# Patient Record
Sex: FEMALE | Race: WHITE | NOT HISPANIC OR LATINO | Employment: OTHER | URBAN - METROPOLITAN AREA
[De-identification: names, ages, dates, MRNs, and addresses within clinical notes are randomized per-mention and may not be internally consistent; named-entity substitution may affect disease eponyms.]

---

## 2017-01-04 RX ORDER — AMOXICILLIN 500 MG/1
CAPSULE ORAL
Qty: 30 CAPSULE | Refills: 0 | Status: SHIPPED | OUTPATIENT
Start: 2017-01-04 | End: 2017-05-01

## 2017-03-16 DIAGNOSIS — C50.912 MALIGNANT NEOPLASM OF LEFT BREAST: ICD-10-CM

## 2017-03-17 RX ORDER — LETROZOLE 2.5 MG/1
TABLET, FILM COATED ORAL
Qty: 90 TABLET | Refills: 0 | Status: SHIPPED | OUTPATIENT
Start: 2017-03-17 | End: 2017-05-01 | Stop reason: SDUPTHER

## 2017-03-28 ENCOUNTER — HOSPITAL ENCOUNTER (OUTPATIENT)
Dept: CARDIOLOGY | Facility: CLINIC | Age: 72
Discharge: HOME | End: 2017-03-28
Payer: MEDICARE

## 2017-03-28 ENCOUNTER — DOCUMENTATION ONLY (OUTPATIENT)
Dept: CARDIOLOGY | Facility: CLINIC | Age: 72
End: 2017-03-28

## 2017-03-28 ENCOUNTER — TELEPHONE (OUTPATIENT)
Dept: CARDIOLOGY | Facility: CLINIC | Age: 72
End: 2017-03-28

## 2017-03-28 DIAGNOSIS — Z95.2 S/P AVR: ICD-10-CM

## 2017-03-28 DIAGNOSIS — I10 ESSENTIAL HYPERTENSION: ICD-10-CM

## 2017-03-28 LAB
DIASTOLIC DYSFUNCTION: YES
ESTIMATED PA SYSTOLIC PRESSURE: 33
MITRAL VALVE REGURGITATION: ABNORMAL
RETIRED EF AND QEF - SEE NOTES: 65 (ref 55–65)
TRICUSPID VALVE REGURGITATION: ABNORMAL

## 2017-03-28 PROCEDURE — 93306 TTE W/DOPPLER COMPLETE: CPT | Mod: S$GLB,,, | Performed by: INTERNAL MEDICINE

## 2017-03-28 PROCEDURE — 96374 THER/PROPH/DIAG INJ IV PUSH: CPT | Mod: 59,S$GLB,, | Performed by: INTERNAL MEDICINE

## 2017-03-28 NOTE — TELEPHONE ENCOUNTER
Results given to patient.    Notes Recorded by Álvaro Palm MD on 3/28/2017 at 5:33 PM  Release-echo stable

## 2017-03-28 NOTE — PROGRESS NOTES
Patient identified by 2 identifiers. Denies previous reactions to blood transfusions and allergies reviewed.  Procedure explained & consent obtained.  22 g IV placed to Rt FA, flushed w/ 10cc NS pre & post contrast administration.  3cc Optison administered, echo images obtained.  Pt tolerated procedure well.  IV D/C'ed, preasure dsg applied.  Pt D/C'ed to home.

## 2017-03-29 ENCOUNTER — TELEPHONE (OUTPATIENT)
Dept: OPTOMETRY | Facility: CLINIC | Age: 72
End: 2017-03-29

## 2017-03-30 ENCOUNTER — TELEPHONE (OUTPATIENT)
Dept: HEMATOLOGY/ONCOLOGY | Facility: CLINIC | Age: 72
End: 2017-03-30

## 2017-04-03 DIAGNOSIS — M47.27 OSTEOARTHRITIS OF SPINE WITH RADICULOPATHY, LUMBOSACRAL REGION: ICD-10-CM

## 2017-04-03 DIAGNOSIS — M51.36 DEGENERATIVE LUMBAR DISC: Primary | ICD-10-CM

## 2017-04-03 NOTE — TELEPHONE ENCOUNTER
Patient sent in a refill request for her medication Gabapentin. Medication was called in to [facility]. Spoke to Dieudonen

## 2017-04-04 RX ORDER — GABAPENTIN 300 MG/1
300 CAPSULE ORAL 2 TIMES DAILY
Qty: 180 CAPSULE | Refills: 3 | Status: SHIPPED | OUTPATIENT
Start: 2017-04-04 | End: 2018-04-20 | Stop reason: SDUPTHER

## 2017-04-12 DIAGNOSIS — E11.9 DIABETES MELLITUS WITHOUT COMPLICATION: ICD-10-CM

## 2017-05-01 ENCOUNTER — TELEPHONE (OUTPATIENT)
Dept: SURGERY | Facility: CLINIC | Age: 72
End: 2017-05-01

## 2017-05-01 ENCOUNTER — OFFICE VISIT (OUTPATIENT)
Dept: HEMATOLOGY/ONCOLOGY | Facility: CLINIC | Age: 72
End: 2017-05-01
Payer: MEDICARE

## 2017-05-01 VITALS
RESPIRATION RATE: 16 BRPM | SYSTOLIC BLOOD PRESSURE: 132 MMHG | WEIGHT: 255.75 LBS | DIASTOLIC BLOOD PRESSURE: 60 MMHG | HEART RATE: 80 BPM | BODY MASS INDEX: 46.77 KG/M2 | TEMPERATURE: 99 F

## 2017-05-01 DIAGNOSIS — C50.912 MALIGNANT NEOPLASM OF LEFT FEMALE BREAST, UNSPECIFIED SITE OF BREAST: ICD-10-CM

## 2017-05-01 DIAGNOSIS — C50.912 CANCER OF LEFT BREAST, STAGE 1, ESTROGEN RECEPTOR POSITIVE: Primary | ICD-10-CM

## 2017-05-01 DIAGNOSIS — Z17.0 CANCER OF LEFT BREAST, STAGE 1, ESTROGEN RECEPTOR POSITIVE: Primary | ICD-10-CM

## 2017-05-01 PROCEDURE — 99213 OFFICE O/P EST LOW 20 MIN: CPT | Mod: S$GLB,,, | Performed by: INTERNAL MEDICINE

## 2017-05-01 PROCEDURE — 1157F ADVNC CARE PLAN IN RCRD: CPT | Mod: S$GLB,,, | Performed by: INTERNAL MEDICINE

## 2017-05-01 PROCEDURE — 3075F SYST BP GE 130 - 139MM HG: CPT | Mod: S$GLB,,, | Performed by: INTERNAL MEDICINE

## 2017-05-01 PROCEDURE — 1125F AMNT PAIN NOTED PAIN PRSNT: CPT | Mod: S$GLB,,, | Performed by: INTERNAL MEDICINE

## 2017-05-01 PROCEDURE — 99999 PR PBB SHADOW E&M-EST. PATIENT-LVL III: CPT | Mod: PBBFAC,,, | Performed by: INTERNAL MEDICINE

## 2017-05-01 PROCEDURE — 3078F DIAST BP <80 MM HG: CPT | Mod: S$GLB,,, | Performed by: INTERNAL MEDICINE

## 2017-05-01 PROCEDURE — 1159F MED LIST DOCD IN RCRD: CPT | Mod: S$GLB,,, | Performed by: INTERNAL MEDICINE

## 2017-05-01 PROCEDURE — 1160F RVW MEDS BY RX/DR IN RCRD: CPT | Mod: S$GLB,,, | Performed by: INTERNAL MEDICINE

## 2017-05-01 RX ORDER — LETROZOLE 2.5 MG/1
2.5 TABLET, FILM COATED ORAL DAILY
Qty: 90 TABLET | Refills: 3 | Status: SHIPPED | OUTPATIENT
Start: 2017-05-01 | End: 2017-11-22

## 2017-05-01 NOTE — PROGRESS NOTES
Subjective:       Patient ID: Alina Narayan is a 72 y.o. female.    Chief Complaint: Breast Cancer    HPI Ms Narayan is a 73 Y/O white female seen for followup of carcinoma of the left breast Stage IA ,T1bN0. She began letrozole hormonal therapy in November 2012.   Her major issue continues to be her bilateral knee pain.  She is using a walker.  She's also had significant stress with a brother and a son with prostate cancer or possible prostate cancer.      Mammogram in November was unremarkable.  History: On September 25 she underwent a screening mammogram which showed an asymmetric density in the left breast at 2:00 position. A followup mammogram on the 27th showed an oval mass in that area ultrasound showed a 6 mm solid mass. Core needle biopsy on October 1 showed invasive carcinoma ER 90% positive ID 80% positive and HER-2 negative. On October 29 she underwent lumpectomy and sentinel lymph node biopsy. That showed a 7 mm low-grade (1+2+1) infiltrating carcinoma. 3 sentinel lymph nodes were negative. 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  She has had mitral valve replacement.  She is followed in cardiology for that  Review of Systems   Constitutional: Negative for activity change (limited by knees), appetite change and fever.   Respiratory: Negative for cough and shortness of breath.    Cardiovascular: Negative for chest pain.   Gastrointestinal: Negative for abdominal pain, constipation and diarrhea.   Musculoskeletal: Positive for arthralgias.       Objective:      Physical Exam   Constitutional: She appears well-developed.   Obese   HENT:   Mouth/Throat: No oropharyngeal exudate.   Eyes: No scleral icterus.   Pulmonary/Chest: Effort normal and breath sounds normal. She has no wheezes. She has no rales. Right breast exhibits no mass, no nipple discharge and no skin change. Left breast exhibits no mass, no nipple discharge  and no skin change.       Abdominal: Soft. There is no tenderness.   Lymphadenopathy:     She has no cervical adenopathy.     She has no axillary adenopathy.        Right: No supraclavicular adenopathy present.        Left: No supraclavicular adenopathy present.   Psychiatric: She has a normal mood and affect. Her behavior is normal. Thought content normal.       Assessment:       1. Cancer of left breast, stage 1, estrogen receptor positive        Plan:         RTC 6 Months.    Refer for possible genetic testing (mother with breast cancer, father and brother with prostate CA)

## 2017-05-02 ENCOUNTER — TELEPHONE (OUTPATIENT)
Dept: ORTHOPEDICS | Facility: CLINIC | Age: 72
End: 2017-05-02

## 2017-05-02 NOTE — TELEPHONE ENCOUNTER
Ortho Telephone Triage Message  [id number]  Patient C/O: increased swelling/pain to left knee. Pt requesting appt with Dr. [facility], for possible aspiration, as Dr. [facility] has aspirated knee in past.   Triage Advice:Advised pt that KAREY Crawford LPN/Dr. [facility] will be notified and check with Dr. [facility].  Resolution: KAREY Crawford notified and will follow up with pt. Pt advised of same and will await follow up call per KAREY Crawford LPN.

## 2017-05-02 NOTE — TELEPHONE ENCOUNTER
Ortho Telephone Triage Follow Up [id number]  Follow up to pt to notify that Dr[facility] said that she may be seen by PA/NP for left knee pain/swelling and that same may aspirate, if indicated. Pt states understanding. Appt scheduled on 5/5/17 with GAIL Martinez at 1:45pm with arrival at 1:30pm. Pt advised to call [facility] for further questions/concerns. States understanding. Appt slip mailed.

## 2017-05-02 NOTE — TELEPHONE ENCOUNTER
----- Message from Oskar Arreola sent at 5/1/2017  1:02 PM CDT -----  Contact: self/home  Pt is having lt knee pain and would like to be seen today or any day this week.

## 2017-05-04 ENCOUNTER — OFFICE VISIT (OUTPATIENT)
Dept: SURGERY | Facility: CLINIC | Age: 72
End: 2017-05-04
Payer: MEDICARE

## 2017-05-04 DIAGNOSIS — Z80.3 FAMILY HISTORY OF BREAST CANCER: ICD-10-CM

## 2017-05-04 DIAGNOSIS — Z85.3 PERSONAL HISTORY OF BREAST CANCER: ICD-10-CM

## 2017-05-04 DIAGNOSIS — Z80.42 FAMILY HISTORY OF PROSTATE CANCER: ICD-10-CM

## 2017-05-04 PROCEDURE — 99213 OFFICE O/P EST LOW 20 MIN: CPT | Mod: S$GLB,,, | Performed by: NURSE PRACTITIONER

## 2017-05-04 PROCEDURE — 1159F MED LIST DOCD IN RCRD: CPT | Mod: S$GLB,,, | Performed by: NURSE PRACTITIONER

## 2017-05-04 PROCEDURE — 1157F ADVNC CARE PLAN IN RCRD: CPT | Mod: S$GLB,,, | Performed by: NURSE PRACTITIONER

## 2017-05-04 NOTE — PROGRESS NOTES
Patient presents today for genetic counseling, see pedigree for full family history which will be scanned into Epic. Personal history of breast cancer diagnosed at 67. This most likely represents a family clustering , however based on family history of prostate cancer and breast cancer, option for genetic testing discussed per current NCCN guidelines. Discussed cancer susceptibility genes, types of results, implications for self and family, and cost of  testing/insurance.  Buccal sample collected and sent to [facility] for [facility] , results expected in 2-3 weeks    Time in counseling 40 min, total time 40 min

## 2017-05-05 ENCOUNTER — OFFICE VISIT (OUTPATIENT)
Dept: ORTHOPEDICS | Facility: CLINIC | Age: 72
End: 2017-05-05
Payer: MEDICARE

## 2017-05-05 VITALS
HEIGHT: 62 IN | WEIGHT: 256.38 LBS | HEART RATE: 74 BPM | BODY MASS INDEX: 47.18 KG/M2 | RESPIRATION RATE: 16 BRPM | DIASTOLIC BLOOD PRESSURE: 75 MMHG | SYSTOLIC BLOOD PRESSURE: 132 MMHG

## 2017-05-05 DIAGNOSIS — M17.0 OSTEOARTHRITIS OF BOTH KNEES, UNSPECIFIED OSTEOARTHRITIS TYPE: Primary | ICD-10-CM

## 2017-05-05 PROCEDURE — 99999 PR PBB SHADOW E&M-EST. PATIENT-LVL IV: CPT | Mod: PBBFAC,,, | Performed by: PHYSICIAN ASSISTANT

## 2017-05-05 PROCEDURE — 99499 UNLISTED E&M SERVICE: CPT | Mod: S$GLB,,, | Performed by: PHYSICIAN ASSISTANT

## 2017-05-05 PROCEDURE — 1160F RVW MEDS BY RX/DR IN RCRD: CPT | Mod: S$GLB,,, | Performed by: PHYSICIAN ASSISTANT

## 2017-05-05 PROCEDURE — 99213 OFFICE O/P EST LOW 20 MIN: CPT | Mod: 25,S$GLB,, | Performed by: PHYSICIAN ASSISTANT

## 2017-05-05 PROCEDURE — 1157F ADVNC CARE PLAN IN RCRD: CPT | Mod: S$GLB,,, | Performed by: PHYSICIAN ASSISTANT

## 2017-05-05 PROCEDURE — 20610 DRAIN/INJ JOINT/BURSA W/O US: CPT | Mod: 50,S$GLB,, | Performed by: PHYSICIAN ASSISTANT

## 2017-05-05 PROCEDURE — 3075F SYST BP GE 130 - 139MM HG: CPT | Mod: S$GLB,,, | Performed by: PHYSICIAN ASSISTANT

## 2017-05-05 PROCEDURE — 1159F MED LIST DOCD IN RCRD: CPT | Mod: S$GLB,,, | Performed by: PHYSICIAN ASSISTANT

## 2017-05-05 PROCEDURE — 1125F AMNT PAIN NOTED PAIN PRSNT: CPT | Mod: S$GLB,,, | Performed by: PHYSICIAN ASSISTANT

## 2017-05-05 PROCEDURE — 3078F DIAST BP <80 MM HG: CPT | Mod: S$GLB,,, | Performed by: PHYSICIAN ASSISTANT

## 2017-05-05 RX ORDER — BETAMETHASONE SODIUM PHOSPHATE AND BETAMETHASONE ACETATE 3; 3 MG/ML; MG/ML
12 INJECTION, SUSPENSION INTRA-ARTICULAR; INTRALESIONAL; INTRAMUSCULAR; SOFT TISSUE
Status: COMPLETED | OUTPATIENT
Start: 2017-05-05 | End: 2017-05-05

## 2017-05-05 RX ADMIN — BETAMETHASONE SODIUM PHOSPHATE AND BETAMETHASONE ACETATE 12 MG: 3; 3 INJECTION, SUSPENSION INTRA-ARTICULAR; INTRALESIONAL; INTRAMUSCULAR; SOFT TISSUE at 02:05

## 2017-05-05 NOTE — PROGRESS NOTES
SUBJECTIVE:     Chief Complaint & History of Present Illness:  Alina Narayan is a Established patient 72 y.o. female who is seen here today with a complaint of    Chief Complaint   Patient presents with    Left Knee - Pain    .  Patient is long history of bilateral knee pain secondary to moderate to severe arthritis.  Unfortunate she is a poor surgical candidate secondary to overriding medical conditions was last seen treated in the clinic for this condition on 920/2016.  At which time she undergone cortisone injection of the left knee.  She's having a return of pain in bilateral knees at this point is requesting repeat injection therapy today  On a scale of 1-10, with 10 being worst pain imaginable, he rates this pain as 6 on good days and 10 on bad days.  she describes the pain as sore and grinding.    Review of patient's allergies indicates:   Allergen Reactions    Hydromorphone      Other reaction(s): sedation    Byetta [exenatide] Rash     Other reaction(s): Rash         Current Outpatient Prescriptions   Medication Sig Dispense Refill    alprazolam (XANAX) 0.25 MG tablet One-two daily as needed for anxiety 40 tablet 0    aspirin 81 MG Chew Take 81 mg by mouth once daily.      blood sugar diagnostic Strp TrueTest strips  - pt checks twice daily for uncontrolled glucoses 200 strip 3    carvedilol (COREG) 25 MG tablet .5-1 tablet oral twice daily or as directed. 180 tablet 3    CHOLECALCIFEROL, VITAMIN D3, (VITAMIN D3 ORAL) Take by mouth once daily.       gabapentin (NEURONTIN) 300 MG capsule Take 1 capsule (300 mg total) by mouth 2 (two) times daily. 180 capsule 3    hydrocodone-acetaminophen 5-325mg (NORCO) 5-325 mg per tablet One - two tabs every 6-8 hours as needed for pain 40 tablet 0    letrozole (FEMARA) 2.5 mg Tab Take 1 tablet (2.5 mg total) by mouth once daily. 90 tablet 3    levothyroxine (SYNTHROID) 50 MCG tablet Take 1 tablet (50 mcg total) by mouth every evening. 90 tablet 3     metformin (FORTAMET) 500 mg 24 hr tablet Increase dose to 2 pills twice daily or as directed 360 tablet 3    ondansetron (ZOFRAN-ODT) 4 MG TbDL One tablet as needed for nausea 20 tablet 0    pravastatin (PRAVACHOL) 20 MG tablet TAKE 1 TABLET ONE TIME DAILY 90 tablet 3     Current Facility-Administered Medications   Medication Dose Route Frequency Provider Last Rate Last Dose    EUFLEXXA injection Syrg 40 mg  40 mg Intra-articular Weekly Srinath Monge MD   40 mg at 11/23/15 1058       Past Medical History:   Diagnosis Date    Allergy     seasonal    Anxiety     Arthritis     Breast cancer 10/2012    left breast invasive ductal carcinoma    Colon polyp     Diabetes mellitus     Type 2    Diverticular disease     Diverticulitis 2009    Hyperlipidemia     Hypertension     Morbid obesity     MITCHELL (obstructive sleep apnea)     Stenosis     Stenosis and insufficiency of lacrimal passages     Thyroid disease        Past Surgical History:   Procedure Laterality Date    BREAST BIOPSY  10/1/2012    left breast- invasive ductal carcinoma    BREAST SURGERY      CARDIAC VALVE REPLACEMENT  12/2013    CARPAL TUNNEL RELEASE      Left    DILATION AND CURETTAGE OF UTERUS  1999    Endometrial polyps    ENDOMETRIAL ABLATION  1999    Enodmetrial polyps    EYE SURGERY      left lumpectomy  2012    SHOULDER SURGERY      SKIN BIOPSY         Vital Signs (Most Recent)  Vitals:    05/05/17 1352   BP: 132/75   Pulse: 74   Resp: 16           Review of Systems:  ROS:  Constitutional: no fever or chills  Eyes: no visual changes  ENT: no nasal congestion or sore throat  Respiratory: no cough or shortness of breath  Cardiovascular: no chest pain or palpitations, Status post AVR  Gastrointestinal: no nausea or vomiting, tolerating diet  Genitourinary: no hematuria or dysuria  Integument/Breast: no rash or pruritis, Positive history of  breast cancer  Hematologic/Lymphatic: no easy bruising or lymphadenopathy,  "Hypothyroidism, hyperlipidemia  Musculoskeletal: no arthralgias or myalgias  Neurological: no seizures or tremors  Behavioral/Psych: no auditory or visual hallucinations  Endocrine: Positive for type 2 diabetes poorly controlled                OBJECTIVE:     PHYSICAL EXAM:  Height: 5' 2" (157.5 cm) Weight: 116.3 kg (256 lb 6.3 oz), General Appearance: Well nourished, well developed, in no acute distress.  Neurological: Mood & affect are normal.  bilateral Knee Exam:  Knee Range of Motion:0-100 degrees flexion   Effusion:not significant  Condition of skin:intact  Location of tenderness: Globally   Strength:limited by pain and 5 of 5  Stability:  stable to testing  Varus /Valgus stress:  normal  Anabell:   negative/negative  Hip Examination:  normal    RADIOGRAPHS:  X-rays from previous visit reviewed by me today demonstrate moderate to severe arthritic changes throughout both knees with significant medial joint space narrowing osteophytic spurring and sclerotic changes    ASSESSMENT/PLAN:     Plan: We discussed with the patient at length all the different treatment options available for arthrosis of the knee including anti-inflammatories, acetaminophen, rest, ice, knee strengthening exercise, occasional cortisone injections for temporary relief, Viscosupplimentation injections, arthroscopic debridement osteotomy, and finally knee arthroplasty.   Proceed with bilateral cortisone injections today     The injection site was identified and the skin was prepared with a betadine solution. The  bilateral knee was injected with 1 ml of Celestone and 5 ml Lidocaine under sterile technique. Alina Narayan tolerated the procedure well, she was advised to rest the knee today, ice and elevation. she did receive immediate relief of the pain in and about his knee she was told this would be short lived and is secondary to the lidocaine. she may have an increase in his discomfort tonight followed by steady improvement over the next " several days. I may take 1-3 weeks following the injection to get the full benefit of the medication.  I will see her back in 4-6 months  . Sooner if he has any problems or concerns.    Alina LEXI Narayan was advised to monitor her blood sugars closely over the next several days. The steroid may cause a rise in them. If her glucose levels rise to a point she is uncomfortable or he is unable to control them is is to contact his PCP or go immediately to the emergency department.

## 2017-05-18 ENCOUNTER — TELEPHONE (OUTPATIENT)
Dept: INTERNAL MEDICINE | Facility: CLINIC | Age: 72
End: 2017-05-18

## 2017-05-24 ENCOUNTER — LAB VISIT (OUTPATIENT)
Dept: LAB | Facility: HOSPITAL | Age: 72
End: 2017-05-24
Attending: INTERNAL MEDICINE
Payer: MEDICARE

## 2017-05-24 ENCOUNTER — CLINICAL SUPPORT (OUTPATIENT)
Dept: DIABETES | Facility: CLINIC | Age: 72
End: 2017-05-24
Payer: MEDICARE

## 2017-05-24 DIAGNOSIS — R73.01 IMPAIRED FASTING BLOOD SUGAR: ICD-10-CM

## 2017-05-24 DIAGNOSIS — E66.01 MORBID OBESITY, UNSPECIFIED OBESITY TYPE: ICD-10-CM

## 2017-05-24 DIAGNOSIS — E11.9 DIABETES MELLITUS WITHOUT COMPLICATION: ICD-10-CM

## 2017-05-24 DIAGNOSIS — E78.2 MIXED HYPERLIPIDEMIA: ICD-10-CM

## 2017-05-24 DIAGNOSIS — I10 ESSENTIAL HYPERTENSION: ICD-10-CM

## 2017-05-24 LAB
ALBUMIN SERPL BCP-MCNC: 3.8 G/DL
ALP SERPL-CCNC: 121 U/L
ALT SERPL W/O P-5'-P-CCNC: 21 U/L
ANION GAP SERPL CALC-SCNC: 8 MMOL/L
AST SERPL-CCNC: 17 U/L
BILIRUB SERPL-MCNC: 0.5 MG/DL
BUN SERPL-MCNC: 25 MG/DL
CALCIUM SERPL-MCNC: 10.3 MG/DL
CHLORIDE SERPL-SCNC: 103 MMOL/L
CO2 SERPL-SCNC: 28 MMOL/L
CREAT SERPL-MCNC: 1 MG/DL
EST. GFR  (AFRICAN AMERICAN): >60 ML/MIN/1.73 M^2
EST. GFR  (NON AFRICAN AMERICAN): 56.4 ML/MIN/1.73 M^2
GLUCOSE SERPL-MCNC: 155 MG/DL
POTASSIUM SERPL-SCNC: 4.7 MMOL/L
PROT SERPL-MCNC: 7.2 G/DL
SODIUM SERPL-SCNC: 139 MMOL/L

## 2017-05-24 PROCEDURE — 83036 HEMOGLOBIN GLYCOSYLATED A1C: CPT

## 2017-05-24 PROCEDURE — 80053 COMPREHEN METABOLIC PANEL: CPT

## 2017-05-24 PROCEDURE — 36415 COLL VENOUS BLD VENIPUNCTURE: CPT

## 2017-05-24 PROCEDURE — G0108 DIAB MANAGE TRN  PER INDIV: HCPCS | Mod: S$GLB,,, | Performed by: DIETITIAN, REGISTERED

## 2017-05-24 NOTE — LETTER
May 24, 2017      Aarti Dunn MD  [address]  [facility] [address]         Patient: Alina Narayan   MR Number: [id number]   YOB: 1945   Date of Visit: 5/24/2017       Dear Dr. Dunn:    Thank you for referring Alina for diabetes self-management education and support. We discussed empowerment program to see endocrine NP at length. She verbalized prefers to check new A1C and if is still elevated, agrees to appt with empowerment. She is getting an A1C drawn today, and I will call her tomorrow with results and for scheduling next appt. Below is a summary of her clinical outcomes and goal progress.    Patient Outcomes:    A1c Status:   Lab Results   Component Value Date    HGBA1C 7.5 (H) 07/21/2016    HGBA1C 7.2 (H) 04/07/2016     Goals  Healthy Coping: Set (Work on strategies for reducing stress. )  Start Date: 05/24/17  Target Date: 08/24/17         Follow up:   · Alina to follow diabetes support plan indicated above  · Alina to attend medical appointments as scheduled  · Alina to update you on her DM education progress as needed    Pt willing to schedule appt with empowerment if A1C remains elevated after labs today.      If you have questions, please do not hesitate to call me. I look forward to providing additional education and support as needed.    Sincerely,    Leticia Lyn RD

## 2017-05-24 NOTE — PROGRESS NOTES
Diabetes Education  Author: Leticia Lyn RD  Date: 5/24/2017    Diabetes Education Visit  Diabetes Education Record Assessment/Progress: Initial    Diabetes Type  Diabetes Type : Type II    Diabetes History  Diabetes Diagnosis: >10 years    Nutrition  Meal Planning: 3 meals per day, water, diet drinks, artificial sweeteners  Meal Plan 24 Hour Recall - Breakfast: coffee with splenda, 1 toast, egg whites  Meal Plan 24 Hour Recall - Lunch: chicken breast with spinach and cheese, 2 small baby potatoes, 1 small corn on the cob  Meal Plan 24 Hour Recall - Dinner: 1/2 sandwich and 1/2 apple  Meal Plan 24 Hour Recall - Snack: diet cranberry juice    Monitoring   Self Monitoring : SMBG 1-3 times daily - reports 140s-160s in am, has been testing more frequently since steroid injection - BG elevated in 160s-200s after injection   Blood Glucose Logs: No    Exercise   Exercise Type: swimming (water aerobics)  Frequency:  (2-3 times weekly)  Duration: > 1 hour    Current Diabetes Treatment   Current Treatment: Oral Medication (Metformin 1000mg BID - taking 1000mg once daily - tried to increase but had diarrhea and went back to once daily)    Social History  Preferred Learning Method: Face to Face, Reading Materials  Primary Support: Self, Spouse  Occupation: retired  Smoking Status: Never a Smoker                             Barriers to Change  Barriers to Change: None  Learning Challenges : None    Readiness to Learn   Readiness to Learn : Acceptance    Cultural Influences  Cultural Influences: No    Diabetes Education Assessment/Progress    Acute Complications (preventing, detecting, and treating acute complications): Discussion, Individual Session, Written Materials Provided, Competent (verbalizes/demonstrates), Instructed (Reviewed s/s of hyperglycemia and s/s and appropriate treatment of hypoglycemia. Discussed not likely to have hypo with Metformin only. )    Chronic Complications (preventing, detecting, and treating  chronic complications): Discussion, Individual Session, Written Materials Provided, Competent (verbalizes/demonstrates), Instructed (Gets eye exam outside of [facility]. Verbalized will call and have them send report. Reviewed care schedule and home foot care. )    Diabetes Disease Process (diabetes disease process and treatment options): Discussion, Individual Session, Written Materials Provided, Competent (verbalizes/demonstrates), Instructed    Nutrition (Incorporating nutritional management into one's lifestyle): Discussion, Individual Session, Written Materials Provided, Competent (verbalizes/demonstrates), Instructed (Already controlling portion sizes appropriately, eating 3 meals daily, and avoiding CHO snacks and avoiding CHO beverages based on verbal recall. Praised efforts in controlling eating pattern and encouraged continuing. Shows very good understanding of sources of CHO, serving sizes, balancing meals, and label reading. Does not eat sweets often and when does strictly limits amount. )    Physical Activity (incorporating physical activity into one's lifestyle): Discussion, Individual Session, Written Materials Provided, Competent (verbalizes/demonstrates), Instructed (Already doing water exercises 2-3 times weekly. Applauded efforts and encouraged continuing. Discussed benefits of exercise. )    Medications (states correct name, dose, onset, peak, duration, side effects & timing of meds): Discussion, Individual Session, Written Materials Provided, Competent (verbalizes/demonstrates), Instructed (Reviewed timing, dosage and MOA of Metformin. Discussed titrating up to 1000mg BID per Rx - adding 500mg with dinner x1 week then increase to 1000mg with dinner and explained GI side effect usually improve over time. If does not improve, contact provider.    Gets steroid injections in knees. Discussed effect on BG. Pt also reports noticing elevated BG with knee pain.)    Monitoring (monitoring blood  glucose/other parameters & using results): Discussion, Individual Session, Written Materials Provided, Competent (verbalizes/demonstrates), Instructed (Reviewed SMBG at least once daily, alternating times, goal BG ranges, and keeping log. )    Goal Setting and Problem Solving (verbalizes behavior change strategies & sets realistic goals): Discussion, Individual Session    Behavior Change (developing personal strategies to health & behavior change): Discussion, Individual Session    Psychosocial Issues (developing personal srategies to address psychosocial concerns): Discussion, Individual Session (Experiencing stress associated with son and daughter-in-law . Provided emotional support and discussed effects of stress on BG. Encouraged strategies for reducing stress.)    Goals  Healthy Coping: Set (Work on strategies for reducing stress. )  Start Date: 05/24/17  Target Date: 08/24/17         Diabetes Care Plan/Intervention  Education Plan/Intervention: Other (Last A1C from a year ago. Getting labs drawn today. Discussed empowerment program. Pt agreed to appt if A1C is still elevated. )    Diabetes Meal Plan  Restrictions: Restricted Carbohydrate, Low Sodium, Low Fat  Carbohydrate Per Meal: 30-45g  Carbohydrate Per Snack : 15-20g    Education Units of Time   Time Spent: 60 min      Health Maintenance Topics with due status: Not Due       Topic Last Completion Date    Influenza Vaccine 10/06/2014    TETANUS VACCINE 02/26/2015    Colonoscopy 08/28/2015    Lipid Panel 07/21/2016    Urine Microalbumin 07/21/2016    Mammogram 11/29/2016     Health Maintenance Due   Topic Date Due    Foot Exam  04/08/1955    Zoster Vaccine  04/08/2005    DEXA SCAN  02/22/2016    Pneumococcal (65+) (2 of 2 - PPSV23) 03/05/2016    Eye Exam  09/14/2016    Hemoglobin A1c  01/21/2017

## 2017-05-25 LAB
ESTIMATED AVG GLUCOSE: 183 MG/DL
HBA1C MFR BLD HPLC: 8 %

## 2017-05-26 ENCOUNTER — TELEPHONE (OUTPATIENT)
Dept: INTERNAL MEDICINE | Facility: CLINIC | Age: 72
End: 2017-05-26

## 2017-05-26 NOTE — TELEPHONE ENCOUNTER
----- Message from Geneva Esparza sent at 5/26/2017  1:36 PM CDT -----  Contact: [facility] Jessica/[phone]       fax/[phone]  Cheryl said that she is calling in regards to needing to get an order or the form that she sent to the office on 5/3/2017 for the doctor to sign for pt to get her Cpap supplies she stated that it it has been two weeks she has been trying to get pt's supplies she stated that she the form over again today and all the doctor needs to do is sign the form. Please call and advise          Thank you

## 2017-05-26 NOTE — TELEPHONE ENCOUNTER
Spoke with [facility]. Please be on the look out for pt supply order that was faxed to the office. Pt also has a appointment with PCP Monday.    Thanks Tione:)

## 2017-05-29 ENCOUNTER — OFFICE VISIT (OUTPATIENT)
Dept: INTERNAL MEDICINE | Facility: CLINIC | Age: 72
End: 2017-05-29
Payer: MEDICARE

## 2017-05-29 VITALS
SYSTOLIC BLOOD PRESSURE: 126 MMHG | DIASTOLIC BLOOD PRESSURE: 62 MMHG | WEIGHT: 255 LBS | BODY MASS INDEX: 46.64 KG/M2 | HEART RATE: 84 BPM

## 2017-05-29 DIAGNOSIS — E03.4 HYPOTHYROIDISM DUE TO ACQUIRED ATROPHY OF THYROID: ICD-10-CM

## 2017-05-29 DIAGNOSIS — I10 ESSENTIAL HYPERTENSION: ICD-10-CM

## 2017-05-29 DIAGNOSIS — G47.30 SLEEP APNEA, UNSPECIFIED TYPE: Primary | ICD-10-CM

## 2017-05-29 DIAGNOSIS — Z95.2 S/P AVR: ICD-10-CM

## 2017-05-29 DIAGNOSIS — E55.9 MILD VITAMIN D DEFICIENCY: ICD-10-CM

## 2017-05-29 DIAGNOSIS — M81.0 AGE-RELATED OSTEOPOROSIS WITHOUT CURRENT PATHOLOGICAL FRACTURE: ICD-10-CM

## 2017-05-29 DIAGNOSIS — E78.2 MIXED HYPERLIPIDEMIA: ICD-10-CM

## 2017-05-29 DIAGNOSIS — Z13.820 SCREENING FOR OSTEOPOROSIS: ICD-10-CM

## 2017-05-29 PROCEDURE — 99999 PR PBB SHADOW E&M-EST. PATIENT-LVL IV: CPT | Mod: PBBFAC,,, | Performed by: INTERNAL MEDICINE

## 2017-05-29 PROCEDURE — 99499 UNLISTED E&M SERVICE: CPT | Mod: S$GLB,,, | Performed by: INTERNAL MEDICINE

## 2017-05-29 PROCEDURE — 1159F MED LIST DOCD IN RCRD: CPT | Mod: S$GLB,,, | Performed by: INTERNAL MEDICINE

## 2017-05-29 PROCEDURE — 3045F PR MOST RECENT HEMOGLOBIN A1C LEVEL 7.0-9.0%: CPT | Mod: S$GLB,,, | Performed by: INTERNAL MEDICINE

## 2017-05-29 PROCEDURE — 1157F ADVNC CARE PLAN IN RCRD: CPT | Mod: S$GLB,,, | Performed by: INTERNAL MEDICINE

## 2017-05-29 PROCEDURE — 99214 OFFICE O/P EST MOD 30 MIN: CPT | Mod: S$GLB,,, | Performed by: INTERNAL MEDICINE

## 2017-05-29 RX ORDER — METFORMIN HYDROCHLORIDE EXTENDED-RELEASE TABLETS 500 MG/1
TABLET, FILM COATED, EXTENDED RELEASE ORAL
Qty: 180 TABLET | Refills: 3 | Status: SHIPPED | OUTPATIENT
Start: 2017-05-29 | End: 2017-06-16

## 2017-05-29 RX ORDER — HYDROCODONE BITARTRATE AND ACETAMINOPHEN 5; 325 MG/1; MG/1
TABLET ORAL
Qty: 40 TABLET | Refills: 0 | Status: SHIPPED | OUTPATIENT
Start: 2017-05-29 | End: 2018-03-28 | Stop reason: SDUPTHER

## 2017-05-29 NOTE — PROGRESS NOTES
Subjective:       Patient ID: Alina Narayan is a 72 y.o. female.    Chief Complaint: Follow-up    HPIPt with significant knee pain despite steroid shots a few weeks ago.  Sugars are higher.  No CP or SOB.  Needs help to lose weight.  Review of Systems   Constitutional: Positive for fatigue.   Respiratory: Negative for shortness of breath (PND or orthopnea).    Cardiovascular: Negative for chest pain.   Gastrointestinal: Negative for abdominal pain, diarrhea, nausea and vomiting.   Endocrine: Positive for polyphagia. Negative for polydipsia and polyuria.   Genitourinary: Negative for dysuria.   Neurological: Positive for weakness. Negative for seizures, syncope and headaches.   Psychiatric/Behavioral: The patient is nervous/anxious.        Objective:      Physical Exam   Constitutional: She is oriented to person, place, and time. She appears well-developed and well-nourished. No distress.   HENT:   Head: Normocephalic.   Mouth/Throat: Oropharynx is clear and moist.   Neck: Neck supple. No JVD present. No thyromegaly present.   Cardiovascular: Normal rate, regular rhythm, normal heart sounds and intact distal pulses.  Exam reveals no gallop and no friction rub.    No murmur heard.  Pulmonary/Chest: Effort normal and breath sounds normal. She has no wheezes. She has no rales.   Abdominal: Soft. Bowel sounds are normal. She exhibits no distension and no mass. There is no tenderness. There is no rebound and no guarding.   Musculoskeletal: She exhibits no edema.   Lymphadenopathy:     She has no cervical adenopathy.   Neurological: She is alert and oriented to person, place, and time.   Skin: Skin is warm and dry.   Psychiatric: She has a normal mood and affect. Her behavior is normal. Judgment and thought content normal.       Assessment:       1. Sleep apnea, unspecified type    2. Essential hypertension    3. Uncontrolled type 2 diabetes mellitus with other specified complication    4. Hypothyroidism due to acquired  atrophy of thyroid    5. Mixed hyperlipidemia    6. S/P AVR    7. Mild vitamin D deficiency    8. Screening for osteoporosis    9. Age-related osteoporosis without current pathological fracture         Plan:   Sleep apnea, unspecified type  -     CPAP/BIPAP SUPPLIES    Essential hypertension  -     CBC auto differential; Future; Expected date: 05/29/2017  -     Comprehensive metabolic panel; Future; Expected date: 05/29/2017  Controlled - continue current meds    Uncontrolled type 2 diabetes mellitus with other specified complication  -     Hemoglobin A1c; Future; Expected date: 05/29/2017  -     metformin (FORTAMET) 500 mg 24 hr tablet; Increase dose to 2 pills daily  Dispense: 180 tablet; Refill: 3  -     Ambulatory consult to Bariatric Medicine  Add Januvia  Hypothyroidism due to acquired atrophy of thyroid  -     TSH; Future; Expected date: 05/29/2017    Mixed hyperlipidemia  -     Lipid panel; Future; Expected date: 05/29/2017    S/P AVR  stable    Mild vitamin D deficiency  -     Vitamin D; Future; Expected date: 05/29/2017    Screening for osteoporosis  -     DXA Bone Density Spine And Hip; Future; Expected date: 05/29/2017    Age-related osteoporosis without current pathological fracture   -     DXA Bone Density Spine And Hip; Future; Expected date: 05/29/2017    Other orders  -     SITagliptan (JANUVIA) 100 MG Tab; Take 1 tablet (100 mg total) by mouth once daily.  Dispense: 30 tablet; Refill: 1  -     Discontinue: blood sugar diagnostic Strp; TrueTest strips  - pt checks twice daily for uncontrolled glucoses  Dispense: 200 strip; Refill: 3  -     blood sugar diagnostic Strp; TrueTest strips  - pt checks twice daily for uncontrolled glucoses E11.65  Dispense: 200 strip; Refill: 3  -     hydrocodone-acetaminophen 5-325mg (NORCO) 5-325 mg per tablet; One - two tabs every 6-8 hours as needed for pain  Dispense: 40 tablet; Refill: 0

## 2017-05-30 ENCOUNTER — DOCUMENTATION ONLY (OUTPATIENT)
Dept: SURGERY | Facility: CLINIC | Age: 72
End: 2017-05-30

## 2017-05-30 NOTE — PROGRESS NOTES
Results received from [facility], negative [facility] , she was phoned and results discussed

## 2017-06-06 ENCOUNTER — HOSPITAL ENCOUNTER (OUTPATIENT)
Dept: RADIOLOGY | Facility: CLINIC | Age: 72
Discharge: HOME | End: 2017-06-06
Attending: INTERNAL MEDICINE
Payer: MEDICARE

## 2017-06-06 DIAGNOSIS — Z13.820 SCREENING FOR OSTEOPOROSIS: ICD-10-CM

## 2017-06-06 DIAGNOSIS — M81.0 AGE-RELATED OSTEOPOROSIS WITHOUT CURRENT PATHOLOGICAL FRACTURE: ICD-10-CM

## 2017-06-06 PROCEDURE — 77080 DXA BONE DENSITY AXIAL: CPT | Mod: TC

## 2017-06-06 PROCEDURE — 77080 DXA BONE DENSITY AXIAL: CPT | Mod: 26,,, | Performed by: INTERNAL MEDICINE

## 2017-06-12 ENCOUNTER — PATIENT MESSAGE (OUTPATIENT)
Dept: INTERNAL MEDICINE | Facility: CLINIC | Age: 72
End: 2017-06-12

## 2017-06-12 DIAGNOSIS — E11.9 DIABETES MELLITUS WITHOUT COMPLICATION: ICD-10-CM

## 2017-06-16 ENCOUNTER — PATIENT MESSAGE (OUTPATIENT)
Dept: INTERNAL MEDICINE | Facility: CLINIC | Age: 72
End: 2017-06-16

## 2017-06-16 RX ORDER — METFORMIN HYDROCHLORIDE 500 MG/1
500 TABLET ORAL 2 TIMES DAILY WITH MEALS
Qty: 180 TABLET | Refills: 3 | Status: SHIPPED | OUTPATIENT
Start: 2017-06-16 | End: 2018-03-28 | Stop reason: SDUPTHER

## 2017-06-16 RX ORDER — METFORMIN HYDROCHLORIDE 500 MG/1
TABLET ORAL
Qty: 180 TABLET | Refills: 3 | Status: SHIPPED | OUTPATIENT
Start: 2017-06-16 | End: 2017-09-11 | Stop reason: SDUPTHER

## 2017-06-22 ENCOUNTER — TELEPHONE (OUTPATIENT)
Dept: DIABETES | Facility: CLINIC | Age: 72
End: 2017-06-22

## 2017-06-22 NOTE — TELEPHONE ENCOUNTER
----- Message from Radha Kahn sent at 6/22/2017  8:10 AM CDT -----  Contact: Patient  Patient is requesting a call back in regards to rescheduling her 6/22/2017 appointment.    Please call [phone].

## 2017-06-22 NOTE — TELEPHONE ENCOUNTER
Called pt back regarding rescheduling empowerment appt. Pt recently started on Januvia and is scheduled to see medical weight loss specialist next month. Ms. Narayan stated she wants to wait and see what her next A1C is before scheduling with endocrine NP. Provided contact information to call back when ready.

## 2017-06-25 ENCOUNTER — NURSE TRIAGE (OUTPATIENT)
Dept: ADMINISTRATIVE | Facility: CLINIC | Age: 72
End: 2017-06-25

## 2017-06-25 ENCOUNTER — OFFICE VISIT (OUTPATIENT)
Dept: INTERNAL MEDICINE | Facility: CLINIC | Age: 72
End: 2017-06-25
Payer: MEDICARE

## 2017-06-25 VITALS
WEIGHT: 254.19 LBS | SYSTOLIC BLOOD PRESSURE: 120 MMHG | TEMPERATURE: 99 F | BODY MASS INDEX: 46.78 KG/M2 | HEIGHT: 62 IN | DIASTOLIC BLOOD PRESSURE: 68 MMHG | HEART RATE: 74 BPM

## 2017-06-25 DIAGNOSIS — N39.0 URINARY TRACT INFECTION WITHOUT HEMATURIA, SITE UNSPECIFIED: ICD-10-CM

## 2017-06-25 DIAGNOSIS — K57.32 DIVERTICULITIS OF LARGE INTESTINE WITHOUT PERFORATION OR ABSCESS WITHOUT BLEEDING: ICD-10-CM

## 2017-06-25 DIAGNOSIS — R10.9 LEFT SIDED ABDOMINAL PAIN: Primary | ICD-10-CM

## 2017-06-25 DIAGNOSIS — E66.01 MORBID OBESITY, UNSPECIFIED OBESITY TYPE: ICD-10-CM

## 2017-06-25 LAB
BILIRUB SERPL-MCNC: NORMAL MG/DL
BLOOD URINE, POC: NORMAL
COLOR, POC UA: YELLOW
GLUCOSE UR QL STRIP: NORMAL
KETONES UR QL STRIP: NORMAL
LEUKOCYTE ESTERASE URINE, POC: NORMAL
NITRITE, POC UA: NORMAL
PH, POC UA: 5
PROTEIN, POC: NORMAL
SPECIFIC GRAVITY, POC UA: 1.02
UROBILINOGEN, POC UA: 1

## 2017-06-25 PROCEDURE — 81002 URINALYSIS NONAUTO W/O SCOPE: CPT | Mod: S$GLB,,, | Performed by: INTERNAL MEDICINE

## 2017-06-25 PROCEDURE — 99999 PR PBB SHADOW E&M-EST. PATIENT-LVL IV: CPT | Mod: PBBFAC,,, | Performed by: INTERNAL MEDICINE

## 2017-06-25 PROCEDURE — 1159F MED LIST DOCD IN RCRD: CPT | Mod: S$GLB,,, | Performed by: INTERNAL MEDICINE

## 2017-06-25 PROCEDURE — 1125F AMNT PAIN NOTED PAIN PRSNT: CPT | Mod: S$GLB,,, | Performed by: INTERNAL MEDICINE

## 2017-06-25 PROCEDURE — 87086 URINE CULTURE/COLONY COUNT: CPT

## 2017-06-25 PROCEDURE — 99499 UNLISTED E&M SERVICE: CPT | Mod: S$GLB,,, | Performed by: INTERNAL MEDICINE

## 2017-06-25 PROCEDURE — 99213 OFFICE O/P EST LOW 20 MIN: CPT | Mod: 25,S$GLB,, | Performed by: INTERNAL MEDICINE

## 2017-06-25 PROCEDURE — 1157F ADVNC CARE PLAN IN RCRD: CPT | Mod: S$GLB,,, | Performed by: INTERNAL MEDICINE

## 2017-06-25 RX ORDER — CIPROFLOXACIN 500 MG/1
500 TABLET ORAL 2 TIMES DAILY
Qty: 20 TABLET | Refills: 0 | Status: SHIPPED | OUTPATIENT
Start: 2017-06-25 | End: 2017-07-05

## 2017-06-25 NOTE — TELEPHONE ENCOUNTER
Reason for Disposition   [1] MILD-MODERATE pain AND [2] constant AND [3] present > 2 hours    Protocols used:  ABDOMINAL PAIN - FEMALE-A-    Appointment scheduled with [facility].

## 2017-06-25 NOTE — PROGRESS NOTES
Subjective:       Patient ID: Alina Narayan is a 72 y.o. female.    Chief Complaint: Abdominal Pain (for 3 days)    Abdominal Pain   This is a new problem. The current episode started in the past 7 days. The onset quality is gradual. The problem occurs constantly. The problem has been gradually worsening. The pain is located in the LLQ, LUQ and suprapubic region. The pain is at a severity of 3/10. The pain is mild. The quality of the pain is aching. The abdominal pain does not radiate. Associated symptoms include frequency. Pertinent negatives include no arthralgias, diarrhea, dysuria, fever, headaches, hematochezia, hematuria, melena, nausea or vomiting. Associated symptoms comments: Softer stools. Nothing aggravates the pain. The pain is relieved by nothing. The treatment provided no relief.     Review of Systems   Constitutional: Negative for activity change, chills, fatigue and fever.   HENT: Negative for congestion, ear pain, nosebleeds, postnasal drip, sinus pressure and sore throat.    Eyes: Negative.  Negative for visual disturbance.   Respiratory: Negative for cough, chest tightness, shortness of breath and wheezing.    Cardiovascular: Negative for chest pain.   Gastrointestinal: Positive for abdominal pain. Negative for diarrhea, hematochezia, melena, nausea and vomiting.   Genitourinary: Positive for frequency. Negative for difficulty urinating, dysuria, hematuria and urgency.   Musculoskeletal: Negative for arthralgias and neck stiffness.   Skin: Negative for rash.   Neurological: Negative for dizziness, weakness and headaches.   Psychiatric/Behavioral: Negative for sleep disturbance. The patient is not nervous/anxious.        Objective:      Physical Exam   Constitutional: She is oriented to person, place, and time. She appears well-developed and well-nourished.  Non-toxic appearance. No distress.   HENT:   Head: Normocephalic and atraumatic.   Right Ear: Tympanic membrane, external ear and ear canal  normal.   Left Ear: Tympanic membrane, external ear and ear canal normal.   Eyes: EOM are normal. Pupils are equal, round, and reactive to light. No scleral icterus.   Neck: Normal range of motion. Neck supple. No thyromegaly present.   Cardiovascular: Normal rate, regular rhythm and normal heart sounds.    Pulmonary/Chest: Effort normal and breath sounds normal.   Abdominal: Soft. Bowel sounds are normal. She exhibits no mass. There is tenderness in the left upper quadrant and left lower quadrant. There is no rigidity, no rebound, no guarding and no CVA tenderness.   Musculoskeletal: Normal range of motion.   Lymphadenopathy:     She has no cervical adenopathy.   Neurological: She is alert and oriented to person, place, and time. She has normal reflexes. She displays normal reflexes. No cranial nerve deficit. She exhibits normal muscle tone. Coordination normal.   Skin: Skin is warm and dry.   Psychiatric: She has a normal mood and affect. Her behavior is normal.       Assessment:       1. Left sided abdominal pain    2. Morbid obesity, unspecified obesity type    3. Urinary tract infection without hematuria, site unspecified    4. Diverticulitis of large intestine without perforation or abscess without bleeding        Plan:   Alina was seen today for abdominal pain.    Diagnoses and all orders for this visit:    Left sided abdominal pain  -     POCT URINE DIPSTICK WITHOUT MICROSCOPE  -     Urine culture    Morbid obesity, unspecified obesity type    Urinary tract infection without hematuria, site unspecified    Diverticulitis of large intestine without perforation or abscess without bleeding    Other orders  -     ciprofloxacin HCl (CIPRO) 500 MG tablet; Take 1 tablet (500 mg total) by mouth 2 (two) times daily.

## 2017-06-26 ENCOUNTER — HOSPITAL ENCOUNTER (EMERGENCY)
Facility: HOSPITAL | Age: 72
Discharge: HOME | End: 2017-06-26
Attending: FAMILY MEDICINE | Admitting: FAMILY MEDICINE
Payer: MEDICARE

## 2017-06-26 ENCOUNTER — PATIENT MESSAGE (OUTPATIENT)
Dept: RESEARCH | Facility: HOSPITAL | Age: 72
End: 2017-06-26

## 2017-06-26 ENCOUNTER — NURSE TRIAGE (OUTPATIENT)
Dept: ADMINISTRATIVE | Facility: CLINIC | Age: 72
End: 2017-06-26

## 2017-06-26 VITALS
TEMPERATURE: 98 F | HEART RATE: 85 BPM | HEIGHT: 62 IN | BODY MASS INDEX: 46.74 KG/M2 | SYSTOLIC BLOOD PRESSURE: 149 MMHG | RESPIRATION RATE: 20 BRPM | OXYGEN SATURATION: 98 % | WEIGHT: 254 LBS | DIASTOLIC BLOOD PRESSURE: 70 MMHG

## 2017-06-26 DIAGNOSIS — R10.9 ABDOMINAL PAIN: ICD-10-CM

## 2017-06-26 DIAGNOSIS — K57.32 DIVERTICULITIS OF LARGE INTESTINE WITHOUT PERFORATION OR ABSCESS WITHOUT BLEEDING: Primary | ICD-10-CM

## 2017-06-26 LAB
ALBUMIN SERPL BCP-MCNC: 4 G/DL
ALBUMIN SERPL BCP-MCNC: 4 G/DL
ALP SERPL-CCNC: 108 U/L
ALP SERPL-CCNC: 108 U/L
ALT SERPL W/O P-5'-P-CCNC: 21 U/L
ALT SERPL W/O P-5'-P-CCNC: 21 U/L
ANION GAP SERPL CALC-SCNC: 12 MMOL/L
ANION GAP SERPL CALC-SCNC: 12 MMOL/L
AST SERPL-CCNC: 22 U/L
AST SERPL-CCNC: 22 U/L
BACTERIA #/AREA URNS AUTO: NORMAL /HPF
BACTERIA UR CULT: NORMAL
BASOPHILS # BLD AUTO: 0.02 K/UL
BASOPHILS # BLD AUTO: 0.02 K/UL
BASOPHILS NFR BLD: 0.3 %
BASOPHILS NFR BLD: 0.3 %
BILIRUB SERPL-MCNC: 0.5 MG/DL
BILIRUB SERPL-MCNC: 0.5 MG/DL
BILIRUB UR QL STRIP: NEGATIVE
BUN SERPL-MCNC: 19 MG/DL
BUN SERPL-MCNC: 19 MG/DL
BUN SERPL-MCNC: 25 MG/DL (ref 6–30)
CALCIUM SERPL-MCNC: 10.1 MG/DL
CALCIUM SERPL-MCNC: 10.1 MG/DL
CHLORIDE SERPL-SCNC: 103 MMOL/L (ref 95–110)
CHLORIDE SERPL-SCNC: 105 MMOL/L
CHLORIDE SERPL-SCNC: 105 MMOL/L
CLARITY UR REFRACT.AUTO: CLEAR
CO2 SERPL-SCNC: 23 MMOL/L
CO2 SERPL-SCNC: 23 MMOL/L
COLOR UR AUTO: ABNORMAL
CREAT SERPL-MCNC: 0.8 MG/DL (ref 0.5–1.4)
CREAT SERPL-MCNC: 0.9 MG/DL
CREAT SERPL-MCNC: 0.9 MG/DL
DIFFERENTIAL METHOD: ABNORMAL
DIFFERENTIAL METHOD: ABNORMAL
EOSINOPHIL # BLD AUTO: 0.1 K/UL
EOSINOPHIL # BLD AUTO: 0.1 K/UL
EOSINOPHIL NFR BLD: 1.1 %
EOSINOPHIL NFR BLD: 1.1 %
ERYTHROCYTE [DISTWIDTH] IN BLOOD BY AUTOMATED COUNT: 13.7 %
ERYTHROCYTE [DISTWIDTH] IN BLOOD BY AUTOMATED COUNT: 13.7 %
EST. GFR  (AFRICAN AMERICAN): >60 ML/MIN/1.73 M^2
EST. GFR  (AFRICAN AMERICAN): >60 ML/MIN/1.73 M^2
EST. GFR  (NON AFRICAN AMERICAN): >60 ML/MIN/1.73 M^2
EST. GFR  (NON AFRICAN AMERICAN): >60 ML/MIN/1.73 M^2
GLUCOSE SERPL-MCNC: 122 MG/DL
GLUCOSE SERPL-MCNC: 122 MG/DL
GLUCOSE SERPL-MCNC: 128 MG/DL (ref 70–110)
GLUCOSE UR QL STRIP: NEGATIVE
HCT VFR BLD AUTO: 43.2 %
HCT VFR BLD AUTO: 43.2 %
HCT VFR BLD CALC: 43 %PCV (ref 36–54)
HGB BLD-MCNC: 14 G/DL
HGB BLD-MCNC: 14 G/DL
HGB UR QL STRIP: NEGATIVE
KETONES UR QL STRIP: NEGATIVE
LEUKOCYTE ESTERASE UR QL STRIP: ABNORMAL
LIPASE SERPL-CCNC: 34 U/L
LIPASE SERPL-CCNC: 34 U/L
LYMPHOCYTES # BLD AUTO: 1.1 K/UL
LYMPHOCYTES # BLD AUTO: 1.1 K/UL
LYMPHOCYTES NFR BLD: 14.4 %
LYMPHOCYTES NFR BLD: 14.4 %
MCH RBC QN AUTO: 29.6 PG
MCH RBC QN AUTO: 29.6 PG
MCHC RBC AUTO-ENTMCNC: 32.4 %
MCHC RBC AUTO-ENTMCNC: 32.4 %
MCV RBC AUTO: 91 FL
MCV RBC AUTO: 91 FL
MICROSCOPIC COMMENT: NORMAL
MONOCYTES # BLD AUTO: 0.7 K/UL
MONOCYTES # BLD AUTO: 0.7 K/UL
MONOCYTES NFR BLD: 8.4 %
MONOCYTES NFR BLD: 8.4 %
NEUTROPHILS # BLD AUTO: 5.9 K/UL
NEUTROPHILS # BLD AUTO: 5.9 K/UL
NEUTROPHILS NFR BLD: 75.5 %
NEUTROPHILS NFR BLD: 75.5 %
NITRITE UR QL STRIP: NEGATIVE
PH UR STRIP: 5 [PH] (ref 5–8)
PLATELET # BLD AUTO: 160 K/UL
PLATELET # BLD AUTO: 160 K/UL
PMV BLD AUTO: 10.8 FL
PMV BLD AUTO: 10.8 FL
POC IONIZED CALCIUM: 1.12 MMOL/L (ref 1.06–1.42)
POC TCO2 (MEASURED): 30 MMOL/L (ref 23–29)
POTASSIUM BLD-SCNC: 4.9 MMOL/L (ref 3.5–5.1)
POTASSIUM SERPL-SCNC: 4.3 MMOL/L
POTASSIUM SERPL-SCNC: 4.3 MMOL/L
PROT SERPL-MCNC: 7.9 G/DL
PROT SERPL-MCNC: 7.9 G/DL
PROT UR QL STRIP: NEGATIVE
RBC # BLD AUTO: 4.73 M/UL
RBC # BLD AUTO: 4.73 M/UL
SAMPLE: ABNORMAL
SODIUM BLD-SCNC: 139 MMOL/L (ref 136–145)
SODIUM SERPL-SCNC: 140 MMOL/L
SODIUM SERPL-SCNC: 140 MMOL/L
SP GR UR STRIP: 1 (ref 1–1.03)
SQUAMOUS #/AREA URNS AUTO: 1 /HPF
URN SPEC COLLECT METH UR: ABNORMAL
UROBILINOGEN UR STRIP-ACNC: NEGATIVE EU/DL
WBC # BLD AUTO: 7.84 K/UL
WBC # BLD AUTO: 7.84 K/UL
WBC #/AREA URNS AUTO: 1 /HPF (ref 0–5)

## 2017-06-26 PROCEDURE — 96374 THER/PROPH/DIAG INJ IV PUSH: CPT

## 2017-06-26 PROCEDURE — 93010 ELECTROCARDIOGRAM REPORT: CPT | Mod: S$GLB,,, | Performed by: INTERNAL MEDICINE

## 2017-06-26 PROCEDURE — 81001 URINALYSIS AUTO W/SCOPE: CPT

## 2017-06-26 PROCEDURE — 96361 HYDRATE IV INFUSION ADD-ON: CPT

## 2017-06-26 PROCEDURE — 25000003 PHARM REV CODE 250

## 2017-06-26 PROCEDURE — 93005 ELECTROCARDIOGRAM TRACING: CPT

## 2017-06-26 PROCEDURE — 83690 ASSAY OF LIPASE: CPT

## 2017-06-26 PROCEDURE — 99284 EMERGENCY DEPT VISIT MOD MDM: CPT | Mod: 25

## 2017-06-26 PROCEDURE — 80053 COMPREHEN METABOLIC PANEL: CPT

## 2017-06-26 PROCEDURE — 63600175 PHARM REV CODE 636 W HCPCS

## 2017-06-26 PROCEDURE — 85025 COMPLETE CBC W/AUTO DIFF WBC: CPT

## 2017-06-26 PROCEDURE — 99284 EMERGENCY DEPT VISIT MOD MDM: CPT | Mod: ,,,

## 2017-06-26 PROCEDURE — 25500020 PHARM REV CODE 255: Performed by: FAMILY MEDICINE

## 2017-06-26 PROCEDURE — 87086 URINE CULTURE/COLONY COUNT: CPT

## 2017-06-26 RX ORDER — METRONIDAZOLE 500 MG/1
500 TABLET ORAL EVERY 12 HOURS
Qty: 14 TABLET | Refills: 0 | Status: SHIPPED | OUTPATIENT
Start: 2017-06-26 | End: 2017-07-03

## 2017-06-26 RX ORDER — DICYCLOMINE HYDROCHLORIDE 10 MG/ML
20 INJECTION INTRAMUSCULAR
Status: DISCONTINUED | OUTPATIENT
Start: 2017-06-26 | End: 2017-06-26 | Stop reason: HOSPADM

## 2017-06-26 RX ORDER — KETOROLAC TROMETHAMINE 30 MG/ML
10 INJECTION, SOLUTION INTRAMUSCULAR; INTRAVENOUS
Status: COMPLETED | OUTPATIENT
Start: 2017-06-26 | End: 2017-06-26

## 2017-06-26 RX ADMIN — IOHEXOL 100 ML: 350 INJECTION, SOLUTION INTRAVENOUS at 08:06

## 2017-06-26 RX ADMIN — SODIUM CHLORIDE 1000 ML: 0.9 INJECTION, SOLUTION INTRAVENOUS at 05:06

## 2017-06-26 RX ADMIN — KETOROLAC TROMETHAMINE 10 MG: 30 INJECTION, SOLUTION INTRAMUSCULAR at 05:06

## 2017-06-26 NOTE — ED TRIAGE NOTES
"Pt arrived to the Ed with CC of abdominal pain. Pt states she started having pain on Thursday and saw her DrKimi And started taking cipro for a bladder infection. Pt states last night she started experiencing sharp pain. Pt states she" might be having a diverticulitis attack"  "

## 2017-06-26 NOTE — ED NOTES
Patient identifiers verified and correct for Alina Nino    C/C: abdominal pain  APPEARANCE: awake and alert in NAD.  SKIN: warm, dry and intact. No breakdown or bruising.  MUSCULOSKELETAL: Patient moving all extremities spontaneously, no obvious swelling or deformities noted. Ambulates independently.  RESPIRATORY: no shortness of breath. All breath sounds CTA bilaterally.  CARDIAC: heart tones normal. Regular rate and rhythm; 2+ distal pulses; no peripheral edema  ABDOMEN: S/ND/NT, normoactive bowel sounds present in all four quadrants. Normal stool pattern. Pt reports pain in left lower quadrant.   : voids spontaneously without difficulty.  Neurologic: AAO x 4; follows commands equal strength in all extremities; denies numbness/tingling. PERRLA

## 2017-06-26 NOTE — TELEPHONE ENCOUNTER
Reason for Disposition   Pain lasting > 10 minutes and over 50 years old    Protocols used: ST ABDOMINAL PAIN - UPPER-A-OH

## 2017-06-26 NOTE — ED PROVIDER NOTES
Encounter Date: 6/26/2017       History     Chief Complaint   Patient presents with    Abdominal Pain     Pt c/o pain to left lower abdomen.  Hx diverticulitis and currently taking Cipro.      72-year-old female with medical history of hypertension, diabetes mellitus, hyperlipidemia, anxiety presents to the ED with left lower quadrant pain.  Pain described as sharp and waxes and wanes, made worse with movement.  Patient states symptoms began Thursday afternoon.  Patient reports having 3 soft bowel movements on Saturday but returned normal yesterday and this morning.  Patient states last time pain was this bad it was diverticulitis clear.  Patient denies fever, chills, nausea, vomiting, chest pain, shortness of breath, hematochezia, diarrhea, changes in urination, weakness, syncope.          Review of patient's allergies indicates:   Allergen Reactions    Hydromorphone      Other reaction(s): sedation    Byetta [exenatide] Rash     Other reaction(s): Rash     Past Medical History:   Diagnosis Date    Allergy     seasonal    Anxiety     Arthritis     Breast cancer 10/2012    left breast invasive ductal carcinoma    Colon polyp     Diabetes mellitus     Type 2    Diverticular disease     Diverticulitis 2009    Hyperlipidemia     Hypertension     Morbid obesity     MITCHELL (obstructive sleep apnea)     Stenosis     Stenosis and insufficiency of lacrimal passages     Thyroid disease      Past Surgical History:   Procedure Laterality Date    BREAST BIOPSY  10/1/2012    left breast- invasive ductal carcinoma    BREAST SURGERY      CARDIAC VALVE REPLACEMENT  12/2013    CARPAL TUNNEL RELEASE      Left    DILATION AND CURETTAGE OF UTERUS  1999    Endometrial polyps    ENDOMETRIAL ABLATION  1999    Enodmetrial polyps    EYE SURGERY      left lumpectomy  2012    SHOULDER SURGERY      SKIN BIOPSY       Family History   Problem Relation Age of Onset    Breast cancer Mother 62    Colon cancer Father      Cancer Father      Colon and Prostate CA    Cancer Maternal Grandfather      Colon CA    Ovarian cancer Neg Hx     Melanoma Neg Hx     Psoriasis Neg Hx     Lupus Neg Hx     Eczema Neg Hx     Acne Neg Hx      Social History   Substance Use Topics    Smoking status: Never Smoker    Smokeless tobacco: Never Used    Alcohol use No     Review of Systems   Constitutional: Negative for fever.   HENT: Negative for sore throat.    Respiratory: Negative for shortness of breath.    Cardiovascular: Negative for chest pain.   Gastrointestinal: Positive for abdominal pain. Negative for abdominal distention, diarrhea and nausea.   Genitourinary: Negative for dysuria and flank pain.   Musculoskeletal: Negative for back pain.   Skin: Negative for rash.   Neurological: Negative for syncope, weakness and headaches.   Hematological: Does not bruise/bleed easily.   Psychiatric/Behavioral: The patient is not nervous/anxious.        Physical Exam     Initial Vitals [06/26/17 1420]   BP Pulse Resp Temp SpO2   (!) 149/70 85 20 98 °F (36.7 °C) 98 %      MAP       96.33         Physical Exam    Vitals reviewed.  Constitutional: Vital signs are normal. She appears well-developed and well-nourished. She is not diaphoretic. No distress.   HENT:   Head: Normocephalic and atraumatic.   Nose: Nose normal.   Mouth/Throat: Oropharynx is clear and moist.   Eyes: Conjunctivae and lids are normal. Pupils are equal, round, and reactive to light. Lids are everted and swept, no foreign bodies found.   Neck: Trachea normal and normal range of motion. Neck supple.   Cardiovascular: Normal rate, regular rhythm and normal pulses.   No murmur heard.  Abdominal: Soft. Normal appearance and bowel sounds are normal. There is tenderness.   Musculoskeletal: She exhibits no edema.   Neurological: She is alert and oriented to person, place, and time. No sensory deficit.   Skin: Skin is warm. Capillary refill takes less than 2 seconds. No cyanosis.    Psychiatric: She has a normal mood and affect.         ED Course   Procedures  Labs Reviewed   CBC W/ AUTO DIFFERENTIAL - Abnormal; Notable for the following:        Result Value    Gran% 75.5 (*)     Lymph% 14.4 (*)     All other components within normal limits   COMPREHENSIVE METABOLIC PANEL - Abnormal; Notable for the following:     Glucose 122 (*)     All other components within normal limits   URINALYSIS - Abnormal; Notable for the following:     Leukocytes, UA Trace (*)     All other components within normal limits   CBC W/ AUTO DIFFERENTIAL - Abnormal; Notable for the following:     Gran% 75.5 (*)     Lymph% 14.4 (*)     All other components within normal limits   COMPREHENSIVE METABOLIC PANEL - Abnormal; Notable for the following:     Glucose 122 (*)     All other components within normal limits   ISTAT PROCEDURE - Abnormal; Notable for the following:     POC Glucose 128 (*)     POC TCO2 (MEASURED) 30 (*)     All other components within normal limits   CULTURE, URINE   CULTURE, URINE   LIPASE   URINALYSIS MICROSCOPIC   LIPASE        Imaging Results          CT Abdomen Pelvis With Contrast (Final result)  Result time 06/26/17 20:42:54    Final result by Martin Pandey MD (06/26/17 20:42:54)                 Impression:         Diverticulosis with short segment of bowel wall thickening in the mid left colon without significant fat stranding.  In appropriate clinical setting this could represent early acute diverticulitis.  No obstruction, free air, or abscess.    2.5 cm duodenal diverticulum arising off the second segment.    ______________________________________     Electronically signed by resident: MITCH JULIO MD  Date:     06/26/17  Time:    20:29            As the supervising and teaching physician, I personally reviewed the images and resident's interpretation and I agree with the findings.          Electronically signed by: MARTIN PANDEY MD  Date:     06/26/17  Time:    20:42               Narrative:    CT ABDOMEN, and, PELVIS  with IV contrast    Protocol:  Axial images of the abdomen and pelvis were acquired  after the use of 100 cc [id number] IV contrast.  Coronal and sagittal reconstructions were also obtained    HISTORY:  72 year old F with r/o diverticulitis    COMPARISON: 02/07/2012     FINDINGS:  Heart: Normal in size. No pericardial effusion.     Lung Bases: Well aerated, without consolidation or pleural fluid.     Liver: Normal in size and attenuation, with no focal hepatic lesions.       Gallbladder: No calcified gallstones.     Bile Ducts: No evidence of dilated ducts.     Pancreas: No mass or peripancreatic fat stranding.      Spleen: Normal.     Adrenals: Normal.     GI Tract/Mesentery: There is a short segment of bowel wall thickening in the mid LEFT colon with a few nearby colonic diverticula without significant localized fat stranding.  There is no extravasation of rectal contrast to suggest a leak.  The appendix is normal. 2.5 cm duodenal diverticulum arising off the second segment.    Kidneys/ Ureters: Kidneys are small in size with cortical thinning. No hydronephrosis or nephrolithiasis. No ureteral dilatation. Kidneys excrete contrast appropriately on delayed images.    Bladder: Bladder walls are thickened likely on the basis of nondistention.     Reproductive organs: Normal.     Retroperitoneum:  No significant adenopathy.      Peritoneal Space: No ascites. No free air. No abnormal fluid collection to suggest abscess.    Abdominal wall:  There is a small fat containing umbilical hernia.     Vasculature: No significant atherosclerosis or aneurysm.     Bones: No acute fracture. Age-appropriate degenerative changes.                          ,     Medical Decision Making:   History:   Old Medical Records: I decided to obtain old medical records.  Old Records Summarized: records from clinic visits.  Clinical Tests:   Lab Tests: Ordered and Reviewed  Radiological Study: Ordered and  "Reviewed       APC / Resident Notes:   72-year-old female with medical history of hypertension, diabetes mellitus, hyperlipidemia lipidemia, anxiety presents to the ED with left lower quadrant pain.  Cardiac exam reveals regular rate and rhythm.  Lungs clear bilateral auscultation with no decreased breath sounds.  Patient tender in LLQ,abdomen is soft, non distended with normal bowel sounds x 4.  Strength intact.  Sensation intact.  Distal pulses intact.  Vital stable.  Patient is in no acute distress.    Patient given IV toradol 10 mg.    Labs and imaging reviewed.   Istat wnl. CBC wnl. CMP wnl. Lipase 34. UA wnl.     CT abdomen shows, "Diverticulosis with short segment of bowel wall thickening in the mid left colon without significant fat stranding.  In appropriate clinical setting this could represent early acute diverticulitis.  No obstruction, free air, or abscess."    DDx includes but is not limited to diverticulitis, diverticulosis, abscess, UTI, constipation.    Discharged to home in stable condition, return to ED warnings given, follow up and patient care instructions given. Encouraged to continue cipro 500 x 7 days. Will start flagyl 500mg.     I have discussed and reviewed with my supervising physician.              ED Course     Clinical Impression:   The primary encounter diagnosis was Diverticulitis of large intestine without perforation or abscess without bleeding. A diagnosis of Abdominal pain was also pertinent to this visit.    Disposition:   Disposition: Discharged  Condition: Stable                        Marga Diego PA-C  06/26/17 2238    "

## 2017-06-28 LAB — BACTERIA UR CULT: NO GROWTH

## 2017-07-09 ENCOUNTER — PATIENT MESSAGE (OUTPATIENT)
Dept: INTERNAL MEDICINE | Facility: CLINIC | Age: 72
End: 2017-07-09

## 2017-07-24 ENCOUNTER — TELEPHONE (OUTPATIENT)
Dept: BARIATRICS | Facility: CLINIC | Age: 72
End: 2017-07-24

## 2017-07-24 NOTE — TELEPHONE ENCOUNTER
Called to reschedule patient's appointment with Dr. Maher. Left message for patient to return call

## 2017-07-28 RX ORDER — SITAGLIPTIN 100 MG/1
TABLET, FILM COATED ORAL
Qty: 30 TABLET | Refills: 1 | Status: SHIPPED | OUTPATIENT
Start: 2017-07-28 | End: 2017-08-29 | Stop reason: SDUPTHER

## 2017-07-31 ENCOUNTER — PATIENT MESSAGE (OUTPATIENT)
Dept: INTERNAL MEDICINE | Facility: CLINIC | Age: 72
End: 2017-07-31

## 2017-08-01 DIAGNOSIS — F41.9 ANXIETY: Primary | ICD-10-CM

## 2017-08-01 RX ORDER — ALPRAZOLAM 0.25 MG/1
TABLET ORAL
Qty: 40 TABLET | Refills: 0 | Status: SHIPPED | OUTPATIENT
Start: 2017-08-01 | End: 2017-08-29 | Stop reason: SDUPTHER

## 2017-08-17 ENCOUNTER — OFFICE VISIT (OUTPATIENT)
Dept: BARIATRICS | Facility: CLINIC | Age: 72
End: 2017-08-17
Payer: MEDICARE

## 2017-08-17 VITALS
DIASTOLIC BLOOD PRESSURE: 70 MMHG | WEIGHT: 254 LBS | SYSTOLIC BLOOD PRESSURE: 122 MMHG | HEART RATE: 76 BPM | BODY MASS INDEX: 46.74 KG/M2 | HEIGHT: 62 IN

## 2017-08-17 DIAGNOSIS — I10 ESSENTIAL HYPERTENSION: ICD-10-CM

## 2017-08-17 DIAGNOSIS — M51.37 DEGENERATION OF LUMBAR OR LUMBOSACRAL INTERVERTEBRAL DISC: ICD-10-CM

## 2017-08-17 DIAGNOSIS — E66.01 MORBID OBESITY WITH BMI OF 45.0-49.9, ADULT: Primary | ICD-10-CM

## 2017-08-17 DIAGNOSIS — G47.33 OBSTRUCTIVE SLEEP APNEA SYNDROME: ICD-10-CM

## 2017-08-17 PROCEDURE — 3078F DIAST BP <80 MM HG: CPT | Mod: S$GLB,,, | Performed by: INTERNAL MEDICINE

## 2017-08-17 PROCEDURE — 3045F PR MOST RECENT HEMOGLOBIN A1C LEVEL 7.0-9.0%: CPT | Mod: S$GLB,,, | Performed by: INTERNAL MEDICINE

## 2017-08-17 PROCEDURE — 1125F AMNT PAIN NOTED PAIN PRSNT: CPT | Mod: S$GLB,,, | Performed by: INTERNAL MEDICINE

## 2017-08-17 PROCEDURE — 99999 PR PBB SHADOW E&M-EST. PATIENT-LVL III: CPT | Mod: PBBFAC,,, | Performed by: INTERNAL MEDICINE

## 2017-08-17 PROCEDURE — 99215 OFFICE O/P EST HI 40 MIN: CPT | Mod: S$GLB,,, | Performed by: INTERNAL MEDICINE

## 2017-08-17 PROCEDURE — 1157F ADVNC CARE PLAN IN RCRD: CPT | Mod: S$GLB,,, | Performed by: INTERNAL MEDICINE

## 2017-08-17 PROCEDURE — 1159F MED LIST DOCD IN RCRD: CPT | Mod: S$GLB,,, | Performed by: INTERNAL MEDICINE

## 2017-08-17 PROCEDURE — 3074F SYST BP LT 130 MM HG: CPT | Mod: S$GLB,,, | Performed by: INTERNAL MEDICINE

## 2017-08-17 PROCEDURE — 99499 UNLISTED E&M SERVICE: CPT | Mod: S$GLB,,, | Performed by: INTERNAL MEDICINE

## 2017-08-17 PROCEDURE — 3008F BODY MASS INDEX DOCD: CPT | Mod: S$GLB,,, | Performed by: INTERNAL MEDICINE

## 2017-08-17 NOTE — LETTER
August 20, 2017      Aarti Dunn MD  [address]  [facility] [address]           [facility]  [address]  [facility] [address]  Phone: [phone]  Fax: [phone]          Patient: Alina Narayan   MR Number: [id number]   YOB: 1945   Date of Visit: 8/17/2017       Dear Dr. Aarti Dunn:    Thank you for referring Alina Narayan to me for evaluation. Attached you will find relevant portions of my assessment and plan of care.    If you have questions, please do not hesitate to call me. I look forward to following Alina Narayan along with you.    Sincerely,        Enclosure  CC:  No Recipients    If you would like to receive this communication electronically, please contact [email or web address] or [phone] to request more information on [employer] access.    For providers and/or their staff who would like to refer a patient to [facility], please contact us through our one-stop-shop provider referral line, [facility], at [phone].    If you feel you have received this communication in error or would no longer like to receive these types of communications, please e-mail [email or web address]

## 2017-08-17 NOTE — LETTER
August 22, 2017        Aarti Dunn MD  [address]  [facility] [address]             [facility]  [address]  [facility] [address]  Phone: [phone]  Fax: [phone]   Patient: Alina Narayan   MR Number: [id number]   YOB: 1945   Date of Visit: 8/17/2017       Dear Dr. Dunn:    Thank you for referring Alina Narayan to me for evaluation. Attached you will find relevant portions of my assessment and plan of care.    If you have questions, please do not hesitate to call me. I look forward to following Alina Narayan along with you.    Sincerely,      Shalini Maher MD            CC  No Recipients    Enclosure

## 2017-08-17 NOTE — PATIENT INSTRUCTIONS
1100 calories a day  40% protein (110 grams)  30% carbs (82 grams)  30 % fat (36 grams)  Food diary or calorie tracking Jayashree -loseit or food logs.   Exercise - try to add a 3rd day a week in the pool      Fruits and Vegetables       Include 1-2 servings of fruit daily.      1 serving of fruit includes ½ cup unsweetened applesauce, ½ medium banana, tennis ball size piece of fruit, 17 grapes, 1 cup melon, 1 cup strawberries, ¼ cup dried fruit     Include 2-3 servings of vegetables daily. 1 serving is 1 cup raw or ½ cup cooked.     Non-starchy vegetables include artichoke, asparagus, baby corn, bamboo shoots, beans: green/Italian/wax, bean sprouts, beets, broccoli, [place] sprouts, cabbage, carrots, cauliflower, celery, cucumber, eggplant, green onions or scallions, greens, jicama, leeks, mushrooms, okra, onions, pea pods, peppers, radishes, spinach, summer squash, tomatoes and salsa, turnips, vegetable juice cocktail, water chestnuts, zucchini      Sample meal plan  80-120g protein; 1108-9122 calories  Protein drinks and bars: 0-4 grams sugar  Drink lots of water throughout the day and exercise!  MENU # 1  Breakfast: 2 eggs, 1 turkey sausage estuardo  Lunch: 2-3 roll-ups (sliced turkey, low-fat slice cheese), baby carrots dipped in 1 Tbsp natural peanut butter  Mid-Day Snack: ¼ cup unsalted almonds, ½ cup fruit  Supper: 1 chicken thigh simmered in tomato sauce + 2 Tbsp mozzarella cheese, ½ cup black beans, 1/2 cup steamed veggies  Evening Snack: 1/2 cup grapes with 4 cubes low-fat cheddar cheese   ___________________________________________________  MENU # 2  Breakfast: protein drink  Mid-morning snack : ¼ cup unsalted nuts  Lunch: 1 cup tuna or chicken salad made with light freedman, over salad.   Supper: hamburger estuardo, slice of cheese, 1 cup steamed veggies.   Snack: light yogurt      Menu #3  Breakfast: 6oz plain Greek yogurt + fruit (½ banana, ½ cup fruit - pineapple, blueberries, strawberries, peach), may add  Splenda to marce.  Lunch: ½  chicken breast w/ slice pepper medhat cheese, 1/2 cup steamed veggies and small salad with Salad Spritzer.    Mid-Day snack: protein drink   Supper: 4oz Grilled fish, grilled veggie kabob ( mushrooms, onion, bell pepper, yellow squash, zucchini, cherry tomatoes)  Evening Snack: fudgsicle no-sugar-added    Menu # 4  Breakfast: vanilla iced coffee: 1 scoop vanilla protein powder + 4oz skim milk + 4oz coffee   Snack: protein bar  Lunch: 2 Lettuce tacos: ¼ cup seasoned ground turkey wrapped in a Johnathan lettuce leaf with 1 Tbsp shredded cheese and dollop low-fat sour cream  Dinner: Shrimp omelet: 2 eggs, ½ cup shrimp, green onions, and shredded cheese        Menu #5  Breakfast: 1 cup low-fat cottage cheese, ½ cup peaches (no sugar added)  Lunch: 2 oz baked pork chop, 1 cup okra and tomato stew  Snack: 1 cup black beans + salsa + dollop sour cream  Dinner: Caprese chicken salad: 2 oz chicken, 1oz fresh mozzarella, sliced tomato, 1 Tbsp olive oil, basil  Snack: sugar-free pudding cup      Menu #6  Breakfast: ½ cup part-skim ricotta cheese, 2 Tbsp sugar-free strawberry preserves, ¼ cup slivered almonds  Lunch: 2 oz canned chicken, 1oz shredded cheddar cheese, ½ cup black beans, 2 Tbsp salsa  Snack: Protein drink  Dinner: 4 oz grilled salmon steak, over mixed green salad with light dressing        Sample Diabetic/Bariatric Diet    Day #1    # CHO servings Protein (grams)   Breakfast 2/3 cup (6oz) non-fat yogurt (1C)  ½ medium banana (1C) 2 5   snack 1 cup non-fat milk or soy milk (1C) 1 8   Lunch 3oz canned tuna/chicken   1 egg  1 teaspoon ff freedman  3 whole wheat crackers (1C)  17 grapes (1C) 2 25   snack 1 oz unsalted min-pretzels (1C) 1 2   Dinner 2 oz lean meat/fish  ½ cup peas (1C)  ½ cup cooked carrots   2 18   snack 1 cup sugar free pudding (1C) 1 4   TOTAL  9 62       Day #2    #CHO servings Protein (grams)   Breakfast ½ cup oatmeal (1C)  1 cup blueberries (1C) 2 3   snack 1 cup non-fat  milk/soy milk (1C) 1 8   Lunch 1 small Gabrielas Chili (1.5C)  Side salad, 1 tsp dressing (.5C) 2 16   snack ½ cup Multi Grain Cheerios (.5C)  ½ cup non-fat milk (.5C) 1 5   Dinner 3 oz boiled shrimp/crab  1/2 cup split pea soup (1C)  ½ cup melon (1C) 2 23   snack 2/3  cup non-fat yogurt (1C) 1 5   TOTAL  9 60

## 2017-08-17 NOTE — LETTER
[facility]  [address]  [facility] [address]  Phone: [phone]  Fax: [phone] August 23, 2017      Aarti Dunn MD  [address]  [facility] [address]    Patient: Alina Narayan   MR Number: [id number]   YOB: 1945   Date of Visit: 8/17/2017     Dear Dr. Dunn:    Thank you for referring Alina Narayan to me for evaluation. Below are the relevant portions of my assessment and plan of care.    ASSESSMENT:  1. Morbid obesity with BMI of 45.0-49.9, adult    2. Uncontrolled type 2 diabetes mellitus with other specified complication    3. Essential hypertension    4. Degeneration of lumbar or lumbosacral intervertebral disc    5. Obstructive sleep apnea syndrome      PLAN:  1. Morbid obesity with BMI of 45.0-49.9, adult  She would prefer to start with lifestyle changes alone.   Nutrition materials provided today.    5378-2562 [place] menu given.     1100 calories a day  40% protein (110 grams)  30% carb's (82 grams)  30 % fat (36 grams)  Food diary or calorie tracking Jayashree -loseit or food logs.   Exercise - try to add a 3rd day a week in the pool    The patient was given individualized diet, exercise, and follow-up instructions.      2. Uncontrolled type 2 diabetes mellitus with other specified complication  Expect improvement with weight loss. Patient advised to check blood sugar regularly as medications may need to be adjusted with changes in diet and weight loss.       3. Essential hypertension  The current medical regimen is effective;  continue present plan and medications. Expect improvement with weight loss.      4. Degeneration of lumbar or lumbosacral intervertebral disc  Increase low impact activity as tolerated.  Avoid high impact activity, very heavy lifting or other exercise regimens that may cause discomfort.       5. Obstructive sleep apnea syndrome  Discussed importance of compliance with treatment, and that MITCHELL does require getting closer to normal  BMI range to see improvement that some other co-morbidities. Continue with CPAP.     If you have questions, please do not hesitate to call me. I look forward to following Alina along with you.    Sincerely,      Shalini Maher MD, [facility]  Medical Weight Loss   [facility]     ROBERTH/lorene

## 2017-08-17 NOTE — PROGRESS NOTES
Subjective:       Patient ID: Alina Narayan is a 72 y.o. female.    Chief Complaint: Consult    Current attempts at weight loss:  New pt to me, referred by Aarti Dunn MD  [address] [place] [address] with Patient Active Problem List:     HTN (hypertension)     Hyperlipemia     Diverticulosis     Family history of colon cancer     Malignant neoplasm of upper-outer quadrant of female breast     Nausea     Sleep apnea     Type 2 diabetes mellitus, uncontrolled     S/P AVR     Hypothyroid     Morbid obesity     Pain in joint, shoulder region     Chronic ear pain     Hearing loss, sensorineural     Special screening for malignant neoplasms, colon     Degeneration of lumbar or lumbosacral intervertebral disc     Cancer of left breast, stage 1, estrogen receptor positive    Lab Results       Component                Value               Date                       HGBA1C                   8.0 (H)             05/24/2017                 HGBA1C                   7.5 (H)             07/21/2016                 HGBA1C                   7.2 (H)             04/07/2016            Lab Results       Component                Value               Date                       LDLCALC                  60.4 (L)            07/21/2016                 CREATININE               0.9                 06/26/2017                 CREATININE               0.9                 06/26/2017            Has blood work scheduled next week.     States she cannot do much 2/2 to her shoulder and knees. Gets n pool 2 times a week. Has not changed her eating habits.        Previous diet attempts: WW years ago, but not for long.  Has been to nutritionist. States wasn't any help.     Heaviest weight:  260s#    Lightest weight: 140#    Goal weight: 200#    History of medication for loss: years ago an injection once a week.         Typical eating patterns: She does not like many vegetables. Lives with .She does cook.   Breakfast: Egg beaters  "with ham, maybe potatoes, half bagel or english muffin or toast with SF jelly      Lunch: rotisserie chicken, grilled or baked chicken or salmon with sweet potatoes, half potato,mushrooms. Red beans and rice.     Dinner: rice crispies with half banana. Half sandwich with "something to crunch".     Snacks: Denies but earlier said sometimes she eats bc she feels light headed. Cheezits. Peaches. Crackers    Beverages: Cran- grape juice. Coffee with coffee mate and splenda. Diet root beer. Water.     Willingness to change:  8/10    EKG:Normal sinus rhythm  Left anterior fascicular block  Abnormal ECG  No previous ECGs available    Echo: CONCLUSIONS     1 - Concentric remodeling.     2 - Normal left ventricular systolic function (EF 60-65%).     3 - Left ventricular diastolic dysfunction.     4 - Normal right ventricular systolic function .     5 - The estimated PA systolic pressure is 33 mmHg.     6 - Aortic valve prosthesis, effective prosthetic valve area corrected for BSA is 1.0 cm2.     7 - Mild mitral regurgitation.     8 - Mild tricuspid regurgitation.     BMR: 1615        Review of Systems   Constitutional: Positive for diaphoresis. Negative for chills and fever.   Respiratory: Positive for apnea. Negative for shortness of breath.         USes CPAP   Cardiovascular: Negative for chest pain and leg swelling.   Gastrointestinal: Positive for constipation. Negative for diarrhea.        + GERD sometimes   Genitourinary: Negative for difficulty urinating and dysuria.   Musculoskeletal: Positive for arthralgias and back pain.   Neurological: Positive for light-headedness. Negative for dizziness.        In the morning   Psychiatric/Behavioral: Positive for dysphoric mood. The patient is nervous/anxious.         States she cries       Objective:     /70   Pulse 76   Ht 5' 2" (1.575 m)   Wt 115.2 kg (253 lb 15.5 oz)   BMI 46.45 kg/m²     Physical Exam   Constitutional: She is oriented to person, place, and time. " She appears well-developed. No distress.   Morbidly     HENT:   Head: Normocephalic and atraumatic.   Mouth/Throat: No oropharyngeal exudate.   Eyes: EOM are normal. Pupils are equal, round, and reactive to light. No scleral icterus.   Neck: Normal range of motion. Neck supple. No thyromegaly present.   Cardiovascular: Normal rate and normal heart sounds.  Exam reveals no gallop and no friction rub.    No murmur heard.  Pulmonary/Chest: Effort normal and breath sounds normal. No respiratory distress. She has no wheezes.   Abdominal: Soft. Bowel sounds are normal. She exhibits no distension. There is no tenderness.   Musculoskeletal: Normal range of motion. She exhibits no edema.   Neurological: She is alert and oriented to person, place, and time. No cranial nerve deficit.   Skin: Skin is warm and dry. No erythema.   Psychiatric: She has a normal mood and affect. Her behavior is normal. Judgment normal.   Vitals reviewed.      Assessment:       1. Morbid obesity with BMI of 45.0-49.9, adult    2. Uncontrolled type 2 diabetes mellitus with other specified complication    3. Essential hypertension    4. Degeneration of lumbar or lumbosacral intervertebral disc    5. Obstructive sleep apnea syndrome        Plan:         1. Morbid obesity with BMI of 45.0-49.9, adult  She would prefer to start with lifestyle changes alone.   Nutrition materials provided today.    [id number] [place] menu given.    1100 calories a day  40% protein (110 grams)  30% carbs (82 grams)  30 % fat (36 grams)  Food diary or calorie tracking Jayashree -loseit or food logs.   Exercise - try to add a 3rd day a week in the pool      2. Uncontrolled type 2 diabetes mellitus with other specified complication  Expect improvement with weight loss. Pt advised to check blood sugar regularly as medications may need to be adjusted with changes in diet and weight loss.      3. Essential hypertension  The current medical regimen is effective;  continue present plan and  medications. Expect improvement with weight loss.     4. Degeneration of lumbar or lumbosacral intervertebral disc  Increase low impact activity as tolerated.  Avoid high impact activity, very heavy lifting or other exercise regimens that may cause discomfort.      5. Obstructive sleep apnea syndrome  Discussed importance of compliance with treatment, and that MITCHELL does require getting closer to normal BMI range to see improvement that some other co-morbidities. Continue with CPAP.

## 2017-08-22 ENCOUNTER — OFFICE VISIT (OUTPATIENT)
Dept: SPORTS MEDICINE | Facility: CLINIC | Age: 72
End: 2017-08-22
Payer: MEDICARE

## 2017-08-22 ENCOUNTER — HOSPITAL ENCOUNTER (OUTPATIENT)
Dept: RADIOLOGY | Facility: HOSPITAL | Age: 72
Discharge: HOME | End: 2017-08-22
Attending: ORTHOPAEDIC SURGERY
Payer: MEDICARE

## 2017-08-22 VITALS
HEART RATE: 83 BPM | DIASTOLIC BLOOD PRESSURE: 70 MMHG | SYSTOLIC BLOOD PRESSURE: 121 MMHG | BODY MASS INDEX: 46.56 KG/M2 | WEIGHT: 253 LBS | HEIGHT: 62 IN

## 2017-08-22 DIAGNOSIS — M25.512 LEFT SHOULDER PAIN: ICD-10-CM

## 2017-08-22 DIAGNOSIS — M25.512 LEFT SHOULDER PAIN: Primary | ICD-10-CM

## 2017-08-22 PROCEDURE — 99203 OFFICE O/P NEW LOW 30 MIN: CPT | Mod: 25,S$GLB,, | Performed by: ORTHOPAEDIC SURGERY

## 2017-08-22 PROCEDURE — 3078F DIAST BP <80 MM HG: CPT | Mod: S$GLB,,, | Performed by: ORTHOPAEDIC SURGERY

## 2017-08-22 PROCEDURE — 1159F MED LIST DOCD IN RCRD: CPT | Mod: S$GLB,,, | Performed by: ORTHOPAEDIC SURGERY

## 2017-08-22 PROCEDURE — 20610 DRAIN/INJ JOINT/BURSA W/O US: CPT | Mod: LT,S$GLB,, | Performed by: ORTHOPAEDIC SURGERY

## 2017-08-22 PROCEDURE — 99499 UNLISTED E&M SERVICE: CPT | Mod: S$GLB,,, | Performed by: ORTHOPAEDIC SURGERY

## 2017-08-22 PROCEDURE — 1125F AMNT PAIN NOTED PAIN PRSNT: CPT | Mod: S$GLB,,, | Performed by: ORTHOPAEDIC SURGERY

## 2017-08-22 PROCEDURE — 99999 PR PBB SHADOW E&M-EST. PATIENT-LVL III: CPT | Mod: PBBFAC,,, | Performed by: ORTHOPAEDIC SURGERY

## 2017-08-22 PROCEDURE — 73030 X-RAY EXAM OF SHOULDER: CPT | Mod: TC,PO,LT

## 2017-08-22 PROCEDURE — 3008F BODY MASS INDEX DOCD: CPT | Mod: S$GLB,,, | Performed by: ORTHOPAEDIC SURGERY

## 2017-08-22 PROCEDURE — 3074F SYST BP LT 130 MM HG: CPT | Mod: S$GLB,,, | Performed by: ORTHOPAEDIC SURGERY

## 2017-08-22 PROCEDURE — 73030 X-RAY EXAM OF SHOULDER: CPT | Mod: 26,LT,, | Performed by: RADIOLOGY

## 2017-08-22 PROCEDURE — 1157F ADVNC CARE PLAN IN RCRD: CPT | Mod: S$GLB,,, | Performed by: ORTHOPAEDIC SURGERY

## 2017-08-22 RX ORDER — TRIAMCINOLONE ACETONIDE 40 MG/ML
40 INJECTION, SUSPENSION INTRA-ARTICULAR; INTRAMUSCULAR
Status: DISCONTINUED | OUTPATIENT
Start: 2017-08-22 | End: 2017-08-22 | Stop reason: HOSPADM

## 2017-08-22 RX ADMIN — TRIAMCINOLONE ACETONIDE 40 MG: 40 INJECTION, SUSPENSION INTRA-ARTICULAR; INTRAMUSCULAR at 04:08

## 2017-08-22 NOTE — PROGRESS NOTES
CC: LEFT shoulder pain     72 y.o. Female with a history of left shoulder pain.  She was last seen in clinic over 3 years ago and received a cortisone injection.  Reports is wore off over the last couple months. She tried physical therapy 3 years ago and it made her pain worse. She states that the pain is severe and not responding to any conservative care.      She is a diabetic and had an aortic valve replacement after it was damaged during radiation for breast cancer.    She reports that the pain and weakness is worse with overhead activity. It also bothers her at night.    Is affecting ADLs.  Pain is 6/10 at it's worst.      Past Medical History:   Diagnosis Date    Allergy     seasonal    Anxiety     Arthritis     Breast cancer 10/2012    left breast invasive ductal carcinoma    Colon polyp     Diabetes mellitus     Type 2    Diverticular disease     Diverticulitis 2009    Hyperlipidemia     Hypertension     Morbid obesity     MITCHELL (obstructive sleep apnea)     Stenosis     Stenosis and insufficiency of lacrimal passages     Thyroid disease        Past Surgical History:   Procedure Laterality Date    BREAST BIOPSY  10/1/2012    left breast- invasive ductal carcinoma    BREAST SURGERY      CARDIAC VALVE REPLACEMENT  12/2013    CARPAL TUNNEL RELEASE      Left    DILATION AND CURETTAGE OF UTERUS  1999    Endometrial polyps    ENDOMETRIAL ABLATION  1999    Enodmetrial polyps    EYE SURGERY      left lumpectomy  2012    SHOULDER SURGERY      SKIN BIOPSY         Family History   Problem Relation Age of Onset    Breast cancer Mother 62    Colon cancer Father     Cancer Father      Colon and Prostate CA    Cancer Maternal Grandfather      Colon CA    Ovarian cancer Neg Hx     Melanoma Neg Hx     Psoriasis Neg Hx     Lupus Neg Hx     Eczema Neg Hx     Acne Neg Hx          Current Outpatient Prescriptions:     alprazolam (XANAX) 0.25 MG tablet, One-two daily as needed for anxiety, Disp:  40 tablet, Rfl: 0    aspirin 81 MG Chew, Take 81 mg by mouth once daily., Disp: , Rfl:     blood sugar diagnostic Strp, TrueTest strips  - pt checks twice daily for uncontrolled glucoses E11.65, Disp: 200 strip, Rfl: 3    carvedilol (COREG) 25 MG tablet, .5-1 tablet oral twice daily or as directed., Disp: 180 tablet, Rfl: 3    CHOLECALCIFEROL, VITAMIN D3, (VITAMIN D3 ORAL), Take by mouth once daily. , Disp: , Rfl:     gabapentin (NEURONTIN) 300 MG capsule, Take 1 capsule (300 mg total) by mouth 2 (two) times daily., Disp: 180 capsule, Rfl: 3    hydrocodone-acetaminophen 5-325mg (NORCO) 5-325 mg per tablet, One - two tabs every 6-8 hours as needed for pain, Disp: 40 tablet, Rfl: 0    JANUVIA 100 mg Tab, TAKE 1 TABLET BY MOUTH once DAILY, Disp: 30 tablet, Rfl: 1    letrozole (FEMARA) 2.5 mg Tab, Take 1 tablet (2.5 mg total) by mouth once daily., Disp: 90 tablet, Rfl: 3    levothyroxine (SYNTHROID) 50 MCG tablet, Take 1 tablet (50 mcg total) by mouth every evening., Disp: 90 tablet, Rfl: 3    metformin (GLUCOPHAGE) 500 MG tablet, Take 1 tablet (500 mg total) by mouth 2 (two) times daily with meals., Disp: 180 tablet, Rfl: 3    ondansetron (ZOFRAN-ODT) 4 MG TbDL, One tablet as needed for nausea, Disp: 20 tablet, Rfl: 0    pravastatin (PRAVACHOL) 20 MG tablet, TAKE 1 TABLET ONE TIME DAILY, Disp: 90 tablet, Rfl: 3    metformin (GLUCOPHAGE) 500 MG tablet, TAKE 1 TABLET (500 MG TOTAL) BY MOUTH 2 (TWO) TIMES DAILY WITH MEALS., Disp: 180 tablet, Rfl: 3    Current Facility-Administered Medications:     EUFLEXXA injection Syrg 40 mg, 40 mg, Intra-articular, Weekly, Srinath Monge MD, 40 mg at 11/23/15 1054    Review of patient's allergies indicates:   Allergen Reactions    Hydromorphone      Other reaction(s): sedation    Byetta [exenatide] Rash     Other reaction(s): Rash          REVIEW OF SYSTEMS:  Constitution: Negative. Negative for chills, fever and night sweats.   HENT: Negative for congestion and  "headaches.    Eyes: Negative for blurred vision, left vision loss and right vision loss.   Cardiovascular: Negative for chest pain and syncope.   Respiratory: Negative for cough and shortness of breath.    Endocrine: Negative for polydipsia, polyphagia and polyuria.   Hematologic/Lymphatic: Negative for bleeding problem. Does not bruise/bleed easily.   Skin: Negative for dry skin, itching and rash.   Musculoskeletal: Negative for falls.  Positive for left shoulder pain and muscle weakness.   Gastrointestinal: Negative for abdominal pain and bowel incontinence.   Genitourinary: Negative for bladder incontinence and nocturia.   Neurological: Negative for disturbances in coordination, loss of balance and seizures.   Psychiatric/Behavioral: Negative for depression. The patient does not have insomnia.    Allergic/Immunologic: Negative for hives and persistent infections.      PHYSICAL EXAMINATION:  Vitals:  /70   Pulse 83   Ht 5' 2" (1.575 m)   Wt 114.8 kg (253 lb)   BMI 46.27 kg/m²    General: The patient is alert and oriented x 3.  Mood is pleasant.  Observation of ears, eyes and nose reveal no gross abnormalities.  No labored breathing observed.  Gait is coordinated. Patient can toe walk and heel walk without difficulty.      LEFT Shoulder / Upper Extremity Exam    OBSERVATION:     Swelling  none  Deformity  none   Discoloration  none   Scapular winging +   Scars   none  Atrophy  none    TENDERNESS / CREPITUS (T/C):          T/C      T/C   Clavicle   -/-  SUPRAspinatus    -/-     AC Jt.    -/-  INFRAspinatus  -/-    SC Jt.    -/-  Deltoid    -/-      G. Tuberosity  -/-  LH BICEP groove  +/-   Acromion:  -/-  Midline Neck   -/-     Scapular Spine -/-  Trapezium   -/-   SMA Scapula  -/-  GH jt. line - post  -/-     Scapulothoracic  -/-         ROM: (* = with pain)  Right shoulder   Left shoulder        AROM (PROM)   AROM (PROM)   FE    170° (175°)     90° (145°)     ER at 90° ABD  90°  (90°)    90°  " (90°)   IR (spine level)   T10     L5    STRENGTH: (* = with pain) Right shoulder   Left shoulder    SCAPTION   5/5    4/5    IR    5/5    4/5   ER    5/5    4/5   BICEPS   5/5    4/5   Deltoid    5/5    4+/5     SIGNS:  Painful side       NEER   +    OANGELAS  neg    REYES   +    SPEEDS  neg     DROP ARM   -   BELLY PRESS neg   Superior escape none    LIFT-OFF  neg   X-Body ADD    neg    MOVING VALGUS neg        STABILITY TESTING    Right shoulder   Left shoulder    Translation     Anterior  up face     up face    Posterior  up face    up face    Sulcus   < 10mm    < 10 mm     Signs   Apprehension   neg      neg       Relocation   no change     no change      Jerk test  neg     neg    EXTREMITY NEURO-VASCULAR EXAM:    Sensation grossly intact to light touch all dermatomal regions.    DTR 2+ Biceps, Triceps, BR and Negative Rashards sign   Grossly intact motor function at Elbow, Wrist and Hand   Distal pulses radial and ulnar 2+, brisk cap refill, symmetric.      NECK:  Painless FROM and spinous processes non-tender. Negative Spurlings sign.      OTHER FINDINGS:  + scapular dyskinesia    XRAYS (8/22/17): No acute abnormality . Chronic DJD.       ASSESSMENT:    Left shoulder pain    PLAN:      1. Cortisone injection  2. Follow up as eneded    All questions were answered, patient will contact us for questions or concerns in the interim.

## 2017-08-22 NOTE — PROCEDURES
Large Joint Aspiration/Injection  Date/Time: 8/22/2017 4:12 PM  Performed by: DEDRICK MONIQUE  Authorized by: DEDRICK MONIQUE     Consent Done?:  Yes (Verbal)  Indications:  Pain  Procedure site marked: Yes    Timeout: Prior to procedure the correct patient, procedure, and site was verified      Location:  Shoulder  Site:  L subacromial bursa  Prep: Patient was prepped and draped in usual sterile fashion    Ultrasonic Guidance for needle placement: No  Needle size:  22 G  Approach:  Posterior  Medications:  40 mg triamcinolone acetonide 40 mg/mL  Patient tolerance:  Patient tolerated the procedure well with no immediate complications

## 2017-08-29 ENCOUNTER — PATIENT MESSAGE (OUTPATIENT)
Dept: INTERNAL MEDICINE | Facility: CLINIC | Age: 72
End: 2017-08-29

## 2017-08-29 DIAGNOSIS — F41.9 ANXIETY: ICD-10-CM

## 2017-08-29 RX ORDER — ALPRAZOLAM 0.25 MG/1
TABLET ORAL
Qty: 40 TABLET | Refills: 0 | Status: SHIPPED | OUTPATIENT
Start: 2017-09-01 | End: 2017-12-01 | Stop reason: SDUPTHER

## 2017-08-31 ENCOUNTER — OFFICE VISIT (OUTPATIENT)
Dept: INTERNAL MEDICINE | Facility: CLINIC | Age: 72
End: 2017-08-31
Payer: MEDICARE

## 2017-08-31 VITALS
DIASTOLIC BLOOD PRESSURE: 61 MMHG | HEART RATE: 78 BPM | OXYGEN SATURATION: 98 % | SYSTOLIC BLOOD PRESSURE: 128 MMHG | HEIGHT: 62 IN | BODY MASS INDEX: 44.9 KG/M2 | WEIGHT: 244 LBS

## 2017-08-31 DIAGNOSIS — R41.3 MEMORY CHANGE: Primary | ICD-10-CM

## 2017-08-31 DIAGNOSIS — M89.9 DISORDER OF BONE: ICD-10-CM

## 2017-08-31 DIAGNOSIS — M85.80 OSTEOPENIA, UNSPECIFIED LOCATION: ICD-10-CM

## 2017-08-31 DIAGNOSIS — K57.92 DIVERTICULITIS OF INTESTINE, UNSPECIFIED BLEEDING STATUS, UNSPECIFIED COMPLICATION STATUS, UNSPECIFIED PART OF INTESTINAL TRACT: ICD-10-CM

## 2017-08-31 PROCEDURE — 3008F BODY MASS INDEX DOCD: CPT | Mod: S$GLB,,, | Performed by: INTERNAL MEDICINE

## 2017-08-31 PROCEDURE — 3078F DIAST BP <80 MM HG: CPT | Mod: S$GLB,,, | Performed by: INTERNAL MEDICINE

## 2017-08-31 PROCEDURE — 99213 OFFICE O/P EST LOW 20 MIN: CPT | Mod: S$GLB,,, | Performed by: INTERNAL MEDICINE

## 2017-08-31 PROCEDURE — 1157F ADVNC CARE PLAN IN RCRD: CPT | Mod: S$GLB,,, | Performed by: INTERNAL MEDICINE

## 2017-08-31 PROCEDURE — 3074F SYST BP LT 130 MM HG: CPT | Mod: S$GLB,,, | Performed by: INTERNAL MEDICINE

## 2017-08-31 PROCEDURE — 1125F AMNT PAIN NOTED PAIN PRSNT: CPT | Mod: S$GLB,,, | Performed by: INTERNAL MEDICINE

## 2017-08-31 PROCEDURE — 99999 PR PBB SHADOW E&M-EST. PATIENT-LVL IV: CPT | Mod: PBBFAC,,, | Performed by: INTERNAL MEDICINE

## 2017-08-31 PROCEDURE — 1159F MED LIST DOCD IN RCRD: CPT | Mod: S$GLB,,, | Performed by: INTERNAL MEDICINE

## 2017-08-31 RX ORDER — TRIAMCINOLONE ACETONIDE 1 MG/G
CREAM TOPICAL 2 TIMES DAILY
Qty: 45 G | Refills: 0 | Status: SHIPPED | OUTPATIENT
Start: 2017-08-31 | End: 2018-02-04

## 2017-09-07 ENCOUNTER — TELEPHONE (OUTPATIENT)
Dept: ENDOSCOPY | Facility: HOSPITAL | Age: 72
End: 2017-09-07

## 2017-09-07 DIAGNOSIS — Z12.11 SPECIAL SCREENING FOR MALIGNANT NEOPLASMS, COLON: Primary | ICD-10-CM

## 2017-09-07 RX ORDER — SODIUM, POTASSIUM,MAG SULFATES 17.5-3.13G
1 SOLUTION, RECONSTITUTED, ORAL ORAL ONCE
Qty: 1 BOTTLE | Refills: 0 | Status: SHIPPED | OUTPATIENT
Start: 2017-09-07 | End: 2017-09-07

## 2017-09-07 NOTE — TELEPHONE ENCOUNTER
Patient is scheduled for a colonoscopy on 9/29/17.  She is concerned that she needs prophylactic antibiotics because of her valve replacement.  Please advise.  Thank you.

## 2017-09-08 ENCOUNTER — TELEPHONE (OUTPATIENT)
Dept: INTERNAL MEDICINE | Facility: CLINIC | Age: 72
End: 2017-09-08

## 2017-09-08 NOTE — PROGRESS NOTES
Subjective:       Patient ID: Alina Narayan is a 72 y.o. female.    Chief Complaint: Follow-up    HPIPt had diverticulitis with symptoms now resolved.  Under stress with family issues - feels memory not as good.  No CP or SOB.  Review of Systems   Respiratory: Negative for shortness of breath (PND or orthopnea).    Cardiovascular: Negative for chest pain (arm pain or jaw pain).   Gastrointestinal: Negative for abdominal pain, diarrhea, nausea and vomiting.   Genitourinary: Negative for dysuria.   Neurological: Negative for seizures, syncope and headaches.       Objective:      Physical Exam   Constitutional: She is oriented to person, place, and time. She appears well-developed and well-nourished. No distress.   HENT:   Head: Normocephalic.   Mouth/Throat: Oropharynx is clear and moist.   Neck: Neck supple. No JVD present. No thyromegaly present.   Cardiovascular: Normal rate, regular rhythm, normal heart sounds and intact distal pulses.  Exam reveals no gallop and no friction rub.    No murmur heard.  Pulmonary/Chest: Effort normal and breath sounds normal. She has no wheezes. She has no rales.   Abdominal: Soft. Bowel sounds are normal. She exhibits no distension and no mass. There is no tenderness. There is no rebound and no guarding.   Musculoskeletal: She exhibits no edema.   Lymphadenopathy:     She has no cervical adenopathy.   Neurological: She is alert and oriented to person, place, and time. She has normal reflexes.   Skin: Skin is warm and dry.   Psychiatric: She has a normal mood and affect. Her behavior is normal. Judgment and thought content normal.       Assessment:       1. Memory change    2. Diverticulitis of intestine, unspecified bleeding status, unspecified complication status, unspecified part of intestinal tract    3. Osteopenia, unspecified location    4. Disorder of bone         Plan:   Memory change  -     Vitamin B12; Future; Expected date: 08/31/2017  -     Methylmalonic acid, serum;  Future; Expected date: 08/31/2017    Diverticulitis of intestine, unspecified bleeding status, unspecified complication status, unspecified part of intestinal tract  -     Case request GI: COLONOSCOPY    Osteopenia, unspecified location  -      DXA Bone Density Spine And Hip; Future; Expected date: 08/31/2017    Disorder of bone   -     : DXA Bone Density Spine And Hip; Future; Expected date: 08/31/2017    Other orders  -     triamcinolone acetonide 0.1% (KENALOG) 0.1 % cream; Apply topically 2 (two) times daily.  Dispense: 45 g; Refill: 0

## 2017-09-08 NOTE — TELEPHONE ENCOUNTER
Called pt back and she states that [facility] says [facility] don't carry her glucose meter any more which means she has to change her meter and strips. Pt needs new Rx for a new meter and test strips sent to [facility]

## 2017-09-11 ENCOUNTER — LAB VISIT (OUTPATIENT)
Dept: LAB | Facility: HOSPITAL | Age: 72
End: 2017-09-11
Attending: INTERNAL MEDICINE
Payer: MEDICARE

## 2017-09-11 ENCOUNTER — OFFICE VISIT (OUTPATIENT)
Dept: INTERNAL MEDICINE | Facility: CLINIC | Age: 72
End: 2017-09-11
Payer: MEDICARE

## 2017-09-11 VITALS
DIASTOLIC BLOOD PRESSURE: 70 MMHG | SYSTOLIC BLOOD PRESSURE: 118 MMHG | WEIGHT: 247.81 LBS | HEART RATE: 89 BPM | TEMPERATURE: 98 F | BODY MASS INDEX: 45.6 KG/M2 | OXYGEN SATURATION: 98 % | HEIGHT: 62 IN

## 2017-09-11 DIAGNOSIS — R41.3 MEMORY CHANGE: ICD-10-CM

## 2017-09-11 DIAGNOSIS — Z95.2 S/P AVR: ICD-10-CM

## 2017-09-11 DIAGNOSIS — E66.9 DIABETES MELLITUS TYPE 2 IN OBESE: ICD-10-CM

## 2017-09-11 DIAGNOSIS — Z00.00 ENCOUNTER FOR PREVENTIVE HEALTH EXAMINATION: Primary | ICD-10-CM

## 2017-09-11 DIAGNOSIS — H90.5 SENSORINEURAL HEARING LOSS (SNHL), UNSPECIFIED LATERALITY: ICD-10-CM

## 2017-09-11 DIAGNOSIS — Z80.0 FAMILY HISTORY OF COLON CANCER: ICD-10-CM

## 2017-09-11 DIAGNOSIS — E78.2 MIXED HYPERLIPIDEMIA: ICD-10-CM

## 2017-09-11 DIAGNOSIS — E03.4 HYPOTHYROIDISM DUE TO ACQUIRED ATROPHY OF THYROID: ICD-10-CM

## 2017-09-11 DIAGNOSIS — C50.912 CANCER OF LEFT BREAST, STAGE 1, ESTROGEN RECEPTOR POSITIVE: ICD-10-CM

## 2017-09-11 DIAGNOSIS — E08.22 DIABETES MELLITUS DUE TO UNDERLYING CONDITION WITH STAGE 3 CHRONIC KIDNEY DISEASE, WITHOUT LONG-TERM CURRENT USE OF INSULIN: ICD-10-CM

## 2017-09-11 DIAGNOSIS — E66.01 MORBID OBESITY, UNSPECIFIED OBESITY TYPE: ICD-10-CM

## 2017-09-11 DIAGNOSIS — Z17.0 CANCER OF LEFT BREAST, STAGE 1, ESTROGEN RECEPTOR POSITIVE: ICD-10-CM

## 2017-09-11 DIAGNOSIS — N18.30 DIABETES MELLITUS DUE TO UNDERLYING CONDITION WITH STAGE 3 CHRONIC KIDNEY DISEASE, WITHOUT LONG-TERM CURRENT USE OF INSULIN: ICD-10-CM

## 2017-09-11 DIAGNOSIS — G47.33 OBSTRUCTIVE SLEEP APNEA SYNDROME: ICD-10-CM

## 2017-09-11 DIAGNOSIS — N18.30 CHRONIC KIDNEY DISEASE, STAGE III (MODERATE): ICD-10-CM

## 2017-09-11 DIAGNOSIS — M51.37 DEGENERATION OF LUMBAR OR LUMBOSACRAL INTERVERTEBRAL DISC: ICD-10-CM

## 2017-09-11 DIAGNOSIS — I10 ESSENTIAL HYPERTENSION: ICD-10-CM

## 2017-09-11 DIAGNOSIS — E11.69 DIABETES MELLITUS TYPE 2 IN OBESE: ICD-10-CM

## 2017-09-11 LAB — VIT B12 SERPL-MCNC: 443 PG/ML

## 2017-09-11 PROCEDURE — 83921 ORGANIC ACID SINGLE QUANT: CPT

## 2017-09-11 PROCEDURE — G0439 PPPS, SUBSEQ VISIT: HCPCS | Mod: S$GLB,,, | Performed by: NURSE PRACTITIONER

## 2017-09-11 PROCEDURE — 82607 VITAMIN B-12: CPT

## 2017-09-11 PROCEDURE — 99499 UNLISTED E&M SERVICE: CPT | Mod: S$GLB,,, | Performed by: NURSE PRACTITIONER

## 2017-09-11 PROCEDURE — 99999 PR PBB SHADOW E&M-EST. PATIENT-LVL V: CPT | Mod: PBBFAC,,, | Performed by: NURSE PRACTITIONER

## 2017-09-11 NOTE — PATIENT INSTRUCTIONS
Counseling and Referral of Other Preventative  (Italic type indicates deductible and co-insurance are waived)    Patient Name: Alina Narayan  Today's Date: 9/11/2017      SERVICE LIMITATIONS RECOMMENDATION    Vaccines    · Pneumococcal (once after 65)    · Influenza (annually)    · Hepatitis B (if medium/high risk)    · Prevnar 13      Hepatitis B medium/high risk factors:       - End-stage renal disease       - Hemophiliacs who received Factor VII or         IX concentrates       - Clients of institutions for the mentally             retarded       - Persons who live in the same house as          a HepB carrier       - Homosexual men       - Illicit injectable drug abusers     Pneumococcal: Done, no repeat necessary     Influenza: Done, repeat in one year     Hepatitis B: N/A     Prevnar 13: Done, no repeat necessary    Mammogram (biennial age 50-74)  Annually (age 40 or over)  Last done 11/29/2016, recommend to repeat every 1  years    Pap (up to age 70 and after 70 if unknown history or abnormal study last 10 years)    N/A     The USPSTF recommends against screening for cervical cancer in women older than age 65 years who have had adequate prior screening and are not otherwise at high risk for cervical cancer.      Colorectal cancer screening (to age 75)    · Fecal occult blood test (annual)  · Flexible sigmoidoscopy (5y)  · Screening colonoscopy (10y)  · Barium enema   Scheduled.    Diabetes self-management training (no USPSTF recommendations)  Requires referral by treating physician for patient with diabetes or renal disease. 10 hours of initial DSMT sessions of no less than 30 minutes each in a continuous 12-month period. 2 hours of follow-up DSMT in subsequent years.  Done this year, repeat every year    Bone mass measurements (age 65 & older, biennial)  Requires diagnosis related to osteoporosis or estrogen deficiency. Biennial benefit unless patient has history of long-term glucocorticoid  Last done  6/6/2017, recommend to repeat every 1-2  years    Glaucoma screening (no USPSTF recommendation)  Diabetes mellitus, family history   , age 50 or over    American, age 65 or over  Scheduled, see appointments    Medical nutrition therapy for diabetes or renal disease (no recommended schedule)  Requires referral by treating physician for patient with diabetes or renal disease or kidney transplant within the past 3 years.  Can be provided in same year as diabetes self-management training (DSMT), and CMS recommends medical nutrition therapy take place after DSMT. Up to 3 hours for initial year and 2 hours in subsequent years.  Done this year, repeat every year    Cardiovascular screening blood tests (every 5 years)  · Fasting lipid panel  Order as a panel if possible  Done this year, repeat every year    Diabetes screening tests (at least every 3 years, Medicare covers annually or at 6-month intervals for prediabetic patients)  · Fasting blood sugar (FBS) or glucose tolerance test (GTT)  Patient must be diagnosed with one of the following:       - Hypertension       - Dyslipidemia       - Obesity (BMI 30kg/m2)       - Previous elevated impaired FBS or GTT       ... or any two of the following:       - Overweight (BMI 25 but <30)       - Family history of diabetes       - Age 65 or older       - History of gestational diabetes or birth of baby weighing more than 9 pounds  Done this year, repeat every year    HIV screening (annually for increased risk patients)  · HIV-1 and HIV-2 by EIA, or LIZBETH, rapid antibody test or oral mucosa transudate  Patients must be at increased risk for HIV infection per USPSTF guidelines or pregnant. Tests covered annually for patient at increased risk or as requested by the patient. Pregnant patients may receive up to 3 tests during pregnancy.  Risks discussed, screening is not recommended    Smoking cessation counseling (up to 8 sessions per year)  Patients must be  asymptomatic of tobacco-related conditions to receive as a preventative service.  Non-smoker    Subsequent annual wellness visit  At least 12 months since last AWV  Return in one year     The following information is provided to all patients.  This information is to help you find resources for any of the problems found today that may be affecting your health:                Living healthy guide: www.[facility].[place].[facility]      Understanding Diabetes: www.diabetes.org      Eating healthy: www.cdc.gov/healthyweight      CDC home safety checklist: www.cdc.gov/steadi/patient.html      Agency on Aging: www.goea.[place].[facility]      Alcoholics anonymous (AA): www.aa.org      Physical Activity: www.mervin.nih.gov/vp7wquz      Tobacco use: www.quitwithusla.org

## 2017-09-12 ENCOUNTER — TELEPHONE (OUTPATIENT)
Dept: ENDOSCOPY | Facility: HOSPITAL | Age: 72
End: 2017-09-12

## 2017-09-12 PROBLEM — N18.30 CHRONIC KIDNEY DISEASE, STAGE III (MODERATE): Status: ACTIVE | Noted: 2017-09-12

## 2017-09-12 PROBLEM — N18.30 DIABETES MELLITUS DUE TO UNDERLYING CONDITION WITH STAGE 3 CHRONIC KIDNEY DISEASE, WITHOUT LONG-TERM CURRENT USE OF INSULIN: Status: ACTIVE | Noted: 2017-09-12

## 2017-09-12 PROBLEM — E08.22 DIABETES MELLITUS DUE TO UNDERLYING CONDITION WITH STAGE 3 CHRONIC KIDNEY DISEASE, WITHOUT LONG-TERM CURRENT USE OF INSULIN: Status: ACTIVE | Noted: 2017-09-12

## 2017-09-12 NOTE — TELEPHONE ENCOUNTER
Contacted patient and notified of Dr Palm's answer regarding prophylactic antibiotics.  Stated understanding.  No further questions at this time.

## 2017-09-12 NOTE — PROGRESS NOTES
"Alina Narayan presented for a  Medicare AWV and comprehensive Health Risk Assessment today. The following components were reviewed and updated:    · Medical history  · Family History  · Social history  · Allergies and Current Medications  · Health Risk Assessment  · Health Maintenance  · Care Team     ** See Completed Assessments for Annual Wellness Visit within the encounter summary.**       The following assessments were completed:  · Living Situation  · CAGE  · Depression Screening  · Timed Get Up and Go  · Whisper Test  · Cognitive Function Screening  · Nutrition Screening  · ADL Screening  · PAQ Screening    Vitals:    09/11/17 1407   BP: 118/70   Pulse: 89   Temp: 98 °F (36.7 °C)   SpO2: 98%   Weight: 112.4 kg (247 lb 12.8 oz)   Height: 5' 2" (1.575 m)     Body mass index is 45.32 kg/m².  Physical Exam   Constitutional: She is oriented to person, place, and time. She appears well-developed.   obese   HENT:   Head: Normocephalic and atraumatic.   Nose: Nose normal.   Eyes: Conjunctivae and EOM are normal.   Neck: Normal range of motion. Neck supple.   Cardiovascular: Normal rate, regular rhythm, normal heart sounds and intact distal pulses.    No murmur heard.  Pulmonary/Chest: Effort normal and breath sounds normal.   Musculoskeletal:   Knee pain bilaterally   Neurological: She is alert and oriented to person, place, and time.   Skin: Skin is warm and dry.   Psychiatric: She has a normal mood and affect. Her behavior is normal. Judgment and thought content normal.   Nursing note and vitals reviewed.        Diagnoses and health risks identified today and associated recommendations/orders:    1. Encounter for preventive health examination  Assessment performed. Health maintenance updated. Chart review completed.    2. Degeneration of lumbar or lumbosacral intervertebral disc  Chronic. Stable. Followed by Orthopedics.    3. Cancer of left breast, stage 1, estrogen receptor positive  Stable with current medication " regimen. Followed by Hematology Oncology.    4. Sensorineural hearing loss (SNHL), unspecified laterality  Whisper test normal today. Last seen by Audiology 2014. Patient does not wish to follow up at this time.    5. Morbid obesity, unspecified obesity type  Chronic. Currently limiting calories to approx 1100. Followed by Bariatric Medicine.    6. Essential hypertension  Chronic. Stable on current regimen. Followed by PCP.    7. Mixed hyperlipidemia  Chronic. Stable on current regimen. Followed by PCP.    8. Obstructive sleep apnea syndrome  Chronic. Stable with CPAP.  Followed by Sleep Medicine.    9. S/P AVR  Stable. Followed by Cardiology.    10. Hypothyroidism due to acquired atrophy of thyroid  Chronic. Stable on current regimen. Followed by PCP.    11. Family history of colon cancer  Last colonoscopy 8/28/2015. Next study has been scheduled. Followed by PCP.    12. Diabetes mellitus type 2 in obese  Chronic. Last A1C improved at 7.0. Stable on current regimen. Encouragement provided. Followed by PCP.    13. Chronic kidney disease, stage III (moderate)  Chronic. Last creatinine 1.0. Stable on current regimen. Followed by PCP.    14. Diabetes mellitus due to underlying condition with stage 3 chronic kidney disease, without long-term current use of insulin  Chronic. Stable on current regimen. See above. Followed by PCP.      Provided Alina with a 5-10 year written screening schedule and personal prevention plan. Recommendations were developed using the USPSTF age appropriate recommendations. Education, counseling, and referrals were provided as needed. After Visit Summary printed and given to patient which includes a list of additional screenings\tests needed.    Return for follow up with Primary Care Provider as instructed, ;sooner if problems, HRA in 1 year.    RICHY Ferreira

## 2017-09-14 ENCOUNTER — TELEPHONE (OUTPATIENT)
Dept: INTERNAL MEDICINE | Facility: CLINIC | Age: 72
End: 2017-09-14

## 2017-09-14 NOTE — TELEPHONE ENCOUNTER
----- Message from Maicol Merchant sent at 9/14/2017  1:49 PM CDT -----  Contact: Self [phone]  Pt is requesting a call back regarding her Meter & test strips, stated that she's completely out, advice    Thanks

## 2017-09-15 LAB — METHYLMALONATE SERPL-SCNC: 0.18 UMOL/L

## 2017-09-18 ENCOUNTER — TELEPHONE (OUTPATIENT)
Dept: SURGERY | Facility: CLINIC | Age: 72
End: 2017-09-18

## 2017-09-18 ENCOUNTER — TELEPHONE (OUTPATIENT)
Dept: INTERNAL MEDICINE | Facility: CLINIC | Age: 72
End: 2017-09-18

## 2017-09-18 DIAGNOSIS — Z85.3 PERSONAL HISTORY OF BREAST CANCER: Primary | ICD-10-CM

## 2017-09-18 RX ORDER — INSULIN PUMP SYRINGE, 3 ML
EACH MISCELLANEOUS
Qty: 1 EACH | Refills: 0 | Status: SHIPPED | OUTPATIENT
Start: 2017-09-18 | End: 2020-03-25 | Stop reason: SDUPTHER

## 2017-09-18 NOTE — TELEPHONE ENCOUNTER
----- Message from Leticia Ritchie sent at 9/18/2017 12:36 PM CDT -----  Contact: self   Alina States that she needs a call returned by a nurse in reference to having an order to schedule her mammogram and also seeing ga. Please call Alina @ [phone].                                                               Thanks :)

## 2017-09-18 NOTE — TELEPHONE ENCOUNTER
----- Message from Anurag Tobin sent at 9/18/2017  9:41 AM CDT -----  Contact: Self [phone]    Pt is requesting a call back regarding her Meter & test strips, stated that she's completely out, patient stated she need medications sent to [facility] mail order  advice      Thanks

## 2017-09-18 NOTE — TELEPHONE ENCOUNTER
Returned the patient's call regarding the message below.  The patient is scheduled to be seen on Monday 12/4/17 1:30 pm breast exam with Sonali Patel NP and 2 pm mammogram.  The patient voiced understanding of appointment date and times.  Reminder letter mailed to the patient.

## 2017-09-29 ENCOUNTER — ANESTHESIA (OUTPATIENT)
Dept: ENDOSCOPY | Facility: HOSPITAL | Age: 72
End: 2017-09-29
Payer: MEDICARE

## 2017-09-29 ENCOUNTER — HOSPITAL ENCOUNTER (OUTPATIENT)
Facility: HOSPITAL | Age: 72
Discharge: HOME | End: 2017-09-29
Attending: COLON & RECTAL SURGERY | Admitting: COLON & RECTAL SURGERY
Payer: MEDICARE

## 2017-09-29 ENCOUNTER — ANESTHESIA EVENT (OUTPATIENT)
Dept: ENDOSCOPY | Facility: HOSPITAL | Age: 72
End: 2017-09-29
Payer: MEDICARE

## 2017-09-29 ENCOUNTER — SURGERY (OUTPATIENT)
Age: 72
End: 2017-09-29

## 2017-09-29 VITALS
TEMPERATURE: 98 F | BODY MASS INDEX: 44.53 KG/M2 | DIASTOLIC BLOOD PRESSURE: 72 MMHG | HEART RATE: 64 BPM | HEIGHT: 62 IN | OXYGEN SATURATION: 98 % | RESPIRATION RATE: 16 BRPM | WEIGHT: 242 LBS | RESPIRATION RATE: 48 BRPM | SYSTOLIC BLOOD PRESSURE: 136 MMHG

## 2017-09-29 DIAGNOSIS — Z12.11 SPECIAL SCREENING FOR MALIGNANT NEOPLASMS, COLON: ICD-10-CM

## 2017-09-29 DIAGNOSIS — Z80.0 FAMILY HISTORY OF COLON CANCER: Primary | ICD-10-CM

## 2017-09-29 LAB — POCT GLUCOSE: 130 MG/DL (ref 70–110)

## 2017-09-29 PROCEDURE — 45380 COLONOSCOPY AND BIOPSY: CPT | Performed by: COLON & RECTAL SURGERY

## 2017-09-29 PROCEDURE — 45385 COLONOSCOPY W/LESION REMOVAL: CPT | Mod: PT,,, | Performed by: COLON & RECTAL SURGERY

## 2017-09-29 PROCEDURE — 25000003 PHARM REV CODE 250: Performed by: COLON & RECTAL SURGERY

## 2017-09-29 PROCEDURE — 37000009 HC ANESTHESIA EA ADD 15 MINS: Performed by: COLON & RECTAL SURGERY

## 2017-09-29 PROCEDURE — 63600175 PHARM REV CODE 636 W HCPCS: Performed by: NURSE ANESTHETIST, CERTIFIED REGISTERED

## 2017-09-29 PROCEDURE — 37000008 HC ANESTHESIA 1ST 15 MINUTES: Performed by: COLON & RECTAL SURGERY

## 2017-09-29 PROCEDURE — 45380 COLONOSCOPY AND BIOPSY: CPT | Mod: 59,,, | Performed by: COLON & RECTAL SURGERY

## 2017-09-29 PROCEDURE — 88305 TISSUE EXAM BY PATHOLOGIST: CPT | Mod: 26,,, | Performed by: PATHOLOGY

## 2017-09-29 PROCEDURE — 45385 COLONOSCOPY W/LESION REMOVAL: CPT | Performed by: COLON & RECTAL SURGERY

## 2017-09-29 PROCEDURE — 82962 GLUCOSE BLOOD TEST: CPT | Performed by: COLON & RECTAL SURGERY

## 2017-09-29 PROCEDURE — 27201012 HC FORCEPS, HOT/COLD, DISP: Performed by: COLON & RECTAL SURGERY

## 2017-09-29 PROCEDURE — 27201089 HC SNARE, DISP (ANY): Performed by: COLON & RECTAL SURGERY

## 2017-09-29 PROCEDURE — D9220A PRA ANESTHESIA: Mod: PT,ANES,, | Performed by: ANESTHESIOLOGY

## 2017-09-29 PROCEDURE — 88305 TISSUE EXAM BY PATHOLOGIST: CPT | Mod: 59 | Performed by: PATHOLOGY

## 2017-09-29 PROCEDURE — S5010 5% DEXTROSE AND 0.45% SALINE: HCPCS | Performed by: COLON & RECTAL SURGERY

## 2017-09-29 PROCEDURE — D9220A PRA ANESTHESIA: Mod: PT,CRNA,, | Performed by: NURSE ANESTHETIST, CERTIFIED REGISTERED

## 2017-09-29 RX ORDER — PROPOFOL 10 MG/ML
VIAL (ML) INTRAVENOUS
Status: DISCONTINUED | OUTPATIENT
Start: 2017-09-29 | End: 2017-09-29

## 2017-09-29 RX ORDER — DEXTROSE MONOHYDRATE AND SODIUM CHLORIDE 5; .45 G/100ML; G/100ML
INJECTION, SOLUTION INTRAVENOUS CONTINUOUS
Status: DISCONTINUED | OUTPATIENT
Start: 2017-09-29 | End: 2017-09-29 | Stop reason: HOSPADM

## 2017-09-29 RX ORDER — PROPOFOL 10 MG/ML
VIAL (ML) INTRAVENOUS CONTINUOUS PRN
Status: DISCONTINUED | OUTPATIENT
Start: 2017-09-29 | End: 2017-09-29

## 2017-09-29 RX ORDER — LIDOCAINE HCL/PF 100 MG/5ML
SYRINGE (ML) INTRAVENOUS
Status: DISCONTINUED | OUTPATIENT
Start: 2017-09-29 | End: 2017-09-29

## 2017-09-29 RX ADMIN — DEXTROSE AND SODIUM CHLORIDE: 5; .45 INJECTION, SOLUTION INTRAVENOUS at 10:09

## 2017-09-29 RX ADMIN — PROPOFOL 100 MG: 10 INJECTION, EMULSION INTRAVENOUS at 10:09

## 2017-09-29 RX ADMIN — PROPOFOL 175 MCG/KG/MIN: 10 INJECTION, EMULSION INTRAVENOUS at 10:09

## 2017-09-29 RX ADMIN — LIDOCAINE HYDROCHLORIDE 100 MG: 20 INJECTION, SOLUTION INTRAVENOUS at 10:09

## 2017-09-29 NOTE — PATIENT INSTRUCTIONS
Discharge Summary/Instructions after an Endoscopic Procedure  Patient Name: Alina Narayan  Patient MRN: [id number]  Patient YOB: 1945 Friday, September 29, 2017  Phani Worley MD  RESTRICTIONS:  During your procedure today, you received medications for sedation.  These   medications may affect your judgment, balance and coordination.  Therefore,   for 24 hours, you have the following restrictions:   - DO NOT drive a car, operate machinery, make legal/financial decisions,   sign important papers or drink alcohol.    ACTIVITY:  The following day: return to full activity including work, except no heavy   lifting, straining or running for 3 days if polyps were removed.  DIET:  Eat and drink normally unless instructed otherwise.  TREATMENT FOR COMMON SIDE EFFECTS:  - Mild abdominal pain, belching, bloating or excessive gas: rest, eat   lightly and use a heating pad.  - Sore Throat: treat with throat lozenges and/or gargle with warm salt   water.  SYMPTOMS TO WATCH FOR AND REPORT TO YOUR PHYSICIAN:  1. Abdominal pain or bloating, other than gas cramps.  2. Chest pain.  3. Back pain.  4. Chills or fever occurring within 24 hours after the procedure.  5. Rectal bleeding, which would show as bright red, maroon, or black stools.   (A tablespoon of blood from the rectum is not serious, especially if   hemorrhoids are present.)  6. Vomiting.  7. Weakness or dizziness.  8. Because air was used during the procedure, expelling large amounts of air   from your rectum or belching is normal.  9. If a bowel prep was taken, you may not have a bowel movement for 1-3   days.  This is normal.  GO DIRECTLY TO THE EMERGENCY ROOM IF YOU HAVE ANY OF THE FOLLOWING:   Difficulty breathing   Chills and/or fever over 101 F   Persistent vomiting and/or vomiting blood   Severe abdominal pain   Severe chest pain   Black, tarry stools   Bleeding- more than one tablespoon  Your doctor recommends these additional instructions:  If any biopsies  were taken, your doctors clinic will call you in 1 to 2   weeks with any results.  You are being discharged to home.   Your physician has recommended a repeat colonoscopy in three years for   surveillance.  For questions, problems or results please call your physician - Phani Worley MD at Work:  [phone].  [facility], EMERGENCY ROOM PHONE NUMBER: [phone]  IF A COMPLICATION OR EMERGENCY SITUATION ARISES AND YOU ARE UNABLE TO REACH   YOUR PHYSICIAN - GO DIRECTLY TO THE EMERGENCY ROOM.  Phani Worley MD  9/29/2017 11:04:59 AM  This report has been verified and signed electronically.

## 2017-09-29 NOTE — ANESTHESIA POSTPROCEDURE EVALUATION
"Anesthesia Post Evaluation    Patient: Alina Narayan    Procedure(s) Performed: Procedure(s) (LRB):  COLONOSCOPY (N/A)    Final Anesthesia Type: general  Patient location during evaluation: GI PACU  Patient participation: Yes- Able to Participate  Level of consciousness: awake and alert, awake and oriented  Post-procedure vital signs: reviewed and stable  Pain management: adequate  Airway patency: patent  PONV status at discharge: No PONV  Anesthetic complications: no      Cardiovascular status: stable  Respiratory status: unassisted, spontaneous ventilation and room air  Hydration status: euvolemic  Follow-up not needed.        Visit Vitals  /72   Pulse 64   Temp 36.7 °C (98.1 °F)   Resp 16   Ht 5' 2" (1.575 m)   Wt 109.8 kg (242 lb)   SpO2 98%   Breastfeeding? No   BMI 44.26 kg/m²       Pain/Lin Score: Pain Assessment Performed: Yes (9/29/2017 10:09 AM)  Presence of Pain: denies (9/29/2017 10:09 AM)  Lin Score: 10 (9/29/2017 11:44 AM)      "

## 2017-09-29 NOTE — ANESTHESIA RELEASE NOTE
"Anesthesia Release from PACU Note    Patient: Alina Narayan    Procedure(s) Performed: Procedure(s) (LRB):  COLONOSCOPY (N/A)    Anesthesia type: general    Post pain: Adequate analgesia    Post assessment: no apparent anesthetic complications    Last Vitals:   Visit Vitals  /72   Pulse 64   Temp 36.7 °C (98.1 °F)   Resp 16   Ht 5' 2" (1.575 m)   Wt 109.8 kg (242 lb)   SpO2 98%   Breastfeeding? No   BMI 44.26 kg/m²       Post vital signs: stable    Level of consciousness: awake    Nausea/Vomiting: no nausea/no vomiting    Complications: none    Airway Patency: patent    Respiratory: unassisted    Cardiovascular: stable and blood pressure at baseline    Hydration: euvolemic  "

## 2017-09-29 NOTE — TRANSFER OF CARE
"Anesthesia Transfer of Care Note    Patient: Alina Narayan    Procedure(s) Performed: Procedure(s) (LRB):  COLONOSCOPY (N/A)    Patient location: PACU    Anesthesia Type: general    Transport from OR: Transported from OR on 6-10 L/min O2 by face mask with adequate spontaneous ventilation    Post pain: adequate analgesia    Post vital signs: stable    Level of consciousness: awake    Nausea/Vomiting: no nausea/vomiting    Complications: none    Transfer of care protocol was followed      Last vitals:   Visit Vitals  BP (!) 154/72 (BP Location: Left arm, Patient Position: Sitting)   Pulse 98   Temp 37.1 °C (98.7 °F)   Resp 17   Ht 5' 2" (1.575 m)   Wt 109.8 kg (242 lb)   SpO2 99%   Breastfeeding? No   BMI 44.26 kg/m²     "

## 2017-09-29 NOTE — ANESTHESIA PREPROCEDURE EVALUATION
09/29/2017  Alina Narayan is a 72 y.o., female.    Pre-op Assessment    I have reviewed the Patient Summary Reports.     I have reviewed the Nursing Notes.      Review of Systems  Anesthesia Hx:  No problems with previous Anesthesia    Cardiovascular:   Hypertension ECG has been reviewed. ECHO reviewed   Pulmonary:   Sleep Apnea, CPAP    Endocrine:   Diabetes, type 2 Hypothyroidism        Physical Exam  General:  Well nourished, Morbid Obesity    Airway/Jaw/Neck:  Airway Findings: Mouth Opening: Normal Tongue: Normal  General Airway Assessment: Adult  Mallampati: III  Improves to II with phonation.  TM Distance: Normal, at least 6 cm  Jaw/Neck Findings:  Neck ROM: Normal ROM     Eyes/Ears/Nose:  Eyes/Ears/Nose Findings:    Dental:  Dental Findings: In tact   Chest/Lungs:  Chest/Lungs Findings: Normal Respiratory Rate     Heart/Vascular:  Heart Findings: Rate: Normal  Rhythm: Regular Rhythm        Mental Status:  Mental Status Findings:  Cooperative, Alert and Oriented         Anesthesia Plan  Type of Anesthesia, risks & benefits discussed:  Anesthesia Type:  general  Patient's Preference: general  Intra-op Monitoring Plan:   Intra-op Monitoring Plan Comments:   Post Op Pain Control Plan:   Post Op Pain Control Plan Comments:   Induction:   IV  Beta Blocker:  Patient is on a Beta-Blocker and has received one dose within the past 24 hours (No further documentation required).       Informed Consent: Patient understands risks and agrees with Anesthesia plan.  Questions answered. Anesthesia consent signed with patient.  ASA Score: 3     Day of Surgery Review of History & Physical:    H&P update referred to the surgeon.         Ready For Surgery From Anesthesia Perspective.

## 2017-09-29 NOTE — H&P
Endoscopy H&P    Procedure : Colonoscopy      personal history of colon polyps      Past Medical History:   Diagnosis Date    Allergy     seasonal    Anxiety     Arthritis     Breast cancer 10/2012    left breast invasive ductal carcinoma    Colon polyp     Diabetes mellitus     Type 2    Diverticular disease     Diverticulitis 2009    Hyperlipidemia     Hypertension     Morbid obesity     MITCHELL (obstructive sleep apnea)     Stenosis     Stenosis and insufficiency of lacrimal passages     Thyroid disease              Review of Systems -ROS:  GENERAL: No fever, chills, fatigability or weight loss.  CHEST: Denies ARELLANO, cyanosis, wheezing, cough and sputum production.  CARDIOVASCULAR: Denies chest pain, PND, orthopnea or reduced exercise tolerance.   Musculoskeletal ROS: negative for - gait disturbance or joint pain  Neurological ROS: negative for - confusion or memory loss        Physical Exam:  General: well developed, well nourished, no distress  Head: normocephalic  Neck: supple, symmetrical, trachea midline  Lungs:  clear to auscultation bilaterally and normal respiratory effort  Heart: regular rate and rhythm, S1, S2 normal, no murmur, rub or gallop and regular rate and rhythm  Abdomen: soft, non-tender non-distented; bowel sounds normal; no masses,  no organomegaly  Extremities: no cyanosis or edema, or clubbing       Moderate Sedation (choice): Mallampati Score 1    ASA : III    IMP: personal history of colon polyps    Plan: Colonoscopy with Moderate sedation.  I have explained the procedure including indications, alternatives, expected outcomes and potential complications. The patient appears to understand and gives informed consent. The patient is medically ready for surgery.  Have seen and examined the patient with the fellow and agree with their plan.  LOREN ORTEGA

## 2017-10-06 ENCOUNTER — TELEPHONE (OUTPATIENT)
Dept: ENDOSCOPY | Facility: HOSPITAL | Age: 72
End: 2017-10-06

## 2017-10-31 ENCOUNTER — TELEPHONE (OUTPATIENT)
Dept: BARIATRICS | Facility: CLINIC | Age: 72
End: 2017-10-31

## 2017-11-01 NOTE — PROGRESS NOTES
Subjective:       Patient ID: Alina Narayan is a 72 y.o. female.    Chief Complaint: No chief complaint on file.    HPI Ms Narayan is a 71 Y/O white female seen for followup of carcinoma of the left breast Stage IA ,T1bN0. She began letrozole hormonal therapy in November 2012.       She's  had significant stress with a brother with prostate cancer and a sister recently diagnosed with breast lump..  She was referred  for genetic testing (mother with breast cancer, father and brother with prostate CA) earlier this year - integrated BRCA testing through [employer] was negative.    Mammogram in November was unremarkable.  History: On September 25 she underwent a screening mammogram which showed an asymmetric density in the left breast at 2:00 position. A followup mammogram on the 27th showed an oval mass in that area ultrasound showed a 6 mm solid mass. Core needle biopsy on October 1 showed invasive carcinoma ER 90% positive ND 80% positive and HER-2 negative. On October 29 she underwent lumpectomy and sentinel lymph node biopsy. That showed a 7 mm low-grade (1+2+1) infiltrating carcinoma. 3 sentinel lymph nodes were negative. 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  She has had mitral valve replacement.  She is followed in cardiology for that  Review of Systems   Constitutional: Negative for activity change (limited by knees), appetite change, fever and unexpected weight change.   Eyes: Positive for visual disturbance (dry eyes).   Respiratory: Negative for cough and shortness of breath.    Cardiovascular: Negative for chest pain.   Gastrointestinal: Negative for abdominal pain, constipation and diarrhea.   Genitourinary: Negative for frequency.   Musculoskeletal: Positive for arthralgias and back pain.   Skin: Negative for rash.   Neurological: Negative for headaches.   Hematological: Negative for adenopathy.   Psychiatric/Behavioral:  The patient is nervous/anxious.        Objective:      Physical Exam   Constitutional: She appears well-developed.   Obese   HENT:   Mouth/Throat: No oropharyngeal exudate.   Eyes: No scleral icterus.   Pulmonary/Chest: Effort normal and breath sounds normal. She has no wheezes. She has no rales. Right breast exhibits no mass, no nipple discharge and no skin change. Left breast exhibits no mass, no nipple discharge and no skin change.       Abdominal: Soft. There is no tenderness.   Lymphadenopathy:     She has no cervical adenopathy.     She has no axillary adenopathy.        Right: No supraclavicular adenopathy present.        Left: No supraclavicular adenopathy present.   Psychiatric: She has a normal mood and affect. Her behavior is normal. Thought content normal.       Assessment:       1. Cancer of left breast, stage 1, estrogen receptor positive        Plan:     She will be due for her mammogram later this month and will follow-up in the [facility] Center..    Stop letrozole at the end of this month.  Follow-up with me as needed      Distress Screening Results: Psychosocial Distress screening score of Distress Score: 6 noted and reviewed. No intervention indicated.Related to family medical issues.

## 2017-11-02 ENCOUNTER — OFFICE VISIT (OUTPATIENT)
Dept: HEMATOLOGY/ONCOLOGY | Facility: CLINIC | Age: 72
End: 2017-11-02
Payer: MEDICARE

## 2017-11-02 VITALS
BODY MASS INDEX: 45.56 KG/M2 | HEART RATE: 73 BPM | DIASTOLIC BLOOD PRESSURE: 60 MMHG | TEMPERATURE: 98 F | RESPIRATION RATE: 6 BRPM | SYSTOLIC BLOOD PRESSURE: 135 MMHG | WEIGHT: 249.13 LBS

## 2017-11-02 DIAGNOSIS — C50.912 CANCER OF LEFT BREAST, STAGE 1, ESTROGEN RECEPTOR POSITIVE: Primary | ICD-10-CM

## 2017-11-02 DIAGNOSIS — Z17.0 CANCER OF LEFT BREAST, STAGE 1, ESTROGEN RECEPTOR POSITIVE: Primary | ICD-10-CM

## 2017-11-02 PROCEDURE — 99499 UNLISTED E&M SERVICE: CPT | Mod: S$GLB,,, | Performed by: INTERNAL MEDICINE

## 2017-11-02 PROCEDURE — 99999 PR PBB SHADOW E&M-EST. PATIENT-LVL III: CPT | Mod: PBBFAC,,, | Performed by: INTERNAL MEDICINE

## 2017-11-02 PROCEDURE — 99213 OFFICE O/P EST LOW 20 MIN: CPT | Mod: S$GLB,,, | Performed by: INTERNAL MEDICINE

## 2017-11-08 DIAGNOSIS — E78.2 MIXED HYPERLIPIDEMIA: Primary | ICD-10-CM

## 2017-11-20 NOTE — PROGRESS NOTES
Subjective:   Patient ID:  Alina Narayan is a 72 y.o. female who presents for evaluation of CVD    HPI: Patient is here for valvular heart disease.The patient is here for CAD risk factors.     The patient has no SOB, TIA, syncope or pre-syncope.Patient does not exercise.She has a lot of stress with 2 adult kids and lately has been having chest tightness lasting minutes with or without exertion sometimes with heart skips.        Review of Systems   Constitution: Negative for chills, decreased appetite, diaphoresis, fever, weakness, malaise/fatigue, night sweats, weight gain and weight loss.   HENT: Negative for congestion, hoarse voice, nosebleeds, sore throat and tinnitus.    Eyes: Negative for blurred vision, double vision, vision loss in left eye, vision loss in right eye, visual disturbance and visual halos.   Cardiovascular: Positive for chest pain and irregular heartbeat. Negative for claudication, cyanosis, dyspnea on exertion, leg swelling, near-syncope, orthopnea, palpitations, paroxysmal nocturnal dyspnea and syncope.   Respiratory: Negative for cough, hemoptysis, shortness of breath, sleep disturbances due to breathing, snoring, sputum production and wheezing.    Endocrine: Negative for cold intolerance, heat intolerance, polydipsia, polyphagia and polyuria.   Hematologic/Lymphatic: Negative for adenopathy and bleeding problem. Does not bruise/bleed easily.   Skin: Negative for color change, dry skin, flushing, itching, nail changes, poor wound healing, rash, skin cancer, suspicious lesions and unusual hair distribution.   Musculoskeletal: Negative for arthritis, back pain, falls, gout, joint pain, joint swelling, muscle cramps, muscle weakness, myalgias and stiffness.   Gastrointestinal: Negative for abdominal pain, anorexia, change in bowel habit, constipation, diarrhea, dysphagia, heartburn, hematemesis, hematochezia, melena and vomiting.   Genitourinary: Negative for decreased libido, dysuria,  "hematuria, hesitancy and urgency.   Neurological: Negative for excessive daytime sleepiness, dizziness, focal weakness, headaches, light-headedness, loss of balance, numbness, paresthesias, seizures, sensory change, tremors and vertigo.   Psychiatric/Behavioral: Negative for altered mental status, depression, hallucinations, memory loss, substance abuse and suicidal ideas. The patient does not have insomnia and is not nervous/anxious.    Allergic/Immunologic: Negative for environmental allergies and hives.       Objective: /60 (BP Location: Right arm, Patient Position: Sitting, BP Method: X-Large (Automatic))   Pulse 72   Ht 5' 2" (1.575 m)   Wt 113.2 kg (249 lb 9 oz)   SpO2 96%   BMI 45.65 kg/m²      Physical Exam   Constitutional: She is oriented to person, place, and time. She appears well-developed and well-nourished.   HENT:   Head: Normocephalic.   Eyes: EOM are normal. Pupils are equal, round, and reactive to light.   Neck: Normal range of motion. Normal carotid pulses, no hepatojugular reflux and no JVD present. Carotid bruit is not present. No thyromegaly present.   Cardiovascular: Normal rate, regular rhythm and intact distal pulses.  Exam reveals no gallop and no friction rub.    Murmur heard.   Systolic murmur is present with a grade of 1/6   Pulmonary/Chest: Effort normal and breath sounds normal. No tachypnea. No respiratory distress. She has no wheezes. She has no rales. She exhibits no tenderness.   Abdominal: Soft. Bowel sounds are normal. She exhibits no distension and no mass. There is no tenderness. There is no rebound and no guarding.   Musculoskeletal: Normal range of motion. She exhibits no edema or tenderness.   Lymphadenopathy:     She has no cervical adenopathy.   Neurological: She is alert and oriented to person, place, and time. No cranial nerve deficit. Coordination normal.   Skin: Skin is warm. No rash noted. No erythema.   Psychiatric: She has a normal mood and affect. Her " behavior is normal. Judgment and thought content normal.       Assessment:     1. S/P AVR    2. Essential hypertension    3. Mixed hyperlipidemia    4. Diabetes mellitus due to underlying condition with stage 3 chronic kidney disease, without long-term current use of insulin    5. Chronic kidney disease, stage III (moderate)    6. Cancer of left breast, stage 1, estrogen receptor positive    7. Morbid obesity    8. Vitamin D deficiency    9. Angina pectoris    10. Palpitations    11. Other specified hypothyroidism        Plan:   Discussed diet , achieving and maintaining ideal body weight, and exercise.   We reviewed meds in detail.  Reassured-discussed goals, options, plan  We have discussed the need for endocarditis prophylaxis.  Change ASA to Yosprala 40/81  Increasae carvedilol to whole in am  Try antacids first NTG second for chest tightness, which we will try to catch on cardiobeeper             Alina was seen today for chest pain and shortness of breath.    Diagnoses and all orders for this visit:    S/P AVR    Essential hypertension    Mixed hyperlipidemia    Diabetes mellitus due to underlying condition with stage 3 chronic kidney disease, without long-term current use of insulin    Chronic kidney disease, stage III (moderate)    Cancer of left breast, stage 1, estrogen receptor positive    Morbid obesity    Vitamin D deficiency    Angina pectoris    Palpitations    Other specified hypothyroidism            Return in about 8 months (around 7/22/2018) for with ECG, CFD Lavie day and labs;Cardiobeeper soon.

## 2017-11-22 ENCOUNTER — PATIENT OUTREACH (OUTPATIENT)
Dept: ADMINISTRATIVE | Facility: HOSPITAL | Age: 72
End: 2017-11-22

## 2017-11-22 ENCOUNTER — OFFICE VISIT (OUTPATIENT)
Dept: CARDIOLOGY | Facility: CLINIC | Age: 72
End: 2017-11-22
Payer: MEDICARE

## 2017-11-22 ENCOUNTER — HOSPITAL ENCOUNTER (OUTPATIENT)
Dept: CARDIOLOGY | Facility: CLINIC | Age: 72
Discharge: HOME | End: 2017-11-22
Payer: MEDICARE

## 2017-11-22 VITALS
SYSTOLIC BLOOD PRESSURE: 127 MMHG | HEIGHT: 62 IN | OXYGEN SATURATION: 96 % | BODY MASS INDEX: 45.92 KG/M2 | DIASTOLIC BLOOD PRESSURE: 60 MMHG | HEART RATE: 72 BPM | WEIGHT: 249.56 LBS

## 2017-11-22 DIAGNOSIS — E66.01 MORBID OBESITY: ICD-10-CM

## 2017-11-22 DIAGNOSIS — I20.9 ANGINA PECTORIS: ICD-10-CM

## 2017-11-22 DIAGNOSIS — E03.8 OTHER SPECIFIED HYPOTHYROIDISM: ICD-10-CM

## 2017-11-22 DIAGNOSIS — Z17.0 CANCER OF LEFT BREAST, STAGE 1, ESTROGEN RECEPTOR POSITIVE: ICD-10-CM

## 2017-11-22 DIAGNOSIS — E55.9 VITAMIN D DEFICIENCY: ICD-10-CM

## 2017-11-22 DIAGNOSIS — E08.22 DIABETES MELLITUS DUE TO UNDERLYING CONDITION WITH STAGE 3 CHRONIC KIDNEY DISEASE, WITHOUT LONG-TERM CURRENT USE OF INSULIN: ICD-10-CM

## 2017-11-22 DIAGNOSIS — I10 ESSENTIAL HYPERTENSION: ICD-10-CM

## 2017-11-22 DIAGNOSIS — N18.30 DIABETES MELLITUS DUE TO UNDERLYING CONDITION WITH STAGE 3 CHRONIC KIDNEY DISEASE, WITHOUT LONG-TERM CURRENT USE OF INSULIN: ICD-10-CM

## 2017-11-22 DIAGNOSIS — N18.30 CHRONIC KIDNEY DISEASE, STAGE III (MODERATE): ICD-10-CM

## 2017-11-22 DIAGNOSIS — R00.2 PALPITATIONS: ICD-10-CM

## 2017-11-22 DIAGNOSIS — Z95.2 S/P AVR: Primary | ICD-10-CM

## 2017-11-22 DIAGNOSIS — Z95.2 S/P AVR: ICD-10-CM

## 2017-11-22 DIAGNOSIS — E78.2 MIXED HYPERLIPIDEMIA: ICD-10-CM

## 2017-11-22 DIAGNOSIS — C50.912 CANCER OF LEFT BREAST, STAGE 1, ESTROGEN RECEPTOR POSITIVE: ICD-10-CM

## 2017-11-22 PROCEDURE — 93000 ELECTROCARDIOGRAM COMPLETE: CPT | Mod: S$GLB,,, | Performed by: INTERNAL MEDICINE

## 2017-11-22 PROCEDURE — 99499 UNLISTED E&M SERVICE: CPT | Mod: S$GLB,,, | Performed by: INTERNAL MEDICINE

## 2017-11-22 PROCEDURE — 99215 OFFICE O/P EST HI 40 MIN: CPT | Mod: S$GLB,,, | Performed by: INTERNAL MEDICINE

## 2017-11-22 PROCEDURE — 99999 PR PBB SHADOW E&M-EST. PATIENT-LVL IV: CPT | Mod: PBBFAC,,, | Performed by: INTERNAL MEDICINE

## 2017-11-22 RX ORDER — ASPIRIN/OMEPRAZOLE 81 MG-40MG
1 TABLET,IMMEDIATE,DELAY RELEASE,BIPHASE ORAL DAILY
Qty: 30 EACH | Refills: 12 | Status: SHIPPED | OUTPATIENT
Start: 2017-11-22 | End: 2017-11-22 | Stop reason: SDUPTHER

## 2017-11-22 RX ORDER — ASPIRIN/OMEPRAZOLE 81 MG-40MG
1 TABLET,IMMEDIATE,DELAY RELEASE,BIPHASE ORAL DAILY
Qty: 30 EACH | Refills: 12 | Status: SHIPPED | OUTPATIENT
Start: 2017-11-22 | End: 2018-12-18

## 2017-11-22 RX ORDER — NITROGLYCERIN 0.4 MG/1
0.4 TABLET SUBLINGUAL EVERY 5 MIN PRN
Qty: 25 TABLET | Refills: 3 | Status: ON HOLD | OUTPATIENT
Start: 2017-11-22 | End: 2018-02-04 | Stop reason: HOSPADM

## 2017-11-27 ENCOUNTER — CLINICAL SUPPORT (OUTPATIENT)
Dept: ELECTROPHYSIOLOGY | Facility: CLINIC | Age: 72
End: 2017-11-27
Attending: INTERNAL MEDICINE
Payer: MEDICARE

## 2017-11-27 ENCOUNTER — TELEPHONE (OUTPATIENT)
Dept: ELECTROPHYSIOLOGY | Facility: CLINIC | Age: 72
End: 2017-11-27

## 2017-11-27 DIAGNOSIS — I20.9 ANGINA PECTORIS: ICD-10-CM

## 2017-11-27 DIAGNOSIS — R00.2 PALPITATIONS: ICD-10-CM

## 2017-11-27 NOTE — TELEPHONE ENCOUNTER
Received a message from the patient in relation to the 30 day Cardiac Event that was scheduled on today.  Patient cancelled the message via her patient portal due to it was to be mailed to her home and the message stated she would need to come into the office.  Called the patient to let her know it was indeed noted the monitor would be sent to her home.  Rescheduled the event monitor with the patient and let her know it would be sent out to her home.  Also informed the patient she would receive a call from the monitoring company to confirm delivery and to confirm the mailing address and that the monitor would not be able to be sent out without this confirmation.  Patient verbalized understanding to all of the above.

## 2017-12-01 ENCOUNTER — OFFICE VISIT (OUTPATIENT)
Dept: INTERNAL MEDICINE | Facility: CLINIC | Age: 72
End: 2017-12-01
Payer: MEDICARE

## 2017-12-01 VITALS
BODY MASS INDEX: 46.19 KG/M2 | DIASTOLIC BLOOD PRESSURE: 71 MMHG | HEIGHT: 62 IN | SYSTOLIC BLOOD PRESSURE: 132 MMHG | HEART RATE: 74 BPM | WEIGHT: 251 LBS

## 2017-12-01 DIAGNOSIS — F41.9 ANXIETY: ICD-10-CM

## 2017-12-01 PROCEDURE — 99214 OFFICE O/P EST MOD 30 MIN: CPT | Mod: S$GLB,,, | Performed by: INTERNAL MEDICINE

## 2017-12-01 PROCEDURE — 99499 UNLISTED E&M SERVICE: CPT | Mod: S$GLB,,, | Performed by: INTERNAL MEDICINE

## 2017-12-01 PROCEDURE — 99999 PR PBB SHADOW E&M-EST. PATIENT-LVL IV: CPT | Mod: PBBFAC,,, | Performed by: INTERNAL MEDICINE

## 2017-12-01 RX ORDER — SITAGLIPTIN 100 MG/1
100 TABLET, FILM COATED ORAL DAILY
Refills: 1 | COMMUNITY
Start: 2017-09-27 | End: 2017-12-01 | Stop reason: SDUPTHER

## 2017-12-01 RX ORDER — SITAGLIPTIN 100 MG/1
100 TABLET, FILM COATED ORAL DAILY
Qty: 90 TABLET | Refills: 3 | Status: SHIPPED | OUTPATIENT
Start: 2017-12-01 | End: 2018-03-28

## 2017-12-01 RX ORDER — FLUOXETINE 10 MG/1
10 CAPSULE ORAL DAILY
Qty: 90 CAPSULE | Refills: 1 | Status: SHIPPED | OUTPATIENT
Start: 2017-12-01 | End: 2018-08-17

## 2017-12-01 RX ORDER — ALPRAZOLAM 0.25 MG/1
TABLET ORAL
Qty: 40 TABLET | Refills: 0 | Status: SHIPPED | OUTPATIENT
Start: 2017-12-01 | End: 2018-03-28 | Stop reason: SDUPTHER

## 2017-12-01 RX ORDER — OMEPRAZOLE 40 MG/1
40 CAPSULE, DELAYED RELEASE ORAL DAILY
Refills: 12 | COMMUNITY
Start: 2017-11-22 | End: 2018-03-28

## 2017-12-01 RX ORDER — PRAVASTATIN SODIUM 20 MG/1
TABLET ORAL
Qty: 90 TABLET | Refills: 3 | Status: SHIPPED | OUTPATIENT
Start: 2017-12-01 | End: 2019-02-06 | Stop reason: SDUPTHER

## 2017-12-04 ENCOUNTER — HOSPITAL ENCOUNTER (OUTPATIENT)
Dept: RADIOLOGY | Facility: HOSPITAL | Age: 72
Discharge: HOME | End: 2017-12-04
Attending: NURSE PRACTITIONER
Payer: MEDICARE

## 2017-12-04 ENCOUNTER — OFFICE VISIT (OUTPATIENT)
Dept: SURGERY | Facility: CLINIC | Age: 72
End: 2017-12-04
Payer: MEDICARE

## 2017-12-04 VITALS
BODY MASS INDEX: 45.82 KG/M2 | TEMPERATURE: 99 F | WEIGHT: 249 LBS | DIASTOLIC BLOOD PRESSURE: 67 MMHG | HEIGHT: 62 IN | SYSTOLIC BLOOD PRESSURE: 115 MMHG | HEART RATE: 81 BPM

## 2017-12-04 DIAGNOSIS — Z85.3 PERSONAL HISTORY OF BREAST CANCER: ICD-10-CM

## 2017-12-04 DIAGNOSIS — Z85.3 PERSONAL HISTORY OF BREAST CANCER: Primary | ICD-10-CM

## 2017-12-04 PROCEDURE — 99999 PR PBB SHADOW E&M-EST. PATIENT-LVL IV: CPT | Mod: PBBFAC,,, | Performed by: NURSE PRACTITIONER

## 2017-12-04 PROCEDURE — 99213 OFFICE O/P EST LOW 20 MIN: CPT | Mod: S$GLB,,, | Performed by: NURSE PRACTITIONER

## 2017-12-04 PROCEDURE — 77062 BREAST TOMOSYNTHESIS BI: CPT | Mod: 26,,, | Performed by: STUDENT IN AN ORGANIZED HEALTH CARE EDUCATION/TRAINING PROGRAM

## 2017-12-04 PROCEDURE — 77066 DX MAMMO INCL CAD BI: CPT | Mod: 26,,, | Performed by: STUDENT IN AN ORGANIZED HEALTH CARE EDUCATION/TRAINING PROGRAM

## 2017-12-04 PROCEDURE — 77066 DX MAMMO INCL CAD BI: CPT | Mod: TC,PO

## 2017-12-04 NOTE — PROGRESS NOTES
"Subjective:      Patient ID: Alina Narayan is a 72 y.o. female.    Chief Complaint: Breast Cancer Screening (CBE/Hx of Left Breast Cancer)      HPI: (PF, EPF - 1-3) (Detailed, Comp, - 4) patient presents today for breast cancer surveillance. Patient denies palpable breast mass, pain, nipple discharge, redness, increased warmth, unexplained weight loss, new onset bone pain. Seen by cardiology for "heart palpitations" and will be placed on 30 day monitor . She ambulates with assistance of a walker today and was unable to ambulate onto the exam table secondary to falling and pain in her knee    Last mmg 11- with no abnormality     10-1-2012 core biopsy left breast with IDC, ER/AZ +, HER-2 negative  10- left lumpectomy with 7mm IDC, negative SN, negative margins.   Adjuvant XRT, completed 5 years of endocrine therapy with AI (letrozole)     Negative genetic testing: Integrated [facility]AC[employer] May 2017    Review of Systems   Constitutional: Negative for appetite change, fatigue and fever.   Respiratory: Negative for cough and shortness of breath.    Cardiovascular: Negative for chest pain.   Musculoskeletal: Negative for back pain.     Objective:   Physical Exam   Pulmonary/Chest: She exhibits no mass, no tenderness, no laceration, no edema, no deformity, no swelling and no retraction. Right breast exhibits no inverted nipple, no mass, no nipple discharge, no skin change and no tenderness. Left breast exhibits no inverted nipple, no mass, no nipple discharge, no skin change and no tenderness. Breasts are symmetrical. There is no breast swelling.   Lymphadenopathy:     She has no cervical adenopathy.     She has no axillary adenopathy.        Right: No supraclavicular adenopathy present.        Left: No supraclavicular adenopathy present.     Assessment:       1. Personal history of breast cancer        Plan:       mmg today, new calcifications left breast with biopsy recommended, f/u pending pathology results   "

## 2017-12-05 ENCOUNTER — TELEPHONE (OUTPATIENT)
Dept: RADIOLOGY | Facility: HOSPITAL | Age: 72
End: 2017-12-05

## 2017-12-05 NOTE — TELEPHONE ENCOUNTER
/Spoke with patient. Reviewed breast biopsy procedure and reviewed instructions for breast biopsy. Patient expressed understanding and all questions were answered. Provided patient with my phone number to call for any further concerns or questions.   Patient scheduled breast biopsy at the [facility] on 12/11/17

## 2017-12-06 ENCOUNTER — PATIENT MESSAGE (OUTPATIENT)
Dept: CARDIOLOGY | Facility: CLINIC | Age: 72
End: 2017-12-06

## 2017-12-08 ENCOUNTER — PATIENT MESSAGE (OUTPATIENT)
Dept: HEMATOLOGY/ONCOLOGY | Facility: CLINIC | Age: 72
End: 2017-12-08

## 2017-12-08 ENCOUNTER — PATIENT MESSAGE (OUTPATIENT)
Dept: INTERNAL MEDICINE | Facility: CLINIC | Age: 72
End: 2017-12-08

## 2017-12-09 PROCEDURE — 93268 ECG RECORD/REVIEW: CPT | Mod: S$GLB,,, | Performed by: INTERNAL MEDICINE

## 2017-12-12 NOTE — PROGRESS NOTES
Subjective:       Patient ID: Alina Narayan is a 72 y.o. female.    Chief Complaint: Follow-up    HPI Pt is mainly feeling depressed about her autistic grandson and and cries easily.  Pt is not suicidal or homicidal.  Just saw Dr. Palm in Cardiology for palpitations  Review of Systems   Constitutional: Negative for activity change and unexpected weight change.   HENT: Negative for hearing loss, rhinorrhea and trouble swallowing.    Eyes: Positive for discharge and visual disturbance.   Respiratory: Positive for chest tightness. Negative for wheezing.    Cardiovascular: Positive for palpitations. Negative for chest pain.   Gastrointestinal: Positive for constipation and diarrhea. Negative for blood in stool and vomiting.   Endocrine: Negative for polydipsia and polyuria.   Genitourinary: Negative for difficulty urinating, dysuria, hematuria and menstrual problem.   Musculoskeletal: Positive for arthralgias. Negative for neck pain.   Neurological: Positive for weakness. Negative for headaches.   Psychiatric/Behavioral: Positive for confusion and dysphoric mood. Negative for suicidal ideas.       Objective:      Physical Exam   Constitutional: She is oriented to person, place, and time. She appears well-developed and well-nourished. No distress.   HENT:   Head: Normocephalic.   Mouth/Throat: Oropharynx is clear and moist.   Neck: Neck supple. No JVD present. No thyromegaly present.   Cardiovascular: Normal rate, regular rhythm, normal heart sounds and intact distal pulses.  Exam reveals no gallop and no friction rub.    No murmur heard.  Pulmonary/Chest: Effort normal and breath sounds normal. She has no wheezes. She has no rales.   Abdominal: Soft. Bowel sounds are normal. She exhibits no distension and no mass. There is no tenderness. There is no rebound and no guarding.   Musculoskeletal: She exhibits no edema.   Lymphadenopathy:     She has no cervical adenopathy.   Neurological: She is alert and oriented to  person, place, and time. She has normal reflexes.   Skin: Skin is warm and dry.   Psychiatric: She has a normal mood and affect. Her behavior is normal. Judgment and thought content normal.       Assessment:       1. DM (diabetes mellitus), type 2, uncontrolled, periph vascular complic    2. Anxiety        Plan:   DM (diabetes mellitus), type 2, uncontrolled, periph vascular complic  -     Hemoglobin A1c; Future; Expected date: 12/01/2017  Doing better with januvia  Anxiety  -     ALPRAZolam (XANAX) 0.25 MG tablet; One-two daily as needed for anxiety  Dispense: 40 tablet; Refill: 0    Other orders  -     JANUVIA 100 mg Tab; Take 1 tablet (100 mg total) by mouth once daily.  Dispense: 90 tablet; Refill: 3    Depression  -     Start FLUoxetine (PROZAC) 10 MG capsule; Take 1 capsule (10 mg total) by mouth once daily.  Dispense: 90 capsule; Refill: 1  Hyperlipidemia  -     pravastatin (PRAVACHOL) 20 MG tablet; TAKE 1 TABLET ONE TIME DAILY  Dispense: 90 tablet; Refill: 3

## 2017-12-13 ENCOUNTER — HOSPITAL ENCOUNTER (OUTPATIENT)
Dept: RADIOLOGY | Facility: HOSPITAL | Age: 72
Discharge: HOME | End: 2017-12-13
Attending: NURSE PRACTITIONER
Payer: MEDICARE

## 2017-12-13 DIAGNOSIS — R92.8 ABNORMAL MAMMOGRAM: Primary | ICD-10-CM

## 2017-12-13 PROCEDURE — 88305 TISSUE EXAM BY PATHOLOGIST: CPT | Mod: 26,,, | Performed by: PATHOLOGY

## 2017-12-13 PROCEDURE — 19081 BX BREAST 1ST LESION STRTCTC: CPT | Mod: TC,PO,LT

## 2017-12-13 PROCEDURE — 88305 TISSUE EXAM BY PATHOLOGIST: CPT | Performed by: PATHOLOGY

## 2017-12-13 PROCEDURE — 19081 BX BREAST 1ST LESION STRTCTC: CPT | Mod: 26,LT,, | Performed by: STUDENT IN AN ORGANIZED HEALTH CARE EDUCATION/TRAINING PROGRAM

## 2017-12-13 PROCEDURE — 27201044 MAMMO BREAST STEREOTACTIC BREAST BIOPSY LEFT: Mod: PO

## 2017-12-15 ENCOUNTER — TELEPHONE (OUTPATIENT)
Dept: SURGERY | Facility: CLINIC | Age: 72
End: 2017-12-15

## 2017-12-15 NOTE — TELEPHONE ENCOUNTER
Patient returned my call, discussed biopsy results. Explained that biopsy showed fibroconnective tissue,  no atypia/benign, all questions answered, pt advised to follow up in 6 months with repeat imaging per core biopsy protocol, advised case will be reviewed in the weekly core conference and pt will be notified if there are any changes. Patient verbalized understnading

## 2018-02-02 ENCOUNTER — HOSPITAL ENCOUNTER (OUTPATIENT)
Facility: HOSPITAL | Age: 73
Discharge: HOME | End: 2018-02-04
Attending: EMERGENCY MEDICINE | Admitting: HOSPITALIST
Payer: MEDICARE

## 2018-02-02 DIAGNOSIS — E03.9 HYPOTHYROIDISM, UNSPECIFIED TYPE: ICD-10-CM

## 2018-02-02 DIAGNOSIS — N18.30 DIABETES MELLITUS DUE TO UNDERLYING CONDITION WITH STAGE 3 CHRONIC KIDNEY DISEASE, WITHOUT LONG-TERM CURRENT USE OF INSULIN: ICD-10-CM

## 2018-02-02 DIAGNOSIS — C50.912 CANCER OF LEFT BREAST, STAGE 1, ESTROGEN RECEPTOR POSITIVE: ICD-10-CM
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
## 2018-02-02 DIAGNOSIS — M25.062 HEMARTHROSIS OF LEFT KNEE: ICD-10-CM

## 2018-02-02 DIAGNOSIS — Z17.0 CANCER OF LEFT BREAST, STAGE 1, ESTROGEN RECEPTOR POSITIVE: ICD-10-CM

## 2018-02-02 DIAGNOSIS — E66.01 MORBID OBESITY: ICD-10-CM

## 2018-02-02 DIAGNOSIS — E78.2 MIXED HYPERLIPIDEMIA: ICD-10-CM

## 2018-02-02 DIAGNOSIS — S80.02XA CONTUSION, KNEE AND LOWER LEG, LEFT, INITIAL ENCOUNTER: Primary | ICD-10-CM

## 2018-02-02 DIAGNOSIS — S80.12XA CONTUSION, KNEE AND LOWER LEG, LEFT, INITIAL ENCOUNTER: Primary | ICD-10-CM

## 2018-02-02 DIAGNOSIS — I10 ESSENTIAL HYPERTENSION: ICD-10-CM
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
## 2018-02-02 DIAGNOSIS — E08.22 DIABETES MELLITUS DUE TO UNDERLYING CONDITION WITH STAGE 3 CHRONIC KIDNEY DISEASE, WITHOUT LONG-TERM CURRENT USE OF INSULIN: ICD-10-CM

## 2018-02-02 DIAGNOSIS — Z95.2 S/P AVR: ICD-10-CM

## 2018-02-02 LAB
ALBUMIN SERPL BCP-MCNC: 3.3 G/DL
ANION GAP SERPL CALC-SCNC: 12 MMOL/L
ANION GAP SERPL CALC-SCNC: 12 MMOL/L
ANISOCYTOSIS BLD QL SMEAR: SLIGHT
ANISOCYTOSIS BLD QL SMEAR: SLIGHT
BASOPHILS # BLD AUTO: 0.02 K/UL
BASOPHILS # BLD AUTO: 0.02 K/UL
BASOPHILS NFR BLD: 0.3 %
BASOPHILS NFR BLD: 0.3 %
BUN SERPL-MCNC: 18 MG/DL
BUN SERPL-MCNC: 18 MG/DL
CALCIUM SERPL-MCNC: 9.6 MG/DL
CALCIUM SERPL-MCNC: 9.6 MG/DL
CHLORIDE SERPL-SCNC: 108 MMOL/L
CHLORIDE SERPL-SCNC: 108 MMOL/L
CO2 SERPL-SCNC: 22 MMOL/L
CO2 SERPL-SCNC: 22 MMOL/L
CREAT SERPL-MCNC: 0.8 MG/DL
CREAT SERPL-MCNC: 0.8 MG/DL
DIFFERENTIAL METHOD: ABNORMAL
DIFFERENTIAL METHOD: ABNORMAL
EOSINOPHIL # BLD AUTO: 0.1 K/UL
EOSINOPHIL # BLD AUTO: 0.1 K/UL
EOSINOPHIL NFR BLD: 0.9 %
EOSINOPHIL NFR BLD: 0.9 %
ERYTHROCYTE [DISTWIDTH] IN BLOOD BY AUTOMATED COUNT: 13.4 %
ERYTHROCYTE [DISTWIDTH] IN BLOOD BY AUTOMATED COUNT: 13.4 %
EST. GFR  (AFRICAN AMERICAN): >60 ML/MIN/1.73 M^2
EST. GFR  (AFRICAN AMERICAN): >60 ML/MIN/1.73 M^2
EST. GFR  (NON AFRICAN AMERICAN): >60 ML/MIN/1.73 M^2
EST. GFR  (NON AFRICAN AMERICAN): >60 ML/MIN/1.73 M^2
ESTIMATED AVG GLUCOSE: 151 MG/DL
GLUCOSE SERPL-MCNC: 164 MG/DL
GLUCOSE SERPL-MCNC: 164 MG/DL
HBA1C MFR BLD HPLC: 6.9 %
HCT VFR BLD AUTO: 34.9 %
HCT VFR BLD AUTO: 34.9 %
HGB BLD-MCNC: 12.1 G/DL
HGB BLD-MCNC: 12.1 G/DL
IMM GRANULOCYTES # BLD AUTO: 0.04 K/UL
IMM GRANULOCYTES # BLD AUTO: 0.04 K/UL
IMM GRANULOCYTES NFR BLD AUTO: 0.5 %
IMM GRANULOCYTES NFR BLD AUTO: 0.5 %
LYMPHOCYTES # BLD AUTO: 1.4 K/UL
LYMPHOCYTES # BLD AUTO: 1.4 K/UL
LYMPHOCYTES NFR BLD: 18.9 %
LYMPHOCYTES NFR BLD: 18.9 %
MAGNESIUM SERPL-MCNC: 2.1 MG/DL
MCH RBC QN AUTO: 29.7 PG
MCH RBC QN AUTO: 29.7 PG
MCHC RBC AUTO-ENTMCNC: 34.7 G/DL
MCHC RBC AUTO-ENTMCNC: 34.7 G/DL
MCV RBC AUTO: 86 FL
MCV RBC AUTO: 86 FL
MONOCYTES # BLD AUTO: 0.6 K/UL
MONOCYTES # BLD AUTO: 0.6 K/UL
MONOCYTES NFR BLD: 7.3 %
MONOCYTES NFR BLD: 7.3 %
NEUTROPHILS # BLD AUTO: 5.5 K/UL
NEUTROPHILS # BLD AUTO: 5.5 K/UL
NEUTROPHILS NFR BLD: 72.1 %
NEUTROPHILS NFR BLD: 72.1 %
NRBC BLD-RTO: 0 /100 WBC
NRBC BLD-RTO: 0 /100 WBC
PHOSPHATE SERPL-MCNC: 3.5 MG/DL
PLATELET # BLD AUTO: 108 K/UL
PLATELET # BLD AUTO: 108 K/UL
PLATELET BLD QL SMEAR: ABNORMAL
PLATELET BLD QL SMEAR: ABNORMAL
PMV BLD AUTO: 12.4 FL
PMV BLD AUTO: 12.4 FL
POCT GLUCOSE: 160 MG/DL (ref 70–110)
POIKILOCYTOSIS BLD QL SMEAR: SLIGHT
POIKILOCYTOSIS BLD QL SMEAR: SLIGHT
POTASSIUM SERPL-SCNC: 4.7 MMOL/L
POTASSIUM SERPL-SCNC: 4.7 MMOL/L
RBC # BLD AUTO: 4.07 M/UL
RBC # BLD AUTO: 4.07 M/UL
SODIUM SERPL-SCNC: 142 MMOL/L
SODIUM SERPL-SCNC: 142 MMOL/L
WBC # BLD AUTO: 7.58 K/UL
WBC # BLD AUTO: 7.58 K/UL

## 2018-02-02 PROCEDURE — 20610 DRAIN/INJ JOINT/BURSA W/O US: CPT

## 2018-02-02 PROCEDURE — 83735 ASSAY OF MAGNESIUM: CPT

## 2018-02-02 PROCEDURE — 83036 HEMOGLOBIN GLYCOSYLATED A1C: CPT

## 2018-02-02 PROCEDURE — 96376 TX/PRO/DX INJ SAME DRUG ADON: CPT | Mod: 59

## 2018-02-02 PROCEDURE — G0378 HOSPITAL OBSERVATION PER HR: HCPCS

## 2018-02-02 PROCEDURE — 36415 COLL VENOUS BLD VENIPUNCTURE: CPT

## 2018-02-02 PROCEDURE — 96375 TX/PRO/DX INJ NEW DRUG ADDON: CPT | Mod: 59

## 2018-02-02 PROCEDURE — 99285 EMERGENCY DEPT VISIT HI MDM: CPT

## 2018-02-02 PROCEDURE — 85025 COMPLETE CBC W/AUTO DIFF WBC: CPT

## 2018-02-02 PROCEDURE — 96374 THER/PROPH/DIAG INJ IV PUSH: CPT

## 2018-02-02 PROCEDURE — 25000003 PHARM REV CODE 250: Performed by: PHYSICIAN ASSISTANT

## 2018-02-02 PROCEDURE — 94761 N-INVAS EAR/PLS OXIMETRY MLT: CPT

## 2018-02-02 PROCEDURE — 80069 RENAL FUNCTION PANEL: CPT

## 2018-02-02 PROCEDURE — 99219 PR INITIAL OBSERVATION CARE,LEVL II: CPT | Mod: ,,, | Performed by: PHYSICIAN ASSISTANT

## 2018-02-02 PROCEDURE — 63600175 PHARM REV CODE 636 W HCPCS: Performed by: EMERGENCY MEDICINE

## 2018-02-02 PROCEDURE — 99285 EMERGENCY DEPT VISIT HI MDM: CPT | Mod: ,,, | Performed by: EMERGENCY MEDICINE

## 2018-02-02 PROCEDURE — 63600175 PHARM REV CODE 636 W HCPCS: Performed by: ORTHOPAEDIC SURGERY

## 2018-02-02 RX ORDER — FENTANYL CITRATE 50 UG/ML
50 INJECTION, SOLUTION INTRAMUSCULAR; INTRAVENOUS
Status: COMPLETED | OUTPATIENT
Start: 2018-02-02 | End: 2018-02-02

## 2018-02-02 RX ORDER — PROCHLORPERAZINE MALEATE 10 MG
10 TABLET ORAL EVERY 6 HOURS PRN
Status: DISCONTINUED | OUTPATIENT
Start: 2018-02-02 | End: 2018-02-04 | Stop reason: HOSPADM

## 2018-02-02 RX ORDER — RAMELTEON 8 MG/1
8 TABLET ORAL NIGHTLY PRN
Status: DISCONTINUED | OUTPATIENT
Start: 2018-02-02 | End: 2018-02-04 | Stop reason: HOSPADM

## 2018-02-02 RX ORDER — HYDROCODONE BITARTRATE AND ACETAMINOPHEN 5; 325 MG/1; MG/1
1 TABLET ORAL EVERY 4 HOURS PRN
Status: DISCONTINUED | OUTPATIENT
Start: 2018-02-02 | End: 2018-02-04 | Stop reason: HOSPADM

## 2018-02-02 RX ORDER — IBUPROFEN 200 MG
16 TABLET ORAL
Status: DISCONTINUED | OUTPATIENT
Start: 2018-02-02 | End: 2018-02-04 | Stop reason: HOSPADM

## 2018-02-02 RX ORDER — ACETAMINOPHEN 325 MG/1
650 TABLET ORAL EVERY 4 HOURS PRN
Status: DISCONTINUED | OUTPATIENT
Start: 2018-02-02 | End: 2018-02-04 | Stop reason: HOSPADM

## 2018-02-02 RX ORDER — CHOLECALCIFEROL (VITAMIN D3) 25 MCG
1000 TABLET ORAL DAILY
Status: DISCONTINUED | OUTPATIENT
Start: 2018-02-03 | End: 2018-02-04 | Stop reason: HOSPADM

## 2018-02-02 RX ORDER — ONDANSETRON 2 MG/ML
4 INJECTION INTRAMUSCULAR; INTRAVENOUS
Status: COMPLETED | OUTPATIENT
Start: 2018-02-02 | End: 2018-02-02

## 2018-02-02 RX ORDER — IBUPROFEN 200 MG
24 TABLET ORAL
Status: DISCONTINUED | OUTPATIENT
Start: 2018-02-02 | End: 2018-02-04 | Stop reason: HOSPADM

## 2018-02-02 RX ORDER — ASPIRIN 81 MG/1
81 TABLET ORAL DAILY
Status: DISCONTINUED | OUTPATIENT
Start: 2018-02-03 | End: 2018-02-04 | Stop reason: HOSPADM

## 2018-02-02 RX ORDER — GABAPENTIN 300 MG/1
300 CAPSULE ORAL 2 TIMES DAILY
Status: DISCONTINUED | OUTPATIENT
Start: 2018-02-02 | End: 2018-02-04 | Stop reason: HOSPADM

## 2018-02-02 RX ORDER — INSULIN ASPART 100 [IU]/ML
0-5 INJECTION, SOLUTION INTRAVENOUS; SUBCUTANEOUS
Status: DISCONTINUED | OUTPATIENT
Start: 2018-02-02 | End: 2018-02-04 | Stop reason: HOSPADM

## 2018-02-02 RX ORDER — SODIUM CHLORIDE 0.9 % (FLUSH) 0.9 %
5 SYRINGE (ML) INJECTION
Status: DISCONTINUED | OUTPATIENT
Start: 2018-02-02 | End: 2018-02-04 | Stop reason: HOSPADM

## 2018-02-02 RX ORDER — FENTANYL CITRATE 50 UG/ML
50 INJECTION, SOLUTION INTRAMUSCULAR; INTRAVENOUS
Status: DISCONTINUED | OUTPATIENT
Start: 2018-02-02 | End: 2018-02-02

## 2018-02-02 RX ORDER — AMOXICILLIN 250 MG
1 CAPSULE ORAL 2 TIMES DAILY PRN
Status: DISCONTINUED | OUTPATIENT
Start: 2018-02-02 | End: 2018-02-04 | Stop reason: HOSPADM

## 2018-02-02 RX ORDER — LEVOTHYROXINE SODIUM 50 UG/1
50 TABLET ORAL NIGHTLY
Status: DISCONTINUED | OUTPATIENT
Start: 2018-02-02 | End: 2018-02-04 | Stop reason: HOSPADM

## 2018-02-02 RX ORDER — PANTOPRAZOLE SODIUM 40 MG/1
40 TABLET, DELAYED RELEASE ORAL DAILY
Status: DISCONTINUED | OUTPATIENT
Start: 2018-02-03 | End: 2018-02-04 | Stop reason: HOSPADM

## 2018-02-02 RX ORDER — OXYCODONE HYDROCHLORIDE 5 MG/1
5 TABLET ORAL EVERY 6 HOURS PRN
Status: DISCONTINUED | OUTPATIENT
Start: 2018-02-02 | End: 2018-02-04 | Stop reason: HOSPADM

## 2018-02-02 RX ORDER — PRAVASTATIN SODIUM 20 MG/1
20 TABLET ORAL NIGHTLY
Status: DISCONTINUED | OUTPATIENT
Start: 2018-02-02 | End: 2018-02-04 | Stop reason: HOSPADM

## 2018-02-02 RX ORDER — CARVEDILOL 12.5 MG/1
12.5 TABLET ORAL 2 TIMES DAILY WITH MEALS
Status: DISCONTINUED | OUTPATIENT
Start: 2018-02-02 | End: 2018-02-04 | Stop reason: HOSPADM

## 2018-02-02 RX ORDER — GLUCAGON 1 MG
1 KIT INJECTION
Status: DISCONTINUED | OUTPATIENT
Start: 2018-02-02 | End: 2018-02-04 | Stop reason: HOSPADM

## 2018-02-02 RX ORDER — ONDANSETRON 4 MG/1
4 TABLET, ORALLY DISINTEGRATING ORAL EVERY 8 HOURS PRN
Status: DISCONTINUED | OUTPATIENT
Start: 2018-02-02 | End: 2018-02-04 | Stop reason: HOSPADM

## 2018-02-02 RX ADMIN — ONDANSETRON 4 MG: 2 INJECTION INTRAMUSCULAR; INTRAVENOUS at 12:02

## 2018-02-02 RX ADMIN — CARVEDILOL 12.5 MG: 12.5 TABLET, FILM COATED ORAL at 09:02

## 2018-02-02 RX ADMIN — LEVOTHYROXINE SODIUM 50 MCG: 50 TABLET ORAL at 09:02

## 2018-02-02 RX ADMIN — FENTANYL CITRATE 50 MCG: 50 INJECTION, SOLUTION INTRAMUSCULAR; INTRAVENOUS at 04:02

## 2018-02-02 RX ADMIN — GABAPENTIN 300 MG: 300 CAPSULE ORAL at 09:02

## 2018-02-02 RX ADMIN — FENTANYL CITRATE 50 MCG: 50 INJECTION, SOLUTION INTRAMUSCULAR; INTRAVENOUS at 12:02

## 2018-02-02 RX ADMIN — PRAVASTATIN SODIUM 20 MG: 20 TABLET ORAL at 09:02

## 2018-02-02 RX ADMIN — HYDROCODONE BITARTRATE AND ACETAMINOPHEN 1 TABLET: 5; 325 TABLET ORAL at 10:02

## 2018-02-02 NOTE — ED NOTES
APPEARANCE: awake and alert in NAD.  SKIN: warm, dry and intact. No breakdown or bruising.  MUSCULOSKELETAL: Patient has swelling to the left knee and bruising over the knee cap.   RESPIRATORY: no shortness of breath.   CARDIAC: heart tones normal. Regular rate and rhythm; 2+ distal pulses; no peripheral edema  ABDOMEN: S/ND/NT, normoactive bowel sounds present in all four quadrants. Normal stool pattern.  : voids spontaneously without difficulty.  Neurologic: AAO x 4; follows commands equal strength in all extremities; denies numbness/tingling.

## 2018-02-02 NOTE — HPI
Alina Narayan is a 72 y.o. female with multiple comorbidities and bilateral knee osteoarthritis who presents to the ED with bilateral knee pain after a fall this morning. She was walking in her home when her left foot caught on the ground and she fell forward landing on her knees. She was unable to get up because of pain. Her son assisted her and she was transported to the ED by EMS. She has had significant swelling and bruising of the left knee since the fall. She has a faint bruise over the anterior/inferior aspect of the right knee with a small effusion. She denies numbness or tingling in either leg. No other injuries. She has been seen in orthopedics clinic for her bilateral knee osteoarthritis in the past and has had several injections.

## 2018-02-02 NOTE — ED NOTES
Report received--pt c/o pain to L knee after fall today--bruising and swelling noted--neuro intact--family at bedside--will monitor

## 2018-02-02 NOTE — ED PROVIDER NOTES
Encounter Date: 2/2/2018       History     Chief Complaint   Patient presents with    Knee Pain     left knee     Patient presenting to the ED after an apparent fall while coming out of the bathroom where there is some unevenness she fell onto both knees with most of  her weight on the left knee. Brought in by paramedic ambulance with severe pain and swelling. Crying in severe pain  Denies any open wound          Review of patient's allergies indicates:   Allergen Reactions    Hydromorphone      Other reaction(s): sedation    Byetta [exenatide] Rash     Other reaction(s): Rash     Past Medical History:   Diagnosis Date    Allergy     seasonal    Anxiety     Arthritis     Breast cancer 10/2012    left breast invasive ductal carcinoma    Colon polyp     Diabetes mellitus     Type 2    Diverticular disease     Diverticulitis 2009    Genetic testing 05/2017    negative [employer] [facility]    Hyperlipidemia     Hypertension     Morbid obesity     MITCHELL (obstructive sleep apnea)     Stenosis     Stenosis and insufficiency of lacrimal passages     Thyroid disease      Past Surgical History:   Procedure Laterality Date    BREAST BIOPSY Left 10/1/2012    left breast- invasive ductal carcinoma    BREAST LUMPECTOMY Left 2012    CARDIAC VALVE REPLACEMENT  12/2013    CARPAL TUNNEL RELEASE      Left    COLONOSCOPY N/A 9/29/2017    Procedure: COLONOSCOPY;  Surgeon: Phani Worley MD;  Location: [facility] ([address] [place] [address]);  Service: Endoscopy;  Laterality: N/A;    DILATION AND CURETTAGE OF UTERUS  1999    Endometrial polyps    ENDOMETRIAL ABLATION  1999    Enodmetrial polyps    EYE SURGERY      left lumpectomy  2012    SHOULDER SURGERY      SKIN BIOPSY       Family History   Problem Relation Age of Onset    Breast cancer Mother 62    Cancer Mother      breast    Colon cancer Father     Cancer Father      Colon and Prostate CA    No Known Problems Sister     No Known Problems Brother     Cancer  Maternal Grandfather      Colon CA    No Known Problems Son     Cancer Brother      prostate    Anxiety disorder Son     SAL disease Son     Ovarian cancer Neg Hx     Melanoma Neg Hx     Psoriasis Neg Hx     Lupus Neg Hx     Eczema Neg Hx     Acne Neg Hx      Social History   Substance Use Topics    Smoking status: Never Smoker    Smokeless tobacco: Never Used    Alcohol use No     Review of Systems   Constitutional: Positive for activity change and diaphoresis. Negative for fatigue and fever.   HENT: Negative for trouble swallowing.    Eyes: Negative for photophobia and visual disturbance.   Respiratory: Negative for chest tightness and shortness of breath.    Cardiovascular: Negative for chest pain.   Gastrointestinal: Positive for nausea. Negative for abdominal pain and vomiting.   Genitourinary: Negative for hematuria.   Musculoskeletal: Positive for arthralgias, joint swelling and myalgias. Negative for back pain, neck pain and neck stiffness.   Skin:        Abrasion of left anterior knee   Neurological: Negative for syncope, weakness, light-headedness and headaches.   Hematological: Does not bruise/bleed easily.       Physical Exam     Initial Vitals [02/02/18 1146]   BP Pulse Resp Temp SpO2   134/79 72 20 98.1 °F (36.7 °C) 99 %      MAP       97.33         Physical Exam    Constitutional: She is cooperative. She appears ill. She appears distressed.   HENT:   Head: Normocephalic.   Eyes: Conjunctivae, EOM and lids are normal.   Neck: Normal range of motion. Neck supple.   Cardiovascular: Regular rhythm and normal heart sounds. Tachycardia present.    Pulmonary/Chest: Breath sounds normal. No accessory muscle usage. No tachypnea. No respiratory distress.   Abdominal: She exhibits no distension. There is no tenderness.   Musculoskeletal:        Right knee: Tenderness found.        Left knee: She exhibits decreased range of motion, swelling, ecchymosis, deformity and abnormal alignment.   Minimal  swelling and bruising of the right knee  Good ROM    Marked swelling and bruising of the left knee  Unable to stress the left knee due to pain   Neurological: She is alert and oriented to person, place, and time. She has normal strength. No cranial nerve deficit. GCS eye subscore is 4. GCS verbal subscore is 5. GCS motor subscore is 6.   Limited by the significant swelling and pain   Skin:              ED Course   Procedures  Labs Reviewed - No data to display       X-Rays:   Independently Interpreted Readings:   Other Readings:  Xrays of lower extremities negative for fracture  CT of the left lower extremity is negative for fracture                 Attending Attestation:             Attending ED Notes:   Plan is to place in the hospital for pain control and Physical therapy          ED Course      Clinical Impression:   The primary encounter diagnosis was Contusion, knee and lower leg, left, initial encounter. Diagnoses of Fall, Diabetes mellitus due to underlying condition with stage 3 chronic kidney disease, without long-term current use of insulin, Cancer of left breast, stage 1, estrogen receptor positive, Fall, initial encounter, Hemarthrosis of left knee, Essential hypertension, Mixed hyperlipidemia, Hypothyroidism, unspecified type, Morbid obesity, and S/P AVR were also pertinent to this visit.    Disposition:   Disposition: Placed in Observation  Condition: Serious                        Mario Deutsch MD  02/14/18 [id number]

## 2018-02-02 NOTE — SUBJECTIVE & OBJECTIVE
Past Medical History:   Diagnosis Date    Allergy     seasonal    Anxiety     Arthritis     Breast cancer 10/2012    left breast invasive ductal carcinoma    Colon polyp     Diabetes mellitus     Type 2    Diverticular disease     Diverticulitis 2009    Genetic testing 05/2017    negative [employer] [facility]    Hyperlipidemia     Hypertension     Morbid obesity     MITCHELL (obstructive sleep apnea)     Stenosis     Stenosis and insufficiency of lacrimal passages     Thyroid disease        Past Surgical History:   Procedure Laterality Date    BREAST BIOPSY Left 10/1/2012    left breast- invasive ductal carcinoma    BREAST LUMPECTOMY Left 2012    CARDIAC VALVE REPLACEMENT  12/2013    CARPAL TUNNEL RELEASE      Left    COLONOSCOPY N/A 9/29/2017    Procedure: COLONOSCOPY;  Surgeon: Phani Worley MD;  [facility] ([address] [place] [address]);  Service: Endoscopy;  Laterality: N/A;    DILATION AND CURETTAGE OF UTERUS  1999    Endometrial polyps    ENDOMETRIAL ABLATION  1999    Enodmetrial polyps    EYE SURGERY      left lumpectomy  2012    SHOULDER SURGERY      SKIN BIOPSY         Review of patient's allergies indicates:   Allergen Reactions    Hydromorphone      Other reaction(s): sedation    Byetta [exenatide] Rash     Other reaction(s): Rash       Current Facility-Administered Medications   Medication    fentaNYL injection 50 mcg     Current Outpatient Prescriptions   Medication Sig    ASPIR-LOW 81 mg EC tablet Take 81 mg by mouth once daily.    carvedilol (COREG) 25 MG tablet .5-1 tablet oral twice daily or as directed.    CHOLECALCIFEROL, VITAMIN D3, (VITAMIN D3 ORAL) Take by mouth once daily.     gabapentin (NEURONTIN) 300 MG capsule Take 1 capsule (300 mg total) by mouth 2 (two) times daily.    metformin (GLUCOPHAGE) 500 MG tablet Take 1 tablet (500 mg total) by mouth 2 (two) times daily with meals.    ALPRAZolam (XANAX) 0.25 MG tablet One-two daily as needed for anxiety     aspirin-omeprazole (YOSPRALA) 81-40 mg TbID Take 1 capsule by mouth once daily.    blood sugar diagnostic Strp TrueTest strips or strips and lancets for meter covered by insurance - pt checks twice daily for uncontrolled glucoses E11.65    blood-glucose meter kit True Test or meter covered by insurance - pt checks glucose twice daily for uncontrolled glucoses E11.65    FLUoxetine (PROZAC) 10 MG capsule Take 1 capsule (10 mg total) by mouth once daily.    hydrocodone-acetaminophen 5-325mg (NORCO) 5-325 mg per tablet One - two tabs every 6-8 hours as needed for pain    JANUVIA 100 mg Tab Take 1 tablet (100 mg total) by mouth once daily.    levothyroxine (SYNTHROID) 50 MCG tablet Take 1 tablet (50 mcg total) by mouth every evening.    nitroGLYCERIN (NITROSTAT) 0.4 MG SL tablet Place 1 tablet (0.4 mg total) under the tongue every 5 (five) minutes as needed for Chest pain.    omeprazole (PRILOSEC) 40 MG capsule Take 40 mg by mouth once daily.    pravastatin (PRAVACHOL) 20 MG tablet TAKE 1 TABLET ONE TIME DAILY    triamcinolone acetonide 0.1% (KENALOG) 0.1 % cream Apply topically 2 (two) times daily.     Family History     Problem Relation (Age of Onset)    Anxiety disorder Son    Breast cancer Mother (62)    Cancer Mother, Father, Maternal Grandfather, Brother    Colon cancer Father    SAL disease Son    No Known Problems Sister, Brother, Son        Social History Main Topics    Smoking status: Never Smoker    Smokeless tobacco: Never Used    Alcohol use No    Drug use: No    Sexual activity: Yes     ROS   See ROS documented by ED provider    Objective:     Vital Signs (Most Recent):  Temp: 98.2 °F (36.8 °C) (02/02/18 1556)  Pulse: 72 (02/02/18 1556)  Resp: 18 (02/02/18 1556)  BP: 132/62 (02/02/18 1556)  SpO2: 99 % (02/02/18 1556) Vital Signs (24h Range):  Temp:  [98.1 °F (36.7 °C)-98.2 °F (36.8 °C)] 98.2 °F (36.8 °C)  Pulse:  [72-78] 72  Resp:  [18-20] 18  SpO2:  [98 %-99 %] 99 %  BP: (123-134)/(60-79)  "132/62     Weight: 114.3 kg (252 lb)  Height: 5' 2" (157.5 cm)  Body mass index is 46.09 kg/m².    No intake or output data in the 24 hours ending 02/02/18 1653    Ortho/SPM Exam   HEENT: normocephalic, atraumatic  Resp: no increased work of breathing  CV: regular rate and rhythm  MSK: moves b/l upper extremities well    left lower extremity:  Small superficial abrasion over anterior knee  Large ecchymosis over anterior/medial aspect of knee with significant effusion  Sensation intact SP/DP/T/Sural/Saphenous nerves  Motor intact EHL/FHL/TA/gastroc  TTP around knee  Palpable DP/PT pulses  Unable to actively or passively range knee secondary to pain, but patient is able to maintain straight leg raise to demonstrate an intact extensor mechanism    right lower extremity:  Skin intact  Faint ecchymoses over anterior/inferior aspect of knee, no erythema, mild effusion  Sensation intact SP/DP/T/Sural/Saphenous nerves  Motor intact EHL/FHL/TA/gastroc  TTP anterior knee  Palpable DP/PT pulses  Active ROM knee intact with minimal discomfort  Able to actively extend knee without assistance      Significant Labs: All pertinent labs within the past 24 hours have been reviewed.    Significant Imaging: I have reviewed all pertinent imaging results/findings. Radiographs and CT reviewed. No fracture or dislocations bilateral knees or left tib/fib/femur. No patella esthela or baja.  "

## 2018-02-02 NOTE — ED TRIAGE NOTES
Pt reports she was walking to the bathroom in their home and tripped on a ledge and  Landed on both of her knees. Pt reports she has bad knees and is due for a knee replacement.

## 2018-02-02 NOTE — ASSESSMENT & PLAN NOTE
Alina Narayan is a 72 y.o. female with bilateral knee pain, left knee effusion    - No fractures or dislocation, extensor mechanism intact bilaterally  - Left knee effusion aspirated, found to have hemarthrosis. Minimal amount of sanguinous fluid withdrawn, likely due to clot  - WBAT b/l LE  - Recommend ice, elevation, and compression as tolerated to reduce swelling  - F/u in orthopedics clinic in 1-2 weeks for recheck (pt will be contacted to schedule)    After time out was performed and patient ID, side, and site were verified, the left knee was sterilly prepped in the standard fashion. An 18-gauge needle was introduced into the joint from an inferolateral site without complication. 1 cc sanguinous fluid was withdrawn. A second site at the superolateral aspect of the knee was then sterilly prepped and a new 18-gauge needle was introduced into this site in a second attempt to aspirate. 3 cc sanguinous fluid withdrawn. Sterile dressing was applied. Minimal blood loss. No complications.

## 2018-02-02 NOTE — CONSULTS
[facility]  Orthopedics  Consult Note    Patient Name: Alina Narayan  MRN: [id number]  Admission Date: 2/2/2018  Hospital Length of Stay: 0 days  Attending Provider: Mario Deutsch, *  Primary Care Provider: Aarti Dunn MD    Patient information was obtained from patient, past medical records and ER records.     Inpatient consult to Orthopedic Surgery  Consult performed by: ERICH NARAYAN  Consult ordered by: MARIO DEUTSCH        Subjective:     Principal Problem:<principal problem not specified>    Chief Complaint:   Chief Complaint   Patient presents with    Knee Pain     left knee        HPI: Alina Narayan is a 72 y.o. female with multiple comorbidities and bilateral knee osteoarthritis who presents to the ED with bilateral knee pain after a fall this morning. She was walking in her home when her left foot caught on the ground and she fell forward landing on her knees. She was unable to get up because of pain. Her son assisted her and she was transported to the ED by EMS. She has had significant swelling and bruising of the left knee since the fall. She has a faint bruise over the anterior/inferior aspect of the right knee with a small effusion. She denies numbness or tingling in either leg. No other injuries. She has been seen in orthopedics clinic for her bilateral knee osteoarthritis in the past and has had several injections.    Past Medical History:   Diagnosis Date    Allergy     seasonal    Anxiety     Arthritis     Breast cancer 10/2012    left breast invasive ductal carcinoma    Colon polyp     Diabetes mellitus     Type 2    Diverticular disease     Diverticulitis 2009    Genetic testing 05/2017    negative Integrated BRACAnalysis    Hyperlipidemia     Hypertension     Morbid obesity     MITCHELL (obstructive sleep apnea)     Stenosis     Stenosis and insufficiency of lacrimal passages     Thyroid disease        Past Surgical History:    Procedure Laterality Date    BREAST BIOPSY Left 10/1/2012    left breast- invasive ductal carcinoma    BREAST LUMPECTOMY Left 2012    CARDIAC VALVE REPLACEMENT  12/2013    CARPAL TUNNEL RELEASE      Left    COLONOSCOPY N/A 9/29/2017    Procedure: COLONOSCOPY;  Surgeon: Phani Worley MD;  Location: [facility] ([address] [place] [address]);  Service: Endoscopy;  Laterality: N/A;    DILATION AND CURETTAGE OF UTERUS  1999    Endometrial polyps    ENDOMETRIAL ABLATION  1999    Enodmetrial polyps    EYE SURGERY      left lumpectomy  2012    SHOULDER SURGERY      SKIN BIOPSY         Review of patient's allergies indicates:   Allergen Reactions    Hydromorphone      Other reaction(s): sedation    Byetta [exenatide] Rash     Other reaction(s): Rash       Current Facility-Administered Medications   Medication    fentaNYL injection 50 mcg     Current Outpatient Prescriptions   Medication Sig    ASPIR-LOW 81 mg EC tablet Take 81 mg by mouth once daily.    carvedilol (COREG) 25 MG tablet .5-1 tablet oral twice daily or as directed.    CHOLECALCIFEROL, VITAMIN D3, (VITAMIN D3 ORAL) Take by mouth once daily.     gabapentin (NEURONTIN) 300 MG capsule Take 1 capsule (300 mg total) by mouth 2 (two) times daily.    metformin (GLUCOPHAGE) 500 MG tablet Take 1 tablet (500 mg total) by mouth 2 (two) times daily with meals.    ALPRAZolam (XANAX) 0.25 MG tablet One-two daily as needed for anxiety    aspirin-omeprazole (YOSPRALA) 81-40 mg TbID Take 1 capsule by mouth once daily.    blood sugar diagnostic Strp TrueTest strips or strips and lancets for meter covered by insurance - pt checks twice daily for uncontrolled glucoses E11.65    blood-glucose meter kit True Test or meter covered by insurance - pt checks glucose twice daily for uncontrolled glucoses E11.65    FLUoxetine (PROZAC) 10 MG capsule Take 1 capsule (10 mg total) by mouth once daily.    hydrocodone-acetaminophen 5-325mg (NORCO) 5-325 mg per tablet One - two tabs  "every 6-8 hours as needed for pain    JANUVIA 100 mg Tab Take 1 tablet (100 mg total) by mouth once daily.    levothyroxine (SYNTHROID) 50 MCG tablet Take 1 tablet (50 mcg total) by mouth every evening.    nitroGLYCERIN (NITROSTAT) 0.4 MG SL tablet Place 1 tablet (0.4 mg total) under the tongue every 5 (five) minutes as needed for Chest pain.    omeprazole (PRILOSEC) 40 MG capsule Take 40 mg by mouth once daily.    pravastatin (PRAVACHOL) 20 MG tablet TAKE 1 TABLET ONE TIME DAILY    triamcinolone acetonide 0.1% (KENALOG) 0.1 % cream Apply topically 2 (two) times daily.     Family History     Problem Relation (Age of Onset)    Anxiety disorder Son    Breast cancer Mother (62)    Cancer Mother, Father, Maternal Grandfather, Brother    Colon cancer Father    SAL disease Son    No Known Problems Sister, Brother, Son        Social History Main Topics    Smoking status: Never Smoker    Smokeless tobacco: Never Used    Alcohol use No    Drug use: No    Sexual activity: Yes     ROS   See ROS documented by ED provider    Objective:     Vital Signs (Most Recent):  Temp: 98.2 °F (36.8 °C) (02/02/18 1556)  Pulse: 72 (02/02/18 1556)  Resp: 18 (02/02/18 1556)  BP: 132/62 (02/02/18 1556)  SpO2: 99 % (02/02/18 1556) Vital Signs (24h Range):  Temp:  [98.1 °F (36.7 °C)-98.2 °F (36.8 °C)] 98.2 °F (36.8 °C)  Pulse:  [72-78] 72  Resp:  [18-20] 18  SpO2:  [98 %-99 %] 99 %  BP: (123-134)/(60-79) 132/62     Weight: 114.3 kg (252 lb)  Height: 5' 2" (157.5 cm)  Body mass index is 46.09 kg/m².    No intake or output data in the 24 hours ending 02/02/18 1653    Ortho/SPM Exam   HEENT: normocephalic, atraumatic  Resp: no increased work of breathing  CV: regular rate and rhythm  MSK: moves b/l upper extremities well    left lower extremity:  Small superficial abrasion over anterior knee  Large ecchymosis over anterior/medial aspect of knee with significant effusion  Sensation intact SP/DP/T/Sural/Saphenous nerves  Motor intact " EHL/FHL/TA/gastroc  TTP around knee  Palpable DP/PT pulses  Unable to actively or passively range knee secondary to pain, but patient is able to maintain straight leg raise to demonstrate an intact extensor mechanism    right lower extremity:  Skin intact  Faint ecchymoses over anterior/inferior aspect of knee, no erythema, mild effusion  Sensation intact SP/DP/T/Sural/Saphenous nerves  Motor intact EHL/FHL/TA/gastroc  TTP anterior knee  Palpable DP/PT pulses  Active ROM knee intact with minimal discomfort  Able to actively extend knee without assistance      Significant Labs: All pertinent labs within the past 24 hours have been reviewed.    Significant Imaging: I have reviewed all pertinent imaging results/findings. Radiographs and CT reviewed. No fracture or dislocations bilateral knees or left tib/fib/femur. No patella esthela or baja.    Assessment/Plan:     Hemarthrosis of left knee    Alina Narayan is a 72 y.o. female with bilateral knee pain, left knee effusion    - No fractures or dislocation, extensor mechanism intact bilaterally  - Left knee effusion aspirated, found to have hemarthrosis. Minimal amount of sanguinous fluid withdrawn, likely due to clot  - WBAT b/l LE  - Recommend ice, elevation, and compression as tolerated to reduce swelling  - F/u in orthopedics clinic in 1-2 weeks for recheck (pt will be contacted to schedule)    Procedure note:  After time out was performed and patient ID, side, and site were verified, the left knee was sterilly prepped in the standard fashion. An 18-gauge needle was introduced into the joint from an inferolateral site without complication. 1 cc sanguinous fluid was withdrawn. A second site at the superolateral aspect of the knee was then sterilly prepped and a new 18-gauge needle was introduced into this site in a second attempt to aspirate. 3 cc sanguinous fluid withdrawn. Sterile dressing was applied. Minimal blood loss. No complications.                Thank you for  your consult. Please call with questions    Alida Choi MD  Orthopedics  [facility]

## 2018-02-03 LAB
ANION GAP SERPL CALC-SCNC: 9 MMOL/L
BUN SERPL-MCNC: 20 MG/DL
CALCIUM SERPL-MCNC: 8.9 MG/DL
CHLORIDE SERPL-SCNC: 106 MMOL/L
CO2 SERPL-SCNC: 24 MMOL/L
CREAT SERPL-MCNC: 0.8 MG/DL
EST. GFR  (AFRICAN AMERICAN): >60 ML/MIN/1.73 M^2
EST. GFR  (NON AFRICAN AMERICAN): >60 ML/MIN/1.73 M^2
GLUCOSE SERPL-MCNC: 147 MG/DL
POCT GLUCOSE: 145 MG/DL (ref 70–110)
POCT GLUCOSE: 158 MG/DL (ref 70–110)
POCT GLUCOSE: 174 MG/DL (ref 70–110)
POCT GLUCOSE: 182 MG/DL (ref 70–110)
POTASSIUM SERPL-SCNC: 4 MMOL/L
SODIUM SERPL-SCNC: 139 MMOL/L

## 2018-02-03 PROCEDURE — 97161 PT EVAL LOW COMPLEX 20 MIN: CPT

## 2018-02-03 PROCEDURE — 80048 BASIC METABOLIC PNL TOTAL CA: CPT

## 2018-02-03 PROCEDURE — 99225 PR SUBSEQUENT OBSERVATION CARE,LEVEL II: CPT | Mod: ,,, | Performed by: PHYSICIAN ASSISTANT

## 2018-02-03 PROCEDURE — G8978 MOBILITY CURRENT STATUS: HCPCS | Mod: CK

## 2018-02-03 PROCEDURE — 25000003 PHARM REV CODE 250: Performed by: PHYSICIAN ASSISTANT

## 2018-02-03 PROCEDURE — 63600175 PHARM REV CODE 636 W HCPCS: Performed by: PHYSICIAN ASSISTANT

## 2018-02-03 PROCEDURE — 97530 THERAPEUTIC ACTIVITIES: CPT

## 2018-02-03 PROCEDURE — 36415 COLL VENOUS BLD VENIPUNCTURE: CPT

## 2018-02-03 PROCEDURE — G0378 HOSPITAL OBSERVATION PER HR: HCPCS

## 2018-02-03 PROCEDURE — 82962 GLUCOSE BLOOD TEST: CPT

## 2018-02-03 PROCEDURE — G8980 MOBILITY D/C STATUS: HCPCS | Mod: CK

## 2018-02-03 PROCEDURE — G8979 MOBILITY GOAL STATUS: HCPCS | Mod: CJ

## 2018-02-03 PROCEDURE — 97116 GAIT TRAINING THERAPY: CPT

## 2018-02-03 RX ORDER — ENOXAPARIN SODIUM 100 MG/ML
40 INJECTION SUBCUTANEOUS EVERY 24 HOURS
Status: DISCONTINUED | OUTPATIENT
Start: 2018-02-03 | End: 2018-02-04 | Stop reason: HOSPADM

## 2018-02-03 RX ORDER — CYCLOBENZAPRINE HCL 5 MG
5 TABLET ORAL 3 TIMES DAILY PRN
Status: DISCONTINUED | OUTPATIENT
Start: 2018-02-03 | End: 2018-02-04 | Stop reason: HOSPADM

## 2018-02-03 RX ADMIN — CARVEDILOL 12.5 MG: 12.5 TABLET, FILM COATED ORAL at 05:02

## 2018-02-03 RX ADMIN — GABAPENTIN 300 MG: 300 CAPSULE ORAL at 09:02

## 2018-02-03 RX ADMIN — CARVEDILOL 12.5 MG: 12.5 TABLET, FILM COATED ORAL at 09:02

## 2018-02-03 RX ADMIN — LEVOTHYROXINE SODIUM 50 MCG: 50 TABLET ORAL at 09:02

## 2018-02-03 RX ADMIN — PANTOPRAZOLE SODIUM 40 MG: 40 TABLET, DELAYED RELEASE ORAL at 09:02

## 2018-02-03 RX ADMIN — ENOXAPARIN SODIUM 40 MG: 100 INJECTION SUBCUTANEOUS at 05:02

## 2018-02-03 RX ADMIN — ASPIRIN 81 MG: 81 TABLET, COATED ORAL at 09:02

## 2018-02-03 RX ADMIN — PRAVASTATIN SODIUM 20 MG: 20 TABLET ORAL at 09:02

## 2018-02-03 RX ADMIN — VITAMIN D, TAB 1000IU (100/BT) 1000 UNITS: 25 TAB at 09:02

## 2018-02-03 NOTE — HOSPITAL COURSE
Alina Narayan was placed in observation for immobility due to hemarthrosis of the left knee s/p mechanical fall. Intake imaging was without fracture or dislocation. Orthopedic surgery was consulted in the Ed; they performed a knee aspiration pulling off minimal sanguinous fluid. Patient was to be WBAT with activity and to maintain RICE management to reduce swelling. She will be contacted by for follow-up in 1-2 weeks. PT/OT consulted and recommending outpatient PT/OT, ambulatory referral sent. Patient stable to discharge.

## 2018-02-03 NOTE — ASSESSMENT & PLAN NOTE
Fall  Hemarthrosis of left knee  Presents to ED after mechanical fall onto both knees.  No head trauma, LOC, syncope.  - L leg X-rays and L leg CT without evidence of fracture/dislocation  - Ortho consulted and L knee aspiration performed.  See note.  - Pain control with PRN hydrocodone 5-325 and oxycodone 5 mg  - PT/OT consulted  - Ice, elevation, compression   - Fall precautions  - Intake labs pending

## 2018-02-03 NOTE — H&P
"[facility] Hospital Medicine  History & Physical    Patient Name: Alina Narayan  MRN: [id number]  Admission Date: 2/2/2018  Attending Physician: Juancho Rivera MD   Primary Care Provider: Aarti Dunn MD    [facility] Medicine Team: Networked reference to record PCT  Ankit Umaña PA-C     Patient information was obtained from patient, past medical records and ER records.     Subjective:     Principal Problem:<principal problem not specified>    Chief Complaint:   Chief Complaint   Patient presents with    Knee Pain     left knee        HPI: This is a 72 y.o. F with pmhx of DM2, OA, peripheral neuropathy, hypothyroidism, GERD, anxiety presents to the ED after an apparent fall while coming out of the bathroom where there was some unevenness on the ground.  She fell onto both knees with most of her weight on the left knee. Brought in by paramedic ambulance with severe pain and swelling. Crying in severe pain and states that she cannot walk on her legs.  Denies any open wound.  Denies numbness, tingling.  No head trauma.  No syncope.    In ED, XR of L femur, L tibia/fibula, BL knees without evidence of dislocation or fracture.  L CT leg shows "No evidence of fracture or dislocation.  Extensive edema throughout the soft tissues and a focal soft tissue density anterior to the knee consistent with a hematoma.  DJD"  Ortho consulted and cleared for WBAT.  Aspiration of L knee showed hemarthrosis.  No further intervention needed.  Pending Intake labs.  Given 3 injections of IV fentanyl 50 mcg in ED.     Past Medical History:   Diagnosis Date    Allergy     seasonal    Anxiety     Arthritis     Breast cancer 10/2012    left breast invasive ductal carcinoma    Colon polyp     Diabetes mellitus     Type 2    Diverticular disease     Diverticulitis 2009    Genetic testing 05/2017    negative Integrated BRACAnalysis    Hyperlipidemia     Hypertension     Morbid obesity     MITCHELL " (obstructive sleep apnea)     Stenosis     Stenosis and insufficiency of lacrimal passages     Thyroid disease        Past Surgical History:   Procedure Laterality Date    BREAST BIOPSY Left 10/1/2012    left breast- invasive ductal carcinoma    BREAST LUMPECTOMY Left 2012    CARDIAC VALVE REPLACEMENT  12/2013    CARPAL TUNNEL RELEASE      Left    COLONOSCOPY N/A 9/29/2017    Procedure: COLONOSCOPY;  Surgeon: Phani Worley MD;  Location: [facility] ([address] [place] [address]);  Service: Endoscopy;  Laterality: N/A;    DILATION AND CURETTAGE OF UTERUS  1999    Endometrial polyps    ENDOMETRIAL ABLATION  1999    Enodmetrial polyps    EYE SURGERY      left lumpectomy  2012    SHOULDER SURGERY      SKIN BIOPSY         Review of patient's allergies indicates:   Allergen Reactions    Hydromorphone      Other reaction(s): sedation    Byetta [exenatide] Rash     Other reaction(s): Rash       No current facility-administered medications on file prior to encounter.      Current Outpatient Prescriptions on File Prior to Encounter   Medication Sig    ASPIR-LOW 81 mg EC tablet Take 81 mg by mouth once daily.    carvedilol (COREG) 25 MG tablet .5-1 tablet oral twice daily or as directed.    CHOLECALCIFEROL, VITAMIN D3, (VITAMIN D3 ORAL) Take by mouth once daily.     gabapentin (NEURONTIN) 300 MG capsule Take 1 capsule (300 mg total) by mouth 2 (two) times daily.    metformin (GLUCOPHAGE) 500 MG tablet Take 1 tablet (500 mg total) by mouth 2 (two) times daily with meals.    ALPRAZolam (XANAX) 0.25 MG tablet One-two daily as needed for anxiety    aspirin-omeprazole (YOSPRALA) 81-40 mg TbID Take 1 capsule by mouth once daily.    blood sugar diagnostic Strp TrueTest strips or strips and lancets for meter covered by insurance - pt checks twice daily for uncontrolled glucoses E11.65    blood-glucose meter kit True Test or meter covered by insurance - pt checks glucose twice daily for uncontrolled glucoses E11.65     FLUoxetine (PROZAC) 10 MG capsule Take 1 capsule (10 mg total) by mouth once daily.    hydrocodone-acetaminophen 5-325mg (NORCO) 5-325 mg per tablet One - two tabs every 6-8 hours as needed for pain    JANUVIA 100 mg Tab Take 1 tablet (100 mg total) by mouth once daily.    levothyroxine (SYNTHROID) 50 MCG tablet Take 1 tablet (50 mcg total) by mouth every evening.    nitroGLYCERIN (NITROSTAT) 0.4 MG SL tablet Place 1 tablet (0.4 mg total) under the tongue every 5 (five) minutes as needed for Chest pain.    omeprazole (PRILOSEC) 40 MG capsule Take 40 mg by mouth once daily.    pravastatin (PRAVACHOL) 20 MG tablet TAKE 1 TABLET ONE TIME DAILY    triamcinolone acetonide 0.1% (KENALOG) 0.1 % cream Apply topically 2 (two) times daily.     Family History     Problem Relation (Age of Onset)    Anxiety disorder Son    Breast cancer Mother (62)    Cancer Mother, Father, Maternal Grandfather, Brother    Colon cancer Father    SAL disease Son    No Known Problems Sister, Brother, Son        Social History Main Topics    Smoking status: Never Smoker    Smokeless tobacco: Never Used    Alcohol use No    Drug use: No    Sexual activity: Yes     Review of Systems   Constitutional: Negative for chills and fever.   HENT: Negative for congestion and rhinorrhea.    Eyes: Negative for photophobia and visual disturbance.   Respiratory: Negative for cough and shortness of breath.    Cardiovascular: Negative for chest pain and palpitations.   Gastrointestinal: Negative for abdominal distention, abdominal pain, constipation, diarrhea, nausea and vomiting.   Genitourinary: Negative for difficulty urinating and dysuria.   Musculoskeletal: Positive for back pain and joint swelling (L knee). Negative for neck pain and neck stiffness.        BUE soreness  Moderate L knee pain   Skin:        Bruising around L knee  Tightness of skin around L knee   Neurological: Negative for dizziness, weakness and numbness.   Psychiatric/Behavioral:  Negative for behavioral problems and confusion. The patient is not nervous/anxious.      Objective:     Vital Signs (Most Recent):  Temp: 98.2 °F (36.8 °C) (02/02/18 1556)  Pulse: 86 (02/02/18 1919)  Resp: 18 (02/02/18 1556)  BP: (!) 117/59 (02/02/18 1729)  SpO2: 96 % (02/02/18 1919) Vital Signs (24h Range):  Temp:  [98.1 °F (36.7 °C)-98.2 °F (36.8 °C)] 98.2 °F (36.8 °C)  Pulse:  [72-86] 86  Resp:  [18-20] 18  SpO2:  [96 %-99 %] 96 %  BP: (117-134)/(59-79) 117/59     Weight: 114.3 kg (252 lb)  Body mass index is 46.09 kg/m².    Physical Exam   Constitutional: She is oriented to person, place, and time. She appears well-developed and well-nourished.   Obese female in mild distress while being positioned in bed   HENT:   Head: Normocephalic and atraumatic.   Eyes: Conjunctivae and EOM are normal. Pupils are equal, round, and reactive to light.   Neck: Normal range of motion. Neck supple.   BL mild shoulder tenderness   Cardiovascular: Normal rate, regular rhythm, normal heart sounds and intact distal pulses.    Pulmonary/Chest: Effort normal and breath sounds normal. She has no wheezes.   Abdominal: Soft. Bowel sounds are normal. She exhibits distension. There is no tenderness.   Musculoskeletal: She exhibits tenderness (L knee). She exhibits no deformity.   TTP from anterior mid thigh to mid tibia  No TTP to posterior knee  FROM to RLE  LROM to L knee 2/2 exquisite pain   Neurological: She is alert and oriented to person, place, and time.   Skin: Skin is warm and dry.   Ecchymosis surround L knee  Two bandaids from aspiration   Skin tension 8 inches around L knee   Psychiatric: She has a normal mood and affect. Her behavior is normal.   Nursing note and vitals reviewed.        CRANIAL NERVES     CN III, IV, VI   Pupils are equal, round, and reactive to light.  Extraocular motions are normal.        Significant Labs: CBC: No results for input(s): WBC, HGB, HCT, PLT in the last 48 hours.  CMP: No results for input(s):  NA, K, CL, CO2, GLU, BUN, CREATININE, CALCIUM, PROT, ALBUMIN, BILITOT, ALKPHOS, AST, ALT, ANIONGAP, EGFRNONAA in the last 48 hours.    Invalid input(s): TIERNEY    Significant Imaging: I have reviewed all pertinent imaging results/findings within the past 24 hours.   X-ray Chest 1 View  Result Date: 2/2/2018  COMPARISON: Chest radiograph 2/6/14 FINDINGS: Frontal chest radiograph. Large body habitus. No detrimental change. The bilateral lungs are well expanded without large consolidation.  No large pleural effusion or pneumothorax.  Cardiomediastinal silhouette appears stable without evidence of failure.  No acute osseous process seen.  Prior sternotomy. PA and lateral views can be obtained.    No detrimental change or radiographic acute intrathoracic process seen on this single view.    X-ray Femur Ap/lat Left  Result Date: 2/2/2018  COMPARISON: CT abdomen and pelvis 6/26/17 FINDINGS: 4 views left femur.  Overall alignment is within normal limits. No acute displaced fracture, dislocation, or destructive osseous process identified.  Mild degenerative change noted at the left hip and knee.   No subcutaneous emphysema or radiodense retained foreign body.    No acute displaced fracture or dislocation identified.     X-ray Tibia Fibula 2 View Left  Result Date: 2/2/2018  2 views: No fractures dislocation bone destruction seen. There is mild DJD.    X-ray Knee 3 View Bilateral  Result Date: 2/2/2018  3 views bilateral. Left: There is moderate DJD and a varus deformity. Right: There is moderate DJD and a varus deformity.     Ct Leg (tibia-fibula) Without Contrast Left  Result Date: 2/2/2018  Procedure comments: Axial 2-mm images of the left knee and  leg   obtained without intravenous contrast.  Data submitted for coronal and sagittal reformats. Comparison: Knee, tibia/fibula radiograph 2/2/2018 Indication: Trauma to knees after fall. Findings: There is extensive edema throughout the lower thigh and leg soft tissues.   "There is a focal soft tissue density overlying the knee measuring 3 cm X 9 cm X 15 cm AP X TV X CC dimensions respectively, and in the setting of trauma is consistent with a hematoma. There is no evidence of fracture or dislocation.  There are degenerative changes at the knee joint with narrowing of the medial tibiofemoral space and spurring .  No joint effusion. A calcaneal spur is present at the ankle.  There is calcific atherosclerosis throughout the visualized arteries.    No evidence of fracture or dislocation.  Extensive edema throughout the soft tissues and a focal soft tissue density anterior to the knee consistent with a hematoma. Correlate clinically. DJD . Atherosclerosis in the visualized arteries.    Assessment/Plan:     Contusion, knee and lower leg, left, initial encounter    Hemarthrosis of left knee  Presents to ED after mechanical fall onto both knees.  No head trauma, LOC, syncope.  - L leg X-rays and L leg CT without evidence of fracture/dislocation  - Ortho consulted and L knee aspiration performed.  See note.  - Pain control with PRN hydrocodone 5-325 and oxycodone 5 mg  - PT/OT consulted  - Ice, elevation, compression   - Fall precautions  - Intake labs pending        Diabetes mellitus due to underlying condition with stage 3 chronic kidney disease, without long-term current use of insulin    Home medication: metformin 1,000 mg BID, januvia 100 mg daily  Inpatient med: SSI, acchuchecks ACHS  - diabetic diet 1800 [address]  - Pending Hg A1c; Last Hg A1c 7.0        Cancer of left breast, stage 1, estrogen receptor positive    History of breast cancer  - 12/4/17 mammogram showed possible evidence of cancer  - 12/13/17 biopsy performed  -  Per chart, "showed fibroconnective tissue,  no atypia/benign".  Patient notified.        Morbid obesity    BMI 46  - Patient reports OA and weakness to her knees that is chronic.  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  - bovine valve replacment  - continue ASA        Hyperlipemia    Continue pravastatin 20 mg QHS        HTN (hypertension)    Continue carvedilol 12.5 mg BID          VTE Risk Mitigation         Ordered     Medium Risk of VTE  Once      02/02/18 1913     Place sequential compression device  Until discontinued      02/02/18 1913     Place ALBINA hose  Until discontinued      02/02/18 1913             Ankit Umaña PA-C  Department of Hospital Medicine   [facility]

## 2018-02-03 NOTE — ASSESSMENT & PLAN NOTE
"History of breast cancer  - 12/4/17 mammogram showed possible evidence of cancer  - 12/13/17 biopsy performed  -  Per chart, "showed fibroconnective tissue,  no atypia/benign".  Patient notified.  "

## 2018-02-03 NOTE — ASSESSMENT & PLAN NOTE
Radiation from breast cancer treatment damaged native AV.  - bovine valve replacment  - continue ASA

## 2018-02-03 NOTE — ASSESSMENT & PLAN NOTE
Home medication: metformin 1,000 mg BID, januvia 100 mg daily  Inpatient med: francisco GILLETTE  - diabetic diet [address]  - Pending Hg A1c; Last Hg A1c 7.0

## 2018-02-03 NOTE — ASSESSMENT & PLAN NOTE
Radiation from breast cancer treatment damaged native Av.  - H/H stable on admit  - bovine valve replacment  - continue ASA

## 2018-02-03 NOTE — ASSESSMENT & PLAN NOTE
Hemarthrosis of left knee  Stable.  - Presented to ED s/p mechanical fall onto both knees.  No head trauma, LOC, syncope.  - L leg X-rays and L leg CT without evidence of fracture/dislocation  - Ortho consulted and L knee aspiration performed.  See note.  - Pain control with PRN hydrocodone 5-325 and oxycodone 5 mg  - PT/OT consulted, awaiting recs  - Ice, elevation, compression   - Fall precautions  - Intake labs unremarkable

## 2018-02-03 NOTE — NURSING
Pt. Received from ER per stretcher in NAD accompanied by family members and transport. Left knee very edematous and bruised. Dressings x 2 to left knee Both dressings bloody. Ice bag applied to knee,left leg elevated. Pedal pulses strong ruben. Pt. Oriented to room and call bell system limb alert and allergy and fall precaution bracelet placed on pt.

## 2018-02-03 NOTE — PLAN OF CARE
Problem: Patient Care Overview  Goal: Plan of Care Review  Outcome: Ongoing (interventions implemented as appropriate)  Pt aaox3, vss, no s/s of distress noted.  Pt denies pain at this time.  Accuchecks ac/hs.  Safety precautions maintained, pt remains free of falls.  Ice pack to left knee.  Call light within reach.  Spouse at bedside.

## 2018-02-03 NOTE — ASSESSMENT & PLAN NOTE
Glucose within goal range 140-180  - Home medication: metformin 1,000 mg BID, januvia 100 mg daily  - Inpatient med: francisco GILLETTE  - diabetic diet [address]  - Hg A1c 6.9    - Last Hg A1c 7.0  - hypoglycemic protocols in place

## 2018-02-03 NOTE — ASSESSMENT & PLAN NOTE
BMI 46  - Patient reports OA and weakness to her knees that is chronic.  She states that she has been told she needs a BL knee replacement  - Obesity exacerbating chronic knee pain

## 2018-02-03 NOTE — SUBJECTIVE & OBJECTIVE
Past Medical History:   Diagnosis Date    Allergy     seasonal    Anxiety     Arthritis     Breast cancer 10/2012    left breast invasive ductal carcinoma    Colon polyp     Diabetes mellitus     Type 2    Diverticular disease     Diverticulitis 2009    Genetic testing 05/2017    negative Integrated BRACAnalysis    Hyperlipidemia     Hypertension     Morbid obesity     MITCHELL (obstructive sleep apnea)     Stenosis     Stenosis and insufficiency of lacrimal passages     Thyroid disease        Past Surgical History:   Procedure Laterality Date    BREAST BIOPSY Left 10/1/2012    left breast- invasive ductal carcinoma    BREAST LUMPECTOMY Left 2012    CARDIAC VALVE REPLACEMENT  12/2013    CARPAL TUNNEL RELEASE      Left    COLONOSCOPY N/A 9/29/2017    Procedure: COLONOSCOPY;  Surgeon: Phani Worley MD;  Location: [address]);  Service: Endoscopy;  Laterality: N/A;    DILATION AND CURETTAGE OF UTERUS  1999    Endometrial polyps    ENDOMETRIAL ABLATION  1999    Enodmetrial polyps    EYE SURGERY      left lumpectomy  2012    SHOULDER SURGERY      SKIN BIOPSY         Review of patient's allergies indicates:   Allergen Reactions    Hydromorphone      Other reaction(s): sedation    Byetta [exenatide] Rash     Other reaction(s): Rash       No current facility-administered medications on file prior to encounter.      Current Outpatient Prescriptions on File Prior to Encounter   Medication Sig    ASPIR-LOW 81 mg EC tablet Take 81 mg by mouth once daily.    carvedilol (COREG) 25 MG tablet .5-1 tablet oral twice daily or as directed.    CHOLECALCIFEROL, VITAMIN D3, (VITAMIN D3 ORAL) Take by mouth once daily.     gabapentin (NEURONTIN) 300 MG capsule Take 1 capsule (300 mg total) by mouth 2 (two) times daily.    metformin (GLUCOPHAGE) 500 MG tablet Take 1 tablet (500 mg total) by mouth 2 (two) times daily with meals.    ALPRAZolam (XANAX) 0.25 MG tablet One-two daily as needed for  anxiety    aspirin-omeprazole (YOSPRALA) 81-40 mg TbID Take 1 capsule by mouth once daily.    blood sugar diagnostic Strp TrueTest strips or strips and lancets for meter covered by insurance - pt checks twice daily for uncontrolled glucoses E11.65    blood-glucose meter kit True Test or meter covered by insurance - pt checks glucose twice daily for uncontrolled glucoses E11.65    FLUoxetine (PROZAC) 10 MG capsule Take 1 capsule (10 mg total) by mouth once daily.    hydrocodone-acetaminophen 5-325mg (NORCO) 5-325 mg per tablet One - two tabs every 6-8 hours as needed for pain    JANUVIA 100 mg Tab Take 1 tablet (100 mg total) by mouth once daily.    levothyroxine (SYNTHROID) 50 MCG tablet Take 1 tablet (50 mcg total) by mouth every evening.    nitroGLYCERIN (NITROSTAT) 0.4 MG SL tablet Place 1 tablet (0.4 mg total) under the tongue every 5 (five) minutes as needed for Chest pain.    omeprazole (PRILOSEC) 40 MG capsule Take 40 mg by mouth once daily.    pravastatin (PRAVACHOL) 20 MG tablet TAKE 1 TABLET ONE TIME DAILY    triamcinolone acetonide 0.1% (KENALOG) 0.1 % cream Apply topically 2 (two) times daily.     Family History     Problem Relation (Age of Onset)    Anxiety disorder Son    Breast cancer Mother (62)    Cancer Mother, Father, Maternal Grandfather, Brother    Colon cancer Father    SAL disease Son    No Known Problems Sister, Brother, Son        Social History Main Topics    Smoking status: Never Smoker    Smokeless tobacco: Never Used    Alcohol use No    Drug use: No    Sexual activity: Yes     Review of Systems   Constitutional: Negative for chills and fever.   HENT: Negative for congestion and rhinorrhea.    Eyes: Negative for photophobia and visual disturbance.   Respiratory: Negative for cough and shortness of breath.    Cardiovascular: Negative for chest pain and palpitations.   Gastrointestinal: Negative for abdominal distention, abdominal pain, constipation, diarrhea, nausea and  vomiting.   Genitourinary: Negative for difficulty urinating and dysuria.   Musculoskeletal: Positive for back pain and joint swelling (L knee). Negative for neck pain and neck stiffness.        BUE soreness  Moderate L knee pain   Skin:        Bruising around L knee  Tightness of skin around L knee   Neurological: Negative for dizziness, weakness and numbness.   Psychiatric/Behavioral: Negative for behavioral problems and confusion. The patient is not nervous/anxious.      Objective:     Vital Signs (Most Recent):  Temp: 98.2 °F (36.8 °C) (02/02/18 1556)  Pulse: 86 (02/02/18 1919)  Resp: 18 (02/02/18 1556)  BP: (!) 117/59 (02/02/18 1729)  SpO2: 96 % (02/02/18 1919) Vital Signs (24h Range):  Temp:  [98.1 °F (36.7 °C)-98.2 °F (36.8 °C)] 98.2 °F (36.8 °C)  Pulse:  [72-86] 86  Resp:  [18-20] 18  SpO2:  [96 %-99 %] 96 %  BP: (117-134)/(59-79) 117/59     Weight: 114.3 kg (252 lb)  Body mass index is 46.09 kg/m².    Physical Exam   Constitutional: She is oriented to person, place, and time. She appears well-developed and well-nourished.   Obese female in mild distress while being positioned in bed   HENT:   Head: Normocephalic and atraumatic.   Eyes: Conjunctivae and EOM are normal. Pupils are equal, round, and reactive to light.   Neck: Normal range of motion. Neck supple.   BL mild shoulder tenderness   Cardiovascular: Normal rate, regular rhythm, normal heart sounds and intact distal pulses.    Pulmonary/Chest: Effort normal and breath sounds normal. She has no wheezes.   Abdominal: Soft. Bowel sounds are normal. She exhibits distension. There is no tenderness.   Musculoskeletal: She exhibits tenderness (L knee). She exhibits no deformity.   TTP from anterior mid thigh to mid tibia  No TTP to posterior knee  FROM to RLE  LROM to L knee 2/2 exquisite pain   Neurological: She is alert and oriented to person, place, and time.   Skin: Skin is warm and dry.   Ecchymosis surround L knee  Two bandaids from aspiration   Skin  tension 8 inches around L knee   Psychiatric: She has a normal mood and affect. Her behavior is normal.   Nursing note and vitals reviewed.        CRANIAL NERVES     CN III, IV, VI   Pupils are equal, round, and reactive to light.  Extraocular motions are normal.        Significant Labs: CBC: No results for input(s): WBC, HGB, HCT, PLT in the last 48 hours.  CMP: No results for input(s): NA, K, CL, CO2, GLU, BUN, CREATININE, CALCIUM, PROT, ALBUMIN, BILITOT, ALKPHOS, AST, ALT, ANIONGAP, EGFRNONAA in the last 48 hours.    Invalid input(s): ESTGFAFRICA    Significant Imaging: I have reviewed all pertinent imaging results/findings within the past 24 hours.   X-ray Chest 1 View  Result Date: 2/2/2018  COMPARISON: Chest radiograph 2/6/14 FINDINGS: Frontal chest radiograph. Large body habitus. No detrimental change. The bilateral lungs are well expanded without large consolidation.  No large pleural effusion or pneumothorax.  Cardiomediastinal silhouette appears stable without evidence of failure.  No acute osseous process seen.  Prior sternotomy. PA and lateral views can be obtained.    No detrimental change or radiographic acute intrathoracic process seen on this single view.    X-ray Femur Ap/lat Left  Result Date: 2/2/2018  COMPARISON: CT abdomen and pelvis 6/26/17 FINDINGS: 4 views left femur.  Overall alignment is within normal limits. No acute displaced fracture, dislocation, or destructive osseous process identified.  Mild degenerative change noted at the left hip and knee.   No subcutaneous emphysema or radiodense retained foreign body.    No acute displaced fracture or dislocation identified.     X-ray Tibia Fibula 2 View Left  Result Date: 2/2/2018  2 views: No fractures dislocation bone destruction seen. There is mild DJD.    X-ray Knee 3 View Bilateral  Result Date: 2/2/2018  3 views bilateral. Left: There is moderate DJD and a varus deformity. Right: There is moderate DJD and a varus deformity.     Ct Leg  (tibia-fibula) Without Contrast Left  Result Date: 2/2/2018  Procedure comments: Axial 2-mm images of the left knee and  leg   obtained without intravenous contrast.  Data submitted for coronal and sagittal reformats. Comparison: Knee, tibia/fibula radiograph 2/2/2018 Indication: Trauma to knees after fall. Findings: There is extensive edema throughout the lower thigh and leg soft tissues.  There is a focal soft tissue density overlying the knee measuring 3 cm X 9 cm X 15 cm AP X TV X CC dimensions respectively, and in the setting of trauma is consistent with a hematoma. There is no evidence of fracture or dislocation.  There are degenerative changes at the knee joint with narrowing of the medial tibiofemoral space and spurring .  No joint effusion. A calcaneal spur is present at the ankle.  There is calcific atherosclerosis throughout the visualized arteries.    No evidence of fracture or dislocation.  Extensive edema throughout the soft tissues and a focal soft tissue density anterior to the knee consistent with a hematoma. Correlate clinically. DJD . Atherosclerosis in the visualized arteries.

## 2018-02-03 NOTE — SUBJECTIVE & OBJECTIVE
Interval History: Patient resting in bed with  at bedside. Pain controlled with PO pain meds.     Review of Systems   Constitutional: Negative for chills and fever.   HENT: Negative for congestion and rhinorrhea.    Eyes: Negative for photophobia and visual disturbance.   Respiratory: Negative for cough and shortness of breath.    Cardiovascular: Negative for chest pain and palpitations.   Gastrointestinal: Negative for abdominal distention, abdominal pain, constipation, diarrhea, nausea and vomiting.   Genitourinary: Negative for difficulty urinating and dysuria.   Musculoskeletal: Positive for arthralgias, back pain, gait problem and joint swelling (L knee). Negative for neck pain and neck stiffness.        Lower extremity muscle spasms   Moderate L knee pain   Skin: Positive for color change (L knee bruising).   Neurological: Negative for dizziness, weakness and numbness.   Psychiatric/Behavioral: Negative for behavioral problems and confusion. The patient is not nervous/anxious.      Objective:     Vital Signs (Most Recent):  Temp: 97.8 °F (36.6 °C) (02/03/18 0809)  Pulse: 75 (02/03/18 0809)  Resp: 17 (02/03/18 0809)  BP: (!) 129/59 (02/03/18 0809)  SpO2: (!) 93 % (02/03/18 0809) Vital Signs (24h Range):  Temp:  [96.6 °F (35.9 °C)-98.2 °F (36.8 °C)] 97.8 °F (36.6 °C)  Pulse:  [72-90] 75  Resp:  [16-20] 17  SpO2:  [93 %-99 %] 93 %  BP: (107-137)/(52-79) 129/59     Weight: 113.4 kg (250 lb)  Body mass index is 45.73 kg/m².    Intake/Output Summary (Last 24 hours) at 02/03/18 0934  Last data filed at 02/03/18 0600   Gross per 24 hour   Intake              180 ml   Output              600 ml   Net             -420 ml      Physical Exam   Constitutional: She is oriented to person, place, and time. She appears well-developed and well-nourished. No distress.   Obese older, female   HENT:   Head: Normocephalic and atraumatic.   Eyes: Conjunctivae and EOM are normal. Pupils are equal, round, and reactive to light.    Neck: Normal range of motion. Neck supple.   BL mild shoulder tenderness   Cardiovascular: Normal rate, regular rhythm, normal heart sounds and intact distal pulses.    Pulmonary/Chest: Effort normal and breath sounds normal. She has no wheezes.   Abdominal: Soft. Bowel sounds are normal. She exhibits distension. There is no tenderness.   Musculoskeletal: She exhibits tenderness (L knee). She exhibits no deformity.   RLE:   TTP to posterior knee and anterior ankle  4-5/5 knee flexion/extension, plantar/dorsi flexion, hip flexion  Sensation grossly intact to touch    LLE:  Two Mepilex dressings in place, fully saturated with sanguinous drainage   Edematous knee with ecchymosis of the anterior and mediolateral aspect  Minimal ROM due to pain  Sensation grossly intact to touch   Neurological: She is alert and oriented to person, place, and time.   Skin: Skin is warm and dry. Capillary refill takes 2 to 3 seconds. No erythema.   Psychiatric: She has a normal mood and affect. Her behavior is normal.   Nursing note and vitals reviewed.      Significant Labs: All pertinent labs within the past 24 hours have been reviewed.    Significant Imaging: I have reviewed all pertinent imaging results/findings within the past 24 hours.

## 2018-02-03 NOTE — HPI
"This is a 72 y.o. F with pmhx of DM2, OA, peripheral neuropathy, hypothyroidism, GERD, anxiety presents to the ED after an apparent fall while coming out of the bathroom where there was some unevenness on the ground.  She fell onto both knees with most of her weight on the left knee. Brought in by paramedic ambulance with severe pain and swelling. Crying in severe pain and states that she cannot walk on her legs.  Denies any open wound.  Denies numbness, tingling.  No head trauma.  No syncope.    In ED, XR of L femur, L tibia/fibula, BL knees without evidence of dislocation or fracture.  L CT leg shows "No evidence of fracture or dislocation.  Extensive edema throughout the soft tissues and a focal soft tissue density anterior to the knee consistent with a hematoma.  DJD"  Ortho consulted and cleared for WBAT.  Aspiration of L knee showed hemarthrosis.  No further intervention needed.  Pending Intake labs.  Given 3 injections of IV fentanyl 50 mcg in ED.   "

## 2018-02-03 NOTE — PT/OT/SLP EVAL
Physical Therapy Evaluation    Patient Name:  Alina Narayan   MRN:  [id number]    Recommendations:     Discharge Recommendations:      Discharge Equipment Recommendations: walker, rolling   Barriers to discharge: None    Assessment:     Alina Narayan is a 72 y.o. female admitted with a medical diagnosis of Contusion, knee and lower leg, left, initial encounter.  She presents with the following impairments/functional limitations:  weakness, impaired endurance, impaired functional mobilty, gait instability, impaired balance, decreased lower extremity function, orthopedic precautions, edema, impaired cardiopulmonary response to activity, decreased ROM.  Pt presents with a history of DM2, OA, peripheral neuropathy, hypothyroidism, GERD, anxiety presents to the ED after an apparent fall while coming out of the bathroom where there was some unevenness on the ground.  She fell onto both knees with most of her weight on the left knee.  In ED, XR of L femur, L tibia/fibula, BL knees without evidence of dislocation or fracture.  L CT leg shows no evidence of fracture or dislocation.  Extensive edema throughout the soft tissues and a focal soft tissue density anterior to the knee consistent with a hematoma. Aspiration of L knee showed hemarthrosis.    Significant jt effusion and contusions noted with the L knee. Integrity of LCL, MCL, ACL, and PCL intact of the L kn with specialized testing.  Pt tolerated PT evaluation and gait training well. Able to amb with CGA-SBA with AD.  Pt able to perf bed mobility and functional transfers with Oliver-SBA.  Pt tolerated stair amb.  Currently safe to return home. Recommendation for pt to receive a RW and skilled PT services in the outpatient setting upon discharge.  Pt will benefit from acute skilled PT services to address these deficits and reach maximum level of function.      Recent Surgery: * No surgery found *      Plan:     During this hospitalization, patient to be seen 2 x/week to  "address the above listed problems via gait training, therapeutic activities, therapeutic exercises, neuromuscular re-education  · Plan of Care Expires:  03/03/18   Plan of Care Reviewed with: patient, spouse    Subjective     Communicated with nursing prior to session.  Patient found in supine upon PT entry to room, agreeable to evaluation.      "Not at the moment, I've got my ice."    Chief Complaint: Pain in the R wrist at site of PIV  Patient comments/goals: To go home  Pain/Comfort:  · Pain Rating 1: 0/10  · Pain Addressed 1: Nurse notified  · Pain Rating Post-Intervention 1: 4/10 (At site of R wrist PIV)    Patients cultural, spiritual, Quaker conflicts given the current situation: no    Living Environment:  Pt lives with spouse, [facility], [address].  Prior to hospitalization, pt and spouse were both able to drive  Prior to admission, patient pt amb with a cane or rollator outdoors and used furniture for support indoors.  Pt was I with ADLs and functional transfers.  Patient has the following equipment: cane, straight, rollator, grab bar, shower chair, glucometer.  DME owned (not currently used): none.  Upon discharge, patient will have assistance from spouse.    Objective:     Patient found with: telemetry, peripheral IV     General Precautions: Standard, fall   Orthopedic Precautions:LLE weight bearing as tolerated   Braces: N/A     Exams:    · Cognitive Exam:  Patient is oriented to Person, Place, Time and Situation and follows 100% of multi step commands   · Fine Motor Coordination:    · -       Intact  Left hand thumb/finger opposition skills and Right hand thumb/finger opposition skills  · Gross Motor Coordination:  WFL  · Postural Exam:  Patient presented with the following abnormalities:    · -       No postural abnormalities identified  · Sensation:    · -       Intact  light/touch B LEs, hypersensitivity of the L knee  · Skin Integrity/Edema:      · -       Skin integrity: 2 wound dressing on the L " knee  · -       Edema: Mild B LEs, L kn jt effusion    · RUE ROM: WFL  · RUE Strength: Deficits: 4+/5 sh flex with pain  · LUE ROM: WFL  · LUE Strength: WFL    · RLE ROM: WFL  · RLE Strength: Deficits: 4+/5 hip flex and kn flex/ext  · LLE ROM: Deficits: kn flex inhibited by edema  · LLE Strength: kn strength not tested    Functional Mobility:    · Bed Mobility:     · Scooting: contact guard assistance  · Supine to Sit: minimum assistance  · Sit to Supine: stand by assistance    · Transfers:     · Sit to Stand:  minimum assistance with rolling walker  · Bed to Chair: stand by assistance with  rolling walker  using  Stand Pivot  · Toilet Transfer: S with  grab bars and rolling walker  using  Stand Pivot    · Gait: Pt amb 172', CGA-SBA, RW, decreased gait speed, minimal fwd trunk flex.    · Balance: SBA-CGA: dynamic standing balance with AD    · Stairs:  Pt ascended/descended 1 stair with No Assistive Device with bilateral handrails with Stand-by Assistance. Time taken for demo and ed on safety and proper tech.     AM-PAC 6 CLICK MOBILITY  Total Score:18       Therapeutic Activities and Exercises:  Whiteboard updated  Toileting: SBA with bladder management and hygiene  Lachman's Test: Neg  Posterior Drawer Test: Neg  Valgus Stress Test: Neg, inhibited testing sec to pain  Varus Stress Test: Neg, inhibited testing sec to pain    Patient left supine with all lines intact, call button in reach, nursing notified and spouse present.    GOALS:    Physical Therapy Goals        Problem: Physical Therapy Goal    Goal Priority Disciplines Outcome Goal Variances Interventions   Physical Therapy Goal     PT/OT, PT Ongoing (interventions implemented as appropriate)     Description:  Goals to be met by: 18     Patient will increase functional independence with mobility by performin. Supine to sit with Supervision.  2. Sit to supine with Supervision.  3. Sit to stand transfer with Stand-by Assistance.  4. Bed to chair  transfer with Supervision using Rolling Walker.  5. Gait  x 250 feet with Supervision using Rolling Walker.   6. Lower extremity exercise program x 20 reps per handout, with assistance as needed.                      History:     Past Medical History:   Diagnosis Date    Allergy     seasonal    Anxiety     Arthritis     Breast cancer 10/2012    left breast invasive ductal carcinoma    Colon polyp     Diabetes mellitus     Type 2    Diverticular disease     Diverticulitis 2009    Genetic testing 05/2017    negative Integrated BRACAnalysis    Hyperlipidemia     Hypertension     Morbid obesity     MITCHELL (obstructive sleep apnea)     Stenosis     Stenosis and insufficiency of lacrimal passages     Thyroid disease        Past Surgical History:   Procedure Laterality Date    BREAST BIOPSY Left 10/1/2012    left breast- invasive ductal carcinoma    BREAST LUMPECTOMY Left 2012    CARDIAC VALVE REPLACEMENT  12/2013    CARPAL TUNNEL RELEASE      Left    COLONOSCOPY N/A 9/29/2017    Procedure: COLONOSCOPY;  Surgeon: Phani Worley MD;  Location: [address]);  Service: Endoscopy;  Laterality: N/A;    DILATION AND CURETTAGE OF UTERUS  1999    Endometrial polyps    ENDOMETRIAL ABLATION  1999    Enodmetrial polyps    EYE SURGERY      left lumpectomy  2012    SHOULDER SURGERY      SKIN BIOPSY         Clinical Decision Making:     History  Co-morbidities and personal factors that may impact the plan of care Examination  Body Structures and Functions, activity limitations and participation restrictions that may impact the plan of care Clinical Presentation   Decision Making/ Complexity Score   Co-morbidities:   [] Time since onset of injury / illness / exacerbation  [x] Status of current condition  []Patient's cognitive status and safety concerns    [] Multiple Medical Problems (see med hx)  Personal Factors:   [] Patient's age  [] Prior Level of function   [] Patient's home situation (environment  and family support)  [] Patient's level of motivation  [] Expected progression of patient      HISTORY:(criteria)    [] 29565 - no personal factors/history    [x] 95791 - has 1-2 personal factor/comorbidity     [] 90412 - has >3 personal factor/comorbidity     Body Regions:  [] Objective examination findings  [] Head     []  Neck  [] Trunk   [] Upper Extremity  [x] Lower Extremity    Body Systems:  [] For communication ability, affect, cognition, language, and learning style: the assessment of the ability to make needs known, consciousness, orientation (person, place, and time), expected emotional /behavioral responses, and learning preferences (eg, learning barriers, education  needs)  [x] For the neuromuscular system: a general assessment of gross coordinated movement (eg, balance, gait, locomotion, transfers, and transitions) and motor function  (motor control and motor learning)  [x] For the musculoskeletal system: the assessment of gross symmetry, gross range of motion, gross strength, height, and weight  [x] For the integumentary system: the assessment of pliability(texture), presence of scar formation, skin color, and skin integrity  [] For cardiovascular/pulmonary system: the assessment of heart rate, respiratory rate, blood pressure, and edema     Activity limitations:    [] Patient's cognitive status and saf ety concerns          [x] Status of current condition      [x] Weight bearing restriction  [] Cardiopulmunary Restriction    Participation Restrictions:   [] Goals and goal agreement with the patient     [] Rehab potential (prognosis) and probable outcome      Examination of Body System: (criteria)    [x] 55544 - addressing 1-2 elements    [] 80355 - addressing a total of 3 or more elements     [] 07342 -  Addressing a total of 4 or more elements         Clinical Presentation: (criteria)  Stable - 89024     On examination of body system using standardized tests and measures patient presents with 1-2  elements from any of the following: body structures and functions, activity limitations, and/or participation restrictions.  Leading to a clinical presentation that is considered stable and/or uncomplicated                              Clinical Decision Making  (Eval Complexity):  Low- 77795     Time Tracking:     PT Received On: 02/03/18  PT Start Time: 1135     PT Stop Time: 1228  PT Total Time (min): 53 min     Billable Minutes: Evaluation 15, Gait Training 20 and Therapeutic Activity 16      Corinne Moses, PT  02/03/2018

## 2018-02-03 NOTE — PROGRESS NOTES
Left knee foam dressings removed.  Areas cleaned with normal saline and new foam dressings applied.  New ice pack placed to left knee.

## 2018-02-03 NOTE — PLAN OF CARE
Problem: Physical Therapy Goal  Goal: Physical Therapy Goal  Goals to be met by: 18     Patient will increase functional independence with mobility by performin. Supine to sit with Supervision.  2. Sit to supine with Supervision.  3. Sit to stand transfer with Stand-by Assistance.  4. Bed to chair transfer with Supervision using Rolling Walker.  5. Gait  x 250 feet with Supervision using Rolling Walker.   6. Lower extremity exercise program x 20 reps per handout, with assistance as needed.    Outcome: Ongoing (interventions implemented as appropriate)  PT goals established.

## 2018-02-03 NOTE — PROGRESS NOTES
"[facility] Hospital Medicine  Progress Note    Patient Name: Alina Narayan  MRN: [id number]  Patient Class: OP- Observation   Admission Date: 2/2/2018  Length of Stay: 0 days  Attending Physician: Juancho Rivera MD  Primary Care Provider: Aarti Dunn MD    [facility] Medicine Team: Networked reference to record PCT  Antonio Ruano PA-C    Subjective:     Principal Problem:Contusion, knee and lower leg, left, initial encounter    HPI:  This is a 72 y.o. F with pmhx of DM2, OA, peripheral neuropathy, hypothyroidism, GERD, anxiety presents to the ED after an apparent fall while coming out of the bathroom where there was some unevenness on the ground.  She fell onto both knees with most of her weight on the left knee. Brought in by paramedic ambulance with severe pain and swelling. Crying in severe pain and states that she cannot walk on her legs.  Denies any open wound.  Denies numbness, tingling.  No head trauma.  No syncope.    In ED, XR of L femur, L tibia/fibula, BL knees without evidence of dislocation or fracture.  L CT leg shows "No evidence of fracture or dislocation.  Extensive edema throughout the soft tissues and a focal soft tissue density anterior to the knee consistent with a hematoma.  DJD"  Ortho consulted and cleared for WBAT.  Aspiration of L knee showed hemarthrosis.  No further intervention needed.  Pending Intake labs.  Given 3 injections of IV fentanyl 50 mcg in ED.     Hospital Course:  Alina Narayan was placed in observation for immobility due to hemarthrosis of the left knee s/p mechanical fall. Intake imaging was without fracture or dislocation. Orthopedic surgery was consulted in the Ed; they performed a knee aspiration pulling off minimal sanguinous fluid. Patient was to be WBAT with activity and to maintain RICE management to reduce swelling. She will be contacted by for follow-up in 1-2 weeks. PT/OT consulted for recommendations.     Interval History: Patient " resting in bed with  at bedside. Pain controlled with PO pain meds.     Review of Systems   Constitutional: Negative for chills and fever.   HENT: Negative for congestion and rhinorrhea.    Eyes: Negative for photophobia and visual disturbance.   Respiratory: Negative for cough and shortness of breath.    Cardiovascular: Negative for chest pain and palpitations.   Gastrointestinal: Negative for abdominal distention, abdominal pain, constipation, diarrhea, nausea and vomiting.   Genitourinary: Negative for difficulty urinating and dysuria.   Musculoskeletal: Positive for arthralgias, back pain, gait problem and joint swelling (L knee). Negative for neck pain and neck stiffness.        Lower extremity muscle spasms   Moderate L knee pain   Skin: Positive for color change (L knee bruising).   Neurological: Negative for dizziness, weakness and numbness.   Psychiatric/Behavioral: Negative for behavioral problems and confusion. The patient is not nervous/anxious.      Objective:     Vital Signs (Most Recent):  Temp: 97.8 °F (36.6 °C) (02/03/18 0809)  Pulse: 75 (02/03/18 0809)  Resp: 17 (02/03/18 0809)  BP: (!) 129/59 (02/03/18 0809)  SpO2: (!) 93 % (02/03/18 0809) Vital Signs (24h Range):  Temp:  [96.6 °F (35.9 °C)-98.2 °F (36.8 °C)] 97.8 °F (36.6 °C)  Pulse:  [72-90] 75  Resp:  [16-20] 17  SpO2:  [93 %-99 %] 93 %  BP: (107-137)/(52-79) 129/59     Weight: 113.4 kg (250 lb)  Body mass index is 45.73 kg/m².    Intake/Output Summary (Last 24 hours) at 02/03/18 0934  Last data filed at 02/03/18 0600   Gross per 24 hour   Intake              180 ml   Output              600 ml   Net             -420 ml      Physical Exam   Constitutional: She is oriented to person, place, and time. She appears well-developed and well-nourished. No distress.   Obese older, female   HENT:   Head: Normocephalic and atraumatic.   Eyes: Conjunctivae and EOM are normal. Pupils are equal, round, and reactive to light.   Neck: Normal range of  motion. Neck supple.   BL mild shoulder tenderness   Cardiovascular: Normal rate, regular rhythm, normal heart sounds and intact distal pulses.    Pulmonary/Chest: Effort normal and breath sounds normal. She has no wheezes.   Abdominal: Soft. Bowel sounds are normal. She exhibits distension. There is no tenderness.   Musculoskeletal: She exhibits tenderness (L knee). She exhibits no deformity.   RLE:   TTP to posterior knee and anterior ankle  4-5/5 knee flexion/extension, plantar/dorsi flexion, hip flexion  Sensation grossly intact to touch    LLE:  Two Mepilex dressings in place, fully saturated with sanguinous drainage   Edematous knee with ecchymosis of the anterior and mediolateral aspect  Minimal ROM due to pain  Sensation grossly intact to touch   Neurological: She is alert and oriented to person, place, and time.   Skin: Skin is warm and dry. Capillary refill takes 2 to 3 seconds. No erythema.   Psychiatric: She has a normal mood and affect. Her behavior is normal.   Nursing note and vitals reviewed.      Significant Labs: All pertinent labs within the past 24 hours have been reviewed.    Significant Imaging: I have reviewed all pertinent imaging results/findings within the past 24 hours.    Assessment/Plan:      * Contusion, knee and lower leg, left, initial encounter    Hemarthrosis of left knee  Stable.  - Presented to ED s/p mechanical fall onto both knees.  No head trauma, LOC, syncope.  - L leg X-rays and L leg CT without evidence of fracture/dislocation  - Ortho consulted and L knee aspiration performed.  See note.  - Pain control with PRN hydrocodone 5-325 and oxycodone 5 mg  - PT/OT consulted, awaiting recs  - Ice, elevation, compression   - Fall precautions  - Intake labs unremarkable         Diabetes mellitus due to underlying condition with stage 3 chronic kidney disease, without long-term current use of insulin    Glucose within goal range 140-180  - Home medication: metformin 1,000 mg BID,  "januvia 100 mg daily  - Inpatient med: SSI, acchuchecks ACHS  - diabetic diet [address]  - Hg A1c 6.9    - Last Hg A1c 7.0  - hypoglycemic protocols in place        Cancer of left breast, stage 1, estrogen receptor positive    History of breast cancer  - 12/4/17 mammogram showed possible evidence of cancer  - 12/13/17 biopsy performed  -  Per chart, "showed fibroconnective tissue,  no atypia/benign".  Patient notified.        Morbid obesity    BMI 46  - Patient reports OA and weakness to her knees that is chronic.  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  - H/H stable on admit  - bovine valve replacment  - continue ASA        Hyperlipemia    - Continue pravastatin 20 mg QHS        HTN (hypertension)    Controlled  - Continue carvedilol 12.5 mg BID          VTE Risk Mitigation         Ordered     enoxaparin injection 40 mg  Daily     Route:  Subcutaneous        02/03/18 0930     Medium Risk of VTE  Once      02/02/18 1913     Place sequential compression device  Until discontinued      02/02/18 1913     Place ALBINA hose  Until discontinued      02/02/18 1913              Antonio Ruano PA-C  Department of Hospital Medicine   [facility]  "

## 2018-02-04 VITALS
DIASTOLIC BLOOD PRESSURE: 59 MMHG | OXYGEN SATURATION: 98 % | HEART RATE: 81 BPM | RESPIRATION RATE: 18 BRPM | WEIGHT: 250 LBS | BODY MASS INDEX: 46.01 KG/M2 | SYSTOLIC BLOOD PRESSURE: 121 MMHG | HEIGHT: 62 IN | TEMPERATURE: 98 F

## 2018-02-04 LAB
ANION GAP SERPL CALC-SCNC: 8 MMOL/L
BUN SERPL-MCNC: 19 MG/DL
CALCIUM SERPL-MCNC: 9.2 MG/DL
CHLORIDE SERPL-SCNC: 105 MMOL/L
CO2 SERPL-SCNC: 26 MMOL/L
CREAT SERPL-MCNC: 1 MG/DL
EST. GFR  (AFRICAN AMERICAN): >60 ML/MIN/1.73 M^2
EST. GFR  (NON AFRICAN AMERICAN): 56.4 ML/MIN/1.73 M^2
GLUCOSE SERPL-MCNC: 167 MG/DL
POCT GLUCOSE: 163 MG/DL (ref 70–110)
POTASSIUM SERPL-SCNC: 4.5 MMOL/L
SODIUM SERPL-SCNC: 139 MMOL/L

## 2018-02-04 PROCEDURE — 82962 GLUCOSE BLOOD TEST: CPT | Mod: 91

## 2018-02-04 PROCEDURE — 25000003 PHARM REV CODE 250: Performed by: PHYSICIAN ASSISTANT

## 2018-02-04 PROCEDURE — 99217 PR OBSERVATION CARE DISCHARGE: CPT | Mod: ,,, | Performed by: PHYSICIAN ASSISTANT

## 2018-02-04 PROCEDURE — 80048 BASIC METABOLIC PNL TOTAL CA: CPT

## 2018-02-04 PROCEDURE — 36415 COLL VENOUS BLD VENIPUNCTURE: CPT

## 2018-02-04 PROCEDURE — G0378 HOSPITAL OBSERVATION PER HR: HCPCS

## 2018-02-04 RX ADMIN — CARVEDILOL 12.5 MG: 12.5 TABLET, FILM COATED ORAL at 08:02

## 2018-02-04 RX ADMIN — ASPIRIN 81 MG: 81 TABLET, COATED ORAL at 08:02

## 2018-02-04 RX ADMIN — HYDROCODONE BITARTRATE AND ACETAMINOPHEN 1 TABLET: 5; 325 TABLET ORAL at 11:02

## 2018-02-04 RX ADMIN — HYDROCODONE BITARTRATE AND ACETAMINOPHEN 1 TABLET: 5; 325 TABLET ORAL at 04:02

## 2018-02-04 RX ADMIN — PANTOPRAZOLE SODIUM 40 MG: 40 TABLET, DELAYED RELEASE ORAL at 08:02

## 2018-02-04 RX ADMIN — VITAMIN D, TAB 1000IU (100/BT) 1000 UNITS: 25 TAB at 08:02

## 2018-02-04 RX ADMIN — GABAPENTIN 300 MG: 300 CAPSULE ORAL at 08:02

## 2018-02-04 NOTE — DISCHARGE SUMMARY
"[facility] Hospital Medicine  Discharge Summary      Patient Name: Alina Narayan  MRN: [id number]  Admission Date: 2/2/2018  Hospital Length of Stay: 0 days  Discharge Date and Time:  02/04/2018 10:28 AM  Attending Physician: Juancho Rivrea MD   Discharging Provider: nAtonio Ruano PA-C  Primary Care Provider: Aarti Dunn MD  Hospital Medicine Team: Networked reference to record PCT  Antonio Ruano PA-C    HPI:   This is a 72 y.o. F with pmhx of DM2, OA, peripheral neuropathy, hypothyroidism, GERD, anxiety presents to the ED after an apparent fall while coming out of the bathroom where there was some unevenness on the ground.  She fell onto both knees with most of her weight on the left knee. Brought in by paramedic ambulance with severe pain and swelling. Crying in severe pain and states that she cannot walk on her legs.  Denies any open wound.  Denies numbness, tingling.  No head trauma.  No syncope.    In ED, XR of L femur, L tibia/fibula, BL knees without evidence of dislocation or fracture.  L CT leg shows "No evidence of fracture or dislocation.  Extensive edema throughout the soft tissues and a focal soft tissue density anterior to the knee consistent with a hematoma.  DJD"  Ortho consulted and cleared for WBAT.  Aspiration of L knee showed hemarthrosis.  No further intervention needed.  Pending Intake labs.  Given 3 injections of IV fentanyl 50 mcg in ED.     * No surgery found *      Hospital Course:   Alina Narayan was placed in observation for immobility due to hemarthrosis of the left knee s/p mechanical fall. Intake imaging was without fracture or dislocation. Orthopedic surgery was consulted in the Ed; they performed a knee aspiration pulling off minimal sanguinous fluid. Patient was to be WBAT with activity and to maintain RICE management to reduce swelling. She will be contacted by for follow-up in 1-2 weeks. PT/OT consulted and recommending outpatient PT/OT, " "ambulatory referral sent. Patient stable to discharge.      Consults:   Consults         Status Ordering Provider     Inpatient consult to Orthopedic Surgery  Once     Provider:  (Not yet assigned)    Completed KYM MYERS          * Contusion, knee and lower leg, left, initial encounter    Hemarthrosis of left knee  Stable.  - Presented to ED s/p mechanical fall onto both knees.  No head trauma, LOC, syncope.  - L leg X-rays and L leg CT without evidence of fracture/dislocation  - Ortho consulted and L knee aspiration performed.  See note.   - patient to be contacted regarding follow-up in 1-2 weeks    - Pain control with PRN home med, hydrocodone 5-325   - PT/OT consulted and recommending outpatient PT/OT   - rolling walker ordered at discharge, to be delivered to bedside  - Ice, elevation, compression          Final Active Diagnoses:    Diagnosis Date Noted POA    PRINCIPAL PROBLEM:  Contusion, knee and lower leg, left, initial encounter [S80.02XA, S80.12XA] 02/02/2018 Yes    Hemarthrosis of left knee [M25.062] 02/02/2018 Yes    Fall [W19.XXXA] 02/02/2018 Unknown    Diabetes mellitus due to underlying condition with stage 3 chronic kidney disease, without long-term current use of insulin [E08.22, N18.3] 09/12/2017 Yes      Problems Resolved During this Admission:    Diagnosis Date Noted Date Resolved POA    Cancer of left breast, stage 1, estrogen receptor positive [C50.912, Z17.0] 12/07/2015 02/04/2018 Not Applicable    S/P AVR [Z95.2] 12/02/2013 02/04/2018 Not Applicable    Morbid obesity [E66.01] 12/02/2013 02/04/2018 Yes    Hypothyroid [E03.9] 12/02/2013 02/04/2018 Yes    HTN (hypertension) [I10] 07/06/2012 02/04/2018 Yes    Hyperlipemia [E78.5] 07/06/2012 02/04/2018 Yes       Discharged Condition: good    Disposition: Home or Self Care    Follow Up:    Patient Instructions:     WALKER FOR HOME USE   Order Specific Question Answer Comments   Type of Walker: Adult (5'4"-6'6")    With " "wheels? Yes    Height: 5' 2" (1.575 m)    Weight: 113.4 kg (250 lb)    Length of need (1-99 months): 99    Does patient have medical equipment at home? cane, straight    Does patient have medical equipment at home? rollator    Does patient have medical equipment at home? grab bar    Does patient have medical equipment at home? shower chair    Does patient have medical equipment at home? glucometer    Please check all that apply: Patient is unable to safely ambulate without equipment.      Ambulatory consult to Physical Therapy   Referral Priority: Routine Referral Type: Physical Medicine   Referral Reason: Specialty Services Required    Requested Specialty: Physical Therapy    Number of Visits Requested: 1      Diet diabetic     Activity as tolerated     Ice to affected area     Keep surgical extremity elevated     Notify your health care provider if you experience any of the following:  severe uncontrolled pain     Remove dressing in 24 hours         Significant Diagnostic Studies: Labs:   CMP     Recent Labs  Lab 02/02/18  2124 02/03/18  0431 02/04/18  0515     142 139 139   K 4.7  4.7 4.0 4.5     108 106 105   CO2 22*  22* 24 26   *  164* 147* 167*   BUN 18  18 20 19   CREATININE 0.8  0.8 0.8 1.0   CALCIUM 9.6  9.6 8.9 9.2   ALBUMIN 3.3*  --   --    ANIONGAP 12  12 9 8   ESTGFRAFRICA >60.0  >60.0 >60.0 >60.0   EGFRNONAA >60.0  >60.0 >60.0 56.4*    and CBC     Recent Labs  Lab 02/02/18 2124   WBC 7.58  7.58   HGB 12.1  12.1   HCT 34.9*  34.9*   *  108*     X-ray Chest 1 View  Result Date: 2/2/2018  COMPARISON: Chest radiograph 2/6/14 FINDINGS: Frontal chest radiograph. Large body habitus. No detrimental change. The bilateral lungs are well expanded without large consolidation.  No large pleural effusion or pneumothorax.  Cardiomediastinal silhouette appears stable without evidence of failure.  No acute osseous process seen.  Prior sternotomy. PA and lateral views can be " obtained.    No detrimental change or radiographic acute intrathoracic process seen on this single view.         X-ray Femur Ap/lat Left  Result Date: 2/2/2018  COMPARISON: CT abdomen and pelvis 6/26/17 FINDINGS: 4 views left femur.  Overall alignment is within normal limits. No acute displaced fracture, dislocation, or destructive osseous process identified.  Mild degenerative change noted at the left hip and knee.   No subcutaneous emphysema or radiodense retained foreign body.    No acute displaced fracture or dislocation identified.     X-ray Tibia Fibula 2 View Left  Result Date: 2/2/2018  2 views: No fractures dislocation bone destruction seen. There is mild DJD.        X-ray Knee 3 View Bilateral  Result Date: 2/2/2018  3 views bilateral. Left: There is moderate DJD and a varus deformity. Right: There is moderate DJD and a varus deformity.    Ct Leg (tibia-fibula) Without Contrast Left  Result Date: 2/2/2018  Procedure comments: Axial 2-mm images of the left knee and  leg   obtained without intravenous contrast.  Data submitted for coronal and sagittal reformats. Comparison: Knee, tibia/fibula radiograph 2/2/2018 Indication: Trauma to knees after fall. Findings: There is extensive edema throughout the lower thigh and leg soft tissues.  There is a focal soft tissue density overlying the knee measuring 3 cm X 9 cm X 15 cm AP X TV X CC dimensions respectively, and in the setting of trauma is consistent with a hematoma. There is no evidence of fracture or dislocation.  There are degenerative changes at the knee joint with narrowing of the medial tibiofemoral space and spurring .  No joint effusion. A calcaneal spur is present at the ankle.  There is calcific atherosclerosis throughout the visualized arteries.    No evidence of fracture or dislocation.  Extensive edema throughout the soft tissues and a focal soft tissue density anterior to the knee consistent with a hematoma. Correlate clinically. DJD .  Atherosclerosis in the visualized arteries.      Pending Diagnostic Studies:     None         Medications:  Reconciled Home Medications:   Current Discharge Medication List      CONTINUE these medications which have NOT CHANGED    Details   ASPIR-LOW 81 mg EC tablet Take 81 mg by mouth once daily.  Refills: 12      carvedilol (COREG) 25 MG tablet .5-1 tablet oral twice daily or as directed.  Qty: 180 tablet, Refills: 3    Associated Diagnoses: S/P AVR; Essential hypertension      CHOLECALCIFEROL, VITAMIN D3, (VITAMIN D3 ORAL) Take by mouth once daily.       gabapentin (NEURONTIN) 300 MG capsule Take 1 capsule (300 mg total) by mouth 2 (two) times daily.  Qty: 180 capsule, Refills: 3    Associated Diagnoses: Degenerative lumbar disc; Osteoarthritis of spine with radiculopathy, lumbosacral region      metformin (GLUCOPHAGE) 500 MG tablet Take 1 tablet (500 mg total) by mouth 2 (two) times daily with meals.  Qty: 180 tablet, Refills: 3      ALPRAZolam (XANAX) 0.25 MG tablet One-two daily as needed for anxiety  Qty: 40 tablet, Refills: 0    Associated Diagnoses: Anxiety      aspirin-omeprazole (YOSPRALA) 81-40 mg TbID Take 1 capsule by mouth once daily.  Qty: 30 each, Refills: 12    Associated Diagnoses: S/P AVR; Essential hypertension; Mixed hyperlipidemia; Diabetes mellitus due to underlying condition with stage 3 chronic kidney disease, without long-term current use of insulin      blood sugar diagnostic Strp TrueTest strips or strips and lancets for meter covered by insurance - pt checks twice daily for uncontrolled glucoses E11.65  Qty: 200 each, Refills: 3      blood-glucose meter kit True Test or meter covered by insurance - pt checks glucose twice daily for uncontrolled glucoses E11.65  Qty: 1 each, Refills: 0      FLUoxetine (PROZAC) 10 MG capsule Take 1 capsule (10 mg total) by mouth once daily.  Qty: 90 capsule, Refills: 1      hydrocodone-acetaminophen 5-325mg (NORCO) 5-325 mg per tablet One - two tabs every  6-8 hours as needed for pain  Qty: 40 tablet, Refills: 0      JANUVIA 100 mg Tab Take 1 tablet (100 mg total) by mouth once daily.  Qty: 90 tablet, Refills: 3      levothyroxine (SYNTHROID) 50 MCG tablet Take 1 tablet (50 mcg total) by mouth every evening.  Qty: 90 tablet, Refills: 3    Associated Diagnoses: Hypothyroidism due to acquired atrophy of thyroid      omeprazole (PRILOSEC) 40 MG capsule Take 40 mg by mouth once daily.  Refills: 12      pravastatin (PRAVACHOL) 20 MG tablet TAKE 1 TABLET ONE TIME DAILY  Qty: 90 tablet, Refills: 3         STOP taking these medications       nitroGLYCERIN (NITROSTAT) 0.4 MG SL tablet Comments:   Reason for Stopping:         triamcinolone acetonide 0.1% (KENALOG) 0.1 % cream Comments:   Reason for Stopping:               Indwelling Lines/Drains at time of discharge:   Lines/Drains/Airways     Drain            Female External Urinary Catheter 02/02/18 2 days                Time spent on the discharge of patient: 30 minutes  Patient was seen and examined on the date of discharge and determined to be suitable for discharge.         Antonio Ruano PA-C  Department of Hospital Medicine  [facility]

## 2018-02-04 NOTE — PROGRESS NOTES
Pt discharged from unit.  Pt aaox3, vss, no s/s of distress noted.  Pt c/o pain to left knee 5/10.  Discharge instructions given to pt and she verbalized understanding.  Home meds and f/u appts reviewed as well.  IV removed from right wrist w/ catheter intact, no redness or swelling noted to area.  Walker was delivered to room prior to discharge.  Pt left unit via w/c and was accompanied by spouse.

## 2018-02-04 NOTE — ASSESSMENT & PLAN NOTE
Hemarthrosis of left knee  Stable.  - Presented to ED s/p mechanical fall onto both knees.  No head trauma, LOC, syncope.  - L leg X-rays and L leg CT without evidence of fracture/dislocation  - Ortho consulted and L knee aspiration performed.  See note.   - patient to be contacted regarding follow-up in 1-2 weeks    - Pain control with PRN home med, hydrocodone 5-325   - PT/OT consulted and recommending outpatient PT/OT  - Ice, elevation, compression

## 2018-02-04 NOTE — ASSESSMENT & PLAN NOTE
Hemarthrosis of left knee  Stable.  - Presented to ED s/p mechanical fall onto both knees.  No head trauma, LOC, syncope.  - L leg X-rays and L leg CT without evidence of fracture/dislocation  - Ortho consulted and L knee aspiration performed.  See note.   - patient to be contacted regarding follow-up in 1-2 weeks    - Pain control with PRN home med, hydrocodone 5-325   - PT/OT consulted and recommending outpatient PT/OT   - rolling walker ordered at discharge, to be delivered to bedside  - Ice, elevation, compression

## 2018-02-04 NOTE — PLAN OF CARE
Problem: Patient Care Overview  Goal: Plan of Care Review  Outcome: Ongoing (interventions implemented as appropriate)  Pt aaox3, vss, no s/s of distress noted.  Accuchecks ac/hs.  Safety precautions maintained, pt remains free of falls.  No s/s of infection noted.  Pt c/o pain to lle 4/10.  Ice Pack to left knee.  Call light within reach.  Spouse at bedside.

## 2018-02-04 NOTE — PT/OT/SLP DISCHARGE
Physical Therapy Discharge Summary    Name: Alina Narayan  MRN: [id number]   Principal Problem: Contusion, knee and lower leg, left, initial encounter     Patient Discharged from acute Physical Therapy on 18.  Please refer to prior PT noted date on 2/3/18 for functional status.     Assessment:     Patient appropriate for care in another setting.    Objective:     GOALS:    Physical Therapy Goals     Not on file          Multidisciplinary Problems (Resolved)        Problem: Physical Therapy Goal    Goal Priority Disciplines Outcome Goal Variances Interventions   Physical Therapy Goal   (Resolved)     PT/OT, PT Outcome(s) achieved     Description:  Goals to be met by: 18     Patient will increase functional independence with mobility by performin. Supine to sit with Supervision.  2. Sit to supine with Supervision.  3. Sit to stand transfer with Stand-by Assistance.  4. Bed to chair transfer with Supervision using Rolling Walker.  5. Gait  x 250 feet with Supervision using Rolling Walker.   6. Lower extremity exercise program x 20 reps per handout, with assistance as needed.                      Reasons for Discontinuation of Therapy Services  Transfer to alternate level of care.      Plan:     Patient Discharged to: Outpatient Therapy Services.  D/C'd with kathrine only.     Corinne Moses, PT  2018

## 2018-02-04 NOTE — ASSESSMENT & PLAN NOTE
Continue home regimen. Glucose within goal range 140-180  - Home medication: metformin 1,000 mg BID, januvia 100 mg daily  - Hg A1c 6.9

## 2018-02-04 NOTE — PLAN OF CARE
Pt being discharged home and will need a walker with wheels.  Put in call to [employer], faxed orders and paperwork to [phone].  Pt ready to discharge home when walker is delivered to room.

## 2018-02-08 PROBLEM — M25.562 LEFT ANTERIOR KNEE PAIN: Status: ACTIVE | Noted: 2018-02-08

## 2018-02-16 ENCOUNTER — OFFICE VISIT (OUTPATIENT)
Dept: ORTHOPEDICS | Facility: CLINIC | Age: 73
End: 2018-02-16
Payer: MEDICARE

## 2018-02-16 VITALS
WEIGHT: 250 LBS | DIASTOLIC BLOOD PRESSURE: 86 MMHG | HEIGHT: 62 IN | SYSTOLIC BLOOD PRESSURE: 125 MMHG | BODY MASS INDEX: 46.01 KG/M2 | HEART RATE: 96 BPM

## 2018-02-16 DIAGNOSIS — M25.562 ACUTE PAIN OF LEFT KNEE: ICD-10-CM

## 2018-02-16 DIAGNOSIS — T14.8XXA HEMATOMA AND CONTUSION: Primary | ICD-10-CM

## 2018-02-16 PROCEDURE — 3008F BODY MASS INDEX DOCD: CPT | Mod: S$GLB,,, | Performed by: PHYSICIAN ASSISTANT

## 2018-02-16 PROCEDURE — 1159F MED LIST DOCD IN RCRD: CPT | Mod: S$GLB,,, | Performed by: PHYSICIAN ASSISTANT

## 2018-02-16 PROCEDURE — 99213 OFFICE O/P EST LOW 20 MIN: CPT | Mod: S$GLB,,, | Performed by: PHYSICIAN ASSISTANT

## 2018-02-16 PROCEDURE — 99999 PR PBB SHADOW E&M-EST. PATIENT-LVL III: CPT | Mod: PBBFAC,,, | Performed by: PHYSICIAN ASSISTANT

## 2018-02-16 PROCEDURE — 1125F AMNT PAIN NOTED PAIN PRSNT: CPT | Mod: S$GLB,,, | Performed by: PHYSICIAN ASSISTANT

## 2018-02-16 NOTE — PROGRESS NOTES
SUBJECTIVE:     Chief Complaint & History of Present Illness:  Alina Narayan is a Established patient 72 y.o. female who is seen here today with a complaint of    Chief Complaint   Patient presents with    Left Knee - Pain    .  His patient well known to us was last seen and treated emergency room 02/02/2018 for significant contusion of the left knee.  Aspiration was attempted in the emergency room but no significant fluid was able to be withdrawn she does have a large hematoma in the anterior portion of the knee and calf region.  With significant associated ecchymosis  On a scale of 1-10, with 10 being worst pain imaginable, he rates this pain as 4 on good days and 7 on bad days.  she describes the pain as tender and sore.    Review of patient's allergies indicates:   Allergen Reactions    Hydromorphone      Other reaction(s): sedation    Byetta [exenatide] Rash     Other reaction(s): Rash         Current Outpatient Prescriptions   Medication Sig Dispense Refill    ALPRAZolam (XANAX) 0.25 MG tablet One-two daily as needed for anxiety 40 tablet 0    ASPIR-LOW 81 mg EC tablet Take 81 mg by mouth once daily.  12    aspirin-omeprazole (YOSPRALA) 81-40 mg TbID Take 1 capsule by mouth once daily. 30 each 12    blood sugar diagnostic Strp TrueTest strips or strips and lancets for meter covered by insurance - pt checks twice daily for uncontrolled glucoses E11.65 200 each 3    blood-glucose meter kit True Test or meter covered by insurance - pt checks glucose twice daily for uncontrolled glucoses E11.65 1 each 0    carvedilol (COREG) 25 MG tablet .5-1 tablet oral twice daily or as directed. 180 tablet 3    CHOLECALCIFEROL, VITAMIN D3, (VITAMIN D3 ORAL) Take by mouth once daily.       FLUoxetine (PROZAC) 10 MG capsule Take 1 capsule (10 mg total) by mouth once daily. 90 capsule 1    gabapentin (NEURONTIN) 300 MG capsule Take 1 capsule (300 mg total) by mouth 2 (two) times daily. 180 capsule 3     hydrocodone-acetaminophen 5-325mg (NORCO) 5-325 mg per tablet One - two tabs every 6-8 hours as needed for pain 40 tablet 0    JANUVIA 100 mg Tab Take 1 tablet (100 mg total) by mouth once daily. 90 tablet 3    levothyroxine (SYNTHROID) 50 MCG tablet Take 1 tablet (50 mcg total) by mouth every evening. 90 tablet 3    metformin (GLUCOPHAGE) 500 MG tablet Take 1 tablet (500 mg total) by mouth 2 (two) times daily with meals. 180 tablet 3    omeprazole (PRILOSEC) 40 MG capsule Take 40 mg by mouth once daily.  12    pravastatin (PRAVACHOL) 20 MG tablet TAKE 1 TABLET ONE TIME DAILY 90 tablet 3     No current facility-administered medications for this visit.        Past Medical History:   Diagnosis Date    Allergy     seasonal    Anxiety     Arthritis     Breast cancer 10/2012    left breast invasive ductal carcinoma    Colon polyp     Diabetes mellitus     Type 2    Diverticular disease     Diverticulitis 2009    Genetic testing 05/2017    negative [employer]    Hyperlipidemia     Hypertension     Morbid obesity     MITCHELL (obstructive sleep apnea)     Stenosis     Stenosis and insufficiency of lacrimal passages     Thyroid disease        Past Surgical History:   Procedure Laterality Date    BREAST BIOPSY Left 10/1/2012    left breast- invasive ductal carcinoma    BREAST LUMPECTOMY Left 2012    CARDIAC VALVE REPLACEMENT  12/2013    CARPAL TUNNEL RELEASE      Left    COLONOSCOPY N/A 9/29/2017    Procedure: COLONOSCOPY;  Surgeon: Phani Worley MD;  Location: [facility] ([address] [place] [address]);  Service: Endoscopy;  Laterality: N/A;    DILATION AND CURETTAGE OF UTERUS  1999    Endometrial polyps    ENDOMETRIAL ABLATION  1999    Enodmetrial polyps    EYE SURGERY      left lumpectomy  2012    SHOULDER SURGERY      SKIN BIOPSY         Vital Signs (Most Recent)  Vitals:    02/16/18 1511   BP: 125/86   Pulse: 96           Review of Systems:  ROS:  Constitutional: no fever or chills  Eyes: no visual  "changes  ENT: no nasal congestion or sore throat  Respiratory: no cough or shortness of breath  Cardiovascular: no chest pain or palpitations, Status post atrial valve replacement, aortic stenosis  Gastrointestinal: no nausea or vomiting, tolerating diet  Genitourinary: no hematuria or dysuria, Positive CK D stage III  Integument/Breast: no rash or pruritis, Positive history of breast cancer  Hematologic/Lymphatic: no easy bruising or lymphadenopathy  Musculoskeletal: no arthralgias or myalgias  Neurological: no seizures or tremors  Behavioral/Psych: no auditory or visual hallucinations, Positive for anxiety  Endocrine: no heat or cold intolerance, Diabetes type 2                OBJECTIVE:     PHYSICAL EXAM:  Height: 5' 2" (157.5 cm) Weight: 113.4 kg (250 lb), General Appearance: Well nourished, well developed, in no acute distress.  Neurological: Mood & affect are normal.  left  Knee Exam:  Knee Range of Motion:0-60 degrees flexion   Effusion:yes and tense  Condition of skin:bruising, mild erythema and no evidence of infection  Location of tenderness: Globally   Strength: Not tested  Stability:  stable to testing  Varus /Valgus stress:  normal        ASSESSMENT/PLAN:     Plan: A she does have mild the edema throughout the lower extremities was advised to increase elevations leg.  Upon questioning she was doing her elevation in a lazy boy and not getting the lower extremity significant higher than the heart.  She was instructed in proper elevation and ice application to the affected area.  She will continue her current pain medication with the intermittent Tylenol not to exceed 3000 mg per day.  Follow-up when necessary  "

## 2018-03-01 ENCOUNTER — TELEPHONE (OUTPATIENT)
Dept: INTERNAL MEDICINE | Facility: CLINIC | Age: 73
End: 2018-03-01

## 2018-03-01 NOTE — TELEPHONE ENCOUNTER
Called to reschedule appointment, patient is requesting to speak with you. She fell and went to ER 4 weeks ago and knee is still very swollen.

## 2018-03-02 NOTE — TELEPHONE ENCOUNTER
Spoke with patient about making appointment with [facility] and she will wait to see you on 3-23-18.

## 2018-03-03 NOTE — PROGRESS NOTES
Patient, Alina Narayan (MRN #[id number]), presented with a recorded BMI of 46.64 kg/m^2 consistent with the definition of morbid obesity (ICD-10 E66.01). The patient's morbid obesity was monitored, evaluated, addressed and/or treated. This addendum to the medical record is made on 03/03/2018.

## 2018-03-12 ENCOUNTER — PATIENT MESSAGE (OUTPATIENT)
Dept: ORTHOPEDICS | Facility: CLINIC | Age: 73
End: 2018-03-12

## 2018-03-19 ENCOUNTER — TELEPHONE (OUTPATIENT)
Dept: INTERNAL MEDICINE | Facility: CLINIC | Age: 73
End: 2018-03-19

## 2018-03-28 ENCOUNTER — OFFICE VISIT (OUTPATIENT)
Dept: INTERNAL MEDICINE | Facility: CLINIC | Age: 73
End: 2018-03-28
Payer: MEDICARE

## 2018-03-28 VITALS
TEMPERATURE: 99 F | DIASTOLIC BLOOD PRESSURE: 73 MMHG | HEART RATE: 68 BPM | HEIGHT: 62 IN | SYSTOLIC BLOOD PRESSURE: 125 MMHG | BODY MASS INDEX: 46.74 KG/M2 | WEIGHT: 254 LBS

## 2018-03-28 DIAGNOSIS — I10 ESSENTIAL HYPERTENSION: ICD-10-CM

## 2018-03-28 DIAGNOSIS — Z95.2 S/P AVR: ICD-10-CM

## 2018-03-28 DIAGNOSIS — F41.9 ANXIETY: ICD-10-CM

## 2018-03-28 DIAGNOSIS — S89.92XS INJURY OF LEFT KNEE, SEQUELA: ICD-10-CM

## 2018-03-28 DIAGNOSIS — R60.9 EDEMA, UNSPECIFIED TYPE: ICD-10-CM

## 2018-03-28 DIAGNOSIS — E03.4 HYPOTHYROIDISM DUE TO ACQUIRED ATROPHY OF THYROID: ICD-10-CM

## 2018-03-28 DIAGNOSIS — S99.922S INJURY OF LEFT FOOT, SEQUELA: Primary | ICD-10-CM

## 2018-03-28 DIAGNOSIS — N39.0 URINARY TRACT INFECTION WITHOUT HEMATURIA, SITE UNSPECIFIED: ICD-10-CM

## 2018-03-28 LAB
BILIRUB UR QL STRIP: NEGATIVE
CLARITY UR REFRACT.AUTO: ABNORMAL
COLOR UR AUTO: YELLOW
GLUCOSE UR QL STRIP: NEGATIVE
HGB UR QL STRIP: NEGATIVE
KETONES UR QL STRIP: NEGATIVE
LEUKOCYTE ESTERASE UR QL STRIP: NEGATIVE
NITRITE UR QL STRIP: NEGATIVE
PH UR STRIP: 6 [PH] (ref 5–8)
PROT UR QL STRIP: NEGATIVE
SP GR UR STRIP: 1.02 (ref 1–1.03)
URN SPEC COLLECT METH UR: ABNORMAL
UROBILINOGEN UR STRIP-ACNC: NEGATIVE EU/DL

## 2018-03-28 PROCEDURE — 81003 URINALYSIS AUTO W/O SCOPE: CPT

## 2018-03-28 PROCEDURE — 99214 OFFICE O/P EST MOD 30 MIN: CPT | Mod: S$GLB,,, | Performed by: INTERNAL MEDICINE

## 2018-03-28 PROCEDURE — 99999 PR PBB SHADOW E&M-EST. PATIENT-LVL V: CPT | Mod: PBBFAC,,, | Performed by: INTERNAL MEDICINE

## 2018-03-28 PROCEDURE — 3078F DIAST BP <80 MM HG: CPT | Mod: CPTII,S$GLB,, | Performed by: INTERNAL MEDICINE

## 2018-03-28 PROCEDURE — 87086 URINE CULTURE/COLONY COUNT: CPT

## 2018-03-28 PROCEDURE — 3074F SYST BP LT 130 MM HG: CPT | Mod: CPTII,S$GLB,, | Performed by: INTERNAL MEDICINE

## 2018-03-28 RX ORDER — LEVOTHYROXINE SODIUM 50 UG/1
50 TABLET ORAL NIGHTLY
Qty: 90 TABLET | Refills: 3 | Status: SHIPPED | OUTPATIENT
Start: 2018-03-28 | End: 2019-05-13 | Stop reason: SDUPTHER

## 2018-03-28 RX ORDER — CARVEDILOL 25 MG/1
TABLET ORAL
Qty: 180 TABLET | Refills: 3 | Status: ON HOLD | OUTPATIENT
Start: 2018-03-28 | End: 2019-05-10 | Stop reason: SDUPTHER

## 2018-03-28 RX ORDER — HYDROCODONE BITARTRATE AND ACETAMINOPHEN 5; 325 MG/1; MG/1
TABLET ORAL
Qty: 40 TABLET | Refills: 0 | Status: SHIPPED | OUTPATIENT
Start: 2018-03-28 | End: 2018-08-17 | Stop reason: SDUPTHER

## 2018-03-28 RX ORDER — ALPRAZOLAM 0.25 MG/1
TABLET ORAL
Qty: 40 TABLET | Refills: 0 | Status: SHIPPED | OUTPATIENT
Start: 2018-03-28 | End: 2018-08-17 | Stop reason: SDUPTHER

## 2018-03-28 RX ORDER — METFORMIN HYDROCHLORIDE 500 MG/1
500 TABLET ORAL 2 TIMES DAILY WITH MEALS
Qty: 180 TABLET | Refills: 3 | Status: SHIPPED | OUTPATIENT
Start: 2018-03-28 | End: 2019-06-17 | Stop reason: SDUPTHER

## 2018-03-29 LAB
BACTERIA UR CULT: NORMAL
BACTERIA UR CULT: NORMAL

## 2018-04-02 ENCOUNTER — HOSPITAL ENCOUNTER (OUTPATIENT)
Dept: VASCULAR SURGERY | Facility: CLINIC | Age: 73
Discharge: HOME | End: 2018-04-02
Attending: INTERNAL MEDICINE
Payer: MEDICARE

## 2018-04-02 ENCOUNTER — HOSPITAL ENCOUNTER (OUTPATIENT)
Dept: RADIOLOGY | Facility: HOSPITAL | Age: 73
Discharge: HOME | End: 2018-04-02
Attending: INTERNAL MEDICINE
Payer: MEDICARE

## 2018-04-02 DIAGNOSIS — R60.9 EDEMA, UNSPECIFIED TYPE: ICD-10-CM

## 2018-04-02 DIAGNOSIS — S99.922S INJURY OF LEFT FOOT, SEQUELA: ICD-10-CM

## 2018-04-02 PROCEDURE — 73630 X-RAY EXAM OF FOOT: CPT | Mod: TC,LT

## 2018-04-02 PROCEDURE — 73610 X-RAY EXAM OF ANKLE: CPT | Mod: TC,LT

## 2018-04-02 PROCEDURE — 73630 X-RAY EXAM OF FOOT: CPT | Mod: 26,LT,, | Performed by: RADIOLOGY

## 2018-04-02 PROCEDURE — 93970 EXTREMITY STUDY: CPT | Mod: S$GLB,,, | Performed by: SURGERY

## 2018-04-02 PROCEDURE — 73610 X-RAY EXAM OF ANKLE: CPT | Mod: 26,LT,, | Performed by: RADIOLOGY

## 2018-04-08 NOTE — PROGRESS NOTES
Subjective:       Patient ID: Alina Narayan is a 73 y.o. female.    Chief Complaint: Follow-up    HPIPt fell about one month ago in the bathroom and her left leg, ankle and foot are still painful.  No Cp or SOB.    Review of Systems   Respiratory: Negative for shortness of breath (PND or orthopnea).    Cardiovascular: Negative for chest pain (arm pain or jaw pain).   Gastrointestinal: Negative for abdominal pain, diarrhea, nausea and vomiting.   Genitourinary: Negative for dysuria.   Neurological: Negative for seizures, syncope and headaches.       Objective:      Physical Exam   Constitutional: She is oriented to person, place, and time. She appears well-developed and well-nourished. No distress.   HENT:   Head: Normocephalic.   Mouth/Throat: Oropharynx is clear and moist.   Neck: Neck supple. No JVD present. No thyromegaly present.   Cardiovascular: Normal rate, regular rhythm, normal heart sounds and intact distal pulses.  Exam reveals no gallop and no friction rub.    No murmur heard.  Pulmonary/Chest: Effort normal and breath sounds normal. She has no wheezes. She has no rales.   Abdominal: Soft. Bowel sounds are normal. She exhibits no distension and no mass. There is no tenderness. There is no rebound and no guarding.   Musculoskeletal: She exhibits no edema.   Le knee and leg still somewhat swollen   Lymphadenopathy:     She has no cervical adenopathy.   Neurological: She is alert and oriented to person, place, and time. She has normal reflexes.   Skin: Skin is warm and dry.   Psychiatric: She has a normal mood and affect. Her behavior is normal. Judgment and thought content normal.       Assessment:       1. Injury of left foot, sequela    2. Hypothyroidism due to acquired atrophy of thyroid    3. S/P AVR    4. Essential hypertension    5. Edema, unspecified type    6. Injury of left knee, sequela    7. Anxiety    8. Urinary tract infection without hematuria, site unspecified        Plan:   Injury of left  foot, sequela  -     X-Ray Foot Complete Left; Future; Expected date: 03/28/2018  -     X-Ray Ankle Complete Left; Future; Expected date: 03/28/2018    Hypothyroidism due to acquired atrophy of thyroid  -     levothyroxine (SYNTHROID) 50 MCG tablet; Take 1 tablet (50 mcg total) by mouth every evening.  Dispense: 90 tablet; Refill: 3    S/P AVR  -     carvedilol (COREG) 25 MG tablet; .5-1 tablet oral twice daily or as directed.  Dispense: 180 tablet; Refill: 3    Essential hypertension  -     carvedilol (COREG) 25 MG tablet; .5-1 tablet oral twice daily or as directed.  Dispense: 180 tablet; Refill: 3  Controlled - continue current meds    Edema, unspecified type  -     VAS US Venous Legs Bilateral; Future  -     CAR Ultrasound doppler venous legs bilat; Future  -     US Lower Extremity Veins Bilateral; Future; Expected date: 03/28/2018  Check for blood clot  Injury of left knee, sequela  -     Ambulatory consult to Orthopedics    Anxiety  -     ALPRAZolam (XANAX) 0.25 MG tablet; One-two daily as needed for anxiety  Dispense: 40 tablet; Refill: 0    Urinary tract infection without hematuria, site unspecified  -     Urinalysis  -     Urine culture    Other orders  -     metFORMIN (GLUCOPHAGE) 500 MG tablet; Take 1 tablet (500 mg total) by mouth 2 (two) times daily with meals.  Dispense: 180 tablet; Refill: 3  -     hydrocodone-acetaminophen 5-325mg (NORCO) 5-325 mg per tablet; One - two tabs every 6-8 hours as needed for pain  Dispense: 40 tablet; Refill: 0  -     varicella-zoster [id number], PF, (SHINGRIX, PF,) 50 mcg/0.5 mL injection; Inject 0.5 mLs into the muscle once.  Dispense: 0.5 mL; Refill: 0

## 2018-04-10 ENCOUNTER — OFFICE VISIT (OUTPATIENT)
Dept: ORTHOPEDICS | Facility: CLINIC | Age: 73
End: 2018-04-10
Payer: MEDICARE

## 2018-04-10 VITALS
HEART RATE: 85 BPM | BODY MASS INDEX: 46.7 KG/M2 | HEIGHT: 62 IN | SYSTOLIC BLOOD PRESSURE: 141 MMHG | DIASTOLIC BLOOD PRESSURE: 82 MMHG | WEIGHT: 253.75 LBS

## 2018-04-10 DIAGNOSIS — M25.562 LEFT ANTERIOR KNEE PAIN: ICD-10-CM

## 2018-04-10 DIAGNOSIS — M17.0 OSTEOARTHRITIS OF BOTH KNEES, UNSPECIFIED OSTEOARTHRITIS TYPE: ICD-10-CM

## 2018-04-10 DIAGNOSIS — T14.8XXA HEMATOMA AND CONTUSION: Primary | ICD-10-CM

## 2018-04-10 PROCEDURE — 99999 PR PBB SHADOW E&M-EST. PATIENT-LVL III: CPT | Mod: PBBFAC,,, | Performed by: PHYSICIAN ASSISTANT

## 2018-04-10 PROCEDURE — 3077F SYST BP >= 140 MM HG: CPT | Mod: CPTII,S$GLB,, | Performed by: PHYSICIAN ASSISTANT

## 2018-04-10 PROCEDURE — 99213 OFFICE O/P EST LOW 20 MIN: CPT | Mod: S$GLB,,, | Performed by: PHYSICIAN ASSISTANT

## 2018-04-10 PROCEDURE — 3079F DIAST BP 80-89 MM HG: CPT | Mod: CPTII,S$GLB,, | Performed by: PHYSICIAN ASSISTANT

## 2018-04-10 NOTE — LETTER
April 10, 2018      Aarti Dunn MD  [address]  [facility] [address]           [facility]  [address]  [facility] [address]  Phone: [phone]          Patient: Alina Narayan   MR Number: [id number]   YOB: 1945   Date of Visit: 4/10/2018       Dear Dr. Aarti Dunn:    Thank you for referring Alina Narayan to me for evaluation. Attached you will find relevant portions of my assessment and plan of care.    If you have questions, please do not hesitate to call me. I look forward to following Alina Narayan along with you.    Sincerely,    Den Porras PA-C    Enclosure  CC:  No Recipients    If you would like to receive this communication electronically, please contact [email or web address] or [phone] to request more information on [facility] access.    For providers and/or their staff who would like to refer a patient to [facility], please contact us through our one-stop-shop provider referral line, [facility], at [phone].    If you feel you have received this communication in error or would no longer like to receive these types of communications, please e-mail [email or web address]

## 2018-04-10 NOTE — PROGRESS NOTES
SUBJECTIVE:     Chief Complaint & History of Present Illness:  Alina Narayan is a Established patient 73 y.o. female who is seen here today with a complaint of    Chief Complaint   Patient presents with    Left Knee - Pain    .  She is patient well known to me was last seen and treated in the clinic for this condition 2/16/2018 for hematoma and contusion of the knee and leg.  Her symptoms have subsided substantially since her last visit she still has some mild to moderate ecchymosis around the anterior knee near the patella and the thigh region.  Swelling is down considerably.  She continues to struggle with soreness and achiness in the knees radiating down the legs to the ankles.  She was seen by her primary care who has x-rayed both foot and ankle for a concern of mechanical injuries a do demonstrate moderate arthritic changes in multiple areas in both the mortise of the ankle as well as the plantar surfaces of the foot  On a scale of 1-10, with 10 being worst pain imaginable, he rates this pain as 2 on good days and 5 on bad days.  she describes the pain as sore and achy.    Review of patient's allergies indicates:   Allergen Reactions    Hydromorphone      Other reaction(s): sedation    Byetta [exenatide] Rash     Other reaction(s): Rash         Current Outpatient Prescriptions   Medication Sig Dispense Refill    ALPRAZolam (XANAX) 0.25 MG tablet One-two daily as needed for anxiety 40 tablet 0    ASPIR-LOW 81 mg EC tablet Take 81 mg by mouth once daily.  12    aspirin-omeprazole (YOSPRALA) 81-40 mg TbID Take 1 capsule by mouth once daily. 30 each 12    blood sugar diagnostic Strp TrueTest strips or strips and lancets for meter covered by insurance - pt checks twice daily for uncontrolled glucoses E11.65 200 each 3    blood-glucose meter kit True Test or meter covered by insurance - pt checks glucose twice daily for uncontrolled glucoses E11.65 1 each 0    carvedilol (COREG) 25 MG tablet .5-1 tablet  oral twice daily or as directed. 180 tablet 3    CHOLECALCIFEROL, VITAMIN D3, (VITAMIN D3 ORAL) Take by mouth once daily.       FLUoxetine (PROZAC) 10 MG capsule Take 1 capsule (10 mg total) by mouth once daily. 90 capsule 1    gabapentin (NEURONTIN) 300 MG capsule Take 1 capsule (300 mg total) by mouth 2 (two) times daily. 180 capsule 3    hydrocodone-acetaminophen 5-325mg (NORCO) 5-325 mg per tablet One - two tabs every 6-8 hours as needed for pain 40 tablet 0    levothyroxine (SYNTHROID) 50 MCG tablet Take 1 tablet (50 mcg total) by mouth every evening. 90 tablet 3    metFORMIN (GLUCOPHAGE) 500 MG tablet Take 1 tablet (500 mg total) by mouth 2 (two) times daily with meals. 180 tablet 3    pravastatin (PRAVACHOL) 20 MG tablet TAKE 1 TABLET ONE TIME DAILY 90 tablet 3     No current facility-administered medications for this visit.        Past Medical History:   Diagnosis Date    Allergy     seasonal    Anxiety     Arthritis     Breast cancer 10/2012    left breast invasive ductal carcinoma    Colon polyp     Diabetes mellitus     Type 2    Diverticular disease     Diverticulitis 2009    Genetic testing 05/2017    negative Integrated BRACAnalysis    Hyperlipidemia     Hypertension     Morbid obesity     MITCHELL (obstructive sleep apnea)     Stenosis     Stenosis and insufficiency of lacrimal passages     Thyroid disease        Past Surgical History:   Procedure Laterality Date    BREAST BIOPSY Left 10/1/2012    left breast- invasive ductal carcinoma    BREAST LUMPECTOMY Left 2012    CARDIAC VALVE REPLACEMENT  12/2013    CARPAL TUNNEL RELEASE      Left    COLONOSCOPY N/A 9/29/2017    Procedure: COLONOSCOPY;  Surgeon: Phani Worley MD;  Location: [address]);  Service: Endoscopy;  Laterality: N/A;    DILATION AND CURETTAGE OF UTERUS  1999    Endometrial polyps    ENDOMETRIAL ABLATION  1999    Enodmetrial polyps    EYE SURGERY      left lumpectomy  2012    SHOULDER SURGERY    "   SKIN BIOPSY         Vital Signs (Most Recent)  Vitals:    04/10/18 1133   BP: (!) 141/82   Pulse: 85           Review of Systems:  ROS:  Constitutional: no fever or chills  Eyes: no visual changes  ENT: no nasal congestion or sore throat  Respiratory: no cough or shortness of breath  Cardiovascular: no chest pain or palpitations, Status post atrial valve replacement, aortic stenosis  Gastrointestinal: no nausea or vomiting, tolerating diet  Genitourinary: no hematuria or dysuria, Positive CK D stage III  Integument/Breast: no rash or pruritis, Positive history of breast cancer  Hematologic/Lymphatic: no easy bruising or lymphadenopathy  Musculoskeletal: no arthralgias or myalgias  Neurological: no seizures or tremors  Behavioral/Psych: no auditory or visual hallucinations, Positive for anxiety  Endocrine: no heat or cold intolerance, Diabetes type 2               OBJECTIVE:     PHYSICAL EXAM:  Height: 5' 2" (157.5 cm) Weight: 115.1 kg (253 lb 12 oz), General Appearance: Well nourished, well developed, in no acute distress.  Neurological: Mood & affect are normal.  left  Knee Exam:  Knee Range of Motion:0-100 degrees flexion   Effusion:yes  Condition of skin:bruising  Location of tenderness: Globally   Strength:limited by pain and 5 of 5  Stability:  stable to testing  Varus /Valgus stress:  normal  Termination of foot and ankle demonstrates excellent range of motion with flexion to 90° plantarflexion to 180 30° of internal rotation without increase in pain.  There is mild tenderness to deep palpation of the plantar surface of the foot this is a chronic condition    Hip Examination:  normal        ASSESSMENT/PLAN:     Plan: We discussed with the patient at length all the different treatment options available for  the knee including anti-inflammatories, acetaminophen, rest, ice, knee strengthening exercise, occasional cortisone injections for temporary relief, Viscosupplimentation injections, arthroscopic " debridement osteotomy, and finally knee arthroplasty.   Patient will continue with ice and heat elevation and rest as she continues to resorb her rather large hematoma course of watchful waiting at this point follow-up.

## 2018-04-20 ENCOUNTER — PATIENT MESSAGE (OUTPATIENT)
Dept: INTERNAL MEDICINE | Facility: CLINIC | Age: 73
End: 2018-04-20

## 2018-04-20 DIAGNOSIS — M51.36 DEGENERATIVE LUMBAR DISC: ICD-10-CM

## 2018-04-20 DIAGNOSIS — M47.27 OSTEOARTHRITIS OF SPINE WITH RADICULOPATHY, LUMBOSACRAL REGION: ICD-10-CM

## 2018-04-20 RX ORDER — GABAPENTIN 300 MG/1
300 CAPSULE ORAL 2 TIMES DAILY
Qty: 180 CAPSULE | Refills: 1 | Status: SHIPPED | OUTPATIENT
Start: 2018-04-20 | End: 2018-07-24 | Stop reason: SDUPTHER

## 2018-04-23 ENCOUNTER — TELEPHONE (OUTPATIENT)
Dept: INTERNAL MEDICINE | Facility: CLINIC | Age: 73
End: 2018-04-23

## 2018-05-02 ENCOUNTER — PES CALL (OUTPATIENT)
Dept: ADMINISTRATIVE | Facility: CLINIC | Age: 73
End: 2018-05-02

## 2018-07-17 ENCOUNTER — HOSPITAL ENCOUNTER (OUTPATIENT)
Dept: RADIOLOGY | Facility: HOSPITAL | Age: 73
Discharge: HOME | End: 2018-07-17
Attending: NURSE PRACTITIONER
Payer: MEDICARE

## 2018-07-17 ENCOUNTER — OFFICE VISIT (OUTPATIENT)
Dept: SURGERY | Facility: CLINIC | Age: 73
End: 2018-07-17
Payer: MEDICARE

## 2018-07-17 VITALS
TEMPERATURE: 98 F | DIASTOLIC BLOOD PRESSURE: 73 MMHG | HEART RATE: 72 BPM | HEIGHT: 62 IN | BODY MASS INDEX: 47.59 KG/M2 | SYSTOLIC BLOOD PRESSURE: 141 MMHG | WEIGHT: 258.63 LBS

## 2018-07-17 DIAGNOSIS — Z85.3 PERSONAL HISTORY OF BREAST CANCER: Primary | ICD-10-CM

## 2018-07-17 DIAGNOSIS — R92.8 ABNORMAL MAMMOGRAM: ICD-10-CM

## 2018-07-17 DIAGNOSIS — Z80.3 FAMILY HISTORY OF BREAST CANCER: ICD-10-CM

## 2018-07-17 PROCEDURE — 99213 OFFICE O/P EST LOW 20 MIN: CPT | Mod: S$GLB,,, | Performed by: NURSE PRACTITIONER

## 2018-07-17 PROCEDURE — 77065 DX MAMMO INCL CAD UNI: CPT | Mod: TC,PO,LT

## 2018-07-17 PROCEDURE — 77065 DX MAMMO INCL CAD UNI: CPT | Mod: 26,LT,, | Performed by: RADIOLOGY

## 2018-07-17 PROCEDURE — 3078F DIAST BP <80 MM HG: CPT | Mod: CPTII,S$GLB,, | Performed by: NURSE PRACTITIONER

## 2018-07-17 PROCEDURE — 3077F SYST BP >= 140 MM HG: CPT | Mod: CPTII,S$GLB,, | Performed by: NURSE PRACTITIONER

## 2018-07-17 PROCEDURE — 99999 PR PBB SHADOW E&M-EST. PATIENT-LVL IV: CPT | Mod: PBBFAC,,, | Performed by: NURSE PRACTITIONER

## 2018-07-17 PROCEDURE — 77061 BREAST TOMOSYNTHESIS UNI: CPT | Mod: TC,PO,LT

## 2018-07-17 PROCEDURE — 77061 BREAST TOMOSYNTHESIS UNI: CPT | Mod: 26,LT,, | Performed by: RADIOLOGY

## 2018-07-17 NOTE — PROGRESS NOTES
Subjective:      Patient ID: Alina Narayan is a 73 y.o. female.    Chief Complaint: Breast Cancer Screening (CBE/Hx of Breast Cancer)      HPI: (PF, EPF - 1-3) (Detailed, Comp, - 4)patient presents today for breast cancer surveillance and 6 month f/u from benign core needle biopsy left breast 12/2017.  Patient denies palpable breast mass, pain, nipple discharge, redness, increased warmth, unexplained weight loss, new onset bone pain. She is using a walker, unable to get onto exam table today, reports fall 2/2018     Last mmg 12-4-2017, see report for details, new calcifications left breast with biopsy recommended     12- core biopsy left breast for new calcifications with FRAGMENTS OF BENIGN FAT AND DENSE FIBROCONNECTIVE  TISSUE WITH DYSTROPHIC CALCIFICATION, NO MALIGNANCY OR ATYPIA IDENTIFIED     10-1-2012 core biopsy left breast with IDC, ER/OK +, HER-2 negative  10- left lumpectomy with 7mm IDC, negative SN, negative margins.   Adjuvant XRT, completed 5 years of endocrine therapy with AI (letrozole)      Negative genetic testing: Integrated [employer] May 2017      Review of Systems  Objective:   Physical Exam   Pulmonary/Chest: Right breast exhibits no inverted nipple, no mass, no nipple discharge, no skin change and no tenderness. Left breast exhibits no inverted nipple, no mass, no nipple discharge, no skin change and no tenderness. Breasts are symmetrical. There is no breast swelling.   Lymphadenopathy:     She has no cervical adenopathy.     She has no axillary adenopathy.        Right: No supraclavicular adenopathy present.        Left: No supraclavicular adenopathy present.     Assessment:       1. Personal history of breast cancer    2. Family history of breast cancer        Plan:       Left mmg today, There are post-surgical findings from a previous lumpectomy with radiation seen in the upper outer quadrant of the left breast at 1 o'clock in the posterior depth. There has been no interval  development of a suspicious mass, microcalcification, or architectural distortion. benign  Return in 12/2018 with bilat diag mmg  Call for any interval palpable breast mass, pain, nipple discharge, skin changes or other breast related concerns

## 2018-07-24 DIAGNOSIS — M51.36 DEGENERATIVE LUMBAR DISC: ICD-10-CM

## 2018-07-24 DIAGNOSIS — M47.27 OSTEOARTHRITIS OF SPINE WITH RADICULOPATHY, LUMBOSACRAL REGION: ICD-10-CM

## 2018-07-24 RX ORDER — GABAPENTIN 300 MG/1
CAPSULE ORAL
Qty: 180 CAPSULE | Refills: 1 | Status: SHIPPED | OUTPATIENT
Start: 2018-07-24 | End: 2018-08-17 | Stop reason: SDUPTHER

## 2018-07-27 ENCOUNTER — PATIENT MESSAGE (OUTPATIENT)
Dept: INTERNAL MEDICINE | Facility: CLINIC | Age: 73
End: 2018-07-27

## 2018-07-30 ENCOUNTER — TELEPHONE (OUTPATIENT)
Dept: INTERNAL MEDICINE | Facility: CLINIC | Age: 73
End: 2018-07-30

## 2018-07-30 NOTE — TELEPHONE ENCOUNTER
Alina Dunn MD 3 days ago         My appointment with Dr. Dunn is August 17th at 3:00 pm. I would like to have any blood work I need done the morning before my visit. I will have to fast for my A1C so if I can get my blood work done the morning before on the 16th that would be easier for me.   Alina Narayan         Just sending this so you can place lab orders when u return

## 2018-08-17 ENCOUNTER — OFFICE VISIT (OUTPATIENT)
Dept: INTERNAL MEDICINE | Facility: CLINIC | Age: 73
End: 2018-08-17
Payer: MEDICARE

## 2018-08-17 VITALS
OXYGEN SATURATION: 97 % | HEART RATE: 85 BPM | WEIGHT: 253.31 LBS | BODY MASS INDEX: 46.61 KG/M2 | SYSTOLIC BLOOD PRESSURE: 130 MMHG | HEIGHT: 62 IN | DIASTOLIC BLOOD PRESSURE: 74 MMHG | TEMPERATURE: 98 F

## 2018-08-17 DIAGNOSIS — M51.36 DEGENERATIVE LUMBAR DISC: ICD-10-CM

## 2018-08-17 DIAGNOSIS — M47.27 OSTEOARTHRITIS OF SPINE WITH RADICULOPATHY, LUMBOSACRAL REGION: ICD-10-CM

## 2018-08-17 DIAGNOSIS — N18.30 DIABETES MELLITUS DUE TO UNDERLYING CONDITION WITH STAGE 3 CHRONIC KIDNEY DISEASE, WITHOUT LONG-TERM CURRENT USE OF INSULIN: ICD-10-CM

## 2018-08-17 DIAGNOSIS — Z95.2 S/P AVR: ICD-10-CM

## 2018-08-17 DIAGNOSIS — N18.2 CHRONIC KIDNEY DISEASE, STAGE II (MILD): ICD-10-CM

## 2018-08-17 DIAGNOSIS — Z85.3 PERSONAL HISTORY OF BREAST CANCER: ICD-10-CM

## 2018-08-17 DIAGNOSIS — E08.22 DIABETES MELLITUS DUE TO UNDERLYING CONDITION WITH STAGE 3 CHRONIC KIDNEY DISEASE, WITHOUT LONG-TERM CURRENT USE OF INSULIN: ICD-10-CM

## 2018-08-17 DIAGNOSIS — E55.9 VITAMIN D DEFICIENCY: ICD-10-CM

## 2018-08-17 DIAGNOSIS — E03.9 HYPOTHYROIDISM, UNSPECIFIED TYPE: ICD-10-CM

## 2018-08-17 DIAGNOSIS — I10 ESSENTIAL HYPERTENSION: Primary | ICD-10-CM

## 2018-08-17 DIAGNOSIS — N39.0 URINARY TRACT INFECTION WITHOUT HEMATURIA, SITE UNSPECIFIED: ICD-10-CM

## 2018-08-17 DIAGNOSIS — F41.9 ANXIETY: ICD-10-CM

## 2018-08-17 DIAGNOSIS — I35.0 AORTIC VALVE STENOSIS, SEVERE: ICD-10-CM

## 2018-08-17 DIAGNOSIS — E78.2 MIXED HYPERLIPIDEMIA: ICD-10-CM

## 2018-08-17 DIAGNOSIS — I25.10 CVD (CARDIOVASCULAR DISEASE): ICD-10-CM

## 2018-08-17 PROCEDURE — 3075F SYST BP GE 130 - 139MM HG: CPT | Mod: CPTII,S$GLB,, | Performed by: INTERNAL MEDICINE

## 2018-08-17 PROCEDURE — 99999 PR PBB SHADOW E&M-EST. PATIENT-LVL IV: CPT | Mod: PBBFAC,,, | Performed by: INTERNAL MEDICINE

## 2018-08-17 PROCEDURE — 99214 OFFICE O/P EST MOD 30 MIN: CPT | Mod: S$GLB,,, | Performed by: INTERNAL MEDICINE

## 2018-08-17 PROCEDURE — 3078F DIAST BP <80 MM HG: CPT | Mod: CPTII,S$GLB,, | Performed by: INTERNAL MEDICINE

## 2018-08-17 PROCEDURE — 99499 UNLISTED E&M SERVICE: CPT | Mod: HCNC,S$GLB,, | Performed by: INTERNAL MEDICINE

## 2018-08-17 RX ORDER — HYDROCODONE BITARTRATE AND ACETAMINOPHEN 5; 325 MG/1; MG/1
TABLET ORAL
Qty: 40 TABLET | Refills: 0 | Status: ON HOLD | OUTPATIENT
Start: 2018-08-17 | End: 2019-02-01 | Stop reason: HOSPADM

## 2018-08-17 RX ORDER — ALPRAZOLAM 0.25 MG/1
TABLET ORAL
Qty: 40 TABLET | Refills: 0 | Status: SHIPPED | OUTPATIENT
Start: 2018-08-17 | End: 2018-12-18 | Stop reason: SDUPTHER

## 2018-08-17 RX ORDER — LANCETS
EACH MISCELLANEOUS
Qty: 200 EACH | Refills: 3 | Status: SHIPPED | OUTPATIENT
Start: 2018-08-17 | End: 2020-03-25 | Stop reason: SDUPTHER

## 2018-08-17 RX ORDER — GABAPENTIN 300 MG/1
300 CAPSULE ORAL 2 TIMES DAILY
Qty: 180 CAPSULE | Refills: 1 | Status: SHIPPED | OUTPATIENT
Start: 2018-08-17 | End: 2019-02-06 | Stop reason: SDUPTHER

## 2018-08-21 ENCOUNTER — LAB VISIT (OUTPATIENT)
Dept: LAB | Facility: HOSPITAL | Age: 73
End: 2018-08-21
Attending: INTERNAL MEDICINE
Payer: MEDICARE

## 2018-08-21 DIAGNOSIS — E55.9 VITAMIN D DEFICIENCY: ICD-10-CM

## 2018-08-21 DIAGNOSIS — E78.2 MIXED HYPERLIPIDEMIA: ICD-10-CM

## 2018-08-21 DIAGNOSIS — N18.30 DIABETES MELLITUS DUE TO UNDERLYING CONDITION WITH STAGE 3 CHRONIC KIDNEY DISEASE, WITHOUT LONG-TERM CURRENT USE OF INSULIN: ICD-10-CM

## 2018-08-21 DIAGNOSIS — I10 ESSENTIAL HYPERTENSION: ICD-10-CM

## 2018-08-21 DIAGNOSIS — E08.22 DIABETES MELLITUS DUE TO UNDERLYING CONDITION WITH STAGE 3 CHRONIC KIDNEY DISEASE, WITHOUT LONG-TERM CURRENT USE OF INSULIN: ICD-10-CM

## 2018-08-21 LAB
25(OH)D3+25(OH)D2 SERPL-MCNC: 31 NG/ML
ALBUMIN SERPL BCP-MCNC: 3.8 G/DL
ALP SERPL-CCNC: 102 U/L
ALT SERPL W/O P-5'-P-CCNC: 17 U/L
ANION GAP SERPL CALC-SCNC: 6 MMOL/L
AST SERPL-CCNC: 16 U/L
BASOPHILS # BLD AUTO: 0.01 K/UL
BASOPHILS NFR BLD: 0.2 %
BILIRUB SERPL-MCNC: 0.6 MG/DL
BUN SERPL-MCNC: 23 MG/DL
CALCIUM SERPL-MCNC: 9.8 MG/DL
CHLORIDE SERPL-SCNC: 105 MMOL/L
CHOLEST SERPL-MCNC: 146 MG/DL
CHOLEST/HDLC SERPL: 2.9 {RATIO}
CO2 SERPL-SCNC: 30 MMOL/L
CREAT SERPL-MCNC: 0.9 MG/DL
DIFFERENTIAL METHOD: ABNORMAL
EOSINOPHIL # BLD AUTO: 0.1 K/UL
EOSINOPHIL NFR BLD: 1.4 %
ERYTHROCYTE [DISTWIDTH] IN BLOOD BY AUTOMATED COUNT: 13.9 %
EST. GFR  (AFRICAN AMERICAN): >60 ML/MIN/1.73 M^2
EST. GFR  (NON AFRICAN AMERICAN): >60 ML/MIN/1.73 M^2
ESTIMATED AVG GLUCOSE: 163 MG/DL
GLUCOSE SERPL-MCNC: 124 MG/DL
HBA1C MFR BLD HPLC: 7.3 %
HCT VFR BLD AUTO: 41.6 %
HDLC SERPL-MCNC: 51 MG/DL
HDLC SERPL: 34.9 %
HGB BLD-MCNC: 13 G/DL
IMM GRANULOCYTES # BLD AUTO: 0.02 K/UL
IMM GRANULOCYTES NFR BLD AUTO: 0.4 %
LDLC SERPL CALC-MCNC: 72 MG/DL
LYMPHOCYTES # BLD AUTO: 1.5 K/UL
LYMPHOCYTES NFR BLD: 28.2 %
MCH RBC QN AUTO: 29.5 PG
MCHC RBC AUTO-ENTMCNC: 31.3 G/DL
MCV RBC AUTO: 94 FL
MONOCYTES # BLD AUTO: 0.5 K/UL
MONOCYTES NFR BLD: 8.8 %
NEUTROPHILS # BLD AUTO: 3.1 K/UL
NEUTROPHILS NFR BLD: 61 %
NONHDLC SERPL-MCNC: 95 MG/DL
NRBC BLD-RTO: 0 /100 WBC
PLATELET # BLD AUTO: 143 K/UL
PMV BLD AUTO: 11.3 FL
POTASSIUM SERPL-SCNC: 4.3 MMOL/L
PROT SERPL-MCNC: 6.9 G/DL
RBC # BLD AUTO: 4.41 M/UL
SODIUM SERPL-SCNC: 141 MMOL/L
T4 FREE SERPL-MCNC: 0.92 NG/DL
TRIGL SERPL-MCNC: 115 MG/DL
TSH SERPL DL<=0.005 MIU/L-ACNC: 5.28 UIU/ML
WBC # BLD AUTO: 5.14 K/UL

## 2018-08-21 PROCEDURE — 84439 ASSAY OF FREE THYROXINE: CPT

## 2018-08-21 PROCEDURE — 82306 VITAMIN D 25 HYDROXY: CPT

## 2018-08-21 PROCEDURE — 36415 COLL VENOUS BLD VENIPUNCTURE: CPT | Mod: PO

## 2018-08-21 PROCEDURE — 80061 LIPID PANEL: CPT

## 2018-08-21 PROCEDURE — 80053 COMPREHEN METABOLIC PANEL: CPT

## 2018-08-21 PROCEDURE — 84443 ASSAY THYROID STIM HORMONE: CPT

## 2018-08-21 PROCEDURE — 85025 COMPLETE CBC W/AUTO DIFF WBC: CPT

## 2018-08-21 PROCEDURE — 83036 HEMOGLOBIN GLYCOSYLATED A1C: CPT

## 2018-08-22 DIAGNOSIS — E11.9 DIABETES MELLITUS WITHOUT COMPLICATION: ICD-10-CM

## 2018-08-26 ENCOUNTER — PATIENT MESSAGE (OUTPATIENT)
Dept: INTERNAL MEDICINE | Facility: CLINIC | Age: 73
End: 2018-08-26

## 2018-08-26 ENCOUNTER — HOSPITAL ENCOUNTER (EMERGENCY)
Facility: HOSPITAL | Age: 73
Discharge: HOME | End: 2018-08-26
Attending: EMERGENCY MEDICINE
Payer: MEDICARE

## 2018-08-26 VITALS
BODY MASS INDEX: 46.01 KG/M2 | DIASTOLIC BLOOD PRESSURE: 60 MMHG | HEIGHT: 62 IN | RESPIRATION RATE: 16 BRPM | HEART RATE: 73 BPM | TEMPERATURE: 98 F | SYSTOLIC BLOOD PRESSURE: 126 MMHG | WEIGHT: 250 LBS | OXYGEN SATURATION: 98 %

## 2018-08-26 DIAGNOSIS — K57.92 DIVERTICULITIS: Primary | ICD-10-CM

## 2018-08-26 LAB
ALBUMIN SERPL BCP-MCNC: 3.9 G/DL
ALP SERPL-CCNC: 111 U/L
ALT SERPL W/O P-5'-P-CCNC: 17 U/L
ANION GAP SERPL CALC-SCNC: 10 MMOL/L
AST SERPL-CCNC: 16 U/L
BASOPHILS # BLD AUTO: 0.03 K/UL
BASOPHILS NFR BLD: 0.4 %
BILIRUB SERPL-MCNC: 0.7 MG/DL
BUN SERPL-MCNC: 19 MG/DL
CALCIUM SERPL-MCNC: 10.1 MG/DL
CHLORIDE SERPL-SCNC: 108 MMOL/L
CO2 SERPL-SCNC: 22 MMOL/L
CREAT SERPL-MCNC: 0.9 MG/DL
DIFFERENTIAL METHOD: ABNORMAL
EOSINOPHIL # BLD AUTO: 0.1 K/UL
EOSINOPHIL NFR BLD: 1.2 %
ERYTHROCYTE [DISTWIDTH] IN BLOOD BY AUTOMATED COUNT: 13.5 %
EST. GFR  (AFRICAN AMERICAN): >60 ML/MIN/1.73 M^2
EST. GFR  (NON AFRICAN AMERICAN): >60 ML/MIN/1.73 M^2
GLUCOSE SERPL-MCNC: 125 MG/DL
HCT VFR BLD AUTO: 43.6 %
HGB BLD-MCNC: 14 G/DL
IMM GRANULOCYTES # BLD AUTO: 0.02 K/UL
IMM GRANULOCYTES NFR BLD AUTO: 0.3 %
LIPASE SERPL-CCNC: 37 U/L
LYMPHOCYTES # BLD AUTO: 1.3 K/UL
LYMPHOCYTES NFR BLD: 19.1 %
MCH RBC QN AUTO: 29.6 PG
MCHC RBC AUTO-ENTMCNC: 32.1 G/DL
MCV RBC AUTO: 92 FL
MONOCYTES # BLD AUTO: 0.6 K/UL
MONOCYTES NFR BLD: 9.3 %
NEUTROPHILS # BLD AUTO: 4.8 K/UL
NEUTROPHILS NFR BLD: 69.7 %
NRBC BLD-RTO: 0 /100 WBC
PLATELET # BLD AUTO: 138 K/UL
PMV BLD AUTO: 10.9 FL
POTASSIUM SERPL-SCNC: 4.2 MMOL/L
PROT SERPL-MCNC: 7.3 G/DL
RBC # BLD AUTO: 4.73 M/UL
SODIUM SERPL-SCNC: 140 MMOL/L
WBC # BLD AUTO: 6.91 K/UL

## 2018-08-26 PROCEDURE — 85025 COMPLETE CBC W/AUTO DIFF WBC: CPT

## 2018-08-26 PROCEDURE — 99284 EMERGENCY DEPT VISIT MOD MDM: CPT | Mod: ,,, | Performed by: EMERGENCY MEDICINE

## 2018-08-26 PROCEDURE — 99284 EMERGENCY DEPT VISIT MOD MDM: CPT

## 2018-08-26 PROCEDURE — 83690 ASSAY OF LIPASE: CPT

## 2018-08-26 PROCEDURE — 80053 COMPREHEN METABOLIC PANEL: CPT

## 2018-08-26 PROCEDURE — 25500020 PHARM REV CODE 255: Performed by: EMERGENCY MEDICINE

## 2018-08-26 RX ORDER — METRONIDAZOLE 500 MG/1
500 TABLET ORAL EVERY 12 HOURS
Qty: 21 TABLET | Refills: 0 | Status: SHIPPED | OUTPATIENT
Start: 2018-08-26 | End: 2018-09-06

## 2018-08-26 RX ORDER — CIPROFLOXACIN 500 MG/1
500 TABLET ORAL 2 TIMES DAILY
Qty: 14 TABLET | Refills: 0 | Status: SHIPPED | OUTPATIENT
Start: 2018-08-26 | End: 2018-09-02

## 2018-08-26 RX ADMIN — IOHEXOL 100 ML: 350 INJECTION, SOLUTION INTRAVENOUS at 01:08

## 2018-08-26 NOTE — PROGRESS NOTES
Subjective:       Patient ID: Alina Narayan is a 73 y.o. female.    Chief Complaint: Follow-up    HPIPt doing better - back in water aerobics.  Denies any new CP or SOB.  No palpitations.  Review of Systems   Constitutional: Positive for fatigue.   Respiratory: Negative for shortness of breath (PND or orthopnea).    Cardiovascular: Negative for chest pain.   Gastrointestinal: Negative for abdominal pain, diarrhea, nausea and vomiting.   Endocrine: Negative for polydipsia, polyphagia and polyuria.   Genitourinary: Negative for dysuria.   Skin: Negative for pallor.   Neurological: Positive for weakness. Negative for dizziness, tremors, seizures, syncope, speech difficulty and headaches.   Psychiatric/Behavioral: Negative for confusion. The patient is nervous/anxious.        Objective:      Physical Exam   Constitutional: She is oriented to person, place, and time. She appears well-developed and well-nourished. No distress.   HENT:   Head: Normocephalic.   Mouth/Throat: Oropharynx is clear and moist.   Neck: Neck supple. No JVD present. No thyromegaly present.   Cardiovascular: Normal rate, regular rhythm, normal heart sounds and intact distal pulses. Exam reveals no gallop and no friction rub.   No murmur heard.  Pulmonary/Chest: Effort normal and breath sounds normal. She has no wheezes. She has no rales.   Abdominal: Soft. Bowel sounds are normal. She exhibits no distension and no mass. There is no tenderness. There is no rebound and no guarding.   Musculoskeletal: She exhibits no edema.   Lymphadenopathy:     She has no cervical adenopathy.   Neurological: She is alert and oriented to person, place, and time. She has normal reflexes.   Skin: Skin is warm and dry.   Psychiatric: She has a normal mood and affect. Her behavior is normal. Judgment and thought content normal.       Assessment:       1. Essential hypertension    2. Mixed hyperlipidemia    3. Aortic valve stenosis, severe    4. S/P AVR    5. Chronic kidney  disease, stage II (mild)    6. Personal history of breast cancer    7. Diabetes mellitus due to underlying condition with stage 3 chronic kidney disease, without long-term current use of insulin    8. Hypothyroidism, unspecified type    9. Degenerative lumbar disc    10. Osteoarthritis of spine with radiculopathy, lumbosacral region    11. Anxiety    12. Urinary tract infection without hematuria, site unspecified    13. Vitamin D deficiency    14. CVD (cardiovascular disease)        Plan:   Essential hypertension  -     CBC auto differential; Future; Expected date: 08/17/2018  -     Comprehensive metabolic panel; Future; Expected date: 08/17/2018  -     TSH; Future; Expected date: 08/17/2018  -     Microalbumin/creatinine urine ratio  Controlled - continue current meds    Mixed hyperlipidemia  -     Lipid panel; Future; Expected date: 08/17/2018    Aortic valve stenosis, severe    S/P AVR  -     Ambulatory consult to Cardiology    Chronic kidney disease, stage II (mild)  Controlled - continue current meds    Personal history of breast cancer  LICO  Diabetes mellitus due to underlying condition with stage 3 chronic kidney disease, without long-term current use of insulin  -     Hemoglobin A1c; Future; Expected date: 08/17/2018    Hypothyroidism, unspecified type  Controlled - continue current meds    Degenerative lumbar disc  -     gabapentin (NEURONTIN) 300 MG capsule; Take 1 capsule (300 mg total) by mouth 2 (two) times daily.  Dispense: 180 capsule; Refill: 1    Osteoarthritis of spine with radiculopathy, lumbosacral region  -     gabapentin (NEURONTIN) 300 MG capsule; Take 1 capsule (300 mg total) by mouth 2 (two) times daily.  Dispense: 180 capsule; Refill: 1    Anxiety  -     ALPRAZolam (XANAX) 0.25 MG tablet; One-two daily as needed for anxiety  Dispense: 40 tablet; Refill: 0    Urinary tract infection without hematuria, site unspecified  -     Urinalysis  -     Urine culture    Vitamin D deficiency  -      Vitamin D; Future; Expected date: 08/17/2018    CVD (cardiovascular disease)  -     US Carotid Bilateral; Future; Expected date: 08/17/2018    Other orders  -     lancets (ACCU-CHEK SOFTCLIX LANCETS) Misc; TrueTest lancets for meter covered by insurance - pt checks twice daily for uncontrolled glucoses E11.65,  Dispense: 200 each; Refill: 3  -     blood sugar diagnostic Strp; TrueTest strips or strips and lancets for meter covered by insurance - pt checks twice daily for uncontrolled glucoses E11.65  Dispense: 200 each; Refill: 3  -     HYDROcodone-acetaminophen (NORCO) 5-325 mg per tablet; One - two tabs every 6-8 hours as needed for pain  Dispense: 40 tablet; Refill: 0

## 2018-08-26 NOTE — ED PROVIDER NOTES
"Encounter Date: 8/26/2018       History     Chief Complaint   Patient presents with    Abdominal Pain     "another diverticulitis attack". Onset abd pain 2 days ago , lower abd pain  . Now pain is on left side. Had diarrhea yesterday, deneis n/v, denies fever or dysuria.     Pt c/o abd pain x 2-3 days, LLQ/L side, worsening, feels c/w prior episodes diverticulitis.  No fever/chills, episode diarrhea yesterday.  No vomiting.  No urinary sx.      The history is provided by the patient.   Abdominal Pain   The other symptoms of the illness do not include fever or fatigue.   Symptoms associated with the illness do not include chills or diaphoresis.     Review of patient's allergies indicates:   Allergen Reactions    Hydromorphone      Other reaction(s): sedation    Byetta [exenatide] Rash     Other reaction(s): Rash     Past Medical History:   Diagnosis Date    Allergy     seasonal    Anxiety     Arthritis     Breast cancer 10/2012    left breast invasive ductal carcinoma    Colon polyp     Diabetes mellitus     Type 2    Diverticular disease     Diverticulitis 2009    Genetic testing 05/2017    negative Integrated [employer]    Hyperlipidemia     Hypertension     Morbid obesity     MITCHELL (obstructive sleep apnea)     Stenosis     Stenosis and insufficiency of lacrimal passages     Thyroid disease      Past Surgical History:   Procedure Laterality Date    BREAST BIOPSY Left 10/1/2012    left breast- invasive ductal carcinoma    BREAST BIOPSY Left 12/2017    BREAST LUMPECTOMY Left 2012    CARDIAC VALVE REPLACEMENT  12/2013    CARPAL TUNNEL RELEASE      Left    DILATION AND CURETTAGE OF UTERUS  1999    Endometrial polyps    ENDOMETRIAL ABLATION  1999    Enodmetrial polyps    EYE SURGERY      left lumpectomy  2012    SHOULDER SURGERY      SKIN BIOPSY       Family History   Problem Relation Age of Onset    Breast cancer Mother 62    Cancer Mother         breast    Colon cancer Father     " Cancer Father         Colon and Prostate CA    No Known Problems Sister     No Known Problems Brother     Cancer Maternal Grandfather         Colon CA    No Known Problems Son     Cancer Brother         prostate    Anxiety disorder Son     SAL disease Son     Ovarian cancer Neg Hx     Melanoma Neg Hx     Psoriasis Neg Hx     Lupus Neg Hx     Eczema Neg Hx     Acne Neg Hx      Social History     Tobacco Use    Smoking status: Never Smoker    Smokeless tobacco: Never Used   Substance Use Topics    Alcohol use: No     Alcohol/week: 0.0 oz    Drug use: No     Review of Systems   Constitutional: Negative for chills, diaphoresis, fatigue and fever.   Gastrointestinal: Positive for abdominal pain.   Genitourinary: Negative for difficulty urinating.   All other systems reviewed and are negative.      Physical Exam     Initial Vitals [08/26/18 1116]   BP Pulse Resp Temp SpO2   135/73 78 18 98.4 °F (36.9 °C) 98 %      MAP       --         Physical Exam    Nursing note and vitals reviewed.  Constitutional: She appears well-developed and well-nourished. She is not diaphoretic. No distress.   HENT:   Mouth/Throat: Oropharynx is clear and moist.   Eyes: Conjunctivae are normal.   Neck: Neck supple.   Cardiovascular: Normal rate.   Pulmonary/Chest: No respiratory distress.   Abdominal: Soft. She exhibits no distension. There is tenderness in the left upper quadrant and left lower quadrant. There is no rebound and no guarding.   Neurological: She is alert and oriented to person, place, and time.   Skin: Skin is warm and dry.   Psychiatric: She has a normal mood and affect.         ED Course   Procedures  Labs Reviewed   CBC W/ AUTO DIFFERENTIAL - Abnormal; Notable for the following components:       Result Value    Platelets 138 (*)     All other components within normal limits   COMPREHENSIVE METABOLIC PANEL - Abnormal; Notable for the following components:    CO2 22 (*)     Glucose 125 (*)     All other  components within normal limits   LIPASE          Imaging Results          CT Abdomen Pelvis With Contrast (Final result)  Result time 08/26/18 14:25:26    Final result by Erick Goetz MD (08/26/18 14:25:26)                 Impression:      Extensive diverticulosis coli with a short segment in the descending colon with minimal surrounding fat stranding concerning for mild diverticulitis.  No evidence of surrounding fluid or free air.    Gallbladder is unremarkable.    Electronically signed by resident: Sanford Lama  Date:    08/26/2018  Time:    13:31    Electronically signed by: Erick Goetz MD  Date:    08/26/2018  Time:    14:25             Narrative:    EXAMINATION:  CT ABDOMEN PELVIS WITH CONTRAST    CLINICAL HISTORY:  LLQ pain, suspect diverticulitis    TECHNIQUE:  Low dose axial images, sagittal and coronal reformations were obtained from the lung bases to the pubic symphysis following the IV administration of 100 mL of Omnipaque 350 .  Oral contrast was not given.    COMPARISON:  CT abdomen pelvis 06/26/2017.    FINDINGS:  Sternotomy wires are present.    Heart: Normal in size. No pericardial effusion.    Lung Bases: Well aerated, without consolidation or pleural fluid.    Liver: Normal in size and attenuation, with no focal hepatic lesions.    Gallbladder: No calcified gallstones. No evidence of cholecystitis.    Bile Ducts: No evidence of dilated ducts.    Pancreas: No mass or peripancreatic fat stranding.    Spleen: Unremarkable.    Adrenals: Unremarkable.    Kidneys/ Ureters: Kidneys are mildly atrophic with cortical thinning likely related to chronic renal disease.  Normal concentration and excretion of contrast. No hydronephrosis.  No ureteral dilatation.    Bladder: No evidence of wall thickening.    Reproductive organs: Unremarkable.    GI Tract/Mesentery: There is scattered prominent diverticulosis coli throughout the colon.  In the descending colon there is a short segment with minimal  surrounding fat stranding, not significantly changed from prior CT, concerning for mild diverticulitis.  No surrounding fluid or free air.    No obstruction.    Peritoneal Space: No ascites. No free air.    Retroperitoneum:  No significant adenopathy.    Abdominal wall:  Small fat containing umbilical hernia.    Vasculature: No aneurysm.  Calcific atherosclerosis throughout the abdominal aorta.    Bones: No acute fracture. Multilevel degenerative changes of the spine with multiple bridging osteophytes.                                 Medical Decision Making:   Initial Assessment:   72 yo f, h/o DM, HTN, multiple episodes diverticulitis, here with LLQ/L mid abd pain/tenderness on exam, likely recurrent diverticulitis  HD stable, well-appearing, afebrile  ED Management:  Labs  CT abd/pelvis  Pt declining pain meds    2:29 PM  CT c/w mild diverticulitis  Safe for d/c home with outpatient management, labs o/w unremarkable, VSS, tolerating PO well                      Clinical Impression:   The encounter diagnosis was Diverticulitis.                             Kathleen Gil MD  08/27/18 [id number]

## 2018-08-30 DIAGNOSIS — E11.9 TYPE 2 DIABETES MELLITUS WITHOUT COMPLICATION: ICD-10-CM

## 2018-10-01 NOTE — PROGRESS NOTES
Subjective:   Patient ID:  Alina Narayan is a 73 y.o. female who presents for follow-up of AVR    HPI: Patient is here for valvular heart disease.The patient is here for CAD risk factors.     The patient has no chest pain, SOB ( only for seconds with quick pain), TIA, palpitations, syncope or pre-syncope.Patient does not exercise much.BP usually 120s.        Review of Systems   Constitution: Negative for chills, decreased appetite, diaphoresis, fever, weakness, malaise/fatigue, night sweats, weight gain and weight loss.   HENT: Negative for congestion, hoarse voice, nosebleeds, sore throat and tinnitus.    Eyes: Negative for blurred vision, double vision, vision loss in left eye, vision loss in right eye, visual disturbance and visual halos.   Cardiovascular: Negative for chest pain, claudication, cyanosis, dyspnea on exertion, irregular heartbeat, leg swelling, near-syncope, orthopnea, palpitations, paroxysmal nocturnal dyspnea and syncope.   Respiratory: Negative for cough, hemoptysis, shortness of breath, sleep disturbances due to breathing, snoring, sputum production and wheezing.    Endocrine: Negative for cold intolerance, heat intolerance, polydipsia, polyphagia and polyuria.   Hematologic/Lymphatic: Negative for adenopathy and bleeding problem. Does not bruise/bleed easily.   Skin: Negative for color change, dry skin, flushing, itching, nail changes, poor wound healing, rash, skin cancer, suspicious lesions and unusual hair distribution.   Musculoskeletal: Positive for arthritis and joint pain. Negative for back pain, falls, gout, joint swelling, muscle cramps, muscle weakness, myalgias and stiffness.   Gastrointestinal: Negative for abdominal pain, anorexia, change in bowel habit, constipation, diarrhea, dysphagia, heartburn, hematemesis, hematochezia, melena and vomiting.   Genitourinary: Negative for decreased libido, dysuria, hematuria, hesitancy and urgency.   Neurological: Negative for excessive daytime  "sleepiness, dizziness, focal weakness, headaches, light-headedness, loss of balance, numbness, paresthesias, seizures, sensory change, tremors and vertigo.   Psychiatric/Behavioral: Negative for altered mental status, depression, hallucinations, memory loss, substance abuse and suicidal ideas. The patient does not have insomnia and is not nervous/anxious.    Allergic/Immunologic: Negative for environmental allergies and hives.       Objective: BP (!) 148/65 (BP Location: Right arm, Patient Position: Sitting, BP Method: X-Large (Automatic))   Pulse 73   Ht 5' 2" (1.575 m)   Wt 116.5 kg (256 lb 13.4 oz)   BMI 46.98 kg/m²      Physical Exam   Constitutional: She is oriented to person, place, and time. She appears well-developed and well-nourished.   HENT:   Head: Normocephalic.   Eyes: EOM are normal. Pupils are equal, round, and reactive to light.   Neck: Normal range of motion. Normal carotid pulses, no hepatojugular reflux and no JVD present. Carotid bruit is not present. No thyromegaly present.   Cardiovascular: Normal rate, regular rhythm and intact distal pulses. Exam reveals no gallop and no friction rub.   Murmur heard.   Systolic murmur is present with a grade of 1/6.  Pulmonary/Chest: Effort normal and breath sounds normal. No tachypnea. No respiratory distress. She has no wheezes. She has no rales. She exhibits no tenderness.   Abdominal: Soft. Bowel sounds are normal. She exhibits no distension and no mass. There is no tenderness. There is no rebound and no guarding.   Musculoskeletal: Normal range of motion. She exhibits no edema or tenderness.   Lymphadenopathy:     She has no cervical adenopathy.   Neurological: She is alert and oriented to person, place, and time. No cranial nerve deficit. Coordination normal.   Skin: Skin is warm. No rash noted. No erythema.   Psychiatric: She has a normal mood and affect. Her behavior is normal. Judgment and thought content normal.       Assessment:     1. S/P AVR " (aortic valve replacement)    2. Diabetes mellitus due to underlying condition with stage 3 chronic kidney disease, without long-term current use of insulin    3. Diabetes mellitus type 2 in obese    4. Chronic kidney disease, stage III (moderate)    5. Left anterior knee pain    6. Hemarthrosis of left knee    7. Sleep apnea, unspecified type    8. Vitamin D deficiency    9. Class 3 obesity due to excess calories in adult    10. Morbid obesity        Plan:   Discussed diet , achieving and maintaining ideal body weight, and exercise.   We reviewed meds in detail.  Reassured-discussed goals, options, plan.  We have discussed the need for endocarditis prophylaxis.             Alina was seen today for s/p avr and chest pain.    Diagnoses and all orders for this visit:    S/P AVR (aortic valve replacement)  -     EKG 12-lead; Future; Expected date: 12/02/2019  -     2D echo with color flow doppler; Future; Expected date: 10/03/2018    Diabetes mellitus due to underlying condition with stage 3 chronic kidney disease, without long-term current use of insulin  -     Lipid panel; Future; Expected date: 12/02/2019  -     Comprehensive metabolic panel; Future; Expected date: 12/02/2019  -     TSH; Future; Expected date: 12/02/2019  -     Hemoglobin A1c; Future; Expected date: 12/02/2019    Diabetes mellitus type 2 in obese  -     Comprehensive metabolic panel; Future; Expected date: 12/02/2019  -     TSH; Future; Expected date: 12/02/2019  -     Hemoglobin A1c; Future; Expected date: 12/02/2019    Chronic kidney disease, stage III (moderate)  -     Comprehensive metabolic panel; Future; Expected date: 12/02/2019  -     TSH; Future; Expected date: 12/02/2019    Left anterior knee pain    Hemarthrosis of left knee    Sleep apnea, unspecified type  -     2D echo with color flow doppler; Future; Expected date: 10/03/2018    Vitamin D deficiency    Class 3 obesity due to excess calories in adult    Morbid obesity  -     TSH;  Future; Expected date: 12/02/2019  -     Hemoglobin A1c; Future; Expected date: 12/02/2019  -     2D echo with color flow doppler; Future; Expected date: 10/03/2018            Follow-up in about 15 months (around 1/2/2020) for with ECG and labs; CFD Néstor ko.

## 2018-10-02 ENCOUNTER — PES CALL (OUTPATIENT)
Dept: ADMINISTRATIVE | Facility: CLINIC | Age: 73
End: 2018-10-02

## 2018-10-02 ENCOUNTER — OFFICE VISIT (OUTPATIENT)
Dept: CARDIOLOGY | Facility: CLINIC | Age: 73
End: 2018-10-02
Payer: MEDICARE

## 2018-10-02 VITALS
SYSTOLIC BLOOD PRESSURE: 148 MMHG | WEIGHT: 256.81 LBS | BODY MASS INDEX: 47.26 KG/M2 | DIASTOLIC BLOOD PRESSURE: 65 MMHG | HEIGHT: 62 IN | HEART RATE: 73 BPM

## 2018-10-02 DIAGNOSIS — N18.30 DIABETES MELLITUS DUE TO UNDERLYING CONDITION WITH STAGE 3 CHRONIC KIDNEY DISEASE, WITHOUT LONG-TERM CURRENT USE OF INSULIN: ICD-10-CM

## 2018-10-02 DIAGNOSIS — E66.9 DIABETES MELLITUS TYPE 2 IN OBESE: ICD-10-CM

## 2018-10-02 DIAGNOSIS — M25.062 HEMARTHROSIS OF LEFT KNEE: ICD-10-CM

## 2018-10-02 DIAGNOSIS — E08.22 DIABETES MELLITUS DUE TO UNDERLYING CONDITION WITH STAGE 3 CHRONIC KIDNEY DISEASE, WITHOUT LONG-TERM CURRENT USE OF INSULIN: ICD-10-CM

## 2018-10-02 DIAGNOSIS — M25.562 LEFT ANTERIOR KNEE PAIN: ICD-10-CM

## 2018-10-02 DIAGNOSIS — N18.30 CHRONIC KIDNEY DISEASE, STAGE III (MODERATE): ICD-10-CM

## 2018-10-02 DIAGNOSIS — E66.01 MORBID OBESITY: ICD-10-CM

## 2018-10-02 DIAGNOSIS — Z95.2 S/P AVR (AORTIC VALVE REPLACEMENT): Primary | ICD-10-CM

## 2018-10-02 DIAGNOSIS — E55.9 VITAMIN D DEFICIENCY: ICD-10-CM

## 2018-10-02 DIAGNOSIS — G47.30 SLEEP APNEA, UNSPECIFIED TYPE: ICD-10-CM

## 2018-10-02 DIAGNOSIS — E11.69 DIABETES MELLITUS TYPE 2 IN OBESE: ICD-10-CM

## 2018-10-02 PROCEDURE — 1101F PT FALLS ASSESS-DOCD LE1/YR: CPT | Mod: CPTII,,, | Performed by: INTERNAL MEDICINE

## 2018-10-02 PROCEDURE — 3078F DIAST BP <80 MM HG: CPT | Mod: CPTII,,, | Performed by: INTERNAL MEDICINE

## 2018-10-02 PROCEDURE — 99499 UNLISTED E&M SERVICE: CPT | Mod: HCNC,S$GLB,, | Performed by: INTERNAL MEDICINE

## 2018-10-02 PROCEDURE — 99999 PR PBB SHADOW E&M-EST. PATIENT-LVL IV: CPT | Mod: PBBFAC,,, | Performed by: INTERNAL MEDICINE

## 2018-10-02 PROCEDURE — 3045F PR MOST RECENT HEMOGLOBIN A1C LEVEL 7.0-9.0%: CPT | Mod: CPTII,,, | Performed by: INTERNAL MEDICINE

## 2018-10-02 PROCEDURE — 99214 OFFICE O/P EST MOD 30 MIN: CPT | Mod: PBBFAC | Performed by: INTERNAL MEDICINE

## 2018-10-02 PROCEDURE — 99215 OFFICE O/P EST HI 40 MIN: CPT | Mod: S$PBB,,, | Performed by: INTERNAL MEDICINE

## 2018-10-02 PROCEDURE — 3077F SYST BP >= 140 MM HG: CPT | Mod: CPTII,,, | Performed by: INTERNAL MEDICINE

## 2018-10-02 NOTE — PATIENT INSTRUCTIONS
Discussed diet , achieving and maintaining ideal body weight, and exercise.   We reviewed meds in detail.  Reassured-discussed goals, options, plan.  We have discussed the need for endocarditis prophylaxis.

## 2018-10-02 NOTE — LETTER
October 2, 2018      Aarti Dunn MD  [address]  [place] [address]           [facility]  [address]  [place] [address]  Phone: [phone]          Patient: Alina Narayan   MR Number: [id number]   YOB: 1945   Date of Visit: 10/2/2018       Dear Dr. Aarti Dunn:    Thank you for referring Alina Narayan to me for evaluation. Attached you will find relevant portions of my assessment and plan of care.    If you have questions, please do not hesitate to call me. I look forward to following Alina Narayan along with you.    Sincerely,    Álvaro Palm MD    Enclosure  CC:  No Recipients    If you would like to receive this communication electronically, please contact [email or web address] or [phone] to request more information on [facility] access.    For providers and/or their staff who would like to refer a patient to [facility], please contact us through our one-stop-shop provider referral line, [facility] , at [phone].    If you feel you have received this communication in error or would no longer like to receive these types of communications, please e-mail [email or web address]

## 2018-10-12 ENCOUNTER — TELEPHONE (OUTPATIENT)
Dept: INTERNAL MEDICINE | Facility: CLINIC | Age: 73
End: 2018-10-12

## 2018-10-19 ENCOUNTER — TELEPHONE (OUTPATIENT)
Dept: CARDIOLOGY | Facility: CLINIC | Age: 73
End: 2018-10-19

## 2018-10-19 ENCOUNTER — DOCUMENTATION ONLY (OUTPATIENT)
Dept: CARDIOLOGY | Facility: CLINIC | Age: 73
End: 2018-10-19

## 2018-10-19 ENCOUNTER — HOSPITAL ENCOUNTER (OUTPATIENT)
Dept: CARDIOLOGY | Facility: CLINIC | Age: 73
Discharge: HOME | End: 2018-10-19
Attending: INTERNAL MEDICINE
Payer: MEDICARE

## 2018-10-19 DIAGNOSIS — Z95.2 S/P AVR (AORTIC VALVE REPLACEMENT): ICD-10-CM

## 2018-10-19 DIAGNOSIS — E66.01 MORBID OBESITY: ICD-10-CM

## 2018-10-19 DIAGNOSIS — G47.30 SLEEP APNEA, UNSPECIFIED TYPE: ICD-10-CM

## 2018-10-19 LAB
ESTIMATED PA SYSTOLIC PRESSURE: 28.4
MITRAL VALVE REGURGITATION: NORMAL
RETIRED EF AND QEF - SEE NOTES: 53 (ref 55–65)
TRICUSPID VALVE REGURGITATION: NORMAL

## 2018-10-19 PROCEDURE — 93306 TTE W/DOPPLER COMPLETE: CPT | Mod: 26,S$PBB,, | Performed by: INTERNAL MEDICINE

## 2018-10-19 NOTE — TELEPHONE ENCOUNTER
Dr Palm reviewed the echo and said that valve looks and the mitral regurgitation is mild not enough to do anything.  Over all the echo is fine.

## 2018-12-18 ENCOUNTER — OFFICE VISIT (OUTPATIENT)
Dept: INTERNAL MEDICINE | Facility: CLINIC | Age: 73
End: 2018-12-18
Payer: MEDICARE

## 2018-12-18 ENCOUNTER — LAB VISIT (OUTPATIENT)
Dept: LAB | Facility: HOSPITAL | Age: 73
End: 2018-12-18
Attending: INTERNAL MEDICINE
Payer: MEDICARE

## 2018-12-18 VITALS
HEART RATE: 81 BPM | SYSTOLIC BLOOD PRESSURE: 108 MMHG | WEIGHT: 260.13 LBS | BODY MASS INDEX: 47.87 KG/M2 | DIASTOLIC BLOOD PRESSURE: 60 MMHG | OXYGEN SATURATION: 98 % | HEIGHT: 62 IN

## 2018-12-18 VITALS
WEIGHT: 261.25 LBS | BODY MASS INDEX: 48.07 KG/M2 | OXYGEN SATURATION: 97 % | HEART RATE: 81 BPM | HEIGHT: 62 IN | DIASTOLIC BLOOD PRESSURE: 60 MMHG | SYSTOLIC BLOOD PRESSURE: 108 MMHG

## 2018-12-18 DIAGNOSIS — M89.9 DISORDER OF BONE: ICD-10-CM

## 2018-12-18 DIAGNOSIS — G47.33 OBSTRUCTIVE SLEEP APNEA: ICD-10-CM

## 2018-12-18 DIAGNOSIS — E66.9 DIABETES MELLITUS TYPE 2 IN OBESE: ICD-10-CM

## 2018-12-18 DIAGNOSIS — Z95.2 S/P AVR (AORTIC VALVE REPLACEMENT): ICD-10-CM

## 2018-12-18 DIAGNOSIS — Z85.3 PERSONAL HISTORY OF BREAST CANCER: ICD-10-CM

## 2018-12-18 DIAGNOSIS — E08.22 DIABETES MELLITUS DUE TO UNDERLYING CONDITION WITH STAGE 3 CHRONIC KIDNEY DISEASE, WITHOUT LONG-TERM CURRENT USE OF INSULIN: ICD-10-CM

## 2018-12-18 DIAGNOSIS — E11.69 DIABETES MELLITUS TYPE 2 IN OBESE: ICD-10-CM

## 2018-12-18 DIAGNOSIS — E55.9 VITAMIN D DEFICIENCY: ICD-10-CM

## 2018-12-18 DIAGNOSIS — I70.0 ATHEROSCLEROSIS OF AORTA: ICD-10-CM

## 2018-12-18 DIAGNOSIS — N18.30 DIABETES MELLITUS DUE TO UNDERLYING CONDITION WITH STAGE 3 CHRONIC KIDNEY DISEASE, WITHOUT LONG-TERM CURRENT USE OF INSULIN: ICD-10-CM

## 2018-12-18 DIAGNOSIS — D69.6 THROMBOCYTOPENIA, UNSPECIFIED: ICD-10-CM

## 2018-12-18 DIAGNOSIS — H90.5 SENSORINEURAL HEARING LOSS (SNHL), UNSPECIFIED LATERALITY: ICD-10-CM

## 2018-12-18 DIAGNOSIS — N18.30 CHRONIC KIDNEY DISEASE, STAGE III (MODERATE): Primary | ICD-10-CM

## 2018-12-18 DIAGNOSIS — N18.30 CHRONIC KIDNEY DISEASE, STAGE III (MODERATE): ICD-10-CM

## 2018-12-18 DIAGNOSIS — Z00.00 ENCOUNTER FOR PREVENTIVE HEALTH EXAMINATION: Primary | ICD-10-CM

## 2018-12-18 DIAGNOSIS — M76.61 TENDONITIS, ACHILLES, RIGHT: ICD-10-CM

## 2018-12-18 DIAGNOSIS — G47.30 SLEEP APNEA, UNSPECIFIED TYPE: ICD-10-CM

## 2018-12-18 DIAGNOSIS — Z80.0 FAMILY HISTORY OF COLON CANCER: ICD-10-CM

## 2018-12-18 DIAGNOSIS — F41.9 ANXIETY: ICD-10-CM

## 2018-12-18 PROBLEM — Z12.11 SPECIAL SCREENING FOR MALIGNANT NEOPLASMS, COLON: Status: RESOLVED | Noted: 2017-09-29 | Resolved: 2018-12-18

## 2018-12-18 LAB
ALBUMIN SERPL BCP-MCNC: 3.8 G/DL
ALP SERPL-CCNC: 116 U/L
ALT SERPL W/O P-5'-P-CCNC: 19 U/L
ANION GAP SERPL CALC-SCNC: 10 MMOL/L
AST SERPL-CCNC: 17 U/L
BILIRUB SERPL-MCNC: 0.4 MG/DL
BUN SERPL-MCNC: 20 MG/DL
CALCIUM SERPL-MCNC: 9.5 MG/DL
CHLORIDE SERPL-SCNC: 103 MMOL/L
CO2 SERPL-SCNC: 27 MMOL/L
CREAT SERPL-MCNC: 0.9 MG/DL
EST. GFR  (AFRICAN AMERICAN): >60 ML/MIN/1.73 M^2
EST. GFR  (NON AFRICAN AMERICAN): >60 ML/MIN/1.73 M^2
ESTIMATED AVG GLUCOSE: 163 MG/DL
GLUCOSE SERPL-MCNC: 168 MG/DL
HBA1C MFR BLD HPLC: 7.3 %
POTASSIUM SERPL-SCNC: 4.6 MMOL/L
PROT SERPL-MCNC: 7.3 G/DL
SODIUM SERPL-SCNC: 140 MMOL/L
TSH SERPL DL<=0.005 MIU/L-ACNC: 2.12 UIU/ML

## 2018-12-18 PROCEDURE — 80053 COMPREHEN METABOLIC PANEL: CPT | Mod: HCNC

## 2018-12-18 PROCEDURE — 36415 COLL VENOUS BLD VENIPUNCTURE: CPT | Mod: HCNC,PO

## 2018-12-18 PROCEDURE — 3074F SYST BP LT 130 MM HG: CPT | Mod: CPTII,HCNC,S$GLB, | Performed by: INTERNAL MEDICINE

## 2018-12-18 PROCEDURE — 1101F PT FALLS ASSESS-DOCD LE1/YR: CPT | Mod: CPTII,HCNC,S$GLB, | Performed by: INTERNAL MEDICINE

## 2018-12-18 PROCEDURE — 83036 HEMOGLOBIN GLYCOSYLATED A1C: CPT | Mod: HCNC

## 2018-12-18 PROCEDURE — 99499 UNLISTED E&M SERVICE: CPT | Mod: HCNC,S$GLB,, | Performed by: INTERNAL MEDICINE

## 2018-12-18 PROCEDURE — 3045F PR MOST RECENT HEMOGLOBIN A1C LEVEL 7.0-9.0%: CPT | Mod: CPTII,HCNC,S$GLB, | Performed by: NURSE PRACTITIONER

## 2018-12-18 PROCEDURE — 84443 ASSAY THYROID STIM HORMONE: CPT | Mod: HCNC

## 2018-12-18 PROCEDURE — 99999 PR PBB SHADOW E&M-EST. PATIENT-LVL IV: CPT | Mod: PBBFAC,HCNC,, | Performed by: NURSE PRACTITIONER

## 2018-12-18 PROCEDURE — 99999 PR PBB SHADOW E&M-EST. PATIENT-LVL V: CPT | Mod: PBBFAC,HCNC,, | Performed by: INTERNAL MEDICINE

## 2018-12-18 PROCEDURE — 99499 UNLISTED E&M SERVICE: CPT | Mod: HCNC,S$GLB,, | Performed by: NURSE PRACTITIONER

## 2018-12-18 PROCEDURE — 3074F SYST BP LT 130 MM HG: CPT | Mod: CPTII,HCNC,S$GLB, | Performed by: NURSE PRACTITIONER

## 2018-12-18 PROCEDURE — 3078F DIAST BP <80 MM HG: CPT | Mod: CPTII,HCNC,S$GLB, | Performed by: INTERNAL MEDICINE

## 2018-12-18 PROCEDURE — 99214 OFFICE O/P EST MOD 30 MIN: CPT | Mod: HCNC,S$GLB,, | Performed by: INTERNAL MEDICINE

## 2018-12-18 PROCEDURE — G0439 PPPS, SUBSEQ VISIT: HCPCS | Mod: HCNC,S$GLB,, | Performed by: NURSE PRACTITIONER

## 2018-12-18 PROCEDURE — 3078F DIAST BP <80 MM HG: CPT | Mod: CPTII,HCNC,S$GLB, | Performed by: NURSE PRACTITIONER

## 2018-12-18 RX ORDER — MELOXICAM 7.5 MG/1
TABLET ORAL
Qty: 30 TABLET | Refills: 0 | Status: SHIPPED | OUTPATIENT
Start: 2018-12-18 | End: 2019-01-30

## 2018-12-18 RX ORDER — ALPRAZOLAM 0.25 MG/1
TABLET ORAL
Qty: 40 TABLET | Refills: 0 | Status: SHIPPED | OUTPATIENT
Start: 2018-12-18 | End: 2019-08-13

## 2018-12-18 NOTE — PATIENT INSTRUCTIONS
Counseling and Referral of Other Preventative  (Italic type indicates deductible and co-insurance are waived)    Patient Name: Alina Narayan  Today's Date: 12/18/2018    Health Maintenance       Date Due Completion Date    Urine Microalbumin 06/26/2018 6/26/2017    Hemoglobin A1c 04/02/2019 10/2/2018    Eye Exam 04/12/2019 4/12/2018    Override on 2/6/2012: Done    DEXA SCAN 06/06/2019 6/6/2017    Mammogram 07/17/2019 7/17/2018    Lipid Panel 08/21/2019 8/21/2018    High Dose Statin 12/18/2019 12/18/2018    Foot Exam 12/18/2019 12/18/2018 (Done)    Override on 12/18/2018: Done    Override on 8/31/2017: Done    Colonoscopy 09/29/2020 9/29/2017    TETANUS VACCINE 02/26/2025 2/26/2015        No orders of the defined types were placed in this encounter.    The following information is provided to all patients.  This information is to help you find resources for any of the problems found today that may be affecting your health:                Living healthy guide: www.[facility].[place].gov      Understanding Diabetes: www.diabetes.org      Eating healthy: www.cdc.gov/healthyweight      CDC home safety checklist: www.cdc.gov/steadi/patient.html      Agency on Aging: www.goea.[place].gov      Alcoholics anonymous (AA): www.aa.org      Physical Activity: www.mervin.nih.gov/vg5wgjr      Tobacco use: www.quitwithusla.org

## 2018-12-18 NOTE — PROGRESS NOTES
"Alina Narayan presented for a  Medicare AWV and comprehensive Health Risk Assessment today. The following components were reviewed and updated:    · Medical history  · Family History  · Social history  · Allergies and Current Medications  · Health Risk Assessment  · Health Maintenance  · Care Team     ** See Completed Assessments for Annual Wellness Visit within the encounter summary.**       The following assessments were completed:  · Living Situation  · CAGE  · Depression Screening  · Timed Get Up and Go  · Whisper Test  · Cognitive Function Screening  ·   ·   · Nutrition Screening  · ADL Screening  · PAQ Screening    Vitals:    12/18/18 1308   BP: 108/60   Pulse: 81   SpO2: 97%   Weight: 118.5 kg (261 lb 3.9 oz)   Height: 5' 2" (1.575 m)     Body mass index is 47.78 kg/m².  Physical Exam   Constitutional: She is oriented to person, place, and time. She appears well-developed.   Obese  Wheeled walker use   HENT:   Head: Normocephalic and atraumatic.   Nose: Nose normal.   Eyes: Conjunctivae and EOM are normal.   Cardiovascular: Normal rate, regular rhythm, normal heart sounds and intact distal pulses.   No murmur heard.  Pulses:       Dorsalis pedis pulses are 2+ on the right side, and 2+ on the left side.   Pulmonary/Chest: Effort normal and breath sounds normal.   Musculoskeletal:        Right foot: There is normal range of motion and no deformity (fungus right great toe).        Left foot: There is normal range of motion and no deformity.   Feet:   Right Foot:   Protective Sensation: 7 sites tested. 7 sites sensed.   Skin Integrity: Negative for ulcer, blister, skin breakdown, callus or dry skin.   Left Foot:   Protective Sensation: 7 sites tested. 7 sites sensed.   Skin Integrity: Negative for blister, skin breakdown, callus or dry skin.   Neurological: She is alert and oriented to person, place, and time.   Skin: Skin is warm and dry.   Psychiatric: She has a normal mood and affect. Her behavior is normal. " Judgment and thought content normal.   Nursing note and vitals reviewed.        Diagnoses and health risks identified today and associated recommendations/orders:    1. Encounter for preventive health examination  Assessment performed. Health maintenance updated. Chart review completed.    2. Chronic kidney disease, stage III (moderate)  Chronic. Stable. Followed by PCP.  Component      Latest Ref Rng & Units 8/26/2018             Creatinine      0.5 - 1.4 mg/dL 0.9     3. Atherosclerosis of aorta  Noted on imaging. Stable with blood pressure control and statin therapy. Followed by PCP.    4. Thrombocytopenia, unspecified  Component      Latest Ref Rng & Units 8/26/2018 8/21/2018 2/2/2018             9:24 PM   Platelets      150 - 350 K/uL 138 (L) 143 (L) 108 (L)   Chronic. Stable. Followed by PCP.    5. Diabetes mellitus due to underlying condition with stage 3 chronic kidney disease, without long-term current use of insulin  Component      Latest Ref Rng & Units 8/21/2018   Hemoglobin A1C      4.0 - 5.6 % 7.3 (H)   Estimated Avg Glucose      68 - 131 mg/dL 163 (H)   Chronic. Not at goal. Discussed diet and exercise. Followed by PCP.    6. Diabetes mellitus type 2 in obese  Component      Latest Ref Rng & Units 8/21/2018   Hemoglobin A1C      4.0 - 5.6 % 7.3 (H)   Estimated Avg Glucose      68 - 131 mg/dL 163 (H)   Chronic. Not at goal. Discussed diet and exercise. Followed by PCP.    7. BMI 45.0-49.9, adult  Chronic. Limited ROM due to knee injury in February. Continues to exercise in the pool twice weekly.  Followed by PCP.    8. Sensorineural hearing loss (SNHL), unspecified laterality  Chronic. Stable. Evaluated by Audiology.    9. S/P AVR (aortic valve replacement)  Chronic. Stable. Followed by Cardiology.    10. Personal history of breast cancer  Stable. Mammogram up to date.    11. Vitamin D deficiency  Chronic. Continue current regimen. Followed by PCP.    12. Sleep apnea, unspecified type  Chronic. Stable  with CPAP. Needs new machine per patient. Plans to discuss with PCP today.    13. Family history of colon cancer  Colonoscopy up to date.      Provided Alina with a 5-10 year written screening schedule and personal prevention plan. Recommendations were developed using the USPSTF age appropriate recommendations. Education, counseling, and referrals were provided as needed. After Visit Summary printed and given to patient which includes a list of additional screenings\tests needed.    Follow-up for Annual Wellness Visit in 1 year, follow up with Primary Care Provider as instructed, ;sooner if prob.    RICHY Ferreira

## 2018-12-26 ENCOUNTER — TELEPHONE (OUTPATIENT)
Dept: SURGERY | Facility: CLINIC | Age: 73
End: 2018-12-26

## 2018-12-26 DIAGNOSIS — Z85.3 PERSONAL HISTORY OF BREAST CANCER: Primary | ICD-10-CM

## 2018-12-26 NOTE — TELEPHONE ENCOUNTER
----- Message from Carina Garcia sent at 12/26/2018  3:28 PM CST -----  Contact: Self  Patient wants to know if she needs to see Sonali for an appt when she comes in for her 6 month f/u mammo.  [phone]

## 2018-12-26 NOTE — TELEPHONE ENCOUNTER
Returned the patient call regarding the message below.  The patient is scheduled to be seen on Friday 12/28/18 10:15 am mammogram and breast exam to follow with Janes Landa NP.  The patient voiced understanding of appointment date and time.

## 2018-12-28 ENCOUNTER — OFFICE VISIT (OUTPATIENT)
Dept: SURGERY | Facility: CLINIC | Age: 73
End: 2018-12-28
Payer: MEDICARE

## 2018-12-28 ENCOUNTER — HOSPITAL ENCOUNTER (OUTPATIENT)
Dept: RADIOLOGY | Facility: HOSPITAL | Age: 73
Discharge: HOME | End: 2018-12-28
Attending: NURSE PRACTITIONER
Payer: MEDICARE

## 2018-12-28 ENCOUNTER — PATIENT MESSAGE (OUTPATIENT)
Dept: INTERNAL MEDICINE | Facility: CLINIC | Age: 73
End: 2018-12-28

## 2018-12-28 VITALS
HEART RATE: 75 BPM | SYSTOLIC BLOOD PRESSURE: 161 MMHG | TEMPERATURE: 98 F | DIASTOLIC BLOOD PRESSURE: 72 MMHG | BODY MASS INDEX: 47.84 KG/M2 | WEIGHT: 260 LBS | HEIGHT: 62 IN

## 2018-12-28 DIAGNOSIS — Z12.39 BREAST CANCER SCREENING: Primary | ICD-10-CM

## 2018-12-28 DIAGNOSIS — Z85.3 PERSONAL HISTORY OF BREAST CANCER: ICD-10-CM

## 2018-12-28 DIAGNOSIS — N63.10 LUMP OF BREAST, RIGHT: ICD-10-CM

## 2018-12-28 DIAGNOSIS — N64.89 BREAST ASYMMETRY: ICD-10-CM

## 2018-12-28 DIAGNOSIS — Z80.3 FAMILY HISTORY OF BREAST CANCER: ICD-10-CM

## 2018-12-28 DIAGNOSIS — R59.0 ENLARGED LYMPH NODE IN NECK: ICD-10-CM

## 2018-12-28 PROCEDURE — 77062 BREAST TOMOSYNTHESIS BI: CPT | Mod: 26,HCNC,, | Performed by: RADIOLOGY

## 2018-12-28 PROCEDURE — 76642 ULTRASOUND BREAST LIMITED: CPT | Mod: 26,HCNC,RT, | Performed by: RADIOLOGY

## 2018-12-28 PROCEDURE — 1101F PT FALLS ASSESS-DOCD LE1/YR: CPT | Mod: CPTII,HCNC,S$GLB, | Performed by: NURSE PRACTITIONER

## 2018-12-28 PROCEDURE — 76642 ULTRASOUND BREAST LIMITED: CPT | Mod: TC,HCNC,PO,RT

## 2018-12-28 PROCEDURE — 3078F DIAST BP <80 MM HG: CPT | Mod: CPTII,HCNC,S$GLB, | Performed by: NURSE PRACTITIONER

## 2018-12-28 PROCEDURE — 99999 PR PBB SHADOW E&M-EST. PATIENT-LVL III: CPT | Mod: PBBFAC,HCNC,, | Performed by: NURSE PRACTITIONER

## 2018-12-28 PROCEDURE — 77066 DX MAMMO INCL CAD BI: CPT | Mod: 26,HCNC,, | Performed by: RADIOLOGY

## 2018-12-28 PROCEDURE — 99213 OFFICE O/P EST LOW 20 MIN: CPT | Mod: HCNC,S$GLB,, | Performed by: NURSE PRACTITIONER

## 2018-12-28 PROCEDURE — 77062 BREAST TOMOSYNTHESIS BI: CPT | Mod: TC,HCNC,PO

## 2018-12-28 PROCEDURE — 3077F SYST BP >= 140 MM HG: CPT | Mod: CPTII,HCNC,S$GLB, | Performed by: NURSE PRACTITIONER

## 2018-12-28 RX ORDER — AMOXICILLIN 500 MG/1
CAPSULE ORAL
Refills: 1 | COMMUNITY
Start: 2018-12-19 | End: 2019-02-14

## 2018-12-28 NOTE — Clinical Note
Dr. Dunn,Ms. Narayan mentioned she should be seeing a GI specialist soon for to irregularity and constipation, to rule out a bowel obstruction, according to the patient. Wanted to see if you could facilitate this referral if not already. Patient thought she might be seeing Dr. Worley for this? I don't see an appt. Please let me know if I can be of any help.Thanks, Janes Landa, RICHY

## 2018-12-28 NOTE — PROGRESS NOTES
Subjective:       Patient ID: Alina Narayan is a 73 y.o. female.    Chief Complaint: Follow-up    HPIPt back in water aerobics - less pain in knees, shoulders and back.  No CP or oSB.  No abdominal pain N/V/D.    Review of Systems   Respiratory: Negative for shortness of breath (PND or orthopnea).    Cardiovascular: Negative for chest pain (arm pain or jaw pain).   Gastrointestinal: Negative for abdominal pain, diarrhea, nausea and vomiting.   Genitourinary: Negative for dysuria.   Neurological: Negative for seizures, syncope and headaches.       Objective:      Physical Exam   Constitutional: She is oriented to person, place, and time. She appears well-developed and well-nourished. No distress.   HENT:   Head: Normocephalic.   Mouth/Throat: Oropharynx is clear and moist.   Neck: Neck supple. No JVD present. No thyromegaly present.   Cardiovascular: Normal rate, regular rhythm, normal heart sounds and intact distal pulses. Exam reveals no gallop and no friction rub.   No murmur heard.  Pulmonary/Chest: Effort normal and breath sounds normal. She has no wheezes. She has no rales.   Abdominal: Soft. Bowel sounds are normal. She exhibits no distension and no mass. There is no tenderness. There is no rebound and no guarding.   Musculoskeletal: She exhibits no edema.   Lymphadenopathy:     She has no cervical adenopathy.   Neurological: She is alert and oriented to person, place, and time. She has normal reflexes.   Skin: Skin is warm and dry.   Psychiatric: She has a normal mood and affect. Her behavior is normal. Judgment and thought content normal.       Assessment:       1. Chronic kidney disease, stage III (moderate)    2. Anxiety    3. Personal history of breast cancer    4. Diabetes mellitus due to underlying condition with stage 3 chronic kidney disease, without long-term current use of insulin    5. Obstructive sleep apnea    6. Disorder of bone    7. Tendonitis, Achilles, right        Plan:   Chronic kidney  disease, stage III (moderate)  resolved  Anxiety  -     ALPRAZolam (XANAX) 0.25 MG tablet; One-two daily as needed for anxiety  Dispense: 40 tablet; Refill: 0    Personal history of breast cancer  LICO  Diabetes mellitus due to underlying condition with stage 3 chronic kidney disease, without long-term current use of insulin  -     Comprehensive metabolic panel; Future; Expected date: 12/18/2018  -     TSH; Future; Expected date: 12/18/2018  -     Hemoglobin A1c; Future; Expected date: 12/18/2018    Obstructive sleep apnea  -     CPAP FOR HOME USE    Disorder of bone  -     DXA Bone Density Spine And Hip; Future; Expected date: 12/18/2018    Tendonitis, Achilles, right  -     Ambulatory consult to Podiatry    Other orders  -     blood sugar diagnostic Strp; True Metirx strips or strips and lancets  - pt checks twice daily for uncontrolled glucoses E11.65  Dispense: 200 each; Refill: 3  -     meloxicam (MOBIC) 7.5 MG tablet; One tablet twice daily  Dispense: 30 tablet; Refill: 0    Note sent to [facility] - need for appt vs colonoscopy after diverticulitis even though recent colonoscopy

## 2018-12-28 NOTE — PROGRESS NOTES
"Patient ID: Alina Narayan is a 73 y.o. female.    Chief Complaint: Breast Cancer Screening (CBE/Hx of Breast Cancer)        HPI:  Established patient of the Department of Breast Surgery, previously seen by RICHY Redman, and new to me, presents today for breast cancer surveillance.    Breast History:  Reviewed with pt today:    10-1-2012 core biopsy left breast with IDC, ER+/TN +, HER-2 negative  10- left lumpectomy with 7mm IDC, negative SN, negative margins.   Adjuvant XRT, completed 5 years of endocrine therapy with AI (letrozole)   Pt reports she needed to "have aortic valve replaced a year after radiation because radiation burned it"    Negative genetic testing: [facility] May 2017    Bilat mmg 17 report reviewed, w/ report reading:  Impression:  Left breast 1:00 axis posterior depth post-therapy change with new suspicious calcifications. Assessment: 4 - Suspicious finding. Biopsy is recommended.    BI-RADS Category:   Overall: 4 - Suspicious   Recommendation:  Left breast stereotactic biopsy is recommended for the new calcifications at the site of post-therapy change.    Core bx 17 path report reviewed, w/ report indicatin. CORE BIOPSY, LEFT BREAST SHOWING FRAGMENTS OF BENIGN FAT AND DENSE FIBROCONNECTIVE  TISSUE WITH DYSTROPHIC CALCIFICATION, NO MALIGNANCY OR ATYPIA IDENTIFIED.  2. CORE BIOPSY, LEFT BREAST SHOWING A SMALL FRAGMENT OF DENSE FIBROCONNECTIVE    Left mmg 2018 benign     Breast Imaging  Bilat mammogram and right breast U/S today with 2 abnormalities to right breast/axillary tail, report pending in computer, discussed in person with Dr. Alexander (radiologist who performed U/S), biopsies to be scheduled    Interval Breast History  Patient denies palpable breast mass, enlarged lymph nodes, breast pain, breast-related skin changes, nipple discharge and nipple retraction.    GYN History:  Uterus and ovaries:  intact  Menopause:  54 y/o  HRT usage:  " "never  , first live birth at 20 y/o  Personal history of ovarian cancer:  no    Other Oncologic History:  Personal history of other cancer not abovementioned:  no    Social History:  Tobacco use:  denies  Alcohol use:  denies    Family History:    Family Oncologic History    Ashkenazi Adventism ancestry:  no    Family History   Problem Relation Age of Onset    Breast cancer Mother 62    Colon cancer Father     Cancer Father         Colon CA (cause of death); Prostate CA (no chemo)    Cancer Maternal Grandfather         Colon CA    Cancer Brother         prostate CA, no chemo, "it was bad"    Ovarian cancer Neg Hx        History of genetic testing in relatives:  no          Review of Systems     Constitutional:  Negative for unexplained weight loss.    Respiratory:  Negative for dyspnea or hemoptysis.    Musculoskeletal:  Negative for new-onset or changing back pain, hip pain, or new-onset bone pain.  History of fall in February in 2018 that injured left knee and foot, history of disc problems and lower spinal arthritis (for years), left shoulder tears (for years), bilateral knee arthritis (for years), bilateral carpal tunnel (for years), and right Achilles problems (started 1-2 months ago, has upcoming appt with Melany Sotomayor in Podiatry); nothing new or worsening with chronic problems.      Neurological:  Negative for new-onset or changing headaches.    Gastrointestinal:  Negative for new-onset or changing abdominal pain.  Negative for hematochezia.  Positive for bloating, secondary to constipation per patient, onset several months ago.      Denies other systemic concerns.    Objective:   Physical Exam   Pulmonary/Chest: She exhibits no mass, no edema and no deformity. Right breast exhibits mass and tenderness. Right breast exhibits no inverted nipple, no nipple discharge and no skin change. Left breast exhibits tenderness. Left breast exhibits no inverted nipple, no mass, no nipple discharge and no skin " change. Breasts are asymmetrical. There is no breast swelling.   Unable to ambulate onto exam table.  Using walker today.  Discussed limitations of performing CBE in upright position only.    Bilat breasts with diffuse TTP.    S/p left breast lumpectomy scar 1:00 region, well healed, TTP, pt states this is unchanged, no mass or skin change appreciated.    Right breast 11-12:00 in axillary tail with round lump appreciated, <1 cm.  Of significant clinical suspicion.    Right breast 12:00 region, medial to palpable lump, with swollen tissue that is slightly asymmetrical and more vascular-appearing when compared to left breast corresponding region. No discrete mass appreciated to this area.  Of low clinical suspicion.    Right inframammary fold 5:00 with 1in of skin irritation, no mass appreciated.    Breathing non-labored.       Abdominal: There is no tenderness.   Musculoskeletal:   Negative for low back point-tenderness or spinal column point-tenderness.   Lymphadenopathy:        Head (left side): Submandibular adenopathy present.     She has cervical adenopathy.     She has no axillary adenopathy.        Right: No supraclavicular adenopathy present.        Left: No supraclavicular adenopathy present.   Left anterior neck with asymmetry as compared to right anterior neck. Possible lymph node feels 1.5cm to my estimation. Anterior cervical lymphadenopathy vs. submandibular lymphadenopathy vs. thyroid etiology.     Assessment:       1. Breast cancer screening    2. Lump of breast, right    3. Personal history of breast cancer    4. Family history of breast cancer    5. Enlarged lymph node in neck    6. Breast asymmetry          Plan:   Lump of breast, right  S/p mmg and U/S of areas today, w/ report pending.  Discussed in person w/ Dr. Alexander.  Right breast biopsies to be scheduled.    Breast asymmetry  Right breast 12:00 region, medial to palpable lump, with swollen tissue that is slightly asymmetrical and more  "vascular-appearing when compared to left breast corresponding region. No discrete mass appreciated to this area.  Of low clinical suspicion.  Ordered right limited breast U/S today; pt desires to have this on day of right breast bx.  Discussed w/ radiologist Dr. Alexander.    Enlarged lymph node in neck  Soft tissue U/S head/neck/thyroid ordered today.  Rule out concerning etiology with imaging.  Left anterior cervical lymphadenopathy vs left submandibular lymphadenopathy vs thyroid etiology. Will f/u pending results.    Skin irritation, right inframammary fold  Pt was unaware of this. Mild. Area appears moist today. Possibly of fungal etiology. Advised pt to keep dry and to apply OTC Lotrimin x 2 weeks and to contact me if not improved/resolved.    Other    Pt to f/u with Dr. Worley or other GI specialist for "possible bowel obstruction," per patient.  Pt denies hematochezia but states BMs are irregular and she gets constipated.  States she has discussed w/ PCP.    [facility] for genetic counseling with possible need for updated genetic testing given only BRCA1/2 were tested in 2017.  Paperwork initiated today and will be sent to [employer].    Musculoskeletal ROS positives are likely of non-cancer-related etiology based on pt's reported history.  Pt to proceed w/ Podiatry f/u for Achilles.  Advised pt to continue monitoring all musculoskeletal symptoms and to RTC with any changes/worsening of existing symptoms or development of new symptoms.  Advised her as to symptoms to monitor for.  Offered bone scan; pt declined at this time.      Follow-up  TBD after breast bx results and neck U/S result.  Plan to see pt back in clinic within 1 month unless she will be seeing a breast surgeon secondary to results of above.  "

## 2018-12-29 NOTE — PROGRESS NOTES
Pranav,    Please schedule pt for right breast biopsy and right breast lymph node biopsy.  Right breast U/S to occur on same day.  Please let me know if you need me to put in any orders.    Thanks,  Janes    Discussed suspicious findings in person today with pt.

## 2018-12-31 ENCOUNTER — TELEPHONE (OUTPATIENT)
Dept: RADIOLOGY | Facility: HOSPITAL | Age: 73
End: 2018-12-31

## 2018-12-31 NOTE — TELEPHONE ENCOUNTER
Spoke with patient. Reviewed breast biopsy procedure and reviewed instructions for breast biopsy. Patient expressed understanding and all questions were answered. Provided patient with my phone number to call for any further concerns or questions.   Patient scheduled breast biopsy at the [facility] on 1/7/2019

## 2019-01-03 ENCOUNTER — HOSPITAL ENCOUNTER (OUTPATIENT)
Dept: RADIOLOGY | Facility: HOSPITAL | Age: 74
Discharge: HOME | End: 2019-01-03
Attending: NURSE PRACTITIONER
Payer: MEDICARE

## 2019-01-03 DIAGNOSIS — R59.0 ENLARGED LYMPH NODE IN NECK: ICD-10-CM

## 2019-01-03 PROCEDURE — 76536 US EXAM OF HEAD AND NECK: CPT | Mod: TC,HCNC

## 2019-01-03 PROCEDURE — 76536 US SOFT TISSUE HEAD NECK THYROID: ICD-10-PCS | Mod: 26,HCNC,, | Performed by: RADIOLOGY

## 2019-01-03 PROCEDURE — 76536 US EXAM OF HEAD AND NECK: CPT | Mod: 26,HCNC,, | Performed by: RADIOLOGY

## 2019-01-07 ENCOUNTER — HOSPITAL ENCOUNTER (OUTPATIENT)
Dept: RADIOLOGY | Facility: HOSPITAL | Age: 74
Discharge: HOME | End: 2019-01-07
Attending: NURSE PRACTITIONER
Payer: MEDICARE

## 2019-01-07 DIAGNOSIS — N64.89 BREAST ASYMMETRY: ICD-10-CM

## 2019-01-07 DIAGNOSIS — R92.8 ABNORMAL MAMMOGRAM: ICD-10-CM

## 2019-01-07 PROCEDURE — 38505 US BIOPSY LYMPH NODE AXILLA: ICD-10-PCS | Mod: HCNC,,, | Performed by: RADIOLOGY

## 2019-01-07 PROCEDURE — 19083 US BREAST BIOPSY WITH IMAGING 1ST SITE RIGHT: ICD-10-PCS | Mod: HCNC,RT,, | Performed by: RADIOLOGY

## 2019-01-07 PROCEDURE — 88305 TISSUE EXAM BY PATHOLOGIST: CPT | Mod: HCNC,59 | Performed by: PATHOLOGY

## 2019-01-07 PROCEDURE — 88341 IMHCHEM/IMCYTCHM EA ADD ANTB: CPT | Mod: 26,HCNC,59, | Performed by: PATHOLOGY

## 2019-01-07 PROCEDURE — 19083 BX BREAST 1ST LESION US IMAG: CPT | Mod: HCNC,RT,, | Performed by: RADIOLOGY

## 2019-01-07 PROCEDURE — 77065 DX MAMMO INCL CAD UNI: CPT | Mod: 26,HCNC,RT, | Performed by: RADIOLOGY

## 2019-01-07 PROCEDURE — 88342 IMHCHEM/IMCYTCHM 1ST ANTB: CPT | Mod: 26,HCNC,59, | Performed by: PATHOLOGY

## 2019-01-07 PROCEDURE — 27201068 US BREAST BIOPSY WITH IMAGING 1ST SITE RIGHT: Mod: HCNC,PO

## 2019-01-07 PROCEDURE — 88342 TISSUE SPECIMEN TO PATHOLOGY, RADIOLOGY: ICD-10-PCS | Mod: 26,HCNC,59, | Performed by: PATHOLOGY

## 2019-01-07 PROCEDURE — 38505 NEEDLE BIOPSY LYMPH NODES: CPT | Mod: HCNC,,, | Performed by: RADIOLOGY

## 2019-01-07 PROCEDURE — 88341 TISSUE SPECIMEN TO PATHOLOGY, RADIOLOGY: ICD-10-PCS | Mod: 26,HCNC,59, | Performed by: PATHOLOGY

## 2019-01-07 PROCEDURE — 88360 PR  TUMOR IMMUNOHISTOCHEM/MANUAL: ICD-10-PCS | Mod: 26,HCNC,, | Performed by: PATHOLOGY

## 2019-01-07 PROCEDURE — 88360 TUMOR IMMUNOHISTOCHEM/MANUAL: CPT | Mod: 26,HCNC,, | Performed by: PATHOLOGY

## 2019-01-07 PROCEDURE — 27201068 US BIOPSY LYMPH NODE AXILLA: Mod: HCNC,PO

## 2019-01-07 PROCEDURE — 88305 TISSUE EXAM BY PATHOLOGIST: CPT | Mod: 26,HCNC,, | Performed by: PATHOLOGY

## 2019-01-07 PROCEDURE — 77065 MAMMO DIGITAL DIAGNOSTIC RIGHT WITH CAD: ICD-10-PCS | Mod: 26,HCNC,RT, | Performed by: RADIOLOGY

## 2019-01-07 PROCEDURE — 88341 IMHCHEM/IMCYTCHM EA ADD ANTB: CPT | Mod: HCNC | Performed by: PATHOLOGY

## 2019-01-07 PROCEDURE — 88305 TISSUE SPECIMEN TO PATHOLOGY, RADIOLOGY: ICD-10-PCS | Mod: 26,HCNC,, | Performed by: PATHOLOGY

## 2019-01-07 PROCEDURE — 77065 DX MAMMO INCL CAD UNI: CPT | Mod: TC,HCNC,PO,RT

## 2019-01-10 ENCOUNTER — TELEPHONE (OUTPATIENT)
Dept: SURGERY | Facility: CLINIC | Age: 74
End: 2019-01-10

## 2019-01-10 NOTE — TELEPHONE ENCOUNTER
Called patient to discuss biopsy results from 1/7/19. Explained that biopsy showed two positive lymph nodes for invasive mammary carcinoma. We discussed what this means, and that we need to schedule her to come in and meet with a surgeon to discuss treatment options. Patient would like to see Dr. Flores, who did her left breast surgery. Scheduled for Thursday at 3:45 in the general surgery clinic. Provided directions to this clinic, patient verbalized understanding of all information     Told patient I would speak to Dr. Flores about any other tests/scans he may want to order, and we can potentially get them done while we wait for the appt with him. She will wait for my call regarding this

## 2019-01-11 ENCOUNTER — TELEPHONE (OUTPATIENT)
Dept: SURGERY | Facility: CLINIC | Age: 74
End: 2019-01-11

## 2019-01-11 DIAGNOSIS — C50.911 MALIGNANT NEOPLASM OF RIGHT FEMALE BREAST, UNSPECIFIED ESTROGEN RECEPTOR STATUS, UNSPECIFIED SITE OF BREAST: Primary | ICD-10-CM

## 2019-01-11 NOTE — TELEPHONE ENCOUNTER
Called patient again regarding recommendation for breast MRI to determine breast primary. We discussed MRI and how it is performed. Scheduled patient for Tuesday 1/15 at 4pm. Reviewed the location of the Imaging Center, patient verbalized understanding of all information

## 2019-01-11 NOTE — TELEPHONE ENCOUNTER
Called patient back after speaking to Dr. Baker about her case and need for further diagnostic workup. Per Dr. Baker patient will require a breast MRI. Left voicemail explaining to patient that we can schedule this at her convenience, provided my callback number and requested return call at earliest convenience

## 2019-01-14 ENCOUNTER — TELEPHONE (OUTPATIENT)
Dept: SURGERY | Facility: CLINIC | Age: 74
End: 2019-01-14

## 2019-01-14 ENCOUNTER — DOCUMENTATION ONLY (OUTPATIENT)
Dept: SURGERY | Facility: CLINIC | Age: 74
End: 2019-01-14

## 2019-01-14 ENCOUNTER — TELEPHONE (OUTPATIENT)
Dept: INTERNAL MEDICINE | Facility: CLINIC | Age: 74
End: 2019-01-14

## 2019-01-14 DIAGNOSIS — C50.611 MALIGNANT NEOPLASM OF AXILLARY TAIL OF RIGHT BREAST IN FEMALE, ESTROGEN RECEPTOR NEGATIVE: ICD-10-CM

## 2019-01-14 DIAGNOSIS — Z17.1 MALIGNANT NEOPLASM OF AXILLARY TAIL OF RIGHT BREAST IN FEMALE, ESTROGEN RECEPTOR NEGATIVE: ICD-10-CM

## 2019-01-14 DIAGNOSIS — Z17.1 MALIGNANT NEOPLASM OF RIGHT BREAST IN FEMALE, ESTROGEN RECEPTOR NEGATIVE, UNSPECIFIED SITE OF BREAST: Primary | ICD-10-CM

## 2019-01-14 DIAGNOSIS — C50.911 MALIGNANT NEOPLASM OF RIGHT BREAST IN FEMALE, ESTROGEN RECEPTOR NEGATIVE, UNSPECIFIED SITE OF BREAST: Primary | ICD-10-CM

## 2019-01-14 NOTE — TELEPHONE ENCOUNTER
Called patient to discuss two more appts Dr. Flores is requesting we schedule for her. Discussed with patient his recommendation for a PET scan and a consult with a medical oncologist. Patient is agreeable with this, scheduled PET for Friday. She would like to see Dr. Dwyer, who treated her previous contralateral breast cancer. Scheduled her to see him on 1/21/19. Reviewed the location of all appts, as well as her other existing appts for this week. Verbalized understanding of all information

## 2019-01-14 NOTE — PROGRESS NOTES
Notified by medical assistant Abdoulaye that she was advised by [employer] that pt is scheduled for her genetic counseling session on 1/16/2019 at 2:30.  Will advise Dr. Flores.

## 2019-01-14 NOTE — TELEPHONE ENCOUNTER
Patient states her Breast cancer is back and needed to reschedule appointment to eval CPAP machine. States you will need to call to get CPAP readings from [phone].

## 2019-01-14 NOTE — TELEPHONE ENCOUNTER
----- Message from Yanely Vargas sent at 1/14/2019 12:45 PM CST -----  Contact: call pt [phone]  Calling because she needs to reschedule her appt 01/18/2019, but she is unsure when she will be able to come in with all of her appointments and surgeries scheduled,

## 2019-01-15 ENCOUNTER — HOSPITAL ENCOUNTER (OUTPATIENT)
Dept: RADIOLOGY | Facility: HOSPITAL | Age: 74
Discharge: HOME | End: 2019-01-15
Attending: SURGERY
Payer: MEDICARE

## 2019-01-15 DIAGNOSIS — C50.911 MALIGNANT NEOPLASM OF RIGHT FEMALE BREAST, UNSPECIFIED ESTROGEN RECEPTOR STATUS, UNSPECIFIED SITE OF BREAST: ICD-10-CM

## 2019-01-15 PROCEDURE — 77049 MRI BREAST C-+ W/CAD BI: CPT | Mod: 26,HCNC,, | Performed by: RADIOLOGY

## 2019-01-15 PROCEDURE — A9585 GADOBUTROL INJECTION: HCPCS | Mod: HCNC | Performed by: SURGERY

## 2019-01-15 PROCEDURE — 25500020 PHARM REV CODE 255: Mod: HCNC | Performed by: SURGERY

## 2019-01-15 PROCEDURE — 77049 MRI BREAST C-+ W/CAD BI: CPT | Mod: TC,HCNC

## 2019-01-15 PROCEDURE — 77049 MRI BREAST W/WO CONTRAST, W/CAD, BILATERAL: ICD-10-PCS | Mod: 26,HCNC,, | Performed by: RADIOLOGY

## 2019-01-15 RX ORDER — GADOBUTROL 604.72 MG/ML
20 INJECTION INTRAVENOUS
Status: COMPLETED | OUTPATIENT
Start: 2019-01-15 | End: 2019-01-15

## 2019-01-15 RX ADMIN — GADOBUTROL 20 ML: 604.72 INJECTION INTRAVENOUS at 06:01

## 2019-01-16 ENCOUNTER — OFFICE VISIT (OUTPATIENT)
Dept: PODIATRY | Facility: CLINIC | Age: 74
End: 2019-01-16
Payer: MEDICARE

## 2019-01-16 VITALS — BODY MASS INDEX: 47.84 KG/M2 | WEIGHT: 260 LBS | HEIGHT: 62 IN

## 2019-01-16 DIAGNOSIS — M79.671 RIGHT FOOT PAIN: ICD-10-CM

## 2019-01-16 DIAGNOSIS — M76.60 ACHILLES TENDINITIS, UNSPECIFIED LATERALITY: Primary | ICD-10-CM

## 2019-01-16 DIAGNOSIS — B35.1 ONYCHOMYCOSIS DUE TO DERMATOPHYTE: ICD-10-CM

## 2019-01-16 PROCEDURE — 99999 PR PBB SHADOW E&M-EST. PATIENT-LVL III: CPT | Mod: PBBFAC,HCNC,, | Performed by: PODIATRIST

## 2019-01-16 PROCEDURE — 99213 OFFICE O/P EST LOW 20 MIN: CPT | Mod: HCNC,S$GLB,, | Performed by: PODIATRIST

## 2019-01-16 PROCEDURE — 99213 PR OFFICE/OUTPT VISIT, EST, LEVL III, 20-29 MIN: ICD-10-PCS | Mod: HCNC,S$GLB,, | Performed by: PODIATRIST

## 2019-01-16 PROCEDURE — 1101F PR PT FALLS ASSESS DOC 0-1 FALLS W/OUT INJ PAST YR: ICD-10-PCS | Mod: CPTII,HCNC,S$GLB, | Performed by: PODIATRIST

## 2019-01-16 PROCEDURE — 1101F PT FALLS ASSESS-DOCD LE1/YR: CPT | Mod: CPTII,HCNC,S$GLB, | Performed by: PODIATRIST

## 2019-01-16 PROCEDURE — 99999 PR PBB SHADOW E&M-EST. PATIENT-LVL III: ICD-10-PCS | Mod: PBBFAC,HCNC,, | Performed by: PODIATRIST

## 2019-01-16 PROCEDURE — 99499 RISK ADDL DX/OHS AUDIT: ICD-10-PCS | Mod: HCNC,S$GLB,, | Performed by: PODIATRIST

## 2019-01-16 PROCEDURE — 99499 UNLISTED E&M SERVICE: CPT | Mod: HCNC,S$GLB,, | Performed by: PODIATRIST

## 2019-01-16 RX ORDER — DICLOFENAC SODIUM 10 MG/G
2 GEL TOPICAL DAILY
Qty: 100 G | Refills: 3 | Status: SHIPPED | OUTPATIENT
Start: 2019-01-16 | End: 2019-08-13 | Stop reason: ALTCHOICE

## 2019-01-17 ENCOUNTER — OFFICE VISIT (OUTPATIENT)
Dept: SURGERY | Facility: CLINIC | Age: 74
End: 2019-01-17
Payer: MEDICARE

## 2019-01-17 ENCOUNTER — DOCUMENTATION ONLY (OUTPATIENT)
Dept: SURGERY | Facility: CLINIC | Age: 74
End: 2019-01-17

## 2019-01-17 VITALS
HEART RATE: 80 BPM | WEIGHT: 259.69 LBS | SYSTOLIC BLOOD PRESSURE: 138 MMHG | DIASTOLIC BLOOD PRESSURE: 64 MMHG | TEMPERATURE: 98 F | HEIGHT: 62 IN | BODY MASS INDEX: 47.79 KG/M2

## 2019-01-17 DIAGNOSIS — C50.911 MALIGNANT NEOPLASM OF RIGHT FEMALE BREAST, UNSPECIFIED ESTROGEN RECEPTOR STATUS, UNSPECIFIED SITE OF BREAST: Primary | ICD-10-CM

## 2019-01-17 DIAGNOSIS — R59.0 AXILLARY LYMPHADENOPATHY: ICD-10-CM

## 2019-01-17 PROCEDURE — 99999 PR PBB SHADOW E&M-EST. PATIENT-LVL IV: ICD-10-PCS | Mod: PBBFAC,HCNC,, | Performed by: SURGERY

## 2019-01-17 PROCEDURE — 3078F PR MOST RECENT DIASTOLIC BLOOD PRESSURE < 80 MM HG: ICD-10-PCS | Mod: CPTII,HCNC,S$GLB, | Performed by: SURGERY

## 2019-01-17 PROCEDURE — 3075F PR MOST RECENT SYSTOLIC BLOOD PRESS GE 130-139MM HG: ICD-10-PCS | Mod: CPTII,HCNC,S$GLB, | Performed by: SURGERY

## 2019-01-17 PROCEDURE — 3078F DIAST BP <80 MM HG: CPT | Mod: CPTII,HCNC,S$GLB, | Performed by: SURGERY

## 2019-01-17 PROCEDURE — 1101F PR PT FALLS ASSESS DOC 0-1 FALLS W/OUT INJ PAST YR: ICD-10-PCS | Mod: CPTII,HCNC,S$GLB, | Performed by: SURGERY

## 2019-01-17 PROCEDURE — 99215 PR OFFICE/OUTPT VISIT, EST, LEVL V, 40-54 MIN: ICD-10-PCS | Mod: HCNC,S$GLB,, | Performed by: SURGERY

## 2019-01-17 PROCEDURE — 3075F SYST BP GE 130 - 139MM HG: CPT | Mod: CPTII,HCNC,S$GLB, | Performed by: SURGERY

## 2019-01-17 PROCEDURE — 99215 OFFICE O/P EST HI 40 MIN: CPT | Mod: HCNC,S$GLB,, | Performed by: SURGERY

## 2019-01-17 PROCEDURE — 99999 PR PBB SHADOW E&M-EST. PATIENT-LVL IV: CPT | Mod: PBBFAC,HCNC,, | Performed by: SURGERY

## 2019-01-17 PROCEDURE — 1101F PT FALLS ASSESS-DOCD LE1/YR: CPT | Mod: CPTII,HCNC,S$GLB, | Performed by: SURGERY

## 2019-01-17 NOTE — LETTER
January 20, 2019      Janes Landa, ALEX  [address] [address]           [facility]  [address]  [place] [address]  Phone: [phone]          Patient: Alina Narayan   MR Number: [id number]   YOB: 1945   Date of Visit: 1/17/2019       Dear Janes Landa:    Thank you for referring Alina Narayan to me for evaluation. Attached you will find relevant portions of my assessment and plan of care.    If you have questions, please do not hesitate to call me. I look forward to following Alina Narayan along with you.    Sincerely,    Gaston Flores MD    Enclosure  CC:  No Recipients    If you would like to receive this communication electronically, please contact [email or web address] or [phone] to request more information on [employer] access.    For providers and/or their staff who would like to refer a patient to [facility], please contact us through our one-stop-shop provider referral line, Vasiliy Alejandra, at [phone].    If you feel you have received this communication in error or would no longer like to receive these types of communications, please e-mail [email or web address]

## 2019-01-17 NOTE — H&P (VIEW-ONLY)
Breast Cancer  History and Physical  [facility]  Department of Surgery    REFERRING PROVIDER: Janes Landa, DNP  [address] [place] [address]    CHIEF COMPLAINT: right breast cancer    Subjective:      Alina Narayan is a 73 y.o. postmenopausal female referred for evaluation of recently diagnosed lymph nodes x 2 of the right breast. The patient was initially referred for surgical evaluation of an abnormal mammogram first noted 12/2018. Follow-up imaging showed calcifications with 2 axillary lymph nodes, the largest measuring  1.4 x 1 cm in the axillary tail. A ultrasound guided biopsy was performed on right with pathology revealing metastatic mammary carcinoma of the breast.     Patient does routinely do self breast exams.  Patient has not noted a change on breast exam.  Patient denies nipple discharge. Patient endorses a history of previous breast biopsy. Patient has a personal history of breast cancer.    She underwent an ultrasound guided breast biopsy on 1/7 followed by a MRI notable for level 1 right axillary adenopathy without MR evidence of a primary.     - she had a history of a CNB of the left breast 10/2012, w/ IDC, ER/WA (+), HER 2 russell neg, followed by a left lumpectomy for a 7 mm IDC and negative margins and SN, thus she underwent adjuvant XRT with 5 years of ietroazole     - QY MMG - BIRADs 2 in 2015. 2016  - MMG BIRADs 4 - 12/13/2017, with new calcifications and stereotactic bx  - 7/2018 - BIRADs 2   - 12/2018 - BIRADS 4B  - 1/7/19 u/s guided bx from right axillary mass - met mammary carcinoma, also in right axillary node  - 1/15/19 - MRI breast with two axillary nodes demonstrated, largest measurig 1.4 x 1 cm     - negative genetic testing, integrated BRCA 5/2017     Findings at that time from the most recent biopsy were the following:   Estrogen Receptor: neg  Her-2 russell: neg  Progesterone Receptor: neg  Ki 67 - 40%   Lymph node status: pos      GYN History:  Age of menarche was  11. Age of menopause was 53.  Patient denies hormonal therapy. Patient is . Age of first live birth was 19. Patient did breast feed.    FAMILY History:  - mother - breast cancer 62   - father - CRC   - brother - prostate Ca     Social:  - no tobacco or etoh use  - swims with her girlfriends twice weekly at the [employer]  - uses a rolling walker, no shortness of breath or chest pain with walking, mostly limited by knee pain     Past Medical History:   Diagnosis Date    Allergy     seasonal    Anxiety     Arthritis     Breast cancer 10/2012    left breast invasive ductal carcinoma    Colon polyp     Diabetes mellitus     Type 2    Diverticular disease     Diverticulitis     Genetic testing 2017    negative Integrated BRACAnalysis    Hyperlipidemia     Hypertension     Morbid obesity     MITCHELL (obstructive sleep apnea)     Stenosis     Stenosis and insufficiency of lacrimal passages     Thyroid disease      Past Surgical History:   Procedure Laterality Date    BREAST BIOPSY Left 10/1/2012    left breast- invasive ductal carcinoma    BREAST BIOPSY Left 2017    BREAST LUMPECTOMY Left     CARDIAC VALVE REPLACEMENT  2013    CARDIAC VALVE SURGERY  2013    CARPAL TUNNEL RELEASE      Left    CATHETERIZATION, HEART, LEFT Left 2013    Performed [facility] CATH LAB    COLONOSCOPY N/A 2017    [facility]formed by Ph[facility] Worley [facility] at [facility] ENDO (4TH FLR)    COLONOSCOPY N/A 2015    Performed by [facility] ENDO (4TH FLR)    DILATION AND CURETTAGE OF UTERUS      Endometrial polyps    ENDOMETRIAL ABLATION      Enodmetrial polyps    NIC-TRANSFORAMINAL Left 10/1/2015    [facility] MGT    EYE SURGERY      left lumpectomy      REPLACEMENT, AORTIC VALVE N/A 2013    Performed by Venkat Webb MD at [facility] OR 2ND FLR    SHOULDER SURGERY      SKIN BIOPSY       Current Outpatient  Medications on File Prior to Visit   Medication Sig Dispense Refill    ALPRAZolam (XANAX) 0.25 MG tablet One-two daily as needed for anxiety 40 tablet 0    amoxicillin (AMOXIL) 500 MG capsule TAKE 4 CAPSULES 1 HOUR BEFORE APPOINTMENT  1    ASPIR-LOW 81 mg EC tablet Take 81 mg by mouth once daily.  12    blood sugar diagnostic Strp True Metirx strips or strips and lancets  - pt checks twice daily for uncontrolled glucoses E11.65 200 each 3    carvedilol (COREG) 25 MG tablet .5-1 tablet oral twice daily or as directed. 180 tablet 3    CHOLECALCIFEROL, VITAMIN D3, (VITAMIN D3 ORAL) Take by mouth once daily. 1000 IU      diclofenac sodium (VOLTAREN) 1 % Gel Apply 2 g topically once daily. 100 g 3    gabapentin (NEURONTIN) 300 MG capsule Take 1 capsule (300 mg total) by mouth 2 (two) times daily. 180 capsule 1    HYDROcodone-acetaminophen (NORCO) 5-325 mg per tablet One - two tabs every 6-8 hours as needed for pain 40 tablet 0    lancets (ACCU-CHEK SOFTCLIX LANCETS) Misc TrueTest lancets for meter covered by insurance - pt checks twice daily for uncontrolled glucoses E11.65, 200 each 3    levothyroxine (SYNTHROID) 50 MCG tablet Take 1 tablet (50 mcg total) by mouth every evening. 90 tablet 3    meloxicam (MOBIC) 7.5 MG tablet One tablet twice daily 30 tablet 0    metFORMIN (GLUCOPHAGE) 500 MG tablet Take 1 tablet (500 mg total) by mouth 2 (two) times daily with meals. 180 tablet 3    pravastatin (PRAVACHOL) 20 MG tablet TAKE 1 TABLET ONE TIME DAILY 90 tablet 3    blood-glucose meter kit True Test or meter covered by insurance - pt checks glucose twice daily for uncontrolled glucoses E11.65 1 each 0     No current facility-administered medications on file prior to visit.      Social History     Socioeconomic History    Marital status:      Spouse name: Srinath    Number of children: 2    Years of education: Not on file    Highest education level: Not on file   Social Needs    Financial resource  "strain: Not on file    Food insecurity - worry: Not on file    Food insecurity - inability: Not on file    Transportation needs - medical: Not on file    Transportation needs - non-medical: Not on file   Occupational History    Occupation: Retired   Tobacco Use    Smoking status: Never Smoker    Smokeless tobacco: Never Used   Substance and Sexual Activity    Alcohol use: No     Alcohol/week: 0.0 oz    Drug use: No    Sexual activity: Yes   Other Topics Concern    Are you pregnant or think you may be? No    Breast-feeding No   Social History Narrative    Not on file     Family History   Problem Relation Age of Onset    Breast cancer Mother 62    Colon cancer Father     Cancer Father         Colon CA (cause of death); Prostate CA (no chemo)    Heart disease Father     No Known Problems Sister     No Known Problems Brother     Cancer Maternal Grandfather         Colon CA    No Known Problems Son     Cancer Brother         prostate CA, no chemo, "it was bad"    Anxiety disorder Son     SAL disease Son     Sleep disorder Son     Ovarian cancer Neg Hx     Melanoma Neg Hx     Psoriasis Neg Hx     Lupus Neg Hx     Eczema Neg Hx     Acne Neg Hx         Review of Systems  Review of Systems   Constitutional: Positive for activity change. Negative for fever and unexpected weight change.        Bilateral knee pain (evaluated and not candidate for knee replacements)    Eyes: Negative for pain.   Respiratory: Negative for chest tightness and shortness of breath.    Cardiovascular: Negative for chest pain and palpitations.   Gastrointestinal: Negative for abdominal pain.   Genitourinary: Negative for difficulty urinating.   Musculoskeletal: Positive for back pain, gait problem and joint swelling.   Neurological: Negative for dizziness.   Hematological: Bruises/bleeds easily.        Objective:   PHYSICAL EXAM:  /64   Pulse 80   Temp 97.6 °F (36.4 °C)   Ht 5' 2" (1.575 m)   Wt 117.8 kg (259 " lb 11.2 oz)   BMI 47.50 kg/m²     Physical Exam   Constitutional: She is oriented to person, place, and time. She appears well-developed and well-nourished.   Sitting in a wheelchair   HENT:   Head: Normocephalic and atraumatic.   Eyes: EOM are normal.   Neck: Neck supple.   Cardiovascular: Normal rate.    Pulmonary/Chest: Effort normal. She exhibits no mass, no tenderness and no retraction.   On room air, no palpable LN in the right axilla, no palpable breast masses, ecchymosis noted from the previous biopsy sites   Abdominal: Soft. She exhibits no distension.   Neurological: She is alert and oriented to person, place, and time.   Skin: Skin is warm and dry.     Psychiatric: She has a normal mood and affect.         Radiology review: Images personally reviewed by me in the clinic.     MRI 1/15/19    History:  Two new biopsy-proven level 1 right axillary stef metastases of unknown breast primary.      Films Compared:  Prior images (if available) were compared.     Technique:  Multiplanar multisequence MR images. IV contrast: 20 mL Multihance.     Findings:  Scattered fibroglandular tissue. Minimal background parenchymal enhancement.     Level 1 right axillary adenopathy. Two of these nodes are biopsy-proven stef metastases with associated internal biopsy clips. The largest of the nodes measures 1.4 x 1.0 cm.      No MR evidence for the primary malignancy in either breast. No abnormal skin or nipple enhancement.      Benign post treatment changes in the left breast, including lumpectomy scar.      No left axillary adenopathy. No internal mammary adenopathy.      Impression:  1. No MR evidence for the primary breast malignancy.      2. Level 1 right axillary adenopathy. Two of these nodes are biopsy-proven stef metastases of unknown breast primary. BI-RADS 6: Known malignancy.      BI-RADS Category:   Overall: 6 - Known Biopsy-Proven Malignancy     Surgical Pathology: 1/7/19  Estrogen receptor:  Negative.  Progesterone receptor: Negative.  HER2: Negative.  Ki-67: 40%.    FINAL PATHOLOGIC DIAGNOSIS [id number],ER,PGR,AIT8HCE,ER,PGR    1. Right axillary mass, core needle biopsy:  - One lymph node positive for metastatic mammary carcinoma (1/1)  Note: Immunohistochemical preparations demonstrate tumor cell positivity for AE1/AE3, WSK, and CAM5.2, which  support the above diagnosis. Prognostic/predictive markers for ER, FL, HER2 and Ki 67 are pending, and will be  reported in an addendum.  2. Right axilla node, core needle biopsy:  - One lymph node positive for metastatic mammary carcinoma (1/1)  Note: Immunohistochemical preparations demonstrate tumor cell positivity for AE1/AE3, WSK, CD31, and CAM5.2,  while negative for CD34 and Factor VIII, which support the above diagnosis. Prognostic/predictive markers for ER,  FL, HER2 and Ki 67 are pending, and will be reported in an addendum.       Assessment:      Alina Narayan is a 73 y.o. postmenopausal female with recently diagnosed carcinoma of the right breast with 2 positive biopsied LN, no breast primary noted, triple neg.      Plan:     - PET tomorrow 1/18   - Evaluation by med onc, Dr. Dwyer already scheduled for 1/21, in order to discuss pre-operative chemotherapy   - per patient request, would not desire radiation but would rather opt for a RIGHT MRM, possible consideration of a bilateral mastectomy (LEFT completion/ppx mastectomy) due to history of requiring an AVR after radiation to the LEFT side s/p lumpectomy  - consented for port placement on the RIGHT 2/1/19   - f/u after the above with Dr. Flores for formal consent and further discussion regarding definitive intervention, likely planned 6 months after chemo initiation (or 1 month after it has been completed)     I have personally taken the history and examined this patient and agree with the resident's note as stated above.  Alina Narayan is a very pleasant 73-year-old  female who presents with  her    for evaluation of a newly diagnosed right-sided breast cancer,   presenting as two positive right axillary nodes and occult primary in the right   breast.    The patient's history dates back to me back to 2012 when she underwent breast   conservation surgery and radiotherapy for an estrogen receptor positive tumor in   2012.  She was on Femara, which she completed a five year course.  She states   her ejection fraction dropped from her radiation and was secondary to radiation   therapy and had dropped down to 50 to 55%.  She now presented with axillary   abnormality on diagnostic imaging.  Needle biopsy revealed two lymph nodes that   were both positive for triple negative breast cancer.  The patient subsequently   underwent MRI of the breast, which revealed no evidence of any primary source   within the right breast.    Certainly, I feel this is consistent with an occult primary since her cancer   back in 2012, was estrogen receptor positive and the two nodes now in the right   axilla, both triple negative and both consistent with metastatic triple negative   breast cancer, two right axillary lymph nodes with occult primary.    The patient has had an aortic valve replacement with a bovine valve by Dr. Webb and she is currently not on any anticoagulation.  We discussed potential   treatments for occult primary of the breast, which would include potentially   axillary lymph node dissection followed by either whole breast radiotherapy to   the right breast for occult primary versus right therapeutic mastectomy.    She is not interested in receiving any radiotherapy preventatively to the right   breast due to the fact that she feels her drop in her ejection fraction was due   to her prior left-sided breast radiotherapy.  She would desire a right modified   radical mastectomy for the occult primary rather than receiving radiotherapy.    We discussed that she may still require adjuvant radiotherapy if  she is found to   have N2 disease or pathologic findings that may warrant postmastectomy   radiotherapy.  She also desires a left completion prophylactic mastectomy to   achieve symmetry.    The patient underwent genetic testing in May 2017, which was negative for any   BRCA or genetic mutation.  At this point, since she presents with two nodes   positive for metastatic triple negative breast cancer, we will obtain a PET CT   whole body fusion scan for staging purposes.  This has been scheduled for   01/18/2019.  The patient will see Dr. Dwyer on Monday 01/21/2019 to discuss   neoadjuvant chemotherapy and I will plan to place a right-sided Port-A-Cath on   Friday 02/01/2019.  Certainly could be placed on the left side, but since she   will ultimately have a mastectomy and the left side has already been radiated,   we will attempt a Port-A-Cath placement on the ipsilateral right side.    Time spent with the patient and her  today was greater than 60 minutes   with greater than 50% of time in counseling.  We will proceed with neoadjuvant   chemotherapy approach, which will likely include multiple drug regimens.  I will   defer choice of medications, especially Adriamycin to Dr. Dwyer given potential   compromise, cardiac function due to her prior radiotherapy and report of   ejection fraction of 50 to 55%.      RLC/[facility]  dd: 01/20/2019 09:22:02 (CST)  td: 01/20/2019 21:48:47 (CST)  Doc ID   #[id number]  Job ID #[id number]    CC:     Job # [id number].

## 2019-01-17 NOTE — PROGRESS NOTES
Nurse Navigator Note:     Met with patient during her consult with Dr. Flores. Patient and I reviewed the information she discussed with Dr. Flores, including treatment options, diagnosis, and future plans for workup. Patient and I went through the new patient binder, explained some of the information and why it is provided.     Also offered patient consults with our other specialty clinics: Dr. Carranza for gynecological health during treatment, our breast physical therapy department for pre-op and post-operative assessments, Dr. York for psychological support, and Karen Russo for nutritional counseling. Explained to patient that all of these support services are completely optional. Discussed that physical therapy is the only service that is recommended pre-op specifically, everything else can be requested at a later time. Patient was given a copy of Dr. Flores's card and my card. Encouraged her to call me if she has any questions or concerns or would like to schedule any additional appointments. Verbalized understanding of all information.

## 2019-01-17 NOTE — PROGRESS NOTES
Breast Cancer  History and Physical  [facility]  Department of Surgery    REFERRING PROVIDER: Janes Landa, DNP  [address] [place] [address]    CHIEF COMPLAINT: right breast cancer    Subjective:      Alina Narayan is a 73 y.o. postmenopausal female referred for evaluation of recently diagnosed lymph nodes x 2 of the right breast. The patient was initially referred for surgical evaluation of an abnormal mammogram first noted 12/2018. Follow-up imaging showed calcifications with 2 axillary lymph nodes, the largest measuring  1.4 x 1 cm in the axillary tail. A ultrasound guided biopsy was performed on right with pathology revealing metastatic mammary carcinoma of the breast.     Patient does routinely do self breast exams.  Patient has not noted a change on breast exam.  Patient denies nipple discharge. Patient endorses a history of previous breast biopsy. Patient has a personal history of breast cancer.    She underwent an ultrasound guided breast biopsy on 1/7 followed by a MRI notable for level 1 right axillary adenopathy without MR evidence of a primary.     - she had a history of a CNB of the left breast 10/2012, w/ IDC, ER/CT (+), HER 2 russell neg, followed by a left lumpectomy for a 7 mm IDC and negative margins and SN, thus she underwent adjuvant XRT with 5 years of ietroazole     - QY MMG - BIRADs 2 in 2015. 2016  - MMG BIRADs 4 - 12/13/2017, with new calcifications and stereotactic bx  - 7/2018 - BIRADs 2   - 12/2018 - BIRADS 4B  - 1/7/19 u/s guided bx from right axillary mass - met mammary carcinoma, also in right axillary node  - 1/15/19 - MRI breast with two axillary nodes demonstrated, largest measurig 1.4 x 1 cm     - negative genetic testing, integrated BRCA 5/2017     Findings at that time from the most recent biopsy were the following:   Estrogen Receptor: neg  Her-2 russell: neg  Progesterone Receptor: neg  Ki 67 - 40%   Lymph node status: pos      GYN History:  Age of menarche was  11. Age of menopause was 53.  Patient denies hormonal therapy. Patient is . Age of first live birth was 19. Patient did breast feed.    FAMILY History:  - mother - breast cancer 62   - father - CRC   - brother - prostate Ca     Social:  - no tobacco or etoh use  - swims with her girlfriends twice weekly at the [employer]  - uses a rolling walker, no shortness of breath or chest pain with walking, mostly limited by knee pain     Past Medical History:   Diagnosis Date    Allergy     seasonal    Anxiety     Arthritis     Breast cancer 10/2012    left breast invasive ductal carcinoma    Colon polyp     Diabetes mellitus     Type 2    Diverticular disease     Diverticulitis     Genetic testing 2017    negative Integrated BRACAnalysis    Hyperlipidemia     Hypertension     Morbid obesity     MITCHELL (obstructive sleep apnea)     Stenosis     Stenosis and insufficiency of lacrimal passages     Thyroid disease      Past Surgical History:   Procedure Laterality Date    BREAST BIOPSY Left 10/1/2012    left breast- invasive ductal carcinoma    BREAST BIOPSY Left 2017    BREAST LUMPECTOMY Left     CARDIAC VALVE REPLACEMENT  2013    CARDIAC VALVE SURGERY  2013    CARPAL TUNNEL RELEASE      Left    CATHETERIZATION, HEART, LEFT Left 2013    Performed by Alphonse Merchant [facility] at [facility] CATH LAB    COLONOSCOPY N/A 2017    Performed by Phani Worley [facility] at [facility] ENDO (4TH FLR)    COLONOSCOPY N/A 2015    Performed by Phani Worley [facility] at [facility] ENDO (4TH FLR)    DILATION AND CURETTAGE OF UTERUS      Endometrial polyps    ENDOMETRIAL ABLATION      Enodmetrial polyps    NIC-TRANSFORAMINAL Left 10/1/2015    [facility] MGT    EYE SURGERY      left lumpectomy      REPLACEMENT, AORTIC VALVE N/A 2013    Performed by Venkat Webb MD at [facility] OR 2ND FLR    SHOULDER SURGERY      SKIN BIOPSY       Current Outpatient  Medications on File Prior to Visit   Medication Sig Dispense Refill    ALPRAZolam (XANAX) 0.25 MG tablet One-two daily as needed for anxiety 40 tablet 0    amoxicillin (AMOXIL) 500 MG capsule TAKE 4 CAPSULES 1 HOUR BEFORE APPOINTMENT  1    ASPIR-LOW 81 mg EC tablet Take 81 mg by mouth once daily.  12    blood sugar diagnostic Strp True Metirx strips or strips and lancets  - pt checks twice daily for uncontrolled glucoses E11.65 200 each 3    carvedilol (COREG) 25 MG tablet .5-1 tablet oral twice daily or as directed. 180 tablet 3    CHOLECALCIFEROL, VITAMIN D3, (VITAMIN D3 ORAL) Take by mouth once daily. 1000 IU      diclofenac sodium (VOLTAREN) 1 % Gel Apply 2 g topically once daily. 100 g 3    gabapentin (NEURONTIN) 300 MG capsule Take 1 capsule (300 mg total) by mouth 2 (two) times daily. 180 capsule 1    HYDROcodone-acetaminophen (NORCO) 5-325 mg per tablet One - two tabs every 6-8 hours as needed for pain 40 tablet 0    lancets (ACCU-CHEK SOFTCLIX LANCETS) Misc TrueTest lancets for meter covered by insurance - pt checks twice daily for uncontrolled glucoses E11.65, 200 each 3    levothyroxine (SYNTHROID) 50 MCG tablet Take 1 tablet (50 mcg total) by mouth every evening. 90 tablet 3    meloxicam (MOBIC) 7.5 MG tablet One tablet twice daily 30 tablet 0    metFORMIN (GLUCOPHAGE) 500 MG tablet Take 1 tablet (500 mg total) by mouth 2 (two) times daily with meals. 180 tablet 3    pravastatin (PRAVACHOL) 20 MG tablet TAKE 1 TABLET ONE TIME DAILY 90 tablet 3    blood-glucose meter kit True Test or meter covered by insurance - pt checks glucose twice daily for uncontrolled glucoses E11.65 1 each 0     No current facility-administered medications on file prior to visit.      Social History     Socioeconomic History    Marital status:      Spouse name: Srinath    Number of children: 2    Years of education: Not on file    Highest education level: Not on file   Social Needs    Financial resource  "strain: Not on file    Food insecurity - worry: Not on file    Food insecurity - inability: Not on file    Transportation needs - medical: Not on file    Transportation needs - non-medical: Not on file   Occupational History    Occupation: Retired   Tobacco Use    Smoking status: Never Smoker    Smokeless tobacco: Never Used   Substance and Sexual Activity    Alcohol use: No     Alcohol/week: 0.0 oz    Drug use: No    Sexual activity: Yes   Other Topics Concern    Are you pregnant or think you may be? No    Breast-feeding No   Social History Narrative    Not on file     Family History   Problem Relation Age of Onset    Breast cancer Mother 62    Colon cancer Father     Cancer Father         Colon CA (cause of death); Prostate CA (no chemo)    Heart disease Father     No Known Problems Sister     No Known Problems Brother     Cancer Maternal Grandfather         Colon CA    No Known Problems Son     Cancer Brother         prostate CA, no chemo, "it was bad"    Anxiety disorder Son     SAL disease Son     Sleep disorder Son     Ovarian cancer Neg Hx     Melanoma Neg Hx     Psoriasis Neg Hx     Lupus Neg Hx     Eczema Neg Hx     Acne Neg Hx         Review of Systems  Review of Systems   Constitutional: Positive for activity change. Negative for fever and unexpected weight change.        Bilateral knee pain (evaluated and not candidate for knee replacements)    Eyes: Negative for pain.   Respiratory: Negative for chest tightness and shortness of breath.    Cardiovascular: Negative for chest pain and palpitations.   Gastrointestinal: Negative for abdominal pain.   Genitourinary: Negative for difficulty urinating.   Musculoskeletal: Positive for back pain, gait problem and joint swelling.   Neurological: Negative for dizziness.   Hematological: Bruises/bleeds easily.        Objective:   PHYSICAL EXAM:  /64   Pulse 80   Temp 97.6 °F (36.4 °C)   Ht 5' 2" (1.575 m)   Wt 117.8 kg (259 " lb 11.2 oz)   BMI 47.50 kg/m²     Physical Exam   Constitutional: She is oriented to person, place, and time. She appears well-developed and well-nourished.   Sitting in a wheelchair   HENT:   Head: Normocephalic and atraumatic.   Eyes: EOM are normal.   Neck: Neck supple.   Cardiovascular: Normal rate.    Pulmonary/Chest: Effort normal. She exhibits no mass, no tenderness and no retraction.   On room air, no palpable LN in the right axilla, no palpable breast masses, ecchymosis noted from the previous biopsy sites   Abdominal: Soft. She exhibits no distension.   Neurological: She is alert and oriented to person, place, and time.   Skin: Skin is warm and dry.     Psychiatric: She has a normal mood and affect.         Radiology review: Images personally reviewed by me in the clinic.     MRI 1/15/19    History:  Two new biopsy-proven level 1 right axillary stef metastases of unknown breast primary.      Films Compared:  Prior images (if available) were compared.     Technique:  Multiplanar multisequence MR images. IV contrast: 20 mL Multihance.     Findings:  Scattered fibroglandular tissue. Minimal background parenchymal enhancement.     Level 1 right axillary adenopathy. Two of these nodes are biopsy-proven stef metastases with associated internal biopsy clips. The largest of the nodes measures 1.4 x 1.0 cm.      No MR evidence for the primary malignancy in either breast. No abnormal skin or nipple enhancement.      Benign post treatment changes in the left breast, including lumpectomy scar.      No left axillary adenopathy. No internal mammary adenopathy.      Impression:  1. No MR evidence for the primary breast malignancy.      2. Level 1 right axillary adenopathy. Two of these nodes are biopsy-proven stef metastases of unknown breast primary. BI-RADS 6: Known malignancy.      BI-RADS Category:   Overall: 6 - Known Biopsy-Proven Malignancy     Surgical Pathology: 1/7/19  Estrogen receptor:  Negative.  Progesterone receptor: Negative.  HER2: Negative.  Ki-67: 40%.    FINAL PATHOLOGIC DIAGNOSIS [id number],ER,PGR,IYO5SGZ,ER,PGR    1. Right axillary mass, core needle biopsy:  - One lymph node positive for metastatic mammary carcinoma (1/1)  Note: Immunohistochemical preparations demonstrate tumor cell positivity for AE1/AE3, WSK, and CAM5.2, which  support the above diagnosis. Prognostic/predictive markers for ER, ND, HER2 and Ki 67 are pending, and will be  reported in an addendum.  2. Right axilla node, core needle biopsy:  - One lymph node positive for metastatic mammary carcinoma (1/1)  Note: Immunohistochemical preparations demonstrate tumor cell positivity for AE1/AE3, WSK, CD31, and CAM5.2,  while negative for CD34 and Factor VIII, which support the above diagnosis. Prognostic/predictive markers for ER,  ND, HER2 and Ki 67 are pending, and will be reported in an addendum.       Assessment:      Alina Narayan is a 73 y.o. postmenopausal female with recently diagnosed carcinoma of the right breast with 2 positive biopsied LN, no breast primary noted, triple neg.      Plan:     - PET tomorrow 1/18   - Evaluation by med onc, Dr. Dwyer already scheduled for 1/21, in order to discuss pre-operative chemotherapy   - per patient request, would not desire radiation but would rather opt for a RIGHT MRM, possible consideration of a bilateral mastectomy (LEFT completion/ppx mastectomy) due to history of requiring an AVR after radiation to the LEFT side s/p lumpectomy  - consented for port placement on the RIGHT 2/1/19   - f/u after the above with Dr. Flores for formal consent and further discussion regarding definitive intervention, likely planned 6 months after chemo initiation (or 1 month after it has been completed)     I have personally taken the history and examined this patient and agree with the resident's note as stated above.  Alina Narayan is a very pleasant 73-year-old  female who presents with  her    for evaluation of a newly diagnosed right-sided breast cancer,   presenting as two positive right axillary nodes and occult primary in the right   breast.    The patient's history dates back to me back to 2012 when she underwent breast   conservation surgery and radiotherapy for an estrogen receptor positive tumor in   2012.  She was on Femara, which she completed a five year course.  She states   her ejection fraction dropped from her radiation and was secondary to radiation   therapy and had dropped down to 50 to 55%.  She now presented with axillary   abnormality on diagnostic imaging.  Needle biopsy revealed two lymph nodes that   were both positive for triple negative breast cancer.  The patient subsequently   underwent MRI of the breast, which revealed no evidence of any primary source   within the right breast.    Certainly, I feel this is consistent with an occult primary since her cancer   back in 2012, was estrogen receptor positive and the two nodes now in the right   axilla, both triple negative and both consistent with metastatic triple negative   breast cancer, two right axillary lymph nodes with occult primary.    The patient has had an aortic valve replacement with a bovine valve by Dr. Webb and she is currently not on any anticoagulation.  We discussed potential   treatments for occult primary of the breast, which would include potentially   axillary lymph node dissection followed by either whole breast radiotherapy to   the right breast for occult primary versus right therapeutic mastectomy.    She is not interested in receiving any radiotherapy preventatively to the right   breast due to the fact that she feels her drop in her ejection fraction was due   to her prior left-sided breast radiotherapy.  She would desire a right modified   radical mastectomy for the occult primary rather than receiving radiotherapy.    We discussed that she may still require adjuvant radiotherapy if  she is found to   have N2 disease or pathologic findings that may warrant postmastectomy   radiotherapy.  She also desires a left completion prophylactic mastectomy to   achieve symmetry.    The patient underwent genetic testing in May 2017, which was negative for any   BRCA or genetic mutation.  At this point, since she presents with two nodes   positive for metastatic triple negative breast cancer, we will obtain a PET CT   whole body fusion scan for staging purposes.  This has been scheduled for   01/18/2019.  The patient will see Dr. Dwyer on Monday 01/21/2019 to discuss   neoadjuvant chemotherapy and I will plan to place a right-sided Port-A-Cath on   Friday 02/01/2019.  Certainly could be placed on the left side, but since she   will ultimately have a mastectomy and the left side has already been radiated,   we will attempt a Port-A-Cath placement on the ipsilateral right side.    Time spent with the patient and her  today was greater than 60 minutes   with greater than 50% of time in counseling.  We will proceed with neoadjuvant   chemotherapy approach, which will likely include multiple drug regimens.  I will   defer choice of medications, especially Adriamycin to Dr. Dwyer given potential   compromise, cardiac function due to her prior radiotherapy and report of   ejection fraction of 50 to 55%.      RLC/HN  dd: 01/20/2019 09:22:02 (CST)  td: 01/20/2019 21:48:47 (CST)  Doc ID   #[id number]  Job ID #[id number]    CC:     Job # [id number].

## 2019-01-17 NOTE — LETTER
January 20, 2019        Janes Landa, ALEX  [address] [facility] [address]             [facility] - General Surgery  [address]  [place] [address]  Phone: [phone]   Patient: Alina Narayan   MR Number: [id number]   YOB: 1945   Date of Visit: 1/17/2019       Dear Dr. Landa:    Thank you for referring Alina Narayan to me for evaluation. Below are the relevant portions of my assessment and plan of care.            If you have questions, please do not hesitate to call me. I look forward to following Alina along with you.    Sincerely,      Gaston Flores MD           CC  Erick Dwyer MD

## 2019-01-18 ENCOUNTER — HOSPITAL ENCOUNTER (OUTPATIENT)
Dept: RADIOLOGY | Facility: HOSPITAL | Age: 74
Discharge: HOME | End: 2019-01-18
Attending: SURGERY
Payer: MEDICARE

## 2019-01-18 DIAGNOSIS — Z17.1 MALIGNANT NEOPLASM OF RIGHT BREAST IN FEMALE, ESTROGEN RECEPTOR NEGATIVE, UNSPECIFIED SITE OF BREAST: ICD-10-CM

## 2019-01-18 DIAGNOSIS — Z17.1 MALIGNANT NEOPLASM OF AXILLARY TAIL OF RIGHT BREAST IN FEMALE, ESTROGEN RECEPTOR NEGATIVE: ICD-10-CM

## 2019-01-18 DIAGNOSIS — C50.611 MALIGNANT NEOPLASM OF AXILLARY TAIL OF RIGHT BREAST IN FEMALE, ESTROGEN RECEPTOR NEGATIVE: ICD-10-CM

## 2019-01-18 DIAGNOSIS — C50.911 MALIGNANT NEOPLASM OF RIGHT BREAST IN FEMALE, ESTROGEN RECEPTOR NEGATIVE, UNSPECIFIED SITE OF BREAST: ICD-10-CM

## 2019-01-18 LAB — POCT GLUCOSE: 159 MG/DL (ref 70–110)

## 2019-01-18 PROCEDURE — 78815 PET IMAGE W/CT SKULL-THIGH: CPT | Mod: 26,HCNC,PI, | Performed by: RADIOLOGY

## 2019-01-18 PROCEDURE — 78815 PET IMAGE W/CT SKULL-THIGH: CPT | Mod: TC,HCNC,PI

## 2019-01-18 PROCEDURE — A9552 F18 FDG: HCPCS | Mod: HCNC

## 2019-01-18 PROCEDURE — 78815 NM PET CT ROUTINE: ICD-10-PCS | Mod: 26,HCNC,PI, | Performed by: RADIOLOGY

## 2019-01-21 ENCOUNTER — OFFICE VISIT (OUTPATIENT)
Dept: HEMATOLOGY/ONCOLOGY | Facility: CLINIC | Age: 74
End: 2019-01-21
Payer: MEDICARE

## 2019-01-21 VITALS
OXYGEN SATURATION: 99 % | HEART RATE: 72 BPM | WEIGHT: 261.94 LBS | BODY MASS INDEX: 48.2 KG/M2 | HEIGHT: 62 IN | TEMPERATURE: 98 F | RESPIRATION RATE: 18 BRPM | SYSTOLIC BLOOD PRESSURE: 137 MMHG | DIASTOLIC BLOOD PRESSURE: 63 MMHG

## 2019-01-21 DIAGNOSIS — Z51.11 ENCOUNTER FOR ANTINEOPLASTIC CHEMOTHERAPY: ICD-10-CM

## 2019-01-21 DIAGNOSIS — Z17.1 MALIGNANT NEOPLASM OF RIGHT BREAST IN FEMALE, ESTROGEN RECEPTOR NEGATIVE, UNSPECIFIED SITE OF BREAST: Primary | ICD-10-CM

## 2019-01-21 DIAGNOSIS — Z85.3 PERSONAL HISTORY OF BREAST CANCER: ICD-10-CM

## 2019-01-21 DIAGNOSIS — C50.911 MALIGNANT NEOPLASM OF RIGHT BREAST IN FEMALE, ESTROGEN RECEPTOR NEGATIVE, UNSPECIFIED SITE OF BREAST: Primary | ICD-10-CM

## 2019-01-21 PROCEDURE — 99499 UNLISTED E&M SERVICE: CPT | Mod: HCNC,S$GLB,, | Performed by: INTERNAL MEDICINE

## 2019-01-21 PROCEDURE — 1101F PT FALLS ASSESS-DOCD LE1/YR: CPT | Mod: CPTII,HCNC,S$GLB, | Performed by: INTERNAL MEDICINE

## 2019-01-21 PROCEDURE — 3075F SYST BP GE 130 - 139MM HG: CPT | Mod: CPTII,HCNC,S$GLB, | Performed by: INTERNAL MEDICINE

## 2019-01-21 PROCEDURE — 1101F PR PT FALLS ASSESS DOC 0-1 FALLS W/OUT INJ PAST YR: ICD-10-PCS | Mod: CPTII,HCNC,S$GLB, | Performed by: INTERNAL MEDICINE

## 2019-01-21 PROCEDURE — 3075F PR MOST RECENT SYSTOLIC BLOOD PRESS GE 130-139MM HG: ICD-10-PCS | Mod: CPTII,HCNC,S$GLB, | Performed by: INTERNAL MEDICINE

## 2019-01-21 PROCEDURE — 99499 RISK ADDL DX/OHS AUDIT: ICD-10-PCS | Mod: HCNC,S$GLB,, | Performed by: INTERNAL MEDICINE

## 2019-01-21 PROCEDURE — 3078F DIAST BP <80 MM HG: CPT | Mod: CPTII,HCNC,S$GLB, | Performed by: INTERNAL MEDICINE

## 2019-01-21 PROCEDURE — 99999 PR PBB SHADOW E&M-EST. PATIENT-LVL IV: CPT | Mod: PBBFAC,HCNC,, | Performed by: INTERNAL MEDICINE

## 2019-01-21 PROCEDURE — 99214 PR OFFICE/OUTPT VISIT, EST, LEVL IV, 30-39 MIN: ICD-10-PCS | Mod: HCNC,S$GLB,, | Performed by: INTERNAL MEDICINE

## 2019-01-21 PROCEDURE — 3078F PR MOST RECENT DIASTOLIC BLOOD PRESSURE < 80 MM HG: ICD-10-PCS | Mod: CPTII,HCNC,S$GLB, | Performed by: INTERNAL MEDICINE

## 2019-01-21 PROCEDURE — 99214 OFFICE O/P EST MOD 30 MIN: CPT | Mod: HCNC,S$GLB,, | Performed by: INTERNAL MEDICINE

## 2019-01-21 PROCEDURE — 99999 PR PBB SHADOW E&M-EST. PATIENT-LVL IV: ICD-10-PCS | Mod: PBBFAC,HCNC,, | Performed by: INTERNAL MEDICINE

## 2019-01-21 NOTE — LETTER
January 21, 2019      Gaston Flores MD  [address]  [facility] [address]           [facility] Hematology Oncology  [address]  [facility] [address]  Phone: [phone]          Patient: Alina Narayan   MR Number: [id number]   YOB: 1945   Date of Visit: 1/21/2019       Dear Dr. Gaston Flores:    Thank you for referring Alina Narayan to me for evaluation. Attached you will find relevant portions of my assessment and plan of care.    If you have questions, please do not hesitate to call me. I look forward to following Alina Narayan along with you.    Sincerely,    Erick Dwyer MD    Enclosure  CC:  No Recipients    If you would like to receive this communication electronically, please contact [email or web address] or [phone] to request more information on [employer] Link access.    For providers and/or their staff who would like to refer a patient to [facility], please contact us through our one-stop-shop provider referral line, [facility], at [phone].    If you feel you have received this communication in error or would no longer like to receive these types of communications, please e-mail [email or web address]

## 2019-01-21 NOTE — PROGRESS NOTES
Patient, Alina Narayan (MRN #[id number]), presented with a recorded BMI of 47.9 kg/m^2 consistent with the definition of morbid obesity (ICD-10 E66.01). The patient's morbid obesity was monitored, evaluated, addressed and/or treated. This addendum to the medical record is made on 01/21/2019.

## 2019-01-22 ENCOUNTER — PATIENT MESSAGE (OUTPATIENT)
Dept: SURGERY | Facility: CLINIC | Age: 74
End: 2019-01-22

## 2019-01-22 ENCOUNTER — DOCUMENTATION ONLY (OUTPATIENT)
Dept: SURGERY | Facility: CLINIC | Age: 74
End: 2019-01-22

## 2019-01-22 DIAGNOSIS — Z51.11 ENCOUNTER FOR ANTINEOPLASTIC CHEMOTHERAPY: Primary | ICD-10-CM

## 2019-01-22 RX ORDER — LIDOCAINE AND PRILOCAINE 25; 25 MG/G; MG/G
CREAM TOPICAL
Qty: 5 G | Refills: 3 | Status: SHIPPED | OUTPATIENT
Start: 2019-01-22 | End: 2020-01-29

## 2019-01-22 RX ORDER — ONDANSETRON 4 MG/1
4 TABLET, ORALLY DISINTEGRATING ORAL EVERY 6 HOURS PRN
Qty: 20 TABLET | Refills: 3 | Status: SHIPPED | OUTPATIENT
Start: 2019-01-22 | End: 2019-01-30

## 2019-01-22 NOTE — TELEPHONE ENCOUNTER
----- Message from Kailee Nunez sent at 1/22/2019  2:27 PM CST -----  Contact: Pt   Pt was expecting a prescription for numbing cream, since her port will be put in 2/1:  The [facility] location has shut down. Pt's preferred pickup pharmacy is.    - [facility]   [address]  [place] [address] Ph: [phone]

## 2019-01-22 NOTE — PROGRESS NOTES
Genetic counseling through [facility] was initiated around 12/28/2018 to determine whether further genetic testing was indicated for this pt given only BRCA1/2 were tested in 2017.    Received fax from Pretty Marcelo, genetic counselor with [employer], on 1/18/2019.  Reviewed SILVINA Marcelo's recommendations for this pt of mine who is now being seen by breast surgeon Dr. Flores.  Per the report, no further genetic testing is indicated at this time if pt's previous BRCA1/2 testing was normal (which it was), and pt has chosen not to proceed with genetic testing as it is not clinically indicated but is to contact [facility] if anyone else in the family develops cancer or pursues genetic testing.      BRCA1/2 test results were requested by [facility] and will be sent to [facility].  Received permission from pt over the phone on 1/22/2019 at 12:40PM to send these to [facility].    Pt has been messaged regarding [facility] counseling session.  See encounter.    See full report, which will be scanned into Media tab.  Dr. Flores has been notified of above.

## 2019-01-23 NOTE — PROGRESS NOTES
Subjective:      Patient ID: Alina Narayan is a 73 y.o. female.    Chief Complaint: Diabetes Mellitus (right ankle pain (PCP Dr Dunn)); Diabetic Foot Exam; Nail Care; and Foot Pain    Alina is a 73 y.o. female who presents to the podiatry clinic  with complaint of  right foot pain. Complains of right posterior heel pain. Onset of the symptoms was several months ago. Precipitating event: none known. Current symptoms include: ability to bear weight, but with some pain. Aggravating factors: any weight bearing. Symptoms have progressed to a point and plateaued. Patient has had prior foot problems. Evaluation to date: none. Treatment to date: none. Patients rates pain 4/10 on pain scale.        Review of Systems   Constitution: Negative for chills, diaphoresis, fever and weakness.   Cardiovascular: Negative for claudication, cyanosis, leg swelling and syncope.   Respiratory: Negative for cough and shortness of breath.    Skin: Positive for color change, nail changes and suspicious lesions.   Musculoskeletal: Positive for joint pain and myalgias. Negative for falls, muscle cramps and muscle weakness.   Gastrointestinal: Negative for diarrhea, nausea and vomiting.   Neurological: Negative for disturbances in coordination, numbness, paresthesias, sensory change and tremors.   Psychiatric/Behavioral: Negative for altered mental status.           Objective:      Physical Exam   Constitutional: She appears well-developed. She is cooperative.   Oriented to time, place, and person.   Cardiovascular:   DP and PT pulses are palpable bilaterally. 3 sec capillary refill time and toes and feet are warm to touch proximally .  There is  hair growth on the feet and toes b/l. There is no edema b/l. No spider veins or varicosities present b/l.      Musculoskeletal:   Equinus noted b/l ankles with < 10 deg DF noted. MMT 5/5 in DF/PF/Inv/Ev resistance with no reproduction of pain in any direction. Passive range of motion of ankle and  pedal joints is painless b/l.    Decreased right ankle joint ROM, pain with palpation of posterior 1/3 calcaneus at region of Achilles tendon insertion. No pain with ankle joint ROM      Feet:   Right Foot:   Skin Integrity: Negative for callus or dry skin.   Left Foot:   Skin Integrity: Negative for callus or dry skin.   Lymphadenopathy:   Negative lymphadenopathy bilateral popliteal fossa and tarsal tunnel.   Neurological: She is alert.   Light touch, proprioception, and sharp/dull sensation are all intact bilaterally. Protective threshold with the [place]Wienstein monofilament is intact bilaterally.    Skin:   No open lesions, lacerations or wounds noted.Interdigital spaces clean, dry and intact b/l. No erythema noted to b/l foot.    Toenails 1-5 bilaterally are elongated by 2-3 mm, thickened by 2-3 mm, discolored/yellowed, dystrophic, brittle with subungual debris.     Psychiatric: She has a normal mood and affect.             Assessment:       Encounter Diagnoses   Name Primary?    Achilles tendinitis, unspecified laterality Yes    Right foot pain     Onychomycosis due to dermatophyte          Plan:       Alina was seen today for diabetes mellitus, diabetic foot exam, nail care and foot pain.    Diagnoses and all orders for this visit:    Achilles tendinitis, unspecified laterality    Right foot pain    Onychomycosis due to dermatophyte    Other orders  -     diclofenac sodium (VOLTAREN) 1 % Gel; Apply 2 g topically once daily.      I counseled the patient on her conditions, their implications and medical management.    Discussed conservative treatment with shoes of adequate dimensions, material, and style to alleviate symptoms and delay or prevent surgical intervention.    - Patient will stretch the tendo achilles complex three times daily as demonstrated in the office to lengthen heel cord and increase motion at ankle offloading the load on the forefoot and MTPJ..  Literature was dispensed illustrating  proper stretching technique.    Rx Voltaren gel to be applied to affected area up to 3-4 x daily as needed for pain    RICE. The patient is advised to apply ice or cold packs intermittently as needed to relieve pain. 20 minute increments, protect skin with towel.      Nails 1-5 B/L feet trimmed. Patient is aware that routine trimming of nails or calluses will not be covered service per insurance and he will fall under Proc B if this service is desired. Patient verbalized understanding of this. RTC as needed for Proc B or any other pedal complaint.     Heel lifts dispensed.     F/u 6 weeks right heel pain.     Melany Sotomayor DPM

## 2019-01-24 ENCOUNTER — TELEPHONE (OUTPATIENT)
Dept: HEMATOLOGY/ONCOLOGY | Facility: CLINIC | Age: 74
End: 2019-01-24

## 2019-01-24 NOTE — TELEPHONE ENCOUNTER
Left voice mail to call the office to schedule infusion. Scheduled for 2/4?  Number was provided.      ----- Message from Britney Stacy RN sent at 1/23/2019  3:56 PM CST -----  I think that was she needs labs the day she starts her therapy.     ----- Message -----  From: Erick Dwyer MD  Sent: 1/23/2019   3:51 PM  To: Britney Stacy RN    She needs labs today she starts her therapy  ----- Message -----  From: Britney Stacy RN  Sent: 1/23/2019  12:56 PM  To: Erick Dwyer MD        ----- Message -----  From: Alicia Marion  Sent: 1/23/2019  12:27 PM  To: Reymundo Erick T Staff    Chemo is approved. Does she need labs or f/u before starting ?    Message   Received: Today   Message Contents   MD Alicia Epstein       Start weekly Taxol with GAVIN MOON from [facility]

## 2019-01-24 NOTE — TELEPHONE ENCOUNTER
Spoke to patient about scheduling her chemo. We scheduled her for the chemo class on 1/28. The chemo to start on 2/11. Sent message to nurse patient had a few questions about medications.        ----- Message from Kailee Nunez sent at 1/24/2019  1:35 PM CST -----  Contact: Pt  Pt missed your call to schedule. I let her know that you mentioned 2/4 for her infusion, but she stated that her port was scheduled to be put in on 2/1. She has questions. Please contact her when you can at [phone]

## 2019-01-24 NOTE — TELEPHONE ENCOUNTER
Call put in to patient to discuss chemotherapy she will be receiving. Pt did not answer. Message left to return call to clinic. Will reach out to patient again about this matter.         ----- Message from Ally Cutler sent at 1/24/2019  2:32 PM CST -----  Regarding: Questions  Patient would like to speak to nurse about starting her chemo. She states she did not get any information on what medications she can or can not take. She is scheduled to get her port on Friday 2/1. I scheduled her for chemo class on Monday 1/28. She is starting chemo on 2/11. Please call patient.    Thanks  [place]ia

## 2019-01-28 ENCOUNTER — CLINICAL SUPPORT (OUTPATIENT)
Dept: HEMATOLOGY/ONCOLOGY | Facility: CLINIC | Age: 74
End: 2019-01-28
Payer: MEDICARE

## 2019-01-28 NOTE — PROGRESS NOTES
Patient completed chemo class:  Viewed video presentation, received binder that has medication information specific to the patient, participated in Q&A.    Speakers included the Oncology Dietician (Karen).   Navigator had one-on-one discussion of patient's treatment regimen.  The group was oriented to the Infusion Center.

## 2019-01-31 ENCOUNTER — TELEPHONE (OUTPATIENT)
Dept: SURGERY | Facility: CLINIC | Age: 74
End: 2019-01-31

## 2019-01-31 NOTE — TELEPHONE ENCOUNTER
Spoke with pt regarding surgery, pt advised to arrive to [facility] at 0530 for 0730 surgery, pt verbalized understanding, pre-op education reinforced, all questions answered at this time, pt given reassurance

## 2019-01-31 NOTE — TELEPHONE ENCOUNTER
Left VM with my direct contact information, patient advised to give a return call with any questions or concerns regarding arrival time for surgery tomorrow 2/1/19 for 0573

## 2019-01-31 NOTE — TELEPHONE ENCOUNTER
----- Message from Cheryl Mccain sent at 1/31/2019 11:47 AM CST -----  Contact: Patient   Patient Returning Call from [facility]    Who Left Message for Patient: Swati     Communication Preference: [phone]

## 2019-02-01 ENCOUNTER — ANESTHESIA EVENT (OUTPATIENT)
Dept: SURGERY | Facility: HOSPITAL | Age: 74
End: 2019-02-01
Payer: MEDICARE

## 2019-02-01 ENCOUNTER — HOSPITAL ENCOUNTER (OUTPATIENT)
Facility: HOSPITAL | Age: 74
Discharge: HOME | End: 2019-02-01
Attending: SURGERY | Admitting: SURGERY
Payer: MEDICARE

## 2019-02-01 ENCOUNTER — ANESTHESIA (OUTPATIENT)
Dept: SURGERY | Facility: HOSPITAL | Age: 74
End: 2019-02-01
Payer: MEDICARE

## 2019-02-01 VITALS
HEART RATE: 71 BPM | HEIGHT: 62 IN | DIASTOLIC BLOOD PRESSURE: 50 MMHG | OXYGEN SATURATION: 98 % | BODY MASS INDEX: 46.38 KG/M2 | RESPIRATION RATE: 20 BRPM | WEIGHT: 252 LBS | TEMPERATURE: 97 F | SYSTOLIC BLOOD PRESSURE: 94 MMHG

## 2019-02-01 DIAGNOSIS — C50.919 BREAST CANCER: Primary | ICD-10-CM

## 2019-02-01 LAB
POCT GLUCOSE: 162 MG/DL (ref 70–110)
POCT GLUCOSE: 167 MG/DL (ref 70–110)

## 2019-02-01 PROCEDURE — 25000003 PHARM REV CODE 250: Mod: HCNC | Performed by: NURSE ANESTHETIST, CERTIFIED REGISTERED

## 2019-02-01 PROCEDURE — 82962 GLUCOSE BLOOD TEST: CPT | Mod: HCNC,59 | Performed by: SURGERY

## 2019-02-01 PROCEDURE — 25000003 PHARM REV CODE 250: Mod: HCNC

## 2019-02-01 PROCEDURE — 36000706: Mod: HCNC | Performed by: SURGERY

## 2019-02-01 PROCEDURE — 94761 N-INVAS EAR/PLS OXIMETRY MLT: CPT | Mod: HCNC

## 2019-02-01 PROCEDURE — 71000015 HC POSTOP RECOV 1ST HR: Mod: HCNC | Performed by: SURGERY

## 2019-02-01 PROCEDURE — 77001 CHG FLUOROGUIDE CNTRL VEN ACCESS,PLACE,REPLACE,REMOVE: ICD-10-PCS | Mod: 26,HCNC,, | Performed by: SURGERY

## 2019-02-01 PROCEDURE — 25000003 PHARM REV CODE 250: Mod: HCNC | Performed by: ANESTHESIOLOGY

## 2019-02-01 PROCEDURE — 37000009 HC ANESTHESIA EA ADD 15 MINS: Mod: HCNC | Performed by: SURGERY

## 2019-02-01 PROCEDURE — 82962 GLUCOSE BLOOD TEST: CPT | Mod: HCNC | Performed by: SURGERY

## 2019-02-01 PROCEDURE — 27200651 HC AIRWAY, LMA: Mod: HCNC | Performed by: NURSE ANESTHETIST, CERTIFIED REGISTERED

## 2019-02-01 PROCEDURE — 36561 PR INSERT TUNNELED CV CATH WITH PORT: ICD-10-PCS | Mod: HCNC,RT,, | Performed by: SURGERY

## 2019-02-01 PROCEDURE — 63600175 PHARM REV CODE 636 W HCPCS: Mod: HCNC | Performed by: SURGERY

## 2019-02-01 PROCEDURE — 25000003 PHARM REV CODE 250: Mod: HCNC | Performed by: SURGERY

## 2019-02-01 PROCEDURE — 63600175 PHARM REV CODE 636 W HCPCS: Mod: HCNC | Performed by: ANESTHESIOLOGY

## 2019-02-01 PROCEDURE — 36561 INSERT TUNNELED CV CATH: CPT | Mod: HCNC,RT,, | Performed by: SURGERY

## 2019-02-01 PROCEDURE — D9220A PRA ANESTHESIA: ICD-10-PCS | Mod: HCNC,ANES,, | Performed by: ANESTHESIOLOGY

## 2019-02-01 PROCEDURE — 71000033 HC RECOVERY, INTIAL HOUR: Mod: HCNC | Performed by: SURGERY

## 2019-02-01 PROCEDURE — 63600175 PHARM REV CODE 636 W HCPCS: Mod: HCNC | Performed by: NURSE ANESTHETIST, CERTIFIED REGISTERED

## 2019-02-01 PROCEDURE — 37000008 HC ANESTHESIA 1ST 15 MINUTES: Mod: HCNC | Performed by: SURGERY

## 2019-02-01 PROCEDURE — D9220A PRA ANESTHESIA: Mod: HCNC,ANES,, | Performed by: ANESTHESIOLOGY

## 2019-02-01 PROCEDURE — 71000039 HC RECOVERY, EACH ADD'L HOUR: Mod: HCNC | Performed by: SURGERY

## 2019-02-01 PROCEDURE — C1788 PORT, INDWELLING, IMP: HCPCS | Mod: HCNC | Performed by: SURGERY

## 2019-02-01 PROCEDURE — 63600175 PHARM REV CODE 636 W HCPCS: Mod: HCNC | Performed by: STUDENT IN AN ORGANIZED HEALTH CARE EDUCATION/TRAINING PROGRAM

## 2019-02-01 PROCEDURE — 77001 FLUOROGUIDE FOR VEIN DEVICE: CPT | Mod: 26,HCNC,, | Performed by: SURGERY

## 2019-02-01 PROCEDURE — S0020 INJECTION, BUPIVICAINE HYDRO: HCPCS | Mod: HCNC | Performed by: SURGERY

## 2019-02-01 PROCEDURE — 36000707: Mod: HCNC | Performed by: SURGERY

## 2019-02-01 PROCEDURE — 63600175 PHARM REV CODE 636 W HCPCS: Mod: HCNC

## 2019-02-01 DEVICE — KIT POWERPORT SINGLE 8FR: Type: IMPLANTABLE DEVICE | Site: CHEST | Status: FUNCTIONAL

## 2019-02-01 RX ORDER — FENTANYL CITRATE 50 UG/ML
INJECTION, SOLUTION INTRAMUSCULAR; INTRAVENOUS
Status: COMPLETED
Start: 2019-02-01 | End: 2019-02-01

## 2019-02-01 RX ORDER — ACETAMINOPHEN 500 MG
TABLET ORAL
Status: COMPLETED
Start: 2019-02-01 | End: 2019-02-01

## 2019-02-01 RX ORDER — ACETAMINOPHEN 500 MG
1000 TABLET ORAL ONCE
Status: COMPLETED | OUTPATIENT
Start: 2019-02-01 | End: 2019-02-01

## 2019-02-01 RX ORDER — OXYCODONE HYDROCHLORIDE 5 MG/1
TABLET ORAL
Status: COMPLETED
Start: 2019-02-01 | End: 2019-02-01

## 2019-02-01 RX ORDER — FENTANYL CITRATE 50 UG/ML
25 INJECTION, SOLUTION INTRAMUSCULAR; INTRAVENOUS EVERY 5 MIN PRN
Status: COMPLETED | OUTPATIENT
Start: 2019-02-01 | End: 2019-02-01

## 2019-02-01 RX ORDER — OXYCODONE HYDROCHLORIDE 5 MG/1
5 TABLET ORAL ONCE
Status: DISCONTINUED | OUTPATIENT
Start: 2019-02-01 | End: 2019-02-01 | Stop reason: HOSPADM

## 2019-02-01 RX ORDER — SODIUM CHLORIDE 9 MG/ML
INJECTION, SOLUTION INTRAVENOUS CONTINUOUS
Status: DISCONTINUED | OUTPATIENT
Start: 2019-02-01 | End: 2019-02-01 | Stop reason: HOSPADM

## 2019-02-01 RX ORDER — LIDOCAINE HCL/PF 100 MG/5ML
SYRINGE (ML) INTRAVENOUS
Status: DISCONTINUED | OUTPATIENT
Start: 2019-02-01 | End: 2019-02-01

## 2019-02-01 RX ORDER — ONDANSETRON 2 MG/ML
4 INJECTION INTRAMUSCULAR; INTRAVENOUS ONCE AS NEEDED
Status: DISCONTINUED | OUTPATIENT
Start: 2019-02-01 | End: 2019-02-01 | Stop reason: HOSPADM

## 2019-02-01 RX ORDER — GLYCOPYRROLATE 0.2 MG/ML
INJECTION INTRAMUSCULAR; INTRAVENOUS
Status: DISCONTINUED | OUTPATIENT
Start: 2019-02-01 | End: 2019-02-01

## 2019-02-01 RX ORDER — MIDAZOLAM HYDROCHLORIDE 1 MG/ML
INJECTION, SOLUTION INTRAMUSCULAR; INTRAVENOUS
Status: DISCONTINUED | OUTPATIENT
Start: 2019-02-01 | End: 2019-02-01

## 2019-02-01 RX ORDER — SODIUM CHLORIDE 0.9 % (FLUSH) 0.9 %
3 SYRINGE (ML) INJECTION
Status: DISCONTINUED | OUTPATIENT
Start: 2019-02-01 | End: 2019-02-01 | Stop reason: HOSPADM

## 2019-02-01 RX ORDER — KETOROLAC TROMETHAMINE 30 MG/ML
15 INJECTION, SOLUTION INTRAMUSCULAR; INTRAVENOUS ONCE
Status: COMPLETED | OUTPATIENT
Start: 2019-02-01 | End: 2019-02-01

## 2019-02-01 RX ORDER — PROPOFOL 10 MG/ML
VIAL (ML) INTRAVENOUS CONTINUOUS PRN
Status: DISCONTINUED | OUTPATIENT
Start: 2019-02-01 | End: 2019-02-01

## 2019-02-01 RX ORDER — PROPOFOL 10 MG/ML
VIAL (ML) INTRAVENOUS
Status: DISCONTINUED | OUTPATIENT
Start: 2019-02-01 | End: 2019-02-01

## 2019-02-01 RX ORDER — ONDANSETRON 2 MG/ML
INJECTION INTRAMUSCULAR; INTRAVENOUS
Status: DISCONTINUED | OUTPATIENT
Start: 2019-02-01 | End: 2019-02-01

## 2019-02-01 RX ORDER — LIDOCAINE HYDROCHLORIDE 10 MG/ML
1 INJECTION, SOLUTION EPIDURAL; INFILTRATION; INTRACAUDAL; PERINEURAL ONCE
Status: DISCONTINUED | OUTPATIENT
Start: 2019-02-01 | End: 2019-02-01 | Stop reason: HOSPADM

## 2019-02-01 RX ORDER — HEPARIN SODIUM (PORCINE) LOCK FLUSH IV SOLN 100 UNIT/ML 100 UNIT/ML
SOLUTION INTRAVENOUS
Status: DISCONTINUED | OUTPATIENT
Start: 2019-02-01 | End: 2019-02-01 | Stop reason: HOSPADM

## 2019-02-01 RX ORDER — OXYCODONE HYDROCHLORIDE 5 MG/1
5 TABLET ORAL ONCE AS NEEDED
Status: COMPLETED | OUTPATIENT
Start: 2019-02-01 | End: 2019-02-01

## 2019-02-01 RX ORDER — KETOROLAC TROMETHAMINE 30 MG/ML
INJECTION, SOLUTION INTRAMUSCULAR; INTRAVENOUS
Status: COMPLETED
Start: 2019-02-01 | End: 2019-02-01

## 2019-02-01 RX ORDER — CEFAZOLIN SODIUM 1 G/3ML
2 INJECTION, POWDER, FOR SOLUTION INTRAMUSCULAR; INTRAVENOUS
Status: COMPLETED | OUTPATIENT
Start: 2019-02-01 | End: 2019-02-01

## 2019-02-01 RX ORDER — DEXTROMETHORPHAN HYDROBROMIDE, GUAIFENESIN 5; 100 MG/5ML; MG/5ML
650 LIQUID ORAL EVERY 8 HOURS PRN
Refills: 0 | COMMUNITY
Start: 2019-02-01 | End: 2019-05-08

## 2019-02-01 RX ORDER — BUPIVACAINE HYDROCHLORIDE 5 MG/ML
INJECTION, SOLUTION EPIDURAL; INTRACAUDAL
Status: DISCONTINUED | OUTPATIENT
Start: 2019-02-01 | End: 2019-02-01 | Stop reason: HOSPADM

## 2019-02-01 RX ORDER — LIDOCAINE HYDROCHLORIDE 10 MG/ML
1 INJECTION, SOLUTION EPIDURAL; INFILTRATION; INTRACAUDAL; PERINEURAL ONCE
Status: COMPLETED | OUTPATIENT
Start: 2019-02-01 | End: 2019-02-01

## 2019-02-01 RX ADMIN — PROPOFOL 70 MG: 10 INJECTION, EMULSION INTRAVENOUS at 07:02

## 2019-02-01 RX ADMIN — CEFAZOLIN 2 G: 330 INJECTION, POWDER, FOR SOLUTION INTRAMUSCULAR; INTRAVENOUS at 07:02

## 2019-02-01 RX ADMIN — FENTANYL CITRATE 25 MCG: 50 INJECTION, SOLUTION INTRAMUSCULAR; INTRAVENOUS at 09:02

## 2019-02-01 RX ADMIN — ONDANSETRON 4 MG: 2 INJECTION INTRAMUSCULAR; INTRAVENOUS at 08:02

## 2019-02-01 RX ADMIN — KETOROLAC TROMETHAMINE 15 MG: 30 INJECTION, SOLUTION INTRAMUSCULAR at 10:02

## 2019-02-01 RX ADMIN — OXYCODONE HYDROCHLORIDE 5 MG: 5 TABLET ORAL at 09:02

## 2019-02-01 RX ADMIN — Medication 1000 MG: at 10:02

## 2019-02-01 RX ADMIN — KETOROLAC TROMETHAMINE 15 MG: 30 INJECTION, SOLUTION INTRAMUSCULAR; INTRAVENOUS at 10:02

## 2019-02-01 RX ADMIN — LIDOCAINE HYDROCHLORIDE 80 MG: 20 INJECTION, SOLUTION INTRAVENOUS at 07:02

## 2019-02-01 RX ADMIN — GLYCOPYRROLATE 0.2 MG: 0.2 INJECTION, SOLUTION INTRAMUSCULAR; INTRAVENOUS at 06:02

## 2019-02-01 RX ADMIN — MIDAZOLAM HYDROCHLORIDE 2 MG: 1 INJECTION, SOLUTION INTRAMUSCULAR; INTRAVENOUS at 06:02

## 2019-02-01 RX ADMIN — SODIUM CHLORIDE, SODIUM GLUCONATE, SODIUM ACETATE, POTASSIUM CHLORIDE, MAGNESIUM CHLORIDE, SODIUM PHOSPHATE, DIBASIC, AND POTASSIUM PHOSPHATE: .53; .5; .37; .037; .03; .012; .00082 INJECTION, SOLUTION INTRAVENOUS at 07:02

## 2019-02-01 RX ADMIN — SODIUM CHLORIDE: 0.9 INJECTION, SOLUTION INTRAVENOUS at 06:02

## 2019-02-01 RX ADMIN — ACETAMINOPHEN 1000 MG: 500 TABLET ORAL at 10:02

## 2019-02-01 RX ADMIN — LIDOCAINE HYDROCHLORIDE 10 MG: 10 INJECTION, SOLUTION EPIDURAL; INFILTRATION; INTRACAUDAL; PERINEURAL at 06:02

## 2019-02-01 RX ADMIN — PROPOFOL 200 MCG/KG/MIN: 10 INJECTION, EMULSION INTRAVENOUS at 07:02

## 2019-02-01 NOTE — INTERVAL H&P NOTE
The patient has been examined and the H&P has been reviewed:    I concur with the findings and no changes have occurred since H&P was written. Patient marked on the RIGHT, consents verified in media tab    Anesthesia/Surgery risks, benefits and alternative options discussed and understood by patient/family.          Active Hospital Problems    Diagnosis  POA    Breast cancer [C50.919]  Yes      Resolved Hospital Problems   No resolved problems to display.

## 2019-02-01 NOTE — BRIEF OP NOTE
[facility]  Brief Operative Note     SUMMARY     Surgery Date: 2/1/2019     Surgeon(s) and Role:     * Gaston Flores MD - Primary  Srinath Humphreys MD - res       Pre-op Diagnosis:  Malignant neoplasm of right female breast, unspecified estrogen receptor status, unspecified site of breast [C50.911]    Post-op Diagnosis:  Post-Op Diagnosis Codes:     * Malignant neoplasm of right female breast, unspecified estrogen receptor status, unspecified site of breast [C50.911]    Procedure(s) (LRB):  [id number] (Right)    Anesthesia: General    Description of the findings of the procedure: RIGHT post placement, verified placement intra-op fluoro     Findings/Key Components: see op note for further details     Estimated Blood Loss: * No values recorded between 2/1/2019  7:39 AM and 2/1/2019  8:03 AM *         Specimens:   Specimen (12h ago, onward)    None          Discharge Note    SUMMARY     Admit Date: 2/1/2019    Discharge Date and Time:  02/01/2019 8:03 AM    Hospital Course (synopsis of major diagnoses, care, treatment, and services provided during the course of the hospital stay): S/p RIGHT port placement, under fluoro guidance, monitored in PACU and discharged home in stable condition after additional verification via CXR in PACU.     Final Diagnosis: Post-Op Diagnosis Codes:     * Malignant neoplasm of right female breast, unspecified estrogen receptor status, unspecified site of breast [C50.911]    Disposition: Home or Self Care    Follow Up/Patient Instructions:     Medications:  Reconciled Home Medications:      Medication List      START taking these medications    acetaminophen 650 MG Tbsr  Commonly known as:  TYLENOL  Take 1 tablet (650 mg total) by mouth every 8 (eight) hours as needed (pain).        CHANGE how you take these medications    metFORMIN 500 MG tablet  Commonly known as:  GLUCOPHAGE  Take 1 tablet (500 mg total) by mouth 2 (two) times daily with meals.  What changed:     · how much to take  · when to take this     pravastatin 20 MG tablet  Commonly known as:  PRAVACHOL  TAKE 1 TABLET ONE TIME DAILY  What changed:    · how much to take  · how to take this  · when to take this  · additional instructions        CONTINUE taking these medications    ALPRAZolam 0.25 MG tablet  Commonly known as:  XANAX  One-two daily as needed for anxiety     amoxicillin 500 MG capsule  Commonly known as:  AMOXIL  TAKE 4 CAPSULES 1 HOUR BEFORE APPOINTMENT     ASPIR-LOW 81 MG EC tablet  Generic drug:  aspirin  Take 81 mg by mouth once daily.     blood sugar diagnostic Strp  True Metirx strips or strips and lancets  - pt checks twice daily for uncontrolled glucoses E11.65     blood-glucose meter kit  True Test or meter covered by insurance - pt checks glucose twice daily for uncontrolled glucoses E11.65     carvedilol 25 MG tablet  Commonly known as:  COREG  .5-1 tablet oral twice daily or as directed.     diclofenac sodium 1 % Gel  Commonly known as:  VOLTAREN  Apply 2 g topically once daily.     gabapentin 300 MG capsule  Commonly known as:  NEURONTIN  Take 1 capsule (300 mg total) by mouth 2 (two) times daily.     lancets Misc  Commonly known as:  ACCU-CHEK SOFTCLIX LANCETS  TrueTest lancets for meter covered by insurance - pt checks twice daily for uncontrolled glucoses E11.65,     levothyroxine 50 MCG tablet  Commonly known as:  SYNTHROID  Take 1 tablet (50 mcg total) by mouth every evening.     lidocaine-prilocaine cream  Commonly known as:  EMLA  Apply topically as needed. Apply topically as needed 45 minutes prior to port access.     VITAMIN D3 ORAL  Take by mouth once daily. 1000 IU        STOP taking these medications    HYDROcodone-acetaminophen 5-325 mg per tablet  Commonly known as:  NORCO          Discharge Procedure Orders   Diet Adult Regular     Notify your health care provider if you experience any of the following:  temperature >100.4     Notify your health care provider if you  experience any of the following:  persistent nausea and vomiting or diarrhea     Notify your health care provider if you experience any of the following:  severe uncontrolled pain     Notify your health care provider if you experience any of the following:  redness, tenderness, or signs of infection (pain, swelling, redness, odor or green/yellow discharge around incision site)     Notify your health care provider if you experience any of the following:  worsening rash     Notify your health care provider if you experience any of the following:  difficulty breathing or increased cough     No dressing needed     Activity as tolerated     Follow-up Information     Erick Dwyer MD In 2 weeks.    Specialties:  Hematology and Oncology, Hematology  Why:  for initiation of chemo  Contact information:  [address] [facility] [address]  [phone]             Follow up In 1 week.    Why:  starting 2/4

## 2019-02-01 NOTE — BRIEF OP NOTE
[facility]  Brief Operative Note     SUMMARY     Surgery Date: 2/1/2019     Surgeon(s) and Role:     * Gaston Flores MD - Primary     * Srinath Humphreys MD - Resident - Assisting      Pre-op Diagnosis:  Malignant neoplasm of right female breast, unspecified estrogen receptor status, unspecified site of breast [C50.911]    Post-op Diagnosis:  Post-Op Diagnosis Codes:     * Malignant neoplasm of right female breast, unspecified estrogen receptor status, unspecified site of breast [C50.911]    Procedure(s) (LRB):  [id number] (Right)    Anesthesia: General    Description of the findings of the procedure: Right SCV port-a-cath placement     Findings/Key Components: As above     Estimated Blood Loss: * No values recorded between 2/1/2019  7:39 AM and 2/1/2019  8:15 AM *         Specimens:   Specimen (12h ago, onward)    None          Discharge Note    SUMMARY     Admit Date: 2/1/2019    Discharge Date and Time:  02/01/2019 8:15 AM    Hospital Course (synopsis of major diagnoses, care, treatment, and services provided during the course of the hospital stay): Successful outpatient procedure.      Final Diagnosis: Post-Op Diagnosis Codes:     * Malignant neoplasm of right female breast, unspecified estrogen receptor status, unspecified site of breast [C50.911]    Disposition: Home or Self Care    Follow Up/Patient Instructions:     Medications:  Reconciled Home Medications:      Medication List      START taking these medications    acetaminophen 650 MG Tbsr  Commonly known as:  TYLENOL  Take 1 tablet (650 mg total) by mouth every 8 (eight) hours as needed (pain).        CHANGE how you take these medications    metFORMIN 500 MG tablet  Commonly known as:  GLUCOPHAGE  Take 1 tablet (500 mg total) by mouth 2 (two) times daily with meals.  What changed:    · how much to take  · when to take this     pravastatin 20 MG tablet  Commonly known as:  PRAVACHOL  TAKE 1 TABLET ONE TIME DAILY  What  changed:    · how much to take  · how to take this  · when to take this  · additional instructions        CONTINUE taking these medications    ALPRAZolam 0.25 MG tablet  Commonly known as:  XANAX  One-two daily as needed for anxiety     amoxicillin 500 MG capsule  Commonly known as:  AMOXIL  TAKE 4 CAPSULES 1 HOUR BEFORE APPOINTMENT     ASPIR-LOW 81 MG EC tablet  Generic drug:  aspirin  Take 81 mg by mouth once daily.     blood sugar diagnostic Strp  True Metirx strips or strips and lancets  - pt checks twice daily for uncontrolled glucoses E11.65     blood-glucose meter kit  True Test or meter covered by insurance - pt checks glucose twice daily for uncontrolled glucoses E11.65     carvedilol 25 MG tablet  Commonly known as:  COREG  .5-1 tablet oral twice daily or as directed.     diclofenac sodium 1 % Gel  Commonly known as:  VOLTAREN  Apply 2 g topically once daily.     gabapentin 300 MG capsule  Commonly known as:  NEURONTIN  Take 1 capsule (300 mg total) by mouth 2 (two) times daily.     lancets Misc  Commonly known as:  ACCU-CHEK SOFTCLIX LANCETS  TrueTest lancets for meter covered by insurance - pt checks twice daily for uncontrolled glucoses E11.65,     levothyroxine 50 MCG tablet  Commonly known as:  SYNTHROID  Take 1 tablet (50 mcg total) by mouth every evening.     lidocaine-prilocaine cream  Commonly known as:  EMLA  Apply topically as needed. Apply topically as needed 45 minutes prior to port access.     VITAMIN D3 ORAL  Take by mouth once daily. 1000 IU        STOP taking these medications    HYDROcodone-acetaminophen 5-325 mg per tablet  Commonly known as:  NORCO          Discharge Procedure Orders   Diet Adult Regular     Notify your health care provider if you experience any of the following:  temperature >100.4     Notify your health care provider if you experience any of the following:  persistent nausea and vomiting or diarrhea     Notify your health care provider if you experience any of the  following:  severe uncontrolled pain     Notify your health care provider if you experience any of the following:  redness, tenderness, or signs of infection (pain, swelling, redness, odor or green/yellow discharge around incision site)     Notify your health care provider if you experience any of the following:  worsening rash     Notify your health care provider if you experience any of the following:  difficulty breathing or increased cough     No dressing needed     Activity as tolerated     Follow-up Information     Erick Dwyer MD In 2 weeks.    Specialties:  Hematology and Oncology, Hematology  Why:  for initiation of chemo  Contact information:  [address]  [facility] [address]  [phone]             Follow up In 1 week.    Why:  starting 2/4

## 2019-02-01 NOTE — OP NOTE
DATE OF PROCEDURE:  02/01/2019.    PRIMARY SURGEON:  Gaston Flores M.D.    ASSISTANT:  Srinath Humphreys M.D. (RES).    PREOPERATIVE DIAGNOSIS:  Invasive infiltrating triple-negative breast carcinoma   of the right breast with need for preoperative primary neoadjuvant chemotherapy.    POSTOPERATIVE DIAGNOSIS:  Invasive infiltrating triple-negative breast carcinoma   of the right breast with need for preoperative primary neoadjuvant   chemotherapy.    PROCEDURE PERFORMED:  Insertion of right anterior chest wall, right subclavian   vein, MRI-compatible 8-[place] PowerPort Port-A-Cath with intraoperative   fluoroscopy by Seldinger technique.    PROCEDURE IN DETAIL:  The patient underwent informed consent.  The history and   physical examination was reviewed and updated.  The right chest was marked.  We   opted to place the port on the right side, which was the ipsilateral side   because she had had prior radiotherapy on the left and since she would   ultimately desire mastectomy.  The patient was brought to the Operating Room.    Again, informed consent was obtained.  The history and physical examination was   reviewed and updated.  The right side was marked.  She underwent general   anesthesia with general endotracheal anesthesia and was positioned in a supine   position.  A vertical roll was placed parallel to her spine between her shoulder   blades.  Both arms were tucked.  The right anterior neck and chest were prepped   and draped in a sterile fashion.  Ioban drape was placed.  Using standard   landmarks at the midclavicular line in the infraclavicular region, we cannulated   the right subclavian vein with good venous blood return after percutaneous   aspirate was obtained.  The guidewire was advanced into the central venous   system under fluoroscopic guidance.  The percutaneous access site was enlarged   with a #15 blade scalpel to a 3 to 4 cm incision.  We created a subcutaneous   pocket.  Hemostasis was  achieved.  The port was anchored to the catheter with   the locking hub and cut to a length of approximately 18 cm.  We anchored the   port into the subcutaneous pocket with interrupted 2-0 Vicryl sutures x3.  The   fluoroscopy confirmed the wire in the proper position.  The dilator and   peel-away sheath were then advanced over the guidewire under fluoroscopic   guidance.  The dilator and guidewire were removed.  The catheter was advanced   through the peel-away sheath without difficulty.  The peel-away sheath was   withdrawn.  Fluoroscopy confirmed the catheter tip in the appropriate position   in the superior vena cava without kinks in the port catheter system.  The port   was accessed with the Randle needle.  It flushed and aspirated easily.  It was   flushed with a final solution of 100 units of heparin per milliliter of saline.    The deep dermal and subcutaneous layers were closed with 3-0 Vicryl suture.    The skin was closed with a running 4-0 Monocryl subcuticular skin closure.    Mastisol, Steri-Strips, sterile Telfa gauze and Tegaderm dressing were applied.    Estimated blood loss was minimal.  All needle, instrument and sponge counts   were correct.  Postop chest x-ray would be pending.  She tolerated the procedure   well without complication.  She was turned over to Anesthesia for extubation   and transport to the Recovery area in a satisfactory condition.      RAMESH/ESTHER  dd: 02/01/2019 10:53:55 (CST)  td: 02/01/2019 11:13:53 (CST)  Doc ID   #[id number]  Job ID #[id number]    CC:

## 2019-02-01 NOTE — DISCHARGE INSTRUCTIONS
Vascular Access Port Implantation   Port implantation is surgery to place (implant) a port under the skin. For vascular access, it is placed into a vein. The port allows medicines or nutrition to be sent right into your bloodstream. Blood can also be taken or given through the port. During the procedure, a long, thin tube called a catheter is threaded into one of your large veins. The tube is then attached to the port. This usually sits under the skin of your chest and causes a small bump. To use the port, a special needle is passed through your skin and into the port. The needle can stay in your skin for up to 7 days, if needed. A port can stay in place for weeks or months or longer.    Why is a vascular access port needed?  A vascular access port may allow healthcare providers to give you:  · Chemotherapy or other cancer-fighting drugs  · IV treatments, such as antibiotics or nutrition  · Hemodialysis (for kidney failure)  The port may also be used to draw blood.  Before the procedure  Follow any instructions you are given on how to prepare.  Tell your provider about any medicines you are taking. This includes:  · All prescription medicines  · Over-the-counter medicines such as aspirin or ibuprofen  · Herbs, vitamins, and other supplements  Also be sure your provider knows:  · If you are pregnant or think you may be pregnant  · If you are allergic to any medicines or substances, especially local anesthetics or iodine  · Your full medical history, including why you will need the port  · If you plan on doing any contact sports  During the procedure  · Before the procedure, an IV may be put into a vein in your arm or hand. This gives you fluids and medicines. You may be given medicine through the IV to help you relax during the procedure. This is called sedation. But some surgeons place ports using general anesthesia.  · The chest is used most often for the port. In some cases, your belly (abdomen) or arm will be  used instead.  · The skin over the insertion area is numbed with local anesthetic.  · Ultrasound or X-rays are used to help the healthcare provider guide the catheter into the proper location during the procedure.  · A cut (incision) is made in the skin where the port will be placed. A small pocket for the port is formed under the skin.  · A second small incision is made in the skin near the first incision. A tunnel under the skin is created. The catheter is put through the tunnel and into the blood vessel.  · The skin is closed over the port. It is held shut with stitches (sutures) or surgical glue or tape. The second small incision is also closed.  · A chest X-ray may be done to make sure the port is placed properly.  After the procedure  You may be taken to a recovery room where youll recover from the sedation. Nurses will check on you as you rest. If you have pain, nurses can give you medicine. If you are not staying in the hospital overnight, you will be sent home a few hours after the procedure is done. A healthcare provider will tell you when you can go home. An adult family member or friend will need to drive you home.  Recovering at home  · Take pain medicine as directed by your healthcare provider.  · Take it easy for 24 hours after the procedure. Avoid physical activity and heavy lifting until your healthcare provider says its OK.  · Keep the port clean and dry. Ask when you can shower again. You will need to keep the port dry by covering it when you shower.  · Care for the insertion site as you are directed.  · Dont swim, bathe, or do other activities that cause water to cover the insertion site.  · To keep the port from getting blocked with blood clots, flush it as often as directed. You should be shown the proper way to flush the port before you go home. It is important to follow these directions.     Risks and possible complications of implantation  · Bleeding  · Infection of the insertion  site  · Damage to a blood vessel  · Nerve injury or irritation  · Collapsed lung (for chest port placements)  · Skin breakdown over the port  Risks and possible complications of having a port  · Blocked  port or catheter  · Leakage or breakage of the port or catheter  · The port moves out of position  · Blood clot  · Skin or bloodstream infection  · Skin breakdown over the port      When to seek medical care  Call your healthcare provider right away if you have any of the following:  · A fever of 100.4°F (38.0°C) or higher  · You can't access or use the port properly  · You can't flush the port or get a blood return  · The skin near the port is red, warm, swollen, or broken  · You have shoulder pain on the side where the port is located  · You feel a heart flutter or racing heart   · Swollen arm, if the port is placed in your arm

## 2019-02-01 NOTE — ANESTHESIA PREPROCEDURE EVALUATION
02/01/2019  Alina Narayan is a 73 y.o., female   Pre-operative evaluation for Procedure(s) (LRB):  [id number] (Right)    Alina Narayan is a 73 y.o. female     Patient Active Problem List   Diagnosis    Diverticulosis    Family history of colon cancer    Sleep apnea    Diabetes mellitus type 2 in obese    S/P AVR (aortic valve replacement)    Chronic ear pain    Hearing loss, sensorineural    Degeneration of lumbar or lumbosacral intervertebral disc    Malignant neoplasm of right female breast    Chronic kidney disease, stage III (moderate)    Diabetes mellitus due to underlying condition with stage 3 chronic kidney disease, without long-term current use of insulin    Vitamin D deficiency    Hemarthrosis of left knee    Contusion, knee and lower leg, left, initial encounter    Fall    Left anterior knee pain    Edema    Personal history of breast cancer    BMI 45.0-49.9, adult    Atherosclerosis of aorta    Thrombocytopenia, unspecified    Encounter for antineoplastic chemotherapy    Breast cancer       Review of patient's allergies indicates:   Allergen Reactions    Hydromorphone      Other reaction(s): sedation    Byetta [exenatide] Rash     Other reaction(s): Rash       No current facility-administered medications on file prior to encounter.      Current Outpatient Medications on File Prior to Encounter   Medication Sig Dispense Refill    ASPIR-LOW 81 mg EC tablet Take 81 mg by mouth once daily.  12    carvedilol (COREG) 25 MG tablet .5-1 tablet oral twice daily or as directed. 180 tablet 3    CHOLECALCIFEROL, VITAMIN D3, (VITAMIN D3 ORAL) Take by mouth once daily. 1000 IU      diclofenac sodium (VOLTAREN) 1 % Gel Apply 2 g topically once daily. 100 g 3    gabapentin (NEURONTIN) 300 MG capsule Take 1 capsule (300 mg total) by mouth 2 (two) times daily. 180 capsule 1     levothyroxine (SYNTHROID) 50 MCG tablet Take 1 tablet (50 mcg total) by mouth every evening. 90 tablet 3    metFORMIN (GLUCOPHAGE) 500 MG tablet Take 1 tablet (500 mg total) by mouth 2 (two) times daily with meals. (Patient taking differently: Take 1,000 mg by mouth daily with breakfast. ) 180 tablet 3    pravastatin (PRAVACHOL) 20 MG tablet TAKE 1 TABLET ONE TIME DAILY (Patient taking differently: Take 20 mg by mouth every evening. TAKE 1 TABLET ONE TIME DAILY) 90 tablet 3    ALPRAZolam (XANAX) 0.25 MG tablet One-two daily as needed for anxiety 40 tablet 0    amoxicillin (AMOXIL) 500 MG capsule TAKE 4 CAPSULES 1 HOUR BEFORE APPOINTMENT  1    blood sugar diagnostic Strp True Metirx strips or strips and lancets  - pt checks twice daily for uncontrolled glucoses E11.65 200 each 3    blood-glucose meter kit True Test or meter covered by insurance - pt checks glucose twice daily for uncontrolled glucoses E11.65 1 each 0    HYDROcodone-acetaminophen (NORCO) 5-325 mg per tablet One - two tabs every 6-8 hours as needed for pain 40 tablet 0    lancets (ACCU-CHEK SOFTCLIX LANCETS) Misc TrueTest lancets for meter covered by insurance - pt checks twice daily for uncontrolled glucoses E11.65, 200 each 3       Past Surgical History:   Procedure Laterality Date    BREAST BIOPSY Left 10/1/2012    left breast- invasive ductal carcinoma    BREAST BIOPSY Left 12/2017    BREAST LUMPECTOMY Left 2012    CARDIAC VALVE REPLACEMENT  12/2013    CARDIAC VALVE SURGERY  2013    CARPAL TUNNEL RELEASE      Left    CATHETERIZATION, HEART, LEFT Left 11/7/2013    [facility]    COLONOSCOPY N/A 9/29/2017    [facility] by Phani Worley [facility] at [facility] ENDO (4TH FLR)    COLONOSCOPY N/A 8/28/2015    [facility] at [facility] ENDO (4TH FLR)    DILATION AND CURETTAGE OF UTERUS  1999    Endometrial polyps    ENDOMETRIAL ABLATION  1999    Enodmetrial polyps     NIC-TRANSFORAMINAL Left 10/1/2015    Per[facility] at [facility] MGT    EYE SURGERY      left lumpectomy  2012    REPLACEMENT, AORTIC VALVE N/A 12/2/2013    Performed by Venkat Webb MD at [facility] OR [address] [place] [address]    SHOULDER SURGERY      SKIN BIOPSY         Social History     Socioeconomic History    Marital status:      Spouse name: Srinath    Number of children: 2    Years of education: Not on file    Highest education level: Not on file   Social Needs    Financial resource strain: Not on file    Food insecurity - worry: Not on file    Food insecurity - inability: Not on file    Transportation needs - medical: Not on file    Transportation needs - non-medical: Not on file   Occupational History    Occupation: Retired   Tobacco Use    Smoking status: Never Smoker    Smokeless tobacco: Never Used   Substance and Sexual Activity    Alcohol use: No     Alcohol/week: 0.0 oz    Drug use: No    Sexual activity: Yes   Other Topics Concern    Are you pregnant or think you may be? No    Breast-feeding No   Social History Narrative    Not on file     Lab Results   Component Value Date    WBC 6.91 08/26/2018    HGB 14.0 08/26/2018    HCT 43.6 08/26/2018    MCV 92 08/26/2018     (L) 08/26/2018       2D Echo:  Results for orders placed or performed during the hospital encounter of 10/19/18   2D echo with color flow doppler   Result Value Ref Range    QEF 53 55 - 65    Mitral Valve Regurgitation MILD TO MODERATE     Est. PA Systolic Pressure 28.4     Tricuspid Valve Regurgitation MILD TO MODERATE          Anesthesia Evaluation    I have reviewed the Patient Summary Reports.     I have reviewed the Medications.     Review of Systems  Anesthesia Hx:  No problems with previous Anesthesia Denies Hx of Anesthetic complications  History of prior surgery of interest to airway management or planning: Denies Family Hx of Anesthesia complications.   Denies Personal Hx of Anesthesia  complications.   Social:  No Alcohol Use, Non-Smoker    Hematology/Oncology:  Hematology Normal   Oncology Normal     EENT/Dental:EENT/Dental Normal   Cardiovascular:   Exercise tolerance: good Hypertension    Pulmonary:   Sleep Apnea    Renal/:   Chronic Renal Disease, CRI    Hepatic/GI:  Hepatic/GI Normal    Musculoskeletal:   Arthritis     Neurological:  Neurology Normal    Endocrine:   Diabetes, type 2    Psych:  Psychiatric Normal       Aortic valve replacement five years ago, no complications   Body mass index is 46.09 kg/m².      Physical Exam  General:  Well nourished, Morbid Obesity    Airway/Jaw/Neck:  Airway Findings: Mouth Opening: Normal Tongue: Normal  General Airway Assessment: Adult, Average  Mallampati: II  TM Distance: Normal, at least 6 cm  Jaw/Neck Findings:  Neck ROM: Normal ROM      Dental:  Dental Findings: In tact    Chest/Lungs:  Chest/Lungs Findings: Normal Respiratory Rate, Clear to auscultation     Heart/Vascular:  Heart Findings: Rate: Normal  Rhythm: Regular Rhythm        Mental Status:  Mental Status Findings:  Alert and Oriented, Cooperative         Anesthesia Plan  Type of Anesthesia, risks & benefits discussed:  Anesthesia Type:  general  Patient's Preference:   Intra-op Monitoring Plan: standard ASA monitors  Intra-op Monitoring Plan Comments:   Post Op Pain Control Plan: multimodal analgesia and IV/PO Opioids PRN  Post Op Pain Control Plan Comments:   Induction:   IV  Beta Blocker:  Patient is not currently on a Beta-Blocker (No further documentation required).       Informed Consent: Patient understands risks and agrees with Anesthesia plan.  Questions answered. Anesthesia consent signed with patient.  ASA Score: 3     Day of Surgery Review of History & Physical:    H&P update referred to the surgeon.         Ready For Surgery From Anesthesia Perspective.

## 2019-02-01 NOTE — PLAN OF CARE
Discharge instructions given to patient and spouse . Verbalized understanding. Pt tolerated fluids. No complaints at this time. Pt adequate for discharge. Consents in chart

## 2019-02-01 NOTE — TRANSFER OF CARE
"Anesthesia Transfer of Care Note    Patient: Alina Narayan    Procedure(s) Performed: Procedure(s) (LRB):  [id number] (Right)    Patient location: PACU    Anesthesia Type: general    Transport from OR: Transported from OR on room air with adequate spontaneous ventilation    Post pain: adequate analgesia    Post assessment: no apparent anesthetic complications and tolerated procedure well    Post vital signs: stable    Level of consciousness: awake    Nausea/Vomiting: no nausea/vomiting    Complications: none    Transfer of care protocol was followed      Last vitals:   Visit Vitals  /68   Pulse 78   Temp 36.3 °C (97.4 °F) (Oral)   Resp 16   Ht 5' 2" (1.575 m)   Wt 114.3 kg (252 lb)   SpO2 97%   Breastfeeding? No   BMI 46.09 kg/m²     "

## 2019-02-01 NOTE — ANESTHESIA POSTPROCEDURE EVALUATION
"Anesthesia Post Evaluation    Patient: Alina Narayan    Procedure(s) Performed: Procedure(s) (LRB):  [id number] (Right)    Final Anesthesia Type: general  Patient location during evaluation: PACU  Patient participation: Yes- Able to Participate  Level of consciousness: awake and alert and oriented  Post-procedure vital signs: reviewed and stable  Pain management: adequate  Airway patency: patent  PONV status at discharge: No PONV  Anesthetic complications: no      Cardiovascular status: blood pressure returned to baseline and hemodynamically stable  Respiratory status: unassisted, spontaneous ventilation and room air  Hydration status: euvolemic  Follow-up not needed.        Visit Vitals  BP (!) 94/50   Pulse 71   Temp 36.2 °C (97.1 °F) (Tympanic)   Resp 20   Ht 5' 2" (1.575 m)   Wt 114.3 kg (252 lb)   SpO2 98%   Breastfeeding? No   BMI 46.09 kg/m²       Pain/Lin Score: Pain Rating Prior to Med Admin: 6 (2/1/2019 10:29 AM)  Lin Score: 10 (2/1/2019 10:48 AM)        "

## 2019-02-01 NOTE — PLAN OF CARE
Pt AAOx4. Complaints of moderate pain to right chest. PRN PO and IV pain medication administered as ordered. Tolerating sips of water. VSS. Safety maintained. CXR completed. Dressing to site remains CDI. Pt to be discharged home per [facility].

## 2019-02-04 RX ORDER — HEPARIN 100 UNIT/ML
500 SYRINGE INTRAVENOUS
Status: CANCELLED | OUTPATIENT
Start: 2019-02-04

## 2019-02-04 RX ORDER — EPINEPHRINE 0.3 MG/.3ML
0.3 INJECTION SUBCUTANEOUS ONCE AS NEEDED
Status: CANCELLED | OUTPATIENT
Start: 2019-02-04 | End: 2019-02-04

## 2019-02-04 RX ORDER — SODIUM CHLORIDE 0.9 % (FLUSH) 0.9 %
10 SYRINGE (ML) INJECTION
Status: CANCELLED | OUTPATIENT
Start: 2019-02-04

## 2019-02-04 RX ORDER — FAMOTIDINE 10 MG/ML
20 INJECTION INTRAVENOUS
Status: CANCELLED | OUTPATIENT
Start: 2019-02-04

## 2019-02-04 RX ORDER — DIPHENHYDRAMINE HYDROCHLORIDE 50 MG/ML
50 INJECTION INTRAMUSCULAR; INTRAVENOUS ONCE AS NEEDED
Status: CANCELLED | OUTPATIENT
Start: 2019-02-04 | End: 2019-02-04

## 2019-02-06 DIAGNOSIS — M51.36 DEGENERATIVE LUMBAR DISC: ICD-10-CM

## 2019-02-06 DIAGNOSIS — M47.27 OSTEOARTHRITIS OF SPINE WITH RADICULOPATHY, LUMBOSACRAL REGION: ICD-10-CM

## 2019-02-07 ENCOUNTER — PATIENT MESSAGE (OUTPATIENT)
Dept: CARDIOLOGY | Facility: CLINIC | Age: 74
End: 2019-02-07

## 2019-02-08 RX ORDER — PRAVASTATIN SODIUM 20 MG/1
TABLET ORAL
Qty: 90 TABLET | Refills: 3 | Status: SHIPPED | OUTPATIENT
Start: 2019-02-08 | End: 2020-05-02

## 2019-02-08 RX ORDER — GABAPENTIN 300 MG/1
CAPSULE ORAL
Qty: 180 CAPSULE | Refills: 1 | Status: SHIPPED | OUTPATIENT
Start: 2019-02-08 | End: 2019-08-25 | Stop reason: SDUPTHER

## 2019-02-11 ENCOUNTER — INFUSION (OUTPATIENT)
Dept: INFUSION THERAPY | Facility: HOSPITAL | Age: 74
End: 2019-02-11
Attending: INTERNAL MEDICINE
Payer: MEDICARE

## 2019-02-11 VITALS
HEIGHT: 62 IN | RESPIRATION RATE: 18 BRPM | SYSTOLIC BLOOD PRESSURE: 175 MMHG | WEIGHT: 252 LBS | DIASTOLIC BLOOD PRESSURE: 80 MMHG | HEART RATE: 77 BPM | TEMPERATURE: 98 F | BODY MASS INDEX: 46.38 KG/M2

## 2019-02-11 DIAGNOSIS — Z51.11 ENCOUNTER FOR ANTINEOPLASTIC CHEMOTHERAPY: ICD-10-CM

## 2019-02-11 DIAGNOSIS — Z51.11 ENCOUNTER FOR ANTINEOPLASTIC CHEMOTHERAPY: Primary | ICD-10-CM

## 2019-02-11 DIAGNOSIS — Z17.1 MALIGNANT NEOPLASM OF RIGHT BREAST IN FEMALE, ESTROGEN RECEPTOR NEGATIVE, UNSPECIFIED SITE OF BREAST: ICD-10-CM

## 2019-02-11 DIAGNOSIS — C50.911 MALIGNANT NEOPLASM OF RIGHT BREAST IN FEMALE, ESTROGEN RECEPTOR NEGATIVE, UNSPECIFIED SITE OF BREAST: Primary | ICD-10-CM

## 2019-02-11 DIAGNOSIS — C50.911 MALIGNANT NEOPLASM OF RIGHT FEMALE BREAST: ICD-10-CM

## 2019-02-11 DIAGNOSIS — Z17.1 MALIGNANT NEOPLASM OF RIGHT BREAST IN FEMALE, ESTROGEN RECEPTOR NEGATIVE, UNSPECIFIED SITE OF BREAST: Primary | ICD-10-CM

## 2019-02-11 DIAGNOSIS — C50.911 MALIGNANT NEOPLASM OF RIGHT BREAST IN FEMALE, ESTROGEN RECEPTOR NEGATIVE, UNSPECIFIED SITE OF BREAST: ICD-10-CM

## 2019-02-11 LAB
ALBUMIN SERPL BCP-MCNC: 3.6 G/DL
ALP SERPL-CCNC: 108 U/L
ALT SERPL W/O P-5'-P-CCNC: 16 U/L
ANION GAP SERPL CALC-SCNC: 7 MMOL/L
AST SERPL-CCNC: 15 U/L
BASOPHILS # BLD AUTO: 0.02 K/UL
BASOPHILS NFR BLD: 0.4 %
BILIRUB SERPL-MCNC: 0.5 MG/DL
BUN SERPL-MCNC: 19 MG/DL
CALCIUM SERPL-MCNC: 9.7 MG/DL
CHLORIDE SERPL-SCNC: 105 MMOL/L
CO2 SERPL-SCNC: 29 MMOL/L
CREAT SERPL-MCNC: 0.9 MG/DL
DIFFERENTIAL METHOD: ABNORMAL
EOSINOPHIL # BLD AUTO: 0.1 K/UL
EOSINOPHIL NFR BLD: 1.2 %
ERYTHROCYTE [DISTWIDTH] IN BLOOD BY AUTOMATED COUNT: 13.1 %
EST. GFR  (AFRICAN AMERICAN): >60 ML/MIN/1.73 M^2
EST. GFR  (NON AFRICAN AMERICAN): >60 ML/MIN/1.73 M^2
GLUCOSE SERPL-MCNC: 147 MG/DL
HCT VFR BLD AUTO: 40.2 %
HGB BLD-MCNC: 12.4 G/DL
IMM GRANULOCYTES # BLD AUTO: 0.02 K/UL
IMM GRANULOCYTES NFR BLD AUTO: 0.4 %
LYMPHOCYTES # BLD AUTO: 1.2 K/UL
LYMPHOCYTES NFR BLD: 23 %
MCH RBC QN AUTO: 29.5 PG
MCHC RBC AUTO-ENTMCNC: 30.8 G/DL
MCV RBC AUTO: 96 FL
MONOCYTES # BLD AUTO: 0.5 K/UL
MONOCYTES NFR BLD: 9 %
NEUTROPHILS # BLD AUTO: 3.4 K/UL
NEUTROPHILS NFR BLD: 66 %
NRBC BLD-RTO: 0 /100 WBC
PLATELET # BLD AUTO: 147 K/UL
PMV BLD AUTO: 11.1 FL
POTASSIUM SERPL-SCNC: 4.2 MMOL/L
PROT SERPL-MCNC: 6.9 G/DL
RBC # BLD AUTO: 4.21 M/UL
SODIUM SERPL-SCNC: 141 MMOL/L
WBC # BLD AUTO: 5.09 K/UL

## 2019-02-11 PROCEDURE — 96413 CHEMO IV INFUSION 1 HR: CPT | Mod: HCNC

## 2019-02-11 PROCEDURE — 96367 TX/PROPH/DG ADDL SEQ IV INF: CPT | Mod: HCNC

## 2019-02-11 PROCEDURE — 80053 COMPREHEN METABOLIC PANEL: CPT | Mod: HCNC

## 2019-02-11 PROCEDURE — 63600175 PHARM REV CODE 636 W HCPCS: Mod: HCNC | Performed by: INTERNAL MEDICINE

## 2019-02-11 PROCEDURE — 25000003 PHARM REV CODE 250: Mod: HCNC | Performed by: INTERNAL MEDICINE

## 2019-02-11 PROCEDURE — S0028 INJECTION, FAMOTIDINE, 20 MG: HCPCS | Mod: HCNC | Performed by: INTERNAL MEDICINE

## 2019-02-11 PROCEDURE — 85025 COMPLETE CBC W/AUTO DIFF WBC: CPT | Mod: HCNC

## 2019-02-11 PROCEDURE — 96375 TX/PRO/DX INJ NEW DRUG ADDON: CPT | Mod: HCNC

## 2019-02-11 PROCEDURE — A4216 STERILE WATER/SALINE, 10 ML: HCPCS | Mod: HCNC | Performed by: INTERNAL MEDICINE

## 2019-02-11 RX ORDER — HEPARIN 100 UNIT/ML
500 SYRINGE INTRAVENOUS
Status: CANCELLED | OUTPATIENT
Start: 2019-02-11

## 2019-02-11 RX ORDER — SODIUM CHLORIDE 0.9 % (FLUSH) 0.9 %
10 SYRINGE (ML) INJECTION
Status: CANCELLED | OUTPATIENT
Start: 2019-02-11

## 2019-02-11 RX ORDER — DIPHENHYDRAMINE HYDROCHLORIDE 50 MG/ML
50 INJECTION INTRAMUSCULAR; INTRAVENOUS ONCE AS NEEDED
Status: DISCONTINUED | OUTPATIENT
Start: 2019-02-11 | End: 2019-02-11 | Stop reason: HOSPADM

## 2019-02-11 RX ORDER — HEPARIN 100 UNIT/ML
500 SYRINGE INTRAVENOUS
Status: DISCONTINUED | OUTPATIENT
Start: 2019-02-11 | End: 2019-02-11 | Stop reason: HOSPADM

## 2019-02-11 RX ORDER — SODIUM CHLORIDE 0.9 % (FLUSH) 0.9 %
10 SYRINGE (ML) INJECTION
Status: COMPLETED | OUTPATIENT
Start: 2019-02-11 | End: 2019-02-11

## 2019-02-11 RX ORDER — EPINEPHRINE 0.3 MG/.3ML
0.3 INJECTION SUBCUTANEOUS ONCE AS NEEDED
Status: DISCONTINUED | OUTPATIENT
Start: 2019-02-11 | End: 2019-02-11 | Stop reason: HOSPADM

## 2019-02-11 RX ORDER — HEPARIN 100 UNIT/ML
500 SYRINGE INTRAVENOUS
Status: COMPLETED | OUTPATIENT
Start: 2019-02-11 | End: 2019-02-11

## 2019-02-11 RX ORDER — FAMOTIDINE 10 MG/ML
20 INJECTION INTRAVENOUS
Status: COMPLETED | OUTPATIENT
Start: 2019-02-11 | End: 2019-02-11

## 2019-02-11 RX ORDER — SODIUM CHLORIDE 0.9 % (FLUSH) 0.9 %
10 SYRINGE (ML) INJECTION
Status: DISCONTINUED | OUTPATIENT
Start: 2019-02-11 | End: 2019-02-11 | Stop reason: HOSPADM

## 2019-02-11 RX ADMIN — SODIUM CHLORIDE: 9 INJECTION, SOLUTION INTRAVENOUS at 11:02

## 2019-02-11 RX ADMIN — HEPARIN 500 UNITS: 100 SYRINGE at 10:02

## 2019-02-11 RX ADMIN — PACLITAXEL 180 MG: 6 INJECTION, SOLUTION, CONCENTRATE INTRAVENOUS at 12:02

## 2019-02-11 RX ADMIN — Medication 10 ML: at 10:02

## 2019-02-11 RX ADMIN — FAMOTIDINE 20 MG: 10 INJECTION, SOLUTION INTRAVENOUS at 11:02

## 2019-02-11 RX ADMIN — DIPHENHYDRAMINE HYDROCHLORIDE 25 MG: 50 INJECTION, SOLUTION INTRAMUSCULAR; INTRAVENOUS at 11:02

## 2019-02-11 RX ADMIN — HEPARIN SODIUM (PORCINE) LOCK FLUSH IV SOLN 100 UNIT/ML 500 UNITS: 100 SOLUTION at 02:02

## 2019-02-11 RX ADMIN — DEXAMETHASONE SODIUM PHOSPHATE 10 MG: 4 INJECTION, SOLUTION INTRA-ARTICULAR; INTRALESIONAL; INTRAMUSCULAR; INTRAVENOUS; SOFT TISSUE at 11:02

## 2019-02-11 NOTE — PLAN OF CARE
Problem: Adult Inpatient Plan of Care  Goal: Plan of Care Review  Outcome: Ongoing (interventions implemented as appropriate)  1420:  Patient tolerated Taxol well, NAD noted, denies any problems.  Portacath flushed per protocol and de-accessed.  AVS given, although chemo appt for next week still needs to be scheduled.  Patient advised to contact office in the a.m. To verify appt time.  Released in stable condition, ambulated off unit, with walker, accompanied by her .

## 2019-02-12 ENCOUNTER — PATIENT MESSAGE (OUTPATIENT)
Dept: HEMATOLOGY/ONCOLOGY | Facility: CLINIC | Age: 74
End: 2019-02-12

## 2019-02-12 ENCOUNTER — TELEPHONE (OUTPATIENT)
Dept: HEMATOLOGY/ONCOLOGY | Facility: CLINIC | Age: 74
End: 2019-02-12

## 2019-02-12 DIAGNOSIS — Z17.1 MALIGNANT NEOPLASM OF RIGHT BREAST IN FEMALE, ESTROGEN RECEPTOR NEGATIVE, UNSPECIFIED SITE OF BREAST: Primary | ICD-10-CM

## 2019-02-12 DIAGNOSIS — C50.911 MALIGNANT NEOPLASM OF RIGHT BREAST IN FEMALE, ESTROGEN RECEPTOR NEGATIVE, UNSPECIFIED SITE OF BREAST: Primary | ICD-10-CM

## 2019-02-12 NOTE — TELEPHONE ENCOUNTER
Spoke with pt to update her about her weekly taxol and labs and hour beforehand. Pt agreed to this. Pt verbalized she is doing well since her first treatment, only have 2 episodes of diarrhea this morning and waking with just a little flushed feeling to her cheeks. Pt informed to give us a call if she needs anything or starts feeling any worse after her chemo treatments. Pt verbalized understanding of this information

## 2019-02-13 RX ORDER — DIPHENHYDRAMINE HYDROCHLORIDE 50 MG/ML
50 INJECTION INTRAMUSCULAR; INTRAVENOUS ONCE AS NEEDED
Status: CANCELLED | OUTPATIENT
Start: 2019-02-19 | End: 2019-02-13

## 2019-02-13 RX ORDER — HEPARIN 100 UNIT/ML
500 SYRINGE INTRAVENOUS
Status: CANCELLED | OUTPATIENT
Start: 2019-02-19

## 2019-02-13 RX ORDER — SODIUM CHLORIDE 0.9 % (FLUSH) 0.9 %
10 SYRINGE (ML) INJECTION
Status: CANCELLED | OUTPATIENT
Start: 2019-02-19

## 2019-02-13 RX ORDER — FAMOTIDINE 10 MG/ML
20 INJECTION INTRAVENOUS
Status: CANCELLED | OUTPATIENT
Start: 2019-02-19

## 2019-02-13 RX ORDER — EPINEPHRINE 0.3 MG/.3ML
0.3 INJECTION SUBCUTANEOUS ONCE AS NEEDED
Status: CANCELLED | OUTPATIENT
Start: 2019-02-19 | End: 2019-02-13

## 2019-02-14 ENCOUNTER — OFFICE VISIT (OUTPATIENT)
Dept: SURGERY | Facility: CLINIC | Age: 74
End: 2019-02-14
Payer: MEDICARE

## 2019-02-14 ENCOUNTER — HOSPITAL ENCOUNTER (EMERGENCY)
Facility: HOSPITAL | Age: 74
Discharge: HOME | End: 2019-02-14
Attending: EMERGENCY MEDICINE
Payer: MEDICARE

## 2019-02-14 ENCOUNTER — TELEPHONE (OUTPATIENT)
Dept: HEMATOLOGY/ONCOLOGY | Facility: CLINIC | Age: 74
End: 2019-02-14

## 2019-02-14 VITALS
HEIGHT: 62 IN | SYSTOLIC BLOOD PRESSURE: 135 MMHG | WEIGHT: 252 LBS | BODY MASS INDEX: 46.38 KG/M2 | TEMPERATURE: 98 F | DIASTOLIC BLOOD PRESSURE: 62 MMHG | HEART RATE: 77 BPM

## 2019-02-14 VITALS
WEIGHT: 252 LBS | OXYGEN SATURATION: 99 % | SYSTOLIC BLOOD PRESSURE: 132 MMHG | TEMPERATURE: 98 F | BODY MASS INDEX: 46.38 KG/M2 | HEIGHT: 62 IN | RESPIRATION RATE: 19 BRPM | DIASTOLIC BLOOD PRESSURE: 63 MMHG | HEART RATE: 74 BPM

## 2019-02-14 DIAGNOSIS — C50.911 MALIGNANT NEOPLASM OF RIGHT BREAST IN FEMALE, ESTROGEN RECEPTOR NEGATIVE, UNSPECIFIED SITE OF BREAST: Primary | ICD-10-CM

## 2019-02-14 DIAGNOSIS — K57.30 DIVERTICULOSIS OF LARGE INTESTINE WITHOUT HEMORRHAGE: Primary | ICD-10-CM

## 2019-02-14 DIAGNOSIS — Z17.1 MALIGNANT NEOPLASM OF RIGHT BREAST IN FEMALE, ESTROGEN RECEPTOR NEGATIVE, UNSPECIFIED SITE OF BREAST: Primary | ICD-10-CM

## 2019-02-14 DIAGNOSIS — R19.7 DIARRHEA, UNSPECIFIED TYPE: Primary | ICD-10-CM

## 2019-02-14 DIAGNOSIS — R10.9 ABDOMINAL PAIN: ICD-10-CM

## 2019-02-14 LAB
ALBUMIN SERPL BCP-MCNC: 4.1 G/DL
ALP SERPL-CCNC: 105 U/L
ALT SERPL W/O P-5'-P-CCNC: 23 U/L
ANION GAP SERPL CALC-SCNC: 11 MMOL/L
AST SERPL-CCNC: 30 U/L
BASOPHILS # BLD AUTO: 0.02 K/UL
BASOPHILS NFR BLD: 0.4 %
BILIRUB SERPL-MCNC: 0.6 MG/DL
BILIRUB UR QL STRIP: NEGATIVE
BUN SERPL-MCNC: 17 MG/DL
CALCIUM SERPL-MCNC: 9.8 MG/DL
CHLORIDE SERPL-SCNC: 106 MMOL/L
CLARITY UR REFRACT.AUTO: CLEAR
CO2 SERPL-SCNC: 22 MMOL/L
COLOR UR AUTO: NORMAL
CREAT SERPL-MCNC: 0.9 MG/DL
DIFFERENTIAL METHOD: ABNORMAL
EOSINOPHIL # BLD AUTO: 0.1 K/UL
EOSINOPHIL NFR BLD: 1.3 %
ERYTHROCYTE [DISTWIDTH] IN BLOOD BY AUTOMATED COUNT: 13.1 %
EST. GFR  (AFRICAN AMERICAN): >60 ML/MIN/1.73 M^2
EST. GFR  (NON AFRICAN AMERICAN): >60 ML/MIN/1.73 M^2
GLUCOSE SERPL-MCNC: 136 MG/DL
GLUCOSE UR QL STRIP: NEGATIVE
HCT VFR BLD AUTO: 48 %
HGB BLD-MCNC: 14.5 G/DL
HGB UR QL STRIP: NEGATIVE
IMM GRANULOCYTES # BLD AUTO: 0 K/UL
IMM GRANULOCYTES NFR BLD AUTO: 0 %
KETONES UR QL STRIP: NEGATIVE
LEUKOCYTE ESTERASE UR QL STRIP: NEGATIVE
LIPASE SERPL-CCNC: 36 U/L
LYMPHOCYTES # BLD AUTO: 1.2 K/UL
LYMPHOCYTES NFR BLD: 22.6 %
MAGNESIUM SERPL-MCNC: 2.6 MG/DL
MCH RBC QN AUTO: 30 PG
MCHC RBC AUTO-ENTMCNC: 30.2 G/DL
MCV RBC AUTO: 99 FL
MONOCYTES # BLD AUTO: 0.1 K/UL
MONOCYTES NFR BLD: 2.3 %
NEUTROPHILS # BLD AUTO: 3.8 K/UL
NEUTROPHILS NFR BLD: 73.4 %
NITRITE UR QL STRIP: NEGATIVE
NRBC BLD-RTO: 0 /100 WBC
PH UR STRIP: 7 [PH] (ref 5–8)
PLATELET # BLD AUTO: 133 K/UL
PMV BLD AUTO: 11.4 FL
POTASSIUM SERPL-SCNC: 5.3 MMOL/L
PROT SERPL-MCNC: 8.2 G/DL
PROT UR QL STRIP: NEGATIVE
RBC # BLD AUTO: 4.83 M/UL
SODIUM SERPL-SCNC: 139 MMOL/L
SP GR UR STRIP: 1 (ref 1–1.03)
URN SPEC COLLECT METH UR: NORMAL
WBC # BLD AUTO: 5.22 K/UL

## 2019-02-14 PROCEDURE — 99999 PR PBB SHADOW E&M-EST. PATIENT-LVL III: ICD-10-PCS | Mod: PBBFAC,HCNC,, | Performed by: SURGERY

## 2019-02-14 PROCEDURE — 99999 PR PBB SHADOW E&M-EST. PATIENT-LVL III: CPT | Mod: PBBFAC,HCNC,, | Performed by: SURGERY

## 2019-02-14 PROCEDURE — 99024 POSTOP FOLLOW-UP VISIT: CPT | Mod: HCNC,S$GLB,, | Performed by: SURGERY

## 2019-02-14 PROCEDURE — 99285 PR EMERGENCY DEPT VISIT,LEVEL V: ICD-10-PCS | Mod: HCNC,,, | Performed by: EMERGENCY MEDICINE

## 2019-02-14 PROCEDURE — 93010 EKG 12-LEAD: ICD-10-PCS | Mod: HCNC,,, | Performed by: INTERNAL MEDICINE

## 2019-02-14 PROCEDURE — 25500020 PHARM REV CODE 255: Mod: HCNC | Performed by: EMERGENCY MEDICINE

## 2019-02-14 PROCEDURE — 96375 TX/PRO/DX INJ NEW DRUG ADDON: CPT | Mod: HCNC

## 2019-02-14 PROCEDURE — 83690 ASSAY OF LIPASE: CPT | Mod: HCNC

## 2019-02-14 PROCEDURE — 63600175 PHARM REV CODE 636 W HCPCS: Mod: HCNC | Performed by: EMERGENCY MEDICINE

## 2019-02-14 PROCEDURE — 81003 URINALYSIS AUTO W/O SCOPE: CPT | Mod: HCNC

## 2019-02-14 PROCEDURE — 93010 ELECTROCARDIOGRAM REPORT: CPT | Mod: HCNC,,, | Performed by: INTERNAL MEDICINE

## 2019-02-14 PROCEDURE — 93005 ELECTROCARDIOGRAM TRACING: CPT | Mod: HCNC

## 2019-02-14 PROCEDURE — 83735 ASSAY OF MAGNESIUM: CPT | Mod: HCNC

## 2019-02-14 PROCEDURE — 99024 PR POST-OP FOLLOW-UP VISIT: ICD-10-PCS | Mod: HCNC,S$GLB,, | Performed by: SURGERY

## 2019-02-14 PROCEDURE — 96374 THER/PROPH/DIAG INJ IV PUSH: CPT | Mod: HCNC

## 2019-02-14 PROCEDURE — 80053 COMPREHEN METABOLIC PANEL: CPT | Mod: HCNC

## 2019-02-14 PROCEDURE — 99285 EMERGENCY DEPT VISIT HI MDM: CPT | Mod: HCNC,,, | Performed by: EMERGENCY MEDICINE

## 2019-02-14 PROCEDURE — 99285 EMERGENCY DEPT VISIT HI MDM: CPT | Mod: 25,HCNC

## 2019-02-14 PROCEDURE — 85025 COMPLETE CBC W/AUTO DIFF WBC: CPT | Mod: HCNC

## 2019-02-14 RX ORDER — HYDROCODONE BITARTRATE AND ACETAMINOPHEN 5; 325 MG/1; MG/1
1 TABLET ORAL EVERY 4 HOURS PRN
Qty: 18 TABLET | Refills: 0 | Status: SHIPPED | OUTPATIENT
Start: 2019-02-14 | End: 2019-05-08

## 2019-02-14 RX ORDER — DICYCLOMINE HYDROCHLORIDE 20 MG/1
20 TABLET ORAL 3 TIMES DAILY PRN
Qty: 20 TABLET | Refills: 0 | Status: SHIPPED | OUTPATIENT
Start: 2019-02-14 | End: 2019-03-16

## 2019-02-14 RX ORDER — ONDANSETRON 2 MG/ML
4 INJECTION INTRAMUSCULAR; INTRAVENOUS
Status: COMPLETED | OUTPATIENT
Start: 2019-02-14 | End: 2019-02-14

## 2019-02-14 RX ORDER — MORPHINE SULFATE 4 MG/ML
4 INJECTION, SOLUTION INTRAMUSCULAR; INTRAVENOUS
Status: COMPLETED | OUTPATIENT
Start: 2019-02-14 | End: 2019-02-14

## 2019-02-14 RX ORDER — CIPROFLOXACIN 500 MG/1
500 TABLET ORAL 2 TIMES DAILY
Qty: 20 TABLET | Refills: 0 | Status: SHIPPED | OUTPATIENT
Start: 2019-02-14 | End: 2019-02-14

## 2019-02-14 RX ADMIN — MORPHINE SULFATE 4 MG: 4 INJECTION INTRAVENOUS at 05:02

## 2019-02-14 RX ADMIN — ONDANSETRON 4 MG: 2 INJECTION INTRAMUSCULAR; INTRAVENOUS at 04:02

## 2019-02-14 RX ADMIN — IOHEXOL 100 ML: 350 INJECTION, SOLUTION INTRAVENOUS at 07:02

## 2019-02-14 NOTE — TELEPHONE ENCOUNTER
"[facility] abdominal pain with intermittent cramping sometimes as bad as 9/10. Patient reports having diarrhea x2 bouts 2nd day after chemo, since diarrhea resolved. Patient denies fever, N/V, diarrhea, bloody stools. Patient states similar to her previous "diverticulitis" flare up, and concerned could be reoccurring. Patient was inquiring if chemo could be causing this. Explained to patient potentially could be triggering but not precise cause if so. Explained that she should report to the ED, if pain that severe for imaging and evaluation in case antibiotic therapy needed. Will trend patient's chart to follow plan of care. Will inform Dr. Dwyer of patient's situation. Explained that if caused from chemo that would be determined and discussed with Dr. Dwyer in case dosage strength or frequency change necessary.     "

## 2019-02-14 NOTE — PROGRESS NOTES
Subjective:       Alina Narayan presents to the clinic 2 weeks following a right portacath placement. Eating a regular diet without difficulty. Bowel movements are Abnormal- experienced diarrhea and lower abdominal pain following first chemo infusion..  No pain related to port..      Objective:      There were no vitals taken for this visit.    General:  alert, appears stated age and cooperative   Abdomen: soft, non-tender   Incision:   healing well, no drainage, no erythema, no hernia, no seroma, no swelling, no dehiscence, incision well approximated       Assessment:      Doing well postoperatively.      Plan:      1. Continue chemo as scheduled.   2. Follow up prior to chemo completion to discuss surgical planning.   I agree with the assessment and plan as outlined above.  No clinic al signs of post-op infection per resident report.  No signs of hematoma, seroma, or infection.  F/U with me in 4 months for surgical planning as previously outlined as she will be near completion of neoadjuvant chemotherapy.

## 2019-02-14 NOTE — ED PROVIDER NOTES
Encounter Date: 2/14/2019       History     Chief Complaint   Patient presents with    Abdominal Pain     since yesterday, with diarrhea tuesday. Hx of diverticulitis. Also breast Ca pt, chemo monday. Denies fevers or chills.      Time patient was seen by the provider: 4:15 PM      The patient is a 73 y.o. female with co-morbidities including: breast CA, anxiety, HLD, HTN, DM, CKD, and diverticulitis, who presents to the ED with a complaint of lower abdominal cramping since this morning. Patient's last chemotherapy treatment was 3 days ago. The next day she developed a loose diarrhea which became watery as the day progressed. Denies hematochezia. Patient notes a significant history of diverticulosis and diverticulitis, with her last episode of diverticulitis occurring 6 months ago.  notes that Dr. Dwyer prescribed Cipro for treatment of possible diverticulitis. Denies active diarrhea, fevers, chills, sore throat, difficulty swallowing, CP, cough, SOB, difficulty urinating, dysuria, nausea, or emesis. Denies sick contacts.         The history is provided by the patient, the spouse and medical records.     Review of patient's allergies indicates:   Allergen Reactions    Hydromorphone      Other reaction(s): sedation    Byetta [exenatide] Rash     Other reaction(s): Rash     Past Medical History:   Diagnosis Date    Allergy     seasonal    Anxiety     Arthritis     Breast cancer 10/2012    left breast invasive ductal carcinoma    Colon polyp     Diabetes mellitus     Type 2    Diverticular disease     Diverticulitis 2009    Genetic testing 05/2017    negative Integrated [employer]    Hyperlipidemia     Hypertension     Morbid obesity     MITCHELL (obstructive sleep apnea)     Stenosis     Stenosis and insufficiency of lacrimal passages     Thyroid disease      Past Surgical History:   Procedure Laterality Date    BREAST BIOPSY Left 10/1/2012    left breast- invasive ductal carcinoma    BREAST  "BIOPSY Left 12/2017    BREAST LUMPECTOMY Left 2012    CARDIAC VALVE REPLACEMENT  12/2013    CARDIAC VALVE SURGERY  2013    CARPAL TUNNEL RELEASE      Left    CATHETERIZATION, HEART, LEFT Left 11/7/2013    Performed by [facility]onse Merchant [facility] at [facility] CATH LAB    COLONOSCOPY N/A 9/29/2017    Performed by Phani Worley MD at [facility] ENDO (4TH FLR)    COLONOSCOPY N/A 8/28/2015    Performed by Phani Worley [facility] at [facility] ENDO (4TH FLR)    DILATION AND CURETTAGE OF UTERUS  1999    Endometrial polyps    ENDOMETRIAL ABLATION  1999    Enodmetrial polyps    NIC-TRANSFORAMINAL Left 10/1/2015    [facility]    EYE SURGERY      [id number]-[id number]-G-CATH Right 2/1/2019    Performed by Gaston Flores MD at [facility] OR 2ND FLR    left lumpectomy  2012    REPLACEMENT, AORTIC VALVE N/A 12/2/2013    Performed by Venkat Webb MD at [facility] OR 2ND FLR    SHOULDER SURGERY      SKIN BIOPSY       Family History   Problem Relation Age of Onset    Breast cancer Mother 62    Colon cancer Father     Cancer Father         Colon CA (cause of death); Prostate CA (no chemo)    Heart disease Father     No Known Problems Sister     No Known Problems Brother     Cancer Maternal Grandfather         Colon CA    No Known Problems Son     Cancer Brother         prostate CA, no chemo, "it was bad"    Anxiety disorder Son     SAL disease Son     Sleep disorder Son     Ovarian cancer Neg Hx     Melanoma Neg Hx     Psoriasis Neg Hx     Lupus Neg Hx     Eczema Neg Hx     Acne Neg Hx      Social History     Tobacco Use    Smoking status: Never Smoker    Smokeless tobacco: Never Used   Substance Use Topics    Alcohol use: No     Alcohol/week: 0.0 oz    Drug use: No     Review of Systems   Constitutional: Negative for chills and fever.   HENT: Negative for sore throat and trouble swallowing.    Eyes: Positive for visual disturbance.   Respiratory: Negative for shortness of breath.  "   Cardiovascular: Negative for chest pain.   Gastrointestinal: Positive for abdominal pain. Negative for blood in stool, diarrhea, nausea and vomiting.   Genitourinary: Negative for difficulty urinating and dysuria.   Musculoskeletal: Negative for gait problem.   Skin: Negative for wound.   Neurological: Negative for speech difficulty.   Psychiatric/Behavioral: Negative for confusion.       Physical Exam     Initial Vitals [02/14/19 1458]   BP Pulse Resp Temp SpO2   (!) 180/81 76 18 98.3 °F (36.8 °C) 98 %      MAP       --         Physical Exam    Nursing note and vitals reviewed.  Constitutional: She appears well-developed and well-nourished. No distress.   HENT:   Head: Normocephalic and atraumatic.   Eyes: Pupils are equal, round, and reactive to light.   Cardiovascular: Normal rate, regular rhythm and normal heart sounds. Exam reveals no gallop and no friction rub.    No murmur heard.  Pulmonary/Chest: Breath sounds normal. No respiratory distress. She has no wheezes. She has no rhonchi. She has no rales.   Abdominal: Soft. Bowel sounds are normal. She exhibits no distension. There is no tenderness.   Musculoskeletal: Normal range of motion. She exhibits no edema.   Neurological: She is alert and oriented to person, place, and time. She has normal strength. No cranial nerve deficit.   Skin: Skin is warm and dry. No pallor.         ED Course   Procedures  Labs Reviewed   CBC W/ AUTO DIFFERENTIAL - Abnormal; Notable for the following components:       Result Value    MCV 99 (*)     MCHC 30.2 (*)     Platelets 133 (*)     Mono # 0.1 (*)     Gran% 73.4 (*)     Mono% 2.3 (*)     All other components within normal limits   COMPREHENSIVE METABOLIC PANEL - Abnormal; Notable for the following components:    Potassium 5.3 (*)     CO2 22 (*)     Glucose 136 (*)     All other components within normal limits   LIPASE   MAGNESIUM   URINALYSIS, REFLEX TO URINE CULTURE    Narrative:     Preferred Collection Type->Urine, Clean  Catch     EKG Readings: (Independently Interpreted)   Initial Reading: No STEMI. Rhythm: Normal Sinus Rhythm. Heart Rate: 76. Ectopy: No Ectopy. Conduction: Normal. ST Segments: Normal ST Segments. T Waves: Normal. Axis: Left Axis Deviation.       Imaging Results          CT Abdomen Pelvis With Contrast (Final result)  Result time 02/14/19 20:17:52    Final result by Martin Pandey MD (02/14/19 20:17:52)                 Impression:      No acute intra-abdominal findings.    Diverticulosis coli without evidence of acute diverticulitis.    Additional findings as above.    Electronically signed by resident: Og Singer  Date:    02/14/2019  Time:    19:24    Electronically signed by: Martin Pandey MD  Date:    02/14/2019  Time:    20:17             Narrative:    EXAMINATION:  CT ABDOMEN PELVIS WITH CONTRAST    CLINICAL HISTORY:  Abdominal pain, unspecified;    TECHNIQUE:  Low dose axial images, sagittal and coronal reformations were obtained from the lung bases to the pubic symphysis following the IV administration of 100 mL of Omnipaque 350 .  Oral contrast was not given.    COMPARISON:  Nuclear medicine PET-CT from 01/18/2019.  CT abdomen/pelvis from 08/26/2018.    FINDINGS:  Heart: Normal in size. No pericardial effusion.    Lung Bases: Well aerated, without consolidation or pleural fluid.    Liver: Normal in size and attenuation, with no focal hepatic lesions.    Gallbladder: No calcified gallstones.    Bile Ducts: No evidence of dilated ducts.    Pancreas: No mass or peripancreatic fat stranding.    Spleen: Unremarkable.    Adrenals: Unremarkable.    Kidneys/ Ureters: Normal in size and location. Normal concentration and excretion of contrast. No hydronephrosis or nephrolithiasis. No ureteral dilatation.    Bladder: No evidence of wall thickening.    Reproductive organs: Unremarkable.    GI Tract/Mesentery: Diverticulosis coli without evidence of acute diverticulitis.  No evidence of bowel obstruction  or inflammation. Duodenal diverticulum 2nd segment, unchanged.    Peritoneal Space: No ascites. No free air.    Retroperitoneum:  No significant adenopathy.    Abdominal wall:  Small fat containing umbilical hernia.    Vasculature: Mild atherosclerosis without aneurysm.    Bones: No acute fracture. Stable degenerative changes throughout the spine.                                 Medical Decision Making:   History:   Old Medical Records: I decided to obtain old medical records.  Clinical Tests:   Lab Tests: Ordered and Reviewed  Radiological Study: Ordered and Reviewed  Medical Tests: Ordered and Reviewed  Other:   I have discussed this case with another health care provider.  Medical decision making:  This patient was evaluated for abdominal pain and diarrhea that began after receiving chemotherapy.  She was afebrile stable vital signs. She had no clinical signs of dehydration.  She had a benign abdominal examination. Labs are unremarkable. CT of the abdomen and pelvis was negative for obstruction or other concerning acute findings.  The patient had resolution of her symptoms with ED management.  I discussed the presentation and workup with the oncology service who recommended discharge home.  She was prescribed Bentyl and Norco.  She was instructed to follow up for next scheduled chemotherapy.  She was instructed to return to the ED immediately for severe pain, inability to tolerate oral intake, or for any other concerns.  Pee So III, M.D.                      Clinical Impression:   The primary encounter diagnosis was Diarrhea, unspecified type. A diagnosis of Abdominal pain was also pertinent to this visit.      Disposition:   Disposition: Discharged  Condition: Stable                        Pee So III, MD  02/18/19 [id number]

## 2019-02-14 NOTE — ED TRIAGE NOTES
Patient in for eval of abdominal pain and diarrhea x 2 days. Patient has breast cancer (last chemo Monday) and a history of diverticulitis/diverticulosis. Sent by oncologist to rule out acute diverticulitis infection. Patient reports she has had 2 episodes of loose stools. Denies any nausea, vomiting, chest pain, headache, fever, or chills.

## 2019-02-15 NOTE — TELEPHONE ENCOUNTER
Contacted patient this morning to follow up on events of yesterday. Patient went to the ED, CT imaging with diverticuli of colon however no evidence of diverticulitis. Per patient morphine injection and pain resolved, however, pain returned later on but today is more manageable. Reeducated on use of bentyl if the cramping symptoms return. Explained that if fever/chill like feeling noted to initiate the cipro to treat ppx if concern for infection. Explained that if N/V, pain intensifies, or if fever of 100.4 for 4 hours or spike of 101 noted to return back to ED over weekend for evaluation. Discussed maintaining hydration and maintaining a light/bland diet at this time. She voiced understanding. No further questions/concerns at this time. Patient plans to return to clinic Monday for chemotherapy.

## 2019-02-18 ENCOUNTER — INFUSION (OUTPATIENT)
Dept: INFUSION THERAPY | Facility: HOSPITAL | Age: 74
End: 2019-02-18
Attending: INTERNAL MEDICINE
Payer: MEDICARE

## 2019-02-18 ENCOUNTER — OUTPATIENT CASE MANAGEMENT (OUTPATIENT)
Dept: ADMINISTRATIVE | Facility: OTHER | Age: 74
End: 2019-02-18

## 2019-02-18 VITALS
RESPIRATION RATE: 18 BRPM | HEIGHT: 62 IN | TEMPERATURE: 98 F | WEIGHT: 252 LBS | DIASTOLIC BLOOD PRESSURE: 76 MMHG | SYSTOLIC BLOOD PRESSURE: 177 MMHG | BODY MASS INDEX: 46.38 KG/M2 | HEART RATE: 76 BPM

## 2019-02-18 DIAGNOSIS — Z51.11 ENCOUNTER FOR ANTINEOPLASTIC CHEMOTHERAPY: Primary | ICD-10-CM

## 2019-02-18 DIAGNOSIS — Z51.11 ENCOUNTER FOR ANTINEOPLASTIC CHEMOTHERAPY: ICD-10-CM

## 2019-02-18 DIAGNOSIS — C50.911 MALIGNANT NEOPLASM OF RIGHT BREAST IN FEMALE, ESTROGEN RECEPTOR NEGATIVE, UNSPECIFIED SITE OF BREAST: Primary | ICD-10-CM

## 2019-02-18 DIAGNOSIS — Z17.1 MALIGNANT NEOPLASM OF RIGHT BREAST IN FEMALE, ESTROGEN RECEPTOR NEGATIVE, UNSPECIFIED SITE OF BREAST: Primary | ICD-10-CM

## 2019-02-18 DIAGNOSIS — C50.911 MALIGNANT NEOPLASM OF RIGHT FEMALE BREAST: ICD-10-CM

## 2019-02-18 DIAGNOSIS — C50.911 MALIGNANT NEOPLASM OF RIGHT BREAST IN FEMALE, ESTROGEN RECEPTOR NEGATIVE, UNSPECIFIED SITE OF BREAST: ICD-10-CM

## 2019-02-18 DIAGNOSIS — Z17.1 MALIGNANT NEOPLASM OF RIGHT BREAST IN FEMALE, ESTROGEN RECEPTOR NEGATIVE, UNSPECIFIED SITE OF BREAST: ICD-10-CM

## 2019-02-18 LAB
ALBUMIN SERPL BCP-MCNC: 3.3 G/DL
ALP SERPL-CCNC: 104 U/L
ALT SERPL W/O P-5'-P-CCNC: 19 U/L
ANION GAP SERPL CALC-SCNC: 4 MMOL/L
AST SERPL-CCNC: 15 U/L
BASOPHILS # BLD AUTO: 0.02 K/UL
BASOPHILS NFR BLD: 0.6 %
BILIRUB SERPL-MCNC: 0.4 MG/DL
BUN SERPL-MCNC: 15 MG/DL
CALCIUM SERPL-MCNC: 9.4 MG/DL
CHLORIDE SERPL-SCNC: 106 MMOL/L
CO2 SERPL-SCNC: 29 MMOL/L
CREAT SERPL-MCNC: 0.8 MG/DL
DIFFERENTIAL METHOD: ABNORMAL
EOSINOPHIL # BLD AUTO: 0.1 K/UL
EOSINOPHIL NFR BLD: 1.9 %
ERYTHROCYTE [DISTWIDTH] IN BLOOD BY AUTOMATED COUNT: 12.8 %
EST. GFR  (AFRICAN AMERICAN): >60 ML/MIN/1.73 M^2
EST. GFR  (NON AFRICAN AMERICAN): >60 ML/MIN/1.73 M^2
GLUCOSE SERPL-MCNC: 225 MG/DL
HCT VFR BLD AUTO: 36.8 %
HGB BLD-MCNC: 11.7 G/DL
IMM GRANULOCYTES # BLD AUTO: 0.03 K/UL
IMM GRANULOCYTES NFR BLD AUTO: 0.8 %
LYMPHOCYTES # BLD AUTO: 1.1 K/UL
LYMPHOCYTES NFR BLD: 29.6 %
MCH RBC QN AUTO: 29.5 PG
MCHC RBC AUTO-ENTMCNC: 31.8 G/DL
MCV RBC AUTO: 93 FL
MONOCYTES # BLD AUTO: 0.2 K/UL
MONOCYTES NFR BLD: 5 %
NEUTROPHILS # BLD AUTO: 2.2 K/UL
NEUTROPHILS NFR BLD: 62.1 %
NRBC BLD-RTO: 0 /100 WBC
PLATELET # BLD AUTO: 154 K/UL
PMV BLD AUTO: 11 FL
POTASSIUM SERPL-SCNC: 4 MMOL/L
PROT SERPL-MCNC: 6.4 G/DL
RBC # BLD AUTO: 3.96 M/UL
SODIUM SERPL-SCNC: 139 MMOL/L
WBC # BLD AUTO: 3.61 K/UL

## 2019-02-18 PROCEDURE — 96367 TX/PROPH/DG ADDL SEQ IV INF: CPT | Mod: HCNC

## 2019-02-18 PROCEDURE — A4216 STERILE WATER/SALINE, 10 ML: HCPCS | Mod: HCNC | Performed by: INTERNAL MEDICINE

## 2019-02-18 PROCEDURE — 96375 TX/PRO/DX INJ NEW DRUG ADDON: CPT | Mod: HCNC

## 2019-02-18 PROCEDURE — 85025 COMPLETE CBC W/AUTO DIFF WBC: CPT | Mod: HCNC

## 2019-02-18 PROCEDURE — 63600175 PHARM REV CODE 636 W HCPCS: Mod: HCNC | Performed by: INTERNAL MEDICINE

## 2019-02-18 PROCEDURE — 96413 CHEMO IV INFUSION 1 HR: CPT | Mod: HCNC

## 2019-02-18 PROCEDURE — S0028 INJECTION, FAMOTIDINE, 20 MG: HCPCS | Mod: HCNC | Performed by: INTERNAL MEDICINE

## 2019-02-18 PROCEDURE — 25000003 PHARM REV CODE 250: Mod: HCNC | Performed by: INTERNAL MEDICINE

## 2019-02-18 PROCEDURE — 80053 COMPREHEN METABOLIC PANEL: CPT | Mod: HCNC

## 2019-02-18 RX ORDER — SODIUM CHLORIDE 0.9 % (FLUSH) 0.9 %
10 SYRINGE (ML) INJECTION
Status: DISCONTINUED | OUTPATIENT
Start: 2019-02-18 | End: 2019-02-18 | Stop reason: HOSPADM

## 2019-02-18 RX ORDER — FAMOTIDINE 10 MG/ML
20 INJECTION INTRAVENOUS
Status: COMPLETED | OUTPATIENT
Start: 2019-02-18 | End: 2019-02-18

## 2019-02-18 RX ORDER — DIPHENHYDRAMINE HYDROCHLORIDE 50 MG/ML
50 INJECTION INTRAMUSCULAR; INTRAVENOUS ONCE AS NEEDED
Status: DISCONTINUED | OUTPATIENT
Start: 2019-02-18 | End: 2019-02-18 | Stop reason: HOSPADM

## 2019-02-18 RX ORDER — SODIUM CHLORIDE 0.9 % (FLUSH) 0.9 %
10 SYRINGE (ML) INJECTION
Status: COMPLETED | OUTPATIENT
Start: 2019-02-18 | End: 2019-02-18

## 2019-02-18 RX ORDER — HEPARIN 100 UNIT/ML
500 SYRINGE INTRAVENOUS
Status: CANCELLED | OUTPATIENT
Start: 2019-02-18

## 2019-02-18 RX ORDER — HEPARIN 100 UNIT/ML
500 SYRINGE INTRAVENOUS
Status: COMPLETED | OUTPATIENT
Start: 2019-02-18 | End: 2019-02-18

## 2019-02-18 RX ORDER — SODIUM CHLORIDE 0.9 % (FLUSH) 0.9 %
10 SYRINGE (ML) INJECTION
Status: CANCELLED | OUTPATIENT
Start: 2019-02-18

## 2019-02-18 RX ORDER — HEPARIN 100 UNIT/ML
500 SYRINGE INTRAVENOUS
Status: DISCONTINUED | OUTPATIENT
Start: 2019-02-18 | End: 2019-02-18 | Stop reason: HOSPADM

## 2019-02-18 RX ORDER — EPINEPHRINE 0.3 MG/.3ML
0.3 INJECTION SUBCUTANEOUS ONCE AS NEEDED
Status: DISCONTINUED | OUTPATIENT
Start: 2019-02-18 | End: 2019-02-18 | Stop reason: HOSPADM

## 2019-02-18 RX ADMIN — HEPARIN 500 UNITS: 100 SYRINGE at 08:02

## 2019-02-18 RX ADMIN — Medication 10 ML: at 11:02

## 2019-02-18 RX ADMIN — PACLITAXEL 180 MG: 6 INJECTION, SOLUTION INTRAVENOUS at 10:02

## 2019-02-18 RX ADMIN — SODIUM CHLORIDE: 0.9 INJECTION, SOLUTION INTRAVENOUS at 09:02

## 2019-02-18 RX ADMIN — FAMOTIDINE 20 MG: 10 INJECTION INTRAVENOUS at 09:02

## 2019-02-18 RX ADMIN — DEXAMETHASONE SODIUM PHOSPHATE 10 MG: 4 INJECTION, SOLUTION INTRA-ARTICULAR; INTRALESIONAL; INTRAMUSCULAR; INTRAVENOUS; SOFT TISSUE at 09:02

## 2019-02-18 RX ADMIN — HEPARIN SODIUM (PORCINE) LOCK FLUSH IV SOLN 100 UNIT/ML 500 UNITS: 100 SOLUTION at 11:02

## 2019-02-18 RX ADMIN — Medication 10 ML: at 08:02

## 2019-02-18 RX ADMIN — DIPHENHYDRAMINE HYDROCHLORIDE 25 MG: 50 INJECTION, SOLUTION INTRAMUSCULAR; INTRAVENOUS at 10:02

## 2019-02-18 NOTE — PROGRESS NOTES
The following patient has been assigned to Cha Taylor RN with Outpatient Complex Care Management for high risk screening.    Reason: High Risk Recently Discharged    Please contact [facility] at [phone] with any questions.    Thank you,    Cassie Campbell    Outpatient Case Management

## 2019-02-18 NOTE — PLAN OF CARE
Problem: Adult Inpatient Plan of Care  Goal: Plan of Care Review  Did well with weekly taxol.Still having loose stools Encouraged to get immodium.Sensitive to tegaderm.

## 2019-02-19 RX ORDER — FAMOTIDINE 10 MG/ML
20 INJECTION INTRAVENOUS
Status: CANCELLED | OUTPATIENT
Start: 2019-02-26

## 2019-02-19 RX ORDER — DIPHENHYDRAMINE HYDROCHLORIDE 50 MG/ML
50 INJECTION INTRAMUSCULAR; INTRAVENOUS ONCE AS NEEDED
Status: CANCELLED | OUTPATIENT
Start: 2019-02-26 | End: 2019-02-26

## 2019-02-19 RX ORDER — EPINEPHRINE 0.3 MG/.3ML
0.3 INJECTION SUBCUTANEOUS ONCE AS NEEDED
Status: CANCELLED | OUTPATIENT
Start: 2019-02-26 | End: 2019-02-26

## 2019-02-19 RX ORDER — SODIUM CHLORIDE 0.9 % (FLUSH) 0.9 %
10 SYRINGE (ML) INJECTION
Status: CANCELLED | OUTPATIENT
Start: 2019-02-26

## 2019-02-19 RX ORDER — HEPARIN 100 UNIT/ML
500 SYRINGE INTRAVENOUS
Status: CANCELLED | OUTPATIENT
Start: 2019-02-26

## 2019-02-20 ENCOUNTER — OUTPATIENT CASE MANAGEMENT (OUTPATIENT)
Dept: ADMINISTRATIVE | Facility: OTHER | Age: 74
End: 2019-02-20

## 2019-02-20 NOTE — PROGRESS NOTES
Summary:1st attempt to complete initial assessment-spoke with pt -pt stated that she had just gottne chemotherpay on Monday and was not feeling well today at all -stating the she was achy and had just gotten into her recliner-requested a call back later    Interventions:  Honored pt requested - asked about tomorrow - time agreed on    Plan:  Called back tomorrow at 11 am per pt request

## 2019-02-25 ENCOUNTER — TELEPHONE (OUTPATIENT)
Dept: HEMATOLOGY/ONCOLOGY | Facility: CLINIC | Age: 74
End: 2019-02-25

## 2019-02-25 ENCOUNTER — INFUSION (OUTPATIENT)
Dept: INFUSION THERAPY | Facility: HOSPITAL | Age: 74
End: 2019-02-25
Attending: INTERNAL MEDICINE
Payer: MEDICARE

## 2019-02-25 ENCOUNTER — OUTPATIENT CASE MANAGEMENT (OUTPATIENT)
Dept: ADMINISTRATIVE | Facility: OTHER | Age: 74
End: 2019-02-25

## 2019-02-25 VITALS
SYSTOLIC BLOOD PRESSURE: 136 MMHG | RESPIRATION RATE: 18 BRPM | HEART RATE: 79 BPM | BODY MASS INDEX: 46.38 KG/M2 | WEIGHT: 252 LBS | DIASTOLIC BLOOD PRESSURE: 65 MMHG | HEIGHT: 62 IN

## 2019-02-25 DIAGNOSIS — C50.911 MALIGNANT NEOPLASM OF RIGHT BREAST IN FEMALE, ESTROGEN RECEPTOR NEGATIVE, UNSPECIFIED SITE OF BREAST: ICD-10-CM

## 2019-02-25 DIAGNOSIS — Z17.1 MALIGNANT NEOPLASM OF RIGHT BREAST IN FEMALE, ESTROGEN RECEPTOR NEGATIVE, UNSPECIFIED SITE OF BREAST: Primary | ICD-10-CM

## 2019-02-25 DIAGNOSIS — C50.911 MALIGNANT NEOPLASM OF RIGHT FEMALE BREAST: ICD-10-CM

## 2019-02-25 DIAGNOSIS — Z51.11 ENCOUNTER FOR ANTINEOPLASTIC CHEMOTHERAPY: Primary | ICD-10-CM

## 2019-02-25 DIAGNOSIS — Z51.11 ENCOUNTER FOR ANTINEOPLASTIC CHEMOTHERAPY: ICD-10-CM

## 2019-02-25 DIAGNOSIS — Z17.1 MALIGNANT NEOPLASM OF RIGHT BREAST IN FEMALE, ESTROGEN RECEPTOR NEGATIVE, UNSPECIFIED SITE OF BREAST: ICD-10-CM

## 2019-02-25 DIAGNOSIS — C50.911 MALIGNANT NEOPLASM OF RIGHT BREAST IN FEMALE, ESTROGEN RECEPTOR NEGATIVE, UNSPECIFIED SITE OF BREAST: Primary | ICD-10-CM

## 2019-02-25 LAB
ALBUMIN SERPL BCP-MCNC: 3.6 G/DL
ALP SERPL-CCNC: 116 U/L
ALT SERPL W/O P-5'-P-CCNC: 20 U/L
ANION GAP SERPL CALC-SCNC: 8 MMOL/L
AST SERPL-CCNC: 14 U/L
BASOPHILS # BLD AUTO: 0.03 K/UL
BASOPHILS NFR BLD: 0.7 %
BILIRUB SERPL-MCNC: 0.5 MG/DL
BUN SERPL-MCNC: 16 MG/DL
CALCIUM SERPL-MCNC: 9.5 MG/DL
CHLORIDE SERPL-SCNC: 103 MMOL/L
CO2 SERPL-SCNC: 26 MMOL/L
CREAT SERPL-MCNC: 0.9 MG/DL
DIFFERENTIAL METHOD: ABNORMAL
EOSINOPHIL # BLD AUTO: 0.1 K/UL
EOSINOPHIL NFR BLD: 1.4 %
ERYTHROCYTE [DISTWIDTH] IN BLOOD BY AUTOMATED COUNT: 13.1 %
EST. GFR  (AFRICAN AMERICAN): >60 ML/MIN/1.73 M^2
EST. GFR  (NON AFRICAN AMERICAN): >60 ML/MIN/1.73 M^2
GLUCOSE SERPL-MCNC: 165 MG/DL
HCT VFR BLD AUTO: 39.4 %
HGB BLD-MCNC: 12.7 G/DL
IMM GRANULOCYTES # BLD AUTO: 0.04 K/UL
IMM GRANULOCYTES NFR BLD AUTO: 0.9 %
LYMPHOCYTES # BLD AUTO: 1.2 K/UL
LYMPHOCYTES NFR BLD: 27.1 %
MCH RBC QN AUTO: 30.1 PG
MCHC RBC AUTO-ENTMCNC: 32.2 G/DL
MCV RBC AUTO: 93 FL
MONOCYTES # BLD AUTO: 0.2 K/UL
MONOCYTES NFR BLD: 5.3 %
NEUTROPHILS # BLD AUTO: 2.8 K/UL
NEUTROPHILS NFR BLD: 64.6 %
NRBC BLD-RTO: 0 /100 WBC
PLATELET # BLD AUTO: 186 K/UL
PMV BLD AUTO: 11 FL
POTASSIUM SERPL-SCNC: 4.1 MMOL/L
PROT SERPL-MCNC: 7 G/DL
RBC # BLD AUTO: 4.22 M/UL
SODIUM SERPL-SCNC: 137 MMOL/L
WBC # BLD AUTO: 4.36 K/UL

## 2019-02-25 PROCEDURE — 36593 DECLOT VASCULAR DEVICE: CPT | Mod: HCNC

## 2019-02-25 PROCEDURE — S0028 INJECTION, FAMOTIDINE, 20 MG: HCPCS | Mod: HCNC | Performed by: INTERNAL MEDICINE

## 2019-02-25 PROCEDURE — 96523 IRRIG DRUG DELIVERY DEVICE: CPT | Mod: HCNC

## 2019-02-25 PROCEDURE — 63600175 PHARM REV CODE 636 W HCPCS: Mod: HCNC | Performed by: INTERNAL MEDICINE

## 2019-02-25 PROCEDURE — 85025 COMPLETE CBC W/AUTO DIFF WBC: CPT | Mod: HCNC

## 2019-02-25 PROCEDURE — 96413 CHEMO IV INFUSION 1 HR: CPT | Mod: HCNC

## 2019-02-25 PROCEDURE — 63600175 PHARM REV CODE 636 W HCPCS: Mod: HCNC,JG | Performed by: INTERNAL MEDICINE

## 2019-02-25 PROCEDURE — 96367 TX/PROPH/DG ADDL SEQ IV INF: CPT | Mod: HCNC

## 2019-02-25 PROCEDURE — 25000003 PHARM REV CODE 250: Mod: HCNC | Performed by: INTERNAL MEDICINE

## 2019-02-25 PROCEDURE — 96375 TX/PRO/DX INJ NEW DRUG ADDON: CPT | Mod: HCNC

## 2019-02-25 PROCEDURE — 80053 COMPREHEN METABOLIC PANEL: CPT | Mod: HCNC

## 2019-02-25 RX ORDER — EPINEPHRINE 0.3 MG/.3ML
0.3 INJECTION SUBCUTANEOUS ONCE AS NEEDED
Status: DISCONTINUED | OUTPATIENT
Start: 2019-02-25 | End: 2019-02-25 | Stop reason: HOSPADM

## 2019-02-25 RX ORDER — SODIUM CHLORIDE 0.9 % (FLUSH) 0.9 %
10 SYRINGE (ML) INJECTION
Status: CANCELLED | OUTPATIENT
Start: 2019-02-25

## 2019-02-25 RX ORDER — SODIUM CHLORIDE 0.9 % (FLUSH) 0.9 %
10 SYRINGE (ML) INJECTION
Status: DISCONTINUED | OUTPATIENT
Start: 2019-02-25 | End: 2019-02-25 | Stop reason: HOSPADM

## 2019-02-25 RX ORDER — HEPARIN 100 UNIT/ML
500 SYRINGE INTRAVENOUS
Status: DISCONTINUED | OUTPATIENT
Start: 2019-02-25 | End: 2019-02-25 | Stop reason: HOSPADM

## 2019-02-25 RX ORDER — DIPHENHYDRAMINE HYDROCHLORIDE 50 MG/ML
50 INJECTION INTRAMUSCULAR; INTRAVENOUS ONCE AS NEEDED
Status: DISCONTINUED | OUTPATIENT
Start: 2019-02-25 | End: 2019-02-25 | Stop reason: HOSPADM

## 2019-02-25 RX ORDER — FAMOTIDINE 10 MG/ML
20 INJECTION INTRAVENOUS
Status: COMPLETED | OUTPATIENT
Start: 2019-02-25 | End: 2019-02-25

## 2019-02-25 RX ORDER — HEPARIN 100 UNIT/ML
500 SYRINGE INTRAVENOUS
Status: CANCELLED | OUTPATIENT
Start: 2019-02-25

## 2019-02-25 RX ADMIN — ALTEPLASE 2 MG: 2.2 INJECTION, POWDER, LYOPHILIZED, FOR SOLUTION INTRAVENOUS at 12:02

## 2019-02-25 RX ADMIN — DEXAMETHASONE SODIUM PHOSPHATE 10 MG: 4 INJECTION, SOLUTION INTRA-ARTICULAR; INTRALESIONAL; INTRAMUSCULAR; INTRAVENOUS; SOFT TISSUE at 02:02

## 2019-02-25 RX ADMIN — FAMOTIDINE 20 MG: 10 INJECTION, SOLUTION INTRAVENOUS at 02:02

## 2019-02-25 RX ADMIN — PACLITAXEL 180 MG: 6 INJECTION, SOLUTION INTRAVENOUS at 03:02

## 2019-02-25 RX ADMIN — DIPHENHYDRAMINE HYDROCHLORIDE 25 MG: 50 INJECTION, SOLUTION INTRAMUSCULAR; INTRAVENOUS at 02:02

## 2019-02-25 NOTE — PLAN OF CARE
Problem: Adult Inpatient Plan of Care  Goal: Plan of Care Review  Outcome: Ongoing (interventions implemented as appropriate)  Pt tolerated taxol well.  No s/s of reaction. Vitals stable, NAD.

## 2019-02-25 NOTE — TELEPHONE ENCOUNTER
Spoke with pt to reschedule her labs to clinic collects. Pt informed labs were rescheduled and she can get them drawn from port now. Pt verbalized understanding.      ----- Message from Kailee Nunez sent at 2/25/2019 10:48 AM CST -----  Contact: Pt   Pt called about the 2/25 12 p.m appt scheduled for today. She is used to going up on the 5th floor but the visit has her scheduled to come to the 3rd instead. Please call her at [phone]

## 2019-02-25 NOTE — NURSING
1255- Patient arrived ambulatory to the unit to have labs drawn from port.  Port was accessed, flushed well but no blood return aspirated.  Cath berto instilled and labs drawn from peripheral site.  Patient discharged to next appointment, RN will re-assess cathflo.

## 2019-02-25 NOTE — LETTER
February 25, 2019    Alina Narayan  [address] [id number]             [facility] [address] [place] [address] Dear Ms. Sylvain,    I work with [facility] Outpatient Case Management Department. We received a referral to call you to discuss your medical history.These services are free of charge and are offered to [facility] patients who have recently been discharged from any of our facilities or who have complex medical conditions that may require the skill of a nurse to assist with management.         .      I attempted to reach you by telephone, but I was unsuccessful. Please call our department so that we can go over some questions with you regarding your health.    The Outpatient Case Management Department can be reached at [phone] from 8:00AM to 4:30 PM on Monday thru Friday. [facility] also has a program where a nurse is available 24/7 to answer questions or provide medical advice, their number is [phone].    Thanks,      Cha SanchezVirginia) Claudia RN  [phone]  Outpatient Care Management

## 2019-02-26 ENCOUNTER — TELEPHONE (OUTPATIENT)
Dept: HEMATOLOGY/ONCOLOGY | Facility: CLINIC | Age: 74
End: 2019-02-26

## 2019-02-26 NOTE — TELEPHONE ENCOUNTER
Spoke with patient to inform her no one will be available this Monday to do her port draw for labwork and informed pt it may have to be drawn peripherally. Pt did not seem too happy about this idea, but informed pt would check with Dr Dwyer to see if she could have it drawn from the port this week.       ----- Message from Britney Stacy RN sent at 2/26/2019  3:55 PM CST -----  Message   Received: Today   Message Contents   Jamie Stacy RN         Chemo doesn't have any availability on 03/4/19 before visit due to the holiday, can you see if a time can be given and pt can just come per nurse approval, or see if pt will have labs drawn on the 3rd floor, please advise.Thanks!   Previous Messages        ----- Message -----   From: Britney Stacy RN   Sent: 2/25/2019   2:47 PM   To: Alicia Marion     Could someone please change Ms Narayan's lab appt on 3/4 to a port draw. Thanks in advance!

## 2019-02-27 ENCOUNTER — TELEPHONE (OUTPATIENT)
Dept: HEMATOLOGY/ONCOLOGY | Facility: CLINIC | Age: 74
End: 2019-02-27

## 2019-02-27 NOTE — TELEPHONE ENCOUNTER
Spoke with pt to let her know we were able to get her scheduled for labs at 10:15 on 3/4/19 before her appt with Dr Dwyer. Pt verbalized understanding of this information.     ----- Message from Kailee Nunez sent at 2/27/2019  2:32 PM CST -----  Contact: Pt   Pt was expecting a call in regards to her appt, in consideration for the time it will take to travel. Please follow up with her at [phone]

## 2019-03-01 ENCOUNTER — TELEPHONE (OUTPATIENT)
Dept: INTERNAL MEDICINE | Facility: CLINIC | Age: 74
End: 2019-03-01

## 2019-03-01 ENCOUNTER — OUTPATIENT CASE MANAGEMENT (OUTPATIENT)
Dept: ADMINISTRATIVE | Facility: OTHER | Age: 74
End: 2019-03-01

## 2019-03-01 PROBLEM — C50.611 CANCER OF AXILLARY TAIL OF RIGHT BREAST: Status: ACTIVE | Noted: 2019-03-01

## 2019-03-01 NOTE — PROGRESS NOTES
"Summary:  spoke with pt regarding enrollent in [facility] - briefly explained program - pt agree to enroll.  Pt states that she is diabetic -and her bloods sugars are normally in the 120's to 130's  -but with chemo it has been in the 160's but never in the 180's 190's or over 200 according to the pt.Pt reports that she fell a little over a year ago and really injured her knee - pt reports that she has very bad knees and needs replacements but it has taken a back seat to the cancer.  Pt states that her biggest problem is her cancer reporting that she has 5 months of chemo abd this upcoming Monday will be her fourth treatment - additionally pt reports that after her chemo she faces a bilateral mastectomy.  Pt stated she was asked about if she was "swisihing" pt was interested in earning what she should be doing with mouth care - pt stated that she has been having diarrhea and a rough time with chemo-pt reports she is drinking  3 bottles of alkaline water per day to stay hydrated.  Pt reports she does not have an appetite as her digestive system is "messed up" from chemo      Interventions:  Messaged PCP regarding med rec discrepancies  Discussed mount care and care needed during chemo  Mailed chemo therapy educational info on care -mouth, skin, nails  Discuss disaster plan encouraged pt to talk with oncology about chemo if evacuated  Mailed pill box  Mailed info on fall prevention  Mailed blood glucose logs  Mailed OTC [employer] Fe  Discussed staying hydrated and also using an electrolyte fluid like Pedialyte or poweraide etc to make sure she does not get dehydrtated    Plan:  Follow-up on receipt of literature  Follow on chemo tolerance and mouth care  Follow-up on taking and recording of blood sugars    Todays [facility] Self-Management Care Plan was developed with the patients/caregivers input and was based on identified barriers from todays assessment.  Goals were written today with the patient/caregiver and the " patient has agreed to work towards these goals to improve his/her overall well-being. Patient verbalized understanding of the care plan, goals, and all of today's instructions. Encouraged patient/caregiver to communicate with his/her physician and health care team about health conditions and the treatment plan.  Provided my contact information today and encouraged patient/caregiver to call me with any questions as needed.

## 2019-03-01 NOTE — PROGRESS NOTES
Subjective:       Patient ID: Alina Narayan is a 73 y.o. female.    Chief Complaint: No chief complaint on file.    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  She has received 3 cycles of weekly Taxol.     Major side effect has been related to her GI system.  She has some soft bowel movements and some abdominal cramps starting after her chemotherapy.  She actually went to the emergency room after 1st treatment.  She has Imodium and dicyclomine to take at home.    Current history:  abnormal mammogram of the right breast in December 2018.  She was having no breast symptoms at that time    That mammogram showed a 13 mm mass in the right axillary tail.  By ultrasound there was a 6 x 9 x 11 mm intramammary node and a 2nd 5 x 6 x 6 mm hypoechoic mass in the axillary tail.  On January 7, 2019 both masses were biopsied and both showed lymph nodes with metastatic carcinoma which was ER negative NH negative and HER2 negative.  Ki-67 was 40%.    MRI on January 15, 2019 showed 2 right axillary lymph nodes the largest measured 1.4 x 1.0 cm.  There were no abnormalities in the right breast.    PET scan was then performed which showed only low-grade activity in the right axilla with an SUV of 1.32.    Previous breast cancer history History: On September 25, 2012 she underwent a screening mammogram which showed an asymmetric density in the left breast at 2:00 position. A followup mammogram on the 27th showed an oval mass in that area ultrasound showed a 6 mm solid mass. Core needle biopsy on October 1 showed invasive carcinoma ER 90% positive NH 80% positive and HER-2 negative. On October 29 she underwent lumpectomy and sentinel lymph node biopsy. That showed a 7 mm low-grade (1+2+1) infiltrating carcinoma. 3 sentinel lymph nodes were negative. Final pathological stage TIB N0 stage IA.  She completed letrozole  therapy in 2017.    She was referred  for genetic testing (mother with breast cancer, father and brother with prostate CA)  - integrated BRCA testing through [employer] was negative.  She did not have more comprehensive testing is at was denied.    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  She has had mitral valve replacement.      Review of Systems   Constitutional: Negative for activity change (limited by knees), appetite change, fever and unexpected weight change.   Eyes: Negative for visual disturbance (chronic dry eyes ).   Respiratory: Negative for cough and shortness of breath.    Cardiovascular: Negative for chest pain.   Gastrointestinal: Positive for abdominal pain (Crampy with loose bowels). Negative for constipation and diarrhea.   Genitourinary: Negative for frequency.   Musculoskeletal: Positive for arthralgias (knees) and back pain.   Skin: Negative for rash.   Neurological: Negative for headaches.   Hematological: Negative for adenopathy.   Psychiatric/Behavioral: Negative for dysphoric mood. The patient is not nervous/anxious.        Objective:      Physical Exam   Constitutional: She is oriented to person, place, and time. She appears well-developed and well-nourished. No distress.   Obese   HENT:   Mouth/Throat: No oropharyngeal exudate.   Eyes: No scleral icterus.   Cardiovascular: Normal rate and regular rhythm.   Pulmonary/Chest: Effort normal and breath sounds normal. She has no wheezes. She has no rales. Right breast exhibits no mass, no nipple discharge and no skin change. Left breast exhibits no mass, no nipple discharge and no skin change.       Abdominal: Soft. There is no tenderness.   Lymphadenopathy:     She has no cervical adenopathy.     She has no axillary adenopathy.        Right: No supraclavicular adenopathy present.        Left: No supraclavicular adenopathy present.   Neurological: She is alert and oriented to person, place, and time.    Psychiatric: She has a normal mood and affect. Her behavior is normal. Thought content normal.   Vitals reviewed.      Assessment:     CBC is unremarkable  1. Encounter for antineoplastic chemotherapy    2. Cancer of axillary tail of right breast        Plan:       Continue current therapy.      Distress Screening Results: Psychosocial Distress screening score of Distress Score: 0 noted and reviewed. No intervention indicated.

## 2019-03-01 NOTE — TELEPHONE ENCOUNTER
----- Message from Sharron Taylor RN sent at 3/1/2019 12:26 PM CST -----  Contact: Cha Taylor RN [facility]  Good Afternoon,    The above pt was enrolled in Outpatient Care Management today.  The med rec revealed the following discrepancies.:  Asprin 81 mg 1 daily - not taking  Bentyl 20 mg 3x/day as needed for abdominal cramping -not taking  Norco 5-325 mg q 4 hr for pain PRN - not taking  .  This is just FYI as we cannot change the MAR - thank you for your time and attention in this matter.    Best Regards,    Cha ([place]) Claudia FRIEND BSN RN   Outpatient Care Management  [phone]

## 2019-03-01 NOTE — PATIENT INSTRUCTIONS
Fall Due to Dizziness, Weakness, or Loss of Balance  The symptoms that led to your fall have been evaluated. Your doctor feels it is safe for you to return home.  Many things can cause you to become dizzy. Everyone means a little something different by the word dizzy. People may describe their symptoms using these words:  · It doesnt feel right in my head  · It feels like spinning in my head  · It seems like the room is spinning  · My balance feels off  · I feel lightheaded, like I am going to pass out  All these descriptions can have real causes.     Your balance mechanism is in your inner ear. Anything disturbing it can make you feel dizzy, whether it is from a cold, an injury, or many other things. Anything that causes your blood pressure to drop suddenly can make you feel lightheaded, or like you are going to faint. This is because at that moment there might not be enough blood flowing to your brain. Causes include:  · Medicines  · Dehydration  · Standing up or bending over too quickly  · Becoming overheated  · Taking a hot shower or bath  · Straining hard while lifting something or using the toilet  · Strokes, heart attack, heart valve disease, very slow or very fast heart rate  · Low blood sugar  · Ear infection  · Hyperventilation  · Anemia  · Trauma  · Infection  · Panic attack  · Pregnancy  You may be at risk of repeat falls. Take precautions described below to prevent another fall.  Home care  · If you become lightheaded or dizzy, lie down immediately or sit and lean forward with your head down. It is better to do this than fall and seriously hurt or injure yourself.  · Rest today. When changing position, take a moment to be sure any dizziness goes away before standing and walking.  · If you have been prescribed a walker, be sure to use it whenever you walk, even if it is a short distance.  · If you were injured during the fall, follow the advice from your doctor regarding care of your injury.  · Be  sure your doctor knows all the medicines, herbs, and supplements you take. Some medicines can cause dizziness.  Follow-up care  Unless given other advice, call your doctor on the next office day to advise of your fall and to schedule an appointment. You may require further treatment for the underlying condition that caused todays fall.  If X-ray or a CT scan were done, you will be notified of the results, especially if it affects treatment.  Call 911  Call 911 if any of these occur:  · Trouble breathing  · Confused or difficulty arousing  · Fainting or loss of consciousness  · Rapid or very slow heart rate  · Seizure  · Difficulty with speech or vision, weakness of an arm or leg  · Difficulty walking or talking, loss of balance  · Numbness or weakness in one side of your body, facial droop  When to seek medical advice  Call your healthcare provider right away if any of the following occur:  · Another fall  · Continued dizzy spells  · Severe headache  · Blood in vomit or stools (black or red color)  Date Last Reviewed: 11/5/2015  © 0215-6153 [employer], [employer]. [address] [place] [address], [place] [address]. All rights reserved. This information is not intended as a substitute for professional medical care. Always follow your healthcare professional's instructions.

## 2019-03-04 ENCOUNTER — INFUSION (OUTPATIENT)
Dept: INFUSION THERAPY | Facility: HOSPITAL | Age: 74
End: 2019-03-04
Attending: INTERNAL MEDICINE
Payer: MEDICARE

## 2019-03-04 ENCOUNTER — OFFICE VISIT (OUTPATIENT)
Dept: HEMATOLOGY/ONCOLOGY | Facility: CLINIC | Age: 74
End: 2019-03-04
Payer: MEDICARE

## 2019-03-04 VITALS
DIASTOLIC BLOOD PRESSURE: 58 MMHG | TEMPERATURE: 98 F | HEIGHT: 62 IN | RESPIRATION RATE: 20 BRPM | BODY MASS INDEX: 47.15 KG/M2 | OXYGEN SATURATION: 97 % | WEIGHT: 256.19 LBS | HEART RATE: 80 BPM | SYSTOLIC BLOOD PRESSURE: 128 MMHG

## 2019-03-04 VITALS
WEIGHT: 256.19 LBS | BODY MASS INDEX: 47.15 KG/M2 | RESPIRATION RATE: 18 BRPM | HEART RATE: 77 BPM | TEMPERATURE: 98 F | HEIGHT: 62 IN | DIASTOLIC BLOOD PRESSURE: 68 MMHG | SYSTOLIC BLOOD PRESSURE: 129 MMHG

## 2019-03-04 DIAGNOSIS — C50.611 CANCER OF AXILLARY TAIL OF RIGHT BREAST: ICD-10-CM

## 2019-03-04 DIAGNOSIS — Z51.11 ENCOUNTER FOR ANTINEOPLASTIC CHEMOTHERAPY: ICD-10-CM

## 2019-03-04 DIAGNOSIS — Z51.11 ENCOUNTER FOR ANTINEOPLASTIC CHEMOTHERAPY: Primary | ICD-10-CM

## 2019-03-04 DIAGNOSIS — Z17.1 MALIGNANT NEOPLASM OF RIGHT BREAST IN FEMALE, ESTROGEN RECEPTOR NEGATIVE, UNSPECIFIED SITE OF BREAST: Primary | ICD-10-CM

## 2019-03-04 DIAGNOSIS — C50.911 MALIGNANT NEOPLASM OF RIGHT BREAST IN FEMALE, ESTROGEN RECEPTOR NEGATIVE, UNSPECIFIED SITE OF BREAST: Primary | ICD-10-CM

## 2019-03-04 DIAGNOSIS — Z17.1 MALIGNANT NEOPLASM OF RIGHT BREAST IN FEMALE, ESTROGEN RECEPTOR NEGATIVE, UNSPECIFIED SITE OF BREAST: ICD-10-CM

## 2019-03-04 DIAGNOSIS — C50.911 MALIGNANT NEOPLASM OF RIGHT BREAST IN FEMALE, ESTROGEN RECEPTOR NEGATIVE, UNSPECIFIED SITE OF BREAST: ICD-10-CM

## 2019-03-04 DIAGNOSIS — C50.911 MALIGNANT NEOPLASM OF RIGHT FEMALE BREAST: ICD-10-CM

## 2019-03-04 LAB
ALBUMIN SERPL BCP-MCNC: 3.4 G/DL
ALP SERPL-CCNC: 96 U/L
ALT SERPL W/O P-5'-P-CCNC: 19 U/L
ANION GAP SERPL CALC-SCNC: 8 MMOL/L
AST SERPL-CCNC: 16 U/L
BASOPHILS # BLD AUTO: 0.03 K/UL
BASOPHILS NFR BLD: 0.7 %
BILIRUB SERPL-MCNC: 0.4 MG/DL
BUN SERPL-MCNC: 15 MG/DL
CALCIUM SERPL-MCNC: 9.5 MG/DL
CHLORIDE SERPL-SCNC: 104 MMOL/L
CO2 SERPL-SCNC: 27 MMOL/L
CREAT SERPL-MCNC: 0.8 MG/DL
DIFFERENTIAL METHOD: ABNORMAL
EOSINOPHIL # BLD AUTO: 0.1 K/UL
EOSINOPHIL NFR BLD: 2.7 %
ERYTHROCYTE [DISTWIDTH] IN BLOOD BY AUTOMATED COUNT: 13.2 %
EST. GFR  (AFRICAN AMERICAN): >60 ML/MIN/1.73 M^2
EST. GFR  (NON AFRICAN AMERICAN): >60 ML/MIN/1.73 M^2
GLUCOSE SERPL-MCNC: 158 MG/DL
HCT VFR BLD AUTO: 37.4 %
HGB BLD-MCNC: 12 G/DL
IMM GRANULOCYTES # BLD AUTO: 0.06 K/UL
IMM GRANULOCYTES NFR BLD AUTO: 1.5 %
LYMPHOCYTES # BLD AUTO: 1.3 K/UL
LYMPHOCYTES NFR BLD: 32.8 %
MCH RBC QN AUTO: 29.8 PG
MCHC RBC AUTO-ENTMCNC: 32.1 G/DL
MCV RBC AUTO: 93 FL
MONOCYTES # BLD AUTO: 0.2 K/UL
MONOCYTES NFR BLD: 5.9 %
NEUTROPHILS # BLD AUTO: 2.3 K/UL
NEUTROPHILS NFR BLD: 56.4 %
NRBC BLD-RTO: 0 /100 WBC
PLATELET # BLD AUTO: 182 K/UL
PMV BLD AUTO: 11 FL
POTASSIUM SERPL-SCNC: 4.5 MMOL/L
PROT SERPL-MCNC: 6.6 G/DL
RBC # BLD AUTO: 4.03 M/UL
SODIUM SERPL-SCNC: 139 MMOL/L
WBC # BLD AUTO: 4.08 K/UL

## 2019-03-04 PROCEDURE — 3078F DIAST BP <80 MM HG: CPT | Mod: HCNC,CPTII,S$GLB, | Performed by: INTERNAL MEDICINE

## 2019-03-04 PROCEDURE — 99214 PR OFFICE/OUTPT VISIT, EST, LEVL IV, 30-39 MIN: ICD-10-PCS | Mod: HCNC,S$GLB,, | Performed by: INTERNAL MEDICINE

## 2019-03-04 PROCEDURE — A4216 STERILE WATER/SALINE, 10 ML: HCPCS | Mod: HCNC | Performed by: INTERNAL MEDICINE

## 2019-03-04 PROCEDURE — 25000003 PHARM REV CODE 250: Mod: HCNC | Performed by: INTERNAL MEDICINE

## 2019-03-04 PROCEDURE — 99999 PR PBB SHADOW E&M-EST. PATIENT-LVL V: CPT | Mod: PBBFAC,HCNC,, | Performed by: INTERNAL MEDICINE

## 2019-03-04 PROCEDURE — 85025 COMPLETE CBC W/AUTO DIFF WBC: CPT | Mod: HCNC

## 2019-03-04 PROCEDURE — S0028 INJECTION, FAMOTIDINE, 20 MG: HCPCS | Mod: HCNC | Performed by: INTERNAL MEDICINE

## 2019-03-04 PROCEDURE — 3074F PR MOST RECENT SYSTOLIC BLOOD PRESSURE < 130 MM HG: ICD-10-PCS | Mod: HCNC,CPTII,S$GLB, | Performed by: INTERNAL MEDICINE

## 2019-03-04 PROCEDURE — 63600175 PHARM REV CODE 636 W HCPCS: Mod: HCNC | Performed by: INTERNAL MEDICINE

## 2019-03-04 PROCEDURE — 96413 CHEMO IV INFUSION 1 HR: CPT | Mod: HCNC

## 2019-03-04 PROCEDURE — 80053 COMPREHEN METABOLIC PANEL: CPT | Mod: HCNC

## 2019-03-04 PROCEDURE — 99999 PR PBB SHADOW E&M-EST. PATIENT-LVL V: ICD-10-PCS | Mod: PBBFAC,HCNC,, | Performed by: INTERNAL MEDICINE

## 2019-03-04 PROCEDURE — 96367 TX/PROPH/DG ADDL SEQ IV INF: CPT | Mod: HCNC

## 2019-03-04 PROCEDURE — 1101F PT FALLS ASSESS-DOCD LE1/YR: CPT | Mod: HCNC,CPTII,S$GLB, | Performed by: INTERNAL MEDICINE

## 2019-03-04 PROCEDURE — 96375 TX/PRO/DX INJ NEW DRUG ADDON: CPT | Mod: HCNC

## 2019-03-04 PROCEDURE — 99214 OFFICE O/P EST MOD 30 MIN: CPT | Mod: HCNC,S$GLB,, | Performed by: INTERNAL MEDICINE

## 2019-03-04 PROCEDURE — 3078F PR MOST RECENT DIASTOLIC BLOOD PRESSURE < 80 MM HG: ICD-10-PCS | Mod: HCNC,CPTII,S$GLB, | Performed by: INTERNAL MEDICINE

## 2019-03-04 PROCEDURE — 1101F PR PT FALLS ASSESS DOC 0-1 FALLS W/OUT INJ PAST YR: ICD-10-PCS | Mod: HCNC,CPTII,S$GLB, | Performed by: INTERNAL MEDICINE

## 2019-03-04 PROCEDURE — 3074F SYST BP LT 130 MM HG: CPT | Mod: HCNC,CPTII,S$GLB, | Performed by: INTERNAL MEDICINE

## 2019-03-04 RX ORDER — SODIUM CHLORIDE 0.9 % (FLUSH) 0.9 %
10 SYRINGE (ML) INJECTION
Status: DISCONTINUED | OUTPATIENT
Start: 2019-03-04 | End: 2019-03-04 | Stop reason: HOSPADM

## 2019-03-04 RX ORDER — SODIUM CHLORIDE 0.9 % (FLUSH) 0.9 %
10 SYRINGE (ML) INJECTION
Status: CANCELLED | OUTPATIENT
Start: 2019-03-05

## 2019-03-04 RX ORDER — DIPHENHYDRAMINE HYDROCHLORIDE 50 MG/ML
50 INJECTION INTRAMUSCULAR; INTRAVENOUS ONCE AS NEEDED
Status: CANCELLED | OUTPATIENT
Start: 2019-03-05 | End: 2019-03-05

## 2019-03-04 RX ORDER — HEPARIN 100 UNIT/ML
500 SYRINGE INTRAVENOUS
Status: CANCELLED | OUTPATIENT
Start: 2019-03-04

## 2019-03-04 RX ORDER — HEPARIN 100 UNIT/ML
500 SYRINGE INTRAVENOUS
Status: COMPLETED | OUTPATIENT
Start: 2019-03-04 | End: 2019-03-04

## 2019-03-04 RX ORDER — EPINEPHRINE 0.3 MG/.3ML
0.3 INJECTION SUBCUTANEOUS ONCE AS NEEDED
Status: CANCELLED | OUTPATIENT
Start: 2019-03-05 | End: 2019-03-05

## 2019-03-04 RX ORDER — DIPHENHYDRAMINE HYDROCHLORIDE 50 MG/ML
50 INJECTION INTRAMUSCULAR; INTRAVENOUS ONCE AS NEEDED
Status: DISCONTINUED | OUTPATIENT
Start: 2019-03-04 | End: 2019-03-04 | Stop reason: HOSPADM

## 2019-03-04 RX ORDER — EPINEPHRINE 0.3 MG/.3ML
0.3 INJECTION SUBCUTANEOUS ONCE AS NEEDED
Status: DISCONTINUED | OUTPATIENT
Start: 2019-03-04 | End: 2019-03-04 | Stop reason: HOSPADM

## 2019-03-04 RX ORDER — SODIUM CHLORIDE 0.9 % (FLUSH) 0.9 %
10 SYRINGE (ML) INJECTION
Status: COMPLETED | OUTPATIENT
Start: 2019-03-04 | End: 2019-03-04

## 2019-03-04 RX ORDER — FAMOTIDINE 10 MG/ML
20 INJECTION INTRAVENOUS
Status: COMPLETED | OUTPATIENT
Start: 2019-03-04 | End: 2019-03-04

## 2019-03-04 RX ORDER — FAMOTIDINE 10 MG/ML
20 INJECTION INTRAVENOUS
Status: CANCELLED | OUTPATIENT
Start: 2019-03-05

## 2019-03-04 RX ORDER — HEPARIN 100 UNIT/ML
500 SYRINGE INTRAVENOUS
Status: DISCONTINUED | OUTPATIENT
Start: 2019-03-04 | End: 2019-03-04 | Stop reason: HOSPADM

## 2019-03-04 RX ORDER — HEPARIN 100 UNIT/ML
500 SYRINGE INTRAVENOUS
Status: CANCELLED | OUTPATIENT
Start: 2019-03-05

## 2019-03-04 RX ORDER — SODIUM CHLORIDE 0.9 % (FLUSH) 0.9 %
10 SYRINGE (ML) INJECTION
Status: CANCELLED | OUTPATIENT
Start: 2019-03-04

## 2019-03-04 RX ADMIN — PACLITAXEL 180 MG: 6 INJECTION, SOLUTION INTRAVENOUS at 12:03

## 2019-03-04 RX ADMIN — HEPARIN SODIUM (PORCINE) LOCK FLUSH IV SOLN 100 UNIT/ML 500 UNITS: 100 SOLUTION at 10:03

## 2019-03-04 RX ADMIN — DIPHENHYDRAMINE HYDROCHLORIDE 25 MG: 50 INJECTION INTRAMUSCULAR; INTRAVENOUS at 12:03

## 2019-03-04 RX ADMIN — Medication 10 ML: at 02:03

## 2019-03-04 RX ADMIN — DEXAMETHASONE SODIUM PHOSPHATE 10 MG: 4 INJECTION, SOLUTION INTRA-ARTICULAR; INTRALESIONAL; INTRAMUSCULAR; INTRAVENOUS; SOFT TISSUE at 12:03

## 2019-03-04 RX ADMIN — Medication 10 ML: at 10:03

## 2019-03-04 RX ADMIN — FAMOTIDINE 20 MG: 10 INJECTION, SOLUTION INTRAVENOUS at 11:03

## 2019-03-04 RX ADMIN — HEPARIN SODIUM (PORCINE) LOCK FLUSH IV SOLN 100 UNIT/ML 500 UNITS: 100 SOLUTION at 02:03

## 2019-03-04 RX ADMIN — SODIUM CHLORIDE: 0.9 INJECTION, SOLUTION INTRAVENOUS at 11:03

## 2019-03-04 NOTE — PLAN OF CARE
Problem: Neurotoxicity (Chemotherapy Effects)  Goal: Neurotoxicity Symptom Control  Outcome: Ongoing (interventions implemented as appropriate)  Labs , hx, and medications reviewed. Assessment completed. Discussed plan of care with patient. Patient in agreement. VSS.  Chair reclined and warm blanket and snack offered. Will cont to monitor

## 2019-03-04 NOTE — PLAN OF CARE
Problem: Adult Inpatient Plan of Care  Goal: Plan of Care Review  Outcome: Ongoing (interventions implemented as appropriate)  Pt tolerated C4 taxol without adverse effects. VSS. Verbalized understanding of RTC date. DC with family ambulating independently.

## 2019-03-04 NOTE — NURSING
Pt arrived for port access and labs.  Port flushes easily with blood return after deep breaths, labs sent.  Port left access for chemo today.  Pt now going to MD appt with  using her walker.

## 2019-03-04 NOTE — Clinical Note
Weekly Taxol with CBC and CMP.Next week she needs labs at 9:30 a.m. and chemotherapy around noon (has a another doctor's appointment at 10:30 a.m.)

## 2019-03-05 ENCOUNTER — PATIENT MESSAGE (OUTPATIENT)
Dept: INTERNAL MEDICINE | Facility: CLINIC | Age: 74
End: 2019-03-05

## 2019-03-06 RX ORDER — FAMOTIDINE 10 MG/ML
20 INJECTION INTRAVENOUS
Status: CANCELLED | OUTPATIENT
Start: 2019-03-12

## 2019-03-06 RX ORDER — EPINEPHRINE 0.3 MG/.3ML
0.3 INJECTION SUBCUTANEOUS ONCE AS NEEDED
Status: CANCELLED | OUTPATIENT
Start: 2019-03-12 | End: 2019-03-12

## 2019-03-06 RX ORDER — SODIUM CHLORIDE 0.9 % (FLUSH) 0.9 %
10 SYRINGE (ML) INJECTION
Status: CANCELLED | OUTPATIENT
Start: 2019-03-12

## 2019-03-06 RX ORDER — DIPHENHYDRAMINE HYDROCHLORIDE 50 MG/ML
50 INJECTION INTRAMUSCULAR; INTRAVENOUS ONCE AS NEEDED
Status: CANCELLED | OUTPATIENT
Start: 2019-03-12 | End: 2019-03-12

## 2019-03-06 RX ORDER — HEPARIN 100 UNIT/ML
500 SYRINGE INTRAVENOUS
Status: CANCELLED | OUTPATIENT
Start: 2019-03-12

## 2019-03-08 ENCOUNTER — OUTPATIENT CASE MANAGEMENT (OUTPATIENT)
Dept: ADMINISTRATIVE | Facility: OTHER | Age: 74
End: 2019-03-08

## 2019-03-08 NOTE — PROGRESS NOTES
Summary:  1st missed follow-up attempt    Interventions:  Left message explaining the purpose of the call - follow-up on pt and seeing education packet and pillbox was received - left contact info requesting a return call to update    Plan:  Follow-up next week if no return call - 3-15  *pt has chemo on 3-11

## 2019-03-11 ENCOUNTER — INFUSION (OUTPATIENT)
Dept: INFUSION THERAPY | Facility: HOSPITAL | Age: 74
End: 2019-03-11
Attending: INTERNAL MEDICINE
Payer: MEDICARE

## 2019-03-11 ENCOUNTER — TELEPHONE (OUTPATIENT)
Dept: INTERNAL MEDICINE | Facility: CLINIC | Age: 74
End: 2019-03-11

## 2019-03-11 ENCOUNTER — OFFICE VISIT (OUTPATIENT)
Dept: INTERNAL MEDICINE | Facility: CLINIC | Age: 74
End: 2019-03-11
Payer: MEDICARE

## 2019-03-11 VITALS
HEIGHT: 62 IN | BODY MASS INDEX: 47.06 KG/M2 | WEIGHT: 255.75 LBS | OXYGEN SATURATION: 98 % | SYSTOLIC BLOOD PRESSURE: 130 MMHG | DIASTOLIC BLOOD PRESSURE: 80 MMHG | HEART RATE: 84 BPM

## 2019-03-11 VITALS
HEART RATE: 80 BPM | HEIGHT: 62 IN | BODY MASS INDEX: 47.06 KG/M2 | SYSTOLIC BLOOD PRESSURE: 118 MMHG | WEIGHT: 255.75 LBS | RESPIRATION RATE: 18 BRPM | DIASTOLIC BLOOD PRESSURE: 60 MMHG | TEMPERATURE: 98 F

## 2019-03-11 DIAGNOSIS — N18.30 DIABETES MELLITUS DUE TO UNDERLYING CONDITION WITH STAGE 3 CHRONIC KIDNEY DISEASE, WITHOUT LONG-TERM CURRENT USE OF INSULIN: Primary | ICD-10-CM

## 2019-03-11 DIAGNOSIS — C50.911 MALIGNANT NEOPLASM OF RIGHT BREAST IN FEMALE, ESTROGEN RECEPTOR NEGATIVE, UNSPECIFIED SITE OF BREAST: Primary | ICD-10-CM

## 2019-03-11 DIAGNOSIS — E08.22 DIABETES MELLITUS DUE TO UNDERLYING CONDITION WITH STAGE 3 CHRONIC KIDNEY DISEASE, WITHOUT LONG-TERM CURRENT USE OF INSULIN: ICD-10-CM

## 2019-03-11 DIAGNOSIS — K57.30 DIVERTICULOSIS OF LARGE INTESTINE WITHOUT HEMORRHAGE: ICD-10-CM

## 2019-03-11 DIAGNOSIS — E78.2 HYPERLIPIDEMIA, MIXED: ICD-10-CM

## 2019-03-11 DIAGNOSIS — N18.30 DIABETES MELLITUS DUE TO UNDERLYING CONDITION WITH STAGE 3 CHRONIC KIDNEY DISEASE, WITHOUT LONG-TERM CURRENT USE OF INSULIN: ICD-10-CM

## 2019-03-11 DIAGNOSIS — Z51.11 ENCOUNTER FOR ANTINEOPLASTIC CHEMOTHERAPY: ICD-10-CM

## 2019-03-11 DIAGNOSIS — Z51.11 ENCOUNTER FOR ANTINEOPLASTIC CHEMOTHERAPY: Primary | ICD-10-CM

## 2019-03-11 DIAGNOSIS — E03.9 HYPOTHYROIDISM, UNSPECIFIED TYPE: ICD-10-CM

## 2019-03-11 DIAGNOSIS — C50.911 MALIGNANT NEOPLASM OF RIGHT FEMALE BREAST: ICD-10-CM

## 2019-03-11 DIAGNOSIS — Z17.1 MALIGNANT NEOPLASM OF RIGHT BREAST IN FEMALE, ESTROGEN RECEPTOR NEGATIVE, UNSPECIFIED SITE OF BREAST: Primary | ICD-10-CM

## 2019-03-11 DIAGNOSIS — C50.611 CANCER OF AXILLARY TAIL OF RIGHT BREAST: Primary | ICD-10-CM

## 2019-03-11 DIAGNOSIS — E08.22 DIABETES MELLITUS DUE TO UNDERLYING CONDITION WITH STAGE 3 CHRONIC KIDNEY DISEASE, WITHOUT LONG-TERM CURRENT USE OF INSULIN: Primary | ICD-10-CM

## 2019-03-11 DIAGNOSIS — I10 ESSENTIAL HYPERTENSION: ICD-10-CM

## 2019-03-11 DIAGNOSIS — C50.911 MALIGNANT NEOPLASM OF RIGHT BREAST IN FEMALE, ESTROGEN RECEPTOR NEGATIVE, UNSPECIFIED SITE OF BREAST: ICD-10-CM

## 2019-03-11 DIAGNOSIS — G47.33 OBSTRUCTIVE SLEEP APNEA: ICD-10-CM

## 2019-03-11 DIAGNOSIS — Z17.1 MALIGNANT NEOPLASM OF RIGHT BREAST IN FEMALE, ESTROGEN RECEPTOR NEGATIVE, UNSPECIFIED SITE OF BREAST: ICD-10-CM

## 2019-03-11 LAB
ALBUMIN SERPL BCP-MCNC: 3.3 G/DL
ALP SERPL-CCNC: 106 U/L
ALT SERPL W/O P-5'-P-CCNC: 18 U/L
ANION GAP SERPL CALC-SCNC: 6 MMOL/L
AST SERPL-CCNC: 13 U/L
BASOPHILS # BLD AUTO: 0.03 K/UL
BASOPHILS NFR BLD: 0.7 %
BILIRUB SERPL-MCNC: 0.4 MG/DL
BUN SERPL-MCNC: 15 MG/DL
CALCIUM SERPL-MCNC: 9.1 MG/DL
CHLORIDE SERPL-SCNC: 105 MMOL/L
CO2 SERPL-SCNC: 27 MMOL/L
CREAT SERPL-MCNC: 0.8 MG/DL
DIFFERENTIAL METHOD: ABNORMAL
EOSINOPHIL # BLD AUTO: 0.1 K/UL
EOSINOPHIL NFR BLD: 1.9 %
ERYTHROCYTE [DISTWIDTH] IN BLOOD BY AUTOMATED COUNT: 13.5 %
EST. GFR  (AFRICAN AMERICAN): >60 ML/MIN/1.73 M^2
EST. GFR  (NON AFRICAN AMERICAN): >60 ML/MIN/1.73 M^2
ESTIMATED AVG GLUCOSE: 189 MG/DL
GLUCOSE SERPL-MCNC: 179 MG/DL
HBA1C MFR BLD HPLC: 8.2 %
HCT VFR BLD AUTO: 36.5 %
HGB BLD-MCNC: 11.5 G/DL
IMM GRANULOCYTES # BLD AUTO: 0.06 K/UL
IMM GRANULOCYTES NFR BLD AUTO: 1.4 %
LYMPHOCYTES # BLD AUTO: 1.3 K/UL
LYMPHOCYTES NFR BLD: 30.6 %
MCH RBC QN AUTO: 29.6 PG
MCHC RBC AUTO-ENTMCNC: 31.5 G/DL
MCV RBC AUTO: 94 FL
MONOCYTES # BLD AUTO: 0.2 K/UL
MONOCYTES NFR BLD: 5.2 %
NEUTROPHILS # BLD AUTO: 2.5 K/UL
NEUTROPHILS NFR BLD: 60.2 %
NRBC BLD-RTO: 0 /100 WBC
PLATELET # BLD AUTO: 165 K/UL
PMV BLD AUTO: 11 FL
POTASSIUM SERPL-SCNC: 3.9 MMOL/L
PROT SERPL-MCNC: 6.4 G/DL
RBC # BLD AUTO: 3.89 M/UL
SODIUM SERPL-SCNC: 138 MMOL/L
TSH SERPL DL<=0.005 MIU/L-ACNC: 1.55 UIU/ML
WBC # BLD AUTO: 4.22 K/UL

## 2019-03-11 PROCEDURE — 83036 HEMOGLOBIN GLYCOSYLATED A1C: CPT | Mod: HCNC

## 2019-03-11 PROCEDURE — 99499 RISK ADDL DX/OHS AUDIT: ICD-10-PCS | Mod: HCNC,S$GLB,, | Performed by: INTERNAL MEDICINE

## 2019-03-11 PROCEDURE — 99999 PR PBB SHADOW E&M-EST. PATIENT-LVL IV: ICD-10-PCS | Mod: PBBFAC,HCNC,, | Performed by: INTERNAL MEDICINE

## 2019-03-11 PROCEDURE — 3075F PR MOST RECENT SYSTOLIC BLOOD PRESS GE 130-139MM HG: ICD-10-PCS | Mod: HCNC,CPTII,S$GLB, | Performed by: INTERNAL MEDICINE

## 2019-03-11 PROCEDURE — 25000003 PHARM REV CODE 250: Mod: HCNC | Performed by: INTERNAL MEDICINE

## 2019-03-11 PROCEDURE — A4216 STERILE WATER/SALINE, 10 ML: HCPCS | Mod: HCNC | Performed by: INTERNAL MEDICINE

## 2019-03-11 PROCEDURE — 3079F DIAST BP 80-89 MM HG: CPT | Mod: HCNC,CPTII,S$GLB, | Performed by: INTERNAL MEDICINE

## 2019-03-11 PROCEDURE — 63600175 PHARM REV CODE 636 W HCPCS: Mod: HCNC | Performed by: INTERNAL MEDICINE

## 2019-03-11 PROCEDURE — S0028 INJECTION, FAMOTIDINE, 20 MG: HCPCS | Mod: HCNC | Performed by: INTERNAL MEDICINE

## 2019-03-11 PROCEDURE — 3075F SYST BP GE 130 - 139MM HG: CPT | Mod: HCNC,CPTII,S$GLB, | Performed by: INTERNAL MEDICINE

## 2019-03-11 PROCEDURE — 99213 OFFICE O/P EST LOW 20 MIN: CPT | Mod: HCNC,S$GLB,, | Performed by: INTERNAL MEDICINE

## 2019-03-11 PROCEDURE — 99213 PR OFFICE/OUTPT VISIT, EST, LEVL III, 20-29 MIN: ICD-10-PCS | Mod: HCNC,S$GLB,, | Performed by: INTERNAL MEDICINE

## 2019-03-11 PROCEDURE — 1101F PT FALLS ASSESS-DOCD LE1/YR: CPT | Mod: HCNC,CPTII,S$GLB, | Performed by: INTERNAL MEDICINE

## 2019-03-11 PROCEDURE — 3079F PR MOST RECENT DIASTOLIC BLOOD PRESSURE 80-89 MM HG: ICD-10-PCS | Mod: HCNC,CPTII,S$GLB, | Performed by: INTERNAL MEDICINE

## 2019-03-11 PROCEDURE — 96367 TX/PROPH/DG ADDL SEQ IV INF: CPT | Mod: HCNC

## 2019-03-11 PROCEDURE — 96375 TX/PRO/DX INJ NEW DRUG ADDON: CPT | Mod: HCNC

## 2019-03-11 PROCEDURE — 1101F PR PT FALLS ASSESS DOC 0-1 FALLS W/OUT INJ PAST YR: ICD-10-PCS | Mod: HCNC,CPTII,S$GLB, | Performed by: INTERNAL MEDICINE

## 2019-03-11 PROCEDURE — 99999 PR PBB SHADOW E&M-EST. PATIENT-LVL IV: CPT | Mod: PBBFAC,HCNC,, | Performed by: INTERNAL MEDICINE

## 2019-03-11 PROCEDURE — 96365 THER/PROPH/DIAG IV INF INIT: CPT | Mod: HCNC

## 2019-03-11 PROCEDURE — 85025 COMPLETE CBC W/AUTO DIFF WBC: CPT | Mod: HCNC

## 2019-03-11 PROCEDURE — 80053 COMPREHEN METABOLIC PANEL: CPT | Mod: HCNC

## 2019-03-11 PROCEDURE — 96413 CHEMO IV INFUSION 1 HR: CPT | Mod: HCNC

## 2019-03-11 PROCEDURE — 84443 ASSAY THYROID STIM HORMONE: CPT | Mod: HCNC

## 2019-03-11 PROCEDURE — 99499 UNLISTED E&M SERVICE: CPT | Mod: HCNC,S$GLB,, | Performed by: INTERNAL MEDICINE

## 2019-03-11 RX ORDER — HEPARIN 100 UNIT/ML
500 SYRINGE INTRAVENOUS
Status: DISCONTINUED | OUTPATIENT
Start: 2019-03-11 | End: 2023-05-22

## 2019-03-11 RX ORDER — EPINEPHRINE 0.3 MG/.3ML
0.3 INJECTION SUBCUTANEOUS ONCE AS NEEDED
Status: DISCONTINUED | OUTPATIENT
Start: 2019-03-11 | End: 2019-03-11 | Stop reason: HOSPADM

## 2019-03-11 RX ORDER — SODIUM CHLORIDE 0.9 % (FLUSH) 0.9 %
10 SYRINGE (ML) INJECTION
Status: DISCONTINUED | OUTPATIENT
Start: 2019-03-11 | End: 2019-03-11 | Stop reason: HOSPADM

## 2019-03-11 RX ORDER — SODIUM CHLORIDE 0.9 % (FLUSH) 0.9 %
10 SYRINGE (ML) INJECTION
Status: COMPLETED | OUTPATIENT
Start: 2019-03-11 | End: 2019-03-11

## 2019-03-11 RX ORDER — HEPARIN 100 UNIT/ML
500 SYRINGE INTRAVENOUS
Status: CANCELLED | OUTPATIENT
Start: 2019-03-11

## 2019-03-11 RX ORDER — SODIUM CHLORIDE 0.9 % (FLUSH) 0.9 %
10 SYRINGE (ML) INJECTION
Status: CANCELLED | OUTPATIENT
Start: 2019-03-11

## 2019-03-11 RX ORDER — DIPHENHYDRAMINE HYDROCHLORIDE 50 MG/ML
50 INJECTION INTRAMUSCULAR; INTRAVENOUS ONCE AS NEEDED
Status: DISCONTINUED | OUTPATIENT
Start: 2019-03-11 | End: 2019-03-11 | Stop reason: HOSPADM

## 2019-03-11 RX ORDER — FAMOTIDINE 10 MG/ML
20 INJECTION INTRAVENOUS
Status: COMPLETED | OUTPATIENT
Start: 2019-03-11 | End: 2019-03-11

## 2019-03-11 RX ORDER — HEPARIN 100 UNIT/ML
500 SYRINGE INTRAVENOUS
Status: DISCONTINUED | OUTPATIENT
Start: 2019-03-11 | End: 2019-03-11 | Stop reason: HOSPADM

## 2019-03-11 RX ADMIN — HEPARIN 500 UNITS: 100 SYRINGE at 03:03

## 2019-03-11 RX ADMIN — DIPHENHYDRAMINE HYDROCHLORIDE 25 MG: 50 INJECTION, SOLUTION INTRAMUSCULAR; INTRAVENOUS at 01:03

## 2019-03-11 RX ADMIN — Medication 10 ML: at 11:03

## 2019-03-11 RX ADMIN — PACLITAXEL 180 MG: 6 INJECTION, SOLUTION INTRAVENOUS at 02:03

## 2019-03-11 RX ADMIN — ALTEPLASE 2 MG: 2.2 INJECTION, POWDER, LYOPHILIZED, FOR SOLUTION INTRAVENOUS at 11:03

## 2019-03-11 RX ADMIN — FAMOTIDINE 20 MG: 10 INJECTION, SOLUTION INTRAVENOUS at 01:03

## 2019-03-11 RX ADMIN — Medication 10 ML: at 03:03

## 2019-03-11 RX ADMIN — DEXAMETHASONE SODIUM PHOSPHATE 10 MG: 4 INJECTION, SOLUTION INTRA-ARTICULAR; INTRALESIONAL; INTRAMUSCULAR; INTRAVENOUS; SOFT TISSUE at 01:03

## 2019-03-11 RX ADMIN — SODIUM CHLORIDE: 0.9 INJECTION, SOLUTION INTRAVENOUS at 01:03

## 2019-03-11 NOTE — PLAN OF CARE
Problem: Adult Inpatient Plan of Care  Goal: Plan of Care Review  Outcome: Ongoing (interventions implemented as appropriate)  Infusion completed, pt tolerated well; pt instructed to remain well hydrated; reviewed when to contact MD, when to report to ER; pt declined printed AVS, pt and spouse verbalized understanding of all discussed and when to report next

## 2019-03-11 NOTE — NURSING
Pt arrived for port access and labs.  Port flushes easily with no blood return.  Activase instilled, report given to infusion nurse.  Labs drawn from right hand and sent.  Pt now waiting on labs and infusion chair with .

## 2019-03-11 NOTE — PROGRESS NOTES
Subjective:       Patient ID: Alina Narayan is a 73 y.o. female.    Chief Complaint: Follow-up    HPIPt receiving chemo today - she uses CPAP regularly with great benefit.  No CP or SOB.  Review of Systems   Constitutional: Positive for activity change. Negative for unexpected weight change.   HENT: Positive for rhinorrhea. Negative for hearing loss and trouble swallowing.    Eyes: Negative for discharge and visual disturbance.   Respiratory: Negative for chest tightness, shortness of breath (PND or orthopnea) and wheezing.    Cardiovascular: Negative for chest pain and palpitations.   Gastrointestinal: Positive for diarrhea. Negative for abdominal pain, blood in stool, constipation, nausea and vomiting.   Endocrine: Negative for polydipsia and polyuria.   Genitourinary: Negative for difficulty urinating, dysuria, hematuria and menstrual problem.   Musculoskeletal: Positive for arthralgias. Negative for joint swelling and neck pain.   Neurological: Negative for seizures, syncope, weakness and headaches.   Psychiatric/Behavioral: Negative for confusion and dysphoric mood.       Objective:      Physical Exam   Constitutional: She is oriented to person, place, and time. She appears well-developed and well-nourished. No distress.   HENT:   Head: Normocephalic.   Mouth/Throat: Oropharynx is clear and moist.   Neck: Neck supple. No JVD present. No thyromegaly present.   Cardiovascular: Normal rate, regular rhythm, normal heart sounds and intact distal pulses. Exam reveals no gallop and no friction rub.   No murmur heard.  Pulmonary/Chest: Effort normal and breath sounds normal. She has no wheezes. She has no rales.   Abdominal: Soft. Bowel sounds are normal. She exhibits no distension and no mass. There is no tenderness. There is no rebound and no guarding.   Musculoskeletal: She exhibits no edema.   Lymphadenopathy:     She has no cervical adenopathy.   Neurological: She is alert and oriented to person, place, and time.  She has normal reflexes.   Skin: Skin is warm and dry.   Psychiatric: She has a normal mood and affect. Her behavior is normal. Judgment and thought content normal.       Assessment:       1. Cancer of axillary tail of right breast    2. Diabetes mellitus due to underlying condition with stage 3 chronic kidney disease, without long-term current use of insulin    3. Diverticulosis of large intestine without hemorrhage    4. Hypothyroidism, unspecified type    5. Hyperlipidemia, mixed    6. Essential hypertension    7. Obstructive sleep apnea        Plan:   Cancer of axillary tail of right breast  Chemo per oncology  Diabetes mellitus due to underlying condition with stage 3 chronic kidney disease, without long-term current use of insulin  Check HgbA1c today  Diverticulosis of large intestine without hemorrhage  No symptoms  Hypothyroidism, unspecified type  Check lab  Hyperlipidemia, mixed  Controlled - continue current meds    Essential hypertension  Controlled - continue current meds    Obstructive sleep apnea  Patient is using faithfully and derives great benefit from it

## 2019-03-11 NOTE — NURSING
1300  Pt here for Taxol infusion, accompanied by spouse, no new complaints or concerns at present; discussed treatment plan for today, all questions answered and pt agrees to proceed

## 2019-03-11 NOTE — TELEPHONE ENCOUNTER
Colon due 9/2020  ----- Message from Phani Worley MD sent at 1/8/2019 11:08 AM CST -----  Contact: Aarti Dunn MD  Sorry for the delay I was out,. She's good for 3 years  Phani  ----- Message -----  From: Aarti Dunn MD  Sent: 12/18/2018   4:23 PM  To: Phani Worley MD    Hi,    Ms. Narayan had her last colonoscopy 9/2017 then had a bout of diverticulitis 8/2018 - would you suggest she do another colonoscopy - hx of colon cancer in her father. Thanks for your advice - have a great holiday!    Aarti

## 2019-03-12 RX ORDER — FAMOTIDINE 10 MG/ML
20 INJECTION INTRAVENOUS
Status: CANCELLED | OUTPATIENT
Start: 2019-03-19

## 2019-03-12 RX ORDER — DIPHENHYDRAMINE HYDROCHLORIDE 50 MG/ML
50 INJECTION INTRAMUSCULAR; INTRAVENOUS ONCE AS NEEDED
Status: CANCELLED | OUTPATIENT
Start: 2019-03-19 | End: 2019-03-19

## 2019-03-12 RX ORDER — SODIUM CHLORIDE 0.9 % (FLUSH) 0.9 %
10 SYRINGE (ML) INJECTION
Status: CANCELLED | OUTPATIENT
Start: 2019-03-19

## 2019-03-12 RX ORDER — EPINEPHRINE 0.3 MG/.3ML
0.3 INJECTION SUBCUTANEOUS ONCE AS NEEDED
Status: CANCELLED | OUTPATIENT
Start: 2019-03-19 | End: 2019-03-19

## 2019-03-12 RX ORDER — HEPARIN 100 UNIT/ML
500 SYRINGE INTRAVENOUS
Status: CANCELLED | OUTPATIENT
Start: 2019-03-19

## 2019-03-14 ENCOUNTER — TELEPHONE (OUTPATIENT)
Dept: SURGERY | Facility: CLINIC | Age: 74
End: 2019-03-14

## 2019-03-14 NOTE — TELEPHONE ENCOUNTER
Contacted [employer] to see if the genetic counselor for this patient received pervious genetic testing result.  Per Ellen with [facility] previous genetic testing results received.  Janes Landa NP notified of this information.

## 2019-03-15 ENCOUNTER — OUTPATIENT CASE MANAGEMENT (OUTPATIENT)
Dept: ADMINISTRATIVE | Facility: OTHER | Age: 74
End: 2019-03-15

## 2019-03-15 NOTE — PROGRESS NOTES
Summary:  Spoke with pt regarding follow-up - pt stated that her nausea comes and goes - states she has chemo every Monday for 2 months - then switched to another drug - Pt stated that the educational packet was extremely helpful -pt was glad to know about mouth care - pt has been doing mouth care - pt reports that her skin is dry from the chemo  Pt thinks by August she will be finished the chemo - Pt states her appetite is ok - as long as she can keep the nausea down - pt states she often has aches by mid-week - Pt states that she gets steroids every week and it is making A1C  high -states it has never been this high at 8.1- pt states that her blood sugars are running between the 160's to 170's. Pt reports she saw her PCP this week and was assured that her blood sugars would come down as soon as she was off the steroids. Pt states that she had had breast cancer 6 years ago and it was hormonal and this cancer in her other breast is not hormonal.  Pt thanked [facility] for the educational packet - stated that her son had gotten her lotion for her dry skin from whole [employer] store-pt reports that she has lost all of her hair - states this will be a rough year because after the chemo is finished she will have to have surgery to have the breast removed.  Pt denies any SOB or falls    Interventions:  Discussed the importance of protein as well as proper nutrition during chemo  Allowed pt to express feeling regarding the chemo and the day's different symptoms as she goes thru the week  Encouraged pt to stay ahead of the nausea      Plan:  Continue to follow-up with pt on Friday -as that is her best day  Provide support and comfort as needed  Assess for further educational opportunities  Protein drinks?  Encourage pt and offer support thru the various symptoms and side effects  Todays [facility] Self-Management Care Plan was developed with the patients/caregivers input and was based on identified barriers from todays assessment.   Goals were written today with the patient/caregiver and the patient has agreed to work towards these goals to improve his/her overall well-being. Patient verbalized understanding of the care plan, goals, and all of today's instructions. Encouraged patient/caregiver to communicate with his/her physician and health care team about health conditions and the treatment plan.  Provided my contact information today and encouraged patient/caregiver to call me with any questions as needed.

## 2019-03-18 ENCOUNTER — INFUSION (OUTPATIENT)
Dept: INFUSION THERAPY | Facility: HOSPITAL | Age: 74
End: 2019-03-18
Attending: INTERNAL MEDICINE
Payer: MEDICARE

## 2019-03-18 VITALS
OXYGEN SATURATION: 98 % | RESPIRATION RATE: 18 BRPM | TEMPERATURE: 98 F | HEART RATE: 80 BPM | DIASTOLIC BLOOD PRESSURE: 71 MMHG | SYSTOLIC BLOOD PRESSURE: 146 MMHG

## 2019-03-18 DIAGNOSIS — Z51.11 ENCOUNTER FOR ANTINEOPLASTIC CHEMOTHERAPY: Primary | ICD-10-CM

## 2019-03-18 DIAGNOSIS — C50.911 MALIGNANT NEOPLASM OF RIGHT BREAST IN FEMALE, ESTROGEN RECEPTOR NEGATIVE, UNSPECIFIED SITE OF BREAST: Primary | ICD-10-CM

## 2019-03-18 DIAGNOSIS — Z51.11 ENCOUNTER FOR ANTINEOPLASTIC CHEMOTHERAPY: ICD-10-CM

## 2019-03-18 DIAGNOSIS — Z17.1 MALIGNANT NEOPLASM OF RIGHT BREAST IN FEMALE, ESTROGEN RECEPTOR NEGATIVE, UNSPECIFIED SITE OF BREAST: ICD-10-CM

## 2019-03-18 DIAGNOSIS — C50.911 MALIGNANT NEOPLASM OF RIGHT BREAST IN FEMALE, ESTROGEN RECEPTOR NEGATIVE, UNSPECIFIED SITE OF BREAST: ICD-10-CM

## 2019-03-18 DIAGNOSIS — C50.911 MALIGNANT NEOPLASM OF RIGHT FEMALE BREAST: ICD-10-CM

## 2019-03-18 DIAGNOSIS — Z17.1 MALIGNANT NEOPLASM OF RIGHT BREAST IN FEMALE, ESTROGEN RECEPTOR NEGATIVE, UNSPECIFIED SITE OF BREAST: Primary | ICD-10-CM

## 2019-03-18 LAB
ALBUMIN SERPL BCP-MCNC: 3.5 G/DL
ALP SERPL-CCNC: 117 U/L
ALT SERPL W/O P-5'-P-CCNC: 17 U/L
ANION GAP SERPL CALC-SCNC: 8 MMOL/L
AST SERPL-CCNC: 12 U/L
BASOPHILS # BLD AUTO: 0.03 K/UL
BASOPHILS NFR BLD: 0.7 %
BILIRUB SERPL-MCNC: 0.4 MG/DL
BUN SERPL-MCNC: 17 MG/DL
CALCIUM SERPL-MCNC: 9.5 MG/DL
CHLORIDE SERPL-SCNC: 105 MMOL/L
CO2 SERPL-SCNC: 27 MMOL/L
CREAT SERPL-MCNC: 0.9 MG/DL
DIFFERENTIAL METHOD: ABNORMAL
EOSINOPHIL # BLD AUTO: 0.1 K/UL
EOSINOPHIL NFR BLD: 3 %
ERYTHROCYTE [DISTWIDTH] IN BLOOD BY AUTOMATED COUNT: 13.8 %
EST. GFR  (AFRICAN AMERICAN): >60 ML/MIN/1.73 M^2
EST. GFR  (NON AFRICAN AMERICAN): >60 ML/MIN/1.73 M^2
GLUCOSE SERPL-MCNC: 179 MG/DL
HCT VFR BLD AUTO: 38.7 %
HGB BLD-MCNC: 11.9 G/DL
IMM GRANULOCYTES # BLD AUTO: 0.07 K/UL
IMM GRANULOCYTES NFR BLD AUTO: 1.6 %
LYMPHOCYTES # BLD AUTO: 1.1 K/UL
LYMPHOCYTES NFR BLD: 26.3 %
MCH RBC QN AUTO: 29.6 PG
MCHC RBC AUTO-ENTMCNC: 30.7 G/DL
MCV RBC AUTO: 96 FL
MONOCYTES # BLD AUTO: 0.2 K/UL
MONOCYTES NFR BLD: 5.5 %
NEUTROPHILS # BLD AUTO: 2.7 K/UL
NEUTROPHILS NFR BLD: 62.9 %
NRBC BLD-RTO: 0 /100 WBC
PLATELET # BLD AUTO: 180 K/UL
PMV BLD AUTO: 10.9 FL
POTASSIUM SERPL-SCNC: 4.1 MMOL/L
PROT SERPL-MCNC: 6.7 G/DL
RBC # BLD AUTO: 4.02 M/UL
SODIUM SERPL-SCNC: 140 MMOL/L
WBC # BLD AUTO: 4.34 K/UL

## 2019-03-18 PROCEDURE — S0028 INJECTION, FAMOTIDINE, 20 MG: HCPCS | Mod: HCNC | Performed by: INTERNAL MEDICINE

## 2019-03-18 PROCEDURE — A4216 STERILE WATER/SALINE, 10 ML: HCPCS | Mod: HCNC | Performed by: INTERNAL MEDICINE

## 2019-03-18 PROCEDURE — 80053 COMPREHEN METABOLIC PANEL: CPT | Mod: HCNC

## 2019-03-18 PROCEDURE — 25000003 PHARM REV CODE 250: Mod: HCNC | Performed by: INTERNAL MEDICINE

## 2019-03-18 PROCEDURE — 96375 TX/PRO/DX INJ NEW DRUG ADDON: CPT | Mod: HCNC

## 2019-03-18 PROCEDURE — 63600175 PHARM REV CODE 636 W HCPCS: Mod: HCNC | Performed by: INTERNAL MEDICINE

## 2019-03-18 PROCEDURE — 96367 TX/PROPH/DG ADDL SEQ IV INF: CPT | Mod: HCNC

## 2019-03-18 PROCEDURE — 96413 CHEMO IV INFUSION 1 HR: CPT | Mod: HCNC

## 2019-03-18 PROCEDURE — 85025 COMPLETE CBC W/AUTO DIFF WBC: CPT | Mod: HCNC

## 2019-03-18 RX ORDER — SODIUM CHLORIDE 0.9 % (FLUSH) 0.9 %
10 SYRINGE (ML) INJECTION
Status: COMPLETED | OUTPATIENT
Start: 2019-03-18 | End: 2019-03-18

## 2019-03-18 RX ORDER — FAMOTIDINE 10 MG/ML
20 INJECTION INTRAVENOUS
Status: COMPLETED | OUTPATIENT
Start: 2019-03-18 | End: 2019-03-18

## 2019-03-18 RX ORDER — EPINEPHRINE 0.3 MG/.3ML
0.3 INJECTION SUBCUTANEOUS ONCE AS NEEDED
Status: DISCONTINUED | OUTPATIENT
Start: 2019-03-18 | End: 2019-03-18 | Stop reason: HOSPADM

## 2019-03-18 RX ORDER — HEPARIN 100 UNIT/ML
500 SYRINGE INTRAVENOUS
Status: DISCONTINUED | OUTPATIENT
Start: 2019-03-18 | End: 2019-03-18 | Stop reason: HOSPADM

## 2019-03-18 RX ORDER — HEPARIN 100 UNIT/ML
500 SYRINGE INTRAVENOUS
Status: CANCELLED | OUTPATIENT
Start: 2019-03-18

## 2019-03-18 RX ORDER — HEPARIN 100 UNIT/ML
500 SYRINGE INTRAVENOUS
Status: COMPLETED | OUTPATIENT
Start: 2019-03-18 | End: 2019-03-18

## 2019-03-18 RX ORDER — DIPHENHYDRAMINE HYDROCHLORIDE 50 MG/ML
50 INJECTION INTRAMUSCULAR; INTRAVENOUS ONCE AS NEEDED
Status: DISCONTINUED | OUTPATIENT
Start: 2019-03-18 | End: 2019-03-18 | Stop reason: HOSPADM

## 2019-03-18 RX ORDER — SODIUM CHLORIDE 0.9 % (FLUSH) 0.9 %
10 SYRINGE (ML) INJECTION
Status: CANCELLED | OUTPATIENT
Start: 2019-03-18

## 2019-03-18 RX ORDER — SODIUM CHLORIDE 0.9 % (FLUSH) 0.9 %
10 SYRINGE (ML) INJECTION
Status: DISCONTINUED | OUTPATIENT
Start: 2019-03-18 | End: 2019-03-18 | Stop reason: HOSPADM

## 2019-03-18 RX ADMIN — Medication 10 ML: at 01:03

## 2019-03-18 RX ADMIN — DEXAMETHASONE SODIUM PHOSPHATE 10 MG: 4 INJECTION, SOLUTION INTRAMUSCULAR; INTRAVENOUS at 11:03

## 2019-03-18 RX ADMIN — FAMOTIDINE 20 MG: 10 INJECTION, SOLUTION INTRAVENOUS at 12:03

## 2019-03-18 RX ADMIN — SODIUM CHLORIDE: 0.9 INJECTION, SOLUTION INTRAVENOUS at 11:03

## 2019-03-18 RX ADMIN — PACLITAXEL 180 MG: 6 INJECTION, SOLUTION INTRAVENOUS at 12:03

## 2019-03-18 RX ADMIN — DIPHENHYDRAMINE HYDROCHLORIDE 25 MG: 50 INJECTION, SOLUTION INTRAMUSCULAR; INTRAVENOUS at 12:03

## 2019-03-18 RX ADMIN — Medication 10 ML: at 10:03

## 2019-03-18 RX ADMIN — HEPARIN 500 UNITS: 100 SYRINGE at 01:03

## 2019-03-18 RX ADMIN — HEPARIN 500 UNITS: 100 SYRINGE at 10:03

## 2019-03-18 NOTE — PLAN OF CARE
Problem: Adult Inpatient Plan of Care  Goal: Plan of Care Review  Outcome: Ongoing (interventions implemented as appropriate)  Pt tolerated Taxol infusion well, with no complications or s/s of adverse reaction. VS stable throughout treatment. Right chest port positive for blood return, flushed with normal saline and heparin and de-accessed prior to discharge. Site dressed with band-aid. RTC 3/25/19, pt and  verbalized understanding. Pt discharged with no distress noted and ambulated indepedently out of infusion center accompanied by ..

## 2019-03-18 NOTE — NURSING
PAC accessed for lab draw, 10 cc of blood return obtained after multiple flushes and position changes, however unable to obtain sufficient amount of blood following waste to get lab specimens.  PAC flushed with saline and heparin and left accessed for today's scheduled treatment.  Labs obtained peripherally, tolerated well.  Dr Dwyer and EUGENIO Stacy, RN sent message to set up port study since patient has had cathflo multiple times and continues to have difficulty with port.  Per Dr Dwyer, ok to use pac for chemo today

## 2019-03-19 ENCOUNTER — TELEPHONE (OUTPATIENT)
Dept: HEMATOLOGY/ONCOLOGY | Facility: CLINIC | Age: 74
End: 2019-03-19

## 2019-03-19 NOTE — TELEPHONE ENCOUNTER
spoke with pt on today in regards to upcoming appointments on 03/25/19, pt is aware and has confirm.

## 2019-03-22 ENCOUNTER — OUTPATIENT CASE MANAGEMENT (OUTPATIENT)
Dept: ADMINISTRATIVE | Facility: OTHER | Age: 74
End: 2019-03-22

## 2019-03-22 NOTE — PROGRESS NOTES
Subjective:       Patient ID: Alina Narayan is a 73 y.o. female.    Chief Complaint: No chief complaint on file.    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  She has received 6 cycles of weekly Taxol.    Overall she has been tolerating that fairly well. She has some mild nausea and continues to have some bloating, cramping and irregular bowels.  She also has some fatigue.      Current history:  abnormal mammogram of the right breast in December 2018.  She was having no breast symptoms at that time    That mammogram showed a 13 mm mass in the right axillary tail.  By ultrasound there was a 6 x 9 x 11 mm intramammary node and a 2nd 5 x 6 x 6 mm hypoechoic mass in the axillary tail.  On January 7, 2019 both masses were biopsied and both showed lymph nodes with metastatic carcinoma which was ER negative KS negative and HER2 negative.  Ki-67 was 40%.    MRI on January 15, 2019 showed 2 right axillary lymph nodes the largest measured 1.4 x 1.0 cm.  There were no abnormalities in the right breast.    PET scan was then performed which showed only low-grade activity in the right axilla with an SUV of 1.32.    Previous breast cancer history History: On September 25, 2012 she underwent a screening mammogram which showed an asymmetric density in the left breast at 2:00 position. A followup mammogram on the 27th showed an oval mass in that area ultrasound showed a 6 mm solid mass. Core needle biopsy on October 1 showed invasive carcinoma ER 90% positive KS 80% positive and HER-2 negative. On October 29 she underwent lumpectomy and sentinel lymph node biopsy. That showed a 7 mm low-grade (1+2+1) infiltrating carcinoma. 3 sentinel lymph nodes were negative. Final pathological stage TIB N0 stage IA.  She completed letrozole therapy in 2017.    She was referred  for genetic testing (mother with breast cancer, father  and brother with prostate CA)  - integrated BRCA testing through [employer] was negative.  She did not have more comprehensive testing is at was denied.    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  She has had mitral valve replacement.      Review of Systems   Constitutional: Positive for fatigue. Negative for activity change (limited by knees), appetite change, fever and unexpected weight change.   Respiratory: Negative for cough and shortness of breath.    Cardiovascular: Negative for chest pain.   Gastrointestinal: Positive for abdominal distention, abdominal pain (Crampy with loose bowels) and nausea. Negative for constipation and diarrhea.   Genitourinary: Negative for frequency.   Musculoskeletal: Positive for arthralgias (knees) and back pain.   Skin: Negative for rash.   Neurological: Negative for headaches.   Hematological: Negative for adenopathy.   Psychiatric/Behavioral: Negative for dysphoric mood. The patient is not nervous/anxious.        Objective:      Physical Exam   Constitutional: She is oriented to person, place, and time. She appears well-developed and well-nourished. No distress.   Obese   HENT:   Mouth/Throat: No oropharyngeal exudate.   Eyes: No scleral icterus.   Cardiovascular: Normal rate and regular rhythm.   Pulmonary/Chest: Effort normal and breath sounds normal. She has no wheezes. She has no rales. Right breast exhibits no mass, no nipple discharge and no skin change. Left breast exhibits no mass, no nipple discharge and no skin change.       Abdominal: Soft. There is no tenderness.   Lymphadenopathy:     She has no cervical adenopathy.     She has no axillary adenopathy.        Right: No supraclavicular adenopathy present.        Left: No supraclavicular adenopathy present.   Neurological: She is alert and oriented to person, place, and time.   Psychiatric: She has a normal mood and affect. Her behavior is normal. Thought content normal.   Vitals  reviewed.      Assessment:     CBC is unremarkable, CMP shows a glucose of 230 otherwise unremarkable.  1. Cancer of axillary tail of right breast    2. Encounter for antineoplastic chemotherapy        Plan:       Continue current therapy.  Port study.  Will likely repeat imaging after her initial chemotherapy since she has no clearly palpable abnormality.

## 2019-03-25 ENCOUNTER — INFUSION (OUTPATIENT)
Dept: INFUSION THERAPY | Facility: HOSPITAL | Age: 74
End: 2019-03-25
Attending: INTERNAL MEDICINE
Payer: MEDICARE

## 2019-03-25 ENCOUNTER — TELEPHONE (OUTPATIENT)
Dept: HEMATOLOGY/ONCOLOGY | Facility: CLINIC | Age: 74
End: 2019-03-25

## 2019-03-25 ENCOUNTER — OFFICE VISIT (OUTPATIENT)
Dept: HEMATOLOGY/ONCOLOGY | Facility: CLINIC | Age: 74
End: 2019-03-25
Payer: MEDICARE

## 2019-03-25 VITALS
RESPIRATION RATE: 20 BRPM | BODY MASS INDEX: 47.3 KG/M2 | DIASTOLIC BLOOD PRESSURE: 60 MMHG | HEIGHT: 62 IN | SYSTOLIC BLOOD PRESSURE: 134 MMHG | OXYGEN SATURATION: 95 % | HEART RATE: 87 BPM | WEIGHT: 257.06 LBS | TEMPERATURE: 98 F

## 2019-03-25 VITALS
SYSTOLIC BLOOD PRESSURE: 131 MMHG | DIASTOLIC BLOOD PRESSURE: 71 MMHG | TEMPERATURE: 98 F | RESPIRATION RATE: 16 BRPM | HEART RATE: 80 BPM

## 2019-03-25 DIAGNOSIS — Z51.11 ENCOUNTER FOR ANTINEOPLASTIC CHEMOTHERAPY: ICD-10-CM

## 2019-03-25 DIAGNOSIS — Z17.1 MALIGNANT NEOPLASM OF RIGHT BREAST IN FEMALE, ESTROGEN RECEPTOR NEGATIVE, UNSPECIFIED SITE OF BREAST: Primary | ICD-10-CM

## 2019-03-25 DIAGNOSIS — C50.911 MALIGNANT NEOPLASM OF RIGHT BREAST IN FEMALE, ESTROGEN RECEPTOR NEGATIVE, UNSPECIFIED SITE OF BREAST: Primary | ICD-10-CM

## 2019-03-25 DIAGNOSIS — C50.911 MALIGNANT NEOPLASM OF RIGHT BREAST IN FEMALE, ESTROGEN RECEPTOR NEGATIVE, UNSPECIFIED SITE OF BREAST: ICD-10-CM

## 2019-03-25 DIAGNOSIS — Z17.1 MALIGNANT NEOPLASM OF RIGHT BREAST IN FEMALE, ESTROGEN RECEPTOR NEGATIVE, UNSPECIFIED SITE OF BREAST: ICD-10-CM

## 2019-03-25 DIAGNOSIS — C50.611 CANCER OF AXILLARY TAIL OF RIGHT BREAST: Primary | ICD-10-CM

## 2019-03-25 DIAGNOSIS — C50.911 MALIGNANT NEOPLASM OF RIGHT FEMALE BREAST: ICD-10-CM

## 2019-03-25 DIAGNOSIS — Z51.11 ENCOUNTER FOR ANTINEOPLASTIC CHEMOTHERAPY: Primary | ICD-10-CM

## 2019-03-25 LAB
ALBUMIN SERPL BCP-MCNC: 3.3 G/DL (ref 3.5–5.2)
ALP SERPL-CCNC: 101 U/L (ref 55–135)
ALT SERPL W/O P-5'-P-CCNC: 16 U/L (ref 10–44)
ANION GAP SERPL CALC-SCNC: 9 MMOL/L (ref 8–16)
AST SERPL-CCNC: 12 U/L (ref 10–40)
BASOPHILS # BLD AUTO: 0.03 K/UL (ref 0–0.2)
BASOPHILS NFR BLD: 0.8 % (ref 0–1.9)
BILIRUB SERPL-MCNC: 0.5 MG/DL (ref 0.1–1)
BUN SERPL-MCNC: 12 MG/DL (ref 8–23)
CALCIUM SERPL-MCNC: 9.2 MG/DL (ref 8.7–10.5)
CHLORIDE SERPL-SCNC: 105 MMOL/L (ref 95–110)
CO2 SERPL-SCNC: 27 MMOL/L (ref 23–29)
CREAT SERPL-MCNC: 0.8 MG/DL (ref 0.5–1.4)
DIFFERENTIAL METHOD: ABNORMAL
EOSINOPHIL # BLD AUTO: 0.1 K/UL (ref 0–0.5)
EOSINOPHIL NFR BLD: 1.8 % (ref 0–8)
ERYTHROCYTE [DISTWIDTH] IN BLOOD BY AUTOMATED COUNT: 14.3 % (ref 11.5–14.5)
EST. GFR  (AFRICAN AMERICAN): >60 ML/MIN/1.73 M^2
EST. GFR  (NON AFRICAN AMERICAN): >60 ML/MIN/1.73 M^2
GLUCOSE SERPL-MCNC: 230 MG/DL (ref 70–110)
HCT VFR BLD AUTO: 36.6 % (ref 37–48.5)
HGB BLD-MCNC: 11.3 G/DL (ref 12–16)
IMM GRANULOCYTES # BLD AUTO: 0.11 K/UL (ref 0–0.04)
IMM GRANULOCYTES NFR BLD AUTO: 2.8 % (ref 0–0.5)
LYMPHOCYTES # BLD AUTO: 1.3 K/UL (ref 1–4.8)
LYMPHOCYTES NFR BLD: 31.9 % (ref 18–48)
MCH RBC QN AUTO: 29.9 PG (ref 27–31)
MCHC RBC AUTO-ENTMCNC: 30.9 G/DL (ref 32–36)
MCV RBC AUTO: 97 FL (ref 82–98)
MONOCYTES # BLD AUTO: 0.2 K/UL (ref 0.3–1)
MONOCYTES NFR BLD: 4.8 % (ref 4–15)
NEUTROPHILS # BLD AUTO: 2.3 K/UL (ref 1.8–7.7)
NEUTROPHILS NFR BLD: 57.9 % (ref 38–73)
NRBC BLD-RTO: 0 /100 WBC
PLATELET # BLD AUTO: 179 K/UL (ref 150–350)
PMV BLD AUTO: 10.9 FL (ref 9.2–12.9)
POTASSIUM SERPL-SCNC: 3.8 MMOL/L (ref 3.5–5.1)
PROT SERPL-MCNC: 6.4 G/DL (ref 6–8.4)
RBC # BLD AUTO: 3.78 M/UL (ref 4–5.4)
SODIUM SERPL-SCNC: 141 MMOL/L (ref 136–145)
WBC # BLD AUTO: 3.95 K/UL (ref 3.9–12.7)

## 2019-03-25 PROCEDURE — 25000003 PHARM REV CODE 250: Mod: HCNC | Performed by: INTERNAL MEDICINE

## 2019-03-25 PROCEDURE — 3078F PR MOST RECENT DIASTOLIC BLOOD PRESSURE < 80 MM HG: ICD-10-PCS | Mod: HCNC,CPTII,S$GLB, | Performed by: INTERNAL MEDICINE

## 2019-03-25 PROCEDURE — 3078F DIAST BP <80 MM HG: CPT | Mod: HCNC,CPTII,S$GLB, | Performed by: INTERNAL MEDICINE

## 2019-03-25 PROCEDURE — 96413 CHEMO IV INFUSION 1 HR: CPT | Mod: HCNC

## 2019-03-25 PROCEDURE — A4216 STERILE WATER/SALINE, 10 ML: HCPCS | Mod: HCNC | Performed by: INTERNAL MEDICINE

## 2019-03-25 PROCEDURE — 1101F PT FALLS ASSESS-DOCD LE1/YR: CPT | Mod: HCNC,CPTII,S$GLB, | Performed by: INTERNAL MEDICINE

## 2019-03-25 PROCEDURE — 63600175 PHARM REV CODE 636 W HCPCS: Mod: HCNC | Performed by: INTERNAL MEDICINE

## 2019-03-25 PROCEDURE — 3075F PR MOST RECENT SYSTOLIC BLOOD PRESS GE 130-139MM HG: ICD-10-PCS | Mod: HCNC,CPTII,S$GLB, | Performed by: INTERNAL MEDICINE

## 2019-03-25 PROCEDURE — 3075F SYST BP GE 130 - 139MM HG: CPT | Mod: HCNC,CPTII,S$GLB, | Performed by: INTERNAL MEDICINE

## 2019-03-25 PROCEDURE — 85025 COMPLETE CBC W/AUTO DIFF WBC: CPT | Mod: HCNC

## 2019-03-25 PROCEDURE — 1101F PR PT FALLS ASSESS DOC 0-1 FALLS W/OUT INJ PAST YR: ICD-10-PCS | Mod: HCNC,CPTII,S$GLB, | Performed by: INTERNAL MEDICINE

## 2019-03-25 PROCEDURE — S0028 INJECTION, FAMOTIDINE, 20 MG: HCPCS | Mod: HCNC | Performed by: INTERNAL MEDICINE

## 2019-03-25 PROCEDURE — 96375 TX/PRO/DX INJ NEW DRUG ADDON: CPT | Mod: HCNC

## 2019-03-25 PROCEDURE — 99214 OFFICE O/P EST MOD 30 MIN: CPT | Mod: HCNC,S$GLB,, | Performed by: INTERNAL MEDICINE

## 2019-03-25 PROCEDURE — 80053 COMPREHEN METABOLIC PANEL: CPT | Mod: HCNC

## 2019-03-25 PROCEDURE — 99999 PR PBB SHADOW E&M-EST. PATIENT-LVL III: ICD-10-PCS | Mod: PBBFAC,HCNC,, | Performed by: INTERNAL MEDICINE

## 2019-03-25 PROCEDURE — 99214 PR OFFICE/OUTPT VISIT, EST, LEVL IV, 30-39 MIN: ICD-10-PCS | Mod: HCNC,S$GLB,, | Performed by: INTERNAL MEDICINE

## 2019-03-25 PROCEDURE — 36593 DECLOT VASCULAR DEVICE: CPT | Mod: HCNC

## 2019-03-25 PROCEDURE — 96367 TX/PROPH/DG ADDL SEQ IV INF: CPT | Mod: HCNC

## 2019-03-25 PROCEDURE — 99999 PR PBB SHADOW E&M-EST. PATIENT-LVL III: CPT | Mod: PBBFAC,HCNC,, | Performed by: INTERNAL MEDICINE

## 2019-03-25 PROCEDURE — 63600175 PHARM REV CODE 636 W HCPCS: Mod: HCNC,JG | Performed by: INTERNAL MEDICINE

## 2019-03-25 RX ORDER — EPINEPHRINE 0.3 MG/.3ML
0.3 INJECTION SUBCUTANEOUS ONCE AS NEEDED
Status: DISCONTINUED | OUTPATIENT
Start: 2019-03-25 | End: 2019-03-25 | Stop reason: HOSPADM

## 2019-03-25 RX ORDER — SODIUM CHLORIDE 0.9 % (FLUSH) 0.9 %
10 SYRINGE (ML) INJECTION
Status: CANCELLED | OUTPATIENT
Start: 2019-03-26

## 2019-03-25 RX ORDER — DIPHENHYDRAMINE HYDROCHLORIDE 50 MG/ML
50 INJECTION INTRAMUSCULAR; INTRAVENOUS ONCE AS NEEDED
Status: DISCONTINUED | OUTPATIENT
Start: 2019-03-25 | End: 2019-03-25 | Stop reason: HOSPADM

## 2019-03-25 RX ORDER — HEPARIN 100 UNIT/ML
500 SYRINGE INTRAVENOUS
Status: DISCONTINUED | OUTPATIENT
Start: 2019-03-25 | End: 2019-03-25 | Stop reason: HOSPADM

## 2019-03-25 RX ORDER — HEPARIN 100 UNIT/ML
500 SYRINGE INTRAVENOUS
Status: CANCELLED | OUTPATIENT
Start: 2019-03-26

## 2019-03-25 RX ORDER — HEPARIN 100 UNIT/ML
500 SYRINGE INTRAVENOUS
Status: CANCELLED | OUTPATIENT
Start: 2019-03-25

## 2019-03-25 RX ORDER — DIPHENHYDRAMINE HYDROCHLORIDE 50 MG/ML
50 INJECTION INTRAMUSCULAR; INTRAVENOUS ONCE AS NEEDED
Status: CANCELLED | OUTPATIENT
Start: 2019-03-26

## 2019-03-25 RX ORDER — FAMOTIDINE 10 MG/ML
20 INJECTION INTRAVENOUS
Status: COMPLETED | OUTPATIENT
Start: 2019-03-25 | End: 2019-03-25

## 2019-03-25 RX ORDER — SODIUM CHLORIDE 0.9 % (FLUSH) 0.9 %
10 SYRINGE (ML) INJECTION
Status: CANCELLED | OUTPATIENT
Start: 2019-03-25

## 2019-03-25 RX ORDER — SODIUM CHLORIDE 0.9 % (FLUSH) 0.9 %
10 SYRINGE (ML) INJECTION
Status: DISCONTINUED | OUTPATIENT
Start: 2019-03-25 | End: 2019-03-25 | Stop reason: HOSPADM

## 2019-03-25 RX ORDER — EPINEPHRINE 0.3 MG/.3ML
0.3 INJECTION SUBCUTANEOUS ONCE AS NEEDED
Status: CANCELLED | OUTPATIENT
Start: 2019-03-26

## 2019-03-25 RX ORDER — FAMOTIDINE 10 MG/ML
20 INJECTION INTRAVENOUS
Status: CANCELLED | OUTPATIENT
Start: 2019-03-26

## 2019-03-25 RX ORDER — SODIUM CHLORIDE 0.9 % (FLUSH) 0.9 %
10 SYRINGE (ML) INJECTION
Status: COMPLETED | OUTPATIENT
Start: 2019-03-25 | End: 2019-03-25

## 2019-03-25 RX ADMIN — DIPHENHYDRAMINE HYDROCHLORIDE 25 MG: 50 INJECTION INTRAMUSCULAR; INTRAVENOUS at 10:03

## 2019-03-25 RX ADMIN — SODIUM CHLORIDE: 0.9 INJECTION, SOLUTION INTRAVENOUS at 10:03

## 2019-03-25 RX ADMIN — ALTEPLASE 2 MG: 2.2 INJECTION, POWDER, LYOPHILIZED, FOR SOLUTION INTRAVENOUS at 08:03

## 2019-03-25 RX ADMIN — DEXAMETHASONE SODIUM PHOSPHATE 10 MG: 4 INJECTION, SOLUTION INTRAMUSCULAR; INTRAVENOUS at 10:03

## 2019-03-25 RX ADMIN — FAMOTIDINE 20 MG: 10 INJECTION, SOLUTION INTRAVENOUS at 10:03

## 2019-03-25 RX ADMIN — Medication 10 ML: at 08:03

## 2019-03-25 RX ADMIN — PACLITAXEL 180 MG: 6 INJECTION, SOLUTION INTRAVENOUS at 10:03

## 2019-03-25 RX ADMIN — HEPARIN 500 UNITS: 100 SYRINGE at 12:03

## 2019-03-25 NOTE — TELEPHONE ENCOUNTER
Spoke to patient scheduled port study for Friday 3/29. Scheduled chemo and port draw for Monday 4/1.      ----- Message from Erick Dwyer MD sent at 3/25/2019  9:45 AM CDT -----  Port study before next treatment

## 2019-03-25 NOTE — PLAN OF CARE
Problem: Adult Inpatient Plan of Care  Goal: Plan of Care Review  Outcome: Ongoing (interventions implemented as appropriate)  1210:  Patient tolerated Taxol infusion well, VSS, NAD noted.  Upon completion of infusion, patient reported some chest tightness, during infusion, that only lasted a few seconds then went away and is not occurring at present.  Port flushed per protocol and de-accessed.  Brisk blood return noted.  AVS declined.  Patient released in stable condition, seated on rolling walker, accompanied by her .

## 2019-03-26 RX ORDER — DIPHENHYDRAMINE HYDROCHLORIDE 50 MG/ML
50 INJECTION INTRAMUSCULAR; INTRAVENOUS ONCE AS NEEDED
Status: CANCELLED | OUTPATIENT
Start: 2019-04-02

## 2019-03-26 RX ORDER — FAMOTIDINE 10 MG/ML
20 INJECTION INTRAVENOUS
Status: CANCELLED | OUTPATIENT
Start: 2019-04-02

## 2019-03-26 RX ORDER — HEPARIN 100 UNIT/ML
500 SYRINGE INTRAVENOUS
Status: CANCELLED | OUTPATIENT
Start: 2019-04-02

## 2019-03-26 RX ORDER — EPINEPHRINE 0.3 MG/.3ML
0.3 INJECTION SUBCUTANEOUS ONCE AS NEEDED
Status: CANCELLED | OUTPATIENT
Start: 2019-04-02

## 2019-03-26 RX ORDER — SODIUM CHLORIDE 0.9 % (FLUSH) 0.9 %
10 SYRINGE (ML) INJECTION
Status: CANCELLED | OUTPATIENT
Start: 2019-04-02

## 2019-03-27 ENCOUNTER — TELEPHONE (OUTPATIENT)
Dept: HEMATOLOGY/ONCOLOGY | Facility: CLINIC | Age: 74
End: 2019-03-27

## 2019-03-27 NOTE — TELEPHONE ENCOUNTER
Spoke with patient to try and answer questions she has about procedure she is scheduled for on 3/29/19. Patient is wanting to know if what she is seeing that she is scheduled for is the correct procedure because she will need to fast the night before, the paperwork she saw said they would be putting in a PICC line, and the patient is curious as to if she will be under sedation for all of this. Answered patients questions to the best of my ability and also gave the patient the number to IR ([phone]) to speak with someone in that department that may be able to better explain the process to her. Patient would like a call from Dr Dwyer if possible since she is still feeling unsure about the procedure, but will also try calling the number given to her today to reach someone. Informed patient would relay this to Dr Dwyer and to reach back out to us if she still cannot get in touch with anyone from IR.         ----- Message from Kailee Nunez sent at 3/27/2019  2:03 PM CDT -----  Contact: Pt   Pt called to check the 3/29appt or precedure she called about earlier. She has questions it and fasting etc. Please contact her at [phone]. Thank you

## 2019-03-28 ENCOUNTER — TELEPHONE (OUTPATIENT)
Dept: HEMATOLOGY/ONCOLOGY | Facility: CLINIC | Age: 74
End: 2019-03-28

## 2019-03-28 ENCOUNTER — PATIENT MESSAGE (OUTPATIENT)
Dept: HEMATOLOGY/ONCOLOGY | Facility: CLINIC | Age: 74
End: 2019-03-28

## 2019-03-28 ENCOUNTER — TELEPHONE (OUTPATIENT)
Dept: INTERVENTIONAL RADIOLOGY/VASCULAR | Facility: HOSPITAL | Age: 74
End: 2019-03-28

## 2019-03-28 NOTE — TELEPHONE ENCOUNTER
Returned call to patient to follow up and discuss procedure details. Information relayed to patient and everything clarified. Patient had also just recently spoke with IR and appeared much more relieved. At this time patient also mentioned scheduling situation. She was concerned of appointments on 4/17 as they are to be scheduled on Monday the 15th alternatively. Will forward to scheduling for assistance and have them contact her regarding changes. She expressed gratitude.

## 2019-03-28 NOTE — TELEPHONE ENCOUNTER
----- Message from Andie Koch sent at 3/28/2019 10:59 AM CDT -----  Contact: pt   Pt called to speak with nurse shruthi have some questions   Callback#[phone]  Thank You  Isis

## 2019-03-28 NOTE — TELEPHONE ENCOUNTER
Returned call to patient to discuss no answer. Voicemail left. Clinic number provided for patient to contact us back

## 2019-03-28 NOTE — TELEPHONE ENCOUNTER
----- Message from Mary Mora sent at 3/28/2019  9:17 AM CDT -----  Contact: Patient  Pt has been calling for 3day to find out what procedure she's having on 3/29. Spoke with Britney yesterday, and was waiting for a response from Dr Dwyer. She has questions that she needs clarified ASAP.    Contact:: [phone]

## 2019-03-29 ENCOUNTER — HOSPITAL ENCOUNTER (OUTPATIENT)
Dept: INTERVENTIONAL RADIOLOGY/VASCULAR | Facility: HOSPITAL | Age: 74
Discharge: HOME | End: 2019-03-29
Attending: INTERNAL MEDICINE
Payer: MEDICARE

## 2019-03-29 ENCOUNTER — OUTPATIENT CASE MANAGEMENT (OUTPATIENT)
Dept: ADMINISTRATIVE | Facility: OTHER | Age: 74
End: 2019-03-29

## 2019-03-29 DIAGNOSIS — C50.611 CANCER OF AXILLARY TAIL OF RIGHT BREAST: ICD-10-CM

## 2019-03-29 DIAGNOSIS — Z51.11 ENCOUNTER FOR ANTINEOPLASTIC CHEMOTHERAPY: ICD-10-CM

## 2019-03-29 PROCEDURE — 36593 IR CATHETER DECLOT THROMBOLYTIC: ICD-10-PCS | Mod: HCNC,GC,, | Performed by: STUDENT IN AN ORGANIZED HEALTH CARE EDUCATION/TRAINING PROGRAM

## 2019-03-29 PROCEDURE — 36593 DECLOT VASCULAR DEVICE: CPT | Mod: HCNC,GC,, | Performed by: STUDENT IN AN ORGANIZED HEALTH CARE EDUCATION/TRAINING PROGRAM

## 2019-03-29 PROCEDURE — 36593 DECLOT VASCULAR DEVICE: CPT | Mod: HCNC

## 2019-03-29 PROCEDURE — 25500020 PHARM REV CODE 255: Mod: HCNC | Performed by: INTERNAL MEDICINE

## 2019-03-29 RX ADMIN — IOHEXOL 10 ML: 300 INJECTION, SOLUTION INTRAVENOUS at 09:03

## 2019-03-29 NOTE — PROGRESS NOTES
Port deaccessed after heparin flush instilled and 2x2/bandaid to site. Left via w/c without c/o's.

## 2019-03-29 NOTE — PROGRESS NOTES
Arrived via w/c for port check. Patient states poor outflow and subsequent peripheral blood draws but inflows well for chemo.  Site cleansed with Chloroprep and 1 inch port needle placed without difficulty. MD here and contrast injected per him.

## 2019-03-29 NOTE — PROCEDURES
Radiology Post-Procedure Note    Pre Op Diagnosis: port dysfunction  Post Op Diagnosis: Same    Procedure: port check    Procedure performed by: Shankar Donaldson MD    Findings:   R chest port was accessed and flushed without difficulty. Small amount of blood was able to be aspirated initially. Contrast was injected through the port under fluoroscopy and demonstrated intact catheter in satisfactory position without evidence of a fibrin sheath. Following contrast injection, blood was aspirated without difficulty.     Patient tolerated procedure well.    Shankar Donaldson MD  Radiology PGY-3  [phone]

## 2019-04-01 ENCOUNTER — INFUSION (OUTPATIENT)
Dept: INFUSION THERAPY | Facility: HOSPITAL | Age: 74
End: 2019-04-01
Attending: INTERNAL MEDICINE
Payer: MEDICARE

## 2019-04-01 VITALS
HEART RATE: 76 BPM | HEIGHT: 62 IN | RESPIRATION RATE: 18 BRPM | DIASTOLIC BLOOD PRESSURE: 80 MMHG | BODY MASS INDEX: 47.29 KG/M2 | SYSTOLIC BLOOD PRESSURE: 122 MMHG | WEIGHT: 257 LBS | TEMPERATURE: 98 F

## 2019-04-01 DIAGNOSIS — Z51.11 ENCOUNTER FOR ANTINEOPLASTIC CHEMOTHERAPY: ICD-10-CM

## 2019-04-01 DIAGNOSIS — Z51.11 ENCOUNTER FOR ANTINEOPLASTIC CHEMOTHERAPY: Primary | ICD-10-CM

## 2019-04-01 DIAGNOSIS — C50.911 MALIGNANT NEOPLASM OF RIGHT FEMALE BREAST: ICD-10-CM

## 2019-04-01 DIAGNOSIS — C50.911 MALIGNANT NEOPLASM OF RIGHT BREAST IN FEMALE, ESTROGEN RECEPTOR NEGATIVE, UNSPECIFIED SITE OF BREAST: ICD-10-CM

## 2019-04-01 DIAGNOSIS — Z17.1 MALIGNANT NEOPLASM OF RIGHT BREAST IN FEMALE, ESTROGEN RECEPTOR NEGATIVE, UNSPECIFIED SITE OF BREAST: Primary | ICD-10-CM

## 2019-04-01 DIAGNOSIS — C50.911 MALIGNANT NEOPLASM OF RIGHT BREAST IN FEMALE, ESTROGEN RECEPTOR NEGATIVE, UNSPECIFIED SITE OF BREAST: Primary | ICD-10-CM

## 2019-04-01 DIAGNOSIS — Z17.1 MALIGNANT NEOPLASM OF RIGHT BREAST IN FEMALE, ESTROGEN RECEPTOR NEGATIVE, UNSPECIFIED SITE OF BREAST: ICD-10-CM

## 2019-04-01 LAB
ALBUMIN SERPL BCP-MCNC: 3.3 G/DL (ref 3.5–5.2)
ALP SERPL-CCNC: 102 U/L (ref 55–135)
ALT SERPL W/O P-5'-P-CCNC: 20 U/L (ref 10–44)
ANION GAP SERPL CALC-SCNC: 8 MMOL/L (ref 8–16)
AST SERPL-CCNC: 13 U/L (ref 10–40)
BASOPHILS # BLD AUTO: 0.04 K/UL (ref 0–0.2)
BASOPHILS NFR BLD: 1 % (ref 0–1.9)
BILIRUB SERPL-MCNC: 0.5 MG/DL (ref 0.1–1)
BUN SERPL-MCNC: 14 MG/DL (ref 8–23)
CALCIUM SERPL-MCNC: 9.4 MG/DL (ref 8.7–10.5)
CHLORIDE SERPL-SCNC: 106 MMOL/L (ref 95–110)
CO2 SERPL-SCNC: 28 MMOL/L (ref 23–29)
CREAT SERPL-MCNC: 0.8 MG/DL (ref 0.5–1.4)
DIFFERENTIAL METHOD: ABNORMAL
EOSINOPHIL # BLD AUTO: 0.1 K/UL (ref 0–0.5)
EOSINOPHIL NFR BLD: 1.2 % (ref 0–8)
ERYTHROCYTE [DISTWIDTH] IN BLOOD BY AUTOMATED COUNT: 14.6 % (ref 11.5–14.5)
EST. GFR  (AFRICAN AMERICAN): >60 ML/MIN/1.73 M^2
EST. GFR  (NON AFRICAN AMERICAN): >60 ML/MIN/1.73 M^2
GLUCOSE SERPL-MCNC: 192 MG/DL (ref 70–110)
HCT VFR BLD AUTO: 35.4 % (ref 37–48.5)
HGB BLD-MCNC: 11.1 G/DL (ref 12–16)
IMM GRANULOCYTES # BLD AUTO: 0.14 K/UL (ref 0–0.04)
IMM GRANULOCYTES NFR BLD AUTO: 3.4 % (ref 0–0.5)
LYMPHOCYTES # BLD AUTO: 1.2 K/UL (ref 1–4.8)
LYMPHOCYTES NFR BLD: 29.6 % (ref 18–48)
MCH RBC QN AUTO: 29.7 PG (ref 27–31)
MCHC RBC AUTO-ENTMCNC: 31.4 G/DL (ref 32–36)
MCV RBC AUTO: 95 FL (ref 82–98)
MONOCYTES # BLD AUTO: 0.2 K/UL (ref 0.3–1)
MONOCYTES NFR BLD: 5.9 % (ref 4–15)
NEUTROPHILS # BLD AUTO: 2.4 K/UL (ref 1.8–7.7)
NEUTROPHILS NFR BLD: 58.9 % (ref 38–73)
NRBC BLD-RTO: 0 /100 WBC
PLATELET # BLD AUTO: 191 K/UL (ref 150–350)
PMV BLD AUTO: 10.3 FL (ref 9.2–12.9)
POTASSIUM SERPL-SCNC: 4 MMOL/L (ref 3.5–5.1)
PROT SERPL-MCNC: 6.5 G/DL (ref 6–8.4)
RBC # BLD AUTO: 3.74 M/UL (ref 4–5.4)
SODIUM SERPL-SCNC: 142 MMOL/L (ref 136–145)
WBC # BLD AUTO: 4.09 K/UL (ref 3.9–12.7)

## 2019-04-01 PROCEDURE — 25000003 PHARM REV CODE 250: Mod: HCNC | Performed by: INTERNAL MEDICINE

## 2019-04-01 PROCEDURE — 63600175 PHARM REV CODE 636 W HCPCS: Mod: HCNC | Performed by: INTERNAL MEDICINE

## 2019-04-01 PROCEDURE — S0028 INJECTION, FAMOTIDINE, 20 MG: HCPCS | Mod: HCNC | Performed by: INTERNAL MEDICINE

## 2019-04-01 PROCEDURE — 96413 CHEMO IV INFUSION 1 HR: CPT | Mod: HCNC

## 2019-04-01 PROCEDURE — 85025 COMPLETE CBC W/AUTO DIFF WBC: CPT | Mod: HCNC

## 2019-04-01 PROCEDURE — A4216 STERILE WATER/SALINE, 10 ML: HCPCS | Mod: HCNC | Performed by: INTERNAL MEDICINE

## 2019-04-01 PROCEDURE — 80053 COMPREHEN METABOLIC PANEL: CPT | Mod: HCNC

## 2019-04-01 PROCEDURE — 96367 TX/PROPH/DG ADDL SEQ IV INF: CPT | Mod: HCNC

## 2019-04-01 RX ORDER — HEPARIN 100 UNIT/ML
500 SYRINGE INTRAVENOUS
Status: COMPLETED | OUTPATIENT
Start: 2019-04-01 | End: 2019-04-01

## 2019-04-01 RX ORDER — FAMOTIDINE 10 MG/ML
20 INJECTION INTRAVENOUS
Status: COMPLETED | OUTPATIENT
Start: 2019-04-01 | End: 2019-04-01

## 2019-04-01 RX ORDER — SODIUM CHLORIDE 0.9 % (FLUSH) 0.9 %
10 SYRINGE (ML) INJECTION
Status: CANCELLED | OUTPATIENT
Start: 2019-04-01

## 2019-04-01 RX ORDER — HEPARIN 100 UNIT/ML
500 SYRINGE INTRAVENOUS
Status: CANCELLED | OUTPATIENT
Start: 2019-04-01

## 2019-04-01 RX ORDER — SODIUM CHLORIDE 0.9 % (FLUSH) 0.9 %
10 SYRINGE (ML) INJECTION
Status: COMPLETED | OUTPATIENT
Start: 2019-04-01 | End: 2019-04-01

## 2019-04-01 RX ORDER — SODIUM CHLORIDE 0.9 % (FLUSH) 0.9 %
10 SYRINGE (ML) INJECTION
Status: DISCONTINUED | OUTPATIENT
Start: 2019-04-01 | End: 2019-04-01 | Stop reason: HOSPADM

## 2019-04-01 RX ORDER — EPINEPHRINE 0.3 MG/.3ML
0.3 INJECTION SUBCUTANEOUS ONCE AS NEEDED
Status: DISCONTINUED | OUTPATIENT
Start: 2019-04-01 | End: 2019-04-01 | Stop reason: HOSPADM

## 2019-04-01 RX ORDER — HEPARIN 100 UNIT/ML
500 SYRINGE INTRAVENOUS
Status: DISCONTINUED | OUTPATIENT
Start: 2019-04-01 | End: 2019-04-01 | Stop reason: HOSPADM

## 2019-04-01 RX ORDER — DIPHENHYDRAMINE HYDROCHLORIDE 50 MG/ML
50 INJECTION INTRAMUSCULAR; INTRAVENOUS ONCE AS NEEDED
Status: DISCONTINUED | OUTPATIENT
Start: 2019-04-01 | End: 2019-04-01 | Stop reason: HOSPADM

## 2019-04-01 RX ADMIN — HEPARIN 500 UNITS: 100 SYRINGE at 11:04

## 2019-04-01 RX ADMIN — SODIUM CHLORIDE: 9 INJECTION, SOLUTION INTRAVENOUS at 09:04

## 2019-04-01 RX ADMIN — DEXAMETHASONE SODIUM PHOSPHATE 10 MG: 4 INJECTION, SOLUTION INTRAMUSCULAR; INTRAVENOUS at 09:04

## 2019-04-01 RX ADMIN — Medication 10 ML: at 08:04

## 2019-04-01 RX ADMIN — Medication 10 ML: at 11:04

## 2019-04-01 RX ADMIN — FAMOTIDINE 20 MG: 10 INJECTION, SOLUTION INTRAVENOUS at 09:04

## 2019-04-01 RX ADMIN — DIPHENHYDRAMINE HYDROCHLORIDE 25 MG: 50 INJECTION, SOLUTION INTRAMUSCULAR; INTRAVENOUS at 10:04

## 2019-04-01 RX ADMIN — HEPARIN 500 UNITS: 100 SYRINGE at 08:04

## 2019-04-01 RX ADMIN — PACLITAXEL 180 MG: 6 INJECTION, SOLUTION INTRAVENOUS at 10:04

## 2019-04-01 NOTE — PLAN OF CARE
Problem: Adult Inpatient Plan of Care  Goal: Plan of Care Review  Outcome: Ongoing (interventions implemented as appropriate)  Pt here for taxol infusion D1C8, labs, hx, meds, allergies reviewed, pt with no complaints at this time, reclined in chair, continue to monitor

## 2019-04-01 NOTE — PLAN OF CARE
Problem: Adult Inpatient Plan of Care  Goal: Plan of Care Review  Outcome: Ongoing (interventions implemented as appropriate)  Pt tolerated taxol infusion without issue, pt to rtc next week 4/8/19, no distress noted upon d/c to home with spouse

## 2019-04-02 RX ORDER — EPINEPHRINE 0.3 MG/.3ML
0.3 INJECTION SUBCUTANEOUS ONCE AS NEEDED
Status: CANCELLED | OUTPATIENT
Start: 2019-04-09

## 2019-04-02 RX ORDER — SODIUM CHLORIDE 0.9 % (FLUSH) 0.9 %
10 SYRINGE (ML) INJECTION
Status: CANCELLED | OUTPATIENT
Start: 2019-04-09

## 2019-04-02 RX ORDER — DIPHENHYDRAMINE HYDROCHLORIDE 50 MG/ML
50 INJECTION INTRAMUSCULAR; INTRAVENOUS ONCE AS NEEDED
Status: CANCELLED | OUTPATIENT
Start: 2019-04-09

## 2019-04-02 RX ORDER — HEPARIN 100 UNIT/ML
500 SYRINGE INTRAVENOUS
Status: CANCELLED | OUTPATIENT
Start: 2019-04-09

## 2019-04-02 RX ORDER — FAMOTIDINE 10 MG/ML
20 INJECTION INTRAVENOUS
Status: CANCELLED | OUTPATIENT
Start: 2019-04-09

## 2019-04-05 ENCOUNTER — OUTPATIENT CASE MANAGEMENT (OUTPATIENT)
Dept: ADMINISTRATIVE | Facility: OTHER | Age: 74
End: 2019-04-05

## 2019-04-05 NOTE — PROGRESS NOTES
Summary:  Spoke with pt regarding last week and pt being in interventional radiology - pt states that she has a kink in her port- pt states she is feeling terrible - the nausea and cramps in her stomach are awful - pt has trouble eating and she has to eat to take her medications - pt stats she has 4 more of the weekly treattments- pt - pt states her urine stinks and her poop stinks - states she doesn't feel clean and states she doesn't feel clean stating chemo stinks -literally - chemo is a hard thing to get thru - states it is not a picnic-pt shared her cancer story and how she went thru the 5 years and now this cancer is not hormonal but in her breasts.  - pt states that her oncologist is baffled- Pt reports that chemo kills everything stating her skin constantly flakes and peels- pt reports using lotion  Pt reports that she still has diarrhea after chemo -usually on Wednesday and often has aches too  Pt states she has to keep on because she is not ready to give up    Interventions:  Allow pt to vent about her side effects and feelings  Offered emotional support and empathy with listening  Encouraged pt to continue to keep skin hydrated and cared for  Encouraged pt to write down questions regarding port, surgery and chemo  Encouraged pt to keep hydrated on her diarrhea days - pt doesn't like Gatorade- uses water and juice    Plan:  Follow-up thru the next four weeks of chemo -offer emtional support  Assess for further educational needs    Todays OPCM Self-Management Care Plan was developed with the patients/caregivers input and was based on identified barriers from todays assessment.  Goals were written today with the patient/caregiver and the patient has agreed to work towards these goals to improve his/her overall well-being. Patient verbalized understanding of the care plan, goals, and all of today's instructions. Encouraged patient/caregiver to communicate with his/her physician and health care team about  health conditions and the treatment plan.  Provided my contact information today and encouraged patient/caregiver to call me with any questions as needed.

## 2019-04-08 ENCOUNTER — INFUSION (OUTPATIENT)
Dept: INFUSION THERAPY | Facility: HOSPITAL | Age: 74
End: 2019-04-08
Attending: INTERNAL MEDICINE
Payer: MEDICARE

## 2019-04-08 VITALS
BODY MASS INDEX: 46.49 KG/M2 | HEIGHT: 62 IN | SYSTOLIC BLOOD PRESSURE: 122 MMHG | RESPIRATION RATE: 18 BRPM | HEART RATE: 79 BPM | WEIGHT: 252.63 LBS | DIASTOLIC BLOOD PRESSURE: 57 MMHG

## 2019-04-08 VITALS — BODY MASS INDEX: 46.49 KG/M2 | WEIGHT: 252.63 LBS | HEIGHT: 62 IN

## 2019-04-08 DIAGNOSIS — C50.911 MALIGNANT NEOPLASM OF RIGHT BREAST IN FEMALE, ESTROGEN RECEPTOR NEGATIVE, UNSPECIFIED SITE OF BREAST: Primary | ICD-10-CM

## 2019-04-08 DIAGNOSIS — C50.911 MALIGNANT NEOPLASM OF RIGHT FEMALE BREAST: ICD-10-CM

## 2019-04-08 DIAGNOSIS — Z17.1 MALIGNANT NEOPLASM OF RIGHT BREAST IN FEMALE, ESTROGEN RECEPTOR NEGATIVE, UNSPECIFIED SITE OF BREAST: Primary | ICD-10-CM

## 2019-04-08 DIAGNOSIS — Z51.11 ENCOUNTER FOR ANTINEOPLASTIC CHEMOTHERAPY: ICD-10-CM

## 2019-04-08 LAB
ALBUMIN SERPL BCP-MCNC: 3.2 G/DL (ref 3.5–5.2)
ALP SERPL-CCNC: 97 U/L (ref 55–135)
ALT SERPL W/O P-5'-P-CCNC: 19 U/L (ref 10–44)
ANION GAP SERPL CALC-SCNC: 7 MMOL/L (ref 8–16)
AST SERPL-CCNC: 13 U/L (ref 10–40)
BASOPHILS # BLD AUTO: 0.04 K/UL (ref 0–0.2)
BASOPHILS NFR BLD: 1.1 % (ref 0–1.9)
BILIRUB SERPL-MCNC: 0.6 MG/DL (ref 0.1–1)
BUN SERPL-MCNC: 14 MG/DL (ref 8–23)
CALCIUM SERPL-MCNC: 9.3 MG/DL (ref 8.7–10.5)
CHLORIDE SERPL-SCNC: 105 MMOL/L (ref 95–110)
CO2 SERPL-SCNC: 28 MMOL/L (ref 23–29)
CREAT SERPL-MCNC: 0.8 MG/DL (ref 0.5–1.4)
DIFFERENTIAL METHOD: ABNORMAL
EOSINOPHIL # BLD AUTO: 0 K/UL (ref 0–0.5)
EOSINOPHIL NFR BLD: 1.1 % (ref 0–8)
ERYTHROCYTE [DISTWIDTH] IN BLOOD BY AUTOMATED COUNT: 15 % (ref 11.5–14.5)
EST. GFR  (AFRICAN AMERICAN): >60 ML/MIN/1.73 M^2
EST. GFR  (NON AFRICAN AMERICAN): >60 ML/MIN/1.73 M^2
GLUCOSE SERPL-MCNC: 168 MG/DL (ref 70–110)
HCT VFR BLD AUTO: 34.6 % (ref 37–48.5)
HGB BLD-MCNC: 11 G/DL (ref 12–16)
IMM GRANULOCYTES # BLD AUTO: 0.12 K/UL (ref 0–0.04)
IMM GRANULOCYTES NFR BLD AUTO: 3.2 % (ref 0–0.5)
LYMPHOCYTES # BLD AUTO: 1.1 K/UL (ref 1–4.8)
LYMPHOCYTES NFR BLD: 28 % (ref 18–48)
MCH RBC QN AUTO: 30.2 PG (ref 27–31)
MCHC RBC AUTO-ENTMCNC: 31.8 G/DL (ref 32–36)
MCV RBC AUTO: 95 FL (ref 82–98)
MONOCYTES # BLD AUTO: 0.2 K/UL (ref 0.3–1)
MONOCYTES NFR BLD: 6.3 % (ref 4–15)
NEUTROPHILS # BLD AUTO: 2.3 K/UL (ref 1.8–7.7)
NEUTROPHILS NFR BLD: 60.3 % (ref 38–73)
NRBC BLD-RTO: 0 /100 WBC
PLATELET # BLD AUTO: 180 K/UL (ref 150–350)
PMV BLD AUTO: 10.5 FL (ref 9.2–12.9)
POTASSIUM SERPL-SCNC: 4 MMOL/L (ref 3.5–5.1)
PROT SERPL-MCNC: 6.3 G/DL (ref 6–8.4)
RBC # BLD AUTO: 3.64 M/UL (ref 4–5.4)
SODIUM SERPL-SCNC: 140 MMOL/L (ref 136–145)
WBC # BLD AUTO: 3.78 K/UL (ref 3.9–12.7)

## 2019-04-08 PROCEDURE — 96374 THER/PROPH/DIAG INJ IV PUSH: CPT | Mod: HCNC

## 2019-04-08 PROCEDURE — 96367 TX/PROPH/DG ADDL SEQ IV INF: CPT | Mod: HCNC

## 2019-04-08 PROCEDURE — 25000003 PHARM REV CODE 250: Mod: HCNC | Performed by: INTERNAL MEDICINE

## 2019-04-08 PROCEDURE — A4216 STERILE WATER/SALINE, 10 ML: HCPCS | Mod: HCNC | Performed by: INTERNAL MEDICINE

## 2019-04-08 PROCEDURE — 96413 CHEMO IV INFUSION 1 HR: CPT | Mod: HCNC

## 2019-04-08 PROCEDURE — 63600175 PHARM REV CODE 636 W HCPCS: Mod: HCNC | Performed by: INTERNAL MEDICINE

## 2019-04-08 PROCEDURE — 85025 COMPLETE CBC W/AUTO DIFF WBC: CPT | Mod: HCNC

## 2019-04-08 PROCEDURE — S0028 INJECTION, FAMOTIDINE, 20 MG: HCPCS | Mod: HCNC | Performed by: INTERNAL MEDICINE

## 2019-04-08 PROCEDURE — 96375 TX/PRO/DX INJ NEW DRUG ADDON: CPT

## 2019-04-08 PROCEDURE — 80053 COMPREHEN METABOLIC PANEL: CPT | Mod: HCNC

## 2019-04-08 RX ORDER — HEPARIN 100 UNIT/ML
500 SYRINGE INTRAVENOUS
Status: CANCELLED | OUTPATIENT
Start: 2019-04-08

## 2019-04-08 RX ORDER — DIPHENHYDRAMINE HYDROCHLORIDE 50 MG/ML
50 INJECTION INTRAMUSCULAR; INTRAVENOUS ONCE AS NEEDED
Status: DISCONTINUED | OUTPATIENT
Start: 2019-04-08 | End: 2019-04-08 | Stop reason: HOSPADM

## 2019-04-08 RX ORDER — SODIUM CHLORIDE 0.9 % (FLUSH) 0.9 %
10 SYRINGE (ML) INJECTION
Status: DISCONTINUED | OUTPATIENT
Start: 2019-04-08 | End: 2019-04-08 | Stop reason: HOSPADM

## 2019-04-08 RX ORDER — EPINEPHRINE 0.3 MG/.3ML
0.3 INJECTION SUBCUTANEOUS ONCE AS NEEDED
Status: DISCONTINUED | OUTPATIENT
Start: 2019-04-08 | End: 2019-04-08 | Stop reason: HOSPADM

## 2019-04-08 RX ORDER — HEPARIN 100 UNIT/ML
500 SYRINGE INTRAVENOUS
Status: COMPLETED | OUTPATIENT
Start: 2019-04-08 | End: 2019-04-08

## 2019-04-08 RX ORDER — HEPARIN 100 UNIT/ML
500 SYRINGE INTRAVENOUS
Status: DISCONTINUED | OUTPATIENT
Start: 2019-04-08 | End: 2019-04-08 | Stop reason: HOSPADM

## 2019-04-08 RX ORDER — SODIUM CHLORIDE 0.9 % (FLUSH) 0.9 %
10 SYRINGE (ML) INJECTION
Status: CANCELLED | OUTPATIENT
Start: 2019-04-08

## 2019-04-08 RX ORDER — FAMOTIDINE 10 MG/ML
20 INJECTION INTRAVENOUS
Status: COMPLETED | OUTPATIENT
Start: 2019-04-08 | End: 2019-04-08

## 2019-04-08 RX ORDER — SODIUM CHLORIDE 0.9 % (FLUSH) 0.9 %
10 SYRINGE (ML) INJECTION
Status: COMPLETED | OUTPATIENT
Start: 2019-04-08 | End: 2019-04-08

## 2019-04-08 RX ADMIN — FAMOTIDINE 20 MG: 10 INJECTION, SOLUTION INTRAVENOUS at 11:04

## 2019-04-08 RX ADMIN — HEPARIN 500 UNITS: 100 SYRINGE at 09:04

## 2019-04-08 RX ADMIN — Medication 10 ML: at 09:04

## 2019-04-08 RX ADMIN — PACLITAXEL 180 MG: 6 INJECTION, SOLUTION INTRAVENOUS at 11:04

## 2019-04-08 RX ADMIN — HEPARIN 500 UNITS: 100 SYRINGE at 12:04

## 2019-04-08 RX ADMIN — DIPHENHYDRAMINE HYDROCHLORIDE 25 MG: 50 INJECTION, SOLUTION INTRAMUSCULAR; INTRAVENOUS at 10:04

## 2019-04-08 RX ADMIN — SODIUM CHLORIDE: 9 INJECTION, SOLUTION INTRAVENOUS at 10:04

## 2019-04-08 RX ADMIN — Medication 10 ML: at 12:04

## 2019-04-08 RX ADMIN — Medication 10 MG: at 11:04

## 2019-04-08 NOTE — PLAN OF CARE
Problem: Adult Inpatient Plan of Care  Goal: Plan of Care Review  Outcome: Ongoing (interventions implemented as appropriate)  Tolerated infusion well.  No reactions noted.  No questions or concerns at this time.  Ambulated off unit in [facility].

## 2019-04-08 NOTE — NURSING
0920- Patient arrived ambulatory to the unit to have labs drawn from port.  Patient reports some nausea and discomfort to abdomen, has prn meds to treat.  Patient to await lab results for treatment today, port left accessed.

## 2019-04-08 NOTE — PLAN OF CARE
Problem: Nausea and Vomiting (Chemotherapy Effects)  Goal: Fluid and Electrolyte Balance  Outcome: Ongoing (interventions implemented as appropriate)  Patient here for Taxol.  Assessment completed and labs reviewed.  VSS.  No needs at this time. Chair reclined and blanket offered.  Will continue to monitor.

## 2019-04-12 ENCOUNTER — OUTPATIENT CASE MANAGEMENT (OUTPATIENT)
Dept: ADMINISTRATIVE | Facility: OTHER | Age: 74
End: 2019-04-12

## 2019-04-12 NOTE — PROGRESS NOTES
Subjective:       Patient ID: Alina Narayan is a 74 y.o. female.    Chief Complaint: No chief complaint on file.    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  She has received 9 cycles of weekly Taxol.  She had recent port study showed her catheter was in the appropriate place.    Her major side effect has been some GI symptoms with decreased appetite and some nausea.  She also has some numbness in her fingertips and some pins and needle sensation in her feet which bother her at night.        Current history:  abnormal mammogram of the right breast in December 2018.  She was having no breast symptoms at that time    That mammogram showed a 13 mm mass in the right axillary tail.  By ultrasound there was a 6 x 9 x 11 mm intramammary node and a 2nd 5 x 6 x 6 mm hypoechoic mass in the axillary tail.  On January 7, 2019 both masses were biopsied and both showed lymph nodes with metastatic carcinoma which was ER negative KS negative and HER2 negative.  Ki-67 was 40%.    MRI on January 15, 2019 showed 2 right axillary lymph nodes the largest measured 1.4 x 1.0 cm.  There were no abnormalities in the right breast.    PET scan was then performed which showed only low-grade activity in the right axilla with an SUV of 1.32.    Previous breast cancer history History: On September 25, 2012 she underwent a screening mammogram which showed an asymmetric density in the left breast at 2:00 position. A followup mammogram on the 27th showed an oval mass in that area ultrasound showed a 6 mm solid mass. Core needle biopsy on October 1 showed invasive carcinoma ER 90% positive KS 80% positive and HER-2 negative. On October 29 she underwent lumpectomy and sentinel lymph node biopsy. That showed a 7 mm low-grade (1+2+1) infiltrating carcinoma. 3 sentinel lymph nodes were negative. Final pathological stage TIB N0 stage  IA.  She completed letrozole therapy in 2017.    She was referred  for genetic testing (mother with breast cancer, father and brother with prostate CA)  - integrated BRCA testing through [employer] was negative.  She did not have more comprehensive testing is at was denied.    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  She has had mitral valve replacement.      Review of Systems   Constitutional: Positive for appetite change ( decreased) and fatigue. Negative for activity change, fever and unexpected weight change.   Respiratory: Negative for cough and shortness of breath.    Cardiovascular: Negative for chest pain.   Gastrointestinal: Positive for abdominal distention ( bloating), abdominal pain (Crampy with loose bowels) and nausea. Negative for constipation and diarrhea.   Genitourinary: Negative for frequency.   Musculoskeletal: Positive for arthralgias (knees) and back pain.   Skin: Negative for rash.   Neurological: Positive for numbness (In fingertips). Negative for headaches.   Hematological: Negative for adenopathy.   Psychiatric/Behavioral: Negative for dysphoric mood. The patient is not nervous/anxious.        Objective:      Physical Exam   Constitutional: She is oriented to person, place, and time. She appears well-developed and well-nourished. No distress.   Obese   HENT:   Mouth/Throat: No oropharyngeal exudate.   Eyes: No scleral icterus.   Cardiovascular: Normal rate and regular rhythm.   Pulmonary/Chest: Effort normal and breath sounds normal. She has no wheezes. She has no rales. Right breast exhibits no mass, no nipple discharge and no skin change. Left breast exhibits no mass, no nipple discharge and no skin change.       Abdominal: Soft. There is no tenderness.   Lymphadenopathy:     She has no cervical adenopathy.     She has no axillary adenopathy.        Right: No supraclavicular adenopathy present.        Left: No supraclavicular adenopathy present.    Neurological: She is alert and oriented to person, place, and time.   Psychiatric: She has a normal mood and affect. Her behavior is normal. Thought content normal.   Vitals reviewed.      Assessment:     CBC shows a white blood cell count of 3940 with an ANC of 2200  1. Cancer of axillary tail of right breast        Plan:       Continue weekly Taxol.  Return to clinic in 3 weeks.  Repeat mammogram/ultrasound at that time.   She will start CMF chemotherapy at that time.

## 2019-04-15 ENCOUNTER — INFUSION (OUTPATIENT)
Dept: INFUSION THERAPY | Facility: HOSPITAL | Age: 74
End: 2019-04-15
Attending: INTERNAL MEDICINE
Payer: MEDICARE

## 2019-04-15 ENCOUNTER — OFFICE VISIT (OUTPATIENT)
Dept: HEMATOLOGY/ONCOLOGY | Facility: CLINIC | Age: 74
End: 2019-04-15
Payer: MEDICARE

## 2019-04-15 VITALS
WEIGHT: 252 LBS | SYSTOLIC BLOOD PRESSURE: 126 MMHG | DIASTOLIC BLOOD PRESSURE: 72 MMHG | HEIGHT: 62 IN | BODY MASS INDEX: 46.38 KG/M2 | TEMPERATURE: 98 F | HEART RATE: 84 BPM | RESPIRATION RATE: 18 BRPM

## 2019-04-15 VITALS
OXYGEN SATURATION: 99 % | WEIGHT: 252.19 LBS | HEIGHT: 62 IN | SYSTOLIC BLOOD PRESSURE: 127 MMHG | DIASTOLIC BLOOD PRESSURE: 60 MMHG | BODY MASS INDEX: 46.41 KG/M2 | RESPIRATION RATE: 20 BRPM | HEART RATE: 78 BPM

## 2019-04-15 DIAGNOSIS — C50.911 MALIGNANT NEOPLASM OF RIGHT BREAST IN FEMALE, ESTROGEN RECEPTOR NEGATIVE, UNSPECIFIED SITE OF BREAST: Primary | ICD-10-CM

## 2019-04-15 DIAGNOSIS — Z17.1 MALIGNANT NEOPLASM OF RIGHT BREAST IN FEMALE, ESTROGEN RECEPTOR NEGATIVE, UNSPECIFIED SITE OF BREAST: Primary | ICD-10-CM

## 2019-04-15 DIAGNOSIS — C50.611 CANCER OF AXILLARY TAIL OF RIGHT BREAST: Primary | ICD-10-CM

## 2019-04-15 DIAGNOSIS — C50.911 MALIGNANT NEOPLASM OF RIGHT FEMALE BREAST: ICD-10-CM

## 2019-04-15 DIAGNOSIS — Z51.11 ENCOUNTER FOR ANTINEOPLASTIC CHEMOTHERAPY: ICD-10-CM

## 2019-04-15 LAB
ALBUMIN SERPL BCP-MCNC: 3.2 G/DL (ref 3.5–5.2)
ALP SERPL-CCNC: 103 U/L (ref 55–135)
ALT SERPL W/O P-5'-P-CCNC: 18 U/L (ref 10–44)
ANION GAP SERPL CALC-SCNC: 8 MMOL/L (ref 8–16)
AST SERPL-CCNC: 14 U/L (ref 10–40)
BASOPHILS # BLD AUTO: 0.03 K/UL (ref 0–0.2)
BASOPHILS NFR BLD: 0.8 % (ref 0–1.9)
BILIRUB SERPL-MCNC: 0.5 MG/DL (ref 0.1–1)
BUN SERPL-MCNC: 15 MG/DL (ref 8–23)
CALCIUM SERPL-MCNC: 8.9 MG/DL (ref 8.7–10.5)
CHLORIDE SERPL-SCNC: 106 MMOL/L (ref 95–110)
CO2 SERPL-SCNC: 26 MMOL/L (ref 23–29)
CREAT SERPL-MCNC: 0.8 MG/DL (ref 0.5–1.4)
DIFFERENTIAL METHOD: ABNORMAL
EOSINOPHIL # BLD AUTO: 0.1 K/UL (ref 0–0.5)
EOSINOPHIL NFR BLD: 1.3 % (ref 0–8)
ERYTHROCYTE [DISTWIDTH] IN BLOOD BY AUTOMATED COUNT: 15.3 % (ref 11.5–14.5)
EST. GFR  (AFRICAN AMERICAN): >60 ML/MIN/1.73 M^2
EST. GFR  (NON AFRICAN AMERICAN): >60 ML/MIN/1.73 M^2
GLUCOSE SERPL-MCNC: 195 MG/DL (ref 70–110)
HCT VFR BLD AUTO: 34.7 % (ref 37–48.5)
HGB BLD-MCNC: 10.7 G/DL (ref 12–16)
IMM GRANULOCYTES # BLD AUTO: 0.13 K/UL (ref 0–0.04)
IMM GRANULOCYTES NFR BLD AUTO: 3.3 % (ref 0–0.5)
LYMPHOCYTES # BLD AUTO: 1.2 K/UL (ref 1–4.8)
LYMPHOCYTES NFR BLD: 31.5 % (ref 18–48)
MCH RBC QN AUTO: 30.1 PG (ref 27–31)
MCHC RBC AUTO-ENTMCNC: 30.8 G/DL (ref 32–36)
MCV RBC AUTO: 98 FL (ref 82–98)
MONOCYTES # BLD AUTO: 0.3 K/UL (ref 0.3–1)
MONOCYTES NFR BLD: 6.6 % (ref 4–15)
NEUTROPHILS # BLD AUTO: 2.2 K/UL (ref 1.8–7.7)
NEUTROPHILS NFR BLD: 56.5 % (ref 38–73)
NRBC BLD-RTO: 1 /100 WBC
PLATELET # BLD AUTO: 171 K/UL (ref 150–350)
PMV BLD AUTO: 10.9 FL (ref 9.2–12.9)
POTASSIUM SERPL-SCNC: 4.4 MMOL/L (ref 3.5–5.1)
PROT SERPL-MCNC: 6.3 G/DL (ref 6–8.4)
RBC # BLD AUTO: 3.56 M/UL (ref 4–5.4)
SODIUM SERPL-SCNC: 140 MMOL/L (ref 136–145)
WBC # BLD AUTO: 3.94 K/UL (ref 3.9–12.7)

## 2019-04-15 PROCEDURE — 25000003 PHARM REV CODE 250: Mod: HCNC | Performed by: INTERNAL MEDICINE

## 2019-04-15 PROCEDURE — A4216 STERILE WATER/SALINE, 10 ML: HCPCS | Mod: HCNC | Performed by: INTERNAL MEDICINE

## 2019-04-15 PROCEDURE — 1101F PT FALLS ASSESS-DOCD LE1/YR: CPT | Mod: HCNC,CPTII,S$GLB, | Performed by: INTERNAL MEDICINE

## 2019-04-15 PROCEDURE — 63600175 PHARM REV CODE 636 W HCPCS: Mod: HCNC | Performed by: INTERNAL MEDICINE

## 2019-04-15 PROCEDURE — 1101F PR PT FALLS ASSESS DOC 0-1 FALLS W/OUT INJ PAST YR: ICD-10-PCS | Mod: HCNC,CPTII,S$GLB, | Performed by: INTERNAL MEDICINE

## 2019-04-15 PROCEDURE — 99214 OFFICE O/P EST MOD 30 MIN: CPT | Mod: HCNC,S$GLB,, | Performed by: INTERNAL MEDICINE

## 2019-04-15 PROCEDURE — 96375 TX/PRO/DX INJ NEW DRUG ADDON: CPT | Mod: HCNC

## 2019-04-15 PROCEDURE — 3078F DIAST BP <80 MM HG: CPT | Mod: HCNC,CPTII,S$GLB, | Performed by: INTERNAL MEDICINE

## 2019-04-15 PROCEDURE — 3074F SYST BP LT 130 MM HG: CPT | Mod: HCNC,CPTII,S$GLB, | Performed by: INTERNAL MEDICINE

## 2019-04-15 PROCEDURE — 99214 PR OFFICE/OUTPT VISIT, EST, LEVL IV, 30-39 MIN: ICD-10-PCS | Mod: HCNC,S$GLB,, | Performed by: INTERNAL MEDICINE

## 2019-04-15 PROCEDURE — 80053 COMPREHEN METABOLIC PANEL: CPT | Mod: HCNC

## 2019-04-15 PROCEDURE — 96413 CHEMO IV INFUSION 1 HR: CPT | Mod: HCNC

## 2019-04-15 PROCEDURE — 96367 TX/PROPH/DG ADDL SEQ IV INF: CPT | Mod: HCNC

## 2019-04-15 PROCEDURE — 3074F PR MOST RECENT SYSTOLIC BLOOD PRESSURE < 130 MM HG: ICD-10-PCS | Mod: HCNC,CPTII,S$GLB, | Performed by: INTERNAL MEDICINE

## 2019-04-15 PROCEDURE — 85025 COMPLETE CBC W/AUTO DIFF WBC: CPT | Mod: HCNC

## 2019-04-15 PROCEDURE — S0028 INJECTION, FAMOTIDINE, 20 MG: HCPCS | Mod: HCNC | Performed by: INTERNAL MEDICINE

## 2019-04-15 PROCEDURE — 3078F PR MOST RECENT DIASTOLIC BLOOD PRESSURE < 80 MM HG: ICD-10-PCS | Mod: HCNC,CPTII,S$GLB, | Performed by: INTERNAL MEDICINE

## 2019-04-15 PROCEDURE — 99999 PR PBB SHADOW E&M-EST. PATIENT-LVL IV: ICD-10-PCS | Mod: PBBFAC,HCNC,, | Performed by: INTERNAL MEDICINE

## 2019-04-15 PROCEDURE — 99999 PR PBB SHADOW E&M-EST. PATIENT-LVL IV: CPT | Mod: PBBFAC,HCNC,, | Performed by: INTERNAL MEDICINE

## 2019-04-15 RX ORDER — HEPARIN 100 UNIT/ML
500 SYRINGE INTRAVENOUS
Status: DISCONTINUED | OUTPATIENT
Start: 2019-04-15 | End: 2019-04-15 | Stop reason: HOSPADM

## 2019-04-15 RX ORDER — SODIUM CHLORIDE 0.9 % (FLUSH) 0.9 %
10 SYRINGE (ML) INJECTION
Status: COMPLETED | OUTPATIENT
Start: 2019-04-15 | End: 2019-04-15

## 2019-04-15 RX ORDER — FAMOTIDINE 10 MG/ML
20 INJECTION INTRAVENOUS
Status: CANCELLED | OUTPATIENT
Start: 2019-04-16

## 2019-04-15 RX ORDER — HEPARIN 100 UNIT/ML
500 SYRINGE INTRAVENOUS
Status: CANCELLED | OUTPATIENT
Start: 2019-04-15

## 2019-04-15 RX ORDER — SODIUM CHLORIDE 0.9 % (FLUSH) 0.9 %
10 SYRINGE (ML) INJECTION
Status: CANCELLED | OUTPATIENT
Start: 2019-04-15

## 2019-04-15 RX ORDER — FAMOTIDINE 10 MG/ML
20 INJECTION INTRAVENOUS
Status: COMPLETED | OUTPATIENT
Start: 2019-04-15 | End: 2019-04-15

## 2019-04-15 RX ORDER — DIPHENHYDRAMINE HYDROCHLORIDE 50 MG/ML
50 INJECTION INTRAMUSCULAR; INTRAVENOUS ONCE AS NEEDED
Status: DISCONTINUED | OUTPATIENT
Start: 2019-04-15 | End: 2019-04-15 | Stop reason: HOSPADM

## 2019-04-15 RX ORDER — HEPARIN 100 UNIT/ML
500 SYRINGE INTRAVENOUS
Status: CANCELLED | OUTPATIENT
Start: 2019-04-16

## 2019-04-15 RX ORDER — SODIUM CHLORIDE 0.9 % (FLUSH) 0.9 %
10 SYRINGE (ML) INJECTION
Status: DISCONTINUED | OUTPATIENT
Start: 2019-04-15 | End: 2019-04-15 | Stop reason: HOSPADM

## 2019-04-15 RX ORDER — DIPHENHYDRAMINE HYDROCHLORIDE 50 MG/ML
50 INJECTION INTRAMUSCULAR; INTRAVENOUS ONCE AS NEEDED
Status: CANCELLED | OUTPATIENT
Start: 2019-04-16

## 2019-04-15 RX ORDER — HEPARIN 100 UNIT/ML
500 SYRINGE INTRAVENOUS
Status: COMPLETED | OUTPATIENT
Start: 2019-04-15 | End: 2019-04-15

## 2019-04-15 RX ORDER — EPINEPHRINE 0.3 MG/.3ML
0.3 INJECTION SUBCUTANEOUS ONCE AS NEEDED
Status: DISCONTINUED | OUTPATIENT
Start: 2019-04-15 | End: 2019-04-15 | Stop reason: HOSPADM

## 2019-04-15 RX ORDER — SODIUM CHLORIDE 0.9 % (FLUSH) 0.9 %
10 SYRINGE (ML) INJECTION
Status: CANCELLED | OUTPATIENT
Start: 2019-04-16

## 2019-04-15 RX ORDER — EPINEPHRINE 0.3 MG/.3ML
0.3 INJECTION SUBCUTANEOUS ONCE AS NEEDED
Status: CANCELLED | OUTPATIENT
Start: 2019-04-16

## 2019-04-15 RX ADMIN — Medication 10 ML: at 01:04

## 2019-04-15 RX ADMIN — DIPHENHYDRAMINE HYDROCHLORIDE 25 MG: 50 INJECTION, SOLUTION INTRAMUSCULAR; INTRAVENOUS at 11:04

## 2019-04-15 RX ADMIN — HEPARIN 500 UNITS: 100 SYRINGE at 01:04

## 2019-04-15 RX ADMIN — HEPARIN 500 UNITS: 100 SYRINGE at 09:04

## 2019-04-15 RX ADMIN — DEXAMETHASONE SODIUM PHOSPHATE 10 MG: 4 INJECTION, SOLUTION INTRAMUSCULAR; INTRAVENOUS at 11:04

## 2019-04-15 RX ADMIN — Medication 10 ML: at 09:04

## 2019-04-15 RX ADMIN — PACLITAXEL 180 MG: 6 INJECTION, SOLUTION INTRAVENOUS at 12:04

## 2019-04-15 RX ADMIN — FAMOTIDINE 20 MG: 10 INJECTION, SOLUTION INTRAVENOUS at 11:04

## 2019-04-15 RX ADMIN — SODIUM CHLORIDE: 0.9 INJECTION, SOLUTION INTRAVENOUS at 11:04

## 2019-04-15 NOTE — PLAN OF CARE
Problem: Adult Inpatient Plan of Care  Goal: Plan of Care Review  Outcome: Ongoing (interventions implemented as appropriate)  Pt tolerated taxol infusion withouti ssue, pt has no upcoming appts scheduled at this time, no distress noted pt d/c to home with spouse

## 2019-04-15 NOTE — PLAN OF CARE
Problem: Adult Inpatient Plan of Care  Goal: Plan of Care Review  Outcome: Ongoing (interventions implemented as appropriate)  Pt here for taxol infusion D1C10, labs, hx, meds, allergies reviewed, pt with no complaints at this time, reclined in chair, continue to monitor

## 2019-04-15 NOTE — NURSING
Patient's PAC accessed.  Flushes easily.  Blood retrurn sluggish but present.  Lab specimens obtained and sent to lab.  PAC heparinized and left in place for scheduled chemo.

## 2019-04-16 ENCOUNTER — TELEPHONE (OUTPATIENT)
Dept: HEMATOLOGY/ONCOLOGY | Facility: CLINIC | Age: 74
End: 2019-04-16

## 2019-04-16 NOTE — TELEPHONE ENCOUNTER
Called patient to schedule next chemo and port draw labs and follow up.        ----- Message from Shalini Arreola RN sent at 4/16/2019 11:26 AM CDT -----  Contact: Patient  Needs C11 taxol 4/22 if available?    ----- Message -----  From: Mary Mora  Sent: 4/16/2019  11:20 AM  To: Reym[facility] Staff    Pt would like to schedule lab and infusion appt on 04/22 if avail.      Contact:: [phone]

## 2019-04-17 RX ORDER — HEPARIN 100 UNIT/ML
500 SYRINGE INTRAVENOUS
Status: CANCELLED | OUTPATIENT
Start: 2019-04-23

## 2019-04-17 RX ORDER — DIPHENHYDRAMINE HYDROCHLORIDE 50 MG/ML
50 INJECTION INTRAMUSCULAR; INTRAVENOUS ONCE AS NEEDED
Status: CANCELLED | OUTPATIENT
Start: 2019-04-23

## 2019-04-17 RX ORDER — EPINEPHRINE 0.3 MG/.3ML
0.3 INJECTION SUBCUTANEOUS ONCE AS NEEDED
Status: CANCELLED | OUTPATIENT
Start: 2019-04-23

## 2019-04-17 RX ORDER — SODIUM CHLORIDE 0.9 % (FLUSH) 0.9 %
10 SYRINGE (ML) INJECTION
Status: CANCELLED | OUTPATIENT
Start: 2019-04-23

## 2019-04-17 RX ORDER — FAMOTIDINE 10 MG/ML
20 INJECTION INTRAVENOUS
Status: CANCELLED | OUTPATIENT
Start: 2019-04-23

## 2019-04-18 ENCOUNTER — PATIENT MESSAGE (OUTPATIENT)
Dept: ADMINISTRATIVE | Facility: OTHER | Age: 74
End: 2019-04-18

## 2019-04-22 ENCOUNTER — INFUSION (OUTPATIENT)
Dept: INFUSION THERAPY | Facility: HOSPITAL | Age: 74
End: 2019-04-22
Attending: INTERNAL MEDICINE
Payer: MEDICARE

## 2019-04-22 ENCOUNTER — PATIENT MESSAGE (OUTPATIENT)
Dept: CARDIOLOGY | Facility: CLINIC | Age: 74
End: 2019-04-22

## 2019-04-22 VITALS
TEMPERATURE: 98 F | WEIGHT: 254.88 LBS | HEIGHT: 62 IN | HEART RATE: 86 BPM | DIASTOLIC BLOOD PRESSURE: 75 MMHG | BODY MASS INDEX: 46.9 KG/M2 | SYSTOLIC BLOOD PRESSURE: 161 MMHG | RESPIRATION RATE: 18 BRPM

## 2019-04-22 DIAGNOSIS — C50.911 MALIGNANT NEOPLASM OF RIGHT BREAST IN FEMALE, ESTROGEN RECEPTOR NEGATIVE, UNSPECIFIED SITE OF BREAST: Primary | ICD-10-CM

## 2019-04-22 DIAGNOSIS — Z17.1 MALIGNANT NEOPLASM OF RIGHT BREAST IN FEMALE, ESTROGEN RECEPTOR NEGATIVE, UNSPECIFIED SITE OF BREAST: Primary | ICD-10-CM

## 2019-04-22 DIAGNOSIS — C50.911 MALIGNANT NEOPLASM OF RIGHT FEMALE BREAST: ICD-10-CM

## 2019-04-22 LAB
ALBUMIN SERPL BCP-MCNC: 3.2 G/DL (ref 3.5–5.2)
ALP SERPL-CCNC: 107 U/L (ref 55–135)
ALT SERPL W/O P-5'-P-CCNC: 15 U/L (ref 10–44)
ANION GAP SERPL CALC-SCNC: 6 MMOL/L (ref 8–16)
AST SERPL-CCNC: 11 U/L (ref 10–40)
BASOPHILS # BLD AUTO: 0.03 K/UL (ref 0–0.2)
BASOPHILS NFR BLD: 0.7 % (ref 0–1.9)
BILIRUB SERPL-MCNC: 0.5 MG/DL (ref 0.1–1)
BUN SERPL-MCNC: 12 MG/DL (ref 8–23)
CALCIUM SERPL-MCNC: 9.2 MG/DL (ref 8.7–10.5)
CHLORIDE SERPL-SCNC: 105 MMOL/L (ref 95–110)
CO2 SERPL-SCNC: 29 MMOL/L (ref 23–29)
CREAT SERPL-MCNC: 0.8 MG/DL (ref 0.5–1.4)
DIFFERENTIAL METHOD: ABNORMAL
EOSINOPHIL # BLD AUTO: 0 K/UL (ref 0–0.5)
EOSINOPHIL NFR BLD: 1 % (ref 0–8)
ERYTHROCYTE [DISTWIDTH] IN BLOOD BY AUTOMATED COUNT: 15.9 % (ref 11.5–14.5)
EST. GFR  (AFRICAN AMERICAN): >60 ML/MIN/1.73 M^2
EST. GFR  (NON AFRICAN AMERICAN): >60 ML/MIN/1.73 M^2
GLUCOSE SERPL-MCNC: 214 MG/DL (ref 70–110)
HCT VFR BLD AUTO: 33.2 % (ref 37–48.5)
HGB BLD-MCNC: 10.4 G/DL (ref 12–16)
IMM GRANULOCYTES # BLD AUTO: 0.16 K/UL (ref 0–0.04)
IMM GRANULOCYTES NFR BLD AUTO: 3.8 % (ref 0–0.5)
LYMPHOCYTES # BLD AUTO: 1.1 K/UL (ref 1–4.8)
LYMPHOCYTES NFR BLD: 26.4 % (ref 18–48)
MCH RBC QN AUTO: 30.7 PG (ref 27–31)
MCHC RBC AUTO-ENTMCNC: 31.3 G/DL (ref 32–36)
MCV RBC AUTO: 98 FL (ref 82–98)
MONOCYTES # BLD AUTO: 0.3 K/UL (ref 0.3–1)
MONOCYTES NFR BLD: 5.9 % (ref 4–15)
NEUTROPHILS # BLD AUTO: 2.6 K/UL (ref 1.8–7.7)
NEUTROPHILS NFR BLD: 62.2 % (ref 38–73)
NRBC BLD-RTO: 0 /100 WBC
PLATELET # BLD AUTO: 173 K/UL (ref 150–350)
PMV BLD AUTO: 10.4 FL (ref 9.2–12.9)
POTASSIUM SERPL-SCNC: 4.2 MMOL/L (ref 3.5–5.1)
PROT SERPL-MCNC: 6.1 G/DL (ref 6–8.4)
RBC # BLD AUTO: 3.39 M/UL (ref 4–5.4)
SODIUM SERPL-SCNC: 140 MMOL/L (ref 136–145)
WBC # BLD AUTO: 4.21 K/UL (ref 3.9–12.7)

## 2019-04-22 PROCEDURE — A4216 STERILE WATER/SALINE, 10 ML: HCPCS | Mod: HCNC | Performed by: INTERNAL MEDICINE

## 2019-04-22 PROCEDURE — 25000003 PHARM REV CODE 250: Mod: HCNC | Performed by: INTERNAL MEDICINE

## 2019-04-22 PROCEDURE — 96376 TX/PRO/DX INJ SAME DRUG ADON: CPT | Mod: HCNC

## 2019-04-22 PROCEDURE — 96367 TX/PROPH/DG ADDL SEQ IV INF: CPT | Mod: HCNC

## 2019-04-22 PROCEDURE — 96413 CHEMO IV INFUSION 1 HR: CPT | Mod: HCNC

## 2019-04-22 PROCEDURE — 63600175 PHARM REV CODE 636 W HCPCS: Mod: HCNC | Performed by: INTERNAL MEDICINE

## 2019-04-22 PROCEDURE — 80053 COMPREHEN METABOLIC PANEL: CPT | Mod: HCNC

## 2019-04-22 PROCEDURE — S0028 INJECTION, FAMOTIDINE, 20 MG: HCPCS | Mod: HCNC | Performed by: INTERNAL MEDICINE

## 2019-04-22 PROCEDURE — 85025 COMPLETE CBC W/AUTO DIFF WBC: CPT | Mod: HCNC

## 2019-04-22 RX ORDER — SODIUM CHLORIDE 0.9 % (FLUSH) 0.9 %
10 SYRINGE (ML) INJECTION
Status: COMPLETED | OUTPATIENT
Start: 2019-04-22 | End: 2019-04-22

## 2019-04-22 RX ORDER — HEPARIN 100 UNIT/ML
500 SYRINGE INTRAVENOUS
Status: DISCONTINUED | OUTPATIENT
Start: 2019-04-22 | End: 2019-04-22 | Stop reason: HOSPADM

## 2019-04-22 RX ORDER — FAMOTIDINE 10 MG/ML
20 INJECTION INTRAVENOUS
Status: COMPLETED | OUTPATIENT
Start: 2019-04-22 | End: 2019-04-22

## 2019-04-22 RX ORDER — SODIUM CHLORIDE 0.9 % (FLUSH) 0.9 %
10 SYRINGE (ML) INJECTION
Status: DISCONTINUED | OUTPATIENT
Start: 2019-04-22 | End: 2019-04-22 | Stop reason: HOSPADM

## 2019-04-22 RX ORDER — HEPARIN 100 UNIT/ML
500 SYRINGE INTRAVENOUS
Status: CANCELLED | OUTPATIENT
Start: 2019-04-22

## 2019-04-22 RX ORDER — SODIUM CHLORIDE 0.9 % (FLUSH) 0.9 %
10 SYRINGE (ML) INJECTION
Status: CANCELLED | OUTPATIENT
Start: 2019-04-22

## 2019-04-22 RX ORDER — EPINEPHRINE 0.3 MG/.3ML
0.3 INJECTION SUBCUTANEOUS ONCE AS NEEDED
Status: DISCONTINUED | OUTPATIENT
Start: 2019-04-22 | End: 2019-04-22 | Stop reason: HOSPADM

## 2019-04-22 RX ORDER — HEPARIN 100 UNIT/ML
500 SYRINGE INTRAVENOUS
Status: COMPLETED | OUTPATIENT
Start: 2019-04-22 | End: 2019-04-22

## 2019-04-22 RX ORDER — DIPHENHYDRAMINE HYDROCHLORIDE 50 MG/ML
50 INJECTION INTRAMUSCULAR; INTRAVENOUS ONCE AS NEEDED
Status: DISCONTINUED | OUTPATIENT
Start: 2019-04-22 | End: 2019-04-22 | Stop reason: HOSPADM

## 2019-04-22 RX ADMIN — FAMOTIDINE 20 MG: 10 INJECTION, SOLUTION INTRAVENOUS at 10:04

## 2019-04-22 RX ADMIN — DIPHENHYDRAMINE HYDROCHLORIDE 25 MG: 50 INJECTION, SOLUTION INTRAMUSCULAR; INTRAVENOUS at 09:04

## 2019-04-22 RX ADMIN — HEPARIN 500 UNITS: 100 SYRINGE at 12:04

## 2019-04-22 RX ADMIN — Medication 10 ML: at 12:04

## 2019-04-22 RX ADMIN — SODIUM CHLORIDE: 0.9 INJECTION, SOLUTION INTRAVENOUS at 09:04

## 2019-04-22 RX ADMIN — Medication 10 ML: at 08:04

## 2019-04-22 RX ADMIN — HEPARIN 500 UNITS: 100 SYRINGE at 08:04

## 2019-04-22 RX ADMIN — DEXAMETHASONE SODIUM PHOSPHATE 10 MG: 4 INJECTION, SOLUTION INTRAMUSCULAR; INTRAVENOUS at 09:04

## 2019-04-22 RX ADMIN — PACLITAXEL 180 MG: 6 INJECTION, SOLUTION INTRAVENOUS at 11:04

## 2019-04-22 NOTE — PLAN OF CARE
Problem: Adult Inpatient Plan of Care  Goal: Plan of Care Review  Outcome: Ongoing (interventions implemented as appropriate)  Infusion completed, pt tolerated well; pt instructed to remain well hydrated, reminded to contact MD Palm for recent edema; discussed when to contact MD, when to report to ER; pt declined printed AVS, verbalized understanding of all discussed and when to report next

## 2019-04-22 NOTE — NURSING
0920  OK per MD Dwyer to proceed with treatment, pt with +2 bilateral leg/feet edema x several weeks, no pain, redness or temp changes, pt instructed to contact MD Palm today for further evaluation

## 2019-04-22 NOTE — NURSING
0840  Pt here for Taxol infusion, accompanied by spouse, reports bilateral lower leg/feet edema +2 x several weeks, no pain, redness, temp changes reported, pt seen by cardiologist MD Palm and instructed to contact MD via [facility] today to discuss same;discussed treatment plan for today, all questions answered and pt agrees to proceed, pending lab results

## 2019-04-23 ENCOUNTER — PATIENT MESSAGE (OUTPATIENT)
Dept: HEMATOLOGY/ONCOLOGY | Facility: CLINIC | Age: 74
End: 2019-04-23

## 2019-04-23 RX ORDER — EPINEPHRINE 0.3 MG/.3ML
0.3 INJECTION SUBCUTANEOUS ONCE AS NEEDED
Status: CANCELLED | OUTPATIENT
Start: 2019-04-30

## 2019-04-23 RX ORDER — HEPARIN 100 UNIT/ML
500 SYRINGE INTRAVENOUS
Status: CANCELLED | OUTPATIENT
Start: 2019-04-30

## 2019-04-23 RX ORDER — SODIUM CHLORIDE 0.9 % (FLUSH) 0.9 %
10 SYRINGE (ML) INJECTION
Status: CANCELLED | OUTPATIENT
Start: 2019-04-30

## 2019-04-23 RX ORDER — FUROSEMIDE 20 MG/1
20 TABLET ORAL DAILY
COMMUNITY
End: 2019-04-23 | Stop reason: SDUPTHER

## 2019-04-23 RX ORDER — FAMOTIDINE 10 MG/ML
20 INJECTION INTRAVENOUS
Status: CANCELLED | OUTPATIENT
Start: 2019-04-30

## 2019-04-23 RX ORDER — DIPHENHYDRAMINE HYDROCHLORIDE 50 MG/ML
50 INJECTION INTRAMUSCULAR; INTRAVENOUS ONCE AS NEEDED
Status: CANCELLED | OUTPATIENT
Start: 2019-04-30

## 2019-04-23 RX ORDER — FUROSEMIDE 20 MG/1
20 TABLET ORAL DAILY
Qty: 30 TABLET | Refills: 11 | Status: SHIPPED | OUTPATIENT
Start: 2019-04-23 | End: 2020-05-11

## 2019-04-29 ENCOUNTER — INFUSION (OUTPATIENT)
Dept: INFUSION THERAPY | Facility: HOSPITAL | Age: 74
End: 2019-04-29
Attending: INTERNAL MEDICINE
Payer: MEDICARE

## 2019-04-29 VITALS
BODY MASS INDEX: 46.74 KG/M2 | RESPIRATION RATE: 18 BRPM | TEMPERATURE: 98 F | SYSTOLIC BLOOD PRESSURE: 151 MMHG | WEIGHT: 254 LBS | DIASTOLIC BLOOD PRESSURE: 64 MMHG | HEART RATE: 100 BPM | HEIGHT: 62 IN

## 2019-04-29 DIAGNOSIS — C50.911 MALIGNANT NEOPLASM OF RIGHT BREAST IN FEMALE, ESTROGEN RECEPTOR NEGATIVE, UNSPECIFIED SITE OF BREAST: Primary | ICD-10-CM

## 2019-04-29 DIAGNOSIS — C50.911 MALIGNANT NEOPLASM OF RIGHT BREAST IN FEMALE, ESTROGEN RECEPTOR NEGATIVE, UNSPECIFIED SITE OF BREAST: ICD-10-CM

## 2019-04-29 DIAGNOSIS — Z51.11 ENCOUNTER FOR ANTINEOPLASTIC CHEMOTHERAPY: ICD-10-CM

## 2019-04-29 DIAGNOSIS — Z17.1 MALIGNANT NEOPLASM OF RIGHT BREAST IN FEMALE, ESTROGEN RECEPTOR NEGATIVE, UNSPECIFIED SITE OF BREAST: Primary | ICD-10-CM

## 2019-04-29 DIAGNOSIS — C50.911 MALIGNANT NEOPLASM OF RIGHT FEMALE BREAST: Primary | ICD-10-CM

## 2019-04-29 DIAGNOSIS — Z17.1 MALIGNANT NEOPLASM OF RIGHT BREAST IN FEMALE, ESTROGEN RECEPTOR NEGATIVE, UNSPECIFIED SITE OF BREAST: ICD-10-CM

## 2019-04-29 LAB
ALBUMIN SERPL BCP-MCNC: 3.2 G/DL (ref 3.5–5.2)
ALP SERPL-CCNC: 112 U/L (ref 55–135)
ALT SERPL W/O P-5'-P-CCNC: 17 U/L (ref 10–44)
ANION GAP SERPL CALC-SCNC: 10 MMOL/L (ref 8–16)
AST SERPL-CCNC: 11 U/L (ref 10–40)
BASOPHILS # BLD AUTO: 0.03 K/UL (ref 0–0.2)
BASOPHILS NFR BLD: 0.7 % (ref 0–1.9)
BILIRUB SERPL-MCNC: 0.7 MG/DL (ref 0.1–1)
BUN SERPL-MCNC: 19 MG/DL (ref 8–23)
CALCIUM SERPL-MCNC: 9.1 MG/DL (ref 8.7–10.5)
CHLORIDE SERPL-SCNC: 102 MMOL/L (ref 95–110)
CO2 SERPL-SCNC: 27 MMOL/L (ref 23–29)
CREAT SERPL-MCNC: 0.8 MG/DL (ref 0.5–1.4)
DIFFERENTIAL METHOD: ABNORMAL
EOSINOPHIL # BLD AUTO: 0 K/UL (ref 0–0.5)
EOSINOPHIL NFR BLD: 0.9 % (ref 0–8)
ERYTHROCYTE [DISTWIDTH] IN BLOOD BY AUTOMATED COUNT: 16.2 % (ref 11.5–14.5)
EST. GFR  (AFRICAN AMERICAN): >60 ML/MIN/1.73 M^2
EST. GFR  (NON AFRICAN AMERICAN): >60 ML/MIN/1.73 M^2
GLUCOSE SERPL-MCNC: 254 MG/DL (ref 70–110)
HCT VFR BLD AUTO: 33.5 % (ref 37–48.5)
HGB BLD-MCNC: 10.5 G/DL (ref 12–16)
IMM GRANULOCYTES # BLD AUTO: 0.12 K/UL (ref 0–0.04)
IMM GRANULOCYTES NFR BLD AUTO: 2.8 % (ref 0–0.5)
LYMPHOCYTES # BLD AUTO: 1.1 K/UL (ref 1–4.8)
LYMPHOCYTES NFR BLD: 24.6 % (ref 18–48)
MCH RBC QN AUTO: 30.8 PG (ref 27–31)
MCHC RBC AUTO-ENTMCNC: 31.3 G/DL (ref 32–36)
MCV RBC AUTO: 98 FL (ref 82–98)
MONOCYTES # BLD AUTO: 0.3 K/UL (ref 0.3–1)
MONOCYTES NFR BLD: 6.2 % (ref 4–15)
NEUTROPHILS # BLD AUTO: 2.8 K/UL (ref 1.8–7.7)
NEUTROPHILS NFR BLD: 64.8 % (ref 38–73)
NRBC BLD-RTO: 1 /100 WBC
PLATELET # BLD AUTO: 163 K/UL (ref 150–350)
PMV BLD AUTO: 10.6 FL (ref 9.2–12.9)
POTASSIUM SERPL-SCNC: 3.6 MMOL/L (ref 3.5–5.1)
PROT SERPL-MCNC: 6.3 G/DL (ref 6–8.4)
RBC # BLD AUTO: 3.41 M/UL (ref 4–5.4)
SODIUM SERPL-SCNC: 139 MMOL/L (ref 136–145)
WBC # BLD AUTO: 4.35 K/UL (ref 3.9–12.7)

## 2019-04-29 PROCEDURE — 63600175 PHARM REV CODE 636 W HCPCS: Mod: HCNC | Performed by: INTERNAL MEDICINE

## 2019-04-29 PROCEDURE — 25000003 PHARM REV CODE 250: Mod: HCNC | Performed by: INTERNAL MEDICINE

## 2019-04-29 PROCEDURE — 80053 COMPREHEN METABOLIC PANEL: CPT | Mod: HCNC

## 2019-04-29 PROCEDURE — S0028 INJECTION, FAMOTIDINE, 20 MG: HCPCS | Mod: HCNC | Performed by: INTERNAL MEDICINE

## 2019-04-29 PROCEDURE — 96375 TX/PRO/DX INJ NEW DRUG ADDON: CPT | Mod: HCNC

## 2019-04-29 PROCEDURE — A4216 STERILE WATER/SALINE, 10 ML: HCPCS | Mod: HCNC | Performed by: INTERNAL MEDICINE

## 2019-04-29 PROCEDURE — 96367 TX/PROPH/DG ADDL SEQ IV INF: CPT | Mod: HCNC

## 2019-04-29 PROCEDURE — 85025 COMPLETE CBC W/AUTO DIFF WBC: CPT | Mod: HCNC

## 2019-04-29 PROCEDURE — 96413 CHEMO IV INFUSION 1 HR: CPT | Mod: HCNC

## 2019-04-29 RX ORDER — DIPHENHYDRAMINE HYDROCHLORIDE 50 MG/ML
50 INJECTION INTRAMUSCULAR; INTRAVENOUS ONCE AS NEEDED
Status: DISCONTINUED | OUTPATIENT
Start: 2019-04-29 | End: 2019-04-29 | Stop reason: HOSPADM

## 2019-04-29 RX ORDER — FAMOTIDINE 10 MG/ML
20 INJECTION INTRAVENOUS
Status: COMPLETED | OUTPATIENT
Start: 2019-04-29 | End: 2019-04-29

## 2019-04-29 RX ORDER — SODIUM CHLORIDE 0.9 % (FLUSH) 0.9 %
10 SYRINGE (ML) INJECTION
Status: CANCELLED | OUTPATIENT
Start: 2019-04-29

## 2019-04-29 RX ORDER — EPINEPHRINE 0.3 MG/.3ML
0.3 INJECTION SUBCUTANEOUS ONCE AS NEEDED
Status: DISCONTINUED | OUTPATIENT
Start: 2019-04-29 | End: 2019-04-29 | Stop reason: HOSPADM

## 2019-04-29 RX ORDER — HEPARIN 100 UNIT/ML
500 SYRINGE INTRAVENOUS
Status: COMPLETED | OUTPATIENT
Start: 2019-04-29 | End: 2019-04-29

## 2019-04-29 RX ORDER — HEPARIN 100 UNIT/ML
500 SYRINGE INTRAVENOUS
Status: CANCELLED | OUTPATIENT
Start: 2019-04-29

## 2019-04-29 RX ORDER — SODIUM CHLORIDE 0.9 % (FLUSH) 0.9 %
10 SYRINGE (ML) INJECTION
Status: DISCONTINUED | OUTPATIENT
Start: 2019-04-29 | End: 2019-04-29 | Stop reason: HOSPADM

## 2019-04-29 RX ORDER — SODIUM CHLORIDE 0.9 % (FLUSH) 0.9 %
10 SYRINGE (ML) INJECTION
Status: COMPLETED | OUTPATIENT
Start: 2019-04-29 | End: 2019-04-29

## 2019-04-29 RX ORDER — HEPARIN 100 UNIT/ML
500 SYRINGE INTRAVENOUS
Status: DISCONTINUED | OUTPATIENT
Start: 2019-04-29 | End: 2019-04-29 | Stop reason: HOSPADM

## 2019-04-29 RX ADMIN — HEPARIN 500 UNITS: 100 SYRINGE at 09:04

## 2019-04-29 RX ADMIN — DIPHENHYDRAMINE HYDROCHLORIDE 25 MG: 50 INJECTION INTRAMUSCULAR; INTRAVENOUS at 11:04

## 2019-04-29 RX ADMIN — Medication 10 ML: at 12:04

## 2019-04-29 RX ADMIN — FAMOTIDINE 20 MG: 10 INJECTION, SOLUTION INTRAVENOUS at 10:04

## 2019-04-29 RX ADMIN — Medication 10 ML: at 09:04

## 2019-04-29 RX ADMIN — DEXAMETHASONE SODIUM PHOSPHATE 10 MG: 4 INJECTION, SOLUTION INTRAMUSCULAR; INTRAVENOUS at 10:04

## 2019-04-29 RX ADMIN — SODIUM CHLORIDE: 9 INJECTION, SOLUTION INTRAVENOUS at 10:04

## 2019-04-29 RX ADMIN — PACLITAXEL 180 MG: 6 INJECTION, SOLUTION INTRAVENOUS at 11:04

## 2019-04-29 RX ADMIN — HEPARIN 500 UNITS: 100 SYRINGE at 12:04

## 2019-04-29 NOTE — PLAN OF CARE
Problem: Adult Inpatient Plan of Care  Goal: Plan of Care Review  Outcome: Ongoing (interventions implemented as appropriate)  Pt here for Taxol infusion D1C12, labs, hx, meds, allergies reviewed, pt with c/o shortness of breath on exertion, pt states last week had lots of swelling in ankles started on lasix and is improved still with trace edema to ankles, reclined in chair, continue to monitor

## 2019-04-29 NOTE — PLAN OF CARE
Problem: Adult Inpatient Plan of Care  Goal: Plan of Care Review  Outcome: Ongoing (interventions implemented as appropriate)  Pt tolerated taxol infusion without issue, pt to rtc 5/6/19, no distress noted upon d/c to home

## 2019-04-29 NOTE — NURSING
Pt arrived for port access and labs.  Port flushes easily with good blood return, labs sent.  Port left access.  Pt now waiting on labs and infusion chair.

## 2019-05-03 NOTE — PROGRESS NOTES
Subjective:       Patient ID: Alina Narayan is a 74 y.o. female.    Chief Complaint: No chief complaint on file.    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  She has received 12 cycles of weekly Taxol.      Today she reports she continues to be fatigued.  She also has some worsening shortness of breath.  Her lower extremity edema improved with Lasix.  Her appetite is off.  She has some numbness in her hands and feet.      Current history:  abnormal mammogram of the right breast in December 2018.  She was having no breast symptoms at that time    That mammogram showed a 13 mm mass in the right axillary tail.  By ultrasound there was a 6 x 9 x 11 mm intramammary node and a 2nd 5 x 6 x 6 mm hypoechoic mass in the axillary tail.  On January 7, 2019 both masses were biopsied and both showed lymph nodes with metastatic carcinoma which was ER negative IN negative and HER2 negative.  Ki-67 was 40%.    MRI on January 15, 2019 showed 2 right axillary lymph nodes the largest measured 1.4 x 1.0 cm.  There were no abnormalities in the right breast.    PET scan was then performed which showed only low-grade activity in the right axilla with an SUV of 1.32.    Previous breast cancer history History: On September 25, 2012 she underwent a screening mammogram which showed an asymmetric density in the left breast at 2:00 position. A followup mammogram on the 27th showed an oval mass in that area ultrasound showed a 6 mm solid mass. Core needle biopsy on October 1 showed invasive carcinoma ER 90% positive IN 80% positive and HER-2 negative. On October 29 she underwent lumpectomy and sentinel lymph node biopsy. That showed a 7 mm low-grade (1+2+1) infiltrating carcinoma. 3 sentinel lymph nodes were negative. Final pathological stage TIB N0 stage IA.  She completed letrozole therapy in 2017.    She was referred  for  genetic testing (mother with breast cancer, father and brother with prostate CA)  - integrated BRCA testing through [employer] was negative.  She did not have more comprehensive testing is at was denied.    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  She has had mitral valve replacement.   Review of Systems   Constitutional: Positive for activity change, appetite change and fatigue. Negative for unexpected weight change.   Eyes: Negative for visual disturbance.   Respiratory: Positive for shortness of breath. Negative for cough.    Cardiovascular: Negative for chest pain.   Gastrointestinal: Positive for diarrhea. Negative for abdominal pain.   Genitourinary: Negative for frequency.   Musculoskeletal: Positive for back pain.   Skin: Negative for rash.   Neurological: Positive for numbness. Negative for headaches.   Hematological: Negative for adenopathy.   Psychiatric/Behavioral: The patient is not nervous/anxious.        Objective:      Physical Exam   Constitutional: She is oriented to person, place, and time. She appears well-developed. No distress.   Obese   HENT:   Mouth/Throat: No oropharyngeal exudate.   Eyes: No scleral icterus.   Cardiovascular: Normal rate and regular rhythm.   Pulmonary/Chest: Effort normal and breath sounds normal. She has no wheezes. She has no rales.   Abdominal: Soft. There is no tenderness.   Lymphadenopathy:     She has no cervical adenopathy.   Neurological: She is alert and oriented to person, place, and time.   Psychiatric: She has a normal mood and affect. Her behavior is normal. Thought content normal.   Vitals reviewed.      Assessment:      mammogram and ultrasound today-There is an intramammary lymph node seen in the axillary tail region of the right breast. Associated butterfly clip; pathology showed metastatic disease within a lymph node.    There is a 9 mm lymph node seen in the right axilla. Associated coil clip; pathology showed  metastastic disease within a lymph node. Decreased in size compared to prior, which measured 14 mm.    US Breast Right Limited  There is a 6 mm x 6 mm x 7 mm intramammary lymph node seen in the axillary tail region of the right breast. Compared to the previous study, the intramammary lymph node decreased in size. Previously measured 6 x 9 x 11 mm.  1. Cancer of axillary tail of right breast        Plan:       Plan to initiate additional neoadjuvant therapy with [facility] for 4 cycles.      Will start that next week.  Echocardiogram.

## 2019-05-06 ENCOUNTER — HOSPITAL ENCOUNTER (OUTPATIENT)
Dept: RADIOLOGY | Facility: HOSPITAL | Age: 74
Discharge: HOME | DRG: 176 | End: 2019-05-06
Attending: RADIOLOGY
Payer: MEDICARE

## 2019-05-06 ENCOUNTER — HOSPITAL ENCOUNTER (OUTPATIENT)
Dept: CARDIOLOGY | Facility: CLINIC | Age: 74
Discharge: HOME | DRG: 176 | End: 2019-05-06
Attending: INTERNAL MEDICINE
Payer: MEDICARE

## 2019-05-06 ENCOUNTER — TELEPHONE (OUTPATIENT)
Dept: HEMATOLOGY/ONCOLOGY | Facility: CLINIC | Age: 74
End: 2019-05-06

## 2019-05-06 ENCOUNTER — INFUSION (OUTPATIENT)
Dept: INFUSION THERAPY | Facility: HOSPITAL | Age: 74
DRG: 176 | End: 2019-05-06
Attending: INTERNAL MEDICINE
Payer: MEDICARE

## 2019-05-06 ENCOUNTER — OFFICE VISIT (OUTPATIENT)
Dept: HEMATOLOGY/ONCOLOGY | Facility: CLINIC | Age: 74
DRG: 176 | End: 2019-05-06
Payer: MEDICARE

## 2019-05-06 ENCOUNTER — HOSPITAL ENCOUNTER (OUTPATIENT)
Dept: RADIOLOGY | Facility: HOSPITAL | Age: 74
Discharge: HOME | DRG: 176 | End: 2019-05-06
Attending: INTERNAL MEDICINE
Payer: MEDICARE

## 2019-05-06 VITALS
HEIGHT: 62 IN | DIASTOLIC BLOOD PRESSURE: 54 MMHG | OXYGEN SATURATION: 92 % | WEIGHT: 241.38 LBS | HEART RATE: 95 BPM | SYSTOLIC BLOOD PRESSURE: 121 MMHG | RESPIRATION RATE: 20 BRPM | BODY MASS INDEX: 44.42 KG/M2

## 2019-05-06 VITALS
WEIGHT: 241 LBS | BODY MASS INDEX: 44.35 KG/M2 | HEART RATE: 90 BPM | SYSTOLIC BLOOD PRESSURE: 121 MMHG | DIASTOLIC BLOOD PRESSURE: 54 MMHG | HEIGHT: 62 IN

## 2019-05-06 DIAGNOSIS — C50.611 CANCER OF AXILLARY TAIL OF RIGHT BREAST: ICD-10-CM

## 2019-05-06 DIAGNOSIS — Z17.1 MALIGNANT NEOPLASM OF RIGHT BREAST IN FEMALE, ESTROGEN RECEPTOR NEGATIVE, UNSPECIFIED SITE OF BREAST: Primary | ICD-10-CM

## 2019-05-06 DIAGNOSIS — C50.911 MALIGNANT NEOPLASM OF RIGHT BREAST IN FEMALE, ESTROGEN RECEPTOR NEGATIVE, UNSPECIFIED SITE OF BREAST: Primary | ICD-10-CM

## 2019-05-06 DIAGNOSIS — R10.9 ABDOMINAL CRAMPS: ICD-10-CM

## 2019-05-06 DIAGNOSIS — C50.911 MALIGNANT NEOPLASM OF RIGHT FEMALE BREAST: ICD-10-CM

## 2019-05-06 DIAGNOSIS — N63.0 BREAST MASS: ICD-10-CM

## 2019-05-06 DIAGNOSIS — C50.611 CANCER OF AXILLARY TAIL OF RIGHT BREAST: Primary | ICD-10-CM

## 2019-05-06 LAB
ALBUMIN SERPL BCP-MCNC: 3.6 G/DL (ref 3.5–5.2)
ALP SERPL-CCNC: 117 U/L (ref 55–135)
ALT SERPL W/O P-5'-P-CCNC: 20 U/L (ref 10–44)
ANION GAP SERPL CALC-SCNC: 9 MMOL/L (ref 8–16)
ASCENDING AORTA: 3.27 CM
AST SERPL-CCNC: 16 U/L (ref 10–40)
AV INDEX (PROSTH): 0.58
AV MEAN GRADIENT: 7.1 MMHG
AV PEAK GRADIENT: 13.54 MMHG
AV VALVE AREA: 2.1 CM2
AV VELOCITY RATIO: 0.49
BASOPHILS # BLD AUTO: 0.04 K/UL (ref 0–0.2)
BASOPHILS NFR BLD: 0.8 % (ref 0–1.9)
BILIRUB SERPL-MCNC: 0.7 MG/DL (ref 0.1–1)
BSA FOR ECHO PROCEDURE: 2.19 M2
BUN SERPL-MCNC: 16 MG/DL (ref 8–23)
CALCIUM SERPL-MCNC: 9.4 MG/DL (ref 8.7–10.5)
CHLORIDE SERPL-SCNC: 100 MMOL/L (ref 95–110)
CO2 SERPL-SCNC: 30 MMOL/L (ref 23–29)
CREAT SERPL-MCNC: 0.9 MG/DL (ref 0.5–1.4)
CV ECHO LV RWT: 0.38 CM
DIFFERENTIAL METHOD: ABNORMAL
DOP CALC AO PEAK VEL: 1.84 M/S
DOP CALC AO VTI: 31.71 CM
DOP CALC LVOT AREA: 3.63 CM2
DOP CALC LVOT DIAMETER: 2.15 CM
DOP CALC LVOT PEAK VEL: 0.9 M/S
DOP CALC LVOT STROKE VOLUME: 66.59 CM3
DOP CALCLVOT PEAK VEL VTI: 18.35 CM
E WAVE DECELERATION TIME: 198.97 MSEC
E/A RATIO: 0.61
E/E' RATIO: 8.38
ECHO LV POSTERIOR WALL: 0.84 CM (ref 0.6–1.1)
EOSINOPHIL # BLD AUTO: 0.1 K/UL (ref 0–0.5)
EOSINOPHIL NFR BLD: 1 % (ref 0–8)
ERYTHROCYTE [DISTWIDTH] IN BLOOD BY AUTOMATED COUNT: 16.1 % (ref 11.5–14.5)
EST. GFR  (AFRICAN AMERICAN): >60 ML/MIN/1.73 M^2
EST. GFR  (NON AFRICAN AMERICAN): >60 ML/MIN/1.73 M^2
FRACTIONAL SHORTENING: 28 % (ref 28–44)
GLUCOSE SERPL-MCNC: 220 MG/DL (ref 70–110)
HCT VFR BLD AUTO: 34.3 % (ref 37–48.5)
HGB BLD-MCNC: 11.3 G/DL (ref 12–16)
IMM GRANULOCYTES # BLD AUTO: 0.19 K/UL (ref 0–0.04)
IMM GRANULOCYTES NFR BLD AUTO: 3.7 % (ref 0–0.5)
INTERVENTRICULAR SEPTUM: 0.95 CM (ref 0.6–1.1)
IVRT: 0.08 MSEC
LA MAJOR: 5 CM
LA MINOR: 5 CM
LA WIDTH: 4.1 CM
LEFT ATRIUM SIZE: 4.2 CM
LEFT ATRIUM VOLUME INDEX: 35.4 ML/M2
LEFT ATRIUM VOLUME: 73.19 CM3
LEFT INTERNAL DIMENSION IN SYSTOLE: 3.14 CM (ref 2.1–4)
LEFT VENTRICLE DIASTOLIC VOLUME INDEX: 41.93 ML/M2
LEFT VENTRICLE DIASTOLIC VOLUME: 86.77 ML
LEFT VENTRICLE MASS INDEX: 60.9 G/M2
LEFT VENTRICLE SYSTOLIC VOLUME INDEX: 18.9 ML/M2
LEFT VENTRICLE SYSTOLIC VOLUME: 39.08 ML
LEFT VENTRICULAR INTERNAL DIMENSION IN DIASTOLE: 4.38 CM (ref 3.5–6)
LEFT VENTRICULAR MASS: 126.11 G
LV LATERAL E/E' RATIO: 8.38
LV SEPTAL E/E' RATIO: 8.38
LYMPHOCYTES # BLD AUTO: 1.2 K/UL (ref 1–4.8)
LYMPHOCYTES NFR BLD: 23.8 % (ref 18–48)
MCH RBC QN AUTO: 31.6 PG (ref 27–31)
MCHC RBC AUTO-ENTMCNC: 32.9 G/DL (ref 32–36)
MCV RBC AUTO: 96 FL (ref 82–98)
MONOCYTES # BLD AUTO: 0.3 K/UL (ref 0.3–1)
MONOCYTES NFR BLD: 6.3 % (ref 4–15)
MV PEAK A VEL: 1.09 M/S
MV PEAK E VEL: 0.67 M/S
NEUTROPHILS # BLD AUTO: 3.3 K/UL (ref 1.8–7.7)
NEUTROPHILS NFR BLD: 64.4 % (ref 38–73)
NRBC BLD-RTO: 1 /100 WBC
PISA TR MAX VEL: 2.6 M/S
PLATELET # BLD AUTO: 188 K/UL (ref 150–350)
PMV BLD AUTO: 10.5 FL (ref 9.2–12.9)
POTASSIUM SERPL-SCNC: 3.7 MMOL/L (ref 3.5–5.1)
PROT SERPL-MCNC: 6.9 G/DL (ref 6–8.4)
PULM VEIN S/D RATIO: 1.73
PV PEAK D VEL: 0.33 M/S
PV PEAK S VEL: 0.57 M/S
RA MAJOR: 4.66 CM
RA PRESSURE: 3 MMHG
RA WIDTH: 3.69 CM
RBC # BLD AUTO: 3.58 M/UL (ref 4–5.4)
RIGHT VENTRICULAR END-DIASTOLIC DIMENSION: 2.52 CM
RV TISSUE DOPPLER FREE WALL SYSTOLIC VELOCITY 1 (APICAL 4 CHAMBER VIEW): 10.05 M/S
SINUS: 2.56 CM
SODIUM SERPL-SCNC: 139 MMOL/L (ref 136–145)
STJ: 3.27 CM
TDI LATERAL: 0.08
TDI SEPTAL: 0.08
TDI: 0.08
TR MAX PG: 27.04 MMHG
TRICUSPID ANNULAR PLANE SYSTOLIC EXCURSION: 1.66 CM
TV REST PULMONARY ARTERY PRESSURE: 30 MMHG
WBC # BLD AUTO: 5.08 K/UL (ref 3.9–12.7)

## 2019-05-06 PROCEDURE — 77065 DX MAMMO INCL CAD UNI: CPT | Mod: 26,HCNC,, | Performed by: RADIOLOGY

## 2019-05-06 PROCEDURE — 25000003 PHARM REV CODE 250: Mod: HCNC | Performed by: INTERNAL MEDICINE

## 2019-05-06 PROCEDURE — 99499 RISK ADDL DX/OHS AUDIT: ICD-10-PCS | Mod: HCNC,S$GLB,, | Performed by: INTERNAL MEDICINE

## 2019-05-06 PROCEDURE — 3074F SYST BP LT 130 MM HG: CPT | Mod: HCNC,CPTII,S$GLB, | Performed by: INTERNAL MEDICINE

## 2019-05-06 PROCEDURE — 99999 PR PBB SHADOW E&M-EST. PATIENT-LVL III: CPT | Mod: PBBFAC,HCNC,, | Performed by: INTERNAL MEDICINE

## 2019-05-06 PROCEDURE — 36415 COLL VENOUS BLD VENIPUNCTURE: CPT | Mod: HCNC

## 2019-05-06 PROCEDURE — 76642 ULTRASOUND BREAST LIMITED: CPT | Mod: TC,HCNC,PO,RT

## 2019-05-06 PROCEDURE — 76642 ULTRASOUND BREAST LIMITED: CPT | Mod: 26,HCNC,RT, | Performed by: RADIOLOGY

## 2019-05-06 PROCEDURE — 99214 PR OFFICE/OUTPT VISIT, EST, LEVL IV, 30-39 MIN: ICD-10-PCS | Mod: HCNC,S$GLB,, | Performed by: INTERNAL MEDICINE

## 2019-05-06 PROCEDURE — 77061 MAMMO DIGITAL DIAGNOSTIC RIGHT WITH TOMOSYNTHESIS_CAD: ICD-10-PCS | Mod: 26,HCNC,, | Performed by: RADIOLOGY

## 2019-05-06 PROCEDURE — 1101F PR PT FALLS ASSESS DOC 0-1 FALLS W/OUT INJ PAST YR: ICD-10-PCS | Mod: HCNC,CPTII,S$GLB, | Performed by: INTERNAL MEDICINE

## 2019-05-06 PROCEDURE — 77061 BREAST TOMOSYNTHESIS UNI: CPT | Mod: TC,HCNC,PO

## 2019-05-06 PROCEDURE — 77065 DX MAMMO INCL CAD UNI: CPT | Mod: TC,HCNC,PO

## 2019-05-06 PROCEDURE — 99499 UNLISTED E&M SERVICE: CPT | Mod: HCNC,S$GLB,, | Performed by: INTERNAL MEDICINE

## 2019-05-06 PROCEDURE — 96523 IRRIG DRUG DELIVERY DEVICE: CPT | Mod: HCNC

## 2019-05-06 PROCEDURE — 93306 TRANSTHORACIC ECHO (TTE) COMPLETE (CUPID ONLY): ICD-10-PCS | Mod: HCNC,S$GLB,, | Performed by: INTERNAL MEDICINE

## 2019-05-06 PROCEDURE — 76642 US BREAST RIGHT LIMITED: ICD-10-PCS | Mod: 26,HCNC,RT, | Performed by: RADIOLOGY

## 2019-05-06 PROCEDURE — 99999 PR PBB SHADOW E&M-EST. PATIENT-LVL III: ICD-10-PCS | Mod: PBBFAC,HCNC,, | Performed by: INTERNAL MEDICINE

## 2019-05-06 PROCEDURE — 99214 OFFICE O/P EST MOD 30 MIN: CPT | Mod: HCNC,S$GLB,, | Performed by: INTERNAL MEDICINE

## 2019-05-06 PROCEDURE — 80053 COMPREHEN METABOLIC PANEL: CPT | Mod: HCNC

## 2019-05-06 PROCEDURE — 3078F PR MOST RECENT DIASTOLIC BLOOD PRESSURE < 80 MM HG: ICD-10-PCS | Mod: HCNC,CPTII,S$GLB, | Performed by: INTERNAL MEDICINE

## 2019-05-06 PROCEDURE — 3078F DIAST BP <80 MM HG: CPT | Mod: HCNC,CPTII,S$GLB, | Performed by: INTERNAL MEDICINE

## 2019-05-06 PROCEDURE — 77065 MAMMO DIGITAL DIAGNOSTIC RIGHT WITH TOMOSYNTHESIS_CAD: ICD-10-PCS | Mod: 26,HCNC,, | Performed by: RADIOLOGY

## 2019-05-06 PROCEDURE — 3074F PR MOST RECENT SYSTOLIC BLOOD PRESSURE < 130 MM HG: ICD-10-PCS | Mod: HCNC,CPTII,S$GLB, | Performed by: INTERNAL MEDICINE

## 2019-05-06 PROCEDURE — 93306 TTE W/DOPPLER COMPLETE: CPT | Mod: HCNC,S$GLB,, | Performed by: INTERNAL MEDICINE

## 2019-05-06 PROCEDURE — 63600175 PHARM REV CODE 636 W HCPCS: Mod: HCNC | Performed by: INTERNAL MEDICINE

## 2019-05-06 PROCEDURE — 77061 BREAST TOMOSYNTHESIS UNI: CPT | Mod: 26,HCNC,, | Performed by: RADIOLOGY

## 2019-05-06 PROCEDURE — 85025 COMPLETE CBC W/AUTO DIFF WBC: CPT | Mod: HCNC

## 2019-05-06 PROCEDURE — 1101F PT FALLS ASSESS-DOCD LE1/YR: CPT | Mod: HCNC,CPTII,S$GLB, | Performed by: INTERNAL MEDICINE

## 2019-05-06 PROCEDURE — A4216 STERILE WATER/SALINE, 10 ML: HCPCS | Mod: HCNC | Performed by: INTERNAL MEDICINE

## 2019-05-06 RX ORDER — DICYCLOMINE HYDROCHLORIDE 20 MG/1
20 TABLET ORAL 3 TIMES DAILY PRN
Qty: 30 TABLET | Refills: 1 | Status: SHIPPED | OUTPATIENT
Start: 2019-05-06 | End: 2019-08-13 | Stop reason: ALTCHOICE

## 2019-05-06 RX ORDER — HEPARIN 100 UNIT/ML
500 SYRINGE INTRAVENOUS
Status: CANCELLED | OUTPATIENT
Start: 2019-05-14

## 2019-05-06 RX ORDER — SODIUM CHLORIDE 0.9 % (FLUSH) 0.9 %
10 SYRINGE (ML) INJECTION
Status: COMPLETED | OUTPATIENT
Start: 2019-05-06 | End: 2019-05-06

## 2019-05-06 RX ORDER — HEPARIN 100 UNIT/ML
500 SYRINGE INTRAVENOUS
Status: CANCELLED | OUTPATIENT
Start: 2019-05-06

## 2019-05-06 RX ORDER — METHOTREXATE 25 MG/ML
40 INJECTION INTRA-ARTERIAL; INTRAMUSCULAR; INTRATHECAL; INTRAVENOUS
Status: CANCELLED | OUTPATIENT
Start: 2019-05-14

## 2019-05-06 RX ORDER — SODIUM CHLORIDE 0.9 % (FLUSH) 0.9 %
10 SYRINGE (ML) INJECTION
Status: CANCELLED | OUTPATIENT
Start: 2019-05-06

## 2019-05-06 RX ORDER — SODIUM CHLORIDE 0.9 % (FLUSH) 0.9 %
10 SYRINGE (ML) INJECTION
Status: CANCELLED | OUTPATIENT
Start: 2019-05-14

## 2019-05-06 RX ORDER — HEPARIN 100 UNIT/ML
500 SYRINGE INTRAVENOUS
Status: COMPLETED | OUTPATIENT
Start: 2019-05-06 | End: 2019-05-06

## 2019-05-06 RX ORDER — FLUOROURACIL 50 MG/ML
600 INJECTION, SOLUTION INTRAVENOUS
Status: CANCELLED | OUTPATIENT
Start: 2019-05-14

## 2019-05-06 RX ADMIN — Medication 10 ML: at 10:05

## 2019-05-06 RX ADMIN — HEPARIN 500 UNITS: 100 SYRINGE at 10:05

## 2019-05-06 NOTE — TELEPHONE ENCOUNTER
Called patient no answer left message asking pt to call back to assist with scheduling her chemo and f/u

## 2019-05-06 NOTE — PROGRESS NOTES
Dr. Dwyer,    This result got sent to my in-basket.  I see that you ordered this imaging, I assume to assess the pt's response to chemo.  Please let me know if there is anything you need me to do with regard to this pt.    Thanks,  Janes

## 2019-05-06 NOTE — TELEPHONE ENCOUNTER
----- Message from Britney Stacy RN sent at 5/6/2019  2:30 PM CDT -----      ----- Message -----  From: Erick Dwyer MD  Sent: 5/6/2019   2:25 PM  To: Britney Stacy RN    Just chemo  ----- Message -----  From: Britney Stacy RN  Sent: 5/6/2019   1:42 PM  To: Erick Dwyer MD        ----- Message -----  From: Alicia Marion  Sent: 5/6/2019   1:34 PM  To: Reym[facility] Staff    Does she need labs and a visit or just chemo for the 1st visit ?    Message   Received: Today   Message Contents   MD Alicia Epstein         Echocardiogram next available-start [facility] chemotherapy next week (every 3 weeks)

## 2019-05-07 ENCOUNTER — PATIENT MESSAGE (OUTPATIENT)
Dept: CARDIOLOGY | Facility: CLINIC | Age: 74
End: 2019-05-07

## 2019-05-07 ENCOUNTER — PATIENT MESSAGE (OUTPATIENT)
Dept: HEMATOLOGY/ONCOLOGY | Facility: CLINIC | Age: 74
End: 2019-05-07

## 2019-05-08 ENCOUNTER — HOSPITAL ENCOUNTER (INPATIENT)
Facility: HOSPITAL | Age: 74
LOS: 2 days | Discharge: HOME | DRG: 176 | End: 2019-05-10
Attending: EMERGENCY MEDICINE | Admitting: INTERNAL MEDICINE
Payer: MEDICARE

## 2019-05-08 ENCOUNTER — TELEPHONE (OUTPATIENT)
Dept: HEMATOLOGY/ONCOLOGY | Facility: CLINIC | Age: 74
End: 2019-05-08

## 2019-05-08 DIAGNOSIS — I10 ESSENTIAL HYPERTENSION: ICD-10-CM

## 2019-05-08 DIAGNOSIS — I26.99 PULMONARY EMBOLISM: ICD-10-CM

## 2019-05-08 DIAGNOSIS — I27.20 PULMONARY HTN: ICD-10-CM

## 2019-05-08 DIAGNOSIS — I26.99 BILATERAL PULMONARY EMBOLISM: Primary | ICD-10-CM

## 2019-05-08 DIAGNOSIS — Z95.2 S/P AVR: ICD-10-CM

## 2019-05-08 DIAGNOSIS — R06.02 SHORTNESS OF BREATH: ICD-10-CM

## 2019-05-08 DIAGNOSIS — R07.9 CHEST PAIN: ICD-10-CM

## 2019-05-08 LAB
ALBUMIN SERPL BCP-MCNC: 3.3 G/DL (ref 3.5–5.2)
ALP SERPL-CCNC: 120 U/L (ref 55–135)
ALT SERPL W/O P-5'-P-CCNC: 25 U/L (ref 10–44)
ANION GAP SERPL CALC-SCNC: 12 MMOL/L (ref 8–16)
APTT BLDCRRT: 22.6 SEC (ref 21–32)
AST SERPL-CCNC: 22 U/L (ref 10–40)
BASOPHILS # BLD AUTO: 0.06 K/UL (ref 0–0.2)
BASOPHILS NFR BLD: 1 % (ref 0–1.9)
BILIRUB SERPL-MCNC: 0.5 MG/DL (ref 0.1–1)
BILIRUB UR QL STRIP: NEGATIVE
BNP SERPL-MCNC: 25 PG/ML (ref 0–99)
BUN SERPL-MCNC: 17 MG/DL (ref 8–23)
CALCIUM SERPL-MCNC: 9.6 MG/DL (ref 8.7–10.5)
CHLORIDE SERPL-SCNC: 101 MMOL/L (ref 95–110)
CLARITY UR REFRACT.AUTO: CLEAR
CO2 SERPL-SCNC: 26 MMOL/L (ref 23–29)
COLOR UR AUTO: ABNORMAL
CREAT SERPL-MCNC: 0.9 MG/DL (ref 0.5–1.4)
DIFFERENTIAL METHOD: ABNORMAL
EOSINOPHIL # BLD AUTO: 0.1 K/UL (ref 0–0.5)
EOSINOPHIL NFR BLD: 0.8 % (ref 0–8)
ERYTHROCYTE [DISTWIDTH] IN BLOOD BY AUTOMATED COUNT: 16.6 % (ref 11.5–14.5)
EST. GFR  (AFRICAN AMERICAN): >60 ML/MIN/1.73 M^2
EST. GFR  (NON AFRICAN AMERICAN): >60 ML/MIN/1.73 M^2
GLUCOSE SERPL-MCNC: 195 MG/DL (ref 70–110)
GLUCOSE UR QL STRIP: NEGATIVE
HCT VFR BLD AUTO: 34.8 % (ref 37–48.5)
HGB BLD-MCNC: 11 G/DL (ref 12–16)
HGB UR QL STRIP: ABNORMAL
HYALINE CASTS UR QL AUTO: 1 /LPF
IMM GRANULOCYTES # BLD AUTO: 0.26 K/UL (ref 0–0.04)
IMM GRANULOCYTES NFR BLD AUTO: 4.2 % (ref 0–0.5)
KETONES UR QL STRIP: NEGATIVE
LEUKOCYTE ESTERASE UR QL STRIP: NEGATIVE
LYMPHOCYTES # BLD AUTO: 1.4 K/UL (ref 1–4.8)
LYMPHOCYTES NFR BLD: 22.3 % (ref 18–48)
MCH RBC QN AUTO: 30.8 PG (ref 27–31)
MCHC RBC AUTO-ENTMCNC: 31.6 G/DL (ref 32–36)
MCV RBC AUTO: 98 FL (ref 82–98)
MICROSCOPIC COMMENT: NORMAL
MONOCYTES # BLD AUTO: 0.5 K/UL (ref 0.3–1)
MONOCYTES NFR BLD: 8.7 % (ref 4–15)
NEUTROPHILS # BLD AUTO: 3.9 K/UL (ref 1.8–7.7)
NEUTROPHILS NFR BLD: 63 % (ref 38–73)
NITRITE UR QL STRIP: NEGATIVE
NRBC BLD-RTO: 1 /100 WBC
PH UR STRIP: 6 [PH] (ref 5–8)
PLATELET # BLD AUTO: 212 K/UL (ref 150–350)
PMV BLD AUTO: 11 FL (ref 9.2–12.9)
POTASSIUM SERPL-SCNC: 3.5 MMOL/L (ref 3.5–5.1)
PROT SERPL-MCNC: 6.8 G/DL (ref 6–8.4)
PROT UR QL STRIP: NEGATIVE
RBC # BLD AUTO: 3.57 M/UL (ref 4–5.4)
RBC #/AREA URNS AUTO: 3 /HPF (ref 0–4)
SODIUM SERPL-SCNC: 139 MMOL/L (ref 136–145)
SP GR UR STRIP: 1 (ref 1–1.03)
SQUAMOUS #/AREA URNS AUTO: 0 /HPF
TROPONIN I SERPL DL<=0.01 NG/ML-MCNC: <0.006 NG/ML (ref 0–0.03)
URN SPEC COLLECT METH UR: ABNORMAL
WBC # BLD AUTO: 6.18 K/UL (ref 3.9–12.7)
WBC #/AREA URNS AUTO: 2 /HPF (ref 0–5)

## 2019-05-08 PROCEDURE — 85025 COMPLETE CBC W/AUTO DIFF WBC: CPT | Mod: HCNC

## 2019-05-08 PROCEDURE — 93010 EKG 12-LEAD: ICD-10-PCS | Mod: HCNC,,, | Performed by: INTERNAL MEDICINE

## 2019-05-08 PROCEDURE — 93010 ELECTROCARDIOGRAM REPORT: CPT | Mod: HCNC,,, | Performed by: INTERNAL MEDICINE

## 2019-05-08 PROCEDURE — 99285 EMERGENCY DEPT VISIT HI MDM: CPT | Mod: 25,HCNC

## 2019-05-08 PROCEDURE — 85730 THROMBOPLASTIN TIME PARTIAL: CPT | Mod: HCNC

## 2019-05-08 PROCEDURE — 93005 ELECTROCARDIOGRAM TRACING: CPT | Mod: HCNC

## 2019-05-08 PROCEDURE — 80053 COMPREHEN METABOLIC PANEL: CPT | Mod: HCNC

## 2019-05-08 PROCEDURE — 83880 ASSAY OF NATRIURETIC PEPTIDE: CPT | Mod: HCNC

## 2019-05-08 PROCEDURE — 63600175 PHARM REV CODE 636 W HCPCS: Mod: HCNC | Performed by: STUDENT IN AN ORGANIZED HEALTH CARE EDUCATION/TRAINING PROGRAM

## 2019-05-08 PROCEDURE — 12000002 HC ACUTE/MED SURGE SEMI-PRIVATE ROOM: Mod: HCNC

## 2019-05-08 PROCEDURE — 20600001 HC STEP DOWN PRIVATE ROOM: Mod: HCNC

## 2019-05-08 PROCEDURE — 81001 URINALYSIS AUTO W/SCOPE: CPT | Mod: HCNC

## 2019-05-08 PROCEDURE — 84484 ASSAY OF TROPONIN QUANT: CPT | Mod: HCNC

## 2019-05-08 PROCEDURE — 25000003 PHARM REV CODE 250: Mod: HCNC | Performed by: STUDENT IN AN ORGANIZED HEALTH CARE EDUCATION/TRAINING PROGRAM

## 2019-05-08 PROCEDURE — 94761 N-INVAS EAR/PLS OXIMETRY MLT: CPT | Mod: HCNC

## 2019-05-08 PROCEDURE — 99284 PR EMERGENCY DEPT VISIT,LEVEL IV: ICD-10-PCS | Mod: HCNC,,, | Performed by: PHYSICIAN ASSISTANT

## 2019-05-08 PROCEDURE — 25500020 PHARM REV CODE 255: Mod: HCNC | Performed by: EMERGENCY MEDICINE

## 2019-05-08 PROCEDURE — 96374 THER/PROPH/DIAG INJ IV PUSH: CPT | Mod: HCNC

## 2019-05-08 PROCEDURE — 99284 EMERGENCY DEPT VISIT MOD MDM: CPT | Mod: HCNC,,, | Performed by: PHYSICIAN ASSISTANT

## 2019-05-08 RX ORDER — IBUPROFEN 200 MG
24 TABLET ORAL
Status: DISCONTINUED | OUTPATIENT
Start: 2019-05-08 | End: 2019-05-10 | Stop reason: HOSPADM

## 2019-05-08 RX ORDER — HEPARIN SODIUM,PORCINE/D5W 25000/250
18 INTRAVENOUS SOLUTION INTRAVENOUS CONTINUOUS
Status: DISCONTINUED | OUTPATIENT
Start: 2019-05-08 | End: 2019-05-09

## 2019-05-08 RX ORDER — IBUPROFEN 200 MG
16 TABLET ORAL
Status: DISCONTINUED | OUTPATIENT
Start: 2019-05-08 | End: 2019-05-10 | Stop reason: HOSPADM

## 2019-05-08 RX ORDER — INSULIN ASPART 100 [IU]/ML
0-5 INJECTION, SOLUTION INTRAVENOUS; SUBCUTANEOUS
Status: DISCONTINUED | OUTPATIENT
Start: 2019-05-08 | End: 2019-05-10 | Stop reason: HOSPADM

## 2019-05-08 RX ORDER — FUROSEMIDE 20 MG/1
20 TABLET ORAL DAILY
Status: DISCONTINUED | OUTPATIENT
Start: 2019-05-09 | End: 2019-05-10 | Stop reason: HOSPADM

## 2019-05-08 RX ORDER — GABAPENTIN 300 MG/1
300 CAPSULE ORAL 2 TIMES DAILY
Status: DISCONTINUED | OUTPATIENT
Start: 2019-05-08 | End: 2019-05-10 | Stop reason: HOSPADM

## 2019-05-08 RX ORDER — PRAVASTATIN SODIUM 10 MG/1
20 TABLET ORAL DAILY
Status: DISCONTINUED | OUTPATIENT
Start: 2019-05-09 | End: 2019-05-10 | Stop reason: HOSPADM

## 2019-05-08 RX ORDER — SODIUM CHLORIDE 0.9 % (FLUSH) 0.9 %
10 SYRINGE (ML) INJECTION
Status: DISCONTINUED | OUTPATIENT
Start: 2019-05-08 | End: 2019-05-10 | Stop reason: HOSPADM

## 2019-05-08 RX ORDER — ACETAMINOPHEN 325 MG/1
650 TABLET ORAL EVERY 4 HOURS PRN
Status: DISCONTINUED | OUTPATIENT
Start: 2019-05-08 | End: 2019-05-10 | Stop reason: HOSPADM

## 2019-05-08 RX ORDER — LEVOTHYROXINE SODIUM 50 UG/1
50 TABLET ORAL NIGHTLY
Status: DISCONTINUED | OUTPATIENT
Start: 2019-05-08 | End: 2019-05-10 | Stop reason: HOSPADM

## 2019-05-08 RX ORDER — CARVEDILOL 12.5 MG/1
12.5 TABLET ORAL 2 TIMES DAILY WITH MEALS
Status: DISCONTINUED | OUTPATIENT
Start: 2019-05-09 | End: 2019-05-10 | Stop reason: HOSPADM

## 2019-05-08 RX ORDER — GLUCAGON 1 MG
1 KIT INJECTION
Status: DISCONTINUED | OUTPATIENT
Start: 2019-05-08 | End: 2019-05-10 | Stop reason: HOSPADM

## 2019-05-08 RX ORDER — ASPIRIN 81 MG/1
81 TABLET ORAL DAILY
Status: DISCONTINUED | OUTPATIENT
Start: 2019-05-09 | End: 2019-05-10 | Stop reason: HOSPADM

## 2019-05-08 RX ADMIN — IOHEXOL 100 ML: 350 INJECTION, SOLUTION INTRAVENOUS at 07:05

## 2019-05-08 RX ADMIN — HEPARIN SODIUM AND DEXTROSE 18 UNITS/KG/HR: 10000; 5 INJECTION INTRAVENOUS at 10:05

## 2019-05-08 RX ADMIN — LEVOTHYROXINE SODIUM 50 MCG: 50 TABLET ORAL at 11:05

## 2019-05-08 RX ADMIN — GABAPENTIN 300 MG: 300 CAPSULE ORAL at 11:05

## 2019-05-08 NOTE — TELEPHONE ENCOUNTER
"Spoke with pt about having trouble breathing. Pt breathing was very labored over the phone. Pt relayed she started feeling like this every since she started taking lasix. Pt wanted to know if she could be seen by pulmonary "real quick" today. I informed they probably wouldn't be able to get her in on such short notice. Placed patient on hold to discuss this with Dr Dwyer. We both agreed patient should go to the emergency room, and although reluctant at first she agreed to go today.         ----- Message from Andie Koch sent at 5/8/2019 11:46 AM CDT -----  Contact: pt   Pt called to speak with nurse states that she is having a hard time breathing  have some questions   Callback#[phone]  Thank You  DANNY Koch     "

## 2019-05-08 NOTE — ED NOTES
Patient resting in stretcher with NAD noted. VSS, following commands, and speech clear and appropriate. All belongings within reach. Patient denies any pain at this time. Patient updated on plan of care and all questions addressed. Safety precautions remain in place. Patient provided pillow per RN.

## 2019-05-08 NOTE — ED TRIAGE NOTES
Cancer of the breast with lymph removal, patient s/p 12 weeks of chemo treatments ended 9 days ago, c/o SOB and tightness in the chest starting  2 weeks ago and progressing with increased fluid in BLE treating with lasix from primary but not improving, denies diarrhea chest pain, N/V.

## 2019-05-08 NOTE — ED PROVIDER NOTES
Encounter Date: 5/8/2019       History     Chief Complaint   Patient presents with    Shortness of Breath     just finished chemo for breast, had avr in past     Patient is a 74-yo white female with a PMHx of BRCA (left breast ductal invasive carcinoma), DMII, HTN, HLD, and thyroid disease who presents to the ED for urgent evaluation of SOB. Patient reports 2-3 week history of shortness of breath on exertion and bilateral leg swelling. She informed her cardiologist Dr. Palm of these symptoms, who prescribed Lasix 20 mg and scheduled an echocardiogram. Patient reports the echo from 5/6/19 was normal and Lasix has not improved her symptoms. She states she becomes short of breath with mild exertion. She reports a history of aortic valve replacement 5 years ago. She endorses concern about these new symptoms as she is scheduled to start her second round of chemotherapy on 5/13. Her 12th week of Taxol was on 4/29. She denies hx of DVT/PE. She takes ASA 81 mg daily. She denies fever/chills, HA, chest pain, nausea/vomiting, abdominal pain, diarrhea, constipation, dysuria, bloody stools, numbness/tingling, or extremity weakness.     The history is provided by the patient.     Review of patient's allergies indicates:   Allergen Reactions    Dilaudid [hydromorphone (bulk)] Other (See Comments)     Other reaction(s): sedation    Hydromorphone Other (See Comments)     Other reaction(s): sedation    Exenatide Rash     Other reaction(s): Rash     Past Medical History:   Diagnosis Date    Allergy     seasonal    Anxiety     Arthritis     Breast cancer 10/2012    left breast invasive ductal carcinoma    Colon polyp     Diabetes mellitus     Type 2    Diverticular disease     Diverticulitis 2009    Genetic testing 05/2017    negative Integrated [employer]    Hyperlipidemia     Hypertension     Morbid obesity     MITCHELL (obstructive sleep apnea)     Stenosis     Stenosis and insufficiency of lacrimal passages      "Thyroid disease      Past Surgical History:   Procedure Laterality Date    BREAST BIOPSY Left 10/1/2012    left breast- invasive ductal carcinoma    BREAST BIOPSY Left 12/2017    BREAST LUMPECTOMY Left 2012    CARDIAC VALVE REPLACEMENT  12/2013    CARDIAC VALVE SURGERY  2013    CARPAL TUNNEL RELEASE      Left    CATHETERIZATION, HEART, LEFT Left 11/7/2013    [facility] CATH LAB    COLONOSCOPY N/A 9/29/2017    Performed by Phani Worley MD at [facility] ENDO (4TH FLR)    COLONOSCOPY N/A 8/28/2015    [facility] at [facility] ENDO (4TH FLR)    DILATION AND CURETTAGE OF UTERUS  1999    Endometrial polyps    ENDOMETRIAL ABLATION  1999    Enodmetrial polyps    NIC-TRANSFORAMINAL Left 10/1/2015    [facility]    EYE SURGERY      [id number] Right 2/1/2019    [facility]formed [facility]aston Flores [facility] at [facility] OR 2ND FLR    left lumpectomy  2012    REPLACEMENT, AORTIC VALVE N/A 12/2/2013    Performed by Venkat Webb [facility] at [facility] OR 2ND FLR    SHOULDER SURGERY      SKIN BIOPSY       Family History   Problem Relation Age of Onset    Breast cancer Mother 62    Colon cancer Father     Cancer Father         Colon CA (cause of death); Prostate CA (no chemo)    Heart disease Father     No Known Problems Sister     No Known Problems Brother     Cancer Maternal Grandfather         Colon CA    No Known Problems Son     Cancer Brother         prostate CA, no chemo, "it was bad"    Anxiety disorder Son     SAL disease Son     Sleep disorder Son     Ovarian cancer Neg Hx     Melanoma Neg Hx     Psoriasis Neg Hx     Lupus Neg Hx     Eczema Neg Hx     Acne Neg Hx      Social History     Tobacco Use    Smoking status: Never Smoker    Smokeless tobacco: Never Used   Substance Use Topics    Alcohol use: No     Alcohol/week: 0.0 oz    Drug use: No     Review of Systems   Constitutional: Negative for chills " and fever.   HENT: Negative for sore throat.    Eyes: Negative for visual disturbance.   Respiratory: Positive for shortness of breath. Negative for cough.    Cardiovascular: Positive for leg swelling. Negative for chest pain.   Gastrointestinal: Negative for abdominal pain, blood in stool, constipation, diarrhea, nausea and vomiting.   Genitourinary: Negative for dysuria and hematuria.   Musculoskeletal: Negative for back pain.   Skin: Negative for wound.   Neurological: Negative for weakness, numbness and headaches.   Psychiatric/Behavioral: Negative for dysphoric mood.       Physical Exam     Initial Vitals [05/08/19 1417]   BP Pulse Resp Temp SpO2   (!) 123/58 95 18 97.7 °F (36.5 °C) 96 %      MAP       --         Physical Exam    Nursing note and vitals reviewed.  Constitutional: She appears well-developed and well-nourished. She is not diaphoretic. No distress.   HENT:   Head: Normocephalic and atraumatic.   Mouth/Throat: Oropharynx is clear and moist. No oropharyngeal exudate.   Eyes: Conjunctivae and EOM are normal. Pupils are equal, round, and reactive to light.   Neck: Normal range of motion. Neck supple.   Cardiovascular: Normal rate, regular rhythm, normal heart sounds and intact distal pulses.   Pulmonary/Chest: Breath sounds normal. No respiratory distress. She has no wheezes. She has no rhonchi. She has no rales.   Patient speaking in clear and full sentences, no accessory muscle usage.   Abdominal: Soft. Bowel sounds are normal. There is no tenderness.   Musculoskeletal: Normal range of motion. She exhibits no edema or tenderness.   Mild BLE non-pitting edema (L>R, pt states at baseline 2/2 knee injury).   Neurological: She is alert and oriented to person, place, and time. She has normal strength. GCS score is 15. GCS eye subscore is 4. GCS verbal subscore is 5. GCS motor subscore is 6.   Skin: Skin is warm and dry.   Psychiatric: She has a normal mood and affect. Thought content normal.         ED  Course   Procedures  Labs Reviewed   CBC W/ AUTO DIFFERENTIAL - Abnormal; Notable for the following components:       Result Value    RBC 3.57 (*)     Hemoglobin 11.0 (*)     Hematocrit 34.8 (*)     Mean Corpuscular Hemoglobin Conc 31.6 (*)     RDW 16.6 (*)     Immature Granulocytes 4.2 (*)     Immature Grans (Abs) 0.26 (*)     nRBC 1 (*)     All other components within normal limits    Narrative:     1 lav shared   COMPREHENSIVE METABOLIC PANEL - Abnormal; Notable for the following components:    Glucose 195 (*)     Albumin 3.3 (*)     All other components within normal limits    Narrative:     1 lav shared   URINALYSIS, REFLEX TO URINE CULTURE - Abnormal; Notable for the following components:    Occult Blood UA 2+ (*)     All other components within normal limits    Narrative:     Preferred Collection Type->Urine, Clean Catch   TROPONIN I    Narrative:     1 lav shared   B-TYPE NATRIURETIC PEPTIDE    Narrative:     1 lav shared   URINALYSIS MICROSCOPIC    Narrative:     Preferred Collection Type->Urine, Clean Catch        ECG Results          EKG 12-lead (In process)  Result time 05/08/19 15:14:36    In process by Interface, Lab In [facility] (05/08/19 15:14:36)                 Narrative:    Test Reason : R06.02,    Vent. Rate : 091 BPM     Atrial Rate : 091 BPM     P-R Int : 134 ms          QRS Dur : 102 ms      QT Int : 368 ms       P-R-T Axes : 062 -58 047 degrees     QTc Int : 452 ms    Normal sinus rhythm  Left axis deviation  Inferior infarct ,age undetermined  Abnormal ECG  When compared with ECG of 14-FEB-2019 16:38,  No significant change was found    Referred By: [id number]   SELF           Confirmed By:                             Imaging Results          CTA Chest Non-Coronary (PE Study) (In process)                  Medical Decision Making:   History:   Old Medical Records: I decided to obtain old medical records.  Initial Assessment:   Patient is a 74-yo white female with a PMHx of BRCA (left breast  ductal invasive carcinoma), DMII, HTN, HLD, and thyroid disease who presents to the ED for urgent evaluation of SOB. PE reveals a non-toxic, afebrile, well-appearing female in NAD. Per RN, patient saturating at 93-96%, 2L NC placed with saturation now at 99%. While auscultating the lungs with pt sitting up, patient had transient drop to 71%. /59.  Differential Diagnosis:   DDx includes but is not limited to: DVT/PE, atypical ACS, PNA, PTX, symptomatic anemia, chest wall strain.  Clinical Tests:   Lab Tests: Ordered and Reviewed  Radiological Study: Ordered  Medical Tests: Ordered and Reviewed  ED Management:  ED course:  Basic labs, troponin, BNP, UA, EKG.  Will obtain CTA PE study given Wells score of 4.    CBC without leukocytosis, stable H&H.  CMP remarkable for glucose 195 and albumin 3.3; normal renal function and LFTs.  UA without signs of infection.  Troponin negative, BNP within normal limits.  CTA pending.    I will transfer care of this patient to Dr. Aguilar, who will determine final disposition of the patient pending CT results.  There are no identifiable sources of infection on exam.  EKG without signs of ischemia, neg Trop; therefore, I do not suspect ACS at this time.  Results of most recent echo reveal low normal left ventricular systolic function with EF 50-55%. BNP normal. I do not suspect acute CHF at this time. Patient informed of resulted findings. She is stable for sign out. I have discussed patient case with my supervisory physician, who is in agreement with my assessment and plan.      Additional MDM:     Well's Criteria Score:  -Clinical symptoms of DVT (leg swelling, pain with palpation) = 0.0  -Other diagnosis less likely than pulmonary embolism =            3.0  -Heart Rate >100 =   0.0  -Immobilization (= or > than 3 days) or surgery in the previous 4 weeks = 0.0  -Previous DVT/PE = 0.0  -Hemoptysis =          0.0  -Malignancy =           1.0  Well's Probability Score =     4          APC / Resident Notes:   1952: Patient's care signed out to this on-coming provider (Dr. Violetta Aguilar, PGY-2) at change of shift with reassessment significant for CTAB sating 98% on RA with decrease to 89% while talking when on RA with patient appearing winded while talking. Awaiting CT PE study.    2019: CTPE read with bilateral PE. Patient with no troponin leak or elevation in BNP. Will consult oncology for Dr. Dwyer's team in the setting of bilateral pulmonary embolisms. Patient is currently satting 98% on RA. Discussed with patient about findings and that she will be admitted.    2100: Medicine down to admit patient for oncology team.                  Clinical Impression:       ICD-10-CM ICD-9-CM   1. Shortness of breath R06.02 786.05                                Cheryl Ghotra PA-C  05/08/19 2002       Violetta Aguilar MD  Resident  05/08/19 3414

## 2019-05-09 LAB
ALBUMIN SERPL BCP-MCNC: 3.3 G/DL (ref 3.5–5.2)
ALP SERPL-CCNC: 109 U/L (ref 55–135)
ALT SERPL W/O P-5'-P-CCNC: 20 U/L (ref 10–44)
ANION GAP SERPL CALC-SCNC: 10 MMOL/L (ref 8–16)
APTT BLDCRRT: 101.9 SEC (ref 21–32)
ASCENDING AORTA: 3.4 CM
AST SERPL-CCNC: 18 U/L (ref 10–40)
BASOPHILS # BLD AUTO: 0.05 K/UL (ref 0–0.2)
BASOPHILS # BLD AUTO: 0.05 K/UL (ref 0–0.2)
BASOPHILS NFR BLD: 0.8 % (ref 0–1.9)
BASOPHILS NFR BLD: 0.8 % (ref 0–1.9)
BILIRUB SERPL-MCNC: 0.4 MG/DL (ref 0.1–1)
BSA FOR ECHO PROCEDURE: 2.18 M2
BUN SERPL-MCNC: 13 MG/DL (ref 8–23)
CALCIUM SERPL-MCNC: 9.1 MG/DL (ref 8.7–10.5)
CHLORIDE SERPL-SCNC: 103 MMOL/L (ref 95–110)
CO2 SERPL-SCNC: 27 MMOL/L (ref 23–29)
CREAT SERPL-MCNC: 0.9 MG/DL (ref 0.5–1.4)
CV ECHO LV RWT: 0.33 CM
DIFFERENTIAL METHOD: ABNORMAL
DIFFERENTIAL METHOD: ABNORMAL
DOP CALC LVOT AREA: 3.11 CM2
DOP CALC LVOT DIAMETER: 1.99 CM
E WAVE DECELERATION TIME: 179.46 MSEC
E/A RATIO: 0.64
E/E' RATIO: 8.4
ECHO LV POSTERIOR WALL: 0.84 CM (ref 0.6–1.1)
EOSINOPHIL # BLD AUTO: 0.1 K/UL (ref 0–0.5)
EOSINOPHIL # BLD AUTO: 0.1 K/UL (ref 0–0.5)
EOSINOPHIL NFR BLD: 0.8 % (ref 0–8)
EOSINOPHIL NFR BLD: 0.8 % (ref 0–8)
ERYTHROCYTE [DISTWIDTH] IN BLOOD BY AUTOMATED COUNT: 16.8 % (ref 11.5–14.5)
ERYTHROCYTE [DISTWIDTH] IN BLOOD BY AUTOMATED COUNT: 16.8 % (ref 11.5–14.5)
EST. GFR  (AFRICAN AMERICAN): >60 ML/MIN/1.73 M^2
EST. GFR  (NON AFRICAN AMERICAN): >60 ML/MIN/1.73 M^2
FRACTIONAL SHORTENING: 35 % (ref 28–44)
GLUCOSE SERPL-MCNC: 150 MG/DL (ref 70–110)
HCT VFR BLD AUTO: 33.5 % (ref 37–48.5)
HCT VFR BLD AUTO: 33.5 % (ref 37–48.5)
HGB BLD-MCNC: 10.6 G/DL (ref 12–16)
HGB BLD-MCNC: 10.6 G/DL (ref 12–16)
IMM GRANULOCYTES # BLD AUTO: 0.24 K/UL (ref 0–0.04)
IMM GRANULOCYTES # BLD AUTO: 0.24 K/UL (ref 0–0.04)
IMM GRANULOCYTES NFR BLD AUTO: 3.9 % (ref 0–0.5)
IMM GRANULOCYTES NFR BLD AUTO: 3.9 % (ref 0–0.5)
INTERVENTRICULAR SEPTUM: 0.81 CM (ref 0.6–1.1)
IVRT: 0.11 MSEC
LA MAJOR: 5.61 CM
LA MINOR: 5.01 CM
LA WIDTH: 4.33 CM
LEFT ATRIUM SIZE: 3.79 CM
LEFT ATRIUM VOLUME INDEX: 35.7 ML/M2
LEFT ATRIUM VOLUME: 73.83 CM3
LEFT INTERNAL DIMENSION IN SYSTOLE: 3.28 CM (ref 2.1–4)
LEFT VENTRICLE DIASTOLIC VOLUME INDEX: 57.78 ML/M2
LEFT VENTRICLE DIASTOLIC VOLUME: 119.35 ML
LEFT VENTRICLE MASS INDEX: 68.9 G/M2
LEFT VENTRICLE SYSTOLIC VOLUME INDEX: 21.1 ML/M2
LEFT VENTRICLE SYSTOLIC VOLUME: 43.61 ML
LEFT VENTRICULAR INTERNAL DIMENSION IN DIASTOLE: 5.02 CM (ref 3.5–6)
LEFT VENTRICULAR MASS: 142.24 G
LV LATERAL E/E' RATIO: 7.88
LV SEPTAL E/E' RATIO: 9
LYMPHOCYTES # BLD AUTO: 2 K/UL (ref 1–4.8)
LYMPHOCYTES # BLD AUTO: 2 K/UL (ref 1–4.8)
LYMPHOCYTES NFR BLD: 33 % (ref 18–48)
LYMPHOCYTES NFR BLD: 33 % (ref 18–48)
MAGNESIUM SERPL-MCNC: 1.7 MG/DL (ref 1.6–2.6)
MCH RBC QN AUTO: 31.1 PG (ref 27–31)
MCH RBC QN AUTO: 31.1 PG (ref 27–31)
MCHC RBC AUTO-ENTMCNC: 31.6 G/DL (ref 32–36)
MCHC RBC AUTO-ENTMCNC: 31.6 G/DL (ref 32–36)
MCV RBC AUTO: 98 FL (ref 82–98)
MCV RBC AUTO: 98 FL (ref 82–98)
MONOCYTES # BLD AUTO: 0.7 K/UL (ref 0.3–1)
MONOCYTES # BLD AUTO: 0.7 K/UL (ref 0.3–1)
MONOCYTES NFR BLD: 11 % (ref 4–15)
MONOCYTES NFR BLD: 11 % (ref 4–15)
MV PEAK A VEL: 0.98 M/S
MV PEAK E VEL: 0.63 M/S
NEUTROPHILS # BLD AUTO: 3.1 K/UL (ref 1.8–7.7)
NEUTROPHILS # BLD AUTO: 3.1 K/UL (ref 1.8–7.7)
NEUTROPHILS NFR BLD: 50.5 % (ref 38–73)
NEUTROPHILS NFR BLD: 50.5 % (ref 38–73)
NRBC BLD-RTO: 1 /100 WBC
NRBC BLD-RTO: 1 /100 WBC
PISA TR MAX VEL: 2.42 M/S
PLATELET # BLD AUTO: 209 K/UL (ref 150–350)
PLATELET # BLD AUTO: 209 K/UL (ref 150–350)
PMV BLD AUTO: 10.7 FL (ref 9.2–12.9)
PMV BLD AUTO: 10.7 FL (ref 9.2–12.9)
POCT GLUCOSE: 140 MG/DL (ref 70–110)
POCT GLUCOSE: 157 MG/DL (ref 70–110)
POCT GLUCOSE: 168 MG/DL (ref 70–110)
POTASSIUM SERPL-SCNC: 3.3 MMOL/L (ref 3.5–5.1)
PROT SERPL-MCNC: 6.3 G/DL (ref 6–8.4)
RA PRESSURE: 3 MMHG
RBC # BLD AUTO: 3.41 M/UL (ref 4–5.4)
RBC # BLD AUTO: 3.41 M/UL (ref 4–5.4)
RIGHT VENTRICULAR END-DIASTOLIC DIMENSION: 3.64 CM
RV TISSUE DOPPLER FREE WALL SYSTOLIC VELOCITY 1 (APICAL 4 CHAMBER VIEW): 8.58 M/S
SINUS: 3.1 CM
SODIUM SERPL-SCNC: 140 MMOL/L (ref 136–145)
STJ: 2.46 CM
TDI LATERAL: 0.08
TDI SEPTAL: 0.07
TDI: 0.08
TR MAX PG: 23.43 MMHG
TV REST PULMONARY ARTERY PRESSURE: 26 MMHG
WBC # BLD AUTO: 6.09 K/UL (ref 3.9–12.7)
WBC # BLD AUTO: 6.09 K/UL (ref 3.9–12.7)

## 2019-05-09 PROCEDURE — 99223 1ST HOSP IP/OBS HIGH 75: CPT | Mod: AI,HCNC,, | Performed by: INTERNAL MEDICINE

## 2019-05-09 PROCEDURE — 80053 COMPREHEN METABOLIC PANEL: CPT | Mod: HCNC

## 2019-05-09 PROCEDURE — 36415 COLL VENOUS BLD VENIPUNCTURE: CPT | Mod: HCNC

## 2019-05-09 PROCEDURE — 83735 ASSAY OF MAGNESIUM: CPT | Mod: HCNC

## 2019-05-09 PROCEDURE — 63600175 PHARM REV CODE 636 W HCPCS: Mod: HCNC | Performed by: INTERNAL MEDICINE

## 2019-05-09 PROCEDURE — 94660 CPAP INITIATION&MGMT: CPT | Mod: HCNC

## 2019-05-09 PROCEDURE — 25000003 PHARM REV CODE 250: Mod: HCNC | Performed by: PEDIATRICS

## 2019-05-09 PROCEDURE — 99223 PR INITIAL HOSPITAL CARE,LEVL III: ICD-10-PCS | Mod: AI,HCNC,, | Performed by: INTERNAL MEDICINE

## 2019-05-09 PROCEDURE — 63600175 PHARM REV CODE 636 W HCPCS: Mod: HCNC | Performed by: STUDENT IN AN ORGANIZED HEALTH CARE EDUCATION/TRAINING PROGRAM

## 2019-05-09 PROCEDURE — 85730 THROMBOPLASTIN TIME PARTIAL: CPT | Mod: HCNC

## 2019-05-09 PROCEDURE — 99900035 HC TECH TIME PER 15 MIN (STAT): Mod: HCNC

## 2019-05-09 PROCEDURE — 27000190 HC CPAP FULL FACE MASK W/VALVE: Mod: HCNC

## 2019-05-09 PROCEDURE — 85025 COMPLETE CBC W/AUTO DIFF WBC: CPT | Mod: HCNC

## 2019-05-09 PROCEDURE — 25000003 PHARM REV CODE 250: Mod: HCNC | Performed by: STUDENT IN AN ORGANIZED HEALTH CARE EDUCATION/TRAINING PROGRAM

## 2019-05-09 PROCEDURE — 94761 N-INVAS EAR/PLS OXIMETRY MLT: CPT | Mod: HCNC

## 2019-05-09 PROCEDURE — 20600001 HC STEP DOWN PRIVATE ROOM: Mod: HCNC

## 2019-05-09 RX ORDER — POTASSIUM CHLORIDE 20 MEQ/15ML
40 SOLUTION ORAL ONCE
Status: DISCONTINUED | OUTPATIENT
Start: 2019-05-09 | End: 2019-05-09

## 2019-05-09 RX ORDER — LANOLIN ALCOHOL/MO/W.PET/CERES
400 CREAM (GRAM) TOPICAL ONCE
Status: COMPLETED | OUTPATIENT
Start: 2019-05-09 | End: 2019-05-09

## 2019-05-09 RX ORDER — POTASSIUM CHLORIDE 20 MEQ/1
40 TABLET, EXTENDED RELEASE ORAL ONCE
Status: COMPLETED | OUTPATIENT
Start: 2019-05-09 | End: 2019-05-09

## 2019-05-09 RX ORDER — MAGNESIUM GLUCONATE 27 MG(500)
54 TABLET ORAL ONCE
Status: DISCONTINUED | OUTPATIENT
Start: 2019-05-09 | End: 2019-05-09

## 2019-05-09 RX ORDER — ENOXAPARIN SODIUM 150 MG/ML
1 INJECTION SUBCUTANEOUS 2 TIMES DAILY
Status: DISCONTINUED | OUTPATIENT
Start: 2019-05-09 | End: 2019-05-10 | Stop reason: HOSPADM

## 2019-05-09 RX ADMIN — POTASSIUM CHLORIDE 40 MEQ: 1500 TABLET, EXTENDED RELEASE ORAL at 07:05

## 2019-05-09 RX ADMIN — FUROSEMIDE 20 MG: 20 TABLET ORAL at 09:05

## 2019-05-09 RX ADMIN — ENOXAPARIN SODIUM 110 MG: 150 INJECTION SUBCUTANEOUS at 11:05

## 2019-05-09 RX ADMIN — GABAPENTIN 300 MG: 300 CAPSULE ORAL at 09:05

## 2019-05-09 RX ADMIN — HUMAN ALBUMIN MICROSPHERES AND PERFLUTREN 0.66 MG: 10; .22 INJECTION, SOLUTION INTRAVENOUS at 09:05

## 2019-05-09 RX ADMIN — ASPIRIN 81 MG: 81 TABLET, COATED ORAL at 09:05

## 2019-05-09 RX ADMIN — MAGNESIUM OXIDE TAB 400 MG (241.3 MG ELEMENTAL MG) 400 MG: 400 (241.3 MG) TAB at 07:05

## 2019-05-09 RX ADMIN — LEVOTHYROXINE SODIUM 50 MCG: 50 TABLET ORAL at 09:05

## 2019-05-09 RX ADMIN — ENOXAPARIN SODIUM 110 MG: 150 INJECTION SUBCUTANEOUS at 09:05

## 2019-05-09 NOTE — ASSESSMENT & PLAN NOTE
- follows with Dr. Dwyer  - triple negative, s/p 12 cycles of neoadj taxol  - will need chronic AC - follow up with Dr. Dwyer regarding next step in cancer treatment

## 2019-05-09 NOTE — PROGRESS NOTES
Dr. Garza notified of current BP and will hold evening coreg.        05/09/19 1551 05/09/19 1552   Vital Signs   BP (!) 96/51 (!) 105/53   MAP (mmHg) 72 76   BP Location Right arm Right arm   Patient Position Lying Lying

## 2019-05-09 NOTE — HPI
"Ms. Alina Narayan is a 74 year old female who has ER/AR/HER2- breast cancer of right breast, DM2 with CKD3, hypothyroidism, HLD, HTN, MITCHELL on CPAP and prior hx of ER/AR+ breast cancer of left breast treated with lumpectomy, radiation and aromatase inhibition in 2012 c/b radiation damage to aortic valve requiring bioprosthetic aortic valve replacement who presents today due to worsening shortness of breath. She recently completed her 12th cycle of neoadjuvant Taxol on 4/29/2019 and was seen by Dr. Dwyer, her oncologist in clinic on 5/6 where he noted that  she continued to be fatigued, with worsening shortness of breath but that her lower extremity edema improved with Lasix. As a result of this SOB a repeat echo was recently performed which displayed EF 50-55, normal RV systolic function, ePAP of 30 from 28 on echo 11/2018. Due to increasing shortness of breath, difficult with chest tightness and "catching her wind" over the last 2 weeks she presented to the hospital. On ROS she denies chest pain, hemoptysis, unilateral leg swelling, productive cough, but does state new dry cough x 2 weeks. Pt denied fever, states chronically cold/chills since chemo started, denies sore throat, nausea, vomiting, abdo pain, changes to bowel/bladder or new skin changes. She is joined at the bedside by her  during the interview and examination.     In the ED at [facility] was performed of her chest which displayed significant bilateral PE. Despite the clot burden, BNP and trop were unrevealing and patient was not tachycardic. Labs which were obtained were all around her baseline, platelets were >200. Medical Oncology consulted because of the above.       "

## 2019-05-09 NOTE — SUBJECTIVE & OBJECTIVE
Interval History: No acute evnts oevrnight. Patient remains afebrile, hemodynamically stable. U/S bilateral LE's performed overnight showing no evidence of DVT. Patient continues on heparin gtt, will plan to switch to Lovenox and discuss the best option for long-tem anticoagulation.     Oncology Treatment Plan:   OP BREAST CMF - METHOTREXATE FLUOROURACIL CYCLOPHOSPHAMIDE (IV) Q3W    Medications:  Continuous Infusions:   heparin (porcine) in D5W 14 Units/kg/hr (05/09/19 0739)     Scheduled Meds:   aspirin  81 mg Oral Daily    carvedilol  12.5 mg Oral BID WM    furosemide  20 mg Oral Daily    gabapentin  300 mg Oral BID    levothyroxine  50 mcg Oral QHS    pravastatin  20 mg Oral Daily     PRN Meds:acetaminophen, dextrose 50%, dextrose 50%, glucagon (human recombinant), glucose, glucose, heparin (PORCINE), heparin (PORCINE), insulin aspart U-100, sodium chloride 0.9%     Review of Systems   Constitutional: Positive for chills (chronic since chemo started) and fatigue. Negative for appetite change and fever.   HENT: Negative.    Eyes: Negative for visual disturbance.   Respiratory: Positive for cough (dry cough x 2 days), chest tightness and shortness of breath. Negative for wheezing.    Cardiovascular: Positive for leg swelling (recently started on lasix - improved on lasix). Negative for chest pain and palpitations.   Gastrointestinal: Negative for anal bleeding, constipation, diarrhea, nausea and vomiting.   Endocrine: Positive for cold intolerance.   Genitourinary: Negative.    Musculoskeletal: Negative.    Skin: Negative.    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   Hematological: Bruises/bleeds easily.   Psychiatric/Behavioral: Negative.      Objective:     Vital Signs (Most Recent):  Temp: 97.6 °F (36.4 °C) (05/09/19 0806)  Pulse: 75 (05/09/19 0806)  Resp: 14 (05/09/19 0806)  BP: (!) 105/56 (05/09/19 0810)  SpO2: (!) 94 % (05/09/19 0806) Vital Signs (24h Range):  Temp:   [97.6 °F (36.4 °C)-98 °F (36.7 °C)] 97.6 °F (36.4 °C)  Pulse:  [75-99] 75  Resp:  [14-22] 14  SpO2:  [93 %-100 %] 94 %  BP: ()/(51-67) 105/56     Weight: 109.2 kg (240 lb 13.6 oz)  Body mass index is 44.05 kg/m².  Body surface area is 2.19 meters squared.    No intake or output data in the 24 hours ending 05/09/19 0852    Physical Exam   Constitutional: She is oriented to person, place, and time. She appears well-developed and well-nourished. She appears ill. No distress. Nasal cannula in place.   Obese  Satting 92% during my exam - nursing vitals improved 98%   HENT:   Head: Normocephalic and atraumatic.   Eyes: Pupils are equal, round, and reactive to light. Right eye exhibits no discharge. Left eye exhibits no discharge. No scleral icterus.   Cardiovascular: Normal rate, regular rhythm, S1 normal and S2 normal.   Murmur heard.   Systolic murmur is present.  Pulses:       Radial pulses are 2+ on the right side, and 2+ on the left side.        Dorsalis pedis pulses are 2+ on the right side, and 2+ on the left side.   Loud S2, singular    Pulmonary/Chest: Effort normal. No respiratory distress.   Reduced breath sounds BL lower lung fields  On nasal cannula, satting 95% during my exam on 2L   Abdominal: Soft. There is no tenderness.   Lymphadenopathy:        Head (right side): No submental, no submandibular and no tonsillar adenopathy present.        Head (left side): No submental, no submandibular and no tonsillar adenopathy present.   Neurological: She is alert and oriented to person, place, and time.   Skin: Skin is warm, dry and intact. She is not diaphoretic. There is pallor.   No gross swelling of BL LE  LLE marginally larger circ at calf than RLE   Psychiatric: She has a normal mood and affect. Her speech is normal.   Nursing note and vitals reviewed.      Significant Labs:   CBC:   Recent Labs   Lab 05/08/19  1534 05/09/19  0356   WBC 6.18 6.09  6.09   HGB 11.0* 10.6*  10.6*   HCT 34.8* 33.5*  33.5*     209  209   , CMP:   Recent Labs   Lab 05/08/19  1534 05/09/19  0356    140   K 3.5 3.3*    103   CO2 26 27   * 150*   BUN 17 13   CREATININE 0.9 0.9   CALCIUM 9.6 9.1   PROT 6.8 6.3   ALBUMIN 3.3* 3.3*   BILITOT 0.5 0.4   ALKPHOS 120 109   AST 22 18   ALT 25 20   ANIONGAP 12 10   EGFRNONAA >60.0 >60.0   , Coagulation:   Recent Labs   Lab 05/08/19  2200 05/09/19  0356   APTT 22.6 101.9*    and LFTs:   Recent Labs   Lab 05/08/19  1534 05/09/19  0356   ALT 25 20   AST 22 18   ALKPHOS 120 109   BILITOT 0.5 0.4   PROT 6.8 6.3   ALBUMIN 3.3* 3.3*       Diagnostic Results:  I have reviewed and interpreted all pertinent imaging results/findings within the past 24 hours.     Imaging Results          US Lower Extremity Veins Bilateral (Final result)  Result time 05/09/19 03:37:27    Final result by Martin Pandey MD (05/09/19 03:37:27)                 Impression:      No evidence of deep venous thrombosis in either lower extremity.    Electronically signed by resident: Shankar Donaldson  Date:    05/09/2019  Time:    03:32    Electronically signed by: Martin Pandey MD  Date:    05/09/2019  Time:    03:37             Narrative:    EXAMINATION:  US LOWER EXTREMITY VEINS BILATERAL    CLINICAL HISTORY:  pulmonary embolism;    TECHNIQUE:  Duplex and color flow Doppler and dynamic compression was performed of the bilateral lower extremity veins was performed.    COMPARISON:  Ultrasound 10/26/2015    FINDINGS:  Right thigh veins: The common femoral, femoral, popliteal, upper greater saphenous, and deep femoral veins are patent and free of thrombus. The veins are normally compressible and have normal phasic flow and augmentation response.    Right calf veins: The visualized calf veins are patent.    Left thigh veins: The common femoral, femoral, popliteal, upper greater saphenous, and deep femoral veins are patent and free of thrombus. The veins are normally compressible and have normal phasic flow and  augmentation response.    Left calf veins: The visualized calf veins are patent.                               CTA Chest Non-Coronary (PE Study) (Final result)  Result time 05/08/19 20:03:13    Final result by Homero Webb MD (05/08/19 20:03:13)                 Impression:      Bilateral pulmonary thromboembolism involving the right lower lobe and left upper, lingular and lower lobes as above.    Few additional findings as above.    COMMUNICATION  This critical result was discovered/received at 19:40 hours on 05/08/2019.  The critical information above was relayed by Dr. Owusu on my behalf by telephone to Dr. Foley on 05/08/2019 at 19:47 hours.    Electronically signed by resident: Issa Bush  Date:    05/08/2019  Time:    19:41    Electronically signed by: Homero Webb MD  Date:    05/08/2019  Time:    20:03             Narrative:    EXAMINATION:  CTA CHEST NON CORONARY    CLINICAL HISTORY:  Chest pain, acute, PE suspected, high pretest prob;    TECHNIQUE:  Low dose axial images, sagittal and coronal reformations were obtained from the thoracic inlet to the lung bases following the IV administration of 100 mL of Omnipaque 350.  Contrast timing was optimized to evaluate the pulmonary arteries.    COMPARISON:  Chest radiograph 02/01/2019.    FINDINGS:  Pulmonary vasculature: Satisfactory opacification of the pulmonary arterial system.  There are intraluminal filling defects in the distal right pulmonary artery at the origin of the right interlobar pulmonary artery extending into the right lower lobe segmental pulmonary arteries.  Additionally, there are filling defects in the left pulmonary artery extending into the left upper, lingular and lower lobar arteries and distally into the segmental pulmonary arteries.    Aorta: Left-sided aortic arch.  Minimal calcific atherosclerosis without aneurysm or dissection.    Base of Neck: No significant abnormality.    Thoracic soft tissues: Right anterior chest port with  catheter tip in the SVC.  Postsurgical changes of median sternotomy..    Heart: Normal size. No effusion.  Calcification of the aortic valve and coronary arteries.    Jena/Mediastinum: No pathologic stef enlargement.    Airways: Patent.    Lungs/Pleura: No consolidation or findings to suggest pulmonary infarction.  No pleural effusion or thickening.    Esophagus: Normal.    Upper Abdomen: Scattered colonic diverticulosis.    Bones: No acute fracture. No suspicious lytic or sclerotic lesions.

## 2019-05-09 NOTE — PROGRESS NOTES
ATTENDING NOTE, ONCOLOGY INPATIENT TEAM      Patient seen and examined, chart reviewed.  Full note to follow by house staff.  This morning she appears comfortable, in NAD.  Lungs are clear to auscultation.  Abdomen is soft, nontender.  Labs reviewed.    PLAN  Port study today.  Repeat labs in am.  Switch to enoxaparin 1 mg/kg Q 12 hours.  Will discuss long term anticoagulation with Dr. Dwyer;  She is not a candidate for NOACs.  We will follow.  Her multiple questions were answered to her satisfaction.        Stephon Liu MD

## 2019-05-09 NOTE — ASSESSMENT & PLAN NOTE
CTA revealed intraluminal filling defects in the distal right pulmonary artery at the origin of the right interlobar pulmonary artery extending into the right lower lobe segmental pulmonary arteries.  Additionally, there are filling defects in the left pulmonary artery extending into the left upper, lingular and lower lobar arteries and distally into the segmental pulmonary arteries.    Patient with multiple risk factors, reduced mobility, obesity and active cancer    Plan:  · Heparin gtt per HD nomogram  · Eval repeat 2D echo for RV dysfunction, eval clot burden with BL LE US Veins w/ doppler  · NPO at midnight  · Consider vascular consult in AM but patient hemodynamically stable  · May require oxygen for home use   · Transition from heparin gtt to NOAC per medical oncology primary service

## 2019-05-09 NOTE — PROGRESS NOTES
Dr. Miller notified of current BP, will hold coreg this AM and continue to monitor pt.          05/09/19 0806 05/09/19 0810   Vital Signs   BP (!) 87/51 (!) 105/56   MAP (mmHg) 66  --    BP Location Right arm Right arm   BP Method Automatic Automatic   Patient Position Lying Lying

## 2019-05-09 NOTE — ASSESSMENT & PLAN NOTE
- sliding scale insulin   - A1c was 8.2 on 3/2019   - may need change to baseline diabetic regime with worsening A1c - recommend PCP follow up for adjustments

## 2019-05-09 NOTE — PLAN OF CARE
MDRs completed with Dr. Gonzalez and the team. Patient of Dr. Dwyer with a history of ER/[place]/HER2 - breast cancer of the right breast (prior history of ER/[place]+ breast cancer of the left breast s/p lumpectomy, radiation and aromatase inhibition in 2012). Patient admitted on 5/8/19 c/o worsening SOB x 2 weeks. Imaging results show bilateral PE's. Patient started on a heparin gtt. Today VSS. Patient is afebrile. O2 sats 93-94% on 2L O2 per nasal cannula. Plans to transition patient from a Heparin gtt to Lovenox injections today; Lovenox 1mg/kg every 12 hours. BLE ultrasound negative for DVTs. Plans for port study today to assess for clot. Discharge plan pending medical stability. CM to continue to follow with the team.    PCP: Aarti Dunn MD    Pharmacy:   [id number] - [place] - [id number] [address] [facility] [address]  [id number] [address] [facility] [address]  [place] [id number]  Phone: [phone] Fax: [phone]    Payor: [employer] MEDICARE / Plan: [employer] MEDICARE HMO / Product Type: Capitation /     Future Appointments   Date Time Provider Department Center   7/3/2019 11:00 AM Critical access hospital NM1 GE INFINIA LIMIT [id number] Critical access hospital NUCLEAR Critical access hospital     Rebecca Mims, RN, BSN, CM [facility]  Nurse - Med Onc/Gyn Onc     Past 6 months/Lifetime

## 2019-05-09 NOTE — SUBJECTIVE & OBJECTIVE
Patient information was obtained from patient, spouse/SO, past medical records and ER records.     Oncology History:    Abnormal mammogram of the right breast in December 2018.  She was having no breast symptoms at that time     That mammogram showed a 13 mm mass in the right axillary tail.  By ultrasound there was a 6 x 9 x 11 mm intramammary node and a 2nd 5 x 6 x 6 mm hypoechoic mass in the axillary tail.  On January 7, 2019 both masses were biopsied and both showed lymph nodes with metastatic carcinoma which was ER negative GA negative and HER2 negative.  Ki-67 was 40%. Has undergone 12 cycles of Taxol (last dose April 29, 2019).     MRI on January 15, 2019 showed 2 right axillary lymph nodes the largest measured 1.4 x 1.0 cm.  There were no abnormalities in the right breast.     PET scan was then performed which showed only low-grade activity in the right axilla with an SUV of 1.32.     Previous breast cancer history History: On September 25, 2012 she underwent a screening mammogram which showed an asymmetric density in the left breast at 2:00 position. A followup mammogram on the 27th showed an oval mass in that area ultrasound showed a 6 mm solid mass. Core needle biopsy on October 1 showed invasive carcinoma ER 90% positive GA 80% positive and HER-2 negative. On October 29 she underwent lumpectomy and sentinel lymph node biopsy. That showed a 7 mm low-grade (1+2+1) infiltrating carcinoma. 3 sentinel lymph nodes were negative. Final pathological stage TIB N0 stage IA.  She completed letrozole therapy in 2017.     She was referred  for genetic testing (mother with breast cancer, father and brother with prostate CA)  - integrated BRCA testing through [facility] was negative.  She did not have more comprehensive testing is at was denied.      Dilaudid [hydromorphone (bulk)]; Hydromorphone; and Exenatide     Past Medical History:   Diagnosis Date    Allergy     seasonal    Anxiety     Arthritis     Breast cancer  10/2012    left breast invasive ductal carcinoma    Colon polyp     Diabetes mellitus     Type 2    Diverticular disease     Diverticulitis 2009    Genetic testing 05/2017    negative Integrated BRACAnalysis    Hyperlipidemia     Hypertension     Morbid obesity     MITCHELL (obstructive sleep apnea)     Stenosis     Stenosis and insufficiency of lacrimal passages     Thyroid disease      Past Surgical History:   Procedure Laterality Date    BREAST BIOPSY Left 10/1/2012    left breast- invasive ductal carcinoma    BREAST BIOPSY Left 12/2017    BREAST LUMPECTOMY Left 2012    CARDIAC VALVE REPLACEMENT  12/2013    CARDIAC VALVE SURGERY  2013    CARPAL TUNNEL RELEASE      Left    CATHETERIZATION, HEART, LEFT Left 11/7/2013    Performed by Alphonse Merchant MD at [facility] CATH LAB    COLONOSCOPY N/A 9/29/2017    Performed by Phani Worley MD at [facility] ENDO (4TH FLR)    COLONOSCOPY N/A 8/28/2015    Performed by Phani Worley [facility] at [facility] ENDO (4TH FLR)    DILATION AND CURETTAGE OF UTERUS  1999    Endometrial polyps    ENDOMETRIAL ABLATION  1999    Enodmetrial polyps    NIC-TRANSFORAMINAL Left 10/1/2015    [facility] at [facility]    EYE SURGERY      [id number]-CATH Right 2/1/2019    Performed by Gaston Flores MD at [facility] OR 2ND FLR    left lumpectomy  2012    REPLACEMENT, AORTIC VALVE N/A 12/2/2013    Performed by Venkat Webb MD at [facility] OR 2ND FLR    SHOULDER SURGERY      SKIN BIOPSY       Family History     Problem Relation (Age of Onset)    Anxiety disorder Son    Breast cancer Mother (62)    Cancer Father, Maternal Grandfather, Brother    Colon cancer Father    SAL disease Son    Heart disease Father    No Known Problems Sister, Brother, Son    Sleep disorder Son        Tobacco Use    Smoking status: Never Smoker    Smokeless tobacco: Never Used   Substance and Sexual Activity    Alcohol use: No     Alcohol/week: 0.0 oz    Drug use: No     Sexual activity: Yes       Review of Systems   Constitutional: Positive for chills (chronic since chemo started) and fatigue. Negative for appetite change and fever.   HENT: Negative.    Eyes: Negative for visual disturbance.   Respiratory: Positive for cough (dry cough x 2 days), chest tightness and shortness of breath. Negative for wheezing.    Cardiovascular: Positive for leg swelling (recently started on lasix - improved on lasix). Negative for chest pain and palpitations.   Gastrointestinal: Negative for anal bleeding, constipation, diarrhea, nausea and vomiting.   Endocrine: Positive for cold intolerance.   Genitourinary: Negative.    Musculoskeletal: Negative.    Skin: Negative.    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   Hematological: Bruises/bleeds easily.   Psychiatric/Behavioral: Negative.      Objective:     Vital Signs (Most Recent):  Temp: 97.7 °F (36.5 °C) (05/08/19 1417)  Pulse: 84 (05/08/19 2017)  Resp: 18 (05/08/19 2017)  BP: (!) 130/59 (05/08/19 1908)  SpO2: 97 % (05/08/19 2017) Vital Signs (24h Range):  Temp:  [97.7 °F (36.5 °C)] 97.7 °F (36.5 °C)  Pulse:  [75-95] 84  Resp:  [17-22] 18  SpO2:  [96 %-100 %] 97 %  BP: (104-134)/(53-67) 130/59     Weight: 108.9 kg (240 lb)  Body mass index is 43.9 kg/m².  Body surface area is 2.18 meters squared.    ECOG SCORE         [unfilled]    Lines/Drains/Airways     Central Venous Catheter Line                 Port A Cath Single Lumen -- days          Peripheral Intravenous Line                 Peripheral IV - Single Lumen 05/08/19 1533 20 G Right Forearm less than 1 day                Physical Exam   Constitutional: She is oriented to person, place, and time. She appears well-developed and well-nourished. She appears ill. No distress. Nasal cannula in place.   Obese  Satting 92% during my exam - nursing vitals improved 98%   HENT:   Head: Normocephalic and atraumatic.   Eyes: Pupils are equal, round, and reactive to  light. Right eye exhibits no discharge. Left eye exhibits no discharge. No scleral icterus.   Cardiovascular: Normal rate, regular rhythm, S1 normal and S2 normal.   Murmur heard.   Systolic murmur is present.  Pulses:       Radial pulses are 2+ on the right side, and 2+ on the left side.        Dorsalis pedis pulses are 2+ on the right side, and 2+ on the left side.   Loud S2, singular    Pulmonary/Chest: Effort normal. No respiratory distress.   Reduced breath sounds BL lower lung fields  On nasal cannula, satting 95% during my exam on 2L   Abdominal: Soft. There is no tenderness.   Lymphadenopathy:        Head (right side): No submental, no submandibular and no tonsillar adenopathy present.        Head (left side): No submental, no submandibular and no tonsillar adenopathy present.   Neurological: She is alert and oriented to person, place, and time.   Skin: Skin is warm, dry and intact. She is not diaphoretic. There is pallor.   No gross swelling of BL LE  LLE marginally larger circ at calf than RLE   Psychiatric: She has a normal mood and affect. Her speech is normal.   Nursing note and vitals reviewed.      Significant Labs:   All pertinent labs from the last 24 hours have been reviewed.   Normal BNP and normal trop.     Diagnostic Results:  I have reviewed all pertinent imaging results/findings within the past 24 hours.

## 2019-05-09 NOTE — PLAN OF CARE
Problem: Adult Inpatient Plan of Care  Goal: Plan of Care Review  Outcome: Ongoing (interventions implemented as appropriate)  Pt. with nonskid footwear on with bed in lowest position and locked with bed rails up x2.  Pt. instructed to call prior to getting OOB.  Pt. with call light within reach and verbalized understanding.  Pt. with TTE complete this morning.  Pt. received electrolyte replacements this morning.  Pt.continues with c/o SOB, weaned down to 1L O2.  Pt. transitioned to lovenox injections this morning.  Pt. pending port study.  Will continue to monitor pt.

## 2019-05-09 NOTE — ASSESSMENT & PLAN NOTE
- risk factor for DVT/PE  - diabetic diet  - will need diet resumed if no need for vascular intervention - consider consult/call in AM

## 2019-05-09 NOTE — HPI
"HPI: Ms. 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 She recently completed her 12th cycle of neoadjuvant Taxol on 4/29/2019 and was seen by Dr. Dwyer, her oncologist in clinic on 5/6 where he noted that  she continued to be fatigued, with worsening shortness of breath but that her lower extremity edema improved with Lasix. As a result of this SOB a repeat echo was recently performed which displayed EF 50-55, normal RV systolic function, ePAP of 30 from 28 on echo 11/2018. Due to increasing shortness of breath, difficult with chest tightness and "catching her wind" over the last 2 weeks she presented to the hospital. On ROS she denies chest pain, hemoptysis, unilateral leg swelling, productive cough, but does state new dry cough x 2 weeks. Pt denied fever, states chronically cold/chills since chemo started, denies sore throat, nausea, vomiting, abdo pain, changes to bowel/bladder or new skin changes. She is joined at the bedside by her  during the interview and examination.      In the ED at [facility] was performed of her chest which displayed significant bilateral PE. Despite the clot burden, BNP and trop were unrevealing and patient was not tachycardic. Labs which were obtained were all around her baseline, platelets were >200. Medical Oncology consulted because of the above.            Patient information was obtained from patient, spouse/SO, past medical records and ER records.      Oncology History:    Abnormal mammogram of the right breast in December 2018.  She was having no breast symptoms at that time     That mammogram showed a 13 mm mass in the right " axillary tail.  By ultrasound there was a 6 x 9 x 11 mm intramammary node and a 2nd 5 x 6 x 6 mm hypoechoic mass in the axillary tail.  On January 7, 2019 both masses were biopsied and both showed lymph nodes with metastatic carcinoma which was ER negative MS negative and HER2 negative.  Ki-67 was 40%. Has undergone 12 cycles of Taxol (last dose April 29, 2019).     MRI on January 15, 2019 showed 2 right axillary lymph nodes the largest measured 1.4 x 1.0 cm.  There were no abnormalities in the right breast.     PET scan was then performed which showed only low-grade activity in the right axilla with an SUV of 1.32.     Previous breast cancer history History: On September 25, 2012 she underwent a screening mammogram which showed an asymmetric density in the left breast at 2:00 position. A followup mammogram on the 27th showed an oval mass in that area ultrasound showed a 6 mm solid mass. Core needle biopsy on October 1 showed invasive carcinoma ER 90% positive MS 80% positive and HER-2 negative. On October 29 she underwent lumpectomy and sentinel lymph node biopsy. That showed a 7 mm low-grade (1+2+1) infiltrating carcinoma. 3 sentinel lymph nodes were negative. Final pathological stage TIB N0 stage IA.  She completed letrozole therapy in 2017.     She was referred  for genetic testing (mother with breast cancer, father and brother with prostate CA)  - integrated BRCA testing through [employer] was negative.  She did not have more comprehensive testing is at was denied.

## 2019-05-09 NOTE — PROGRESS NOTES
"[facility]  Hematology/Oncology  Progress Note    Patient Name: Alina Narayan  Admission Date: 5/8/2019  Hospital Length of Stay: 1 days  Code Status: Full Code     Subjective:     HPI: Ms. 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 She recently completed her 12th cycle of neoadjuvant Taxol on 4/29/2019 and was seen by Dr. Dwyer, her oncologist in clinic on 5/6 where he noted that  she continued to be fatigued, with worsening shortness of breath but that her lower extremity edema improved with Lasix. As a result of this SOB a repeat echo was recently performed which displayed EF 50-55, normal RV systolic function, ePAP of 30 from 28 on echo 11/2018. Due to increasing shortness of breath, difficult with chest tightness and "catching her wind" over the last 2 weeks she presented to the hospital. On ROS she denies chest pain, hemoptysis, unilateral leg swelling, productive cough, but does state new dry cough x 2 weeks. Pt denied fever, states chronically cold/chills since chemo started, denies sore throat, nausea, vomiting, abdo pain, changes to bowel/bladder or new skin changes. She is joined at the bedside by her  during the interview and examination.      In the ED at [facility] was performed of her chest which displayed significant bilateral PE. Despite the clot burden, BNP and trop were unrevealing and patient was not tachycardic. Labs which were obtained were all around her baseline, platelets were >200. Medical Oncology consulted because of the above.            Patient information was obtained from patient, spouse/SO, past medical records " and ER records.      Oncology History:    Abnormal mammogram of the right breast in December 2018.  She was having no breast symptoms at that time     That mammogram showed a 13 mm mass in the right axillary tail.  By ultrasound there was a 6 x 9 x 11 mm intramammary node and a 2nd 5 x 6 x 6 mm hypoechoic mass in the axillary tail.  On January 7, 2019 both masses were biopsied and both showed lymph nodes with metastatic carcinoma which was ER negative MT negative and HER2 negative.  Ki-67 was 40%. Has undergone 12 cycles of Taxol (last dose April 29, 2019).     MRI on January 15, 2019 showed 2 right axillary lymph nodes the largest measured 1.4 x 1.0 cm.  There were no abnormalities in the right breast.     PET scan was then performed which showed only low-grade activity in the right axilla with an SUV of 1.32.     Previous breast cancer history History: On September 25, 2012 she underwent a screening mammogram which showed an asymmetric density in the left breast at 2:00 position. A followup mammogram on the 27th showed an oval mass in that area ultrasound showed a 6 mm solid mass. Core needle biopsy on October 1 showed invasive carcinoma ER 90% positive MT 80% positive and HER-2 negative. On October 29 she underwent lumpectomy and sentinel lymph node biopsy. That showed a 7 mm low-grade (1+2+1) infiltrating carcinoma. 3 sentinel lymph nodes were negative. Final pathological stage TIB N0 stage IA.  She completed letrozole therapy in 2017.     She was referred  for genetic testing (mother with breast cancer, father and brother with prostate CA)  - integrated BRCA testing through [employer] was negative.  She did not have more comprehensive testing is at was denied.        Interval History: No acute evnts oevrnight. Patient remains afebrile, hemodynamically stable. U/S bilateral LE's performed overnight showing no evidence of DVT. Patient continues on heparin gtt, will plan to switch to Lovenox and discuss the best  option for long-tem anticoagulation.     Oncology Treatment Plan:   OP BREAST CMF - METHOTREXATE FLUOROURACIL CYCLOPHOSPHAMIDE (IV) Q3W    Medications:  Continuous Infusions:   heparin (porcine) in D5W 14 Units/kg/hr (05/09/19 0739)     Scheduled Meds:   aspirin  81 mg Oral Daily    carvedilol  12.5 mg Oral BID WM    furosemide  20 mg Oral Daily    gabapentin  300 mg Oral BID    levothyroxine  50 mcg Oral QHS    pravastatin  20 mg Oral Daily     PRN Meds:acetaminophen, dextrose 50%, dextrose 50%, glucagon (human recombinant), glucose, glucose, heparin (PORCINE), heparin (PORCINE), insulin aspart U-100, sodium chloride 0.9%     Review of Systems   Constitutional: Positive for chills (chronic since chemo started) and fatigue. Negative for appetite change and fever.   HENT: Negative.    Eyes: Negative for visual disturbance.   Respiratory: Positive for cough (dry cough x 2 days), chest tightness and shortness of breath. Negative for wheezing.    Cardiovascular: Positive for leg swelling (recently started on lasix - improved on lasix). Negative for chest pain and palpitations.   Gastrointestinal: Negative for anal bleeding, constipation, diarrhea, nausea and vomiting.   Endocrine: Positive for cold intolerance.   Genitourinary: Negative.    Musculoskeletal: Negative.    Skin: Negative.    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   Hematological: Bruises/bleeds easily.   Psychiatric/Behavioral: Negative.      Objective:     Vital Signs (Most Recent):  Temp: 97.6 °F (36.4 °C) (05/09/19 0806)  Pulse: 75 (05/09/19 0806)  Resp: 14 (05/09/19 0806)  BP: (!) 105/56 (05/09/19 0810)  SpO2: (!) 94 % (05/09/19 0806) Vital Signs (24h Range):  Temp:  [97.6 °F (36.4 °C)-98 °F (36.7 °C)] 97.6 °F (36.4 °C)  Pulse:  [75-99] 75  Resp:  [14-22] 14  SpO2:  [93 %-100 %] 94 %  BP: ()/(51-67) 105/56     Weight: 109.2 kg (240 lb 13.6 oz)  Body mass index is 44.05 kg/m².  Body surface area is 2.19  meters squared.    No intake or output data in the 24 hours ending 05/09/19 0852    Physical Exam   Constitutional: She is oriented to person, place, and time. She appears well-developed and well-nourished. She appears ill. No distress. Nasal cannula in place.   Obese  Satting 92% during my exam - nursing vitals improved 98%   HENT:   Head: Normocephalic and atraumatic.   Eyes: Pupils are equal, round, and reactive to light. Right eye exhibits no discharge. Left eye exhibits no discharge. No scleral icterus.   Cardiovascular: Normal rate, regular rhythm, S1 normal and S2 normal.   Murmur heard.   Systolic murmur is present.  Pulses:       Radial pulses are 2+ on the right side, and 2+ on the left side.        Dorsalis pedis pulses are 2+ on the right side, and 2+ on the left side.   Loud S2, singular    Pulmonary/Chest: Effort normal. No respiratory distress.   Reduced breath sounds BL lower lung fields  On nasal cannula, satting 95% during my exam on 2L   Abdominal: Soft. There is no tenderness.   Lymphadenopathy:        Head (right side): No submental, no submandibular and no tonsillar adenopathy present.        Head (left side): No submental, no submandibular and no tonsillar adenopathy present.   Neurological: She is alert and oriented to person, place, and time.   Skin: Skin is warm, dry and intact. She is not diaphoretic. There is pallor.   No gross swelling of BL LE  LLE marginally larger circ at calf than RLE   Psychiatric: She has a normal mood and affect. Her speech is normal.   Nursing note and vitals reviewed.      Significant Labs:   CBC:   Recent Labs   Lab 05/08/19  1534 05/09/19  0356   WBC 6.18 6.09  6.09   HGB 11.0* 10.6*  10.6*   HCT 34.8* 33.5*  33.5*    209  209   , CMP:   Recent Labs   Lab 05/08/19  1534 05/09/19  0356    140   K 3.5 3.3*    103   CO2 26 27   * 150*   BUN 17 13   CREATININE 0.9 0.9   CALCIUM 9.6 9.1   PROT 6.8 6.3   ALBUMIN 3.3* 3.3*   BILITOT 0.5  0.4   ALKPHOS 120 109   AST 22 18   ALT 25 20   ANIONGAP 12 10   EGFRNONAA >60.0 >60.0   , Coagulation:   Recent Labs   Lab 05/08/19  2200 05/09/19  0356   APTT 22.6 101.9*    and LFTs:   Recent Labs   Lab 05/08/19  1534 05/09/19  0356   ALT 25 20   AST 22 18   ALKPHOS 120 109   BILITOT 0.5 0.4   PROT 6.8 6.3   ALBUMIN 3.3* 3.3*       Diagnostic Results:  I have reviewed and interpreted all pertinent imaging results/findings within the past 24 hours.     Imaging Results          US Lower Extremity Veins Bilateral (Final result)  Result time 05/09/19 03:37:27    Final result by Martin Pandey MD (05/09/19 03:37:27)                 Impression:      No evidence of deep venous thrombosis in either lower extremity.    Electronically signed by resident: Shankar Donaldson  Date:    05/09/2019  Time:    03:32    Electronically signed by: Martin Pandey MD  Date:    05/09/2019  Time:    03:37             Narrative:    EXAMINATION:  US LOWER EXTREMITY VEINS BILATERAL    CLINICAL HISTORY:  pulmonary embolism;    TECHNIQUE:  Duplex and color flow Doppler and dynamic compression was performed of the bilateral lower extremity veins was performed.    COMPARISON:  Ultrasound 10/26/2015    FINDINGS:  Right thigh veins: The common femoral, femoral, popliteal, upper greater saphenous, and deep femoral veins are patent and free of thrombus. The veins are normally compressible and have normal phasic flow and augmentation response.    Right calf veins: The visualized calf veins are patent.    Left thigh veins: The common femoral, femoral, popliteal, upper greater saphenous, and deep femoral veins are patent and free of thrombus. The veins are normally compressible and have normal phasic flow and augmentation response.    Left calf veins: The visualized calf veins are patent.                               CTA Chest Non-Coronary (PE Study) (Final result)  Result time 05/08/19 20:03:13    Final result by Homero Webb MD (05/08/19  20:03:13)                 Impression:      Bilateral pulmonary thromboembolism involving the right lower lobe and left upper, lingular and lower lobes as above.    Few additional findings as above.    COMMUNICATION  This critical result was discovered/received at 19:40 hours on 05/08/2019.  The critical information above was relayed by Dr. Owusu on my behalf by telephone to Dr. Foley on 05/08/2019 at 19:47 hours.    Electronically signed by resident: Issa Bush  Date:    05/08/2019  Time:    19:41    Electronically signed by: Homero Webb MD  Date:    05/08/2019  Time:    20:03             Narrative:    EXAMINATION:  CTA CHEST NON CORONARY    CLINICAL HISTORY:  Chest pain, acute, PE suspected, high pretest prob;    TECHNIQUE:  Low dose axial images, sagittal and coronal reformations were obtained from the thoracic inlet to the lung bases following the IV administration of 100 mL of Omnipaque 350.  Contrast timing was optimized to evaluate the pulmonary arteries.    COMPARISON:  Chest radiograph 02/01/2019.    FINDINGS:  Pulmonary vasculature: Satisfactory opacification of the pulmonary arterial system.  There are intraluminal filling defects in the distal right pulmonary artery at the origin of the right interlobar pulmonary artery extending into the right lower lobe segmental pulmonary arteries.  Additionally, there are filling defects in the left pulmonary artery extending into the left upper, lingular and lower lobar arteries and distally into the segmental pulmonary arteries.    Aorta: Left-sided aortic arch.  Minimal calcific atherosclerosis without aneurysm or dissection.    Base of Neck: No significant abnormality.    Thoracic soft tissues: Right anterior chest port with catheter tip in the SVC.  Postsurgical changes of median sternotomy..    Heart: Normal size. No effusion.  Calcification of the aortic valve and coronary arteries.    Jena/Mediastinum: No pathologic stef enlargement.    Airways:  Patent.    Lungs/Pleura: No consolidation or findings to suggest pulmonary infarction.  No pleural effusion or thickening.    Esophagus: Normal.    Upper Abdomen: Scattered colonic diverticulosis.    Bones: No acute fracture. No suspicious lytic or sclerotic lesions.                              Assessment/Plan:     Bilateral pulmonary embolism  CTA revealed intraluminal filling defects in the distal right pulmonary artery at the origin of the right interlobar pulmonary artery extending into the right lower lobe segmental pulmonary arteries.  Additionally, there are filling defects in the left pulmonary artery extending into the left upper, lingular and lower lobar arteries and distally into the segmental pulmonary arteries.     Patient with multiple risk factors, reduced mobility, obesity and active cancer     Plan:  · Will switch from heparin gtt to enoxaparin today, will discuss best option for long-term anticoagulation with pt's oncologist, Dr. Dwyer  · Repeating 2D echo showing mildly improved EF (63%), and signs of mild right heart strain (estimated PA 26 mm Hg) but no significant change from prior echo on 05/06/19  · B/l LE US showing no evidence of DVT  · Will obtain U/S of port site to try to rule out clot at the port tip.     BMI 45.0-49.9, adult  - risk factor for DVT/PE  - diabetic diet    Personal history of breast cancer  - prior ER/MA pos L breast adeno treated with lumpectomy and radiation in 2012  - follows with Dr. Dwyer for triple ng R breast ca now     Diabetes mellitus due to underlying condition with stage 3 chronic kidney disease, without long-term current use of insulin  - sliding scale insulin   - A1c was 8.2 on 3/2019   - may need change to baseline diabetic regime with worsening A1c - recommend PCP follow up for adjustments     Malignant neoplasm of right female breast  - follows with Dr. Dwyer  - triple negative, s/p 12 cycles of neoadj taxol  - will need chronic AC - follow up with Dr. Dwyer  regarding next step in cancer treatment     S/P AVR (aortic valve replacement)  - continue ASA 81mg in the setting of bioprosthetic AVR    Diabetes mellitus type 2 in obese  - see above     Essential hypertension  - continue coreg while inpatient      Javier Miller MD  Hematology/Oncology  [facility]      ATTENDING NOTE, ONCOLOGY INPATIENT TEAM        Patient seen and examined, chart reviewed.  Full note to follow by house staff.  This morning she appears comfortable, in NAD.  Lungs are clear to auscultation.  Abdomen is soft, nontender.  Labs reviewed.     PLAN  Port study today.  Repeat labs in am.  Switch to enoxaparin 1 mg/kg Q 12 hours.  Will discuss long term anticoagulation with Dr. Dwyer;  She is not a candidate for NOACs.  We will follow.  Her multiple questions were answered to her satisfaction.           Stephon Liu MD

## 2019-05-09 NOTE — ASSESSMENT & PLAN NOTE
CTA revealed intraluminal filling defects in the distal right pulmonary artery at the origin of the right interlobar pulmonary artery extending into the right lower lobe segmental pulmonary arteries.  Additionally, there are filling defects in the left pulmonary artery extending into the left upper, lingular and lower lobar arteries and distally into the segmental pulmonary arteries.     Patient with multiple risk factors, reduced mobility, obesity and active cancer     Plan:  · Will switch from heparin gtt to enoxaparin today, will discuss best option for long-term anticoagulation with pt's oncologist, Dr. Dwyer  · Repeating 2D echo showing mildly improved EF (63%), and signs of mild right heart strain (estimated PA 26 mm Hg) but no significant change from prior echo on 05/06/19  · B/l LE US showing no evidence of DVT  · Will obtain U/S of port site to try to rule out clot at the port tip.

## 2019-05-09 NOTE — NURSING TRANSFER
Nursing Transfer Note      5/9/2019     Transfer From: ED    Transfer via stretcher    Transfer with O2, cardiac monitoring    Transported by transport    Medicines sent: n/a    Chart send with patient: Yes    Notified: N/A    Patient reassessed at: 05/09/2019 0000    Upon arrival to floor: cardiac monitor applied, patient oriented to room, call bell in reach and bed in lowest position    No skin breakdown noted

## 2019-05-09 NOTE — ASSESSMENT & PLAN NOTE
- prior ER/CT pos L breast adeno treated with lumpectomy and radiation in 2012  - follows with Dr. Dwyer for triple ng R breast ca now

## 2019-05-09 NOTE — H&P
"[facility]  Hematology  Bone Marrow Transplant / Medical Oncology  H&P    Subjective:     Principal Problem: <principal problem not specified>    HPI: Ms. 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 She recently completed her 12th cycle of neoadjuvant Taxol on 4/29/2019 and was seen by Dr. Dwyer, her oncologist in clinic on 5/6 where he noted that  she continued to be fatigued, with worsening shortness of breath but that her lower extremity edema improved with Lasix. As a result of this SOB a repeat echo was recently performed which displayed EF 50-55, normal RV systolic function, ePAP of 30 from 28 on echo 11/2018. Due to increasing shortness of breath, difficult with chest tightness and "catching her wind" over the last 2 weeks she presented to the hospital. On ROS she denies chest pain, hemoptysis, unilateral leg swelling, productive cough, but does state new dry cough x 2 weeks. Pt denied fever, states chronically cold/chills since chemo started, denies sore throat, nausea, vomiting, abdo pain, changes to bowel/bladder or new skin changes. She is joined at the bedside by her  during the interview and examination.     In the ED at [facility]A was performed of her chest which displayed significant bilateral PE. Despite the clot burden, BNP and trop were unrevealing and patient was not tachycardic. Labs which were obtained were all around her baseline, platelets were >200. Medical Oncology consulted because of the above.         Patient information was obtained from patient, spouse/SO, past medical records and ER records.     Oncology History:  "   Abnormal mammogram of the right breast in December 2018.  She was having no breast symptoms at that time     That mammogram showed a 13 mm mass in the right axillary tail.  By ultrasound there was a 6 x 9 x 11 mm intramammary node and a 2nd 5 x 6 x 6 mm hypoechoic mass in the axillary tail.  On January 7, 2019 both masses were biopsied and both showed lymph nodes with metastatic carcinoma which was ER negative FL negative and HER2 negative.  Ki-67 was 40%. Has undergone 12 cycles of Taxol (last dose April 29, 2019).     MRI on January 15, 2019 showed 2 right axillary lymph nodes the largest measured 1.4 x 1.0 cm.  There were no abnormalities in the right breast.     PET scan was then performed which showed only low-grade activity in the right axilla with an SUV of 1.32.     Previous breast cancer history History: On September 25, 2012 she underwent a screening mammogram which showed an asymmetric density in the left breast at 2:00 position. A followup mammogram on the 27th showed an oval mass in that area ultrasound showed a 6 mm solid mass. Core needle biopsy on October 1 showed invasive carcinoma ER 90% positive FL 80% positive and HER-2 negative. On October 29 she underwent lumpectomy and sentinel lymph node biopsy. That showed a 7 mm low-grade (1+2+1) infiltrating carcinoma. 3 sentinel lymph nodes were negative. Final pathological stage TIB N0 stage IA.  She completed letrozole therapy in 2017.     She was referred  for genetic testing (mother with breast cancer, father and brother with prostate CA)  - integrated BRCA testing through [employer] was negative.  She did not have more comprehensive testing is at was denied.      Dilaudid [hydromorphone (bulk)]; Hydromorphone; and Exenatide     Past Medical History:   Diagnosis Date    Allergy     seasonal    Anxiety     Arthritis     Breast cancer 10/2012    left breast invasive ductal carcinoma    Colon polyp     Diabetes mellitus     Type 2    Diverticular  disease     Diverticulitis 2009    Genetic testing 05/2017    negative Integrated [employer]    Hyperlipidemia     Hypertension     Morbid obesity     MITCHELL (obstructive sleep apnea)     Stenosis     Stenosis and insufficiency of lacrimal passages     Thyroid disease      Past Surgical History:   Procedure Laterality Date    BREAST BIOPSY Left 10/1/2012    left breast- invasive ductal carcinoma    BREAST BIOPSY Left 12/2017    BREAST LUMPECTOMY Left 2012    CARDIAC VALVE REPLACEMENT  12/2013    CARDIAC VALVE SURGERY  2013    CARPAL TUNNEL RELEASE      Left    CATHETERIZATION, HEART, LEFT Left 11/7/2013    Performed by Alphonse Merchant MD at [facility] CATH LAB    COLONOSCOPY N/A 9/29/2017    Performed by Phani Worley MD at [facility] ENDO (4TH FLR)    COLONOSCOPY N/A 8/28/2015    Performed by Phani Worley MD at [facility] ENDO (4TH FLR)    DILATION AND CURETTAGE OF UTERUS  1999    Endometrial polyps    ENDOMETRIAL ABLATION  1999    Enodmetrial polyps    NIC-TRANSFORAMINAL Left 10/1/2015    [facility] Luis [facility] at [facility]    EYE SURGERY      RKNBDIAMT-VNYN-J-CATH Right 2/1/2019    Performed by Gaston Flores MD at [facility] OR 2ND FLR    left lumpectomy  2012    REPLACEMENT, AORTIC VALVE N/A 12/2/2013    Performed by Venkat Webb MD at [facility] OR 2ND FLR    SHOULDER SURGERY      SKIN BIOPSY       Family History     Problem Relation (Age of Onset)    Anxiety disorder Son    Breast cancer Mother (62)    Cancer Father, Maternal Grandfather, Brother    Colon cancer Father    SAL disease Son    Heart disease Father    No Known Problems Sister, Brother, Son    Sleep disorder Son        Tobacco Use    Smoking status: Never Smoker    Smokeless tobacco: Never Used   Substance and Sexual Activity    Alcohol use: No     Alcohol/week: 0.0 oz    Drug use: No    Sexual activity: Yes       Review of Systems   Constitutional: Positive for chills (chronic since chemo started) and  fatigue. Negative for appetite change and fever.   HENT: Negative.    Eyes: Negative for visual disturbance.   Respiratory: Positive for cough (dry cough x 2 days), chest tightness and shortness of breath. Negative for wheezing.    Cardiovascular: Positive for leg swelling (recently started on lasix - improved on lasix). Negative for chest pain and palpitations.   Gastrointestinal: Negative for anal bleeding, constipation, diarrhea, nausea and vomiting.   Endocrine: Positive for cold intolerance.   Genitourinary: Negative.    Musculoskeletal: Negative.    Skin: Negative.    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   Hematological: Bruises/bleeds easily.   Psychiatric/Behavioral: Negative.      Objective:     Vital Signs (Most Recent):  Temp: 97.7 °F (36.5 °C) (05/08/19 1417)  Pulse: 84 (05/08/19 2017)  Resp: 18 (05/08/19 2017)  BP: (!) 130/59 (05/08/19 1908)  SpO2: 97 % (05/08/19 2017) Vital Signs (24h Range):  Temp:  [97.7 °F (36.5 °C)] 97.7 °F (36.5 °C)  Pulse:  [75-95] 84  Resp:  [17-22] 18  SpO2:  [96 %-100 %] 97 %  BP: (104-134)/(53-67) 130/59     Weight: 108.9 kg (240 lb)  Body mass index is 43.9 kg/m².  Body surface area is 2.18 meters squared.    ECOG SCORE         [unfilled]    Lines/Drains/Airways     Central Venous Catheter Line                 Port A Cath Single Lumen -- days          Peripheral Intravenous Line                 Peripheral IV - Single Lumen 05/08/19 1533 20 G Right Forearm less than 1 day                Physical Exam   Constitutional: She is oriented to person, place, and time. She appears well-developed and well-nourished. She appears ill. No distress. Nasal cannula in place.   Obese  Satting 92% during my exam - nursing vitals improved 98%   HENT:   Head: Normocephalic and atraumatic.   Eyes: Pupils are equal, round, and reactive to light. Right eye exhibits no discharge. Left eye exhibits no discharge. No scleral icterus.   Cardiovascular: Normal rate,  regular rhythm, S1 normal and S2 normal.   Murmur heard.   Systolic murmur is present.  Pulses:       Radial pulses are 2+ on the right side, and 2+ on the left side.        Dorsalis pedis pulses are 2+ on the right side, and 2+ on the left side.   Loud S2, singular    Pulmonary/Chest: Effort normal. No respiratory distress.   Reduced breath sounds BL lower lung fields  On nasal cannula, satting 95% during my exam on 2L   Abdominal: Soft. There is no tenderness.   Lymphadenopathy:        Head (right side): No submental, no submandibular and no tonsillar adenopathy present.        Head (left side): No submental, no submandibular and no tonsillar adenopathy present.   Neurological: She is alert and oriented to person, place, and time.   Skin: Skin is warm, dry and intact. She is not diaphoretic. There is pallor.   No gross swelling of BL LE  LLE marginally larger circ at calf than RLE   Psychiatric: She has a normal mood and affect. Her speech is normal.   Nursing note and vitals reviewed.      Significant Labs:   All pertinent labs from the last 24 hours have been reviewed.   Normal BNP and normal trop.     Diagnostic Results:  I have reviewed all pertinent imaging results/findings within the past 24 hours.    Assessment/Plan:     Bilateral pulmonary embolism  CTA revealed intraluminal filling defects in the distal right pulmonary artery at the origin of the right interlobar pulmonary artery extending into the right lower lobe segmental pulmonary arteries.  Additionally, there are filling defects in the left pulmonary artery extending into the left upper, lingular and lower lobar arteries and distally into the segmental pulmonary arteries.    Patient with multiple risk factors, reduced mobility, obesity and active cancer    Plan:  · Heparin gtt per HD nomogram  · Eval repeat 2D echo for RV dysfunction, eval clot burden with BL LE US Veins w/ doppler  · NPO at midnight  · Consider vascular consult in AM but patient  hemodynamically stable  · May require oxygen for home use   · Transition from heparin gtt to NOAC per medical oncology primary service     BMI 45.0-49.9, adult  - risk factor for DVT/PE  - diabetic diet  - will need diet resumed if no need for vascular intervention - consider consult/call in AM     Personal history of breast cancer  - prior ER/MA pos L breast adeno treated with lumpectomy and radiation in 2012  - follows with Dr. Dwyer for triple ng R breast ca now     Diabetes mellitus due to underlying condition with stage 3 chronic kidney disease, without long-term current use of insulin  - sliding scale insulin   - A1c was 8.2 on 3/2019   - may need change to baseline diabetic regime with worsening A1c - recommend PCP follow up for adjustments     Malignant neoplasm of right female breast  - follows with Dr. Dwyer  - triple negative, s/p 12 cycles of neoadj taxol  - will need chronic AC - follow up with Dr. Dwyer regarding next step in cancer treatment     S/P AVR (aortic valve replacement)  - continue ASA 81mg in the setting of bioprosthetic AVR    Diabetes mellitus type 2 in obese  - see above     Essential hypertension  - continue coreg while inpatient          VTE Risk Mitigation (From admission, onward)        Ordered     heparin 25,000 units in dextrose 5% 250 mL (100 units/mL) infusion HIGH INTENSITY nomogram - [facility]  Continuous      05/08/19 2128     heparin 25,000 units in dextrose 5% (100 units/ml) IV bolus from bag - ADDITIONAL PRN BOLUS - 60 units/kg  As needed (PRN)      05/08/19 2128     heparin 25,000 units in dextrose 5% (100 units/ml) IV bolus from bag - ADDITIONAL PRN BOLUS - 30 units/kg  As needed (PRN)      05/08/19 2128     Reason for no Mechanical VTE Prophylaxis  Once      05/08/19 2135     IP VTE HIGH RISK PATIENT  Once      05/08/19 2135          Disposition: inpatient - home with possibly need for home O2     Homero Sagastume MD  Bone Marrow Transplant  Hematology  [facility]            ATTENDING NOTE, ONCOLOGY INPATIENT TEAM    As above; please refer to my note of 5/9/2019 for our assessment and plan    Stephon Liu MD

## 2019-05-09 NOTE — HOSPITAL COURSE
Admitted to medical oncology service for further management of bilateral PE's seen on CTA. Started on heparin gtt and obtained 2D echo to evaluate for signs of right heart strain, showed mildly improved EF but no other significant changes from prior echo performed on 05/06/19. Subsequently switched from heparin gtt to Lovenox, further discussing best option for long-term anticoagulation with pt's oncologist Dr. Dwyer. Plans remain to start CMF therapy in Heme/Onc clinic next week. Plan for discharge home today (05/10). Patient will continue to take Lovenox at home.

## 2019-05-09 NOTE — PLAN OF CARE
Problem: Adult Inpatient Plan of Care  Goal: Plan of Care Review  Outcome: Ongoing (interventions implemented as appropriate)  Patient is progressing and involved with plan of care. Frequent rounds made to assess pain and safety. Pt communicating needs throughout shift. Up with stand by assist (uses walker at home) NPO until vascular consult complete. Voiding without difficulty. No c/o pain. Pt on 2LMP O2, Until nighttime when she'll switch to CPAP. Telemetry monitored as ordered. Heperan continued as ordered but put on hold once PTT resulted. Due to restart at 0730. No c/o pain.CBG to be checked ACHS. US completed, TTE pending. All vitals stable; no acute events this shift. Pt. Remaining free from falls or injury throughout shift; bed in lowest position; side rails up X2; call light within reach; pt instructed to call for assistance as needed - verbalized understanding. Q2h rounding on patient. Will continue to monitor.

## 2019-05-09 NOTE — NURSING
Patient identified by 2 identifiers. Denies previous reactions to blood transfusions, allergies reviewed & procedure explained.  20 g IV in place to Rt FA, flushed w/ 10cc NS pre & post contrast administration.  3cc Optison administered, echo images obtained.  Pt tolerated procedure well.

## 2019-05-09 NOTE — ASSESSMENT & PLAN NOTE
- prior ER/NJ pos L breast adeno treated with lumpectomy and radiation in 2012  - follows with Dr. Dwyer for triple ng R breast ca now

## 2019-05-09 NOTE — PROGRESS NOTES
Pt PTT is at 101.9. Heparan held until 0730. Then will start again at 9.2 ml/hr. PTT should be checked again at 1330..

## 2019-05-10 VITALS
DIASTOLIC BLOOD PRESSURE: 57 MMHG | TEMPERATURE: 99 F | SYSTOLIC BLOOD PRESSURE: 105 MMHG | HEIGHT: 62 IN | WEIGHT: 240 LBS | OXYGEN SATURATION: 98 % | HEART RATE: 99 BPM | RESPIRATION RATE: 18 BRPM | BODY MASS INDEX: 44.16 KG/M2

## 2019-05-10 PROBLEM — I27.20 PULMONARY HTN: Status: ACTIVE | Noted: 2019-05-10

## 2019-05-10 LAB
ALBUMIN SERPL BCP-MCNC: 3 G/DL (ref 3.5–5.2)
ALP SERPL-CCNC: 110 U/L (ref 55–135)
ALT SERPL W/O P-5'-P-CCNC: 20 U/L (ref 10–44)
ANION GAP SERPL CALC-SCNC: 10 MMOL/L (ref 8–16)
AST SERPL-CCNC: 17 U/L (ref 10–40)
BASOPHILS # BLD AUTO: 0.03 K/UL (ref 0–0.2)
BASOPHILS NFR BLD: 0.6 % (ref 0–1.9)
BILIRUB SERPL-MCNC: 0.3 MG/DL (ref 0.1–1)
BUN SERPL-MCNC: 14 MG/DL (ref 8–23)
CALCIUM SERPL-MCNC: 9.3 MG/DL (ref 8.7–10.5)
CHLORIDE SERPL-SCNC: 104 MMOL/L (ref 95–110)
CO2 SERPL-SCNC: 27 MMOL/L (ref 23–29)
CREAT SERPL-MCNC: 0.8 MG/DL (ref 0.5–1.4)
DIFFERENTIAL METHOD: ABNORMAL
EOSINOPHIL # BLD AUTO: 0.1 K/UL (ref 0–0.5)
EOSINOPHIL NFR BLD: 1.7 % (ref 0–8)
ERYTHROCYTE [DISTWIDTH] IN BLOOD BY AUTOMATED COUNT: 17.2 % (ref 11.5–14.5)
EST. GFR  (AFRICAN AMERICAN): >60 ML/MIN/1.73 M^2
EST. GFR  (NON AFRICAN AMERICAN): >60 ML/MIN/1.73 M^2
GLUCOSE SERPL-MCNC: 153 MG/DL (ref 70–110)
HCT VFR BLD AUTO: 33.4 % (ref 37–48.5)
HGB BLD-MCNC: 10.3 G/DL (ref 12–16)
IMM GRANULOCYTES # BLD AUTO: 0.14 K/UL (ref 0–0.04)
IMM GRANULOCYTES NFR BLD AUTO: 3 % (ref 0–0.5)
LYMPHOCYTES # BLD AUTO: 1.5 K/UL (ref 1–4.8)
LYMPHOCYTES NFR BLD: 32.3 % (ref 18–48)
MAGNESIUM SERPL-MCNC: 2 MG/DL (ref 1.6–2.6)
MCH RBC QN AUTO: 30.8 PG (ref 27–31)
MCHC RBC AUTO-ENTMCNC: 30.8 G/DL (ref 32–36)
MCV RBC AUTO: 100 FL (ref 82–98)
MONOCYTES # BLD AUTO: 0.7 K/UL (ref 0.3–1)
MONOCYTES NFR BLD: 13.8 % (ref 4–15)
NEUTROPHILS # BLD AUTO: 2.3 K/UL (ref 1.8–7.7)
NEUTROPHILS NFR BLD: 48.6 % (ref 38–73)
NRBC BLD-RTO: 1 /100 WBC
PLATELET # BLD AUTO: 203 K/UL (ref 150–350)
PMV BLD AUTO: 10.7 FL (ref 9.2–12.9)
POCT GLUCOSE: 229 MG/DL (ref 70–110)
POTASSIUM SERPL-SCNC: 3.9 MMOL/L (ref 3.5–5.1)
PROT SERPL-MCNC: 5.9 G/DL (ref 6–8.4)
RBC # BLD AUTO: 3.34 M/UL (ref 4–5.4)
SODIUM SERPL-SCNC: 141 MMOL/L (ref 136–145)
WBC # BLD AUTO: 4.7 K/UL (ref 3.9–12.7)

## 2019-05-10 PROCEDURE — 97161 PT EVAL LOW COMPLEX 20 MIN: CPT | Mod: HCNC

## 2019-05-10 PROCEDURE — 97165 OT EVAL LOW COMPLEX 30 MIN: CPT | Mod: HCNC

## 2019-05-10 PROCEDURE — 83735 ASSAY OF MAGNESIUM: CPT | Mod: HCNC

## 2019-05-10 PROCEDURE — 99900035 HC TECH TIME PER 15 MIN (STAT): Mod: HCNC

## 2019-05-10 PROCEDURE — 36415 COLL VENOUS BLD VENIPUNCTURE: CPT | Mod: HCNC

## 2019-05-10 PROCEDURE — 25000003 PHARM REV CODE 250: Mod: HCNC | Performed by: STUDENT IN AN ORGANIZED HEALTH CARE EDUCATION/TRAINING PROGRAM

## 2019-05-10 PROCEDURE — 85025 COMPLETE CBC W/AUTO DIFF WBC: CPT | Mod: HCNC

## 2019-05-10 PROCEDURE — 63600175 PHARM REV CODE 636 W HCPCS: Mod: HCNC | Performed by: STUDENT IN AN ORGANIZED HEALTH CARE EDUCATION/TRAINING PROGRAM

## 2019-05-10 PROCEDURE — 99239 PR HOSPITAL DISCHARGE DAY,>30 MIN: ICD-10-PCS | Mod: HCNC,GC,, | Performed by: INTERNAL MEDICINE

## 2019-05-10 PROCEDURE — 80053 COMPREHEN METABOLIC PANEL: CPT | Mod: HCNC

## 2019-05-10 PROCEDURE — 94660 CPAP INITIATION&MGMT: CPT | Mod: HCNC

## 2019-05-10 PROCEDURE — 99239 HOSP IP/OBS DSCHRG MGMT >30: CPT | Mod: HCNC,GC,, | Performed by: INTERNAL MEDICINE

## 2019-05-10 RX ORDER — ENOXAPARIN SODIUM 150 MG/ML
150 INJECTION SUBCUTANEOUS DAILY
Qty: 30 ML | Refills: 0 | Status: SHIPPED | OUTPATIENT
Start: 2019-05-10 | End: 2019-06-04 | Stop reason: SDUPTHER

## 2019-05-10 RX ORDER — ENOXAPARIN SODIUM 150 MG/ML
1.5 INJECTION SUBCUTANEOUS DAILY
Qty: 33 ML | Refills: 0 | Status: CANCELLED | OUTPATIENT
Start: 2019-05-10 | End: 2019-06-09

## 2019-05-10 RX ORDER — CARVEDILOL 25 MG/1
TABLET ORAL
Qty: 180 TABLET | Refills: 3
Start: 2019-05-10 | End: 2019-08-25 | Stop reason: SDUPTHER

## 2019-05-10 RX ORDER — ENOXAPARIN SODIUM 100 MG/ML
1.5 INJECTION SUBCUTANEOUS DAILY
Qty: 48 ML | Refills: 0 | Status: SHIPPED | OUTPATIENT
Start: 2019-05-10 | End: 2019-05-10 | Stop reason: HOSPADM

## 2019-05-10 RX ADMIN — INSULIN ASPART 2 UNITS: 100 INJECTION, SOLUTION INTRAVENOUS; SUBCUTANEOUS at 12:05

## 2019-05-10 RX ADMIN — ASPIRIN 81 MG: 81 TABLET, COATED ORAL at 08:05

## 2019-05-10 RX ADMIN — ENOXAPARIN SODIUM 110 MG: 150 INJECTION SUBCUTANEOUS at 08:05

## 2019-05-10 RX ADMIN — FUROSEMIDE 20 MG: 20 TABLET ORAL at 08:05

## 2019-05-10 RX ADMIN — GABAPENTIN 300 MG: 300 CAPSULE ORAL at 08:05

## 2019-05-10 NOTE — PLAN OF CARE
Problem: Adult Inpatient Plan of Care  Goal: Plan of Care Review  Outcome: Ongoing (interventions implemented as appropriate)  Plan of care reviewed with the patient at the beginning of the shift. Pt admitted for Bilateral PE. She is on therapeutic lovenox BID. Lovenox teaching attempted, pt states she will not be able to give herself injections and that her  will be doing it. Encouraged her to make sure he is here for her morning dose. Oxygen at 1L NC. Pt does experience some dyspnea with exertion. She denies pain, n/v/d. Blood glucose monitoring ac and hs. Tele monitoring continued. Vitals stable. Pt afebrile. Fall precautions maintained. Pt remained free from falls and injury this shift. Bed locked in lowest position, side rails up x2, call light within reach. Instructed pt to call for assistance as needed. Pt verbalized understanding. No acute issues overnight. Will continue to monitor.

## 2019-05-10 NOTE — ASSESSMENT & PLAN NOTE
- Coreg has been held during this admission due to persistent hypotension (systolic BP in 's)  - Instructed patient to continue to hold over the weekend until evaluated by Dr. Dwyer on Monday

## 2019-05-10 NOTE — ASSESSMENT & PLAN NOTE
Per pt's recent ech on 05/06/19:    · Low normal left ventricular systolic function. The estimated ejection fraction is 50-55%  · Indeterminate left ventricular diastolic function.  · There is a bioprosthetic aortic valve present but poorly visualized. SAW 2.10cm2, AVAi 0.96cm2/m2, peak velocity 1.84m/s, mean gradient 9mmHg.  · Normal right ventricular systolic function.  · Mild tricuspid regurgitation.  · The estimated PA systolic pressure is 30 mm Hg  · Normal central venous pressure (3 mm Hg).  · Pulmonary hypertension present.  · Mild left atrial enlargement.    Plan:   - In light, of recent findings of bilateral pulmonary embolisms, patient may need the use of home oxygen therapy for a period of time.  - Will perform six minute walk test to assess

## 2019-05-10 NOTE — PROGRESS NOTES
Discharge instructions and explained to pt.  Pt. And  educated on lovenox injections.  Pt. verbalized understanding with no further questions.  Peripheral IV D/C'd with catheter tip intact.  VS WDL.  Patient is awaiting escort.      ROAD TEST  O=SpO2 98% on RA  A=Ambulating around room with walker  D=IV D/C'd  T=Tolerating regular diet  E=Voids  S=Performs self care independently  T=Teaching on lovenox injections complete

## 2019-05-10 NOTE — PLAN OF CARE
Problem: Occupational Therapy Goal  Goal: Occupational Therapy Goal  Outcome: Outcome(s) achieved Date Met: 05/10/19  Evaluation complete.  Pt without skilled OT need.  DC OT  Mony Gray, OT  5/10/2019

## 2019-05-10 NOTE — ASSESSMENT & PLAN NOTE
CTA revealed intraluminal filling defects in the distal right pulmonary artery at the origin of the right interlobar pulmonary artery extending into the right lower lobe segmental pulmonary arteries.  Additionally, there are filling defects in the left pulmonary artery extending into the left upper, lingular and lower lobar arteries and distally into the segmental pulmonary arteries.     Patient with multiple risk factors, reduced mobility, obesity and active cancer     Plan:  · Will discharge home today on Lovenox for long-term anticoagulation. Follow-up with pt's oncologist Dr. Dwyer on Monday 05/13  · Repeat 2D echo showing mildly improved EF (63%), and signs of mild right heart strain (estimated PA 26 mm Hg) but no significant change from prior echo on 05/06/19  · B/l LE US showing no evidence of DVT  · B/l UE U/S showing no evidence of DVT, no abnormalities seen around pt's port

## 2019-05-10 NOTE — PLAN OF CARE
05/10/2019      Alina Narayan  [address] [address]          [facility] Medicine Dept.  [facility] [address] [address] [id number] [phone] after hours  [phone] fax Principal Diagnosis:  <principal problem not specified>  Oxygen Requiring Condition: Pulmonary HTN                         Portable Oxygen / Compressor    Pulse Oximetry:   88% on 05/10/19 at rest    Prescribe: 0-5 Liter/min Nasal Canula continuous    Six Minute Walking Test:  SpO2 96% at rest on RA  SpO2 86% with ambulation on RA  SpO2 91 % with ambulation on 1L O2  Pt. recovery time 3 minutes with NC O2 @ 1L with SpO2 100%.      __________________________  Javier Miller MD  05/10/2019

## 2019-05-10 NOTE — ASSESSMENT & PLAN NOTE
- prior ER/AL pos L breast adeno treated with lumpectomy and radiation in 2012  - follows with Dr. Dwyer for triple ng R breast ca now

## 2019-05-10 NOTE — ASSESSMENT & PLAN NOTE
CTA revealed intraluminal filling defects in the distal right pulmonary artery at the origin of the right interlobar pulmonary artery extending into the right lower lobe segmental pulmonary arteries.  Additionally, there are filling defects in the left pulmonary artery extending into the left upper, lingular and lower lobar arteries and distally into the segmental pulmonary arteries.     Patient with multiple risk factors, reduced mobility, obesity and active cancer     Plan:  · Will discharge home today on Lovenox for long-term anticoagulation.  Pt could be possibly be a candidate for a NOAC like edoxaban or rivaroxaban, but patient will discuss best option for long-term anticoagulation with her oncologist, Dr. Dwyer.  · Repeat 2D echo showing mildly improved EF (63%), and signs of mild right heart strain (estimated PA 26 mm Hg) but no significant change from prior echo on 05/06/19  · B/l LE US showing no evidence of DVT  · B/l UE U/S showing no evidence of DVT, no abnormalities seen around pt's port

## 2019-05-10 NOTE — PT/OT/SLP EVAL
Occupational Therapy   Evaluation and Discharge Note    Name: Alina Narayan  MRN: [id number]  Admitting Diagnosis:  <principal problem not specified>      Recommendations:     Discharge Recommendations: home  Discharge Equipment Recommendations:  none  Barriers to discharge:  None    Assessment:     Alina Narayan is a 74 y.o. female with a medical diagnosis of <principal problem not specified>. At this time, patient is functioning at their prior level of function and does not require further acute OT services.     Plan:     During this hospitalization, patient does not require further acute OT services.  Please re-consult if situation changes.    · Plan of Care Reviewed with: patient    Subjective     Chief Complaint: decreased endurance, SOB   Patient/Family Comments/goals: return to home     Occupational Profile:  Living Environment: Pt lives with spouse in 1 story house with threshold to enter  Previous level of function: pt with DME in place for self care and occasional assit due to SOB.  Pt admits she has difficulty donning shoes and socks and prefers slip ons for ease  Equipment Used at home:  walker, rolling, grab bar, shower chair, rollator  Assistance upon Discharge: spouse and adult children able to assist     Pain/Comfort:  · Pain Rating 1: 0/10    Patients cultural, spiritual, Voodoo conflicts given the current situation: no    Objective:     Communicated with: RN prior to session.  Patient found supine with telemetry, oxygen upon OT entry to room.    General Precautions: Standard, fall   Orthopedic Precautions:N/A   Braces: N/A     Occupational Performance:    Bed Mobility:    · Pt SBA for supine to sit edge of bed     Functional Mobility/Transfers:  · Functional Mobility: Pt SBA for mobility with rolling walker     Activities of Daily Living:  · Pt able to complete self care but prefers slip on shoes and no socks due to difficulty bending over     Cognitive/Visual Perceptual:  Cognitive/Psychosocial  Skills:     -       Oriented to: Person, Place, Time and Situation   -       Follows Commands/attention:Follows two-step commands  -       Communication: clear/fluent  -       Memory: No Deficits noted  -       Safety awareness/insight to disability: intact   -       Mood/Affect/Coping skills/emotional control: Appropriate to situation    Physical Exam:  Upper Extremity Range of Motion:  -       Right Upper Extremity: WFL  -       Left Upper Extremity: WFL  Upper Extremity Strength:    -       Right Upper Extremity: WFL  -       Left Upper Extremity: WFL    AMPAC 6 Click ADL:  AMPAC Total Score: 21    Treatment & Education:  Evaluation complete.  Pt educated on safety, role of OT, importance of increased participation in self care for gains , expectations for participation, expectations for gains, POC, energy conservation, caregiver strain. White board updated.     Education:    Patient left supine with all lines intact    GOALS:   Multidisciplinary Problems     Occupational Therapy Goals     Not on file          Multidisciplinary Problems (Resolved)        Problem: Occupational Therapy Goal    Goal Priority Disciplines Outcome Interventions   Occupational Therapy Goal   (Resolved)     OT, PT/OT Outcome(s) achieved                    History:     Past Medical History:   Diagnosis Date    Allergy     seasonal    Anxiety     Arthritis     Breast cancer 10/2012    left breast invasive ductal carcinoma    Colon polyp     Diabetes mellitus     Type 2    Diverticular disease     Diverticulitis 2009    Genetic testing 05/2017    negative Integrated BRACAnalysis    Hyperlipidemia     Hypertension     Morbid obesity     MITCHELL (obstructive sleep apnea)     Stenosis     Stenosis and insufficiency of lacrimal passages     Thyroid disease        Past Surgical History:   Procedure Laterality Date    BREAST BIOPSY Left 10/1/2012    left breast- invasive ductal carcinoma    BREAST BIOPSY Left 12/2017    BREAST  LUMPECTOMY Left 2012    CARDIAC VALVE REPLACEMENT  12/2013    CARDIAC VALVE SURGERY  2013    CARPAL TUNNEL RELEASE      Left    CATHETERIZATION, HEART, LEFT Left 11/7/2013    [facility] CATH LAB    COLONOSCOPY N/A 9/29/2017    [facility] by [facility] at [facility] ENDO (4TH FLR)    COLONOSCOPY N/A 8/28/2015    [facility] by [facility] at [facility] ENDO (4TH FLR)    DILATION AND CURETTAGE OF UTERUS  1999    Endometrial polyps    ENDOMETRIAL ABLATION  1999    Enodmetrial polyps    NIC-TRANSFORAMINAL Left 10/1/2015    [facility]    EYE SURGERY      [id number]-T-CATH Right 2/1/2019    Per[facility] at [facility] OR 2ND FLR    left lumpectomy  2012    REPLACEMENT, AORTIC VALVE N/A 12/2/2013    Performed by Venkat Webb [facility] at [facility] OR 2ND FLR    SHOULDER SURGERY      SKIN BIOPSY         Time Tracking:     OT Date of Treatment: 05/10/19  OT Start Time: 1005  OT Stop Time: 1025  OT Total Time (min): 20 min    Billable Minutes:Evaluation 20    Mony Gray, OT  5/10/2019

## 2019-05-10 NOTE — ASSESSMENT & PLAN NOTE
- follows with Dr. Dwyer  - triple negative, s/p 12 cycles of neoadj taxol, plans to start [facility] therapy next week  - will need chronic AC - follow up with Dr. Dwyer regarding next step in cancer treatment

## 2019-05-10 NOTE — PT/OT/SLP EVAL
Physical Therapy Evaluation/discharge summary    Patient Name:  Alina Narayan   MRN:  [id number]    Recommendations:     Discharge Recommendations:  home   Discharge Equipment Recommendations: none   Barriers to discharge: None    Assessment:     Alina Narayan is a 74 y.o. female admitted with a medical diagnosis of B pulmonary embolism.  She presents with the following impairments/functional limitations:    impaired response to activity via need for supplemental O2. Otherwise, pt is able to perform bed mobility, transfers, and ambulation with supervision to Mod independence with use of rolling walker. She is safe to discharge from acute PT services, focusing on frequent ambulation to allow for full return of functional mobility.     Rehab Prognosis: Good; patient would benefit from acute skilled PT services to address these deficits and reach maximum level of function.    Recent Surgery: * No surgery found *      Plan:     · DC home with , no DME needs.    Subjective     Chief Complaint: None  Patient/Family Comments/goals: To return home today.  Pain/Comfort:  · Pain Rating 1: 0/10    Patients cultural, spiritual, Islam conflicts given the current situation: no    Living Environment:  Lives with  in [facility] [address] [facility] in [place], [place]. She bathes/dresses herself, has a walk-in shower with shower chair, grab bars by toilet and   Prior to admission, patients level of function was Mod I with walker.  Equipment used at home: walker, rolling, rollator, grab bar, shower chair(low height bed; high toilet).  DME owned (not currently used): none.  Upon discharge, patient will have assistance from .    Objective:     Communicated with nurse, Sydney, prior to session.  Patient found HOB elevated with telemetry, oxygen  upon PT entry to room.    General Precautions: Standard,     Orthopedic Precautions:N/A   Braces:       Exams:  · Cognitive Exam:  Patient is oriented to Person, Place, Time and Situation  · RUE ROM:  WNL  · RUE Strength: WNL  · LUE ROM: WNL  · LUE Strength: WNL  · RLE ROM: WNL  · RLE Strength: WNL  · LLE ROM: WNL  · LLE Strength: WNL    Functional Mobility:  · Bed Mobility:  Supine to Sit: supervision  · Transfers:  Sit to Stand:  supervision with rolling walker  · Bed to Chair: supervision with  rolling walker  using  Stand Pivot  · Gait: 200 feet with supervision and use of rolling walker, swing-through gait pattern. Increased work of breathing, lower SpO2 (88%) during ambulation. Prior to ambulation, wavered between 96-97%.   · Balance: Able to stand with no UE support statically with supervision; dynamically with RW, supervision as well      Therapeutic Activities and Exercises:   Pt was able to stand from low-rise toilet with unilateral UE support- supervision.     AM-PAC 6 CLICK MOBILITY  Total Score:24     Patient left up in chair with call button in reach and nurse present.        History:     Past Medical History:   Diagnosis Date    Allergy     seasonal    Anxiety     Arthritis     Breast cancer 10/2012    left breast invasive ductal carcinoma    Colon polyp     Diabetes mellitus     Type 2    Diverticular disease     Diverticulitis 2009    Genetic testing 05/2017    negative Integrated BRACAnalysis    Hyperlipidemia     Hypertension     Morbid obesity     MITCHELL (obstructive sleep apnea)     Stenosis     Stenosis and insufficiency of lacrimal passages     Thyroid disease        Past Surgical History:   Procedure Laterality Date    BREAST BIOPSY Left 10/1/2012    left breast- invasive ductal carcinoma    BREAST BIOPSY Left 12/2017    BREAST LUMPECTOMY Left 2012    CARDIAC VALVE REPLACEMENT  12/2013    CARDIAC VALVE SURGERY  2013    CARPAL TUNNEL RELEASE      Left    CATHETERIZATION, HEART, LEFT Left 11/7/2013    [facility] CATH LAB    COLONOSCOPY N/A 9/29/2017    [facility] at [facility] ENDO (4TH FLR)    COLONOSCOPY N/A 8/28/2015     [facility] at [facility] ENDO (4TH FLR)    DILATION AND CURETTAGE OF UTERUS  1999    Endometrial polyps    ENDOMETRIAL ABLATION  1999    Enodmetrial polyps    NIC-TRANSFORAMINAL Left 10/1/2015    [facility]    EYE SURGERY      [id number]-CATH Right 2/1/2019    Performed by G[facility] at [facility] OR 2ND FLR    left lumpectomy  2012    REPLACEMENT, AORTIC VALVE N/A 12/2/2013    Performed by Venkat [facility] at [facility] OR 2ND FLR    SHOULDER SURGERY      SKIN BIOPSY         Time Tracking:     PT Received On: 05/10/19  PT Start Time: 1008     PT Stop Time: 1028  PT Total Time (min): 20 min     Billable Minutes: Evaluation 20      Ni Mendiola, PT  05/10/2019

## 2019-05-10 NOTE — PROGRESS NOTES
"[facility]  Hematology/Oncology  Progress Note    Patient Name: Alina Narayan  Admission Date: 5/8/2019  Hospital Length of Stay: 2 days  Code Status: Full Code     Subjective:     HPI:  HPI: Ms. 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 She recently completed her 12th cycle of neoadjuvant Taxol on 4/29/2019 and was seen by Dr. Dwyer, her oncologist in clinic on 5/6 where he noted that  she continued to be fatigued, with worsening shortness of breath but that her lower extremity edema improved with Lasix. As a result of this SOB a repeat echo was recently performed which displayed EF 50-55, normal RV systolic function, ePAP of 30 from 28 on echo 11/2018. Due to increasing shortness of breath, difficult with chest tightness and "catching her wind" over the last 2 weeks she presented to the hospital. On ROS she denies chest pain, hemoptysis, unilateral leg swelling, productive cough, but does state new dry cough x 2 weeks. Pt denied fever, states chronically cold/chills since chemo started, denies sore throat, nausea, vomiting, abdo pain, changes to bowel/bladder or new skin changes. She is joined at the bedside by her  during the interview and examination.      In the ED at [facility] was performed of her chest which displayed significant bilateral PE. Despite the clot burden, BNP and trop were unrevealing and patient was not tachycardic. Labs which were obtained were all around her baseline, platelets were >200. Medical Oncology consulted because of the above.            Patient information was obtained from patient, spouse/SO, past medical " records and ER records.      Oncology History:    Abnormal mammogram of the right breast in December 2018.  She was having no breast symptoms at that time     That mammogram showed a 13 mm mass in the right axillary tail.  By ultrasound there was a 6 x 9 x 11 mm intramammary node and a 2nd 5 x 6 x 6 mm hypoechoic mass in the axillary tail.  On January 7, 2019 both masses were biopsied and both showed lymph nodes with metastatic carcinoma which was ER negative KS negative and HER2 negative.  Ki-67 was 40%. Has undergone 12 cycles of Taxol (last dose April 29, 2019).     MRI on January 15, 2019 showed 2 right axillary lymph nodes the largest measured 1.4 x 1.0 cm.  There were no abnormalities in the right breast.     PET scan was then performed which showed only low-grade activity in the right axilla with an SUV of 1.32.     Previous breast cancer history History: On September 25, 2012 she underwent a screening mammogram which showed an asymmetric density in the left breast at 2:00 position. A followup mammogram on the 27th showed an oval mass in that area ultrasound showed a 6 mm solid mass. Core needle biopsy on October 1 showed invasive carcinoma ER 90% positive KS 80% positive and HER-2 negative. On October 29 she underwent lumpectomy and sentinel lymph node biopsy. That showed a 7 mm low-grade (1+2+1) infiltrating carcinoma. 3 sentinel lymph nodes were negative. Final pathological stage TIB N0 stage IA.  She completed letrozole therapy in 2017.     She was referred  for genetic testing (mother with breast cancer, father and brother with prostate CA)  - integrated BRCA testing through [facility] was negative.  She did not have more comprehensive testing is at was denied.        Interval History: No acute evnts oevrnight. Patient remains afebrile, hemodynamically stable. U/S bilateral UE's performed yesterday showing no evidence of DVT and no abnormalities involving the patient's port. Switched patient to Lovenox for  anticoagulation, plan to discharge home today. Plans to start CMF therapy in Heme/Onc clinic next week with Dr. Dwyer.    Oncology Treatment Plan:   OP BREAST CMF - METHOTREXATE FLUOROURACIL CYCLOPHOSPHAMIDE (IV) Q3W    Medications:  Continuous Infusions:    Scheduled Meds:   aspirin  81 mg Oral Daily    carvedilol  12.5 mg Oral BID WM    enoxparin  1 mg/kg Subcutaneous BID    furosemide  20 mg Oral Daily    gabapentin  300 mg Oral BID    levothyroxine  50 mcg Oral QHS    pravastatin  20 mg Oral Daily     PRN Meds:acetaminophen, dextrose 50%, dextrose 50%, glucagon (human recombinant), glucose, glucose, insulin aspart U-100, sodium chloride 0.9%     Review of Systems   Constitutional: Positive for chills (chronic since chemo started) and fatigue. Negative for appetite change and fever.   HENT: Negative.    Eyes: Negative for visual disturbance.   Respiratory: Positive for cough (dry cough x 2 days), chest tightness and shortness of breath. Negative for wheezing.    Cardiovascular: Positive for leg swelling (recently started on lasix - improved on lasix). Negative for chest pain and palpitations.   Gastrointestinal: Negative for anal bleeding, constipation, diarrhea, nausea and vomiting.   Endocrine: Positive for cold intolerance.   Genitourinary: Negative.    Musculoskeletal: Negative.    Skin: Negative.    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   Hematological: Bruises/bleeds easily.   Psychiatric/Behavioral: Negative.      Objective:     Vital Signs (Most Recent):  Temp: 97.8 °F (36.6 °C) (05/10/19 0457)  Pulse: 84 (05/10/19 0733)  Resp: 20 (05/10/19 0457)  BP: (!) 107/56 (05/10/19 0457)  SpO2: 98 % (05/10/19 0457) Vital Signs (24h Range):  Temp:  [97.4 °F (36.3 °C)-98.9 °F (37.2 °C)] 97.8 °F (36.6 °C)  Pulse:  [] 84  Resp:  [17-20] 20  SpO2:  [94 %-99 %] 98 %  BP: ()/(51-60) 107/56     Weight: 108.9 kg (240 lb)  Body mass index is 43.9 kg/m².  Body surface  area is 2.18 meters squared.      Intake/Output Summary (Last 24 hours) at 5/10/2019 0817  Last data filed at 5/10/2019 0000  Gross per 24 hour   Intake 600 ml   Output --   Net 600 ml       Physical Exam   Constitutional: She is oriented to person, place, and time. She appears well-developed and well-nourished. She appears ill. No distress. Nasal cannula in place.   Obese  Satting 92% during my exam - nursing vitals improved 98%   HENT:   Head: Normocephalic and atraumatic.   Eyes: Pupils are equal, round, and reactive to light. Right eye exhibits no discharge. Left eye exhibits no discharge. No scleral icterus.   Cardiovascular: Normal rate, regular rhythm, S1 normal and S2 normal.   Murmur heard.   Systolic murmur is present.  Pulses:       Radial pulses are 2+ on the right side, and 2+ on the left side.        Dorsalis pedis pulses are 2+ on the right side, and 2+ on the left side.   Loud S2, singular    Pulmonary/Chest: Effort normal. No respiratory distress.   Reduced breath sounds BL lower lung fields  On nasal cannula, satting 95% during my exam on 2L   Abdominal: Soft. There is no tenderness.   Lymphadenopathy:        Head (right side): No submental, no submandibular and no tonsillar adenopathy present.        Head (left side): No submental, no submandibular and no tonsillar adenopathy present.   Neurological: She is alert and oriented to person, place, and time.   Skin: Skin is warm, dry and intact. She is not diaphoretic. There is pallor.   No gross swelling of BL LE  LLE marginally larger circ at calf than RLE   Psychiatric: She has a normal mood and affect. Her speech is normal.   Nursing note and vitals reviewed.      Significant Labs:   CBC:   Recent Labs   Lab 05/08/19  1534 05/09/19  0356 05/10/19  0542   WBC 6.18 6.09  6.09 4.70   HGB 11.0* 10.6*  10.6* 10.3*   HCT 34.8* 33.5*  33.5* 33.4*    209  209 203   , CMP:   Recent Labs   Lab 05/08/19  1534 05/09/19  0356 05/10/19  0542     140 141   K 3.5 3.3* 3.9    103 104   CO2 26 27 27   * 150* 153*   BUN 17 13 14   CREATININE 0.9 0.9 0.8   CALCIUM 9.6 9.1 9.3   PROT 6.8 6.3 5.9*   ALBUMIN 3.3* 3.3* 3.0*   BILITOT 0.5 0.4 0.3   ALKPHOS 120 109 110   AST 22 18 17   ALT 25 20 20   ANIONGAP 12 10 10   EGFRNONAA >60.0 >60.0 >60.0   , Coagulation:   Recent Labs   Lab 05/08/19  2200 05/09/19  0356   APTT 22.6 101.9*    and LFTs:   Recent Labs   Lab 05/08/19  1534 05/09/19  0356 05/10/19  0542   ALT 25 20 20   AST 22 18 17   ALKPHOS 120 109 110   BILITOT 0.5 0.4 0.3   PROT 6.8 6.3 5.9*   ALBUMIN 3.3* 3.3* 3.0*       Diagnostic Results:  I have reviewed and interpreted all pertinent imaging results/findings within the past 24 hours.     Imaging Results          US Lower Extremity Veins Bilateral (Final result)  Result time 05/09/19 03:37:27    Final result by Martin Pandey MD (05/09/19 03:37:27)                 Impression:      No evidence of deep venous thrombosis in either lower extremity.    Electronically signed by resident: Shankar Donaldson  Date:    05/09/2019  Time:    03:32    Electronically signed by: Martin Pandey MD  Date:    05/09/2019  Time:    03:37             Narrative:    EXAMINATION:  US LOWER EXTREMITY VEINS BILATERAL    CLINICAL HISTORY:  pulmonary embolism;    TECHNIQUE:  Duplex and color flow Doppler and dynamic compression was performed of the bilateral lower extremity veins was performed.    COMPARISON:  Ultrasound 10/26/2015    FINDINGS:  Right thigh veins: The common femoral, femoral, popliteal, upper greater saphenous, and deep femoral veins are patent and free of thrombus. The veins are normally compressible and have normal phasic flow and augmentation response.    Right calf veins: The visualized calf veins are patent.    Left thigh veins: The common femoral, femoral, popliteal, upper greater saphenous, and deep femoral veins are patent and free of thrombus. The veins are normally compressible and have normal  phasic flow and augmentation response.    Left calf veins: The visualized calf veins are patent.                               CTA Chest Non-Coronary (PE Study) (Final result)  Result time 05/08/19 20:03:13    Final result by Homero Webb MD (05/08/19 20:03:13)                 Impression:      Bilateral pulmonary thromboembolism involving the right lower lobe and left upper, lingular and lower lobes as above.    Few additional findings as above.    COMMUNICATION  This critical result was discovered/received at 19:40 hours on 05/08/2019.  The critical information above was relayed by Dr. Owusu on my behalf by telephone to Dr. Foley on 05/08/2019 at 19:47 hours.    Electronically signed by resident: Issa Bush  Date:    05/08/2019  Time:    19:41    Electronically signed by: Homero Webb MD  Date:    05/08/2019  Time:    20:03             Narrative:    EXAMINATION:  CTA CHEST NON CORONARY    CLINICAL HISTORY:  Chest pain, acute, PE suspected, high pretest prob;    TECHNIQUE:  Low dose axial images, sagittal and coronal reformations were obtained from the thoracic inlet to the lung bases following the IV administration of 100 mL of Omnipaque 350.  Contrast timing was optimized to evaluate the pulmonary arteries.    COMPARISON:  Chest radiograph 02/01/2019.    FINDINGS:  Pulmonary vasculature: Satisfactory opacification of the pulmonary arterial system.  There are intraluminal filling defects in the distal right pulmonary artery at the origin of the right interlobar pulmonary artery extending into the right lower lobe segmental pulmonary arteries.  Additionally, there are filling defects in the left pulmonary artery extending into the left upper, lingular and lower lobar arteries and distally into the segmental pulmonary arteries.    Aorta: Left-sided aortic arch.  Minimal calcific atherosclerosis without aneurysm or dissection.    Base of Neck: No significant abnormality.    Thoracic soft tissues: Right anterior  chest port with catheter tip in the SVC.  Postsurgical changes of median sternotomy..    Heart: Normal size. No effusion.  Calcification of the aortic valve and coronary arteries.    Jena/Mediastinum: No pathologic stef enlargement.    Airways: Patent.    Lungs/Pleura: No consolidation or findings to suggest pulmonary infarction.  No pleural effusion or thickening.    Esophagus: Normal.    Upper Abdomen: Scattered colonic diverticulosis.    Bones: No acute fracture. No suspicious lytic or sclerotic lesions.                              Assessment/Plan:     Bilateral pulmonary embolism  CTA revealed intraluminal filling defects in the distal right pulmonary artery at the origin of the right interlobar pulmonary artery extending into the right lower lobe segmental pulmonary arteries.  Additionally, there are filling defects in the left pulmonary artery extending into the left upper, lingular and lower lobar arteries and distally into the segmental pulmonary arteries.     Patient with multiple risk factors, reduced mobility, obesity and active cancer     Plan:  · Will discharge home today on Lovenox for long-term anticoagulation.  Pt could be possibly be a candidate for a NOAC like edoxaban or rivaroxaban, but patient will discuss best option for long-term anticoagulation with her oncologist, Dr. Dwyer.  · Repeat 2D echo showing mildly improved EF (63%), and signs of mild right heart strain (estimated PA 26 mm Hg) but no significant change from prior echo on 05/06/19  · B/l LE US showing no evidence of DVT  · B/l UE U/S showing no evidence of DVT, no abnormalities seen around pt's port    Pulmonary HTN:    Per pt's recent ech on 05/06/19:    · Low normal left ventricular systolic function. The estimated ejection fraction is 50-55%  · Indeterminate left ventricular diastolic function.  · There is a bioprosthetic aortic valve present but poorly visualized. SAW 2.10cm2, AVAi 0.96cm2/m2, peak velocity 1.84m/s, mean  gradient 9mmHg.  · Normal right ventricular systolic function.  · Mild tricuspid regurgitation.  · The estimated PA systolic pressure is 30 mm Hg  · Normal central venous pressure (3 mm Hg).  · Pulmonary hypertension present.  · Mild left atrial enlargement.    Plan:   In light, of recent findings of bilateral pulmonary embolisms, patient may need the use of home oxygen therapy for a period of time.    Pulmonary HTN  Per pt's recent ech on 05/06/19:    · Low normal left ventricular systolic function. The estimated ejection fraction is 50-55%  · Indeterminate left ventricular diastolic function.  · There is a bioprosthetic aortic valve present but poorly visualized. SAW 2.10cm2, AVAi 0.96cm2/m2, peak velocity 1.84m/s, mean gradient 9mmHg.  · Normal right ventricular systolic function.  · Mild tricuspid regurgitation.  · The estimated PA systolic pressure is 30 mm Hg  · Normal central venous pressure (3 mm Hg).  · Pulmonary hypertension present.  · Mild left atrial enlargement.    Plan:   - In light, of recent findings of bilateral pulmonary embolisms, patient may need the use of home oxygen therapy for a period of time.  - Will perform six minute walk test to assess    BMI 45.0-49.9, adult  - risk factor for DVT/PE  - diabetic diet    Personal history of breast cancer  - prior ER/GA pos L breast adeno treated with lumpectomy and radiation in 2012  - follows with Dr. Dwyer for triple ng R breast ca now     Diabetes mellitus due to underlying condition with stage 3 chronic kidney disease, without long-term current use of insulin  - sliding scale insulin   - A1c was 8.2 on 3/2019   - may need change to baseline diabetic regime with worsening A1c - recommend PCP follow up for adjustments     Malignant neoplasm of right female breast  - follows with Dr. Dwyer  - triple negative, s/p 12 cycles of neoadj taxol, plans to start CMF therapy next week  - will need chronic AC - follow up with Dr. Dwyer regarding next step in cancer  treatment     S/P AVR (aortic valve replacement)  - continue ASA 81mg in the setting of bioprosthetic AVR    Diabetes mellitus type 2 in obese  - see above     Essential hypertension  - continue coreg while inpatient             Javier Miller MD  Hematology/Oncology  [facility]

## 2019-05-10 NOTE — PROGRESS NOTES
Received report from med onc team that pt is a discharge needing portable/home oxygen.  Order placed in [facility].  Contacted [facility]/Jenae and Paige to offer support/assistance if needed.      Per Jenae she is processing the order  for delivery.  No other needs voiced at this time.

## 2019-05-10 NOTE — SUBJECTIVE & OBJECTIVE
Interval History: No acute evnts oevrnight. Patient remains afebrile, hemodynamically stable. U/S bilateral UE's performed yesterday showing no evidence of DVT and no abnormalities involving the patient's port. Switched patient to Lovenox for anticoagulation, plan to discharge home today. Plans to start CMF therapy in Heme/Onc clinic next week with Dr. Dwyer.    Oncology Treatment Plan:   OP BREAST CMF - METHOTREXATE FLUOROURACIL CYCLOPHOSPHAMIDE (IV) Q3W    Medications:  Continuous Infusions:    Scheduled Meds:   aspirin  81 mg Oral Daily    carvedilol  12.5 mg Oral BID WM    enoxparin  1 mg/kg Subcutaneous BID    furosemide  20 mg Oral Daily    gabapentin  300 mg Oral BID    levothyroxine  50 mcg Oral QHS    pravastatin  20 mg Oral Daily     PRN Meds:acetaminophen, dextrose 50%, dextrose 50%, glucagon (human recombinant), glucose, glucose, insulin aspart U-100, sodium chloride 0.9%     Review of Systems   Constitutional: Positive for chills (chronic since chemo started) and fatigue. Negative for appetite change and fever.   HENT: Negative.    Eyes: Negative for visual disturbance.   Respiratory: Positive for cough (dry cough x 2 days), chest tightness and shortness of breath. Negative for wheezing.    Cardiovascular: Positive for leg swelling (recently started on lasix - improved on lasix). Negative for chest pain and palpitations.   Gastrointestinal: Negative for anal bleeding, constipation, diarrhea, nausea and vomiting.   Endocrine: Positive for cold intolerance.   Genitourinary: Negative.    Musculoskeletal: Negative.    Skin: Negative.    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   Hematological: Bruises/bleeds easily.   Psychiatric/Behavioral: Negative.      Objective:     Vital Signs (Most Recent):  Temp: 97.8 °F (36.6 °C) (05/10/19 0457)  Pulse: 84 (05/10/19 0733)  Resp: 20 (05/10/19 0457)  BP: (!) 107/56 (05/10/19 0457)  SpO2: 98 % (05/10/19 0457) Vital Signs (24h  Range):  Temp:  [97.4 °F (36.3 °C)-98.9 °F (37.2 °C)] 97.8 °F (36.6 °C)  Pulse:  [] 84  Resp:  [17-20] 20  SpO2:  [94 %-99 %] 98 %  BP: ()/(51-60) 107/56     Weight: 108.9 kg (240 lb)  Body mass index is 43.9 kg/m².  Body surface area is 2.18 meters squared.      Intake/Output Summary (Last 24 hours) at 5/10/2019 0817  Last data filed at 5/10/2019 0000  Gross per 24 hour   Intake 600 ml   Output --   Net 600 ml       Physical Exam   Constitutional: She is oriented to person, place, and time. She appears well-developed and well-nourished. She appears ill. No distress. Nasal cannula in place.   Obese  Satting 92% during my exam - nursing vitals improved 98%   HENT:   Head: Normocephalic and atraumatic.   Eyes: Pupils are equal, round, and reactive to light. Right eye exhibits no discharge. Left eye exhibits no discharge. No scleral icterus.   Cardiovascular: Normal rate, regular rhythm, S1 normal and S2 normal.   Murmur heard.   Systolic murmur is present.  Pulses:       Radial pulses are 2+ on the right side, and 2+ on the left side.        Dorsalis pedis pulses are 2+ on the right side, and 2+ on the left side.   Loud S2, singular    Pulmonary/Chest: Effort normal. No respiratory distress.   Reduced breath sounds BL lower lung fields  On nasal cannula, satting 95% during my exam on 2L   Abdominal: Soft. There is no tenderness.   Lymphadenopathy:        Head (right side): No submental, no submandibular and no tonsillar adenopathy present.        Head (left side): No submental, no submandibular and no tonsillar adenopathy present.   Neurological: She is alert and oriented to person, place, and time.   Skin: Skin is warm, dry and intact. She is not diaphoretic. There is pallor.   No gross swelling of BL LE  LLE marginally larger circ at calf than RLE   Psychiatric: She has a normal mood and affect. Her speech is normal.   Nursing note and vitals reviewed.      Significant Labs:   CBC:   Recent Labs   Lab  05/08/19  1534 05/09/19  0356 05/10/19  0542   WBC 6.18 6.09  6.09 4.70   HGB 11.0* 10.6*  10.6* 10.3*   HCT 34.8* 33.5*  33.5* 33.4*    209  209 203   , CMP:   Recent Labs   Lab 05/08/19  1534 05/09/19  0356 05/10/19  0542    140 141   K 3.5 3.3* 3.9    103 104   CO2 26 27 27   * 150* 153*   BUN 17 13 14   CREATININE 0.9 0.9 0.8   CALCIUM 9.6 9.1 9.3   PROT 6.8 6.3 5.9*   ALBUMIN 3.3* 3.3* 3.0*   BILITOT 0.5 0.4 0.3   ALKPHOS 120 109 110   AST 22 18 17   ALT 25 20 20   ANIONGAP 12 10 10   EGFRNONAA >60.0 >60.0 >60.0   , Coagulation:   Recent Labs   Lab 05/08/19  2200 05/09/19  0356   APTT 22.6 101.9*    and LFTs:   Recent Labs   Lab 05/08/19  1534 05/09/19  0356 05/10/19  0542   ALT 25 20 20   AST 22 18 17   ALKPHOS 120 109 110   BILITOT 0.5 0.4 0.3   PROT 6.8 6.3 5.9*   ALBUMIN 3.3* 3.3* 3.0*       Diagnostic Results:  I have reviewed and interpreted all pertinent imaging results/findings within the past 24 hours.     Imaging Results          US Lower Extremity Veins Bilateral (Final result)  Result time 05/09/19 03:37:27    Final result by Martin Pandey MD (05/09/19 03:37:27)                 Impression:      No evidence of deep venous thrombosis in either lower extremity.    Electronically signed by resident: Shankar Donaldson  Date:    05/09/2019  Time:    03:32    Electronically signed by: Martin Pandey MD  Date:    05/09/2019  Time:    03:37             Narrative:    EXAMINATION:  US LOWER EXTREMITY VEINS BILATERAL    CLINICAL HISTORY:  pulmonary embolism;    TECHNIQUE:  Duplex and color flow Doppler and dynamic compression was performed of the bilateral lower extremity veins was performed.    COMPARISON:  Ultrasound 10/26/2015    FINDINGS:  Right thigh veins: The common femoral, femoral, popliteal, upper greater saphenous, and deep femoral veins are patent and free of thrombus. The veins are normally compressible and have normal phasic flow and augmentation response.    Right  calf veins: The visualized calf veins are patent.    Left thigh veins: The common femoral, femoral, popliteal, upper greater saphenous, and deep femoral veins are patent and free of thrombus. The veins are normally compressible and have normal phasic flow and augmentation response.    Left calf veins: The visualized calf veins are patent.                               CTA Chest Non-Coronary (PE Study) (Final result)  Result time 05/08/19 20:03:13    Final result by Homero Webb MD (05/08/19 20:03:13)                 Impression:      Bilateral pulmonary thromboembolism involving the right lower lobe and left upper, lingular and lower lobes as above.    Few additional findings as above.    COMMUNICATION  This critical result was discovered/received at 19:40 hours on 05/08/2019.  The critical information above was relayed by Dr. Owusu on my behalf by telephone to Dr. Foley on 05/08/2019 at 19:47 hours.    Electronically signed by resident: Issa Bush  Date:    05/08/2019  Time:    19:41    Electronically signed by: Homero Webb MD  Date:    05/08/2019  Time:    20:03             Narrative:    EXAMINATION:  CTA CHEST NON CORONARY    CLINICAL HISTORY:  Chest pain, acute, PE suspected, high pretest prob;    TECHNIQUE:  Low dose axial images, sagittal and coronal reformations were obtained from the thoracic inlet to the lung bases following the IV administration of 100 mL of Omnipaque 350.  Contrast timing was optimized to evaluate the pulmonary arteries.    COMPARISON:  Chest radiograph 02/01/2019.    FINDINGS:  Pulmonary vasculature: Satisfactory opacification of the pulmonary arterial system.  There are intraluminal filling defects in the distal right pulmonary artery at the origin of the right interlobar pulmonary artery extending into the right lower lobe segmental pulmonary arteries.  Additionally, there are filling defects in the left pulmonary artery extending into the left upper, lingular and lower lobar  arteries and distally into the segmental pulmonary arteries.    Aorta: Left-sided aortic arch.  Minimal calcific atherosclerosis without aneurysm or dissection.    Base of Neck: No significant abnormality.    Thoracic soft tissues: Right anterior chest port with catheter tip in the SVC.  Postsurgical changes of median sternotomy..    Heart: Normal size. No effusion.  Calcification of the aortic valve and coronary arteries.    Jena/Mediastinum: No pathologic stef enlargement.    Airways: Patent.    Lungs/Pleura: No consolidation or findings to suggest pulmonary infarction.  No pleural effusion or thickening.    Esophagus: Normal.    Upper Abdomen: Scattered colonic diverticulosis.    Bones: No acute fracture. No suspicious lytic or sclerotic lesions.

## 2019-05-10 NOTE — PROGRESS NOTES
Per [facility] oxygen dispatched and on its way to pt bedside.  Updated pts assigned nurse [id number].

## 2019-05-10 NOTE — PLAN OF CARE
Problem: Physical Therapy Goal  Goal: Physical Therapy Goal  Outcome: Outcome(s) achieved Date Met: 05/10/19  Pt is safe to discharge from PT services. No equipment needs at this time.

## 2019-05-10 NOTE — PLAN OF CARE
MDRs completed with Dr. Devi and the team. Plans for patient to discharge home later today. Patient to discharge home on Lovenox injections. Bedside nurse to complete Lovenox teaching with patient and patient's spouse. Dr. Devi to contact Dr. Dwyer regarding follow-up appt. Discharge and follow-up instructions to be completed by the bedside nurse.    Future Appointments   Date Time Provider Department Center   7/3/2019 11:00 AM [facility] NM[facility] GE INFINIA LIMIT 430LBS [facility] NUCLEAR [facility]      05/10/19 1158   Final Note   Assessment Type Final Discharge Note   Anticipated Discharge Disposition Home   What phone number can be called within the next 1-3 days to see how you are doing after discharge?   ([phone])   Hospital Follow Up  Appt(s) scheduled? Yes   Discharge plans and expectations educations in teach back method with documentation complete? Yes  (per bedside nurse)

## 2019-05-10 NOTE — PHYSICIAN QUERY
PT Name: Alina Narayan  MR #: [id number]     PHYSICIAN QUERY -  ELECTROLYTE CLARIFICATION      CDS: Dee Tobin RN    Contact information:[email or web address]    This form is a permanent document in the medical record.     Query Date: May 10, 2019    By submitting this query, we are merely seeking further clarification of documentation to reflect the severity of illness of your patient. Please utilize your independent clinical judgment when addressing the question(s) below.    The Medical record reflects the following:     Indicators   Supporting Clinical Findings Location in Medical Record   x Lab Value(s)  5/9   Potassium 3.3       Lab Results   x Treatment                 Medication potassium chloride SA CR tablet 40 mEq  Ordered Dose: 40 mEq   Route: Oral   Frequency: Once MAR- Given 5/9    Other       Provider, please specify the diagnosis or diagnoses that correspond(s) to the above indicators. Marco all that apply:    [ +  ] Hypokalemia   [   ] Other electrolyte disturbance (please specify): _______   [   ]  Clinically Undetermined       Please document in your progress notes daily for the duration of treatment until resolved, and include in your discharge summary.

## 2019-05-10 NOTE — DISCHARGE SUMMARY
"[facility]  Hematology/Oncology  Discharge Summary      Patient Name: Alina Narayan  MRN: [id number]  Admission Date: 5/8/2019  Hospital Length of Stay: 2 days  Discharge Date and Time:  05/10/2019 2:41 PM  Attending Physician: Stephon Liu MD   Discharging Provider: Javier Miller MD  Primary Care Provider: Aarti Dunn MD    HPI: HPI: Ms. 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 She recently completed her 12th cycle of neoadjuvant Taxol on 4/29/2019 and was seen by Dr. Dwyer, her oncologist in clinic on 5/6 where he noted that  she continued to be fatigued, with worsening shortness of breath but that her lower extremity edema improved with Lasix. As a result of this SOB a repeat echo was recently performed which displayed EF 50-55, normal RV systolic function, ePAP of 30 from 28 on echo 11/2018. Due to increasing shortness of breath, difficult with chest tightness and "catching her wind" over the last 2 weeks she presented to the hospital. On ROS she denies chest pain, hemoptysis, unilateral leg swelling, productive cough, but does state new dry cough x 2 weeks. Pt denied fever, states chronically cold/chills since chemo started, denies sore throat, nausea, vomiting, abdo pain, changes to bowel/bladder or new skin changes. She is joined at the bedside by her  during the interview and examination.      In the ED at [facility] was performed of her chest which displayed significant bilateral PE. Despite the clot burden, BNP and trop were unrevealing and patient was not tachycardic. Labs which were obtained were all around her " baseline, platelets were >200. Medical Oncology consulted because of the above.            Patient information was obtained from patient, spouse/SO, past medical records and ER records.      Oncology History:    Abnormal mammogram of the right breast in December 2018.  She was having no breast symptoms at that time     That mammogram showed a 13 mm mass in the right axillary tail.  By ultrasound there was a 6 x 9 x 11 mm intramammary node and a 2nd 5 x 6 x 6 mm hypoechoic mass in the axillary tail.  On January 7, 2019 both masses were biopsied and both showed lymph nodes with metastatic carcinoma which was ER negative SD negative and HER2 negative.  Ki-67 was 40%. Has undergone 12 cycles of Taxol (last dose April 29, 2019).     MRI on January 15, 2019 showed 2 right axillary lymph nodes the largest measured 1.4 x 1.0 cm.  There were no abnormalities in the right breast.     PET scan was then performed which showed only low-grade activity in the right axilla with an SUV of 1.32.     Previous breast cancer history History: On September 25, 2012 she underwent a screening mammogram which showed an asymmetric density in the left breast at 2:00 position. A followup mammogram on the 27th showed an oval mass in that area ultrasound showed a 6 mm solid mass. Core needle biopsy on October 1 showed invasive carcinoma ER 90% positive SD 80% positive and HER-2 negative. On October 29 she underwent lumpectomy and sentinel lymph node biopsy. That showed a 7 mm low-grade (1+2+1) infiltrating carcinoma. 3 sentinel lymph nodes were negative. Final pathological stage TIB N0 stage IA.  She completed letrozole therapy in 2017.     She was referred  for genetic testing (mother with breast cancer, father and brother with prostate CA)  - integrated BRCA testing through [employer] was negative.  She did not have more comprehensive testing is at was denied.        * No surgery found *     Hospital Course: Admitted to medical oncology service for  further management of bilateral PE's seen on [facility]. Started on heparin gtt and obtained 2D echo to evaluate for signs of right heart strain, showed mildly improved EF but no other significant changes from prior echo performed on 05/06/19. Subsequently switched from heparin gtt to Lovenox, further discussing best option for long-term anticoagulation with pt's oncologist Dr. Dwyer. Plans remain to start CMF therapy in Heme/Onc clinic next week. Plan for discharge home today (05/10). Patient will continue to take Lovenox at home.    Consults:     Significant Diagnostic Studies: Labs:   CMP   Recent Labs   Lab 05/08/19  1534 05/09/19  0356 05/10/19  0542    140 141   K 3.5 3.3* 3.9    103 104   CO2 26 27 27   * 150* 153*   BUN 17 13 14   CREATININE 0.9 0.9 0.8   CALCIUM 9.6 9.1 9.3   PROT 6.8 6.3 5.9*   ALBUMIN 3.3* 3.3* 3.0*   BILITOT 0.5 0.4 0.3   ALKPHOS 120 109 110   AST 22 18 17   ALT 25 20 20   ANIONGAP 12 10 10   ESTGFRAFRICA >60.0 >60.0 >60.0   EGFRNONAA >60.0 >60.0 >60.0   , CBC   Recent Labs   Lab 05/08/19  1534 05/09/19  0356 05/10/19  0542   WBC 6.18 6.09  6.09 4.70   HGB 11.0* 10.6*  10.6* 10.3*   HCT 34.8* 33.5*  33.5* 33.4*    209  209 203   , INR   Lab Results   Component Value Date    INR 1.0 12/07/2013    INR 1.0 12/06/2013    INR 1.1 12/05/2013    and A1C:   Recent Labs   Lab 12/18/18  1657 03/11/19  1110   HGBA1C 7.3* 8.2*     Radiology: CT scan: CT ABDOMEN PELVIS WITH CONTRAST: No results found for this visit on 05/08/19.    Pending Diagnostic Studies:     Procedure Component Value Units Date/Time    IR PICC Tunneled with Port (xpd) [[id number]]     Order Status:  Sent Lab Status:  No result         Final Active Diagnoses:    Diagnosis Date Noted POA    PRINCIPAL PROBLEM:  Bilateral pulmonary embolism [I26.99] 05/08/2019 Yes    Pulmonary HTN [I27.20] 05/10/2019 Unknown    BMI 45.0-49.9, adult [Z68.42] 09/30/2018 Not Applicable    Personal history of breast cancer  "[Z85.3] 07/17/2018 Not Applicable    Diabetes mellitus due to underlying condition with stage 3 chronic kidney disease, without long-term current use of insulin [E08.22, N18.3] 09/12/2017 Yes    Malignant neoplasm of right female breast [C50.911] 12/07/2015 Yes    S/P AVR (aortic valve replacement) [Z95.2] 12/02/2013 Not Applicable    Diabetes mellitus type 2 in obese [E11.69, E66.9] 12/02/2013 Yes    Essential hypertension [I10] 07/06/2012 Yes      Problems Resolved During this Admission:      Discharged Condition: good    Disposition: Home or Self Care    Follow Up:  Follow-up Information     Erick Dwyer MD.    Specialties:  Hematology and Oncology, Hematology  Why:  As scheduled by the clinic  Contact information:  [id number]  [facility] [id number]  [phone]                 Patient Instructions:      OXYGEN FOR HOME USE     Order Specific Question Answer Comments   Liter Flow 1 0-5   Duration Continuous    Qualifying SpO2: 86% upon ambulation    Testing done at: Exercise/Activity    Route nasal cannula    Portable mode: continuous    Device home concentrator with portable unit    Length of need (in months): 3 mos    Patient condition with qualifying saturation other chronic pulmonary condition bilteral pulmonary embolisms   Height: 5' 2" (1.575 m)    Weight: 108.9 kg (240 lb)    Does patient have medical equipment at home? walker, rolling low height bed; high toilet / low height bed; high toilet / low height bed; high toilet / low height bed; high toilet   Alternative treatment measures have been tried or considered and deemed clinically ineffective. Yes      OXYGEN FOR HOME USE     Order Specific Question Answer Comments   Liter Flow 1 0-5   Duration Continuous    Qualifying SpO2: pulmonary HTN    Testing done at: Rest    Route nasal cannula    Portable mode: continuous    Device home concentrator with portable unit    Height: 5' 2" (1.575 m)    Weight: 108.9 kg (240 lb)    Does patient have " "medical equipment at home? walker, rolling low height bed; high toilet / low height bed; high toilet / low height bed; high toilet / low height bed; high toilet   Alternative treatment measures have been tried or considered and deemed clinically ineffective. Yes      OXYGEN FOR HOME USE     Order Specific Question Answer Comments   Liter Flow 1 0-5   Duration Continuous    Qualifying SpO2: 86% upon ambulation    Testing done at: Exercise/Activity    Route nasal cannula    Portable mode: continuous    Device home concentrator with portable unit    Length of need (in months): 3 mos    Patient condition with qualifying saturation other chronic pulmonary condition Pulmonary HTN   Height: 5' 2" (1.575 m)    Weight: 108.9 kg (240 lb)    Does patient have medical equipment at home? walker, rolling low height bed; high toilet / low height bed; high toilet / low height bed; high toilet / low height bed; high toilet   Alternative treatment measures have been tried or considered and deemed clinically ineffective. Yes      OXYGEN FOR HOME USE     Order Specific Question Answer Comments   Liter Flow 1    Duration Continuous    Qualifying SpO2: 86% with ambulation    Testing done at: Exercise/Activity    Route nasal cannula    Portable mode: continuous    Device home concentrator with portable unit    Patient condition with qualifying saturation other chronic pulmonary condition Pulmonary HTN   Height: 5' 2" (1.575 m)    Weight: 108.9 kg (240 lb)    Does patient have medical equipment at home? walker, rolling    Alternative treatment measures have been tried or considered and deemed clinically ineffective. Yes      Medications:  Reconciled Home Medications:      Medication List      START taking these medications    enoxaparin 150 mg/mL Syrg  Commonly known as:  LOVENOX  Inject 1 mL (150 mg total) into the skin once daily.        CHANGE how you take these medications    carvedilol 25 MG tablet  Commonly known as:  COREG  .5-1 " tablet oral twice daily or as directed.  Hold until Dr. Dwyer says to restart  What changed:  additional instructions        CONTINUE taking these medications    ALPRAZolam 0.25 MG tablet  Commonly known as:  XANAX  One-two daily as needed for anxiety     ASPIR-LOW 81 MG EC tablet  Generic drug:  aspirin  Take 81 mg by mouth once daily.     blood sugar diagnostic Strp  True Metirx strips or strips and lancets  - pt checks twice daily for uncontrolled glucoses E11.65     blood-glucose meter kit  True Test or meter covered by insurance - pt checks glucose twice daily for uncontrolled glucoses E11.65     diclofenac sodium 1 % Gel  Commonly known as:  VOLTAREN  Apply 2 g topically once daily.     dicyclomine 20 mg tablet  Commonly known as:  BENTYL  Take 1 tablet (20 mg total) by mouth 3 (three) times daily as needed.     furosemide 20 MG tablet  Commonly known as:  LASIX  Take 1 tablet (20 mg total) by mouth once daily.     gabapentin 300 MG capsule  Commonly known as:  NEURONTIN  TAKE 1 CAPSULE TWICE DAILY     lancets Misc  Commonly known as:  ACCU-CHEK SOFTCLIX LANCETS  TrueTest lancets for meter covered by insurance - pt checks twice daily for uncontrolled glucoses E11.65,     levothyroxine 50 MCG tablet  Commonly known as:  SYNTHROID  Take 1 tablet (50 mcg total) by mouth every evening.     lidocaine-prilocaine cream  Commonly known as:  EMLA  Apply topically as needed. Apply topically as needed 45 minutes prior to port access.     pravastatin 20 MG tablet  Commonly known as:  PRAVACHOL  TAKE 1 TABLET EVERY DAY     VITAMIN D3 ORAL  Take by mouth once daily. 1000 IU        STOP taking these medications    acetaminophen 650 MG Tbsr  Commonly known as:  TYLENOL     HYDROcodone-acetaminophen 5-325 mg per tablet  Commonly known as:  NORCO        ASK your doctor about these medications    metFORMIN 500 MG tablet  Commonly known as:  GLUCOPHAGE  Take 1 tablet (500 mg total) by mouth 2 (two) times daily with meals.             Javier Miller MD  Hematology/Oncology  [facility]

## 2019-05-13 DIAGNOSIS — E03.4 HYPOTHYROIDISM DUE TO ACQUIRED ATROPHY OF THYROID: ICD-10-CM

## 2019-05-13 NOTE — PROGRESS NOTES
Subjective:       Patient ID: Alina Narayan is a 74 y.o. female.    Chief Complaint: No chief complaint on file.    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  She has received 12 cycles of weekly Taxol.    Following her last visit she had persistent shortness of breath.  Echocardiogram showed no significant change and because of worsening symptoms she was instructed to go to the emergency room on May 8.  Evaluation there with CT angiogram revealed pulmonary embolism-as follows:Bilateral pulmonary thromboembolism involving the right lower lobe and left upper, lingular and lower lobes as above.  She is currently on Lovenox at home and also home oxygen.  She still is short of breath with activity.    Her follow-up  Breast imaging showed the following:There is a 9 mm lymph node seen in the right axilla. Associated coil clip; pathology showed metastastic disease within a lymph node. Decreased in size compared to prior, which measured 14 mm.      US Breast Right Limited  There is a 6 mm x 6 mm x 7 mm intramammary lymph node seen in the axillary tail region of the right breast. Compared to the previous study, the intramammary lymph node decreased in size. Previously measured 6 x 9 x 11 mm    Current history:  abnormal mammogram of the right breast in December 2018.  She was having no breast symptoms at that time    That mammogram showed a 13 mm mass in the right axillary tail.  By ultrasound there was a 6 x 9 x 11 mm intramammary node and a 2nd 5 x 6 x 6 mm hypoechoic mass in the axillary tail.  On January 7, 2019 both masses were biopsied and both showed lymph nodes with metastatic carcinoma which was ER negative TX negative and HER2 negative.  Ki-67 was 40%.    MRI on January 15, 2019 showed 2 right axillary lymph nodes the largest measured 1.4 x 1.0 cm.  There were no abnormalities in the right  breast.    PET scan was then performed which showed only low-grade activity in the right axilla with an SUV of 1.32.    Previous breast cancer history History: On September 25, 2012 she underwent a screening mammogram which showed an asymmetric density in the left breast at 2:00 position. A followup mammogram on the 27th showed an oval mass in that area ultrasound showed a 6 mm solid mass. Core needle biopsy on October 1 showed invasive carcinoma ER 90% positive NC 80% positive and HER-2 negative. On October 29 she underwent lumpectomy and sentinel lymph node biopsy. That showed a 7 mm low-grade (1+2+1) infiltrating carcinoma. 3 sentinel lymph nodes were negative. Final pathological stage TIB N0 stage IA.  She completed letrozole therapy in 2017.    She was referred  for genetic testing (mother with breast cancer, father and brother with prostate CA)  - integrated BRCA testing through [employer] was negative.  She did not have more comprehensive testing is at was denied.  Review of Systems   Constitutional: Negative for appetite change, fever and unexpected weight change.   Respiratory: Positive for shortness of breath. Negative for cough.    Cardiovascular: Negative for chest pain.   Gastrointestinal: Negative for abdominal pain, diarrhea and nausea.   Musculoskeletal: Negative for back pain.   Skin: Negative for rash.   Neurological: Negative for headaches.   Hematological: Negative for adenopathy.   Psychiatric/Behavioral: Negative for dysphoric mood. The patient is nervous/anxious.        Objective:      Physical Exam   Constitutional: She is oriented to person, place, and time. She appears well-developed. No distress.   Obese   Cardiovascular: Normal rate and regular rhythm.   Pulmonary/Chest: Effort normal and breath sounds normal. Right breast exhibits no mass, no nipple discharge and no skin change. Left breast exhibits no mass, no nipple discharge and no skin change.   Abdominal: Soft. There is no tenderness.    Lymphadenopathy:     She has no cervical adenopathy.     She has no axillary adenopathy.        Right: No supraclavicular adenopathy present.        Left: No supraclavicular adenopathy present.   Neurological: She is alert and oriented to person, place, and time.   Psychiatric: She has a normal mood and affect. Her behavior is normal. Thought content normal.   Vitals reviewed.      Assessment:      Right breast cancer improved on neoadjuvant therapy.  1. Bilateral pulmonary embolism        Plan:         Start CMF therapy.  Return in 3 weeks.

## 2019-05-14 ENCOUNTER — OFFICE VISIT (OUTPATIENT)
Dept: HEMATOLOGY/ONCOLOGY | Facility: CLINIC | Age: 74
End: 2019-05-14
Payer: MEDICARE

## 2019-05-14 ENCOUNTER — INFUSION (OUTPATIENT)
Dept: INFUSION THERAPY | Facility: HOSPITAL | Age: 74
End: 2019-05-14
Attending: INTERNAL MEDICINE
Payer: MEDICARE

## 2019-05-14 VITALS
OXYGEN SATURATION: 96 % | DIASTOLIC BLOOD PRESSURE: 64 MMHG | HEIGHT: 62 IN | HEART RATE: 96 BPM | SYSTOLIC BLOOD PRESSURE: 137 MMHG | BODY MASS INDEX: 43.9 KG/M2 | RESPIRATION RATE: 20 BRPM | TEMPERATURE: 98 F

## 2019-05-14 VITALS
RESPIRATION RATE: 20 BRPM | WEIGHT: 239.88 LBS | HEART RATE: 91 BPM | BODY MASS INDEX: 44.14 KG/M2 | SYSTOLIC BLOOD PRESSURE: 122 MMHG | HEIGHT: 62 IN | TEMPERATURE: 98 F | DIASTOLIC BLOOD PRESSURE: 62 MMHG

## 2019-05-14 DIAGNOSIS — I26.99 BILATERAL PULMONARY EMBOLISM: Primary | ICD-10-CM

## 2019-05-14 DIAGNOSIS — C50.611 CANCER OF AXILLARY TAIL OF RIGHT BREAST: ICD-10-CM

## 2019-05-14 DIAGNOSIS — C50.911 MALIGNANT NEOPLASM OF RIGHT BREAST IN FEMALE, ESTROGEN RECEPTOR NEGATIVE, UNSPECIFIED SITE OF BREAST: Primary | ICD-10-CM

## 2019-05-14 DIAGNOSIS — Z17.1 MALIGNANT NEOPLASM OF RIGHT BREAST IN FEMALE, ESTROGEN RECEPTOR NEGATIVE, UNSPECIFIED SITE OF BREAST: Primary | ICD-10-CM

## 2019-05-14 PROCEDURE — 3075F PR MOST RECENT SYSTOLIC BLOOD PRESS GE 130-139MM HG: ICD-10-PCS | Mod: HCNC,CPTII,S$GLB, | Performed by: INTERNAL MEDICINE

## 2019-05-14 PROCEDURE — 99999 PR PBB SHADOW E&M-EST. PATIENT-LVL III: CPT | Mod: PBBFAC,HCNC,, | Performed by: INTERNAL MEDICINE

## 2019-05-14 PROCEDURE — 99214 PR OFFICE/OUTPT VISIT, EST, LEVL IV, 30-39 MIN: ICD-10-PCS | Mod: HCNC,S$GLB,, | Performed by: INTERNAL MEDICINE

## 2019-05-14 PROCEDURE — 3078F DIAST BP <80 MM HG: CPT | Mod: HCNC,CPTII,S$GLB, | Performed by: INTERNAL MEDICINE

## 2019-05-14 PROCEDURE — 1101F PT FALLS ASSESS-DOCD LE1/YR: CPT | Mod: HCNC,CPTII,S$GLB, | Performed by: INTERNAL MEDICINE

## 2019-05-14 PROCEDURE — 3075F SYST BP GE 130 - 139MM HG: CPT | Mod: HCNC,CPTII,S$GLB, | Performed by: INTERNAL MEDICINE

## 2019-05-14 PROCEDURE — 96413 CHEMO IV INFUSION 1 HR: CPT | Mod: HCNC

## 2019-05-14 PROCEDURE — 99999 PR PBB SHADOW E&M-EST. PATIENT-LVL III: ICD-10-PCS | Mod: PBBFAC,HCNC,, | Performed by: INTERNAL MEDICINE

## 2019-05-14 PROCEDURE — 63600175 PHARM REV CODE 636 W HCPCS: Mod: HCNC | Performed by: INTERNAL MEDICINE

## 2019-05-14 PROCEDURE — 99214 OFFICE O/P EST MOD 30 MIN: CPT | Mod: HCNC,S$GLB,, | Performed by: INTERNAL MEDICINE

## 2019-05-14 PROCEDURE — 1101F PR PT FALLS ASSESS DOC 0-1 FALLS W/OUT INJ PAST YR: ICD-10-PCS | Mod: HCNC,CPTII,S$GLB, | Performed by: INTERNAL MEDICINE

## 2019-05-14 PROCEDURE — 3078F PR MOST RECENT DIASTOLIC BLOOD PRESSURE < 80 MM HG: ICD-10-PCS | Mod: HCNC,CPTII,S$GLB, | Performed by: INTERNAL MEDICINE

## 2019-05-14 PROCEDURE — 96411 CHEMO IV PUSH ADDL DRUG: CPT | Mod: HCNC

## 2019-05-14 PROCEDURE — 96367 TX/PROPH/DG ADDL SEQ IV INF: CPT | Mod: HCNC

## 2019-05-14 PROCEDURE — 25000003 PHARM REV CODE 250: Mod: HCNC | Performed by: INTERNAL MEDICINE

## 2019-05-14 RX ORDER — HEPARIN 100 UNIT/ML
500 SYRINGE INTRAVENOUS
Status: DISCONTINUED | OUTPATIENT
Start: 2019-05-14 | End: 2019-05-14 | Stop reason: HOSPADM

## 2019-05-14 RX ORDER — SODIUM CHLORIDE 0.9 % (FLUSH) 0.9 %
10 SYRINGE (ML) INJECTION
Status: DISCONTINUED | OUTPATIENT
Start: 2019-05-14 | End: 2019-05-14 | Stop reason: HOSPADM

## 2019-05-14 RX ORDER — METHOTREXATE 25 MG/ML
40 INJECTION INTRA-ARTERIAL; INTRAMUSCULAR; INTRATHECAL; INTRAVENOUS
Status: COMPLETED | OUTPATIENT
Start: 2019-05-14 | End: 2019-05-14

## 2019-05-14 RX ORDER — FLUOROURACIL 50 MG/ML
600 INJECTION, SOLUTION INTRAVENOUS
Status: COMPLETED | OUTPATIENT
Start: 2019-05-14 | End: 2019-05-14

## 2019-05-14 RX ADMIN — SODIUM CHLORIDE: 0.9 INJECTION, SOLUTION INTRAVENOUS at 10:05

## 2019-05-14 RX ADMIN — DEXAMETHASONE SODIUM PHOSPHATE: 4 INJECTION, SOLUTION INTRA-ARTICULAR; INTRALESIONAL; INTRAMUSCULAR; INTRAVENOUS; SOFT TISSUE at 10:05

## 2019-05-14 RX ADMIN — METHOTREXATE 90 MG: 25 SOLUTION INTRA-ARTERIAL; INTRAMUSCULAR; INTRATHECAL; INTRAVENOUS at 10:05

## 2019-05-14 RX ADMIN — FLUOROURACIL 1345 MG: 50 INJECTION, SOLUTION INTRAVENOUS at 10:05

## 2019-05-14 RX ADMIN — HEPARIN 500 UNITS: 100 SYRINGE at 12:05

## 2019-05-14 RX ADMIN — CYCLOPHOSPHAMIDE 1345 MG: 1 INJECTION, POWDER, FOR SOLUTION INTRAVENOUS; ORAL at 11:05

## 2019-05-14 NOTE — PLAN OF CARE
Problem: Adult Inpatient Plan of Care  Goal: Plan of Care Review  Outcome: Ongoing (interventions implemented as appropriate)  1220:  Patient tolerated C1D1 CMF well, NAD noted, denies any new issues.  [facility] hand-outs given to patient and reviewed.  Port flushed upon treatment completion, and de-accessed.  AVS given.  Patient released in stable condition, via wheelchair, accompanied by her .

## 2019-05-15 RX ORDER — LEVOTHYROXINE SODIUM 50 UG/1
50 TABLET ORAL NIGHTLY
Qty: 90 TABLET | Refills: 3 | Status: SHIPPED | OUTPATIENT
Start: 2019-05-15 | End: 2020-05-02

## 2019-05-28 ENCOUNTER — TELEPHONE (OUTPATIENT)
Dept: HEMATOLOGY/ONCOLOGY | Facility: CLINIC | Age: 74
End: 2019-05-28

## 2019-05-28 NOTE — TELEPHONE ENCOUNTER
Called and spoke with patient in regards to scheduling her infusion at the [facility] if she would like as they have opened an infusion center. Patient stated that she does not, she has lived out [place] for 47 years and has only gone to that hospital twice and both times she was disappointed. I voiced understanding, apologized for the bad experience and told her I would keep her appointments as is.

## 2019-06-03 ENCOUNTER — TELEPHONE (OUTPATIENT)
Dept: HEMATOLOGY/ONCOLOGY | Facility: CLINIC | Age: 74
End: 2019-06-03

## 2019-06-03 NOTE — TELEPHONE ENCOUNTER
Spoke with patient about bleeding from the site of lovenox injections. Pt informed that when she took the shot Saturday it bled a little, she placed some gauze over the site and went to bed, woke up a little later and the gauze was soaked. The site kept bleeding a fair amount to soak the gauze pads. Pt called triage and spoke with Dr Devi who advised holding the shot Sunday. Pt said the bleeding has since stopped, but both patient and  are nervous about her taking the shot again before she sees a provider. Offered patient an urgent care slot today , 6/3, but patient said she has an appt with Dr Dwyer tomorrow and will just wait to be seen, but wanted to see should she still take the shot. Advised pt that if she feels uncomfortable taking it again before speaking to a provider and being seen, she can hold today's dose and possibly resume tomorrow after having seen Dr Dwyer. Pt agreeable to this and will wait to see Dr Dwyer before she takes another shot. Informed pt would relay this information to Dr Dwyer as well and to call back to clinic if she starts bleeding again or needs anything else. Pt verbalized understanding of this information.       ----- Message from Kailee Nunez sent at 6/3/2019  8:39 AM CDT -----  Contact: pt  Please contact the pt in regards to a concern after taking a blood clot shot.  enoxaparin (LOVENOX) 150 mg/mL Syrg . Pt has noticed bleeding and she's not sure how to address this or will she have to schedule an emergency appt to come in.  Her number is [phone]     Thank you

## 2019-06-03 NOTE — TELEPHONE ENCOUNTER
Spoke with patient to let her know Dr Dwyer would like her to restart her lovenox shot today. Pt agreebale to this and will monitor the area for bleeding and see Dr Dwyer tomorrow for her appointment.

## 2019-06-04 ENCOUNTER — INFUSION (OUTPATIENT)
Dept: INFUSION THERAPY | Facility: HOSPITAL | Age: 74
End: 2019-06-04
Attending: INTERNAL MEDICINE
Payer: MEDICARE

## 2019-06-04 ENCOUNTER — OFFICE VISIT (OUTPATIENT)
Dept: HEMATOLOGY/ONCOLOGY | Facility: CLINIC | Age: 74
End: 2019-06-04
Payer: MEDICARE

## 2019-06-04 ENCOUNTER — TELEPHONE (OUTPATIENT)
Dept: HEMATOLOGY/ONCOLOGY | Facility: CLINIC | Age: 74
End: 2019-06-04

## 2019-06-04 VITALS
SYSTOLIC BLOOD PRESSURE: 133 MMHG | RESPIRATION RATE: 18 BRPM | HEART RATE: 79 BPM | WEIGHT: 244.5 LBS | DIASTOLIC BLOOD PRESSURE: 60 MMHG | TEMPERATURE: 98 F | HEIGHT: 62 IN | OXYGEN SATURATION: 98 % | BODY MASS INDEX: 44.99 KG/M2

## 2019-06-04 DIAGNOSIS — C50.911 MALIGNANT NEOPLASM OF RIGHT FEMALE BREAST: ICD-10-CM

## 2019-06-04 DIAGNOSIS — I26.99 BILATERAL PULMONARY EMBOLISM: ICD-10-CM

## 2019-06-04 DIAGNOSIS — C50.611 CANCER OF AXILLARY TAIL OF RIGHT BREAST: Primary | ICD-10-CM

## 2019-06-04 LAB
ALBUMIN SERPL BCP-MCNC: 3.5 G/DL (ref 3.5–5.2)
ALP SERPL-CCNC: 101 U/L (ref 55–135)
ALT SERPL W/O P-5'-P-CCNC: 62 U/L (ref 10–44)
ANION GAP SERPL CALC-SCNC: 9 MMOL/L (ref 8–16)
ANISOCYTOSIS BLD QL SMEAR: SLIGHT
AST SERPL-CCNC: 39 U/L (ref 10–40)
BASOPHILS # BLD AUTO: 0.03 K/UL (ref 0–0.2)
BASOPHILS NFR BLD: 1 % (ref 0–1.9)
BILIRUB SERPL-MCNC: 0.5 MG/DL (ref 0.1–1)
BUN SERPL-MCNC: 17 MG/DL (ref 8–23)
CALCIUM SERPL-MCNC: 9.7 MG/DL (ref 8.7–10.5)
CHLORIDE SERPL-SCNC: 102 MMOL/L (ref 95–110)
CO2 SERPL-SCNC: 28 MMOL/L (ref 23–29)
CREAT SERPL-MCNC: 1 MG/DL (ref 0.5–1.4)
DIFFERENTIAL METHOD: ABNORMAL
EOSINOPHIL # BLD AUTO: 0.1 K/UL (ref 0–0.5)
EOSINOPHIL NFR BLD: 2.7 % (ref 0–8)
ERYTHROCYTE [DISTWIDTH] IN BLOOD BY AUTOMATED COUNT: 15.8 % (ref 11.5–14.5)
EST. GFR  (AFRICAN AMERICAN): >60 ML/MIN/1.73 M^2
EST. GFR  (NON AFRICAN AMERICAN): 55.6 ML/MIN/1.73 M^2
GLUCOSE SERPL-MCNC: 204 MG/DL (ref 70–110)
HCT VFR BLD AUTO: 35 % (ref 37–48.5)
HGB BLD-MCNC: 11 G/DL (ref 12–16)
HYPOCHROMIA BLD QL SMEAR: ABNORMAL
IMM GRANULOCYTES # BLD AUTO: 0.13 K/UL (ref 0–0.04)
IMM GRANULOCYTES NFR BLD AUTO: 4.5 % (ref 0–0.5)
LYMPHOCYTES # BLD AUTO: 1.3 K/UL (ref 1–4.8)
LYMPHOCYTES NFR BLD: 46 % (ref 18–48)
MCH RBC QN AUTO: 31.8 PG (ref 27–31)
MCHC RBC AUTO-ENTMCNC: 31.4 G/DL (ref 32–36)
MCV RBC AUTO: 101 FL (ref 82–98)
MONOCYTES # BLD AUTO: 0.7 K/UL (ref 0.3–1)
MONOCYTES NFR BLD: 22.7 % (ref 4–15)
NEUTROPHILS # BLD AUTO: 0.7 K/UL (ref 1.8–7.7)
NEUTROPHILS NFR BLD: 23.1 % (ref 38–73)
NRBC BLD-RTO: 1 /100 WBC
OVALOCYTES BLD QL SMEAR: ABNORMAL
PLATELET # BLD AUTO: 141 K/UL (ref 150–350)
PMV BLD AUTO: 10.5 FL (ref 9.2–12.9)
POIKILOCYTOSIS BLD QL SMEAR: SLIGHT
POLYCHROMASIA BLD QL SMEAR: ABNORMAL
POTASSIUM SERPL-SCNC: 4.1 MMOL/L (ref 3.5–5.1)
PROT SERPL-MCNC: 6.5 G/DL (ref 6–8.4)
RBC # BLD AUTO: 3.46 M/UL (ref 4–5.4)
SODIUM SERPL-SCNC: 139 MMOL/L (ref 136–145)
WBC # BLD AUTO: 2.91 K/UL (ref 3.9–12.7)

## 2019-06-04 PROCEDURE — 85025 COMPLETE CBC W/AUTO DIFF WBC: CPT | Mod: HCNC

## 2019-06-04 PROCEDURE — 3075F SYST BP GE 130 - 139MM HG: CPT | Mod: HCNC,CPTII,S$GLB, | Performed by: INTERNAL MEDICINE

## 2019-06-04 PROCEDURE — 99214 OFFICE O/P EST MOD 30 MIN: CPT | Mod: HCNC,S$GLB,, | Performed by: INTERNAL MEDICINE

## 2019-06-04 PROCEDURE — 99499 RISK ADDL DX/OHS AUDIT: ICD-10-PCS | Mod: HCNC,S$GLB,, | Performed by: INTERNAL MEDICINE

## 2019-06-04 PROCEDURE — 80053 COMPREHEN METABOLIC PANEL: CPT | Mod: HCNC

## 2019-06-04 PROCEDURE — A4216 STERILE WATER/SALINE, 10 ML: HCPCS | Mod: HCNC | Performed by: INTERNAL MEDICINE

## 2019-06-04 PROCEDURE — 99214 PR OFFICE/OUTPT VISIT, EST, LEVL IV, 30-39 MIN: ICD-10-PCS | Mod: HCNC,S$GLB,, | Performed by: INTERNAL MEDICINE

## 2019-06-04 PROCEDURE — 3078F DIAST BP <80 MM HG: CPT | Mod: HCNC,CPTII,S$GLB, | Performed by: INTERNAL MEDICINE

## 2019-06-04 PROCEDURE — 3078F PR MOST RECENT DIASTOLIC BLOOD PRESSURE < 80 MM HG: ICD-10-PCS | Mod: HCNC,CPTII,S$GLB, | Performed by: INTERNAL MEDICINE

## 2019-06-04 PROCEDURE — 63600175 PHARM REV CODE 636 W HCPCS: Mod: HCNC | Performed by: INTERNAL MEDICINE

## 2019-06-04 PROCEDURE — 99499 UNLISTED E&M SERVICE: CPT | Mod: HCNC,S$GLB,, | Performed by: INTERNAL MEDICINE

## 2019-06-04 PROCEDURE — 1101F PT FALLS ASSESS-DOCD LE1/YR: CPT | Mod: HCNC,CPTII,S$GLB, | Performed by: INTERNAL MEDICINE

## 2019-06-04 PROCEDURE — 25000003 PHARM REV CODE 250: Mod: HCNC | Performed by: INTERNAL MEDICINE

## 2019-06-04 PROCEDURE — 99999 PR PBB SHADOW E&M-EST. PATIENT-LVL III: ICD-10-PCS | Mod: PBBFAC,HCNC,, | Performed by: INTERNAL MEDICINE

## 2019-06-04 PROCEDURE — 1101F PR PT FALLS ASSESS DOC 0-1 FALLS W/OUT INJ PAST YR: ICD-10-PCS | Mod: HCNC,CPTII,S$GLB, | Performed by: INTERNAL MEDICINE

## 2019-06-04 PROCEDURE — 99999 PR PBB SHADOW E&M-EST. PATIENT-LVL III: CPT | Mod: PBBFAC,HCNC,, | Performed by: INTERNAL MEDICINE

## 2019-06-04 PROCEDURE — 3075F PR MOST RECENT SYSTOLIC BLOOD PRESS GE 130-139MM HG: ICD-10-PCS | Mod: HCNC,CPTII,S$GLB, | Performed by: INTERNAL MEDICINE

## 2019-06-04 PROCEDURE — 36591 DRAW BLOOD OFF VENOUS DEVICE: CPT | Mod: HCNC

## 2019-06-04 RX ORDER — SODIUM CHLORIDE 0.9 % (FLUSH) 0.9 %
10 SYRINGE (ML) INJECTION
Status: CANCELLED | OUTPATIENT
Start: 2019-06-04

## 2019-06-04 RX ORDER — HEPARIN 100 UNIT/ML
500 SYRINGE INTRAVENOUS
Status: COMPLETED | OUTPATIENT
Start: 2019-06-04 | End: 2019-06-04

## 2019-06-04 RX ORDER — SODIUM CHLORIDE 0.9 % (FLUSH) 0.9 %
10 SYRINGE (ML) INJECTION
Status: COMPLETED | OUTPATIENT
Start: 2019-06-04 | End: 2019-06-04

## 2019-06-04 RX ORDER — ENOXAPARIN SODIUM 150 MG/ML
150 INJECTION SUBCUTANEOUS DAILY
Qty: 30 ML | Refills: 3 | Status: SHIPPED | OUTPATIENT
Start: 2019-06-04 | End: 2019-10-10

## 2019-06-04 RX ORDER — HEPARIN 100 UNIT/ML
500 SYRINGE INTRAVENOUS
Status: CANCELLED | OUTPATIENT
Start: 2019-06-04

## 2019-06-04 RX ADMIN — HEPARIN 500 UNITS: 100 SYRINGE at 09:06

## 2019-06-04 RX ADMIN — Medication 10 ML: at 09:06

## 2019-06-04 NOTE — TELEPHONE ENCOUNTER
----- Message from Britney Stacy, RN sent at 6/4/2019 11:11 AM CDT -----  That looks like what I am seeing on the paper chart over here.   He routed the chart to Louise at [facility] for some reason, but this is what is says: Delay CMF 1 week need CBC next week and day 2 Neulasta, return in 4 weeks with CBC and CMP     ----- Message -----  From: Alicia Marion  Sent: 6/4/2019  11:07 AM  To: Reymundo MONT[facility] Staff    I did not receive a chart on this. Can you verify this is right   Patient needs CBC and CMF next week with D2 Neulasta and then come back 3 weeks from then/4 weeks from today     ----- Message -----  From: Louise Marcelo  Sent: 6/4/2019  11:05 AM  To: Alicia Marion    Delay CMF 1 week need CBC next week and day 2 Neulasta, return in 4 weeks with CBC and CMP

## 2019-06-04 NOTE — PROGRESS NOTES
Patient, Alina Narayan (MRN #[id number]), presented with a recent Platelet count less than 150 K/uL consistent with the definition of thrombocytopenia (ICD10 - D69.6).    Platelets   Date Value Ref Range Status   06/04/2019 141 (L) 150 - 350 K/uL Final     The patient's thrombocytopenia was monitored, evaluated, addressed and/or treated. This addendum to the medical record is made on 06/04/2019.

## 2019-06-04 NOTE — PROGRESS NOTES
Subjective:       Patient ID: Alina Narayan is a 74 y.o. female.    Chief Complaint: No chief complaint on file.    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  She has received 12 cycles of weekly Taxol and 1 cycle of CMF.      She was diagnosed with PE on on May 8.    She is currently on Lovenox at home and has had some recent bleeding from her injection sites.       Her follow-up  Breast imaging after Taxol showed the following:There is a 9 mm lymph node seen in the right axilla. Associated coil clip; pathology showed metastastic disease within a lymph node. Decreased in size compared to prior, which measured 14 mm.      US Breast Right Limited  There is a 6 mm x 6 mm x 7 mm intramammary lymph node seen in the axillary tail region of the right breast. Compared to the previous study, the intramammary lymph node decreased in size. Previously measured 6 x 9 x 11 mm    Current history:  abnormal mammogram of the right breast in December 2018.  She was having no breast symptoms at that time    That mammogram showed a 13 mm mass in the right axillary tail.  By ultrasound there was a 6 x 9 x 11 mm intramammary node and a 2nd 5 x 6 x 6 mm hypoechoic mass in the axillary tail.  On January 7, 2019 both masses were biopsied and both showed lymph nodes with metastatic carcinoma which was ER negative MN negative and HER2 negative.  Ki-67 was 40%.    MRI on January 15, 2019 showed 2 right axillary lymph nodes the largest measured 1.4 x 1.0 cm.  There were no abnormalities in the right breast.    PET scan was then performed which showed only low-grade activity in the right axilla with an SUV of 1.32.    Previous breast cancer history History: On September 25, 2012 she underwent a screening mammogram which showed an asymmetric density in the left breast at 2:00 position. A followup mammogram on the 27th showed an  oval mass in that area ultrasound showed a 6 mm solid mass. Core needle biopsy on October 1 showed invasive carcinoma ER 90% positive CT 80% positive and HER-2 negative. On October 29 she underwent lumpectomy and sentinel lymph node biopsy. That showed a 7 mm low-grade (1+2+1) infiltrating carcinoma. 3 sentinel lymph nodes were negative. Final pathological stage TIB N0 stage IA.  She completed letrozole therapy in 2017.  Review of Systems   Constitutional: Negative for appetite change and unexpected weight change.   Eyes: Negative for visual disturbance.   Respiratory: Positive for shortness of breath. Negative for cough.    Cardiovascular: Negative for chest pain.   Gastrointestinal: Positive for diarrhea. Negative for abdominal pain.   Genitourinary: Positive for frequency.   Musculoskeletal: Positive for back pain.   Skin: Negative for rash.   Neurological: Negative for headaches.   Hematological: Negative for adenopathy.   Psychiatric/Behavioral: The patient is nervous/anxious.        Objective:      Physical Exam   Constitutional: She is oriented to person, place, and time. She appears well-developed and well-nourished.   HENT:   Mouth/Throat: No oropharyngeal exudate.   Eyes: No scleral icterus.   Cardiovascular: Normal rate and regular rhythm.   Pulmonary/Chest: Effort normal and breath sounds normal. She has no wheezes. She has no rales. Right breast exhibits no mass, no nipple discharge and no skin change. Left breast exhibits no mass.       Abdominal: There is no tenderness.   Lymphadenopathy:     She has no cervical adenopathy.   Neurological: She is alert and oriented to person, place, and time.   Psychiatric: She has a normal mood and affect. Her behavior is normal. Thought content normal.       Assessment:       1. Cancer of axillary tail of right breast    2. Bilateral pulmonary embolism        Plan:       Delay chemotherapy 1 week due to neutropenia.  Add Neulasta for cycle 2.          Distress  Screening Results: Psychosocial Distress screening score of Distress Score: 5 noted and reviewed. No intervention indicated.

## 2019-06-04 NOTE — NURSING
Patient here for blood draw from right chest port-accesses easily with blood return after changing positions and deep breathing-blood to lab-line flushed and left accessed for chemo today-tolerated well.

## 2019-06-05 RX ORDER — SODIUM CHLORIDE 0.9 % (FLUSH) 0.9 %
10 SYRINGE (ML) INJECTION
Status: CANCELLED | OUTPATIENT
Start: 2019-06-11

## 2019-06-05 RX ORDER — METHOTREXATE 25 MG/ML
40 INJECTION INTRA-ARTERIAL; INTRAMUSCULAR; INTRATHECAL; INTRAVENOUS
Status: CANCELLED | OUTPATIENT
Start: 2019-06-11

## 2019-06-05 RX ORDER — FLUOROURACIL 50 MG/ML
600 INJECTION, SOLUTION INTRAVENOUS
Status: CANCELLED | OUTPATIENT
Start: 2019-06-11

## 2019-06-05 RX ORDER — HEPARIN 100 UNIT/ML
500 SYRINGE INTRAVENOUS
Status: CANCELLED | OUTPATIENT
Start: 2019-06-11

## 2019-06-06 ENCOUNTER — PATIENT MESSAGE (OUTPATIENT)
Dept: HEMATOLOGY/ONCOLOGY | Facility: CLINIC | Age: 74
End: 2019-06-06

## 2019-06-06 ENCOUNTER — TELEPHONE (OUTPATIENT)
Dept: HEMATOLOGY/ONCOLOGY | Facility: CLINIC | Age: 74
End: 2019-06-06

## 2019-06-06 NOTE — TELEPHONE ENCOUNTER
Returned call to patient to discuss issues and pain in her foot. First time called, got busy signal. Second call, was able to leave a voicemail asking patient to please call back to clinic at her earliest convenience.      ----- Message from Andie Koch sent at 6/6/2019 10:15 AM CDT -----  Contact: pt    Pt  called to speak with nurse states that he has some questions about pt having some issues and pain in foot   Callback#[phone]  Thank You  DANNY Koch

## 2019-06-07 ENCOUNTER — TELEPHONE (OUTPATIENT)
Dept: PODIATRY | Facility: CLINIC | Age: 74
End: 2019-06-07

## 2019-06-07 ENCOUNTER — OFFICE VISIT (OUTPATIENT)
Dept: URGENT CARE | Facility: CLINIC | Age: 74
End: 2019-06-07
Payer: MEDICARE

## 2019-06-07 VITALS
WEIGHT: 244.5 LBS | TEMPERATURE: 98 F | HEART RATE: 83 BPM | SYSTOLIC BLOOD PRESSURE: 126 MMHG | HEIGHT: 62 IN | OXYGEN SATURATION: 97 % | RESPIRATION RATE: 18 BRPM | BODY MASS INDEX: 44.99 KG/M2 | DIASTOLIC BLOOD PRESSURE: 65 MMHG

## 2019-06-07 DIAGNOSIS — M79.672 PAIN OF LEFT HEEL: ICD-10-CM

## 2019-06-07 DIAGNOSIS — E11.65 UNCONTROLLED TYPE 2 DIABETES MELLITUS WITH HYPERGLYCEMIA: ICD-10-CM

## 2019-06-07 DIAGNOSIS — B35.1 TOENAIL FUNGUS: ICD-10-CM

## 2019-06-07 DIAGNOSIS — C50.911 MALIGNANT NEOPLASM OF RIGHT FEMALE BREAST, UNSPECIFIED ESTROGEN RECEPTOR STATUS, UNSPECIFIED SITE OF BREAST: ICD-10-CM

## 2019-06-07 DIAGNOSIS — S99.829A TOENAIL TORN AWAY: Primary | ICD-10-CM

## 2019-06-07 DIAGNOSIS — N18.30 DIABETES MELLITUS DUE TO UNDERLYING CONDITION WITH STAGE 3 CHRONIC KIDNEY DISEASE, WITHOUT LONG-TERM CURRENT USE OF INSULIN: ICD-10-CM

## 2019-06-07 DIAGNOSIS — E08.22 DIABETES MELLITUS DUE TO UNDERLYING CONDITION WITH STAGE 3 CHRONIC KIDNEY DISEASE, WITHOUT LONG-TERM CURRENT USE OF INSULIN: ICD-10-CM

## 2019-06-07 PROCEDURE — 3078F DIAST BP <80 MM HG: CPT | Mod: CPTII,S$GLB,, | Performed by: NURSE PRACTITIONER

## 2019-06-07 PROCEDURE — 1101F PR PT FALLS ASSESS DOC 0-1 FALLS W/OUT INJ PAST YR: ICD-10-PCS | Mod: CPTII,S$GLB,, | Performed by: NURSE PRACTITIONER

## 2019-06-07 PROCEDURE — 3045F PR MOST RECENT HEMOGLOBIN A1C LEVEL 7.0-9.0%: CPT | Mod: CPTII,S$GLB,, | Performed by: NURSE PRACTITIONER

## 2019-06-07 PROCEDURE — 99214 OFFICE O/P EST MOD 30 MIN: CPT | Mod: S$GLB,,, | Performed by: NURSE PRACTITIONER

## 2019-06-07 PROCEDURE — 1101F PT FALLS ASSESS-DOCD LE1/YR: CPT | Mod: CPTII,S$GLB,, | Performed by: NURSE PRACTITIONER

## 2019-06-07 PROCEDURE — 99214 PR OFFICE/OUTPT VISIT, EST, LEVL IV, 30-39 MIN: ICD-10-PCS | Mod: S$GLB,,, | Performed by: NURSE PRACTITIONER

## 2019-06-07 PROCEDURE — 3074F SYST BP LT 130 MM HG: CPT | Mod: CPTII,S$GLB,, | Performed by: NURSE PRACTITIONER

## 2019-06-07 PROCEDURE — 3078F PR MOST RECENT DIASTOLIC BLOOD PRESSURE < 80 MM HG: ICD-10-PCS | Mod: CPTII,S$GLB,, | Performed by: NURSE PRACTITIONER

## 2019-06-07 PROCEDURE — 3074F PR MOST RECENT SYSTOLIC BLOOD PRESSURE < 130 MM HG: ICD-10-PCS | Mod: CPTII,S$GLB,, | Performed by: NURSE PRACTITIONER

## 2019-06-07 PROCEDURE — 3045F PR MOST RECENT HEMOGLOBIN A1C LEVEL 7.0-9.0%: ICD-10-PCS | Mod: CPTII,S$GLB,, | Performed by: NURSE PRACTITIONER

## 2019-06-07 NOTE — PATIENT INSTRUCTIONS
Return to Urgent Care or go to ER if symptoms worsen or fail to improve.  Follow up with PCP as recommended for further management.     You will receive a phone call soon for an Urgent Referral to Podiatry.    Spoke with Aida in clinic [facility]-- she has sent a message for an appointment for next week.  Scheduling will contact patient to schedule.               Understanding Heel Pain    Your heel is the back part of your foot. A band of tissue called the plantar fascia connects the heel bone to the bones in the ball of your foot. Nerves run from the heel up the inside of your ankle and into your leg. When you feel pain in the bottom of your heel, the plantar fascia may be inflamed. Overuse, Achilles tightness, or excess body weight can cause the tissue to tear or pull away from the bone. Sometimes the inflamed plantar fascia also irritates a nerve, causing more pain.  What causes heel pain?  Wearing shoes with poor cushioning can irritate the tissue in your heel (plantar fascia). Being overweight or standing for long periods can also irritate the tissue. Running, walking, tennis, and other sports that put stress on the heels can cause tiny tears in the tissue. If your lower leg muscles are tight, this is more likely to occur. A tight Achilles tendon will also contribute to heel pain.  Symptoms  You may feel pain on the bottom or on the inside edge of your heel. The pain may be sharp when you get out of bed or when you stand up after sitting for a while. You may feel a dull ache in your heel after youve been standing for a long time on a hard surface. Running can also cause a dull ache.  Preventing future problems  To prevent future heel pain, wear shoes with well-cushioned heels. And do exercises prescribed by your healthcare provider to stretch the plantar fascia and the muscles in the lower leg.   Date Last Reviewed: 9/10/2015  © 1258-6239 [employer]. [address] [place] [address], [place] [address].  All rights reserved. This information is not intended as a substitute for professional medical care. Always follow your healthcare professional's instructions.        Understanding Tinea Unguium  Tinea unguium is a type of fungal infection. The fungus infects the fingernails and, more commonly, the toenails. Its more common in men, older adults, and people who have diabetes, peripheral vascular disease, or another health problem that weakens the immune system.  How to say it  [id number]-[id number]   What causes tinea unguium?  Tinea unguium is caused by a fungus. Several different types of fungus can grow on the nails.  The condition is much more likely to occur on the toenails. It can spread from one nail to another. You are more likely to get tinea unguium if you:  · Have another fungal infection, such as athletes foot  · Have sweaty feet  · Share nail clippers with a person who has a fungal infection  · Swim often  · Walk barefoot in damp areas, such as locker rooms  · Use communal or shared showers  What are the symptoms of tinea unguium?  If you have an infected nail, it may become:  · Brittle  · Thick  · Hard  · Discolored, yellow to brown  · Irregular in shape  The nail may also have crumbling white or colored material under it.  If left untreated, the fungus may spread to the nail bed, which is the skin under the nail. The nail may  also fall off.  How is tinea unguium treated?  With proper treatment, tinea unguium may be cured. But it often takes several months as the nail grows.  Treatments include:  · Good hygiene. Keep feet and nails clean and dry. If the infection is on the toenails, check that your shoes fit properly.  · Medicine. Over-the-counter antifungal products, such as creams, may kill the fungus and ease symptoms. If you have a severe infection, or the infection wont go away, you may need to take another medicine by mouth.  Some of these oral medicines can have side effects and need to be  used for 3 months.  · Surgery.  The nail may be removed. But there is a high chance the fungus will return.  · Laser. A special type of laser directed at the nail itself can kill the fungus.  When should I call my healthcare provider?  Call your healthcare provider right away if you have any of these:  · Pain that gets worse  · Symptoms that dont get better, or get worse  · New symptoms   Date Last Reviewed: 3/30/2016  © 2423-4027 [employer]. [address] [place] [address]. All rights reserved. This information is not intended as a substitute for professional medical care. Always follow your healthcare professional's instructions.

## 2019-06-07 NOTE — TELEPHONE ENCOUNTER
----- Message from Aida Barillas sent at 6/7/2019 12:28 PM CDT -----  Regarding: Urgent Care Referral   Good Afternoon,    GEORGE Escobar with the  [facility] entered a referral for the current patient to be seen asap for  Left foot pain/ Uncontrolled type II DM. The first availability I see from my end is 6/24/19. Would it be possible to escalate an appointment on Dr. Bauer's schedule for the patient?     Thank you for your assistance!    Aida Barillas   [facility]  [phone]

## 2019-06-07 NOTE — PROGRESS NOTES
"Subjective:       Patient ID: Alina Narayan is a 74 y.o. female.    Vitals:  height is 5' 2" (1.575 m) and weight is 110.9 kg (244 lb 7.8 oz). Her temperature is 97.9 °F (36.6 °C). Her blood pressure is 126/65 and her pulse is 83. Her respiration is 18 and oxygen saturation is 97%.     Chief Complaint: Foot Pain    This is a 74 y.o. female who presents today with a chief complaint of left foot pain where it appears to be skin peeling and redness for 3 weeks.        Rash   This is a new problem. The current episode started 1 to 4 weeks ago. The problem is unchanged. The affected locations include the left foot. The rash is characterized by pain, redness and peeling. Pertinent negatives include no cough, fever or sore throat. Past treatments include nothing.       Constitution: Negative for chills and fever.   HENT: Negative for facial swelling and sore throat.    Neck: Negative for painful lymph nodes.   Eyes: Negative for eye itching and eyelid swelling.   Respiratory: Negative for cough.    Musculoskeletal: Positive for pain (left heel pain). Negative for joint pain and joint swelling.   Skin: Negative for color change, pale, wound, abrasion, laceration, lesion, skin thickening/induration, puncture wound, erythema, bruising, abscess, avulsion and hives.   Allergic/Immunologic: Negative for environmental allergies, immunocompromised state and hives.   Hematologic/Lymphatic: Negative for swollen lymph nodes.       Objective:      Physical Exam   Constitutional: She is oriented to person, place, and time. She appears well-developed and well-nourished. She is cooperative.  Non-toxic appearance. She does not appear ill. No distress.   HENT:   Head: Normocephalic and atraumatic.   Right Ear: Hearing, tympanic membrane, external ear and ear canal normal.   Left Ear: Hearing, tympanic membrane, external ear and ear canal normal.   Nose: Nose normal. No mucosal edema, rhinorrhea or nasal deformity. No epistaxis. Right sinus " exhibits no maxillary sinus tenderness and no frontal sinus tenderness. Left sinus exhibits no maxillary sinus tenderness and no frontal sinus tenderness.   Mouth/Throat: Uvula is midline, oropharynx is clear and moist and mucous membranes are normal. No trismus in the jaw. Normal dentition. No uvula swelling. No posterior oropharyngeal erythema.   Eyes: Conjunctivae and lids are normal. Right eye exhibits no discharge. Left eye exhibits no discharge. No scleral icterus.   Sclera clear bilat   Neck: Trachea normal, normal range of motion, full passive range of motion without pain and phonation normal. Neck supple.   Cardiovascular: Normal rate, regular rhythm, normal heart sounds, intact distal pulses and normal pulses.   Pulses:       Dorsalis pedis pulses are 2+ on the right side, and 2+ on the left side.   Pulmonary/Chest: Effort normal and breath sounds normal. No respiratory distress.   Abdominal: Soft. Normal appearance and bowel sounds are normal. She exhibits no distension, no pulsatile midline mass and no mass. There is no tenderness.   Musculoskeletal: Normal range of motion. She exhibits no edema or deformity.        Left foot: There is tenderness (heel tenderness to palpation). There is normal capillary refill.        Feet:    Feet:   Left Foot:   Skin Integrity: Positive for erythema (heel) and dry skin.   Neurological: She is alert and oriented to person, place, and time. She exhibits normal muscle tone. Coordination normal.   Skin: Skin is warm, dry and intact. She is not diaphoretic. No erythema. No pallor.   Psychiatric: She has a normal mood and affect. Her speech is normal and behavior is normal. Judgment and thought content normal. Cognition and memory are normal.   Nursing note and vitals reviewed.      Assessment:       1. Toenail torn away    2. Pain of left heel    3. Uncontrolled type 2 diabetes mellitus with hyperglycemia    4. Diabetes mellitus due to underlying condition with stage 3  chronic kidney disease, without long-term current use of insulin    5. Malignant neoplasm of right female breast, unspecified estrogen receptor status, unspecified site of breast    6. Toenail fungus        Plan:         Toenail torn away  -     Ambulatory referral to Podiatry    Pain of left heel  -     Ambulatory referral to Podiatry    Uncontrolled type 2 diabetes mellitus with hyperglycemia  -     Ambulatory referral to Podiatry    Diabetes mellitus due to underlying condition with stage 3 chronic kidney disease, without long-term current use of insulin    Malignant neoplasm of right female breast, unspecified estrogen receptor status, unspecified site of breast    Toenail fungus      Alina Wilkesug 74 y.o. female with significant co-morbidities-- currently receiving chemo treatment for Breast CA-- recently diagnosed with PE last month and receiving lovenox injections-- has history of DM, uncontrolled and fungal toenail infection.  Currently left 1st toenail distal edge is tearing apart from the base. Now patient has new complaint of left heel pain with erythema and peeling of foot skin. Long discussion with patient regarding prevention of foot infections.  Recommended patient follow up with Podiatry due to risk factors.   Spoke with Aida in clinic -- she has sent a message for an appointment for next week.  Scheduling will contact patient to schedule.

## 2019-06-10 ENCOUNTER — OFFICE VISIT (OUTPATIENT)
Dept: PODIATRY | Facility: CLINIC | Age: 74
End: 2019-06-10
Payer: MEDICARE

## 2019-06-10 ENCOUNTER — TELEPHONE (OUTPATIENT)
Dept: URGENT CARE | Facility: CLINIC | Age: 74
End: 2019-06-10

## 2019-06-10 VITALS
HEART RATE: 77 BPM | SYSTOLIC BLOOD PRESSURE: 117 MMHG | WEIGHT: 243 LBS | BODY MASS INDEX: 44.72 KG/M2 | DIASTOLIC BLOOD PRESSURE: 67 MMHG | HEIGHT: 62 IN | RESPIRATION RATE: 18 BRPM

## 2019-06-10 DIAGNOSIS — T45.1X5A CHEMOTHERAPY-INDUCED NEUROPATHY: ICD-10-CM

## 2019-06-10 DIAGNOSIS — E66.9 DIABETES MELLITUS TYPE 2 IN OBESE: Primary | ICD-10-CM

## 2019-06-10 DIAGNOSIS — G62.0 CHEMOTHERAPY-INDUCED NEUROPATHY: ICD-10-CM

## 2019-06-10 DIAGNOSIS — L84 CORN OR CALLUS: ICD-10-CM

## 2019-06-10 DIAGNOSIS — E11.69 DIABETES MELLITUS TYPE 2 IN OBESE: Primary | ICD-10-CM

## 2019-06-10 PROCEDURE — 99999 PR PBB SHADOW E&M-EST. PATIENT-LVL IV: ICD-10-PCS | Mod: PBBFAC,HCNC,, | Performed by: PODIATRIST

## 2019-06-10 PROCEDURE — 1101F PT FALLS ASSESS-DOCD LE1/YR: CPT | Mod: HCNC,CPTII,S$GLB, | Performed by: PODIATRIST

## 2019-06-10 PROCEDURE — 3045F PR MOST RECENT HEMOGLOBIN A1C LEVEL 7.0-9.0%: ICD-10-PCS | Mod: HCNC,CPTII,S$GLB, | Performed by: PODIATRIST

## 2019-06-10 PROCEDURE — 99213 PR OFFICE/OUTPT VISIT, EST, LEVL III, 20-29 MIN: ICD-10-PCS | Mod: HCNC,S$GLB,, | Performed by: PODIATRIST

## 2019-06-10 PROCEDURE — 99499 UNLISTED E&M SERVICE: CPT | Mod: HCNC,S$GLB,, | Performed by: PODIATRIST

## 2019-06-10 PROCEDURE — 3074F PR MOST RECENT SYSTOLIC BLOOD PRESSURE < 130 MM HG: ICD-10-PCS | Mod: HCNC,CPTII,S$GLB, | Performed by: PODIATRIST

## 2019-06-10 PROCEDURE — 3074F SYST BP LT 130 MM HG: CPT | Mod: HCNC,CPTII,S$GLB, | Performed by: PODIATRIST

## 2019-06-10 PROCEDURE — 1101F PR PT FALLS ASSESS DOC 0-1 FALLS W/OUT INJ PAST YR: ICD-10-PCS | Mod: HCNC,CPTII,S$GLB, | Performed by: PODIATRIST

## 2019-06-10 PROCEDURE — 99213 OFFICE O/P EST LOW 20 MIN: CPT | Mod: HCNC,S$GLB,, | Performed by: PODIATRIST

## 2019-06-10 PROCEDURE — 3045F PR MOST RECENT HEMOGLOBIN A1C LEVEL 7.0-9.0%: CPT | Mod: HCNC,CPTII,S$GLB, | Performed by: PODIATRIST

## 2019-06-10 PROCEDURE — 99499 RISK ADDL DX/OHS AUDIT: ICD-10-PCS | Mod: HCNC,S$GLB,, | Performed by: PODIATRIST

## 2019-06-10 PROCEDURE — 3078F PR MOST RECENT DIASTOLIC BLOOD PRESSURE < 80 MM HG: ICD-10-PCS | Mod: HCNC,CPTII,S$GLB, | Performed by: PODIATRIST

## 2019-06-10 PROCEDURE — 99999 PR PBB SHADOW E&M-EST. PATIENT-LVL IV: CPT | Mod: PBBFAC,HCNC,, | Performed by: PODIATRIST

## 2019-06-10 PROCEDURE — 3078F DIAST BP <80 MM HG: CPT | Mod: HCNC,CPTII,S$GLB, | Performed by: PODIATRIST

## 2019-06-10 NOTE — TELEPHONE ENCOUNTER
Call back DOS 6/7/2019- Patient stated she is doing good and has an appointment today with her foot doctor.

## 2019-06-10 NOTE — PROGRESS NOTES
Subjective:      Patient ID: Alina Narayan is a 74 y.o. female.    Chief Complaint: Blister (left foot )    75 y/o female presents with left heel skin lesion. Pt is known to Dr. Sotomayor for diabetic foot routine care and R achilles tendonitis. Pt was prescribed voltaren but was not applying it because she was concerned that it will have inter action with chemo. Complains of L heel pain over the lesion. She is walking with open toe shoe today. She tells me it is painful to walk on.  Noticed couple days ago. She wants to make sure that it is not infected. She is presenting with her .     Review of Systems   Constitution: Negative for decreased appetite, fever and malaise/fatigue.   HENT: Negative for congestion.    Cardiovascular: Negative for chest pain and leg swelling.   Respiratory: Negative for cough and shortness of breath.    Skin: Positive for color change. Negative for nail changes and rash.   Musculoskeletal: Negative for arthritis, joint pain, joint swelling and muscle weakness.        L foot pain     Gastrointestinal: Negative for bloating, abdominal pain, nausea and vomiting.   Neurological: Positive for numbness and sensory change. Negative for headaches and weakness.   Psychiatric/Behavioral: Negative for altered mental status.             Past Medical History:   Diagnosis Date    Allergy     seasonal    Anxiety     Arthritis     Breast cancer 10/2012    left breast invasive ductal carcinoma    Colon polyp     Diabetes mellitus     Type 2    Diverticular disease     Diverticulitis 2009    Genetic testing 05/2017    negative Integrated BRACAnalysis    Hyperlipidemia     Hypertension     Morbid obesity     MITCHELL (obstructive sleep apnea)     Stenosis     Stenosis and insufficiency of lacrimal passages     Thyroid disease        Past Surgical History:   Procedure Laterality Date    BREAST BIOPSY Left 10/1/2012    left breast- invasive ductal carcinoma    BREAST BIOPSY Left 12/2017     "BREAST LUMPECTOMY Left 2012    CARDIAC VALVE REPLACEMENT  12/2013    CARDIAC VALVE SURGERY  2013    CARPAL TUNNEL RELEASE      Left    CATHETERIZATION, HEART, LEFT Left 11/7/2013    Performed [facility]chant [facility] at [facility] CATH LAB    COLONOSCOPY N/A 9/29/2017    Performed by Phani Worley MD at [facility] ENDO (4TH FLR)    COLONOSCOPY N/A 8/28/2015    Per[facility] by [facility] Worley [facility] at [facility] ENDO (4TH FLR)    DILATION AND CURETTAGE OF UTERUS  1999    Endometrial polyps    ENDOMETRIAL ABLATION  1999    Enodmetrial polyps    NIC-TRANSFORAMINAL Left 10/1/2015    [facility]    EYE SURGERY      [id number]-CATH Right 2/1/2019    Performed by Gaston Flores MD at [facility] OR 2ND FLR    left lumpectomy  2012    REPLACEMENT, AORTIC VALVE N/A 12/2/2013    Performed by Venkat Webb MD at [facility] OR 2ND FLR    SHOULDER SURGERY      SKIN BIOPSY         Family History   Problem Relation Age of Onset    Breast cancer Mother 62    Colon cancer Father     Cancer Father         Colon CA (cause of death); Prostate CA (no chemo)    Heart disease Father     No Known Problems Sister     No Known Problems Brother     Cancer Maternal Grandfather         Colon CA    No Known Problems Son     Cancer Brother         prostate CA, no chemo, "it was bad"    Anxiety disorder Son     SAL disease Son     Sleep disorder Son     Ovarian cancer Neg Hx     Melanoma Neg Hx     Psoriasis Neg Hx     Lupus Neg Hx     Eczema Neg Hx     Acne Neg Hx        Social History     Socioeconomic History    Marital status:      Spouse name: Srinath    Number of children: 2    Years of education: Not on file    Highest education level: Not on file   Occupational History    Occupation: Retired   Social Needs    Financial resource strain: Not on file    Food insecurity:     Worry: Not on file     Inability: Not on file    Transportation needs:     Medical: Not on file "     Non-medical: Not on file   Tobacco Use    Smoking status: Never Smoker    Smokeless tobacco: Never Used   Substance and Sexual Activity    Alcohol use: No     Alcohol/week: 0.0 oz    Drug use: No    Sexual activity: Yes   Lifestyle    Physical activity:     Days per week: Not on file     Minutes per session: Not on file    Stress: Not on file   Relationships    Social connections:     Talks on phone: Not on file     Gets together: Not on file     Attends Oriental orthodox service: Not on file     Active member of club or organization: Not on file     Attends meetings of clubs or organizations: Not on file     Relationship status: Not on file   Other Topics Concern    Are you pregnant or think you may be? No    Breast-feeding No   Social History Narrative    Not on file       Current Outpatient Medications   Medication Sig Dispense Refill    ASPIR-LOW 81 mg EC tablet Take 81 mg by mouth once daily.  12    blood sugar diagnostic Strp True Metirx strips or strips and lancets  - pt checks twice daily for uncontrolled glucoses E11.65 200 each 3    carvedilol (COREG) 25 MG tablet .5-1 tablet oral twice daily or as directed.  Hold until Dr. Dwyer says to restart 180 tablet 3    CHOLECALCIFEROL, VITAMIN D3, (VITAMIN D3 ORAL) Take by mouth once daily. 1000 IU      enoxaparin (LOVENOX) 150 mg/mL Syrg Inject 1 mL (150 mg total) into the skin once daily. 30 mL 3    furosemide (LASIX) 20 MG tablet Take 1 tablet (20 mg total) by mouth once daily. 30 tablet 11    gabapentin (NEURONTIN) 300 MG capsule TAKE 1 CAPSULE TWICE DAILY 180 capsule 1    lancets (ACCU-CHEK SOFTCLIX LANCETS) Misc TrueTest lancets for meter covered by insurance - pt checks twice daily for uncontrolled glucoses E11.65, 200 each 3    levothyroxine (SYNTHROID) 50 MCG tablet Take 1 tablet (50 mcg total) by mouth every evening. 90 tablet 3    lidocaine-prilocaine (EMLA) cream Apply topically as needed. Apply topically as needed 45 minutes prior to  "port access. 5 g 3    pravastatin (PRAVACHOL) 20 MG tablet TAKE 1 TABLET EVERY DAY 90 tablet 3    ALPRAZolam (XANAX) 0.25 MG tablet One-two daily as needed for anxiety 40 tablet 0    blood-glucose meter kit True Test or meter covered by insurance - pt checks glucose twice daily for uncontrolled glucoses E11.65 1 each 0    diclofenac sodium (VOLTAREN) 1 % Gel Apply 2 g topically once daily. 100 g 3    dicyclomine (BENTYL) 20 mg tablet Take 1 tablet (20 mg total) by mouth 3 (three) times daily as needed. 30 tablet 1    metFORMIN (GLUCOPHAGE) 500 MG tablet Take 1 tablet (500 mg total) by mouth 2 (two) times daily with meals. (Patient taking differently: Take 1,000 mg by mouth daily with breakfast. ) 180 tablet 3     No current facility-administered medications for this visit.      Facility-Administered Medications Ordered in Other Visits   Medication Dose Route Frequency Provider Last Rate Last Dose    alteplase injection 2 mg  2 mg Intra-Catheter PRN Erick Dwyer MD   2 mg at 03/11/19 1140    heparin, porcine (PF) 100 unit/mL injection flush 500 Units  500 Units Intravenous 1 time in Clinic/HOD Erick Dwyer MD        heparin, porcine (PF) 100 unit/mL injection flush 500 Units  500 Units Intravenous 1 time in Clinic/HOD Erick Dwyer MD           Review of patient's allergies indicates:   Allergen Reactions    Dilaudid [hydromorphone (bulk)] Other (See Comments)     Other reaction(s): sedation    Hydromorphone Other (See Comments)     Other reaction(s): sedation    Exenatide Rash     Other reaction(s): Rash       Vitals:    06/10/19 1311   BP: 117/67   Pulse: 77   Resp: 18   Weight: 110.2 kg (243 lb)   Height: 5' 2" (1.575 m)   PainSc: 0-No pain       Objective:      Physical Exam   Constitutional: She is oriented to person, place, and time. She appears well-developed and well-nourished. No distress.   Musculoskeletal: She exhibits tenderness. She exhibits no edema or deformity.   Neurological: She is " alert and oriented to person, place, and time.   Skin: Skin is warm. Capillary refill takes less than 2 seconds. No erythema.   Psychiatric: She has a normal mood and affect. Her behavior is normal.       Vascular: Distal DP/PT pulses palpable 2/4. CRT < 3 sec to tips of toes. No vericosities noted to LEs. Hair growth present LE, warm to touch LE, No edema noted to LE.    Dermatologic: No open lesions, lacerations or wounds. Interdigital spaces clean, dry and intact. No erythema, rubor, calor noted LE, Toenails 1-5 bilaterally are elongated by 2-3 mm, thickened by 2-3 mm, discolored/yellowed, dystrophic, brittle with subungual debris. No incurvation. Mild xerosis noted, bilat.   L heel: pre ulcerative/hyperkeratotic lesion posterior heel. No signs of infection such as rubor, calor, drainage, pus, and maldor.    Musculoskeletal: MMT 5/5 in DF/PF/Inv/Ev resistance with no reproduction of pain in any direction. Passive range of motion of ankle and pedal joints is painless. No calf tenderness LE, Compartments soft/compressible.  L heel: pop around the heel.     Neurological:   Light touch, proprioception, and sharp/dull sensation are decreased b/l. Protective threshold with the Coram-Wienstein monofilament is decreased b/l. Vibratory sensation decreased b/l.         Assessment:       Encounter Diagnoses   Name Primary?    Diabetes mellitus type 2 in obese Yes    BMI 45.0-49.9, adult     Corn or callus     Chemotherapy-induced neuropathy          Plan:       Alina was seen today for blister.    Diagnoses and all orders for this visit:    Diabetes mellitus type 2 in obese    BMI 45.0-49.9, adult    Corn or callus    Chemotherapy-induced neuropathy      I counseled the patient on her conditions, their implications and medical management.    75 y/o female with L heel skin lesion most likely callus vs wart     -skin lesion debrided sharply with #15 blade. Tolerated well. No signs of infection.  -recommend OTC callus  removal and WBAT in open toe shoe  -Shoe inspection. General Foot Education. Patient reminded of the importance of good nutrition. Patient instructed on proper foot hygeine. We discussed wearing proper shoe gear, daily foot inspections, never walking without protective shoe gear, caution putting sharp instruments to feet. Discussed general foot care:  Wear comfortable, proper fitting shoes. Wash feet daily. Dry well. After drying, apply moisturizer to feet (no lotion to webspaces). Inspect feet daily for skin breaks, blisters, swelling, or redness. Wear cotton socks (preferably white)  Change socks every day. Do NOT walk barefoot. Do NOT use heating pads or warm/hot water soaks   -It was discussed the importance of wearing shoes with adequate room in toe box to accommodate toe deformities. Recommended New Balance/[employer]ics shoe brands with adequate arch supports to alleviate abnormal pressure and improve stability of foot while walking. Avoid flat shoes and barefoot walking as these will exacerbate or worsen symptoms.   -The nature of the condition, options for management, as well as potential risks and complications were discussed in detail with patient. Patient was amenable to my recommendations and left my office fully informed and will follow up as instructed or sooner if necessary.    -Patient was advised of signs and symptoms of infection including redness, drainage, purulence, odor, streaking, fever, chills and I advised patient to seek medical attention (ER or urgent care) if these symptoms arise.   -Discussed reducing caloric intake, increase physical activity, weight loss, exercise, low salt diet, which may include blood sugar control to help with foot and ankle complications.  -f/u 4 months for routine DM care.

## 2019-06-11 ENCOUNTER — INFUSION (OUTPATIENT)
Dept: INFUSION THERAPY | Facility: HOSPITAL | Age: 74
End: 2019-06-11
Attending: INTERNAL MEDICINE
Payer: MEDICARE

## 2019-06-11 VITALS
SYSTOLIC BLOOD PRESSURE: 121 MMHG | TEMPERATURE: 98 F | HEART RATE: 81 BPM | DIASTOLIC BLOOD PRESSURE: 60 MMHG | RESPIRATION RATE: 18 BRPM

## 2019-06-11 DIAGNOSIS — Z17.1 MALIGNANT NEOPLASM OF RIGHT BREAST IN FEMALE, ESTROGEN RECEPTOR NEGATIVE, UNSPECIFIED SITE OF BREAST: Primary | ICD-10-CM

## 2019-06-11 DIAGNOSIS — C50.911 MALIGNANT NEOPLASM OF RIGHT FEMALE BREAST: ICD-10-CM

## 2019-06-11 DIAGNOSIS — C50.611 CANCER OF AXILLARY TAIL OF RIGHT BREAST: Primary | ICD-10-CM

## 2019-06-11 DIAGNOSIS — C50.911 MALIGNANT NEOPLASM OF RIGHT BREAST IN FEMALE, ESTROGEN RECEPTOR NEGATIVE, UNSPECIFIED SITE OF BREAST: Primary | ICD-10-CM

## 2019-06-11 LAB
BASOPHILS # BLD AUTO: 0.04 K/UL (ref 0–0.2)
BASOPHILS NFR BLD: 0.6 % (ref 0–1.9)
DIFFERENTIAL METHOD: ABNORMAL
EOSINOPHIL # BLD AUTO: 0.1 K/UL (ref 0–0.5)
EOSINOPHIL NFR BLD: 1 % (ref 0–8)
ERYTHROCYTE [DISTWIDTH] IN BLOOD BY AUTOMATED COUNT: 14.9 % (ref 11.5–14.5)
HCT VFR BLD AUTO: 36.5 % (ref 37–48.5)
HGB BLD-MCNC: 11.4 G/DL (ref 12–16)
IMM GRANULOCYTES # BLD AUTO: 0.16 K/UL (ref 0–0.04)
IMM GRANULOCYTES NFR BLD AUTO: 2.6 % (ref 0–0.5)
LYMPHOCYTES # BLD AUTO: 1.5 K/UL (ref 1–4.8)
LYMPHOCYTES NFR BLD: 23.8 % (ref 18–48)
MCH RBC QN AUTO: 31.9 PG (ref 27–31)
MCHC RBC AUTO-ENTMCNC: 31.2 G/DL (ref 32–36)
MCV RBC AUTO: 102 FL (ref 82–98)
MONOCYTES # BLD AUTO: 0.7 K/UL (ref 0.3–1)
MONOCYTES NFR BLD: 10.7 % (ref 4–15)
NEUTROPHILS # BLD AUTO: 3.8 K/UL (ref 1.8–7.7)
NEUTROPHILS NFR BLD: 61.3 % (ref 38–73)
NRBC BLD-RTO: 0 /100 WBC
PLATELET # BLD AUTO: 120 K/UL (ref 150–350)
PMV BLD AUTO: 10.2 FL (ref 9.2–12.9)
RBC # BLD AUTO: 3.57 M/UL (ref 4–5.4)
WBC # BLD AUTO: 6.18 K/UL (ref 3.9–12.7)

## 2019-06-11 PROCEDURE — 63600175 PHARM REV CODE 636 W HCPCS: Mod: HCNC,JG | Performed by: INTERNAL MEDICINE

## 2019-06-11 PROCEDURE — 63600175 PHARM REV CODE 636 W HCPCS: Mod: HCNC | Performed by: INTERNAL MEDICINE

## 2019-06-11 PROCEDURE — 96413 CHEMO IV INFUSION 1 HR: CPT | Mod: HCNC

## 2019-06-11 PROCEDURE — 96367 TX/PROPH/DG ADDL SEQ IV INF: CPT | Mod: HCNC

## 2019-06-11 PROCEDURE — A4216 STERILE WATER/SALINE, 10 ML: HCPCS | Mod: HCNC | Performed by: INTERNAL MEDICINE

## 2019-06-11 PROCEDURE — 25000003 PHARM REV CODE 250: Mod: HCNC | Performed by: INTERNAL MEDICINE

## 2019-06-11 PROCEDURE — 85025 COMPLETE CBC W/AUTO DIFF WBC: CPT | Mod: HCNC

## 2019-06-11 PROCEDURE — 36415 COLL VENOUS BLD VENIPUNCTURE: CPT | Mod: HCNC

## 2019-06-11 PROCEDURE — 96411 CHEMO IV PUSH ADDL DRUG: CPT | Mod: HCNC

## 2019-06-11 RX ORDER — METHOTREXATE 25 MG/ML
40 INJECTION INTRA-ARTERIAL; INTRAMUSCULAR; INTRATHECAL; INTRAVENOUS
Status: COMPLETED | OUTPATIENT
Start: 2019-06-11 | End: 2019-06-11

## 2019-06-11 RX ORDER — SODIUM CHLORIDE 0.9 % (FLUSH) 0.9 %
10 SYRINGE (ML) INJECTION
Status: COMPLETED | OUTPATIENT
Start: 2019-06-11 | End: 2019-06-11

## 2019-06-11 RX ORDER — HEPARIN 100 UNIT/ML
500 SYRINGE INTRAVENOUS
Status: CANCELLED | OUTPATIENT
Start: 2019-06-11

## 2019-06-11 RX ORDER — FLUOROURACIL 50 MG/ML
600 INJECTION, SOLUTION INTRAVENOUS
Status: COMPLETED | OUTPATIENT
Start: 2019-06-11 | End: 2019-06-11

## 2019-06-11 RX ORDER — SODIUM CHLORIDE 0.9 % (FLUSH) 0.9 %
10 SYRINGE (ML) INJECTION
Status: CANCELLED | OUTPATIENT
Start: 2019-06-11

## 2019-06-11 RX ORDER — SODIUM CHLORIDE 0.9 % (FLUSH) 0.9 %
10 SYRINGE (ML) INJECTION
Status: DISCONTINUED | OUTPATIENT
Start: 2019-06-11 | End: 2019-06-11 | Stop reason: HOSPADM

## 2019-06-11 RX ORDER — HEPARIN 100 UNIT/ML
500 SYRINGE INTRAVENOUS
Status: COMPLETED | OUTPATIENT
Start: 2019-06-11 | End: 2019-06-11

## 2019-06-11 RX ORDER — HEPARIN 100 UNIT/ML
500 SYRINGE INTRAVENOUS
Status: DISCONTINUED | OUTPATIENT
Start: 2019-06-11 | End: 2019-06-11 | Stop reason: HOSPADM

## 2019-06-11 RX ADMIN — Medication 10 ML: at 01:06

## 2019-06-11 RX ADMIN — CYCLOPHOSPHAMIDE 1345 MG: 500 INJECTION, POWDER, FOR SOLUTION INTRAVENOUS; ORAL at 12:06

## 2019-06-11 RX ADMIN — METHOTREXATE 90 MG: 25 SOLUTION INTRA-ARTERIAL; INTRAMUSCULAR; INTRATHECAL; INTRAVENOUS at 12:06

## 2019-06-11 RX ADMIN — SODIUM CHLORIDE: 0.9 INJECTION, SOLUTION INTRAVENOUS at 11:06

## 2019-06-11 RX ADMIN — HEPARIN 500 UNITS: 100 SYRINGE at 10:06

## 2019-06-11 RX ADMIN — Medication 10 ML: at 10:06

## 2019-06-11 RX ADMIN — HEPARIN 500 UNITS: 100 SYRINGE at 01:06

## 2019-06-11 RX ADMIN — FLUOROURACIL 1345 MG: 50 INJECTION, SOLUTION INTRAVENOUS at 12:06

## 2019-06-11 RX ADMIN — DEXAMETHASONE SODIUM PHOSPHATE: 4 INJECTION, SOLUTION INTRA-ARTICULAR; INTRALESIONAL; INTRAMUSCULAR; INTRAVENOUS; SOFT TISSUE at 11:06

## 2019-06-11 NOTE — PLAN OF CARE
Problem: Adult Inpatient Plan of Care  Goal: Plan of Care Review  Outcome: Ongoing (interventions implemented as appropriate)  Did well today with cmf.For neulasta tomarrow.Jayashree'ts given. No complaints.

## 2019-06-12 ENCOUNTER — INFUSION (OUTPATIENT)
Dept: INFUSION THERAPY | Facility: HOSPITAL | Age: 74
End: 2019-06-12
Attending: INTERNAL MEDICINE
Payer: MEDICARE

## 2019-06-12 DIAGNOSIS — Z17.1 MALIGNANT NEOPLASM OF RIGHT BREAST IN FEMALE, ESTROGEN RECEPTOR NEGATIVE, UNSPECIFIED SITE OF BREAST: Primary | ICD-10-CM

## 2019-06-12 DIAGNOSIS — C50.911 MALIGNANT NEOPLASM OF RIGHT BREAST IN FEMALE, ESTROGEN RECEPTOR NEGATIVE, UNSPECIFIED SITE OF BREAST: Primary | ICD-10-CM

## 2019-06-12 PROCEDURE — 63600175 PHARM REV CODE 636 W HCPCS: Mod: HCNC,JG | Performed by: INTERNAL MEDICINE

## 2019-06-12 PROCEDURE — 96372 THER/PROPH/DIAG INJ SC/IM: CPT | Mod: HCNC

## 2019-06-12 RX ADMIN — PEGFILGRASTIM 6 MG: 6 INJECTION SUBCUTANEOUS at 02:06

## 2019-06-12 NOTE — NURSING
Patient here for neulasta injection-teaching with booklet on neulasta-no complaints except fatigue-tolerated well.

## 2019-06-17 RX ORDER — METFORMIN HYDROCHLORIDE 500 MG/1
TABLET ORAL
Qty: 180 TABLET | Refills: 3 | Status: SHIPPED | OUTPATIENT
Start: 2019-06-17 | End: 2020-05-02

## 2019-06-20 ENCOUNTER — PES CALL (OUTPATIENT)
Dept: ADMINISTRATIVE | Facility: CLINIC | Age: 74
End: 2019-06-20

## 2019-06-25 RX ORDER — SODIUM CHLORIDE 0.9 % (FLUSH) 0.9 %
10 SYRINGE (ML) INJECTION
Status: CANCELLED | OUTPATIENT
Start: 2019-07-02

## 2019-06-25 RX ORDER — METHOTREXATE 25 MG/ML
40 INJECTION INTRA-ARTERIAL; INTRAMUSCULAR; INTRATHECAL; INTRAVENOUS
Status: CANCELLED | OUTPATIENT
Start: 2019-07-02

## 2019-06-25 RX ORDER — HEPARIN 100 UNIT/ML
500 SYRINGE INTRAVENOUS
Status: CANCELLED | OUTPATIENT
Start: 2019-07-02

## 2019-06-25 RX ORDER — FLUOROURACIL 50 MG/ML
600 INJECTION, SOLUTION INTRAVENOUS
Status: CANCELLED | OUTPATIENT
Start: 2019-07-02

## 2019-07-01 NOTE — PROGRESS NOTES
Subjective:       Patient ID: Alina Narayan is a 74 y.o. female.    Chief Complaint: No chief complaint on file.    HPI Ms Narayan is a 73 Y/O white female who is currently receiving neoadjuvant chemotherapy for a recently diagnosed triple negative carcinoma of the right breast.  She has received 12 cycles of weekly Taxol and 2 cycles of CMF.      She was diagnosed with PE on on May 8.    She is currently on Lovenox at home.    Today she reports that her breathing has been good.  She is no longer using home oxygen.  She has some intermittent swelling of her feet.  She does have some diarrhea for a day or 2 after chemotherapy and then subsequently some constipation.  Appetite has been somewhat decreased.  She has noted some hair growth.      Her follow-up  Breast imaging after Taxol showed the following:There is a 9 mm lymph node seen in the right axilla. Associated coil clip; pathology showed metastastic disease within a lymph node. Decreased in size compared to prior, which measured 14 mm.      US Breast Right Limited  There is a 6 mm x 6 mm x 7 mm intramammary lymph node seen in the axillary tail region of the right breast. Compared to the previous study, the intramammary lymph node decreased in size. Previously measured 6 x 9 x 11 mm    Current history:  abnormal mammogram of the right breast in December 2018.  She was having no breast symptoms at that time    That mammogram showed a 13 mm mass in the right axillary tail.  By ultrasound there was a 6 x 9 x 11 mm intramammary node and a 2nd 5 x 6 x 6 mm hypoechoic mass in the axillary tail.  On January 7, 2019 both masses were biopsied and both showed lymph nodes with metastatic carcinoma which was ER negative MA negative and HER2 negative.  Ki-67 was 40%.    MRI on January 15, 2019 showed 2 right axillary lymph nodes the largest measured 1.4 x 1.0 cm.  There were no abnormalities in the right breast.    PET scan was then performed which showed only low-grade activity  in the right axilla with an SUV of 1.32.    Previous breast cancer history History: On September 25, 2012 she underwent a screening mammogram which showed an asymmetric density in the left breast at 2:00 position. A followup mammogram on the 27th showed an oval mass in that area ultrasound showed a 6 mm solid mass. Core needle biopsy on October 1 showed invasive carcinoma ER 90% positive AL 80% positive and HER-2 negative. On October 29 she underwent lumpectomy and sentinel lymph node biopsy. That showed a 7 mm low-grade (1+2+1) infiltrating carcinoma. 3 sentinel lymph nodes were negative. Final pathological stage TIB N0 stage IA.  She completed letrozole therapy in 2017.  Review of Systems   Constitutional: Positive for appetite change and unexpected weight change.   Eyes: Negative for visual disturbance.   Respiratory: Negative for cough and shortness of breath.    Cardiovascular: Negative for chest pain.   Gastrointestinal: Positive for diarrhea. Negative for abdominal pain.   Genitourinary: Negative for frequency.   Musculoskeletal: Positive for back pain.   Skin: Negative for rash.   Neurological: Negative for headaches.   Hematological: Negative for adenopathy.   Psychiatric/Behavioral: The patient is nervous/anxious.        Objective:      Physical Exam   Constitutional: She is oriented to person, place, and time. She appears well-developed and well-nourished.   Eyes: No scleral icterus.   Cardiovascular: Normal rate and regular rhythm.   Pulmonary/Chest: Effort normal. Right breast exhibits no mass, no nipple discharge and no skin change. Left breast exhibits no mass, no nipple discharge and no skin change.       Abdominal: Soft. She exhibits no mass. There is no tenderness.   Lymphadenopathy:     She has no cervical adenopathy.   Neurological: She is alert and oriented to person, place, and time.   Psychiatric: She has a normal mood and affect. Her behavior is normal. Thought content normal.   Vitals  reviewed.      Assessment:       1. Cancer of axillary tail of right breast        Plan:       Cycle 3 of CMF and return in 3 weeks for cycle 4.  Will need follow-up in surgery with repeat imaging.        Distress Screening Results: Psychosocial Distress screening score of Distress Score: 6 noted and reviewed. No intervention indicated.

## 2019-07-02 ENCOUNTER — OFFICE VISIT (OUTPATIENT)
Dept: HEMATOLOGY/ONCOLOGY | Facility: CLINIC | Age: 74
End: 2019-07-02
Payer: MEDICARE

## 2019-07-02 ENCOUNTER — INFUSION (OUTPATIENT)
Dept: INFUSION THERAPY | Facility: HOSPITAL | Age: 74
End: 2019-07-02
Attending: INTERNAL MEDICINE
Payer: MEDICARE

## 2019-07-02 VITALS
RESPIRATION RATE: 18 BRPM | BODY MASS INDEX: 44.18 KG/M2 | TEMPERATURE: 98 F | HEIGHT: 62 IN | OXYGEN SATURATION: 97 % | DIASTOLIC BLOOD PRESSURE: 71 MMHG | HEART RATE: 82 BPM | WEIGHT: 240.06 LBS | SYSTOLIC BLOOD PRESSURE: 134 MMHG

## 2019-07-02 VITALS
RESPIRATION RATE: 18 BRPM | DIASTOLIC BLOOD PRESSURE: 59 MMHG | HEIGHT: 62 IN | HEART RATE: 82 BPM | WEIGHT: 240.06 LBS | SYSTOLIC BLOOD PRESSURE: 127 MMHG | BODY MASS INDEX: 44.18 KG/M2 | TEMPERATURE: 98 F

## 2019-07-02 DIAGNOSIS — C50.611 CANCER OF AXILLARY TAIL OF RIGHT BREAST: Primary | ICD-10-CM

## 2019-07-02 DIAGNOSIS — Z17.1 MALIGNANT NEOPLASM OF RIGHT BREAST IN FEMALE, ESTROGEN RECEPTOR NEGATIVE, UNSPECIFIED SITE OF BREAST: ICD-10-CM

## 2019-07-02 DIAGNOSIS — C50.911 MALIGNANT NEOPLASM OF RIGHT BREAST IN FEMALE, ESTROGEN RECEPTOR NEGATIVE, UNSPECIFIED SITE OF BREAST: Primary | ICD-10-CM

## 2019-07-02 DIAGNOSIS — C50.911 MALIGNANT NEOPLASM OF RIGHT FEMALE BREAST: ICD-10-CM

## 2019-07-02 DIAGNOSIS — Z17.1 MALIGNANT NEOPLASM OF RIGHT BREAST IN FEMALE, ESTROGEN RECEPTOR NEGATIVE, UNSPECIFIED SITE OF BREAST: Primary | ICD-10-CM

## 2019-07-02 DIAGNOSIS — C50.911 MALIGNANT NEOPLASM OF RIGHT BREAST IN FEMALE, ESTROGEN RECEPTOR NEGATIVE, UNSPECIFIED SITE OF BREAST: ICD-10-CM

## 2019-07-02 LAB
ALBUMIN SERPL BCP-MCNC: 3.7 G/DL (ref 3.5–5.2)
ALP SERPL-CCNC: 99 U/L (ref 55–135)
ALT SERPL W/O P-5'-P-CCNC: 25 U/L (ref 10–44)
ANION GAP SERPL CALC-SCNC: 10 MMOL/L (ref 8–16)
AST SERPL-CCNC: 24 U/L (ref 10–40)
BASOPHILS # BLD AUTO: 0.05 K/UL (ref 0–0.2)
BASOPHILS NFR BLD: 0.8 % (ref 0–1.9)
BILIRUB SERPL-MCNC: 0.4 MG/DL (ref 0.1–1)
BUN SERPL-MCNC: 14 MG/DL (ref 8–23)
CALCIUM SERPL-MCNC: 9.5 MG/DL (ref 8.7–10.5)
CHLORIDE SERPL-SCNC: 102 MMOL/L (ref 95–110)
CO2 SERPL-SCNC: 30 MMOL/L (ref 23–29)
CREAT SERPL-MCNC: 0.9 MG/DL (ref 0.5–1.4)
DIFFERENTIAL METHOD: ABNORMAL
EOSINOPHIL # BLD AUTO: 0.1 K/UL (ref 0–0.5)
EOSINOPHIL NFR BLD: 1.1 % (ref 0–8)
ERYTHROCYTE [DISTWIDTH] IN BLOOD BY AUTOMATED COUNT: 14.3 % (ref 11.5–14.5)
EST. GFR  (AFRICAN AMERICAN): >60 ML/MIN/1.73 M^2
EST. GFR  (NON AFRICAN AMERICAN): >60 ML/MIN/1.73 M^2
GLUCOSE SERPL-MCNC: 165 MG/DL (ref 70–110)
HCT VFR BLD AUTO: 35.4 % (ref 37–48.5)
HGB BLD-MCNC: 11.3 G/DL (ref 12–16)
IMM GRANULOCYTES # BLD AUTO: 0.16 K/UL (ref 0–0.04)
IMM GRANULOCYTES NFR BLD AUTO: 2.6 % (ref 0–0.5)
LYMPHOCYTES # BLD AUTO: 1.6 K/UL (ref 1–4.8)
LYMPHOCYTES NFR BLD: 25.7 % (ref 18–48)
MCH RBC QN AUTO: 32 PG (ref 27–31)
MCHC RBC AUTO-ENTMCNC: 31.9 G/DL (ref 32–36)
MCV RBC AUTO: 100 FL (ref 82–98)
MONOCYTES # BLD AUTO: 0.6 K/UL (ref 0.3–1)
MONOCYTES NFR BLD: 9.7 % (ref 4–15)
NEUTROPHILS # BLD AUTO: 3.7 K/UL (ref 1.8–7.7)
NEUTROPHILS NFR BLD: 60.1 % (ref 38–73)
NRBC BLD-RTO: 1 /100 WBC
PLATELET # BLD AUTO: 105 K/UL (ref 150–350)
PMV BLD AUTO: 11.6 FL (ref 9.2–12.9)
POTASSIUM SERPL-SCNC: 3.9 MMOL/L (ref 3.5–5.1)
PROT SERPL-MCNC: 6.5 G/DL (ref 6–8.4)
RBC # BLD AUTO: 3.53 M/UL (ref 4–5.4)
SODIUM SERPL-SCNC: 142 MMOL/L (ref 136–145)
WBC # BLD AUTO: 6.1 K/UL (ref 3.9–12.7)

## 2019-07-02 PROCEDURE — 63600175 PHARM REV CODE 636 W HCPCS: Mod: HCNC | Performed by: INTERNAL MEDICINE

## 2019-07-02 PROCEDURE — A4216 STERILE WATER/SALINE, 10 ML: HCPCS | Mod: HCNC | Performed by: INTERNAL MEDICINE

## 2019-07-02 PROCEDURE — 96413 CHEMO IV INFUSION 1 HR: CPT | Mod: HCNC

## 2019-07-02 PROCEDURE — 25000003 PHARM REV CODE 250: Mod: HCNC | Performed by: INTERNAL MEDICINE

## 2019-07-02 PROCEDURE — 1101F PR PT FALLS ASSESS DOC 0-1 FALLS W/OUT INJ PAST YR: ICD-10-PCS | Mod: HCNC,CPTII,S$GLB, | Performed by: INTERNAL MEDICINE

## 2019-07-02 PROCEDURE — 3078F PR MOST RECENT DIASTOLIC BLOOD PRESSURE < 80 MM HG: ICD-10-PCS | Mod: HCNC,CPTII,S$GLB, | Performed by: INTERNAL MEDICINE

## 2019-07-02 PROCEDURE — 99214 OFFICE O/P EST MOD 30 MIN: CPT | Mod: HCNC,S$GLB,, | Performed by: INTERNAL MEDICINE

## 2019-07-02 PROCEDURE — 3075F SYST BP GE 130 - 139MM HG: CPT | Mod: HCNC,CPTII,S$GLB, | Performed by: INTERNAL MEDICINE

## 2019-07-02 PROCEDURE — 85025 COMPLETE CBC W/AUTO DIFF WBC: CPT | Mod: HCNC

## 2019-07-02 PROCEDURE — 80053 COMPREHEN METABOLIC PANEL: CPT | Mod: HCNC

## 2019-07-02 PROCEDURE — 99999 PR PBB SHADOW E&M-EST. PATIENT-LVL III: CPT | Mod: PBBFAC,HCNC,, | Performed by: INTERNAL MEDICINE

## 2019-07-02 PROCEDURE — 1101F PT FALLS ASSESS-DOCD LE1/YR: CPT | Mod: HCNC,CPTII,S$GLB, | Performed by: INTERNAL MEDICINE

## 2019-07-02 PROCEDURE — 99214 PR OFFICE/OUTPT VISIT, EST, LEVL IV, 30-39 MIN: ICD-10-PCS | Mod: HCNC,S$GLB,, | Performed by: INTERNAL MEDICINE

## 2019-07-02 PROCEDURE — 99999 PR PBB SHADOW E&M-EST. PATIENT-LVL III: ICD-10-PCS | Mod: PBBFAC,HCNC,, | Performed by: INTERNAL MEDICINE

## 2019-07-02 PROCEDURE — 96367 TX/PROPH/DG ADDL SEQ IV INF: CPT | Mod: HCNC

## 2019-07-02 PROCEDURE — 3075F PR MOST RECENT SYSTOLIC BLOOD PRESS GE 130-139MM HG: ICD-10-PCS | Mod: HCNC,CPTII,S$GLB, | Performed by: INTERNAL MEDICINE

## 2019-07-02 PROCEDURE — 96411 CHEMO IV PUSH ADDL DRUG: CPT | Mod: HCNC

## 2019-07-02 PROCEDURE — 3078F DIAST BP <80 MM HG: CPT | Mod: HCNC,CPTII,S$GLB, | Performed by: INTERNAL MEDICINE

## 2019-07-02 RX ORDER — SODIUM CHLORIDE 0.9 % (FLUSH) 0.9 %
10 SYRINGE (ML) INJECTION
Status: DISCONTINUED | OUTPATIENT
Start: 2019-07-02 | End: 2019-07-02 | Stop reason: HOSPADM

## 2019-07-02 RX ORDER — METHOTREXATE 25 MG/ML
40 INJECTION INTRA-ARTERIAL; INTRAMUSCULAR; INTRATHECAL; INTRAVENOUS
Status: COMPLETED | OUTPATIENT
Start: 2019-07-02 | End: 2019-07-02

## 2019-07-02 RX ORDER — FLUOROURACIL 50 MG/ML
600 INJECTION, SOLUTION INTRAVENOUS
Status: COMPLETED | OUTPATIENT
Start: 2019-07-02 | End: 2019-07-02

## 2019-07-02 RX ORDER — HEPARIN 100 UNIT/ML
500 SYRINGE INTRAVENOUS
Status: COMPLETED | OUTPATIENT
Start: 2019-07-02 | End: 2019-07-02

## 2019-07-02 RX ORDER — HEPARIN 100 UNIT/ML
500 SYRINGE INTRAVENOUS
Status: DISCONTINUED | OUTPATIENT
Start: 2019-07-02 | End: 2019-07-02 | Stop reason: HOSPADM

## 2019-07-02 RX ORDER — SODIUM CHLORIDE 0.9 % (FLUSH) 0.9 %
10 SYRINGE (ML) INJECTION
Status: COMPLETED | OUTPATIENT
Start: 2019-07-02 | End: 2019-07-02

## 2019-07-02 RX ORDER — HEPARIN 100 UNIT/ML
500 SYRINGE INTRAVENOUS
Status: CANCELLED | OUTPATIENT
Start: 2019-07-02

## 2019-07-02 RX ORDER — SODIUM CHLORIDE 0.9 % (FLUSH) 0.9 %
10 SYRINGE (ML) INJECTION
Status: CANCELLED | OUTPATIENT
Start: 2019-07-02

## 2019-07-02 RX ADMIN — SODIUM CHLORIDE: 0.9 INJECTION, SOLUTION INTRAVENOUS at 09:07

## 2019-07-02 RX ADMIN — CYCLOPHOSPHAMIDE 1345 MG: 1 INJECTION, POWDER, FOR SOLUTION INTRAVENOUS; ORAL at 10:07

## 2019-07-02 RX ADMIN — HEPARIN 500 UNITS: 100 SYRINGE at 08:07

## 2019-07-02 RX ADMIN — HEPARIN 500 UNITS: 100 SYRINGE at 11:07

## 2019-07-02 RX ADMIN — PALONOSETRON HYDROCHLORIDE: 0.25 INJECTION, SOLUTION INTRAVENOUS at 09:07

## 2019-07-02 RX ADMIN — Medication 10 ML: at 08:07

## 2019-07-02 RX ADMIN — Medication 10 ML: at 11:07

## 2019-07-02 RX ADMIN — FLUOROURACIL 1345 MG: 50 INJECTION, SOLUTION INTRAVENOUS at 09:07

## 2019-07-02 RX ADMIN — METHOTREXATE 90 MG: 25 INJECTION, SOLUTION INTRA-ARTERIAL; INTRAMUSCULAR; INTRATHECAL; INTRAVENOUS at 09:07

## 2019-07-02 NOTE — NURSING
Patient arrived for lab draw. Port + blood return present, flushed, hep locked and left accessed for possible chemo today. NaD noted upon discharge. Discharged to MD appt, ambulated independently using walker.

## 2019-07-03 ENCOUNTER — TELEPHONE (OUTPATIENT)
Dept: SURGERY | Facility: CLINIC | Age: 74
End: 2019-07-03

## 2019-07-03 NOTE — TELEPHONE ENCOUNTER
Called patient and she is scheduled to see Dr. Flores on 7/18 at 9:30am as requested by the patient.    ----- Message from Alicia Marion sent at 7/3/2019  2:13 PM CDT -----  Good afternoon, Dr. Dwyer asked that  Dr. Flores see this patient in 2 weeks please.

## 2019-07-13 ENCOUNTER — HOSPITAL ENCOUNTER (EMERGENCY)
Facility: HOSPITAL | Age: 74
Discharge: HOME | End: 2019-07-13
Attending: EMERGENCY MEDICINE
Payer: MEDICARE

## 2019-07-13 ENCOUNTER — PATIENT MESSAGE (OUTPATIENT)
Dept: INTERNAL MEDICINE | Facility: CLINIC | Age: 74
End: 2019-07-13

## 2019-07-13 VITALS
HEART RATE: 84 BPM | SYSTOLIC BLOOD PRESSURE: 126 MMHG | HEIGHT: 62 IN | DIASTOLIC BLOOD PRESSURE: 59 MMHG | OXYGEN SATURATION: 99 % | WEIGHT: 240 LBS | RESPIRATION RATE: 17 BRPM | TEMPERATURE: 98 F | BODY MASS INDEX: 44.16 KG/M2

## 2019-07-13 DIAGNOSIS — K76.0 HEPATIC STEATOSIS: ICD-10-CM

## 2019-07-13 DIAGNOSIS — R10.32 LEFT LOWER QUADRANT PAIN: Primary | ICD-10-CM

## 2019-07-13 LAB
ALBUMIN SERPL BCP-MCNC: 3.8 G/DL (ref 3.5–5.2)
ALP SERPL-CCNC: 91 U/L (ref 55–135)
ALT SERPL W/O P-5'-P-CCNC: 21 U/L (ref 10–44)
ANION GAP SERPL CALC-SCNC: 11 MMOL/L (ref 8–16)
AST SERPL-CCNC: 18 U/L (ref 10–40)
BASOPHILS # BLD AUTO: 0.02 K/UL (ref 0–0.2)
BASOPHILS NFR BLD: 0.9 % (ref 0–1.9)
BILIRUB SERPL-MCNC: 0.5 MG/DL (ref 0.1–1)
BILIRUB UR QL STRIP: NEGATIVE
BUN SERPL-MCNC: 12 MG/DL (ref 8–23)
CALCIUM SERPL-MCNC: 10 MG/DL (ref 8.7–10.5)
CHLORIDE SERPL-SCNC: 102 MMOL/L (ref 95–110)
CLARITY UR REFRACT.AUTO: CLEAR
CO2 SERPL-SCNC: 26 MMOL/L (ref 23–29)
COLOR UR AUTO: ABNORMAL
CREAT SERPL-MCNC: 0.9 MG/DL (ref 0.5–1.4)
DIFFERENTIAL METHOD: ABNORMAL
EOSINOPHIL # BLD AUTO: 0 K/UL (ref 0–0.5)
EOSINOPHIL NFR BLD: 0.9 % (ref 0–8)
ERYTHROCYTE [DISTWIDTH] IN BLOOD BY AUTOMATED COUNT: 13.7 % (ref 11.5–14.5)
EST. GFR  (AFRICAN AMERICAN): >60 ML/MIN/1.73 M^2
EST. GFR  (NON AFRICAN AMERICAN): >60 ML/MIN/1.73 M^2
GLUCOSE SERPL-MCNC: 194 MG/DL (ref 70–110)
GLUCOSE UR QL STRIP: ABNORMAL
HCT VFR BLD AUTO: 34.2 % (ref 37–48.5)
HGB BLD-MCNC: 11 G/DL (ref 12–16)
HGB UR QL STRIP: NEGATIVE
IMM GRANULOCYTES # BLD AUTO: 0.01 K/UL (ref 0–0.04)
IMM GRANULOCYTES NFR BLD AUTO: 0.5 % (ref 0–0.5)
KETONES UR QL STRIP: NEGATIVE
LEUKOCYTE ESTERASE UR QL STRIP: NEGATIVE
LYMPHOCYTES # BLD AUTO: 0.6 K/UL (ref 1–4.8)
LYMPHOCYTES NFR BLD: 28.6 % (ref 18–48)
MCH RBC QN AUTO: 31.5 PG (ref 27–31)
MCHC RBC AUTO-ENTMCNC: 32.2 G/DL (ref 32–36)
MCV RBC AUTO: 98 FL (ref 82–98)
MONOCYTES # BLD AUTO: 0.2 K/UL (ref 0.3–1)
MONOCYTES NFR BLD: 8.6 % (ref 4–15)
NEUTROPHILS # BLD AUTO: 1.3 K/UL (ref 1.8–7.7)
NEUTROPHILS NFR BLD: 60.5 % (ref 38–73)
NITRITE UR QL STRIP: NEGATIVE
NRBC BLD-RTO: 0 /100 WBC
PH UR STRIP: 7 [PH] (ref 5–8)
PLATELET # BLD AUTO: 139 K/UL (ref 150–350)
PMV BLD AUTO: 10.1 FL (ref 9.2–12.9)
POTASSIUM SERPL-SCNC: 3.9 MMOL/L (ref 3.5–5.1)
PROT SERPL-MCNC: 6.7 G/DL (ref 6–8.4)
PROT UR QL STRIP: NEGATIVE
RBC # BLD AUTO: 3.49 M/UL (ref 4–5.4)
SODIUM SERPL-SCNC: 139 MMOL/L (ref 136–145)
SP GR UR STRIP: 1 (ref 1–1.03)
URN SPEC COLLECT METH UR: ABNORMAL
WBC # BLD AUTO: 2.2 K/UL (ref 3.9–12.7)

## 2019-07-13 PROCEDURE — 99285 PR EMERGENCY DEPT VISIT,LEVEL V: ICD-10-PCS | Mod: HCNC,,, | Performed by: EMERGENCY MEDICINE

## 2019-07-13 PROCEDURE — 25500020 PHARM REV CODE 255: Mod: HCNC | Performed by: EMERGENCY MEDICINE

## 2019-07-13 PROCEDURE — 81003 URINALYSIS AUTO W/O SCOPE: CPT | Mod: HCNC

## 2019-07-13 PROCEDURE — 80053 COMPREHEN METABOLIC PANEL: CPT | Mod: HCNC

## 2019-07-13 PROCEDURE — 99285 EMERGENCY DEPT VISIT HI MDM: CPT | Mod: 25,HCNC

## 2019-07-13 PROCEDURE — 25000003 PHARM REV CODE 250: Mod: HCNC | Performed by: STUDENT IN AN ORGANIZED HEALTH CARE EDUCATION/TRAINING PROGRAM

## 2019-07-13 PROCEDURE — 85025 COMPLETE CBC W/AUTO DIFF WBC: CPT | Mod: HCNC

## 2019-07-13 PROCEDURE — 99285 EMERGENCY DEPT VISIT HI MDM: CPT | Mod: HCNC,,, | Performed by: EMERGENCY MEDICINE

## 2019-07-13 RX ORDER — MORPHINE SULFATE 2 MG/ML
2 INJECTION, SOLUTION INTRAMUSCULAR; INTRAVENOUS
Status: DISCONTINUED | OUTPATIENT
Start: 2019-07-13 | End: 2019-07-13 | Stop reason: HOSPADM

## 2019-07-13 RX ORDER — CIPROFLOXACIN 500 MG/1
500 TABLET ORAL 2 TIMES DAILY
Qty: 20 TABLET | Refills: 0 | Status: SHIPPED | OUTPATIENT
Start: 2019-07-13 | End: 2019-07-23

## 2019-07-13 RX ORDER — METRONIDAZOLE 500 MG/1
500 TABLET ORAL
Status: COMPLETED | OUTPATIENT
Start: 2019-07-13 | End: 2019-07-13

## 2019-07-13 RX ORDER — METRONIDAZOLE 500 MG/1
500 TABLET ORAL EVERY 12 HOURS
Qty: 20 TABLET | Refills: 0 | Status: SHIPPED | OUTPATIENT
Start: 2019-07-13 | End: 2019-07-23

## 2019-07-13 RX ORDER — CIPROFLOXACIN 500 MG/1
500 TABLET ORAL
Status: COMPLETED | OUTPATIENT
Start: 2019-07-13 | End: 2019-07-13

## 2019-07-13 RX ADMIN — METRONIDAZOLE 500 MG: 500 TABLET ORAL at 04:07

## 2019-07-13 RX ADMIN — IOHEXOL 100 ML: 350 INJECTION, SOLUTION INTRAVENOUS at 01:07

## 2019-07-13 RX ADMIN — CIPROFLOXACIN HYDROCHLORIDE 500 MG: 500 TABLET, FILM COATED ORAL at 04:07

## 2019-07-13 NOTE — DISCHARGE INSTRUCTIONS
Your CT showed early diverticulitis, for which you should take antibiotics as prescribed.  Your CT also showed a small nodule in your right upper lung, as well as fatty liver, for which you should follow-up with your PCP next week (see attached CT reading).    Our goal in the emergency department is to always give you outstanding care and exceptional service. You may receive a survey by mail or e-mail in the next week regarding your experience in our ED. We would greatly appreciate your completing and returning the survey. Your feedback provides us with a way to recognize our staff who give very good care and it helps us learn how to improve when your experience was below our aspiration of excellence.

## 2019-07-13 NOTE — ED PROVIDER NOTES
Encounter Date: 7/13/2019       History     Chief Complaint   Patient presents with    Abdominal Pain     Hx of divertiulitis. Denies blood in stool. Last received chemo 2 weeks ago.      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 Pt states the pain began yesterday morning without inciting events. Pain is described as achy L sided abd pain, 2/10 at rest and 8/10 with movement. She has not taken anything to relieve the pain. She denies fevers, chills, diaphoresis, N/V/D, blood in stool, dysuria, hematuria, or SoB.     The history is provided by the patient.     Review of patient's allergies indicates:   Allergen Reactions    Dilaudid [hydromorphone (bulk)] Other (See Comments)     Other reaction(s): sedation    Hydromorphone Other (See Comments)     Other reaction(s): sedation    Exenatide Rash     Other reaction(s): Rash     Past Medical History:   Diagnosis Date    Allergy     seasonal    Anxiety     Arthritis     Breast cancer 10/2012    left breast invasive ductal carcinoma    Colon polyp     Diabetes mellitus     Type 2    Diverticular disease     Diverticulitis 2009    Genetic testing 05/2017    negative Integrated [employer]    Hyperlipidemia     Hypertension     Morbid obesity     MITCHELL (obstructive sleep apnea)     Stenosis     Stenosis and insufficiency of lacrimal passages     Thyroid disease      Past Surgical History:   Procedure Laterality Date    BREAST BIOPSY Left 10/1/2012    left breast- invasive ductal carcinoma    BREAST BIOPSY Left 12/2017    BREAST LUMPECTOMY Left 2012    CARDIAC VALVE REPLACEMENT  12/2013    CARDIAC VALVE SURGERY  2013    CARPAL TUNNEL RELEASE      Left    CATHETERIZATION, HEART, LEFT Left 11/7/2013    Per[facility] CATH LAB    COLONOSCOPY N/A 9/29/2017    Performed by Phani Worley MD at [facility] ENDO (4TH  "FLR)    COLONOSCOPY N/A 8/28/2015    Performed by [facility] at [facility] ENDO (4TH FLR)    DILATION AND CURETTAGE OF UTERUS  1999    Endometrial polyps    ENDOMETRIAL ABLATION  1999    Enodmetrial polyps    NIC-TRANSFORAMINAL Left 10/1/2015    Performed by [facility] Luis [facility] MGT    EYE SURGERY      [id number]-QOMM-E-CATH Right 2/1/2019    Performed by Gaston Flores MD at [facility] OR 2ND FLR    left lumpectomy  2012    REPLACEMENT, AORTIC VALVE N/A 12/2/2013    Performed by Venkat Webb MD at [facility] OR 2ND FLR    SHOULDER SURGERY      SKIN BIOPSY       Family History   Problem Relation Age of Onset    Breast cancer Mother 62    Colon cancer Father     Cancer Father         Colon CA (cause of death); Prostate CA (no chemo)    Heart disease Father     No Known Problems Sister     No Known Problems Brother     Cancer Maternal Grandfather         Colon CA    No Known Problems Son     Cancer Brother         prostate CA, no chemo, "it was bad"    Anxiety disorder Son     SAL disease Son     Sleep disorder Son     Ovarian cancer Neg Hx     Melanoma Neg Hx     Psoriasis Neg Hx     Lupus Neg Hx     Eczema Neg Hx     Acne Neg Hx      Social History     Tobacco Use    Smoking status: Never Smoker    Smokeless tobacco: Never Used   Substance Use Topics    Alcohol use: No     Alcohol/week: 0.0 oz    Drug use: No     Review of Systems   Constitutional: Negative for chills, diaphoresis and fever.   HENT: Negative for ear pain, hearing loss, postnasal drip, rhinorrhea, sore throat, tinnitus and trouble swallowing.    Eyes: Negative for visual disturbance.   Respiratory: Negative for cough, chest tightness, shortness of breath, wheezing and stridor.    Cardiovascular: Negative for chest pain.   Gastrointestinal: Positive for abdominal pain (L sided). Negative for abdominal distention, diarrhea, nausea and vomiting.   Endocrine: Negative for cold intolerance and heat " intolerance.   Genitourinary: Negative for difficulty urinating and dysuria.   Musculoskeletal: Negative for arthralgias and myalgias.   Skin: Negative for rash.   Neurological: Negative for dizziness, weakness, light-headedness and headaches.   Hematological: Bruises/bleeds easily (On Lovenox).   Psychiatric/Behavioral: Negative for confusion.       Physical Exam     Initial Vitals [07/13/19 1114]   BP Pulse Resp Temp SpO2   (!) 123/98 88 16 98.4 °F (36.9 °C) 97 %      MAP       --         Physical Exam    Nursing note and vitals reviewed.  Constitutional: She appears well-developed and well-nourished.   HENT:   Head: Normocephalic and atraumatic.   Right Ear: Hearing normal.   Left Ear: Hearing normal.   Eyes: Conjunctivae, EOM and lids are normal.   Neck: Trachea normal, normal range of motion and phonation normal. Neck supple. No stridor present. No tracheal tenderness present. No tracheal deviation present.   Cardiovascular: Normal rate, regular rhythm and normal heart sounds. Exam reveals no gallop and no friction rub.    No murmur heard.  Pulses:       Radial pulses are 2+ on the right side, and 2+ on the left side.   Pulmonary/Chest: Effort normal and breath sounds normal. No respiratory distress. She has no decreased breath sounds. She has no wheezes. She has no rhonchi. She has no rales.   Abdominal: Soft. Normal appearance and bowel sounds are normal. She exhibits no distension. There is tenderness in the left upper quadrant and left lower quadrant. There is no rigidity, no rebound and no guarding.   Musculoskeletal: Normal range of motion.   Neurological: She is alert and oriented to person, place, and time. She has normal strength.   Skin: Skin is warm, dry and intact. No laceration and no rash noted. No pallor.   Psychiatric: She has a normal mood and affect. Her speech is normal and behavior is normal. Judgment and thought content normal. Cognition and memory are normal.         ED Course    Procedures  Labs Reviewed   CBC W/ AUTO DIFFERENTIAL - Abnormal; Notable for the following components:       Result Value    WBC 2.20 (*)     RBC 3.49 (*)     Hemoglobin 11.0 (*)     Hematocrit 34.2 (*)     Mean Corpuscular Hemoglobin 31.5 (*)     Platelets 139 (*)     Gran # (ANC) 1.3 (*)     Lymph # 0.6 (*)     Mono # 0.2 (*)     All other components within normal limits   COMPREHENSIVE METABOLIC PANEL - Abnormal; Notable for the following components:    Glucose 194 (*)     All other components within normal limits   URINALYSIS, REFLEX TO URINE CULTURE - Abnormal; Notable for the following components:    Glucose, UA Trace (*)     All other components within normal limits    Narrative:     Preferred Collection Type->Urine, Clean Catch          Imaging Results          CT Abdomen Pelvis With Contrast (Final result)  Result time 07/13/19 14:04:52    Final result by Mike Sullivan MD (07/13/19 14:04:52)                 Impression:      1. Findings concerning for early changes of mid to distal descending colon diverticulitis.  Correlation advised.  2. Hepatic steatosis, correlation with LFTs recommended.  3. Nonspecific 1-2 mm pulmonary nodule within the right upper lobe.  For a solid nodule <6 mm, Fleischner Society 2017 guidelines recommend no routine follow up for a low risk patient, or follow-up with non-contrast chest CT at 12 months in a high risk patient.  4. Several additional findings above.      Electronically signed by: Mike Sullivan MD  Date:    07/13/2019  Time:    14:04             Narrative:    EXAMINATION:  CT ABDOMEN PELVIS WITH CONTRAST    CLINICAL HISTORY:  LLQ pain, suspect diverticulitis;    TECHNIQUE:  Low dose axial images, sagittal and coronal reformations were obtained from the lung bases to the pubic symphysis following the IV administration of 100 mL of Omnipaque 350 .  Oral contrast was not given.    COMPARISON:  02/14/2019    FINDINGS:  Images of the lower thorax are remarkable for  a 1-2 mm pulmonary nodule within the right upper lobe.  There is bilateral basilar dependent atelectasis.    The liver is hypoattenuating, suggesting steatosis, correlation with LFTs recommended.  There are a few punctate foci of hypoattenuation within the spleen, nonspecific.  The pancreas is grossly unremarkable noting atrophic change.  The gallbladder is unremarkable.  The adrenal glands are unremarkable.  The portal vein, splenic vein, SMV, celiac axis and SMA all are patent.  There is no significant biliary dilation or ascites.  The pancreatic duct is not dilated.    The kidneys enhance symmetrically without hydronephrosis or nephrolithiasis.  There is bilateral perinephric fat stranding.  The bilateral ureters are unremarkable without calculi seen.  The urinary bladder is unremarkable.  The uterus and adnexa is grossly unremarkable.    There are multiple scattered colonic diverticula noting there may be subtle indistinctness about a few diverticula involving the mid to distal descending colon versus motion artifact.  The terminal ileum and appendix are unremarkable.  The small bowel is grossly unremarkable.  No focal organized pelvic fluid collection.    Degenerative changes are noted of the sacroiliac joints and spine.  No significant inguinal lymphadenopathy.  Multiple anterior abdominal wall injection sites noted, several of which have regions of surrounding induration, clinical correlation recommended.                                 Medical Decision Making:   History:   Old Medical Records: I decided to obtain old medical records.  Old Records Summarized: records from clinic visits and records from previous admission(s).       <> Summary of Records: Chart notable for recent chemo tx for breast cancer, PE, IDDM, and recurrent diverticulitis  Initial Assessment:   74 y.o. female  with h/o Breast cancer on chemo, PE, Type 2 IDDM, and recurrent diverticulitis presents with L sided abd pain. My DDx includes  but is not limited to diverticulitis, mesenteric ischemia, gastroenteritis, gastroparesis, electrolyte abnormalities, UTI, ovarian torsion, ovarian cyst, endometriosis. I have considered PE, ACS, nephrolithiasis, hydronephrosis, pyelonephritis, and mets but based on hx and physical exam findings, these are less likely. Will obtain labs, imaging. Clinical presentation consistent with diverticulitis.     Clinical Tests:   Lab Tests: Ordered and Reviewed  Radiological Study: Ordered and Reviewed       APC / Resident Notes:   4:27 PM  CT Abd pelvis with contrast:   1. Findings concerning for early changes of mid to distal descending colon diverticulitis.   2. Hepatic steatosis, correlation with LFTs recommended.  3. Nonspecific 1-2 mm pulmonary nodule within the right upper lobe.  For a solid nodule <6 mm, Fleischner Society 2017 guidelines recommend no routine follow up for a low risk patient, or follow-up with non-contrast chest CT at 12 months in a high risk patient.    Heme/onc called to determine if cipro and flagyl would be okay to give for 10d given pt's chemo treatment. Pt is to receive chemo in 11d from now.  Heme/onc cleared abx     Pt given 500mg Cipro and 500mg Flagyl. Will be sent home to complete 10d course of treatment    Pt informed of incidental findings on CT and told to f/u with her providers. She states she understands treatment and f/u plans.           Attending Attestation:   Physician Attestation Statement for Resident:  As the supervising MD   Physician Attestation Statement: I have personally seen and examined this patient.   I agree with the above history. -: 74F with PMH  breast cancer (last CTX 2 weeks ago), T2 IDDM, CKD 3, bilateral PE, diverticulitis, hypertension, hyperlipidemia, presenting with 1 day of LLQ pain. Pain worse with movement, but no NVD, F/C, blood in stool.    As the supervising MD I agree with the above PE.   -: NAD, NCAT, A&Ox3, PERRL and EOMI, neck supple/normal ROM,  CTAB, RRR no mrg, normal extremity ROM/m/s, abd s/nd/mod TTP in LLQ w/o rebound, no LE edema, skin dry and warm   As the supervising MD I agree with the above treatment, course, plan, and disposition.   -:     No leukocytosis, remainder of CMP and CBC grossly within normal limits    CT showing early diverticulitis, as well as 1-2 mm solitary pulmonary nodule in right upper lobe, and hepatic steatosis.  Findings discussed with patient, with recommendation to follow up with her PCP early next week for further evaluation.  Will discharge patient home on Cipro/Flagyl for early diverticulitis per discussion with Heme-Onc.  I have reviewed and agree with the residents interpretation of the following: lab data and CT scans.  I have reviewed the following: old records at this facility.                       Clinical Impression:       ICD-10-CM ICD-9-CM   1. Left lower quadrant pain R10.32 789.04   2. Lung nodule < 6cm on CT R91.1 793.11   3. Hepatic steatosis K76.0 571.8                                Steve Arcos MD  Resident  07/13/19 1655       Padmini Garcia MD  07/22/19 1200       Padmini Garcia MD  07/22/19 1200

## 2019-07-13 NOTE — ED NOTES
Pt states she would like to wait on morphine administration, states she doesn't think she needs it right now.

## 2019-07-15 ENCOUNTER — TELEPHONE (OUTPATIENT)
Dept: INTERNAL MEDICINE | Facility: CLINIC | Age: 74
End: 2019-07-15

## 2019-07-15 DIAGNOSIS — R91.1 PULMONARY NODULE: Primary | ICD-10-CM

## 2019-07-15 NOTE — TELEPHONE ENCOUNTER
Pt feeling better from diverticulitis.  Discussed fatty liver with normal LFTs - advised to decrease carbohydrates - will consider referral to hepatology  after chemo and mastectomy.  Very small nodule R lung 1-2mm - will repeat Ct chest in 3 months - Please schedule CT chest in early October with appt with me afterwards thanks.

## 2019-07-18 ENCOUNTER — TELEPHONE (OUTPATIENT)
Dept: PREADMISSION TESTING | Facility: HOSPITAL | Age: 74
End: 2019-07-18

## 2019-07-18 ENCOUNTER — OFFICE VISIT (OUTPATIENT)
Dept: SURGERY | Facility: CLINIC | Age: 74
End: 2019-07-18
Payer: MEDICARE

## 2019-07-18 VITALS
DIASTOLIC BLOOD PRESSURE: 65 MMHG | HEIGHT: 62 IN | HEART RATE: 74 BPM | SYSTOLIC BLOOD PRESSURE: 109 MMHG | WEIGHT: 240.06 LBS | TEMPERATURE: 98 F | BODY MASS INDEX: 44.18 KG/M2

## 2019-07-18 DIAGNOSIS — Z17.1 MALIGNANT NEOPLASM OF RIGHT BREAST IN FEMALE, ESTROGEN RECEPTOR NEGATIVE, UNSPECIFIED SITE OF BREAST: Primary | ICD-10-CM

## 2019-07-18 DIAGNOSIS — C50.911 MALIGNANT NEOPLASM OF RIGHT BREAST IN FEMALE, ESTROGEN RECEPTOR NEGATIVE, UNSPECIFIED SITE OF BREAST: Primary | ICD-10-CM

## 2019-07-18 PROCEDURE — 99214 OFFICE O/P EST MOD 30 MIN: CPT | Mod: HCNC,S$GLB,, | Performed by: SURGERY

## 2019-07-18 PROCEDURE — 99999 PR PBB SHADOW E&M-EST. PATIENT-LVL III: ICD-10-PCS | Mod: PBBFAC,HCNC,, | Performed by: SURGERY

## 2019-07-18 PROCEDURE — 99499 RISK ADDL DX/OHS AUDIT: ICD-10-PCS | Mod: S$GLB,,, | Performed by: SURGERY

## 2019-07-18 PROCEDURE — 99499 UNLISTED E&M SERVICE: CPT | Mod: S$GLB,,, | Performed by: SURGERY

## 2019-07-18 PROCEDURE — 1101F PT FALLS ASSESS-DOCD LE1/YR: CPT | Mod: HCNC,CPTII,S$GLB, | Performed by: SURGERY

## 2019-07-18 PROCEDURE — 3078F PR MOST RECENT DIASTOLIC BLOOD PRESSURE < 80 MM HG: ICD-10-PCS | Mod: HCNC,CPTII,S$GLB, | Performed by: SURGERY

## 2019-07-18 PROCEDURE — 3074F SYST BP LT 130 MM HG: CPT | Mod: HCNC,CPTII,S$GLB, | Performed by: SURGERY

## 2019-07-18 PROCEDURE — 99214 PR OFFICE/OUTPT VISIT, EST, LEVL IV, 30-39 MIN: ICD-10-PCS | Mod: HCNC,S$GLB,, | Performed by: SURGERY

## 2019-07-18 PROCEDURE — 99999 PR PBB SHADOW E&M-EST. PATIENT-LVL III: CPT | Mod: PBBFAC,HCNC,, | Performed by: SURGERY

## 2019-07-18 PROCEDURE — 3078F DIAST BP <80 MM HG: CPT | Mod: HCNC,CPTII,S$GLB, | Performed by: SURGERY

## 2019-07-18 PROCEDURE — 3074F PR MOST RECENT SYSTOLIC BLOOD PRESSURE < 130 MM HG: ICD-10-PCS | Mod: HCNC,CPTII,S$GLB, | Performed by: SURGERY

## 2019-07-18 PROCEDURE — 1101F PR PT FALLS ASSESS DOC 0-1 FALLS W/OUT INJ PAST YR: ICD-10-PCS | Mod: HCNC,CPTII,S$GLB, | Performed by: SURGERY

## 2019-07-18 NOTE — TELEPHONE ENCOUNTER
----- Message from Arnold Verde, RN sent at 7/18/2019 11:35 AM CDT -----  Yuval oDe!,         Pt of Dr Flores's needs appt Dr Marsh prior to surgery with Dr Flores 8/26/19. She has her last chemo 7/23. We were thinking week of 8/12 to let her recover from the chemo.     Thanks,   Arnold dhaliwal/Dr Flores

## 2019-07-18 NOTE — TELEPHONE ENCOUNTER
----- Message from Arnold Verde, RN sent at 7/18/2019 11:35 AM CDT -----  Yuval Doe!,         Pt of Dr Flores's needs appt Dr Marsh prior to surgery with Dr Flores 8/26/19. She has her last chemo 7/23. We were thinking week of 8/12 to let her recover from the chemo.     Thanks,   Arnold dhaliwal/Dr Flores

## 2019-07-18 NOTE — PROGRESS NOTES
CHIEF COMPLAINT: right breast cancer     Subjective:       Alina Narayan is a 73 y.o. postmenopausal female referred for surgical planning of for right breast cancer. Patient has one more course of chemotherapy. Patient had an abnormal mammogram first noted 2018. Follow-up imaging showed calcifications with 2 axillary lymph nodes, the largest measuring  1.4 x 1 cm in the axillary tail. A ultrasound guided biopsy was performed on right with pathology revealing metastatic mammary carcinoma of the breast.       Patient does routinely do self breast exams.  Patient has not noted a change on breast exam.  Patient denies nipple discharge. She had a history of of the left breast cancer in 10/2012, w/ IDC, ER/NE (+), HER 2 russell neg, followed by a left lumpectomy for a 7 mm IDC and negative margins and SN, thus she underwent adjuvant XRT with 5 years of ietroazole      - QY MMG - BIRADs 2 in . 2016  - MMG BIRADs 4 - 2017, with new calcifications and stereotactic bx  - 2018 - BIRADs 2   - 2018 - BIRADS 4B  - 19 u/s guided bx from right axillary mass - met mammary carcinoma, also in right axillary node  - 1/15/19 - MRI breast with two axillary nodes demonstrated, largest measurig 1.4 x 1 cm      - negative genetic testing, integrated BRCA 2017      Findings at that time from the most recent biopsy were the following:   Estrogen Receptor: neg  Her-2 russell: neg  Progesterone Receptor: neg  Ki 67 - 40%   Lymph node status: pos        GYN History:  Age of menarche was 11. Age of menopause was 53.  Patient denies hormonal therapy. Patient is . Age of first live birth was 19. Patient did breast feed.     FAMILY History:  - mother - breast cancer 62   - father - CRC   - brother - prostate Ca      Social:  - no tobacco or etoh use  - swims with her girlfriends twice weekly at the [employer]  - uses a rolling walker, no shortness of breath or chest pain with walking, mostly limited by knee pain            Past Medical History:   Diagnosis Date    Allergy       seasonal    Anxiety      Arthritis      Breast cancer 10/2012     left breast invasive ductal carcinoma    Colon polyp      Diabetes mellitus       Type 2    Diverticular disease      Diverticulitis 2009    Genetic testing 05/2017     negative Integrated BRACAnalysis    Hyperlipidemia      Hypertension      Morbid obesity      MITCHELL (obstructive sleep apnea)      Stenosis      Stenosis and insufficiency of lacrimal passages      Thyroid disease              Past Surgical History:   Procedure Laterality Date    BREAST BIOPSY Left 10/1/2012     left breast- invasive ductal carcinoma    BREAST BIOPSY Left 12/2017    BREAST LUMPECTOMY Left 2012    CARDIAC VALVE REPLACEMENT   12/2013    CARDIAC VALVE SURGERY   2013    CARPAL TUNNEL RELEASE         Left    CATHETERIZATION, HEART, LEFT Left 11/7/2013     Performed by [facility]chant [facility] at [facility] CATH LAB    COLONOSCOPY N/A 9/29/2017     Performed by Phani Worley MD at [facility] ENDO (4TH FLR)    COLONOSCOPY N/A 8/28/2015     Performed by Phani Worley [facility] at [facility] ENDO (4TH FLR)    DILATION AND CURETTAGE OF UTERUS   1999     Endometrial polyps    ENDOMETRIAL ABLATION   1999     Enodmetrial polyps    NIC-TRANSFORAMINAL Left 10/1/2015     Per[facility] at [facility] PAIN MGT    EYE SURGERY        left lumpectomy   2012    REPLACEMENT, AORTIC VALVE N/A 12/2/2013     Performed by Venkat Webb MD at [facility] OR 2ND FLR    SHOULDER SURGERY        SKIN BIOPSY                 Current Outpatient Medications on File Prior to Visit   Medication Sig Dispense Refill    ALPRAZolam (XANAX) 0.25 MG tablet One-two daily as needed for anxiety 40 tablet 0    amoxicillin (AMOXIL) 500 MG capsule TAKE 4 CAPSULES 1 HOUR BEFORE APPOINTMENT   1    ASPIR-LOW 81 mg EC tablet Take 81 mg by mouth once daily.   12    blood sugar diagnostic Strp True Metirx strips or strips and lancets  -  pt checks twice daily for uncontrolled glucoses E11.65 200 each 3    carvedilol (COREG) 25 MG tablet .5-1 tablet oral twice daily or as directed. 180 tablet 3    CHOLECALCIFEROL, VITAMIN D3, (VITAMIN D3 ORAL) Take by mouth once daily. 1000 IU        diclofenac sodium (VOLTAREN) 1 % Gel Apply 2 g topically once daily. 100 g 3    gabapentin (NEURONTIN) 300 MG capsule Take 1 capsule (300 mg total) by mouth 2 (two) times daily. 180 capsule 1    HYDROcodone-acetaminophen (NORCO) 5-325 mg per tablet One - two tabs every 6-8 hours as needed for pain 40 tablet 0    lancets (ACCU-CHEK SOFTCLIX LANCETS) Misc TrueTest lancets for meter covered by insurance - pt checks twice daily for uncontrolled glucoses E11.65, 200 each 3    levothyroxine (SYNTHROID) 50 MCG tablet Take 1 tablet (50 mcg total) by mouth every evening. 90 tablet 3    meloxicam (MOBIC) 7.5 MG tablet One tablet twice daily 30 tablet 0    metFORMIN (GLUCOPHAGE) 500 MG tablet Take 1 tablet (500 mg total) by mouth 2 (two) times daily with meals. 180 tablet 3    pravastatin (PRAVACHOL) 20 MG tablet TAKE 1 TABLET ONE TIME DAILY 90 tablet 3    blood-glucose meter kit True Test or meter covered by insurance - pt checks glucose twice daily for uncontrolled glucoses E11.65 1 each 0      No current facility-administered medications on file prior to visit.       Social History            Socioeconomic History    Marital status:        Spouse name: Srinath    Number of children: 2    Years of education: Not on file    Highest education level: Not on file   Social Needs    Financial resource strain: Not on file    Food insecurity - worry: Not on file    Food insecurity - inability: Not on file    Transportation needs - medical: Not on file    Transportation needs - non-medical: Not on file   Occupational History    Occupation: Retired   Tobacco Use    Smoking status: Never Smoker    Smokeless tobacco: Never Used   Substance and Sexual Activity     "Alcohol use: No       Alcohol/week: 0.0 oz    Drug use: No    Sexual activity: Yes   Other Topics Concern    Are you pregnant or think you may be? No    Breast-feeding No   Social History Narrative    Not on file            Family History   Problem Relation Age of Onset    Breast cancer Mother 62    Colon cancer Father      Cancer Father           Colon CA (cause of death); Prostate CA (no chemo)    Heart disease Father      No Known Problems Sister      No Known Problems Brother      Cancer Maternal Grandfather           Colon CA    No Known Problems Son      Cancer Brother           prostate CA, no chemo, "it was bad"    Anxiety disorder Son      SAL disease Son      Sleep disorder Son      Ovarian cancer Neg Hx      Melanoma Neg Hx      Psoriasis Neg Hx      Lupus Neg Hx      Eczema Neg Hx      Acne Neg Hx           Review of Systems  Review of Systems   Constitutional: Positive for activity change. Negative for fever and unexpected weight change.        Bilateral knee pain (evaluated and not candidate for knee replacements)    Eyes: Negative for pain.   Respiratory: Negative for chest tightness and shortness of breath.    Cardiovascular: Negative for chest pain and palpitations.   Gastrointestinal: Negative for abdominal pain.   Genitourinary: Negative for difficulty urinating.   Musculoskeletal: Positive for back pain, gait problem and joint swelling.   Neurological: Negative for dizziness.   Hematological: Bruises/bleeds easily.         Objective:   PHYSICAL EXAM:  /64   Pulse 80   Temp 97.6 °F (36.4 °C)   Ht 5' 2" (1.575 m)   Wt 117.8 kg (259 lb 11.2 oz)   BMI 47.50 kg/m²      Physical Exam   Constitutional: She is oriented to person, place, and time. She appears well-developed and well-nourished.   Sitting in a wheelchair   HENT:   Head: Normocephalic and atraumatic.   Eyes: EOM are normal.   Neck: Neck supple.   Cardiovascular: Normal rate.    Pulmonary/Chest: Effort " normal. She exhibits no mass, no tenderness and no retraction.   On room air, no palpable LN in the right axilla, no palpable breast masses, ecchymosis noted from the previous biopsy sites   Abdominal: Soft. She exhibits no distension.   Neurological: She is alert and oriented to person, place, and time.   Skin: Skin is warm and dry.     Psychiatric: She has a normal mood and affect.            Radiology review: Images personally reviewed by me in the clinic.      MRI 1/15/19     History:  Two new biopsy-proven level 1 right axillary stef metastases of unknown breast primary.      Films Compared:  Prior images (if available) were compared.     Technique:  Multiplanar multisequence MR images. IV contrast: 20 mL Multihance.     Findings:  Scattered fibroglandular tissue. Minimal background parenchymal enhancement.     Level 1 right axillary adenopathy. Two of these nodes are biopsy-proven stef metastases with associated internal biopsy clips. The largest of the nodes measures 1.4 x 1.0 cm.      No MR evidence for the primary malignancy in either breast. No abnormal skin or nipple enhancement.      Benign post treatment changes in the left breast, including lumpectomy scar.      No left axillary adenopathy. No internal mammary adenopathy.      Impression:  1. No MR evidence for the primary breast malignancy.      2. Level 1 right axillary adenopathy. Two of these nodes are biopsy-proven stef metastases of unknown breast primary. BI-RADS 6: Known malignancy.      BI-RADS Category:   Overall: 6 - Known Biopsy-Proven Malignancy     Surgical Pathology: 1/7/19  Estrogen receptor: Negative.  Progesterone receptor: Negative.  HER2: Negative.  Ki-67: 40%.     FINAL PATHOLOGIC DIAGNOSIS DAT0PIT,ER,PGR,YDT3ZEU,ER,PGR     1. Right axillary mass, core needle biopsy:  - One lymph node positive for metastatic mammary carcinoma (1/1)  Note: Immunohistochemical preparations demonstrate tumor cell positivity for AE1/AE3, WSK, and  CAM5.2, which  support the above diagnosis. Prognostic/predictive markers for ER, WA, HER2 and Ki 67 are pending, and will be  reported in an addendum.  2. Right axilla node, core needle biopsy:  - One lymph node positive for metastatic mammary carcinoma (1/1)  Note: Immunohistochemical preparations demonstrate tumor cell positivity for AE1/AE3, WSK, CD31, and CAM5.2,  while negative for CD34 and Factor VIII, which support the above diagnosis. Prognostic/predictive markers for ER,  WA, HER2 and Ki 67 are pending, and will be reported in an addendum.        Assessment:       Alina Narayan is a 73 y.o. postmenopausal female with diagnosed carcinoma of the right breast with 2 positive biopsied LN, no breast primary noted, triple neg presents for surgical planning.     Plan:        1.) Complete last session of chemotherapy July 23, 2019.  2.) Schedule Right Mastectomy for August 26th 2019.  3.) Follow up with Oncology to determine removal of Port.  4.) Perform follow up imaging for lung nodule.    I have personally taken the history and examined this patient and agree with the resident's note as stated above.  I have personally taken the history and examined this patient and agree with the resident's note as stated above.  Patient presents with her  as she nears completion of her neoadjuvant preoperative chemotherapy for her port negative breast cancer on the right side. She had presented with 2 positive right axillary lymph nodes an occult primary in her right breast the biopsies of the axillary nodes revealed triple negative breast cancer.  She has a history of breast conservation surgery and radiotherapy in the remote past for estrogen receptor positive cancer on the contralateral left breast in 2012 as noted above.    She is supposed to finish her chemotherapy next week on 07/23/2019 with Dr. Dwyer.  She has had a DVT and is currently on Lovenox every 24 hr once a day.  Her genetic testing in 2017 was negative.   Her pre chemotherapy PET scan had revealed only the evidence of disease in the right axilla with occult primary lesion in the right breast.  She does not wants radiotherapy for the occult primary given the fact that the radiotherapy had caused some cardiac insufficiency back in 2012.  Initially she wanted bilateral mastectomies but this point we have elected to keep the left side given the risk created by her recent DVT and I would like to minimize her surgery time.    We will hold the Lovenox preoperatively for 24 hr and perform a right modified radical mastectomy only without reconstruction.  This has been tentatively scheduled for Monday 08/26/2019.  This may need be altered if her chemotherapy is delayed by Dr. Dwyer for any side effects or complications related to her last visit.  The patient will discuss with Dr. Dwyer whether we will remove the right-sided ipsilateral Port-A-Cath or not.  Since she had had occult disease in the right breast at the time of presentation I will not repeat the MRI.  She may not be eligible for any clinical trial such as the platinum for future trial if there is residual the because of the prior history of breast cancer.  Therefore me that she has residual disease she will likely get oral capecitabine.  Nonetheless, I will ask her to ask Dr. Dwyer if he would like the Port-A-Cath removed at the time of for right modified radical mastectomy.  The patient let me know after she discusses this with Dr. Dwyer as to whether or not we will remove the Port-A-Cath at time of surgery.

## 2019-07-22 DIAGNOSIS — C50.911 MALIGNANT NEOPLASM OF RIGHT BREAST IN FEMALE, ESTROGEN RECEPTOR NEGATIVE, UNSPECIFIED SITE OF BREAST: Primary | ICD-10-CM

## 2019-07-22 DIAGNOSIS — E11.9 TYPE 2 DIABETES MELLITUS WITHOUT COMPLICATION, UNSPECIFIED WHETHER LONG TERM INSULIN USE: ICD-10-CM

## 2019-07-22 DIAGNOSIS — Z17.1 MALIGNANT NEOPLASM OF RIGHT BREAST IN FEMALE, ESTROGEN RECEPTOR NEGATIVE, UNSPECIFIED SITE OF BREAST: Primary | ICD-10-CM

## 2019-07-22 NOTE — PROGRESS NOTES
Subjective:       Patient ID: Alina Narayan is a 74 y.o. female.    Chief Complaint: No chief complaint on file.    HPI Ms Narayan is a 73 Y/O white female who is currently receiving neoadjuvant chemotherapy for a recently diagnosed triple negative carcinoma of the right breast.  She has received 12 cycles of weekly Taxol and 3 cycles of CMF.      She was diagnosed with PE on on May 8.    She is currently on Lovenox at home.  She has some fatigue but really no new issues.  Appetite remains decreased.  She is scheduled for surgery at the end of August.    Her follow-up  Breast imaging after Taxol showed the following:There is a 9 mm lymph node seen in the right axilla. Associated coil clip; pathology showed metastastic disease within a lymph node. Decreased in size compared to prior, which measured 14 mm.      US Breast Right Limited  There is a 6 mm x 6 mm x 7 mm intramammary lymph node seen in the axillary tail region of the right breast. Compared to the previous study, the intramammary lymph node decreased in size. Previously measured 6 x 9 x 11 mm    Current history:  abnormal mammogram of the right breast in December 2018.  She was having no breast symptoms at that time    That mammogram showed a 13 mm mass in the right axillary tail.  By ultrasound there was a 6 x 9 x 11 mm intramammary node and a 2nd 5 x 6 x 6 mm hypoechoic mass in the axillary tail.  On January 7, 2019 both masses were biopsied and both showed lymph nodes with metastatic carcinoma which was ER negative MO negative and HER2 negative.  Ki-67 was 40%.    MRI on January 15, 2019 showed 2 right axillary lymph nodes the largest measured 1.4 x 1.0 cm.  There were no abnormalities in the right breast.    PET scan was then performed which showed only low-grade activity in the right axilla with an SUV of 1.32.    Previous breast cancer history History: On September 25, 2012 she underwent a screening mammogram which showed an asymmetric density in the left  breast at 2:00 position. A followup mammogram on the 27th showed an oval mass in that area ultrasound showed a 6 mm solid mass. Core needle biopsy on October 1 showed invasive carcinoma ER 90% positive NH 80% positive and HER-2 negative. On October 29 she underwent lumpectomy and sentinel lymph node biopsy. That showed a 7 mm low-grade (1+2+1) infiltrating carcinoma. 3 sentinel lymph nodes were negative. Final pathological stage TIB N0 stage IA.  She completed letrozole therapy in 2017  Review of Systems   Constitutional: Positive for appetite change. Negative for unexpected weight change.   Eyes: Negative for visual disturbance.   Respiratory: Negative for cough and shortness of breath.    Cardiovascular: Negative for chest pain.   Gastrointestinal: Positive for abdominal pain. Negative for diarrhea.   Genitourinary: Negative for frequency.   Musculoskeletal: Negative for back pain.   Skin: Negative for rash.   Neurological: Negative for headaches.   Hematological: Negative for adenopathy.   Psychiatric/Behavioral: The patient is nervous/anxious.        Objective:      Physical Exam   Constitutional: She is oriented to person, place, and time. She appears well-developed and well-nourished.   HENT:   Mouth/Throat: No oropharyngeal exudate.   Cardiovascular: Normal rate and regular rhythm.   Pulmonary/Chest: Effort normal and breath sounds normal. She has no wheezes. She has no rales.   Abdominal: Soft. There is no tenderness.   Lymphadenopathy:     She has no cervical adenopathy.   Neurological: She is alert and oriented to person, place, and time.   Psychiatric: She has a normal mood and affect. Her behavior is normal. Thought content normal.   Vitals reviewed.      Assessment:     CBC shows ANC of 400  1. Cancer of axillary tail of right breast        Plan:       Delay chemotherapy 1 week.  Recheck CBC on the 26 with possible G-CSF     We discussed follow-up of her pulmonary nodule with repeat scanning which is  going to be done in October.  I will see her in 1 month with labs.

## 2019-07-23 ENCOUNTER — INFUSION (OUTPATIENT)
Dept: INFUSION THERAPY | Facility: HOSPITAL | Age: 74
End: 2019-07-23
Attending: INTERNAL MEDICINE
Payer: MEDICARE

## 2019-07-23 ENCOUNTER — OFFICE VISIT (OUTPATIENT)
Dept: HEMATOLOGY/ONCOLOGY | Facility: CLINIC | Age: 74
End: 2019-07-23
Payer: MEDICARE

## 2019-07-23 VITALS
BODY MASS INDEX: 43.08 KG/M2 | WEIGHT: 234.13 LBS | SYSTOLIC BLOOD PRESSURE: 128 MMHG | RESPIRATION RATE: 18 BRPM | DIASTOLIC BLOOD PRESSURE: 57 MMHG | TEMPERATURE: 98 F | HEART RATE: 83 BPM | OXYGEN SATURATION: 95 % | HEIGHT: 62 IN

## 2019-07-23 DIAGNOSIS — C50.911 MALIGNANT NEOPLASM OF RIGHT FEMALE BREAST: ICD-10-CM

## 2019-07-23 DIAGNOSIS — C50.611 CANCER OF AXILLARY TAIL OF RIGHT BREAST: Primary | ICD-10-CM

## 2019-07-23 DIAGNOSIS — T45.1X5A CHEMOTHERAPY INDUCED NEUTROPENIA: ICD-10-CM

## 2019-07-23 DIAGNOSIS — C50.611 CANCER OF AXILLARY TAIL OF RIGHT BREAST: ICD-10-CM

## 2019-07-23 DIAGNOSIS — D70.1 CHEMOTHERAPY INDUCED NEUTROPENIA: ICD-10-CM

## 2019-07-23 DIAGNOSIS — C50.911 MALIGNANT NEOPLASM OF RIGHT BREAST IN FEMALE, ESTROGEN RECEPTOR NEGATIVE, UNSPECIFIED SITE OF BREAST: Primary | ICD-10-CM

## 2019-07-23 DIAGNOSIS — Z17.1 MALIGNANT NEOPLASM OF RIGHT BREAST IN FEMALE, ESTROGEN RECEPTOR NEGATIVE, UNSPECIFIED SITE OF BREAST: Primary | ICD-10-CM

## 2019-07-23 LAB
ALBUMIN SERPL BCP-MCNC: 3.7 G/DL (ref 3.5–5.2)
ALP SERPL-CCNC: 79 U/L (ref 55–135)
ALT SERPL W/O P-5'-P-CCNC: 41 U/L (ref 10–44)
ANION GAP SERPL CALC-SCNC: 7 MMOL/L (ref 8–16)
AST SERPL-CCNC: 43 U/L (ref 10–40)
BASOPHILS # BLD AUTO: 0.02 K/UL (ref 0–0.2)
BASOPHILS NFR BLD: 1 % (ref 0–1.9)
BILIRUB SERPL-MCNC: 0.4 MG/DL (ref 0.1–1)
BUN SERPL-MCNC: 11 MG/DL (ref 8–23)
CALCIUM SERPL-MCNC: 9 MG/DL (ref 8.7–10.5)
CHLORIDE SERPL-SCNC: 101 MMOL/L (ref 95–110)
CO2 SERPL-SCNC: 31 MMOL/L (ref 23–29)
CREAT SERPL-MCNC: 0.9 MG/DL (ref 0.5–1.4)
DIFFERENTIAL METHOD: ABNORMAL
EOSINOPHIL # BLD AUTO: 0.1 K/UL (ref 0–0.5)
EOSINOPHIL NFR BLD: 5 % (ref 0–8)
ERYTHROCYTE [DISTWIDTH] IN BLOOD BY AUTOMATED COUNT: 14.2 % (ref 11.5–14.5)
EST. GFR  (AFRICAN AMERICAN): >60 ML/MIN/1.73 M^2
EST. GFR  (NON AFRICAN AMERICAN): >60 ML/MIN/1.73 M^2
GLUCOSE SERPL-MCNC: 157 MG/DL (ref 70–110)
HCT VFR BLD AUTO: 38.5 % (ref 37–48.5)
HGB BLD-MCNC: 11.7 G/DL (ref 12–16)
IMM GRANULOCYTES # BLD AUTO: 0.03 K/UL (ref 0–0.04)
IMM GRANULOCYTES NFR BLD AUTO: 1.5 % (ref 0–0.5)
LYMPHOCYTES # BLD AUTO: 0.9 K/UL (ref 1–4.8)
LYMPHOCYTES NFR BLD: 45.8 % (ref 18–48)
MCH RBC QN AUTO: 31 PG (ref 27–31)
MCHC RBC AUTO-ENTMCNC: 30.4 G/DL (ref 32–36)
MCV RBC AUTO: 102 FL (ref 82–98)
MONOCYTES # BLD AUTO: 0.5 K/UL (ref 0.3–1)
MONOCYTES NFR BLD: 25.4 % (ref 4–15)
NEUTROPHILS # BLD AUTO: 0.4 K/UL (ref 1.8–7.7)
NEUTROPHILS NFR BLD: 21.3 % (ref 38–73)
NRBC BLD-RTO: 0 /100 WBC
PLATELET # BLD AUTO: 163 K/UL (ref 150–350)
PMV BLD AUTO: 10.1 FL (ref 9.2–12.9)
POTASSIUM SERPL-SCNC: 3.7 MMOL/L (ref 3.5–5.1)
PROT SERPL-MCNC: 6.3 G/DL (ref 6–8.4)
RBC # BLD AUTO: 3.77 M/UL (ref 4–5.4)
SODIUM SERPL-SCNC: 139 MMOL/L (ref 136–145)
WBC # BLD AUTO: 2.01 K/UL (ref 3.9–12.7)

## 2019-07-23 PROCEDURE — 63600175 PHARM REV CODE 636 W HCPCS: Mod: HCNC | Performed by: INTERNAL MEDICINE

## 2019-07-23 PROCEDURE — 3074F PR MOST RECENT SYSTOLIC BLOOD PRESSURE < 130 MM HG: ICD-10-PCS | Mod: HCNC,CPTII,S$GLB, | Performed by: INTERNAL MEDICINE

## 2019-07-23 PROCEDURE — 99214 OFFICE O/P EST MOD 30 MIN: CPT | Mod: HCNC,S$GLB,, | Performed by: INTERNAL MEDICINE

## 2019-07-23 PROCEDURE — 1101F PT FALLS ASSESS-DOCD LE1/YR: CPT | Mod: HCNC,CPTII,S$GLB, | Performed by: INTERNAL MEDICINE

## 2019-07-23 PROCEDURE — A4216 STERILE WATER/SALINE, 10 ML: HCPCS | Mod: HCNC | Performed by: INTERNAL MEDICINE

## 2019-07-23 PROCEDURE — 99214 PR OFFICE/OUTPT VISIT, EST, LEVL IV, 30-39 MIN: ICD-10-PCS | Mod: HCNC,S$GLB,, | Performed by: INTERNAL MEDICINE

## 2019-07-23 PROCEDURE — 85025 COMPLETE CBC W/AUTO DIFF WBC: CPT | Mod: HCNC

## 2019-07-23 PROCEDURE — 25000003 PHARM REV CODE 250: Mod: HCNC | Performed by: INTERNAL MEDICINE

## 2019-07-23 PROCEDURE — 3078F DIAST BP <80 MM HG: CPT | Mod: HCNC,CPTII,S$GLB, | Performed by: INTERNAL MEDICINE

## 2019-07-23 PROCEDURE — 1101F PR PT FALLS ASSESS DOC 0-1 FALLS W/OUT INJ PAST YR: ICD-10-PCS | Mod: HCNC,CPTII,S$GLB, | Performed by: INTERNAL MEDICINE

## 2019-07-23 PROCEDURE — 99999 PR PBB SHADOW E&M-EST. PATIENT-LVL IV: CPT | Mod: PBBFAC,HCNC,, | Performed by: INTERNAL MEDICINE

## 2019-07-23 PROCEDURE — 80053 COMPREHEN METABOLIC PANEL: CPT | Mod: HCNC

## 2019-07-23 PROCEDURE — 99999 PR PBB SHADOW E&M-EST. PATIENT-LVL IV: ICD-10-PCS | Mod: PBBFAC,HCNC,, | Performed by: INTERNAL MEDICINE

## 2019-07-23 PROCEDURE — 3074F SYST BP LT 130 MM HG: CPT | Mod: HCNC,CPTII,S$GLB, | Performed by: INTERNAL MEDICINE

## 2019-07-23 PROCEDURE — 3078F PR MOST RECENT DIASTOLIC BLOOD PRESSURE < 80 MM HG: ICD-10-PCS | Mod: HCNC,CPTII,S$GLB, | Performed by: INTERNAL MEDICINE

## 2019-07-23 PROCEDURE — 36591 DRAW BLOOD OFF VENOUS DEVICE: CPT | Mod: HCNC

## 2019-07-23 RX ORDER — SODIUM CHLORIDE 0.9 % (FLUSH) 0.9 %
10 SYRINGE (ML) INJECTION
Status: DISCONTINUED | OUTPATIENT
Start: 2019-07-23 | End: 2023-06-13

## 2019-07-23 RX ORDER — HEPARIN 100 UNIT/ML
500 SYRINGE INTRAVENOUS
Status: COMPLETED | OUTPATIENT
Start: 2019-07-23 | End: 2019-07-23

## 2019-07-23 RX ORDER — HEPARIN 100 UNIT/ML
500 SYRINGE INTRAVENOUS
Status: CANCELLED | OUTPATIENT
Start: 2019-07-23

## 2019-07-23 RX ORDER — SODIUM CHLORIDE 0.9 % (FLUSH) 0.9 %
10 SYRINGE (ML) INJECTION
Status: CANCELLED | OUTPATIENT
Start: 2019-07-23

## 2019-07-23 RX ORDER — SODIUM CHLORIDE 0.9 % (FLUSH) 0.9 %
10 SYRINGE (ML) INJECTION
Status: COMPLETED | OUTPATIENT
Start: 2019-07-23 | End: 2019-07-23

## 2019-07-23 RX ADMIN — HEPARIN 500 UNITS: 100 SYRINGE at 10:07

## 2019-07-23 RX ADMIN — HEPARIN 500 UNITS: 100 SYRINGE at 11:07

## 2019-07-23 RX ADMIN — Medication 10 ML: at 10:07

## 2019-07-23 RX ADMIN — Medication 10 ML: at 11:07

## 2019-07-23 NOTE — Clinical Note
Delay CMF 1 week, day 2 NeulastaCBC on July 26 with possible Neupogen to start that daySee me in 4 weeks with CBC and CMP

## 2019-07-23 NOTE — NURSING
1014  Pt here for lab draw from PAC, no new complaints or concerns at present; discussed using plastic utensils r/t metallic taste; labs drawn per order, pt tolerated well; pt instructed to contact MD for any needs or concerns, pt verbalized understanding of all discussed

## 2019-07-23 NOTE — NURSING
1154  Pt here for deaccess of PAC, will not have treatment today; deaccessed per protocol, tolerated well; printed instructions given to and reviewed for Lovenox injections; pt instructed to contact MD for any needs or concerns; pt verbalized understanding of all discussed and when to report next

## 2019-07-24 ENCOUNTER — PATIENT MESSAGE (OUTPATIENT)
Dept: SURGERY | Facility: HOSPITAL | Age: 74
End: 2019-07-24

## 2019-07-24 ENCOUNTER — PES CALL (OUTPATIENT)
Dept: ADMINISTRATIVE | Facility: CLINIC | Age: 74
End: 2019-07-24

## 2019-07-24 ENCOUNTER — TELEPHONE (OUTPATIENT)
Dept: HEMATOLOGY/ONCOLOGY | Facility: CLINIC | Age: 74
End: 2019-07-24

## 2019-07-24 NOTE — TELEPHONE ENCOUNTER
----- Message from Britney Stcay RN sent at 7/24/2019  1:52 PM CDT -----  Contact: Patient      ----- Message -----  From: Mary Mora  Sent: 7/24/2019   1:45 PM  To: [facility] Staff    Pt would like a call regarding chemo scheduling, say that the dates are incorrect. Please contact to assist.      Contact:: [phone]

## 2019-07-24 NOTE — TELEPHONE ENCOUNTER
Called and spoke with patient in regards to her apts. patient wanted to know why her injection was not scheduled on Friday. I explained that it had to be approved by her insurance company before we can schedule it. Patient voiced understanding.   Patient then asked if she should have chemo scheduled on 8/20, as she thought her last chemo should be on 7/30. I advised her that I would find out and call her back. She voiced appreciation

## 2019-07-26 ENCOUNTER — INFUSION (OUTPATIENT)
Dept: INFUSION THERAPY | Facility: HOSPITAL | Age: 74
End: 2019-07-26
Attending: INTERNAL MEDICINE
Payer: MEDICARE

## 2019-07-26 ENCOUNTER — PATIENT MESSAGE (OUTPATIENT)
Dept: HEMATOLOGY/ONCOLOGY | Facility: CLINIC | Age: 74
End: 2019-07-26

## 2019-07-26 DIAGNOSIS — C50.911 MALIGNANT NEOPLASM OF RIGHT FEMALE BREAST: ICD-10-CM

## 2019-07-26 DIAGNOSIS — Z17.1 MALIGNANT NEOPLASM OF RIGHT BREAST IN FEMALE, ESTROGEN RECEPTOR NEGATIVE, UNSPECIFIED SITE OF BREAST: Primary | ICD-10-CM

## 2019-07-26 DIAGNOSIS — Z51.11 ENCOUNTER FOR ANTINEOPLASTIC CHEMOTHERAPY: ICD-10-CM

## 2019-07-26 DIAGNOSIS — C50.911 MALIGNANT NEOPLASM OF RIGHT BREAST IN FEMALE, ESTROGEN RECEPTOR NEGATIVE, UNSPECIFIED SITE OF BREAST: Primary | ICD-10-CM

## 2019-07-26 LAB
ALBUMIN SERPL BCP-MCNC: 3.6 G/DL (ref 3.5–5.2)
ALP SERPL-CCNC: 71 U/L (ref 55–135)
ALT SERPL W/O P-5'-P-CCNC: 26 U/L (ref 10–44)
ANION GAP SERPL CALC-SCNC: 9 MMOL/L (ref 8–16)
AST SERPL-CCNC: 24 U/L (ref 10–40)
BASOPHILS # BLD AUTO: 0.03 K/UL (ref 0–0.2)
BASOPHILS NFR BLD: 1 % (ref 0–1.9)
BILIRUB SERPL-MCNC: 0.3 MG/DL (ref 0.1–1)
BUN SERPL-MCNC: 9 MG/DL (ref 8–23)
CALCIUM SERPL-MCNC: 9.5 MG/DL (ref 8.7–10.5)
CHLORIDE SERPL-SCNC: 102 MMOL/L (ref 95–110)
CO2 SERPL-SCNC: 30 MMOL/L (ref 23–29)
CREAT SERPL-MCNC: 0.9 MG/DL (ref 0.5–1.4)
DIFFERENTIAL METHOD: ABNORMAL
EOSINOPHIL # BLD AUTO: 0.1 K/UL (ref 0–0.5)
EOSINOPHIL NFR BLD: 3.3 % (ref 0–8)
ERYTHROCYTE [DISTWIDTH] IN BLOOD BY AUTOMATED COUNT: 14 % (ref 11.5–14.5)
EST. GFR  (AFRICAN AMERICAN): >60 ML/MIN/1.73 M^2
EST. GFR  (NON AFRICAN AMERICAN): >60 ML/MIN/1.73 M^2
GLUCOSE SERPL-MCNC: 176 MG/DL (ref 70–110)
HCT VFR BLD AUTO: 38.4 % (ref 37–48.5)
HGB BLD-MCNC: 11.6 G/DL (ref 12–16)
IMM GRANULOCYTES # BLD AUTO: 0.1 K/UL (ref 0–0.04)
IMM GRANULOCYTES NFR BLD AUTO: 3.3 % (ref 0–0.5)
LYMPHOCYTES # BLD AUTO: 1.1 K/UL (ref 1–4.8)
LYMPHOCYTES NFR BLD: 36.8 % (ref 18–48)
MCH RBC QN AUTO: 30.9 PG (ref 27–31)
MCHC RBC AUTO-ENTMCNC: 30.2 G/DL (ref 32–36)
MCV RBC AUTO: 102 FL (ref 82–98)
MONOCYTES # BLD AUTO: 0.5 K/UL (ref 0.3–1)
MONOCYTES NFR BLD: 17.8 % (ref 4–15)
NEUTROPHILS # BLD AUTO: 1.2 K/UL (ref 1.8–7.7)
NEUTROPHILS NFR BLD: 37.8 % (ref 38–73)
NRBC BLD-RTO: 0 /100 WBC
PLATELET # BLD AUTO: 133 K/UL (ref 150–350)
PMV BLD AUTO: 10.5 FL (ref 9.2–12.9)
POTASSIUM SERPL-SCNC: 3.8 MMOL/L (ref 3.5–5.1)
PROT SERPL-MCNC: 6.3 G/DL (ref 6–8.4)
RBC # BLD AUTO: 3.76 M/UL (ref 4–5.4)
SODIUM SERPL-SCNC: 141 MMOL/L (ref 136–145)
WBC # BLD AUTO: 3.04 K/UL (ref 3.9–12.7)

## 2019-07-26 PROCEDURE — 25000003 PHARM REV CODE 250: Mod: HCNC | Performed by: INTERNAL MEDICINE

## 2019-07-26 PROCEDURE — A4216 STERILE WATER/SALINE, 10 ML: HCPCS | Mod: HCNC | Performed by: INTERNAL MEDICINE

## 2019-07-26 PROCEDURE — 85025 COMPLETE CBC W/AUTO DIFF WBC: CPT | Mod: HCNC

## 2019-07-26 PROCEDURE — 63600175 PHARM REV CODE 636 W HCPCS: Mod: HCNC | Performed by: INTERNAL MEDICINE

## 2019-07-26 PROCEDURE — 80053 COMPREHEN METABOLIC PANEL: CPT | Mod: HCNC

## 2019-07-26 PROCEDURE — 36591 DRAW BLOOD OFF VENOUS DEVICE: CPT | Mod: HCNC

## 2019-07-26 RX ORDER — HEPARIN 100 UNIT/ML
500 SYRINGE INTRAVENOUS
Status: CANCELLED | OUTPATIENT
Start: 2019-07-26

## 2019-07-26 RX ORDER — SODIUM CHLORIDE 0.9 % (FLUSH) 0.9 %
10 SYRINGE (ML) INJECTION
Status: COMPLETED | OUTPATIENT
Start: 2019-07-26 | End: 2019-07-26

## 2019-07-26 RX ORDER — HEPARIN 100 UNIT/ML
500 SYRINGE INTRAVENOUS
Status: COMPLETED | OUTPATIENT
Start: 2019-07-26 | End: 2019-07-26

## 2019-07-26 RX ORDER — SODIUM CHLORIDE 0.9 % (FLUSH) 0.9 %
10 SYRINGE (ML) INJECTION
Status: CANCELLED | OUTPATIENT
Start: 2019-07-26

## 2019-07-26 RX ADMIN — HEPARIN 500 UNITS: 100 SYRINGE at 10:07

## 2019-07-26 RX ADMIN — Medication 10 ML: at 10:07

## 2019-07-29 RX ORDER — SODIUM CHLORIDE 0.9 % (FLUSH) 0.9 %
10 SYRINGE (ML) INJECTION
Status: CANCELLED | OUTPATIENT
Start: 2019-07-29

## 2019-07-29 RX ORDER — METHOTREXATE 25 MG/ML
40 INJECTION INTRA-ARTERIAL; INTRAMUSCULAR; INTRATHECAL; INTRAVENOUS
Status: CANCELLED | OUTPATIENT
Start: 2019-07-29

## 2019-07-29 RX ORDER — HEPARIN 100 UNIT/ML
500 SYRINGE INTRAVENOUS
Status: CANCELLED | OUTPATIENT
Start: 2019-07-29

## 2019-07-29 RX ORDER — FLUOROURACIL 50 MG/ML
600 INJECTION, SOLUTION INTRAVENOUS
Status: CANCELLED | OUTPATIENT
Start: 2019-07-29

## 2019-07-30 ENCOUNTER — INFUSION (OUTPATIENT)
Dept: INFUSION THERAPY | Facility: HOSPITAL | Age: 74
End: 2019-07-30
Attending: INTERNAL MEDICINE
Payer: MEDICARE

## 2019-07-30 ENCOUNTER — TELEPHONE (OUTPATIENT)
Dept: HEMATOLOGY/ONCOLOGY | Facility: CLINIC | Age: 74
End: 2019-07-30

## 2019-07-30 VITALS
DIASTOLIC BLOOD PRESSURE: 63 MMHG | HEART RATE: 71 BPM | SYSTOLIC BLOOD PRESSURE: 135 MMHG | WEIGHT: 236.75 LBS | RESPIRATION RATE: 18 BRPM | TEMPERATURE: 98 F | HEIGHT: 62 IN | BODY MASS INDEX: 43.57 KG/M2

## 2019-07-30 DIAGNOSIS — C50.911 MALIGNANT NEOPLASM OF RIGHT BREAST IN FEMALE, ESTROGEN RECEPTOR NEGATIVE, UNSPECIFIED SITE OF BREAST: Primary | ICD-10-CM

## 2019-07-30 DIAGNOSIS — C50.611 CANCER OF AXILLARY TAIL OF RIGHT BREAST: Primary | ICD-10-CM

## 2019-07-30 DIAGNOSIS — C50.911 MALIGNANT NEOPLASM OF RIGHT FEMALE BREAST: ICD-10-CM

## 2019-07-30 DIAGNOSIS — Z17.1 MALIGNANT NEOPLASM OF RIGHT BREAST IN FEMALE, ESTROGEN RECEPTOR NEGATIVE, UNSPECIFIED SITE OF BREAST: Primary | ICD-10-CM

## 2019-07-30 LAB
BASOPHILS # BLD AUTO: 0.03 K/UL (ref 0–0.2)
BASOPHILS NFR BLD: 0.7 % (ref 0–1.9)
DIFFERENTIAL METHOD: ABNORMAL
EOSINOPHIL # BLD AUTO: 0.1 K/UL (ref 0–0.5)
EOSINOPHIL NFR BLD: 2.4 % (ref 0–8)
ERYTHROCYTE [DISTWIDTH] IN BLOOD BY AUTOMATED COUNT: 13.8 % (ref 11.5–14.5)
HCT VFR BLD AUTO: 36.9 % (ref 37–48.5)
HGB BLD-MCNC: 11.6 G/DL (ref 12–16)
IMM GRANULOCYTES # BLD AUTO: 0.07 K/UL (ref 0–0.04)
IMM GRANULOCYTES NFR BLD AUTO: 1.5 % (ref 0–0.5)
LYMPHOCYTES # BLD AUTO: 1 K/UL (ref 1–4.8)
LYMPHOCYTES NFR BLD: 22.5 % (ref 18–48)
MCH RBC QN AUTO: 31.3 PG (ref 27–31)
MCHC RBC AUTO-ENTMCNC: 31.4 G/DL (ref 32–36)
MCV RBC AUTO: 100 FL (ref 82–98)
MONOCYTES # BLD AUTO: 0.5 K/UL (ref 0.3–1)
MONOCYTES NFR BLD: 10.8 % (ref 4–15)
NEUTROPHILS # BLD AUTO: 2.8 K/UL (ref 1.8–7.7)
NEUTROPHILS NFR BLD: 62.1 % (ref 38–73)
NRBC BLD-RTO: 0 /100 WBC
PLATELET # BLD AUTO: 102 K/UL (ref 150–350)
PMV BLD AUTO: 10.7 FL (ref 9.2–12.9)
RBC # BLD AUTO: 3.71 M/UL (ref 4–5.4)
WBC # BLD AUTO: 4.54 K/UL (ref 3.9–12.7)

## 2019-07-30 PROCEDURE — 25000003 PHARM REV CODE 250: Mod: HCNC | Performed by: INTERNAL MEDICINE

## 2019-07-30 PROCEDURE — 85025 COMPLETE CBC W/AUTO DIFF WBC: CPT | Mod: HCNC

## 2019-07-30 PROCEDURE — 63600175 PHARM REV CODE 636 W HCPCS: Mod: HCNC | Performed by: INTERNAL MEDICINE

## 2019-07-30 PROCEDURE — 96367 TX/PROPH/DG ADDL SEQ IV INF: CPT | Mod: HCNC

## 2019-07-30 PROCEDURE — 96413 CHEMO IV INFUSION 1 HR: CPT | Mod: HCNC

## 2019-07-30 PROCEDURE — A4216 STERILE WATER/SALINE, 10 ML: HCPCS | Mod: HCNC | Performed by: INTERNAL MEDICINE

## 2019-07-30 PROCEDURE — 96411 CHEMO IV PUSH ADDL DRUG: CPT | Mod: HCNC

## 2019-07-30 RX ORDER — FLUOROURACIL 50 MG/ML
600 INJECTION, SOLUTION INTRAVENOUS
Status: COMPLETED | OUTPATIENT
Start: 2019-07-30 | End: 2019-07-30

## 2019-07-30 RX ORDER — HEPARIN 100 UNIT/ML
500 SYRINGE INTRAVENOUS
Status: COMPLETED | OUTPATIENT
Start: 2019-07-30 | End: 2019-07-30

## 2019-07-30 RX ORDER — SODIUM CHLORIDE 0.9 % (FLUSH) 0.9 %
10 SYRINGE (ML) INJECTION
Status: COMPLETED | OUTPATIENT
Start: 2019-07-30 | End: 2019-07-30

## 2019-07-30 RX ORDER — SODIUM CHLORIDE 0.9 % (FLUSH) 0.9 %
10 SYRINGE (ML) INJECTION
Status: DISCONTINUED | OUTPATIENT
Start: 2019-07-30 | End: 2019-07-30 | Stop reason: HOSPADM

## 2019-07-30 RX ORDER — HEPARIN 100 UNIT/ML
500 SYRINGE INTRAVENOUS
Status: CANCELLED | OUTPATIENT
Start: 2019-07-30

## 2019-07-30 RX ORDER — HEPARIN 100 UNIT/ML
500 SYRINGE INTRAVENOUS
Status: DISCONTINUED | OUTPATIENT
Start: 2019-07-30 | End: 2019-07-30 | Stop reason: HOSPADM

## 2019-07-30 RX ORDER — SODIUM CHLORIDE 0.9 % (FLUSH) 0.9 %
10 SYRINGE (ML) INJECTION
Status: CANCELLED | OUTPATIENT
Start: 2019-07-30

## 2019-07-30 RX ORDER — METHOTREXATE 25 MG/ML
40 INJECTION INTRA-ARTERIAL; INTRAMUSCULAR; INTRATHECAL; INTRAVENOUS
Status: COMPLETED | OUTPATIENT
Start: 2019-07-30 | End: 2019-07-30

## 2019-07-30 RX ADMIN — HEPARIN 500 UNITS: 100 SYRINGE at 10:07

## 2019-07-30 RX ADMIN — HEPARIN 500 UNITS: 100 SYRINGE at 01:07

## 2019-07-30 RX ADMIN — FLUOROURACIL 1345 MG: 50 INJECTION, SOLUTION INTRAVENOUS at 12:07

## 2019-07-30 RX ADMIN — DEXAMETHASONE SODIUM PHOSPHATE: 4 INJECTION, SOLUTION INTRAMUSCULAR; INTRAVENOUS at 11:07

## 2019-07-30 RX ADMIN — CYCLOPHOSPHAMIDE 1345 MG: 1 INJECTION, POWDER, FOR SOLUTION INTRAVENOUS; ORAL at 12:07

## 2019-07-30 RX ADMIN — METHOTREXATE 90 MG: 25 SOLUTION INTRA-ARTERIAL; INTRAMUSCULAR; INTRATHECAL; INTRAVENOUS at 12:07

## 2019-07-30 RX ADMIN — SODIUM CHLORIDE: 0.9 INJECTION, SOLUTION INTRAVENOUS at 11:07

## 2019-07-30 RX ADMIN — Medication 10 ML: at 01:07

## 2019-07-30 RX ADMIN — Medication 10 ML: at 10:07

## 2019-07-30 NOTE — TELEPHONE ENCOUNTER
"----- Message from Kailee Nunez sent at 7/30/2019  8:02 AM CDT -----  Pt was expecting a lab today before the appt   If this was left off in error, please contact her and advise, she can be reached at [phone]  "Thank you for all that you do for our patients'"  "

## 2019-07-30 NOTE — TELEPHONE ENCOUNTER
Contacted patient to confirm that labs needing to be obtained for CBC prior to her infusion today at 11. Explained to patient would be scheduled for 10 via port prior to her infusion. She expressed understanding and gratitude.

## 2019-07-30 NOTE — PLAN OF CARE
Problem: Adult Inpatient Plan of Care  Goal: Plan of Care Review  Outcome: Outcome(s) achieved Date Met: 07/30/19  Pt tolerated C4D1 CMF without adverse effects. VSS. Verbalized understanding of RTC date. DC with family ambulating independently.

## 2019-08-13 ENCOUNTER — INITIAL CONSULT (OUTPATIENT)
Dept: INTERNAL MEDICINE | Facility: CLINIC | Age: 74
End: 2019-08-13
Payer: MEDICARE

## 2019-08-13 VITALS
HEIGHT: 62 IN | OXYGEN SATURATION: 97 % | BODY MASS INDEX: 42.73 KG/M2 | SYSTOLIC BLOOD PRESSURE: 102 MMHG | WEIGHT: 232.19 LBS | TEMPERATURE: 98 F | DIASTOLIC BLOOD PRESSURE: 58 MMHG | HEART RATE: 79 BPM

## 2019-08-13 DIAGNOSIS — M51.37 DEGENERATION OF LUMBAR OR LUMBOSACRAL INTERVERTEBRAL DISC: ICD-10-CM

## 2019-08-13 DIAGNOSIS — I10 ESSENTIAL HYPERTENSION: ICD-10-CM

## 2019-08-13 DIAGNOSIS — E11.69 DIABETES MELLITUS TYPE 2 IN OBESE: ICD-10-CM

## 2019-08-13 DIAGNOSIS — D72.819 LEUKOPENIA, UNSPECIFIED TYPE: ICD-10-CM

## 2019-08-13 DIAGNOSIS — I27.20 PULMONARY HTN: ICD-10-CM

## 2019-08-13 DIAGNOSIS — D69.6 THROMBOCYTOPENIA, UNSPECIFIED: ICD-10-CM

## 2019-08-13 DIAGNOSIS — E66.9 DIABETES MELLITUS TYPE 2 IN OBESE: ICD-10-CM

## 2019-08-13 DIAGNOSIS — H90.5 SENSORINEURAL HEARING LOSS (SNHL), UNSPECIFIED LATERALITY: ICD-10-CM

## 2019-08-13 DIAGNOSIS — Z01.818 PREOP EXAMINATION: Primary | ICD-10-CM

## 2019-08-13 DIAGNOSIS — R60.9 EDEMA, UNSPECIFIED TYPE: ICD-10-CM

## 2019-08-13 DIAGNOSIS — Z85.3 PERSONAL HISTORY OF BREAST CANCER: ICD-10-CM

## 2019-08-13 DIAGNOSIS — R22.9 SOFT TISSUE SWELLING: ICD-10-CM

## 2019-08-13 DIAGNOSIS — M67.912 DISORDER OF ROTATOR CUFF OF BOTH SHOULDERS: ICD-10-CM

## 2019-08-13 DIAGNOSIS — G47.33 OBSTRUCTIVE SLEEP APNEA: ICD-10-CM

## 2019-08-13 DIAGNOSIS — K76.0 FATTY LIVER: ICD-10-CM

## 2019-08-13 DIAGNOSIS — E78.2 HYPERLIPIDEMIA, MIXED: ICD-10-CM

## 2019-08-13 DIAGNOSIS — I26.99 BILATERAL PULMONARY EMBOLISM: ICD-10-CM

## 2019-08-13 DIAGNOSIS — Z95.2 S/P AVR (AORTIC VALVE REPLACEMENT): ICD-10-CM

## 2019-08-13 DIAGNOSIS — M67.911 DISORDER OF ROTATOR CUFF OF BOTH SHOULDERS: ICD-10-CM

## 2019-08-13 PROCEDURE — 1101F PT FALLS ASSESS-DOCD LE1/YR: CPT | Mod: HCNC,CPTII,S$GLB, | Performed by: HOSPITALIST

## 2019-08-13 PROCEDURE — 3045F PR MOST RECENT HEMOGLOBIN A1C LEVEL 7.0-9.0%: CPT | Mod: HCNC,CPTII,S$GLB, | Performed by: HOSPITALIST

## 2019-08-13 PROCEDURE — 3074F PR MOST RECENT SYSTOLIC BLOOD PRESSURE < 130 MM HG: ICD-10-PCS | Mod: HCNC,CPTII,S$GLB, | Performed by: HOSPITALIST

## 2019-08-13 PROCEDURE — 99999 PR PBB SHADOW E&M-EST. PATIENT-LVL III: CPT | Mod: PBBFAC,HCNC,, | Performed by: HOSPITALIST

## 2019-08-13 PROCEDURE — 99499 RISK ADDL DX/OHS AUDIT: ICD-10-PCS | Mod: HCNC,S$GLB,, | Performed by: HOSPITALIST

## 2019-08-13 PROCEDURE — 99999 PR PBB SHADOW E&M-EST. PATIENT-LVL III: ICD-10-PCS | Mod: PBBFAC,HCNC,, | Performed by: HOSPITALIST

## 2019-08-13 PROCEDURE — 3045F PR MOST RECENT HEMOGLOBIN A1C LEVEL 7.0-9.0%: ICD-10-PCS | Mod: HCNC,CPTII,S$GLB, | Performed by: HOSPITALIST

## 2019-08-13 PROCEDURE — 3074F SYST BP LT 130 MM HG: CPT | Mod: HCNC,CPTII,S$GLB, | Performed by: HOSPITALIST

## 2019-08-13 PROCEDURE — 99214 PR OFFICE/OUTPT VISIT, EST, LEVL IV, 30-39 MIN: ICD-10-PCS | Mod: HCNC,S$GLB,, | Performed by: HOSPITALIST

## 2019-08-13 PROCEDURE — 3078F DIAST BP <80 MM HG: CPT | Mod: HCNC,CPTII,S$GLB, | Performed by: HOSPITALIST

## 2019-08-13 PROCEDURE — 99499 UNLISTED E&M SERVICE: CPT | Mod: HCNC,S$GLB,, | Performed by: HOSPITALIST

## 2019-08-13 PROCEDURE — 3078F PR MOST RECENT DIASTOLIC BLOOD PRESSURE < 80 MM HG: ICD-10-PCS | Mod: HCNC,CPTII,S$GLB, | Performed by: HOSPITALIST

## 2019-08-13 PROCEDURE — 1101F PR PT FALLS ASSESS DOC 0-1 FALLS W/OUT INJ PAST YR: ICD-10-PCS | Mod: HCNC,CPTII,S$GLB, | Performed by: HOSPITALIST

## 2019-08-13 PROCEDURE — 99214 OFFICE O/P EST MOD 30 MIN: CPT | Mod: HCNC,S$GLB,, | Performed by: HOSPITALIST

## 2019-08-13 NOTE — ASSESSMENT & PLAN NOTE
May 2019   · Normal RV size with low normal to normal right ventricular systolic function.  · Normal central venous pressure (3 mm Hg).  · The estimated PA systolic pressure is 26 mm Hg

## 2019-08-13 NOTE — ASSESSMENT & PLAN NOTE
Edema- I suggested avoidance of added salt,avoidance of NSAID's, except ASA, unless advised or ordered  and suggested Limb elevation and iam hose use

## 2019-08-13 NOTE — OUTPATIENT SUBJECTIVE & OBJECTIVE
"Outpatient Subjective & Objective     Chief complaint-Preoperative evaluation, Perioperative Medical management, complication reduction plan     Active cardiac conditions- none    Revised cardiac risk index predictors- none    Functional capacity -Examples of physical activity ,  house work and can take a flight of stairs holding on to the railing----- She can undertake all the above activities without  chest pain,chest tightness, Shortness of breath ,making her exercise tolerance more,   than 4 Mets.       Review of Systems   Constitutional: Negative for chills and fever.            Weight loss with chemo    HENT:        Sleep apnea   Eyes:        No new visual changes   Respiratory:        No cough , phlegm    No Hemoptysis   Cardiovascular:        As noted   Gastrointestinal:        No overt GI/ blood losses  LMP 53 Y  Bowel movements- diarrhea, constipation    Endocrine:        Prednisone use > 20 mg daily for 3 weeks   Genitourinary: Negative for dysuria.        No urinary hesitancy    Musculoskeletal:        As above      Skin: Negative for rash.   Neurological: Negative for syncope.        No unilateral weakness   Hematological:        Current use of Anticoagulants  Current use of Antiplatelet agents  None    Psychiatric/Behavioral:        Supportive  , family   No SI/HI     No vascular stenting           No anesthesia, bleeding, cardiac problems, PONV with previous surgeries/procedures.  Medications and Allergies reviewed in epic.   Cancer screening suggested     FH- No anesthesia,bleeding / venous thrombosis ,  in family     Physical Exam  Blood pressure (!) 102/58, pulse 79, temperature 97.8 °F (36.6 °C), height 5' 2" (1.575 m), weight 105.3 kg (232 lb 3.2 oz), SpO2 97 %.      Physical Exam  Constitutional- Vitals - Body mass index is 42.47 kg/m².,   Vitals:    08/13/19 1018   BP: (!) 102/58   Pulse: 79   Temp: 97.8 °F (36.6 °C)     General appearance-Conscious,Coherent  Eyes- No conjunctival " icterus,pupils  round , reactive to light  and  bilateral intra ocular lenses  ENT-Oral cavity- moist  , Hearing grossly normal   Neck- No thyromegaly ,Trachea -central, No jugular venous distension,   No Carotid Bruit   Cardiovascular -Heart Sounds- Normal  and  no murmur   , No gallop rhythm   Respiratory - Normal Respiratory Effort, Normal breath sounds,  no wheeze  and  no forced expiratory wheeze    Peripheral pitting pedal edema-- mild, no calf pain   Gastrointestinal -Soft abdomen, No palpable masses, Non Tender,Liver,Spleen not palpable. No-- free fluid and shifting dullness  Musculoskeletal- No finger Clubbing. Strength grossly normal   Lymphatic-No Palpable cervical, axillary,Inguinal lymphadenopathy   Psychiatric - normal effect,Orientation  Rt Dorsalis pedis pulses-palpable    Lt Dorsalis pedis pulses- palpable   Rt Posterior tibial pulses -palpable   Left posterior tibial pulses -palpable   Miscellaneous -  no asterixis,  no dupuytren's contracture,  Surgical scarsternum, port. Bruises abdomen , Hematoma Rt lower abdomen   and  no renal bruit  Investigations  Lab and Imaging have been reviewed in epic.    Review of Medicine tests    EKG- I had independently reviewed the EKG from--5/8/2019   It was reported to be showing     Normal sinus rhythm  Left axis deviation  Minimal voltage criteria for LVH, may be normal variant  Abnormal ECG  When compared with ECG of 08-MAY-2019 14:25,  No significant change was found    MAY 2019     · Normal left ventricular systolic function. The estimated ejection fraction is 63%  · Local minor septal wall motion abnormalities.  · Indeterminate left ventricular diastolic function.  · Mild left atrial enlargement.  · Normal RV size with low normal to normal right ventricular systolic function.  · Normal central venous pressure (3 mm Hg).  · The estimated PA systolic pressure is 26 mm Hg    Review of clinical lab tests:  Lab Results   Component Value Date    CREATININE 0.9  07/26/2019    HGB 11.6 (L) 07/30/2019     (L) 07/30/2019     Aug  2018     No evidence of a hemodynamically significant carotid bifurcation stenosis    Nov 2013     Normal coronary arteries  Severe aortic stenosis        Review of old records- Was done and information gathered regards to events leading to surgery and health conditions of significance in the perioperative period.    Outpatient Subjective & Objective

## 2019-08-13 NOTE — ASSESSMENT & PLAN NOTE
Left side  - 2013 - operated , had Radiation    Rt side  Dec2018       I suggest monitoring the patient for any suggestions of adrenal insufficiency in view of the recent steroid use

## 2019-08-13 NOTE — PROGRESS NOTES
[facility] - Pre Op Consult  Progress Note    Patient Name: Alina Narayan  MRN: [id number]  Date of Evaluation- 08/20/2019  PCP- Aarti Dunn MD    Future cases for Alina Narayan [[id number]]     Case ID Status Date Time Gagan Procedure Provider Location    [id number] [facility] 8/26/2019  7:00  MASTECTOMY, MODIFIED RADICAL Gaston Flores MD [[id number]] [facility] OR 2ND FLR      Rt     HPI:  History of present illness- I had the pleasure of meeting this pleasant 74 y.o. lady in the pre op clinic prior to her elective Breast surgery. The patient is new to me . Alina was accompanied by  Vince.    I have obtained the history by speaking to the patient and by reviewing the electronic health records.    Events leading up to surgery / History of presenting illness -    Malignant neoplasm of right breast     Found on mammogram, leading to biopsy     Had port placed Early Feb 2019 ( 2 nd ) , started Chemo, Mid Feb 2019 - End of July 2019   GI effects from chemo   Fingers , toes numbness     No pain from this .No aggravating factors. No relieving factors     Relevant health conditions of significance for the perioperative period/ History of presenting illness -    Health conditions of significance for the perioperative period - Pulmonary embolism , HTN, DM, sleep apnea     Not known to have COPD    Lives with  in a single level house in [place]    going to help post op              Subjective/ Objective:          Chief complaint-Preoperative evaluation, Perioperative Medical management, complication reduction plan     Active cardiac conditions- none    Revised cardiac risk index predictors- none    Functional capacity -Examples of physical activity ,  house work and can take a flight of stairs holding on to the railing----- She can undertake all the above activities without  chest pain,chest tightness, Shortness of breath ,making her exercise tolerance more,   than 4 Mets.       Review of Systems   Constitutional: Negative  "for chills and fever.            Weight loss with chemo    HENT:        Sleep apnea   Eyes:        No new visual changes   Respiratory:        No cough , phlegm    No Hemoptysis   Cardiovascular:        As noted   Gastrointestinal:        No overt GI/ blood losses  LMP 53 Y  Bowel movements- diarrhea, constipation    Endocrine:        Prednisone use > 20 mg daily for 3 weeks   Genitourinary: Negative for dysuria.        No urinary hesitancy    Musculoskeletal:        As above      Skin: Negative for rash.   Neurological: Negative for syncope.        No unilateral weakness   Hematological:        Current use of Anticoagulants  Current use of Antiplatelet agents  None    Psychiatric/Behavioral:        Supportive  , family   No SI/HI     No vascular stenting           No anesthesia, bleeding, cardiac problems, PONV with previous surgeries/procedures.  Medications and Allergies reviewed in epic.   Cancer screening suggested     FH- No anesthesia,bleeding / venous thrombosis ,  in family     Physical Exam  Blood pressure (!) 102/58, pulse 79, temperature 97.8 °F (36.6 °C), height 5' 2" (1.575 m), weight 105.3 kg (232 lb 3.2 oz), SpO2 97 %.      Physical Exam  Constitutional- Vitals - Body mass index is 42.47 kg/m².,   Vitals:    08/13/19 1018   BP: (!) 102/58   Pulse: 79   Temp: 97.8 °F (36.6 °C)     General appearance-Conscious,Coherent  Eyes- No conjunctival icterus,pupils  round , reactive to light  and  bilateral intra ocular lenses  ENT-Oral cavity- moist  , Hearing grossly normal   Neck- No thyromegaly ,Trachea -central, No jugular venous distension,   No Carotid Bruit   Cardiovascular -Heart Sounds- Normal  and  no murmur   , No gallop rhythm   Respiratory - Normal Respiratory Effort, Normal breath sounds,  no wheeze  and  no forced expiratory wheeze    Peripheral pitting pedal edema-- mild, no calf pain   Gastrointestinal -Soft abdomen, No palpable masses, Non Tender,Liver,Spleen not palpable. No-- free " fluid and shifting dullness  Musculoskeletal- No finger Clubbing. Strength grossly normal   Lymphatic-No Palpable cervical, axillary,Inguinal lymphadenopathy   Psychiatric - normal effect,Orientation  Rt Dorsalis pedis pulses-palpable    Lt Dorsalis pedis pulses- palpable   Rt Posterior tibial pulses -palpable   Left posterior tibial pulses -palpable   Miscellaneous -  no asterixis,  no dupuytren's contracture,  Surgical scarsternum, port. Bruises abdomen , Hematoma Rt lower abdomen   and  no renal bruit  Investigations  Lab and Imaging have been reviewed in [facility].    Review of Medicine tests    EKG- I had independently reviewed the EKG from--5/8/2019   It was reported to be showing     Normal sinus rhythm  Left axis deviation  Minimal voltage criteria for LVH, may be normal variant  Abnormal ECG  When compared with ECG of 08-MAY-2019 14:25,  No significant change was found    MAY 2019     · Normal left ventricular systolic function. The estimated ejection fraction is 63%  · Local minor septal wall motion abnormalities.  · Indeterminate left ventricular diastolic function.  · Mild left atrial enlargement.  · Normal RV size with low normal to normal right ventricular systolic function.  · Normal central venous pressure (3 mm Hg).  · The estimated PA systolic pressure is 26 mm Hg    Review of clinical lab tests:  Lab Results   Component Value Date    CREATININE 0.9 07/26/2019    HGB 11.6 (L) 07/30/2019     (L) 07/30/2019     Aug  2018     No evidence of a hemodynamically significant carotid bifurcation stenosis    Nov 2013     Normal coronary arteries  Severe aortic stenosis        Review of old records- Was done and information gathered regards to events leading to surgery and health conditions of significance in the perioperative period.        Preoperative cardiac risk assessment-  The patient does not have any active cardiac conditions . Revised cardiac risk index predictors- 0---.Functional capacity is more  than 4 Mets. She will be undergoing a Breast procedure that carries a intermediate risk     Risk of a major Cardiac event ( Defined as death, myocardial infarction, or cardiac arrest at 30 days after noncardiac surgery), based on RCRI score     3.9%        No further cardiac work up is indicated prior to proceeding with the surgery     Orders Placed This Encounter    Hemoglobin A1c       American Society of Anesthesiologists Physical status classification ( ASA ) class:3     Postoperative pulmonary complication risk assessment:      ARISCAT ( [place]) risk index- risk class -  Low, if duration of surgery is under 3 hours, intermediate, if duration of surgery is over 3 hours      [place] Respiratory failure index- percentage risk of respiratory failure: 0.5 %     Assessment/Plan:     Essential hypertension  Coreg      Recent BP readings in the record-120/60's  Hypertension-  Blood pressure is acceptable . I suggest continuation of -Coreg-- during the entire perioperative period. I suggest  blood pressure  function monitoring .I suggest addressing pain control as uncontrolled pain can increased blood pressure     Personal history of breast cancer  Left side  - 2013 - operated , had Radiation    Rt side  Dec2018       I suggest monitoring the patient for any suggestions of adrenal insufficiency in view of the recent steroid use    Bilateral pulmonary embolism  Was suddenly SOB   Presented to ER    She was diagnosed with PE on on May 8.    She is currently on Lovenox at home.PM time     May 8 th 2019   Bilateral pulmonary thromboembolism involving the right lower lobe and left upper, lingular and lower lobes    History of PE  Episode- 1  Associated with reduced mobility ( with chemo, knee arthritis , Morbid obesity ) long journeys, cancer, prior surgery, no long Hospital stay,no  HRT  Anticoagulated with Lovenox-  injects - abdominal   IVC filter -None     US 5/9- No evidence of deep venous thrombosis in either  lower extremity  US 5/9-No thrombus in central veins of the left upper extremity  US- 5/9-No thrombus in central veins of the right upper extremity    She are beyond the higher risk period of 12 weeks post diagnosis     PE is provoked PE- cancer ,reduced mobility ( with chemo, knee arthritis , Morbid obesity )    Surgery scheduled for 8/26     Last dose pre op 8/24 PM    Increased risk of thrombosis in the anaya operative period       Degeneration of lumbar or lumbosacral intervertebral disc  Gabapentin helping back pain      Hearing loss, sensorineural  Not wearing hearing aids   Not troubled with hearing loss    Pulmonary HTN  May 2019   · Normal RV size with low normal to normal right ventricular systolic function.  · Normal central venous pressure (3 mm Hg).  · The estimated PA systolic pressure is 26 mm Hg    Hyperlipidemia, mixed  HLD-I  suggest continuation of statin during the entire perioperative period.    S/P AVR (aortic valve replacement)  Radiation related   Bovine  Echo ,May 2019 - Mobility is normal  No longer SOB    Thrombocytopenia, unspecified  Plt 102 - 7/30/.2019   - I suggest that the perioperative team be aware of this so that appropriate care can be taken     BMI 45.0-49.9, adult  Has HLD, HTN, DM, Arthritis , sleep apnea, fatty liver     Encouraged weight loss    Diabetes mellitus type 2 in obese  Type 2 Diabetes Mellitus  On treatment with oral agent    Hemoglobin A1c- 8.2- March 2019   Capillary glucose check-none   Steroid effect      Diabetes Mellitus-I suggest monitoring the glucose in the perioperative period ( Before meals and bed time,if the patient is on oral feeds or every 6 hourly ,if the patient is NPO )  Blood glucose target in hospitalized patients is 140-180. Oral Hypoglycemic agents are generally avoided during the hospital stay . If glucose is consistently elevated ,I suggest using basal ,prandial Insulin regimen to control the glucose , as elevated glucose can be associated  with adverse surgical out comes. Please consider involving Hospital Medicine or Endocrinology ,if any help is needed with Glucose control. Patient will be instructed based on the pre op clinic guidelines  about adjustment of diabetic treatment (If applicable )  considering the NPO status for Surgery     I had educated that uncontrolled DM can cause post op complications,risk of infection, wound healing problem,increased length of stay in hospital and its associated complications.I suggest exercise as much as possible and follow diabetic diet      Edema  Edema- I suggested avoidance of added salt,avoidance of NSAID's, except ASA, unless advised or ordered  and suggested Limb elevation and iam hose use    Obstructive sleep apnea  Sleep apnea .Uses CPAP .Suggested bringing for hospital use . Informed the risk of worsening sleep apnea in the perioperative period and suggest using CPAP use any time in 24 hrs ( day or night )for planned sleep       I suggest a sleep study and suggest caution with usage of medication that can cause respiratory suppression in the perioperative period      Avoidance of  supine sleep, weight gain , alcoholic beverages , care with , sedative , CNS depressant use indicated  since all of these can worsen MITCHELL     Had Sedation with dilaudid     Bicarbonate elevated- suggested getting CPAP adjusted, if needed             Fatty liver  Weight loss encouraged   No suggestion of  hepatic decompensation     Disorder of rotator cuff of both shoulders  Had surgery Rt side- long time ago  Left side - had steroid injections around 2017      She I s  concerned about the way that she is going to be handled in the OR    I suggest that the perioperative team be aware of this so that appropriate care can be taken     Soft tissue swelling  Rt posterior chest   Non tender   ? Bruising on it   ? Hematoma   Suggested follow up       Leukopenia  Started GCSF by Hematologist         Preventive perioperative  care    Thromboembolic prophylaxis:  Her risk factors for thrombosis include cancer morbid obesity, surgical procedure and age.I suggest  thromboembolic prophylaxis ( mechanical/pharmacological, weighing the risk benefits of pharmacological agent use considering anaya procedural bleeding )  during the perioperative period.I suggested being active in the post operative period.      Postoperative pulmonary complication prophylaxis-Risk factors for post operative pulmonary complications include age over 65 years, ASA class >2 and proximity of the surgical site to the lungs- I suggest incentive spirometry use, early ambulation, end tidal carbon dioxide monitoring and pain control so as to avoid diaphragmatic splinting  , oral care , head end of bed elevation      Renal complication prophylaxis-Risk factors for renal complications include diabetes mellitus and hypertension . I suggest keeping her well hydrated  in the perioperative period.I suggested drinking 2 litre's of water a day      Surgical site Infection Prophylaxis-I  suggest appropriate antibiotic for Prophylaxis against Surgical site infections     This visit was focused on Preoperative evaluation, Perioperative Medical management, complication reduction plans. I suggest that the patient follows up with primary care or relevant sub specialists for ongoing health care.    I appreciate the opportunity to be involved in this patients care. Please feel free to contact me if there were any questions about this consultation.    Patient is optimized     Patient was instructed to call and update me about any changes to health,  medication, office visits ,testing out side of the anaya operative care center , hospitalizations between now and surgery     Merlyn Marsh MD  Perioperative Medicine  [facility]   Pager [phone]  ---  8/16- 18 07     Corresponded with surgeon , Hematologist     With regards to anticoagulation hold time -  Last dose 24  hours pre op -   As per Anesthesiologist -  she can still get a regional block   She is currently using Lovenox at night time ,will suggest moving Lovenox towards the AM time   Spoke to her    With regards to the Lovenox injection site - can be given anywhere in the subcutaneous tissue    spoke to her - suggested -upper lateral thighs, buttocks  -  8/20- 19 52     Labs from 8/20/2019   PLT - N  Hb, HCT stable   A1c 7       Correspondence from Dr Flores - 8/16 regarding her concern of shoulder     Plan to limit abduction / adduction of the arm to 90 degrees.   -  8/23- 2012    Correspondence from surgeon 8 /21-   Leukopenia   Plan to repeat CBC-     Called to speak to her  Had Neulasta , 3 shots  Rt back swelling better   Doing well  Taking Lovenox- took lovenox 9 30 AM   Last dose 24 hours pre op      Had bleeding with Lovenox injection - 8/22  No fever   No overt GI/ blood losses     Called to follow up , spoke to  Her to address any concerns with the up coming surgery or any questions on Medication instructions -  Doing well ,No changes to Medication, Health -

## 2019-08-13 NOTE — ASSESSMENT & PLAN NOTE
Plt 102 - 7/30/.2019   - I suggest that the perioperative team be aware of this so that appropriate care can be taken

## 2019-08-13 NOTE — ASSESSMENT & PLAN NOTE
Was suddenly SOB   Presented to ER    She was diagnosed with PE on on May 8.    She is currently on Lovenox at home.PM time     May 8 th 2019   Bilateral pulmonary thromboembolism involving the right lower lobe and left upper, lingular and lower lobes    History of PE  Episode- 1  Associated with reduced mobility ( with chemo, knee arthritis , Morbid obesity ) long journeys, cancer, prior surgery, no long Hospital stay,no  HRT  Anticoagulated with Lovenox-  injects - abdominal   IVC filter -None     US 5/9- No evidence of deep venous thrombosis in either lower extremity  US 5/9-No thrombus in central veins of the left upper extremity  US- 5/9-No thrombus in central veins of the right upper extremity    She are beyond the higher risk period of 12 weeks post diagnosis     PE is provoked PE- cancer ,reduced mobility ( with chemo, knee arthritis , Morbid obesity )    Surgery scheduled for 8/26     Last dose pre op 8/24 PM    Increased risk of thrombosis in the anaya operative period

## 2019-08-13 NOTE — PATIENT INSTRUCTIONS
Your surgery has been scheduled for:__Monday 8/26__     You should report to:  _____[facility], located on the [place] side of the first floor of the [facility] ([phone])  ___X___The Second Floor Surgery Center, located on the [facility] side of the Second floor of the [facility] ([phone])  ______3rd Floor [facility]SCU located on the [facility] side of the [facility] [phone]    Please Note  -Tell your doctors if you take asprin, products containing aspirin, herbal medications or blood thinners, such as Coumadin, Ticlid, or Plavix. (Consult your provider regarding holding or stopping before surgery).  -Arrange for someone to drive you home following surgery. You will not be allowed to leave the surgical facility alone or drive yourself home following sedation and anesthesia.      Outpatient Encounter Medications as of 8/13/2019   Medication Sig Note Dispense Refill    ASPIR-LOW 81 mg EC tablet Take 81 mg by mouth once daily. 8/13/2019: As per Dr Dwyer  12    carvedilol (COREG) 25 MG tablet .5-1 tablet oral twice daily or as directed.  Hold until Dr. Dwyer says to restart (Patient taking differently: Take 12.5 mg by mouth 2 (two) times daily with meals. ) 8/13/2019: Take AM of surgery 180 tablet 3    CHOLECALCIFEROL, VITAMIN D3, (VITAMIN D3 ORAL) Take by mouth once daily. 1000 IU 8/13/2019: Hold until after surgery      enoxaparin (LOVENOX) 150 mg/mL Syrg Inject 1 mL (150 mg total) into the skin once daily. 8/13/2019: Take last dose Sat PM prior to surgery per Dr Marsh 30 mL 3    furosemide (LASIX) 20 MG tablet Take 1 tablet (20 mg total) by mouth once daily. 8/13/2019: Hold AM of surgery 30 tablet 11    gabapentin (NEURONTIN) 300 MG capsule TAKE 1 CAPSULE TWICE DAILY 8/13/2019: Take AM of surgery 180 capsule 1    levothyroxine (SYNTHROID) 50 MCG tablet Take 1 tablet (50 mcg total) by mouth every evening. (Patient taking  differently: Take 50 mcg by mouth before breakfast. ) 8/13/2019: Take AM of surgery 90 tablet 3    lidocaine-prilocaine (EMLA) cream Apply topically as needed. Apply topically as needed 45 minutes prior to port access. 8/13/2019: Hold AM of surgery 5 g 3    metFORMIN (GLUCOPHAGE) 500 MG tablet TAKE 1 TABLET TWICE DAILY WITH MEALS (Patient taking differently: TAKE 1 TABLET TWICE DAILY WITH MEALS  (takes 2 in AM only)) 8/13/2019: Hold AM of surgery 180 tablet 3    pravastatin (PRAVACHOL) 20 MG tablet TAKE 1 TABLET EVERY DAY 8/13/2019: Take as sched PM 90 tablet 3    blood sugar diagnostic Strp True Metirx strips or strips and lancets  - pt checks twice daily for uncontrolled glucoses E11.65  200 each 3    blood-glucose meter kit True Test or meter covered by insurance - pt checks glucose twice daily for uncontrolled glucoses E11.65  1 each 0    lancets (ACCU-CHEK SOFTCLIX LANCETS) Misc TrueTest lancets for meter covered by insurance - pt checks twice daily for uncontrolled glucoses E11.65,  200 each 3     Facility-Administered Encounter Medications as of 8/13/2019         Please review the instructions regarding your above listed medications.    Before Surgery (or as per your Surgeon)  -Stop taking all herbal medications 14 days prior to surgery  -Stop taking aspirin, products containing aspirin (per Dr Dwyer) days before surgery  -Stop taking blood thinners  2 days before surgery  -Refrain from drinking alcoholic beverages for 24 hours before and after surgery  -Stop or limit smoking   days before surgery    Night before Surgery (or as per your Surgeon)  -Stop ALL solid food, gum, candy 8 hours before surgery/procedure time.  -Stop all CLOUDY liquids: coffee with creamer, tube feeds, cloudy juices, 6 hours prior to surgery/procedure time.  -CLEAR liquids include only water, black coffee NO creamer, clear oral rehydration drinks, clear sports drinks or clear fruit juices (no orange juice, no pulpy juices, no  apple cider)  -IF IN DOUBT, drink water instead.   -Take a shower or bath (shower is recommended). Bathe with Hibiciens soap or an antibacterial soap from the neck down. If not supplied by your surgeon, Hibiciens soap will need to be purchased over the counter in the pharmacy. Rinse soap off thoroughly.  -Shampoo your hair with your regular shampoo    The Day of Surgery  -The patient should be ENCOURAGED to drink carbohydrate-rich clear liquids (sports drinks, clear juices) until 2 hours prior to surgery/procedure time.  -NOTHING TO DRINK 2 hours before to surgery/procedure time. If you are told to take medication on the morning of surgery, it may be taken with a sip of water.   -Take another bath or shower with Hibiciens or any antibacterial soap, to reduce the chance of infection.  -Take heart and blood pressure medications with a small sip of water, as advised by the perioperative team.  -Do not take fluid pills  -You may brush your teeth and rinse your mouth, but do not swallow any additional water.  -Do not apply perfumes, powder, body lotions, or deodorant on the day of surgery.  -Nail polish should be removed  -Do not wear makeup or moisturizer  -Wear comfortable clothes, such as a button front shirt and loose fitting pants.  -Leave all jewelry, including body piercings, and valuables at home.  -Bring any devices you will need after surgery such as crutches or canes.  -If you have sleep apnea, please bring your CPAP machine    In the event of your physical conditions including the onset of a cold or respiratory illness, or if you have to delay or cancel your surgery, please notify your surgeon.     If you have any questions or concerns, please don't hesitate to call.    Sincerely,    Lucia [phone]

## 2019-08-13 NOTE — ASSESSMENT & PLAN NOTE
Coreg      Recent BP readings in the record-120/60's  Hypertension-  Blood pressure is acceptable . I suggest continuation of -Coreg-- during the entire perioperative period. I suggest  blood pressure  function monitoring .I suggest addressing pain control as uncontrolled pain can increased blood pressure

## 2019-08-13 NOTE — LETTER
August 13, 2019      Gaston Flores MD  [address] [facility] [address]           [facility] - Pre Op Consult  [address] [facility] [address]  Phone: [phone]          Patient: Alina Narayan   MR Number: [id number]   YOB: 1945   Date of Visit: 8/13/2019       Dear Dr. Gaston Flores:    Thank you for referring Alina Narayan to me for evaluation. Attached you will find relevant portions of my assessment and plan of care.    If you have questions, please do not hesitate to call me. I look forward to following Alina Narayan along with you.    Sincerely,    Merlyn Marsh MD    Enclosure  CC:  MD Aarti Epstein MD Carl J. Lavie, MD    If you would like to receive this communication electronically, please contact [email or web address] or [phone] to request more information on [employer] access.    For providers and/or their staff who would like to refer a patient to [facility], please contact us through our one-stop-shop provider referral line, [facility], at [phone].    If you feel you have received this communication in error or would no longer like to receive these types of communications, please e-mail [email or web address]

## 2019-08-13 NOTE — HPI
History of present illness- I had the pleasure of meeting this pleasant 74 y.o. lady in the pre op clinic prior to her elective Breast surgery. The patient is new to me . Alina was accompanied by  Vince.    I have obtained the history by speaking to the patient and by reviewing the electronic health records.    Events leading up to surgery / History of presenting illness -    Malignant neoplasm of right breast     Found on mammogram, leading to biopsy     Had port placed Early Feb 2019 ( 2 nd ) , started Chemo, Mid Feb 2019 - End of July 2019   GI effects from chemo   Fingers , toes numbness     No pain from this .No aggravating factors. No relieving factors     Relevant health conditions of significance for the perioperative period/ History of presenting illness -    Health conditions of significance for the perioperative period - Pulmonary embolism , HTN, DM, sleep apnea     Not known to have COPD    Lives with  in a single level house in [place]    going to help post op

## 2019-08-13 NOTE — ASSESSMENT & PLAN NOTE
Type 2 Diabetes Mellitus  On treatment with oral agent    Hemoglobin A1c- 8.2- March 2019   Capillary glucose check-none   Steroid effect      Diabetes Mellitus-I suggest monitoring the glucose in the perioperative period ( Before meals and bed time,if the patient is on oral feeds or every 6 hourly ,if the patient is NPO )  Blood glucose target in hospitalized patients is 140-180. Oral Hypoglycemic agents are generally avoided during the hospital stay . If glucose is consistently elevated ,I suggest using basal ,prandial Insulin regimen to control the glucose , as elevated glucose can be associated with adverse surgical out comes. Please consider involving Hospital Medicine or Endocrinology ,if any help is needed with Glucose control. Patient will be instructed based on the pre op clinic guidelines  about adjustment of diabetic treatment (If applicable )  considering the NPO status for Surgery     I had educated that uncontrolled DM can cause post op complications,risk of infection, wound healing problem,increased length of stay in hospital and its associated complications.I suggest exercise as much as possible and follow diabetic diet

## 2019-08-13 NOTE — ASSESSMENT & PLAN NOTE
Sleep apnea .Uses CPAP .Suggested bringing for hospital use . Informed the risk of worsening sleep apnea in the perioperative period and suggest using CPAP use any time in 24 hrs ( day or night )for planned sleep       I suggest a sleep study and suggest caution with usage of medication that can cause respiratory suppression in the perioperative period      Avoidance of  supine sleep, weight gain , alcoholic beverages , care with , sedative , CNS depressant use indicated  since all of these can worsen MITCHELL     Had Sedation with dilaudid     Bicarbonate elevated- suggested getting CPAP adjusted, if needed

## 2019-08-13 NOTE — ASSESSMENT & PLAN NOTE
Had surgery Rt side- long time ago  Left side - had steroid injections around 2017      She I s  concerned about the way that she is going to be handled in the OR    I suggest that the perioperative team be aware of this so that appropriate care can be taken

## 2019-08-15 ENCOUNTER — PES CALL (OUTPATIENT)
Dept: ADMINISTRATIVE | Facility: CLINIC | Age: 74
End: 2019-08-15

## 2019-08-19 PROBLEM — Z51.11 ENCOUNTER FOR ANTINEOPLASTIC CHEMOTHERAPY: Status: RESOLVED | Noted: 2019-01-21 | Resolved: 2019-08-19

## 2019-08-19 NOTE — PROGRESS NOTES
Subjective:       Patient ID: Alina Narayan is a 74 y.o. female.    Chief Complaint: No chief complaint on file.    HPI   Ms Narayan is a 75 Y/O white female who is currently receiving neoadjuvant chemotherapy for a recently diagnosed triple negative carcinoma of the right breast.  She has received 12 cycles of weekly Taxol and 4 cycles of CMF which she completed on July 30th.  She is due for surgery on August 26.      She continues on Lovenox for her history of pulmonary embolism diagnosed in May of this year.        Current history:  abnormal mammogram of the right breast in December 2018.  She was having no breast symptoms at that time    That mammogram showed a 13 mm mass in the right axillary tail.  By ultrasound there was a 6 x 9 x 11 mm intramammary node and a 2nd 5 x 6 x 6 mm hypoechoic mass in the axillary tail.  On January 7, 2019 both masses were biopsied and both showed lymph nodes with metastatic carcinoma which was ER negative MS negative and HER2 negative.  Ki-67 was 40%.    MRI on January 15, 2019 showed 2 right axillary lymph nodes the largest measured 1.4 x 1.0 cm.  There were no abnormalities in the right breast.    PET scan was then performed which showed only low-grade activity in the right axilla with an SUV of 1.32.    She then received 12 weeks of weekly Taxol and 4 cycles CMF chemotherapy.    Her follow-up  Breast imaging after Taxol showed the following:There is a 9 mm lymph node seen in the right axilla. Associated coil clip; pathology showed metastastic disease within a lymph node. Decreased in size compared to prior, which measured 14 mm.      US Breast Right Limited  There is a 6 mm x 6 mm x 7 mm intramammary lymph node seen in the axillary tail region of the right breast. Compared to the previous study, the intramammary lymph node decreased in size. Previously measured 6 x 9 x 11 mm    Previous breast cancer history History: On September 25, 2012 she underwent a screening mammogram which  showed an asymmetric density in the left breast at 2:00 position. A followup mammogram on the 27th showed an oval mass in that area ultrasound showed a 6 mm solid mass. Core needle biopsy on October 1 showed invasive carcinoma ER 90% positive NH 80% positive and HER-2 negative. On October 29 she underwent lumpectomy and sentinel lymph node biopsy. That showed a 7 mm low-grade (1+2+1) infiltrating carcinoma. 3 sentinel lymph nodes were negative. Final pathological stage TIB N0 stage IA.  She completed letrozole therapy in 2017  Review of Systems   Constitutional: Negative for appetite change and unexpected weight change.   Eyes: Negative for visual disturbance.   Respiratory: Negative for cough and shortness of breath.    Cardiovascular: Negative for chest pain.   Gastrointestinal: Positive for abdominal pain and diarrhea.   Genitourinary: Negative for frequency.   Musculoskeletal: Negative for back pain.   Skin: Negative for rash.   Neurological: Negative for headaches.   Hematological: Negative for adenopathy.   Psychiatric/Behavioral: The patient is nervous/anxious.      Objective:      Physical Exam   Constitutional: She is oriented to person, place, and time. She appears well-developed and well-nourished.   HENT:   Mouth/Throat: No oropharyngeal exudate.   Cardiovascular: Normal rate and regular rhythm.   Pulmonary/Chest: Effort normal and breath sounds normal. She has no wheezes. She has no rales.   Abdominal: Soft. There is no tenderness.   Lymphadenopathy:     She has no cervical adenopathy.   Neurological: She is alert and oriented to person, place, and time.   Psychiatric: She has a normal mood and affect. Her behavior is normal. Thought content normal.   Vitals reviewed.      Assessment:     CBC shows white count of 2440 with an ANC of 803, hemoglobin 11.5, platelet count [id number]  Metabolic profile is normal  1. Cancer of axillary tail of right breast    2. Bilateral pulmonary embolism        Plan:        G-CSF this week  Return to clinic in 1 month.  Will discuss recommendations for any additional therapy at that time.

## 2019-08-20 ENCOUNTER — TELEPHONE (OUTPATIENT)
Dept: HEMATOLOGY/ONCOLOGY | Facility: CLINIC | Age: 74
End: 2019-08-20

## 2019-08-20 ENCOUNTER — INFUSION (OUTPATIENT)
Dept: INFUSION THERAPY | Facility: HOSPITAL | Age: 74
End: 2019-08-20
Attending: INTERNAL MEDICINE
Payer: MEDICARE

## 2019-08-20 ENCOUNTER — OFFICE VISIT (OUTPATIENT)
Dept: HEMATOLOGY/ONCOLOGY | Facility: CLINIC | Age: 74
End: 2019-08-20
Payer: MEDICARE

## 2019-08-20 VITALS
BODY MASS INDEX: 42.92 KG/M2 | HEIGHT: 62 IN | DIASTOLIC BLOOD PRESSURE: 61 MMHG | RESPIRATION RATE: 20 BRPM | OXYGEN SATURATION: 92 % | SYSTOLIC BLOOD PRESSURE: 130 MMHG | TEMPERATURE: 98 F | HEART RATE: 88 BPM | WEIGHT: 233.25 LBS

## 2019-08-20 DIAGNOSIS — C50.911 MALIGNANT NEOPLASM OF RIGHT FEMALE BREAST: ICD-10-CM

## 2019-08-20 DIAGNOSIS — C50.911 MALIGNANT NEOPLASM OF RIGHT BREAST IN FEMALE, ESTROGEN RECEPTOR NEGATIVE, UNSPECIFIED SITE OF BREAST: Primary | ICD-10-CM

## 2019-08-20 DIAGNOSIS — Z51.11 ENCOUNTER FOR ANTINEOPLASTIC CHEMOTHERAPY: ICD-10-CM

## 2019-08-20 DIAGNOSIS — E66.9 DIABETES MELLITUS TYPE 2 IN OBESE: ICD-10-CM

## 2019-08-20 DIAGNOSIS — Z17.1 MALIGNANT NEOPLASM OF RIGHT BREAST IN FEMALE, ESTROGEN RECEPTOR NEGATIVE, UNSPECIFIED SITE OF BREAST: Primary | ICD-10-CM

## 2019-08-20 DIAGNOSIS — C50.611 CANCER OF AXILLARY TAIL OF RIGHT BREAST: Primary | ICD-10-CM

## 2019-08-20 DIAGNOSIS — C50.611 CANCER OF AXILLARY TAIL OF RIGHT BREAST: ICD-10-CM

## 2019-08-20 DIAGNOSIS — I26.99 BILATERAL PULMONARY EMBOLISM: ICD-10-CM

## 2019-08-20 DIAGNOSIS — E11.69 DIABETES MELLITUS TYPE 2 IN OBESE: ICD-10-CM

## 2019-08-20 PROBLEM — D72.819 LEUKOPENIA: Status: ACTIVE | Noted: 2019-08-20

## 2019-08-20 LAB
ALBUMIN SERPL BCP-MCNC: 3.7 G/DL (ref 3.5–5.2)
ALP SERPL-CCNC: 96 U/L (ref 55–135)
ALT SERPL W/O P-5'-P-CCNC: 27 U/L (ref 10–44)
ANION GAP SERPL CALC-SCNC: 12 MMOL/L (ref 8–16)
ANISOCYTOSIS BLD QL SMEAR: SLIGHT
AST SERPL-CCNC: 20 U/L (ref 10–40)
BASOPHILS # BLD AUTO: ABNORMAL K/UL (ref 0–0.2)
BASOPHILS NFR BLD: 1 % (ref 0–1.9)
BILIRUB SERPL-MCNC: 0.4 MG/DL (ref 0.1–1)
BUN SERPL-MCNC: 15 MG/DL (ref 8–23)
CALCIUM SERPL-MCNC: 9.6 MG/DL (ref 8.7–10.5)
CHLORIDE SERPL-SCNC: 104 MMOL/L (ref 95–110)
CO2 SERPL-SCNC: 26 MMOL/L (ref 23–29)
CREAT SERPL-MCNC: 0.8 MG/DL (ref 0.5–1.4)
DIFFERENTIAL METHOD: ABNORMAL
EOSINOPHIL # BLD AUTO: ABNORMAL K/UL (ref 0–0.5)
EOSINOPHIL NFR BLD: 1 % (ref 0–8)
ERYTHROCYTE [DISTWIDTH] IN BLOOD BY AUTOMATED COUNT: 13.8 % (ref 11.5–14.5)
EST. GFR  (AFRICAN AMERICAN): >60 ML/MIN/1.73 M^2
EST. GFR  (NON AFRICAN AMERICAN): >60 ML/MIN/1.73 M^2
ESTIMATED AVG GLUCOSE: 154 MG/DL (ref 68–131)
GLUCOSE SERPL-MCNC: 135 MG/DL (ref 70–110)
HBA1C MFR BLD HPLC: 7 % (ref 4–5.6)
HCT VFR BLD AUTO: 35.9 % (ref 37–48.5)
HGB BLD-MCNC: 11.5 G/DL (ref 12–16)
HYPOCHROMIA BLD QL SMEAR: ABNORMAL
IMM GRANULOCYTES # BLD AUTO: ABNORMAL K/UL (ref 0–0.04)
IMM GRANULOCYTES NFR BLD AUTO: ABNORMAL % (ref 0–0.5)
LYMPHOCYTES # BLD AUTO: ABNORMAL K/UL (ref 1–4.8)
LYMPHOCYTES NFR BLD: 37 % (ref 18–48)
MCH RBC QN AUTO: 30.8 PG (ref 27–31)
MCHC RBC AUTO-ENTMCNC: 32 G/DL (ref 32–36)
MCV RBC AUTO: 96 FL (ref 82–98)
METAMYELOCYTES NFR BLD MANUAL: 4 %
MONOCYTES # BLD AUTO: ABNORMAL K/UL (ref 0.3–1)
MONOCYTES NFR BLD: 19 % (ref 4–15)
MYELOCYTES NFR BLD MANUAL: 5 %
NEUTROPHILS NFR BLD: 33 % (ref 38–73)
NRBC BLD-RTO: 0 /100 WBC
PLATELET # BLD AUTO: 179 K/UL (ref 150–350)
PLATELET BLD QL SMEAR: ABNORMAL
PMV BLD AUTO: 9.7 FL (ref 9.2–12.9)
POLYCHROMASIA BLD QL SMEAR: ABNORMAL
POTASSIUM SERPL-SCNC: 4 MMOL/L (ref 3.5–5.1)
PROT SERPL-MCNC: 6.5 G/DL (ref 6–8.4)
RBC # BLD AUTO: 3.73 M/UL (ref 4–5.4)
SODIUM SERPL-SCNC: 142 MMOL/L (ref 136–145)
WBC # BLD AUTO: 2.44 K/UL (ref 3.9–12.7)

## 2019-08-20 PROCEDURE — 99214 OFFICE O/P EST MOD 30 MIN: CPT | Mod: HCNC,S$GLB,, | Performed by: INTERNAL MEDICINE

## 2019-08-20 PROCEDURE — 3075F SYST BP GE 130 - 139MM HG: CPT | Mod: HCNC,CPTII,S$GLB, | Performed by: INTERNAL MEDICINE

## 2019-08-20 PROCEDURE — 83036 HEMOGLOBIN GLYCOSYLATED A1C: CPT | Mod: HCNC

## 2019-08-20 PROCEDURE — 25000003 PHARM REV CODE 250: Mod: HCNC | Performed by: INTERNAL MEDICINE

## 2019-08-20 PROCEDURE — 85007 BL SMEAR W/DIFF WBC COUNT: CPT | Mod: HCNC

## 2019-08-20 PROCEDURE — A4216 STERILE WATER/SALINE, 10 ML: HCPCS | Mod: HCNC | Performed by: INTERNAL MEDICINE

## 2019-08-20 PROCEDURE — 1101F PT FALLS ASSESS-DOCD LE1/YR: CPT | Mod: HCNC,CPTII,S$GLB, | Performed by: INTERNAL MEDICINE

## 2019-08-20 PROCEDURE — 80053 COMPREHEN METABOLIC PANEL: CPT | Mod: HCNC

## 2019-08-20 PROCEDURE — 3078F PR MOST RECENT DIASTOLIC BLOOD PRESSURE < 80 MM HG: ICD-10-PCS | Mod: HCNC,CPTII,S$GLB, | Performed by: INTERNAL MEDICINE

## 2019-08-20 PROCEDURE — 36591 DRAW BLOOD OFF VENOUS DEVICE: CPT | Mod: HCNC

## 2019-08-20 PROCEDURE — 3078F DIAST BP <80 MM HG: CPT | Mod: HCNC,CPTII,S$GLB, | Performed by: INTERNAL MEDICINE

## 2019-08-20 PROCEDURE — 85027 COMPLETE CBC AUTOMATED: CPT | Mod: HCNC

## 2019-08-20 PROCEDURE — 63600175 PHARM REV CODE 636 W HCPCS: Mod: HCNC | Performed by: INTERNAL MEDICINE

## 2019-08-20 PROCEDURE — 99999 PR PBB SHADOW E&M-EST. PATIENT-LVL III: ICD-10-PCS | Mod: PBBFAC,HCNC,, | Performed by: INTERNAL MEDICINE

## 2019-08-20 PROCEDURE — 1101F PR PT FALLS ASSESS DOC 0-1 FALLS W/OUT INJ PAST YR: ICD-10-PCS | Mod: HCNC,CPTII,S$GLB, | Performed by: INTERNAL MEDICINE

## 2019-08-20 PROCEDURE — 99999 PR PBB SHADOW E&M-EST. PATIENT-LVL III: CPT | Mod: PBBFAC,HCNC,, | Performed by: INTERNAL MEDICINE

## 2019-08-20 PROCEDURE — 3075F PR MOST RECENT SYSTOLIC BLOOD PRESS GE 130-139MM HG: ICD-10-PCS | Mod: HCNC,CPTII,S$GLB, | Performed by: INTERNAL MEDICINE

## 2019-08-20 PROCEDURE — 99214 PR OFFICE/OUTPT VISIT, EST, LEVL IV, 30-39 MIN: ICD-10-PCS | Mod: HCNC,S$GLB,, | Performed by: INTERNAL MEDICINE

## 2019-08-20 RX ORDER — SODIUM CHLORIDE 0.9 % (FLUSH) 0.9 %
10 SYRINGE (ML) INJECTION
Status: CANCELLED | OUTPATIENT
Start: 2019-08-20

## 2019-08-20 RX ORDER — HEPARIN 100 UNIT/ML
500 SYRINGE INTRAVENOUS
Status: CANCELLED | OUTPATIENT
Start: 2019-08-20

## 2019-08-20 RX ORDER — HEPARIN 100 UNIT/ML
500 SYRINGE INTRAVENOUS
Status: COMPLETED | OUTPATIENT
Start: 2019-08-20 | End: 2019-08-20

## 2019-08-20 RX ORDER — SODIUM CHLORIDE 0.9 % (FLUSH) 0.9 %
10 SYRINGE (ML) INJECTION
Status: COMPLETED | OUTPATIENT
Start: 2019-08-20 | End: 2019-08-20

## 2019-08-20 RX ADMIN — HEPARIN SODIUM (PORCINE) LOCK FLUSH IV SOLN 100 UNIT/ML 500 UNITS: 100 SOLUTION at 08:08

## 2019-08-20 RX ADMIN — Medication 10 ML: at 08:08

## 2019-08-20 NOTE — TELEPHONE ENCOUNTER
----- Message from Erick Dwyer MD sent at 8/20/2019 11:10 AM CDT -----  Needs G-CSF WED-Friday this week

## 2019-08-20 NOTE — NURSING
Patient here for blood draw from right chest port-accesses easily with good blood return-blood to lab-line flushed and needle removed.

## 2019-08-21 ENCOUNTER — INFUSION (OUTPATIENT)
Dept: INFUSION THERAPY | Facility: HOSPITAL | Age: 74
End: 2019-08-21
Attending: INTERNAL MEDICINE
Payer: MEDICARE

## 2019-08-21 DIAGNOSIS — D70.1 CHEMOTHERAPY INDUCED NEUTROPENIA: Primary | ICD-10-CM

## 2019-08-21 DIAGNOSIS — T45.1X5A CHEMOTHERAPY INDUCED NEUTROPENIA: Primary | ICD-10-CM

## 2019-08-21 DIAGNOSIS — C50.911 MALIGNANT NEOPLASM OF RIGHT BREAST IN FEMALE, ESTROGEN RECEPTOR NEGATIVE, UNSPECIFIED SITE OF BREAST: Primary | ICD-10-CM

## 2019-08-21 DIAGNOSIS — Z17.1 MALIGNANT NEOPLASM OF RIGHT BREAST IN FEMALE, ESTROGEN RECEPTOR NEGATIVE, UNSPECIFIED SITE OF BREAST: Primary | ICD-10-CM

## 2019-08-21 PROCEDURE — 63600175 PHARM REV CODE 636 W HCPCS: Mod: JG,HCNC | Performed by: INTERNAL MEDICINE

## 2019-08-21 PROCEDURE — 96372 THER/PROPH/DIAG INJ SC/IM: CPT | Mod: HCNC

## 2019-08-21 RX ADMIN — FILGRASTIM 480 MCG: 480 INJECTION, SOLUTION INTRAVENOUS; SUBCUTANEOUS at 11:08

## 2019-08-22 ENCOUNTER — INFUSION (OUTPATIENT)
Dept: INFUSION THERAPY | Facility: HOSPITAL | Age: 74
End: 2019-08-22
Attending: INTERNAL MEDICINE
Payer: MEDICARE

## 2019-08-22 DIAGNOSIS — T45.1X5A CHEMOTHERAPY INDUCED NEUTROPENIA: ICD-10-CM

## 2019-08-22 DIAGNOSIS — Z17.1 MALIGNANT NEOPLASM OF RIGHT BREAST IN FEMALE, ESTROGEN RECEPTOR NEGATIVE, UNSPECIFIED SITE OF BREAST: Primary | ICD-10-CM

## 2019-08-22 DIAGNOSIS — C50.911 MALIGNANT NEOPLASM OF RIGHT BREAST IN FEMALE, ESTROGEN RECEPTOR NEGATIVE, UNSPECIFIED SITE OF BREAST: Primary | ICD-10-CM

## 2019-08-22 DIAGNOSIS — D70.1 CHEMOTHERAPY INDUCED NEUTROPENIA: ICD-10-CM

## 2019-08-22 PROCEDURE — 63600175 PHARM REV CODE 636 W HCPCS: Mod: JG,HCNC | Performed by: INTERNAL MEDICINE

## 2019-08-22 PROCEDURE — 96372 THER/PROPH/DIAG INJ SC/IM: CPT | Mod: HCNC

## 2019-08-22 RX ADMIN — FILGRASTIM 480 MCG: 480 INJECTION, SOLUTION INTRAVENOUS; SUBCUTANEOUS at 10:08

## 2019-08-23 ENCOUNTER — ANESTHESIA EVENT (OUTPATIENT)
Dept: SURGERY | Facility: HOSPITAL | Age: 74
End: 2019-08-23
Payer: MEDICARE

## 2019-08-23 ENCOUNTER — TELEPHONE (OUTPATIENT)
Dept: SURGERY | Facility: CLINIC | Age: 74
End: 2019-08-23

## 2019-08-23 ENCOUNTER — INFUSION (OUTPATIENT)
Dept: INFUSION THERAPY | Facility: HOSPITAL | Age: 74
End: 2019-08-23
Attending: INTERNAL MEDICINE
Payer: MEDICARE

## 2019-08-23 DIAGNOSIS — D70.1 CHEMOTHERAPY INDUCED NEUTROPENIA: Primary | ICD-10-CM

## 2019-08-23 DIAGNOSIS — T45.1X5A CHEMOTHERAPY INDUCED NEUTROPENIA: Primary | ICD-10-CM

## 2019-08-23 PROCEDURE — 63600175 PHARM REV CODE 636 W HCPCS: Mod: JG,HCNC | Performed by: INTERNAL MEDICINE

## 2019-08-23 PROCEDURE — 96372 THER/PROPH/DIAG INJ SC/IM: CPT | Mod: HCNC

## 2019-08-23 RX ADMIN — FILGRASTIM 480 MCG: 480 INJECTION, SOLUTION INTRAVENOUS; SUBCUTANEOUS at 12:08

## 2019-08-23 NOTE — NURSING
Patient in clinic for Neupogen injection. Medication given SQ into abdomen with no complications. Patient has no c/o pain or discomfort. Reports no changes since last appointment. Patient educated on side effects of medication. AVS declined. Discharged home.

## 2019-08-23 NOTE — TELEPHONE ENCOUNTER
Spoke with pt regarding surgery, pt advised to [facility] at [facility] for 1300 surgery, pt verbalized understanding, pre-op education reinforced, all questions answered at this time, pt given reassurance

## 2019-08-25 DIAGNOSIS — M51.36 DEGENERATIVE LUMBAR DISC: ICD-10-CM

## 2019-08-25 DIAGNOSIS — I10 ESSENTIAL HYPERTENSION: ICD-10-CM

## 2019-08-25 DIAGNOSIS — M47.27 OSTEOARTHRITIS OF SPINE WITH RADICULOPATHY, LUMBOSACRAL REGION: ICD-10-CM

## 2019-08-25 DIAGNOSIS — Z95.2 S/P AVR: ICD-10-CM

## 2019-08-26 ENCOUNTER — HOSPITAL ENCOUNTER (OUTPATIENT)
Facility: HOSPITAL | Age: 74
Discharge: HOME | End: 2019-08-27
Attending: SURGERY | Admitting: SURGERY
Payer: MEDICARE

## 2019-08-26 ENCOUNTER — ANESTHESIA (OUTPATIENT)
Dept: SURGERY | Facility: HOSPITAL | Age: 74
End: 2019-08-26
Payer: MEDICARE

## 2019-08-26 DIAGNOSIS — C50.919 INVASIVE CARCINOMA OF BREAST: ICD-10-CM

## 2019-08-26 LAB
POCT GLUCOSE: 137 MG/DL (ref 70–110)
POCT GLUCOSE: 184 MG/DL (ref 70–110)

## 2019-08-26 PROCEDURE — 36000706: Mod: HCNC | Performed by: SURGERY

## 2019-08-26 PROCEDURE — 64461 PVB THORACIC SINGLE INJ SITE: CPT | Mod: HCNC | Performed by: STUDENT IN AN ORGANIZED HEALTH CARE EDUCATION/TRAINING PROGRAM

## 2019-08-26 PROCEDURE — 94761 N-INVAS EAR/PLS OXIMETRY MLT: CPT | Mod: HCNC

## 2019-08-26 PROCEDURE — 88305 TISSUE EXAM BY PATHOLOGIST: CPT | Mod: HCNC | Performed by: PATHOLOGY

## 2019-08-26 PROCEDURE — 63600175 PHARM REV CODE 636 W HCPCS: Mod: HCNC | Performed by: ANESTHESIOLOGY

## 2019-08-26 PROCEDURE — 25000003 PHARM REV CODE 250: Mod: HCNC | Performed by: STUDENT IN AN ORGANIZED HEALTH CARE EDUCATION/TRAINING PROGRAM

## 2019-08-26 PROCEDURE — 63600175 PHARM REV CODE 636 W HCPCS: Mod: HCNC | Performed by: NURSE ANESTHETIST, CERTIFIED REGISTERED

## 2019-08-26 PROCEDURE — 88342 IMHCHEM/IMCYTCHM 1ST ANTB: CPT | Mod: 26,HCNC,, | Performed by: PATHOLOGY

## 2019-08-26 PROCEDURE — 63600175 PHARM REV CODE 636 W HCPCS: Mod: HCNC | Performed by: STUDENT IN AN ORGANIZED HEALTH CARE EDUCATION/TRAINING PROGRAM

## 2019-08-26 PROCEDURE — 25000003 PHARM REV CODE 250: Mod: HCNC | Performed by: ANESTHESIOLOGY

## 2019-08-26 PROCEDURE — 64461 PVB THORACIC SINGLE INJ SITE: CPT | Mod: 59,HCNC,RT, | Performed by: ANESTHESIOLOGY

## 2019-08-26 PROCEDURE — 71000033 HC RECOVERY, INTIAL HOUR: Mod: HCNC | Performed by: SURGERY

## 2019-08-26 PROCEDURE — 19307 MAST MOD RAD: CPT | Mod: HCNC,RT,, | Performed by: SURGERY

## 2019-08-26 PROCEDURE — 36590 PR REMOVAL TUNNELED CV CATH W SUBQ PORT OR PUMP: ICD-10-PCS | Mod: 51,HCNC,RT, | Performed by: SURGERY

## 2019-08-26 PROCEDURE — 88305 TISSUE SPECIMEN TO PATHOLOGY - SURGERY: ICD-10-PCS | Mod: 26,HCNC,, | Performed by: PATHOLOGY

## 2019-08-26 PROCEDURE — 64461 T4 ERECTOR SPINAE PLANE SINGLE INJECTION BLOCK: ICD-10-PCS | Mod: 59,HCNC,RT, | Performed by: ANESTHESIOLOGY

## 2019-08-26 PROCEDURE — C1729 CATH, DRAINAGE: HCPCS | Mod: HCNC | Performed by: SURGERY

## 2019-08-26 PROCEDURE — 27201423 OPTIME MED/SURG SUP & DEVICES STERILE SUPPLY: Mod: HCNC | Performed by: SURGERY

## 2019-08-26 PROCEDURE — 88307 TISSUE EXAM BY PATHOLOGIST: CPT | Mod: 26,HCNC,, | Performed by: PATHOLOGY

## 2019-08-26 PROCEDURE — D9220A PRA ANESTHESIA: ICD-10-PCS | Mod: HCNC,ANES,, | Performed by: ANESTHESIOLOGY

## 2019-08-26 PROCEDURE — 82962 GLUCOSE BLOOD TEST: CPT | Mod: HCNC | Performed by: SURGERY

## 2019-08-26 PROCEDURE — 36000707: Mod: HCNC | Performed by: SURGERY

## 2019-08-26 PROCEDURE — D9220A PRA ANESTHESIA: Mod: HCNC,ANES,, | Performed by: ANESTHESIOLOGY

## 2019-08-26 PROCEDURE — 76942 ECHO GUIDE FOR BIOPSY: CPT | Mod: HCNC,59 | Performed by: STUDENT IN AN ORGANIZED HEALTH CARE EDUCATION/TRAINING PROGRAM

## 2019-08-26 PROCEDURE — 36590 REMOVAL TUNNELED CV CATH: CPT | Mod: 51,HCNC,RT, | Performed by: SURGERY

## 2019-08-26 PROCEDURE — 71000039 HC RECOVERY, EACH ADD'L HOUR: Mod: HCNC | Performed by: SURGERY

## 2019-08-26 PROCEDURE — 25000003 PHARM REV CODE 250: Mod: HCNC | Performed by: NURSE ANESTHETIST, CERTIFIED REGISTERED

## 2019-08-26 PROCEDURE — 37000009 HC ANESTHESIA EA ADD 15 MINS: Mod: HCNC | Performed by: SURGERY

## 2019-08-26 PROCEDURE — 25000003 PHARM REV CODE 250: Mod: HCNC

## 2019-08-26 PROCEDURE — 37000008 HC ANESTHESIA 1ST 15 MINUTES: Mod: HCNC | Performed by: SURGERY

## 2019-08-26 PROCEDURE — 63600175 PHARM REV CODE 636 W HCPCS: Mod: HCNC

## 2019-08-26 PROCEDURE — 88305 TISSUE EXAM BY PATHOLOGIST: CPT | Mod: 26,HCNC,, | Performed by: PATHOLOGY

## 2019-08-26 PROCEDURE — 19307 PR MASTECTOMY, MODIFIED RADICAL: ICD-10-PCS | Mod: HCNC,RT,, | Performed by: SURGERY

## 2019-08-26 PROCEDURE — 88342 IMHCHEM/IMCYTCHM 1ST ANTB: CPT | Mod: HCNC | Performed by: PATHOLOGY

## 2019-08-26 PROCEDURE — 63600175 PHARM REV CODE 636 W HCPCS: Mod: HCNC | Performed by: SURGERY

## 2019-08-26 PROCEDURE — 88342 TISSUE SPECIMEN TO PATHOLOGY - SURGERY: ICD-10-PCS | Mod: 26,HCNC,, | Performed by: PATHOLOGY

## 2019-08-26 PROCEDURE — 88307 TISSUE SPECIMEN TO PATHOLOGY - SURGERY: ICD-10-PCS | Mod: 26,HCNC,, | Performed by: PATHOLOGY

## 2019-08-26 RX ORDER — BUPIVACAINE HYDROCHLORIDE AND EPINEPHRINE 5; 5 MG/ML; UG/ML
INJECTION, SOLUTION EPIDURAL; INTRACAUDAL; PERINEURAL
Status: COMPLETED | OUTPATIENT
Start: 2019-08-26 | End: 2019-08-26

## 2019-08-26 RX ORDER — MIDAZOLAM HYDROCHLORIDE 1 MG/ML
0.5 INJECTION INTRAMUSCULAR; INTRAVENOUS
Status: DISCONTINUED | OUTPATIENT
Start: 2019-08-26 | End: 2019-08-26

## 2019-08-26 RX ORDER — GABAPENTIN 300 MG/1
300 CAPSULE ORAL 2 TIMES DAILY
Status: DISCONTINUED | OUTPATIENT
Start: 2019-08-26 | End: 2019-08-27 | Stop reason: HOSPADM

## 2019-08-26 RX ORDER — OXYCODONE AND ACETAMINOPHEN 5; 325 MG/1; MG/1
1 TABLET ORAL EVERY 6 HOURS PRN
Qty: 20 TABLET | Refills: 0 | Status: SHIPPED | OUTPATIENT
Start: 2019-08-26 | End: 2019-10-14

## 2019-08-26 RX ORDER — PROPOFOL 10 MG/ML
VIAL (ML) INTRAVENOUS
Status: DISCONTINUED | OUTPATIENT
Start: 2019-08-26 | End: 2019-08-26

## 2019-08-26 RX ORDER — IBUPROFEN 200 MG
16 TABLET ORAL
Status: DISCONTINUED | OUTPATIENT
Start: 2019-08-26 | End: 2019-08-27 | Stop reason: HOSPADM

## 2019-08-26 RX ORDER — FENTANYL CITRATE 50 UG/ML
INJECTION, SOLUTION INTRAMUSCULAR; INTRAVENOUS
Status: DISCONTINUED | OUTPATIENT
Start: 2019-08-26 | End: 2019-08-26

## 2019-08-26 RX ORDER — OXYCODONE AND ACETAMINOPHEN 5; 325 MG/1; MG/1
TABLET ORAL
Status: DISPENSED
Start: 2019-08-26 | End: 2019-08-27

## 2019-08-26 RX ORDER — PHENYLEPHRINE HYDROCHLORIDE 10 MG/ML
INJECTION INTRAVENOUS
Status: DISCONTINUED | OUTPATIENT
Start: 2019-08-26 | End: 2019-08-26

## 2019-08-26 RX ORDER — SODIUM CHLORIDE 9 MG/ML
INJECTION, SOLUTION INTRAVENOUS CONTINUOUS
Status: DISCONTINUED | OUTPATIENT
Start: 2019-08-26 | End: 2019-08-26

## 2019-08-26 RX ORDER — LEVOTHYROXINE SODIUM 50 UG/1
50 TABLET ORAL
Status: DISCONTINUED | OUTPATIENT
Start: 2019-08-27 | End: 2019-08-27 | Stop reason: HOSPADM

## 2019-08-26 RX ORDER — OXYCODONE AND ACETAMINOPHEN 5; 325 MG/1; MG/1
1 TABLET ORAL EVERY 4 HOURS PRN
Status: DISCONTINUED | OUTPATIENT
Start: 2019-08-26 | End: 2019-08-27 | Stop reason: HOSPADM

## 2019-08-26 RX ORDER — HYDROMORPHONE HYDROCHLORIDE 1 MG/ML
0.2 INJECTION, SOLUTION INTRAMUSCULAR; INTRAVENOUS; SUBCUTANEOUS EVERY 5 MIN PRN
Status: COMPLETED | OUTPATIENT
Start: 2019-08-26 | End: 2019-08-26

## 2019-08-26 RX ORDER — CARVEDILOL 12.5 MG/1
12.5 TABLET ORAL 2 TIMES DAILY WITH MEALS
Status: DISCONTINUED | OUTPATIENT
Start: 2019-08-26 | End: 2019-08-27 | Stop reason: HOSPADM

## 2019-08-26 RX ORDER — ONDANSETRON 2 MG/ML
INJECTION INTRAMUSCULAR; INTRAVENOUS
Status: DISCONTINUED | OUTPATIENT
Start: 2019-08-26 | End: 2019-08-26

## 2019-08-26 RX ORDER — LIDOCAINE HCL/PF 100 MG/5ML
SYRINGE (ML) INTRAVENOUS
Status: DISCONTINUED | OUTPATIENT
Start: 2019-08-26 | End: 2019-08-26

## 2019-08-26 RX ORDER — IBUPROFEN 200 MG
24 TABLET ORAL
Status: DISCONTINUED | OUTPATIENT
Start: 2019-08-26 | End: 2019-08-27 | Stop reason: HOSPADM

## 2019-08-26 RX ORDER — LORAZEPAM 2 MG/ML
0.5 INJECTION INTRAMUSCULAR EVERY 30 MIN PRN
Status: COMPLETED | OUTPATIENT
Start: 2019-08-26 | End: 2019-08-26

## 2019-08-26 RX ORDER — HYDROMORPHONE HYDROCHLORIDE 1 MG/ML
INJECTION, SOLUTION INTRAMUSCULAR; INTRAVENOUS; SUBCUTANEOUS
Status: COMPLETED
Start: 2019-08-26 | End: 2019-08-26

## 2019-08-26 RX ORDER — FENTANYL CITRATE 50 UG/ML
25 INJECTION, SOLUTION INTRAMUSCULAR; INTRAVENOUS EVERY 5 MIN PRN
Status: DISCONTINUED | OUTPATIENT
Start: 2019-08-26 | End: 2019-08-26

## 2019-08-26 RX ORDER — LIDOCAINE HYDROCHLORIDE 10 MG/ML
1 INJECTION, SOLUTION EPIDURAL; INFILTRATION; INTRACAUDAL; PERINEURAL ONCE
Status: DISCONTINUED | OUTPATIENT
Start: 2019-08-26 | End: 2019-08-26

## 2019-08-26 RX ORDER — OXYCODONE AND ACETAMINOPHEN 10; 325 MG/1; MG/1
1 TABLET ORAL EVERY 4 HOURS PRN
Status: DISCONTINUED | OUTPATIENT
Start: 2019-08-26 | End: 2019-08-27 | Stop reason: HOSPADM

## 2019-08-26 RX ORDER — INSULIN ASPART 100 [IU]/ML
0-5 INJECTION, SOLUTION INTRAVENOUS; SUBCUTANEOUS
Status: DISCONTINUED | OUTPATIENT
Start: 2019-08-26 | End: 2019-08-27 | Stop reason: HOSPADM

## 2019-08-26 RX ORDER — OXYCODONE AND ACETAMINOPHEN 5; 325 MG/1; MG/1
1 TABLET ORAL ONCE
Status: COMPLETED | OUTPATIENT
Start: 2019-08-26 | End: 2019-08-26

## 2019-08-26 RX ORDER — FENTANYL CITRATE 50 UG/ML
25 INJECTION, SOLUTION INTRAMUSCULAR; INTRAVENOUS EVERY 5 MIN PRN
Status: COMPLETED | OUTPATIENT
Start: 2019-08-26 | End: 2019-08-26

## 2019-08-26 RX ORDER — ENOXAPARIN SODIUM 100 MG/ML
1 INJECTION SUBCUTANEOUS
Status: DISCONTINUED | OUTPATIENT
Start: 2019-08-27 | End: 2019-08-27 | Stop reason: HOSPADM

## 2019-08-26 RX ORDER — ASPIRIN 81 MG/1
81 TABLET ORAL DAILY
Status: DISCONTINUED | OUTPATIENT
Start: 2019-08-27 | End: 2019-08-27 | Stop reason: HOSPADM

## 2019-08-26 RX ORDER — SODIUM CHLORIDE 0.9 % (FLUSH) 0.9 %
10 SYRINGE (ML) INJECTION
Status: DISCONTINUED | OUTPATIENT
Start: 2019-08-26 | End: 2019-08-27 | Stop reason: HOSPADM

## 2019-08-26 RX ORDER — ONDANSETRON 8 MG/1
8 TABLET, ORALLY DISINTEGRATING ORAL EVERY 8 HOURS PRN
Status: DISCONTINUED | OUTPATIENT
Start: 2019-08-26 | End: 2019-08-27 | Stop reason: HOSPADM

## 2019-08-26 RX ORDER — LORAZEPAM 2 MG/ML
INJECTION INTRAMUSCULAR
Status: COMPLETED
Start: 2019-08-26 | End: 2019-08-26

## 2019-08-26 RX ORDER — ACETAMINOPHEN 325 MG/1
650 TABLET ORAL EVERY 8 HOURS PRN
Status: DISCONTINUED | OUTPATIENT
Start: 2019-08-26 | End: 2019-08-27 | Stop reason: HOSPADM

## 2019-08-26 RX ORDER — GLUCAGON 1 MG
1 KIT INJECTION
Status: DISCONTINUED | OUTPATIENT
Start: 2019-08-26 | End: 2019-08-27 | Stop reason: HOSPADM

## 2019-08-26 RX ORDER — OXYCODONE AND ACETAMINOPHEN 5; 325 MG/1; MG/1
TABLET ORAL
Status: COMPLETED
Start: 2019-08-26 | End: 2019-08-26

## 2019-08-26 RX ORDER — CEFAZOLIN SODIUM 1 G/3ML
2 INJECTION, POWDER, FOR SOLUTION INTRAMUSCULAR; INTRAVENOUS
Status: COMPLETED | OUTPATIENT
Start: 2019-08-26 | End: 2019-08-26

## 2019-08-26 RX ORDER — SODIUM CHLORIDE 0.9 % (FLUSH) 0.9 %
3 SYRINGE (ML) INJECTION
Status: DISCONTINUED | OUTPATIENT
Start: 2019-08-26 | End: 2019-08-26

## 2019-08-26 RX ADMIN — FENTANYL CITRATE 25 MCG: 50 INJECTION INTRAMUSCULAR; INTRAVENOUS at 06:08

## 2019-08-26 RX ADMIN — ONDANSETRON 4 MG: 2 INJECTION INTRAMUSCULAR; INTRAVENOUS at 05:08

## 2019-08-26 RX ADMIN — HYDROMORPHONE HYDROCHLORIDE 0.2 MG: 1 INJECTION, SOLUTION INTRAMUSCULAR; INTRAVENOUS; SUBCUTANEOUS at 07:08

## 2019-08-26 RX ADMIN — GABAPENTIN 300 MG: 300 CAPSULE ORAL at 08:08

## 2019-08-26 RX ADMIN — HYDROMORPHONE HYDROCHLORIDE 0.2 MG: 1 INJECTION, SOLUTION INTRAMUSCULAR; INTRAVENOUS; SUBCUTANEOUS at 06:08

## 2019-08-26 RX ADMIN — OXYCODONE AND ACETAMINOPHEN 1 TABLET: 5; 325 TABLET ORAL at 06:08

## 2019-08-26 RX ADMIN — PROPOFOL 50 MG: 10 INJECTION, EMULSION INTRAVENOUS at 02:08

## 2019-08-26 RX ADMIN — FENTANYL CITRATE 50 MCG: 50 INJECTION INTRAMUSCULAR; INTRAVENOUS at 12:08

## 2019-08-26 RX ADMIN — PROPOFOL 200 MG: 10 INJECTION, EMULSION INTRAVENOUS at 02:08

## 2019-08-26 RX ADMIN — SODIUM CHLORIDE, SODIUM GLUCONATE, SODIUM ACETATE, POTASSIUM CHLORIDE, MAGNESIUM CHLORIDE, SODIUM PHOSPHATE, DIBASIC, AND POTASSIUM PHOSPHATE: .53; .5; .37; .037; .03; .012; .00082 INJECTION, SOLUTION INTRAVENOUS at 04:08

## 2019-08-26 RX ADMIN — PHENYLEPHRINE HYDROCHLORIDE 100 MCG: 10 INJECTION INTRAVENOUS at 03:08

## 2019-08-26 RX ADMIN — OXYCODONE HYDROCHLORIDE AND ACETAMINOPHEN 1 TABLET: 5; 325 TABLET ORAL at 06:08

## 2019-08-26 RX ADMIN — SODIUM CHLORIDE: 0.9 INJECTION, SOLUTION INTRAVENOUS at 12:08

## 2019-08-26 RX ADMIN — CEFAZOLIN 2 G: 330 INJECTION, POWDER, FOR SOLUTION INTRAMUSCULAR; INTRAVENOUS at 03:08

## 2019-08-26 RX ADMIN — ONDANSETRON 8 MG: 8 TABLET, ORALLY DISINTEGRATING ORAL at 07:08

## 2019-08-26 RX ADMIN — BUPIVACAINE HYDROCHLORIDE AND EPINEPHRINE BITARTRATE 30 ML: 5; .005 INJECTION, SOLUTION EPIDURAL; INTRACAUDAL; PERINEURAL at 12:08

## 2019-08-26 RX ADMIN — CARVEDILOL 12.5 MG: 12.5 TABLET, FILM COATED ORAL at 07:08

## 2019-08-26 RX ADMIN — FENTANYL CITRATE 25 MCG: 50 INJECTION, SOLUTION INTRAMUSCULAR; INTRAVENOUS at 03:08

## 2019-08-26 RX ADMIN — PHENYLEPHRINE HYDROCHLORIDE 100 MCG: 10 INJECTION INTRAVENOUS at 04:08

## 2019-08-26 RX ADMIN — LORAZEPAM 0.5 MG: 2 INJECTION INTRAMUSCULAR; INTRAVENOUS at 07:08

## 2019-08-26 RX ADMIN — LIDOCAINE HYDROCHLORIDE 100 MG: 20 INJECTION, SOLUTION INTRAVENOUS at 02:08

## 2019-08-26 NOTE — PLAN OF CARE
Pt resting comfortably.    Call light in reach.    No questions or concerns at this time.      Please see note attached to chart

## 2019-08-26 NOTE — TRANSFER OF CARE
"Anesthesia Transfer of Care Note    Patient: Alina Narayan    Procedure(s) Performed: Procedure(s) (LRB):  MASTECTOMY, MODIFIED RADICAL (Right)  REMOVAL, CATHETER, CENTRAL VENOUS, TUNNELED, WITH PORT (Right)    Patient location: PACU    Anesthesia Type: general    Transport from OR: Transported from OR on 6-10 L/min O2 by face mask with adequate spontaneous ventilation    Post pain: adequate analgesia    Post assessment: no apparent anesthetic complications and tolerated procedure well    Post vital signs: stable    Level of consciousness: responds to stimulation and lethargic    Nausea/Vomiting: no nausea/vomiting    Complications: none    Transfer of care protocol was followed      Last vitals:   Visit Vitals  BP (!) 146/77 (BP Location: Left arm, Patient Position: Lying)   Pulse 74   Temp 36.3 °C (97.3 °F) (Temporal)   Resp 18   Ht 5' 2" (1.575 m)   Wt 105.2 kg (232 lb)   SpO2 100%   Breastfeeding? No   BMI 42.43 kg/m²     "

## 2019-08-26 NOTE — ANESTHESIA PREPROCEDURE EVALUATION
08/26/2019  Alina Narayan is a 74 y.o., female   Pre-operative evaluation for Procedure(s) (LRB):  [id number] (Right)    Alina Narayan is a 74 y.o. female     Patient Active Problem List   Diagnosis    Essential hypertension    Hyperlipidemia, mixed    Diverticulosis    Family history of colon cancer    Obstructive sleep apnea    Diabetes mellitus type 2 in obese    S/P AVR (aortic valve replacement)    Hearing loss, sensorineural    Degeneration of lumbar or lumbosacral intervertebral disc    Malignant neoplasm of right female breast    Chronic kidney disease, stage III (moderate)    Diabetes mellitus due to underlying condition with stage 3 chronic kidney disease, without long-term current use of insulin    Vitamin D deficiency    Hemarthrosis of left knee    Contusion, knee and lower leg, left, initial encounter    Fall    Left anterior knee pain    Edema    Personal history of breast cancer    BMI 45.0-49.9, adult    Atherosclerosis of aorta    Thrombocytopenia, unspecified    Cancer of axillary tail of right breast    Bilateral pulmonary embolism    Pulmonary HTN    Chemotherapy induced neutropenia    Fatty liver    Disorder of rotator cuff of both shoulders    Soft tissue swelling    Leukopenia    Invasive carcinoma of breast       Review of patient's allergies indicates:   Allergen Reactions    Hydromorphone      Other reaction(s): sedation    Byetta [exenatide] Rash     Other reaction(s): Rash       Current Facility-Administered Medications on File Prior to Visit   Medication Dose Route Frequency Provider Last Rate Last Dose    0.9%  NaCl infusion   Intravenous Continuous Toni Heck MD 70 mL/hr at 08/26/19 1200      alteplase injection 2 mg  2 mg Intra-Catheter PRN Erick Dwyer MD   2 mg at 03/11/19 1140    ceFAZolin injection 2 g  2 g Intravenous  On Call Procedure Toni Heck MD        fentaNYL injection 25 mcg  25 mcg Intravenous Q5 Min PRN Wenceslao Salcedo MD        heparin, porcine (PF) 100 unit/mL injection flush 500 Units  500 Units Intravenous 1 time in Clinic/HOD Erick wDyer MD        heparin, porcine (PF) 100 unit/mL injection flush 500 Units  500 Units Intravenous 1 time in Clinic/HOD Erick Dwyer MD        lidocaine (PF) 10 mg/ml (1%) injection 10 mg  1 mL Intradermal Once Toni Heck MD        midazolam (VERSED) 1 mg/mL injection 0.5 mg  0.5 mg Intravenous PRN Wenceslao Salcedo MD        sodium chloride 0.9% flush 10 mL  10 mL Intravenous PRN Erick Dwyer MD   10 mL at 07/23/19 1157     Current Outpatient Medications on File Prior to Visit   Medication Sig Dispense Refill    ASPIR-LOW 81 mg EC tablet Take 81 mg by mouth once daily.  12    blood sugar diagnostic Strp True Metirx strips or strips and lancets  - pt checks twice daily for uncontrolled glucoses E11.65 200 each 3    blood-glucose meter kit True Test or meter covered by insurance - pt checks glucose twice daily for uncontrolled glucoses E11.65 1 each 0    carvedilol (COREG) 25 MG tablet .5-1 tablet oral twice daily or as directed.  Hold until Dr. Dwyer says to restart (Patient taking differently: Take 12.5 mg by mouth 2 (two) times daily with meals. ) 180 tablet 3    CHOLECALCIFEROL, VITAMIN D3, (VITAMIN D3 ORAL) Take by mouth once daily. 1000 IU      enoxaparin (LOVENOX) 150 mg/mL Syrg Inject 1 mL (150 mg total) into the skin once daily. 30 mL 3    furosemide (LASIX) 20 MG tablet Take 1 tablet (20 mg total) by mouth once daily. 30 tablet 11    gabapentin (NEURONTIN) 300 MG capsule TAKE 1 CAPSULE TWICE DAILY 180 capsule 1    lancets (ACCU-CHEK SOFTCLIX LANCETS) Misc TrueTest lancets for meter covered by insurance - pt checks twice daily for uncontrolled glucoses E11.65, 200 each 3    levothyroxine (SYNTHROID) 50 MCG tablet Take 1 tablet (50 mcg  total) by mouth every evening. (Patient taking differently: Take 50 mcg by mouth before breakfast. ) 90 tablet 3    lidocaine-prilocaine (EMLA) cream Apply topically as needed. Apply topically as needed 45 minutes prior to port access. 5 g 3    metFORMIN (GLUCOPHAGE) 500 MG tablet TAKE 1 TABLET TWICE DAILY WITH MEALS (Patient taking differently: TAKE 1 TABLET TWICE DAILY WITH MEALS  (takes 2 in AM only)) 180 tablet 3    pravastatin (PRAVACHOL) 20 MG tablet TAKE 1 TABLET EVERY DAY 90 tablet 3       Past Surgical History:   Procedure Laterality Date    BREAST BIOPSY Left 10/1/2012    left breast- invasive ductal carcinoma    BREAST BIOPSY Left 12/2017    BREAST LUMPECTOMY Left 2012    CARDIAC VALVE REPLACEMENT  12/2013    CARDIAC VALVE SURGERY  2013    CARPAL TUNNEL RELEASE      Left    CATHETERIZATION, HEART, LEFT Left 11/7/2013    [facility]chant [facility] at [facility] CATH LAB    COLONOSCOPY N/A 9/29/2017    Performed by Phani Worley MD at [facility] ENDO (4TH FLR)    COLONOSCOPY N/A 8/28/2015    Performed by Ph[facility]ley [facility] at [facility] ENDO (4TH FLR)    DILATION AND CURETTAGE OF UTERUS  1999    Endometrial polyps    ENDOMETRIAL ABLATION  1999    Enodmetrial polyps    NIC-TRANSFORAMINAL Left 10/1/2015    [facility]    EYE SURGERY      YLRHIVJIA-[id number]-Q-CATH Right 2/1/2019    Performed by Gaston Flores MD at [facility] OR 2ND FLR    left lumpectomy  2012    REPLACEMENT, AORTIC VALVE N/A 12/2/2013    Performed by Venkat Webb MD at [facility] OR 2ND FLR    SHOULDER SURGERY      SKIN BIOPSY         Social History     Socioeconomic History    Marital status:      Spouse name: Srinath    Number of children: 2    Years of education: Not on file    Highest education level: Not on file   Occupational History    Occupation: Retired   Social Needs    Financial resource strain: Not on file    Food insecurity:     Worry: Not on file     Inability:  Not on file    Transportation needs:     Medical: Not on file     Non-medical: Not on file   Tobacco Use    Smoking status: Never Smoker    Smokeless tobacco: Never Used   Substance and Sexual Activity    Alcohol use: No     Alcohol/week: 0.0 oz    Drug use: No    Sexual activity: Yes   Lifestyle    Physical activity:     Days per week: Not on file     Minutes per session: Not on file    Stress: Not on file   Relationships    Social connections:     Talks on phone: Not on file     Gets together: Not on file     Attends Gnosticist service: Not on file     Active member of club or organization: Not on file     Attends meetings of clubs or organizations: Not on file     Relationship status: Not on file   Other Topics Concern    Are you pregnant or think you may be? No    Breast-feeding No   Social History Narrative    Not on file     Lab Results   Component Value Date    WBC 29.07 (H) 08/23/2019    HGB 11.5 (L) 08/23/2019    HCT 36.6 (L) 08/23/2019    MCV 98 08/23/2019     (L) 08/23/2019       2D Echo:  Results for orders placed or performed during the hospital encounter of 10/19/18   2D echo with color flow doppler   Result Value Ref Range    QEF 53 55 - 65    Mitral Valve Regurgitation MILD TO MODERATE     Est. PA Systolic Pressure 28.4     Tricuspid Valve Regurgitation MILD TO MODERATE          Anesthesia Evaluation    I have reviewed the Patient Summary Reports.     I have reviewed the Medications.     Review of Systems  Anesthesia Hx:  No problems with previous Anesthesia Denies Hx of Anesthetic complications  History of prior surgery of interest to airway management or planning: Denies Family Hx of Anesthesia complications.   Denies Personal Hx of Anesthesia complications.   Social:  No Alcohol Use, Non-Smoker    Hematology/Oncology:  Hematology Normal   Oncology Normal     EENT/Dental:EENT/Dental Normal   Cardiovascular:   Exercise tolerance: good Hypertension    Pulmonary:   Sleep Apnea     Renal/:   Chronic Renal Disease, CRI    Hepatic/GI:  Hepatic/GI Normal    Musculoskeletal:   Arthritis     Neurological:  Neurology Normal    Endocrine:   Diabetes, type 2    Psych:  Psychiatric Normal       Aortic valve replacement five years ago, no complications   There is no height or weight on file to calculate BMI.      Physical Exam  General:  Well nourished, Morbid Obesity    Airway/Jaw/Neck:  Airway Findings: Mouth Opening: Normal Tongue: Normal  General Airway Assessment: Adult, Average  Mallampati: II  TM Distance: Normal, at least 6 cm  Jaw/Neck Findings:  Neck ROM: Normal ROM      Dental:  Dental Findings: In tact    Chest/Lungs:  Chest/Lungs Findings: Normal Respiratory Rate, Clear to auscultation     Heart/Vascular:  Heart Findings: Rate: Normal  Rhythm: Regular Rhythm        Mental Status:  Mental Status Findings:  Alert and Oriented, Cooperative         Anesthesia Plan  Type of Anesthesia, risks & benefits discussed:  Anesthesia Type:  general  Patient's Preference:   Intra-op Monitoring Plan: standard ASA monitors  Intra-op Monitoring Plan Comments:   Post Op Pain Control Plan: multimodal analgesia and IV/PO Opioids PRN  Post Op Pain Control Plan Comments:   Induction:   IV  Beta Blocker:  Patient is not currently on a Beta-Blocker (No further documentation required).       Informed Consent: Patient understands risks and agrees with Anesthesia plan.  Questions answered. Anesthesia consent signed with patient.  ASA Score: 3     Day of Surgery Review of History & Physical:    H&P update referred to the surgeon.         Ready For Surgery From Anesthesia Perspective.                                                                                                                  08/26/2019  Alina Narayan is a 74 y.o., female.    Anesthesia Evaluation         Review of Systems  Social:  Non-Smoker   Hematology/Oncology:        Oncology Comments: Breast cancer   Cardiovascular:   Hypertension PVD  hyperlipidemia Pulmonary HTN,  S/P AVR (aortic valve replacement),  ejection fraction is 63%   Pulmonary:   Sleep Apnea Bilateral pulmonary embolism   Renal/:   Chronic Renal Disease, CRI    Hepatic/GI:   Liver Disease, (Fatty)    Musculoskeletal:   Arthritis     Endocrine:   Diabetes        Physical Exam  General:  Morbid Obesity    Airway/Jaw/Neck:  Airway Findings: Mouth Opening: Normal Tongue: Normal  General Airway Assessment: Adult  Mallampati: III  Improves to II with phonation.  TM Distance: Normal, at least 6 cm  Jaw/Neck Findings:  Neck ROM: Normal ROM     Eyes/Ears/Nose:  Eyes/Ears/Nose Findings:    Dental:  Dental Findings: In tact   Chest/Lungs:  Chest/Lungs Findings: Normal Respiratory Rate     Heart/Vascular:  Heart Findings: Rate: Normal  Rhythm: Regular Rhythm        Mental Status:  Mental Status Findings:  Alert and Oriented, Cooperative         Anesthesia Plan  Type of Anesthesia, risks & benefits discussed:  Anesthesia Type:  general  Patient's Preference:   Intra-op Monitoring Plan: standard ASA monitors  Intra-op Monitoring Plan Comments:   Post Op Pain Control Plan: multimodal analgesia  Post Op Pain Control Plan Comments:   Induction:   IV  Beta Blocker:  Patient is on a Beta-Blocker and has received one dose within the past 24 hours (No further documentation required).       Informed Consent: Patient understands risks and agrees with Anesthesia plan.  Questions answered. Anesthesia consent signed with patient.  ASA Score: 3     Day of Surgery Review of History & Physical:    H&P update referred to the surgeon.         Ready For Surgery From Anesthesia Perspective.

## 2019-08-26 NOTE — ANESTHESIA POSTPROCEDURE EVALUATION
Anesthesia Post Evaluation    Patient: Alina Narayan    Procedure(s) Performed: Procedure(s) (LRB):  MASTECTOMY, MODIFIED RADICAL (Right)  REMOVAL, CATHETER, CENTRAL VENOUS, TUNNELED, WITH PORT (Right)    Final Anesthesia Type: general  Patient location during evaluation: PACU  Patient participation: Yes- Able to Participate  Level of consciousness: awake and alert and oriented  Post-procedure vital signs: reviewed and stable  Pain management: adequate  Airway patency: patent  PONV status at discharge: No PONV  Anesthetic complications: no      Cardiovascular status: blood pressure returned to baseline  Respiratory status: unassisted, room air and spontaneous ventilation  Hydration status: euvolemic  Follow-up not needed.          Vitals Value Taken Time   /61 8/26/2019  6:17 PM   Temp 36.3 °C (97.3 °F) 8/26/2019  6:00 PM   Pulse 72 8/26/2019  6:17 PM   Resp 16 8/26/2019  6:17 PM   SpO2 91 % 8/26/2019  6:17 PM   Vitals shown include unvalidated device data.      No case tracking events are documented in the log.      Pain/Lin Score: Pain Rating Prior to Med Admin: 8 (8/26/2019  6:10 PM)  Lin Score: 10 (8/26/2019  2:10 PM)

## 2019-08-26 NOTE — H&P
CHIEF COMPLAINT: right breast cancer     Subjective:       Alina Narayan is a 73 y.o. postmenopausal female referred for surgical planning of for right breast cancer. Patient has one more course of chemotherapy. Patient had an abnormal mammogram first noted 2018. Follow-up imaging showed calcifications with 2 axillary lymph nodes, the largest measuring  1.4 x 1 cm in the axillary tail. A ultrasound guided biopsy was performed on right with pathology revealing metastatic mammary carcinoma of the breast.       Patient does routinely do self breast exams.  Patient has not noted a change on breast exam.  Patient denies nipple discharge. She had a history of of the left breast cancer in 10/2012, w/ IDC, ER/FL (+), HER 2 russell neg, followed by a left lumpectomy for a 7 mm IDC and negative margins and SN, thus she underwent adjuvant XRT with 5 years of ietroazole      - QY MMG - BIRADs 2 in . 2016  - MMG BIRADs 4 - 2017, with new calcifications and stereotactic bx  - 2018 - BIRADs 2   - 2018 - BIRADS 4B  - 19 u/s guided bx from right axillary mass - met mammary carcinoma, also in right axillary node  - 1/15/19 - MRI breast with two axillary nodes demonstrated, largest measurig 1.4 x 1 cm      - negative genetic testing, integrated BRCA 2017      Findings at that time from the most recent biopsy were the following:   Estrogen Receptor: neg  Her-2 russell: neg  Progesterone Receptor: neg  Ki 67 - 40%   Lymph node status: pos       Interval Hx:  Patient reports no significant medical changes since last seen in clinic. Has small rash under right breast. Spoke with Dr. Dwyer and with her great response to her white cell GSF injection she is cleared to have her port removed today as well.     GYN History:  Age of menarche was 11. Age of menopause was 53.  Patient denies hormonal therapy. Patient is . Age of first live birth was 19. Patient did breast feed.     FAMILY History:  - mother - breast cancer 62   -  father - CRC   - brother - prostate Ca      Social:  - no tobacco or etoh use  - swims with her girlfriends twice weekly at the [employer]  - uses a rolling walker, no shortness of breath or chest pain with walking, mostly limited by knee pain              Past Medical History:   Diagnosis Date    Allergy       seasonal    Anxiety      Arthritis      Breast cancer 10/2012     left breast invasive ductal carcinoma    Colon polyp      Diabetes mellitus       Type 2    Diverticular disease      Diverticulitis 2009    Genetic testing 05/2017     negative Integrated BRACAnalysis    Hyperlipidemia      Hypertension      Morbid obesity      MITCHELL (obstructive sleep apnea)      Stenosis      Stenosis and insufficiency of lacrimal passages      Thyroid disease                  Past Surgical History:   Procedure Laterality Date    BREAST BIOPSY Left 10/1/2012     left breast- invasive ductal carcinoma    BREAST BIOPSY Left 12/2017    BREAST LUMPECTOMY Left 2012    CARDIAC VALVE REPLACEMENT   12/2013    CARDIAC VALVE SURGERY   2013    CARPAL TUNNEL RELEASE         Left    CATHETERIZATION, HEART, LEFT Left 11/7/2013     Performed [facility]chant [facility] at [facility] CATH LAB    COLONOSCOPY N/A 9/29/2017     Performed by Phani Worley MD at [facility] ENDO (4TH FLR)    COLONOSCOPY N/A 8/28/2015     Performed by [facility]ley [facility] at [facility] ENDO (4TH FLR)    DILATION AND CURETTAGE OF UTERUS   1999     Endometrial polyps    ENDOMETRIAL ABLATION   1999     Enodmetrial polyps    NIC-TRANSFORAMINAL Left 10/1/2015     [facility] at [facility]    EYE SURGERY        left lumpectomy   2012    REPLACEMENT, AORTIC VALVE N/A 12/2/2013     Performed by Venkat Webb MD at [facility] OR 2ND FLR    SHOULDER SURGERY        SKIN BIOPSY                      Current Outpatient Medications on File Prior to Visit   Medication Sig Dispense Refill    ALPRAZolam (XANAX) 0.25 MG tablet One-two  daily as needed for anxiety 40 tablet 0    amoxicillin (AMOXIL) 500 MG capsule TAKE 4 CAPSULES 1 HOUR BEFORE APPOINTMENT   1    ASPIR-LOW 81 mg EC tablet Take 81 mg by mouth once daily.   12    blood sugar diagnostic Strp True Metirx strips or strips and lancets  - pt checks twice daily for uncontrolled glucoses E11.65 200 each 3    carvedilol (COREG) 25 MG tablet .5-1 tablet oral twice daily or as directed. 180 tablet 3    CHOLECALCIFEROL, VITAMIN D3, (VITAMIN D3 ORAL) Take by mouth once daily. 1000 IU        diclofenac sodium (VOLTAREN) 1 % Gel Apply 2 g topically once daily. 100 g 3    gabapentin (NEURONTIN) 300 MG capsule Take 1 capsule (300 mg total) by mouth 2 (two) times daily. 180 capsule 1    HYDROcodone-acetaminophen (NORCO) 5-325 mg per tablet One - two tabs every 6-8 hours as needed for pain 40 tablet 0    lancets (ACCU-CHEK SOFTCLIX LANCETS) Misc TrueTest lancets for meter covered by insurance - pt checks twice daily for uncontrolled glucoses E11.65, 200 each 3    levothyroxine (SYNTHROID) 50 MCG tablet Take 1 tablet (50 mcg total) by mouth every evening. 90 tablet 3    meloxicam (MOBIC) 7.5 MG tablet One tablet twice daily 30 tablet 0    metFORMIN (GLUCOPHAGE) 500 MG tablet Take 1 tablet (500 mg total) by mouth 2 (two) times daily with meals. 180 tablet 3    pravastatin (PRAVACHOL) 20 MG tablet TAKE 1 TABLET ONE TIME DAILY 90 tablet 3    blood-glucose meter kit True Test or meter covered by insurance - pt checks glucose twice daily for uncontrolled glucoses E11.65 1 each 0      No current facility-administered medications on file prior to visit.       Social History                Socioeconomic History    Marital status:        Spouse name: Sirnath    Number of children: 2    Years of education: Not on file    Highest education level: Not on file   Social Needs    Financial resource strain: Not on file    Food insecurity - worry: Not on file    Food insecurity - inability:  "Not on file    Transportation needs - medical: Not on file    Transportation needs - non-medical: Not on file   Occupational History    Occupation: Retired   Tobacco Use    Smoking status: Never Smoker    Smokeless tobacco: Never Used   Substance and Sexual Activity    Alcohol use: No       Alcohol/week: 0.0 oz    Drug use: No    Sexual activity: Yes   Other Topics Concern    Are you pregnant or think you may be? No    Breast-feeding No   Social History Narrative    Not on file                Family History   Problem Relation Age of Onset    Breast cancer Mother 62    Colon cancer Father      Cancer Father           Colon CA (cause of death); Prostate CA (no chemo)    Heart disease Father      No Known Problems Sister      No Known Problems Brother      Cancer Maternal Grandfather           Colon CA    No Known Problems Son      Cancer Brother           prostate CA, no chemo, "it was bad"    Anxiety disorder Son      SAL disease Son      Sleep disorder Son      Ovarian cancer Neg Hx      Melanoma Neg Hx      Psoriasis Neg Hx      Lupus Neg Hx      Eczema Neg Hx      Acne Neg Hx           Review of Systems  Review of Systems   Constitutional: Positive for activity change. Negative for fever and unexpected weight change.        Bilateral knee pain (evaluated and not candidate for knee replacements)    Eyes: Negative for pain.   Respiratory: Negative for chest tightness and shortness of breath.    Cardiovascular: Negative for chest pain and palpitations.   Gastrointestinal: Negative for abdominal pain.   Genitourinary: Negative for difficulty urinating.   Musculoskeletal: Positive for back pain, gait problem and joint swelling.   Neurological: Negative for dizziness.   Hematological: Bruises/bleeds easily.         Objective:   PHYSICAL EXAM:  /64   Pulse 80   Temp 97.6 °F (36.4 °C)   Ht 5' 2" (1.575 m)   Wt 117.8 kg (259 lb 11.2 oz)   BMI 47.50 kg/m²      Physical " Exam   Constitutional: She is oriented to person, place, and time. She appears well-developed and well-nourished.   Sitting in a wheelchair   HENT:   Head: Normocephalic and atraumatic.   Eyes: EOM are normal.   Neck: Neck supple.   Cardiovascular: Normal rate.    Pulmonary/Chest: Effort normal. She exhibits no mass, no tenderness and no retraction.   On room air, no palpable LN in the right axilla, no palpable breast masses, ecchymosis noted from the previous biopsy sites   Abdominal: Soft. She exhibits no distension.   Neurological: She is alert and oriented to person, place, and time.   Skin: Skin is warm and dry.     Psychiatric: She has a normal mood and affect.            Radiology review: Images personally reviewed by me in the clinic.      MRI 1/15/19     History:  Two new biopsy-proven level 1 right axillary stef metastases of unknown breast primary.      Films Compared:  Prior images (if available) were compared.     Technique:  Multiplanar multisequence MR images. IV contrast: 20 mL Multihance.     Findings:  Scattered fibroglandular tissue. Minimal background parenchymal enhancement.     Level 1 right axillary adenopathy. Two of these nodes are biopsy-proven stef metastases with associated internal biopsy clips. The largest of the nodes measures 1.4 x 1.0 cm.      No MR evidence for the primary malignancy in either breast. No abnormal skin or nipple enhancement.      Benign post treatment changes in the left breast, including lumpectomy scar.      No left axillary adenopathy. No internal mammary adenopathy.      Impression:  1. No MR evidence for the primary breast malignancy.      2. Level 1 right axillary adenopathy. Two of these nodes are biopsy-proven stef metastases of unknown breast primary. BI-RADS 6: Known malignancy.      BI-RADS Category:   Overall: 6 - Known Biopsy-Proven Malignancy     Surgical Pathology: 1/7/19  Estrogen receptor: Negative.  Progesterone receptor: Negative.  HER2:  Negative.  Ki-67: 40%.     FINAL PATHOLOGIC DIAGNOSIS QHN3LAI,ER,PGR,MWV3RSH,ER,PGR     1. Right axillary mass, core needle biopsy:  - One lymph node positive for metastatic mammary carcinoma (1/1)  Note: Immunohistochemical preparations demonstrate tumor cell positivity for AE1/AE3, WSK, and CAM5.2, which  support the above diagnosis. Prognostic/predictive markers for ER, NC, HER2 and Ki 67 are pending, and will be  reported in an addendum.  2. Right axilla node, core needle biopsy:  - One lymph node positive for metastatic mammary carcinoma (1/1)  Note: Immunohistochemical preparations demonstrate tumor cell positivity for AE1/AE3, WSK, CD31, and CAM5.2,  while negative for CD34 and Factor VIII, which support the above diagnosis. Prognostic/predictive markers for ER,  NC, HER2 and Ki 67 are pending, and will be reported in an addendum.        Assessment:       Alina Narayan is a 73 y.o. postmenopausal female with diagnosed carcinoma of the right breast with 2 positive biopsied LN, no breast primary noted, triple neg presents for surgical planning.     Plan:         1.) Complete last session of chemotherapy July 23, 2019.  2.) Schedule Right Mastectomy for August 26th 2019.  3.) Follow up with Oncology to determine removal of Port.  4.) Perform follow up imaging for lung nodule.     I have personally taken the history and examined this patient and agree with the resident's note as stated above.  I have personally taken the history and examined this patient and agree with the resident's note as stated above.  Patient presents with her  as she nears completion of her neoadjuvant preoperative chemotherapy for her port negative breast cancer on the right side. She had presented with 2 positive right axillary lymph nodes an occult primary in her right breast the biopsies of the axillary nodes revealed triple negative breast cancer.  She has a history of breast conservation surgery and radiotherapy in the remote  past for estrogen receptor positive cancer on the contralateral left breast in 2012 as noted above.     She is supposed to finish her chemotherapy next week on 07/23/2019 with Dr. Dwyer.  She has had a DVT and is currently on Lovenox every 24 hr once a day.  Her genetic testing in 2017 was negative.  Her pre chemotherapy PET scan had revealed only the evidence of disease in the right axilla with occult primary lesion in the right breast.  She does not wants radiotherapy for the occult primary given the fact that the radiotherapy had caused some cardiac insufficiency back in 2012.  Initially she wanted bilateral mastectomies but this point we have elected to keep the left side given the risk created by her recent DVT and I would like to minimize her surgery time.     We will hold the Lovenox preoperatively for 24 hr and perform a right modified radical mastectomy only without reconstruction.  This has been tentatively scheduled for Monday 08/26/2019.  This may need be altered if her chemotherapy is delayed by Dr. Dwyer for any side effects or complications related to her last visit.  The patient will discuss with Dr. Dwyer whether we will remove the right-sided ipsilateral Port-A-Cath or not.  Since she had had occult disease in the right breast at the time of presentation I will not repeat the MRI.  She may not be eligible for any clinical trial such as the platinum for future trial if there is residual the because of the prior history of breast cancer.  Therefore me that she has residual disease she will likely get oral capecitabine.  Nonetheless, I will ask her to ask Dr. Dwyer if he would like the Port-A-Cath removed at the time of for right modified radical mastectomy.  The patient let me know after she discusses this with Dr. Dwyer as to whether or not we will remove the Port-A-Cath at time of surgery.

## 2019-08-26 NOTE — BRIEF OP NOTE
[facility]  Brief Operative Note     SUMMARY     Surgery Date: 8/26/2019     Surgeon(s) and Role:     * Gaston Flores MD - Primary     * Toni Heck MD - Resident - Assisting        Pre-op Diagnosis:  Malignant neoplasm of right breast in female, estrogen receptor negative, unspecified site of breast [C50.911, Z17.1]    Post-op Diagnosis:  Post-Op Diagnosis Codes:     * Malignant neoplasm of right breast in female, estrogen receptor negative, unspecified site of breast [C50.911, Z17.1]    Procedure(s) (LRB):  MASTECTOMY, MODIFIED RADICAL (Right)  REMOVAL, CATHETER, CENTRAL VENOUS, TUNNELED, WITH PORT (Right)    Anesthesia: General    Description of the findings of the procedure: Right modified radical mastectomy performed and right port removed.    Findings/Key Components: 2 fernanda drains placed/    Estimated Blood Loss: 50 mL         Specimens:   Specimen (12h ago, onward)    Start     Ordered    08/26/19 1714  Specimen to Pathology - Surgery  Once     Comments:  Pre-op Diagnosis: Malignant neoplasm of right breast in female, estrogen receptor negative, unspecified site of breast [C50.911, Z17.1]Procedure(s):MASTECTOMY, MODIFIED RADICALREMOVAL, CATHETER, CENTRAL VENOUS, TUNNELED, WITH PORT Number of specimens: 2Name of specimens: 1. Apical Level 2 Lymph Node Tissue - Perm2. Right Modified Radical Mastectomy, Short Stitch-Superior, Long Stitch-Axillary Contacts - Perm     Start Status     08/26/19 1714 Collected (08/26/19 1430) Order ID: [id number]       08/26/19 1716          Dispo: Patient extubated and to PACU in stable condition

## 2019-08-26 NOTE — PROGRESS NOTES
MD at bedside examining pt R breast rash.  Resident will contact MD Flores in regards to breast irritation

## 2019-08-26 NOTE — ANESTHESIA PROCEDURE NOTES
T4 Erector Spinae Plane Single Injection Block    Patient location during procedure: pre-op   Block not for primary anesthetic.  Reason for block: at surgeon's request and post-op pain management   Post-op Pain Location: R chest wall pain  Start time: 8/26/2019 12:45 PM  Timeout: 8/26/2019 12:45 PM   End time: 8/26/2019 12:50 PM    Staffing  Authorizing Provider: Venkat Robledo MD  Performing Provider: Andrea Klein MD    Preanesthetic Checklist  Completed: patient identified, site marked, surgical consent, pre-op evaluation, timeout performed, IV checked, risks and benefits discussed and monitors and equipment checked  Peripheral Block  Patient position: sitting  Prep: ChloraPrep  Patient monitoring: heart rate, cardiac monitor, continuous pulse ox, continuous capnometry and frequent blood pressure checks  Block type: erector spinae plane (Erector Spinae Plane Block)  Laterality: right  Injection technique: single shot  Location: T3-4  Needle  Needle type: Tuohy   Needle gauge: 17 G  Needle length: 3.5 in  Needle localization: anatomical landmarks and ultrasound guidance   -ultrasound image captured on disc.  Assessment  Injection assessment: negative aspiration, negative parasthesia and local visualized surrounding nerve  Paresthesia pain: none  Heart rate change: no  Slow fractionated injection: yes  Additional Notes  Patient tolerated well.  [facility]

## 2019-08-27 VITALS
HEART RATE: 81 BPM | WEIGHT: 232 LBS | OXYGEN SATURATION: 95 % | HEIGHT: 62 IN | TEMPERATURE: 97 F | DIASTOLIC BLOOD PRESSURE: 58 MMHG | SYSTOLIC BLOOD PRESSURE: 108 MMHG | RESPIRATION RATE: 18 BRPM | BODY MASS INDEX: 42.69 KG/M2

## 2019-08-27 LAB
ANION GAP SERPL CALC-SCNC: 8 MMOL/L (ref 8–16)
ANISOCYTOSIS BLD QL SMEAR: SLIGHT
BASOPHILS NFR BLD: 1 % (ref 0–1.9)
BUN SERPL-MCNC: 13 MG/DL (ref 8–23)
CALCIUM SERPL-MCNC: 8.8 MG/DL (ref 8.7–10.5)
CHLORIDE SERPL-SCNC: 104 MMOL/L (ref 95–110)
CO2 SERPL-SCNC: 29 MMOL/L (ref 23–29)
CREAT SERPL-MCNC: 0.9 MG/DL (ref 0.5–1.4)
DIFFERENTIAL METHOD: ABNORMAL
EOSINOPHIL NFR BLD: 0 % (ref 0–8)
ERYTHROCYTE [DISTWIDTH] IN BLOOD BY AUTOMATED COUNT: 14.5 % (ref 11.5–14.5)
EST. GFR  (AFRICAN AMERICAN): >60 ML/MIN/1.73 M^2
EST. GFR  (NON AFRICAN AMERICAN): >60 ML/MIN/1.73 M^2
GLUCOSE SERPL-MCNC: 167 MG/DL (ref 70–110)
HCT VFR BLD AUTO: 33.6 % (ref 37–48.5)
HGB BLD-MCNC: 10.5 G/DL (ref 12–16)
IMM GRANULOCYTES # BLD AUTO: ABNORMAL K/UL (ref 0–0.04)
IMM GRANULOCYTES NFR BLD AUTO: ABNORMAL % (ref 0–0.5)
LYMPHOCYTES NFR BLD: 7 % (ref 18–48)
MAGNESIUM SERPL-MCNC: 1.9 MG/DL (ref 1.6–2.6)
MCH RBC QN AUTO: 31 PG (ref 27–31)
MCHC RBC AUTO-ENTMCNC: 31.3 G/DL (ref 32–36)
MCV RBC AUTO: 99 FL (ref 82–98)
METAMYELOCYTES NFR BLD MANUAL: 5 %
MONOCYTES NFR BLD: 6 % (ref 4–15)
MYELOCYTES NFR BLD MANUAL: 3 %
NEUTROPHILS NFR BLD: 78 % (ref 38–73)
NRBC BLD-RTO: 0 /100 WBC
OVALOCYTES BLD QL SMEAR: ABNORMAL
PHOSPHATE SERPL-MCNC: 4.3 MG/DL (ref 2.7–4.5)
PLATELET # BLD AUTO: 80 K/UL (ref 150–350)
PLATELET BLD QL SMEAR: ABNORMAL
PMV BLD AUTO: 10.8 FL (ref 9.2–12.9)
POCT GLUCOSE: 144 MG/DL (ref 70–110)
POCT GLUCOSE: 153 MG/DL (ref 70–110)
POCT GLUCOSE: 167 MG/DL (ref 70–110)
POLYCHROMASIA BLD QL SMEAR: ABNORMAL
POTASSIUM SERPL-SCNC: 4.6 MMOL/L (ref 3.5–5.1)
RBC # BLD AUTO: 3.39 M/UL (ref 4–5.4)
SODIUM SERPL-SCNC: 141 MMOL/L (ref 136–145)
WBC # BLD AUTO: 12.02 K/UL (ref 3.9–12.7)

## 2019-08-27 PROCEDURE — 25000003 PHARM REV CODE 250: Mod: HCNC | Performed by: STUDENT IN AN ORGANIZED HEALTH CARE EDUCATION/TRAINING PROGRAM

## 2019-08-27 PROCEDURE — 84100 ASSAY OF PHOSPHORUS: CPT | Mod: HCNC

## 2019-08-27 PROCEDURE — 36415 COLL VENOUS BLD VENIPUNCTURE: CPT | Mod: HCNC

## 2019-08-27 PROCEDURE — 85007 BL SMEAR W/DIFF WBC COUNT: CPT | Mod: HCNC

## 2019-08-27 PROCEDURE — 85027 COMPLETE CBC AUTOMATED: CPT | Mod: HCNC

## 2019-08-27 PROCEDURE — 83735 ASSAY OF MAGNESIUM: CPT | Mod: HCNC

## 2019-08-27 PROCEDURE — 63600175 PHARM REV CODE 636 W HCPCS: Mod: HCNC | Performed by: STUDENT IN AN ORGANIZED HEALTH CARE EDUCATION/TRAINING PROGRAM

## 2019-08-27 PROCEDURE — 80048 BASIC METABOLIC PNL TOTAL CA: CPT | Mod: HCNC

## 2019-08-27 RX ORDER — DOXYLAMINE SUCCINATE 25 MG
TABLET ORAL 2 TIMES DAILY
Status: DISCONTINUED | OUTPATIENT
Start: 2019-08-27 | End: 2019-08-27 | Stop reason: HOSPADM

## 2019-08-27 RX ORDER — DOXYLAMINE SUCCINATE 25 MG
TABLET ORAL 2 TIMES DAILY
Refills: 0 | COMMUNITY
Start: 2019-08-27 | End: 2020-01-29

## 2019-08-27 RX ADMIN — ASPIRIN 81 MG: 81 TABLET, COATED ORAL at 08:08

## 2019-08-27 RX ADMIN — CARVEDILOL 12.5 MG: 12.5 TABLET, FILM COATED ORAL at 08:08

## 2019-08-27 RX ADMIN — MICONAZOLE NITRATE: 20 CREAM TOPICAL at 12:08

## 2019-08-27 RX ADMIN — OXYCODONE HYDROCHLORIDE AND ACETAMINOPHEN 1 TABLET: 10; 325 TABLET ORAL at 01:08

## 2019-08-27 RX ADMIN — LEVOTHYROXINE SODIUM 50 MCG: 50 TABLET ORAL at 05:08

## 2019-08-27 RX ADMIN — OXYCODONE HYDROCHLORIDE AND ACETAMINOPHEN 1 TABLET: 10; 325 TABLET ORAL at 05:08

## 2019-08-27 RX ADMIN — GABAPENTIN 300 MG: 300 CAPSULE ORAL at 08:08

## 2019-08-27 RX ADMIN — ENOXAPARIN SODIUM 110 MG: 100 INJECTION SUBCUTANEOUS at 08:08

## 2019-08-27 NOTE — OP NOTE
DATE OF PROCEDURE:  08/26/2019    PRIMARY SURGEON:  Gaston Flores M.D.    ASSISTANT SURGEON:  Toni Heck M.D. (RES).    PREOPERATIVE DIAGNOSES:  Status post neoadjuvant primary preoperative   chemotherapy for initial presentation with positive axillary stef disease with   occult primary in the right breast.    POSTOPERATIVE DIAGNOSES:  Status post neoadjuvant primary preoperative   chemotherapy for initial presentation with positive axillary stef disease with   occult primary in the right breast consistent with breast cancer with right   axillary lymph node involvement with occult right breast.    PROCEDURE:  Right breast modified radical mastectomy; removal of right   subclavian vein Port-A-Cath.    PROCEDURE IN DETAIL:  The patient underwent informed consent.  The history and   physical examination was reviewed and updated.  The right side was marked.  The   patient underwent a regional block by the Regional Anesthesia Team.  Her   platelet count and white blood cell count were stable for surgery after   preoperative injection of Neulasta last week.  She underwent transferred to the   Operating Room.  She underwent general anesthesia.  She was in a supine   position.  The right breast, right anterior chest, right arm and axilla were   prepped and draped in a sterile fashion.  We marked traditional transverse   elliptical mastectomy incisions to take the entire nipple areolar complex.  The   skin was incised sharply.  The inferior dissection was carried inferiorly down   to the inframammary fold, 1 cm inferior to it, identifying the fascia of the   rectus abdominis muscle and the dissection was carried medially to the midline   to the lateral border of the sternum not crossing midline and laterally to   identify the anterior border of latissimus dorsi muscle and the serratus   anterior muscle.  The superior incision was made and the superior dissection was   carried cephalad to the clavicle, medially  to the midline to the lateral border   of the sternum, again not crossing midline, identifying the intercostal   perforators and preserving those.  The superior lateral dissection was carried   up and over the upper outer quadrant axillary tail of Leonardo breast tissue to   the level of the clavipectoral fascia, which was opened.  The breast was removed   from a medial to lateral direction taking the pectoralis fascia with it.  We   performed an en bloc right axillary lymph node dissection, identifying the   axillary vein and preserving its adventitia.  We cleaned out the lymph nodes   beneath the pectoralis minor muscle and the apical level 2 lymph nodes were   submitted separately.  We swept out all the lymphatic tissue deep to the   pectoralis minor level 2 region, preserving the medial pectoral nerve and   lateral pectoral vessels.  The dissection was carried posterior to this dividing   the intercostal brachial nerve due to the reaction from the preoperative   chemotherapy to the known prior positive nodes.  There was fibrosis in the   axillary tissue.  The posterior dissection was continued to identify the long   thoracic nerve of Martines on the posteromedial chest wall continued the dissection   posterolaterally.  We identified the subscapularis muscle and the thoracodorsal   neurovascular bundle, which was also preserved.  The axillary contents were   swept inferiorly off of the subscapularis muscle, the thoracodorsal   neurovascular bundle and long thoracic nerve on the posteromedial chest wall   inferiorly off of the latissimus dorsi muscle and serratus anterior muscle to   complete the modified radical mastectomy, again preserving all motor nerves and   dividing the intercostal brachial nerve, preserving the axillary vein and its   adventitia to minimize risk for lymphedema.  Once this was done, there was no   residual palpable lymphadenopathy in the axilla.  There was no palpable level 3   lymphadenopathy.   The breast was oriented with a short stitch superiorly and a   long stitch laterally and submitted to pathology for permanent sectioning.    During the procedure from within the superior mastectomy flap through the   subcutaneous tissue, we opened the capsule around the Port-A-Cath and removed   the port from within the mastectomy wound.  We reapproximated the catheter tract   with 2-0 Vicryl figure-of-eight suture as the catheter was pulled from the   catheter tract.  The port was removed intact in its entirety and the capsule had   been opened from within the wound.  She tolerated the port removal at this   portion of the procedure well.  We then irrigated the mastectomy flaps.  They   were viable without evidence of ischemia, approximately 1 cm in thickness,   leaving only skin and subcutaneous tissue, having performed the dissection   outside of the superficial investing fascial layer of the breast.  We then   placed two #19 round Javier drains through separate inferior stab incisions.    They were anchored to the skin at the exit site with 2-0 nylon sutures.  The   deep dermal and subcutaneous layers were reapproximated with 2-0 and 3-0 Vicryl   sutures.  The skin was closed with staples.  Sterile skin dressing was applied,   followed by sterile fluff gauze and post-procedure bra.  Estimated blood loss   had been minimal.  All needle, instrument and sponge counts were correct.  The   drains were placed to self-bulb suction.  The patient was turned over to   Anesthesia for extubation and transfer to the recovery area in a satisfactory   condition.  All specimens were sent to Pathology for permanent sectioning.    Again, the specimens included the right modified radical mastectomy as well as   the apical level 2 lymph nodes additionally submitted for permanent sectioning.      RLC/HN  dd: 08/27/2019 06:54:53 (CDT)  td: 08/27/2019 09:16:41 (CD)  Doc ID   #[id number]  Job ID #[id number]    CC:

## 2019-08-27 NOTE — DISCHARGE SUMMARY
[facility]  General Surgery  Discharge Summary      Patient Name: Alina Narayan  MRN: [id number]  Admission Date: 8/26/2019  Hospital Length of Stay: 0 days  Discharge Date and Time:  08/27/2019 1:40 PM  Attending Physician: Gaston Flores MD   Discharging Provider: Toni Heck MD  Primary Care Provider: Aarti Dunn MD     HPI: Alina Narayan is a 73 y.o. postmenopausal female referred for surgical planning of for right breast cancer. Patient has one more course of chemotherapy. Patient had an abnormal mammogram first noted 12/2018. Follow-up imaging showed calcifications with 2 axillary lymph nodes, the largest measuring  1.4 x 1 cm in the axillary tail. A ultrasound guided biopsy was performed on right with pathology revealing metastatic mammary carcinoma of the breast.       Patient does routinely do self breast exams.  Patient has not noted a change on breast exam.  Patient denies nipple discharge. She had a history of of the left breast cancer in 10/2012, w/ IDC, ER/UT (+), HER 2 russell neg, followed by a left lumpectomy for a 7 mm IDC and negative margins and SN, thus she underwent adjuvant XRT with 5 years of ietroazole      - QY MMG - BIRADs 2 in 2015. 2016  - MMG BIRADs 4 - 12/13/2017, with new calcifications and stereotactic bx  - 7/2018 - BIRADs 2   - 12/2018 - BIRADS 4B  - 1/7/19 u/s guided bx from right axillary mass - met mammary carcinoma, also in right axillary node  - 1/15/19 - MRI breast with two axillary nodes demonstrated, largest measurig 1.4 x 1 cm      - negative genetic testing, integrated BRCA 5/2017      Findings at that time from the most recent biopsy were the following:   Estrogen Receptor: neg  Her-2 russell: neg  Progesterone Receptor: neg  Ki 67 - 40%   Lymph node status: pos        Interval Hx:  Patient reports no significant medical changes since last seen in clinic. Has small rash under right breast. Spoke with Dr. Dwyer and with her great response to her  white cell GSF injection she is cleared to have her port removed today as well.     GYN History:  Age of menarche was 11. Age of menopause was 53.  Patient denies hormonal therapy. Patient is . Age of first live birth was 19. Patient did breast feed.     FAMILY History:  - mother - breast cancer 62   - father - CRC   - brother - prostate Ca      Social:  - no tobacco or etoh use  - swims with her girlfriends twice weekly at the [employer]  - uses a rolling walker, no shortness of breath or chest pain with walking, mostly limited by knee pain     Procedure(s) (LRB):  MASTECTOMY, MODIFIED RADICAL (Right)  REMOVAL, CATHETER, CENTRAL VENOUS, TUNNELED, WITH PORT (Right)     Hospital Course: Patient tolerated procedure well, POD 1 her pain was well controlled, she was tolerating diet, she was ambulating, voiding, and HDS. She was discharged with instructions to follow up in 2 weeks. She was prescribed topical antifungal cream as well to apply to her right inferior chest wall.    Physical Exam   Constitutional: She is oriented to person, place, and time. She appears well-developed and well-nourished. No distress.   HENT:   Head: Normocephalic and atraumatic.   Eyes: Pupils are equal, round, and reactive to light. EOM are normal.   Neck: Normal range of motion. Neck supple.   Cardiovascular: Normal rate and regular rhythm.   Pulmonary/Chest: Effort normal. No respiratory distress.       Abdominal: Soft. She exhibits no distension.   Musculoskeletal: Normal range of motion.   Neurological: She is alert and oriented to person, place, and time.   Skin: Skin is warm and dry. She is not diaphoretic.   Psychiatric: She has a normal mood and affect. Her behavior is normal. Judgment and thought content normal.   Nursing note and vitals reviewed.        Consults:     Significant Diagnostic Studies: Labs:   BMP:   Recent Labs   Lab 19  0552   *      K 4.6      CO2 29   BUN 13   CREATININE 0.9   CALCIUM  8.8   MG 1.9    and CBC   Recent Labs   Lab 08/27/19  0552   WBC 12.02   HGB 10.5*   HCT 33.6*   PLT 80*       Pending Diagnostic Studies:     None        Final Active Diagnoses:    Diagnosis Date Noted POA    PRINCIPAL PROBLEM:  Invasive carcinoma of breast [C50.919] 08/26/2019 Yes      Problems Resolved During this Admission:      Discharged Condition: good    Disposition:     Follow Up:    Patient Instructions:   No discharge procedures on file.  Medications:  Reconciled Home Medications:      Medication List      START taking these medications    miconazole 2 % cream  Commonly known as:  MICOTIN  Apply topically 2 (two) times daily.     oxyCODONE-acetaminophen 5-325 mg per tablet  Commonly known as:  PERCOCET  Take 1 tablet by mouth every 6 (six) hours as needed for Pain.        CHANGE how you take these medications    carvedilol 25 MG tablet  Commonly known as:  COREG  .5-1 tablet oral twice daily or as directed.  Hold until Dr. Dwyer says to restart  What changed:    · how much to take  · how to take this  · when to take this  · additional instructions     levothyroxine 50 MCG tablet  Commonly known as:  SYNTHROID  Take 1 tablet (50 mcg total) by mouth every evening.  What changed:  when to take this     metFORMIN 500 MG tablet  Commonly known as:  GLUCOPHAGE  TAKE 1 TABLET TWICE DAILY WITH MEALS  What changed:    · how much to take  · how to take this  · when to take this        CONTINUE taking these medications    ASPIR-LOW 81 MG EC tablet  Generic drug:  aspirin  Take 81 mg by mouth once daily.     blood sugar diagnostic Strp  True Metirx strips or strips and lancets  - pt checks twice daily for uncontrolled glucoses E11.65     blood-glucose meter kit  True Test or meter covered by insurance - pt checks glucose twice daily for uncontrolled glucoses E11.65     enoxaparin 150 mg/mL Syrg  Commonly known as:  LOVENOX  Inject 1 mL (150 mg total) into the skin once daily.     furosemide 20 MG tablet  Commonly  known as:  LASIX  Take 1 tablet (20 mg total) by mouth once daily.     gabapentin 300 MG capsule  Commonly known as:  NEURONTIN  TAKE 1 CAPSULE TWICE DAILY     lancets Misc  Commonly known as:  ACCU-CHEK SOFTCLIX LANCETS  TrueTest lancets for meter covered by insurance - pt checks twice daily for uncontrolled glucoses E11.65,     lidocaine-prilocaine cream  Commonly known as:  EMLA  Apply topically as needed. Apply topically as needed 45 minutes prior to port access.     pravastatin 20 MG tablet  Commonly known as:  PRAVACHOL  TAKE 1 TABLET EVERY DAY     VITAMIN D3 ORAL  Take by mouth once daily. 1000 IU            Toni Heck MD  General Surgery  [facility]

## 2019-08-27 NOTE — PROGRESS NOTES
Pt complaining of pain 10/10 with no decrease in pain after multiple doses of PO and IV medications. Dr James notified and Ativan IVP ordered. Will administer and continue to monitor.

## 2019-08-27 NOTE — NURSING
Pt ambulated 2 laps in hallway this shift so far. Denies c/o pain and is resting in chair with call bell intact and in reach. Will continue to monitor.

## 2019-08-27 NOTE — NURSING
ANA MARÍA drain care teaching complete with  at side. Both state understanding. Drain care handout given to pt as well.

## 2019-08-27 NOTE — PLAN OF CARE
Problem: Adult Inpatient Plan of Care  Goal: Plan of Care Review  Outcome: Ongoing (interventions implemented as appropriate)  Viital signs stable. Afebrile. Drowsy, oriented and following commands. Pain controlled with multiple medications. Nausea also controlled with PRN meds. Currently on 2LNC. Dressings remain CDI. ANA MARÍA drains intact and draining x2.  POC reviewed with pt and family members at the bedside with understanding verbalized.

## 2019-08-29 RX ORDER — GABAPENTIN 300 MG/1
CAPSULE ORAL
Qty: 180 CAPSULE | Refills: 1 | Status: SHIPPED | OUTPATIENT
Start: 2019-08-29 | End: 2020-02-14

## 2019-08-29 RX ORDER — CARVEDILOL 25 MG/1
12.5 TABLET ORAL 2 TIMES DAILY WITH MEALS
Qty: 90 TABLET | Refills: 3 | Status: SHIPPED | OUTPATIENT
Start: 2019-08-29 | End: 2020-10-25

## 2019-08-30 ENCOUNTER — TELEPHONE (OUTPATIENT)
Dept: SURGERY | Facility: CLINIC | Age: 74
End: 2019-08-30

## 2019-08-30 ENCOUNTER — PATIENT MESSAGE (OUTPATIENT)
Dept: ADMINISTRATIVE | Facility: OTHER | Age: 74
End: 2019-08-30

## 2019-08-30 NOTE — TELEPHONE ENCOUNTER
Called and left a message for the patient regarding having Dr. Flores check her ANA MARÍA drains. She did not answer, left her detailed instructions to call me back on Tuesday to get her scheduled to see Dr. Flores on Thursday while he is in clinic. Left patient my direct number.

## 2019-08-30 NOTE — TELEPHONE ENCOUNTER
"Patient called regarding ANA MARÍA drains, patient states she is having multiple large thick blood clots in the ANA MARÍA drains, patient states that she is unable to fully empty the ANA MARÍA drain bulbs at this time as the blood clots are "stuck," patient states that she is stripping the ANA MARÍA tubing multiple times per day to remove clots from the tubing, currently ANA MARÍA tubing is clot free and free flowing at this time, ANA MARÍA drain education reinforced, patient given reassurance at this time, pt to continue to stipe tubing to ensure no clots in tubes and record ANA MARÍA drain output, advised patient to call office if tubing becomes blocked rt clots, pt verbalized understanding, patient currently on Lovenox 150 mg/mL subQ daily and 81 mg ASA daily, will notify MD   "

## 2019-08-31 ENCOUNTER — HOSPITAL ENCOUNTER (INPATIENT)
Facility: HOSPITAL | Age: 74
LOS: 4 days | Discharge: HOME | DRG: 920 | End: 2019-09-04
Attending: EMERGENCY MEDICINE | Admitting: SURGERY
Payer: MEDICARE

## 2019-08-31 ENCOUNTER — NURSE TRIAGE (OUTPATIENT)
Dept: ADMINISTRATIVE | Facility: CLINIC | Age: 74
End: 2019-08-31

## 2019-08-31 DIAGNOSIS — Z85.3 PERSONAL HISTORY OF BREAST CANCER: ICD-10-CM

## 2019-08-31 DIAGNOSIS — N64.89 HEMATOMA (NONTRAUMATIC) OF BREAST: Primary | ICD-10-CM

## 2019-08-31 DIAGNOSIS — R42 LIGHTHEADEDNESS: ICD-10-CM

## 2019-08-31 DIAGNOSIS — R53.1 WEAKNESS: ICD-10-CM

## 2019-08-31 DIAGNOSIS — D50.0 BLOOD LOSS ANEMIA: ICD-10-CM

## 2019-08-31 LAB
ABO + RH BLD: NORMAL
ALBUMIN SERPL BCP-MCNC: 3.3 G/DL (ref 3.5–5.2)
ALP SERPL-CCNC: 112 U/L (ref 55–135)
ALT SERPL W/O P-5'-P-CCNC: 11 U/L (ref 10–44)
ANION GAP SERPL CALC-SCNC: 12 MMOL/L (ref 8–16)
AST SERPL-CCNC: 12 U/L (ref 10–40)
BASOPHILS # BLD AUTO: 0.03 K/UL (ref 0–0.2)
BASOPHILS NFR BLD: 0.3 % (ref 0–1.9)
BILIRUB SERPL-MCNC: 0.5 MG/DL (ref 0.1–1)
BLD GP AB SCN CELLS X3 SERPL QL: NORMAL
BUN SERPL-MCNC: 19 MG/DL (ref 8–23)
CALCIUM SERPL-MCNC: 9.2 MG/DL (ref 8.7–10.5)
CHLORIDE SERPL-SCNC: 100 MMOL/L (ref 95–110)
CO2 SERPL-SCNC: 27 MMOL/L (ref 23–29)
CREAT SERPL-MCNC: 1 MG/DL (ref 0.5–1.4)
DIFFERENTIAL METHOD: ABNORMAL
EOSINOPHIL # BLD AUTO: 0.1 K/UL (ref 0–0.5)
EOSINOPHIL NFR BLD: 0.5 % (ref 0–8)
ERYTHROCYTE [DISTWIDTH] IN BLOOD BY AUTOMATED COUNT: 14.6 % (ref 11.5–14.5)
EST. GFR  (AFRICAN AMERICAN): >60 ML/MIN/1.73 M^2
EST. GFR  (NON AFRICAN AMERICAN): 55.6 ML/MIN/1.73 M^2
GLUCOSE SERPL-MCNC: 196 MG/DL (ref 70–110)
HCT VFR BLD AUTO: 25.4 % (ref 37–48.5)
HGB BLD-MCNC: 7.7 G/DL (ref 12–16)
IMM GRANULOCYTES # BLD AUTO: 0.44 K/UL (ref 0–0.04)
IMM GRANULOCYTES NFR BLD AUTO: 4.2 % (ref 0–0.5)
INR PPP: 1 (ref 0.8–1.2)
LYMPHOCYTES # BLD AUTO: 1.3 K/UL (ref 1–4.8)
LYMPHOCYTES NFR BLD: 12.4 % (ref 18–48)
MCH RBC QN AUTO: 30.2 PG (ref 27–31)
MCHC RBC AUTO-ENTMCNC: 30.3 G/DL (ref 32–36)
MCV RBC AUTO: 100 FL (ref 82–98)
MONOCYTES # BLD AUTO: 0.8 K/UL (ref 0.3–1)
MONOCYTES NFR BLD: 7.6 % (ref 4–15)
NEUTROPHILS # BLD AUTO: 8 K/UL (ref 1.8–7.7)
NEUTROPHILS NFR BLD: 75 % (ref 38–73)
NRBC BLD-RTO: 1 /100 WBC
PLATELET # BLD AUTO: 151 K/UL (ref 150–350)
PMV BLD AUTO: 11 FL (ref 9.2–12.9)
POTASSIUM SERPL-SCNC: 4.2 MMOL/L (ref 3.5–5.1)
PROT SERPL-MCNC: 6.5 G/DL (ref 6–8.4)
PROTHROMBIN TIME: 10.2 SEC (ref 9–12.5)
RBC # BLD AUTO: 2.55 M/UL (ref 4–5.4)
SODIUM SERPL-SCNC: 139 MMOL/L (ref 136–145)
WBC # BLD AUTO: 10.58 K/UL (ref 3.9–12.7)

## 2019-08-31 PROCEDURE — 85610 PROTHROMBIN TIME: CPT | Mod: HCNC

## 2019-08-31 PROCEDURE — 85025 COMPLETE CBC W/AUTO DIFF WBC: CPT | Mod: HCNC

## 2019-08-31 PROCEDURE — 86920 COMPATIBILITY TEST SPIN: CPT | Mod: HCNC

## 2019-08-31 PROCEDURE — 86901 BLOOD TYPING SEROLOGIC RH(D): CPT | Mod: HCNC

## 2019-08-31 PROCEDURE — G0378 HOSPITAL OBSERVATION PER HR: HCPCS | Mod: HCNC

## 2019-08-31 PROCEDURE — 99285 EMERGENCY DEPT VISIT HI MDM: CPT | Mod: HCNC,,, | Performed by: EMERGENCY MEDICINE

## 2019-08-31 PROCEDURE — 93010 ELECTROCARDIOGRAM REPORT: CPT | Mod: HCNC,,, | Performed by: INTERNAL MEDICINE

## 2019-08-31 PROCEDURE — 99284 EMERGENCY DEPT VISIT MOD MDM: CPT | Mod: 25,HCNC

## 2019-08-31 PROCEDURE — 96360 HYDRATION IV INFUSION INIT: CPT | Mod: HCNC

## 2019-08-31 PROCEDURE — 93010 EKG 12-LEAD: ICD-10-PCS | Mod: HCNC,,, | Performed by: INTERNAL MEDICINE

## 2019-08-31 PROCEDURE — 63600175 PHARM REV CODE 636 W HCPCS: Mod: HCNC | Performed by: PHYSICIAN ASSISTANT

## 2019-08-31 PROCEDURE — 93005 ELECTROCARDIOGRAM TRACING: CPT | Mod: HCNC

## 2019-08-31 PROCEDURE — 99285 EMERGENCY DEPT VISIT HI MDM: CPT | Mod: 25,HCNC

## 2019-08-31 PROCEDURE — 99285 PR EMERGENCY DEPT VISIT,LEVEL V: ICD-10-PCS | Mod: HCNC,,, | Performed by: EMERGENCY MEDICINE

## 2019-08-31 PROCEDURE — 12000002 HC ACUTE/MED SURGE SEMI-PRIVATE ROOM: Mod: HCNC

## 2019-08-31 PROCEDURE — 96361 HYDRATE IV INFUSION ADD-ON: CPT | Mod: HCNC

## 2019-08-31 PROCEDURE — 80053 COMPREHEN METABOLIC PANEL: CPT | Mod: HCNC

## 2019-08-31 RX ORDER — LEVOTHYROXINE SODIUM 50 UG/1
50 TABLET ORAL
Status: DISCONTINUED | OUTPATIENT
Start: 2019-09-01 | End: 2019-09-04 | Stop reason: HOSPADM

## 2019-08-31 RX ORDER — OXYCODONE AND ACETAMINOPHEN 5; 325 MG/1; MG/1
1 TABLET ORAL EVERY 4 HOURS PRN
Status: DISCONTINUED | OUTPATIENT
Start: 2019-08-31 | End: 2019-09-04 | Stop reason: HOSPADM

## 2019-08-31 RX ORDER — DOXYLAMINE SUCCINATE 25 MG
TABLET ORAL 2 TIMES DAILY
Status: DISCONTINUED | OUTPATIENT
Start: 2019-08-31 | End: 2019-09-04 | Stop reason: HOSPADM

## 2019-08-31 RX ORDER — IBUPROFEN 200 MG
16 TABLET ORAL
Status: DISCONTINUED | OUTPATIENT
Start: 2019-08-31 | End: 2019-09-04 | Stop reason: HOSPADM

## 2019-08-31 RX ORDER — ENOXAPARIN SODIUM 150 MG/ML
1 INJECTION SUBCUTANEOUS
Status: DISCONTINUED | OUTPATIENT
Start: 2019-09-01 | End: 2019-09-01

## 2019-08-31 RX ORDER — IBUPROFEN 200 MG
24 TABLET ORAL
Status: DISCONTINUED | OUTPATIENT
Start: 2019-08-31 | End: 2019-09-04 | Stop reason: HOSPADM

## 2019-08-31 RX ORDER — CARVEDILOL 12.5 MG/1
12.5 TABLET ORAL 2 TIMES DAILY WITH MEALS
Status: DISCONTINUED | OUTPATIENT
Start: 2019-09-01 | End: 2019-09-04 | Stop reason: HOSPADM

## 2019-08-31 RX ORDER — GABAPENTIN 300 MG/1
300 CAPSULE ORAL 2 TIMES DAILY
Status: DISCONTINUED | OUTPATIENT
Start: 2019-08-31 | End: 2019-09-04 | Stop reason: HOSPADM

## 2019-08-31 RX ORDER — GLUCAGON 1 MG
1 KIT INJECTION
Status: DISCONTINUED | OUTPATIENT
Start: 2019-08-31 | End: 2019-09-04 | Stop reason: HOSPADM

## 2019-08-31 RX ORDER — LIDOCAINE HYDROCHLORIDE 10 MG/ML
1 INJECTION, SOLUTION EPIDURAL; INFILTRATION; INTRACAUDAL; PERINEURAL ONCE
Status: DISCONTINUED | OUTPATIENT
Start: 2019-08-31 | End: 2019-09-04 | Stop reason: HOSPADM

## 2019-08-31 RX ORDER — ACETAMINOPHEN 325 MG/1
650 TABLET ORAL EVERY 8 HOURS PRN
Status: DISCONTINUED | OUTPATIENT
Start: 2019-08-31 | End: 2019-09-04 | Stop reason: HOSPADM

## 2019-08-31 RX ORDER — OXYCODONE AND ACETAMINOPHEN 10; 325 MG/1; MG/1
1 TABLET ORAL EVERY 4 HOURS PRN
Status: DISCONTINUED | OUTPATIENT
Start: 2019-08-31 | End: 2019-09-04 | Stop reason: HOSPADM

## 2019-08-31 RX ORDER — INSULIN ASPART 100 [IU]/ML
0-5 INJECTION, SOLUTION INTRAVENOUS; SUBCUTANEOUS
Status: DISCONTINUED | OUTPATIENT
Start: 2019-08-31 | End: 2019-09-04 | Stop reason: HOSPADM

## 2019-08-31 RX ORDER — FERROUS SULFATE 325(65) MG
325 TABLET, DELAYED RELEASE (ENTERIC COATED) ORAL DAILY
Status: DISCONTINUED | OUTPATIENT
Start: 2019-09-01 | End: 2019-09-04 | Stop reason: HOSPADM

## 2019-08-31 RX ORDER — ONDANSETRON 8 MG/1
8 TABLET, ORALLY DISINTEGRATING ORAL EVERY 8 HOURS PRN
Status: DISCONTINUED | OUTPATIENT
Start: 2019-08-31 | End: 2019-09-04 | Stop reason: HOSPADM

## 2019-08-31 RX ORDER — SODIUM CHLORIDE 0.9 % (FLUSH) 0.9 %
10 SYRINGE (ML) INJECTION
Status: DISCONTINUED | OUTPATIENT
Start: 2019-08-31 | End: 2019-09-04 | Stop reason: HOSPADM

## 2019-08-31 RX ADMIN — SODIUM CHLORIDE 1000 ML: 0.9 INJECTION, SOLUTION INTRAVENOUS at 08:08

## 2019-09-01 PROBLEM — R58 BLEEDING: Status: ACTIVE | Noted: 2019-09-01

## 2019-09-01 LAB
ABO + RH BLD: NORMAL
BASOPHILS # BLD AUTO: 0.02 K/UL (ref 0–0.2)
BASOPHILS # BLD AUTO: 0.04 K/UL (ref 0–0.2)
BASOPHILS NFR BLD: 0.3 % (ref 0–1.9)
BASOPHILS NFR BLD: 0.4 % (ref 0–1.9)
BLD GP AB SCN CELLS X3 SERPL QL: NORMAL
BLD PROD TYP BPU: NORMAL
BLD PROD TYP BPU: NORMAL
BLOOD UNIT EXPIRATION DATE: NORMAL
BLOOD UNIT EXPIRATION DATE: NORMAL
BLOOD UNIT TYPE CODE: 5100
BLOOD UNIT TYPE CODE: 5100
BLOOD UNIT TYPE: NORMAL
BLOOD UNIT TYPE: NORMAL
CODING SYSTEM: NORMAL
CODING SYSTEM: NORMAL
DIFFERENTIAL METHOD: ABNORMAL
DIFFERENTIAL METHOD: ABNORMAL
DISPENSE STATUS: NORMAL
DISPENSE STATUS: NORMAL
EOSINOPHIL # BLD AUTO: 0 K/UL (ref 0–0.5)
EOSINOPHIL # BLD AUTO: 0.1 K/UL (ref 0–0.5)
EOSINOPHIL NFR BLD: 0.4 % (ref 0–8)
EOSINOPHIL NFR BLD: 0.8 % (ref 0–8)
ERYTHROCYTE [DISTWIDTH] IN BLOOD BY AUTOMATED COUNT: 14.8 % (ref 11.5–14.5)
ERYTHROCYTE [DISTWIDTH] IN BLOOD BY AUTOMATED COUNT: 15.7 % (ref 11.5–14.5)
FIBRINOGEN PPP-MCNC: 523 MG/DL (ref 182–366)
HCT VFR BLD AUTO: 20.7 % (ref 37–48.5)
HCT VFR BLD AUTO: 31.1 % (ref 37–48.5)
HGB BLD-MCNC: 6.3 G/DL (ref 12–16)
HGB BLD-MCNC: 9.9 G/DL (ref 12–16)
IMM GRANULOCYTES # BLD AUTO: 0.2 K/UL (ref 0–0.04)
IMM GRANULOCYTES # BLD AUTO: 0.3 K/UL (ref 0–0.04)
IMM GRANULOCYTES NFR BLD AUTO: 2.1 % (ref 0–0.5)
IMM GRANULOCYTES NFR BLD AUTO: 3.8 % (ref 0–0.5)
INR PPP: 1 (ref 0.8–1.2)
LYMPHOCYTES # BLD AUTO: 0.9 K/UL (ref 1–4.8)
LYMPHOCYTES # BLD AUTO: 1.2 K/UL (ref 1–4.8)
LYMPHOCYTES NFR BLD: 15.2 % (ref 18–48)
LYMPHOCYTES NFR BLD: 9.7 % (ref 18–48)
MCH RBC QN AUTO: 30.4 PG (ref 27–31)
MCH RBC QN AUTO: 30.7 PG (ref 27–31)
MCHC RBC AUTO-ENTMCNC: 30.4 G/DL (ref 32–36)
MCHC RBC AUTO-ENTMCNC: 31.8 G/DL (ref 32–36)
MCV RBC AUTO: 101 FL (ref 82–98)
MCV RBC AUTO: 95 FL (ref 82–98)
MONOCYTES # BLD AUTO: 0.7 K/UL (ref 0.3–1)
MONOCYTES # BLD AUTO: 0.7 K/UL (ref 0.3–1)
MONOCYTES NFR BLD: 7.2 % (ref 4–15)
MONOCYTES NFR BLD: 8.9 % (ref 4–15)
NEUTROPHILS # BLD AUTO: 5.7 K/UL (ref 1.8–7.7)
NEUTROPHILS # BLD AUTO: 7.8 K/UL (ref 1.8–7.7)
NEUTROPHILS NFR BLD: 71 % (ref 38–73)
NEUTROPHILS NFR BLD: 80.2 % (ref 38–73)
NRBC BLD-RTO: 0 /100 WBC
NRBC BLD-RTO: 0 /100 WBC
PLATELET # BLD AUTO: 124 K/UL (ref 150–350)
PLATELET # BLD AUTO: 147 K/UL (ref 150–350)
PMV BLD AUTO: 10 FL (ref 9.2–12.9)
PMV BLD AUTO: 10.7 FL (ref 9.2–12.9)
POCT GLUCOSE: 145 MG/DL (ref 70–110)
PROTHROMBIN TIME: 10.2 SEC (ref 9–12.5)
RBC # BLD AUTO: 2.05 M/UL (ref 4–5.4)
RBC # BLD AUTO: 3.26 M/UL (ref 4–5.4)
TRANS ERYTHROCYTES VOL PATIENT: NORMAL ML
TRANS ERYTHROCYTES VOL PATIENT: NORMAL ML
WBC # BLD AUTO: 7.97 K/UL (ref 3.9–12.7)
WBC # BLD AUTO: 9.74 K/UL (ref 3.9–12.7)

## 2019-09-01 PROCEDURE — 36415 COLL VENOUS BLD VENIPUNCTURE: CPT | Mod: HCNC

## 2019-09-01 PROCEDURE — 86901 BLOOD TYPING SEROLOGIC RH(D): CPT | Mod: HCNC

## 2019-09-01 PROCEDURE — G0378 HOSPITAL OBSERVATION PER HR: HCPCS | Mod: HCNC

## 2019-09-01 PROCEDURE — 85025 COMPLETE CBC W/AUTO DIFF WBC: CPT | Mod: 91,HCNC

## 2019-09-01 PROCEDURE — 25000003 PHARM REV CODE 250: Mod: HCNC | Performed by: STUDENT IN AN ORGANIZED HEALTH CARE EDUCATION/TRAINING PROGRAM

## 2019-09-01 PROCEDURE — P9021 RED BLOOD CELLS UNIT: HCPCS | Mod: HCNC

## 2019-09-01 PROCEDURE — 86920 COMPATIBILITY TEST SPIN: CPT | Mod: HCNC

## 2019-09-01 PROCEDURE — 27201040 HC RC 50 FILTER: Mod: HCNC

## 2019-09-01 PROCEDURE — 36430 TRANSFUSION BLD/BLD COMPNT: CPT

## 2019-09-01 PROCEDURE — 85610 PROTHROMBIN TIME: CPT | Mod: HCNC

## 2019-09-01 PROCEDURE — 85384 FIBRINOGEN ACTIVITY: CPT | Mod: HCNC

## 2019-09-01 PROCEDURE — 63600175 PHARM REV CODE 636 W HCPCS: Mod: HCNC | Performed by: STUDENT IN AN ORGANIZED HEALTH CARE EDUCATION/TRAINING PROGRAM

## 2019-09-01 PROCEDURE — 11000001 HC ACUTE MED/SURG PRIVATE ROOM: Mod: HCNC

## 2019-09-01 RX ORDER — HYDROCODONE BITARTRATE AND ACETAMINOPHEN 500; 5 MG/1; MG/1
TABLET ORAL
Status: DISCONTINUED | OUTPATIENT
Start: 2019-09-01 | End: 2019-09-04 | Stop reason: HOSPADM

## 2019-09-01 RX ORDER — SODIUM CHLORIDE 9 MG/ML
INJECTION, SOLUTION INTRAVENOUS CONTINUOUS
Status: DISCONTINUED | OUTPATIENT
Start: 2019-09-01 | End: 2019-09-02

## 2019-09-01 RX ADMIN — CARVEDILOL 12.5 MG: 12.5 TABLET, FILM COATED ORAL at 09:09

## 2019-09-01 RX ADMIN — GABAPENTIN 300 MG: 300 CAPSULE ORAL at 09:09

## 2019-09-01 RX ADMIN — FERROUS SULFATE TAB EC 325 MG (65 MG FE EQUIVALENT) 325 MG: 325 (65 FE) TABLET DELAYED RESPONSE at 09:09

## 2019-09-01 RX ADMIN — SODIUM CHLORIDE: 0.9 INJECTION, SOLUTION INTRAVENOUS at 08:09

## 2019-09-01 RX ADMIN — MICONAZOLE NITRATE: 20 CREAM TOPICAL at 09:09

## 2019-09-01 RX ADMIN — LEVOTHYROXINE SODIUM 50 MCG: 50 TABLET ORAL at 05:09

## 2019-09-01 NOTE — CONSULTS
"[facility]  General Surgery  Consult Note    Inpatient consult to General Surgery  Consult performed by: Lilly Amato MD  Consult ordered by: Cheryl Ghotra PA-C  Reason for consult: blood loss after surgery  Assessment/Recommendations: Ms. Narayan is a 75 yo woman POD5 after right modified radical mastectomy with bloody drain output, a hematoma in the dependent portion of the axilla and Hg down to 7.7 from 10.5 post op, with likely symptomatic anemia.  - currently she would prefer not to undergo blood transfusion given the risks of transfusion and would like to wait to see how she feels in the morning - currently hemodynamically stable  - will admit to observation and d/w Dr. Flores in the morning  - will need to continue therapeutic lovenox in the morning d/t recent PE        Subjective:     Chief Complaint/Reason for Admission: weakness, lightheadedness    History of Present Illness: Mrs. Narayan is a pleasant 75 yo woman w h/x left breast cancer s/p lumpectomy+XRT, DM2, CKD3, hypothyroidism, HLD, HTN, MITCHELL on CPAP, PE on therapeutic Lovenox, recently diagnosed with right breast cancer s/p right modified radical mastectomy with Dr. Flores on 8/26/19 who presents to the ED for evaluation of hypotension, lightheadedness and bloody drainage.  Patient reports feeling lightheaded, weak and fatigued over the past few days. She states that tonight at home she had a BP of around 80/60. She reports frequent emptying of bloody fluid from her drains which is sometimes difficult due to clotting. Pain is tolerable and is exacerbated with movement of the right arm. She has a good appetite and is having bowel function. She has had over a liter out from drains since surgery and reports that it is mostly bloody with clots, darker than "red koolaid." Denies shortness of breath, chest pain or palpitations.            Current Facility-Administered Medications on File Prior to Encounter   Medication    " alteplase injection 2 mg    heparin, porcine (PF) 100 unit/mL injection flush 500 Units    heparin, porcine (PF) 100 unit/mL injection flush 500 Units    sodium chloride 0.9% flush 10 mL     Current Outpatient Medications on File Prior to Encounter   Medication Sig    ASPIR-LOW 81 mg EC tablet Take 81 mg by mouth once daily.    blood sugar diagnostic Strp True Metirx strips or strips and lancets  - pt checks twice daily for uncontrolled glucoses E11.65    blood-glucose meter kit True Test or meter covered by insurance - pt checks glucose twice daily for uncontrolled glucoses E11.65    carvedilol (COREG) 25 MG tablet Take 0.5 tablets (12.5 mg total) by mouth 2 (two) times daily with meals.    CHOLECALCIFEROL, VITAMIN D3, (VITAMIN D3 ORAL) Take by mouth once daily. 1000 IU    enoxaparin (LOVENOX) 150 mg/mL Syrg Inject 1 mL (150 mg total) into the skin once daily.    furosemide (LASIX) 20 MG tablet Take 1 tablet (20 mg total) by mouth once daily.    gabapentin (NEURONTIN) 300 MG capsule TAKE 1 CAPSULE TWICE DAILY    lancets (ACCU-CHEK SOFTCLIX LANCETS) Misc TrueTest lancets for meter covered by insurance - pt checks twice daily for uncontrolled glucoses E11.65,    levothyroxine (SYNTHROID) 50 MCG tablet Take 1 tablet (50 mcg total) by mouth every evening. (Patient taking differently: Take 50 mcg by mouth before breakfast. )    lidocaine-prilocaine (EMLA) cream Apply topically as needed. Apply topically as needed 45 minutes prior to port access.    metFORMIN (GLUCOPHAGE) 500 MG tablet TAKE 1 TABLET TWICE DAILY WITH MEALS (Patient taking differently: TAKE 1 TABLET TWICE DAILY WITH MEALS  (takes 2 in AM only))    miconazole (MICOTIN) 2 % cream Apply topically 2 (two) times daily.    oxyCODONE-acetaminophen (PERCOCET) 5-325 mg per tablet Take 1 tablet by mouth every 6 (six) hours as needed for Pain.    pravastatin (PRAVACHOL) 20 MG tablet TAKE 1 TABLET EVERY DAY       Review of patient's allergies  indicates:   Allergen Reactions    Dilaudid [hydromorphone (bulk)] Other (See Comments)     Other reaction(s): sedation    Hydromorphone Other (See Comments)     Other reaction(s): sedation    Exenatide Rash     Other reaction(s): Rash       Past Medical History:   Diagnosis Date    Allergy     seasonal    Anxiety     Arthritis     Breast cancer 10/2012    left breast invasive ductal carcinoma    Colon polyp     Diabetes mellitus     Type 2    Diabetes mellitus, type 2     Diverticular disease     Diverticulitis 2009    Genetic testing 05/2017    negative Integrated BRACAnalysis    Hyperlipidemia     Hypertension     Morbid obesity     MITCHELL (obstructive sleep apnea)     Pulmonary embolism     Stenosis     Stenosis and insufficiency of lacrimal passages     Thyroid disease      Past Surgical History:   Procedure Laterality Date    BREAST BIOPSY Left 10/1/2012    left breast- invasive ductal carcinoma    BREAST BIOPSY Left 12/2017    BREAST LUMPECTOMY Left 2012    CARDIAC VALVE REPLACEMENT  12/2013    CARDIAC VALVE SURGERY  2013    CARPAL TUNNEL RELEASE      Left    CATHETERIZATION, HEART, LEFT Left 11/7/2013    [facility] CATH LAB    COLONOSCOPY N/A 9/29/2017    Performed by Phani Worley MD at [facility] ENDO (4TH FLR)    COLONOSCOPY N/A 8/28/2015    Per[facility] by [facility]ley [facility] at [facility] ENDO (4TH FLR)    DILATION AND CURETTAGE OF UTERUS  1999    Endometrial polyps    ENDOMETRIAL ABLATION  1999    Enodmetrial polyps    NIC-TRANSFORAMINAL Left 10/1/2015    [facility]    EYE SURGERY      YKAIZBYAR-QUQD-B-CATH Right 2/1/2019    Performed by Gaston Flores [facility] at [facility] OR 2ND FLR    left lumpectomy  2012    MASTECTOMY, MODIFIED RADICAL Right 8/26/2019    Per[facility] OR 2ND FLR    REMOVAL, CATHETER, CENTRAL VENOUS, TUNNELED, WITH PORT Right 8/26/2019    [facility] Gaston BYRD  [facility] 2ND FLR    REPLACEMENT, AORTIC VALVE N/A 12/2/2013    [facility] at [facility] OR 2ND FLR    SHOULDER SURGERY      SKIN BIOPSY       Family History     Problem Relation (Age of Onset)    Anxiety disorder Son    Breast cancer Mother (62)    Cancer Father, Maternal Grandfather, Brother    Colon cancer Father    SAL disease Son    Heart disease Father    No Known Problems Sister, Brother, Son    Sleep disorder Son        Tobacco Use    Smoking status: Never Smoker    Smokeless tobacco: Never Used   Substance and Sexual Activity    Alcohol use: No     Alcohol/week: 0.0 oz    Drug use: No    Sexual activity: Yes     Review of Systems   Constitutional: Positive for activity change and fatigue. Negative for appetite change, chills, diaphoresis, fever and unexpected weight change.   HENT: Negative.    Respiratory: Negative for shortness of breath.    Cardiovascular: Negative for chest pain and palpitations.   Gastrointestinal: Negative.    Genitourinary: Negative.    Musculoskeletal: Negative.    Skin: Positive for wound.        Bruising r mastectomy wound   Neurological: Positive for dizziness, weakness and light-headedness.   Hematological: Negative for adenopathy. Bruises/bleeds easily.   Psychiatric/Behavioral: Negative.      Objective:     Vital Signs (Most Recent):  Temp: 98.4 °F (36.9 °C) (08/31/19 2015)  Pulse: 102 (08/31/19 2015)  Resp: 20 (08/31/19 2015)  BP: 131/63 (08/31/19 2015)  SpO2: 98 % (08/31/19 2015) Vital Signs (24h Range):  Temp:  [98.4 °F (36.9 °C)] 98.4 °F (36.9 °C)  Pulse:  [102] 102  Resp:  [20] 20  SpO2:  [98 %] 98 %  BP: (131)/(63) 131/63     Weight: 104.3 kg (230 lb)  Body mass index is 42.07 kg/m².    No intake or output data in the 24 hours ending 08/31/19 2239    Physical Exam   Constitutional: She is oriented to person, place, and time. She appears well-developed and well-nourished. No distress.   Eyes: EOM are normal.   Cardiovascular:  Normal rate.   Pulmonary/Chest: Effort normal.       Abdominal: Soft.   Musculoskeletal: Normal range of motion.   Neurological: She is alert and oriented to person, place, and time.   Skin: Skin is warm and dry. There is pallor.   Psychiatric: She has a normal mood and affect.   Nursing note and vitals reviewed.      Significant Labs:  CBC:   Recent Labs   Lab 08/31/19 2055   WBC 10.58   RBC 2.55*   HGB 7.7*   HCT 25.4*      *   MCH 30.2   MCHC 30.3*     CMP:   Recent Labs   Lab 08/31/19 2055   *   CALCIUM 9.2   ALBUMIN 3.3*   PROT 6.5      K 4.2   CO2 27      BUN 19   CREATININE 1.0   ALKPHOS 112   ALT 11   AST 12   BILITOT 0.5       Significant Diagnostics:  CXR: I have reviewed all pertinent results/findings within the past 24 hours. lungs ok   Can see a right axillary opacity    Assessment/Plan:     Active Diagnoses:    Diagnosis Date Noted POA    Lightheadedness [R42] 08/31/2019 Yes      Problems Resolved During this Admission:       Thank you for your consult. I will follow-up with patient. Please contact us if you have any additional questions.    Britni Amato MD  General Surgery  [facility]

## 2019-09-01 NOTE — ED NOTES
Attempted to call report. Receiving nurse providing pt care at this time. Will continue to monitor.

## 2019-09-01 NOTE — ASSESSMENT & PLAN NOTE
Diagnosed on May 2019 CTA during neoadjuvant chemo - Being managed as outpatient by Dr. Dwyer    Will hold anticoagulation today and plan for OR tomorrow

## 2019-09-01 NOTE — PROGRESS NOTES
[facility]  General Surgery  Progress Note    Subjective:     History of Present Illness:  No notes on file    Post-Op Info:  Procedure(s) (LRB):  WASHOUT - Right mastectomy site (Right)         Interval History: NAEON, patient feeling ok this morning, pain well controlled, afebrile, mildly tachycardic, normotensive    Medications:  Continuous Infusions:   sodium chloride 0.9%       Scheduled Meds:   carvedilol  12.5 mg Oral BID WM    ferrous sulfate  325 mg Oral Daily    gabapentin  300 mg Oral BID    levothyroxine  50 mcg Oral Before breakfast    lidocaine (PF) 10 mg/ml (1%)  1 mL Other Once    miconazole   Topical (Top) BID     PRN Meds:sodium chloride, acetaminophen, Dextrose 10% Bolus, Dextrose 10% Bolus, glucagon (human recombinant), glucose, glucose, insulin aspart U-100, ondansetron, oxyCODONE-acetaminophen, oxyCODONE-acetaminophen, promethazine (PHENERGAN) IVPB, sodium chloride 0.9%     Review of patient's allergies indicates:   Allergen Reactions    Dilaudid [hydromorphone (bulk)] Other (See Comments)     Other reaction(s): sedation    Hydromorphone Other (See Comments)     Other reaction(s): sedation    Exenatide Rash     Other reaction(s): Rash     Objective:     Vital Signs (Most Recent):  Temp: 96.3 °F (35.7 °C) (09/01/19 0135)  Pulse: 103 (09/01/19 0135)  Resp: 16 (09/01/19 0135)  BP: (!) 121/57 (09/01/19 0135)  SpO2: 97 % (09/01/19 0135) Vital Signs (24h Range):  Temp:  [96.3 °F (35.7 °C)-98.4 °F (36.9 °C)] 96.3 °F (35.7 °C)  Pulse:  [] 103  Resp:  [15-20] 16  SpO2:  [97 %-100 %] 97 %  BP: (103-181)/(51-94) 121/57     Weight: 105.8 kg (233 lb 2.5 oz)  Body mass index is 42.65 kg/m².    Intake/Output - Last 3 Shifts       08/30 0700 - 08/31 0659 08/31 0700 - 09/01 0659 09/01 0700 - 09/02 0659    IV Piggyback  1000     Total Intake(mL/kg)  1000 (9.5)     Urine (mL/kg/hr)  0     Drains  45     Total Output  45     Net  +955            Urine Occurrence  2 x            Physical Exam   Constitutional: She is oriented to person, place, and time. She appears well-developed and well-nourished.   Cardiovascular: Normal rate and regular rhythm.   Pulmonary/Chest: Effort normal. No respiratory distress.       Abdominal: Soft. She exhibits no distension.   Musculoskeletal: Normal range of motion.   Neurological: She is alert and oriented to person, place, and time.   Skin: Skin is warm and dry.   Psychiatric: She has a normal mood and affect. Her behavior is normal. Judgment and thought content normal.   Nursing note and vitals reviewed.      Significant Labs:  CBC:   Recent Labs   Lab 09/01/19  0409   WBC 7.97   RBC 2.05*   HGB 6.3*   HCT 20.7*   *   *   MCH 30.7   MCHC 30.4*     CMP:   Recent Labs   Lab 08/31/19  2055   *   CALCIUM 9.2   ALBUMIN 3.3*   PROT 6.5      K 4.2   CO2 27      BUN 19   CREATININE 1.0   ALKPHOS 112   ALT 11   AST 12   BILITOT 0.5       Significant Diagnostics:  I have reviewed all pertinent imaging results/findings within the past 24 hours.    Assessment/Plan:     Bleeding  Patient is a 73 yo F who underwent R modified radical mastectomy 8/26/19 for an occult right breast cancer, who was also diagnosed with pulm emboli in May 2019 and has been on therapeutic lovenox perioperatively    Will transfuse 2 x pRBC today, and recheck CBC and coags after  Hold anticoagulation  Plan for to takeback for washout in the OR tomorrow AM - consent in chart  NPO  mIVF    Bilateral pulmonary embolism  Diagnosed on May 2019 CTA during neoadjuvant chemo - Being managed as outpatient by Dr. Dwyer    Will hold anticoagulation today and plan for OR tomorrow        Toni Heck MD  General Surgery  [facility]

## 2019-09-01 NOTE — ED TRIAGE NOTES
74 year old female with history of breast cancer with recent surgery presents to the ED c/o weakness. Reports that she was feeling weak and dizzy earlier,  took BP and the systolic was in the 80's. Has also been having issues with her ANA MARÍA drains not draining.

## 2019-09-01 NOTE — SUBJECTIVE & OBJECTIVE
Interval History: NAEON, patient feeling ok this morning, pain well controlled, afebrile, mildly tachycardic, normotensive    Medications:  Continuous Infusions:   sodium chloride 0.9%       Scheduled Meds:   carvedilol  12.5 mg Oral BID WM    ferrous sulfate  325 mg Oral Daily    gabapentin  300 mg Oral BID    levothyroxine  50 mcg Oral Before breakfast    lidocaine (PF) 10 mg/ml (1%)  1 mL Other Once    miconazole   Topical (Top) BID     PRN Meds:sodium chloride, acetaminophen, Dextrose 10% Bolus, Dextrose 10% Bolus, glucagon (human recombinant), glucose, glucose, insulin aspart U-100, ondansetron, oxyCODONE-acetaminophen, oxyCODONE-acetaminophen, promethazine (PHENERGAN) IVPB, sodium chloride 0.9%     Review of patient's allergies indicates:   Allergen Reactions    Dilaudid [hydromorphone (bulk)] Other (See Comments)     Other reaction(s): sedation    Hydromorphone Other (See Comments)     Other reaction(s): sedation    Exenatide Rash     Other reaction(s): Rash     Objective:     Vital Signs (Most Recent):  Temp: 96.3 °F (35.7 °C) (09/01/19 0135)  Pulse: 103 (09/01/19 0135)  Resp: 16 (09/01/19 0135)  BP: (!) 121/57 (09/01/19 0135)  SpO2: 97 % (09/01/19 0135) Vital Signs (24h Range):  Temp:  [96.3 °F (35.7 °C)-98.4 °F (36.9 °C)] 96.3 °F (35.7 °C)  Pulse:  [] 103  Resp:  [15-20] 16  SpO2:  [97 %-100 %] 97 %  BP: (103-181)/(51-94) 121/57     Weight: 105.8 kg (233 lb 2.5 oz)  Body mass index is 42.65 kg/m².    Intake/Output - Last 3 Shifts       08/30 0700 - 08/31 0659 08/31 0700 - 09/01 0659 09/01 0700 - 09/02 0659    IV Piggyback  1000     Total Intake(mL/kg)  1000 (9.5)     Urine (mL/kg/hr)  0     Drains  45     Total Output  45     Net  +955            Urine Occurrence  2 x           Physical Exam   Constitutional: She is oriented to person, place, and time. She appears well-developed and well-nourished.   Cardiovascular: Normal rate and regular rhythm.   Pulmonary/Chest: Effort normal. No  respiratory distress.       Abdominal: Soft. She exhibits no distension.   Musculoskeletal: Normal range of motion.   Neurological: She is alert and oriented to person, place, and time.   Skin: Skin is warm and dry.   Psychiatric: She has a normal mood and affect. Her behavior is normal. Judgment and thought content normal.   Nursing note and vitals reviewed.      Significant Labs:  CBC:   Recent Labs   Lab 09/01/19  0409   WBC 7.97   RBC 2.05*   HGB 6.3*   HCT 20.7*   *   *   MCH 30.7   MCHC 30.4*     CMP:   Recent Labs   Lab 08/31/19  2055   *   CALCIUM 9.2   ALBUMIN 3.3*   PROT 6.5      K 4.2   CO2 27      BUN 19   CREATININE 1.0   ALKPHOS 112   ALT 11   AST 12   BILITOT 0.5       Significant Diagnostics:  I have reviewed all pertinent imaging results/findings within the past 24 hours.

## 2019-09-01 NOTE — ED PROVIDER NOTES
Encounter Date: 8/31/2019       History     Chief Complaint   Patient presents with    Hypotension     pt reports weakness and ststaes BP at home 87/56. Reports light-headness    POSTOP Problem     reports mastectomy on R side side monday, reports ANA MARÍA drains clogged.     Patient is a 74-year-old white female with a PMH of right breast cancer, DM2, CKD3, hypothyroidism, HLD, HTN, MITCHELL on CPAP, PE on Lovenox, s/p right mastectomy with Dr. Flores on 8/26/19 who presents to the ED for urgent evaluation of hypotension and postop problem.  Patient reports feeling lightheaded, weak, and fatigued since discharge on the 27th. Prior to arrival to the ED, patient reports a BP of 80/60. She reports copious amount of bloody drainage from her ANA MARÍA drains.  She notes difficulty emptying the drains due to clots. Pain is tolerable and is exacerbated with movement of the right arm. She reports small amount of serosanguinous drainage from the lateral incision. She endorses a good appetite with normal bowel movements. She reports some nausea without vomiting. She denies fever/chills, HA, chest pain, SOB, abdominal pain, dysuria, hematuria, bloody stools, diarrhea, constipation or focal weakness.     The history is provided by the patient.     Review of patient's allergies indicates:   Allergen Reactions    Dilaudid [hydromorphone (bulk)] Other (See Comments)     Other reaction(s): sedation    Hydromorphone Other (See Comments)     Other reaction(s): sedation    Exenatide Rash     Other reaction(s): Rash     Past Medical History:   Diagnosis Date    Allergy     seasonal    Anxiety     Arthritis     Breast cancer 10/2012    left breast invasive ductal carcinoma    Colon polyp     Diabetes mellitus     Type 2    Diabetes mellitus, type 2     Diverticular disease     Diverticulitis 2009    Genetic testing 05/2017    negative Integrated [employer]    Hyperlipidemia     Hypertension     Morbid obesity     MITCHELL (obstructive  "sleep apnea)     Pulmonary embolism     Stenosis     Stenosis and insufficiency of lacrimal passages     Thyroid disease      Past Surgical History:   Procedure Laterality Date    BREAST BIOPSY Left 10/1/2012    left breast- invasive ductal carcinoma    BREAST BIOPSY Left 12/2017    BREAST LUMPECTOMY Left 2012    CARDIAC VALVE REPLACEMENT  12/2013    CARDIAC VALVE SURGERY  2013    CARPAL TUNNEL RELEASE      Left    CATHETERIZATION, HEART, LEFT Left 11/7/2013    [facility] CATH LAB    COLONOSCOPY N/A 9/29/2017    Performed by Phani Worley MD at [facility] ENDO (4TH FLR)    COLONOSCOPY N/A 8/28/2015    Performed by Phani Worley MD at [facility] ENDO (4TH FLR)    DILATION AND CURETTAGE OF UTERUS  1999    Endometrial polyps    ENDOMETRIAL ABLATION  1999    Enodmetrial polyps    NIC-TRANSFORAMINAL Left 10/1/2015    [facility]    EYE SURGERY      TPABYNNNM-TTFM-H-CATH Right 2/1/2019    Performed by Gaston Flores [facility] at [facility] OR 2ND FLR    left lumpectomy  2012    MASTECTOMY, MODIFIED RADICAL Right 8/26/2019    Performed by [facility] at [facility] OR 2ND FLR    REMOVAL, CATHETER, CENTRAL VENOUS, TUNNELED, WITH PORT Right 8/26/2019    Performed by [facility] at [facility] OR 2ND FLR    REPLACEMENT, AORTIC VALVE N/A 12/2/2013    Performed by Venkat Webb MD at [facility] OR 2ND FLR    SHOULDER SURGERY      SKIN BIOPSY       Family History   Problem Relation Age of Onset    Breast cancer Mother 62    Colon cancer Father     Cancer Father         Colon CA (cause of death); Prostate CA (no chemo)    Heart disease Father     No Known Problems Sister     No Known Problems Brother     Cancer Maternal Grandfather         Colon CA    No Known Problems Son     Cancer Brother         prostate CA, no chemo, "it was bad"    Anxiety disorder Son     SAL disease Son     Sleep disorder Son     Ovarian cancer Neg Hx  "    Melanoma Neg Hx     Psoriasis Neg Hx     Lupus Neg Hx     Eczema Neg Hx     Acne Neg Hx      Social History     Tobacco Use    Smoking status: Never Smoker    Smokeless tobacco: Never Used   Substance Use Topics    Alcohol use: No     Alcohol/week: 0.0 oz    Drug use: No     Review of Systems   Constitutional: Negative for chills and fever.   HENT: Negative for sore throat.    Eyes: Negative for visual disturbance.   Respiratory: Negative for shortness of breath.    Cardiovascular: Negative for chest pain and leg swelling.   Gastrointestinal: Positive for nausea. Negative for abdominal pain, constipation, diarrhea and vomiting.   Genitourinary: Negative for dysuria and hematuria.   Skin: Positive for color change (bruising) and wound.   Neurological: Negative for headaches.   Psychiatric/Behavioral: Negative for dysphoric mood.       Physical Exam     Initial Vitals [08/31/19 2015]   BP Pulse Resp Temp SpO2   131/63 102 20 98.4 °F (36.9 °C) 98 %      MAP       --         Physical Exam    Nursing note and vitals reviewed.  Constitutional: She appears well-developed and well-nourished. She is not diaphoretic. No distress.   HENT:   Head: Normocephalic and atraumatic.   Mouth/Throat: Oropharynx is clear and moist. No oropharyngeal exudate.   Eyes:   Conjunctival pallor. EOMI. PERRL.   Neck: Normal range of motion. Neck supple.   Cardiovascular: Regular rhythm, normal heart sounds and intact distal pulses. Tachycardia present.    Pulmonary/Chest:       Faint rales heard over B/L upper lungs. No wheezes or rhonchi. No evidence of respiratory distress. Speaking in clear and full sentences.   Abdominal: Soft. Bowel sounds are normal.   Musculoskeletal: Normal range of motion.   Neurological: She is alert and oriented to person, place, and time. GCS score is 15. GCS eye subscore is 4. GCS verbal subscore is 5. GCS motor subscore is 6.   Skin: There is pallor.   Psychiatric: She has a normal mood and affect.  Thought content normal.         ED Course   Procedures  Labs Reviewed   CBC W/ AUTO DIFFERENTIAL - Abnormal; Notable for the following components:       Result Value    RBC 2.55 (*)     Hemoglobin 7.7 (*)     Hematocrit 25.4 (*)     Mean Corpuscular Volume 100 (*)     Mean Corpuscular Hemoglobin Conc 30.3 (*)     RDW 14.6 (*)     Immature Granulocytes 4.2 (*)     Gran # (ANC) 8.0 (*)     Immature Grans (Abs) 0.44 (*)     nRBC 1 (*)     Gran% 75.0 (*)     Lymph% 12.4 (*)     All other components within normal limits   COMPREHENSIVE METABOLIC PANEL - Abnormal; Notable for the following components:    Glucose 196 (*)     Albumin 3.3 (*)     eGFR if non  55.6 (*)     All other components within normal limits   PROTIME-INR   TYPE & SCREEN   POCT GLUCOSE MONITORING CONTINUOUS          Imaging Results          X-Ray Chest AP Portable (Final result)  Result time 08/31/19 21:34:44    Final result by Snow Wilder MD (08/31/19 21:34:44)                 Impression:      No failure or pneumonia.      Electronically signed by: Snow Wilder  Date:    08/31/2019  Time:    21:34             Narrative:    EXAMINATION:  XR CHEST AP PORTABLE    CLINICAL HISTORY:  Weakness    TECHNIQUE:  Single frontal view of the chest was performed.    COMPARISON:  02/01/2019 chest    FINDINGS:  Single AP portable view at 21:15.    The heart is mildly enlarged unchanged.  There are sternotomy wires.  There is right skin clips.  No pneumothorax or pleural effusion or interstitial edema or consolidation or nodule.  The hilar shadows and trachea appear normal.                                 Medical Decision Making:   History:   Old Medical Records: I decided to obtain old medical records.  Old Records Summarized: records from previous admission(s).  Clinical Tests:   Lab Tests: Ordered and Reviewed  Radiological Study: Ordered and Reviewed  Medical Tests: Ordered and Reviewed  Other:   I have discussed this case with another health  care provider.       <> Summary of the Discussion: General Surgery       APC / Resident Notes:   Patient is a 74-year-old white female with a PMH of right breast cancer, DM2, CKD3, hypothyroidism, HLD, HTN, MITCHELL on CPAP, PE on Lovenox, s/p right mastectomy with Dr. Flores on 8/26/19 who presents to the ED for urgent evaluation of hypotension and postop problem. PE reveals a non-toxic, afebrile, pale-appearing elderly female in NAD. Slightly tachycardic (102). Reported BP at home 80/60, now 131/63. Lung exam with faint rales auscultated. No respiratory distress. Surgical incision overlying right breast c/d/i. 2 ANA MARÍA drains inferior to incision with bloody fluid, insertion sites c/d/i. No abdominal tenderness. No leg swelling.    DDx includes but is not limited to: symptomatic anemia, infection, post-op complication.    ED course: basic labs, T&S, PT-INR, CXR, EKG.     9:00PM  Discussed case with General Surgery, who will see the patient.     CBC notable for drop in H&H from 10.5 & 33.6 to 7.7 & 25.4 in 3 days. No leukocytosis. CMP unremarkable. CXR without acute findings. Gen Surg will admit the patient to their service for blood transfusion and OR in the AM for washout. Patient informed of plan and is agreeable. I have discussed patient case with my supervisory physician, who is in agreement with my assessment and plan.                   Clinical Impression:       ICD-10-CM ICD-9-CM   1. Personal history of breast cancer Z85.3 V10.3   2. Lightheadedness R42 780.4   3. Weakness R53.1 780.79   4. Blood loss anemia D50.0 280.0         Disposition:   Disposition: Admitted  Condition: Maame Ghotra PA-C  09/01/19 0836

## 2019-09-01 NOTE — ASSESSMENT & PLAN NOTE
Patient is a 75 yo F who underwent R modified radical mastectomy 8/26/19 for an occult right breast cancer, who was also diagnosed with pulm emboli in May 2019 and has been on therapeutic lovenox perioperatively    Will transfuse 2 x pRBC today, and recheck CBC and coags after  Hold anticoagulation  Plan for to takeback for washout in the OR tomorrow AM - consent in chart  NPO  mIVF

## 2019-09-02 ENCOUNTER — ANESTHESIA EVENT (OUTPATIENT)
Dept: SURGERY | Facility: HOSPITAL | Age: 74
DRG: 920 | End: 2019-09-02
Payer: MEDICARE

## 2019-09-02 ENCOUNTER — ANESTHESIA (OUTPATIENT)
Dept: SURGERY | Facility: HOSPITAL | Age: 74
DRG: 920 | End: 2019-09-02
Payer: MEDICARE

## 2019-09-02 PROBLEM — N64.89 HEMATOMA (NONTRAUMATIC) OF BREAST: Status: ACTIVE | Noted: 2019-09-02

## 2019-09-02 LAB
ANION GAP SERPL CALC-SCNC: 6 MMOL/L (ref 8–16)
APTT BLDCRRT: <21 SEC (ref 21–32)
BASOPHILS # BLD AUTO: 0.02 K/UL (ref 0–0.2)
BASOPHILS # BLD AUTO: 0.03 K/UL (ref 0–0.2)
BASOPHILS # BLD AUTO: 0.04 K/UL (ref 0–0.2)
BASOPHILS NFR BLD: 0.3 % (ref 0–1.9)
BASOPHILS NFR BLD: 0.3 % (ref 0–1.9)
BASOPHILS NFR BLD: 0.5 % (ref 0–1.9)
BUN SERPL-MCNC: 9 MG/DL (ref 8–23)
CALCIUM SERPL-MCNC: 7.3 MG/DL (ref 8.7–10.5)
CHLORIDE SERPL-SCNC: 115 MMOL/L (ref 95–110)
CO2 SERPL-SCNC: 21 MMOL/L (ref 23–29)
CREAT SERPL-MCNC: 0.6 MG/DL (ref 0.5–1.4)
DIFFERENTIAL METHOD: ABNORMAL
EOSINOPHIL # BLD AUTO: 0 K/UL (ref 0–0.5)
EOSINOPHIL NFR BLD: 0.2 % (ref 0–8)
EOSINOPHIL NFR BLD: 0.5 % (ref 0–8)
EOSINOPHIL NFR BLD: 0.7 % (ref 0–8)
ERYTHROCYTE [DISTWIDTH] IN BLOOD BY AUTOMATED COUNT: 15.9 % (ref 11.5–14.5)
ERYTHROCYTE [DISTWIDTH] IN BLOOD BY AUTOMATED COUNT: 15.9 % (ref 11.5–14.5)
ERYTHROCYTE [DISTWIDTH] IN BLOOD BY AUTOMATED COUNT: 16.1 % (ref 11.5–14.5)
EST. GFR  (AFRICAN AMERICAN): >60 ML/MIN/1.73 M^2
EST. GFR  (NON AFRICAN AMERICAN): >60 ML/MIN/1.73 M^2
FIBRINOGEN PPP-MCNC: 424 MG/DL (ref 182–366)
GLUCOSE SERPL-MCNC: 105 MG/DL (ref 70–110)
HCT VFR BLD AUTO: 27.7 % (ref 37–48.5)
HCT VFR BLD AUTO: 29 % (ref 37–48.5)
HCT VFR BLD AUTO: 32.5 % (ref 37–48.5)
HGB BLD-MCNC: 8.8 G/DL (ref 12–16)
HGB BLD-MCNC: 9.3 G/DL (ref 12–16)
HGB BLD-MCNC: 9.4 G/DL (ref 12–16)
IMM GRANULOCYTES # BLD AUTO: 0.13 K/UL (ref 0–0.04)
IMM GRANULOCYTES # BLD AUTO: 0.13 K/UL (ref 0–0.04)
IMM GRANULOCYTES # BLD AUTO: 0.16 K/UL (ref 0–0.04)
IMM GRANULOCYTES NFR BLD AUTO: 1.2 % (ref 0–0.5)
IMM GRANULOCYTES NFR BLD AUTO: 2.1 % (ref 0–0.5)
IMM GRANULOCYTES NFR BLD AUTO: 2.2 % (ref 0–0.5)
INR PPP: 0.9 (ref 0.8–1.2)
LYMPHOCYTES # BLD AUTO: 0.7 K/UL (ref 1–4.8)
LYMPHOCYTES # BLD AUTO: 1 K/UL (ref 1–4.8)
LYMPHOCYTES # BLD AUTO: 1 K/UL (ref 1–4.8)
LYMPHOCYTES NFR BLD: 12.7 % (ref 18–48)
LYMPHOCYTES NFR BLD: 15.7 % (ref 18–48)
LYMPHOCYTES NFR BLD: 7.3 % (ref 18–48)
MAGNESIUM SERPL-MCNC: 1.7 MG/DL (ref 1.6–2.6)
MCH RBC QN AUTO: 29.6 PG (ref 27–31)
MCH RBC QN AUTO: 29.9 PG (ref 27–31)
MCH RBC QN AUTO: 29.9 PG (ref 27–31)
MCHC RBC AUTO-ENTMCNC: 28.6 G/DL (ref 32–36)
MCHC RBC AUTO-ENTMCNC: 31.8 G/DL (ref 32–36)
MCHC RBC AUTO-ENTMCNC: 32.4 G/DL (ref 32–36)
MCV RBC AUTO: 104 FL (ref 82–98)
MCV RBC AUTO: 92 FL (ref 82–98)
MCV RBC AUTO: 94 FL (ref 82–98)
MONOCYTES # BLD AUTO: 0.5 K/UL (ref 0.3–1)
MONOCYTES # BLD AUTO: 0.5 K/UL (ref 0.3–1)
MONOCYTES # BLD AUTO: 1 K/UL (ref 0.3–1)
MONOCYTES NFR BLD: 7.3 % (ref 4–15)
MONOCYTES NFR BLD: 7.9 % (ref 4–15)
MONOCYTES NFR BLD: 8.9 % (ref 4–15)
NEUTROPHILS # BLD AUTO: 11 K/UL (ref 1.8–7.7)
NEUTROPHILS # BLD AUTO: 4.4 K/UL (ref 1.8–7.7)
NEUTROPHILS # BLD AUTO: 4.4 K/UL (ref 1.8–7.7)
NEUTROPHILS NFR BLD: 73.3 % (ref 38–73)
NEUTROPHILS NFR BLD: 75.2 % (ref 38–73)
NEUTROPHILS NFR BLD: 83.7 % (ref 38–73)
NRBC BLD-RTO: 0 /100 WBC
NRBC BLD-RTO: 0 /100 WBC
NRBC BLD-RTO: 1 /100 WBC
PHOSPHATE SERPL-MCNC: 3.2 MG/DL (ref 2.7–4.5)
PLATELET # BLD AUTO: 139 K/UL (ref 150–350)
PLATELET # BLD AUTO: 146 K/UL (ref 150–350)
PLATELET # BLD AUTO: 168 K/UL (ref 150–350)
PMV BLD AUTO: 10 FL (ref 9.2–12.9)
PMV BLD AUTO: 10.1 FL (ref 9.2–12.9)
PMV BLD AUTO: 10.2 FL (ref 9.2–12.9)
POCT GLUCOSE: 140 MG/DL (ref 70–110)
POCT GLUCOSE: 160 MG/DL (ref 70–110)
POTASSIUM SERPL-SCNC: 3.5 MMOL/L (ref 3.5–5.1)
PROTHROMBIN TIME: 10 SEC (ref 9–12.5)
RBC # BLD AUTO: 2.94 M/UL (ref 4–5.4)
RBC # BLD AUTO: 3.14 M/UL (ref 4–5.4)
RBC # BLD AUTO: 3.14 M/UL (ref 4–5.4)
SODIUM SERPL-SCNC: 142 MMOL/L (ref 136–145)
WBC # BLD AUTO: 13.1 K/UL (ref 3.9–12.7)
WBC # BLD AUTO: 5.82 K/UL (ref 3.9–12.7)
WBC # BLD AUTO: 6.06 K/UL (ref 3.9–12.7)

## 2019-09-02 PROCEDURE — 71000033 HC RECOVERY, INTIAL HOUR: Mod: HCNC | Performed by: SURGERY

## 2019-09-02 PROCEDURE — 63600175 PHARM REV CODE 636 W HCPCS: Mod: HCNC | Performed by: ANESTHESIOLOGY

## 2019-09-02 PROCEDURE — 25000003 PHARM REV CODE 250: Mod: HCNC | Performed by: STUDENT IN AN ORGANIZED HEALTH CARE EDUCATION/TRAINING PROGRAM

## 2019-09-02 PROCEDURE — 85730 THROMBOPLASTIN TIME PARTIAL: CPT | Mod: HCNC

## 2019-09-02 PROCEDURE — 36000704 HC OR TIME LEV I 1ST 15 MIN: Mod: HCNC | Performed by: SURGERY

## 2019-09-02 PROCEDURE — 25000003 PHARM REV CODE 250: Mod: HCNC | Performed by: NURSE ANESTHETIST, CERTIFIED REGISTERED

## 2019-09-02 PROCEDURE — 21501 PR I&D DEEP ABSC/HEMATOMA NECK/CHEST: ICD-10-PCS | Mod: 78,HCNC,, | Performed by: SURGERY

## 2019-09-02 PROCEDURE — 36415 COLL VENOUS BLD VENIPUNCTURE: CPT | Mod: HCNC

## 2019-09-02 PROCEDURE — 80048 BASIC METABOLIC PNL TOTAL CA: CPT | Mod: HCNC

## 2019-09-02 PROCEDURE — 21501 I&D DP ABSC/HMTMA SFT TS NCK: CPT | Mod: 78,HCNC,, | Performed by: SURGERY

## 2019-09-02 PROCEDURE — 84100 ASSAY OF PHOSPHORUS: CPT | Mod: HCNC

## 2019-09-02 PROCEDURE — 83735 ASSAY OF MAGNESIUM: CPT | Mod: HCNC

## 2019-09-02 PROCEDURE — D9220A PRA ANESTHESIA: Mod: HCNC,ANES,, | Performed by: ANESTHESIOLOGY

## 2019-09-02 PROCEDURE — 37000009 HC ANESTHESIA EA ADD 15 MINS: Mod: HCNC | Performed by: SURGERY

## 2019-09-02 PROCEDURE — 85025 COMPLETE CBC W/AUTO DIFF WBC: CPT | Mod: 91,HCNC

## 2019-09-02 PROCEDURE — 36000705 HC OR TIME LEV I EA ADD 15 MIN: Mod: HCNC | Performed by: SURGERY

## 2019-09-02 PROCEDURE — D9220A PRA ANESTHESIA: ICD-10-PCS | Mod: HCNC,ANES,, | Performed by: ANESTHESIOLOGY

## 2019-09-02 PROCEDURE — 63600175 PHARM REV CODE 636 W HCPCS: Mod: HCNC | Performed by: NURSE ANESTHETIST, CERTIFIED REGISTERED

## 2019-09-02 PROCEDURE — 82962 GLUCOSE BLOOD TEST: CPT | Mod: HCNC | Performed by: SURGERY

## 2019-09-02 PROCEDURE — 63600175 PHARM REV CODE 636 W HCPCS: Mod: HCNC

## 2019-09-02 PROCEDURE — 85384 FIBRINOGEN ACTIVITY: CPT | Mod: HCNC

## 2019-09-02 PROCEDURE — 27201423 OPTIME MED/SURG SUP & DEVICES STERILE SUPPLY: Mod: HCNC | Performed by: SURGERY

## 2019-09-02 PROCEDURE — 37000008 HC ANESTHESIA 1ST 15 MINUTES: Mod: HCNC | Performed by: SURGERY

## 2019-09-02 PROCEDURE — 11000001 HC ACUTE MED/SURG PRIVATE ROOM: Mod: HCNC

## 2019-09-02 PROCEDURE — 85610 PROTHROMBIN TIME: CPT | Mod: HCNC

## 2019-09-02 RX ORDER — DIPHENHYDRAMINE HYDROCHLORIDE 50 MG/ML
25 INJECTION INTRAMUSCULAR; INTRAVENOUS EVERY 6 HOURS PRN
Status: DISCONTINUED | OUTPATIENT
Start: 2019-09-02 | End: 2019-09-02 | Stop reason: HOSPADM

## 2019-09-02 RX ORDER — CEFAZOLIN SODIUM 1 G/3ML
INJECTION, POWDER, FOR SOLUTION INTRAMUSCULAR; INTRAVENOUS
Status: DISCONTINUED | OUTPATIENT
Start: 2019-09-02 | End: 2019-09-02

## 2019-09-02 RX ORDER — ONDANSETRON 2 MG/ML
4 INJECTION INTRAMUSCULAR; INTRAVENOUS ONCE AS NEEDED
Status: DISCONTINUED | OUTPATIENT
Start: 2019-09-02 | End: 2019-09-02 | Stop reason: HOSPADM

## 2019-09-02 RX ORDER — PHENYLEPHRINE HYDROCHLORIDE 10 MG/ML
INJECTION INTRAVENOUS
Status: DISCONTINUED | OUTPATIENT
Start: 2019-09-02 | End: 2019-09-02

## 2019-09-02 RX ORDER — LORAZEPAM 2 MG/ML
0.5 INJECTION INTRAMUSCULAR ONCE
Status: COMPLETED | OUTPATIENT
Start: 2019-09-02 | End: 2019-09-02

## 2019-09-02 RX ORDER — HYDROMORPHONE HYDROCHLORIDE 1 MG/ML
0.2 INJECTION, SOLUTION INTRAMUSCULAR; INTRAVENOUS; SUBCUTANEOUS EVERY 5 MIN PRN
Status: DISCONTINUED | OUTPATIENT
Start: 2019-09-02 | End: 2019-09-02 | Stop reason: HOSPADM

## 2019-09-02 RX ORDER — SUCCINYLCHOLINE CHLORIDE 20 MG/ML
INJECTION INTRAMUSCULAR; INTRAVENOUS
Status: DISCONTINUED | OUTPATIENT
Start: 2019-09-02 | End: 2019-09-02

## 2019-09-02 RX ORDER — HYDROMORPHONE HYDROCHLORIDE 1 MG/ML
0.5 INJECTION, SOLUTION INTRAMUSCULAR; INTRAVENOUS; SUBCUTANEOUS EVERY 6 HOURS PRN
Status: DISCONTINUED | OUTPATIENT
Start: 2019-09-02 | End: 2019-09-04 | Stop reason: HOSPADM

## 2019-09-02 RX ORDER — FENTANYL CITRATE 50 UG/ML
25 INJECTION, SOLUTION INTRAMUSCULAR; INTRAVENOUS EVERY 5 MIN PRN
Status: DISCONTINUED | OUTPATIENT
Start: 2019-09-02 | End: 2019-09-02 | Stop reason: HOSPADM

## 2019-09-02 RX ORDER — PROPOFOL 10 MG/ML
VIAL (ML) INTRAVENOUS
Status: DISCONTINUED | OUTPATIENT
Start: 2019-09-02 | End: 2019-09-02

## 2019-09-02 RX ORDER — MEPERIDINE HYDROCHLORIDE 50 MG/ML
12.5 INJECTION INTRAMUSCULAR; INTRAVENOUS; SUBCUTANEOUS ONCE AS NEEDED
Status: DISCONTINUED | OUTPATIENT
Start: 2019-09-02 | End: 2019-09-02 | Stop reason: HOSPADM

## 2019-09-02 RX ORDER — HYDROMORPHONE HYDROCHLORIDE 1 MG/ML
INJECTION, SOLUTION INTRAMUSCULAR; INTRAVENOUS; SUBCUTANEOUS
Status: COMPLETED
Start: 2019-09-02 | End: 2019-09-02

## 2019-09-02 RX ORDER — ONDANSETRON 2 MG/ML
INJECTION INTRAMUSCULAR; INTRAVENOUS
Status: DISCONTINUED | OUTPATIENT
Start: 2019-09-02 | End: 2019-09-02

## 2019-09-02 RX ORDER — LIDOCAINE HCL/PF 100 MG/5ML
SYRINGE (ML) INTRAVENOUS
Status: DISCONTINUED | OUTPATIENT
Start: 2019-09-02 | End: 2019-09-02

## 2019-09-02 RX ORDER — LORAZEPAM 2 MG/ML
INJECTION INTRAMUSCULAR
Status: COMPLETED
Start: 2019-09-02 | End: 2019-09-02

## 2019-09-02 RX ORDER — FENTANYL CITRATE 50 UG/ML
INJECTION, SOLUTION INTRAMUSCULAR; INTRAVENOUS
Status: DISCONTINUED | OUTPATIENT
Start: 2019-09-02 | End: 2019-09-02

## 2019-09-02 RX ORDER — ROCURONIUM BROMIDE 10 MG/ML
INJECTION, SOLUTION INTRAVENOUS
Status: DISCONTINUED | OUTPATIENT
Start: 2019-09-02 | End: 2019-09-02

## 2019-09-02 RX ORDER — FENTANYL CITRATE 50 UG/ML
INJECTION, SOLUTION INTRAMUSCULAR; INTRAVENOUS
Status: COMPLETED
Start: 2019-09-02 | End: 2019-09-02

## 2019-09-02 RX ADMIN — FENTANYL CITRATE 25 MCG: 50 INJECTION, SOLUTION INTRAMUSCULAR; INTRAVENOUS at 10:09

## 2019-09-02 RX ADMIN — FENTANYL CITRATE 25 MCG: 50 INJECTION INTRAMUSCULAR; INTRAVENOUS at 10:09

## 2019-09-02 RX ADMIN — MICONAZOLE NITRATE: 20 CREAM TOPICAL at 09:09

## 2019-09-02 RX ADMIN — LORAZEPAM 0.5 MG: 2 INJECTION INTRAMUSCULAR at 10:09

## 2019-09-02 RX ADMIN — ROCURONIUM BROMIDE 5 MG: 10 INJECTION, SOLUTION INTRAVENOUS at 08:09

## 2019-09-02 RX ADMIN — SUCCINYLCHOLINE CHLORIDE 200 MG: 20 INJECTION, SOLUTION INTRAMUSCULAR; INTRAVENOUS at 08:09

## 2019-09-02 RX ADMIN — LIDOCAINE HYDROCHLORIDE 75 MG: 20 INJECTION, SOLUTION INTRAVENOUS at 08:09

## 2019-09-02 RX ADMIN — HYDROMORPHONE HYDROCHLORIDE 0.2 MG: 1 INJECTION, SOLUTION INTRAMUSCULAR; INTRAVENOUS; SUBCUTANEOUS at 10:09

## 2019-09-02 RX ADMIN — GABAPENTIN 300 MG: 300 CAPSULE ORAL at 09:09

## 2019-09-02 RX ADMIN — SODIUM CHLORIDE, SODIUM GLUCONATE, SODIUM ACETATE, POTASSIUM CHLORIDE, MAGNESIUM CHLORIDE, SODIUM PHOSPHATE, DIBASIC, AND POTASSIUM PHOSPHATE: .53; .5; .37; .037; .03; .012; .00082 INJECTION, SOLUTION INTRAVENOUS at 09:09

## 2019-09-02 RX ADMIN — ONDANSETRON 4 MG: 2 INJECTION INTRAMUSCULAR; INTRAVENOUS at 08:09

## 2019-09-02 RX ADMIN — LORAZEPAM 0.5 MG: 2 INJECTION INTRAMUSCULAR; INTRAVENOUS at 10:09

## 2019-09-02 RX ADMIN — PROPOFOL 200 MG: 10 INJECTION, EMULSION INTRAVENOUS at 08:09

## 2019-09-02 RX ADMIN — OXYCODONE HYDROCHLORIDE AND ACETAMINOPHEN 1 TABLET: 10; 325 TABLET ORAL at 10:09

## 2019-09-02 RX ADMIN — SODIUM CHLORIDE, SODIUM GLUCONATE, SODIUM ACETATE, POTASSIUM CHLORIDE, MAGNESIUM CHLORIDE, SODIUM PHOSPHATE, DIBASIC, AND POTASSIUM PHOSPHATE: .53; .5; .37; .037; .03; .012; .00082 INJECTION, SOLUTION INTRAVENOUS at 08:09

## 2019-09-02 RX ADMIN — CEFAZOLIN 2 G: 330 INJECTION, POWDER, FOR SOLUTION INTRAMUSCULAR; INTRAVENOUS at 08:09

## 2019-09-02 RX ADMIN — OXYCODONE HYDROCHLORIDE AND ACETAMINOPHEN 1 TABLET: 10; 325 TABLET ORAL at 09:09

## 2019-09-02 RX ADMIN — PHENYLEPHRINE HYDROCHLORIDE 100 MCG: 10 INJECTION INTRAVENOUS at 08:09

## 2019-09-02 RX ADMIN — FENTANYL CITRATE 100 MCG: 50 INJECTION, SOLUTION INTRAMUSCULAR; INTRAVENOUS at 08:09

## 2019-09-02 RX ADMIN — FENTANYL CITRATE 25 MCG: 50 INJECTION, SOLUTION INTRAMUSCULAR; INTRAVENOUS at 08:09

## 2019-09-02 RX ADMIN — FERROUS SULFATE TAB EC 325 MG (65 MG FE EQUIVALENT) 325 MG: 325 (65 FE) TABLET DELAYED RESPONSE at 10:09

## 2019-09-02 RX ADMIN — GABAPENTIN 300 MG: 300 CAPSULE ORAL at 10:09

## 2019-09-02 RX ADMIN — LEVOTHYROXINE SODIUM 50 MCG: 50 TABLET ORAL at 05:09

## 2019-09-02 RX ADMIN — FENTANYL CITRATE 25 MCG: 50 INJECTION, SOLUTION INTRAMUSCULAR; INTRAVENOUS at 09:09

## 2019-09-02 NOTE — ANESTHESIA RELEASE NOTE
Anesthesia Release from PACU Note    Patient: Alina Narayan    Procedure(s) Performed: Procedure(s) (LRB):  WASHOUT - Right mastectomy site (Right)    Anesthesia type: General    Post pain: Adequate analgesia    Post assessment: no apparent anesthetic complications    Last Vitals:   Vitals:    09/02/19 1100   BP: 118/64   Pulse: 81   Resp: 14   Temp: 36.5 °C (97.7 °F)   SpO2: (!) 94%       Post vital signs: stable    Level of consciousness: awake    Complications: none    Airway Patency: patent    Respiratory: spontaneous    Cardiovascular: stable    Hydration: euvolemic

## 2019-09-02 NOTE — ANESTHESIA POSTPROCEDURE EVALUATION
Anesthesia Post Evaluation    Patient: Alina Narayan    Procedure(s) Performed: Procedure(s) (LRB):  WASHOUT - Right mastectomy site (Right)    Final Anesthesia Type: general  Patient location during evaluation: PACU  Patient participation: Yes- Able to Participate  Level of consciousness: awake and alert  Post-procedure vital signs: reviewed and stable  Pain management: adequate  Airway patency: patent  PONV status at discharge: No PONV  Anesthetic complications: no      Cardiovascular status: blood pressure returned to baseline  Respiratory status: unassisted  Hydration status: euvolemic  Follow-up not needed.          Vitals Value Taken Time   /59 9/2/2019 12:13 PM   Temp 36.5 °C (97.7 °F) 9/2/2019 11:00 AM   Pulse 82 9/2/2019 12:13 PM   Resp 14 9/2/2019 12:13 PM   SpO2 100 % 9/2/2019 12:13 PM         Event Time     Out of Recovery 09/02/2019 10:30:00          Pain/Lin Score: Pain Rating Prior to Med Admin: 8 (9/2/2019 10:57 AM)  Pain Rating Post Med Admin: 4 (9/2/2019 11:30 AM)  Lin Score: 9 (9/2/2019 11:30 AM)

## 2019-09-02 NOTE — OP NOTE
DATE OF PROCEDURE:  09/02/2019    PRIMARY SURGEON:  Gaston Flores M.D.    ASSISTANT SURGEON:  Cristina Gooden M.D. (RES)    PREOPERATIVE DIAGNOSIS:  Right anterior chest wall hematoma, status post   modified radical mastectomy 1 week earlier.    POSTOPERATIVE DIAGNOSIS:  Right anterior chest wall hematoma, status post   modified radical mastectomy 1 week earlier.    PROCEDURES:  Right mastectomy site and chest wall wound exploration and   evacuation of hematoma at the modified radical mastectomy site.    PROCEDURE IN DETAIL:  The patient underwent informed consent.  She had been   transfused 2 units of packed red blood cells preoperatively.  Her Lovenox had   been held for greater than 24 hours. She was brought to the operating room under   general anesthesia.  She was in a supine position.  Both existing drains, which   showed evidence of thrombosis, were pulled.  The staples were removed and the   wound was opened.  The mild-to-moderate amount of hematoma was evacuated.  There   was no sign of infection.  The wound was copiously irrigated.  There was some   raw surfaces and the wound sites were covered both on the chest wall and the   mastectomy flaps with the powdered Ernie.  With this performed and no evidence   of direct bleeding after copious irrigation and hemostasis with cautery prior to   Ernie placement, the wound was reclosed.  We then placed 2 brand new #19 round   Javier drains through the same drain exit sites.  They were anchored to the skin   at the exit site with 2-0 nylon suture.  They were placed beneath the   mastectomy flaps and in the axilla respectively.  The deep dermal and   subcutaneous layer was reclosed with Vicryl sutures and the skin closed again   with staples.  Estimated blood loss was minimal.  The blood loss was evacuated   hematoma, which was a few 100 mL.  There was no obvious source of bleeding   encountered during the dissection with generalized raw surfaces once we    evacuated the hematoma.  She was turned over to Anesthesia after sterile fluff   gauze dressing and compressive circumferential Ace bandage wrap was placed.  She   tolerated the procedure well without complication.      RAMESH/ESTHER  dd: 09/02/2019 09:49:56 (CDT)  td: 09/02/2019 10:14:28 (CDT)  Doc ID   #[id number]  Job ID #[id number]    CC:

## 2019-09-02 NOTE — PROGRESS NOTES
Patient calm and comfortable at present, sleeping between care. Pain tolerable. Dr. Baldwin at bedside releasing patient to floor with nasal O2 2 l/min. Patient denies any distress. Post op CBC unchanged.

## 2019-09-02 NOTE — BRIEF OP NOTE
[facility]  Brief Operative Note     SUMMARY     Surgery Date: 9/2/2019     Surgeon(s) and Role:     * Gaston Flores MD - Primary     * Cristina Gooden MD - Resident - Assisting    Pre-op Diagnosis:  Lightheadedness [R42]  Personal history of breast cancer [Z85.3]    Post-op Diagnosis:  Post-Op Diagnosis Codes:     * Lightheadedness [R42]     * Personal history of breast cancer [Z85.3]    Procedure(s) (LRB):  WASHOUT - Right mastectomy site (Right)    Anesthesia: Choice    Description of the findings of the procedure: right breast hematoma washout    Findings/Key Components: Patient with previous right MRM with moderate sized hematoma. Appox 100cc EBL. She was note to have diffuse oozing from both soft tissue and muscle. Attempt to control oozing with Bovie electrocautery and myranda powder. Two 19 Fr drains replaced.     Estimated Blood Loss: 100 mL         Specimens:   Specimen (12h ago, onward)    None        Dispo: Patient extubated in the OR and transferred to PACU in stable condition. Will continue to monitor drain output as an inpatient     ROCHELLE Gooden MD   General Surgery- PGYIII  [phone]

## 2019-09-02 NOTE — ANESTHESIA PREPROCEDURE EVALUATION
[facility]  Anesthesia Pre-Operative Evaluation         Patient Name: Alina Narayan  YOB: 1945  MRN: [id number]    SUBJECTIVE:     Pre-operative evaluation for Procedure(s) (LRB):  WASHOUT - Right mastectomy site (Right)     09/02/2019    Alina Narayan is a 74 y.o. female w/ a significant PMHx of left breast cancer s/p lumpectomy+XRT, DM2, CKD3, hypothyroidism, HLD, HTN, MITCHELL on CPAP, PE on therapeutic Lovenox, recently diagnosed with right breast cancer s/p right modified radical mastectomy on 8/26/19 who presents to the ED on POD5 with bloody drain output and symptomatic anemia. She was restarted on lovenox at discharge on 8/27 and now held since admission. S/p 2u pRBC with H/H 8.8/27.7 and plt 146. No significant metabolic derangements.     Patient now presents for the above procedure(s).      LDA:       Port A Cath Single Lumen (Active)   Number of days:             Peripheral IV - Single Lumen 08/31/19 2054 20 G Left Wrist (Active)   Number of days: 1           Prev airway:   Placement Date: 08/26/19; Placement Time: 1456; Inserted by: Anesthesia MD; Airway Device: LMA; Mask Ventilation: Not Attempted; Intubated: Postinduction; Airway Device Size: 4.0; Style: Cuffed; Cuff Inflation: Minimal occlusive pressure; Inflation Amount: 4; Placement Verified By: Auscultation, ETT Condensation, Colorimetric EtCO2 device; Complicating Factors: Short neck, Obesity, Small mouth, Retrognathia; Intubation Findings: Positive EtCO2, Bilateral breath sounds, Atraumatic/Condition of teeth unchanged; Securment: Lips; Complications: None; Breath Sounds: Equal Bilateral; Insertion Attempts: 2; Removal Date: 08/26/19;  Removal Time: 1749    Placement Date: 12/02/13; Placement Time: 0739; Method of Intubation: Direct laryngoscopy; Inserted by: Anesthesia Resident; Airway Device: Endotracheal Tube; Mask Ventilation: Easy - oral; Intubated: Postinduction; Blade: Vitale #2; Airway Device Size: 8.0; Style:  Cuffed; Cuff Inflation: Minimal occlusive pressure; Placement Verified By: Auscultation, Capnometry; Grade: Grade I; Complicating Factors: None; Intubation Findings: Positive EtCO2, Bilateral breath sounds, Atraumatic/Condition of teeth unchanged;  Depth of Insertion: 22; Securment: Lips; Complications: None; Breath Sounds: Equal Bilateral; Insertion Attempts: 1; Removal Date: 12/02/13;  Removal Time: 2225; Removal Indication & Assessment: removed per order; Name of Person who Removed: Debra RRT    Drips:   sodium chloride 0.9% 125 mL/hr at 09/01/19 0823       Patient Active Problem List   Diagnosis    Essential hypertension    Hyperlipidemia, mixed    Diverticulosis    Family history of colon cancer    Obstructive sleep apnea    Diabetes mellitus type 2 in obese    S/P AVR (aortic valve replacement)    Hearing loss, sensorineural    Degeneration of lumbar or lumbosacral intervertebral disc    Malignant neoplasm of right female breast    Chronic kidney disease, stage III (moderate)    Diabetes mellitus due to underlying condition with stage 3 chronic kidney disease, without long-term current use of insulin    Vitamin D deficiency    Hemarthrosis of left knee    Contusion, knee and lower leg, left, initial encounter    Fall    Left anterior knee pain    Edema    Personal history of breast cancer    BMI 45.0-49.9, adult    Atherosclerosis of aorta    Thrombocytopenia, unspecified    Cancer of axillary tail of right breast    Bilateral pulmonary embolism    Pulmonary HTN    Chemotherapy induced neutropenia    Fatty liver    Disorder of rotator cuff of both shoulders    Soft tissue swelling    Leukopenia    Invasive carcinoma of breast    Lightheadedness    Bleeding       Review of patient's allergies indicates:   Allergen Reactions    Dilaudid [hydromorphone (bulk)] Other (See Comments)     Other reaction(s): sedation    Hydromorphone Other (See Comments)     Other reaction(s):  sedation    Exenatide Rash     Other reaction(s): Rash       Current Inpatient Medications:   carvedilol  12.5 mg Oral BID WM    ferrous sulfate  325 mg Oral Daily    gabapentin  300 mg Oral BID    levothyroxine  50 mcg Oral Before breakfast    lidocaine (PF) 10 mg/ml (1%)  1 mL Other Once    miconazole   Topical (Top) BID       Current Facility-Administered Medications on File Prior to Encounter   Medication Dose Route Frequency Provider Last Rate Last Dose    alteplase injection 2 mg  2 mg Intra-Catheter PRN Erick Dwyer MD   2 mg at 03/11/19 1140    heparin, porcine (PF) 100 unit/mL injection flush 500 Units  500 Units Intravenous 1 time in Clinic/HOD Erick Dwyer MD        heparin, porcine (PF) 100 unit/mL injection flush 500 Units  500 Units Intravenous 1 time in Clinic/HOD Erick Dwyer MD        sodium chloride 0.9% flush 10 mL  10 mL Intravenous PRN Erick Dwyer MD   10 mL at 07/23/19 1157     Current Outpatient Medications on File Prior to Encounter   Medication Sig Dispense Refill    ASPIR-LOW 81 mg EC tablet Take 81 mg by mouth once daily.  12    blood sugar diagnostic Strp True Metirx strips or strips and lancets  - pt checks twice daily for uncontrolled glucoses E11.65 200 each 3    blood-glucose meter kit True Test or meter covered by insurance - pt checks glucose twice daily for uncontrolled glucoses E11.65 1 each 0    carvedilol (COREG) 25 MG tablet Take 0.5 tablets (12.5 mg total) by mouth 2 (two) times daily with meals. 90 tablet 3    CHOLECALCIFEROL, VITAMIN D3, (VITAMIN D3 ORAL) Take by mouth once daily. 1000 IU      enoxaparin (LOVENOX) 150 mg/mL Syrg Inject 1 mL (150 mg total) into the skin once daily. 30 mL 3    furosemide (LASIX) 20 MG tablet Take 1 tablet (20 mg total) by mouth once daily. 30 tablet 11    gabapentin (NEURONTIN) 300 MG capsule TAKE 1 CAPSULE TWICE DAILY 180 capsule 1    lancets (ACCU-CHEK SOFTCLIX LANCETS) Misc TrueTest lancets for meter covered by  insurance - pt checks twice daily for uncontrolled glucoses E11.65, 200 each 3    levothyroxine (SYNTHROID) 50 MCG tablet Take 1 tablet (50 mcg total) by mouth every evening. (Patient taking differently: Take 50 mcg by mouth before breakfast. ) 90 tablet 3    lidocaine-prilocaine (EMLA) cream Apply topically as needed. Apply topically as needed 45 minutes prior to port access. 5 g 3    metFORMIN (GLUCOPHAGE) 500 MG tablet TAKE 1 TABLET TWICE DAILY WITH MEALS (Patient taking differently: TAKE 1 TABLET TWICE DAILY WITH MEALS  (takes 2 in AM only)) 180 tablet 3    miconazole (MICOTIN) 2 % cream Apply topically 2 (two) times daily.  0    oxyCODONE-acetaminophen (PERCOCET) 5-325 mg per tablet Take 1 tablet by mouth every 6 (six) hours as needed for Pain. 20 tablet 0    pravastatin (PRAVACHOL) 20 MG tablet TAKE 1 TABLET EVERY DAY 90 tablet 3       Past Surgical History:   Procedure Laterality Date    BREAST BIOPSY Left 10/1/2012    left breast- invasive ductal carcinoma    BREAST BIOPSY Left 12/2017    BREAST LUMPECTOMY Left 2012    CARDIAC VALVE REPLACEMENT  12/2013    CARDIAC VALVE SURGERY  2013    CARPAL TUNNEL RELEASE      Left    CATHETERIZATION, HEART, LEFT Left 11/7/2013    [facility] at [facility] CATH LAB    COLONOSCOPY N/A 9/29/2017    Performed by Phani Worley MD at [facility] ENDO (4TH FLR)    COLONOSCOPY N/A 8/28/2015    Performed by Phani Worley [facility] at [facility] ENDO (4TH FLR)    DILATION AND CURETTAGE OF UTERUS  1999    Endometrial polyps    ENDOMETRIAL ABLATION  1999    Enodmetrial polyps    NIC-TRANSFORAMINAL Left 10/1/2015    [facility]    EYE SURGERY      [id number]-CATH Right 2/1/2019    Performed by Gaston Flores MD at [facility] OR 2ND FLR    left lumpectomy  2012    MASTECTOMY, MODIFIED RADICAL Right 8/26/2019    Performed by G[facility] Flores [facility] at [facility] OR 2ND FLR    REMOVAL, CATHETER, CENTRAL VENOUS, TUNNELED,  WITH PORT Right 8/26/2019    [facility] 2ND F[facility]    REPLACEMENT, AORTIC VALVE N/A 12/2/2013    [facility] Venkat Webb [facility] OR 2ND FLR    SHOULDER SURGERY      SKIN BIOPSY         Social History     Socioeconomic History    Marital status:      Spouse name: Srinath    Number of children: 2    Years of education: Not on file    Highest education level: Not on file   Occupational History    Occupation: Retired   Social Needs    Financial resource strain: Not on file    Food insecurity:     Worry: Not on file     Inability: Not on file    Transportation needs:     Medical: Not on file     Non-medical: Not on file   Tobacco Use    Smoking status: Never Smoker    Smokeless tobacco: Never Used   Substance and Sexual Activity    Alcohol use: No     Alcohol/week: 0.0 oz    Drug use: No    Sexual activity: Yes   Lifestyle    Physical activity:     Days per week: Not on file     Minutes per session: Not on file    Stress: Not on file   Relationships    Social connections:     Talks on phone: Not on file     Gets together: Not on file     Attends Ca[employer] service: Not on file     Active member of club or organization: Not on file     Attends meetings of clubs or organizations: Not on file     Relationship status: Not on file   Other Topics Concern    Are you pregnant or think you may be? No    Breast-feeding No   Social History Narrative    Not on file       OBJECTIVE:     Vital Signs Range (Last 24H):  Temp:  [36.4 °C (97.6 °F)-37 °C (98.6 °F)]   Pulse:  [86-99]   Resp:  [18]   BP: (104-135)/(53-63)   SpO2:  [95 %-99 %]       Significant Labs:  Lab Results   Component Value Date    WBC 6.06 09/02/2019    HGB 8.8 (L) 09/02/2019    HCT 27.7 (L) 09/02/2019     (L) 09/02/2019    CHOL 146 08/21/2018    TRIG 115 08/21/2018    HDL 51 08/21/2018    ALT 11 08/31/2019    AST 12 08/31/2019     09/02/2019    K 3.5 09/02/2019     (H)  09/02/2019    CREATININE 0.6 09/02/2019    BUN 9 09/02/2019    CO2 21 (L) 09/02/2019    TSH 1.550 03/11/2019    INR 1.0 09/01/2019    HGBA1C 7.0 (H) 08/20/2019       Diagnostic Studies: No relevant studies.    EKG:   Results for orders placed or performed during the hospital encounter of 08/31/19   EKG 12-lead    Collection Time: 08/31/19  8:34 PM    Narrative    Test Reason : R42,    Vent. Rate : 103 BPM     Atrial Rate : 103 BPM     P-R Int : 146 ms          QRS Dur : 076 ms      QT Int : 338 ms       P-R-T Axes : 050 -49 063 degrees     QTc Int : 442 ms    Sinus tachycardia  Left axis deviation  Possible Lateral infarct ,age undetermined  Inferior-posterior infarct (cited on or before 31-AUG-2019)  Abnormal ECG  When compared with ECG of 08-MAY-2019 21:46,  No significant change was found    Referred By: [email or web address]   SELF           Confirmed By:        2D ECHO:  TTE:  Results for orders placed or performed during the hospital encounter of 05/06/19   Transthoracic echo (TTE) 2D with Color Flow   Result Value Ref Range    Ascending aorta 3.27 cm    STJ 3.27 cm    AV mean gradient 7.10 mmHg    Ao peak dung 1.84 m/s    Ao VTI 31.71 cm    IVRT 0.08 msec    IVS 0.95 0.6 - 1.1 cm    LA size 4.20 cm    Left Atrium Major Axis 5.00 cm    Left Atrium Minor Axis 5.00 cm    LVIDD 4.38 3.5 - 6.0 cm    LVIDS 3.14 2.1 - 4.0 cm    LVOT diameter 2.15 cm    LVOT peak VTI 18.35 cm    PW 0.84 0.6 - 1.1 cm    MV Peak A Dung 1.09 m/s    E wave decelartion time 198.97 msec    MV Peak E Dung 0.67 m/s    PV Peak D Dung 0.33 m/s    PV Peak S Dung 0.57 m/s    RA Major Axis 4.66 cm    RA Width 3.69 cm    RVDD 2.52 cm    Sinus 2.56 cm    TAPSE 1.66 cm    TR Max Dung 2.60 m/s    TDI LATERAL 0.08     TDI SEPTAL 0.08     LA WIDTH 4.10 cm    LV Diastolic Volume 86.77 mL    LV Systolic Volume 39.08 mL    RV S' 10.05 m/s    LVOT peak dung 0.9 m/s    LV LATERAL E/E' RATIO 8.38     LV SEPTAL E/E' RATIO 8.38     FS 28 %    LA volume 73.19 cm3    LV mass  126.11 g    Left Ventricle Relative Wall Thickness 0.38 cm    AV valve area 2.10 cm2    AV Velocity Ratio 0.49     AV index (prosthetic) 0.58     E/A ratio 0.61     Mean e' 0.08     Pulm vein S/D ratio 1.73     LVOT area 3.63 cm2    LVOT stroke volume 66.59 cm3    AV peak gradient 13.54 mmHg    E/E' ratio 8.38     LV Systolic Volume Index 18.9 mL/m2    LV Diastolic Volume Index 41.93 mL/m2    LA Volume Index 35.4 mL/m2    LV Mass Index 60.9 g/m2    Triscuspid Valve Regurgitation Peak Gradient 27.04 mmHg    BSA 2.19 m2    Right Atrial Pressure (from IVC) 3 mmHg    TV rest pulmonary artery pressure 30 mmHg    Narrative    · Low normal left ventricular systolic function. The estimated ejection   fraction is 50-55%  · Indeterminate left ventricular diastolic function.  · There is a bioprosthetic aortic valve present but poorly visualized. SAW   2.10cm2, AVAi 0.96cm2/m2, peak velocity 1.84m/s, mean gradient 9mmHg.  · Normal right ventricular systolic function.  · Mild tricuspid regurgitation.  · The estimated PA systolic pressure is 30 mm Hg  · Normal central venous pressure (3 mm Hg).  · Pulmonary hypertension present.  · Mild left atrial enlargement.        ASSESSMENT/PLAN:         Anesthesia Evaluation    I have reviewed the Patient Summary Reports.     I have reviewed the Medications.     Review of Systems  Anesthesia Hx:  History of prior surgery of interest to airway management or planning:  Denies Personal Hx of Anesthesia complications.   Social:  Non-Smoker    Hematology/Oncology:        Oncology Comments: Breast cancer   Cardiovascular:   Hypertension PVD hyperlipidemia Pulmonary HTN,  S/P AVR (aortic valve replacement),  ejection fraction is 63%   Pulmonary:   Sleep Apnea Bilateral pulmonary embolism   Renal/:   Chronic Renal Disease, CRI    Hepatic/GI:   Liver Disease, (Fatty)    Musculoskeletal:   Arthritis     Endocrine:   Diabetes        Physical Exam  General:  Morbid Obesity    Airway/Jaw/Neck:  Airway  Findings: Mouth Opening: Normal Tongue: Normal  General Airway Assessment: Adult  Mallampati: III  Improves to II with phonation.  TM Distance: Normal, at least 6 cm  Jaw/Neck Findings:  Neck ROM: Normal ROM     Eyes/Ears/Nose:  Eyes/Ears/Nose Findings:    Dental:  Dental Findings: In tact   Chest/Lungs:  Chest/Lungs Findings: Normal Respiratory Rate     Heart/Vascular:  Heart Findings: Rate: Normal  Rhythm: Regular Rhythm        Mental Status:  Mental Status Findings:  Alert and Oriented, Cooperative         Anesthesia Plan  Type of Anesthesia, risks & benefits discussed:  Anesthesia Type:  general  Patient's Preference:   Intra-op Monitoring Plan: standard ASA monitors  Intra-op Monitoring Plan Comments:   Post Op Pain Control Plan: multimodal analgesia, IV/PO Opioids PRN and per primary service following discharge from PACU  Post Op Pain Control Plan Comments:   Induction:   IV  Beta Blocker:  Patient is on a Beta-Blocker and has received one dose within the past 24 hours (No further documentation required).       Informed Consent: Patient understands risks and agrees with Anesthesia plan.  Questions answered. Anesthesia consent signed with patient.  ASA Score: 3     Day of Surgery Review of History & Physical:    H&P update referred to the provider.         Ready For Surgery From Anesthesia Perspective.

## 2019-09-02 NOTE — NURSING TRANSFER
Nursing Transfer Note      9/2/2019     Transfer from PACU to room 526A    Transfer via bed    Transfer with  to O2    Transported by Patient Escort    Medicines sent:     Chart send with patient: Yes    Notified:

## 2019-09-02 NOTE — H&P
History of Present Illness:  No notes on file     Post-Op Info:  Procedure(s) (LRB):  WASHOUT - Right mastectomy site (Right)         Interval History: NAEON, patient feeling ok this morning, pain well controlled, afebrile, mildly tachycardic, normotensive     Medications:  Continuous Infusions:   sodium chloride 0.9%        Scheduled Meds:   carvedilol  12.5 mg Oral BID WM    ferrous sulfate  325 mg Oral Daily    gabapentin  300 mg Oral BID    levothyroxine  50 mcg Oral Before breakfast    lidocaine (PF) 10 mg/ml (1%)  1 mL Other Once    miconazole   Topical (Top) BID      PRN Meds:sodium chloride, acetaminophen, Dextrose 10% Bolus, Dextrose 10% Bolus, glucagon (human recombinant), glucose, glucose, insulin aspart U-100, ondansetron, oxyCODONE-acetaminophen, oxyCODONE-acetaminophen, promethazine (PHENERGAN) IVPB, sodium chloride 0.9%      Review of patient's allergies indicates:   Allergen Reactions    Dilaudid [hydromorphone (bulk)] Other (See Comments)       Other reaction(s): sedation    Hydromorphone Other (See Comments)       Other reaction(s): sedation    Exenatide Rash       Other reaction(s): Rash      Objective:      Vital Signs (Most Recent):  Temp: 96.3 °F (35.7 °C) (09/01/19 0135)  Pulse: 103 (09/01/19 0135)  Resp: 16 (09/01/19 0135)  BP: (!) 121/57 (09/01/19 0135)  SpO2: 97 % (09/01/19 0135) Vital Signs (24h Range):  Temp:  [96.3 °F (35.7 °C)-98.4 °F (36.9 °C)] 96.3 °F (35.7 °C)  Pulse:  [] 103  Resp:  [15-20] 16  SpO2:  [97 %-100 %] 97 %  BP: (103-181)/(51-94) 121/57      Weight: 105.8 kg (233 lb 2.5 oz)  Body mass index is 42.65 kg/m².            Intake/Output - Last 3 Shifts        08/30 0700 - 08/31 0659 08/31 0700 - 09/01 0659 09/01 0700 - 09/02 0659     IV Piggyback   1000       Total Intake(mL/kg)   1000 (9.5)       Urine (mL/kg/hr)   0       Drains   45       Total Output   45       Net   +955                   Urine Occurrence   2 x               Physical Exam   Constitutional:  She is oriented to person, place, and time. She appears well-developed and well-nourished.   Cardiovascular: Normal rate and regular rhythm.   Pulmonary/Chest: Effort normal. No respiratory distress.       Abdominal: Soft. She exhibits no distension.   Musculoskeletal: Normal range of motion.   Neurological: She is alert and oriented to person, place, and time.   Skin: Skin is warm and dry.   Psychiatric: She has a normal mood and affect. Her behavior is normal. Judgment and thought content normal.   Nursing note and vitals reviewed.        Significant Labs:  CBC:       Recent Labs   Lab 09/01/19  0409   WBC 7.97   RBC 2.05*   HGB 6.3*   HCT 20.7*   *   *   MCH 30.7   MCHC 30.4*      CMP:       Recent Labs   Lab 08/31/19  2055   *   CALCIUM 9.2   ALBUMIN 3.3*   PROT 6.5      K 4.2   CO2 27      BUN 19   CREATININE 1.0   ALKPHOS 112   ALT 11   AST 12   BILITOT 0.5         Significant Diagnostics:  I have reviewed all pertinent imaging results/findings within the past 24 hours.     Assessment/Plan:      Bleeding  Patient is a 73 yo F who underwent R modified radical mastectomy 8/26/19 for an occult right breast cancer, who was also diagnosed with pulm emboli in May 2019 and has been on therapeutic lovenox perioperatively     Will transfuse 2 x pRBC today, and recheck CBC and coags after  Hold anticoagulation  Plan for to takeback for washout in the OR tomorrow AM - consent in chart  NPO  mIVF     Bilateral pulmonary embolism  Diagnosed on May 2019 CTA during neoadjuvant chemo - Being managed as outpatient by Dr. Dwyer     Will hold anticoagulation today and plan for OR tomorrow    I have personally taken the history and examined this patient and agree with the resident's note as stated above.  To OR on Monday 9-3-19 after holding Lovenox for  Full 24 hopurs and undergoing transfusion for Hgb of less than 7.

## 2019-09-02 NOTE — TRANSFER OF CARE
"Anesthesia Transfer of Care Note    Patient: Alina Narayan    Procedure(s) Performed: Procedure(s) (LRB):  WASHOUT - Right mastectomy site (Right)    Patient location: PACU    Anesthesia Type: general    Transport from OR: Transported from OR on 6-10 L/min O2 by face mask with adequate spontaneous ventilation    Post pain: adequate analgesia    Post assessment: no apparent anesthetic complications and tolerated procedure well    Post vital signs: stable    Level of consciousness: awake, alert and oriented    Nausea/Vomiting: no nausea/vomiting    Complications: none    Transfer of care protocol was followed      Last vitals:   Visit Vitals  BP (!) 143/70   Pulse 90   Temp 36.1 °C (97 °F)   Resp 20   Ht 5' 2" (1.575 m)   Wt 105.8 kg (233 lb 2.5 oz)   SpO2 99%   Breastfeeding? No   BMI 42.65 kg/m²     "

## 2019-09-03 ENCOUNTER — TELEPHONE (OUTPATIENT)
Dept: SURGERY | Facility: CLINIC | Age: 74
End: 2019-09-03

## 2019-09-03 LAB
ANION GAP SERPL CALC-SCNC: 8 MMOL/L (ref 8–16)
ANISOCYTOSIS BLD QL SMEAR: ABNORMAL
BASOPHILS # BLD AUTO: 0.04 K/UL (ref 0–0.2)
BASOPHILS NFR BLD: 0.3 % (ref 0–1.9)
BUN SERPL-MCNC: 12 MG/DL (ref 8–23)
CALCIUM SERPL-MCNC: 8.1 MG/DL (ref 8.7–10.5)
CHLORIDE SERPL-SCNC: 109 MMOL/L (ref 95–110)
CO2 SERPL-SCNC: 21 MMOL/L (ref 23–29)
CREAT SERPL-MCNC: 0.8 MG/DL (ref 0.5–1.4)
DIFFERENTIAL METHOD: ABNORMAL
EOSINOPHIL # BLD AUTO: 0.1 K/UL (ref 0–0.5)
EOSINOPHIL NFR BLD: 0.5 % (ref 0–8)
ERYTHROCYTE [DISTWIDTH] IN BLOOD BY AUTOMATED COUNT: 16.1 % (ref 11.5–14.5)
EST. GFR  (AFRICAN AMERICAN): >60 ML/MIN/1.73 M^2
EST. GFR  (NON AFRICAN AMERICAN): >60 ML/MIN/1.73 M^2
GLUCOSE SERPL-MCNC: 148 MG/DL (ref 70–110)
HCT VFR BLD AUTO: 27.8 % (ref 37–48.5)
HGB BLD-MCNC: 9 G/DL (ref 12–16)
IMM GRANULOCYTES # BLD AUTO: 0.14 K/UL (ref 0–0.04)
IMM GRANULOCYTES NFR BLD AUTO: 1.1 % (ref 0–0.5)
LYMPHOCYTES # BLD AUTO: 1.1 K/UL (ref 1–4.8)
LYMPHOCYTES NFR BLD: 8.9 % (ref 18–48)
MAGNESIUM SERPL-MCNC: 2.1 MG/DL (ref 1.6–2.6)
MCH RBC QN AUTO: 30.3 PG (ref 27–31)
MCHC RBC AUTO-ENTMCNC: 32.4 G/DL (ref 32–36)
MCV RBC AUTO: 94 FL (ref 82–98)
MONOCYTES # BLD AUTO: 1.1 K/UL (ref 0.3–1)
MONOCYTES NFR BLD: 8.9 % (ref 4–15)
NEUTROPHILS # BLD AUTO: 10.2 K/UL (ref 1.8–7.7)
NEUTROPHILS NFR BLD: 80.3 % (ref 38–73)
NRBC BLD-RTO: 0 /100 WBC
PHOSPHATE SERPL-MCNC: 3.3 MG/DL (ref 2.7–4.5)
PLATELET # BLD AUTO: 161 K/UL (ref 150–350)
PLATELET BLD QL SMEAR: ABNORMAL
PMV BLD AUTO: 10.3 FL (ref 9.2–12.9)
POCT GLUCOSE: 144 MG/DL (ref 70–110)
POCT GLUCOSE: 153 MG/DL (ref 70–110)
POCT GLUCOSE: 186 MG/DL (ref 70–110)
POCT GLUCOSE: 214 MG/DL (ref 70–110)
POLYCHROMASIA BLD QL SMEAR: ABNORMAL
POTASSIUM SERPL-SCNC: 4.8 MMOL/L (ref 3.5–5.1)
RBC # BLD AUTO: 2.97 M/UL (ref 4–5.4)
SODIUM SERPL-SCNC: 138 MMOL/L (ref 136–145)
WBC # BLD AUTO: 12.64 K/UL (ref 3.9–12.7)

## 2019-09-03 PROCEDURE — 63600175 PHARM REV CODE 636 W HCPCS: Mod: HCNC | Performed by: STUDENT IN AN ORGANIZED HEALTH CARE EDUCATION/TRAINING PROGRAM

## 2019-09-03 PROCEDURE — 25000003 PHARM REV CODE 250: Mod: HCNC | Performed by: STUDENT IN AN ORGANIZED HEALTH CARE EDUCATION/TRAINING PROGRAM

## 2019-09-03 PROCEDURE — 11000001 HC ACUTE MED/SURG PRIVATE ROOM: Mod: HCNC

## 2019-09-03 PROCEDURE — 80048 BASIC METABOLIC PNL TOTAL CA: CPT | Mod: HCNC

## 2019-09-03 PROCEDURE — 36415 COLL VENOUS BLD VENIPUNCTURE: CPT | Mod: HCNC

## 2019-09-03 PROCEDURE — 83735 ASSAY OF MAGNESIUM: CPT | Mod: HCNC

## 2019-09-03 PROCEDURE — 84100 ASSAY OF PHOSPHORUS: CPT | Mod: HCNC

## 2019-09-03 PROCEDURE — 85025 COMPLETE CBC W/AUTO DIFF WBC: CPT | Mod: HCNC

## 2019-09-03 RX ADMIN — GABAPENTIN 300 MG: 300 CAPSULE ORAL at 08:09

## 2019-09-03 RX ADMIN — MICONAZOLE NITRATE: 20 CREAM TOPICAL at 08:09

## 2019-09-03 RX ADMIN — OXYCODONE HYDROCHLORIDE AND ACETAMINOPHEN 1 TABLET: 10; 325 TABLET ORAL at 06:09

## 2019-09-03 RX ADMIN — LEVOTHYROXINE SODIUM 50 MCG: 50 TABLET ORAL at 06:09

## 2019-09-03 RX ADMIN — FERROUS SULFATE TAB EC 325 MG (65 MG FE EQUIVALENT) 325 MG: 325 (65 FE) TABLET DELAYED RESPONSE at 08:09

## 2019-09-03 RX ADMIN — INSULIN ASPART 1 UNITS: 100 INJECTION, SOLUTION INTRAVENOUS; SUBCUTANEOUS at 08:09

## 2019-09-03 NOTE — PROGRESS NOTES
[facility]  General Surgery  Progress Note    Subjective:       Post-Op Info:  Procedure(s) (LRB):  WASHOUT - Right mastectomy site (Right)   1 Day Post-Op     Interval History: s/p mastectomy site hematoma washout 9/2  No acute events overnight. Afebrile. Vital stable. H/h stable at 9.0/27.8. Drain output 270cc day shift and 190cc night shift for a total of 460cc total. Output reported much thinner and lighter pink. Lovenox held. Pain well controlled on PO medications. Tolerating diet.    Medications:  Continuous Infusions:    Scheduled Meds:   carvedilol  12.5 mg Oral BID WM    ferrous sulfate  325 mg Oral Daily    gabapentin  300 mg Oral BID    levothyroxine  50 mcg Oral Before breakfast    lidocaine (PF) 10 mg/ml (1%)  1 mL Other Once    miconazole   Topical (Top) BID     PRN Meds:sodium chloride, acetaminophen, Dextrose 10% Bolus, Dextrose 10% Bolus, glucagon (human recombinant), glucose, glucose, HYDROmorphone, insulin aspart U-100, ondansetron, oxyCODONE-acetaminophen, oxyCODONE-acetaminophen, promethazine (PHENERGAN) IVPB, sodium chloride 0.9%     Review of patient's allergies indicates:   Allergen Reactions    Dilaudid [hydromorphone (bulk)] Other (See Comments)     Other reaction(s): sedation    Hydromorphone Other (See Comments)     Other reaction(s): sedation    Exenatide Rash     Other reaction(s): Rash     Objective:     Vital Signs (Most Recent):  Temp: 99.1 °F (37.3 °C) (09/03/19 0722)  Pulse: 84 (09/03/19 0722)  Resp: 16 (09/03/19 0722)  BP: (!) 107/55 (09/03/19 0722)  SpO2: 95 % (09/03/19 0722) Vital Signs (24h Range):  Temp:  [97 °F (36.1 °C)-99.8 °F (37.7 °C)] 99.1 °F (37.3 °C)  Pulse:  [] 84  Resp:  [10-21] 16  SpO2:  [94 %-100 %] 95 %  BP: (106-144)/(55-78) 107/55     Weight: 105.8 kg (233 lb 2.5 oz)  Body mass index is 42.65 kg/m².    Intake/Output - Last 3 Shifts       09/01 0700 - 09/02 0659 09/02 0700 - 09/03 0659 09/03 0700 - 09/04 0659    P.O. 120 100      I.V. (mL/kg)  1100 (10.4)     Blood 560      IV Piggyback       Total Intake(mL/kg) 680 (6.4) 1200 (11.3)     Urine (mL/kg/hr) 0 (0) 0 (0)     Emesis/NG output 0 0     Drains 70 460     Other  0     Stool 0 0     Blood  100     Total Output 70 560     Net +610 +640            Urine Occurrence 6 x 3 x     Stool Occurrence 0 x 0 x     Emesis Occurrence 0 x 0 x           Physical Exam   Constitutional: She is oriented to person, place, and time. She appears well-developed and well-nourished.   Cardiovascular: Normal rate and regular rhythm.   Pulmonary/Chest: Effort normal. No respiratory distress.       Abdominal: Soft. She exhibits no distension.   Musculoskeletal: Normal range of motion.   Neurological: She is alert and oriented to person, place, and time.   Skin: Skin is warm and dry.   Psychiatric: She has a normal mood and affect. Her behavior is normal. Judgment and thought content normal.   Nursing note and vitals reviewed.      Significant Labs:  CBC:   Recent Labs   Lab 09/03/19  0614   WBC 12.64   RBC 2.97*   HGB 9.0*   HCT 27.8*      MCV 94   MCH 30.3   MCHC 32.4     CMP:   Recent Labs   Lab 08/31/19 2055  09/03/19  0614   *   < > 148*   CALCIUM 9.2   < > 8.1*   ALBUMIN 3.3*  --   --    PROT 6.5  --   --       < > 138   K 4.2   < > 4.8   CO2 27   < > 21*      < > 109   BUN 19   < > 12   CREATININE 1.0   < > 0.8   ALKPHOS 112  --   --    ALT 11  --   --    AST 12  --   --    BILITOT 0.5  --   --     < > = values in this interval not displayed.       Significant Diagnostics:  I have reviewed all pertinent imaging results/findings within the past 24 hours.    Assessment/Plan:     Hematoma (nontraumatic) of breast  Patient is a 73 yo F who underwent R modified radical mastectomy 8/26/19 for an occult right breast cancer, who was also diagnosed with pulm emboli in May 2019 and has been on therapeutic lovenox perioperatively. Represented on 9/1 with R breast hematoma s/p hematoma  washout on 9/2. Required 2 uPRBC preoperatively, but has been hemodynamically stable post op.    Regular diabetic diet   Home medications restarted, however holding Lovenox for another day.   SCDs for dvt ppx   Continue to monitor drain output closely- output currently serosanguinous. If change in character will repeat CBC  CBC, CMP, Mag and Phos daily   PRN pain medications  Replete lytes PRN   Encourage ambulation   Plan for possible discharge home tomorrow      Bleeding  Resolved s/p hematoma washout    Bilateral pulmonary embolism  Diagnosed on May 2019 CTA during neoadjuvant chemo - Being managed as outpatient by Dr. Reymundo Gooden MD  General Surgery  [facility]

## 2019-09-03 NOTE — TELEPHONE ENCOUNTER
Called the patient to see about her coming in on 9/5/19 to have the drains assessed with Dr. Flores. She is currently in the hospital under Dr. Flores's care.

## 2019-09-03 NOTE — PLAN OF CARE
Patient is a 74 year old female admitted from home through the ER with Bloody Drain Output POD# 5 from Right Mastectomy, noted low H/H 8/31/2019.  Patient transfused two units PRBC 9/1/2019 and went to OR 9/2/2019 for Right Chest Wall Wound Exploration and Evacuation of Hematoma.  Patient is expected to discharge home with no needs +/- 9/4/2019.    Patient in room with  during visit.  Patient lives in a one story home with her , Srinath, who will drive her home and obtain anything she needs after discharge.  [facility] given to patient and/or family with understanding verbalized.  CM name and number written on white board in patient's room with request to call for any questions and concerns.  Will continue to follow for needs.    PCP  Aarti Dunn MD  [address] [facility] [id number] [phone]      [facility] - [place], [place] - [address]  [address] [facility] [address]  Phone: [phone] Fax: [phone]    [facility] - [place], [place] - [address]  [address]  [facility] [address]  Phone: [phone] Fax: [phone]    [facility] - [place], [place] - [address]  [address]  [place] [address]  Phone: [phone] Fax: [phone]    [id number] - [place] - [address]  [address]  [place] [address]  Phone: [phone] Fax: [phone]      Extended Emergency Contact Information  Primary Emergency Contact: Srinath Narayan  Address: [address] [place] [address] [place] [address] [place]  Mobile Phone: [phone]  Relation: Spouse       09/03/19 1318   Discharge Assessment   Assessment Type Discharge Planning Assessment   Confirmed/corrected address and phone number on facesheet? Yes   Assessment information obtained from? Patient   Expected Length of Stay (days) 4   Communicated expected length of stay with patient/caregiver yes   Prior to  hospitilization cognitive status: Alert/Oriented   Prior to hospitalization functional status: Independent;Assistive Equipment   Current cognitive status: Alert/Oriented   Current Functional Status: Independent;Assistive Equipment   Lives With spouse   Able to Return to Prior Arrangements yes   Is patient able to care for self after discharge? Yes   Who are your caregiver(s) and their phone number(s)?    Patient's perception of discharge disposition home or selfcare   Equipment Currently Used at Home cane, straight;walker, rolling;rollator;bedside commode;shower chair   Do you have any problems affording any of your prescribed medications? No   Is the patient taking medications as prescribed? yes   Does the patient have transportation home? Yes   Transportation Anticipated family or friend will provide   Discharge Plan A Home with family   Discharge Plan B Home with family;Home Health   DME Needed Upon Discharge  none   Patient/Family in Agreement with Plan yes

## 2019-09-03 NOTE — SUBJECTIVE & OBJECTIVE
Interval History: s/p mastostomy site hematoma washout 9/2  No acute events overnight. Afebrile. Vital stable. H/h stable at 9.0/27.8. Drain output 270cc day shift and 190cc night shift for a total of 460cc total. Output reported much thinner and lighter pink. Lovenox held. Pain well controlled on PO medications. Tolerating diet.    Medications:  Continuous Infusions:    Scheduled Meds:   carvedilol  12.5 mg Oral BID WM    ferrous sulfate  325 mg Oral Daily    gabapentin  300 mg Oral BID    levothyroxine  50 mcg Oral Before breakfast    lidocaine (PF) 10 mg/ml (1%)  1 mL Other Once    miconazole   Topical (Top) BID     PRN Meds:sodium chloride, acetaminophen, Dextrose 10% Bolus, Dextrose 10% Bolus, glucagon (human recombinant), glucose, glucose, HYDROmorphone, insulin aspart U-100, ondansetron, oxyCODONE-acetaminophen, oxyCODONE-acetaminophen, promethazine (PHENERGAN) IVPB, sodium chloride 0.9%     Review of patient's allergies indicates:   Allergen Reactions    Dilaudid [hydromorphone (bulk)] Other (See Comments)     Other reaction(s): sedation    Hydromorphone Other (See Comments)     Other reaction(s): sedation    Exenatide Rash     Other reaction(s): Rash     Objective:     Vital Signs (Most Recent):  Temp: 99.1 °F (37.3 °C) (09/03/19 0722)  Pulse: 84 (09/03/19 0722)  Resp: 16 (09/03/19 0722)  BP: (!) 107/55 (09/03/19 0722)  SpO2: 95 % (09/03/19 0722) Vital Signs (24h Range):  Temp:  [97 °F (36.1 °C)-99.8 °F (37.7 °C)] 99.1 °F (37.3 °C)  Pulse:  [] 84  Resp:  [10-21] 16  SpO2:  [94 %-100 %] 95 %  BP: (106-144)/(55-78) 107/55     Weight: 105.8 kg (233 lb 2.5 oz)  Body mass index is 42.65 kg/m².    Intake/Output - Last 3 Shifts       09/01 0700 - 09/02 0659 09/02 0700 - 09/03 0659 09/03 0700 - 09/04 0659    P.O. 120 100     I.V. (mL/kg)  1100 (10.4)     Blood 560      IV Piggyback       Total Intake(mL/kg) 680 (6.4) 1200 (11.3)     Urine (mL/kg/hr) 0 (0) 0 (0)     Emesis/NG output 0 0     Drains 70  460     Other  0     Stool 0 0     Blood  100     Total Output 70 560     Net +610 +640            Urine Occurrence 6 x 3 x     Stool Occurrence 0 x 0 x     Emesis Occurrence 0 x 0 x           Physical Exam   Constitutional: She is oriented to person, place, and time. She appears well-developed and well-nourished.   Cardiovascular: Normal rate and regular rhythm.   Pulmonary/Chest: Effort normal. No respiratory distress.       Abdominal: Soft. She exhibits no distension.   Musculoskeletal: Normal range of motion.   Neurological: She is alert and oriented to person, place, and time.   Skin: Skin is warm and dry.   Psychiatric: She has a normal mood and affect. Her behavior is normal. Judgment and thought content normal.   Nursing note and vitals reviewed.      Significant Labs:  CBC:   Recent Labs   Lab 09/03/19  0614   WBC 12.64   RBC 2.97*   HGB 9.0*   HCT 27.8*      MCV 94   MCH 30.3   MCHC 32.4     CMP:   Recent Labs   Lab 08/31/19 2055 09/03/19  0614   *   < > 148*   CALCIUM 9.2   < > 8.1*   ALBUMIN 3.3*  --   --    PROT 6.5  --   --       < > 138   K 4.2   < > 4.8   CO2 27   < > 21*      < > 109   BUN 19   < > 12   CREATININE 1.0   < > 0.8   ALKPHOS 112  --   --    ALT 11  --   --    AST 12  --   --    BILITOT 0.5  --   --     < > = values in this interval not displayed.       Significant Diagnostics:  I have reviewed all pertinent imaging results/findings within the past 24 hours.

## 2019-09-04 VITALS
BODY MASS INDEX: 42.91 KG/M2 | RESPIRATION RATE: 18 BRPM | HEART RATE: 107 BPM | DIASTOLIC BLOOD PRESSURE: 66 MMHG | WEIGHT: 233.19 LBS | TEMPERATURE: 97 F | OXYGEN SATURATION: 97 % | SYSTOLIC BLOOD PRESSURE: 128 MMHG | HEIGHT: 62 IN

## 2019-09-04 LAB
ANION GAP SERPL CALC-SCNC: 7 MMOL/L (ref 8–16)
ANISOCYTOSIS BLD QL SMEAR: SLIGHT
BASOPHILS # BLD AUTO: 0.02 K/UL (ref 0–0.2)
BASOPHILS NFR BLD: 0.3 % (ref 0–1.9)
BLD PROD TYP BPU: NORMAL
BLD PROD TYP BPU: NORMAL
BLOOD UNIT EXPIRATION DATE: NORMAL
BLOOD UNIT EXPIRATION DATE: NORMAL
BLOOD UNIT TYPE CODE: 5100
BLOOD UNIT TYPE CODE: 5100
BLOOD UNIT TYPE: NORMAL
BLOOD UNIT TYPE: NORMAL
BUN SERPL-MCNC: 11 MG/DL (ref 8–23)
CALCIUM SERPL-MCNC: 8.1 MG/DL (ref 8.7–10.5)
CHLORIDE SERPL-SCNC: 108 MMOL/L (ref 95–110)
CO2 SERPL-SCNC: 22 MMOL/L (ref 23–29)
CODING SYSTEM: NORMAL
CODING SYSTEM: NORMAL
CREAT SERPL-MCNC: 0.7 MG/DL (ref 0.5–1.4)
DIFFERENTIAL METHOD: ABNORMAL
DISPENSE STATUS: NORMAL
DISPENSE STATUS: NORMAL
EOSINOPHIL # BLD AUTO: 0.1 K/UL (ref 0–0.5)
EOSINOPHIL NFR BLD: 1.5 % (ref 0–8)
ERYTHROCYTE [DISTWIDTH] IN BLOOD BY AUTOMATED COUNT: 16.1 % (ref 11.5–14.5)
EST. GFR  (AFRICAN AMERICAN): >60 ML/MIN/1.73 M^2
EST. GFR  (NON AFRICAN AMERICAN): >60 ML/MIN/1.73 M^2
GLUCOSE SERPL-MCNC: 160 MG/DL (ref 70–110)
HCT VFR BLD AUTO: 24.8 % (ref 37–48.5)
HGB BLD-MCNC: 8.1 G/DL (ref 12–16)
IMM GRANULOCYTES # BLD AUTO: 0.12 K/UL (ref 0–0.04)
IMM GRANULOCYTES NFR BLD AUTO: 1.9 % (ref 0–0.5)
LYMPHOCYTES # BLD AUTO: 0.9 K/UL (ref 1–4.8)
LYMPHOCYTES NFR BLD: 14.4 % (ref 18–48)
MAGNESIUM SERPL-MCNC: 2.2 MG/DL (ref 1.6–2.6)
MCH RBC QN AUTO: 30.1 PG (ref 27–31)
MCHC RBC AUTO-ENTMCNC: 32.7 G/DL (ref 32–36)
MCV RBC AUTO: 92 FL (ref 82–98)
MONOCYTES # BLD AUTO: 0.6 K/UL (ref 0.3–1)
MONOCYTES NFR BLD: 9.6 % (ref 4–15)
NEUTROPHILS # BLD AUTO: 4.5 K/UL (ref 1.8–7.7)
NEUTROPHILS NFR BLD: 72.3 % (ref 38–73)
NRBC BLD-RTO: 0 /100 WBC
NUM UNITS TRANS PACKED RBC: NORMAL
NUM UNITS TRANS PACKED RBC: NORMAL
PHOSPHATE SERPL-MCNC: 3.4 MG/DL (ref 2.7–4.5)
PLATELET # BLD AUTO: 156 K/UL (ref 150–350)
PLATELET BLD QL SMEAR: ABNORMAL
PMV BLD AUTO: 10.3 FL (ref 9.2–12.9)
POCT GLUCOSE: 139 MG/DL (ref 70–110)
POCT GLUCOSE: 211 MG/DL (ref 70–110)
POLYCHROMASIA BLD QL SMEAR: ABNORMAL
POTASSIUM SERPL-SCNC: 4.4 MMOL/L (ref 3.5–5.1)
RBC # BLD AUTO: 2.69 M/UL (ref 4–5.4)
SODIUM SERPL-SCNC: 137 MMOL/L (ref 136–145)
WBC # BLD AUTO: 6.16 K/UL (ref 3.9–12.7)

## 2019-09-04 PROCEDURE — 63600175 PHARM REV CODE 636 W HCPCS: Mod: HCNC | Performed by: STUDENT IN AN ORGANIZED HEALTH CARE EDUCATION/TRAINING PROGRAM

## 2019-09-04 PROCEDURE — 25000003 PHARM REV CODE 250: Mod: HCNC | Performed by: STUDENT IN AN ORGANIZED HEALTH CARE EDUCATION/TRAINING PROGRAM

## 2019-09-04 PROCEDURE — 85025 COMPLETE CBC W/AUTO DIFF WBC: CPT | Mod: HCNC

## 2019-09-04 PROCEDURE — 84100 ASSAY OF PHOSPHORUS: CPT | Mod: HCNC

## 2019-09-04 PROCEDURE — 80048 BASIC METABOLIC PNL TOTAL CA: CPT | Mod: HCNC

## 2019-09-04 PROCEDURE — 36415 COLL VENOUS BLD VENIPUNCTURE: CPT | Mod: HCNC

## 2019-09-04 PROCEDURE — 83735 ASSAY OF MAGNESIUM: CPT | Mod: HCNC

## 2019-09-04 RX ORDER — ENOXAPARIN SODIUM 150 MG/ML
150 INJECTION SUBCUTANEOUS DAILY
Status: DISCONTINUED | OUTPATIENT
Start: 2019-09-04 | End: 2019-09-04 | Stop reason: HOSPADM

## 2019-09-04 RX ORDER — SULFAMETHOXAZOLE AND TRIMETHOPRIM 400; 80 MG/1; MG/1
1 TABLET ORAL 2 TIMES DAILY
Qty: 14 TABLET | Refills: 0 | Status: SHIPPED | OUTPATIENT
Start: 2019-09-04 | End: 2019-09-11

## 2019-09-04 RX ADMIN — CARVEDILOL 12.5 MG: 12.5 TABLET, FILM COATED ORAL at 08:09

## 2019-09-04 RX ADMIN — GABAPENTIN 300 MG: 300 CAPSULE ORAL at 08:09

## 2019-09-04 RX ADMIN — FERROUS SULFATE TAB EC 325 MG (65 MG FE EQUIVALENT) 325 MG: 325 (65 FE) TABLET DELAYED RESPONSE at 08:09

## 2019-09-04 RX ADMIN — LEVOTHYROXINE SODIUM 50 MCG: 50 TABLET ORAL at 05:09

## 2019-09-04 RX ADMIN — MICONAZOLE NITRATE: 20 CREAM TOPICAL at 09:09

## 2019-09-04 RX ADMIN — ENOXAPARIN SODIUM 150 MG: 150 INJECTION SUBCUTANEOUS at 12:09

## 2019-09-04 RX ADMIN — INSULIN ASPART 2 UNITS: 100 INJECTION, SOLUTION INTRAVENOUS; SUBCUTANEOUS at 12:09

## 2019-09-04 NOTE — HPI
"Mrs. Narayan is a pleasant 73 yo woman w h/x left breast cancer s/p lumpectomy+XRT, DM2, CKD3, hypothyroidism, HLD, HTN, MITCHELL on CPAP, PE on therapeutic Lovenox, recently diagnosed with right breast cancer s/p right modified radical mastectomy with Dr. Flores on 8/26/19 who presents to the ED for evaluation of hypotension, lightheadedness and bloody drainage on 8/31.  Patient reports feeling lightheaded, weak and fatigued over the past few days. She states that tonight at home she had a BP of around 80/60. She reports frequent emptying of bloody fluid from her drains which is sometimes difficult due to clotting. Pain is tolerable and is exacerbated with movement of the right arm. She has a good appetite and is having bowel function. She has had over a liter out from drains since surgery and reports that it is mostly bloody with clots, darker than "red koolaid." Denies shortness of breath, chest pain or palpitations.  "

## 2019-09-04 NOTE — PLAN OF CARE
Patient discharging home today with no needs.    Future Appointments   Date Time Provider Department Center   9/10/2019  9:45 AM Gaston Flores MD [facility] JAIMEE Shah [facility]   9/24/2019  9:15 AM Erick Dwyer MD [facility] HEM ONC [facility]   10/11/2019  1:00 PM Peg Bauer DPM [facility] POD [place]   10/11/2019  2:00 PM [facility] CT1 GE VCT64 SLICE CT LIMIT 450 LBS [facility] CT SCAN [facility]   10/14/2019  2:00 PM Aarti Dunn MD [facility] JUSTEN Le          09/04/19 1501   Final Note   Assessment Type Final Discharge Note   Anticipated Discharge Disposition Home   Hospital Follow Up  Appt(s) scheduled? Yes   Right Care Referral Info   Post Acute Recommendation No Care

## 2019-09-04 NOTE — SUBJECTIVE & OBJECTIVE
Interval History: s/p mastostomy site hematoma washout 9/2  No acute events overnight. Vital stables with HR in 90s. Tmax 100. Hb down to 8.1 from 9 yesterday. Drain output 350cc day shift and 110cc night shift for a total of 460cc total. Output serosangious. Lovenox held. Pain well controlled on PO medications. Tolerating diet. Ambulating well    Medications:  Continuous Infusions:    Scheduled Meds:   carvedilol  12.5 mg Oral BID WM    enoxaparin  150 mg Subcutaneous Daily    ferrous sulfate  325 mg Oral Daily    gabapentin  300 mg Oral BID    levothyroxine  50 mcg Oral Before breakfast    lidocaine (PF) 10 mg/ml (1%)  1 mL Other Once    miconazole   Topical (Top) BID     PRN Meds:sodium chloride, acetaminophen, Dextrose 10% Bolus, Dextrose 10% Bolus, glucagon (human recombinant), glucose, glucose, HYDROmorphone, insulin aspart U-100, ondansetron, oxyCODONE-acetaminophen, oxyCODONE-acetaminophen, promethazine (PHENERGAN) IVPB, sodium chloride 0.9%     Review of patient's allergies indicates:   Allergen Reactions    Dilaudid [hydromorphone (bulk)] Other (See Comments)     Other reaction(s): sedation    Hydromorphone Other (See Comments)     Other reaction(s): sedation    Exenatide Rash     Other reaction(s): Rash     Objective:     Vital Signs (Most Recent):  Temp: 96 °F (35.6 °C) (09/04/19 0815)  Pulse: 92 (09/04/19 0815)  Resp: 18 (09/04/19 0815)  BP: (!) 122/56 (09/04/19 0815)  SpO2: 96 % (09/04/19 0815) Vital Signs (24h Range):  Temp:  [96 °F (35.6 °C)-100 °F (37.8 °C)] 96 °F (35.6 °C)  Pulse:  [] 92  Resp:  [17-18] 18  SpO2:  [96 %-99 %] 96 %  BP: (109-125)/(53-59) 122/56     Weight: 105.8 kg (233 lb 2.5 oz)  Body mass index is 42.65 kg/m².    Intake/Output - Last 3 Shifts       09/02 0700 - 09/03 0659 09/03 0700 - 09/04 0659 09/04 0700 - 09/05 0659    P.O. 100 950     I.V. (mL/kg) 1100 (10.4)      Blood       Total Intake(mL/kg) 1200 (11.3) 950 (9)     Urine (mL/kg/hr) 0 (0) 0 (0)      Emesis/NG output 0      Drains 460 460     Other 0      Stool 0 0     Blood 100      Total Output 560 460     Net +640 +490            Urine Occurrence 3 x 4 x     Stool Occurrence 0 x 0 x     Emesis Occurrence 0 x            Physical Exam   Constitutional: She is oriented to person, place, and time. She appears well-developed and well-nourished.   Cardiovascular: Normal rate and regular rhythm.   Pulmonary/Chest: Effort normal. No respiratory distress.       Abdominal: Soft. She exhibits no distension.   Musculoskeletal: Normal range of motion.   Neurological: She is alert and oriented to person, place, and time.   Skin: Skin is warm and dry.   Psychiatric: She has a normal mood and affect. Her behavior is normal. Judgment and thought content normal.   Nursing note and vitals reviewed.      Significant Labs:  CBC:   Recent Labs   Lab 09/04/19  0458   WBC 6.16   RBC 2.69*   HGB 8.1*   HCT 24.8*      MCV 92   MCH 30.1   MCHC 32.7     CMP:   Recent Labs   Lab 08/31/19 2055 09/04/19  0458   *   < > 160*   CALCIUM 9.2   < > 8.1*   ALBUMIN 3.3*  --   --    PROT 6.5  --   --       < > 137   K 4.2   < > 4.4   CO2 27   < > 22*      < > 108   BUN 19   < > 11   CREATININE 1.0   < > 0.7   ALKPHOS 112  --   --    ALT 11  --   --    AST 12  --   --    BILITOT 0.5  --   --     < > = values in this interval not displayed.       Significant Diagnostics:  I have reviewed all pertinent imaging results/findings within the past 24 hours.

## 2019-09-04 NOTE — PLAN OF CARE
Problem: Adult Inpatient Plan of Care  Goal: Plan of Care Review  Outcome: Ongoing (interventions implemented as appropriate)  PT resting in bed. IV site clean, dry, intact. fall precautions maintained no falls noted, call light in reach, bed locked, non skid socks on while out of bed pt instructed to call for assistance, skin integrity maintained, pt independent with positioning, c/o pain managed with prn meds. Pt ambulates well in [place] and is looking forward to MT.no other complaints or concerns, will cont to follow careplan

## 2019-09-04 NOTE — NURSING
Pt and partner verbalized understanding of DC instructions. Printed copy of DC instructions provided. IV site removed. No further concerns at this time.

## 2019-09-04 NOTE — DISCHARGE SUMMARY
"[facility]  General Surgery  Discharge Summary      Patient Name: Alina Narayan  MRN: [id number]  Admission Date: 8/31/2019  Hospital Length of Stay: 2 days  Discharge Date and Time:  09/04/2019 2:44 PM  Attending Physician: Gaston Flores MD   Discharging Provider: Cristina Gooden MD  Primary Care Provider: Aarti Dunn MD    HPI:   Mrs. Narayan is a pleasant 73 yo woman w h/x left breast cancer s/p lumpectomy+XRT, DM2, CKD3, hypothyroidism, HLD, HTN, MITCHELL on CPAP, PE on therapeutic Lovenox, recently diagnosed with right breast cancer s/p right modified radical mastectomy with Dr. Flores on 8/26/19 who presents to the ED for evaluation of hypotension, lightheadedness and bloody drainage on 8/31.  Patient reports feeling lightheaded, weak and fatigued over the past few days. She states that tonight at home she had a BP of around 80/60. She reports frequent emptying of bloody fluid from her drains which is sometimes difficult due to clotting. Pain is tolerable and is exacerbated with movement of the right arm. She has a good appetite and is having bowel function. She has had over a liter out from drains since surgery and reports that it is mostly bloody with clots, darker than "red koolaid." Denies shortness of breath, chest pain or palpitations.    Procedure(s) (LRB):  WASHOUT - Right mastectomy site (Right)      Indwelling Lines/Drains at time of discharge:   Lines/Drains/Airways     Central Venous Catheter Line                 Port A Cath Single Lumen -- days          Drain                 Closed/Suction Drain 09/02/19 0901 Right Chest Bulb 19 Fr. 2 days         Closed/Suction Drain 09/02/19 0904 Right Chest Bulb 19 Fr. 2 days              Hospital Course: Patient presented to the ED on 8/31 (POD5 after right modified radical mastectomy) with a hematoma in the dependent portion of the axilla and Hg down to 7.7 and symptomatic anemia. She received 2 units PRBC. She underwent hematoma washout " on 9/2. During the case she was noted to have moderate size hematoma with no definite source just diffuse oozing from subcutaneous tissue and muscle. She was stable post operatively. H/h stable throughout her hospital course with slight drop on POD#2 with Hb 8.1 from 9. Hemodynamically stable with no concern for bleeding. Drain output remains high but now very serosanguinous. Her lovenox was held until POD#2. She is tolerating a diet. Pain well controlled on PO medications. Ambulating well. All post operative mile stones met at time of discharge on 9/4/2019. She is to follow up in clinic in 1 week to evaluate drains.    Consults:   Consults (From admission, onward)        Status Ordering Provider     Inpatient consult to General surgery  Once     Provider:  (Not yet assigned)    RICKY Staley          Significant Diagnostic Studies: Labs:   BMP:   Recent Labs   Lab 09/03/19 0614 09/04/19 0458   * 160*    137   K 4.8 4.4    108   CO2 21* 22*   BUN 12 11   CREATININE 0.8 0.7   CALCIUM 8.1* 8.1*   MG 2.1 2.2   , CMP   Recent Labs   Lab 09/03/19 0614 09/04/19  0458    137   K 4.8 4.4    108   CO2 21* 22*   * 160*   BUN 12 11   CREATININE 0.8 0.7   CALCIUM 8.1* 8.1*   ANIONGAP 8 7*   ESTGFRAFRICA >60.0 >60.0   EGFRNONAA >60.0 >60.0    and CBC   Recent Labs   Lab 09/02/19 2006 09/03/19 0614 09/04/19 0458   WBC 13.10* 12.64 6.16   HGB 9.4* 9.0* 8.1*   HCT 29.0* 27.8* 24.8*    161 156       Pending Diagnostic Studies:     None        Final Active Diagnoses:    Diagnosis Date Noted POA    PRINCIPAL PROBLEM:  Hematoma (nontraumatic) of breast [N64.89] 09/02/2019 Yes    Bleeding [R58] 09/01/2019 Yes    Lightheadedness [R42] 08/31/2019 Yes    Bilateral pulmonary embolism [I26.99] 05/08/2019 Yes      Problems Resolved During this Admission:      Discharged Condition: good    Disposition: Home or Self Care    Follow Up:  Follow-up Information     Gaston OSEI  MD Mark In 1 week.    Specialty:  General Surgery  Why:  Please keep orginial scheduled appointment   Contact information:  [address]ayna Bhatt  [facility] [address]  [phone]                 Patient Instructions:      Diet diabetic     Other restrictions (specify):   Order Comments: Please continue to wrap right chest and incision with ace bandage or wear a supportive bra. Okay to place gauze over the incision for comfort     Notify your health care provider if you experience any of the following:  increased confusion or weakness     Notify your health care provider if you experience any of the following:  persistent dizziness, light-headedness, or visual disturbances     Notify your health care provider if you experience any of the following:  worsening rash     Notify your health care provider if you experience any of the following:  severe persistent headache     Notify your health care provider if you experience any of the following:  difficulty breathing or increased cough     Notify your health care provider if you experience any of the following:  redness, tenderness, or signs of infection (pain, swelling, redness, odor or green/yellow discharge around incision site)     Notify your health care provider if you experience any of the following:  severe uncontrolled pain     Notify your health care provider if you experience any of the following:  persistent nausea and vomiting or diarrhea     Notify your health care provider if you experience any of the following:  temperature >100.4     Notify your health care provider if you experience any of the following:   Order Comments: Please continue to monitor and record drain output. If you notice a change in drain quality (redder, thicker like blood clots) or quality please call the clinic or MD on call.     No dressing needed   Order Comments: Incision closed with under the skin stitches and staples. Please keep incision clean and dry. Use your anti-fungal  cream on lower portion of the wound as previously done. Okay to keep a gauze on the incision for comfort.   If you notice any drainage, bleeding or skin changes around the incision please call the clinic or MD on call     Activity as tolerated     Shower on day dressing removed (No bath)   Order Comments: Okay to take a shower in 48 hours after surgery. Do not soak/submerge incision (aka no bathing, swimming)until cleared in clinic.     Medications:  Reconciled Home Medications:      Medication List      START taking these medications    sulfamethoxazole-trimethoprim 400-80mg 400-80 mg per tablet  Commonly known as:  BACTRIM,SEPTRA  Take 1 tablet by mouth 2 (two) times daily. for 7 days        CHANGE how you take these medications    levothyroxine 50 MCG tablet  Commonly known as:  SYNTHROID  Take 1 tablet (50 mcg total) by mouth every evening.  What changed:  when to take this     metFORMIN 500 MG tablet  Commonly known as:  GLUCOPHAGE  TAKE 1 TABLET TWICE DAILY WITH MEALS  What changed:    · how much to take  · how to take this  · when to take this        CONTINUE taking these medications    ASPIR-LOW 81 MG EC tablet  Generic drug:  aspirin  Take 81 mg by mouth once daily.     blood sugar diagnostic Strp  True Metirx strips or strips and lancets  - pt checks twice daily for uncontrolled glucoses E11.65     blood-glucose meter kit  True Test or meter covered by insurance - pt checks glucose twice daily for uncontrolled glucoses E11.65     carvedilol 25 MG tablet  Commonly known as:  COREG  Take 0.5 tablets (12.5 mg total) by mouth 2 (two) times daily with meals.     enoxaparin 150 mg/mL Syrg  Commonly known as:  LOVENOX  Inject 1 mL (150 mg total) into the skin once daily.     furosemide 20 MG tablet  Commonly known as:  LASIX  Take 1 tablet (20 mg total) by mouth once daily.     gabapentin 300 MG capsule  Commonly known as:  NEURONTIN  TAKE 1 CAPSULE TWICE DAILY     lancets Misc  Commonly known as:  ACCU-CHEK  SOFTCLIX LANCETS  TrueTest lancets for meter covered by insurance - pt checks twice daily for uncontrolled glucoses E11.65,     lidocaine-prilocaine cream  Commonly known as:  EMLA  Apply topically as needed. Apply topically as needed 45 minutes prior to port access.     miconazole 2 % cream  Commonly known as:  MICOTIN  Apply topically 2 (two) times daily.     oxyCODONE-acetaminophen 5-325 mg per tablet  Commonly known as:  PERCOCET  Take 1 tablet by mouth every 6 (six) hours as needed for Pain.     pravastatin 20 MG tablet  Commonly known as:  PRAVACHOL  TAKE 1 TABLET EVERY DAY     VITAMIN D3 ORAL  Take by mouth once daily. 1000 IU          Time spent on the discharge of patient: 20 minutes    Cristina Gooden MD  General Surgery  [facility]

## 2019-09-04 NOTE — HOSPITAL COURSE
Patient presented to the ED on 8/31 (POD5 after right modified radical mastectomy) with a hematoma in the dependent portion of the axilla and Hg down to 7.7 and symptomatic anemia. She received 2 units PRBC. She underwent hematoma washout on 9/2. During the case she was noted to have moderate size hematoma with no definite source just diffuse oozing from subcutaneous tissue and muscle. She was stable post operatively. H/h stable throughout her hospital course with slight drop on POD#2 with Hb 8.1 from 9. Hemodynamically stable with no concern for bleeding. Drain output remains high but now very serosanguinous. Her lovenox was held until POD#2. She is tolerating a diet. Pain well controlled on PO medications. Ambulating well. All post operative mile stones met at time of discharge on 9/4/2019. She is to follow up in clinic in 1 week to evaluate drains.

## 2019-09-04 NOTE — PROGRESS NOTES
[facility]  General Surgery  Progress Note    Subjective:     History of Present Illness:  No notes on file    Post-Op Info:  Procedure(s) (LRB):  WASHOUT - Right mastectomy site (Right)   2 Days Post-Op     Interval History: s/p mastostomy site hematoma washout 9/2  No acute events overnight. Vital stables with HR in 90s. Tmax 100. Hb down to 8.1 from 9 yesterday. Drain output 350cc day shift and 110cc night shift for a total of 460cc total. Output serosangious. Lovenox held. Pain well controlled on PO medications. Tolerating diet. Ambulating well    Medications:  Continuous Infusions:    Scheduled Meds:   carvedilol  12.5 mg Oral BID WM    enoxaparin  150 mg Subcutaneous Daily    ferrous sulfate  325 mg Oral Daily    gabapentin  300 mg Oral BID    levothyroxine  50 mcg Oral Before breakfast    lidocaine (PF) 10 mg/ml (1%)  1 mL Other Once    miconazole   Topical (Top) BID     PRN Meds:sodium chloride, acetaminophen, Dextrose 10% Bolus, Dextrose 10% Bolus, glucagon (human recombinant), glucose, glucose, HYDROmorphone, insulin aspart U-100, ondansetron, oxyCODONE-acetaminophen, oxyCODONE-acetaminophen, promethazine (PHENERGAN) IVPB, sodium chloride 0.9%     Review of patient's allergies indicates:   Allergen Reactions    Dilaudid [hydromorphone (bulk)] Other (See Comments)     Other reaction(s): sedation    Hydromorphone Other (See Comments)     Other reaction(s): sedation    Exenatide Rash     Other reaction(s): Rash     Objective:     Vital Signs (Most Recent):  Temp: 96 °F (35.6 °C) (09/04/19 0815)  Pulse: 92 (09/04/19 0815)  Resp: 18 (09/04/19 0815)  BP: (!) 122/56 (09/04/19 0815)  SpO2: 96 % (09/04/19 0815) Vital Signs (24h Range):  Temp:  [96 °F (35.6 °C)-100 °F (37.8 °C)] 96 °F (35.6 °C)  Pulse:  [] 92  Resp:  [17-18] 18  SpO2:  [96 %-99 %] 96 %  BP: (109-125)/(53-59) 122/56     Weight: 105.8 kg (233 lb 2.5 oz)  Body mass index is 42.65 kg/m².    Intake/Output - Last 3  Shifts       09/02 0700 - 09/03 0659 09/03 0700 - 09/04 0659 09/04 0700 - 09/05 0659    P.O. 100 950     I.V. (mL/kg) 1100 (10.4)      Blood       Total Intake(mL/kg) 1200 (11.3) 950 (9)     Urine (mL/kg/hr) 0 (0) 0 (0)     Emesis/NG output 0      Drains 460 460     Other 0      Stool 0 0     Blood 100      Total Output 560 460     Net +640 +490            Urine Occurrence 3 x 4 x     Stool Occurrence 0 x 0 x     Emesis Occurrence 0 x            Physical Exam   Constitutional: She is oriented to person, place, and time. She appears well-developed and well-nourished.   Cardiovascular: Normal rate and regular rhythm.   Pulmonary/Chest: Effort normal. No respiratory distress.       Abdominal: Soft. She exhibits no distension.   Musculoskeletal: Normal range of motion.   Neurological: She is alert and oriented to person, place, and time.   Skin: Skin is warm and dry.   Psychiatric: She has a normal mood and affect. Her behavior is normal. Judgment and thought content normal.   Nursing note and vitals reviewed.      Significant Labs:  CBC:   Recent Labs   Lab 09/04/19  0458   WBC 6.16   RBC 2.69*   HGB 8.1*   HCT 24.8*      MCV 92   MCH 30.1   MCHC 32.7     CMP:   Recent Labs   Lab 08/31/19 2055 09/04/19  0458   *   < > 160*   CALCIUM 9.2   < > 8.1*   ALBUMIN 3.3*  --   --    PROT 6.5  --   --       < > 137   K 4.2   < > 4.4   CO2 27   < > 22*      < > 108   BUN 19   < > 11   CREATININE 1.0   < > 0.7   ALKPHOS 112  --   --    ALT 11  --   --    AST 12  --   --    BILITOT 0.5  --   --     < > = values in this interval not displayed.       Significant Diagnostics:  I have reviewed all pertinent imaging results/findings within the past 24 hours.    Assessment/Plan:     Hematoma (nontraumatic) of breast  Patient is a 75 yo F who underwent R modified radical mastectomy 8/26/19 for an occult right breast cancer, who was also diagnosed with pulm emboli in May 2019 and has been on therapeutic lovenox  perioperatively. Represented on 9/1 with R breast hematoma s/p hematoma washout on 9/2. Required 2 uPRBC preoperatively, but has been hemodynamically stable post op.    Regular diabetic diet   Home medications. Lovenox started today  SCDs and Lovenox for dvt ppx   Continue to monitor drain output closely- output currently serosanguinous. If change in character will repeat CBC  CBC, CMP, Mag and Phos daily   PRN pain medications  Replete lytes PRN   Encourage ambulation   Likely discharge home today. Will evaluate this afternoon and if she looks good, d/c home      Bleeding  Resolved s/p hematoma washout    Bilateral pulmonary embolism  Diagnosed on May 2019 CTA during neoadjuvant chemo - Being managed as outpatient by Dr. Reymundo Gooden MD  General Surgery  [facility]

## 2019-09-08 LAB — POCT GLUCOSE: 127 MG/DL (ref 70–110)

## 2019-09-10 ENCOUNTER — OFFICE VISIT (OUTPATIENT)
Dept: SURGERY | Facility: CLINIC | Age: 74
End: 2019-09-10
Payer: MEDICARE

## 2019-09-10 VITALS
DIASTOLIC BLOOD PRESSURE: 62 MMHG | HEIGHT: 62 IN | TEMPERATURE: 98 F | SYSTOLIC BLOOD PRESSURE: 117 MMHG | HEART RATE: 85 BPM | BODY MASS INDEX: 42.5 KG/M2 | WEIGHT: 230.94 LBS

## 2019-09-10 DIAGNOSIS — C50.911 MALIGNANT NEOPLASM OF RIGHT BREAST IN FEMALE, ESTROGEN RECEPTOR NEGATIVE, UNSPECIFIED SITE OF BREAST: Primary | ICD-10-CM

## 2019-09-10 DIAGNOSIS — Z17.1 MALIGNANT NEOPLASM OF RIGHT BREAST IN FEMALE, ESTROGEN RECEPTOR NEGATIVE, UNSPECIFIED SITE OF BREAST: Primary | ICD-10-CM

## 2019-09-10 PROCEDURE — 99999 PR PBB SHADOW E&M-EST. PATIENT-LVL III: ICD-10-PCS | Mod: PBBFAC,HCNC,, | Performed by: SURGERY

## 2019-09-10 PROCEDURE — 99024 PR POST-OP FOLLOW-UP VISIT: ICD-10-PCS | Mod: HCNC,S$GLB,, | Performed by: SURGERY

## 2019-09-10 PROCEDURE — 99999 PR PBB SHADOW E&M-EST. PATIENT-LVL III: CPT | Mod: PBBFAC,HCNC,, | Performed by: SURGERY

## 2019-09-10 PROCEDURE — 99024 POSTOP FOLLOW-UP VISIT: CPT | Mod: HCNC,S$GLB,, | Performed by: SURGERY

## 2019-09-10 NOTE — PROGRESS NOTES
BREAST SURGERY  Post Operative Visit     SUBJECTIVE:     HISTORY OF PRESENT ILLNESS: Alina Narayan is a 73 y.o. postmenopausal female referred for surgical planning of for right breast cancer. Patient has one more course of chemotherapy. Patient had an abnormal mammogram first noted 2018. Follow-up imaging showed calcifications with 2 axillary lymph nodes, the largest measuring  1.4 x 1 cm in the axillary tail. A ultrasound guided biopsy was performed on right with pathology revealing metastatic mammary carcinoma of the breast.       Patient does routinely do self breast exams.  Patient has not noted a change on breast exam.  Patient denies nipple discharge. She had a history of of the left breast cancer in 10/2012, w/ IDC, ER/MA (+), HER 2 russell neg, followed by a left lumpectomy for a 7 mm IDC and negative margins and SN, thus she underwent adjuvant XRT with 5 years of ietroazole      - QY MMG - BIRADs 2 in . 2016  - MMG BIRADs 4 - 2017, with new calcifications and stereotactic bx  - 2018 - BIRADs 2   - 2018 - BIRADS 4B  - 19 u/s guided bx from right axillary mass - met mammary carcinoma, also in right axillary node  - 1/15/19 - MRI breast with two axillary nodes demonstrated, largest measurig 1.4 x 1 cm      - negative genetic testing, integrated BRCA 2017      Findings at that time from the most recent biopsy were the following:   Estrogen Receptor: neg  Her-2 russell: neg  Progesterone Receptor: neg  Ki 67 - 40%   Lymph node status: pos        Interval Hx:  Patient reports no significant medical changes since last seen in clinic. Has small rash under right breast. Spoke with Dr. Dwyer and with her great response to her white cell GSF injection she is cleared to have her port removed today as well.     GYN History:  Age of menarche was 11. Age of menopause was 53.  Patient denies hormonal therapy. Patient is . Age of first live birth was 19. Patient did breast feed.     FAMILY History:  -  mother - breast cancer 62   - father - CRC   - brother - prostate Ca      INTERVAL HISTORY: S/p right modified radical  mastectomy on 8/26/19. She represented to the ED On 8/31/19 for evaluation of hypotension, lightheadedness and bloody drainage. She received 2 uPRBC and underwent right mastectomy washout on 9/2/2019. Since discharge from the hospital on 9/4/19 patient reports she has been doing well. Post operative pain is well controlled. She does have tenderness and swelling on the lateral aspect of her incision. States she feels a firm knot at this location. Drain #1 puts out roughly 100cc per day of serosanguinous fluid. Drain #2 puts out 10cc per day of darker serosanguinous fluid. She denies fever, chill, fatigue, weakness, dizziness. She is taking her Lovenox as prescribed.     OBJECTIVE:     Most Recent Vitals:  Temp: 97.6 °F (36.4 °C) (09/10/19 1016)  Pulse: 85 (09/10/19 1016)  BP: 117/62 (09/10/19 1016)    PHYSICAL EXAM:  Physical Exam   Constitutional: She is oriented to person, place, and time and well-developed, well-nourished, and in no distress. No distress.   HENT:   Head: Normocephalic.   Cardiovascular: Normal rate and regular rhythm.   Pulmonary/Chest: No respiratory distress.       Abdominal: Soft. She exhibits no distension. There is no tenderness.   Musculoskeletal: Normal range of motion.   Neurological: She is alert and oriented to person, place, and time.   Skin: Skin is warm and dry. She is not diaphoretic.         LABORATORY VALUES:  Lab Results   Component Value Date    WBC 6.16 09/04/2019    HGB 8.1 (L) 09/04/2019    HCT 24.8 (L) 09/04/2019     09/04/2019    HGBA1C 7.0 (H) 08/20/2019     Lab Results   Component Value Date     09/04/2019    K 4.4 09/04/2019     09/04/2019    CO2 22 (L) 09/04/2019    BUN 11 09/04/2019    CREATININE 0.7 09/04/2019     (H) 09/04/2019    CALCIUM 8.1 (L) 09/04/2019    MG 2.2 09/04/2019    PHOS 3.4 09/04/2019    AST 12 08/31/2019     ALT 11 08/31/2019    ALKPHOS 112 08/31/2019    BILITOT 0.5 08/31/2019    BILIDIR 0.2 03/01/2012    PROT 6.5 08/31/2019    ALBUMIN 3.3 (L) 08/31/2019    AMYLASE 39 11/19/2007    LIPASE 36 02/14/2019     Lab Results   Component Value Date    INR 0.9 09/02/2019     Lab Results   Component Value Date    TSH 1.550 03/11/2019    FREET4 0.92 08/21/2018     FINAL PATHOLOGIC DIAGNOSIS  1. LYMPH NODE TISSUE, APICAL LEVEL 2, DISSECTION:  - Six lymph nodes, negative for carcinoma (0/6).  2. RIGHT BREAST, MODIFIED RADICAL MASTECTOMY:  - Benign breast tissue with chemotherapy related changes, columnar cell change and microcalcifications  in relation to benign breast tissue.  - Benign skin and nipple.  - No evidence of atypia, malignancy or tumor bed in the breast.  - Two lymph nodes with treatment effect, negative for residual carcinoma (0/2). See note.  - Twenty four benign lymph nodes without evidence of carcinoma or treatment effect .  Note: Immunohistochemistry with appropriate control for pankeratin AE1/3 is used to evaluate the two lymph nodes  with treatment effect. The stains are negative, supporting the diagnosis.    ASSESSMENT:     Alina Narayan is a 74 y.o. female postmenopausal female with diagnosed carcinoma of the right breast with 2 positive biopsied LN, no breast primary noted, triple neg. Completed chemotherapy 7/23/2019. S/p MRM on 8/26/2019 with subsequent hematoma washout on 9/2/2019. Pathology with no residual breast carcinoma and 32 negative nodes.     PLAN:  -Will see patient next week in clinic for follow up and drain removal. Educated to keep recording drain output   -Will discuss in multidisciplinary breast conference     ROCHELLE Gooden MD   General Surgery- PGYIII  [phone]    I have personally taken the history and examined this patient and agree with the resident's note as stated above.  We will keep both drains in place as well as the staples for now and she will follow up with us again in clinic  next week for drain and staple removal.  The the pathology report was discussed with the patient over the phone prior to the visit today.  We will present her case at the multidisciplinary breast Cancer Conference and she will follow up with Dr. Dwyer the accordingly as well.

## 2019-09-17 ENCOUNTER — TUMOR BOARD CONFERENCE (OUTPATIENT)
Dept: SURGERY | Facility: CLINIC | Age: 74
End: 2019-09-17

## 2019-09-17 ENCOUNTER — OFFICE VISIT (OUTPATIENT)
Dept: SURGERY | Facility: CLINIC | Age: 74
End: 2019-09-17
Payer: MEDICARE

## 2019-09-17 VITALS
BODY MASS INDEX: 42.52 KG/M2 | WEIGHT: 231.06 LBS | SYSTOLIC BLOOD PRESSURE: 132 MMHG | HEART RATE: 76 BPM | HEIGHT: 62 IN | DIASTOLIC BLOOD PRESSURE: 78 MMHG

## 2019-09-17 DIAGNOSIS — C50.911 MALIGNANT NEOPLASM OF RIGHT BREAST IN FEMALE, ESTROGEN RECEPTOR NEGATIVE, UNSPECIFIED SITE OF BREAST: Primary | ICD-10-CM

## 2019-09-17 DIAGNOSIS — Z17.1 MALIGNANT NEOPLASM OF RIGHT BREAST IN FEMALE, ESTROGEN RECEPTOR NEGATIVE, UNSPECIFIED SITE OF BREAST: Primary | ICD-10-CM

## 2019-09-17 PROCEDURE — 99999 PR PBB SHADOW E&M-EST. PATIENT-LVL III: CPT | Mod: PBBFAC,HCNC,, | Performed by: SURGERY

## 2019-09-17 PROCEDURE — 99024 PR POST-OP FOLLOW-UP VISIT: ICD-10-PCS | Mod: HCNC,S$GLB,, | Performed by: SURGERY

## 2019-09-17 PROCEDURE — 99024 POSTOP FOLLOW-UP VISIT: CPT | Mod: HCNC,S$GLB,, | Performed by: SURGERY

## 2019-09-17 PROCEDURE — 99999 PR PBB SHADOW E&M-EST. PATIENT-LVL III: ICD-10-PCS | Mod: PBBFAC,HCNC,, | Performed by: SURGERY

## 2019-09-17 NOTE — PROGRESS NOTES
BREAST SURGERY  Post Operative Visit     SUBJECTIVE:     INTERVAL HISTORY: S/p right modified radical  mastectomy on 8/26/19. Right mastectomy washout on 9/2/2019. Pathology with no residual breast carcinoma and 32 negative nodes. Pathology discussed in tumor board today, no further treatment required. She reports doing well. Drain 1 continues to put out 50-70cc per day and drain 2 puts out 5-10cc per day. Incision healing well.    HISTORY OF PRESENT ILLNESS: Alina Narayan is a 73 y.o. postmenopausal female referred for surgical planning of for right breast cancer. Patient has one more course of chemotherapy. Patient had an abnormal mammogram first noted 12/2018. Follow-up imaging showed calcifications with 2 axillary lymph nodes, the largest measuring  1.4 x 1 cm in the axillary tail. A ultrasound guided biopsy was performed on right with pathology revealing metastatic mammary carcinoma of the breast.       Patient does routinely do self breast exams.  Patient has not noted a change on breast exam.  Patient denies nipple discharge. She had a history of of the left breast cancer in 10/2012, w/ IDC, ER/PA (+), HER 2 russell neg, followed by a left lumpectomy for a 7 mm IDC and negative margins and SN, thus she underwent adjuvant XRT with 5 years of ietroazole      - QY MMG - BIRADs 2 in 2015. 2016  - MMG BIRADs 4 - 12/13/2017, with new calcifications and stereotactic bx  - 7/2018 - BIRADs 2   - 12/2018 - BIRADS 4B  - 1/7/19 u/s guided bx from right axillary mass - met mammary carcinoma, also in right axillary node  - 1/15/19 - MRI breast with two axillary nodes demonstrated, largest measurig 1.4 x 1 cm      - negative genetic testing, integrated BRCA 5/2017      Findings at that time from the most recent biopsy were the following:   Estrogen Receptor: neg  Her-2 russell: neg  Progesterone Receptor: neg  Ki 67 - 40%   Lymph node status: pos    GYN History:  Age of menarche was 11. Age of menopause was 53.  Patient  denies hormonal therapy. Patient is . Age of first live birth was 19. Patient did breast feed.     FAMILY History:  - mother - breast cancer 62   - father - CRC   - brother - prostate Ca      OBJECTIVE:     Most Recent Vitals:  Pulse: 76 (19 1001)  BP: 132/78 (19 1001)    PHYSICAL EXAM:  Physical Exam   Constitutional: She is oriented to person, place, and time and well-developed, well-nourished, and in no distress. No distress.   HENT:   Head: Normocephalic.   Cardiovascular: Normal rate and regular rhythm.   Pulmonary/Chest: No respiratory distress.       Abdominal: Soft. She exhibits no distension. There is no tenderness.   Musculoskeletal: Normal range of motion.   Neurological: She is alert and oriented to person, place, and time.   Skin: Skin is warm and dry. She is not diaphoretic.     Final surgical Pathology :  1. LYMPH NODE TISSUE, APICAL LEVEL 2, DISSECTION:  - Six lymph nodes, negative for carcinoma (0/6).  2. RIGHT BREAST, MODIFIED RADICAL MASTECTOMY:  - Benign breast tissue with chemotherapy related changes, columnar cell change and microcalcifications  in relation to benign breast tissue.  - Benign skin and nipple.  - No evidence of atypia, malignancy or tumor bed in the breast.  - Two lymph nodes with treatment effect, negative for residual carcinoma (0/2). See note.  - Twenty four benign lymph nodes without evidence of carcinoma or treatment effect .  Note: Immunohistochemistry with appropriate control for pankeratin AE1/3 is used to evaluate the two lymph nodes  with treatment effect. The stains are negative, supporting the diagnosis.    ASSESSMENT:     Alina Narayan is a 74 y.o. female postmenopausal female with diagnosed carcinoma of the right breast with 2 positive biopsied LN, no breast primary noted, triple neg. Completed chemotherapy 2019. S/p MRM on 2019 with subsequent hematoma washout on 2019. Pathology with no residual breast carcinoma and 32 negative nodes.      PLAN:  -Staples and drain #2 removed in clinic today  -Patient to come back to clinic when remaining drain <30cc per day. Will remove at that time   -Patient discussed at tumor board today 9/17, no further treatment required    ROCHELLE Gooden MD   General Surgery- PGYIII  [phone]    I have personally taken the history and examined this patient and agree with the resident's note as stated above.  The patient underwent a right modified radical mastectomy which revealed no primary tumor in the right breast.  She had 32 benign negative lymph nodes with 2 of those lymph nodes revealing a treatment effect status post neoadjuvant chemotherapy for a triple negative breast cancer.  Her Port-A-Cath has also been removed.    The right mastectomy site is healing appropriately without signs of infection.  One of the 2 drains was removed today along with the staples.  She was given a knitted knocker for the right chest wall.  She will follow up in 1-4 weeks p.r.n. once the remaining drain has decreased its output to less than 30 cc daily.  I told her that the maximum I would leave the drain in place would be 6 weeks total postoperatively.  She will call to schedule drain removal once the output is less than 1 oz daily.    In terms of her breast cancer, she was presented at multidisciplinary breast Cancer Conference and since she had a triple negative tumor with complete pathologic response she will not require any additional systemic therapy.  She will not require any adjuvant radiotherapy as this is what she was trying to avoid on given the prior radiation induced cardiomyopathy from the management of her left breast cancer with breast conservation surgery and radiotherapy in 2012.  She will not need for be recommended for right-sided postmastectomy radiotherapy nor which she desired to have it even if it were recommend as per discussion at multidisciplinary breast Cancer Conference today.    She will continue to follow up  with Dr. Dwyer and will follow up with me for remaining drain removal when indicated

## 2019-09-23 ENCOUNTER — TELEPHONE (OUTPATIENT)
Dept: SURGERY | Facility: CLINIC | Age: 74
End: 2019-09-23

## 2019-09-23 NOTE — PROGRESS NOTES
Subjective:       Patient ID: Alina Narayan is a 74 y.o. female.    Chief Complaint: No chief complaint on file.    HPI Ms Narayan is a 73 Y/O white female who is currently receiving neoadjuvant chemotherapy for a recently diagnosed triple negative carcinoma of the right breast.  She has received 12 cycles of weekly Taxol and 4 cycles of CMF which she completed on July 30th.  She had  surgery on August 26.  That mastectomy specimen showed pathological complete response in the breast and axilla.    Her course has been complicated by postop hematoma which required evacuation.  She is due to follow up in surgery to have her last drain removed today.    She has recently been found to have a 1-2 mm right upper lobe nodule on her CT scan.      She continues on Lovenox for her history of pulmonary embolism diagnosed in May of this year.        Current history:  abnormal mammogram of the right breast in December 2018.  She was having no breast symptoms at that time    That mammogram showed a 13 mm mass in the right axillary tail.  By ultrasound there was a 6 x 9 x 11 mm intramammary node and a 2nd 5 x 6 x 6 mm hypoechoic mass in the axillary tail.  On January 7, 2019 both masses were biopsied and both showed lymph nodes with metastatic carcinoma which was ER negative DC negative and HER2 negative.  Ki-67 was 40%.    MRI on January 15, 2019 showed 2 right axillary lymph nodes the largest measured 1.4 x 1.0 cm.  There were no abnormalities in the right breast.    PET scan was then performed which showed only low-grade activity in the right axilla with an SUV of 1.32.    She then received 12 weeks of weekly Taxol and 4 cycles CMF chemotherapy.    Her follow-up  Breast imaging after Taxol showed the following:There is a 9 mm lymph node seen in the right axilla. Associated coil clip; pathology showed metastastic disease within a lymph node. Decreased in size compared to prior, which measured 14 mm.      US Breast Right  Limited  There is a 6 mm x 6 mm x 7 mm intramammary lymph node seen in the axillary tail region of the right breast. Compared to the previous study, the intramammary lymph node decreased in size. Previously measured 6 x 9 x 11 mm    Previous breast cancer history History: On September 25, 2012 she underwent a screening mammogram which showed an asymmetric density in the left breast at 2:00 position. A followup mammogram on the 27th showed an oval mass in that area ultrasound showed a 6 mm solid mass. Core needle biopsy on October 1 showed invasive carcinoma ER 90% positive LA 80% positive and HER-2 negative. On October 29 she underwent lumpectomy and sentinel lymph node biopsy. That showed a 7 mm low-grade (1+2+1) infiltrating carcinoma. 3 sentinel lymph nodes were negative. Final pathological stage TIB N0 stage IA.  She completed letrozole therapy in 2017  Review of Systems   Constitutional: Positive for fatigue. Negative for appetite change, fever and unexpected weight change.   Respiratory: Positive for shortness of breath (Mild and unchanged). Negative for cough.    Cardiovascular: Negative for chest pain.   Gastrointestinal: Negative for abdominal pain and diarrhea.   Genitourinary: Negative for frequency.   Musculoskeletal: Negative for back pain.   Skin: Negative for rash.   Neurological: Negative for headaches.   Psychiatric/Behavioral: The patient is not nervous/anxious.        Objective:      Physical Exam   Constitutional: She is oriented to person, place, and time. She appears well-developed and well-nourished. No distress.   Neurological: She is alert and oriented to person, place, and time.   Psychiatric: She has a normal mood and affect. Her behavior is normal. Thought content normal.   Vitals reviewed.      Assessment:       1. Cancer of axillary tail of right breast      pathological complete response to neoadjuvant therapy.  2.  Pulmonary nodule-low suspicion  Plan:       I discussed our  recommendation of no further therapy.  See me in 3 weeks.  We will discuss changing her from Lovenox to oral anticoagulation.        It is unclear whether she needs any further follow-up for her pulmonary nodule; however, in an abundance of caution I will repeat her chest CT scan in a year.  I recommended she not get her upcoming CT as scheduled.

## 2019-09-23 NOTE — TELEPHONE ENCOUNTER
Spoke with patient regarding needing an appointment to remove ANA MARÍA drain. Made a nurse visit for 9-24-19 at 0945 for Dr. Gooden to remove drain. Patient verbalized understanding.

## 2019-09-24 ENCOUNTER — PATIENT OUTREACH (OUTPATIENT)
Dept: ADMINISTRATIVE | Facility: OTHER | Age: 74
End: 2019-09-24

## 2019-09-24 ENCOUNTER — CLINICAL SUPPORT (OUTPATIENT)
Dept: SURGERY | Facility: CLINIC | Age: 74
End: 2019-09-24
Payer: MEDICARE

## 2019-09-24 ENCOUNTER — OFFICE VISIT (OUTPATIENT)
Dept: HEMATOLOGY/ONCOLOGY | Facility: CLINIC | Age: 74
End: 2019-09-24
Payer: MEDICARE

## 2019-09-24 VITALS
HEIGHT: 62 IN | DIASTOLIC BLOOD PRESSURE: 65 MMHG | TEMPERATURE: 98 F | HEART RATE: 75 BPM | BODY MASS INDEX: 43.98 KG/M2 | WEIGHT: 239 LBS | SYSTOLIC BLOOD PRESSURE: 106 MMHG

## 2019-09-24 VITALS
BODY MASS INDEX: 42.28 KG/M2 | WEIGHT: 229.75 LBS | HEART RATE: 86 BPM | OXYGEN SATURATION: 99 % | HEIGHT: 62 IN | DIASTOLIC BLOOD PRESSURE: 63 MMHG | TEMPERATURE: 98 F | SYSTOLIC BLOOD PRESSURE: 135 MMHG | RESPIRATION RATE: 16 BRPM

## 2019-09-24 DIAGNOSIS — Z85.3 HISTORY OF RIGHT BREAST CANCER: Primary | ICD-10-CM

## 2019-09-24 DIAGNOSIS — Z17.1 MALIGNANT NEOPLASM OF RIGHT BREAST IN FEMALE, ESTROGEN RECEPTOR NEGATIVE, UNSPECIFIED SITE OF BREAST: ICD-10-CM

## 2019-09-24 DIAGNOSIS — C50.611 CANCER OF AXILLARY TAIL OF RIGHT BREAST: Primary | ICD-10-CM

## 2019-09-24 DIAGNOSIS — C50.911 MALIGNANT NEOPLASM OF RIGHT BREAST IN FEMALE, ESTROGEN RECEPTOR NEGATIVE, UNSPECIFIED SITE OF BREAST: ICD-10-CM

## 2019-09-24 PROCEDURE — 99213 PR OFFICE/OUTPT VISIT, EST, LEVL III, 20-29 MIN: ICD-10-PCS | Mod: HCNC,S$GLB,, | Performed by: INTERNAL MEDICINE

## 2019-09-24 PROCEDURE — 1101F PR PT FALLS ASSESS DOC 0-1 FALLS W/OUT INJ PAST YR: ICD-10-PCS | Mod: HCNC,CPTII,S$GLB, | Performed by: INTERNAL MEDICINE

## 2019-09-24 PROCEDURE — 3075F SYST BP GE 130 - 139MM HG: CPT | Mod: HCNC,CPTII,S$GLB, | Performed by: INTERNAL MEDICINE

## 2019-09-24 PROCEDURE — 99999 PR PBB SHADOW E&M-EST. PATIENT-LVL III: ICD-10-PCS | Mod: PBBFAC,HCNC,, | Performed by: INTERNAL MEDICINE

## 2019-09-24 PROCEDURE — 99999 PR PBB SHADOW E&M-EST. PATIENT-LVL IV: CPT | Mod: PBBFAC,HCNC,, | Performed by: SURGERY

## 2019-09-24 PROCEDURE — 1101F PT FALLS ASSESS-DOCD LE1/YR: CPT | Mod: HCNC,CPTII,S$GLB, | Performed by: INTERNAL MEDICINE

## 2019-09-24 PROCEDURE — 99213 OFFICE O/P EST LOW 20 MIN: CPT | Mod: HCNC,S$GLB,, | Performed by: INTERNAL MEDICINE

## 2019-09-24 PROCEDURE — 3078F DIAST BP <80 MM HG: CPT | Mod: HCNC,CPTII,S$GLB, | Performed by: INTERNAL MEDICINE

## 2019-09-24 PROCEDURE — 99999 PR PBB SHADOW E&M-EST. PATIENT-LVL III: CPT | Mod: PBBFAC,HCNC,, | Performed by: INTERNAL MEDICINE

## 2019-09-24 PROCEDURE — 99999 PR PBB SHADOW E&M-EST. PATIENT-LVL IV: ICD-10-PCS | Mod: PBBFAC,HCNC,, | Performed by: SURGERY

## 2019-09-24 PROCEDURE — 3075F PR MOST RECENT SYSTOLIC BLOOD PRESS GE 130-139MM HG: ICD-10-PCS | Mod: HCNC,CPTII,S$GLB, | Performed by: INTERNAL MEDICINE

## 2019-09-24 PROCEDURE — 3078F PR MOST RECENT DIASTOLIC BLOOD PRESSURE < 80 MM HG: ICD-10-PCS | Mod: HCNC,CPTII,S$GLB, | Performed by: INTERNAL MEDICINE

## 2019-09-24 NOTE — PROGRESS NOTES
Patient presented today for remaining drain removal (nusing visit)  Drain only putting out 10-15 cc of serous fluid per day. Otherwise doing very well. Reports some stiffness in her right shoulder.     Drain removed without complication.   Referred to outpatient PT.   Return to clinic in 6 months for EDYTA Gooden MD   General Surgery- PGYIII  [phone]

## 2019-09-30 NOTE — PROGRESS NOTES
[facility]  Physical Therapy Initial Evaluation    Name: Alina Narayan  Clinic Number: [id number]    Therapy Diagnosis:   Encounter Diagnoses   Name Primary?    Malignant neoplasm of areola of right breast in female, estrogen receptor negative Yes    Personal history of breast cancer     Invasive carcinoma of breast     Cancer of axillary tail of right breast     Decreased shoulder mobility, right     At risk for lymphedema      Physician: Cristina Gooden MD    Physician Orders: PT Eval and Treat   Medical Diagnosis: Right breast Cancer  Evaluation Date: 10/1/2019  Authorization Period Expiration: 12/31/19  Plan of Care Certification Period: 11/26/19 (8 weeks)  Visit # / Visits authorized: 1/ 20  Insurance: [employer]    Time In: 10:00 AM  Time Out: 10:50 AM  Total Billable Time: 50 minutes    Precautions: cancer      History   History of Present Illness: Alina is a 74 y.o. female that presents to  [facility] at the [facility] s/p right breast surgery.   Diagnosis: Right breast metastatic IMC with involvement of 2 lymph nodes, Triple Negative ; 2012 left breast lumpectomy and radiation  Chief complaint: decreased mobility of right shoulder; c/o tightness and pain right upper arm and right anterior chest  Surgical History: 9/2/19 right chest wall wound exploration and evacuation of hematoma; 8/26/19 right breast modified radical mastectomy and removal of port; 2/1/9 insertion of port  Alina Narayan  has a past surgical history that includes Carpal tunnel release; Shoulder surgery; Dilation and curettage of uterus (1999); Endometrial ablation (1999); left lumpectomy (2012); Eye surgery; Skin biopsy; Cardiac valve replacement (12/2013); Colonoscopy (N/A, 9/29/2017); Breast lumpectomy (Left, 2012); Breast biopsy (Left, 10/1/2012); Breast biopsy (Left, 12/2017); Cardiac valuve replacement (2013); Insertion of tunneled central venous  catheter (CVC) with subcutaneous port (Right, 2/1/2019); Modified radical mastectomy w/ axillary lymph node dissection (Right, 8/26/2019); and Mediport removal (Right, 8/26/2019).    Chemotherapy: Denys-Adjuvant chemotherapy 2/19 to 8/19  Radiation: none    Past Medical History:   Diagnosis Date    Allergy     seasonal    Anxiety     Arthritis     Breast cancer 10/2012    left breast invasive ductal carcinoma    Colon polyp     Diabetes mellitus     Type 2    Diabetes mellitus, type 2     Diverticular disease     Diverticulitis 2009    Genetic testing 05/2017    negative Integrated BRACAnalysis    Hyperlipidemia     Hypertension     Morbid obesity     MITCHELL (obstructive sleep apnea)     Pulmonary embolism     Stenosis     Stenosis and insufficiency of lacrimal passages     Thyroid disease           Medications:  Alina has a current medication list which includes the following prescription(s): aspir-low, blood sugar diagnostic, blood-glucose meter, carvedilol, cholecalciferol (vitamin d3), enoxaparin, furosemide, gabapentin, lancets, levothyroxine, lidocaine-prilocaine, metformin, miconazole, oxycodone-acetaminophen, and pravastatin, and the following Facility-Administered Medications: alteplase, heparin, porcine (pf), heparin, porcine (pf), and sodium chloride 0.9%.    Allergies:  Review of patient's allergies indicates:   Allergen Reactions    Dilaudid [hydromorphone (bulk)] Other (See Comments)     Other reaction(s): sedation    Hydromorphone Other (See Comments)     Other reaction(s): sedation    Exenatide Rash     Other reaction(s): Rash        Prior Therapy: left shoulder after left breast surgery--has RTC tear left shoulder;   Social History:  lives with their spouse  Occupation: Retired  Prior Level of Function: Independent with all ADL's  Current Level of Function: difficulty lifting RUE upward    Other Past Medical History: See above    Patient's Goals: I want to regain my motion in my  "right arm    Hand dominance: Right handed    Subjective   Pt states: having pain in right anterior chest and right upper arm  Pain: 0/10 on VAS currently.   Best: 0/10  Worst: 7/10   Pain location: right upper arm and anteriro chest   Objective   Mental status :oriented    Postural examination/scapula alignment: Rounded shoulder and Head forward    Skin Integrity:   Scar Location: right anterior chest  Appearance: healing  Signs of infection: No  Drainage:None  Color:NA    Edema: Moderate  Location: Proximal Lateral right chest wall and right axilla    Axillary Web Syndrome/Cording:   Location: right upper arm and volar forearm  Degree of Cording: moderate (mild, moderate etc...)   Number of cords present: one    Sensation: numbness noted right axilla and fingers bilateral hands        Range of Motion:      Shoulder Range of Motion:   Active /Passive ROM Right Left   Flexion 115 160   Abduction 85 130   Extension 50 70   IR/90deg 80 @ 85 deg Abd 85   ER/90deg 45 @85 deg Abd 60          Strength: manual muscle test grades below   Upper Extremity Strength   (R) UE (L) UE   Shoulder flexion: 4/5 5/5   Shoulder Abduction: 3-/5 5/5   Shoulder IR 4/5 5/5   Shoulder ER 4/5 5/5   Elbow flexion: 4/5 5/5   Elbow extension: 4/5 5/5   Wrist flexion: 4/5 5/5   Wrist extension: 4/5 5/5    4/5 5/5       Baseline Measurements of BL UE's for early detection of Lymphedema:     LANDMARK RIGHT UE LEFT UE DIFFERENCE   E + 8" 44 cm 43 cm 1 cm   E + 6" 43 cm 42 cm 1 cm   E + 4" 38 cm 37 cm 1 cm   E + 2" 30 cm 31 cm 1 cm   Elbow 27 cm 27 cm 0 cm   W+ 8" 28 cm 27 cm 1 cm   W +  6" 27.5 cm 26.5 cm 1 cm   W + 4" 25.5 cm 24.5 cm 1 cm   Wrist 17 cm 17 cm 0 cm   DPC 20 cm 20 cm 0 cm   IP Thumb 7 cm 7 cm 0 cm     Functional Mobility (Bed mobility, transfers)  Independent    ADL's:  Difficulty using RUE with bathing and dressing and reaching upward    Gait Assessment:   - AD used: uses rollator  - Assistance: independent  - Distance: community " "distances     Patient Education Provided   - role of therapy in multi - disciplinary team, goals for therapy  - Pt was educated in lymphedema etiology and management plans.    - Pt was provided with written risk reductions and precautions for managing lymphedema.     Pt has no cultural, educational or language barriers to learning provided.  Treatment and Instruction of Home Exercise Program    Time In: 10:30 AM  Time Out: 10:55 AM    Alina received individual therapeutic exercises to improve postural correction and alignment, stretching and soft tissue mobility, and strengthening for 20 minutes including the following:   Supine Shld Flexion with wand      1 x 10  Butterflies                                      10 x 5"  Sidelying Shld Abd                         1 x 10  Scap Retractions                          10 x 5"    Written Home Exercises Provided and Patient Education: Handouts given   See EMR under patient instructions for program given  Pt demonstrated good understanding of the education provided. Patient demo good return demo of skill of exercises.    Functional Limitations Reporting      CMS Impairment/Limitation/Restriction for FOTO Outcome Survey    Therapist reviewed FOTO scores for Alina Narayan on 10/1/2019.   FOTO documents entered into [employer] - see Media section.    Limitations Score: 44%  Category: Carrying    Current : CK = at least 40% but < 60% impaired, limited or restricted  Goal: CI = at least 1% but < 20% impaired, limited or restricted  Discharge:          Assessment   This is a 74 y.o. female referred to outpatient physical therapy and presents with a medical diagnosis of right breast cancer and was seen today post-operatively to establish PT plan of care for impairments following surgery including: decreased AROM bilateral shoulders, decreased strength RUE; cording right upper arm and forearm.     Pt instructed in HEP this session and was able to perform all exercises given independently. " Pt instructed to follow up with therapist if any concerns arise with program established. Pt will continue to benefit from skilled physical therapy to address the impairments stated in chart below, provide patient/family education and to maximize pt's level of independence in home and community environment     Anticipated barriers to physical therapy: None     Pt's spiritual, cultural and educational needs considered and pt agreeable to plan of care and goals as stated below:     Medical necessity is demonstrated by the following IMPAIRMENTS/PROMBLEM LIST:    History  Co-morbidities and personal factors that may impact the plan of care Co-morbidities:   history of cancer and Heart valve replacement    Personal Factors:   no deficits     moderate   Examination  Body Structures and Functions, activity limitations and participation restrictions that may impact the plan of care Body Regions:   upper extremities    Body Systems:    ROM  strength  scar formation    Participation Restrictions:   Difficulty using RUE with ADL's    Activity limitations:   Learning and applying knowledge  no deficits    General Tasks and Commands  no deficits    Communication  no deficits    Mobility  lifting and carrying objects    Self care  washing oneself (bathing, drying, washing hands)  caring for body parts (brushing teeth, shaving, grooming)  dressing    Domestic Life  shopping  cooking  doing house work (cleaning house, washing dishes, laundry)    Interactions/Relationships  no deficits    Life Areas  no deficits    Community and Social Life  no deficits         moderate   Clinical Presentation evolving clinical presentation with changing clinical characteristics moderate   Decision Making/ Complexity Score: moderate       Goals: Pt agrees with goals set    Short Term goals: 4 weeks  1. Patient will demonstrate 100% understanding of lymphedema risk reduction practices to include self monitoring for lymphedema. (progressing, not  met)  2. Patient will demonstrate independence with Home Exercise program established. (progressing, not met)  3. Pt will increase AROM/PROM in shoulder abduction ROM to 140 degrees on right to improve functional reach, carry, push, pull pain free. (progressing, not met)  4. Pt will increase AROM/PROM in shoulder flexion to 140 degrees on right to improve functional reach, carry, push, pull pain free.(progressing, not met)  5. Pt will increase AROM/PROM in shoulder ER to 60 degrees (at 90 degrees shld Abd) on right in order to assist with grooming activities (progressing, not met)  6. Strength will be 4+/5 in RUE in order to perform functional activities. (progressing, not met)    Long Term Goals: 8 weeks   1.  Pt will increase AROM/PROM in shoulder flexion to 170 degrees on right to improve functional reach, carry, push, pull pain free. (progressing, not met)  2. Pt will increase strength to 5/5 in gross UE musculature to improve tolerance to all functional activities pain free. (progressing, not met)  3. Pt will demonstrate full/maximized tissue mobility to increase ROM and promote healthy tissue to be pain free at discharge. (progressing, not met)  4. Pt will report decrease in overall worst pain to 2/10 at discharge. (progressing, not met)  5. Pt will increase AROM/PROM in shoulder abduction ROM to 170 degrees on right to improve functional reach, carry, push, pull pain free. (progressing, not met)  6. Pt will increase AROM/PROM in shoulder ER to 90 degrees on right in order to perform all grooming activities independently (progressing, not met)  7. Patient will report compliance with water aerobic prrogram 5x week for 10 min each day to improve overall cardiovascular function and decrease cancer related fatigue at discharge. (progressing, not met)      Plan   Outpatient physical therapy 2x week for 8 weeks to include the following:   Manual Therapy, Patient Education and Therapeutic Exercise.    Plan of care  Certification Period: 10/1/2019 to 11/26/19.    Therapist: Bibiana Emanuel, PT  10/1/2019

## 2019-10-01 ENCOUNTER — CLINICAL SUPPORT (OUTPATIENT)
Dept: REHABILITATION | Facility: HOSPITAL | Age: 74
End: 2019-10-01
Payer: MEDICARE

## 2019-10-01 DIAGNOSIS — C50.919 INVASIVE CARCINOMA OF BREAST: ICD-10-CM

## 2019-10-01 DIAGNOSIS — Z17.1 MALIGNANT NEOPLASM OF AREOLA OF RIGHT BREAST IN FEMALE, ESTROGEN RECEPTOR NEGATIVE: Primary | ICD-10-CM

## 2019-10-01 DIAGNOSIS — C50.611 CANCER OF AXILLARY TAIL OF RIGHT BREAST: ICD-10-CM

## 2019-10-01 DIAGNOSIS — M25.611 DECREASED SHOULDER MOBILITY, RIGHT: ICD-10-CM

## 2019-10-01 DIAGNOSIS — C50.011 MALIGNANT NEOPLASM OF AREOLA OF RIGHT BREAST IN FEMALE, ESTROGEN RECEPTOR NEGATIVE: Primary | ICD-10-CM

## 2019-10-01 DIAGNOSIS — Z85.3 PERSONAL HISTORY OF BREAST CANCER: ICD-10-CM

## 2019-10-01 DIAGNOSIS — Z91.89 AT RISK FOR LYMPHEDEMA: ICD-10-CM

## 2019-10-01 PROCEDURE — 97162 PT EVAL MOD COMPLEX 30 MIN: CPT | Mod: HCNC | Performed by: PHYSICAL MEDICINE & REHABILITATION

## 2019-10-01 PROCEDURE — 97110 THERAPEUTIC EXERCISES: CPT | Mod: HCNC | Performed by: PHYSICAL MEDICINE & REHABILITATION

## 2019-10-01 PROCEDURE — G8985 CARRY GOAL STATUS: HCPCS | Mod: CI,HCNC | Performed by: PHYSICAL MEDICINE & REHABILITATION

## 2019-10-01 PROCEDURE — G8984 CARRY CURRENT STATUS: HCPCS | Mod: CK,HCNC | Performed by: PHYSICAL MEDICINE & REHABILITATION

## 2019-10-01 NOTE — PLAN OF CARE
[facility]  Physical Therapy Initial Evaluation    Name: Alina Narayan  Clinic Number: [id number]    Therapy Diagnosis:   Encounter Diagnoses   Name Primary?    Malignant neoplasm of areola of right breast in female, estrogen receptor negative Yes    Personal history of breast cancer     Invasive carcinoma of breast     Cancer of axillary tail of right breast     Decreased shoulder mobility, right     At risk for lymphedema      Physician: Cristina Gooden MD    Physician Orders: PT Eval and Treat   Medical Diagnosis: Right breast Cancer  Evaluation Date: 10/1/2019  Authorization Period Expiration: 12/31/19  Plan of Care Certification Period: 11/26/19 (8 weeks)  Visit # / Visits authorized: 1/ 20  Insurance: [employer]    Time In: 10:00 AM  Time Out: 10:50 AM  Total Billable Time: 50 minutes    Precautions: cancer      History   History of Present Illness: Alina is a 74 y.o. female that presents to  [facility] at the [facility] s/p right breast surgery.   Diagnosis: Right breast metastatic IMC with involvement of 2 lymph nodes, Triple Negative ; 2012 left breast lumpectomy and radiation  Chief complaint: decreased mobility of right shoulder; c/o tightness and pain right upper arm and right anterior chest  Surgical History: 9/2/19 right chest wall wound exploration and evacuation of hematoma; 8/26/19 right breast modified radical mastectomy and removal of port; 2/1/9 insertion of port  Alina Narayan  has a past surgical history that includes Carpal tunnel release; Shoulder surgery; Dilation and curettage of uterus (1999); Endometrial ablation (1999); left lumpectomy (2012); Eye surgery; Skin biopsy; Cardiac valve replacement (12/2013); Colonoscopy (N/A, 9/29/2017); Breast lumpectomy (Left, 2012); Breast biopsy (Left, 10/1/2012); Breast biopsy (Left, 12/2017); Cardiac valuve replacement (2013); Insertion of tunneled central venous  catheter (CVC) with subcutaneous port (Right, 2/1/2019); Modified radical mastectomy w/ axillary lymph node dissection (Right, 8/26/2019); and Mediport removal (Right, 8/26/2019).    Chemotherapy: Denys-Adjuvant chemotherapy 2/19 to 8/19  Radiation: none    Past Medical History:   Diagnosis Date    Allergy     seasonal    Anxiety     Arthritis     Breast cancer 10/2012    left breast invasive ductal carcinoma    Colon polyp     Diabetes mellitus     Type 2    Diabetes mellitus, type 2     Diverticular disease     Diverticulitis 2009    Genetic testing 05/2017    negative Integrated [employer]    Hyperlipidemia     Hypertension     Morbid obesity     MITCHELL (obstructive sleep apnea)     Pulmonary embolism     Stenosis     Stenosis and insufficiency of lacrimal passages     Thyroid disease           Medications:  Alina has a current medication list which includes the following prescription(s): aspir-low, blood sugar diagnostic, blood-glucose meter, carvedilol, cholecalciferol (vitamin d3), enoxaparin, furosemide, gabapentin, lancets, levothyroxine, lidocaine-prilocaine, metformin, miconazole, oxycodone-acetaminophen, and pravastatin, and the following Facility-Administered Medications: alteplase, heparin, porcine (pf), heparin, porcine (pf), and sodium chloride 0.9%.    Allergies:  Review of patient's allergies indicates:   Allergen Reactions    Dilaudid [hydromorphone (bulk)] Other (See Comments)     Other reaction(s): sedation    Hydromorphone Other (See Comments)     Other reaction(s): sedation    Exenatide Rash     Other reaction(s): Rash        Prior Therapy: left shoulder after left breast surgery--has RTC tear left shoulder;   Social History:  lives with their spouse  Occupation: Retired  Prior Level of Function: Independent with all ADL's  Current Level of Function: difficulty lifting RUE upward    Other Past Medical History: See above    Patient's Goals: I want to regain my motion in my  "right arm    Hand dominance: Right handed    Subjective   Pt states: having pain in right anterior chest and right upper arm  Pain: 0/10 on VAS currently.   Best: 0/10  Worst: 7/10   Pain location: right upper arm and anteriro chest   Objective   Mental status :oriented    Postural examination/scapula alignment: Rounded shoulder and Head forward    Skin Integrity:   Scar Location: right anterior chest  Appearance: healing  Signs of infection: No  Drainage:None  Color:NA    Edema: Moderate  Location: Proximal Lateral right chest wall and right axilla    Axillary Web Syndrome/Cording:   Location: right upper arm and volar forearm  Degree of Cording: moderate (mild, moderate etc...)   Number of cords present: one    Sensation: numbness noted right axilla and fingers bilateral hands        Range of Motion:      Shoulder Range of Motion:   Active /Passive ROM Right Left   Flexion 115 160   Abduction 85 130   Extension 50 70   IR/90deg 80 @ 85 deg Abd 85   ER/90deg 45 @85 deg Abd 60          Strength: manual muscle test grades below   Upper Extremity Strength   (R) UE (L) UE   Shoulder flexion: 4/5 5/5   Shoulder Abduction: 3-/5 5/5   Shoulder IR 4/5 5/5   Shoulder ER 4/5 5/5   Elbow flexion: 4/5 5/5   Elbow extension: 4/5 5/5   Wrist flexion: 4/5 5/5   Wrist extension: 4/5 5/5    4/5 5/5       Baseline Measurements of BL UE's for early detection of Lymphedema:     LANDMARK RIGHT UE LEFT UE DIFFERENCE   E + 8" 44 cm 43 cm 1 cm   E + 6" 43 cm 42 cm 1 cm   E + 4" 38 cm 37 cm 1 cm   E + 2" 30 cm 31 cm 1 cm   Elbow 27 cm 27 cm 0 cm   W+ 8" 28 cm 27 cm 1 cm   W +  6" 27.5 cm 26.5 cm 1 cm   W + 4" 25.5 cm 24.5 cm 1 cm   Wrist 17 cm 17 cm 0 cm   DPC 20 cm 20 cm 0 cm   IP Thumb 7 cm 7 cm 0 cm     Functional Mobility (Bed mobility, transfers)  Independent    ADL's:  Difficulty using RUE with bathing and dressing and reaching upward    Gait Assessment:   - AD used: uses rollator  - Assistance: independent  - Distance: community " "distances     Patient Education Provided   - role of therapy in multi - disciplinary team, goals for therapy  - Pt was educated in lymphedema etiology and management plans.    - Pt was provided with written risk reductions and precautions for managing lymphedema.     Pt has no cultural, educational or language barriers to learning provided.  Treatment and Instruction of Home Exercise Program    Time In: 10:30 AM  Time Out: 10:55 AM    Alina received individual therapeutic exercises to improve postural correction and alignment, stretching and soft tissue mobility, and strengthening for 20 minutes including the following:   Supine Shld Flexion with wand      1 x 10  Butterflies                                      10 x 5"  Sidelying Shld Abd                         1 x 10  Scap Retractions                          10 x 5"    Written Home Exercises Provided and Patient Education: Handouts given   See EMR under patient instructions for program given  Pt demonstrated good understanding of the education provided. Patient demo good return demo of skill of exercises.    Functional Limitations Reporting      CMS Impairment/Limitation/Restriction for FOTO Outcome Survey    Therapist reviewed FOTO scores for Alina Narayan on 10/1/2019.   FOTO documents entered into [employer] - see Media section.    Limitations Score: 44%  Category: Carrying    Current : CK = at least 40% but < 60% impaired, limited or restricted  Goal: CI = at least 1% but < 20% impaired, limited or restricted  Discharge:          Assessment   This is a 74 y.o. female referred to outpatient physical therapy and presents with a medical diagnosis of right breast cancer and was seen today post-operatively to establish PT plan of care for impairments following surgery including: decreased AROM bilateral shoulders, decreased strength RUE; cording right upper arm and forearm.     Pt instructed in HEP this session and was able to perform all exercises given independently. " Pt instructed to follow up with therapist if any concerns arise with program established. Pt will continue to benefit from skilled physical therapy to address the impairments stated in chart below, provide patient/family education and to maximize pt's level of independence in home and community environment     Anticipated barriers to physical therapy: None     Pt's spiritual, cultural and educational needs considered and pt agreeable to plan of care and goals as stated below:     Medical necessity is demonstrated by the following IMPAIRMENTS/PROMBLEM LIST:    History  Co-morbidities and personal factors that may impact the plan of care Co-morbidities:   history of cancer and Heart valve replacement    Personal Factors:   no deficits     moderate   Examination  Body Structures and Functions, activity limitations and participation restrictions that may impact the plan of care Body Regions:   upper extremities    Body Systems:    ROM  strength  scar formation    Participation Restrictions:   Difficulty using RUE with ADL's    Activity limitations:   Learning and applying knowledge  no deficits    General Tasks and Commands  no deficits    Communication  no deficits    Mobility  lifting and carrying objects    Self care  washing oneself (bathing, drying, washing hands)  caring for body parts (brushing teeth, shaving, grooming)  dressing    Domestic Life  shopping  cooking  doing house work (cleaning house, washing dishes, laundry)    Interactions/Relationships  no deficits    Life Areas  no deficits    Community and Social Life  no deficits         moderate   Clinical Presentation evolving clinical presentation with changing clinical characteristics moderate   Decision Making/ Complexity Score: moderate       Goals: Pt agrees with goals set    Short Term goals: 4 weeks  1. Patient will demonstrate 100% understanding of lymphedema risk reduction practices to include self monitoring for lymphedema. (progressing, not  met)  2. Patient will demonstrate independence with Home Exercise program established. (progressing, not met)  3. Pt will increase AROM/PROM in shoulder abduction ROM to 140 degrees on right to improve functional reach, carry, push, pull pain free. (progressing, not met)  4. Pt will increase AROM/PROM in shoulder flexion to 140 degrees on right to improve functional reach, carry, push, pull pain free.(progressing, not met)  5. Pt will increase AROM/PROM in shoulder ER to 60 degrees (at 90 degrees shld Abd) on right in order to assist with grooming activities (progressing, not met)  6. Strength will be 4+/5 in RUE in order to perform functional activities. (progressing, not met)    Long Term Goals: 8 weeks   1.  Pt will increase AROM/PROM in shoulder flexion to 170 degrees on right to improve functional reach, carry, push, pull pain free. (progressing, not met)  2. Pt will increase strength to 5/5 in gross UE musculature to improve tolerance to all functional activities pain free. (progressing, not met)  3. Pt will demonstrate full/maximized tissue mobility to increase ROM and promote healthy tissue to be pain free at discharge. (progressing, not met)  4. Pt will report decrease in overall worst pain to 2/10 at discharge. (progressing, not met)  5. Pt will increase AROM/PROM in shoulder abduction ROM to 170 degrees on right to improve functional reach, carry, push, pull pain free. (progressing, not met)  6. Pt will increase AROM/PROM in shoulder ER to 90 degrees on right in order to perform all grooming activities independently (progressing, not met)  7. Patient will report compliance with water aerobic prrogram 5x week for 10 min each day to improve overall cardiovascular function and decrease cancer related fatigue at discharge. (progressing, not met)      Plan   Outpatient physical therapy 2x week for 8 weeks to include the following:   Manual Therapy, Patient Education and Therapeutic Exercise.    Plan of care  Certification Period: 10/1/2019 to 11/26/19.    Therapist: Bibiana Emanuel, PT  10/1/2019

## 2019-10-01 NOTE — PATIENT INSTRUCTIONS
ROM: Flexion - Wand (Supine)        Lie on back holding wand. Raise arms over head.   Repeat _5-10___ times per set. Do __1__ sets per session. Do __2__ sessions per day.  Copyright © I. All rights reserved.               Butterflies    Lie on your back with your hands behind your head. Open your chest by  your elbows apart and gently down. Hold for 5 seconds. Then, bring  your elbows back together. Repeat 2x daily  5 reps   Copyright © 8114-1538 [employer]                Sidelying Shoulder Abduction    Lie on your right/left side with right/left arm on top. Keep your elbow straight. Lift your arm upward toward your head.  Perform __5__ times. Perform __1__sets.   Perform __2__ times per day.  Copyright © 7714-7322 [employer]ROM:     Scapular Retraction (Standing)    With arms at sides, squeeze shoulder blades together. Do not shrug and do not hold your breath. Hold 5 seconds. Repeat 10 times 1 sessions 2 x day.                                                PATIENT EDUCATION        Lymphedema - Identification and Prevention     Lymphedema - is the swelling of a body area or extremity caused by the accumulation of lymphatic fluid.  There is a risk for lymphedema with the removal of lymph nodes, trauma or radiation therapy.  Treatment of breast cancer often involves surgery: mastectomy or lumpectomy. Some of the lymph nodes in the underarm (called axillary lymph nodes) may be removed and checked to see if they contain cancer cells.     During breast surgery when axillary lymph nodes are removed (with sentinel node biopsy or axillary dissection) or are treated with radiation therapy, the lymphatic system may become impaired. This may prevent lymphatic fluid from leaving the area therefore, causing lymphedema.     Lymphatic fluid is a normal part of the circulatory system. Its function is to remove waste products and to produce cells vital to fighting infection. Swelling occurs when the vessels become  restricted and the lymphatic fluid is unable to freely flow through them.  If lymphedema is left untreated, the affected limb could progressively become more swollen, which could lead to hardening of the skin, bulkiness in the limb, infection and impaired wound healing.         There are things you can do to decrease the chance of developing lymphedema.                                          [email or web address]                                                                                                                                                  The information presented is intended for general information and educational purposes. It is not intended to replace the  advice of your health care provider. Contact your health care provider if you believe you have a health problem.

## 2019-10-07 NOTE — PROGRESS NOTES
Physical Therapy Daily Treatment Note     Name: Alina Narayan  Clinic Number: [id number]  Diagnosis: No diagnosis found.  Physician: Cristina Gooden MD    Precautions: none  Visit #: 2 of 20  Time In: 10:05 AM  Time Out: 11:00 AM  Total Treatment Time 1:1: 55 minutes    Evaluation Date: 10/1/19  Visit # authorized: 20  Authorization period: 12/31/19  Plan of care Expiration: 11/26/19  MD referral: Cristina Gooden MD  Insurance: [employer] Managed Medicare      Subjective     Pt reports: right arm feels better and moving right arm better, but continues to c/o soreness and tightness in right anterior chest.  Pain Scale: Alina rates pain on a scale of 5/10 on VAS.   Pain location: right  Anterior chest and axilla with motion    Fatigue: Moderate  Functional change: moving RUE upward better  Diagnosis: Right breast metastatic IMC with involvement of 2 lymph nodes, Triple Negative ; 2012 left breast lumpectomy and radiation  Chief complaint: decreased mobility of right shoulder; c/o tightness and pain right upper arm and right anterior chest  Surgical History: 9/2/19 right chest wall wound exploration and evacuation of hematoma; 8/26/19 right breast modified radical mastectomy and removal of port; 2/1/9 insertion of port  Alina Narayan  has a past surgical history that includes Carpal tunnel release; Shoulder surgery; Dilation and curettage of uterus (1999); Endometrial ablation (1999); left lumpectomy (2012); Eye surgery; Skin biopsy; Cardiac valve replacement (12/2013); Colonoscopy (N/A, 9/29/2017); Breast lumpectomy (Left, 2012); Breast biopsy (Left, 10/1/2012); Breast biopsy (Left, 12/2017); Cardiac valuve replacement (2013); Insertion of tunneled central venous catheter (CVC) with subcutaneous port (Right, 2/1/2019); Modified radical mastectomy w/ axillary lymph node dissection (Right, 8/26/2019); and Mediport removal (Right, 8/26/2019).     Chemotherapy: Denys-Adjuvant  "chemotherapy 2/19 to 8/19  Radiation: none    Objective     Alina received individual therapeutic exercises to improve postural correction and alignment, stretching and soft tissue mobility, and strengthening for 25 minutes including the following:   Supine Shld Flexion with wand      1 x 10  Butterflies                                      10 x 5"  Active Shld Abd seated                  1 x 10  Scap Retractions                          10 x 5"   Standing Table Slides for Abd     1 x 10  Wall Push ups                              1 x 10  IR behind back                              1 x 10      Alina received the following manual therapy techniques were performed to increased myofascial/soft tissue length, mobility and pliability, increase PROM, AROM and function as well as to decrease pain for 30 minutes  Bending, stretching, twisting for right axillary/upper arm cording  Grade II G-H joint mobs  Posterior capsule stretch  Passive stretch right shoulder  STM right anteror chest  Anterior chest stretch      Shoulder Range of Motion: 10/8/19  Active /Passive ROM Right Left   Flexion 135 160   Abduction 100 130   Extension 50 70   IR/90deg 80 @ 85 deg Abd 85   ER/90deg 45 @85 deg Abd 60           Strength: manual muscle test grades below   Upper Extremity Strength    (R) UE (L) UE   Shoulder flexion: 4/5 5/5   Shoulder Abduction: 3-/5 5/5   Shoulder IR 4/5 5/5   Shoulder ER 4/5 5/5   Elbow flexion: 4/5 5/5   Elbow extension: 4/5 5/5   Wrist flexion: 4/5 5/5   Wrist extension: 4/5 5/5    4/5 5/5         Functional Limitations Reporting       CMS Impairment/Limitation/Restriction for FOTO Outcome Survey     Therapist reviewed FOTO scores for Alina Narayan on 10/8/2019.   FOTO documents entered into [employer] - see Media section.     Limitations Score: 78%  Category: Carrying     Current : CK = at least 40% but < 60% impaired, limited or restricted  Goal: CI = at least 1% but < 20% impaired, limited or " restricted  Discharge:             Home Exercise Program and Patient Education   Education provided re:  - role of PT in multi - disciplinary team, goals for PT  - progress towards goals     See EMR under notes/patient instructions for HEP given/taught this session - all sets and reps included. Pt received printed copy.     Pt was able to demonstrate and report understanding and performance  Pt has no cultural, educational or language barriers to learning provided.    Assessment     Patient is responding well to physical therapy. Is at expected function and pain level for 5 weeks post operatively.   Pain after treatment: 3/10     Pt prognosis is Good. Patient received manual therapy as above to decrease soft tissue tightness right anterior chest and right shoulder joint tightness. Patient instructed in self-STM for right anterior chest. Patient instructed in above exercise program with emphasis on stretching right pectoralis muscle and right anterior chest to allow for improved RUE mobility.Improvements with AROM of right shoulder: 20 degrees with flexion and 15 degrees with Abd.  Patient instructed to continue with HEP. Pt will continue to benefit from skilled outpatient physical therapy to address the deficits listed in the problem list chart on initial evaluation, provide pt/family education and to maximize pt's level of independence in the home and community environment.  Patient met 2 STG this session.    Goals as follows:  Short Term goals: 4 weeks  1. Patient will demonstrate 100% understanding of lymphedema risk reduction practices to include self monitoring for lymphedema. (met, 10/8/19)  2. Patient will demonstrate independence with Home Exercise program established. (met, 10/8/19)  3. Pt will increase AROM/PROM in shoulder abduction ROM to 140 degrees on right to improve functional reach, carry, push, pull pain free. (progressing, not met)  4. Pt will increase AROM/PROM in shoulder flexion to 140 degrees  on right to improve functional reach, carry, push, pull pain free.(progressing, not met)  5. Pt will increase AROM/PROM in shoulder ER to 60 degrees (at 90 degrees shld Abd) on right in order to assist with grooming activities (progressing, not met)  6. Strength will be 4+/5 in RUE in order to perform functional activities. (progressing, not met)     Long Term Goals: 8 weeks   1.  Pt will increase AROM/PROM in shoulder flexion to 170 degrees on right to improve functional reach, carry, push, pull pain free. (progressing, not met)  2. Pt will increase strength to 5/5 in gross UE musculature to improve tolerance to all functional activities pain free. (progressing, not met)  3. Pt will demonstrate full/maximized tissue mobility to increase ROM and promote healthy tissue to be pain free at discharge. (progressing, not met)  4. Pt will report decrease in overall worst pain to 2/10 at discharge. (progressing, not met)  5. Pt will increase AROM/PROM in shoulder abduction ROM to 170 degrees on right to improve functional reach, carry, push, pull pain free. (progressing, not met)  6. Pt will increase AROM/PROM in shoulder ER to 90 degrees on right in order to perform all grooming activities independently (progressing, not met)  7. Patient will report compliance with water aerobic prrogram 5x week for 10 min each day to improve overall cardiovascular function and decrease cancer related fatigue at discharge. (progressing, not met     Plan     Continue with established POC toward physical therapy goals.    Therapist: Bibiana Emanuel, PT  10/8/2019

## 2019-10-08 ENCOUNTER — CLINICAL SUPPORT (OUTPATIENT)
Dept: REHABILITATION | Facility: HOSPITAL | Age: 74
End: 2019-10-08
Payer: MEDICARE

## 2019-10-08 DIAGNOSIS — Z85.3 PERSONAL HISTORY OF BREAST CANCER: ICD-10-CM

## 2019-10-08 DIAGNOSIS — C50.611 CANCER OF AXILLARY TAIL OF RIGHT BREAST: ICD-10-CM

## 2019-10-08 DIAGNOSIS — Z91.89 AT RISK FOR LYMPHEDEMA: ICD-10-CM

## 2019-10-08 DIAGNOSIS — M25.611 DECREASED SHOULDER MOBILITY, RIGHT: ICD-10-CM

## 2019-10-08 DIAGNOSIS — C50.011 MALIGNANT NEOPLASM OF AREOLA OF RIGHT BREAST IN FEMALE, UNSPECIFIED ESTROGEN RECEPTOR STATUS: ICD-10-CM

## 2019-10-08 PROCEDURE — 97140 MANUAL THERAPY 1/> REGIONS: CPT | Mod: HCNC | Performed by: PHYSICAL MEDICINE & REHABILITATION

## 2019-10-08 PROCEDURE — 97110 THERAPEUTIC EXERCISES: CPT | Mod: HCNC | Performed by: PHYSICAL MEDICINE & REHABILITATION

## 2019-10-08 NOTE — PATIENT INSTRUCTIONS
ROM: Abduction (Sitting orStanding)    Bring arms straight out from sides and raise as high as possible without pain.  Repeat 5 times/2 sets. Do 1 sessions 2x per day.          Stand sideways with your hand on  the table. Slide your hand sideways.  Perform 10 times.  Copyright © 0085-1322 [employer]      Wall Push-ups  .  Facing wall with both hands on wall at shoulder height and shoulder-width apart. Keeping body straight, lean forward allowing elbows to bend then push out again.  Repeat 10 times. Do 1 sessions 2x per day.        Shoulder Internal Rotation behind your Back    Bring right arm backward, then bend your elbow and then reach toward your waist/back pocket.   Perform _5-10__ times. Perform _1__ sets. Perform _2__ times per day  Copyright © 4127-0211 [employer]

## 2019-10-14 ENCOUNTER — LAB VISIT (OUTPATIENT)
Dept: LAB | Facility: HOSPITAL | Age: 74
End: 2019-10-14
Attending: INTERNAL MEDICINE
Payer: MEDICARE

## 2019-10-14 ENCOUNTER — OFFICE VISIT (OUTPATIENT)
Dept: INTERNAL MEDICINE | Facility: CLINIC | Age: 74
End: 2019-10-14
Payer: MEDICARE

## 2019-10-14 VITALS
HEIGHT: 62 IN | WEIGHT: 231.5 LBS | OXYGEN SATURATION: 99 % | SYSTOLIC BLOOD PRESSURE: 112 MMHG | DIASTOLIC BLOOD PRESSURE: 70 MMHG | HEART RATE: 83 BPM | BODY MASS INDEX: 42.6 KG/M2 | TEMPERATURE: 98 F

## 2019-10-14 DIAGNOSIS — E55.9 VITAMIN D DEFICIENCY DISEASE: ICD-10-CM

## 2019-10-14 DIAGNOSIS — E66.9 DIABETES MELLITUS TYPE 2 IN OBESE: ICD-10-CM

## 2019-10-14 DIAGNOSIS — E11.69 DIABETES MELLITUS TYPE 2 IN OBESE: ICD-10-CM

## 2019-10-14 DIAGNOSIS — D64.9 ANEMIA, UNSPECIFIED TYPE: ICD-10-CM

## 2019-10-14 DIAGNOSIS — D64.9 ANEMIA, UNSPECIFIED TYPE: Primary | ICD-10-CM

## 2019-10-14 LAB
25(OH)D3+25(OH)D2 SERPL-MCNC: 27 NG/ML (ref 30–96)
ALBUMIN SERPL BCP-MCNC: 3.9 G/DL (ref 3.5–5.2)
ALP SERPL-CCNC: 88 U/L (ref 55–135)
ALT SERPL W/O P-5'-P-CCNC: 14 U/L (ref 10–44)
ANION GAP SERPL CALC-SCNC: 9 MMOL/L (ref 8–16)
AST SERPL-CCNC: 15 U/L (ref 10–40)
BASOPHILS # BLD AUTO: 0.01 K/UL (ref 0–0.2)
BASOPHILS NFR BLD: 0.2 % (ref 0–1.9)
BILIRUB SERPL-MCNC: 0.7 MG/DL (ref 0.1–1)
BUN SERPL-MCNC: 18 MG/DL (ref 8–23)
CALCIUM SERPL-MCNC: 9.7 MG/DL (ref 8.7–10.5)
CHLORIDE SERPL-SCNC: 103 MMOL/L (ref 95–110)
CO2 SERPL-SCNC: 31 MMOL/L (ref 23–29)
CREAT SERPL-MCNC: 0.9 MG/DL (ref 0.5–1.4)
DIFFERENTIAL METHOD: ABNORMAL
EOSINOPHIL # BLD AUTO: 0.1 K/UL (ref 0–0.5)
EOSINOPHIL NFR BLD: 2.2 % (ref 0–8)
ERYTHROCYTE [DISTWIDTH] IN BLOOD BY AUTOMATED COUNT: 15.6 % (ref 11.5–14.5)
EST. GFR  (AFRICAN AMERICAN): >60 ML/MIN/1.73 M^2
EST. GFR  (NON AFRICAN AMERICAN): >60 ML/MIN/1.73 M^2
FERRITIN SERPL-MCNC: 89 NG/ML (ref 20–300)
GLUCOSE SERPL-MCNC: 124 MG/DL (ref 70–110)
HCT VFR BLD AUTO: 36 % (ref 37–48.5)
HGB BLD-MCNC: 10.7 G/DL (ref 12–16)
IMM GRANULOCYTES # BLD AUTO: 0.02 K/UL (ref 0–0.04)
IMM GRANULOCYTES NFR BLD AUTO: 0.4 % (ref 0–0.5)
IRON SERPL-MCNC: 79 UG/DL (ref 30–160)
LYMPHOCYTES # BLD AUTO: 1 K/UL (ref 1–4.8)
LYMPHOCYTES NFR BLD: 20.6 % (ref 18–48)
MCH RBC QN AUTO: 29.9 PG (ref 27–31)
MCHC RBC AUTO-ENTMCNC: 29.7 G/DL (ref 32–36)
MCV RBC AUTO: 101 FL (ref 82–98)
MONOCYTES # BLD AUTO: 0.5 K/UL (ref 0.3–1)
MONOCYTES NFR BLD: 10.7 % (ref 4–15)
NEUTROPHILS # BLD AUTO: 3.3 K/UL (ref 1.8–7.7)
NEUTROPHILS NFR BLD: 65.9 % (ref 38–73)
NRBC BLD-RTO: 0 /100 WBC
PLATELET # BLD AUTO: 129 K/UL (ref 150–350)
PMV BLD AUTO: 11 FL (ref 9.2–12.9)
POTASSIUM SERPL-SCNC: 4.3 MMOL/L (ref 3.5–5.1)
PROT SERPL-MCNC: 7.2 G/DL (ref 6–8.4)
RBC # BLD AUTO: 3.58 M/UL (ref 4–5.4)
SATURATED IRON: 19 % (ref 20–50)
SODIUM SERPL-SCNC: 143 MMOL/L (ref 136–145)
TOTAL IRON BINDING CAPACITY: 422 UG/DL (ref 250–450)
TRANSFERRIN SERPL-MCNC: 285 MG/DL (ref 200–375)
TSH SERPL DL<=0.005 MIU/L-ACNC: 2.12 UIU/ML (ref 0.4–4)
WBC # BLD AUTO: 4.94 K/UL (ref 3.9–12.7)

## 2019-10-14 PROCEDURE — 99213 OFFICE O/P EST LOW 20 MIN: CPT | Mod: HCNC,S$GLB,, | Performed by: INTERNAL MEDICINE

## 2019-10-14 PROCEDURE — 85025 COMPLETE CBC W/AUTO DIFF WBC: CPT | Mod: HCNC

## 2019-10-14 PROCEDURE — 99213 PR OFFICE/OUTPT VISIT, EST, LEVL III, 20-29 MIN: ICD-10-PCS | Mod: HCNC,S$GLB,, | Performed by: INTERNAL MEDICINE

## 2019-10-14 PROCEDURE — 83036 HEMOGLOBIN GLYCOSYLATED A1C: CPT | Mod: HCNC

## 2019-10-14 PROCEDURE — 36415 COLL VENOUS BLD VENIPUNCTURE: CPT | Mod: HCNC

## 2019-10-14 PROCEDURE — 3078F DIAST BP <80 MM HG: CPT | Mod: HCNC,CPTII,S$GLB, | Performed by: INTERNAL MEDICINE

## 2019-10-14 PROCEDURE — 84443 ASSAY THYROID STIM HORMONE: CPT | Mod: HCNC

## 2019-10-14 PROCEDURE — 3078F PR MOST RECENT DIASTOLIC BLOOD PRESSURE < 80 MM HG: ICD-10-PCS | Mod: HCNC,CPTII,S$GLB, | Performed by: INTERNAL MEDICINE

## 2019-10-14 PROCEDURE — 1101F PR PT FALLS ASSESS DOC 0-1 FALLS W/OUT INJ PAST YR: ICD-10-PCS | Mod: HCNC,CPTII,S$GLB, | Performed by: INTERNAL MEDICINE

## 2019-10-14 PROCEDURE — 3074F SYST BP LT 130 MM HG: CPT | Mod: HCNC,CPTII,S$GLB, | Performed by: INTERNAL MEDICINE

## 2019-10-14 PROCEDURE — 80053 COMPREHEN METABOLIC PANEL: CPT | Mod: HCNC

## 2019-10-14 PROCEDURE — 82306 VITAMIN D 25 HYDROXY: CPT | Mod: HCNC

## 2019-10-14 PROCEDURE — 1101F PT FALLS ASSESS-DOCD LE1/YR: CPT | Mod: HCNC,CPTII,S$GLB, | Performed by: INTERNAL MEDICINE

## 2019-10-14 PROCEDURE — 99999 PR PBB SHADOW E&M-EST. PATIENT-LVL V: CPT | Mod: PBBFAC,HCNC,, | Performed by: INTERNAL MEDICINE

## 2019-10-14 PROCEDURE — 83540 ASSAY OF IRON: CPT | Mod: HCNC

## 2019-10-14 PROCEDURE — 3074F PR MOST RECENT SYSTOLIC BLOOD PRESSURE < 130 MM HG: ICD-10-PCS | Mod: HCNC,CPTII,S$GLB, | Performed by: INTERNAL MEDICINE

## 2019-10-14 PROCEDURE — 3044F PR MOST RECENT HEMOGLOBIN A1C LEVEL <7.0%: ICD-10-PCS | Mod: HCNC,CPTII,S$GLB, | Performed by: INTERNAL MEDICINE

## 2019-10-14 PROCEDURE — 82728 ASSAY OF FERRITIN: CPT | Mod: HCNC

## 2019-10-14 PROCEDURE — 99999 PR PBB SHADOW E&M-EST. PATIENT-LVL V: ICD-10-PCS | Mod: PBBFAC,HCNC,, | Performed by: INTERNAL MEDICINE

## 2019-10-14 PROCEDURE — 3044F HG A1C LEVEL LT 7.0%: CPT | Mod: HCNC,CPTII,S$GLB, | Performed by: INTERNAL MEDICINE

## 2019-10-14 RX ORDER — NYSTATIN 100000 [USP'U]/G
POWDER TOPICAL
Qty: 60 G | Refills: 1 | Status: SHIPPED | OUTPATIENT
Start: 2019-10-14 | End: 2020-01-29

## 2019-10-14 RX ORDER — FLUCONAZOLE 100 MG/1
100 TABLET ORAL DAILY
Qty: 10 TABLET | Refills: 0 | Status: SHIPPED | OUTPATIENT
Start: 2019-10-14 | End: 2019-11-13

## 2019-10-15 LAB
ESTIMATED AVG GLUCOSE: 117 MG/DL (ref 68–131)
HBA1C MFR BLD HPLC: 5.7 % (ref 4–5.6)
LEFT EYE DM RETINOPATHY: NEGATIVE
RIGHT EYE DM RETINOPATHY: NEGATIVE

## 2019-10-16 ENCOUNTER — OFFICE VISIT (OUTPATIENT)
Dept: HEMATOLOGY/ONCOLOGY | Facility: CLINIC | Age: 74
End: 2019-10-16
Payer: MEDICARE

## 2019-10-16 ENCOUNTER — CLINICAL SUPPORT (OUTPATIENT)
Dept: REHABILITATION | Facility: HOSPITAL | Age: 74
End: 2019-10-16
Payer: MEDICARE

## 2019-10-16 VITALS
HEART RATE: 81 BPM | WEIGHT: 233.44 LBS | SYSTOLIC BLOOD PRESSURE: 133 MMHG | BODY MASS INDEX: 42.96 KG/M2 | OXYGEN SATURATION: 100 % | TEMPERATURE: 98 F | DIASTOLIC BLOOD PRESSURE: 60 MMHG | RESPIRATION RATE: 16 BRPM | HEIGHT: 62 IN

## 2019-10-16 DIAGNOSIS — C50.611 CANCER OF AXILLARY TAIL OF RIGHT BREAST: Primary | ICD-10-CM

## 2019-10-16 DIAGNOSIS — Z91.89 AT RISK FOR LYMPHEDEMA: ICD-10-CM

## 2019-10-16 DIAGNOSIS — Z85.3 PERSONAL HISTORY OF BREAST CANCER: ICD-10-CM

## 2019-10-16 DIAGNOSIS — C50.611 CANCER OF AXILLARY TAIL OF RIGHT BREAST: ICD-10-CM

## 2019-10-16 DIAGNOSIS — I26.99 BILATERAL PULMONARY EMBOLISM: ICD-10-CM

## 2019-10-16 DIAGNOSIS — Z90.10 DEFORMITY DUE TO MASTECTOMY: ICD-10-CM

## 2019-10-16 DIAGNOSIS — M25.611 DECREASED SHOULDER MOBILITY, RIGHT: ICD-10-CM

## 2019-10-16 DIAGNOSIS — Z17.0 MALIGNANT NEOPLASM OF NIPPLE OF RIGHT BREAST IN FEMALE, ESTROGEN RECEPTOR POSITIVE: ICD-10-CM

## 2019-10-16 DIAGNOSIS — C50.011 MALIGNANT NEOPLASM OF NIPPLE OF RIGHT BREAST IN FEMALE, ESTROGEN RECEPTOR POSITIVE: ICD-10-CM

## 2019-10-16 DIAGNOSIS — N64.89 DEFORMITY DUE TO MASTECTOMY: ICD-10-CM

## 2019-10-16 PROCEDURE — 99214 PR OFFICE/OUTPT VISIT, EST, LEVL IV, 30-39 MIN: ICD-10-PCS | Mod: HCNC,S$GLB,, | Performed by: INTERNAL MEDICINE

## 2019-10-16 PROCEDURE — 97110 THERAPEUTIC EXERCISES: CPT | Mod: HCNC | Performed by: PHYSICAL MEDICINE & REHABILITATION

## 2019-10-16 PROCEDURE — 99999 PR PBB SHADOW E&M-EST. PATIENT-LVL IV: ICD-10-PCS | Mod: PBBFAC,HCNC,, | Performed by: INTERNAL MEDICINE

## 2019-10-16 PROCEDURE — 97140 MANUAL THERAPY 1/> REGIONS: CPT | Mod: HCNC | Performed by: PHYSICAL MEDICINE & REHABILITATION

## 2019-10-16 PROCEDURE — 3078F PR MOST RECENT DIASTOLIC BLOOD PRESSURE < 80 MM HG: ICD-10-PCS | Mod: HCNC,CPTII,S$GLB, | Performed by: INTERNAL MEDICINE

## 2019-10-16 PROCEDURE — 3078F DIAST BP <80 MM HG: CPT | Mod: HCNC,CPTII,S$GLB, | Performed by: INTERNAL MEDICINE

## 2019-10-16 PROCEDURE — 1101F PR PT FALLS ASSESS DOC 0-1 FALLS W/OUT INJ PAST YR: ICD-10-PCS | Mod: HCNC,CPTII,S$GLB, | Performed by: INTERNAL MEDICINE

## 2019-10-16 PROCEDURE — 99214 OFFICE O/P EST MOD 30 MIN: CPT | Mod: HCNC,S$GLB,, | Performed by: INTERNAL MEDICINE

## 2019-10-16 PROCEDURE — 3075F PR MOST RECENT SYSTOLIC BLOOD PRESS GE 130-139MM HG: ICD-10-PCS | Mod: HCNC,CPTII,S$GLB, | Performed by: INTERNAL MEDICINE

## 2019-10-16 PROCEDURE — 3075F SYST BP GE 130 - 139MM HG: CPT | Mod: HCNC,CPTII,S$GLB, | Performed by: INTERNAL MEDICINE

## 2019-10-16 PROCEDURE — 1101F PT FALLS ASSESS-DOCD LE1/YR: CPT | Mod: HCNC,CPTII,S$GLB, | Performed by: INTERNAL MEDICINE

## 2019-10-16 PROCEDURE — 99999 PR PBB SHADOW E&M-EST. PATIENT-LVL IV: CPT | Mod: PBBFAC,HCNC,, | Performed by: INTERNAL MEDICINE

## 2019-10-16 NOTE — PROGRESS NOTES
Physical Therapy Daily Treatment Note     Name: Alina Narayan  Clinic Number: [id number]  Diagnosis:   Encounter Diagnoses   Name Primary?    Decreased shoulder mobility, right     At risk for lymphedema     Cancer of axillary tail of right breast     Personal history of breast cancer     Malignant neoplasm of nipple of right breast in female, estrogen receptor positive      Physician: Cristina Gooden MD    Precautions: none  Visit #: 3 of 20  Time In: 10:05 AM  Time Out: 11:00 AM  Total Treatment Time 1:1: 55 minutes    Evaluation Date: 10/1/19  Visit # authorized: 20  Authorization period: 12/31/19  Plan of care Expiration: 11/26/19  MD referral: Cristina Gooden MD  Insurance: [employer]      Subjective     Pt reports: soreness from cording in right upper arm and forearm. States still feels tightness in right anterior chest.  Pain Scale: Alina rates pain on a scale of 8/10 on VAS.   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, Triple Negative ; 2012 left breast lumpectomy and radiation  Chief complaint: decreased mobility of right shoulder; c/o tightness and pain right upper arm and right anterior chest  Surgical History: 9/2/19 right chest wall wound exploration and evacuation of hematoma; 8/26/19 right breast modified radical mastectomy and removal of port; 2/1/9 insertion of port  Alina Narayan  has a past surgical history that includes Carpal tunnel release; Shoulder surgery; Dilation and curettage of uterus (1999); Endometrial ablation (1999); left lumpectomy (2012); Eye surgery; Skin biopsy; Cardiac valve replacement (12/2013); Colonoscopy (N/A, 9/29/2017); Breast lumpectomy (Left, 2012); Breast biopsy (Left, 10/1/2012); Breast biopsy (Left, 12/2017); Cardiac valuve replacement (2013); Insertion of tunneled  "central venous catheter (CVC) with subcutaneous port (Right, 2/1/2019); Modified radical mastectomy w/ axillary lymph node dissection (Right, 8/26/2019); and Mediport removal (Right, 8/26/2019).     Chemotherapy: Denys-Adjuvant chemotherapy 2/19 to 8/19  Radiation: none    Objective     Alina received individual therapeutic exercises to improve postural correction and alignment, stretching and soft tissue mobility, and strengthening for 25 minutes including the following:   Supine Shld Flexion                      1 x 10  Butterflies                                      10 x 5"  Active Shld Abd seated                  1 x 10  Scap Retractions                          10 x 5"  IR behind back                              1 x 10  ER on wall                                    10 x 5"  Wall Climbs   Forward                1 x 10                         Sideways              1 x 5    Applied Size F tubigrip to RUE for support      Alina received the following manual therapy techniques were performed to increased myofascial/soft tissue length, mobility and pliability, increase PROM, AROM and function as well as to decrease pain for 30 minutes  Bending, stretching, twisting for right axillary/upper arm cording  Grade II G-H joint mobs  Posterior capsule stretch  Passive stretch right shoulder  STM right anteror chest  Anterior and lateral chest stretch      Shoulder Range of Motion: 10/16/19  Active /Passive ROM Right Left   Flexion 130 160   Abduction 110 130   Extension 50 70   IR/90deg 80 @ 85 deg Abd 85   ER/90deg 60 @85 deg Abd 60           Strength: manual muscle test grades below   Upper Extremity Strength    (R) UE (L) UE   Shoulder flexion: 4/5 5/5   Shoulder Abduction: 3-/5 5/5   Shoulder IR 4/5 5/5   Shoulder ER 4/5 5/5   Elbow flexion: 4/5 5/5   Elbow extension: 4/5 5/5   Wrist flexion: 4/5 5/5   Wrist extension: 4/5 5/5    4/5 5/5         Functional Limitations Reporting       CMS " Impairment/Limitation/Restriction for FOTO Outcome Survey     Therapist reviewed FOTO scores for Alina Narayan on 10/8/2019.   FOTO documents entered into [employer] - see Media section.     Limitations Score: 45%  Category: Carrying     Current : CK = at least 40% but < 60% impaired, limited or restricted  Goal: CI = at least 1% but < 20% impaired, limited or restricted              Home Exercise Program and Patient Education   Education provided re:  - role of PT in multi - disciplinary team, goals for PT  - progress towards goals     See EMR under notes/patient instructions for HEP given/taught this session - all sets and reps included. Pt received printed copy.     Pt was able to demonstrate and report understanding and performance  Pt has no cultural, educational or language barriers to learning provided.    Assessment     Patient is responding well to physical therapy. Is at expected function and pain level for 5 weeks post operatively.   Pain after treatment: 5/10     Pt prognosis is Good. Patient received manual therapy as above to decrease RUE cording, soft tissue tightness right anterior chest and right shoulder joint tightness. Moderate cording remains right upper arm, elbow and forearm. Patient instructed in above exercise program  And tolerated new exercises added today with emphasis on stretching RUE cording and anterior chest to allow for improved RUE mobility.Improvements with AROM of right shoulder: 10 degrees with Abd & ER.  Patient instructed to continue with HEP. Pt will continue to benefit from skilled outpatient physical therapy to address the deficits listed in the problem list chart on initial evaluation, provide pt/family education and to maximize pt's level of independence in the home and community environment.      Goals as follows:  Short Term goals: 4 weeks  1. Patient will demonstrate 100% understanding of lymphedema risk reduction practices to include self monitoring for lymphedema. (met,  10/8/19)  2. Patient will demonstrate independence with Home Exercise program established. (met, 10/8/19)  3. Pt will increase AROM/PROM in shoulder abduction ROM to 140 degrees on right to improve functional reach, carry, push, pull pain free. (progressing, not met)  4. Pt will increase AROM/PROM in shoulder flexion to 140 degrees on right to improve functional reach, carry, push, pull pain free.(progressing, not met)  5. Pt will increase AROM/PROM in shoulder ER to 60 degrees (at 90 degrees shld Abd) on right in order to assist with grooming activities (progressing, not met)  6. Strength will be 4+/5 in RUE in order to perform functional activities. (progressing, not met)     Long Term Goals: 8 weeks   1.  Pt will increase AROM/PROM in shoulder flexion to 170 degrees on right to improve functional reach, carry, push, pull pain free. (progressing, not met)  2. Pt will increase strength to 5/5 in gross UE musculature to improve tolerance to all functional activities pain free. (progressing, not met)  3. Pt will demonstrate full/maximized tissue mobility to increase ROM and promote healthy tissue to be pain free at discharge. (progressing, not met)  4. Pt will report decrease in overall worst pain to 2/10 at discharge. (progressing, not met)  5. Pt will increase AROM/PROM in shoulder abduction ROM to 170 degrees on right to improve functional reach, carry, push, pull pain free. (progressing, not met)  6. Pt will increase AROM/PROM in shoulder ER to 90 degrees on right in order to perform all grooming activities independently (progressing, not met)  7. Patient will report compliance with water aerobic prrogram 5x week for 10 min each day to improve overall cardiovascular function and decrease cancer related fatigue at discharge. (progressing, not met     Plan     Continue with established POC toward physical therapy goals.    Therapist: Bibiana Emanuel, PT  10/16/2019

## 2019-10-16 NOTE — PATIENT INSTRUCTIONS
Shoulder External Rotation - on Wall    One hand on wall, rotate shoulder by stepping outward.  Hold 5 seconds - repeat 10x.  Do 2 sessions per day.    Wall Climb    Perform this exercise two (2) times a day with ten (10) repetitions.    Stand and FACE THE WALL with your toes 10 to 12 inches away from the wall.    a. Place the fingers of your affected arm on the wall and slowly walk your fingers up the wall. Let your fingers climb the wall as high as possible without feeling pulling or pain.  b. Hold this stretch for 5 seconds then move your fingers back down the wall.  c. Try to go a little higher each time. It may relax you a bit if you rest your head against the wall.          Wall Walk (Shoulder Abduction)  Using your affected arm, walk your hand up  a wall straight out to the side, as high as you  are able. Perform 5 times/2 times a day.  Copyright © 6091-1179 [employer]

## 2019-10-16 NOTE — PROGRESS NOTES
Subjective:       Patient ID: Alina Narayan is a 74 y.o. female.    Chief Complaint: No chief complaint on file.    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  She had  surgery on August 26.  That mastectomy specimen showed pathological complete response in the breast and axilla.    She continues on Lovenox for her history of pulmonary embolism diagnosed in May of this year.    Overall she has been doing well.  She has gradually been improving her activity level.  She does have some swelling and some pain in her right arm.        Current history:  abnormal mammogram of the right breast in December 2018.  She was having no breast symptoms at that time    That mammogram showed a 13 mm mass in the right axillary tail.  By ultrasound there was a 6 x 9 x 11 mm intramammary node and a 2nd 5 x 6 x 6 mm hypoechoic mass in the axillary tail.  On January 7, 2019 both masses were biopsied and both showed lymph nodes with metastatic carcinoma which was ER negative WY negative and HER2 negative.  Ki-67 was 40%.    MRI on January 15, 2019 showed 2 right axillary lymph nodes the largest measured 1.4 x 1.0 cm.  There were no abnormalities in the right breast.    PET scan was then performed which showed only low-grade activity in the right axilla with an SUV of 1.32.      She has received 12 cycles of weekly Taxol and 4 cycles of CMF which she completed on July 30th.      Previous breast cancer history History: On September 25, 2012 she underwent a screening mammogram which showed an asymmetric density in the left breast at 2:00 position. A followup mammogram on the 27th showed an oval mass in that area ultrasound showed a 6 mm solid mass. Core needle biopsy on October 1 showed invasive carcinoma ER 90% positive WY 80% positive and HER-2 negative. On October 29 she underwent lumpectomy and sentinel lymph node biopsy.  That showed a 7 mm low-grade (1+2+1) infiltrating carcinoma. 3 sentinel lymph nodes were negative. Final pathological stage TIB N0 stage IA.  She completed letrozole therapy in 2017  Review of Systems   Constitutional: Negative for appetite change and unexpected weight change.   Eyes: Negative for visual disturbance.   Respiratory: Negative for cough and shortness of breath.    Cardiovascular: Negative for chest pain.   Gastrointestinal: Negative for abdominal pain and diarrhea.   Genitourinary: Negative for frequency.   Musculoskeletal: Negative for back pain.        Right arm pain and swelling   Skin: Positive for rash.   Neurological: Negative for headaches.   Hematological: Negative for adenopathy.   Psychiatric/Behavioral: The patient is not nervous/anxious.        Objective:      Physical Exam   Constitutional: She is oriented to person, place, and time. She appears well-developed and well-nourished. No distress.   Eyes: No scleral icterus.   Cardiovascular: Normal rate and regular rhythm.   Pulmonary/Chest: Effort normal and breath sounds normal. She has no wheezes. She has no rales. Left breast exhibits no mass, no nipple discharge and no skin change.       Abdominal: Soft. There is no tenderness.   Musculoskeletal: She exhibits edema (Mild right arm).   Neurological: She is alert and oriented to person, place, and time.   Psychiatric: She has a normal mood and affect. Her behavior is normal. Thought content normal.   Vitals reviewed.      Assessment:       1. Cancer of axillary tail of right breast        Plan:       She will return to clinic in 3 months time.  Continue current anticoagulation - change from Lovenox to Xarelto.

## 2019-10-23 ENCOUNTER — CLINICAL SUPPORT (OUTPATIENT)
Dept: REHABILITATION | Facility: HOSPITAL | Age: 74
End: 2019-10-23
Payer: MEDICARE

## 2019-10-23 DIAGNOSIS — Z91.89 AT RISK FOR LYMPHEDEMA: ICD-10-CM

## 2019-10-23 DIAGNOSIS — C50.611 CANCER OF AXILLARY TAIL OF RIGHT BREAST: ICD-10-CM

## 2019-10-23 DIAGNOSIS — Z85.3 PERSONAL HISTORY OF BREAST CANCER: ICD-10-CM

## 2019-10-23 DIAGNOSIS — Z17.0 MALIGNANT NEOPLASM OF NIPPLE OF RIGHT BREAST IN FEMALE, ESTROGEN RECEPTOR POSITIVE: ICD-10-CM

## 2019-10-23 DIAGNOSIS — M25.611 DECREASED SHOULDER MOBILITY, RIGHT: ICD-10-CM

## 2019-10-23 DIAGNOSIS — C50.011 MALIGNANT NEOPLASM OF NIPPLE OF RIGHT BREAST IN FEMALE, ESTROGEN RECEPTOR POSITIVE: ICD-10-CM

## 2019-10-23 PROCEDURE — 97140 MANUAL THERAPY 1/> REGIONS: CPT | Mod: HCNC | Performed by: PHYSICAL MEDICINE & REHABILITATION

## 2019-10-23 PROCEDURE — 97110 THERAPEUTIC EXERCISES: CPT | Mod: HCNC | Performed by: PHYSICAL MEDICINE & REHABILITATION

## 2019-10-23 NOTE — PATIENT INSTRUCTIONS
ROM: External Rotation - Wand (supine)    Lie on back holding wand with elbows bent to 90°. Rotate forearms over head as far as possible.   Repeat __10__ times per set. Do __1__ sets per session. Do __2__ sessions per day.        Contract / Relax: External Rotation    Place right arm, elbow bent, beside head on pillow. Use 1-3 pillows to be comfortable.  REST AND RELAX. Hold 10 seconds. Repeat 5 x.  Do 2 times per day.         ROM: Flexion - Wand (Supine)        Lie on back holding wand. Raise arms over head.   Repeat __10__ times per set. Do _1___ sets per session. Do __2__ sessions per day.  Copyright © [employer]. All rights reserved.        ROM: Abduction (Sitting or lying down)    Squeeze your shoulder blades and then bring right arm up sideways.  .  Repeat 10 times. Do 1 sessions 2x per day.      Shoulder Internal Rotation behind your Back    Bring right/left arm backward and then reach toward your waist.   Perform __10_ times. Perform _1__ sets. Perform __2_ times per day  Copyright © 6616-0861 [employer]

## 2019-10-23 NOTE — PROGRESS NOTES
Physical Therapy Daily Treatment Note     Name: Alina Narayan  Clinic Number: [id number]  Diagnosis:   Encounter Diagnoses   Name Primary?    Decreased shoulder mobility, right     At risk for lymphedema     Cancer of axillary tail of right breast     Personal history of breast cancer     Malignant neoplasm of nipple of right breast in female, estrogen receptor positive      Physician: Cristina Gooden MD    Precautions: none  Visit #: 4 of 20  Time In: 10:00 AM  Time Out: 10:55 AM  Total Treatment Time 1:1: 55 minutes    Evaluation Date: 10/1/19  Visit # authorized: 20  Authorization period: 12/31/19  Plan of care Expiration: 11/26/19  MD referral: Cristina Gooden MD  Insurance: [employer]      Subjective     Pt reports: continued pain with cording in right upper arm, forearm, and wrist. States right forearm area is also swollen..  Pain Scale: Alina rates pain on a scale of 5/10 on VAS.   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, Triple Negative ; 2012 left breast lumpectomy and radiation  Chief complaint: decreased mobility of right shoulder; c/o tightness and pain right upper arm and right anterior chest  Surgical History: 9/2/19 right chest wall wound exploration and evacuation of hematoma; 8/26/19 right breast modified radical mastectomy and removal of port; 2/1/9 insertion of port  Alina Narayan  has a past surgical history that includes Carpal tunnel release; Shoulder surgery; Dilation and curettage of uterus (1999); Endometrial ablation (1999); left lumpectomy (2012); Eye surgery; Skin biopsy; Cardiac valve replacement (12/2013); Colonoscopy (N/A, 9/29/2017); Breast lumpectomy (Left, 2012); Breast biopsy (Left, 10/1/2012); Breast biopsy (Left, 12/2017); Cardiac valuve replacement (2013); Insertion of tunneled  "central venous catheter (CVC) with subcutaneous port (Right, 2/1/2019); Modified radical mastectomy w/ axillary lymph node dissection (Right, 8/26/2019); and Mediport removal (Right, 8/26/2019).     Chemotherapy: Denys-Adjuvant chemotherapy 2/19 to 8/19  Radiation: none    Objective     Appearance: RUE with mild swelling right proximal forearm. Mild bruising present right proximal forearm along area of cording.      Alina received individual therapeutic exercises to improve postural correction and alignment, stretching and soft tissue mobility, and strengthening for 25 minutes including the following:   Supine Shld Flexion                      1 x 10  Contract/Relax ER on pillow         5 x 10"  Active Shld Abd seated                  1 x 10  Scap Retractions                          10 x 5"  IR behind back                              1 x 10  ER on wall                                    10 x 5"--not done today due to pain  Wall Climbs   Forward                1 x 10--  Not done today due to pain                         Sideways              1 x 5--   Not done today due to pain    Applied Size F tubigrip to RUE for support      Alina received the following manual therapy techniques were performed to increased myofascial/soft tissue length, mobility and pliability, increase PROM, AROM and function as well as to decrease pain for 30 minutes  Bending, stretching, twisting for right axillary/upper arm cording  Grade II G-H joint mobs  Posterior capsule stretch  Passive stretch right shoulder  STM right anteror chest  Anterior and lateral chest stretch      Shoulder Range of Motion: 10/16/19  Active /Passive ROM Right Left   Flexion 130 160   Abduction 110 130   Extension 50 70   IR/90deg 80 @ 85 deg Abd 85   ER/90deg 60 @85 deg Abd 60           Strength: manual muscle test grades below   Upper Extremity Strength    (R) UE (L) UE   Shoulder flexion: 4/5 5/5   Shoulder Abduction: 3-/5 5/5   Shoulder IR 4/5 5/5   Shoulder " ER 4/5 5/5   Elbow flexion: 4/5 5/5   Elbow extension: 4/5 5/5   Wrist flexion: 4/5 5/5   Wrist extension: 4/5 5/5    4/5 5/5         Functional Limitations Reporting       CMS Impairment/Limitation/Restriction for FOTO Outcome Survey     Therapist reviewed FOTO scores for Alina Narayan on 10/23/2019.   FOTO documents entered into [employer] - see Media section.     Limitations Score: 45%  Category: Carrying     Current : CK = at least 40% but < 60% impaired, limited or restricted  Goal: CI = at least 1% but < 20% impaired, limited or restricted              Home Exercise Program and Patient Education   Education provided re:  - role of PT in multi - disciplinary team, goals for PT  - progress towards goals     See EMR under notes/patient instructions for HEP given/taught this session - all sets and reps included. Pt received printed copy.     Pt was able to demonstrate and report understanding and performance  Pt has no cultural, educational or language barriers to learning provided.    Assessment     Patient is responding well to physical therapy. Is at expected function and pain level for 6 weeks post operatively.   Pain after treatment: 4/10     Pt prognosis is Good. Patient received manual therapy as above to decrease RUE cording, soft tissue tightness right anterior chest and right shoulder joint tightness. Moderate cording remains right upper arm, elbow and forearm. Patient instructed in above exercise program and modified exercise program for patient to help decrease her pain. I had patient wear tubigrip sleeve to RUE with exercises to provide support. Slight decrease in pain RUE post exercise. Carmen Poe RN--Dr. Flores's nurse- did come and look at Ms. Narayan's right arm. She did not see signs of infection in RUE.  Patient instructed to continue with modified HEP. Pt will continue to benefit from skilled outpatient physical therapy to address the deficits listed in the problem list chart on initial  evaluation, provide pt/family education and to maximize pt's level of independence in the home and community environment.      Goals as follows:  Short Term goals: 4 weeks  1. Patient will demonstrate 100% understanding of lymphedema risk reduction practices to include self monitoring for lymphedema. (met, 10/8/19)  2. Patient will demonstrate independence with Home Exercise program established. (met, 10/8/19)  3. Pt will increase AROM/PROM in shoulder abduction ROM to 140 degrees on right to improve functional reach, carry, push, pull pain free. (progressing, not met)  4. Pt will increase AROM/PROM in shoulder flexion to 140 degrees on right to improve functional reach, carry, push, pull pain free.(progressing, not met)  5. Pt will increase AROM/PROM in shoulder ER to 60 degrees (at 90 degrees shld Abd) on right in order to assist with grooming activities (progressing, not met)  6. Strength will be 4+/5 in RUE in order to perform functional activities. (progressing, not met)     Long Term Goals: 8 weeks   1.  Pt will increase AROM/PROM in shoulder flexion to 170 degrees on right to improve functional reach, carry, push, pull pain free. (progressing, not met)  2. Pt will increase strength to 5/5 in gross UE musculature to improve tolerance to all functional activities pain free. (progressing, not met)  3. Pt will demonstrate full/maximized tissue mobility to increase ROM and promote healthy tissue to be pain free at discharge. (progressing, not met)  4. Pt will report decrease in overall worst pain to 2/10 at discharge. (progressing, not met)  5. Pt will increase AROM/PROM in shoulder abduction ROM to 170 degrees on right to improve functional reach, carry, push, pull pain free. (progressing, not met)  6. Pt will increase AROM/PROM in shoulder ER to 90 degrees on right in order to perform all grooming activities independently (progressing, not met)  7. Patient will report compliance with water aerobic prrogram 5x  week for 10 min each day to improve overall cardiovascular function and decrease cancer related fatigue at discharge. (progressing, not met     Plan     Continue with established POC toward physical therapy goals.    Therapist: Bibiana Emanuel, PT  10/23/2019

## 2019-10-25 ENCOUNTER — PATIENT MESSAGE (OUTPATIENT)
Dept: ADMINISTRATIVE | Facility: OTHER | Age: 74
End: 2019-10-25

## 2019-10-31 NOTE — TELEPHONE ENCOUNTER
----- Message from Alicia Marion sent at 3/20/2017 10:42 AM CDT -----  Regarding: Apt in [place]-Dr. Dwyer  Call pt to schedule recall apt   Epidermal Sutures: 5-0 Fast Absorbing Gut

## 2019-11-01 DIAGNOSIS — E11.9 TYPE 2 DIABETES MELLITUS WITHOUT COMPLICATION: ICD-10-CM

## 2019-11-02 NOTE — PROGRESS NOTES
Subjective:       Patient ID: Alina Narayan is a 74 y.o. female.    Chief Complaint: Follow-up    HPIPt finished chemo - she is feeling better - has some pain where surgery was.    Review of Systems   Constitutional: Negative for activity change and unexpected weight change.   HENT: Negative for hearing loss, rhinorrhea and trouble swallowing.    Eyes: Negative for discharge and visual disturbance.   Respiratory: Negative for chest tightness, shortness of breath (PND or orthopnea) and wheezing.    Cardiovascular: Negative for chest pain and palpitations.   Gastrointestinal: Negative for abdominal pain, blood in stool, constipation, diarrhea, nausea and vomiting.   Endocrine: Negative for polydipsia and polyuria.   Genitourinary: Negative for difficulty urinating, dysuria, hematuria and menstrual problem.   Musculoskeletal: Positive for arthralgias. Negative for joint swelling and neck pain.   Neurological: Negative for seizures, syncope, weakness and headaches.   Psychiatric/Behavioral: Negative for confusion and dysphoric mood.       Objective:      Physical Exam   Constitutional: She is oriented to person, place, and time. She appears well-developed and well-nourished. No distress.   HENT:   Head: Normocephalic.   Mouth/Throat: Oropharynx is clear and moist.   Neck: Neck supple. No JVD present. No thyromegaly present.   Cardiovascular: Normal rate, regular rhythm, normal heart sounds and intact distal pulses. Exam reveals no gallop and no friction rub.   No murmur heard.  Pulmonary/Chest: Effort normal and breath sounds normal. She has no wheezes. She has no rales.   Abdominal: Soft. Bowel sounds are normal. She exhibits no distension and no mass. There is no tenderness. There is no rebound and no guarding.   Musculoskeletal: She exhibits no edema.   Lymphadenopathy:     She has no cervical adenopathy.   Neurological: She is alert and oriented to person, place, and time. She has normal reflexes.   Skin: Skin is  warm and dry.   Tinea cruris   Psychiatric: She has a normal mood and affect. Her behavior is normal. Judgment and thought content normal.       Assessment:       1. Anemia, unspecified type    2. Diabetes mellitus type 2 in obese    3. Vitamin D deficiency disease        Plan:   Anemia, unspecified type  -     CBC auto differential; Future; Expected date: 10/14/2019  -     Iron and TIBC; Future; Expected date: 10/14/2019  -     Ferritin; Future; Expected date: 10/14/2019    Diabetes mellitus type 2 in obese  -     Comprehensive metabolic panel; Future; Expected date: 10/14/2019  -     TSH; Future; Expected date: 10/14/2019  -     Hemoglobin A1c; Future; Expected date: 10/14/2019    Vitamin D deficiency disease  -     Vitamin D; Future; Expected date: 10/14/2019    Other orders  -     nystatin (MYCOSTATIN) powder; Use on affected area twice daily  Dispense: 60 g; Refill: 1  -     fluconazole (DIFLUCAN) 100 MG tablet; Take 1 tablet (100 mg total) by mouth once daily.  Dispense: 10 tablet; Refill: 0

## 2019-11-06 ENCOUNTER — CLINICAL SUPPORT (OUTPATIENT)
Dept: REHABILITATION | Facility: HOSPITAL | Age: 74
End: 2019-11-06
Payer: MEDICARE

## 2019-11-06 DIAGNOSIS — M25.611 DECREASED SHOULDER MOBILITY, RIGHT: ICD-10-CM

## 2019-11-06 DIAGNOSIS — Z85.3 PERSONAL HISTORY OF BREAST CANCER: ICD-10-CM

## 2019-11-06 DIAGNOSIS — C50.611 CANCER OF AXILLARY TAIL OF RIGHT BREAST: ICD-10-CM

## 2019-11-06 DIAGNOSIS — C50.011 MALIGNANT NEOPLASM OF NIPPLE OF RIGHT BREAST IN FEMALE, ESTROGEN RECEPTOR POSITIVE: ICD-10-CM

## 2019-11-06 DIAGNOSIS — Z17.0 MALIGNANT NEOPLASM OF NIPPLE OF RIGHT BREAST IN FEMALE, ESTROGEN RECEPTOR POSITIVE: ICD-10-CM

## 2019-11-06 DIAGNOSIS — Z91.89 AT RISK FOR LYMPHEDEMA: ICD-10-CM

## 2019-11-06 PROCEDURE — 97110 THERAPEUTIC EXERCISES: CPT | Mod: HCNC | Performed by: PHYSICAL MEDICINE & REHABILITATION

## 2019-11-06 PROCEDURE — 97140 MANUAL THERAPY 1/> REGIONS: CPT | Mod: HCNC | Performed by: PHYSICAL MEDICINE & REHABILITATION

## 2019-11-06 NOTE — PATIENT INSTRUCTIONS
[email or web address]    ROM: External Rotation - Wand (supine)    Lie on back holding wand with elbows bent to 90°. Rotate forearms over head as far as possible.   Repeat _10___ times per set. Do __1__ sets per session. Do __2__ sessions per day.

## 2019-11-06 NOTE — PROGRESS NOTES
Physical Therapy Daily Treatment Note     Name: Alina Narayan  Clinic Number: [id number]  Diagnosis:   Encounter Diagnoses   Name Primary?    Decreased shoulder mobility, right     At risk for lymphedema     Cancer of axillary tail of right breast     Personal history of breast cancer     Malignant neoplasm of nipple of right breast in female, estrogen receptor positive      Physician: Cristina Gooden MD    Precautions: none  Visit #: 5 of 20  Time In: 11:00 AM  Time Out: 11:55 AM  Total Treatment Time 1:1: 55 minutes    Evaluation Date: 10/1/19  Visit # authorized: 20  Authorization period: 12/31/19  Plan of care Expiration: 11/26/19  MD referral: Cristina Gooden MD  Insurance: [employer]      Subjective     Pt reports: out of town last week. Patient states has been performing HEP. Patient states still having tightness and pain with cording in right axilla,  upper arm, forearm, and wrist. Patient states area below right axilla feels swollen,  Pain Scale: Alina rates pain on a scale of 5/10 on VAS.   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, Triple Negative ; 2012 left breast lumpectomy and radiation  Chief complaint: decreased mobility of right shoulder; c/o tightness and pain right upper arm and right anterior chest  Surgical History: 9/2/19 right chest wall wound exploration and evacuation of hematoma; 8/26/19 right breast modified radical mastectomy and removal of port; 2/1/9 insertion of port  Alina Narayan  has a past surgical history that includes Carpal tunnel release; Shoulder surgery; Dilation and curettage of uterus (1999); Endometrial ablation (1999); left lumpectomy (2012); Eye surgery; Skin biopsy; Cardiac valve replacement (12/2013); Colonoscopy (N/A, 9/29/2017); Breast lumpectomy (Left, 2012); Breast  "biopsy (Left, 10/1/2012); Breast biopsy (Left, 12/2017); Cardiac valuve replacement (2013); Insertion of tunneled central venous catheter (CVC) with subcutaneous port (Right, 2/1/2019); Modified radical mastectomy w/ axillary lymph node dissection (Right, 8/26/2019); and Mediport removal (Right, 8/26/2019).     Chemotherapy: Denys-Adjuvant chemotherapy 2/19 to 8/19  Radiation: none    Objective     Appearance: swollen area under right axilla--possible seroma. Had Alcira Poe RN look at this area and will make appointment with GAIL Domínguez.    Alina received individual therapeutic exercises to improve postural correction and alignment, stretching and soft tissue mobility, and strengthening for 25 minutes including the following:   Supine Shld Flexion                      1 x 10  Supine Shld Er with wand         1 x 10  Contract/Relax ER on pillow         5 x 10"  Active Shld Abd seated                  1 x 10  Scap Retractions                          10 x 5"  IR behind back                              1 x 10  ER on wall                                    10 x 5"--not done today due to pain  Wall Climbs   Forward                1 x 10--  Not done today due to pain                         Sideways              1 x 5--   Not done today due to pain    Applied Size F tubigrip to RUE for support      Alina received the following manual therapy techniques were performed to increased myofascial/soft tissue length, mobility and pliability, increase PROM, AROM and function as well as to decrease pain for 30 minutes  Gentle stretching,  for right axillary/upper arm cording  Grade II G-H joint mobs  Posterior capsule stretch  Passive stretch right shoulder  STM right anteror chest  Anterior and lateral chest stretch      Shoulder Range of Motion: 10/16/19  Active /Passive ROM Right Left   Flexion 130 160   Abduction 110 130   Extension 50 70   IR/90deg 80 @ 85 deg Abd 85   ER/90deg 60 @85 deg Abd 60         "   Strength: manual muscle test grades below   Upper Extremity Strength    (R) UE (L) UE   Shoulder flexion: 4/5 5/5   Shoulder Abduction: 3-/5 5/5   Shoulder IR 4/5 5/5   Shoulder ER 4/5 5/5   Elbow flexion: 4/5 5/5   Elbow extension: 4/5 5/5   Wrist flexion: 4/5 5/5   Wrist extension: 4/5 5/5    4/5 5/5         Functional Limitations Reporting       CMS Impairment/Limitation/Restriction for FOTO Outcome Survey     Therapist reviewed FOTO scores for Alina Narayan on 11/6//2019.   FOTO documents entered into [employer] - see Media section.     Limitations Score: 47%  Category: Carrying     Current : CK = at least 40% but < 60% impaired, limited or restricted  Goal: CI = at least 1% but < 20% impaired, limited or restricted              Home Exercise Program and Patient Education   Education provided re:  - role of PT in multi - disciplinary team, goals for PT  - progress towards goals     See EMR under notes/patient instructions for HEP given/taught this session - all sets and reps included. Pt received printed copy.     Pt was able to demonstrate and report understanding and performance  Pt has no cultural, educational or language barriers to learning provided.    Assessment     Patient is responding well to physical therapy. Is at expected function and pain level for 6 weeks post operatively.   Pain after treatment: 3/10     Pt prognosis is Good. Patient received manual therapy as above to decrease RUE cording, soft tissue tightness right anterior chest and right shoulder joint tightness. Moderate cording remains right upper arm, elbow and forearm. Patient instructed in above exercise program and tolerated new exercise added today. Slight decrease in pain RUE post exercise. Carmen Poe RN--Dr. Flores's nurse- did come and look at Ms. Narayan's right axilla and she has scheduled patient with GAIL Domínguez next week to check for possible seroma.   Patient instructed to continue with modified HEP. Pt will  continue to benefit from skilled outpatient physical therapy to address the deficits listed in the problem list chart on initial evaluation, provide pt/family education and to maximize pt's level of independence in the home and community environment.      Goals as follows:  Short Term goals: 4 weeks  1. Patient will demonstrate 100% understanding of lymphedema risk reduction practices to include self monitoring for lymphedema. (met, 10/8/19)  2. Patient will demonstrate independence with Home Exercise program established. (met, 10/8/19)  3. Pt will increase AROM/PROM in shoulder abduction ROM to 140 degrees on right to improve functional reach, carry, push, pull pain free. (progressing, not met)  4. Pt will increase AROM/PROM in shoulder flexion to 140 degrees on right to improve functional reach, carry, push, pull pain free.(progressing, not met)  5. Pt will increase AROM/PROM in shoulder ER to 60 degrees (at 90 degrees shld Abd) on right in order to assist with grooming activities (progressing, not met)  6. Strength will be 4+/5 in RUE in order to perform functional activities. (progressing, not met)     Long Term Goals: 8 weeks   1.  Pt will increase AROM/PROM in shoulder flexion to 170 degrees on right to improve functional reach, carry, push, pull pain free. (progressing, not met)  2. Pt will increase strength to 5/5 in gross UE musculature to improve tolerance to all functional activities pain free. (progressing, not met)  3. Pt will demonstrate full/maximized tissue mobility to increase ROM and promote healthy tissue to be pain free at discharge. (progressing, not met)  4. Pt will report decrease in overall worst pain to 2/10 at discharge. (progressing, not met)  5. Pt will increase AROM/PROM in shoulder abduction ROM to 170 degrees on right to improve functional reach, carry, push, pull pain free. (progressing, not met)  6. Pt will increase AROM/PROM in shoulder ER to 90 degrees on right in order to perform  all grooming activities independently (progressing, not met)  7. Patient will report compliance with water aerobic prrogram 5x week for 10 min each day to improve overall cardiovascular function and decrease cancer related fatigue at discharge. (progressing, not met     Plan     Continue with established POC toward physical therapy goals.    Therapist: Bibiana Emanuel, PT  11/6/2019

## 2019-11-08 ENCOUNTER — PATIENT OUTREACH (OUTPATIENT)
Dept: ADMINISTRATIVE | Facility: OTHER | Age: 74
End: 2019-11-08

## 2019-11-11 ENCOUNTER — PATIENT MESSAGE (OUTPATIENT)
Dept: HEMATOLOGY/ONCOLOGY | Facility: CLINIC | Age: 74
End: 2019-11-11

## 2019-11-11 DIAGNOSIS — L29.9 PRURITUS: Primary | ICD-10-CM

## 2019-11-11 DIAGNOSIS — I26.99 BILATERAL PULMONARY EMBOLISM: ICD-10-CM

## 2019-11-11 RX ORDER — PREDNISONE 20 MG/1
TABLET ORAL
Qty: 10 TABLET | Refills: 1 | Status: SHIPPED | OUTPATIENT
Start: 2019-11-11 | End: 2019-11-11 | Stop reason: SDUPTHER

## 2019-11-11 RX ORDER — PREDNISONE 20 MG/1
TABLET ORAL
Qty: 10 TABLET | Refills: 1 | Status: SHIPPED | OUTPATIENT
Start: 2019-11-11 | End: 2020-01-29

## 2019-11-12 ENCOUNTER — OFFICE VISIT (OUTPATIENT)
Dept: SURGERY | Facility: CLINIC | Age: 74
End: 2019-11-12
Payer: MEDICARE

## 2019-11-12 VITALS
HEART RATE: 73 BPM | WEIGHT: 238.56 LBS | BODY MASS INDEX: 43.9 KG/M2 | TEMPERATURE: 96 F | DIASTOLIC BLOOD PRESSURE: 62 MMHG | SYSTOLIC BLOOD PRESSURE: 134 MMHG | HEIGHT: 62 IN

## 2019-11-12 DIAGNOSIS — Z12.31 BREAST CANCER SCREENING BY MAMMOGRAM: Primary | ICD-10-CM

## 2019-11-12 PROCEDURE — 99024 POSTOP FOLLOW-UP VISIT: CPT | Mod: S$GLB,,, | Performed by: PHYSICIAN ASSISTANT

## 2019-11-12 PROCEDURE — 99999 PR PBB SHADOW E&M-EST. PATIENT-LVL III: CPT | Mod: PBBFAC,HCNC,, | Performed by: PHYSICIAN ASSISTANT

## 2019-11-12 PROCEDURE — 3078F PR MOST RECENT DIASTOLIC BLOOD PRESSURE < 80 MM HG: ICD-10-PCS | Mod: CPTII,S$GLB,, | Performed by: PHYSICIAN ASSISTANT

## 2019-11-12 PROCEDURE — 3078F DIAST BP <80 MM HG: CPT | Mod: CPTII,S$GLB,, | Performed by: PHYSICIAN ASSISTANT

## 2019-11-12 PROCEDURE — 1101F PR PT FALLS ASSESS DOC 0-1 FALLS W/OUT INJ PAST YR: ICD-10-PCS | Mod: CPTII,S$GLB,, | Performed by: PHYSICIAN ASSISTANT

## 2019-11-12 PROCEDURE — 1101F PT FALLS ASSESS-DOCD LE1/YR: CPT | Mod: CPTII,S$GLB,, | Performed by: PHYSICIAN ASSISTANT

## 2019-11-12 PROCEDURE — 3075F PR MOST RECENT SYSTOLIC BLOOD PRESS GE 130-139MM HG: ICD-10-PCS | Mod: CPTII,S$GLB,, | Performed by: PHYSICIAN ASSISTANT

## 2019-11-12 PROCEDURE — 3075F SYST BP GE 130 - 139MM HG: CPT | Mod: CPTII,S$GLB,, | Performed by: PHYSICIAN ASSISTANT

## 2019-11-12 PROCEDURE — 99024 PR POST-OP FOLLOW-UP VISIT: ICD-10-PCS | Mod: S$GLB,,, | Performed by: PHYSICIAN ASSISTANT

## 2019-11-12 PROCEDURE — 99999 PR PBB SHADOW E&M-EST. PATIENT-LVL III: ICD-10-PCS | Mod: PBBFAC,HCNC,, | Performed by: PHYSICIAN ASSISTANT

## 2019-11-12 NOTE — PROGRESS NOTES
[facility]  [facility] [place]  11/12/2019      SUBJECTIVE:   Ms. Alina Narayan is a 74 y.o. female with a history of bilateral breast cancer who presents today for follow up breast cancer screening.      History of Present Illness: She had a history of of the left breast cancer in 10/2012, w/ IDC, ER/WI (+), HER 2 russell neg, followed by a left lumpectomy for a 7 mm IDC and negative margins and SN, thus she underwent adjuvant XRT with 5 years of letrozole.    Patient had an abnormal mammogram in December of 2018. Follow-up imaging showed calcifications with 2 axillary lymph nodes, the largest measuring  1.4 x 1 cm in the axillary tail. A ultrasound guided biopsy was performed on right with pathology revealing metastatic mammary carcinoma of the right breast, triple negative.  She received neoadjuvant chemotherapy with Taxol and CMF.  She subsequently had a right modified mastectomy on 8/26/19 with 32/32 negative lymph nodes.   She did not require any adjuvant radiotherapy as this is what she was trying to avoid given the prior radiation induced cardiomyopathy from the management of her left breast cancer with breast conservation surgery and radiotherapy in 2012.  - negative genetic testing, integrated BRCA 5/2017.    Interval History:  Patient states she is doing well. She denies any unexplained weight loss, changes in vision, or recent headaches.  She denies any bone pain.  She denies palpating any masses at her breasts.    She is currently receiving PT/OT for right arm pain/ lymphedema.  She is worried about the extra skin at the lateral and posterior aspect of the mastectomy site and wants to make sure its not a concerning mass.    Her last mammogram of the intact left breast was on 12/28/18.      Review of Systems: Denies any chest pain or shortness of breath.  Denies any fever or chills.  See HPI/ Interval History for other systems reviewed.    OBJECTIVE:   Vitals:    11/12/19 1352   BP: 134/62    Pulse: 73   Temp: 96.3 °F (35.7 °C)      Physical Exam   Constitutional: She is well-developed, well-nourished, and in no distress.   HENT:   Head: Normocephalic and atraumatic.   Pulmonary/Chest: Left breast exhibits no mass, no nipple discharge and no skin change.       Lymphadenopathy:     She has no cervical adenopathy.     She has no axillary adenopathy.     ASSESSMENT:  Ms. Alina Narayan is a 74 y.o. year old female with a history of bilateral breast cancer who presents today for follow up breast cancer screening.      PLAN:   We discussed that the area she is concerned about is simply fatty tissue.  It is slightly more pronounced on the right than the left due to the incision pushing out the tissue more laterally.  There was otherwise nothing concerning noted on clinical exam.  She can continue PT/OT and she will need a screening mammogram of the left breast in December or January.  I will see her back in 6 months for a breast exam.      ~Fay Bhardwaj PA-C      Surgical Oncology            11/12/2019

## 2019-11-13 ENCOUNTER — TELEPHONE (OUTPATIENT)
Dept: SURGERY | Facility: CLINIC | Age: 74
End: 2019-11-13

## 2019-11-13 NOTE — TELEPHONE ENCOUNTER
Contacted the patient regarding message below. I schedule the patient mmg screening on 1/6/20 @ 9:30am. Patient voiced understanding of the date time and location. A reminder letter will be mailed out.          ----- Message from GAIL Domínguez sent at 11/12/2019  4:43 PM CST -----  Hi,  Please schedule this patient for a mammogram of the left breast at the end of December or January.  She can follow up with me in 6 months.      Thanks,  Fay

## 2019-11-21 ENCOUNTER — CLINICAL SUPPORT (OUTPATIENT)
Dept: REHABILITATION | Facility: HOSPITAL | Age: 74
End: 2019-11-21
Payer: MEDICARE

## 2019-11-21 DIAGNOSIS — M25.611 DECREASED SHOULDER MOBILITY, RIGHT: ICD-10-CM

## 2019-11-21 DIAGNOSIS — Z91.89 AT RISK FOR LYMPHEDEMA: ICD-10-CM

## 2019-11-21 DIAGNOSIS — Z85.3 PERSONAL HISTORY OF BREAST CANCER: ICD-10-CM

## 2019-11-21 DIAGNOSIS — C50.611 CANCER OF AXILLARY TAIL OF RIGHT BREAST: ICD-10-CM

## 2019-11-21 DIAGNOSIS — C50.011 MALIGNANT NEOPLASM OF AREOLA OF RIGHT BREAST IN FEMALE, UNSPECIFIED ESTROGEN RECEPTOR STATUS: ICD-10-CM

## 2019-11-21 PROCEDURE — 97110 THERAPEUTIC EXERCISES: CPT | Performed by: PHYSICAL MEDICINE & REHABILITATION

## 2019-11-21 PROCEDURE — 97140 MANUAL THERAPY 1/> REGIONS: CPT | Performed by: PHYSICAL MEDICINE & REHABILITATION

## 2019-11-21 NOTE — PROGRESS NOTES
Physical Therapy Daily Treatment Note     Name: Alina Narayan  Clinic Number: [id number]  Diagnosis:   Encounter Diagnoses   Name Primary?    Decreased shoulder mobility, right     At risk for lymphedema     Cancer of axillary tail of right breast     Personal history of breast cancer     Malignant neoplasm of areola of right breast in female, unspecified estrogen receptor status      Physician: Cristina Gooden MD    Precautions: none  Visit #: 6 of 20  Time In: 1:00 PM  Time Out: 1:45 PM  Total Treatment Time 1:1: 45 minutes    Evaluation Date: 10/1/19  Visit # authorized: 20  Authorization period: 12/31/19  Plan of care Expiration: 11/26/19  MD referral: Cristina Gooden MD  Insurance: [employer]      Subjective     Pt reports: Patient states cording has decreased in forearm and remains mildly in right axilla and upper arm and pain has decreased in forearm, but still with soreness in upper arm and this pain is present only when she touches this area. States has been exercising in the pool.  Pain Scale: Alina rates pain on a scale of 3/10 on VAS.   Pain location: right upper arm when touching this area    Fatigue: Mild  Functional change: moving RUE upward better  Diagnosis: Right breast metastatic IMC with involvement of 2 lymph nodes, Triple Negative ; 2012 left breast lumpectomy and radiation  Chief complaint: decreased mobility of right shoulder; c/o tightness and pain right upper arm and right anterior chest  Surgical History: 9/2/19 right chest wall wound exploration and evacuation of hematoma; 8/26/19 right breast modified radical mastectomy and removal of port; 2/1/9 insertion of port  Alina Narayan  has a past surgical history that includes Carpal tunnel release; Shoulder surgery; Dilation and curettage of uterus (1999); Endometrial ablation (1999); left lumpectomy (2012); Eye surgery; Skin biopsy; Cardiac valve replacement (12/2013); Colonoscopy  "(N/A, 9/29/2017); Breast lumpectomy (Left, 2012); Breast biopsy (Left, 10/1/2012); Breast biopsy (Left, 12/2017); Cardiac valuve replacement (2013); Insertion of tunneled central venous catheter (CVC) with subcutaneous port (Right, 2/1/2019); Modified radical mastectomy w/ axillary lymph node dissection (Right, 8/26/2019); and Mediport removal (Right, 8/26/2019).     Chemotherapy: Ednys-Adjuvant chemotherapy 2/19 to 8/19  Radiation: none    Objective     Alina received individual therapeutic exercises to improve postural correction and alignment, stretching and soft tissue mobility, and strengthening for 15 minutes including the following:   Supine Shld Flexion                      1 x 10  Contract/Relax ER on pillow         5 x 10"  Active Shld Abd seated                  1 x 10  Scap Retractions                          10 x 5"    Patient instructed to continue with exercises in pool and taught self STM for anterior right chest.      Alina received the following manual therapy techniques were performed to increased myofascial/soft tissue length, mobility and pliability, increase PROM, AROM and function as well as to decrease pain for 30 minutes  Gentle stretching,  for right axillary/upper arm cording  Posterior capsule stretch  Passive stretch right shoulder  STM right anteror chest  Anterior and lateral chest stretch      Shoulder Range of Motion: 11/21/19  Active /Passive ROM Right Left   Flexion 130 160   Abduction 110 130   Extension 50 70   IR/90deg 70  85   ER/90deg 60  60           Strength: manual muscle test grades below   Upper Extremity Strength    (R) UE (L) UE   Shoulder flexion: 4/5 5/5   Shoulder Abduction: 3-/5 5/5   Shoulder IR 4/5 5/5   Shoulder ER 4/5 5/5   Elbow flexion: 4/5 5/5   Elbow extension: 4/5 5/5   Wrist flexion: 4/5 5/5   Wrist extension: 4/5 5/5    4/5 5/5         Functional Limitations Reporting       CMS Impairment/Limitation/Restriction for FOTO Outcome Survey     Therapist " reviewed FOTO scores for Alina Narayan on 11/21//2019.   FOTO documents entered into [employer] - see Media section.     Limitations Score: 47%  Category: Carrying     Current : CK = at least 40% but < 60% impaired, limited or restricted  Goal: CI = at least 1% but < 20% impaired, limited or restricted              Home Exercise Program and Patient Education   Education provided re:  - role of PT in multi - disciplinary team, goals for PT  - progress towards goals     See EMR under notes/patient instructions for HEP given/taught this session - all sets and reps included. Pt received printed copy.     Pt was able to demonstrate and report understanding and performance  Pt has no cultural, educational or language barriers to learning provided.    Assessment     Patient has responded well to physical therapy. Is at expected function and pain level for 12 weeks post operatively.   Pain after treatment: 3/10     Pt prognosis is Good. Patient received manual therapy as above and mild cording remains in right upper arm, but no longer restricting mobility; soft tissue tightness right anterior chest remains and patient taught self STM techniques to help to decrease this tightness. .Reviewed above exercise program and  Also reviewed exercises for patient to continue in pool to assist with shoulder and soft tissue mobility.Patient will continue with HEP and exercising in pool for next 2 weeks and then will call me to let me know how she is doing and whether she requires further physical therapy. Patient does have pre-existing  Bilateral shoulder RTC and joint issues which impede patient from performing certain exercises with her shoulders.  Pt will continue to benefit from skilled outpatient physical therapy to address the deficits listed in the problem list chart on initial evaluation, provide pt/family education and to maximize pt's level of independence in the home and community environment.  Patient has met 1 STG and 1 LTG this  session.    Goals as follows:  Short Term goals: 4 weeks  1. Patient will demonstrate 100% understanding of lymphedema risk reduction practices to include self monitoring for lymphedema. (met, 10/8/19)  2. Patient will demonstrate independence with Home Exercise program established. (met, 10/8/19)  3. Pt will increase AROM/PROM in shoulder abduction ROM to 140 degrees on right to improve functional reach, carry, push, pull pain free. (progressing, not met)  4. Pt will increase AROM/PROM in shoulder flexion to 140 degrees on right to improve functional reach, carry, push, pull pain free.(progressing, not met)  5. Pt will increase AROM/PROM in shoulder ER to 60 degrees (at 90 degrees shld Abd) on right in order to assist with grooming activities (met), 11/21/19  6. Strength will be 4+/5 in RUE in order to perform functional activities. (progressing, not met)     Long Term Goals: 8 weeks   1.  Pt will increase AROM/PROM in shoulder flexion to 170 degrees on right to improve functional reach, carry, push, pull pain free. (progressing, not met)  2. Pt will increase strength to 5/5 in gross UE musculature to improve tolerance to all functional activities pain free. (progressing, not met)  3. Pt will demonstrate full/maximized tissue mobility to increase ROM and promote healthy tissue to be pain free at discharge. (progressing, not met)  4. Pt will report decrease in overall worst pain to 2/10 at discharge. (progressing, not met)  5. Pt will increase AROM/PROM in shoulder abduction ROM to 170 degrees on right to improve functional reach, carry, push, pull pain free. (progressing, not met)  6. Pt will increase AROM/PROM in shoulder ER to 90 degrees on right in order to perform all grooming activities independently (progressing, not met)  7. Patient will report compliance with water aerobic prrogram 5x week for 10 min each day to improve overall cardiovascular function and decrease cancer related fatigue at discharge. (met,  11/21/19     Plan     Continue with established POC toward physical therapy goals. Patient to call in 2 weeks and let me know how she is progressing.     Therapist: Bibiana Emanuel, PT  11/21/2019

## 2019-12-02 ENCOUNTER — DOCUMENTATION ONLY (OUTPATIENT)
Dept: REHABILITATION | Facility: HOSPITAL | Age: 74
End: 2019-12-02

## 2019-12-02 DIAGNOSIS — M25.611 DECREASED SHOULDER MOBILITY, RIGHT: ICD-10-CM

## 2019-12-02 DIAGNOSIS — Z91.89 AT RISK FOR LYMPHEDEMA: ICD-10-CM

## 2019-12-02 DIAGNOSIS — C50.011 MALIGNANT NEOPLASM OF AREOLA OF RIGHT BREAST IN FEMALE, UNSPECIFIED ESTROGEN RECEPTOR STATUS: ICD-10-CM

## 2019-12-02 DIAGNOSIS — Z85.3 PERSONAL HISTORY OF BREAST CANCER: ICD-10-CM

## 2019-12-02 DIAGNOSIS — C50.611 CANCER OF AXILLARY TAIL OF RIGHT BREAST: ICD-10-CM

## 2019-12-31 NOTE — PROGRESS NOTES
Subjective:       Patient ID: Alina Narayan is a 74 y.o. female.    Chief Complaint: No chief complaint on file.    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  She had  surgery on August 26.  That mastectomy specimen showed pathological complete response in the breast and axilla.    She continues on Lovenox for her history of pulmonary embolism diagnosed in May of this year. She was switched to xarelto, but had a rash and has now been transitioned to eliquis. She does note some flushing in the face at night since starting the eliquis. Will stop eliquis today.     Overall she has been doing well.  She has gradually been improving her activity level.  She does have some swelling and some pain in her right arm.      Current history:  abnormal mammogram of the right breast in December 2018.  She was having no breast symptoms at that time    That mammogram showed a 13 mm mass in the right axillary tail.  By ultrasound there was a 6 x 9 x 11 mm intramammary node and a 2nd 5 x 6 x 6 mm hypoechoic mass in the axillary tail.  On January 7, 2019 both masses were biopsied and both showed lymph nodes with metastatic carcinoma which was ER negative [place] negative and HER2 negative.  Ki-67 was 40%.    MRI on January 15, 2019 showed 2 right axillary lymph nodes the largest measured 1.4 x 1.0 cm.  There were no abnormalities in the right breast.    PET scan was then performed which showed only low-grade activity in the right axilla with an SUV of 1.32.    She has received 12 cycles of weekly Taxol and 4 cycles of CMF which she completed on July 30th, 2019.      Previous breast cancer history History: On September 25, 2012 she underwent a screening mammogram which showed an asymmetric density in the left breast at 2:00 position. A followup mammogram on the 27th showed an oval mass in that area ultrasound showed a 6 mm  solid mass. Core needle biopsy on October 1 showed invasive carcinoma ER 90% positive RI 80% positive and HER-2 negative. On October 29 she underwent lumpectomy and sentinel lymph node biopsy. That showed a 7 mm low-grade (1+2+1) infiltrating carcinoma. 3 sentinel lymph nodes were negative. Final pathological stage TIB N0 stage IA.  She completed letrozole therapy in 2017.    Review of Systems   Constitutional: Negative for appetite change, chills, fatigue, fever and unexpected weight change.   HENT: Negative for nosebleeds.    Eyes: Negative for visual disturbance.   Respiratory: Negative for cough and shortness of breath.    Cardiovascular: Negative for chest pain and leg swelling.   Gastrointestinal: Negative for abdominal pain, constipation, diarrhea, nausea and vomiting.   Genitourinary: Negative for frequency and hematuria.   Musculoskeletal: Negative for back pain.        Right arm pain and swelling   Skin: Negative for rash.        Facial flushing - worse at night   Neurological: Negative for dizziness, weakness, light-headedness and headaches.   Hematological: Negative for adenopathy.   Psychiatric/Behavioral: The patient is not nervous/anxious.        Objective:      Physical Exam   Constitutional: She is oriented to person, place, and time. She appears well-developed and well-nourished. No distress.   Eyes: No scleral icterus.   Cardiovascular: Normal rate and regular rhythm.   Pulmonary/Chest: Effort normal and breath sounds normal. She has no wheezes. She has no rales. Left breast exhibits no mass, no nipple discharge and no skin change.       Abdominal: Soft. There is no tenderness.   Musculoskeletal: She exhibits edema (Mild right arm).   Neurological: She is alert and oriented to person, place, and time.   Psychiatric: She has a normal mood and affect. Her behavior is normal. Thought content normal.   Vitals reviewed.      Assessment:       1. Malignant neoplasm of areola of right breast in female,  unspecified estrogen receptor status    2. Essential hypertension    3. Hyperlipidemia, mixed    4. Diabetes mellitus due to underlying condition with stage 3 chronic kidney disease, without long-term current use of insulin        Plan:     1.  She will return to clinic in 3 months time.  Will stop eliquis per Dr. Dwyer. She received 6 months of Lovenox followed by 2 months of xarelto and eliquis.  Will check a D-dimer in 1 month.   2. Continue coreg and follow up with PCP  3. Continue pravastatin and follow up with PCP  4. Continue inuslin and follow up with endocrinologist     Patient is in agreement with the proposed treatment plan. All questions were answered to the patient's satisfaction. Patient knows to call clinic for any new or worsening symptoms and if anything is needed before the next clinic visit.          Svetlana Louise, TWILAP-C  Hematology & Medical Oncology   [facility] [place] [address]  ph. [phone]  Fax. [phone]    Patient dicussed with collaborating physician, Dr. Dwyer.

## 2020-01-06 ENCOUNTER — PATIENT MESSAGE (OUTPATIENT)
Dept: HEMATOLOGY/ONCOLOGY | Facility: CLINIC | Age: 75
End: 2020-01-06

## 2020-01-06 DIAGNOSIS — C50.911 MALIGNANT NEOPLASM OF RIGHT BREAST IN FEMALE, ESTROGEN RECEPTOR NEGATIVE, UNSPECIFIED SITE OF BREAST: Primary | ICD-10-CM

## 2020-01-06 DIAGNOSIS — Z17.1 MALIGNANT NEOPLASM OF RIGHT BREAST IN FEMALE, ESTROGEN RECEPTOR NEGATIVE, UNSPECIFIED SITE OF BREAST: Primary | ICD-10-CM

## 2020-01-14 ENCOUNTER — OFFICE VISIT (OUTPATIENT)
Dept: HEMATOLOGY/ONCOLOGY | Facility: CLINIC | Age: 75
End: 2020-01-14
Payer: MEDICARE

## 2020-01-14 ENCOUNTER — TELEPHONE (OUTPATIENT)
Dept: INTERNAL MEDICINE | Facility: CLINIC | Age: 75
End: 2020-01-14

## 2020-01-14 ENCOUNTER — LAB VISIT (OUTPATIENT)
Dept: LAB | Facility: HOSPITAL | Age: 75
End: 2020-01-14
Attending: INTERNAL MEDICINE
Payer: MEDICARE

## 2020-01-14 ENCOUNTER — TELEPHONE (OUTPATIENT)
Dept: SURGERY | Facility: CLINIC | Age: 75
End: 2020-01-14

## 2020-01-14 VITALS
WEIGHT: 241.38 LBS | HEART RATE: 78 BPM | TEMPERATURE: 98 F | OXYGEN SATURATION: 98 % | BODY MASS INDEX: 44.15 KG/M2 | SYSTOLIC BLOOD PRESSURE: 126 MMHG | DIASTOLIC BLOOD PRESSURE: 61 MMHG | RESPIRATION RATE: 16 BRPM

## 2020-01-14 DIAGNOSIS — E55.9 MILD VITAMIN D DEFICIENCY: ICD-10-CM

## 2020-01-14 DIAGNOSIS — C50.011 MALIGNANT NEOPLASM OF AREOLA OF RIGHT BREAST IN FEMALE, UNSPECIFIED ESTROGEN RECEPTOR STATUS: Primary | ICD-10-CM

## 2020-01-14 DIAGNOSIS — E78.2 HYPERLIPIDEMIA, MIXED: ICD-10-CM

## 2020-01-14 DIAGNOSIS — E08.22 DIABETES MELLITUS DUE TO UNDERLYING CONDITION WITH STAGE 3 CHRONIC KIDNEY DISEASE, WITHOUT LONG-TERM CURRENT USE OF INSULIN: ICD-10-CM

## 2020-01-14 DIAGNOSIS — N18.30 DIABETES MELLITUS DUE TO UNDERLYING CONDITION WITH STAGE 3 CHRONIC KIDNEY DISEASE, WITHOUT LONG-TERM CURRENT USE OF INSULIN: ICD-10-CM

## 2020-01-14 DIAGNOSIS — I26.99 BILATERAL PULMONARY EMBOLISM: ICD-10-CM

## 2020-01-14 DIAGNOSIS — I10 ESSENTIAL HYPERTENSION: ICD-10-CM

## 2020-01-14 DIAGNOSIS — I26.99 BILATERAL PULMONARY EMBOLISM: Primary | ICD-10-CM

## 2020-01-14 DIAGNOSIS — D64.9 ANEMIA, UNSPECIFIED TYPE: Primary | ICD-10-CM

## 2020-01-14 LAB — D DIMER PPP IA.FEU-MCNC: 0.26 MG/L FEU

## 2020-01-14 PROCEDURE — 99214 OFFICE O/P EST MOD 30 MIN: CPT | Mod: HCNC,S$GLB,, | Performed by: NURSE PRACTITIONER

## 2020-01-14 PROCEDURE — 36415 COLL VENOUS BLD VENIPUNCTURE: CPT | Mod: HCNC

## 2020-01-14 PROCEDURE — 99499 UNLISTED E&M SERVICE: CPT | Mod: HCNC,S$GLB,, | Performed by: NURSE PRACTITIONER

## 2020-01-14 PROCEDURE — 99214 PR OFFICE/OUTPT VISIT, EST, LEVL IV, 30-39 MIN: ICD-10-PCS | Mod: HCNC,S$GLB,, | Performed by: NURSE PRACTITIONER

## 2020-01-14 PROCEDURE — 85379 FIBRIN DEGRADATION QUANT: CPT | Mod: HCNC

## 2020-01-14 PROCEDURE — 99499 RISK ADDL DX/OHS AUDIT: ICD-10-PCS | Mod: HCNC,S$GLB,, | Performed by: NURSE PRACTITIONER

## 2020-01-14 PROCEDURE — 99999 PR PBB SHADOW E&M-EST. PATIENT-LVL V: ICD-10-PCS | Mod: PBBFAC,HCNC,, | Performed by: NURSE PRACTITIONER

## 2020-01-14 PROCEDURE — 99999 PR PBB SHADOW E&M-EST. PATIENT-LVL V: CPT | Mod: PBBFAC,HCNC,, | Performed by: NURSE PRACTITIONER

## 2020-01-14 NOTE — TELEPHONE ENCOUNTER
Returned the patient call regarding the message below.  Explained to the patient that she would have to go [facility] to have the mammogram and that it could not be done at [facility] because of the type of mammogram.  The patient voiced understanding of this information.  The patient is scheduled to be seen on Wednesday 1/29/2020, 10 am at the [facility] for her mammogram.  The patient voiced understanding of appointment date, time, and location.  Reminder letter mailed to the patient.

## 2020-01-14 NOTE — TELEPHONE ENCOUNTER
----- Message from [place] sent at 1/14/2020  3:02 PM CST -----  Contact: pt: [phone]  pt request [facility] to have screening mammogram done, unable to schedule, would like to have mammogram done on 1/29 around 10 a.m. If available       Please contact pt: [phone]

## 2020-01-17 ENCOUNTER — PATIENT OUTREACH (OUTPATIENT)
Dept: ADMINISTRATIVE | Facility: HOSPITAL | Age: 75
End: 2020-01-17

## 2020-01-20 ENCOUNTER — HOSPITAL ENCOUNTER (EMERGENCY)
Facility: HOSPITAL | Age: 75
Discharge: HOME | End: 2020-01-21
Attending: EMERGENCY MEDICINE
Payer: MEDICARE

## 2020-01-20 DIAGNOSIS — K57.92 DIVERTICULITIS: Primary | ICD-10-CM

## 2020-01-20 PROCEDURE — 99285 PR EMERGENCY DEPT VISIT,LEVEL V: ICD-10-PCS | Mod: HCNC,,, | Performed by: EMERGENCY MEDICINE

## 2020-01-20 PROCEDURE — 99285 EMERGENCY DEPT VISIT HI MDM: CPT | Mod: HCNC,,, | Performed by: EMERGENCY MEDICINE

## 2020-01-20 PROCEDURE — 99285 EMERGENCY DEPT VISIT HI MDM: CPT | Mod: HCNC

## 2020-01-21 VITALS
HEART RATE: 81 BPM | HEIGHT: 62 IN | DIASTOLIC BLOOD PRESSURE: 61 MMHG | TEMPERATURE: 98 F | BODY MASS INDEX: 44.35 KG/M2 | RESPIRATION RATE: 14 BRPM | OXYGEN SATURATION: 99 % | WEIGHT: 241 LBS | SYSTOLIC BLOOD PRESSURE: 131 MMHG

## 2020-01-21 LAB
ALBUMIN SERPL BCP-MCNC: 4 G/DL (ref 3.5–5.2)
ALP SERPL-CCNC: 108 U/L (ref 55–135)
ALT SERPL W/O P-5'-P-CCNC: 11 U/L (ref 10–44)
ANION GAP SERPL CALC-SCNC: 8 MMOL/L (ref 8–16)
AST SERPL-CCNC: 13 U/L (ref 10–40)
BASOPHILS # BLD AUTO: 0.02 K/UL (ref 0–0.2)
BASOPHILS NFR BLD: 0.2 % (ref 0–1.9)
BILIRUB SERPL-MCNC: 0.4 MG/DL (ref 0.1–1)
BUN SERPL-MCNC: 24 MG/DL (ref 6–30)
BUN SERPL-MCNC: 24 MG/DL (ref 8–23)
CALCIUM SERPL-MCNC: 9.6 MG/DL (ref 8.7–10.5)
CHLORIDE SERPL-SCNC: 101 MMOL/L (ref 95–110)
CHLORIDE SERPL-SCNC: 103 MMOL/L (ref 95–110)
CO2 SERPL-SCNC: 29 MMOL/L (ref 23–29)
CREAT SERPL-MCNC: 1 MG/DL (ref 0.5–1.4)
CREAT SERPL-MCNC: 1.1 MG/DL (ref 0.5–1.4)
DIFFERENTIAL METHOD: ABNORMAL
EOSINOPHIL # BLD AUTO: 0.1 K/UL (ref 0–0.5)
EOSINOPHIL NFR BLD: 0.8 % (ref 0–8)
ERYTHROCYTE [DISTWIDTH] IN BLOOD BY AUTOMATED COUNT: 13.5 % (ref 11.5–14.5)
EST. GFR  (AFRICAN AMERICAN): 57.2 ML/MIN/1.73 M^2
EST. GFR  (NON AFRICAN AMERICAN): 49.6 ML/MIN/1.73 M^2
GLUCOSE SERPL-MCNC: 138 MG/DL (ref 70–110)
GLUCOSE SERPL-MCNC: 140 MG/DL (ref 70–110)
HCT VFR BLD AUTO: 39.8 % (ref 37–48.5)
HCT VFR BLD CALC: 36 %PCV (ref 36–54)
HGB BLD-MCNC: 12 G/DL (ref 12–16)
IMM GRANULOCYTES # BLD AUTO: 0.03 K/UL (ref 0–0.04)
IMM GRANULOCYTES NFR BLD AUTO: 0.4 % (ref 0–0.5)
LYMPHOCYTES # BLD AUTO: 1.4 K/UL (ref 1–4.8)
LYMPHOCYTES NFR BLD: 16.4 % (ref 18–48)
MCH RBC QN AUTO: 29.1 PG (ref 27–31)
MCHC RBC AUTO-ENTMCNC: 30.2 G/DL (ref 32–36)
MCV RBC AUTO: 96 FL (ref 82–98)
MONOCYTES # BLD AUTO: 0.7 K/UL (ref 0.3–1)
MONOCYTES NFR BLD: 8.1 % (ref 4–15)
NEUTROPHILS # BLD AUTO: 6.2 K/UL (ref 1.8–7.7)
NEUTROPHILS NFR BLD: 74.1 % (ref 38–73)
NRBC BLD-RTO: 0 /100 WBC
PLATELET # BLD AUTO: 138 K/UL (ref 150–350)
PMV BLD AUTO: 10.3 FL (ref 9.2–12.9)
POC IONIZED CALCIUM: 1.1 MMOL/L (ref 1.06–1.42)
POC TCO2 (MEASURED): 28 MMOL/L (ref 23–29)
POTASSIUM BLD-SCNC: 4.2 MMOL/L (ref 3.5–5.1)
POTASSIUM SERPL-SCNC: 4.3 MMOL/L (ref 3.5–5.1)
PROT SERPL-MCNC: 7.3 G/DL (ref 6–8.4)
RBC # BLD AUTO: 4.13 M/UL (ref 4–5.4)
SAMPLE: ABNORMAL
SODIUM BLD-SCNC: 138 MMOL/L (ref 136–145)
SODIUM SERPL-SCNC: 140 MMOL/L (ref 136–145)
WBC # BLD AUTO: 8.37 K/UL (ref 3.9–12.7)

## 2020-01-21 PROCEDURE — 25500020 PHARM REV CODE 255: Mod: HCNC | Performed by: EMERGENCY MEDICINE

## 2020-01-21 PROCEDURE — 80053 COMPREHEN METABOLIC PANEL: CPT | Mod: HCNC

## 2020-01-21 PROCEDURE — 85025 COMPLETE CBC W/AUTO DIFF WBC: CPT | Mod: HCNC

## 2020-01-21 PROCEDURE — 25000003 PHARM REV CODE 250: Mod: HCNC | Performed by: EMERGENCY MEDICINE

## 2020-01-21 RX ORDER — AMOXICILLIN AND CLAVULANATE POTASSIUM 875; 125 MG/1; MG/1
1 TABLET, FILM COATED ORAL
Status: COMPLETED | OUTPATIENT
Start: 2020-01-21 | End: 2020-01-21

## 2020-01-21 RX ORDER — AMOXICILLIN AND CLAVULANATE POTASSIUM 875; 125 MG/1; MG/1
1 TABLET, FILM COATED ORAL 3 TIMES DAILY
Qty: 21 TABLET | Refills: 0 | Status: SHIPPED | OUTPATIENT
Start: 2020-01-21 | End: 2020-01-29

## 2020-01-21 RX ADMIN — IOHEXOL 100 ML: 350 INJECTION, SOLUTION INTRAVENOUS at 01:01

## 2020-01-21 RX ADMIN — AMOXICILLIN AND CLAVULANATE POTASSIUM 1 TABLET: 875; 125 TABLET, FILM COATED ORAL at 02:01

## 2020-01-21 NOTE — ED NOTES
"Pt states, "diverticulitis attack again. It started, I guess, earlier today and just kept getting worse." no acute distress noted. Stable condition. Family member at bedside.    "

## 2020-01-21 NOTE — ED PROVIDER NOTES
"Source of History:  patient    Chief complaint:  Abdominal Pain (Pt states "I am having a diverticulitis attack and I can't afford an infection". Pt's last chemo was about 4 months ago, pt also had masectomy about 3-4 months ago. )      HPI:  Alina Narayan is a 74 y.o. female presenting with abdominal pain for about 1 day.  The patient states she has left lower quadrant pain consistent with previous episodes of diverticulitis.  It is constant, worse with movement, moderate to severe.  It is nonradiating.  She has not taken anything for this at home.  Because it feels similar to previous episodes of diverticulitis she came to the ED immediately for further evaluation because she wanted to avoid worsening infection.  Denies any fevers, chills, nausea, vomiting or any other complaint.    ROS: As per HPI and below:    General: No fever.  No chills.  Eyes: No visual changes.  Head: No headache.    Integument: No rashes or lesions.  Chest: No shortness of breath.  Cardiovascular: No chest pain.  Abdomen: abdominal pain.  No nausea or vomiting.  Urinary: No abnormal urination.  Neurologic: No focal weakness.  No numbness.  Hematologic: No easy bruising.  Endocrine: No excessive thirst or urination.      Review of patient's allergies indicates:   Allergen Reactions    Dilaudid [hydromorphone (bulk)] Other (See Comments)     Other reaction(s): sedation    Hydromorphone Other (See Comments)     Other reaction(s): sedation    Exenatide Rash     Other reaction(s): Rash       Current Facility-Administered Medications on File Prior to Encounter   Medication Dose Route Frequency Provider Last Rate Last Dose    alteplase injection 2 mg  2 mg Intra-Catheter PRN Erick Dwyer MD   2 mg at 03/11/19 1140    heparin, porcine (PF) 100 unit/mL injection flush 500 Units  500 Units Intravenous 1 time in Clinic/HOD Erick Dwyer MD        heparin, porcine (PF) 100 unit/mL injection flush 500 Units  500 Units Intravenous 1 time in " Clinic/HOD Erick Dwyer MD        sodium chloride 0.9% flush 10 mL  10 mL Intravenous PRN Erick Dwyer MD   10 mL at 07/23/19 1157     Current Outpatient Medications on File Prior to Encounter   Medication Sig Dispense Refill    apixaban (ELIQUIS) 5 mg Tab Take 1 tablet (5 mg total) by mouth 2 (two) times daily. 60 tablet 3    ASPIR-LOW 81 mg EC tablet Take 81 mg by mouth once daily.  12    blood sugar diagnostic Strp True Metirx strips or strips and lancets  - pt checks twice daily for uncontrolled glucoses E11.65 200 each 3    blood-glucose meter kit True Test or meter covered by insurance - pt checks glucose twice daily for uncontrolled glucoses E11.65 (Patient not taking: Reported on 10/14/2019) 1 each 0    carvedilol (COREG) 25 MG tablet Take 0.5 tablets (12.5 mg total) by mouth 2 (two) times daily with meals. 90 tablet 3    CHOLECALCIFEROL, VITAMIN D3, (VITAMIN D3 ORAL) Take by mouth once daily. 1000 IU      furosemide (LASIX) 20 MG tablet Take 1 tablet (20 mg total) by mouth once daily. 30 tablet 11    gabapentin (NEURONTIN) 300 MG capsule TAKE 1 CAPSULE TWICE DAILY 180 capsule 1    lancets (ACCU-CHEK SOFTCLIX LANCETS) Misc TrueTest lancets for meter covered by insurance - pt checks twice daily for uncontrolled glucoses E11.65, 200 each 3    levothyroxine (SYNTHROID) 50 MCG tablet Take 1 tablet (50 mcg total) by mouth every evening. (Patient taking differently: Take 50 mcg by mouth before breakfast. ) 90 tablet 3    lidocaine-prilocaine (EMLA) cream Apply topically as needed. Apply topically as needed 45 minutes prior to port access. 5 g 3    metFORMIN (GLUCOPHAGE) 500 MG tablet TAKE 1 TABLET TWICE DAILY WITH MEALS (Patient taking differently: TAKE 1 TABLET TWICE DAILY WITH MEALS  (takes 2 in AM only)) 180 tablet 3    miconazole (MICOTIN) 2 % cream Apply topically 2 (two) times daily.  0    nystatin (MYCOSTATIN) powder Use on affected area twice daily 60 g 1    pravastatin (PRAVACHOL) 20 MG  tablet TAKE 1 TABLET EVERY DAY 90 tablet 3    predniSONE (DELTASONE) 20 MG tablet Take 2 tablets daily for 3 days then 1 tablet daily for 3 days 10 tablet 1       PMH:  As per HPI and below:  Past Medical History:   Diagnosis Date    Allergy     seasonal    Anxiety     Arthritis     Breast cancer 10/2012    left breast invasive ductal carcinoma    Colon polyp     Diabetes mellitus     Type 2    Diabetes mellitus, type 2     Diverticular disease     Diverticulitis 2009    Genetic testing 05/2017    negative Integrated BRAC[employer]    Hyperlipidemia     Hypertension     Morbid obesity     MITCHELL (obstructive sleep apnea)     Pulmonary embolism     Stenosis     Stenosis and insufficiency of lacrimal passages     Thyroid disease      Past Surgical History:   Procedure Laterality Date    BREAST BIOPSY Left 10/1/2012    left breast- invasive ductal carcinoma    BREAST BIOPSY Left 12/2017    BREAST LUMPECTOMY Left 2012    CARDIAC VALVE REPLACEMENT  12/2013    CARDIAC VALVE SURGERY  2013    CARPAL TUNNEL RELEASE      Left    COLONOSCOPY N/A 9/29/2017    Procedure: COLONOSCOPY;  Surgeon: Phani Worley MD;  Location: [facility] (4TH FLR);  Service: Endoscopy;  Laterality: N/A;    DILATION AND CURETTAGE OF UTERUS  1999    Endometrial polyps    ENDOMETRIAL ABLATION  1999    Enodmetrial polyps    EYE SURGERY      INSERTION OF TUNNELED CENTRAL VENOUS CATHETER (CVC) WITH SUBCUTANEOUS PORT Right 2/1/2019    Procedure: WIOPHPILN-OZOL-Q-CATH;  Surgeon: Gaston Flores MD;  Location: [facility] OR 2ND FLR;  Service: General;  Laterality: Right;    left lumpectomy  2012    MEDIPORT REMOVAL Right 8/26/2019    Procedure: REMOVAL, CATHETER, CENTRAL VENOUS, TUNNELED, WITH PORT;  Surgeon: Gaston Flores MD;  Location: [facility] OR [address] [place] [address];  Service: General;  Laterality: Right;    MODIFIED RADICAL MASTECTOMY W/ AXILLARY LYMPH NODE DISSECTION Right 8/26/2019    Procedure: MASTECTOMY, MODIFIED RADICAL;  Surgeon:  "Gaston Flores MD;  Location: [facility] OR [address] [place] [address];  Service: General;  Laterality: Right;    SHOULDER SURGERY      SKIN BIOPSY         Social History     Socioeconomic History    Marital status:      Spouse name: Srinath    Number of children: 2    Years of education: Not on file    Highest education level: Not on file   Occupational History    Occupation: Retired   Social Needs    Financial resource strain: Not on file    Food insecurity:     Worry: Not on file     Inability: Not on file    Transportation needs:     Medical: Not on file     Non-medical: Not on file   Tobacco Use    Smoking status: Never Smoker    Smokeless tobacco: Never Used   Substance and Sexual Activity    Alcohol use: No     Alcohol/week: 0.0 standard drinks    Drug use: No    Sexual activity: Yes   Lifestyle    Physical activity:     Days per week: Not on file     Minutes per session: Not on file    Stress: Not on file   Relationships    Social connections:     Talks on phone: Not on file     Gets together: Not on file     Attends Taoist service: Not on file     Active member of club or organization: Not on file     Attends meetings of clubs or organizations: Not on file     Relationship status: Not on file   Other Topics Concern    Are you pregnant or think you may be? No    Breast-feeding No   Social History Narrative    Not on file       Family History   Problem Relation Age of Onset    Breast cancer Mother 62    Colon cancer Father     Cancer Father         Colon CA (cause of death); Prostate CA (no chemo)    Heart disease Father     No Known Problems Sister     No Known Problems Brother     Cancer Maternal Grandfather         Colon CA    No Known Problems Son     Cancer Brother         prostate CA, no chemo, "it was bad"    Anxiety disorder Son     SAL disease Son     Sleep disorder Son     Ovarian cancer Neg Hx     Melanoma Neg Hx     Psoriasis Neg Hx     Lupus Neg Hx     Eczema Neg Hx     " Acne Neg Hx        Physical Exam:    Vitals:    01/20/20 2236   BP: (!) 152/84   Pulse: 87   Resp: 14   Temp: 98 °F (36.7 °C)     Appearance: No acute distress.  Skin: No rashes seen.  Good turgor.  No abrasions.  No ecchymoses.  Eyes: No conjunctival injection. EOMI, PERRL.  ENT: Oropharynx clear.    Chest: Clear to auscultation bilaterally.  Good air movement.  No wheezes.  No rhonchi.  Cardiovascular: Regular rate and rhythm.  No murmurs. No gallops. No rubs.  Abdomen: Soft.  Not distended.  Tender left lower quadrant, left upper quadrant.  No guarding.  No rebound. No Masses  Musculoskeletal: Good range of motion all joints.  No deformities.  Neck supple.  No meningismus.  Neurologic: Moves all extremities.  Normal sensation.  No facial droop.  Normal speech.    Mental Status:  Alert and oriented x 3.  Appropriate, conversant.          Laboratory Studies:  Labs Reviewed   CBC W/ AUTO DIFFERENTIAL - Abnormal; Notable for the following components:       Result Value    Mean Corpuscular Hemoglobin Conc 30.2 (*)     Platelets 138 (*)     Gran% 74.1 (*)     Lymph% 16.4 (*)     All other components within normal limits   COMPREHENSIVE METABOLIC PANEL - Abnormal; Notable for the following components:    Glucose 138 (*)     BUN, Bld 24 (*)     eGFR if  57.2 (*)     eGFR if non  49.6 (*)     All other components within normal limits   ISTAT PROCEDURE - Abnormal; Notable for the following components:    POC Glucose 140 (*)     All other components within normal limits       I decided to obtain the old medical records.  Reviewed and summarized the old medical record and it showed history of breast cancer    Imaging Results           CT Abdomen Pelvis With Contrast (Final result)  Result time 01/21/20 02:27:25    Final result by Martin Pandey MD (01/21/20 02:27:25)                 Impression:      1. Acute uncomplicated diverticulitis involving the mid descending colon.  No evidence of  perforation or abscess.  2. Hepatic steatosis.  3. Additional findings as above.  This report was flagged in Epic as abnormal.    Electronically signed by resident: Shankar Donaldson  Date:    01/21/2020  Time:    01:49    Electronically signed by: Martin Pandey MD  Date:    01/21/2020  Time:    02:27             Narrative:    EXAMINATION:  CT ABDOMEN PELVIS WITH CONTRAST    CLINICAL HISTORY:  Abd pain, fever, abscess suspected;    TECHNIQUE:  The patient was surveyed from the lung bases through the pelvis after the administration of 100 cc Omni 350 IV contrast.  The data was reconstructed for coronal, sagittal, and axial images.    COMPARISON:  CT 07/13/2019    FINDINGS:  The lung bases are clear.  No pleural effusion is seen.    The visualized portions of the heart appear normal. No pericardial effusion.    The liver is normal in size with decreased attenuation suggesting steatosis.  No focal hepatic abnormality is seen. The gallbladder is unremarkable.  No intrahepatic or extrahepatic biliary ductal dilatation is identified. The spleen is unremarkable.  The portal vein, splenic vein, and SMV are patent.    The stomach, pancreas, and adrenal glands are unremarkable.  Stable duodenal diverticulum.    The kidneys are normal in size.  No nephrolithiasis or hydronephrosis is seen.  The ureters appear normal in course and caliber. The urinary bladder is unremarkable. The uterus is unremarkable.  No adnexal masses.    Scattered colonic diverticulosis with wall thickening and surrounding inflammatory changes involving the mid descending colon consistent with acute diverticulitis.  No evidence of perforation or abscess.  No bowel obstruction.  Normal appendix.    No ascites, free fluid, or intraperitoneal free air is identified.  There is no evidence of lymph node enlargement in the abdomen or pelvis.  The abdominal aorta is normal in course and caliber without mild atherosclerotic calcifications.    The osseous structures  demonstrate degenerative changes.  No acute fracture or osseous destructive process.    The extraperitoneal soft tissues demonstrate a small fat containing umbilical hernia.  Scattered foci of induration within the anterior abdominal wall, likely injection sites.                                Medications Given:  Medications   iohexol (OMNIPAQUE 350) injection 100 mL (100 mLs Intravenous Given 1/21/20 0135)       Discussed with:  Patient    MDM:    74 y.o. female with left lower quadrant pain concerning for diverticulitis versus other intra-abdominal infection.  No diarrhea to suggest enteritis.  No sign of hernia on exam.  Afebrile, do not suspect sepsis.  CT shows uncomplicated diverticulitis.  Vitals remained stable in the ED.  Given dose of amoxicillin clavulanate while here, will prescribe the same for home.  Advised pt to follow up with PCP or return if concerning symptoms arise. Pt understands and agrees with plan. Will d/c home.          Diagnostic Impression:    1. Diverticulitis               Sanford Foley MD  01/21/20 0338

## 2020-01-28 ENCOUNTER — PATIENT OUTREACH (OUTPATIENT)
Dept: ADMINISTRATIVE | Facility: OTHER | Age: 75
End: 2020-01-28

## 2020-01-28 NOTE — PROGRESS NOTES
Subjective:   Patient ID:  Alina Narayan is a 74 y.o. female who presents for follow-up of CAD risk    HPI:  The patient is here for CAD risk factors.  The patient has no chest pain, SOB, TIA, palpitations, syncope or pre-syncope.Patient does not exercise.She is nervous about being off Eliquis and with high weight , lack of mobility ( she is back doing water exercise now) and cancers , she is at risk still of PE.        Review of Systems   Constitution: Negative for chills, decreased appetite, diaphoresis, fever, malaise/fatigue, night sweats, weight gain and weight loss.   HENT: Negative for congestion, hoarse voice, nosebleeds, sore throat and tinnitus.    Eyes: Negative for blurred vision, double vision, vision loss in left eye, vision loss in right eye, visual disturbance and visual halos.   Cardiovascular: Negative for chest pain, claudication, cyanosis, dyspnea on exertion, irregular heartbeat, leg swelling, near-syncope, orthopnea, palpitations, paroxysmal nocturnal dyspnea and syncope.   Respiratory: Negative for cough, hemoptysis, shortness of breath, sleep disturbances due to breathing, snoring, sputum production and wheezing.    Endocrine: Negative for cold intolerance, heat intolerance, polydipsia, polyphagia and polyuria.   Hematologic/Lymphatic: Negative for adenopathy and bleeding problem. Does not bruise/bleed easily.   Skin: Negative for color change, dry skin, flushing, itching, nail changes, poor wound healing, rash, skin cancer, suspicious lesions and unusual hair distribution.   Musculoskeletal: Positive for arthritis, back pain and joint pain. Negative for falls, gout, joint swelling, muscle cramps, muscle weakness, myalgias and stiffness.   Gastrointestinal: Negative for abdominal pain, anorexia, change in bowel habit, constipation, diarrhea, dysphagia, heartburn, hematemesis, hematochezia, melena and vomiting.   Genitourinary: Negative for decreased libido, dysuria, hematuria, hesitancy and  "urgency.   Neurological: Negative for excessive daytime sleepiness, dizziness, focal weakness, headaches, light-headedness, loss of balance, numbness, paresthesias, seizures, sensory change, tremors, vertigo and weakness.   Psychiatric/Behavioral: Negative for altered mental status, depression, hallucinations, memory loss, substance abuse and suicidal ideas. The patient does not have insomnia and is not nervous/anxious.    Allergic/Immunologic: Negative for environmental allergies and hives.       Objective: BP (!) 113/59   Pulse 75   Ht 5' 2" (1.575 m)   Wt 110.4 kg (243 lb 6.2 oz)   BMI 44.52 kg/m²      Physical Exam   Constitutional: She is oriented to person, place, and time. She appears well-developed and well-nourished.   HENT:   Head: Normocephalic.   Eyes: Pupils are equal, round, and reactive to light. EOM are normal.   Neck: Normal range of motion. Normal carotid pulses, no hepatojugular reflux and no JVD present. Carotid bruit is not present. No thyromegaly present.   Cardiovascular: Normal rate, regular rhythm, normal heart sounds and intact distal pulses. Exam reveals no gallop and no friction rub.   No murmur heard.  Pulmonary/Chest: Effort normal and breath sounds normal. No tachypnea. No respiratory distress. She has no wheezes. She has no rales. She exhibits no tenderness.   Abdominal: Soft. Bowel sounds are normal. She exhibits no distension and no mass. There is no tenderness. There is no rebound and no guarding.   Musculoskeletal: Normal range of motion. She exhibits no edema or tenderness.   Lymphadenopathy:     She has no cervical adenopathy.   Neurological: She is alert and oriented to person, place, and time. No cranial nerve deficit. Coordination normal.   Skin: Skin is warm. No rash noted. No erythema.   Psychiatric: She has a normal mood and affect. Her behavior is normal. Judgment and thought content normal.       Assessment:     1. Essential hypertension    2. Hyperlipidemia, mixed  "   3. Pulmonary HTN    4. S/P AVR (aortic valve replacement)    5. Vitamin D deficiency    6. Fatty liver    7. Diabetes mellitus type 2 in obese    8. Diabetes mellitus due to underlying condition with stage 3 chronic kidney disease, without long-term current use of insulin    9. Degeneration of lumbar or lumbosacral intervertebral disc    10. Chronic kidney disease, stage III (moderate)    11. Atherosclerosis of aorta    12. Bilateral pulmonary embolism    13. Class 3 obesity with alveolar hypoventilation and body mass index (BMI) of 40.0 to 44.9 in adult, unspecified whether serious comorbidity present    14. Chronic anticoagulation        Plan:   Discussed diet , achieving and maintaining ideal body weight, and exercise.   We reviewed meds in detail.  Reassured -Discussed goals, options , plan.  Eliquis prevention dose 2.5 mg twice.  I also told her to increase her D to at least 3000 IU per day.  She has not been taking Baby ASA but this with Eliquis would be best just 3 times per week  We have discussed the need for endocarditis prophylaxis.         Alina was seen today for s/p avr (aortic valve replacement).    Diagnoses and all orders for this visit:    Essential hypertension  -     Comprehensive metabolic panel; Future; Expected date: 10/29/2020  -     TSH; Future; Expected date: 10/29/2020  -     EKG 12-lead; Future; Expected date: 10/29/2020    Hyperlipidemia, mixed  -     Lipid panel; Future; Expected date: 10/29/2020  -     Comprehensive metabolic panel; Future; Expected date: 10/29/2020  -     TSH; Future; Expected date: 10/29/2020  -     aspirin (ECOTRIN) 81 MG EC tablet; Take 1 tablet (81 mg total) by mouth once daily. Just 3 times per week.    Pulmonary HTN  -     EKG 12-lead; Future; Expected date: 10/29/2020    S/P AVR (aortic valve replacement)  -     aspirin (ECOTRIN) 81 MG EC tablet; Take 1 tablet (81 mg total) by mouth once daily. Just 3 times per week.    Vitamin D deficiency  -     Vitamin D;  Future; Expected date: 10/29/2020    Fatty liver    Diabetes mellitus type 2 in obese  -     Hemoglobin A1c; Future; Expected date: 10/29/2020  -     aspirin (ECOTRIN) 81 MG EC tablet; Take 1 tablet (81 mg total) by mouth once daily. Just 3 times per week.    Diabetes mellitus due to underlying condition with stage 3 chronic kidney disease, without long-term current use of insulin    Degeneration of lumbar or lumbosacral intervertebral disc    Chronic kidney disease, stage III (moderate)    Atherosclerosis of aorta  -     aspirin (ECOTRIN) 81 MG EC tablet; Take 1 tablet (81 mg total) by mouth once daily. Just 3 times per week.    Bilateral pulmonary embolism  -     apixaban (ELIQUIS) 2.5 mg Tab; Take 1 tablet (2.5 mg total) by mouth 2 (two) times daily.  -     CBC auto differential; Future; Expected date: 10/29/2020    Class 3 obesity with alveolar hypoventilation and body mass index (BMI) of 40.0 to 44.9 in adult, unspecified whether serious comorbidity present  -     apixaban (ELIQUIS) 2.5 mg Tab; Take 1 tablet (2.5 mg total) by mouth 2 (two) times daily.  -     TSH; Future; Expected date: 10/29/2020    Chronic anticoagulation  -     CBC auto differential; Future; Expected date: 10/29/2020            Follow up in about 9 months (around 10/29/2020) for with ECG and labs.

## 2020-01-28 NOTE — PROGRESS NOTES
Chart reviewed.   Immunizations: updated  Orders placed: n/a  Upcoming appts: mammogram scheduled 01/29/20 a1c 02/12/20

## 2020-01-29 ENCOUNTER — HOSPITAL ENCOUNTER (OUTPATIENT)
Dept: RADIOLOGY | Facility: HOSPITAL | Age: 75
Discharge: HOME | End: 2020-01-29
Attending: PHYSICIAN ASSISTANT
Payer: MEDICARE

## 2020-01-29 ENCOUNTER — OFFICE VISIT (OUTPATIENT)
Dept: CARDIOLOGY | Facility: CLINIC | Age: 75
End: 2020-01-29
Payer: MEDICARE

## 2020-01-29 VITALS
WEIGHT: 243.38 LBS | HEIGHT: 62 IN | HEART RATE: 75 BPM | DIASTOLIC BLOOD PRESSURE: 59 MMHG | SYSTOLIC BLOOD PRESSURE: 113 MMHG | BODY MASS INDEX: 44.79 KG/M2

## 2020-01-29 DIAGNOSIS — E11.69 DIABETES MELLITUS TYPE 2 IN OBESE: ICD-10-CM

## 2020-01-29 DIAGNOSIS — E55.9 VITAMIN D DEFICIENCY: ICD-10-CM

## 2020-01-29 DIAGNOSIS — N18.30 CHRONIC KIDNEY DISEASE, STAGE III (MODERATE): ICD-10-CM

## 2020-01-29 DIAGNOSIS — E66.9 DIABETES MELLITUS TYPE 2 IN OBESE: ICD-10-CM

## 2020-01-29 DIAGNOSIS — E66.2 CLASS 3 OBESITY WITH ALVEOLAR HYPOVENTILATION AND BODY MASS INDEX (BMI) OF 40.0 TO 44.9 IN ADULT, UNSPECIFIED WHETHER SERIOUS COMORBIDITY PRESENT: ICD-10-CM

## 2020-01-29 DIAGNOSIS — E08.22 DIABETES MELLITUS DUE TO UNDERLYING CONDITION WITH STAGE 3 CHRONIC KIDNEY DISEASE, WITHOUT LONG-TERM CURRENT USE OF INSULIN: ICD-10-CM

## 2020-01-29 DIAGNOSIS — I10 ESSENTIAL HYPERTENSION: Primary | ICD-10-CM

## 2020-01-29 DIAGNOSIS — E78.2 HYPERLIPIDEMIA, MIXED: ICD-10-CM

## 2020-01-29 DIAGNOSIS — K76.0 FATTY LIVER: ICD-10-CM

## 2020-01-29 DIAGNOSIS — I26.99 BILATERAL PULMONARY EMBOLISM: ICD-10-CM

## 2020-01-29 DIAGNOSIS — M51.37 DEGENERATION OF LUMBAR OR LUMBOSACRAL INTERVERTEBRAL DISC: ICD-10-CM

## 2020-01-29 DIAGNOSIS — Z12.31 BREAST CANCER SCREENING BY MAMMOGRAM: ICD-10-CM

## 2020-01-29 DIAGNOSIS — N18.30 DIABETES MELLITUS DUE TO UNDERLYING CONDITION WITH STAGE 3 CHRONIC KIDNEY DISEASE, WITHOUT LONG-TERM CURRENT USE OF INSULIN: ICD-10-CM

## 2020-01-29 DIAGNOSIS — Z95.2 S/P AVR (AORTIC VALVE REPLACEMENT): ICD-10-CM

## 2020-01-29 DIAGNOSIS — I27.20 PULMONARY HTN: ICD-10-CM

## 2020-01-29 DIAGNOSIS — I70.0 ATHEROSCLEROSIS OF AORTA: ICD-10-CM

## 2020-01-29 DIAGNOSIS — Z79.01 CHRONIC ANTICOAGULATION: ICD-10-CM

## 2020-01-29 PROCEDURE — 99215 OFFICE O/P EST HI 40 MIN: CPT | Mod: HCNC,S$GLB,, | Performed by: INTERNAL MEDICINE

## 2020-01-29 PROCEDURE — 77067 SCR MAMMO BI INCL CAD: CPT | Mod: TC,HCNC

## 2020-01-29 PROCEDURE — 3078F DIAST BP <80 MM HG: CPT | Mod: HCNC,CPTII,S$GLB, | Performed by: INTERNAL MEDICINE

## 2020-01-29 PROCEDURE — 3044F PR MOST RECENT HEMOGLOBIN A1C LEVEL <7.0%: ICD-10-PCS | Mod: HCNC,CPTII,S$GLB, | Performed by: INTERNAL MEDICINE

## 2020-01-29 PROCEDURE — 99499 UNLISTED E&M SERVICE: CPT | Mod: HCNC,S$GLB,, | Performed by: INTERNAL MEDICINE

## 2020-01-29 PROCEDURE — 1101F PR PT FALLS ASSESS DOC 0-1 FALLS W/OUT INJ PAST YR: ICD-10-PCS | Mod: HCNC,CPTII,S$GLB, | Performed by: INTERNAL MEDICINE

## 2020-01-29 PROCEDURE — 3074F SYST BP LT 130 MM HG: CPT | Mod: HCNC,CPTII,S$GLB, | Performed by: INTERNAL MEDICINE

## 2020-01-29 PROCEDURE — 3078F PR MOST RECENT DIASTOLIC BLOOD PRESSURE < 80 MM HG: ICD-10-PCS | Mod: HCNC,CPTII,S$GLB, | Performed by: INTERNAL MEDICINE

## 2020-01-29 PROCEDURE — 77063 BREAST TOMOSYNTHESIS BI: CPT | Mod: 26,HCNC,, | Performed by: RADIOLOGY

## 2020-01-29 PROCEDURE — 3044F HG A1C LEVEL LT 7.0%: CPT | Mod: HCNC,CPTII,S$GLB, | Performed by: INTERNAL MEDICINE

## 2020-01-29 PROCEDURE — 99215 PR OFFICE/OUTPT VISIT, EST, LEVL V, 40-54 MIN: ICD-10-PCS | Mod: HCNC,S$GLB,, | Performed by: INTERNAL MEDICINE

## 2020-01-29 PROCEDURE — 99999 PR PBB SHADOW E&M-EST. PATIENT-LVL IV: CPT | Mod: PBBFAC,HCNC,, | Performed by: INTERNAL MEDICINE

## 2020-01-29 PROCEDURE — 1159F PR MEDICATION LIST DOCUMENTED IN MEDICAL RECORD: ICD-10-PCS | Mod: HCNC,S$GLB,, | Performed by: INTERNAL MEDICINE

## 2020-01-29 PROCEDURE — 77063 MAMMO DIGITAL SCREENING LEFT WITH TOMOSYNTHESIS_CAD: ICD-10-PCS | Mod: 26,HCNC,, | Performed by: RADIOLOGY

## 2020-01-29 PROCEDURE — 99999 PR PBB SHADOW E&M-EST. PATIENT-LVL IV: ICD-10-PCS | Mod: PBBFAC,HCNC,, | Performed by: INTERNAL MEDICINE

## 2020-01-29 PROCEDURE — 1126F AMNT PAIN NOTED NONE PRSNT: CPT | Mod: HCNC,S$GLB,, | Performed by: INTERNAL MEDICINE

## 2020-01-29 PROCEDURE — 99499 RISK ADDL DX/OHS AUDIT: ICD-10-PCS | Mod: HCNC,S$GLB,, | Performed by: INTERNAL MEDICINE

## 2020-01-29 PROCEDURE — 77067 MAMMO DIGITAL SCREENING LEFT WITH TOMOSYNTHESIS_CAD: ICD-10-PCS | Mod: 26,HCNC,, | Performed by: RADIOLOGY

## 2020-01-29 PROCEDURE — 1126F PR PAIN SEVERITY QUANTIFIED, NO PAIN PRESENT: ICD-10-PCS | Mod: HCNC,S$GLB,, | Performed by: INTERNAL MEDICINE

## 2020-01-29 PROCEDURE — 1101F PT FALLS ASSESS-DOCD LE1/YR: CPT | Mod: HCNC,CPTII,S$GLB, | Performed by: INTERNAL MEDICINE

## 2020-01-29 PROCEDURE — 77067 SCR MAMMO BI INCL CAD: CPT | Mod: 26,HCNC,, | Performed by: RADIOLOGY

## 2020-01-29 PROCEDURE — 3074F PR MOST RECENT SYSTOLIC BLOOD PRESSURE < 130 MM HG: ICD-10-PCS | Mod: HCNC,CPTII,S$GLB, | Performed by: INTERNAL MEDICINE

## 2020-01-29 PROCEDURE — 1159F MED LIST DOCD IN RCRD: CPT | Mod: HCNC,S$GLB,, | Performed by: INTERNAL MEDICINE

## 2020-01-29 RX ORDER — ASPIRIN 81 MG/1
81 TABLET ORAL DAILY
Qty: 30 TABLET | Refills: 0
Start: 2020-01-29 | End: 2021-03-31

## 2020-01-29 NOTE — PATIENT INSTRUCTIONS
Discussed diet , achieving and maintaining ideal body weight, and exercise.   We reviewed meds in detail.  Reassured -Discussed goals, options , plan.  Eliquis prevention dose 2.5 mg twice.  I also told her to increase her D to at least 3000 IU per day.  She has not been taking Baby ASA but this with Eliquis would be best just 3 times per week  We have discussed the need for endocarditis prophylaxis.

## 2020-02-12 ENCOUNTER — LAB VISIT (OUTPATIENT)
Dept: LAB | Facility: HOSPITAL | Age: 75
End: 2020-02-12
Attending: INTERNAL MEDICINE
Payer: MEDICARE

## 2020-02-12 DIAGNOSIS — N18.30 DIABETES MELLITUS DUE TO UNDERLYING CONDITION WITH STAGE 3 CHRONIC KIDNEY DISEASE, WITHOUT LONG-TERM CURRENT USE OF INSULIN: ICD-10-CM

## 2020-02-12 DIAGNOSIS — I10 ESSENTIAL HYPERTENSION: ICD-10-CM

## 2020-02-12 DIAGNOSIS — I26.99 BILATERAL PULMONARY EMBOLISM: ICD-10-CM

## 2020-02-12 DIAGNOSIS — Z17.1 MALIGNANT NEOPLASM OF RIGHT BREAST IN FEMALE, ESTROGEN RECEPTOR NEGATIVE, UNSPECIFIED SITE OF BREAST: ICD-10-CM

## 2020-02-12 DIAGNOSIS — D64.9 ANEMIA, UNSPECIFIED TYPE: ICD-10-CM

## 2020-02-12 DIAGNOSIS — C50.911 MALIGNANT NEOPLASM OF RIGHT BREAST IN FEMALE, ESTROGEN RECEPTOR NEGATIVE, UNSPECIFIED SITE OF BREAST: ICD-10-CM

## 2020-02-12 DIAGNOSIS — E08.22 DIABETES MELLITUS DUE TO UNDERLYING CONDITION WITH STAGE 3 CHRONIC KIDNEY DISEASE, WITHOUT LONG-TERM CURRENT USE OF INSULIN: ICD-10-CM

## 2020-02-12 DIAGNOSIS — E78.2 HYPERLIPIDEMIA, MIXED: ICD-10-CM

## 2020-02-12 DIAGNOSIS — E55.9 MILD VITAMIN D DEFICIENCY: ICD-10-CM

## 2020-02-12 LAB
ALBUMIN SERPL BCP-MCNC: 3.9 G/DL (ref 3.5–5.2)
ALP SERPL-CCNC: 99 U/L (ref 55–135)
ALT SERPL W/O P-5'-P-CCNC: 11 U/L (ref 10–44)
ANION GAP SERPL CALC-SCNC: 8 MMOL/L (ref 8–16)
AST SERPL-CCNC: 14 U/L (ref 10–40)
BASOPHILS # BLD AUTO: 0.02 K/UL (ref 0–0.2)
BASOPHILS # BLD AUTO: 0.02 K/UL (ref 0–0.2)
BASOPHILS NFR BLD: 0.4 % (ref 0–1.9)
BASOPHILS NFR BLD: 0.4 % (ref 0–1.9)
BILIRUB SERPL-MCNC: 0.5 MG/DL (ref 0.1–1)
BUN SERPL-MCNC: 22 MG/DL (ref 8–23)
CALCIUM SERPL-MCNC: 9.4 MG/DL (ref 8.7–10.5)
CHLORIDE SERPL-SCNC: 104 MMOL/L (ref 95–110)
CHOLEST SERPL-MCNC: 151 MG/DL (ref 120–199)
CHOLEST/HDLC SERPL: 2.8 {RATIO} (ref 2–5)
CO2 SERPL-SCNC: 29 MMOL/L (ref 23–29)
CREAT SERPL-MCNC: 1 MG/DL (ref 0.5–1.4)
D DIMER PPP IA.FEU-MCNC: 0.55 MG/L FEU
D DIMER PPP IA.FEU-MCNC: 0.55 MG/L FEU
DIFFERENTIAL METHOD: ABNORMAL
DIFFERENTIAL METHOD: ABNORMAL
EOSINOPHIL # BLD AUTO: 0.1 K/UL (ref 0–0.5)
EOSINOPHIL # BLD AUTO: 0.1 K/UL (ref 0–0.5)
EOSINOPHIL NFR BLD: 1.2 % (ref 0–8)
EOSINOPHIL NFR BLD: 1.2 % (ref 0–8)
ERYTHROCYTE [DISTWIDTH] IN BLOOD BY AUTOMATED COUNT: 13.8 % (ref 11.5–14.5)
ERYTHROCYTE [DISTWIDTH] IN BLOOD BY AUTOMATED COUNT: 13.8 % (ref 11.5–14.5)
EST. GFR  (AFRICAN AMERICAN): >60 ML/MIN/1.73 M^2
EST. GFR  (NON AFRICAN AMERICAN): 55.6 ML/MIN/1.73 M^2
FERRITIN SERPL-MCNC: 60 NG/ML (ref 20–300)
GLUCOSE SERPL-MCNC: 138 MG/DL (ref 70–110)
HCT VFR BLD AUTO: 40.6 % (ref 37–48.5)
HCT VFR BLD AUTO: 40.6 % (ref 37–48.5)
HDLC SERPL-MCNC: 53 MG/DL (ref 40–75)
HDLC SERPL: 35.1 % (ref 20–50)
HGB BLD-MCNC: 11.9 G/DL (ref 12–16)
HGB BLD-MCNC: 11.9 G/DL (ref 12–16)
IMM GRANULOCYTES # BLD AUTO: 0.01 K/UL (ref 0–0.04)
IMM GRANULOCYTES # BLD AUTO: 0.01 K/UL (ref 0–0.04)
IMM GRANULOCYTES NFR BLD AUTO: 0.2 % (ref 0–0.5)
IMM GRANULOCYTES NFR BLD AUTO: 0.2 % (ref 0–0.5)
IRON SERPL-MCNC: 59 UG/DL (ref 30–160)
LDLC SERPL CALC-MCNC: 68.2 MG/DL (ref 63–159)
LYMPHOCYTES # BLD AUTO: 1.3 K/UL (ref 1–4.8)
LYMPHOCYTES # BLD AUTO: 1.3 K/UL (ref 1–4.8)
LYMPHOCYTES NFR BLD: 25.5 % (ref 18–48)
LYMPHOCYTES NFR BLD: 25.5 % (ref 18–48)
MCH RBC QN AUTO: 28.9 PG (ref 27–31)
MCH RBC QN AUTO: 28.9 PG (ref 27–31)
MCHC RBC AUTO-ENTMCNC: 29.3 G/DL (ref 32–36)
MCHC RBC AUTO-ENTMCNC: 29.3 G/DL (ref 32–36)
MCV RBC AUTO: 99 FL (ref 82–98)
MCV RBC AUTO: 99 FL (ref 82–98)
MONOCYTES # BLD AUTO: 0.4 K/UL (ref 0.3–1)
MONOCYTES # BLD AUTO: 0.4 K/UL (ref 0.3–1)
MONOCYTES NFR BLD: 8.3 % (ref 4–15)
MONOCYTES NFR BLD: 8.3 % (ref 4–15)
NEUTROPHILS # BLD AUTO: 3.2 K/UL (ref 1.8–7.7)
NEUTROPHILS # BLD AUTO: 3.2 K/UL (ref 1.8–7.7)
NEUTROPHILS NFR BLD: 64.4 % (ref 38–73)
NEUTROPHILS NFR BLD: 64.4 % (ref 38–73)
NONHDLC SERPL-MCNC: 98 MG/DL
NRBC BLD-RTO: 0 /100 WBC
NRBC BLD-RTO: 0 /100 WBC
PLATELET # BLD AUTO: 134 K/UL (ref 150–350)
PLATELET # BLD AUTO: 134 K/UL (ref 150–350)
PMV BLD AUTO: 11.5 FL (ref 9.2–12.9)
PMV BLD AUTO: 11.5 FL (ref 9.2–12.9)
POTASSIUM SERPL-SCNC: 4.4 MMOL/L (ref 3.5–5.1)
PROT SERPL-MCNC: 7 G/DL (ref 6–8.4)
RBC # BLD AUTO: 4.12 M/UL (ref 4–5.4)
RBC # BLD AUTO: 4.12 M/UL (ref 4–5.4)
SATURATED IRON: 16 % (ref 20–50)
SODIUM SERPL-SCNC: 141 MMOL/L (ref 136–145)
T4 FREE SERPL-MCNC: 0.93 NG/DL (ref 0.71–1.51)
TOTAL IRON BINDING CAPACITY: 367 UG/DL (ref 250–450)
TRANSFERRIN SERPL-MCNC: 248 MG/DL (ref 200–375)
TRIGL SERPL-MCNC: 149 MG/DL (ref 30–150)
TSH SERPL DL<=0.005 MIU/L-ACNC: 5.65 UIU/ML (ref 0.4–4)
WBC # BLD AUTO: 4.95 K/UL (ref 3.9–12.7)
WBC # BLD AUTO: 4.95 K/UL (ref 3.9–12.7)

## 2020-02-12 PROCEDURE — 80061 LIPID PANEL: CPT | Mod: HCNC

## 2020-02-12 PROCEDURE — 85379 FIBRIN DEGRADATION QUANT: CPT | Mod: HCNC

## 2020-02-12 PROCEDURE — 36415 COLL VENOUS BLD VENIPUNCTURE: CPT | Mod: HCNC

## 2020-02-12 PROCEDURE — 82728 ASSAY OF FERRITIN: CPT | Mod: HCNC

## 2020-02-12 PROCEDURE — 80053 COMPREHEN METABOLIC PANEL: CPT | Mod: HCNC

## 2020-02-12 PROCEDURE — 82306 VITAMIN D 25 HYDROXY: CPT | Mod: HCNC

## 2020-02-12 PROCEDURE — 85025 COMPLETE CBC W/AUTO DIFF WBC: CPT | Mod: HCNC

## 2020-02-12 PROCEDURE — 83540 ASSAY OF IRON: CPT | Mod: HCNC

## 2020-02-12 PROCEDURE — 83036 HEMOGLOBIN GLYCOSYLATED A1C: CPT | Mod: HCNC

## 2020-02-12 PROCEDURE — 84439 ASSAY OF FREE THYROXINE: CPT | Mod: HCNC

## 2020-02-12 PROCEDURE — 84443 ASSAY THYROID STIM HORMONE: CPT | Mod: HCNC

## 2020-02-13 LAB
25(OH)D3+25(OH)D2 SERPL-MCNC: 32 NG/ML (ref 30–96)
ESTIMATED AVG GLUCOSE: 180 MG/DL (ref 68–131)
HBA1C MFR BLD HPLC: 7.9 % (ref 4–5.6)

## 2020-02-14 ENCOUNTER — OFFICE VISIT (OUTPATIENT)
Dept: INTERNAL MEDICINE | Facility: CLINIC | Age: 75
End: 2020-02-14
Payer: MEDICARE

## 2020-02-14 VITALS
SYSTOLIC BLOOD PRESSURE: 100 MMHG | HEART RATE: 85 BPM | HEIGHT: 62 IN | DIASTOLIC BLOOD PRESSURE: 60 MMHG | WEIGHT: 242.5 LBS | TEMPERATURE: 98 F | BODY MASS INDEX: 44.63 KG/M2 | OXYGEN SATURATION: 97 %

## 2020-02-14 DIAGNOSIS — D64.9 ANEMIA, UNSPECIFIED TYPE: Primary | ICD-10-CM

## 2020-02-14 DIAGNOSIS — M51.36 DEGENERATIVE LUMBAR DISC: ICD-10-CM

## 2020-02-14 DIAGNOSIS — E08.22 DIABETES MELLITUS DUE TO UNDERLYING CONDITION WITH STAGE 3 CHRONIC KIDNEY DISEASE, WITHOUT LONG-TERM CURRENT USE OF INSULIN: ICD-10-CM

## 2020-02-14 DIAGNOSIS — N18.30 DIABETES MELLITUS DUE TO UNDERLYING CONDITION WITH STAGE 3 CHRONIC KIDNEY DISEASE, WITHOUT LONG-TERM CURRENT USE OF INSULIN: ICD-10-CM

## 2020-02-14 DIAGNOSIS — M47.27 OSTEOARTHRITIS OF SPINE WITH RADICULOPATHY, LUMBOSACRAL REGION: ICD-10-CM

## 2020-02-14 DIAGNOSIS — D51.8 OTHER VITAMIN B12 DEFICIENCY ANEMIAS: ICD-10-CM

## 2020-02-14 DIAGNOSIS — F33.9 EPISODE OF RECURRENT MAJOR DEPRESSIVE DISORDER, UNSPECIFIED DEPRESSION EPISODE SEVERITY: ICD-10-CM

## 2020-02-14 LAB
ALBUMIN/CREAT UR: 6 UG/MG (ref 0–30)
BACTERIA #/AREA URNS AUTO: NORMAL /HPF
BILIRUB UR QL STRIP: NEGATIVE
CLARITY UR REFRACT.AUTO: CLEAR
COLOR UR AUTO: YELLOW
CREAT UR-MCNC: 50 MG/DL (ref 15–325)
GLUCOSE UR QL STRIP: ABNORMAL
HGB UR QL STRIP: NEGATIVE
KETONES UR QL STRIP: NEGATIVE
LEUKOCYTE ESTERASE UR QL STRIP: ABNORMAL
MICROALBUMIN UR DL<=1MG/L-MCNC: 3 UG/ML
MICROSCOPIC COMMENT: NORMAL
NITRITE UR QL STRIP: NEGATIVE
PH UR STRIP: 7 [PH] (ref 5–8)
PROT UR QL STRIP: NEGATIVE
RBC #/AREA URNS AUTO: 2 /HPF (ref 0–4)
SP GR UR STRIP: 1.01 (ref 1–1.03)
SQUAMOUS #/AREA URNS AUTO: 1 /HPF
URN SPEC COLLECT METH UR: ABNORMAL
WBC #/AREA URNS AUTO: 3 /HPF (ref 0–5)

## 2020-02-14 PROCEDURE — 1101F PT FALLS ASSESS-DOCD LE1/YR: CPT | Mod: HCNC,CPTII,S$GLB, | Performed by: INTERNAL MEDICINE

## 2020-02-14 PROCEDURE — 99499 UNLISTED E&M SERVICE: CPT | Mod: HCNC,S$GLB,, | Performed by: INTERNAL MEDICINE

## 2020-02-14 PROCEDURE — 81001 URINALYSIS AUTO W/SCOPE: CPT | Mod: HCNC

## 2020-02-14 PROCEDURE — 3078F DIAST BP <80 MM HG: CPT | Mod: HCNC,CPTII,S$GLB, | Performed by: INTERNAL MEDICINE

## 2020-02-14 PROCEDURE — 99999 PR PBB SHADOW E&M-EST. PATIENT-LVL III: ICD-10-PCS | Mod: PBBFAC,HCNC,, | Performed by: INTERNAL MEDICINE

## 2020-02-14 PROCEDURE — 99214 PR OFFICE/OUTPT VISIT, EST, LEVL IV, 30-39 MIN: ICD-10-PCS | Mod: HCNC,S$GLB,, | Performed by: INTERNAL MEDICINE

## 2020-02-14 PROCEDURE — 1101F PR PT FALLS ASSESS DOC 0-1 FALLS W/OUT INJ PAST YR: ICD-10-PCS | Mod: HCNC,CPTII,S$GLB, | Performed by: INTERNAL MEDICINE

## 2020-02-14 PROCEDURE — 3078F PR MOST RECENT DIASTOLIC BLOOD PRESSURE < 80 MM HG: ICD-10-PCS | Mod: HCNC,CPTII,S$GLB, | Performed by: INTERNAL MEDICINE

## 2020-02-14 PROCEDURE — 82570 ASSAY OF URINE CREATININE: CPT | Mod: HCNC

## 2020-02-14 PROCEDURE — 3074F PR MOST RECENT SYSTOLIC BLOOD PRESSURE < 130 MM HG: ICD-10-PCS | Mod: HCNC,CPTII,S$GLB, | Performed by: INTERNAL MEDICINE

## 2020-02-14 PROCEDURE — 3074F SYST BP LT 130 MM HG: CPT | Mod: HCNC,CPTII,S$GLB, | Performed by: INTERNAL MEDICINE

## 2020-02-14 PROCEDURE — 1159F PR MEDICATION LIST DOCUMENTED IN MEDICAL RECORD: ICD-10-PCS | Mod: HCNC,S$GLB,, | Performed by: INTERNAL MEDICINE

## 2020-02-14 PROCEDURE — 1159F MED LIST DOCD IN RCRD: CPT | Mod: HCNC,S$GLB,, | Performed by: INTERNAL MEDICINE

## 2020-02-14 PROCEDURE — 99999 PR PBB SHADOW E&M-EST. PATIENT-LVL III: CPT | Mod: PBBFAC,HCNC,, | Performed by: INTERNAL MEDICINE

## 2020-02-14 PROCEDURE — 99214 OFFICE O/P EST MOD 30 MIN: CPT | Mod: HCNC,S$GLB,, | Performed by: INTERNAL MEDICINE

## 2020-02-14 PROCEDURE — 99499 RISK ADDL DX/OHS AUDIT: ICD-10-PCS | Mod: HCNC,S$GLB,, | Performed by: INTERNAL MEDICINE

## 2020-02-14 RX ORDER — AMOXICILLIN 500 MG/1
TABLET, FILM COATED ORAL
Qty: 20 TABLET | Refills: 0 | Status: SHIPPED | OUTPATIENT
Start: 2020-02-14 | End: 2020-07-08

## 2020-02-14 RX ORDER — GLIMEPIRIDE 1 MG/1
TABLET ORAL
Qty: 45 TABLET | Refills: 3 | Status: SHIPPED | OUTPATIENT
Start: 2020-02-14 | End: 2020-08-18

## 2020-02-14 RX ORDER — DULOXETIN HYDROCHLORIDE 30 MG/1
CAPSULE, DELAYED RELEASE ORAL
Qty: 60 CAPSULE | Refills: 2 | Status: SHIPPED | OUTPATIENT
Start: 2020-02-14 | End: 2020-03-13

## 2020-02-14 RX ORDER — GABAPENTIN 300 MG/1
CAPSULE ORAL
Qty: 270 CAPSULE | Refills: 3 | Status: SHIPPED | OUTPATIENT
Start: 2020-02-14 | End: 2021-03-31

## 2020-02-24 NOTE — PROGRESS NOTES
Subjective:       Patient ID: Alina Narayan is a 74 y.o. female.    Chief Complaint: Follow-up    HPIPt is feeling better - no CP or SOB.  Lots of DJD pain, some depression with family and health issues.  Review of Systems   Constitutional: Negative for activity change and unexpected weight change.   HENT: Negative for hearing loss, rhinorrhea and trouble swallowing.    Eyes: Negative for discharge and visual disturbance.   Respiratory: Negative for chest tightness, shortness of breath (PND or orthopnea) and wheezing.    Cardiovascular: Negative for chest pain and palpitations.   Gastrointestinal: Positive for constipation. Negative for abdominal pain, blood in stool, diarrhea, nausea and vomiting.   Endocrine: Negative for polydipsia and polyuria.   Genitourinary: Negative for difficulty urinating, dysuria, hematuria and menstrual problem.   Musculoskeletal: Positive for arthralgias. Negative for joint swelling and neck pain.   Neurological: Negative for seizures, syncope, weakness and headaches.   Psychiatric/Behavioral: Negative for confusion and dysphoric mood.       Objective:      Physical Exam   Constitutional: She is oriented to person, place, and time. She appears well-developed and well-nourished. No distress.   HENT:   Head: Normocephalic.   Mouth/Throat: Oropharynx is clear and moist.   Neck: Neck supple. No JVD present. No thyromegaly present.   Cardiovascular: Normal rate, regular rhythm, normal heart sounds and intact distal pulses. Exam reveals no gallop and no friction rub.   No murmur heard.  Pulmonary/Chest: Effort normal and breath sounds normal. She has no wheezes. She has no rales.   Abdominal: Soft. Bowel sounds are normal. She exhibits no distension and no mass. There is no tenderness. There is no rebound and no guarding.   Musculoskeletal: She exhibits no edema.   Lymphadenopathy:     She has no cervical adenopathy.   Neurological: She is alert and oriented to person, place, and time. She has  normal reflexes.   Skin: Skin is warm and dry.   Psychiatric: She has a normal mood and affect. Her behavior is normal. Judgment and thought content normal.       Assessment:       1. Anemia, unspecified type    2. Degenerative lumbar disc    3. Osteoarthritis of spine with radiculopathy, lumbosacral region    4. Episode of recurrent major depressive disorder, unspecified depression episode severity    5. Diabetes mellitus due to underlying condition with stage 3 chronic kidney disease, without long-term current use of insulin    6. Other vitamin B12 deficiency anemias         Plan:   Anemia, unspecified type  -     CBC auto differential; Future; Expected date: 02/14/2020  -     TSH; Future; Expected date: 02/14/2020  -     Vitamin B12; Future; Expected date: 03/14/2020  -     Methylmalonic acid, serum; Future; Expected date: 02/14/2020    Degenerative lumbar disc  -     gabapentin (NEURONTIN) 300 MG capsule; One in AM, Two at bedtime  Dispense: 270 capsule; Refill: 3    Osteoarthritis of spine with radiculopathy, lumbosacral region  -     gabapentin (NEURONTIN) 300 MG capsule; One in AM, Two at bedtime  Dispense: 270 capsule; Refill: 3    Episode of recurrent major depressive disorder, unspecified depression episode severity  -     Ambulatory referral/consult to Psychiatry; Future; Expected date: 02/21/2020    Diabetes mellitus due to underlying condition with stage 3 chronic kidney disease, without long-term current use of insulin  -     Urinalysis  -     Microalbumin/creatinine urine ratio    Other vitamin B12 deficiency anemias   -     Vitamin B12; Future; Expected date: 03/14/2020    Other orders  -     glimepiride (AMARYL) 1 MG tablet; Half tablet in AM  Dispense: 45 tablet; Refill: 3    Depression  -     DULoxetine (CYMBALTA) 30 MG capsule; One daily for 2 weeks then two daily  Dispense: 60 capsule; Refill: 2 - can also help with pain  -     amoxicillin (AMOXIL) 500 MG Tab; 4 tablets 30-60 minutes prior to  dental work  Dispense: 20 tablet; Refill: 0  -     Urinalysis Microscopic

## 2020-02-26 ENCOUNTER — PATIENT OUTREACH (OUTPATIENT)
Dept: ADMINISTRATIVE | Facility: HOSPITAL | Age: 75
End: 2020-02-26

## 2020-03-13 ENCOUNTER — OFFICE VISIT (OUTPATIENT)
Dept: INTERNAL MEDICINE | Facility: CLINIC | Age: 75
End: 2020-03-13
Payer: MEDICARE

## 2020-03-13 VITALS
HEIGHT: 62 IN | SYSTOLIC BLOOD PRESSURE: 112 MMHG | WEIGHT: 251.31 LBS | BODY MASS INDEX: 46.25 KG/M2 | OXYGEN SATURATION: 99 % | HEART RATE: 71 BPM | TEMPERATURE: 98 F | DIASTOLIC BLOOD PRESSURE: 62 MMHG

## 2020-03-13 DIAGNOSIS — E08.22 DIABETES MELLITUS DUE TO UNDERLYING CONDITION WITH STAGE 3 CHRONIC KIDNEY DISEASE, WITHOUT LONG-TERM CURRENT USE OF INSULIN: Primary | ICD-10-CM

## 2020-03-13 DIAGNOSIS — N18.30 DIABETES MELLITUS DUE TO UNDERLYING CONDITION WITH STAGE 3 CHRONIC KIDNEY DISEASE, WITHOUT LONG-TERM CURRENT USE OF INSULIN: Primary | ICD-10-CM

## 2020-03-13 PROCEDURE — 99999 PR PBB SHADOW E&M-EST. PATIENT-LVL IV: ICD-10-PCS | Mod: PBBFAC,HCNC,, | Performed by: INTERNAL MEDICINE

## 2020-03-13 PROCEDURE — 3078F PR MOST RECENT DIASTOLIC BLOOD PRESSURE < 80 MM HG: ICD-10-PCS | Mod: HCNC,CPTII,S$GLB, | Performed by: INTERNAL MEDICINE

## 2020-03-13 PROCEDURE — G0009 PNEUMOCOCCAL POLYSACCHARIDE VACCINE 23-VALENT =>2YO SQ IM: ICD-10-PCS | Mod: HCNC,S$GLB,, | Performed by: INTERNAL MEDICINE

## 2020-03-13 PROCEDURE — 99213 PR OFFICE/OUTPT VISIT, EST, LEVL III, 20-29 MIN: ICD-10-PCS | Mod: 25,HCNC,S$GLB, | Performed by: INTERNAL MEDICINE

## 2020-03-13 PROCEDURE — 1159F PR MEDICATION LIST DOCUMENTED IN MEDICAL RECORD: ICD-10-PCS | Mod: HCNC,S$GLB,, | Performed by: INTERNAL MEDICINE

## 2020-03-13 PROCEDURE — G0009 ADMIN PNEUMOCOCCAL VACCINE: HCPCS | Mod: HCNC,S$GLB,, | Performed by: INTERNAL MEDICINE

## 2020-03-13 PROCEDURE — 99999 PR PBB SHADOW E&M-EST. PATIENT-LVL IV: CPT | Mod: PBBFAC,HCNC,, | Performed by: INTERNAL MEDICINE

## 2020-03-13 PROCEDURE — 3074F PR MOST RECENT SYSTOLIC BLOOD PRESSURE < 130 MM HG: ICD-10-PCS | Mod: HCNC,CPTII,S$GLB, | Performed by: INTERNAL MEDICINE

## 2020-03-13 PROCEDURE — 3078F DIAST BP <80 MM HG: CPT | Mod: HCNC,CPTII,S$GLB, | Performed by: INTERNAL MEDICINE

## 2020-03-13 PROCEDURE — 3074F SYST BP LT 130 MM HG: CPT | Mod: HCNC,CPTII,S$GLB, | Performed by: INTERNAL MEDICINE

## 2020-03-13 PROCEDURE — 1101F PR PT FALLS ASSESS DOC 0-1 FALLS W/OUT INJ PAST YR: ICD-10-PCS | Mod: HCNC,CPTII,S$GLB, | Performed by: INTERNAL MEDICINE

## 2020-03-13 PROCEDURE — 90732 PPSV23 VACC 2 YRS+ SUBQ/IM: CPT | Mod: HCNC,S$GLB,, | Performed by: INTERNAL MEDICINE

## 2020-03-13 PROCEDURE — 90732 PNEUMOCOCCAL POLYSACCHARIDE VACCINE 23-VALENT =>2YO SQ IM: ICD-10-PCS | Mod: HCNC,S$GLB,, | Performed by: INTERNAL MEDICINE

## 2020-03-13 PROCEDURE — 1101F PT FALLS ASSESS-DOCD LE1/YR: CPT | Mod: HCNC,CPTII,S$GLB, | Performed by: INTERNAL MEDICINE

## 2020-03-13 PROCEDURE — 1159F MED LIST DOCD IN RCRD: CPT | Mod: HCNC,S$GLB,, | Performed by: INTERNAL MEDICINE

## 2020-03-13 PROCEDURE — 99213 OFFICE O/P EST LOW 20 MIN: CPT | Mod: 25,HCNC,S$GLB, | Performed by: INTERNAL MEDICINE

## 2020-03-13 RX ORDER — HYDROCODONE BITARTRATE AND ACETAMINOPHEN 5; 325 MG/1; MG/1
1 TABLET ORAL EVERY 6 HOURS PRN
Qty: 40 TABLET | Refills: 0 | Status: SHIPPED | OUTPATIENT
Start: 2020-03-13 | End: 2021-01-29

## 2020-03-13 RX ORDER — ALPRAZOLAM 0.25 MG/1
TABLET ORAL
Qty: 30 TABLET | Refills: 0 | Status: SHIPPED | OUTPATIENT
Start: 2020-03-13 | End: 2020-05-11 | Stop reason: SDUPTHER

## 2020-03-23 NOTE — PROGRESS NOTES
Subjective:       Patient ID: Alina Narayan is a 74 y.o. female.    Chief Complaint: Follow-up    HPIPt could not take cymbalta.  Sugar is improved with additional meds.   Review of Systems   Constitutional: Negative for activity change and unexpected weight change.   HENT: Negative for hearing loss, rhinorrhea and trouble swallowing.    Eyes: Negative for discharge and visual disturbance.   Respiratory: Negative for chest tightness, shortness of breath (PND or orthopnea) and wheezing.    Cardiovascular: Negative for chest pain and palpitations.   Gastrointestinal: Negative for abdominal pain, blood in stool, constipation, diarrhea, nausea and vomiting.   Endocrine: Negative for polydipsia and polyuria.   Genitourinary: Negative for difficulty urinating, dysuria, hematuria and menstrual problem.   Musculoskeletal: Positive for arthralgias. Negative for joint swelling and neck pain.   Neurological: Positive for weakness. Negative for seizures, syncope and headaches.   Psychiatric/Behavioral: Negative for confusion and dysphoric mood.       Objective:      Physical Exam   Constitutional: She is oriented to person, place, and time. She appears well-developed and well-nourished. No distress.   HENT:   Head: Normocephalic.   Mouth/Throat: Oropharynx is clear and moist.   Neck: Neck supple. No JVD present. No thyromegaly present.   Cardiovascular: Normal rate, regular rhythm, normal heart sounds and intact distal pulses. Exam reveals no gallop and no friction rub.   No murmur heard.  Pulmonary/Chest: Effort normal and breath sounds normal. She has no wheezes. She has no rales.   Abdominal: Soft. Bowel sounds are normal. She exhibits no distension and no mass. There is no tenderness. There is no rebound and no guarding.   Musculoskeletal: She exhibits no edema.   Lymphadenopathy:     She has no cervical adenopathy.   Neurological: She is alert and oriented to person, place, and time. She has normal reflexes.   Skin: Skin is  warm and dry.   Psychiatric: She has a normal mood and affect. Her behavior is normal. Judgment and thought content normal.       Assessment:       1. Diabetes mellitus due to underlying condition with stage 3 chronic kidney disease, without long-term current use of insulin        Plan:   Diabetes mellitus due to underlying condition with stage 3 chronic kidney disease, without long-term current use of insulin  -     Hemoglobin A1c; Future; Expected date: 03/13/2020    Other orders  -     blood sugar diagnostic Strp; True Metirx strips or strips and lancets  - pt checks twice daily for uncontrolled glucoses E11.65  Dispense: 200 each; Refill: 3  -     HYDROcodone-acetaminophen (NORCO) 5-325 mg per tablet; Take 1 tablet by mouth every 6 (six) hours as needed for Pain.  Dispense: 40 tablet; Refill: 0  -     ALPRAZolam (XANAX) 0.25 MG tablet; Half - one tablet daily as needed for anxiety  Dispense: 30 tablet; Refill: 0  -     (In Office Administered) Pneumococcal Polysaccharide Vaccine (23 Valent) (SQ/IM)

## 2020-03-26 RX ORDER — LANCETS
EACH MISCELLANEOUS
Qty: 200 EACH | Refills: 3 | Status: SHIPPED | OUTPATIENT
Start: 2020-03-26

## 2020-03-26 RX ORDER — INSULIN PUMP SYRINGE, 3 ML
EACH MISCELLANEOUS
Qty: 1 EACH | Refills: 0 | Status: SHIPPED | OUTPATIENT
Start: 2020-03-26 | End: 2021-07-16 | Stop reason: SDUPTHER

## 2020-03-27 ENCOUNTER — PATIENT MESSAGE (OUTPATIENT)
Dept: HEMATOLOGY/ONCOLOGY | Facility: CLINIC | Age: 75
End: 2020-03-27

## 2020-04-02 NOTE — TELEPHONE ENCOUNTER
----- Message from Perla Julien sent at 4/2/2020 12:37 PM CDT -----  Contact: Tanisha knapp [facility] 1 [phone]  Diabetic or Medical Supplies.  What supplies are needed: test strips  What is the brand name of the supplies: Accu Check Darby Plu   Is this a refill or new prescription:  new  Who prescribed the supplies: ALISON Dunn  Pharmacy or company name, phone # and location:  [facility] - [address] [phone] (Phone)  [phone] (Fax)  Comments:

## 2020-05-02 DIAGNOSIS — E03.4 HYPOTHYROIDISM DUE TO ACQUIRED ATROPHY OF THYROID: ICD-10-CM

## 2020-05-02 RX ORDER — METFORMIN HYDROCHLORIDE 500 MG/1
TABLET ORAL
Qty: 180 TABLET | Refills: 3 | Status: SHIPPED | OUTPATIENT
Start: 2020-05-02 | End: 2021-03-31

## 2020-05-02 RX ORDER — LEVOTHYROXINE SODIUM 50 UG/1
50 TABLET ORAL NIGHTLY
Qty: 90 TABLET | Refills: 3 | Status: SHIPPED | OUTPATIENT
Start: 2020-05-02 | End: 2020-08-18

## 2020-05-02 RX ORDER — PRAVASTATIN SODIUM 20 MG/1
TABLET ORAL
Qty: 90 TABLET | Refills: 3 | Status: SHIPPED | OUTPATIENT
Start: 2020-05-02 | End: 2021-03-31

## 2020-05-06 ENCOUNTER — TELEPHONE (OUTPATIENT)
Dept: HEMATOLOGY/ONCOLOGY | Facility: CLINIC | Age: 75
End: 2020-05-06

## 2020-05-06 NOTE — TELEPHONE ENCOUNTER
Call made to patient. No answer at this time. VML to inform that we would need to rescheduled her current mamogram and office visit appointments on 6/30 as Dr Dougherty will no longer be in clinic that day. Informed would go ahead and get patient rescheduled and mail out new appointment slips and if any changes are needed to please call back to clinic.

## 2020-05-07 ENCOUNTER — PATIENT MESSAGE (OUTPATIENT)
Dept: INTERNAL MEDICINE | Facility: CLINIC | Age: 75
End: 2020-05-07

## 2020-05-11 ENCOUNTER — PATIENT MESSAGE (OUTPATIENT)
Dept: INTERNAL MEDICINE | Facility: CLINIC | Age: 75
End: 2020-05-11

## 2020-05-11 ENCOUNTER — TELEPHONE (OUTPATIENT)
Dept: INTERNAL MEDICINE | Facility: CLINIC | Age: 75
End: 2020-05-11

## 2020-05-11 RX ORDER — ALPRAZOLAM 0.25 MG/1
TABLET ORAL
Qty: 30 TABLET | Refills: 0 | Status: SHIPPED | OUTPATIENT
Start: 2020-05-11 | End: 2020-11-30 | Stop reason: SDUPTHER

## 2020-05-11 RX ORDER — FUROSEMIDE 20 MG/1
TABLET ORAL
Qty: 30 TABLET | Refills: 10 | Status: SHIPPED | OUTPATIENT
Start: 2020-05-11 | End: 2021-05-07

## 2020-05-19 ENCOUNTER — TELEPHONE (OUTPATIENT)
Dept: SURGERY | Facility: CLINIC | Age: 75
End: 2020-05-19

## 2020-05-19 DIAGNOSIS — I89.0 LYMPHEDEMA: Primary | ICD-10-CM

## 2020-05-19 NOTE — PROGRESS NOTES
[facility]  Physical Therapy Initial Evaluation    Name: Alina Narayan  Clinic Number: [id number]    Therapy Diagnosis:   Encounter Diagnoses   Name Primary?    Lymphedema     Malignant neoplasm of nipple of right breast in female, unspecified estrogen receptor status Yes    Decreased shoulder mobility, right     Pain in right arm     Lymphedema of arm      Physician: Fay Bhardwaj PA    Physician Orders: PT Eval and Treat   Medical Diagnosis: right breast cancer  Evaluation Date: 5/21/2020  Authorization Period Expiration: 5/28/20  Plan of Care Certification Period: 7/3/2020 (6 weeks)  Visit # / Visits authorized: 1/ 1  Insurance: [employer]    Time In: 11:05 AM  Time Out: 12:00 PM  Total Billable Time: 55 minutes    Precautions: cancer      History   History of Present Illness: Alina is a 75 y.o. female that presents to  [facility] at the [facility] s/p right breast surgery.   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 States had to stop due to COVID 19 and pain and swelling in right upper arm returned. Patient states she ordered compression sleeve for her RUE 12/19. States she wears the compression sleeves as needed to ease her pain. (compression sleeves 20mmHG -30mmHg). Patient states returning to work out in pool tomorrow.  Surgical History: 9/2/19 right chest wall wound exploration and evacuation of hematoma; 8/26/19 right modified radical mastectomy and removal of port; 2/19 insertion of port  Alina Narayan  has a past surgical history that includes Carpal tunnel release; Shoulder surgery; Dilation and curettage of uterus (1999); Endometrial ablation (1999); left lumpectomy (2012); Eye surgery; Skin biopsy; Cardiac valve  replacement (12/2013); Colonoscopy (N/A, 9/29/2017); Breast lumpectomy (Left, 2012); Breast biopsy (Left, 10/1/2012); Breast biopsy (Left, 12/2017); Cardiac valuve replacement (2013); Insertion of tunneled central venous catheter (CVC) with subcutaneous port (Right, 2/1/2019); Modified radical mastectomy w/ axillary lymph node dissection (Right, 8/26/2019); and Mediport removal (Right, 8/26/2019).    Chemotherapy: ernesto-adjuvant chemotherapy from 2/19 to 8/19  Radiation: 2012; none currently    Past Medical History:   Diagnosis Date    Allergy     seasonal    Anxiety     Arthritis     Breast cancer 10/2012    left breast invasive ductal carcinoma    Colon polyp     Diabetes mellitus     Type 2    Diabetes mellitus, type 2     Diverticular disease     Diverticulitis 2009    Genetic testing 05/2017    negative Integrated [employer]    Hyperlipidemia     Hypertension     Morbid obesity     MITCHELL (obstructive sleep apnea)     Pulmonary embolism     Stenosis     Stenosis and insufficiency of lacrimal passages     Thyroid disease           Medications:  Alina has a current medication list which includes the following prescription(s): alprazolam, amoxicillin, apixaban, aspirin, blood sugar diagnostic, blood-glucose meter, carvedilol, cholecalciferol (vitamin d3), furosemide, gabapentin, glimepiride, hydrocodone-acetaminophen, lancets, levothyroxine, metformin, and pravastatin, and the following Facility-Administered Medications: alteplase, heparin, porcine (pf), heparin, porcine (pf), and sodium chloride 0.9%.    Allergies:  Review of patient's allergies indicates:   Allergen Reactions    Dilaudid [hydromorphone (bulk)] Other (See Comments)     Other reaction(s): sedation    Hydromorphone Other (See Comments)     Other reaction(s): sedation    Cymbalta [duloxetine]      Dry mouth, agitation    Exenatide Rash     Other reaction(s): Rash        Prior Therapy: Post-op from 10/19 to 11/19  Social History:   "lives with their spouse  Occupation: Retired  Prior Level of Function: Independent  Current Level of Function: difficulty reaching upward with RUE    Other Past Medical History: See Above    Patient's Goals: I want to decrease my pain and control the swelling.    Hand dominance: Right handed    Subjective   Pt states: having pain in right upper arm  Pain: 0/10 on VAS currently.   Best: 0/10  Worst: 6/10   Pain location: right upper arm   Objective   Mental status :oriented    Postural examination/scapula alignment: Rounded shoulder and Head forward    Skin Integrity:   Scar Location: Right anterior chest  Appearance: healed  Signs of infection: No  Drainage:none  Color:NA    Edema: Moderate  Location: right upper arm    Axillary Web Syndrome/Cording:   Location: right axilla and medial upper arm  Degree of Cording: mild (mild, moderate etc...)   Number of cords present: 1-2 cords    Sensation: hypersensitivity noted mastectomy scar right chest      Palpation: tightness present anterior-inferior right chesr over mastectoy scar    Range of Motion:      Shoulder Range of Motion:   Active /Passive ROM Right Left   Flexion 150 155   Abduction 140 140   Extension 60 60   IR/90deg 80 80   ER/90deg 60 50          Strength: manual muscle test grades below   Upper Extremity Strength   (R) UE (L) UE   Shoulder flexion: 4/5 5/5   Shoulder Abduction: 4/5 5/5   Shoulder IR 4/5 5/5   Shoulder ER 4/5 5/5   Elbow flexion: 5/5 5/5   Elbow extension: 5/5 5/5   Wrist flexion: 5/5 5/5   Wrist extension: 5/5 5/5    5/5 5/5       Baseline Measurements of BL UE's for early detection of Lymphedema:     LANDMARK RIGHT UE LEFT UE DIFFERENCE   E + 8" 48 cm 46 2 cm   E + 6" 45 cm 44 cm 1 cm   E + 4" 39.5 cm 39 cm 0.5 cm   E + 2" 33 cm 32.5 cm 0.5 cm   Elbow 28 cm 28 cm 0 cm   W+ 8" 28.5 cm 28.5 cm 0 cm   W +  6" 28 cm 28 cm 0 cm   W + 4" 25 cm 25 cm 0 cm   Wrist 17 cm 17 cm 0 cm   DPC 21 cm 20 cm 1 cm   IP Thumb 7 cm 7 cm 0 cm " "    Functional Mobility (Bed mobility, transfers)  Independent    ADL's:  Difficulty raising RUE upward    Gait Assessment:   - AD used: Rollator  - Assistance: independent  - Distance: community distances     Patient Education Provided   - role of therapy in multi - disciplinary team, goals for therapy  - instructed in self massage and STM to right anterior chest  - Pt was educated in lymphedema etiology and management plans-given in previous sessions and reviewed today.    - Pt was provided with written risk reductions and precautions for managing lymphedema--given in previous sessions and reviewed today.     Pt has no cultural, educational or language barriers to learning provided.  Treatment and Instruction of Home Exercise Program    Time In: 11:35 AM  Time Out: 12:00 PM    Alina received individual therapeutic exercises to improve postural correction and alignment, stretching and soft tissue mobility, and strengthening for 15 minutes including the following:   Supine Shld Flexion with wand        1 x 5  Butterflies (R)                                   5 x 5"  Seated Median nerve glides RUE    5 x 5"  Seated Shld Abd (R)                        1 x 5  Doorway Pec stretch (R)                 5 x 5"        Alina received the following manual therapy techniques were performed to increased myofascial/soft tissue length, mobility and pliability, increase PROM, AROM and function as well as to decrease pain for 10 minutes  Anterior chest massage to desensitize mastectomy scar  STM of soft tissue right anterior chest      Written Home Exercises Provided and Patient Education: Handouts given   See EMR under patient instructions for program given  Pt demonstrated good understanding of the education provided. Patient demo good return demo of skill of exercises.    Functional Limitations Reporting      CMS Impairment/Limitation/Restriction for FOTO Outcome Survey    Therapist reviewed FOTO scores for Alina ELLIOTT Sylvain on " 5/21/2020.   FOTO documents entered into [employer] - see Media section.    Limitations Score: 45%  Category: Carrying           Assessment   This is a 75 y.o. female referred to outpatient physical therapy and presents with a medical diagnosis of right breast cancer and was seen today post-operatively to establish PT plan of care for impairments following surgery including: decreased right shoulder AROM, median nerve neural tension RUE, mild cording right axilla and medial upper arm, soft tissue tightness and scar hypersensitivity right anterior chest. Circumferential measurements of RUE do not indicate lymphedema at this time.     Pt instructed in HEP this session and was able to perform all exercises given independently. Pt instructed to follow up with therapist if any concerns arise with program established. Pt will continue to benefit from skilled physical therapy to address the impairments stated in chart below, provide patient/family education and to maximize pt's level of independence in home and community environment     Anticipated barriers to physical therapy: None     Pt's spiritual, cultural and educational needs considered and pt agreeable to plan of care and goals as stated below:     Medical necessity is demonstrated by the following IMPAIRMENTS/PROMBLEM LIST:    History  Co-morbidities and personal factors that may impact the plan of care Co-morbidities:   anxiety, diabetes, high BMI, history of cancer, HTN and thyroid disease    Personal Factors:   no deficits     moderate   Examination  Body Structures and Functions, activity limitations and participation restrictions that may impact the plan of care Body Regions:   upper extremities    Body Systems:    ROM  strength  edema  scar formation    Participation Restrictions:   Difficulty raising RUE upward    Activity limitations:   Learning and applying knowledge  no deficits    General Tasks and Commands  no deficits    Communication  no  deficits    Mobility  lifting and carrying objects  walking    Self care  washing oneself (bathing, drying, washing hands)  dressing    Domestic Life  cooking  doing house work (cleaning house, washing dishes, laundry)    Interactions/Relationships  no deficits    Life Areas  no deficits    Community and Social Life  no deficits         moderate   Clinical Presentation evolving clinical presentation with changing clinical characteristics moderate   Decision Making/ Complexity Score: moderate       Goals: Pt agrees with goals set    Short Term goals: 3 weeks  1. Patient will demonstrate 100% understanding of lymphedema risk reduction practices to include self monitoring for lymphedema. (progressing, not met)  2. Patient will demonstrate independence with Home Exercise program established. (progressing, not met)  3. Pt will increase AROM/PROM in shoulder abduction ROM to 150 degrees on right to improve functional reach, carry, push, pull pain free. (progressing, not met)  4. Pt will increase AROM/PROM in shoulder flexion to 160 degrees on right to improve functional reach, carry, push, pull pain free.(progressing, not met)  5. Strength will be 4+/5 on right in order to perform functional activities. (progressing, not met)    Long Term Goals: 6 weeks   1.  Pt will increase AROM/PROM in shoulder flexion to 170 degrees on right to improve functional reach, carry, push, pull pain free. (progressing, not met)  2. . Pt will demonstrate full/maximized tissue mobility to increase ROM and promote healthy tissue to be pain free at discharge. (progressing, not met)  4. Pt will report decrease in overall worst pain to 2/10 at discharge. (progressing, not met)  5. Pt will increase AROM/PROM in shoulder abduction ROM to 170 degrees on right to improve functional reach, carry, push, pull pain free. (progressing, not met)      Plan   Outpatient physical therapy 2x week for 6 weeks to include the following:   Manual Therapy, Patient  Education and Therapeutic Exercise.    Plan of care Certification Period: 5/21/2020 to 7/3/2020.    Therapist: Bibiana Emanuel, PT  5/21/2020

## 2020-05-19 NOTE — TELEPHONE ENCOUNTER
Spoke with patient, she has been scheduled for physical therapy with Bibiana. Instructed her to contact clinic if she has any further issues. She verbalizes understanding.

## 2020-05-19 NOTE — TELEPHONE ENCOUNTER
Fay, can you place a new referral for physical therapy. She will reach out to the patient and get her scheduled.    Thanks,

## 2020-05-19 NOTE — TELEPHONE ENCOUNTER
, has this patient ever been seen for arm pain? Do we need to place a referral to get her started for PT?        Spoke with patient, she states that she is having some swelling/ upper arm pain where she had the Mastectomy. She denies any redness or warmth at the site.Last night the pain was worst.She would like to know what should she do in regards to her pain.       ----- Message from Norma Mosher sent at 5/19/2020 10:37 AM CDT -----  Contact: pt @ [phone]  Calling to speak with someone in Dr. Bhardwaj's office regarding pain (upper right arm where her breast was removed). Please call.

## 2020-05-21 ENCOUNTER — CLINICAL SUPPORT (OUTPATIENT)
Dept: REHABILITATION | Facility: HOSPITAL | Age: 75
End: 2020-05-21
Attending: PHYSICIAN ASSISTANT
Payer: MEDICARE

## 2020-05-21 DIAGNOSIS — C50.011 MALIGNANT NEOPLASM OF NIPPLE OF RIGHT BREAST IN FEMALE, UNSPECIFIED ESTROGEN RECEPTOR STATUS: Primary | ICD-10-CM

## 2020-05-21 DIAGNOSIS — M25.611 DECREASED SHOULDER MOBILITY, RIGHT: ICD-10-CM

## 2020-05-21 DIAGNOSIS — M79.601 PAIN IN RIGHT ARM: ICD-10-CM

## 2020-05-21 DIAGNOSIS — I89.0 LYMPHEDEMA OF ARM: ICD-10-CM

## 2020-05-21 DIAGNOSIS — I89.0 LYMPHEDEMA: ICD-10-CM

## 2020-05-21 PROCEDURE — 97162 PT EVAL MOD COMPLEX 30 MIN: CPT | Mod: HCNC | Performed by: PHYSICAL MEDICINE & REHABILITATION

## 2020-05-21 PROCEDURE — 97110 THERAPEUTIC EXERCISES: CPT | Mod: HCNC | Performed by: PHYSICAL MEDICINE & REHABILITATION

## 2020-05-21 NOTE — PATIENT INSTRUCTIONS
ROM: Flexion - Wand (Supine)        Lie on back holding wand. Raise arms over head.   Repeat _5-10___ times per set. Do __1__ sets per session. Do __2__ sessions per day.  Copyright © I. All rights reserved.            Butterflies    Lie on your back with your hands behind your head. Open your chest by  your elbows apart and gently down. Hold for 5 seconds. Then, bring  your elbows back together. Repeat 2x daily  5-10 reps   Copyright © 6328-9704 [employer]        MEDIAN NERVE GLIDE - A  Start with your arm hanging down at your  side with your elbows straight and palm  facing forward. Next, bend your wrist foward  and back. Perform 5 times  Your other hand should be checking to make  sure that your shoulder stays down and  drawn back the entire time.  Copyright © 2307-6040 [employer]ROM:     ROM: Abduction (Standing)    Bring arms straight out from sides and raise as high as possible without pain.  Repeat 5-10 times. Do 1 sessions 2x per day.            Doorway Pec Stretch:  Start by having the right arm flush against  the side of the wall at ~90 degrees between  your arm and torso and also the arm and  elbow.  Have the opposite leg forward and the other  back in a modified lunging stance.  Slowly begin to lean forward and there will be  a stretching sensation through the pectoral  muscle group of the arm on the wall.Perform 5 times, holding for count of 5.  Copyright © 1725-7938 [employer]        Massage over you right chest scars and skin using lotion. Perform 3-5 minutes 2-3 times a day.

## 2020-05-21 NOTE — PLAN OF CARE
[facility]  Physical Therapy Initial Evaluation    Name: Alina Narayan  Clinic Number: [id number]    Therapy Diagnosis:   Encounter Diagnoses   Name Primary?    Lymphedema     Malignant neoplasm of nipple of right breast in female, unspecified estrogen receptor status Yes    Decreased shoulder mobility, right     Pain in right arm     Lymphedema of arm      Physician: Fay Bhardwaj PA    Physician Orders: PT Eval and Treat   Medical Diagnosis: right breast cancer  Evaluation Date: 5/21/2020  Authorization Period Expiration: 5/28/20  Plan of Care Certification Period: 7/3/2020 (6 weeks)  Visit # / Visits authorized: 1/ 1  Insurance: [employer]    Time In: 11:05 AM  Time Out: 12:00 PM  Total Billable Time: 55 minutes    Precautions: cancer      History   History of Present Illness: Alina is a 75 y.o. female that presents to  [facility] at the [facility] s/p right breast surgery.   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 States had to stop due to COVID 19 and pain and swelling in right upper arm returned. Patient states she ordered compression sleeve for her RUE 12/19. States she wears the compression sleeves as needed to ease her pain. (compression sleeves 20mmHG -30mmHg). Patient states returning to work out in pool tomorrow.  Surgical History: 9/2/19 right chest wall wound exploration and evacuation of hematoma; 8/26/19 right modified radical mastectomy and removal of port; 2/19 insertion of port  Alina Narayan  has a past surgical history that includes Carpal tunnel release; Shoulder surgery; Dilation and curettage of uterus (1999); Endometrial ablation (1999); left lumpectomy (2012); Eye surgery; Skin biopsy; Cardiac valve  replacement (12/2013); Colonoscopy (N/A, 9/29/2017); Breast lumpectomy (Left, 2012); Breast biopsy (Left, 10/1/2012); Breast biopsy (Left, 12/2017); Cardiac valuve replacement (2013); Insertion of tunneled central venous catheter (CVC) with subcutaneous port (Right, 2/1/2019); Modified radical mastectomy w/ axillary lymph node dissection (Right, 8/26/2019); and Mediport removal (Right, 8/26/2019).    Chemotherapy: ernesto-adjuvant chemotherapy from 2/19 to 8/19  Radiation: 2012; none currently    Past Medical History:   Diagnosis Date    Allergy     seasonal    Anxiety     Arthritis     Breast cancer 10/2012    left breast invasive ductal carcinoma    Colon polyp     Diabetes mellitus     Type 2    Diabetes mellitus, type 2     Diverticular disease     Diverticulitis 2009    Genetic testing 05/2017    negative Integrated [employer]    Hyperlipidemia     Hypertension     Morbid obesity     MITCHELL (obstructive sleep apnea)     Pulmonary embolism     Stenosis     Stenosis and insufficiency of lacrimal passages     Thyroid disease           Medications:  Alina has a current medication list which includes the following prescription(s): alprazolam, amoxicillin, apixaban, aspirin, blood sugar diagnostic, blood-glucose meter, carvedilol, cholecalciferol (vitamin d3), furosemide, gabapentin, glimepiride, hydrocodone-acetaminophen, lancets, levothyroxine, metformin, and pravastatin, and the following Facility-Administered Medications: alteplase, heparin, porcine (pf), heparin, porcine (pf), and sodium chloride 0.9%.    Allergies:  Review of patient's allergies indicates:   Allergen Reactions    Dilaudid [hydromorphone (bulk)] Other (See Comments)     Other reaction(s): sedation    Hydromorphone Other (See Comments)     Other reaction(s): sedation    Cymbalta [duloxetine]      Dry mouth, agitation    Exenatide Rash     Other reaction(s): Rash        Prior Therapy: Post-op from 10/19 to 11/19  Social History:   "lives with their spouse  Occupation: Retired  Prior Level of Function: Independent  Current Level of Function: difficulty reaching upward with RUE    Other Past Medical History: See Above    Patient's Goals: I want to decrease my pain and control the swelling.    Hand dominance: Right handed    Subjective   Pt states: having pain in right upper arm  Pain: 0/10 on VAS currently.   Best: 0/10  Worst: 6/10   Pain location: right upper arm   Objective   Mental status :oriented    Postural examination/scapula alignment: Rounded shoulder and Head forward    Skin Integrity:   Scar Location: Right anterior chest  Appearance: healed  Signs of infection: No  Drainage:none  Color:NA    Edema: Moderate  Location: right upper arm    Axillary Web Syndrome/Cording:   Location: right axilla and medial upper arm  Degree of Cording: mild (mild, moderate etc...)   Number of cords present: 1-2 cords    Sensation: hypersensitivity noted mastectomy scar right chest      Palpation: tightness present anterior-inferior right chesr over mastectoy scar    Range of Motion:      Shoulder Range of Motion:   Active /Passive ROM Right Left   Flexion 150 155   Abduction 140 140   Extension 60 60   IR/90deg 80 80   ER/90deg 60 50          Strength: manual muscle test grades below   Upper Extremity Strength   (R) UE (L) UE   Shoulder flexion: 4/5 5/5   Shoulder Abduction: 4/5 5/5   Shoulder IR 4/5 5/5   Shoulder ER 4/5 5/5   Elbow flexion: 5/5 5/5   Elbow extension: 5/5 5/5   Wrist flexion: 5/5 5/5   Wrist extension: 5/5 5/5    5/5 5/5       Baseline Measurements of BL UE's for early detection of Lymphedema:     LANDMARK RIGHT UE LEFT UE DIFFERENCE   E + 8" 48 cm 46 2 cm   E + 6" 45 cm 44 cm 1 cm   E + 4" 39.5 cm 39 cm 0.5 cm   E + 2" 33 cm 32.5 cm 0.5 cm   Elbow 28 cm 28 cm 0 cm   W+ 8" 28.5 cm 28.5 cm 0 cm   W +  6" 28 cm 28 cm 0 cm   W + 4" 25 cm 25 cm 0 cm   Wrist 17 cm 17 cm 0 cm   DPC 21 cm 20 cm 1 cm   IP Thumb 7 cm 7 cm 0 cm " "    Functional Mobility (Bed mobility, transfers)  Independent    ADL's:  Difficulty raising RUE upward    Gait Assessment:   - AD used: Rollator  - Assistance: independent  - Distance: community distances     Patient Education Provided   - role of therapy in multi - disciplinary team, goals for therapy  - instructed in self massage and STM to right anterior chest  - Pt was educated in lymphedema etiology and management plans-given in previous sessions and reviewed today.    - Pt was provided with written risk reductions and precautions for managing lymphedema--given in previous sessions and reviewed today.     Pt has no cultural, educational or language barriers to learning provided.  Treatment and Instruction of Home Exercise Program    Time In: 11:35 AM  Time Out: 12:00 PM    Alina received individual therapeutic exercises to improve postural correction and alignment, stretching and soft tissue mobility, and strengthening for 15 minutes including the following:   Supine Shld Flexion with wand        1 x 5  Butterflies (R)                                   5 x 5"  Seated Median nerve glides RUE    5 x 5"  Seated Shld Abd (R)                        1 x 5  Doorway Pec stretch (R)                 5 x 5"        Alina received the following manual therapy techniques were performed to increased myofascial/soft tissue length, mobility and pliability, increase PROM, AROM and function as well as to decrease pain for 10 minutes  Anterior chest massage to desensitize mastectomy scar  STM of soft tissue right anterior chest      Written Home Exercises Provided and Patient Education: Handouts given   See EMR under patient instructions for program given  Pt demonstrated good understanding of the education provided. Patient demo good return demo of skill of exercises.    Functional Limitations Reporting      CMS Impairment/Limitation/Restriction for FOTO Outcome Survey    Therapist reviewed FOTO scores for Alina ELLIOTT Sylvain on " 5/21/2020.   FOTO documents entered into [employer] - see Media section.    Limitations Score: 45%  Category: Carrying           Assessment   This is a 75 y.o. female referred to outpatient physical therapy and presents with a medical diagnosis of right breast cancer and was seen today post-operatively to establish PT plan of care for impairments following surgery including: decreased right shoulder AROM, median nerve neural tension RUE, mild cording right axilla and medial upper arm, soft tissue tightness and scar hypersensitivity right anterior chest. Circumferential measurements of RUE do not indicate lymphedema at this time.     Pt instructed in HEP this session and was able to perform all exercises given independently. Pt instructed to follow up with therapist if any concerns arise with program established. Pt will continue to benefit from skilled physical therapy to address the impairments stated in chart below, provide patient/family education and to maximize pt's level of independence in home and community environment     Anticipated barriers to physical therapy: None     Pt's spiritual, cultural and educational needs considered and pt agreeable to plan of care and goals as stated below:     Medical necessity is demonstrated by the following IMPAIRMENTS/PROMBLEM LIST:    History  Co-morbidities and personal factors that may impact the plan of care Co-morbidities:   anxiety, diabetes, high BMI, history of cancer, HTN and thyroid disease    Personal Factors:   no deficits     moderate   Examination  Body Structures and Functions, activity limitations and participation restrictions that may impact the plan of care Body Regions:   upper extremities    Body Systems:    ROM  strength  edema  scar formation    Participation Restrictions:   Difficulty raising RUE upward    Activity limitations:   Learning and applying knowledge  no deficits    General Tasks and Commands  no deficits    Communication  no  deficits    Mobility  lifting and carrying objects  walking    Self care  washing oneself (bathing, drying, washing hands)  dressing    Domestic Life  cooking  doing house work (cleaning house, washing dishes, laundry)    Interactions/Relationships  no deficits    Life Areas  no deficits    Community and Social Life  no deficits         moderate   Clinical Presentation evolving clinical presentation with changing clinical characteristics moderate   Decision Making/ Complexity Score: moderate       Goals: Pt agrees with goals set    Short Term goals: 3 weeks  1. Patient will demonstrate 100% understanding of lymphedema risk reduction practices to include self monitoring for lymphedema. (progressing, not met)  2. Patient will demonstrate independence with Home Exercise program established. (progressing, not met)  3. Pt will increase AROM/PROM in shoulder abduction ROM to 150 degrees on right to improve functional reach, carry, push, pull pain free. (progressing, not met)  4. Pt will increase AROM/PROM in shoulder flexion to 160 degrees on right to improve functional reach, carry, push, pull pain free.(progressing, not met)  5. Strength will be 4+/5 on right in order to perform functional activities. (progressing, not met)    Long Term Goals: 6 weeks   1.  Pt will increase AROM/PROM in shoulder flexion to 170 degrees on right to improve functional reach, carry, push, pull pain free. (progressing, not met)  2. . Pt will demonstrate full/maximized tissue mobility to increase ROM and promote healthy tissue to be pain free at discharge. (progressing, not met)  4. Pt will report decrease in overall worst pain to 2/10 at discharge. (progressing, not met)  5. Pt will increase AROM/PROM in shoulder abduction ROM to 170 degrees on right to improve functional reach, carry, push, pull pain free. (progressing, not met)      Plan   Outpatient physical therapy 2x week for 6 weeks to include the following:   Manual Therapy, Patient  Education and Therapeutic Exercise.    Plan of care Certification Period: 5/21/2020 to 7/3/2020.    Therapist: Bibiana Emanuel, PT  5/21/2020

## 2020-06-01 NOTE — PROGRESS NOTES
Physical Therapy Daily Treatment Note     Name: Alina Narayan  Clinic Number: [id number]  Diagnosis: No diagnosis found.  Physician: Fay Bhardwaj PA    Precautions: None  Visit #: 2 of 20  Time In: 11:00 AM  Time Out: 11:30  Total Treatment Time 1:1: 30 minutes    Evaluation Date: 5/21/2020  Visit # authorized: 20  Authorization period: 12/2/2020  Plan of care Expiration: 7/3/2020  MD referral: GAIL Domínguez  Insurance: [employer] Man[employer] Medicare      Subjective     Pt reports:Patient states, on arrival, she will have to leave by 11:30.  has had a family emergency and has to drive to [place]. Patient states tightness of right anterior chest and RUE has decreased and has been performing HEP and has returned to exercising in the pool 3x/week and feels this has also helped decrease her tightness.  had difficulty with median nerve glides--increased her pain and stopped performing this exercise.   Pain Scale: Alina rates pain on a scale of 0/10 on VAS.   Pain location: NA}    Fatigue: Mild  Functional change: moving RUE better  Diagnosis: 2019 right breast metastatic IMC, with involvement of 2 lymph nodes, triple negative; 2012 left breast cancer, lumpectomy & radiation  Chief complaint:  was attending aquatic exercise at [facility] and was doing well.  had to stop due to COVID 19 and pain and swelling in right upper arm returned. Patient states she ordered compression sleeve for her RUE 12/19. States she wears the compression sleeves as needed to ease her pain. (compression sleeves 20mmHG -30mmHg). Patient states returning to work out in pool tomorrow.  Surgical History: 9/2/19 right chest wall wound exploration and evacuation of hematoma; 8/26/19 right modified radical mastectomy and removal of port; 2/19 insertion of port  Alina Narayan  has a past surgical history that includes Carpal tunnel release; Shoulder surgery;  "Dilation and curettage of uterus (1999); Endometrial ablation (1999); left lumpectomy (2012); Eye surgery; Skin biopsy; Cardiac valve replacement (12/2013); Colonoscopy (N/A, 9/29/2017); Breast lumpectomy (Left, 2012); Breast biopsy (Left, 10/1/2012); Breast biopsy (Left, 12/2017); Cardiac valuve replacement (2013); Insertion of tunneled central venous catheter (CVC) with subcutaneous port (Right, 2/1/2019); Modified radical mastectomy w/ axillary lymph node dissection (Right, 8/26/2019); and Mediport removal (Right, 8/26/2019).     Chemotherapy: ernesto-adjuvant chemotherapy from 2/19 to 8/19  Radiation: 2012; none currently      Objective     Alina received individual therapeutic exercises to improve postural correction and alignment, stretching and soft tissue mobility, and strengthening for 30 minutes including the following:   Supine Shld Flexion with wand        1 x 10  Butterflies (R)                                   10 x 5"  Seated Shld Abd (R)                        1 x 10  Doorway Pec stretch (R)                 10 x 5"    Educated patient appropriate exercises to perform while in pool and to use HEP on non-pool days to continue to decrease tightness in right anterior chest and right shoulder.     Alina received the following manual therapy techniques were performed to increased myofascial/soft tissue length, mobility and pliability, increase PROM, AROM and function as well as to decrease pain fors---Not done today  Anterior chest massage to desensitize mastectomy scar  STM of soft tissue right anterior chest    Shoulder Range of Motion: 6/2/2020  Active /Passive ROM Right Left   Flexion 160 155   Abduction 140 140   Extension 60 60   IR/90deg 80 80   ER/90deg 65 50           Strength: manual muscle test grades below   Upper Extremity Strength    (R) UE (L) UE   Shoulder flexion: 4/5 5/5   Shoulder Abduction: 4/5 5/5   Shoulder IR 4/5 5/5   Shoulder ER 4/5 5/5   Elbow flexion: 5/5 5/5   Elbow extension: 5/5 " 5/5   Wrist flexion: 5/5 5/5   Wrist extension: 5/5 5/5    5/5 5/5           Functional Limitations Reporting       CMS Impairment/Limitation/Restriction for FOTO Outcome Survey     Therapist reviewed FOTO scores for Alina Narayan on 6/2/2020.   FOTO documents entered into [email or web address] - see Media section.     Limitations Score: 42%  Category: Carrying         Home Exercise Program and Patient Education   Education provided re:  - role of PT in multi - disciplinary team, goals for PT  - progress towards goals     See EMR under notes/patient instructions for HEP given/taught this session - all sets and reps included. Pt received printed copy.     Pt was able to demonstrate and report understanding and performance  Pt has no cultural, educational or language barriers to learning provided.    Assessment     Patient is responding well to physical therapy.   Pain after treatment: 0/10    Pt prognosis is Good.Patient performed above exercise program and discontinued median nerve glides due to increased pain with this exercise. Improvements with right shoulder AROM: 10 degrees with flexion and 5 degrees with ER. Patient instructed to continue with HEP and exercising in the pool. Patient would like to continue with exercising at home/in pool due to COVID precautions and will call me if she feels she needs to come deon the clinic.  Pt will continue to benefit from skilled outpatient physical therapy to address the deficits listed in the problem list chart on initial evaluation, provide pt/family education and to maximize pt's level of independence in the home and community environment. Patient met 3 STG this session.    Goals as follows:  Short Term goals: 3 weeks  1. Patient will demonstrate 100% understanding of lymphedema risk reduction practices to include self monitoring for lymphedema. (met, 6/2/20)  2. Patient will demonstrate independence with Home Exercise program established. (met, 6/2/20)  3. Pt will increase AROM/PROM  in shoulder abduction ROM to 150 degrees on right to improve functional reach, carry, push, pull pain free. (progressing, not met)  4. Pt will increase AROM/PROM in shoulder flexion to 160 degrees on right to improve functional reach, carry, push, pull pain free.met, 6/2/20)  5. Strength will be 4+/5 on right in order to perform functional activities. (progressing, not met)     Long Term Goals: 6 weeks   1.  Pt will increase AROM/PROM in shoulder flexion to 170 degrees on right to improve functional reach, carry, push, pull pain free. (progressing, not met)  2. . Pt will demonstrate full/maximized tissue mobility to increase ROM and promote healthy tissue to be pain free at discharge. (progressing, not met)  4. Pt will report decrease in overall worst pain to 2/10 at discharge. (progressing, not met)  5. Pt will increase AROM/PROM in shoulder abduction ROM to 170 degrees on right to improve functional reach, carry, push, pull pain free. (progressing, not met)     Plan     Will monitor patient via telephone and have patient come into clinic if necessary. Did not reschedule further appointments due to COVID 19 precautions.     Therapist: Bibiana Emanuel, PT  6/2/2020

## 2020-06-02 ENCOUNTER — CLINICAL SUPPORT (OUTPATIENT)
Dept: REHABILITATION | Facility: HOSPITAL | Age: 75
End: 2020-06-02
Attending: PHYSICIAN ASSISTANT
Payer: MEDICARE

## 2020-06-02 DIAGNOSIS — M25.611 DECREASED SHOULDER MOBILITY, RIGHT: ICD-10-CM

## 2020-06-02 DIAGNOSIS — I89.0 LYMPHEDEMA OF ARM: ICD-10-CM

## 2020-06-02 DIAGNOSIS — M79.601 PAIN IN RIGHT ARM: ICD-10-CM

## 2020-06-02 DIAGNOSIS — C50.011 MALIGNANT NEOPLASM OF NIPPLE OF RIGHT BREAST IN FEMALE, UNSPECIFIED ESTROGEN RECEPTOR STATUS: ICD-10-CM

## 2020-06-02 PROCEDURE — 97110 THERAPEUTIC EXERCISES: CPT | Mod: HCNC | Performed by: PHYSICAL MEDICINE & REHABILITATION

## 2020-06-24 ENCOUNTER — DOCUMENTATION ONLY (OUTPATIENT)
Dept: REHABILITATION | Facility: HOSPITAL | Age: 75
End: 2020-06-24

## 2020-06-24 DIAGNOSIS — I89.0 LYMPHEDEMA OF ARM: ICD-10-CM

## 2020-06-24 DIAGNOSIS — C50.011 MALIGNANT NEOPLASM OF NIPPLE OF RIGHT BREAST IN FEMALE, UNSPECIFIED ESTROGEN RECEPTOR STATUS: ICD-10-CM

## 2020-06-24 DIAGNOSIS — M25.611 DECREASED SHOULDER MOBILITY, RIGHT: ICD-10-CM

## 2020-06-24 DIAGNOSIS — M79.601 PAIN IN RIGHT ARM: Primary | ICD-10-CM

## 2020-06-24 NOTE — PROGRESS NOTES
Discharge Note  Ms. Narayan was seen in outpatient physical therapy for an initial evaluation on 6/2/20 for pain, swelling and tightness in her right shoulder and upper arm s/p right mastectomy.   attended One follow up visit. Ms. Narayan called and let me know she has been exercising in the pool and her RUE is doing better.  POC has ended and patient is discharged from physical therapy.        Bibiana Emanuel, PT

## 2020-06-30 NOTE — PROGRESS NOTES
Subjective:       Patient ID: Alina Narayan is a 75 y.o. female.    Chief Complaint: No chief complaint on file.    HPI Ms Narayan is a 76 Y/O white female who returns for follow-up of triple negative carcinoma of the right breast.  She had complete pathologic response to neoadjuvant chemotherapy.        She has a history of pulmonary embolism diagnosed in May of 2019.She was restarted on her anticoagulation low-dose Eliquis in January by her cardiologist due to concerns regarding lack of mobility and her obesity.  She has been very inactive due to knee problems.  Her only type of exercise that she can do is in thepool.        Current history:  abnormal mammogram of the right breast in December 2018.  She was having no breast symptoms at that time    That mammogram showed a 13 mm mass in the right axillary tail.  By ultrasound there was a 6 x 9 x 11 mm intramammary node and a 2nd 5 x 6 x 6 mm hypoechoic mass in the axillary tail.  On January 7, 2019 both masses were biopsied and both showed lymph nodes with metastatic carcinoma which was ER negative MS negative and HER2 negative.  Ki-67 was 40%.    MRI on January 15, 2019 showed 2 right axillary lymph nodes the largest measured 1.4 x 1.0 cm.  There were no abnormalities in the right breast.    PET scan was then performed which showed only low-grade activity in the right axilla with an SUV of 1.32.      She has received 12 cycles of weekly Taxol and 4 cycles of CMF which she completed on July 30th.    On August 26, 2019 mastectomies performed showed complete pathological response in the breast and axilla.      Previous breast cancer history History: On September 25, 2012 she underwent a screening mammogram which showed an asymmetric density in the left breast at 2:00 position. A followup mammogram on the 27th showed an oval mass in that area ultrasound showed a 6 mm solid mass. Core needle biopsy on October 1 showed invasive carcinoma ER 90% positive MS 80% positive and  HER-2 negative. On October 29 she underwent lumpectomy and sentinel lymph node biopsy. That showed a 7 mm low-grade (1+2+1) infiltrating carcinoma. 3 sentinel lymph nodes were negative. Final pathological stage TIB N0 stage IA.  She completed letrozole therapy in 2017  Review of Systems   Constitutional: Negative for appetite change and unexpected weight change.   Eyes: Negative for visual disturbance.   Respiratory: Negative for cough and shortness of breath.    Cardiovascular: Negative for chest pain.   Gastrointestinal: Negative for abdominal pain and diarrhea.   Genitourinary: Negative for frequency.   Musculoskeletal: Positive for arthralgias (Knees). Negative for back pain.   Skin: Positive for rash.   Neurological: Negative for headaches.   Hematological: Negative for adenopathy.   Psychiatric/Behavioral: The patient is not nervous/anxious.        Objective:      Physical Exam  Vitals signs reviewed.   Constitutional:       General: She is not in acute distress.     Appearance: She is well-developed. She is obese.   Eyes:      General: No scleral icterus.  Cardiovascular:      Rate and Rhythm: Normal rate and regular rhythm.   Pulmonary:      Effort: Pulmonary effort is normal.      Breath sounds: Normal breath sounds. No wheezing or rales.   Chest:      Breasts:         Left: No mass, nipple discharge or skin change.       Abdominal:      Palpations: Abdomen is soft.      Tenderness: There is no abdominal tenderness.   Neurological:      Mental Status: She is alert and oriented to person, place, and time.   Psychiatric:         Behavior: Behavior normal.         Thought Content: Thought content normal.         Assessment:       1. Malignant neoplasm of upper-outer quadrant of right breast in female, estrogen receptor negative        Plan:       She will return to clinic in 3 months time.  She will continue Eliquis and try to lose some weight.     Will repeat noncontrast chest CT scan for follow-up of her  pulmonary nodule.

## 2020-07-01 ENCOUNTER — TELEPHONE (OUTPATIENT)
Dept: HEMATOLOGY/ONCOLOGY | Facility: CLINIC | Age: 75
End: 2020-07-01

## 2020-07-01 ENCOUNTER — OFFICE VISIT (OUTPATIENT)
Dept: HEMATOLOGY/ONCOLOGY | Facility: CLINIC | Age: 75
End: 2020-07-01
Payer: MEDICARE

## 2020-07-01 VITALS
HEIGHT: 62 IN | BODY MASS INDEX: 47.18 KG/M2 | RESPIRATION RATE: 18 BRPM | TEMPERATURE: 97 F | OXYGEN SATURATION: 97 % | SYSTOLIC BLOOD PRESSURE: 161 MMHG | DIASTOLIC BLOOD PRESSURE: 74 MMHG | WEIGHT: 256.38 LBS | HEART RATE: 81 BPM

## 2020-07-01 DIAGNOSIS — Z17.1 MALIGNANT NEOPLASM OF UPPER-OUTER QUADRANT OF RIGHT BREAST IN FEMALE, ESTROGEN RECEPTOR NEGATIVE: Primary | ICD-10-CM

## 2020-07-01 DIAGNOSIS — R91.1 PULMONARY NODULE: ICD-10-CM

## 2020-07-01 DIAGNOSIS — C50.411 MALIGNANT NEOPLASM OF UPPER-OUTER QUADRANT OF RIGHT BREAST IN FEMALE, ESTROGEN RECEPTOR NEGATIVE: Primary | ICD-10-CM

## 2020-07-01 PROBLEM — D69.6 THROMBOCYTOPENIA, UNSPECIFIED: Status: RESOLVED | Noted: 2018-12-18 | Resolved: 2020-07-01

## 2020-07-01 PROCEDURE — 99999 PR PBB SHADOW E&M-EST. PATIENT-LVL V: ICD-10-PCS | Mod: PBBFAC,HCNC,, | Performed by: INTERNAL MEDICINE

## 2020-07-01 PROCEDURE — 3077F SYST BP >= 140 MM HG: CPT | Mod: HCNC,CPTII,S$GLB, | Performed by: INTERNAL MEDICINE

## 2020-07-01 PROCEDURE — 1101F PR PT FALLS ASSESS DOC 0-1 FALLS W/OUT INJ PAST YR: ICD-10-PCS | Mod: HCNC,CPTII,S$GLB, | Performed by: INTERNAL MEDICINE

## 2020-07-01 PROCEDURE — 1159F PR MEDICATION LIST DOCUMENTED IN MEDICAL RECORD: ICD-10-PCS | Mod: HCNC,S$GLB,, | Performed by: INTERNAL MEDICINE

## 2020-07-01 PROCEDURE — 3078F PR MOST RECENT DIASTOLIC BLOOD PRESSURE < 80 MM HG: ICD-10-PCS | Mod: HCNC,CPTII,S$GLB, | Performed by: INTERNAL MEDICINE

## 2020-07-01 PROCEDURE — 3078F DIAST BP <80 MM HG: CPT | Mod: HCNC,CPTII,S$GLB, | Performed by: INTERNAL MEDICINE

## 2020-07-01 PROCEDURE — 1125F AMNT PAIN NOTED PAIN PRSNT: CPT | Mod: HCNC,S$GLB,, | Performed by: INTERNAL MEDICINE

## 2020-07-01 PROCEDURE — 1159F MED LIST DOCD IN RCRD: CPT | Mod: HCNC,S$GLB,, | Performed by: INTERNAL MEDICINE

## 2020-07-01 PROCEDURE — 1101F PT FALLS ASSESS-DOCD LE1/YR: CPT | Mod: HCNC,CPTII,S$GLB, | Performed by: INTERNAL MEDICINE

## 2020-07-01 PROCEDURE — 99213 PR OFFICE/OUTPT VISIT, EST, LEVL III, 20-29 MIN: ICD-10-PCS | Mod: HCNC,S$GLB,, | Performed by: INTERNAL MEDICINE

## 2020-07-01 PROCEDURE — 1125F PR PAIN SEVERITY QUANTIFIED, PAIN PRESENT: ICD-10-PCS | Mod: HCNC,S$GLB,, | Performed by: INTERNAL MEDICINE

## 2020-07-01 PROCEDURE — 99999 PR PBB SHADOW E&M-EST. PATIENT-LVL V: CPT | Mod: PBBFAC,HCNC,, | Performed by: INTERNAL MEDICINE

## 2020-07-01 PROCEDURE — 99213 OFFICE O/P EST LOW 20 MIN: CPT | Mod: HCNC,S$GLB,, | Performed by: INTERNAL MEDICINE

## 2020-07-01 PROCEDURE — 3077F PR MOST RECENT SYSTOLIC BLOOD PRESSURE >= 140 MM HG: ICD-10-PCS | Mod: HCNC,CPTII,S$GLB, | Performed by: INTERNAL MEDICINE

## 2020-07-01 NOTE — TELEPHONE ENCOUNTER
Left message to call office to schedule ct scan.  Number was provided.      ----- Message from Erick Dwyer MD sent at 7/1/2020 11:40 AM CDT -----  Noncontrast chest CT scan next available

## 2020-07-01 NOTE — TELEPHONE ENCOUNTER
Called patient to schedule ct scan scheduled for Monday 7/6.          ----- Message from Erick Dwyer MD sent at 7/1/2020 11:40 AM CDT -----  Noncontrast chest CT scan next available

## 2020-07-08 ENCOUNTER — OFFICE VISIT (OUTPATIENT)
Dept: URGENT CARE | Facility: CLINIC | Age: 75
End: 2020-07-08
Payer: MEDICARE

## 2020-07-08 VITALS — OXYGEN SATURATION: 95 % | TEMPERATURE: 98 F | HEART RATE: 80 BPM

## 2020-07-08 DIAGNOSIS — L08.9 SOFT TISSUE INFECTION: Primary | ICD-10-CM

## 2020-07-08 DIAGNOSIS — S69.91XA HAND INJURY, RIGHT, INITIAL ENCOUNTER: ICD-10-CM

## 2020-07-08 PROCEDURE — 73120 XR HAND 2 VIEW RIGHT: ICD-10-PCS | Mod: FY,RT,S$GLB, | Performed by: RADIOLOGY

## 2020-07-08 PROCEDURE — 99214 OFFICE O/P EST MOD 30 MIN: CPT | Mod: S$GLB,,, | Performed by: PHYSICIAN ASSISTANT

## 2020-07-08 PROCEDURE — 99214 PR OFFICE/OUTPT VISIT, EST, LEVL IV, 30-39 MIN: ICD-10-PCS | Mod: S$GLB,,, | Performed by: PHYSICIAN ASSISTANT

## 2020-07-08 PROCEDURE — 73120 X-RAY EXAM OF HAND: CPT | Mod: FY,RT,S$GLB, | Performed by: RADIOLOGY

## 2020-07-08 RX ORDER — MUPIROCIN 20 MG/G
OINTMENT TOPICAL 2 TIMES DAILY
Qty: 15 G | Refills: 0 | Status: SHIPPED | OUTPATIENT
Start: 2020-07-08 | End: 2020-07-08 | Stop reason: SDUPTHER

## 2020-07-08 RX ORDER — CEPHALEXIN 500 MG/1
500 CAPSULE ORAL EVERY 6 HOURS
Qty: 28 CAPSULE | Refills: 0 | Status: SHIPPED | OUTPATIENT
Start: 2020-07-08 | End: 2020-07-15

## 2020-07-08 RX ORDER — CEPHALEXIN 500 MG/1
500 CAPSULE ORAL EVERY 6 HOURS
Qty: 28 CAPSULE | Refills: 0 | Status: SHIPPED | OUTPATIENT
Start: 2020-07-08 | End: 2020-07-08 | Stop reason: SDUPTHER

## 2020-07-08 RX ORDER — MUPIROCIN 20 MG/G
OINTMENT TOPICAL 2 TIMES DAILY
Qty: 15 G | Refills: 0 | Status: ON HOLD | OUTPATIENT
Start: 2020-07-08 | End: 2023-05-23 | Stop reason: HOSPADM

## 2020-07-08 NOTE — PATIENT INSTRUCTIONS
PLEASE READ YOUR DISCHARGE INSTRUCTIONS ENTIRELY AS IT CONTAINS IMPORTANT INFORMATION.  Apply the antibiotic ointment twice daily until healed.  - Rest.    - Drink plenty of fluids.    - Tylenol or Ibuprofen as directed as needed for fever/pain.    - If you were prescribed antibiotics, please take them to completion.  - If you are female and on birth control pills - please use additional methods of contraception to prevent pregnancy while on antibiotics and for one cycle after.   - If you were prescribed a narcotic medication or muscle relaxer, do not drive or operate heavy equipment or machinery while taking these medications, as they can cause drowsiness.   - If you smoke, please stop smoking.  -You must understand that you've received an Urgent Care treatment only and that you may be released before all your medical problems are known or treated. You, the patient, will    arrange for follow up care as instructed. Please arrange follow up with your primary medical clinic as soon as possible.   - Follow up with your PCP or specialty clinic as directed in the next 1-2 weeks if not improved or as needed.  You can call [phone] to schedule an appointment with the appropriate provider.    - Please return to Urgent Care or to the Emergency Department if your symptoms worsen.    Patient aware and verbalized understanding.    Wound Check, Infection  You have a wound that has become infected. The wound will not heal properly unless the infection is cleared. Infection in a wound may also spread if it is not treated. In most cases, antibiotic medicines are prescribed to treat a wound infection.   Symptoms of a wound infection include:  · Redness or swelling around the wound  · Warmth coming from the wound  · New or worsening pain  · Red streaks around the wound  · Draining pus  · Fever  Home care  Follow all directions you are given to treat the infection.  Medicines  Take all medicines as prescribed.   · If you were  given antibiotics, take them until they are gone or your healthcare provider tells you to stop. It is vital to finish the antibiotics even if you feel better. If you do not finish them, the infection may come back and be harder to treat.  · If your infection is not responding to the medicines you are taking, you may be prescribed new medicines.  · Take medicine for pain as directed by your healthcare provider.  Wound care  Care for your wound as directed by your healthcare provider.  · Apply a warm compress (clean cloth soaked in hot water) to the infected area for about 5 to 10 minutes at a time. Be very careful not to burn yourself. Test the cloth on a non-infected area to make sure it is not too hot.  · Continue to change the dressing daily. If it becomes wet, stained with wound fluid, or dirty, change it sooner. To change it:  ¨ Wash your hands with soap and water before changing the dressing.  ¨ Carefully remove the dressing and tape. If it sticks to the wound, you may need to wet it a little to remove it. (Do not do this if your healthcare provider has told you not to.)  ¨ Gently clean the wound with clean water (or saline) using gauze, a clean washcloth, or cotton swab.  ¨ Do not use soap, alcohol, peroxide or other cleansers.  ¨ If you were told to dry the wound before putting on a new dressing, gently pat. Do not rub.  ¨ Throw out the old dressing.  ¨ Wash your hands again before opening the new, clean dressing.  ¨ Wash your hands again when you are done.  Follow-up care  Follow up with your healthcare provider as advised. If a culture was done, you will be notified if your treatment needs to change. Call as directed for the results.  When to seek medical advice  Call your health care provider right away if any of these occur:  · Symptoms of infection don't start to improve within 2 days of starting antibiotics  · Symptoms of infection get worse  · New symptoms, such as red streaks around the wound  · Fever  of 100.4°F (38.0°C) or higher for more than 2 days after starting the antibiotics  Date Last Reviewed: 8/10/2015  © 0315-2158 [employer]. [address] [place] [address], [place] [address]. All rights reserved. This information is not intended as a substitute for professional medical care. Always follow your healthcare professional's instructions.

## 2020-07-08 NOTE — PROGRESS NOTES
Subjective:       Patient ID: Alina Narayan is a 75 y.o. female.    Vitals:  temperature is 98 °F (36.7 °C). Her pulse is 80. Her oxygen saturation is 95%.     Chief Complaint: Hand Pain    Ms. Narayan presents for evaluation of right hand erythema, tenderness and possible splinter.  2 days ago, she grabbed the back of a wooden chair & got a splinter in the thenar eminence portion of her right thumb.  She removed part of it, but thinks there is still splinter in the area.  It has become swollen & red.  Tetanus is UTD.   She denies any paresthesias, drainage, fevers, chills.  She has not taken anything for her symptoms.     Hand Pain   The incident occurred 2 days ago. The incident occurred at home. There was no injury mechanism. The pain is present in the right fingers. The pain does not radiate. The pain is at a severity of 2/10. The pain is mild. The pain has been constant since the incident. The symptoms are aggravated by palpation and movement. She has tried nothing for the symptoms. The treatment provided no relief.       Constitution: Negative for chills and fever.   HENT: Negative for facial swelling and sore throat.    Neck: Negative for painful lymph nodes.   Eyes: Negative for eye itching and eyelid swelling.   Respiratory: Negative for cough.    Musculoskeletal: Positive for pain. Negative for trauma, joint pain and joint swelling.   Skin: Positive for wound and erythema. Negative for color change, pale, abrasion, laceration, lesion, skin thickening/induration, puncture wound, bruising, abscess, avulsion and hives.   Allergic/Immunologic: Negative for environmental allergies, immunocompromised state and hives.   Hematologic/Lymphatic: Negative for swollen lymph nodes.       Objective:      Physical Exam   Constitutional: She is oriented to person, place, and time. She appears well-developed.   HENT:   Head: Normocephalic and atraumatic. Head is without abrasion, without contusion and without laceration.    Right Ear: External ear normal.   Left Ear: External ear normal.   Nose: Nose normal.   Mouth/Throat: Oropharynx is clear and moist and mucous membranes are normal.   Eyes: Pupils are equal, round, and reactive to light. Conjunctivae, EOM and lids are normal.   Neck: Trachea normal, full passive range of motion without pain and phonation normal. Neck supple.   Cardiovascular: Normal rate, regular rhythm and normal heart sounds.   Pulmonary/Chest: Effort normal and breath sounds normal. No stridor. No respiratory distress.   Musculoskeletal: Normal range of motion.      Right hand: She exhibits normal capillary refill. Decreased sensation is not present in the ulnar distribution, is not present in the medial distribution and is not present in the radial distribution.        Hands:    Neurological: She is alert and oriented to person, place, and time.   Skin: Skin is warm, dry, intact and no rash. Capillary refill takes less than 2 seconds. Lesions:  erythemaabrasion, burn, bruising and ecchymosis  Psychiatric: Her speech is normal and behavior is normal. Judgment and thought content normal.   Nursing note and vitals reviewed.      XR R hand - No fracture dislocation bone destruction seen.  Assessment:       1. Soft tissue infection    2. Hand injury, right, initial encounter        Plan:         Soft tissue infection    Hand injury, right, initial encounter  -     X-Ray Hand 2 View Right; Future; Expected date: 07/08/2020    Other orders  -     cephALEXin (KEFLEX) 500 MG capsule; Take 1 capsule (500 mg total) by mouth every 6 (six) hours. for 7 days  Dispense: 28 capsule; Refill: 0  -     mupirocin (BACTROBAN) 2 % ointment; Apply topically 2 (two) times daily.  Dispense: 15 g; Refill: 0    Patient Instructions   PLEASE READ YOUR DISCHARGE INSTRUCTIONS ENTIRELY AS IT CONTAINS IMPORTANT INFORMATION.  Apply the antibiotic ointment twice daily until healed.  - Rest.    - Drink plenty of fluids.    - Tylenol or  Ibuprofen as directed as needed for fever/pain.    - If you were prescribed antibiotics, please take them to completion.  - If you are female and on birth control pills - please use additional methods of contraception to prevent pregnancy while on antibiotics and for one cycle after.   - If you were prescribed a narcotic medication or muscle relaxer, do not drive or operate heavy equipment or machinery while taking these medications, as they can cause drowsiness.   - If you smoke, please stop smoking.  -You must understand that you've received an Urgent Care treatment only and that you may be released before all your medical problems are known or treated. You, the patient, will    arrange for follow up care as instructed. Please arrange follow up with your primary medical clinic as soon as possible.   - Follow up with your PCP or specialty clinic as directed in the next 1-2 weeks if not improved or as needed.  You can call [phone] to schedule an appointment with the appropriate provider.    - Please return to Urgent Care or to the Emergency Department if your symptoms worsen.    Patient aware and verbalized understanding.    Wound Check, Infection  You have a wound that has become infected. The wound will not heal properly unless the infection is cleared. Infection in a wound may also spread if it is not treated. In most cases, antibiotic medicines are prescribed to treat a wound infection.   Symptoms of a wound infection include:  · Redness or swelling around the wound  · Warmth coming from the wound  · New or worsening pain  · Red streaks around the wound  · Draining pus  · Fever  Home care  Follow all directions you are given to treat the infection.  Medicines  Take all medicines as prescribed.   · If you were given antibiotics, take them until they are gone or your healthcare provider tells you to stop. It is vital to finish the antibiotics even if you feel better. If you do not finish them, the infection  may come back and be harder to treat.  · If your infection is not responding to the medicines you are taking, you may be prescribed new medicines.  · Take medicine for pain as directed by your healthcare provider.  Wound care  Care for your wound as directed by your healthcare provider.  · Apply a warm compress (clean cloth soaked in hot water) to the infected area for about 5 to 10 minutes at a time. Be very careful not to burn yourself. Test the cloth on a non-infected area to make sure it is not too hot.  · Continue to change the dressing daily. If it becomes wet, stained with wound fluid, or dirty, change it sooner. To change it:  ¨ Wash your hands with soap and water before changing the dressing.  ¨ Carefully remove the dressing and tape. If it sticks to the wound, you may need to wet it a little to remove it. (Do not do this if your healthcare provider has told you not to.)  ¨ Gently clean the wound with clean water (or saline) using gauze, a clean washcloth, or cotton swab.  ¨ Do not use soap, alcohol, peroxide or other cleansers.  ¨ If you were told to dry the wound before putting on a new dressing, gently pat. Do not rub.  ¨ Throw out the old dressing.  ¨ Wash your hands again before opening the new, clean dressing.  ¨ Wash your hands again when you are done.  Follow-up care  Follow up with your healthcare provider as advised. If a culture was done, you will be notified if your treatment needs to change. Call as directed for the results.  When to seek medical advice  Call your health care provider right away if any of these occur:  · Symptoms of infection don't start to improve within 2 days of starting antibiotics  · Symptoms of infection get worse  · New symptoms, such as red streaks around the wound  · Fever of 100.4°F (38.0°C) or higher for more than 2 days after starting the antibiotics  Date Last Reviewed: 8/10/2015  © 8101-9914 [employer]. [address] [place] [address], [place] [address]. All  rights reserved. This information is not intended as a substitute for professional medical care. Always follow your healthcare professional's instructions.

## 2020-07-13 ENCOUNTER — HOSPITAL ENCOUNTER (OUTPATIENT)
Dept: RADIOLOGY | Facility: HOSPITAL | Age: 75
Discharge: HOME | End: 2020-07-13
Attending: INTERNAL MEDICINE
Payer: MEDICARE

## 2020-07-13 ENCOUNTER — TELEPHONE (OUTPATIENT)
Dept: INTERNAL MEDICINE | Facility: CLINIC | Age: 75
End: 2020-07-13

## 2020-07-13 DIAGNOSIS — R91.1 SOLITARY PULMONARY NODULE: ICD-10-CM

## 2020-07-13 DIAGNOSIS — R91.1 PULMONARY NODULE: ICD-10-CM

## 2020-07-13 PROCEDURE — 71250 CT CHEST WITHOUT CONTRAST: ICD-10-PCS | Mod: 26,HCNC,, | Performed by: RADIOLOGY

## 2020-07-13 PROCEDURE — 71250 CT THORAX DX C-: CPT | Mod: 26,HCNC,, | Performed by: RADIOLOGY

## 2020-07-13 PROCEDURE — 71250 CT THORAX DX C-: CPT | Mod: TC,HCNC

## 2020-07-21 NOTE — NURSING
Right chest port accessed using sterile technique and port flushes easily; however, blood return unable to be obtained. Notified Dr. Dwyer and he said to place Cath-berto into port. Blood specimen collected peripherally from right ac, pt tolerated well. Blood specimen sent to lab. Pressure dressing with gauze and coban applied to site. Cath-berto instilled into port per MD order and port secured for chemotherapy treatment later today. Felix, the chemotherapy nurse, notified that there is Cath-berto in pt's port. Pt discharged with no distress noted, ambulating independently with walker.    TRANSFER - OUT REPORT:    Verbal report given to Leroy Soares RN on Devonte Foote  being transferred to [facility] for routine progression of care       Report consisted of patients Situation, Background, Assessment and   Recommendations(SBAR). Information from the following report(s) Kardex, MAR and Recent Results was reviewed with the receiving nurse. Lines:   Peripheral IV 07/21/20 Left Arm (Active)   Site Assessment Clean, dry, & intact 07/21/20 0700   Phlebitis Assessment 0 07/21/20 0700   Infiltration Assessment 0 07/21/20 0700   Dressing Status Clean, dry, & intact 07/21/20 0700   Dressing Type Tape;Transparent 07/21/20 0700   Hub Color/Line Status Green;Patent; Infusing 07/21/20 0700        Opportunity for questions and clarification was provided.       Patient transported with:   ALEXANDALEXA

## 2020-08-05 ENCOUNTER — PATIENT MESSAGE (OUTPATIENT)
Dept: INTERNAL MEDICINE | Facility: CLINIC | Age: 75
End: 2020-08-05

## 2020-08-11 ENCOUNTER — LAB VISIT (OUTPATIENT)
Dept: LAB | Facility: HOSPITAL | Age: 75
End: 2020-08-11
Attending: INTERNAL MEDICINE
Payer: MEDICARE

## 2020-08-11 DIAGNOSIS — D64.9 ANEMIA, UNSPECIFIED TYPE: ICD-10-CM

## 2020-08-11 DIAGNOSIS — N18.30 DIABETES MELLITUS DUE TO UNDERLYING CONDITION WITH STAGE 3 CHRONIC KIDNEY DISEASE, WITHOUT LONG-TERM CURRENT USE OF INSULIN: ICD-10-CM

## 2020-08-11 DIAGNOSIS — D51.8 OTHER VITAMIN B12 DEFICIENCY ANEMIAS: ICD-10-CM

## 2020-08-11 DIAGNOSIS — E08.22 DIABETES MELLITUS DUE TO UNDERLYING CONDITION WITH STAGE 3 CHRONIC KIDNEY DISEASE, WITHOUT LONG-TERM CURRENT USE OF INSULIN: ICD-10-CM

## 2020-08-11 LAB
BASOPHILS # BLD AUTO: 0.02 K/UL (ref 0–0.2)
BASOPHILS NFR BLD: 0.4 % (ref 0–1.9)
DIFFERENTIAL METHOD: ABNORMAL
EOSINOPHIL # BLD AUTO: 0.1 K/UL (ref 0–0.5)
EOSINOPHIL NFR BLD: 1.1 % (ref 0–8)
ERYTHROCYTE [DISTWIDTH] IN BLOOD BY AUTOMATED COUNT: 14 % (ref 11.5–14.5)
ESTIMATED AVG GLUCOSE: 171 MG/DL (ref 68–131)
HBA1C MFR BLD HPLC: 7.6 % (ref 4–5.6)
HCT VFR BLD AUTO: 41.2 % (ref 37–48.5)
HGB BLD-MCNC: 12.4 G/DL (ref 12–16)
IMM GRANULOCYTES # BLD AUTO: 0.02 K/UL (ref 0–0.04)
IMM GRANULOCYTES NFR BLD AUTO: 0.4 % (ref 0–0.5)
LYMPHOCYTES # BLD AUTO: 1.5 K/UL (ref 1–4.8)
LYMPHOCYTES NFR BLD: 26.9 % (ref 18–48)
MCH RBC QN AUTO: 29.2 PG (ref 27–31)
MCHC RBC AUTO-ENTMCNC: 30.1 G/DL (ref 32–36)
MCV RBC AUTO: 97 FL (ref 82–98)
MONOCYTES # BLD AUTO: 0.4 K/UL (ref 0.3–1)
MONOCYTES NFR BLD: 8 % (ref 4–15)
NEUTROPHILS # BLD AUTO: 3.4 K/UL (ref 1.8–7.7)
NEUTROPHILS NFR BLD: 63.2 % (ref 38–73)
NRBC BLD-RTO: 0 /100 WBC
PLATELET # BLD AUTO: 144 K/UL (ref 150–350)
PMV BLD AUTO: 11.3 FL (ref 9.2–12.9)
RBC # BLD AUTO: 4.25 M/UL (ref 4–5.4)
T4 FREE SERPL-MCNC: 0.99 NG/DL (ref 0.71–1.51)
TSH SERPL DL<=0.005 MIU/L-ACNC: 4.52 UIU/ML (ref 0.4–4)
VIT B12 SERPL-MCNC: 347 PG/ML (ref 210–950)
WBC # BLD AUTO: 5.4 K/UL (ref 3.9–12.7)

## 2020-08-11 PROCEDURE — 36415 COLL VENOUS BLD VENIPUNCTURE: CPT | Mod: HCNC

## 2020-08-11 PROCEDURE — 83921 ORGANIC ACID SINGLE QUANT: CPT | Mod: HCNC

## 2020-08-11 PROCEDURE — 84443 ASSAY THYROID STIM HORMONE: CPT | Mod: HCNC

## 2020-08-11 PROCEDURE — 82607 VITAMIN B-12: CPT | Mod: HCNC

## 2020-08-11 PROCEDURE — 85025 COMPLETE CBC W/AUTO DIFF WBC: CPT | Mod: HCNC

## 2020-08-11 PROCEDURE — 83036 HEMOGLOBIN GLYCOSYLATED A1C: CPT | Mod: HCNC

## 2020-08-11 PROCEDURE — 84439 ASSAY OF FREE THYROXINE: CPT | Mod: HCNC

## 2020-08-16 LAB — METHYLMALONATE SERPL-SCNC: 0.27 UMOL/L

## 2020-08-18 ENCOUNTER — TELEPHONE (OUTPATIENT)
Dept: INTERNAL MEDICINE | Facility: CLINIC | Age: 75
End: 2020-08-18

## 2020-08-18 DIAGNOSIS — E03.9 HYPOTHYROIDISM, UNSPECIFIED TYPE: Primary | ICD-10-CM

## 2020-08-18 RX ORDER — LEVOTHYROXINE SODIUM 75 UG/1
75 TABLET ORAL
Qty: 90 TABLET | Refills: 3 | Status: SHIPPED | OUTPATIENT
Start: 2020-08-18 | End: 2021-05-27

## 2020-08-18 RX ORDER — GLIMEPIRIDE 1 MG/1
1 TABLET ORAL
Qty: 90 TABLET | Refills: 3 | Status: SHIPPED | OUTPATIENT
Start: 2020-08-18 | End: 2020-08-25

## 2020-08-25 RX ORDER — GLIMEPIRIDE 1 MG/1
1 TABLET ORAL
Qty: 90 TABLET | Refills: 3 | Status: SHIPPED | OUTPATIENT
Start: 2020-08-25 | End: 2021-07-19

## 2020-09-21 PROBLEM — D72.819 LEUKOPENIA: Status: RESOLVED | Noted: 2019-08-20 | Resolved: 2020-09-21

## 2020-09-21 NOTE — PROGRESS NOTES
Subjective:       Patient ID: Alina Narayan is a 75 y.o. female.    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  She had complete pathologic response to neoadjuvant chemotherapy.    She has been going to the pool Monday, Tuesday, Thursday.   She complains of bilateral knee pain, pain in the right mastectomy site - notes this is always there, right arm pain and blister under the left breast.           Current history:  abnormal mammogram of the right breast in December 2018.  She was having no breast symptoms at that time    That mammogram showed a 13 mm mass in the right axillary tail.  By ultrasound there was a 6 x 9 x 11 mm intramammary node and a 2nd 5 x 6 x 6 mm hypoechoic mass in the axillary tail.  On January 7, 2019 both masses were biopsied and both showed lymph nodes with metastatic carcinoma which was ER negative IA negative and HER2 negative.  Ki-67 was 40%.    MRI on January 15, 2019 showed 2 right axillary lymph nodes the largest measured 1.4 x 1.0 cm.  There were no abnormalities in the right breast.    PET scan was then performed which showed only low-grade activity in the right axilla with an SUV of 1.32.      She has received 12 cycles of weekly Taxol and 4 cycles of CMF which she completed on July 30th.    On August 26, 2019 mastectomies performed showed complete pathological response in the breast and axilla.      Previous breast cancer history History: On September 25, 2012 she underwent a screening mammogram which showed an asymmetric density in the left breast at 2:00 position. A followup mammogram on the 27th showed an oval mass in that area ultrasound showed a 6 mm solid mass. Core needle biopsy on October 1 showed invasive carcinoma ER 90% positive IA 80% positive and HER-2 negative. On October 29 she underwent lumpectomy and sentinel lymph node biopsy. That showed a 7 mm low-grade (1+2+1)  infiltrating carcinoma. 3 sentinel lymph nodes were negative. Final pathological stage TIB N0 stage IA.  She completed letrozole therapy in 2017.    Review of Systems   Constitutional: Positive for unexpected weight change (gaining weight). Negative for activity change, appetite change, chills, diaphoresis, fatigue and fever.   HENT: Negative for mouth sores, nosebleeds, sore throat and trouble swallowing.    Eyes: Negative for visual disturbance.   Respiratory: Negative for cough and shortness of breath.    Cardiovascular: Negative for chest pain, palpitations and leg swelling.   Gastrointestinal: Negative for abdominal distention, abdominal pain, blood in stool, constipation, diarrhea, nausea and vomiting.   Genitourinary: Negative for frequency, hematuria and vaginal bleeding.        Right breast pain (post mastectomy)   Musculoskeletal: Positive for arthralgias (Knees). Negative for back pain and myalgias.   Skin: Positive for rash. Negative for pallor.   Allergic/Immunologic: Negative for immunocompromised state.   Neurological: Negative for dizziness, weakness, light-headedness, numbness and headaches.   Hematological: Negative for adenopathy. Does not bruise/bleed easily.   Psychiatric/Behavioral: Negative for confusion. The patient is not nervous/anxious.        Objective:      Physical Exam  Vitals signs and nursing note reviewed.   Constitutional:       General: She is not in acute distress.     Appearance: Normal appearance. She is well-developed. She is obese.   HENT:      Head: Normocephalic.      Mouth/Throat:      Pharynx: No oropharyngeal exudate.   Eyes:      General: No scleral icterus.     Pupils: Pupils are equal, round, and reactive to light.   Neck:      Musculoskeletal: Normal range of motion.   Cardiovascular:      Rate and Rhythm: Normal rate and regular rhythm.      Heart sounds: Normal heart sounds.   Pulmonary:      Effort: Pulmonary effort is normal.      Breath sounds: Normal breath  sounds. No wheezing or rales.   Chest:      Breasts:         Left: No mass, nipple discharge or skin change.       Abdominal:      General: Bowel sounds are normal. There is no distension.      Palpations: Abdomen is soft.      Tenderness: There is no abdominal tenderness.   Skin:     General: Skin is warm and dry.      Findings: No rash.   Neurological:      Mental Status: She is alert and oriented to person, place, and time.   Psychiatric:         Behavior: Behavior normal.         Thought Content: Thought content normal.         Assessment:       1. Malignant neoplasm of upper-outer quadrant of right breast in female, estrogen receptor negative    2. Essential hypertension    3. Hyperlipidemia, mixed    4. Atherosclerosis of aorta    5. Chronic kidney disease, stage III (moderate)    6. Diabetes mellitus due to underlying condition with stage 3 chronic kidney disease, without long-term current use of insulin    7. Diabetes mellitus type 2 in obese    8. BMI 45.0-49.9, adult    9. Vitamin D deficiency    10. Pulmonary nodule        Plan:       1. She will return to clinic in 3 months time.  2. Monitored today; continue current medication and follow up with PCP   3. Continue current medication and follow up with PCP  4. Monitored; continue to follow up with PCP  5. Monitored, follow up with nephrology   7. Monitored glucose today; continue current medications and follow up with endocrinology   8. Diet and exercise  9. Continue current replacement and have lab checked yearly   10. CT scan stable from July 2020 - will recheck in 6 months - due in Jan.   She will continue Eliquis and try to lose some weight.         Return to clinic in 3 months with MD appointment and imaging.     Patient is in agreement with the proposed treatment plan. All questions were answered to the patient's satisfaction. Patient knows to call clinic for any new or worsening symptoms and if anything is needed before the next clinic  visit.          Svetlana Louise, FNP-C  Hematology & Medical Oncology   [address] [place] [address]  ph. [phone]  Fax. [phone]    Patient dicussed with collaborating physician, Dr. serrano.

## 2020-09-29 ENCOUNTER — LAB VISIT (OUTPATIENT)
Dept: LAB | Facility: HOSPITAL | Age: 75
End: 2020-09-29
Attending: INTERNAL MEDICINE
Payer: MEDICARE

## 2020-09-29 ENCOUNTER — PATIENT MESSAGE (OUTPATIENT)
Dept: OTHER | Facility: OTHER | Age: 75
End: 2020-09-29

## 2020-09-29 DIAGNOSIS — E03.9 HYPOTHYROIDISM, UNSPECIFIED TYPE: ICD-10-CM

## 2020-09-29 LAB — TSH SERPL DL<=0.005 MIU/L-ACNC: 2.65 UIU/ML (ref 0.4–4)

## 2020-09-29 PROCEDURE — 84443 ASSAY THYROID STIM HORMONE: CPT | Mod: HCNC

## 2020-09-29 PROCEDURE — 36415 COLL VENOUS BLD VENIPUNCTURE: CPT | Mod: HCNC

## 2020-10-05 ENCOUNTER — OFFICE VISIT (OUTPATIENT)
Dept: HEMATOLOGY/ONCOLOGY | Facility: CLINIC | Age: 75
End: 2020-10-05
Payer: MEDICARE

## 2020-10-05 VITALS
RESPIRATION RATE: 16 BRPM | TEMPERATURE: 98 F | OXYGEN SATURATION: 98 % | WEIGHT: 258.63 LBS | HEIGHT: 62 IN | HEART RATE: 84 BPM | SYSTOLIC BLOOD PRESSURE: 138 MMHG | DIASTOLIC BLOOD PRESSURE: 62 MMHG | BODY MASS INDEX: 47.59 KG/M2

## 2020-10-05 DIAGNOSIS — I70.0 ATHEROSCLEROSIS OF AORTA: ICD-10-CM

## 2020-10-05 DIAGNOSIS — E78.2 HYPERLIPIDEMIA, MIXED: ICD-10-CM

## 2020-10-05 DIAGNOSIS — I10 ESSENTIAL HYPERTENSION: ICD-10-CM

## 2020-10-05 DIAGNOSIS — E55.9 VITAMIN D DEFICIENCY: ICD-10-CM

## 2020-10-05 DIAGNOSIS — Z17.1 MALIGNANT NEOPLASM OF UPPER-OUTER QUADRANT OF RIGHT BREAST IN FEMALE, ESTROGEN RECEPTOR NEGATIVE: Primary | ICD-10-CM

## 2020-10-05 DIAGNOSIS — R91.1 PULMONARY NODULE: ICD-10-CM

## 2020-10-05 DIAGNOSIS — N18.30 DIABETES MELLITUS DUE TO UNDERLYING CONDITION WITH STAGE 3 CHRONIC KIDNEY DISEASE, WITHOUT LONG-TERM CURRENT USE OF INSULIN: ICD-10-CM

## 2020-10-05 DIAGNOSIS — N18.30 CHRONIC KIDNEY DISEASE, STAGE III (MODERATE): ICD-10-CM

## 2020-10-05 DIAGNOSIS — C50.411 MALIGNANT NEOPLASM OF UPPER-OUTER QUADRANT OF RIGHT BREAST IN FEMALE, ESTROGEN RECEPTOR NEGATIVE: Primary | ICD-10-CM

## 2020-10-05 DIAGNOSIS — E66.9 DIABETES MELLITUS TYPE 2 IN OBESE: ICD-10-CM

## 2020-10-05 DIAGNOSIS — E11.69 DIABETES MELLITUS TYPE 2 IN OBESE: ICD-10-CM

## 2020-10-05 DIAGNOSIS — E08.22 DIABETES MELLITUS DUE TO UNDERLYING CONDITION WITH STAGE 3 CHRONIC KIDNEY DISEASE, WITHOUT LONG-TERM CURRENT USE OF INSULIN: ICD-10-CM

## 2020-10-05 PROCEDURE — 99214 OFFICE O/P EST MOD 30 MIN: CPT | Mod: HCNC,S$GLB,, | Performed by: NURSE PRACTITIONER

## 2020-10-05 PROCEDURE — 99214 PR OFFICE/OUTPT VISIT, EST, LEVL IV, 30-39 MIN: ICD-10-PCS | Mod: HCNC,S$GLB,, | Performed by: NURSE PRACTITIONER

## 2020-10-05 PROCEDURE — 3051F PR MOST RECENT HEMOGLOBIN A1C LEVEL 7.0 - < 8.0%: ICD-10-PCS | Mod: HCNC,CPTII,S$GLB, | Performed by: NURSE PRACTITIONER

## 2020-10-05 PROCEDURE — 99999 PR PBB SHADOW E&M-EST. PATIENT-LVL V: CPT | Mod: PBBFAC,HCNC,, | Performed by: NURSE PRACTITIONER

## 2020-10-05 PROCEDURE — 3051F HG A1C>EQUAL 7.0%<8.0%: CPT | Mod: HCNC,CPTII,S$GLB, | Performed by: NURSE PRACTITIONER

## 2020-10-05 PROCEDURE — 99999 PR PBB SHADOW E&M-EST. PATIENT-LVL V: ICD-10-PCS | Mod: PBBFAC,HCNC,, | Performed by: NURSE PRACTITIONER

## 2020-11-04 ENCOUNTER — PATIENT OUTREACH (OUTPATIENT)
Dept: ADMINISTRATIVE | Facility: OTHER | Age: 75
End: 2020-11-04

## 2020-11-04 NOTE — PROGRESS NOTES
Requested updates within Care Everywhere.  Patient's chart was reviewed for overdue LILIANA topics.  Media reviewed for outside colonoscopy.  Immunizations reconciled.

## 2020-11-04 NOTE — PROGRESS NOTES
Subjective:   Patient ID:  Alina Narayan is a 75 y.o. female who presents for follow-up of CVD    HPI: Patient is here for valvular heart disease and CAD risk.   The patient has no chest pain, TIA, palpitations, syncope or pre-syncope.Patient does not exercise.She has some chronic SOB/ARELLANO but getting worse.Alot of BPs only 110        Review of Systems   Constitution: Positive for malaise/fatigue. Negative for chills, decreased appetite, diaphoresis, fever, night sweats, weight gain and weight loss.   HENT: Negative for congestion, hoarse voice, nosebleeds, sore throat and tinnitus.    Eyes: Negative for blurred vision, double vision, vision loss in left eye, vision loss in right eye, visual disturbance and visual halos.   Cardiovascular: Positive for dyspnea on exertion. Negative for chest pain, claudication, cyanosis, irregular heartbeat, leg swelling, near-syncope, orthopnea, palpitations, paroxysmal nocturnal dyspnea and syncope.   Respiratory: Positive for shortness of breath. Negative for cough, hemoptysis, sleep disturbances due to breathing, snoring, sputum production and wheezing.    Endocrine: Negative for cold intolerance, heat intolerance, polydipsia, polyphagia and polyuria.   Hematologic/Lymphatic: Negative for adenopathy and bleeding problem. Does not bruise/bleed easily.   Skin: Negative for color change, dry skin, flushing, itching, nail changes, poor wound healing, rash, skin cancer, suspicious lesions and unusual hair distribution.   Musculoskeletal: Negative for arthritis, back pain, falls, gout, joint pain, joint swelling, muscle cramps, muscle weakness, myalgias and stiffness.   Gastrointestinal: Negative for abdominal pain, anorexia, change in bowel habit, constipation, diarrhea, dysphagia, heartburn, hematemesis, hematochezia, melena and vomiting.   Genitourinary: Negative for decreased libido, dysuria, hematuria, hesitancy and urgency.   Neurological: Negative for excessive daytime sleepiness,  "dizziness, focal weakness, headaches, light-headedness, loss of balance, numbness, paresthesias, seizures, sensory change, tremors, vertigo and weakness.   Psychiatric/Behavioral: Negative for altered mental status, depression, hallucinations, memory loss, substance abuse and suicidal ideas. The patient does not have insomnia and is not nervous/anxious.    Allergic/Immunologic: Negative for environmental allergies and hives.       Objective: /63 (BP Location: Left arm, Patient Position: Sitting, BP Method: X-Large (Automatic))   Pulse 96   Ht 5' 2" (1.575 m)   Wt 117.3 kg (258 lb 9.6 oz)   BMI 47.30 kg/m²      Physical Exam   Constitutional: She is oriented to person, place, and time. She appears well-developed and well-nourished.   HENT:   Head: Normocephalic.   Eyes: Pupils are equal, round, and reactive to light. EOM are normal.   Neck: Normal range of motion. Normal carotid pulses, no hepatojugular reflux and no JVD present. Carotid bruit is not present. No thyromegaly present.   Cardiovascular: Normal rate, regular rhythm and intact distal pulses. Exam reveals no gallop and no friction rub.   Murmur heard.   Systolic murmur is present with a grade of 1/6.  Pulmonary/Chest: Effort normal and breath sounds normal. No tachypnea. No respiratory distress. She has no wheezes. She has no rales. She exhibits no tenderness.   Abdominal: Soft. Bowel sounds are normal. She exhibits no distension and no mass. There is no abdominal tenderness. There is no rebound and no guarding.   Musculoskeletal: Normal range of motion.         General: No tenderness or edema.   Lymphadenopathy:     She has no cervical adenopathy.   Neurological: She is alert and oriented to person, place, and time. No cranial nerve deficit. Coordination normal.   Skin: Skin is warm. No rash noted. No erythema.   Psychiatric: She has a normal mood and affect. Her behavior is normal. Judgment and thought content normal.       Assessment:     1. " S/P AVR (aortic valve replacement)    2. Hyperlipidemia, mixed    3. Essential hypertension    4. Diabetes mellitus type 2 in obese    5. Malignant neoplasm of nipple of right breast in female, unspecified estrogen receptor status    6. Degeneration of lumbar or lumbosacral intervertebral disc    7. Stage 3 chronic kidney disease, unspecified whether stage 3a or 3b CKD    8. Vitamin D deficiency    9. Diabetes mellitus due to underlying condition with stage 3a chronic kidney disease, without long-term current use of insulin    10. BMI 45.0-49.9, adult    11. Atherosclerosis of aorta    12. Bilateral pulmonary embolism    13. Pulmonary HTN    14. Fatty liver    15. Thyroid disorder        Plan:   Discussed diet , achieving and maintaining ideal body weight, and exercise.   We reviewed meds in detail.  Reassured -Discussed goals, options, plan  We have discussed the need for endocarditis prophylaxis.    I wrote Dr about possible change in DM med   -eg Farxiga or Jardiance 10 mg instead of the generic Amaryl              Alina was seen today for essential hypertension.    Diagnoses and all orders for this visit:    S/P AVR (aortic valve replacement)    Hyperlipidemia, mixed    Essential hypertension    Diabetes mellitus type 2 in obese    Malignant neoplasm of nipple of right breast in female, unspecified estrogen receptor status    Degeneration of lumbar or lumbosacral intervertebral disc    Stage 3 chronic kidney disease, unspecified whether stage 3a or 3b CKD    Vitamin D deficiency    Diabetes mellitus due to underlying condition with stage 3a chronic kidney disease, without long-term current use of insulin    BMI 45.0-49.9, adult    Atherosclerosis of aorta    Bilateral pulmonary embolism    Pulmonary HTN    Fatty liver    Thyroid disorder            Follow up in about 14 months (around 1/5/2022) for with labs; get JAYLEN aPlm to read soon.

## 2020-11-05 ENCOUNTER — OFFICE VISIT (OUTPATIENT)
Dept: CARDIOLOGY | Facility: CLINIC | Age: 75
End: 2020-11-05
Payer: MEDICARE

## 2020-11-05 VITALS
HEIGHT: 62 IN | WEIGHT: 258.63 LBS | HEART RATE: 96 BPM | BODY MASS INDEX: 47.59 KG/M2 | SYSTOLIC BLOOD PRESSURE: 138 MMHG | DIASTOLIC BLOOD PRESSURE: 63 MMHG

## 2020-11-05 DIAGNOSIS — I26.99 BILATERAL PULMONARY EMBOLISM: ICD-10-CM

## 2020-11-05 DIAGNOSIS — N18.31 DIABETES MELLITUS DUE TO UNDERLYING CONDITION WITH STAGE 3A CHRONIC KIDNEY DISEASE, WITHOUT LONG-TERM CURRENT USE OF INSULIN: ICD-10-CM

## 2020-11-05 DIAGNOSIS — N18.30 STAGE 3 CHRONIC KIDNEY DISEASE, UNSPECIFIED WHETHER STAGE 3A OR 3B CKD: ICD-10-CM

## 2020-11-05 DIAGNOSIS — I27.20 PULMONARY HTN: ICD-10-CM

## 2020-11-05 DIAGNOSIS — I70.0 ATHEROSCLEROSIS OF AORTA: ICD-10-CM

## 2020-11-05 DIAGNOSIS — I10 ESSENTIAL HYPERTENSION: ICD-10-CM

## 2020-11-05 DIAGNOSIS — E08.22 DIABETES MELLITUS DUE TO UNDERLYING CONDITION WITH STAGE 3A CHRONIC KIDNEY DISEASE, WITHOUT LONG-TERM CURRENT USE OF INSULIN: ICD-10-CM

## 2020-11-05 DIAGNOSIS — E78.2 HYPERLIPIDEMIA, MIXED: ICD-10-CM

## 2020-11-05 DIAGNOSIS — Z95.2 S/P AVR (AORTIC VALVE REPLACEMENT): Primary | ICD-10-CM

## 2020-11-05 DIAGNOSIS — E11.69 DIABETES MELLITUS TYPE 2 IN OBESE: ICD-10-CM

## 2020-11-05 DIAGNOSIS — C50.011 MALIGNANT NEOPLASM OF NIPPLE OF RIGHT BREAST IN FEMALE, UNSPECIFIED ESTROGEN RECEPTOR STATUS: ICD-10-CM

## 2020-11-05 DIAGNOSIS — E55.9 VITAMIN D DEFICIENCY: ICD-10-CM

## 2020-11-05 DIAGNOSIS — E66.9 DIABETES MELLITUS TYPE 2 IN OBESE: ICD-10-CM

## 2020-11-05 DIAGNOSIS — E07.9 THYROID DISORDER: ICD-10-CM

## 2020-11-05 DIAGNOSIS — K76.0 FATTY LIVER: ICD-10-CM

## 2020-11-05 DIAGNOSIS — M51.37 DEGENERATION OF LUMBAR OR LUMBOSACRAL INTERVERTEBRAL DISC: ICD-10-CM

## 2020-11-05 PROCEDURE — 3075F PR MOST RECENT SYSTOLIC BLOOD PRESS GE 130-139MM HG: ICD-10-PCS | Mod: HCNC,CPTII,S$GLB, | Performed by: INTERNAL MEDICINE

## 2020-11-05 PROCEDURE — 99215 PR OFFICE/OUTPT VISIT, EST, LEVL V, 40-54 MIN: ICD-10-PCS | Mod: HCNC,S$GLB,, | Performed by: INTERNAL MEDICINE

## 2020-11-05 PROCEDURE — 3051F PR MOST RECENT HEMOGLOBIN A1C LEVEL 7.0 - < 8.0%: ICD-10-PCS | Mod: HCNC,CPTII,S$GLB, | Performed by: INTERNAL MEDICINE

## 2020-11-05 PROCEDURE — 1159F PR MEDICATION LIST DOCUMENTED IN MEDICAL RECORD: ICD-10-PCS | Mod: HCNC,S$GLB,, | Performed by: INTERNAL MEDICINE

## 2020-11-05 PROCEDURE — 1101F PT FALLS ASSESS-DOCD LE1/YR: CPT | Mod: HCNC,CPTII,S$GLB, | Performed by: INTERNAL MEDICINE

## 2020-11-05 PROCEDURE — 3051F HG A1C>EQUAL 7.0%<8.0%: CPT | Mod: HCNC,CPTII,S$GLB, | Performed by: INTERNAL MEDICINE

## 2020-11-05 PROCEDURE — 1159F MED LIST DOCD IN RCRD: CPT | Mod: HCNC,S$GLB,, | Performed by: INTERNAL MEDICINE

## 2020-11-05 PROCEDURE — 1125F AMNT PAIN NOTED PAIN PRSNT: CPT | Mod: HCNC,S$GLB,, | Performed by: INTERNAL MEDICINE

## 2020-11-05 PROCEDURE — 3078F DIAST BP <80 MM HG: CPT | Mod: HCNC,CPTII,S$GLB, | Performed by: INTERNAL MEDICINE

## 2020-11-05 PROCEDURE — 99215 OFFICE O/P EST HI 40 MIN: CPT | Mod: HCNC,S$GLB,, | Performed by: INTERNAL MEDICINE

## 2020-11-05 PROCEDURE — 3078F PR MOST RECENT DIASTOLIC BLOOD PRESSURE < 80 MM HG: ICD-10-PCS | Mod: HCNC,CPTII,S$GLB, | Performed by: INTERNAL MEDICINE

## 2020-11-05 PROCEDURE — 3075F SYST BP GE 130 - 139MM HG: CPT | Mod: HCNC,CPTII,S$GLB, | Performed by: INTERNAL MEDICINE

## 2020-11-05 PROCEDURE — 1101F PR PT FALLS ASSESS DOC 0-1 FALLS W/OUT INJ PAST YR: ICD-10-PCS | Mod: HCNC,CPTII,S$GLB, | Performed by: INTERNAL MEDICINE

## 2020-11-05 PROCEDURE — 99999 PR PBB SHADOW E&M-EST. PATIENT-LVL V: ICD-10-PCS | Mod: PBBFAC,HCNC,, | Performed by: INTERNAL MEDICINE

## 2020-11-05 PROCEDURE — 99499 RISK ADDL DX/OHS AUDIT: ICD-10-PCS | Mod: S$GLB,,, | Performed by: INTERNAL MEDICINE

## 2020-11-05 PROCEDURE — 99499 UNLISTED E&M SERVICE: CPT | Mod: S$GLB,,, | Performed by: INTERNAL MEDICINE

## 2020-11-05 PROCEDURE — 99999 PR PBB SHADOW E&M-EST. PATIENT-LVL V: CPT | Mod: PBBFAC,HCNC,, | Performed by: INTERNAL MEDICINE

## 2020-11-05 PROCEDURE — 1125F PR PAIN SEVERITY QUANTIFIED, PAIN PRESENT: ICD-10-PCS | Mod: HCNC,S$GLB,, | Performed by: INTERNAL MEDICINE

## 2020-11-05 RX ORDER — AMOXICILLIN 500 MG/1
500 CAPSULE ORAL 3 TIMES DAILY
Qty: 12 CAPSULE | Refills: 3 | Status: SHIPPED | OUTPATIENT
Start: 2020-11-05 | End: 2021-04-06 | Stop reason: SDUPTHER

## 2020-11-05 NOTE — PATIENT INSTRUCTIONS
Discussed diet , achieving and maintaining ideal body weight, and exercise.   We reviewed meds in detail.  Reassured -Discussed goals, options, plan  We have discussed the need for endocarditis prophylaxis.   I wrote Dr about possible change in DM med   -eg Farxiga or Jardiance 10 mg instead of the generic Amaryl

## 2020-11-25 ENCOUNTER — HOSPITAL ENCOUNTER (OUTPATIENT)
Dept: CARDIOLOGY | Facility: HOSPITAL | Age: 75
Discharge: HOME | End: 2020-11-25
Attending: INTERNAL MEDICINE
Payer: MEDICARE

## 2020-11-25 ENCOUNTER — PATIENT MESSAGE (OUTPATIENT)
Dept: CARDIOLOGY | Facility: CLINIC | Age: 75
End: 2020-11-25

## 2020-11-25 ENCOUNTER — PATIENT OUTREACH (OUTPATIENT)
Dept: ADMINISTRATIVE | Facility: HOSPITAL | Age: 75
End: 2020-11-25

## 2020-11-25 VITALS
WEIGHT: 252 LBS | HEART RATE: 98 BPM | SYSTOLIC BLOOD PRESSURE: 140 MMHG | BODY MASS INDEX: 46.38 KG/M2 | DIASTOLIC BLOOD PRESSURE: 70 MMHG | HEIGHT: 62 IN

## 2020-11-25 DIAGNOSIS — M89.9 DISORDER OF BONE AND CARTILAGE: ICD-10-CM

## 2020-11-25 DIAGNOSIS — M94.9 DISORDER OF BONE AND CARTILAGE: ICD-10-CM

## 2020-11-25 DIAGNOSIS — M81.0 OSTEOPOROSIS, UNSPECIFIED OSTEOPOROSIS TYPE, UNSPECIFIED PATHOLOGICAL FRACTURE PRESENCE: Primary | ICD-10-CM

## 2020-11-25 LAB
ASCENDING AORTA: 3.34 CM
AV INDEX (PROSTH): 0.63
AV MEAN GRADIENT: 8 MMHG
AV PEAK GRADIENT: 13 MMHG
AV VALVE AREA: 2.15 CM2
AV VELOCITY RATIO: 0.58
BSA FOR ECHO PROCEDURE: 2.24 M2
CV ECHO LV RWT: 0.45 CM
DOP CALC AO PEAK VEL: 1.8 M/S
DOP CALC AO VTI: 36.92 CM
DOP CALC LVOT AREA: 3.4 CM2
DOP CALC LVOT DIAMETER: 2.08 CM
DOP CALC LVOT PEAK VEL: 1.04 M/S
DOP CALC LVOT STROKE VOLUME: 79.27 CM3
DOP CALCLVOT PEAK VEL VTI: 23.34 CM
E WAVE DECELERATION TIME: 216.34 MSEC
E/A RATIO: 0.9
E/E' RATIO: 18.27 M/S
ECHO LV POSTERIOR WALL: 0.97 CM (ref 0.6–1.1)
FRACTIONAL SHORTENING: 34 % (ref 28–44)
INTERVENTRICULAR SEPTUM: 0.98 CM (ref 0.6–1.1)
LA MAJOR: 5.83 CM
LA MINOR: 5.64 CM
LA WIDTH: 3.72 CM
LEFT ATRIUM SIZE: 4.73 CM
LEFT ATRIUM VOLUME INDEX: 40.7 ML/M2
LEFT ATRIUM VOLUME: 85.75 CM3
LEFT INTERNAL DIMENSION IN SYSTOLE: 2.82 CM (ref 2.1–4)
LEFT VENTRICLE DIASTOLIC VOLUME INDEX: 38.93 ML/M2
LEFT VENTRICLE DIASTOLIC VOLUME: 82.09 ML
LEFT VENTRICLE MASS INDEX: 65 G/M2
LEFT VENTRICLE SYSTOLIC VOLUME INDEX: 14.3 ML/M2
LEFT VENTRICLE SYSTOLIC VOLUME: 30.08 ML
LEFT VENTRICULAR INTERNAL DIMENSION IN DIASTOLE: 4.28 CM (ref 3.5–6)
LEFT VENTRICULAR MASS: 136.55 G
LV LATERAL E/E' RATIO: 19.57 M/S
LV SEPTAL E/E' RATIO: 17.13 M/S
MV PEAK A VEL: 1.53 M/S
MV PEAK E VEL: 1.37 M/S
MV STENOSIS PRESSURE HALF TIME: 62.74 MS
MV VALVE AREA P 1/2 METHOD: 3.51 CM2
PISA MRMAX VEL: 0.05 M/S
PISA TR MAX VEL: 2.76 M/S
PULM VEIN S/D RATIO: 1.34
PV PEAK D VEL: 0.53 M/S
PV PEAK S VEL: 0.71 M/S
RA MAJOR: 5.06 CM
RA WIDTH: 3.72 CM
RIGHT VENTRICULAR END-DIASTOLIC DIMENSION: 3.8 CM
SINUS: 3.35 CM
STJ: 2.68 CM
TDI LATERAL: 0.07 M/S
TDI SEPTAL: 0.08 M/S
TDI: 0.08 M/S
TR MAX PG: 30 MMHG
TRICUSPID ANNULAR PLANE SYSTOLIC EXCURSION: 1.27 CM

## 2020-11-25 PROCEDURE — 93306 ECHO (CUPID ONLY): ICD-10-PCS | Mod: 26,HCNC,, | Performed by: INTERNAL MEDICINE

## 2020-11-25 PROCEDURE — 93306 TTE W/DOPPLER COMPLETE: CPT | Mod: HCNC

## 2020-11-25 PROCEDURE — 93306 TTE W/DOPPLER COMPLETE: CPT | Mod: 26,HCNC,, | Performed by: INTERNAL MEDICINE

## 2020-11-30 ENCOUNTER — OFFICE VISIT (OUTPATIENT)
Dept: INTERNAL MEDICINE | Facility: CLINIC | Age: 75
End: 2020-11-30
Payer: MEDICARE

## 2020-11-30 ENCOUNTER — LAB VISIT (OUTPATIENT)
Dept: LAB | Facility: HOSPITAL | Age: 75
End: 2020-11-30
Attending: INTERNAL MEDICINE
Payer: MEDICARE

## 2020-11-30 VITALS
DIASTOLIC BLOOD PRESSURE: 80 MMHG | WEIGHT: 261 LBS | HEART RATE: 76 BPM | HEIGHT: 62 IN | SYSTOLIC BLOOD PRESSURE: 132 MMHG | OXYGEN SATURATION: 98 % | BODY MASS INDEX: 48.03 KG/M2

## 2020-11-30 DIAGNOSIS — E66.9 DIABETES MELLITUS TYPE 2 IN OBESE: ICD-10-CM

## 2020-11-30 DIAGNOSIS — E11.69 DIABETES MELLITUS TYPE 2 IN OBESE: Primary | ICD-10-CM

## 2020-11-30 DIAGNOSIS — E66.9 DIABETES MELLITUS TYPE 2 IN OBESE: Primary | ICD-10-CM

## 2020-11-30 DIAGNOSIS — G89.29 CHRONIC LEFT SHOULDER PAIN: ICD-10-CM

## 2020-11-30 DIAGNOSIS — Z80.0 FAMILY HISTORY OF COLON CANCER: ICD-10-CM

## 2020-11-30 DIAGNOSIS — E11.69 DIABETES MELLITUS TYPE 2 IN OBESE: ICD-10-CM

## 2020-11-30 DIAGNOSIS — M25.512 CHRONIC LEFT SHOULDER PAIN: ICD-10-CM

## 2020-11-30 LAB
ALBUMIN SERPL BCP-MCNC: 4 G/DL (ref 3.5–5.2)
ALP SERPL-CCNC: 101 U/L (ref 55–135)
ALT SERPL W/O P-5'-P-CCNC: 15 U/L (ref 10–44)
ANION GAP SERPL CALC-SCNC: 9 MMOL/L (ref 8–16)
AST SERPL-CCNC: 16 U/L (ref 10–40)
BASOPHILS # BLD AUTO: 0.03 K/UL (ref 0–0.2)
BASOPHILS NFR BLD: 0.5 % (ref 0–1.9)
BILIRUB SERPL-MCNC: 0.4 MG/DL (ref 0.1–1)
BUN SERPL-MCNC: 22 MG/DL (ref 8–23)
CALCIUM SERPL-MCNC: 9.9 MG/DL (ref 8.7–10.5)
CHLORIDE SERPL-SCNC: 102 MMOL/L (ref 95–110)
CO2 SERPL-SCNC: 30 MMOL/L (ref 23–29)
CREAT SERPL-MCNC: 1.2 MG/DL (ref 0.5–1.4)
DIFFERENTIAL METHOD: ABNORMAL
EOSINOPHIL # BLD AUTO: 0.1 K/UL (ref 0–0.5)
EOSINOPHIL NFR BLD: 0.9 % (ref 0–8)
ERYTHROCYTE [DISTWIDTH] IN BLOOD BY AUTOMATED COUNT: 13.8 % (ref 11.5–14.5)
EST. GFR  (AFRICAN AMERICAN): 51.1 ML/MIN/1.73 M^2
EST. GFR  (NON AFRICAN AMERICAN): 44.3 ML/MIN/1.73 M^2
ESTIMATED AVG GLUCOSE: 177 MG/DL (ref 68–131)
GLUCOSE SERPL-MCNC: 147 MG/DL (ref 70–110)
HBA1C MFR BLD HPLC: 7.8 % (ref 4–5.6)
HCT VFR BLD AUTO: 42.3 % (ref 37–48.5)
HGB BLD-MCNC: 12.6 G/DL (ref 12–16)
IMM GRANULOCYTES # BLD AUTO: 0.02 K/UL (ref 0–0.04)
IMM GRANULOCYTES NFR BLD AUTO: 0.3 % (ref 0–0.5)
LYMPHOCYTES # BLD AUTO: 1.5 K/UL (ref 1–4.8)
LYMPHOCYTES NFR BLD: 22.4 % (ref 18–48)
MCH RBC QN AUTO: 28.9 PG (ref 27–31)
MCHC RBC AUTO-ENTMCNC: 29.8 G/DL (ref 32–36)
MCV RBC AUTO: 97 FL (ref 82–98)
MONOCYTES # BLD AUTO: 0.5 K/UL (ref 0.3–1)
MONOCYTES NFR BLD: 8.2 % (ref 4–15)
NEUTROPHILS # BLD AUTO: 4.4 K/UL (ref 1.8–7.7)
NEUTROPHILS NFR BLD: 67.7 % (ref 38–73)
NRBC BLD-RTO: 0 /100 WBC
PLATELET # BLD AUTO: 150 K/UL (ref 150–350)
PMV BLD AUTO: 11.8 FL (ref 9.2–12.9)
POTASSIUM SERPL-SCNC: 4.7 MMOL/L (ref 3.5–5.1)
PROT SERPL-MCNC: 7.6 G/DL (ref 6–8.4)
RBC # BLD AUTO: 4.36 M/UL (ref 4–5.4)
SODIUM SERPL-SCNC: 141 MMOL/L (ref 136–145)
TSH SERPL DL<=0.005 MIU/L-ACNC: 2.13 UIU/ML (ref 0.4–4)
WBC # BLD AUTO: 6.56 K/UL (ref 3.9–12.7)

## 2020-11-30 PROCEDURE — 80053 COMPREHEN METABOLIC PANEL: CPT | Mod: HCNC

## 2020-11-30 PROCEDURE — 99499 RISK ADDL DX/OHS AUDIT: ICD-10-PCS | Mod: S$GLB,,, | Performed by: INTERNAL MEDICINE

## 2020-11-30 PROCEDURE — 3075F PR MOST RECENT SYSTOLIC BLOOD PRESS GE 130-139MM HG: ICD-10-PCS | Mod: HCNC,CPTII,S$GLB, | Performed by: INTERNAL MEDICINE

## 2020-11-30 PROCEDURE — 3051F PR MOST RECENT HEMOGLOBIN A1C LEVEL 7.0 - < 8.0%: ICD-10-PCS | Mod: HCNC,CPTII,S$GLB, | Performed by: INTERNAL MEDICINE

## 2020-11-30 PROCEDURE — 1125F PR PAIN SEVERITY QUANTIFIED, PAIN PRESENT: ICD-10-PCS | Mod: HCNC,S$GLB,, | Performed by: INTERNAL MEDICINE

## 2020-11-30 PROCEDURE — 99999 PR PBB SHADOW E&M-EST. PATIENT-LVL V: CPT | Mod: PBBFAC,HCNC,, | Performed by: INTERNAL MEDICINE

## 2020-11-30 PROCEDURE — 1157F PR ADVANCE CARE PLAN OR EQUIV PRESENT IN MEDICAL RECORD: ICD-10-PCS | Mod: HCNC,S$GLB,, | Performed by: INTERNAL MEDICINE

## 2020-11-30 PROCEDURE — 3079F PR MOST RECENT DIASTOLIC BLOOD PRESSURE 80-89 MM HG: ICD-10-PCS | Mod: HCNC,CPTII,S$GLB, | Performed by: INTERNAL MEDICINE

## 2020-11-30 PROCEDURE — 99214 PR OFFICE/OUTPT VISIT, EST, LEVL IV, 30-39 MIN: ICD-10-PCS | Mod: HCNC,S$GLB,, | Performed by: INTERNAL MEDICINE

## 2020-11-30 PROCEDURE — 99499 UNLISTED E&M SERVICE: CPT | Mod: S$GLB,,, | Performed by: INTERNAL MEDICINE

## 2020-11-30 PROCEDURE — 84443 ASSAY THYROID STIM HORMONE: CPT | Mod: HCNC

## 2020-11-30 PROCEDURE — 1157F ADVNC CARE PLAN IN RCRD: CPT | Mod: HCNC,S$GLB,, | Performed by: INTERNAL MEDICINE

## 2020-11-30 PROCEDURE — 3051F HG A1C>EQUAL 7.0%<8.0%: CPT | Mod: HCNC,CPTII,S$GLB, | Performed by: INTERNAL MEDICINE

## 2020-11-30 PROCEDURE — 3079F DIAST BP 80-89 MM HG: CPT | Mod: HCNC,CPTII,S$GLB, | Performed by: INTERNAL MEDICINE

## 2020-11-30 PROCEDURE — 1159F MED LIST DOCD IN RCRD: CPT | Mod: HCNC,S$GLB,, | Performed by: INTERNAL MEDICINE

## 2020-11-30 PROCEDURE — 99214 OFFICE O/P EST MOD 30 MIN: CPT | Mod: HCNC,S$GLB,, | Performed by: INTERNAL MEDICINE

## 2020-11-30 PROCEDURE — 36415 COLL VENOUS BLD VENIPUNCTURE: CPT | Mod: HCNC

## 2020-11-30 PROCEDURE — 3075F SYST BP GE 130 - 139MM HG: CPT | Mod: HCNC,CPTII,S$GLB, | Performed by: INTERNAL MEDICINE

## 2020-11-30 PROCEDURE — 85025 COMPLETE CBC W/AUTO DIFF WBC: CPT | Mod: HCNC

## 2020-11-30 PROCEDURE — 83036 HEMOGLOBIN GLYCOSYLATED A1C: CPT | Mod: HCNC

## 2020-11-30 PROCEDURE — 1159F PR MEDICATION LIST DOCUMENTED IN MEDICAL RECORD: ICD-10-PCS | Mod: HCNC,S$GLB,, | Performed by: INTERNAL MEDICINE

## 2020-11-30 PROCEDURE — 99999 PR PBB SHADOW E&M-EST. PATIENT-LVL V: ICD-10-PCS | Mod: PBBFAC,HCNC,, | Performed by: INTERNAL MEDICINE

## 2020-11-30 PROCEDURE — 1125F AMNT PAIN NOTED PAIN PRSNT: CPT | Mod: HCNC,S$GLB,, | Performed by: INTERNAL MEDICINE

## 2020-11-30 RX ORDER — DAPAGLIFLOZIN 5 MG/1
5 TABLET, FILM COATED ORAL DAILY
Qty: 30 TABLET | Refills: 3 | Status: SHIPPED | OUTPATIENT
Start: 2020-11-30 | End: 2021-04-06 | Stop reason: SDUPTHER

## 2020-11-30 RX ORDER — ALPRAZOLAM 0.25 MG/1
TABLET ORAL
Qty: 30 TABLET | Refills: 0 | Status: SHIPPED | OUTPATIENT
Start: 2020-11-30 | End: 2021-04-06 | Stop reason: SDUPTHER

## 2020-12-11 ENCOUNTER — PATIENT MESSAGE (OUTPATIENT)
Dept: OTHER | Facility: OTHER | Age: 75
End: 2020-12-11

## 2020-12-12 NOTE — ASSESSMENT & PLAN NOTE
Patient is a 73 yo F who underwent R modified radical mastectomy 8/26/19 for an occult right breast cancer, who was also diagnosed with pulm emboli in May 2019 and has been on therapeutic lovenox perioperatively. Represented on 9/1 with R breast hematoma s/p hematoma washout on 9/2. Required 2 uPRBC preoperatively, but has been hemodynamically stable post op.    Regular diabetic diet   Home medications restarted, however holding Lovenox for another day.   SCDs for dvt ppx   Continue to monitor drain output closely- output currently serosanguinous. If change in character will repeat CBC  CBC, CMP, Mag and Phos daily   PRN pain medications  Replete lytes PRN   Encourage ambulation   Plan for possible discharge home tomorrow     The sheath insertion attempt was made into the left femoral artery.

## 2021-01-03 ENCOUNTER — PATIENT MESSAGE (OUTPATIENT)
Dept: INTERNAL MEDICINE | Facility: CLINIC | Age: 76
End: 2021-01-03

## 2021-01-03 ENCOUNTER — PATIENT MESSAGE (OUTPATIENT)
Dept: HEMATOLOGY/ONCOLOGY | Facility: CLINIC | Age: 76
End: 2021-01-03

## 2021-01-04 ENCOUNTER — PATIENT MESSAGE (OUTPATIENT)
Dept: ADMINISTRATIVE | Facility: HOSPITAL | Age: 76
End: 2021-01-04

## 2021-01-06 ENCOUNTER — IMMUNIZATION (OUTPATIENT)
Dept: FAMILY MEDICINE | Facility: CLINIC | Age: 76
End: 2021-01-06
Payer: MEDICARE

## 2021-01-06 DIAGNOSIS — Z23 NEED FOR VACCINATION: ICD-10-CM

## 2021-01-06 PROCEDURE — 91300 COVID-19, MRNA, LNP-S, PF, 30 MCG/0.3 ML DOSE VACCINE: CPT | Mod: PBBFAC | Performed by: FAMILY MEDICINE

## 2021-01-13 ENCOUNTER — TELEPHONE (OUTPATIENT)
Dept: INTERNAL MEDICINE | Facility: CLINIC | Age: 76
End: 2021-01-13

## 2021-01-13 ENCOUNTER — HOSPITAL ENCOUNTER (OUTPATIENT)
Dept: RADIOLOGY | Facility: HOSPITAL | Age: 76
Discharge: HOME | End: 2021-01-13
Attending: INTERNAL MEDICINE
Payer: MEDICARE

## 2021-01-13 DIAGNOSIS — R91.1 SOLITARY PULMONARY NODULE: ICD-10-CM

## 2021-01-13 PROCEDURE — 71250 CT CHEST WITHOUT CONTRAST: ICD-10-PCS | Mod: 26,HCNC,, | Performed by: RADIOLOGY

## 2021-01-13 PROCEDURE — 71250 CT THORAX DX C-: CPT | Mod: TC,HCNC

## 2021-01-13 PROCEDURE — 71250 CT THORAX DX C-: CPT | Mod: 26,HCNC,, | Performed by: RADIOLOGY

## 2021-01-14 ENCOUNTER — OFFICE VISIT (OUTPATIENT)
Dept: HEMATOLOGY/ONCOLOGY | Facility: CLINIC | Age: 76
End: 2021-01-14
Payer: MEDICARE

## 2021-01-14 VITALS
OXYGEN SATURATION: 97 % | TEMPERATURE: 98 F | DIASTOLIC BLOOD PRESSURE: 68 MMHG | BODY MASS INDEX: 48.16 KG/M2 | HEIGHT: 62 IN | SYSTOLIC BLOOD PRESSURE: 149 MMHG | RESPIRATION RATE: 16 BRPM | HEART RATE: 83 BPM | WEIGHT: 261.69 LBS

## 2021-01-14 DIAGNOSIS — R91.1 PULMONARY NODULE: ICD-10-CM

## 2021-01-14 DIAGNOSIS — Z17.1 MALIGNANT NEOPLASM OF UPPER-OUTER QUADRANT OF RIGHT BREAST IN FEMALE, ESTROGEN RECEPTOR NEGATIVE: Primary | ICD-10-CM

## 2021-01-14 DIAGNOSIS — Z12.31 ENCOUNTER FOR SCREENING MAMMOGRAM FOR HIGH-RISK PATIENT: ICD-10-CM

## 2021-01-14 DIAGNOSIS — C50.411 MALIGNANT NEOPLASM OF UPPER-OUTER QUADRANT OF RIGHT BREAST IN FEMALE, ESTROGEN RECEPTOR NEGATIVE: Primary | ICD-10-CM

## 2021-01-14 PROCEDURE — 99499 UNLISTED E&M SERVICE: CPT | Mod: S$GLB,,, | Performed by: INTERNAL MEDICINE

## 2021-01-14 PROCEDURE — 3077F PR MOST RECENT SYSTOLIC BLOOD PRESSURE >= 140 MM HG: ICD-10-PCS | Mod: HCNC,CPTII,S$GLB, | Performed by: INTERNAL MEDICINE

## 2021-01-14 PROCEDURE — 1159F PR MEDICATION LIST DOCUMENTED IN MEDICAL RECORD: ICD-10-PCS | Mod: HCNC,S$GLB,, | Performed by: INTERNAL MEDICINE

## 2021-01-14 PROCEDURE — 3077F SYST BP >= 140 MM HG: CPT | Mod: HCNC,CPTII,S$GLB, | Performed by: INTERNAL MEDICINE

## 2021-01-14 PROCEDURE — 1101F PR PT FALLS ASSESS DOC 0-1 FALLS W/OUT INJ PAST YR: ICD-10-PCS | Mod: HCNC,CPTII,S$GLB, | Performed by: INTERNAL MEDICINE

## 2021-01-14 PROCEDURE — 99999 PR PBB SHADOW E&M-EST. PATIENT-LVL IV: ICD-10-PCS | Mod: PBBFAC,HCNC,, | Performed by: INTERNAL MEDICINE

## 2021-01-14 PROCEDURE — 3078F PR MOST RECENT DIASTOLIC BLOOD PRESSURE < 80 MM HG: ICD-10-PCS | Mod: HCNC,CPTII,S$GLB, | Performed by: INTERNAL MEDICINE

## 2021-01-14 PROCEDURE — 1157F ADVNC CARE PLAN IN RCRD: CPT | Mod: HCNC,S$GLB,, | Performed by: INTERNAL MEDICINE

## 2021-01-14 PROCEDURE — 99213 PR OFFICE/OUTPT VISIT, EST, LEVL III, 20-29 MIN: ICD-10-PCS | Mod: HCNC,S$GLB,, | Performed by: INTERNAL MEDICINE

## 2021-01-14 PROCEDURE — 1157F PR ADVANCE CARE PLAN OR EQUIV PRESENT IN MEDICAL RECORD: ICD-10-PCS | Mod: HCNC,S$GLB,, | Performed by: INTERNAL MEDICINE

## 2021-01-14 PROCEDURE — 1101F PT FALLS ASSESS-DOCD LE1/YR: CPT | Mod: HCNC,CPTII,S$GLB, | Performed by: INTERNAL MEDICINE

## 2021-01-14 PROCEDURE — 3288F FALL RISK ASSESSMENT DOCD: CPT | Mod: HCNC,CPTII,S$GLB, | Performed by: INTERNAL MEDICINE

## 2021-01-14 PROCEDURE — 99213 OFFICE O/P EST LOW 20 MIN: CPT | Mod: HCNC,S$GLB,, | Performed by: INTERNAL MEDICINE

## 2021-01-14 PROCEDURE — 1159F MED LIST DOCD IN RCRD: CPT | Mod: HCNC,S$GLB,, | Performed by: INTERNAL MEDICINE

## 2021-01-14 PROCEDURE — 99499 RISK ADDL DX/OHS AUDIT: ICD-10-PCS | Mod: S$GLB,,, | Performed by: INTERNAL MEDICINE

## 2021-01-14 PROCEDURE — 1125F AMNT PAIN NOTED PAIN PRSNT: CPT | Mod: HCNC,S$GLB,, | Performed by: INTERNAL MEDICINE

## 2021-01-14 PROCEDURE — 1125F PR PAIN SEVERITY QUANTIFIED, PAIN PRESENT: ICD-10-PCS | Mod: HCNC,S$GLB,, | Performed by: INTERNAL MEDICINE

## 2021-01-14 PROCEDURE — 99999 PR PBB SHADOW E&M-EST. PATIENT-LVL IV: CPT | Mod: PBBFAC,HCNC,, | Performed by: INTERNAL MEDICINE

## 2021-01-14 PROCEDURE — 3078F DIAST BP <80 MM HG: CPT | Mod: HCNC,CPTII,S$GLB, | Performed by: INTERNAL MEDICINE

## 2021-01-14 PROCEDURE — 3288F PR FALLS RISK ASSESSMENT DOCUMENTED: ICD-10-PCS | Mod: HCNC,CPTII,S$GLB, | Performed by: INTERNAL MEDICINE

## 2021-01-27 ENCOUNTER — IMMUNIZATION (OUTPATIENT)
Dept: FAMILY MEDICINE | Facility: CLINIC | Age: 76
End: 2021-01-27
Payer: MEDICARE

## 2021-01-27 DIAGNOSIS — Z23 NEED FOR VACCINATION: Primary | ICD-10-CM

## 2021-01-27 PROCEDURE — 91300 COVID-19, MRNA, LNP-S, PF, 30 MCG/0.3 ML DOSE VACCINE: CPT | Mod: PBBFAC | Performed by: FAMILY MEDICINE

## 2021-01-27 PROCEDURE — 0002A COVID-19, MRNA, LNP-S, PF, 30 MCG/0.3 ML DOSE VACCINE: CPT | Mod: PBBFAC | Performed by: FAMILY MEDICINE

## 2021-01-28 ENCOUNTER — NURSE TRIAGE (OUTPATIENT)
Dept: ADMINISTRATIVE | Facility: CLINIC | Age: 76
End: 2021-01-28

## 2021-01-29 ENCOUNTER — OFFICE VISIT (OUTPATIENT)
Dept: INTERNAL MEDICINE | Facility: CLINIC | Age: 76
End: 2021-01-29
Payer: MEDICARE

## 2021-01-29 ENCOUNTER — PATIENT OUTREACH (OUTPATIENT)
Dept: ADMINISTRATIVE | Facility: HOSPITAL | Age: 76
End: 2021-01-29

## 2021-01-29 VITALS
OXYGEN SATURATION: 98 % | WEIGHT: 260 LBS | BODY MASS INDEX: 47.84 KG/M2 | DIASTOLIC BLOOD PRESSURE: 80 MMHG | HEIGHT: 62 IN | SYSTOLIC BLOOD PRESSURE: 126 MMHG | HEART RATE: 70 BPM

## 2021-01-29 DIAGNOSIS — L03.818 CELLULITIS OF OTHER SPECIFIED SITE: Primary | ICD-10-CM

## 2021-01-29 DIAGNOSIS — N61.0 MASTITIS: ICD-10-CM

## 2021-01-29 PROCEDURE — 96372 THER/PROPH/DIAG INJ SC/IM: CPT | Mod: S$GLB,,, | Performed by: INTERNAL MEDICINE

## 2021-01-29 PROCEDURE — 1157F ADVNC CARE PLAN IN RCRD: CPT | Mod: CPTII,S$GLB,, | Performed by: INTERNAL MEDICINE

## 2021-01-29 PROCEDURE — 99999 PR PBB SHADOW E&M-EST. PATIENT-LVL IV: CPT | Mod: PBBFAC,,, | Performed by: INTERNAL MEDICINE

## 2021-01-29 PROCEDURE — 99999 PR PBB SHADOW E&M-EST. PATIENT-LVL IV: ICD-10-PCS | Mod: PBBFAC,,, | Performed by: INTERNAL MEDICINE

## 2021-01-29 PROCEDURE — 1157F PR ADVANCE CARE PLAN OR EQUIV PRESENT IN MEDICAL RECORD: ICD-10-PCS | Mod: CPTII,S$GLB,, | Performed by: INTERNAL MEDICINE

## 2021-01-29 PROCEDURE — 1101F PR PT FALLS ASSESS DOC 0-1 FALLS W/OUT INJ PAST YR: ICD-10-PCS | Mod: CPTII,S$GLB,, | Performed by: INTERNAL MEDICINE

## 2021-01-29 PROCEDURE — 3288F FALL RISK ASSESSMENT DOCD: CPT | Mod: CPTII,S$GLB,, | Performed by: INTERNAL MEDICINE

## 2021-01-29 PROCEDURE — 3288F PR FALLS RISK ASSESSMENT DOCUMENTED: ICD-10-PCS | Mod: CPTII,S$GLB,, | Performed by: INTERNAL MEDICINE

## 2021-01-29 PROCEDURE — 99214 PR OFFICE/OUTPT VISIT, EST, LEVL IV, 30-39 MIN: ICD-10-PCS | Mod: 25,S$GLB,, | Performed by: INTERNAL MEDICINE

## 2021-01-29 PROCEDURE — 3079F PR MOST RECENT DIASTOLIC BLOOD PRESSURE 80-89 MM HG: ICD-10-PCS | Mod: CPTII,S$GLB,, | Performed by: INTERNAL MEDICINE

## 2021-01-29 PROCEDURE — 1101F PT FALLS ASSESS-DOCD LE1/YR: CPT | Mod: CPTII,S$GLB,, | Performed by: INTERNAL MEDICINE

## 2021-01-29 PROCEDURE — 3079F DIAST BP 80-89 MM HG: CPT | Mod: CPTII,S$GLB,, | Performed by: INTERNAL MEDICINE

## 2021-01-29 PROCEDURE — 96372 PR INJECTION,THERAP/PROPH/DIAG2ST, IM OR SUBCUT: ICD-10-PCS | Mod: S$GLB,,, | Performed by: INTERNAL MEDICINE

## 2021-01-29 PROCEDURE — 3052F PR MOST RECENT HEMOGLOBIN A1C LEVEL 8.0 - < 9.0%: ICD-10-PCS | Mod: CPTII,S$GLB,, | Performed by: INTERNAL MEDICINE

## 2021-01-29 PROCEDURE — 99214 OFFICE O/P EST MOD 30 MIN: CPT | Mod: 25,S$GLB,, | Performed by: INTERNAL MEDICINE

## 2021-01-29 PROCEDURE — 3074F SYST BP LT 130 MM HG: CPT | Mod: CPTII,S$GLB,, | Performed by: INTERNAL MEDICINE

## 2021-01-29 PROCEDURE — 3052F HG A1C>EQUAL 8.0%<EQUAL 9.0%: CPT | Mod: CPTII,S$GLB,, | Performed by: INTERNAL MEDICINE

## 2021-01-29 PROCEDURE — 3074F PR MOST RECENT SYSTOLIC BLOOD PRESSURE < 130 MM HG: ICD-10-PCS | Mod: CPTII,S$GLB,, | Performed by: INTERNAL MEDICINE

## 2021-01-29 RX ORDER — CEFTRIAXONE 1 G/1
1 INJECTION, POWDER, FOR SOLUTION INTRAMUSCULAR; INTRAVENOUS
Status: COMPLETED | OUTPATIENT
Start: 2021-01-29 | End: 2021-01-29

## 2021-01-29 RX ORDER — AMOXICILLIN AND CLAVULANATE POTASSIUM 875; 125 MG/1; MG/1
1 TABLET, FILM COATED ORAL 2 TIMES DAILY
Qty: 20 TABLET | Refills: 0 | Status: SHIPPED | OUTPATIENT
Start: 2021-01-29 | End: 2021-02-08

## 2021-01-29 RX ADMIN — CEFTRIAXONE 1 G: 1 INJECTION, POWDER, FOR SOLUTION INTRAMUSCULAR; INTRAVENOUS at 12:01

## 2021-02-02 ENCOUNTER — PATIENT MESSAGE (OUTPATIENT)
Dept: INTERNAL MEDICINE | Facility: CLINIC | Age: 76
End: 2021-02-02

## 2021-02-04 ENCOUNTER — TELEPHONE (OUTPATIENT)
Dept: INTERNAL MEDICINE | Facility: CLINIC | Age: 76
End: 2021-02-04

## 2021-02-04 DIAGNOSIS — R21 RASH: Primary | ICD-10-CM

## 2021-02-04 RX ORDER — FLUCONAZOLE 150 MG/1
150 TABLET ORAL DAILY
Qty: 2 TABLET | Refills: 1 | Status: SHIPPED | OUTPATIENT
Start: 2021-02-04 | End: 2021-02-05

## 2021-02-05 ENCOUNTER — OFFICE VISIT (OUTPATIENT)
Dept: DERMATOLOGY | Facility: CLINIC | Age: 76
End: 2021-02-05
Payer: MEDICARE

## 2021-02-05 ENCOUNTER — PATIENT OUTREACH (OUTPATIENT)
Dept: ADMINISTRATIVE | Facility: OTHER | Age: 76
End: 2021-02-05

## 2021-02-05 DIAGNOSIS — A46 ERYSIPELAS: ICD-10-CM

## 2021-02-05 DIAGNOSIS — R21 RASH: Primary | ICD-10-CM

## 2021-02-05 DIAGNOSIS — L30.4 INTERTRIGO: ICD-10-CM

## 2021-02-05 PROCEDURE — 1157F PR ADVANCE CARE PLAN OR EQUIV PRESENT IN MEDICAL RECORD: ICD-10-PCS | Mod: S$GLB,,, | Performed by: DERMATOLOGY

## 2021-02-05 PROCEDURE — 3288F PR FALLS RISK ASSESSMENT DOCUMENTED: ICD-10-PCS | Mod: CPTII,S$GLB,, | Performed by: DERMATOLOGY

## 2021-02-05 PROCEDURE — 1157F ADVNC CARE PLAN IN RCRD: CPT | Mod: S$GLB,,, | Performed by: DERMATOLOGY

## 2021-02-05 PROCEDURE — 99204 OFFICE O/P NEW MOD 45 MIN: CPT | Mod: S$GLB,,, | Performed by: DERMATOLOGY

## 2021-02-05 PROCEDURE — 1159F MED LIST DOCD IN RCRD: CPT | Mod: S$GLB,,, | Performed by: DERMATOLOGY

## 2021-02-05 PROCEDURE — 1126F AMNT PAIN NOTED NONE PRSNT: CPT | Mod: S$GLB,,, | Performed by: DERMATOLOGY

## 2021-02-05 PROCEDURE — 1126F PR PAIN SEVERITY QUANTIFIED, NO PAIN PRESENT: ICD-10-PCS | Mod: S$GLB,,, | Performed by: DERMATOLOGY

## 2021-02-05 PROCEDURE — 3288F FALL RISK ASSESSMENT DOCD: CPT | Mod: CPTII,S$GLB,, | Performed by: DERMATOLOGY

## 2021-02-05 PROCEDURE — 1159F PR MEDICATION LIST DOCUMENTED IN MEDICAL RECORD: ICD-10-PCS | Mod: S$GLB,,, | Performed by: DERMATOLOGY

## 2021-02-05 PROCEDURE — 99204 PR OFFICE/OUTPT VISIT, NEW, LEVL IV, 45-59 MIN: ICD-10-PCS | Mod: S$GLB,,, | Performed by: DERMATOLOGY

## 2021-02-05 PROCEDURE — 1101F PT FALLS ASSESS-DOCD LE1/YR: CPT | Mod: CPTII,S$GLB,, | Performed by: DERMATOLOGY

## 2021-02-05 PROCEDURE — 1101F PR PT FALLS ASSESS DOC 0-1 FALLS W/OUT INJ PAST YR: ICD-10-PCS | Mod: CPTII,S$GLB,, | Performed by: DERMATOLOGY

## 2021-02-05 PROCEDURE — 99999 PR PBB SHADOW E&M-EST. PATIENT-LVL IV: ICD-10-PCS | Mod: PBBFAC,,, | Performed by: DERMATOLOGY

## 2021-02-05 PROCEDURE — 99999 PR PBB SHADOW E&M-EST. PATIENT-LVL IV: CPT | Mod: PBBFAC,,, | Performed by: DERMATOLOGY

## 2021-02-05 RX ORDER — KETOCONAZOLE 20 MG/G
CREAM TOPICAL
Qty: 60 G | Refills: 3 | Status: ON HOLD | OUTPATIENT
Start: 2021-02-05 | End: 2023-05-23 | Stop reason: HOSPADM

## 2021-02-05 RX ORDER — HYDROCORTISONE 25 MG/G
CREAM TOPICAL 2 TIMES DAILY PRN
Qty: 30 G | Refills: 3 | Status: ON HOLD | OUTPATIENT
Start: 2021-02-05 | End: 2023-05-23 | Stop reason: HOSPADM

## 2021-02-08 ENCOUNTER — PATIENT MESSAGE (OUTPATIENT)
Dept: HEMATOLOGY/ONCOLOGY | Facility: CLINIC | Age: 76
End: 2021-02-08

## 2021-02-08 ENCOUNTER — PATIENT MESSAGE (OUTPATIENT)
Dept: INTERNAL MEDICINE | Facility: CLINIC | Age: 76
End: 2021-02-08

## 2021-02-12 ENCOUNTER — PATIENT MESSAGE (OUTPATIENT)
Dept: DERMATOLOGY | Facility: CLINIC | Age: 76
End: 2021-02-12

## 2021-02-19 ENCOUNTER — OFFICE VISIT (OUTPATIENT)
Dept: DERMATOLOGY | Facility: CLINIC | Age: 76
End: 2021-02-19
Payer: MEDICARE

## 2021-02-19 DIAGNOSIS — R21 RASH AND NONSPECIFIC SKIN ERUPTION: Primary | ICD-10-CM

## 2021-02-19 PROCEDURE — 88305 TISSUE EXAM BY PATHOLOGIST: ICD-10-PCS | Mod: 26,,, | Performed by: PATHOLOGY

## 2021-02-19 PROCEDURE — 88342 IMHCHEM/IMCYTCHM 1ST ANTB: CPT | Mod: 26,,, | Performed by: PATHOLOGY

## 2021-02-19 PROCEDURE — 3288F FALL RISK ASSESSMENT DOCD: CPT | Mod: CPTII,S$GLB,, | Performed by: DERMATOLOGY

## 2021-02-19 PROCEDURE — 3288F PR FALLS RISK ASSESSMENT DOCUMENTED: ICD-10-PCS | Mod: CPTII,S$GLB,, | Performed by: DERMATOLOGY

## 2021-02-19 PROCEDURE — 99999 PR PBB SHADOW E&M-EST. PATIENT-LVL IV: ICD-10-PCS | Mod: PBBFAC,,, | Performed by: DERMATOLOGY

## 2021-02-19 PROCEDURE — 1157F ADVNC CARE PLAN IN RCRD: CPT | Mod: S$GLB,,, | Performed by: DERMATOLOGY

## 2021-02-19 PROCEDURE — 1125F PR PAIN SEVERITY QUANTIFIED, PAIN PRESENT: ICD-10-PCS | Mod: S$GLB,,, | Performed by: DERMATOLOGY

## 2021-02-19 PROCEDURE — 11104 PUNCH BX SKIN SINGLE LESION: CPT | Mod: S$GLB,,, | Performed by: DERMATOLOGY

## 2021-02-19 PROCEDURE — 88305 TISSUE EXAM BY PATHOLOGIST: CPT | Mod: 59 | Performed by: PATHOLOGY

## 2021-02-19 PROCEDURE — 1101F PR PT FALLS ASSESS DOC 0-1 FALLS W/OUT INJ PAST YR: ICD-10-PCS | Mod: CPTII,S$GLB,, | Performed by: DERMATOLOGY

## 2021-02-19 PROCEDURE — 1125F AMNT PAIN NOTED PAIN PRSNT: CPT | Mod: S$GLB,,, | Performed by: DERMATOLOGY

## 2021-02-19 PROCEDURE — 99499 NO LOS: ICD-10-PCS | Mod: S$GLB,,, | Performed by: DERMATOLOGY

## 2021-02-19 PROCEDURE — 11104 PR PUNCH BIOPSY, SKIN, SINGLE LESION: ICD-10-PCS | Mod: S$GLB,,, | Performed by: DERMATOLOGY

## 2021-02-19 PROCEDURE — 11105 PR PUNCH BIOPSY, SKIN, EA ADDTL LESION: ICD-10-PCS | Mod: S$GLB,,, | Performed by: DERMATOLOGY

## 2021-02-19 PROCEDURE — 11105 PUNCH BX SKIN EA SEP/ADDL: CPT | Mod: S$GLB,,, | Performed by: DERMATOLOGY

## 2021-02-19 PROCEDURE — 1157F PR ADVANCE CARE PLAN OR EQUIV PRESENT IN MEDICAL RECORD: ICD-10-PCS | Mod: S$GLB,,, | Performed by: DERMATOLOGY

## 2021-02-19 PROCEDURE — 88305 TISSUE EXAM BY PATHOLOGIST: CPT | Mod: 26,,, | Performed by: PATHOLOGY

## 2021-02-19 PROCEDURE — 88342 CHG IMMUNOCYTOCHEMISTRY: ICD-10-PCS | Mod: 26,,, | Performed by: PATHOLOGY

## 2021-02-19 PROCEDURE — 1101F PT FALLS ASSESS-DOCD LE1/YR: CPT | Mod: CPTII,S$GLB,, | Performed by: DERMATOLOGY

## 2021-02-19 PROCEDURE — 99499 UNLISTED E&M SERVICE: CPT | Mod: S$GLB,,, | Performed by: DERMATOLOGY

## 2021-02-19 PROCEDURE — 99999 PR PBB SHADOW E&M-EST. PATIENT-LVL IV: CPT | Mod: PBBFAC,,, | Performed by: DERMATOLOGY

## 2021-02-19 PROCEDURE — 88342 IMHCHEM/IMCYTCHM 1ST ANTB: CPT | Performed by: PATHOLOGY

## 2021-02-23 ENCOUNTER — PES CALL (OUTPATIENT)
Dept: ADMINISTRATIVE | Facility: CLINIC | Age: 76
End: 2021-02-23

## 2021-02-25 LAB
FINAL PATHOLOGIC DIAGNOSIS: NORMAL
GROSS: NORMAL
MICROSCOPIC EXAM: NORMAL

## 2021-03-02 ENCOUNTER — TELEPHONE (OUTPATIENT)
Dept: DERMATOLOGY | Facility: CLINIC | Age: 76
End: 2021-03-02

## 2021-03-05 ENCOUNTER — TELEPHONE (OUTPATIENT)
Dept: HEMATOLOGY/ONCOLOGY | Facility: CLINIC | Age: 76
End: 2021-03-05

## 2021-03-08 ENCOUNTER — HOSPITAL ENCOUNTER (OUTPATIENT)
Dept: RADIOLOGY | Facility: HOSPITAL | Age: 76
Discharge: HOME | End: 2021-03-08
Attending: INTERNAL MEDICINE
Payer: MEDICARE

## 2021-03-08 DIAGNOSIS — R23.4 BREAST SKIN CHANGES: ICD-10-CM

## 2021-03-08 DIAGNOSIS — Z12.31 ENCOUNTER FOR SCREENING MAMMOGRAM FOR HIGH-RISK PATIENT: ICD-10-CM

## 2021-03-08 DIAGNOSIS — R92.8 ABNORMAL MAMMOGRAM OF LEFT BREAST: ICD-10-CM

## 2021-03-08 PROCEDURE — 76642 ULTRASOUND BREAST LIMITED: CPT | Mod: TC,LT

## 2021-03-08 PROCEDURE — 77061 BREAST TOMOSYNTHESIS UNI: CPT | Mod: 26,LT,, | Performed by: RADIOLOGY

## 2021-03-08 PROCEDURE — 76642 ULTRASOUND BREAST LIMITED: CPT | Mod: 26,LT,, | Performed by: RADIOLOGY

## 2021-03-08 PROCEDURE — 76642 US BREAST LEFT LIMITED: ICD-10-PCS | Mod: 26,LT,, | Performed by: RADIOLOGY

## 2021-03-08 PROCEDURE — 77061 BREAST TOMOSYNTHESIS UNI: CPT | Mod: TC,LT

## 2021-03-08 PROCEDURE — 77065 DX MAMMO INCL CAD UNI: CPT | Mod: 26,LT,, | Performed by: RADIOLOGY

## 2021-03-08 PROCEDURE — 77061 MAMMO DIGITAL DIAGNOSTIC LEFT WITH TOMO: ICD-10-PCS | Mod: 26,LT,, | Performed by: RADIOLOGY

## 2021-03-08 PROCEDURE — 77065 MAMMO DIGITAL DIAGNOSTIC LEFT WITH TOMO: ICD-10-PCS | Mod: 26,LT,, | Performed by: RADIOLOGY

## 2021-03-12 ENCOUNTER — TELEPHONE (OUTPATIENT)
Dept: DERMATOLOGY | Facility: CLINIC | Age: 76
End: 2021-03-12

## 2021-03-15 ENCOUNTER — PATIENT MESSAGE (OUTPATIENT)
Dept: CARDIOLOGY | Facility: CLINIC | Age: 76
End: 2021-03-15

## 2021-03-17 ENCOUNTER — TELEPHONE (OUTPATIENT)
Dept: INTERNAL MEDICINE | Facility: CLINIC | Age: 76
End: 2021-03-17

## 2021-03-25 ENCOUNTER — PATIENT OUTREACH (OUTPATIENT)
Dept: ADMINISTRATIVE | Facility: HOSPITAL | Age: 76
End: 2021-03-25

## 2021-03-25 ENCOUNTER — TELEPHONE (OUTPATIENT)
Dept: INTERNAL MEDICINE | Facility: CLINIC | Age: 76
End: 2021-03-25

## 2021-03-25 ENCOUNTER — PATIENT MESSAGE (OUTPATIENT)
Dept: INTERNAL MEDICINE | Facility: CLINIC | Age: 76
End: 2021-03-25

## 2021-03-25 DIAGNOSIS — Z79.4 TYPE 2 DIABETES MELLITUS WITHOUT COMPLICATION, WITH LONG-TERM CURRENT USE OF INSULIN: Primary | ICD-10-CM

## 2021-03-25 DIAGNOSIS — E66.9 DIABETES MELLITUS TYPE 2 IN OBESE: Primary | ICD-10-CM

## 2021-03-25 DIAGNOSIS — I10 HYPERTENSION, UNSPECIFIED TYPE: ICD-10-CM

## 2021-03-25 DIAGNOSIS — E78.2 HYPERLIPIDEMIA, MIXED: ICD-10-CM

## 2021-03-25 DIAGNOSIS — E11.69 DIABETES MELLITUS TYPE 2 IN OBESE: Primary | ICD-10-CM

## 2021-03-25 DIAGNOSIS — E11.9 TYPE 2 DIABETES MELLITUS WITHOUT COMPLICATION, WITH LONG-TERM CURRENT USE OF INSULIN: Primary | ICD-10-CM

## 2021-03-25 DIAGNOSIS — E55.9 MILD VITAMIN D DEFICIENCY: ICD-10-CM

## 2021-03-29 ENCOUNTER — PATIENT OUTREACH (OUTPATIENT)
Dept: ADMINISTRATIVE | Facility: OTHER | Age: 76
End: 2021-03-29

## 2021-03-30 ENCOUNTER — TELEPHONE (OUTPATIENT)
Dept: DERMATOLOGY | Facility: CLINIC | Age: 76
End: 2021-03-30

## 2021-03-30 ENCOUNTER — LAB VISIT (OUTPATIENT)
Dept: LAB | Facility: HOSPITAL | Age: 76
End: 2021-03-30
Attending: INTERNAL MEDICINE
Payer: MEDICARE

## 2021-03-30 DIAGNOSIS — E11.9 TYPE 2 DIABETES MELLITUS WITHOUT COMPLICATION, WITH LONG-TERM CURRENT USE OF INSULIN: ICD-10-CM

## 2021-03-30 DIAGNOSIS — E78.2 HYPERLIPIDEMIA, MIXED: ICD-10-CM

## 2021-03-30 DIAGNOSIS — E11.69 DIABETES MELLITUS TYPE 2 IN OBESE: ICD-10-CM

## 2021-03-30 DIAGNOSIS — E66.9 DIABETES MELLITUS TYPE 2 IN OBESE: ICD-10-CM

## 2021-03-30 DIAGNOSIS — E55.9 MILD VITAMIN D DEFICIENCY: ICD-10-CM

## 2021-03-30 DIAGNOSIS — I10 HYPERTENSION, UNSPECIFIED TYPE: ICD-10-CM

## 2021-03-30 DIAGNOSIS — Z79.4 TYPE 2 DIABETES MELLITUS WITHOUT COMPLICATION, WITH LONG-TERM CURRENT USE OF INSULIN: ICD-10-CM

## 2021-03-30 LAB
25(OH)D3+25(OH)D2 SERPL-MCNC: 38 NG/ML (ref 30–96)
ALBUMIN SERPL BCP-MCNC: 3.9 G/DL (ref 3.5–5.2)
ALP SERPL-CCNC: 88 U/L (ref 55–135)
ALT SERPL W/O P-5'-P-CCNC: 13 U/L (ref 10–44)
ANION GAP SERPL CALC-SCNC: 12 MMOL/L (ref 8–16)
AST SERPL-CCNC: 17 U/L (ref 10–40)
BASOPHILS # BLD AUTO: 0.02 K/UL (ref 0–0.2)
BASOPHILS NFR BLD: 0.4 % (ref 0–1.9)
BILIRUB SERPL-MCNC: 0.4 MG/DL (ref 0.1–1)
BUN SERPL-MCNC: 23 MG/DL (ref 8–23)
CALCIUM SERPL-MCNC: 9.1 MG/DL (ref 8.7–10.5)
CHLORIDE SERPL-SCNC: 103 MMOL/L (ref 95–110)
CHOLEST SERPL-MCNC: 129 MG/DL (ref 120–199)
CHOLEST/HDLC SERPL: 2.6 {RATIO} (ref 2–5)
CO2 SERPL-SCNC: 24 MMOL/L (ref 23–29)
CREAT SERPL-MCNC: 1.2 MG/DL (ref 0.5–1.4)
DIFFERENTIAL METHOD: ABNORMAL
EOSINOPHIL # BLD AUTO: 0.1 K/UL (ref 0–0.5)
EOSINOPHIL NFR BLD: 1.5 % (ref 0–8)
ERYTHROCYTE [DISTWIDTH] IN BLOOD BY AUTOMATED COUNT: 14.2 % (ref 11.5–14.5)
EST. GFR  (AFRICAN AMERICAN): 51.1 ML/MIN/1.73 M^2
EST. GFR  (NON AFRICAN AMERICAN): 44.3 ML/MIN/1.73 M^2
ESTIMATED AVG GLUCOSE: 174 MG/DL (ref 68–131)
ESTIMATED AVG GLUCOSE: 174 MG/DL (ref 68–131)
GLUCOSE SERPL-MCNC: 141 MG/DL (ref 70–110)
HBA1C MFR BLD: 7.7 % (ref 4–5.6)
HBA1C MFR BLD: 7.7 % (ref 4–5.6)
HCT VFR BLD AUTO: 39.2 % (ref 37–48.5)
HDLC SERPL-MCNC: 49 MG/DL (ref 40–75)
HDLC SERPL: 38 % (ref 20–50)
HGB BLD-MCNC: 12.3 G/DL (ref 12–16)
IMM GRANULOCYTES # BLD AUTO: 0.02 K/UL (ref 0–0.04)
IMM GRANULOCYTES NFR BLD AUTO: 0.4 % (ref 0–0.5)
LDLC SERPL CALC-MCNC: 57.2 MG/DL (ref 63–159)
LYMPHOCYTES # BLD AUTO: 1.3 K/UL (ref 1–4.8)
LYMPHOCYTES NFR BLD: 27.4 % (ref 18–48)
MCH RBC QN AUTO: 29 PG (ref 27–31)
MCHC RBC AUTO-ENTMCNC: 31.4 G/DL (ref 32–36)
MCV RBC AUTO: 93 FL (ref 82–98)
MONOCYTES # BLD AUTO: 0.5 K/UL (ref 0.3–1)
MONOCYTES NFR BLD: 9.6 % (ref 4–15)
NEUTROPHILS # BLD AUTO: 2.9 K/UL (ref 1.8–7.7)
NEUTROPHILS NFR BLD: 60.7 % (ref 38–73)
NONHDLC SERPL-MCNC: 80 MG/DL
NRBC BLD-RTO: 0 /100 WBC
PLATELET # BLD AUTO: 154 K/UL (ref 150–450)
PLATELET BLD QL SMEAR: ABNORMAL
PMV BLD AUTO: 12 FL (ref 9.2–12.9)
POTASSIUM SERPL-SCNC: 3.9 MMOL/L (ref 3.5–5.1)
PROT SERPL-MCNC: 7.2 G/DL (ref 6–8.4)
RBC # BLD AUTO: 4.24 M/UL (ref 4–5.4)
SODIUM SERPL-SCNC: 139 MMOL/L (ref 136–145)
TRIGL SERPL-MCNC: 114 MG/DL (ref 30–150)
TSH SERPL DL<=0.005 MIU/L-ACNC: 2.55 UIU/ML (ref 0.4–4)
WBC # BLD AUTO: 4.81 K/UL (ref 3.9–12.7)

## 2021-03-30 PROCEDURE — 80053 COMPREHEN METABOLIC PANEL: CPT | Performed by: INTERNAL MEDICINE

## 2021-03-30 PROCEDURE — 83036 HEMOGLOBIN GLYCOSYLATED A1C: CPT | Performed by: INTERNAL MEDICINE

## 2021-03-30 PROCEDURE — 84443 ASSAY THYROID STIM HORMONE: CPT | Performed by: INTERNAL MEDICINE

## 2021-03-30 PROCEDURE — 82306 VITAMIN D 25 HYDROXY: CPT | Performed by: INTERNAL MEDICINE

## 2021-03-30 PROCEDURE — 85025 COMPLETE CBC W/AUTO DIFF WBC: CPT | Performed by: INTERNAL MEDICINE

## 2021-03-30 PROCEDURE — 36415 COLL VENOUS BLD VENIPUNCTURE: CPT | Performed by: INTERNAL MEDICINE

## 2021-03-30 PROCEDURE — 80061 LIPID PANEL: CPT | Performed by: INTERNAL MEDICINE

## 2021-03-31 ENCOUNTER — OFFICE VISIT (OUTPATIENT)
Dept: DERMATOLOGY | Facility: CLINIC | Age: 76
End: 2021-03-31
Payer: MEDICARE

## 2021-03-31 DIAGNOSIS — R21 RASH AND NONSPECIFIC SKIN ERUPTION: Primary | ICD-10-CM

## 2021-03-31 DIAGNOSIS — L82.0 INFLAMED SEBORRHEIC KERATOSIS: ICD-10-CM

## 2021-03-31 DIAGNOSIS — D22.9 NEVUS: ICD-10-CM

## 2021-03-31 DIAGNOSIS — D18.01 CHERRY ANGIOMA: ICD-10-CM

## 2021-03-31 DIAGNOSIS — Z12.83 SCREENING FOR SKIN CANCER: ICD-10-CM

## 2021-03-31 DIAGNOSIS — L82.1 SK (SEBORRHEIC KERATOSIS): ICD-10-CM

## 2021-03-31 DIAGNOSIS — L91.8 ACROCHORDON: ICD-10-CM

## 2021-03-31 PROCEDURE — 99213 OFFICE O/P EST LOW 20 MIN: CPT | Mod: 25,S$GLB,, | Performed by: DERMATOLOGY

## 2021-03-31 PROCEDURE — 3288F PR FALLS RISK ASSESSMENT DOCUMENTED: ICD-10-PCS | Mod: CPTII,S$GLB,, | Performed by: DERMATOLOGY

## 2021-03-31 PROCEDURE — 1101F PT FALLS ASSESS-DOCD LE1/YR: CPT | Mod: CPTII,S$GLB,, | Performed by: DERMATOLOGY

## 2021-03-31 PROCEDURE — 1101F PR PT FALLS ASSESS DOC 0-1 FALLS W/OUT INJ PAST YR: ICD-10-PCS | Mod: CPTII,S$GLB,, | Performed by: DERMATOLOGY

## 2021-03-31 PROCEDURE — 99999 PR PBB SHADOW E&M-EST. PATIENT-LVL III: ICD-10-PCS | Mod: PBBFAC,,, | Performed by: DERMATOLOGY

## 2021-03-31 PROCEDURE — 1126F AMNT PAIN NOTED NONE PRSNT: CPT | Mod: S$GLB,,, | Performed by: DERMATOLOGY

## 2021-03-31 PROCEDURE — 1126F PR PAIN SEVERITY QUANTIFIED, NO PAIN PRESENT: ICD-10-PCS | Mod: S$GLB,,, | Performed by: DERMATOLOGY

## 2021-03-31 PROCEDURE — 3288F FALL RISK ASSESSMENT DOCD: CPT | Mod: CPTII,S$GLB,, | Performed by: DERMATOLOGY

## 2021-03-31 PROCEDURE — 1159F MED LIST DOCD IN RCRD: CPT | Mod: S$GLB,,, | Performed by: DERMATOLOGY

## 2021-03-31 PROCEDURE — 99999 PR PBB SHADOW E&M-EST. PATIENT-LVL III: CPT | Mod: PBBFAC,,, | Performed by: DERMATOLOGY

## 2021-03-31 PROCEDURE — 99213 PR OFFICE/OUTPT VISIT, EST, LEVL III, 20-29 MIN: ICD-10-PCS | Mod: 25,S$GLB,, | Performed by: DERMATOLOGY

## 2021-03-31 PROCEDURE — 1157F ADVNC CARE PLAN IN RCRD: CPT | Mod: S$GLB,,, | Performed by: DERMATOLOGY

## 2021-03-31 PROCEDURE — 1159F PR MEDICATION LIST DOCUMENTED IN MEDICAL RECORD: ICD-10-PCS | Mod: S$GLB,,, | Performed by: DERMATOLOGY

## 2021-03-31 PROCEDURE — 1157F PR ADVANCE CARE PLAN OR EQUIV PRESENT IN MEDICAL RECORD: ICD-10-PCS | Mod: S$GLB,,, | Performed by: DERMATOLOGY

## 2021-03-31 PROCEDURE — 17110 DESTRUCTION B9 LES UP TO 14: CPT | Mod: S$GLB,,, | Performed by: DERMATOLOGY

## 2021-03-31 PROCEDURE — 17110 PR DESTRUCTION BENIGN LESIONS UP TO 14: ICD-10-PCS | Mod: S$GLB,,, | Performed by: DERMATOLOGY

## 2021-04-06 ENCOUNTER — OFFICE VISIT (OUTPATIENT)
Dept: INTERNAL MEDICINE | Facility: CLINIC | Age: 76
End: 2021-04-06
Payer: MEDICARE

## 2021-04-06 VITALS
SYSTOLIC BLOOD PRESSURE: 128 MMHG | HEART RATE: 86 BPM | DIASTOLIC BLOOD PRESSURE: 74 MMHG | OXYGEN SATURATION: 98 % | TEMPERATURE: 98 F | HEIGHT: 62 IN | BODY MASS INDEX: 47.51 KG/M2 | WEIGHT: 258.19 LBS

## 2021-04-06 DIAGNOSIS — E08.22 DIABETES MELLITUS DUE TO UNDERLYING CONDITION WITH STAGE 3A CHRONIC KIDNEY DISEASE, WITHOUT LONG-TERM CURRENT USE OF INSULIN: ICD-10-CM

## 2021-04-06 DIAGNOSIS — Z95.2 S/P AVR (AORTIC VALVE REPLACEMENT): ICD-10-CM

## 2021-04-06 DIAGNOSIS — R60.9 EDEMA, UNSPECIFIED TYPE: Primary | ICD-10-CM

## 2021-04-06 DIAGNOSIS — N18.31 DIABETES MELLITUS DUE TO UNDERLYING CONDITION WITH STAGE 3A CHRONIC KIDNEY DISEASE, WITHOUT LONG-TERM CURRENT USE OF INSULIN: ICD-10-CM

## 2021-04-06 PROCEDURE — 99214 OFFICE O/P EST MOD 30 MIN: CPT | Mod: S$GLB,,, | Performed by: INTERNAL MEDICINE

## 2021-04-06 PROCEDURE — 99499 UNLISTED E&M SERVICE: CPT | Mod: HCNC,S$GLB,, | Performed by: INTERNAL MEDICINE

## 2021-04-06 PROCEDURE — 1101F PR PT FALLS ASSESS DOC 0-1 FALLS W/OUT INJ PAST YR: ICD-10-PCS | Mod: CPTII,S$GLB,, | Performed by: INTERNAL MEDICINE

## 2021-04-06 PROCEDURE — 1157F ADVNC CARE PLAN IN RCRD: CPT | Mod: S$GLB,,, | Performed by: INTERNAL MEDICINE

## 2021-04-06 PROCEDURE — 99214 PR OFFICE/OUTPT VISIT, EST, LEVL IV, 30-39 MIN: ICD-10-PCS | Mod: S$GLB,,, | Performed by: INTERNAL MEDICINE

## 2021-04-06 PROCEDURE — 3288F PR FALLS RISK ASSESSMENT DOCUMENTED: ICD-10-PCS | Mod: CPTII,S$GLB,, | Performed by: INTERNAL MEDICINE

## 2021-04-06 PROCEDURE — 1126F PR PAIN SEVERITY QUANTIFIED, NO PAIN PRESENT: ICD-10-PCS | Mod: S$GLB,,, | Performed by: INTERNAL MEDICINE

## 2021-04-06 PROCEDURE — 1101F PT FALLS ASSESS-DOCD LE1/YR: CPT | Mod: CPTII,S$GLB,, | Performed by: INTERNAL MEDICINE

## 2021-04-06 PROCEDURE — 99999 PR PBB SHADOW E&M-EST. PATIENT-LVL V: ICD-10-PCS | Mod: PBBFAC,,, | Performed by: INTERNAL MEDICINE

## 2021-04-06 PROCEDURE — 1126F AMNT PAIN NOTED NONE PRSNT: CPT | Mod: S$GLB,,, | Performed by: INTERNAL MEDICINE

## 2021-04-06 PROCEDURE — 1159F PR MEDICATION LIST DOCUMENTED IN MEDICAL RECORD: ICD-10-PCS | Mod: S$GLB,,, | Performed by: INTERNAL MEDICINE

## 2021-04-06 PROCEDURE — 99499 RISK ADDL DX/OHS AUDIT: ICD-10-PCS | Mod: HCNC,S$GLB,, | Performed by: INTERNAL MEDICINE

## 2021-04-06 PROCEDURE — 3288F FALL RISK ASSESSMENT DOCD: CPT | Mod: CPTII,S$GLB,, | Performed by: INTERNAL MEDICINE

## 2021-04-06 PROCEDURE — 1159F MED LIST DOCD IN RCRD: CPT | Mod: S$GLB,,, | Performed by: INTERNAL MEDICINE

## 2021-04-06 PROCEDURE — 1157F PR ADVANCE CARE PLAN OR EQUIV PRESENT IN MEDICAL RECORD: ICD-10-PCS | Mod: S$GLB,,, | Performed by: INTERNAL MEDICINE

## 2021-04-06 PROCEDURE — 99999 PR PBB SHADOW E&M-EST. PATIENT-LVL V: CPT | Mod: PBBFAC,,, | Performed by: INTERNAL MEDICINE

## 2021-04-06 RX ORDER — ALPRAZOLAM 0.25 MG/1
TABLET ORAL
Qty: 30 TABLET | Refills: 0 | Status: SHIPPED | OUTPATIENT
Start: 2021-04-06 | End: 2021-07-16 | Stop reason: SDUPTHER

## 2021-04-06 RX ORDER — AMOXICILLIN 500 MG/1
500 CAPSULE ORAL 3 TIMES DAILY
Qty: 12 CAPSULE | Refills: 3 | Status: ON HOLD | OUTPATIENT
Start: 2021-04-06 | End: 2022-05-28 | Stop reason: HOSPADM

## 2021-04-06 RX ORDER — DAPAGLIFLOZIN 5 MG/1
5 TABLET, FILM COATED ORAL DAILY
Qty: 30 TABLET | Refills: 3 | Status: SHIPPED | OUTPATIENT
Start: 2021-04-06 | End: 2021-07-16

## 2021-04-09 ENCOUNTER — PATIENT MESSAGE (OUTPATIENT)
Dept: INTERNAL MEDICINE | Facility: CLINIC | Age: 76
End: 2021-04-09

## 2021-04-21 ENCOUNTER — OFFICE VISIT (OUTPATIENT)
Dept: HEMATOLOGY/ONCOLOGY | Facility: CLINIC | Age: 76
End: 2021-04-21
Payer: MEDICARE

## 2021-04-21 VITALS
RESPIRATION RATE: 18 BRPM | TEMPERATURE: 98 F | DIASTOLIC BLOOD PRESSURE: 68 MMHG | SYSTOLIC BLOOD PRESSURE: 145 MMHG | HEART RATE: 92 BPM | HEIGHT: 62 IN | OXYGEN SATURATION: 97 % | BODY MASS INDEX: 47.69 KG/M2 | WEIGHT: 259.13 LBS

## 2021-04-21 DIAGNOSIS — Z17.1 MALIGNANT NEOPLASM OF UPPER-OUTER QUADRANT OF RIGHT BREAST IN FEMALE, ESTROGEN RECEPTOR NEGATIVE: Primary | ICD-10-CM

## 2021-04-21 DIAGNOSIS — C50.411 MALIGNANT NEOPLASM OF UPPER-OUTER QUADRANT OF RIGHT BREAST IN FEMALE, ESTROGEN RECEPTOR NEGATIVE: Primary | ICD-10-CM

## 2021-04-21 PROCEDURE — 1125F PR PAIN SEVERITY QUANTIFIED, PAIN PRESENT: ICD-10-PCS | Mod: S$GLB,,, | Performed by: INTERNAL MEDICINE

## 2021-04-21 PROCEDURE — 1159F MED LIST DOCD IN RCRD: CPT | Mod: S$GLB,,, | Performed by: INTERNAL MEDICINE

## 2021-04-21 PROCEDURE — 1157F ADVNC CARE PLAN IN RCRD: CPT | Mod: S$GLB,,, | Performed by: INTERNAL MEDICINE

## 2021-04-21 PROCEDURE — 1101F PT FALLS ASSESS-DOCD LE1/YR: CPT | Mod: CPTII,S$GLB,, | Performed by: INTERNAL MEDICINE

## 2021-04-21 PROCEDURE — 3288F FALL RISK ASSESSMENT DOCD: CPT | Mod: CPTII,S$GLB,, | Performed by: INTERNAL MEDICINE

## 2021-04-21 PROCEDURE — 1125F AMNT PAIN NOTED PAIN PRSNT: CPT | Mod: S$GLB,,, | Performed by: INTERNAL MEDICINE

## 2021-04-21 PROCEDURE — 1101F PR PT FALLS ASSESS DOC 0-1 FALLS W/OUT INJ PAST YR: ICD-10-PCS | Mod: CPTII,S$GLB,, | Performed by: INTERNAL MEDICINE

## 2021-04-21 PROCEDURE — 1159F PR MEDICATION LIST DOCUMENTED IN MEDICAL RECORD: ICD-10-PCS | Mod: S$GLB,,, | Performed by: INTERNAL MEDICINE

## 2021-04-21 PROCEDURE — 99999 PR PBB SHADOW E&M-EST. PATIENT-LVL IV: ICD-10-PCS | Mod: PBBFAC,,, | Performed by: INTERNAL MEDICINE

## 2021-04-21 PROCEDURE — 1157F PR ADVANCE CARE PLAN OR EQUIV PRESENT IN MEDICAL RECORD: ICD-10-PCS | Mod: S$GLB,,, | Performed by: INTERNAL MEDICINE

## 2021-04-21 PROCEDURE — 99999 PR PBB SHADOW E&M-EST. PATIENT-LVL IV: CPT | Mod: PBBFAC,,, | Performed by: INTERNAL MEDICINE

## 2021-04-21 PROCEDURE — 99213 OFFICE O/P EST LOW 20 MIN: CPT | Mod: S$GLB,,, | Performed by: INTERNAL MEDICINE

## 2021-04-21 PROCEDURE — 99213 PR OFFICE/OUTPT VISIT, EST, LEVL III, 20-29 MIN: ICD-10-PCS | Mod: S$GLB,,, | Performed by: INTERNAL MEDICINE

## 2021-04-21 PROCEDURE — 3288F PR FALLS RISK ASSESSMENT DOCUMENTED: ICD-10-PCS | Mod: CPTII,S$GLB,, | Performed by: INTERNAL MEDICINE

## 2021-05-06 ENCOUNTER — PATIENT MESSAGE (OUTPATIENT)
Dept: INTERNAL MEDICINE | Facility: CLINIC | Age: 76
End: 2021-05-06

## 2021-05-06 DIAGNOSIS — H93.19 TINNITUS, UNSPECIFIED LATERALITY: Primary | ICD-10-CM

## 2021-05-06 DIAGNOSIS — M79.646 THUMB PAIN, UNSPECIFIED LATERALITY: ICD-10-CM

## 2021-05-06 DIAGNOSIS — Z98.890 HISTORY OF CARPAL TUNNEL RELEASE: ICD-10-CM

## 2021-05-24 ENCOUNTER — CLINICAL SUPPORT (OUTPATIENT)
Dept: AUDIOLOGY | Facility: CLINIC | Age: 76
End: 2021-05-24
Payer: MEDICARE

## 2021-05-24 ENCOUNTER — OFFICE VISIT (OUTPATIENT)
Dept: OTOLARYNGOLOGY | Facility: CLINIC | Age: 76
End: 2021-05-24
Payer: MEDICARE

## 2021-05-24 ENCOUNTER — TELEPHONE (OUTPATIENT)
Dept: ORTHOPEDICS | Facility: CLINIC | Age: 76
End: 2021-05-24

## 2021-05-24 VITALS — DIASTOLIC BLOOD PRESSURE: 76 MMHG | SYSTOLIC BLOOD PRESSURE: 138 MMHG | HEART RATE: 78 BPM

## 2021-05-24 DIAGNOSIS — M79.642 LEFT HAND PAIN: Primary | ICD-10-CM

## 2021-05-24 DIAGNOSIS — H90.A22 SENSORINEURAL HEARING LOSS (SNHL) OF LEFT EAR WITH RESTRICTED HEARING OF RIGHT EAR: Primary | ICD-10-CM

## 2021-05-24 DIAGNOSIS — H90.3 BILATERAL HIGH FREQUENCY SENSORINEURAL HEARING LOSS: Primary | ICD-10-CM

## 2021-05-24 DIAGNOSIS — H93.19 TINNITUS, UNSPECIFIED LATERALITY: ICD-10-CM

## 2021-05-24 PROCEDURE — 1159F MED LIST DOCD IN RCRD: CPT | Mod: S$GLB,,, | Performed by: OTOLARYNGOLOGY

## 2021-05-24 PROCEDURE — 99999 PR PBB SHADOW E&M-EST. PATIENT-LVL IV: ICD-10-PCS | Mod: PBBFAC,,, | Performed by: OTOLARYNGOLOGY

## 2021-05-24 PROCEDURE — 92557 COMPREHENSIVE HEARING TEST: CPT | Mod: S$GLB,,, | Performed by: AUDIOLOGIST

## 2021-05-24 PROCEDURE — 99203 OFFICE O/P NEW LOW 30 MIN: CPT | Mod: S$GLB,,, | Performed by: OTOLARYNGOLOGY

## 2021-05-24 PROCEDURE — 92567 PR TYMPA2METRY: ICD-10-PCS | Mod: S$GLB,,, | Performed by: AUDIOLOGIST

## 2021-05-24 PROCEDURE — 1157F PR ADVANCE CARE PLAN OR EQUIV PRESENT IN MEDICAL RECORD: ICD-10-PCS | Mod: S$GLB,,, | Performed by: OTOLARYNGOLOGY

## 2021-05-24 PROCEDURE — 1126F AMNT PAIN NOTED NONE PRSNT: CPT | Mod: S$GLB,,, | Performed by: OTOLARYNGOLOGY

## 2021-05-24 PROCEDURE — 3288F FALL RISK ASSESSMENT DOCD: CPT | Mod: CPTII,S$GLB,, | Performed by: OTOLARYNGOLOGY

## 2021-05-24 PROCEDURE — 92557 PR COMPREHENSIVE HEARING TEST: ICD-10-PCS | Mod: S$GLB,,, | Performed by: AUDIOLOGIST

## 2021-05-24 PROCEDURE — 92567 TYMPANOMETRY: CPT | Mod: S$GLB,,, | Performed by: AUDIOLOGIST

## 2021-05-24 PROCEDURE — 1157F ADVNC CARE PLAN IN RCRD: CPT | Mod: S$GLB,,, | Performed by: OTOLARYNGOLOGY

## 2021-05-24 PROCEDURE — 1101F PT FALLS ASSESS-DOCD LE1/YR: CPT | Mod: CPTII,S$GLB,, | Performed by: OTOLARYNGOLOGY

## 2021-05-24 PROCEDURE — 1159F PR MEDICATION LIST DOCUMENTED IN MEDICAL RECORD: ICD-10-PCS | Mod: S$GLB,,, | Performed by: OTOLARYNGOLOGY

## 2021-05-24 PROCEDURE — 99999 PR PBB SHADOW E&M-EST. PATIENT-LVL IV: CPT | Mod: PBBFAC,,, | Performed by: OTOLARYNGOLOGY

## 2021-05-24 PROCEDURE — 1101F PR PT FALLS ASSESS DOC 0-1 FALLS W/OUT INJ PAST YR: ICD-10-PCS | Mod: CPTII,S$GLB,, | Performed by: OTOLARYNGOLOGY

## 2021-05-24 PROCEDURE — 1126F PR PAIN SEVERITY QUANTIFIED, NO PAIN PRESENT: ICD-10-PCS | Mod: S$GLB,,, | Performed by: OTOLARYNGOLOGY

## 2021-05-24 PROCEDURE — 99203 PR OFFICE/OUTPT VISIT, NEW, LEVL III, 30-44 MIN: ICD-10-PCS | Mod: S$GLB,,, | Performed by: OTOLARYNGOLOGY

## 2021-05-24 PROCEDURE — 3288F PR FALLS RISK ASSESSMENT DOCUMENTED: ICD-10-PCS | Mod: CPTII,S$GLB,, | Performed by: OTOLARYNGOLOGY

## 2021-05-25 ENCOUNTER — HOSPITAL ENCOUNTER (OUTPATIENT)
Dept: RADIOLOGY | Facility: HOSPITAL | Age: 76
Discharge: HOME | End: 2021-05-25
Attending: ORTHOPAEDIC SURGERY
Payer: MEDICARE

## 2021-05-25 ENCOUNTER — OFFICE VISIT (OUTPATIENT)
Dept: ORTHOPEDICS | Facility: CLINIC | Age: 76
End: 2021-05-25
Payer: MEDICARE

## 2021-05-25 VITALS — HEIGHT: 62 IN | BODY MASS INDEX: 47.66 KG/M2 | WEIGHT: 259 LBS

## 2021-05-25 DIAGNOSIS — M79.646 THUMB PAIN, UNSPECIFIED LATERALITY: ICD-10-CM

## 2021-05-25 DIAGNOSIS — Z98.890 HISTORY OF CARPAL TUNNEL RELEASE: ICD-10-CM

## 2021-05-25 DIAGNOSIS — M65.4 DE QUERVAIN'S TENOSYNOVITIS, LEFT: Primary | ICD-10-CM

## 2021-05-25 DIAGNOSIS — M79.642 LEFT HAND PAIN: ICD-10-CM

## 2021-05-25 PROCEDURE — 1159F MED LIST DOCD IN RCRD: CPT | Mod: S$GLB,,, | Performed by: ORTHOPAEDIC SURGERY

## 2021-05-25 PROCEDURE — 1125F PR PAIN SEVERITY QUANTIFIED, PAIN PRESENT: ICD-10-PCS | Mod: S$GLB,,, | Performed by: ORTHOPAEDIC SURGERY

## 2021-05-25 PROCEDURE — 20550 NJX 1 TENDON SHEATH/LIGAMENT: CPT | Mod: LT,S$GLB,, | Performed by: ORTHOPAEDIC SURGERY

## 2021-05-25 PROCEDURE — 3288F PR FALLS RISK ASSESSMENT DOCUMENTED: ICD-10-PCS | Mod: CPTII,S$GLB,, | Performed by: ORTHOPAEDIC SURGERY

## 2021-05-25 PROCEDURE — 99203 OFFICE O/P NEW LOW 30 MIN: CPT | Mod: 25,S$GLB,, | Performed by: ORTHOPAEDIC SURGERY

## 2021-05-25 PROCEDURE — 73130 X-RAY EXAM OF HAND: CPT | Mod: 26,LT,, | Performed by: RADIOLOGY

## 2021-05-25 PROCEDURE — 1159F PR MEDICATION LIST DOCUMENTED IN MEDICAL RECORD: ICD-10-PCS | Mod: S$GLB,,, | Performed by: ORTHOPAEDIC SURGERY

## 2021-05-25 PROCEDURE — 99203 PR OFFICE/OUTPT VISIT, NEW, LEVL III, 30-44 MIN: ICD-10-PCS | Mod: 25,S$GLB,, | Performed by: ORTHOPAEDIC SURGERY

## 2021-05-25 PROCEDURE — 1125F AMNT PAIN NOTED PAIN PRSNT: CPT | Mod: S$GLB,,, | Performed by: ORTHOPAEDIC SURGERY

## 2021-05-25 PROCEDURE — 1101F PT FALLS ASSESS-DOCD LE1/YR: CPT | Mod: CPTII,S$GLB,, | Performed by: ORTHOPAEDIC SURGERY

## 2021-05-25 PROCEDURE — 1157F ADVNC CARE PLAN IN RCRD: CPT | Mod: S$GLB,,, | Performed by: ORTHOPAEDIC SURGERY

## 2021-05-25 PROCEDURE — 1101F PR PT FALLS ASSESS DOC 0-1 FALLS W/OUT INJ PAST YR: ICD-10-PCS | Mod: CPTII,S$GLB,, | Performed by: ORTHOPAEDIC SURGERY

## 2021-05-25 PROCEDURE — 3288F FALL RISK ASSESSMENT DOCD: CPT | Mod: CPTII,S$GLB,, | Performed by: ORTHOPAEDIC SURGERY

## 2021-05-25 PROCEDURE — 99999 PR PBB SHADOW E&M-EST. PATIENT-LVL III: CPT | Mod: PBBFAC,,, | Performed by: ORTHOPAEDIC SURGERY

## 2021-05-25 PROCEDURE — 20550 TENDON SHEATH: ICD-10-PCS | Mod: LT,S$GLB,, | Performed by: ORTHOPAEDIC SURGERY

## 2021-05-25 PROCEDURE — 99999 PR PBB SHADOW E&M-EST. PATIENT-LVL III: ICD-10-PCS | Mod: PBBFAC,,, | Performed by: ORTHOPAEDIC SURGERY

## 2021-05-25 PROCEDURE — 1157F PR ADVANCE CARE PLAN OR EQUIV PRESENT IN MEDICAL RECORD: ICD-10-PCS | Mod: S$GLB,,, | Performed by: ORTHOPAEDIC SURGERY

## 2021-05-25 PROCEDURE — 73130 XR HAND COMPLETE 3 VIEW LEFT: ICD-10-PCS | Mod: 26,LT,, | Performed by: RADIOLOGY

## 2021-05-25 PROCEDURE — 73130 X-RAY EXAM OF HAND: CPT | Mod: TC,LT

## 2021-05-25 RX ORDER — TRIAMCINOLONE ACETONIDE 40 MG/ML
40 INJECTION, SUSPENSION INTRA-ARTICULAR; INTRAMUSCULAR
Status: DISCONTINUED | OUTPATIENT
Start: 2021-05-25 | End: 2021-05-25 | Stop reason: HOSPADM

## 2021-05-25 RX ADMIN — TRIAMCINOLONE ACETONIDE 40 MG: 40 INJECTION, SUSPENSION INTRA-ARTICULAR; INTRAMUSCULAR at 10:05

## 2021-06-30 ENCOUNTER — OFFICE VISIT (OUTPATIENT)
Dept: DERMATOLOGY | Facility: CLINIC | Age: 76
End: 2021-06-30
Payer: MEDICARE

## 2021-06-30 DIAGNOSIS — R21 RASH AND NONSPECIFIC SKIN ERUPTION: Primary | ICD-10-CM

## 2021-06-30 DIAGNOSIS — L82.0 INFLAMED SEBORRHEIC KERATOSIS: ICD-10-CM

## 2021-06-30 PROCEDURE — 1126F AMNT PAIN NOTED NONE PRSNT: CPT | Mod: S$GLB,,, | Performed by: DERMATOLOGY

## 2021-06-30 PROCEDURE — 1159F MED LIST DOCD IN RCRD: CPT | Mod: S$GLB,,, | Performed by: DERMATOLOGY

## 2021-06-30 PROCEDURE — 1157F ADVNC CARE PLAN IN RCRD: CPT | Mod: S$GLB,,, | Performed by: DERMATOLOGY

## 2021-06-30 PROCEDURE — 99999 PR PBB SHADOW E&M-EST. PATIENT-LVL III: CPT | Mod: PBBFAC,,, | Performed by: DERMATOLOGY

## 2021-06-30 PROCEDURE — 1101F PR PT FALLS ASSESS DOC 0-1 FALLS W/OUT INJ PAST YR: ICD-10-PCS | Mod: CPTII,S$GLB,, | Performed by: DERMATOLOGY

## 2021-06-30 PROCEDURE — 17110 DESTRUCTION B9 LES UP TO 14: CPT | Mod: S$GLB,,, | Performed by: DERMATOLOGY

## 2021-06-30 PROCEDURE — 17110 PR DESTRUCTION BENIGN LESIONS UP TO 14: ICD-10-PCS | Mod: S$GLB,,, | Performed by: DERMATOLOGY

## 2021-06-30 PROCEDURE — 1159F PR MEDICATION LIST DOCUMENTED IN MEDICAL RECORD: ICD-10-PCS | Mod: S$GLB,,, | Performed by: DERMATOLOGY

## 2021-06-30 PROCEDURE — 1157F PR ADVANCE CARE PLAN OR EQUIV PRESENT IN MEDICAL RECORD: ICD-10-PCS | Mod: S$GLB,,, | Performed by: DERMATOLOGY

## 2021-06-30 PROCEDURE — 99999 PR PBB SHADOW E&M-EST. PATIENT-LVL III: ICD-10-PCS | Mod: PBBFAC,,, | Performed by: DERMATOLOGY

## 2021-06-30 PROCEDURE — 99212 OFFICE O/P EST SF 10 MIN: CPT | Mod: 25,S$GLB,, | Performed by: DERMATOLOGY

## 2021-06-30 PROCEDURE — 1101F PT FALLS ASSESS-DOCD LE1/YR: CPT | Mod: CPTII,S$GLB,, | Performed by: DERMATOLOGY

## 2021-06-30 PROCEDURE — 1126F PR PAIN SEVERITY QUANTIFIED, NO PAIN PRESENT: ICD-10-PCS | Mod: S$GLB,,, | Performed by: DERMATOLOGY

## 2021-06-30 PROCEDURE — 3288F PR FALLS RISK ASSESSMENT DOCUMENTED: ICD-10-PCS | Mod: CPTII,S$GLB,, | Performed by: DERMATOLOGY

## 2021-06-30 PROCEDURE — 99212 PR OFFICE/OUTPT VISIT, EST, LEVL II, 10-19 MIN: ICD-10-PCS | Mod: 25,S$GLB,, | Performed by: DERMATOLOGY

## 2021-06-30 PROCEDURE — 3288F FALL RISK ASSESSMENT DOCD: CPT | Mod: CPTII,S$GLB,, | Performed by: DERMATOLOGY

## 2021-07-13 ENCOUNTER — HOSPITAL ENCOUNTER (OUTPATIENT)
Dept: RADIOLOGY | Facility: HOSPITAL | Age: 76
Discharge: HOME | End: 2021-07-13
Attending: INTERNAL MEDICINE
Payer: MEDICARE

## 2021-07-13 ENCOUNTER — PATIENT OUTREACH (OUTPATIENT)
Dept: ADMINISTRATIVE | Facility: OTHER | Age: 76
End: 2021-07-13

## 2021-07-13 DIAGNOSIS — R91.1 SOLITARY PULMONARY NODULE: ICD-10-CM

## 2021-07-13 PROCEDURE — 71250 CT THORAX DX C-: CPT | Mod: 26,,, | Performed by: RADIOLOGY

## 2021-07-13 PROCEDURE — 71250 CT CHEST WITHOUT CONTRAST: ICD-10-PCS | Mod: 26,,, | Performed by: RADIOLOGY

## 2021-07-13 PROCEDURE — 71250 CT THORAX DX C-: CPT | Mod: TC

## 2021-07-14 ENCOUNTER — OFFICE VISIT (OUTPATIENT)
Dept: PODIATRY | Facility: CLINIC | Age: 76
End: 2021-07-14
Payer: MEDICARE

## 2021-07-14 VITALS
BODY MASS INDEX: 47.66 KG/M2 | SYSTOLIC BLOOD PRESSURE: 118 MMHG | HEIGHT: 62 IN | HEART RATE: 77 BPM | DIASTOLIC BLOOD PRESSURE: 78 MMHG | WEIGHT: 259 LBS

## 2021-07-14 DIAGNOSIS — E66.9 DIABETES MELLITUS TYPE 2 IN OBESE: ICD-10-CM

## 2021-07-14 DIAGNOSIS — E11.69 DIABETES MELLITUS TYPE 2 IN OBESE: ICD-10-CM

## 2021-07-14 DIAGNOSIS — E11.9 COMPREHENSIVE DIABETIC FOOT EXAMINATION, TYPE 2 DM, ENCOUNTER FOR: ICD-10-CM

## 2021-07-14 DIAGNOSIS — L60.0 INGROWN NAIL: Primary | ICD-10-CM

## 2021-07-14 PROCEDURE — 99999 PR PBB SHADOW E&M-EST. PATIENT-LVL III: ICD-10-PCS | Mod: PBBFAC,,, | Performed by: PODIATRIST

## 2021-07-14 PROCEDURE — 99213 OFFICE O/P EST LOW 20 MIN: CPT | Mod: S$GLB,,, | Performed by: PODIATRIST

## 2021-07-14 PROCEDURE — 99999 PR PBB SHADOW E&M-EST. PATIENT-LVL III: CPT | Mod: PBBFAC,,, | Performed by: PODIATRIST

## 2021-07-14 PROCEDURE — 1157F ADVNC CARE PLAN IN RCRD: CPT | Mod: S$GLB,,, | Performed by: PODIATRIST

## 2021-07-14 PROCEDURE — 1125F PR PAIN SEVERITY QUANTIFIED, PAIN PRESENT: ICD-10-PCS | Mod: S$GLB,,, | Performed by: PODIATRIST

## 2021-07-14 PROCEDURE — 3051F HG A1C>EQUAL 7.0%<8.0%: CPT | Mod: CPTII,S$GLB,, | Performed by: PODIATRIST

## 2021-07-14 PROCEDURE — 3288F FALL RISK ASSESSMENT DOCD: CPT | Mod: CPTII,S$GLB,, | Performed by: PODIATRIST

## 2021-07-14 PROCEDURE — 1101F PR PT FALLS ASSESS DOC 0-1 FALLS W/OUT INJ PAST YR: ICD-10-PCS | Mod: CPTII,S$GLB,, | Performed by: PODIATRIST

## 2021-07-14 PROCEDURE — 1101F PT FALLS ASSESS-DOCD LE1/YR: CPT | Mod: CPTII,S$GLB,, | Performed by: PODIATRIST

## 2021-07-14 PROCEDURE — 3051F PR MOST RECENT HEMOGLOBIN A1C LEVEL 7.0 - < 8.0%: ICD-10-PCS | Mod: CPTII,S$GLB,, | Performed by: PODIATRIST

## 2021-07-14 PROCEDURE — 1125F AMNT PAIN NOTED PAIN PRSNT: CPT | Mod: S$GLB,,, | Performed by: PODIATRIST

## 2021-07-14 PROCEDURE — 1159F PR MEDICATION LIST DOCUMENTED IN MEDICAL RECORD: ICD-10-PCS | Mod: S$GLB,,, | Performed by: PODIATRIST

## 2021-07-14 PROCEDURE — 1159F MED LIST DOCD IN RCRD: CPT | Mod: S$GLB,,, | Performed by: PODIATRIST

## 2021-07-14 PROCEDURE — 99213 PR OFFICE/OUTPT VISIT, EST, LEVL III, 20-29 MIN: ICD-10-PCS | Mod: S$GLB,,, | Performed by: PODIATRIST

## 2021-07-14 PROCEDURE — 3288F PR FALLS RISK ASSESSMENT DOCUMENTED: ICD-10-PCS | Mod: CPTII,S$GLB,, | Performed by: PODIATRIST

## 2021-07-14 PROCEDURE — 1157F PR ADVANCE CARE PLAN OR EQUIV PRESENT IN MEDICAL RECORD: ICD-10-PCS | Mod: S$GLB,,, | Performed by: PODIATRIST

## 2021-07-16 ENCOUNTER — OFFICE VISIT (OUTPATIENT)
Dept: INTERNAL MEDICINE | Facility: CLINIC | Age: 76
End: 2021-07-16
Payer: MEDICARE

## 2021-07-16 ENCOUNTER — LAB VISIT (OUTPATIENT)
Dept: LAB | Facility: HOSPITAL | Age: 76
End: 2021-07-16
Attending: INTERNAL MEDICINE
Payer: MEDICARE

## 2021-07-16 VITALS
OXYGEN SATURATION: 98 % | TEMPERATURE: 98 F | WEIGHT: 256.63 LBS | HEART RATE: 78 BPM | BODY MASS INDEX: 47.23 KG/M2 | DIASTOLIC BLOOD PRESSURE: 70 MMHG | SYSTOLIC BLOOD PRESSURE: 128 MMHG | HEIGHT: 62 IN

## 2021-07-16 DIAGNOSIS — E66.9 DIABETES MELLITUS TYPE 2 IN OBESE: ICD-10-CM

## 2021-07-16 DIAGNOSIS — Z17.1 MALIGNANT NEOPLASM OF UPPER-OUTER QUADRANT OF RIGHT BREAST IN FEMALE, ESTROGEN RECEPTOR NEGATIVE: ICD-10-CM

## 2021-07-16 DIAGNOSIS — Z80.0 FAMILY HISTORY OF COLON CANCER: ICD-10-CM

## 2021-07-16 DIAGNOSIS — E11.69 DIABETES MELLITUS TYPE 2 IN OBESE: Primary | ICD-10-CM

## 2021-07-16 DIAGNOSIS — F41.9 ANXIETY: ICD-10-CM

## 2021-07-16 DIAGNOSIS — E11.69 DIABETES MELLITUS TYPE 2 IN OBESE: ICD-10-CM

## 2021-07-16 DIAGNOSIS — C50.411 MALIGNANT NEOPLASM OF UPPER-OUTER QUADRANT OF RIGHT BREAST IN FEMALE, ESTROGEN RECEPTOR NEGATIVE: ICD-10-CM

## 2021-07-16 DIAGNOSIS — E66.9 DIABETES MELLITUS TYPE 2 IN OBESE: Primary | ICD-10-CM

## 2021-07-16 LAB
ALBUMIN SERPL BCP-MCNC: 3.9 G/DL (ref 3.5–5.2)
ALP SERPL-CCNC: 107 U/L (ref 55–135)
ALT SERPL W/O P-5'-P-CCNC: 13 U/L (ref 10–44)
ANION GAP SERPL CALC-SCNC: 9 MMOL/L (ref 8–16)
AST SERPL-CCNC: 14 U/L (ref 10–40)
BASOPHILS # BLD AUTO: 0.02 K/UL (ref 0–0.2)
BASOPHILS NFR BLD: 0.3 % (ref 0–1.9)
BILIRUB SERPL-MCNC: 0.5 MG/DL (ref 0.1–1)
BUN SERPL-MCNC: 21 MG/DL (ref 8–23)
CALCIUM SERPL-MCNC: 9.8 MG/DL (ref 8.7–10.5)
CHLORIDE SERPL-SCNC: 102 MMOL/L (ref 95–110)
CO2 SERPL-SCNC: 29 MMOL/L (ref 23–29)
CREAT SERPL-MCNC: 1.1 MG/DL (ref 0.5–1.4)
DIFFERENTIAL METHOD: ABNORMAL
EOSINOPHIL # BLD AUTO: 0.1 K/UL (ref 0–0.5)
EOSINOPHIL NFR BLD: 1 % (ref 0–8)
ERYTHROCYTE [DISTWIDTH] IN BLOOD BY AUTOMATED COUNT: 13.7 % (ref 11.5–14.5)
EST. GFR  (AFRICAN AMERICAN): 56.4 ML/MIN/1.73 M^2
EST. GFR  (NON AFRICAN AMERICAN): 48.9 ML/MIN/1.73 M^2
ESTIMATED AVG GLUCOSE: 189 MG/DL (ref 68–131)
GLUCOSE SERPL-MCNC: 166 MG/DL (ref 70–110)
HBA1C MFR BLD: 8.2 % (ref 4–5.6)
HCT VFR BLD AUTO: 40.6 % (ref 37–48.5)
HGB BLD-MCNC: 12.8 G/DL (ref 12–16)
IMM GRANULOCYTES # BLD AUTO: 0.02 K/UL (ref 0–0.04)
IMM GRANULOCYTES NFR BLD AUTO: 0.3 % (ref 0–0.5)
LYMPHOCYTES # BLD AUTO: 1.4 K/UL (ref 1–4.8)
LYMPHOCYTES NFR BLD: 23.8 % (ref 18–48)
MCH RBC QN AUTO: 30 PG (ref 27–31)
MCHC RBC AUTO-ENTMCNC: 31.5 G/DL (ref 32–36)
MCV RBC AUTO: 95 FL (ref 82–98)
MONOCYTES # BLD AUTO: 0.5 K/UL (ref 0.3–1)
MONOCYTES NFR BLD: 9 % (ref 4–15)
NEUTROPHILS # BLD AUTO: 3.9 K/UL (ref 1.8–7.7)
NEUTROPHILS NFR BLD: 65.6 % (ref 38–73)
NRBC BLD-RTO: 0 /100 WBC
PLATELET # BLD AUTO: 129 K/UL (ref 150–450)
PMV BLD AUTO: 11.7 FL (ref 9.2–12.9)
POTASSIUM SERPL-SCNC: 4.3 MMOL/L (ref 3.5–5.1)
PROT SERPL-MCNC: 7.4 G/DL (ref 6–8.4)
RBC # BLD AUTO: 4.27 M/UL (ref 4–5.4)
SODIUM SERPL-SCNC: 140 MMOL/L (ref 136–145)
WBC # BLD AUTO: 5.92 K/UL (ref 3.9–12.7)

## 2021-07-16 PROCEDURE — 36415 COLL VENOUS BLD VENIPUNCTURE: CPT | Performed by: INTERNAL MEDICINE

## 2021-07-16 PROCEDURE — 3288F FALL RISK ASSESSMENT DOCD: CPT | Mod: CPTII,S$GLB,, | Performed by: INTERNAL MEDICINE

## 2021-07-16 PROCEDURE — 99999 PR PBB SHADOW E&M-EST. PATIENT-LVL IV: ICD-10-PCS | Mod: PBBFAC,,, | Performed by: INTERNAL MEDICINE

## 2021-07-16 PROCEDURE — 99999 PR PBB SHADOW E&M-EST. PATIENT-LVL IV: CPT | Mod: PBBFAC,,, | Performed by: INTERNAL MEDICINE

## 2021-07-16 PROCEDURE — 83036 HEMOGLOBIN GLYCOSYLATED A1C: CPT | Performed by: INTERNAL MEDICINE

## 2021-07-16 PROCEDURE — 3052F PR MOST RECENT HEMOGLOBIN A1C LEVEL 8.0 - < 9.0%: ICD-10-PCS | Mod: CPTII,S$GLB,, | Performed by: INTERNAL MEDICINE

## 2021-07-16 PROCEDURE — 1157F ADVNC CARE PLAN IN RCRD: CPT | Mod: S$GLB,,, | Performed by: INTERNAL MEDICINE

## 2021-07-16 PROCEDURE — 99214 OFFICE O/P EST MOD 30 MIN: CPT | Mod: S$GLB,,, | Performed by: INTERNAL MEDICINE

## 2021-07-16 PROCEDURE — 1159F MED LIST DOCD IN RCRD: CPT | Mod: S$GLB,,, | Performed by: INTERNAL MEDICINE

## 2021-07-16 PROCEDURE — 80053 COMPREHEN METABOLIC PANEL: CPT | Performed by: INTERNAL MEDICINE

## 2021-07-16 PROCEDURE — 1101F PR PT FALLS ASSESS DOC 0-1 FALLS W/OUT INJ PAST YR: ICD-10-PCS | Mod: CPTII,S$GLB,, | Performed by: INTERNAL MEDICINE

## 2021-07-16 PROCEDURE — 99214 PR OFFICE/OUTPT VISIT, EST, LEVL IV, 30-39 MIN: ICD-10-PCS | Mod: S$GLB,,, | Performed by: INTERNAL MEDICINE

## 2021-07-16 PROCEDURE — 85025 COMPLETE CBC W/AUTO DIFF WBC: CPT | Performed by: INTERNAL MEDICINE

## 2021-07-16 PROCEDURE — 3052F HG A1C>EQUAL 8.0%<EQUAL 9.0%: CPT | Mod: CPTII,S$GLB,, | Performed by: INTERNAL MEDICINE

## 2021-07-16 PROCEDURE — 3288F PR FALLS RISK ASSESSMENT DOCUMENTED: ICD-10-PCS | Mod: CPTII,S$GLB,, | Performed by: INTERNAL MEDICINE

## 2021-07-16 PROCEDURE — 99499 RISK ADDL DX/OHS AUDIT: ICD-10-PCS | Mod: S$GLB,,, | Performed by: INTERNAL MEDICINE

## 2021-07-16 PROCEDURE — 99499 UNLISTED E&M SERVICE: CPT | Mod: S$GLB,,, | Performed by: INTERNAL MEDICINE

## 2021-07-16 PROCEDURE — 1159F PR MEDICATION LIST DOCUMENTED IN MEDICAL RECORD: ICD-10-PCS | Mod: S$GLB,,, | Performed by: INTERNAL MEDICINE

## 2021-07-16 PROCEDURE — 1157F PR ADVANCE CARE PLAN OR EQUIV PRESENT IN MEDICAL RECORD: ICD-10-PCS | Mod: S$GLB,,, | Performed by: INTERNAL MEDICINE

## 2021-07-16 PROCEDURE — 1101F PT FALLS ASSESS-DOCD LE1/YR: CPT | Mod: CPTII,S$GLB,, | Performed by: INTERNAL MEDICINE

## 2021-07-16 RX ORDER — ALPRAZOLAM 0.25 MG/1
TABLET ORAL
Qty: 30 TABLET | Refills: 0 | Status: SHIPPED | OUTPATIENT
Start: 2021-07-16 | End: 2022-02-09

## 2021-07-16 RX ORDER — INSULIN PUMP SYRINGE, 3 ML
EACH MISCELLANEOUS
Qty: 1 EACH | Refills: 0 | Status: SHIPPED | OUTPATIENT
Start: 2021-07-16 | End: 2024-01-08

## 2021-07-16 RX ORDER — SERTRALINE HYDROCHLORIDE 25 MG/1
25 TABLET, FILM COATED ORAL DAILY
Qty: 90 TABLET | Refills: 3 | Status: SHIPPED | OUTPATIENT
Start: 2021-07-16 | End: 2021-10-12

## 2021-07-18 ENCOUNTER — PATIENT MESSAGE (OUTPATIENT)
Dept: INTERNAL MEDICINE | Facility: CLINIC | Age: 76
End: 2021-07-18

## 2021-07-18 ENCOUNTER — TELEPHONE (OUTPATIENT)
Dept: INTERNAL MEDICINE | Facility: CLINIC | Age: 76
End: 2021-07-18

## 2021-07-18 DIAGNOSIS — D69.6 THROMBOCYTOPENIA: Primary | ICD-10-CM

## 2021-07-19 ENCOUNTER — TELEPHONE (OUTPATIENT)
Dept: INTERNAL MEDICINE | Facility: CLINIC | Age: 76
End: 2021-07-19

## 2021-07-19 RX ORDER — GLIMEPIRIDE 2 MG/1
2 TABLET ORAL
Qty: 90 TABLET | Refills: 3 | Status: SHIPPED | OUTPATIENT
Start: 2021-07-19 | End: 2022-06-17

## 2021-08-02 ENCOUNTER — PATIENT MESSAGE (OUTPATIENT)
Dept: INTERNAL MEDICINE | Facility: CLINIC | Age: 76
End: 2021-08-02

## 2021-08-02 ENCOUNTER — LAB VISIT (OUTPATIENT)
Dept: LAB | Facility: HOSPITAL | Age: 76
End: 2021-08-02
Attending: INTERNAL MEDICINE
Payer: MEDICARE

## 2021-08-02 ENCOUNTER — TELEPHONE (OUTPATIENT)
Dept: INTERNAL MEDICINE | Facility: CLINIC | Age: 76
End: 2021-08-02
Payer: MEDICARE

## 2021-08-02 DIAGNOSIS — D69.6 THROMBOCYTOPENIA: ICD-10-CM

## 2021-08-02 DIAGNOSIS — D64.9 ANEMIA, UNSPECIFIED TYPE: Primary | ICD-10-CM

## 2021-08-02 LAB
BASOPHILS # BLD AUTO: 0.02 K/UL (ref 0–0.2)
BASOPHILS NFR BLD: 0.4 % (ref 0–1.9)
DIFFERENTIAL METHOD: ABNORMAL
EOSINOPHIL # BLD AUTO: 0 K/UL (ref 0–0.5)
EOSINOPHIL NFR BLD: 0.7 % (ref 0–8)
ERYTHROCYTE [DISTWIDTH] IN BLOOD BY AUTOMATED COUNT: 13.9 % (ref 11.5–14.5)
HCT VFR BLD AUTO: 40.7 % (ref 37–48.5)
HGB BLD-MCNC: 12.7 G/DL (ref 12–16)
IMM GRANULOCYTES # BLD AUTO: 0.04 K/UL (ref 0–0.04)
IMM GRANULOCYTES NFR BLD AUTO: 0.7 % (ref 0–0.5)
LYMPHOCYTES # BLD AUTO: 1.5 K/UL (ref 1–4.8)
LYMPHOCYTES NFR BLD: 27.8 % (ref 18–48)
MCH RBC QN AUTO: 29.5 PG (ref 27–31)
MCHC RBC AUTO-ENTMCNC: 31.2 G/DL (ref 32–36)
MCV RBC AUTO: 94 FL (ref 82–98)
MONOCYTES # BLD AUTO: 0.5 K/UL (ref 0.3–1)
MONOCYTES NFR BLD: 9 % (ref 4–15)
NEUTROPHILS # BLD AUTO: 3.4 K/UL (ref 1.8–7.7)
NEUTROPHILS NFR BLD: 61.4 % (ref 38–73)
NRBC BLD-RTO: 0 /100 WBC
PLATELET # BLD AUTO: 134 K/UL (ref 150–450)
PMV BLD AUTO: 12.2 FL (ref 9.2–12.9)
RBC # BLD AUTO: 4.31 M/UL (ref 4–5.4)
WBC # BLD AUTO: 5.53 K/UL (ref 3.9–12.7)

## 2021-08-02 PROCEDURE — 36415 COLL VENOUS BLD VENIPUNCTURE: CPT | Performed by: INTERNAL MEDICINE

## 2021-08-02 PROCEDURE — 85025 COMPLETE CBC W/AUTO DIFF WBC: CPT | Performed by: INTERNAL MEDICINE

## 2021-10-06 ENCOUNTER — PATIENT MESSAGE (OUTPATIENT)
Dept: HEMATOLOGY/ONCOLOGY | Facility: CLINIC | Age: 76
End: 2021-10-06

## 2021-10-12 ENCOUNTER — LAB VISIT (OUTPATIENT)
Dept: LAB | Facility: HOSPITAL | Age: 76
End: 2021-10-12
Attending: INTERNAL MEDICINE
Payer: MEDICARE

## 2021-10-12 ENCOUNTER — OFFICE VISIT (OUTPATIENT)
Dept: HEMATOLOGY/ONCOLOGY | Facility: CLINIC | Age: 76
End: 2021-10-12
Payer: MEDICARE

## 2021-10-12 VITALS
TEMPERATURE: 99 F | RESPIRATION RATE: 16 BRPM | OXYGEN SATURATION: 98 % | WEIGHT: 250.44 LBS | HEIGHT: 62 IN | BODY MASS INDEX: 46.09 KG/M2 | SYSTOLIC BLOOD PRESSURE: 140 MMHG | DIASTOLIC BLOOD PRESSURE: 64 MMHG | HEART RATE: 86 BPM

## 2021-10-12 DIAGNOSIS — C50.411 MALIGNANT NEOPLASM OF UPPER-OUTER QUADRANT OF RIGHT BREAST IN FEMALE, ESTROGEN RECEPTOR NEGATIVE: Primary | ICD-10-CM

## 2021-10-12 DIAGNOSIS — Z17.1 MALIGNANT NEOPLASM OF UPPER-OUTER QUADRANT OF RIGHT BREAST IN FEMALE, ESTROGEN RECEPTOR NEGATIVE: Primary | ICD-10-CM

## 2021-10-12 DIAGNOSIS — D64.9 ANEMIA, UNSPECIFIED TYPE: ICD-10-CM

## 2021-10-12 LAB
BASOPHILS # BLD AUTO: 0.02 K/UL (ref 0–0.2)
BASOPHILS NFR BLD: 0.3 % (ref 0–1.9)
DIFFERENTIAL METHOD: ABNORMAL
EOSINOPHIL # BLD AUTO: 0.1 K/UL (ref 0–0.5)
EOSINOPHIL NFR BLD: 1 % (ref 0–8)
ERYTHROCYTE [DISTWIDTH] IN BLOOD BY AUTOMATED COUNT: 13.7 % (ref 11.5–14.5)
HCT VFR BLD AUTO: 40.8 % (ref 37–48.5)
HGB BLD-MCNC: 13 G/DL (ref 12–16)
IMM GRANULOCYTES # BLD AUTO: 0.03 K/UL (ref 0–0.04)
IMM GRANULOCYTES NFR BLD AUTO: 0.5 % (ref 0–0.5)
LYMPHOCYTES # BLD AUTO: 1.5 K/UL (ref 1–4.8)
LYMPHOCYTES NFR BLD: 25.5 % (ref 18–48)
MCH RBC QN AUTO: 29.8 PG (ref 27–31)
MCHC RBC AUTO-ENTMCNC: 31.9 G/DL (ref 32–36)
MCV RBC AUTO: 94 FL (ref 82–98)
MONOCYTES # BLD AUTO: 0.5 K/UL (ref 0.3–1)
MONOCYTES NFR BLD: 9.2 % (ref 4–15)
NEUTROPHILS # BLD AUTO: 3.6 K/UL (ref 1.8–7.7)
NEUTROPHILS NFR BLD: 63.5 % (ref 38–73)
NRBC BLD-RTO: 0 /100 WBC
PLATELET # BLD AUTO: 136 K/UL (ref 150–450)
PMV BLD AUTO: 10.8 FL (ref 9.2–12.9)
RBC # BLD AUTO: 4.36 M/UL (ref 4–5.4)
WBC # BLD AUTO: 5.73 K/UL (ref 3.9–12.7)

## 2021-10-12 PROCEDURE — 1157F ADVNC CARE PLAN IN RCRD: CPT | Mod: HCNC,CPTII,S$GLB, | Performed by: INTERNAL MEDICINE

## 2021-10-12 PROCEDURE — 1157F PR ADVANCE CARE PLAN OR EQUIV PRESENT IN MEDICAL RECORD: ICD-10-PCS | Mod: HCNC,CPTII,S$GLB, | Performed by: INTERNAL MEDICINE

## 2021-10-12 PROCEDURE — 1125F AMNT PAIN NOTED PAIN PRSNT: CPT | Mod: HCNC,CPTII,S$GLB, | Performed by: INTERNAL MEDICINE

## 2021-10-12 PROCEDURE — 85025 COMPLETE CBC W/AUTO DIFF WBC: CPT | Mod: HCNC | Performed by: INTERNAL MEDICINE

## 2021-10-12 PROCEDURE — 1125F PR PAIN SEVERITY QUANTIFIED, PAIN PRESENT: ICD-10-PCS | Mod: HCNC,CPTII,S$GLB, | Performed by: INTERNAL MEDICINE

## 2021-10-12 PROCEDURE — 1101F PR PT FALLS ASSESS DOC 0-1 FALLS W/OUT INJ PAST YR: ICD-10-PCS | Mod: HCNC,CPTII,S$GLB, | Performed by: INTERNAL MEDICINE

## 2021-10-12 PROCEDURE — 1159F PR MEDICATION LIST DOCUMENTED IN MEDICAL RECORD: ICD-10-PCS | Mod: HCNC,CPTII,S$GLB, | Performed by: INTERNAL MEDICINE

## 2021-10-12 PROCEDURE — 3288F PR FALLS RISK ASSESSMENT DOCUMENTED: ICD-10-PCS | Mod: HCNC,CPTII,S$GLB, | Performed by: INTERNAL MEDICINE

## 2021-10-12 PROCEDURE — 1101F PT FALLS ASSESS-DOCD LE1/YR: CPT | Mod: HCNC,CPTII,S$GLB, | Performed by: INTERNAL MEDICINE

## 2021-10-12 PROCEDURE — 3077F PR MOST RECENT SYSTOLIC BLOOD PRESSURE >= 140 MM HG: ICD-10-PCS | Mod: HCNC,CPTII,S$GLB, | Performed by: INTERNAL MEDICINE

## 2021-10-12 PROCEDURE — 99999 PR PBB SHADOW E&M-EST. PATIENT-LVL V: CPT | Mod: PBBFAC,HCNC,, | Performed by: INTERNAL MEDICINE

## 2021-10-12 PROCEDURE — 99213 PR OFFICE/OUTPT VISIT, EST, LEVL III, 20-29 MIN: ICD-10-PCS | Mod: HCNC,S$GLB,, | Performed by: INTERNAL MEDICINE

## 2021-10-12 PROCEDURE — 3078F DIAST BP <80 MM HG: CPT | Mod: HCNC,CPTII,S$GLB, | Performed by: INTERNAL MEDICINE

## 2021-10-12 PROCEDURE — 1159F MED LIST DOCD IN RCRD: CPT | Mod: HCNC,CPTII,S$GLB, | Performed by: INTERNAL MEDICINE

## 2021-10-12 PROCEDURE — 99999 PR PBB SHADOW E&M-EST. PATIENT-LVL V: ICD-10-PCS | Mod: PBBFAC,HCNC,, | Performed by: INTERNAL MEDICINE

## 2021-10-12 PROCEDURE — 36415 COLL VENOUS BLD VENIPUNCTURE: CPT | Mod: HCNC | Performed by: INTERNAL MEDICINE

## 2021-10-12 PROCEDURE — 3077F SYST BP >= 140 MM HG: CPT | Mod: HCNC,CPTII,S$GLB, | Performed by: INTERNAL MEDICINE

## 2021-10-12 PROCEDURE — 99213 OFFICE O/P EST LOW 20 MIN: CPT | Mod: HCNC,S$GLB,, | Performed by: INTERNAL MEDICINE

## 2021-10-12 PROCEDURE — 3288F FALL RISK ASSESSMENT DOCD: CPT | Mod: HCNC,CPTII,S$GLB, | Performed by: INTERNAL MEDICINE

## 2021-10-12 PROCEDURE — 3078F PR MOST RECENT DIASTOLIC BLOOD PRESSURE < 80 MM HG: ICD-10-PCS | Mod: HCNC,CPTII,S$GLB, | Performed by: INTERNAL MEDICINE

## 2021-10-13 ENCOUNTER — TELEPHONE (OUTPATIENT)
Dept: INFUSION THERAPY | Facility: HOSPITAL | Age: 76
End: 2021-10-13

## 2021-11-16 ENCOUNTER — OFFICE VISIT (OUTPATIENT)
Dept: PSYCHIATRY | Facility: CLINIC | Age: 76
End: 2021-11-16
Payer: MEDICARE

## 2021-11-16 DIAGNOSIS — C50.411 MALIGNANT NEOPLASM OF UPPER-OUTER QUADRANT OF RIGHT BREAST IN FEMALE, ESTROGEN RECEPTOR NEGATIVE: ICD-10-CM

## 2021-11-16 DIAGNOSIS — F43.23 ADJUSTMENT DISORDER WITH MIXED ANXIETY AND DEPRESSED MOOD: Primary | ICD-10-CM

## 2021-11-16 DIAGNOSIS — Z17.1 MALIGNANT NEOPLASM OF UPPER-OUTER QUADRANT OF RIGHT BREAST IN FEMALE, ESTROGEN RECEPTOR NEGATIVE: ICD-10-CM

## 2021-11-16 PROCEDURE — 90791 PSYCH DIAGNOSTIC EVALUATION: CPT | Mod: S$GLB,,, | Performed by: PSYCHOLOGIST

## 2021-11-16 PROCEDURE — 90791 PR PSYCHIATRIC DIAGNOSTIC EVALUATION: ICD-10-PCS | Mod: S$GLB,,, | Performed by: PSYCHOLOGIST

## 2021-11-16 PROCEDURE — 1157F PR ADVANCE CARE PLAN OR EQUIV PRESENT IN MEDICAL RECORD: ICD-10-PCS | Mod: CPTII,S$GLB,, | Performed by: PSYCHOLOGIST

## 2021-11-16 PROCEDURE — 99999 PR PBB SHADOW E&M-EST. PATIENT-LVL II: CPT | Mod: PBBFAC,,, | Performed by: PSYCHOLOGIST

## 2021-11-16 PROCEDURE — 1159F MED LIST DOCD IN RCRD: CPT | Mod: CPTII,S$GLB,, | Performed by: PSYCHOLOGIST

## 2021-11-16 PROCEDURE — 1157F ADVNC CARE PLAN IN RCRD: CPT | Mod: CPTII,S$GLB,, | Performed by: PSYCHOLOGIST

## 2021-11-16 PROCEDURE — 99999 PR PBB SHADOW E&M-EST. PATIENT-LVL II: ICD-10-PCS | Mod: PBBFAC,,, | Performed by: PSYCHOLOGIST

## 2021-11-16 PROCEDURE — 1159F PR MEDICATION LIST DOCUMENTED IN MEDICAL RECORD: ICD-10-PCS | Mod: CPTII,S$GLB,, | Performed by: PSYCHOLOGIST

## 2022-01-12 NOTE — TELEPHONE ENCOUNTER
No new care gaps identified.  Powered by [employer]. Reference number: [id number].   1/11/2022 6:58:16 PM CST

## 2022-01-18 RX ORDER — METFORMIN HYDROCHLORIDE 500 MG/1
TABLET ORAL
Qty: 180 TABLET | Refills: 0 | Status: SHIPPED | OUTPATIENT
Start: 2022-01-18 | End: 2022-03-03 | Stop reason: SDUPTHER

## 2022-01-18 NOTE — TELEPHONE ENCOUNTER
Refill Authorization Note   Alina Narayan  is requesting a refill authorization.  Brief Assessment and Rationale for Refill:  Approve     Medication Therapy Plan:       Medication Reconciliation Completed: No   Comments:   --->Care Gap information included below if applicable.   Orders Placed This Encounter    metFORMIN (GLUCOPHAGE) 500 MG tablet      Requested Prescriptions   Signed Prescriptions Disp Refills    metFORMIN (GLUCOPHAGE) 500 MG tablet 180 tablet 0     Sig: TAKE 1 TABLET TWICE DAILY WITH MEALS       Endocrinology:  Diabetes - Biguanides Passed - 1/11/2022  6:57 PM        Passed - Patient is at least 18 years old        Passed - Valid encounter within last 15 months     Recent Visits  Date Type Provider Dept   07/16/21 Office Visit Aarti Dunn MD [facility]   04/06/21 Office Visit Aarti Dunn MD [facility]   11/30/20 Office Visit Aarti Dunn MD [facility]   03/13/20 Office Visit Aarti Dunn MD [facility]   02/14/20 Office Visit Aarti Dunn MD [facility]   Showing recent visits within past 720 days and meeting all other requirements  Future Appointments  No visits were found meeting these conditions.  Showing future appointments within next 150 days and meeting all other requirements      Future Appointments              In 3 weeks MD [facility] 3rd Floor, [place]    In 1 month Aarti Dunn MD [facility]- Primary [facility] 4thfl, [place]    In 1 month MD [facility] - Hem Onc 3rd Fl, [place]                Passed - Cr is 1.39 or below and within 360 days     Lab Results   Component Value Date    CREATININE 1.1 07/16/2021    CREATININE 1.2 03/30/2021    CREATININE 1.2 11/30/2020    POCCRE 1.0 01/21/2020    POCCRE 0.8 06/26/2017              Passed - HBA1C within 180 days     Lab Results   Component Value Date    HGBA1C 8.2 (H) 07/16/2021    HGBA1C 7.7 (H) 03/30/2021     HGBA1C 7.7 (H) 03/30/2021              Passed - eGFR is 45 or above and within 360 days     Lab Results   Component Value Date    EGFRNONAA 48.9 (A) 07/16/2021    EGFRNONAA 44.3 (A) 03/30/2021    EGFRNONAA 44.3 (A) 11/30/2020                    Appointments  past 12m or future 3m with PCP    Date Provider   Last Visit   7/16/2021 Aarti Dunn MD   Next Visit   3/3/2022 Aarti Dunn MD   ED visits in past 90 days: 0     Note composed:5:44 PM 01/18/2022

## 2022-01-28 ENCOUNTER — PATIENT MESSAGE (OUTPATIENT)
Dept: HEMATOLOGY/ONCOLOGY | Facility: CLINIC | Age: 77
End: 2022-01-28
Payer: MEDICARE

## 2022-02-09 ENCOUNTER — HOSPITAL ENCOUNTER (OUTPATIENT)
Dept: CARDIOLOGY | Facility: CLINIC | Age: 77
Discharge: HOME | End: 2022-02-09
Payer: MEDICARE

## 2022-02-09 ENCOUNTER — OFFICE VISIT (OUTPATIENT)
Dept: CARDIOLOGY | Facility: CLINIC | Age: 77
End: 2022-02-09
Payer: MEDICARE

## 2022-02-09 VITALS
DIASTOLIC BLOOD PRESSURE: 78 MMHG | WEIGHT: 250 LBS | HEART RATE: 71 BPM | BODY MASS INDEX: 46.01 KG/M2 | HEIGHT: 62 IN | SYSTOLIC BLOOD PRESSURE: 170 MMHG

## 2022-02-09 DIAGNOSIS — Z95.2 S/P AVR (AORTIC VALVE REPLACEMENT): Primary | ICD-10-CM

## 2022-02-09 DIAGNOSIS — Z95.2 S/P AVR (AORTIC VALVE REPLACEMENT): ICD-10-CM

## 2022-02-09 DIAGNOSIS — E08.22 DIABETES MELLITUS DUE TO UNDERLYING CONDITION WITH STAGE 3 CHRONIC KIDNEY DISEASE, WITHOUT LONG-TERM CURRENT USE OF INSULIN, UNSPECIFIED WHETHER STAGE 3A OR 3B CKD: ICD-10-CM

## 2022-02-09 DIAGNOSIS — I27.20 PULMONARY HTN: ICD-10-CM

## 2022-02-09 DIAGNOSIS — M51.37 DEGENERATION OF LUMBAR OR LUMBOSACRAL INTERVERTEBRAL DISC: ICD-10-CM

## 2022-02-09 DIAGNOSIS — E78.2 HYPERLIPIDEMIA, MIXED: ICD-10-CM

## 2022-02-09 DIAGNOSIS — I10 ESSENTIAL HYPERTENSION: ICD-10-CM

## 2022-02-09 DIAGNOSIS — I70.0 ATHEROSCLEROSIS OF AORTA: ICD-10-CM

## 2022-02-09 DIAGNOSIS — C50.411 MALIGNANT NEOPLASM OF UPPER-OUTER QUADRANT OF RIGHT BREAST IN FEMALE, ESTROGEN RECEPTOR NEGATIVE: ICD-10-CM

## 2022-02-09 DIAGNOSIS — I26.99 BILATERAL PULMONARY EMBOLISM: ICD-10-CM

## 2022-02-09 DIAGNOSIS — N18.30 DIABETES MELLITUS DUE TO UNDERLYING CONDITION WITH STAGE 3 CHRONIC KIDNEY DISEASE, WITHOUT LONG-TERM CURRENT USE OF INSULIN, UNSPECIFIED WHETHER STAGE 3A OR 3B CKD: ICD-10-CM

## 2022-02-09 DIAGNOSIS — Z17.1 MALIGNANT NEOPLASM OF UPPER-OUTER QUADRANT OF RIGHT BREAST IN FEMALE, ESTROGEN RECEPTOR NEGATIVE: ICD-10-CM

## 2022-02-09 DIAGNOSIS — N18.30 STAGE 3 CHRONIC KIDNEY DISEASE, UNSPECIFIED WHETHER STAGE 3A OR 3B CKD: ICD-10-CM

## 2022-02-09 DIAGNOSIS — K76.0 FATTY LIVER: ICD-10-CM

## 2022-02-09 PROCEDURE — 1126F PR PAIN SEVERITY QUANTIFIED, NO PAIN PRESENT: ICD-10-PCS | Mod: HCNC,CPTII,S$GLB, | Performed by: INTERNAL MEDICINE

## 2022-02-09 PROCEDURE — 3288F FALL RISK ASSESSMENT DOCD: CPT | Mod: HCNC,CPTII,S$GLB, | Performed by: INTERNAL MEDICINE

## 2022-02-09 PROCEDURE — 1101F PT FALLS ASSESS-DOCD LE1/YR: CPT | Mod: HCNC,CPTII,S$GLB, | Performed by: INTERNAL MEDICINE

## 2022-02-09 PROCEDURE — 3078F PR MOST RECENT DIASTOLIC BLOOD PRESSURE < 80 MM HG: ICD-10-PCS | Mod: HCNC,CPTII,S$GLB, | Performed by: INTERNAL MEDICINE

## 2022-02-09 PROCEDURE — 99499 UNLISTED E&M SERVICE: CPT | Mod: HCNC,S$GLB,, | Performed by: INTERNAL MEDICINE

## 2022-02-09 PROCEDURE — 3077F SYST BP >= 140 MM HG: CPT | Mod: HCNC,CPTII,S$GLB, | Performed by: INTERNAL MEDICINE

## 2022-02-09 PROCEDURE — 93005 ELECTROCARDIOGRAM TRACING: CPT | Mod: HCNC,S$GLB,, | Performed by: INTERNAL MEDICINE

## 2022-02-09 PROCEDURE — 1159F PR MEDICATION LIST DOCUMENTED IN MEDICAL RECORD: ICD-10-PCS | Mod: HCNC,CPTII,S$GLB, | Performed by: INTERNAL MEDICINE

## 2022-02-09 PROCEDURE — 3077F PR MOST RECENT SYSTOLIC BLOOD PRESSURE >= 140 MM HG: ICD-10-PCS | Mod: HCNC,CPTII,S$GLB, | Performed by: INTERNAL MEDICINE

## 2022-02-09 PROCEDURE — 1157F PR ADVANCE CARE PLAN OR EQUIV PRESENT IN MEDICAL RECORD: ICD-10-PCS | Mod: HCNC,CPTII,S$GLB, | Performed by: INTERNAL MEDICINE

## 2022-02-09 PROCEDURE — 99999 PR PBB SHADOW E&M-EST. PATIENT-LVL III: CPT | Mod: PBBFAC,HCNC,, | Performed by: INTERNAL MEDICINE

## 2022-02-09 PROCEDURE — 1126F AMNT PAIN NOTED NONE PRSNT: CPT | Mod: HCNC,CPTII,S$GLB, | Performed by: INTERNAL MEDICINE

## 2022-02-09 PROCEDURE — 99215 OFFICE O/P EST HI 40 MIN: CPT | Mod: HCNC,S$GLB,, | Performed by: INTERNAL MEDICINE

## 2022-02-09 PROCEDURE — 1159F MED LIST DOCD IN RCRD: CPT | Mod: HCNC,CPTII,S$GLB, | Performed by: INTERNAL MEDICINE

## 2022-02-09 PROCEDURE — 99999 PR PBB SHADOW E&M-EST. PATIENT-LVL III: ICD-10-PCS | Mod: PBBFAC,HCNC,, | Performed by: INTERNAL MEDICINE

## 2022-02-09 PROCEDURE — 1101F PR PT FALLS ASSESS DOC 0-1 FALLS W/OUT INJ PAST YR: ICD-10-PCS | Mod: HCNC,CPTII,S$GLB, | Performed by: INTERNAL MEDICINE

## 2022-02-09 PROCEDURE — 93005 EKG 12-LEAD: ICD-10-PCS | Mod: HCNC,S$GLB,, | Performed by: INTERNAL MEDICINE

## 2022-02-09 PROCEDURE — 93010 EKG 12-LEAD: ICD-10-PCS | Mod: HCNC,S$GLB,, | Performed by: INTERNAL MEDICINE

## 2022-02-09 PROCEDURE — 3078F DIAST BP <80 MM HG: CPT | Mod: HCNC,CPTII,S$GLB, | Performed by: INTERNAL MEDICINE

## 2022-02-09 PROCEDURE — 1160F RVW MEDS BY RX/DR IN RCRD: CPT | Mod: HCNC,CPTII,S$GLB, | Performed by: INTERNAL MEDICINE

## 2022-02-09 PROCEDURE — 1160F PR REVIEW ALL MEDS BY PRESCRIBER/CLIN PHARMACIST DOCUMENTED: ICD-10-PCS | Mod: HCNC,CPTII,S$GLB, | Performed by: INTERNAL MEDICINE

## 2022-02-09 PROCEDURE — 99499 RISK ADDL DX/OHS AUDIT: ICD-10-PCS | Mod: HCNC,S$GLB,, | Performed by: INTERNAL MEDICINE

## 2022-02-09 PROCEDURE — 3288F PR FALLS RISK ASSESSMENT DOCUMENTED: ICD-10-PCS | Mod: HCNC,CPTII,S$GLB, | Performed by: INTERNAL MEDICINE

## 2022-02-09 PROCEDURE — 1157F ADVNC CARE PLAN IN RCRD: CPT | Mod: HCNC,CPTII,S$GLB, | Performed by: INTERNAL MEDICINE

## 2022-02-09 PROCEDURE — 93010 ELECTROCARDIOGRAM REPORT: CPT | Mod: HCNC,S$GLB,, | Performed by: INTERNAL MEDICINE

## 2022-02-09 PROCEDURE — 99215 PR OFFICE/OUTPT VISIT, EST, LEVL V, 40-54 MIN: ICD-10-PCS | Mod: HCNC,S$GLB,, | Performed by: INTERNAL MEDICINE

## 2022-02-09 NOTE — PATIENT INSTRUCTIONS
Discussed diet , achieving and maintaining ideal body weight, and exercise.   We reviewed meds in detail.  Reassured-Discussed goals, options, plan.  Omega-3 > 800 mg/d combined EPA/DHA.  Goal BP< 130/80.  Goal LDL < 70 or at least < 100  We have discussed the need for endocarditis prophylaxis.    Can repeat echo this year or next  An SGLT2I like Farxiga or Jardiance 10 mg would be much better that the Generic Amaryl for sugar but also to reduce CVD events especially Heart Failure  ( she had reaction to Jardiance before but does not think she tried Farxiga??)

## 2022-02-10 ENCOUNTER — PATIENT MESSAGE (OUTPATIENT)
Dept: CARDIOLOGY | Facility: CLINIC | Age: 77
End: 2022-02-10
Payer: MEDICARE

## 2022-02-11 ENCOUNTER — PATIENT MESSAGE (OUTPATIENT)
Dept: CARDIOLOGY | Facility: CLINIC | Age: 77
End: 2022-02-11
Payer: MEDICARE

## 2022-03-03 ENCOUNTER — OFFICE VISIT (OUTPATIENT)
Dept: INTERNAL MEDICINE | Facility: CLINIC | Age: 77
End: 2022-03-03
Payer: MEDICARE

## 2022-03-03 VITALS
DIASTOLIC BLOOD PRESSURE: 78 MMHG | WEIGHT: 249.13 LBS | SYSTOLIC BLOOD PRESSURE: 132 MMHG | BODY MASS INDEX: 45.84 KG/M2 | OXYGEN SATURATION: 97 % | HEART RATE: 83 BPM | TEMPERATURE: 98 F | HEIGHT: 62 IN

## 2022-03-03 DIAGNOSIS — Z17.1 MALIGNANT NEOPLASM OF UPPER-OUTER QUADRANT OF RIGHT BREAST IN FEMALE, ESTROGEN RECEPTOR NEGATIVE: ICD-10-CM

## 2022-03-03 DIAGNOSIS — E66.9 DIABETES MELLITUS TYPE 2 IN OBESE: Primary | ICD-10-CM

## 2022-03-03 DIAGNOSIS — E78.2 HYPERLIPIDEMIA, MIXED: ICD-10-CM

## 2022-03-03 DIAGNOSIS — M51.36 DEGENERATIVE LUMBAR DISC: ICD-10-CM

## 2022-03-03 DIAGNOSIS — M47.27 OSTEOARTHRITIS OF SPINE WITH RADICULOPATHY, LUMBOSACRAL REGION: ICD-10-CM

## 2022-03-03 DIAGNOSIS — Z12.11 SCREENING FOR COLON CANCER: ICD-10-CM

## 2022-03-03 DIAGNOSIS — C50.411 MALIGNANT NEOPLASM OF UPPER-OUTER QUADRANT OF RIGHT BREAST IN FEMALE, ESTROGEN RECEPTOR NEGATIVE: ICD-10-CM

## 2022-03-03 DIAGNOSIS — N39.0 URINARY TRACT INFECTION WITHOUT HEMATURIA, SITE UNSPECIFIED: ICD-10-CM

## 2022-03-03 DIAGNOSIS — E11.69 DIABETES MELLITUS TYPE 2 IN OBESE: Primary | ICD-10-CM

## 2022-03-03 LAB
ALBUMIN/CREAT UR: NORMAL UG/MG (ref 0–30)
BILIRUB UR QL STRIP: NEGATIVE
CLARITY UR REFRACT.AUTO: ABNORMAL
COLOR UR AUTO: YELLOW
CREAT UR-MCNC: 47 MG/DL (ref 15–325)
GLUCOSE UR QL STRIP: ABNORMAL
HGB UR QL STRIP: NEGATIVE
KETONES UR QL STRIP: NEGATIVE
LEUKOCYTE ESTERASE UR QL STRIP: NEGATIVE
MICROALBUMIN UR DL<=1MG/L-MCNC: <5 UG/ML
NITRITE UR QL STRIP: NEGATIVE
PH UR STRIP: 5 [PH] (ref 5–8)
PROT UR QL STRIP: NEGATIVE
SP GR UR STRIP: 1.01 (ref 1–1.03)
URN SPEC COLLECT METH UR: ABNORMAL

## 2022-03-03 PROCEDURE — 3075F PR MOST RECENT SYSTOLIC BLOOD PRESS GE 130-139MM HG: ICD-10-PCS | Mod: CPTII,S$GLB,, | Performed by: INTERNAL MEDICINE

## 2022-03-03 PROCEDURE — 99214 PR OFFICE/OUTPT VISIT, EST, LEVL IV, 30-39 MIN: ICD-10-PCS | Mod: S$GLB,,, | Performed by: INTERNAL MEDICINE

## 2022-03-03 PROCEDURE — 99499 UNLISTED E&M SERVICE: CPT | Mod: S$GLB,,, | Performed by: INTERNAL MEDICINE

## 2022-03-03 PROCEDURE — 99214 OFFICE O/P EST MOD 30 MIN: CPT | Mod: S$GLB,,, | Performed by: INTERNAL MEDICINE

## 2022-03-03 PROCEDURE — 3052F PR MOST RECENT HEMOGLOBIN A1C LEVEL 8.0 - < 9.0%: ICD-10-PCS | Mod: CPTII,S$GLB,, | Performed by: INTERNAL MEDICINE

## 2022-03-03 PROCEDURE — 81003 URINALYSIS AUTO W/O SCOPE: CPT | Mod: HCNC | Performed by: INTERNAL MEDICINE

## 2022-03-03 PROCEDURE — 99999 PR PBB SHADOW E&M-EST. PATIENT-LVL V: CPT | Mod: PBBFAC,,, | Performed by: INTERNAL MEDICINE

## 2022-03-03 PROCEDURE — 99999 PR PBB SHADOW E&M-EST. PATIENT-LVL V: ICD-10-PCS | Mod: PBBFAC,,, | Performed by: INTERNAL MEDICINE

## 2022-03-03 PROCEDURE — 1101F PT FALLS ASSESS-DOCD LE1/YR: CPT | Mod: CPTII,S$GLB,, | Performed by: INTERNAL MEDICINE

## 2022-03-03 PROCEDURE — 82043 UR ALBUMIN QUANTITATIVE: CPT | Mod: HCNC | Performed by: INTERNAL MEDICINE

## 2022-03-03 PROCEDURE — 3078F PR MOST RECENT DIASTOLIC BLOOD PRESSURE < 80 MM HG: ICD-10-PCS | Mod: CPTII,S$GLB,, | Performed by: INTERNAL MEDICINE

## 2022-03-03 PROCEDURE — 3078F DIAST BP <80 MM HG: CPT | Mod: CPTII,S$GLB,, | Performed by: INTERNAL MEDICINE

## 2022-03-03 PROCEDURE — 99499 RISK ADDL DX/OHS AUDIT: ICD-10-PCS | Mod: S$GLB,,, | Performed by: INTERNAL MEDICINE

## 2022-03-03 PROCEDURE — 1157F ADVNC CARE PLAN IN RCRD: CPT | Mod: CPTII,S$GLB,, | Performed by: INTERNAL MEDICINE

## 2022-03-03 PROCEDURE — 1101F PR PT FALLS ASSESS DOC 0-1 FALLS W/OUT INJ PAST YR: ICD-10-PCS | Mod: CPTII,S$GLB,, | Performed by: INTERNAL MEDICINE

## 2022-03-03 PROCEDURE — 3288F FALL RISK ASSESSMENT DOCD: CPT | Mod: CPTII,S$GLB,, | Performed by: INTERNAL MEDICINE

## 2022-03-03 PROCEDURE — 3052F HG A1C>EQUAL 8.0%<EQUAL 9.0%: CPT | Mod: CPTII,S$GLB,, | Performed by: INTERNAL MEDICINE

## 2022-03-03 PROCEDURE — 82570 ASSAY OF URINE CREATININE: CPT | Mod: HCNC | Performed by: INTERNAL MEDICINE

## 2022-03-03 PROCEDURE — 87086 URINE CULTURE/COLONY COUNT: CPT | Mod: HCNC | Performed by: INTERNAL MEDICINE

## 2022-03-03 PROCEDURE — 3288F PR FALLS RISK ASSESSMENT DOCUMENTED: ICD-10-PCS | Mod: CPTII,S$GLB,, | Performed by: INTERNAL MEDICINE

## 2022-03-03 PROCEDURE — 1159F PR MEDICATION LIST DOCUMENTED IN MEDICAL RECORD: ICD-10-PCS | Mod: CPTII,S$GLB,, | Performed by: INTERNAL MEDICINE

## 2022-03-03 PROCEDURE — 1159F MED LIST DOCD IN RCRD: CPT | Mod: CPTII,S$GLB,, | Performed by: INTERNAL MEDICINE

## 2022-03-03 PROCEDURE — 1157F PR ADVANCE CARE PLAN OR EQUIV PRESENT IN MEDICAL RECORD: ICD-10-PCS | Mod: CPTII,S$GLB,, | Performed by: INTERNAL MEDICINE

## 2022-03-03 PROCEDURE — 3075F SYST BP GE 130 - 139MM HG: CPT | Mod: CPTII,S$GLB,, | Performed by: INTERNAL MEDICINE

## 2022-03-03 RX ORDER — GABAPENTIN 300 MG/1
CAPSULE ORAL
Qty: 270 CAPSULE | Refills: 3 | Status: SHIPPED | OUTPATIENT
Start: 2022-03-03 | End: 2023-06-13

## 2022-03-03 RX ORDER — METFORMIN HYDROCHLORIDE 500 MG/1
500 TABLET ORAL 2 TIMES DAILY WITH MEALS
Qty: 180 TABLET | Refills: 3 | Status: SHIPPED | OUTPATIENT
Start: 2022-03-03 | End: 2023-02-24

## 2022-03-03 RX ORDER — LEVOTHYROXINE SODIUM 75 UG/1
75 TABLET ORAL
Qty: 90 TABLET | Refills: 3 | Status: SHIPPED | OUTPATIENT
Start: 2022-03-03 | End: 2022-08-23

## 2022-03-03 NOTE — PROGRESS NOTES
Subjective:       Patient ID: Alina Narayan is a 76 y.o. female.    Chief Complaint: Follow-up    HPIPt is doing okay - No CP or SOB. Some chronic back pain.    Review of Systems   Constitutional: Positive for activity change. Negative for unexpected weight change.   HENT: Negative for hearing loss, rhinorrhea and trouble swallowing.    Eyes: Negative for discharge and visual disturbance.   Respiratory: Negative for chest tightness, shortness of breath (PND or orthopnea) and wheezing.    Cardiovascular: Negative for chest pain and palpitations.   Gastrointestinal: Negative for abdominal pain, blood in stool, constipation, diarrhea, nausea and vomiting.   Endocrine: Negative for polydipsia and polyuria.   Genitourinary: Negative for difficulty urinating, dysuria, hematuria and menstrual problem.   Musculoskeletal: Positive for arthralgias. Negative for joint swelling and neck pain.   Neurological: Negative for seizures, syncope, weakness and headaches.   Psychiatric/Behavioral: Negative for confusion and dysphoric mood.       Objective:      Physical Exam  Constitutional:       General: She is not in acute distress.     Appearance: She is well-developed.   HENT:      Head: Normocephalic.   Eyes:      Pupils: Pupils are equal, round, and reactive to light.   Neck:      Thyroid: No thyromegaly.      Vascular: No JVD.   Cardiovascular:      Rate and Rhythm: Normal rate and regular rhythm.      Heart sounds: Normal heart sounds. No murmur heard.    No friction rub. No gallop.   Pulmonary:      Effort: Pulmonary effort is normal.      Breath sounds: Normal breath sounds. No wheezing or rales.   Abdominal:      General: Bowel sounds are normal. There is no distension.      Palpations: Abdomen is soft. There is no mass.      Tenderness: There is no abdominal tenderness. There is no guarding or rebound.   Musculoskeletal:      Cervical back: Neck supple.   Lymphadenopathy:      Cervical: No cervical adenopathy.   Skin:      General: Skin is warm and dry.   Neurological:      Mental Status: She is alert and oriented to person, place, and time.      Deep Tendon Reflexes: Reflexes are normal and symmetric.   Psychiatric:         Behavior: Behavior normal.         Thought Content: Thought content normal.         Judgment: Judgment normal.         Assessment:       1. Diabetes mellitus type 2 in obese    2. Degenerative lumbar disc    3. Osteoarthritis of spine with radiculopathy, lumbosacral region    4. Hyperlipidemia, mixed    5. Malignant neoplasm of upper-outer quadrant of right breast in female, estrogen receptor negative    6. Screening for colon cancer    7. Urinary tract infection without hematuria, site unspecified        Plan:   Diabetes mellitus type 2 in obese  -     CBC Auto Differential; Future; Expected date: 03/03/2022  -     Comprehensive Metabolic Panel; Future; Expected date: 03/03/2022  -     TSH; Future; Expected date: 03/03/2022  -     Hemoglobin A1C; Future; Expected date: 03/03/2022  -     Urinalysis  -     Microalbumin/Creatinine Ratio, Urine    Degenerative lumbar disc  -     gabapentin (NEURONTIN) 300 MG capsule; TAKE 1 CAPSULE EVERY MORNING AND TAKE 2 CAPSULES AT BEDTIME  Dispense: 270 capsule; Refill: 3    Osteoarthritis of spine with radiculopathy, lumbosacral region  -     gabapentin (NEURONTIN) 300 MG capsule; TAKE 1 CAPSULE EVERY MORNING AND TAKE 2 CAPSULES AT BEDTIME  Dispense: 270 capsule; Refill: 3    Hyperlipidemia, mixed  -     Lipid Panel; Future; Expected date: 03/03/2022    Malignant neoplasm of upper-outer quadrant of right breast in female, estrogen receptor negative  -     Mammo Digital Diagnostic Left with Gabriel; Future; Expected date: 03/03/2022    Screening for colon cancer  -     Case Request Endoscopy: COLONOSCOPY    Urinary tract infection without hematuria, site unspecified  -     Urine culture    Other orders  -     metFORMIN (GLUCOPHAGE) 500 MG tablet; Take 1 tablet (500 mg total) by  mouth 2 (two) times daily with meals.  Dispense: 180 tablet; Refill: 3  -     levothyroxine (SYNTHROID) 75 MCG tablet; Take 1 tablet (75 mcg total) by mouth before breakfast.  Dispense: 90 tablet; Refill: 3        Answers for HPI/ROS submitted by the patient on 3/2/2022  activity change: Yes  unexpected weight change: No  neck pain: No  hearing loss: No  rhinorrhea: No  trouble swallowing: No  eye discharge: No  visual disturbance: No  chest tightness: No  wheezing: No  chest pain: No  palpitations: No  blood in stool: No  constipation: No  vomiting: No  diarrhea: No  polydipsia: No  polyuria: No  difficulty urinating: No  hematuria: No  menstrual problem: No  dysuria: No  joint swelling: No  arthralgias: Yes  headaches: No  weakness: No  confusion: No  dysphoric mood: No

## 2022-03-04 LAB
BACTERIA UR CULT: NORMAL
BACTERIA UR CULT: NORMAL

## 2022-03-08 ENCOUNTER — LAB VISIT (OUTPATIENT)
Dept: LAB | Facility: HOSPITAL | Age: 77
End: 2022-03-08
Attending: INTERNAL MEDICINE
Payer: MEDICARE

## 2022-03-08 DIAGNOSIS — E66.9 DIABETES MELLITUS TYPE 2 IN OBESE: ICD-10-CM

## 2022-03-08 DIAGNOSIS — E11.69 DIABETES MELLITUS TYPE 2 IN OBESE: ICD-10-CM

## 2022-03-08 DIAGNOSIS — E78.2 HYPERLIPIDEMIA, MIXED: ICD-10-CM

## 2022-03-08 LAB
ALBUMIN SERPL BCP-MCNC: 4.1 G/DL (ref 3.5–5.2)
ALP SERPL-CCNC: 89 U/L (ref 55–135)
ALT SERPL W/O P-5'-P-CCNC: 14 U/L (ref 10–44)
ANION GAP SERPL CALC-SCNC: 12 MMOL/L (ref 8–16)
AST SERPL-CCNC: 14 U/L (ref 10–40)
BASOPHILS # BLD AUTO: 0.02 K/UL (ref 0–0.2)
BASOPHILS NFR BLD: 0.4 % (ref 0–1.9)
BILIRUB SERPL-MCNC: 0.5 MG/DL (ref 0.1–1)
BUN SERPL-MCNC: 21 MG/DL (ref 8–23)
CALCIUM SERPL-MCNC: 9.9 MG/DL (ref 8.7–10.5)
CHLORIDE SERPL-SCNC: 103 MMOL/L (ref 95–110)
CHOLEST SERPL-MCNC: 149 MG/DL (ref 120–199)
CHOLEST/HDLC SERPL: 2.8 {RATIO} (ref 2–5)
CO2 SERPL-SCNC: 29 MMOL/L (ref 23–29)
CREAT SERPL-MCNC: 1 MG/DL (ref 0.5–1.4)
DIFFERENTIAL METHOD: ABNORMAL
EOSINOPHIL # BLD AUTO: 0 K/UL (ref 0–0.5)
EOSINOPHIL NFR BLD: 0.6 % (ref 0–8)
ERYTHROCYTE [DISTWIDTH] IN BLOOD BY AUTOMATED COUNT: 13.5 % (ref 11.5–14.5)
EST. GFR  (AFRICAN AMERICAN): >60 ML/MIN/1.73 M^2
EST. GFR  (NON AFRICAN AMERICAN): 54.9 ML/MIN/1.73 M^2
ESTIMATED AVG GLUCOSE: 183 MG/DL (ref 68–131)
GLUCOSE SERPL-MCNC: 137 MG/DL (ref 70–110)
HBA1C MFR BLD: 8 % (ref 4–5.6)
HCT VFR BLD AUTO: 43.4 % (ref 37–48.5)
HDLC SERPL-MCNC: 53 MG/DL (ref 40–75)
HDLC SERPL: 35.6 % (ref 20–50)
HGB BLD-MCNC: 13.3 G/DL (ref 12–16)
IMM GRANULOCYTES # BLD AUTO: 0.03 K/UL (ref 0–0.04)
IMM GRANULOCYTES NFR BLD AUTO: 0.6 % (ref 0–0.5)
LDLC SERPL CALC-MCNC: 69.4 MG/DL (ref 63–159)
LYMPHOCYTES # BLD AUTO: 1.6 K/UL (ref 1–4.8)
LYMPHOCYTES NFR BLD: 32.3 % (ref 18–48)
MCH RBC QN AUTO: 29.8 PG (ref 27–31)
MCHC RBC AUTO-ENTMCNC: 30.6 G/DL (ref 32–36)
MCV RBC AUTO: 97 FL (ref 82–98)
MONOCYTES # BLD AUTO: 0.5 K/UL (ref 0.3–1)
MONOCYTES NFR BLD: 9.6 % (ref 4–15)
NEUTROPHILS # BLD AUTO: 2.8 K/UL (ref 1.8–7.7)
NEUTROPHILS NFR BLD: 56.5 % (ref 38–73)
NONHDLC SERPL-MCNC: 96 MG/DL
NRBC BLD-RTO: 0 /100 WBC
PLATELET # BLD AUTO: 130 K/UL (ref 150–450)
PMV BLD AUTO: 12 FL (ref 9.2–12.9)
POTASSIUM SERPL-SCNC: 4.4 MMOL/L (ref 3.5–5.1)
PROT SERPL-MCNC: 7.4 G/DL (ref 6–8.4)
RBC # BLD AUTO: 4.46 M/UL (ref 4–5.4)
SODIUM SERPL-SCNC: 144 MMOL/L (ref 136–145)
TRIGL SERPL-MCNC: 133 MG/DL (ref 30–150)
TSH SERPL DL<=0.005 MIU/L-ACNC: 2.85 UIU/ML (ref 0.4–4)
WBC # BLD AUTO: 4.92 K/UL (ref 3.9–12.7)

## 2022-03-08 PROCEDURE — 84443 ASSAY THYROID STIM HORMONE: CPT | Mod: HCNC | Performed by: INTERNAL MEDICINE

## 2022-03-08 PROCEDURE — 80053 COMPREHEN METABOLIC PANEL: CPT | Mod: HCNC | Performed by: INTERNAL MEDICINE

## 2022-03-08 PROCEDURE — 36415 COLL VENOUS BLD VENIPUNCTURE: CPT | Mod: HCNC | Performed by: INTERNAL MEDICINE

## 2022-03-08 PROCEDURE — 83036 HEMOGLOBIN GLYCOSYLATED A1C: CPT | Mod: HCNC | Performed by: INTERNAL MEDICINE

## 2022-03-08 PROCEDURE — 80061 LIPID PANEL: CPT | Mod: HCNC | Performed by: INTERNAL MEDICINE

## 2022-03-08 PROCEDURE — 85025 COMPLETE CBC W/AUTO DIFF WBC: CPT | Mod: HCNC | Performed by: INTERNAL MEDICINE

## 2022-03-13 NOTE — PROGRESS NOTES
Patient, Alina Narayan (MRN #[id number]), presented with a recent Platelet count less than 150 K/uL consistent with the definition of thrombocytopenia (ICD10 - D69.6).    Platelets   Date Value Ref Range Status   03/08/2022 130 (L) 150 - 450 K/uL Final     The patient's thrombocytopenia was monitored, evaluated, addressed and/or treated. This addendum to the medical record is made on 03/13/2022.

## 2022-03-14 ENCOUNTER — OFFICE VISIT (OUTPATIENT)
Dept: HEMATOLOGY/ONCOLOGY | Facility: CLINIC | Age: 77
End: 2022-03-14
Payer: MEDICARE

## 2022-03-14 VITALS
HEART RATE: 78 BPM | HEIGHT: 62 IN | OXYGEN SATURATION: 98 % | SYSTOLIC BLOOD PRESSURE: 128 MMHG | TEMPERATURE: 98 F | RESPIRATION RATE: 16 BRPM | DIASTOLIC BLOOD PRESSURE: 72 MMHG | WEIGHT: 250.44 LBS | BODY MASS INDEX: 46.09 KG/M2

## 2022-03-14 DIAGNOSIS — C50.411 MALIGNANT NEOPLASM OF UPPER-OUTER QUADRANT OF RIGHT BREAST IN FEMALE, ESTROGEN RECEPTOR NEGATIVE: Primary | ICD-10-CM

## 2022-03-14 DIAGNOSIS — Z17.1 MALIGNANT NEOPLASM OF UPPER-OUTER QUADRANT OF RIGHT BREAST IN FEMALE, ESTROGEN RECEPTOR NEGATIVE: Primary | ICD-10-CM

## 2022-03-14 PROCEDURE — 3074F SYST BP LT 130 MM HG: CPT | Mod: CPTII,S$GLB,, | Performed by: INTERNAL MEDICINE

## 2022-03-14 PROCEDURE — 1157F ADVNC CARE PLAN IN RCRD: CPT | Mod: CPTII,S$GLB,, | Performed by: INTERNAL MEDICINE

## 2022-03-14 PROCEDURE — 3078F PR MOST RECENT DIASTOLIC BLOOD PRESSURE < 80 MM HG: ICD-10-PCS | Mod: CPTII,S$GLB,, | Performed by: INTERNAL MEDICINE

## 2022-03-14 PROCEDURE — 1157F PR ADVANCE CARE PLAN OR EQUIV PRESENT IN MEDICAL RECORD: ICD-10-PCS | Mod: CPTII,S$GLB,, | Performed by: INTERNAL MEDICINE

## 2022-03-14 PROCEDURE — 1125F AMNT PAIN NOTED PAIN PRSNT: CPT | Mod: CPTII,S$GLB,, | Performed by: INTERNAL MEDICINE

## 2022-03-14 PROCEDURE — 1101F PT FALLS ASSESS-DOCD LE1/YR: CPT | Mod: CPTII,S$GLB,, | Performed by: INTERNAL MEDICINE

## 2022-03-14 PROCEDURE — 99999 PR PBB SHADOW E&M-EST. PATIENT-LVL IV: ICD-10-PCS | Mod: PBBFAC,,, | Performed by: INTERNAL MEDICINE

## 2022-03-14 PROCEDURE — 3078F DIAST BP <80 MM HG: CPT | Mod: CPTII,S$GLB,, | Performed by: INTERNAL MEDICINE

## 2022-03-14 PROCEDURE — 1159F PR MEDICATION LIST DOCUMENTED IN MEDICAL RECORD: ICD-10-PCS | Mod: CPTII,S$GLB,, | Performed by: INTERNAL MEDICINE

## 2022-03-14 PROCEDURE — 3074F PR MOST RECENT SYSTOLIC BLOOD PRESSURE < 130 MM HG: ICD-10-PCS | Mod: CPTII,S$GLB,, | Performed by: INTERNAL MEDICINE

## 2022-03-14 PROCEDURE — 3288F PR FALLS RISK ASSESSMENT DOCUMENTED: ICD-10-PCS | Mod: CPTII,S$GLB,, | Performed by: INTERNAL MEDICINE

## 2022-03-14 PROCEDURE — 99213 OFFICE O/P EST LOW 20 MIN: CPT | Mod: S$GLB,,, | Performed by: INTERNAL MEDICINE

## 2022-03-14 PROCEDURE — 99213 PR OFFICE/OUTPT VISIT, EST, LEVL III, 20-29 MIN: ICD-10-PCS | Mod: S$GLB,,, | Performed by: INTERNAL MEDICINE

## 2022-03-14 PROCEDURE — 99999 PR PBB SHADOW E&M-EST. PATIENT-LVL IV: CPT | Mod: PBBFAC,,, | Performed by: INTERNAL MEDICINE

## 2022-03-14 PROCEDURE — 1159F MED LIST DOCD IN RCRD: CPT | Mod: CPTII,S$GLB,, | Performed by: INTERNAL MEDICINE

## 2022-03-14 PROCEDURE — 1101F PR PT FALLS ASSESS DOC 0-1 FALLS W/OUT INJ PAST YR: ICD-10-PCS | Mod: CPTII,S$GLB,, | Performed by: INTERNAL MEDICINE

## 2022-03-14 PROCEDURE — 1125F PR PAIN SEVERITY QUANTIFIED, PAIN PRESENT: ICD-10-PCS | Mod: CPTII,S$GLB,, | Performed by: INTERNAL MEDICINE

## 2022-03-14 PROCEDURE — 3288F FALL RISK ASSESSMENT DOCD: CPT | Mod: CPTII,S$GLB,, | Performed by: INTERNAL MEDICINE

## 2022-03-14 NOTE — PROGRESS NOTES
Subjective:       Patient ID: Alina Narayan is a 76 y.o. female.    Chief Complaint: No chief complaint on file.    HPI 75 Y/O white female who returns for follow-up of triple negative carcinoma of the right breast.  She had complete pathologic response to neoadjuvant chemotherapy.    Today she reports that she has some occasional shortness of breath which she relates to anxiety.  She has chronic joint pain in her knees.  She is concerned that her  may have early dementia and that has been a worry for her.    She has a history of pulmonary embolism diagnosed in May of 2019.She is on chronic anticoagulation with low-dose Eliquis.    She is also being followed a small right upper lobe pulmonary nodule.    Current history:  abnormal mammogram of the right breast in December 2018.  She was having no breast symptoms at that time    That mammogram showed a 13 mm mass in the right axillary tail.  By ultrasound there was a 6 x 9 x 11 mm intramammary node and a 2nd 5 x 6 x 6 mm hypoechoic mass in the axillary tail.  On January 7, 2019 both masses were biopsied and both showed lymph nodes with metastatic carcinoma which was ER negative UT negative and HER2 negative.  Ki-67 was 40%.    MRI on January 15, 2019 showed 2 right axillary lymph nodes the largest measured 1.4 x 1.0 cm.  There were no abnormalities in the right breast.    PET scan was then performed which showed only low-grade activity in the right axilla with an SUV of 1.32.      She has received 12 cycles of weekly Taxol and 4 cycles of CMF which she completed on July 30th.    On August 26, 2019 mastectomies performed showed complete pathological response in the breast and axilla.      Previous breast cancer history History: On September 25, 2012 she underwent a screening mammogram which showed an asymmetric density in the left breast at 2:00 position. A followup mammogram on the 27th showed an oval mass in that area ultrasound showed a 6 mm solid mass. Core  needle biopsy on October 1 showed invasive carcinoma ER 90% positive KY 80% positive and HER-2 negative. On October 29 she underwent lumpectomy and sentinel lymph node biopsy. That showed a 7 mm low-grade (1+2+1) infiltrating carcinoma. 3 sentinel lymph nodes were negative. Final pathological stage TIB N0 stage IA.  She completed letrozole therapy in 2017  Review of Systems   Constitutional: Positive for fatigue. Negative for activity change, appetite change, fever and unexpected weight change.   HENT: Negative for mouth sores.    Eyes: Negative for visual disturbance.   Respiratory: Positive for shortness of breath. Negative for cough.    Cardiovascular: Negative for chest pain.   Gastrointestinal: Negative for abdominal pain and diarrhea.   Genitourinary: Negative for frequency.   Musculoskeletal: Positive for arthralgias (Knees). Negative for back pain.   Integumentary:  Negative for rash.   Neurological: Negative for headaches.   Hematological: Negative for adenopathy.   Psychiatric/Behavioral: The patient is nervous/anxious.          Objective:      Physical Exam  Vitals reviewed.   Constitutional:       General: She is not in acute distress.     Appearance: She is obese. She is not ill-appearing.   Eyes:      General: No scleral icterus.  Cardiovascular:      Rate and Rhythm: Normal rate and regular rhythm.   Pulmonary:      Effort: Pulmonary effort is normal. No respiratory distress.      Breath sounds: Normal breath sounds. No wheezing or rales.   Chest:   Breasts:      Right: No axillary adenopathy or supraclavicular adenopathy.      Left: Normal. No mass, axillary adenopathy or supraclavicular adenopathy.         Abdominal:      Palpations: Abdomen is soft.   Lymphadenopathy:      Cervical: No cervical adenopathy.      Upper Body:      Right upper body: No supraclavicular or axillary adenopathy.      Left upper body: No supraclavicular or axillary adenopathy.   Neurological:      Mental Status: She is  alert and oriented to person, place, and time.   Psychiatric:         Mood and Affect: Mood normal.         Behavior: Behavior normal.         Thought Content: Thought content normal.         Judgment: Judgment normal.         Assessment:     Mammogram - scheduled for 3/21  1. Malignant neoplasm of upper-outer quadrant of right breast in female, estrogen receptor negative        Plan:       RTC 6 months       Route Chart for Scheduling    Med Onc Chart Routing      Follow up with physician 6 months.   Follow up with ALEKSANDRA    Labs    Imaging    Pharmacy appointment    Other referrals            Therapy Plan Information  PORT FLUSH  Flushes  heparin, porcine (PF) 100 unit/mL injection flush 500 Units  500 Units, Intravenous, Every visit  sodium chloride 0.9% flush 10 mL  10 mL, Intravenous, Every visit    Additional Orders  alteplase injection 2 mg  2 mg, Intra-Catheter, PRN

## 2022-03-17 NOTE — TELEPHONE ENCOUNTER
Called patient regarding scheduling appointment for genetic counseling per .  The patient is scheduled to be seen on Thursday 5/4/17 at 2:30pm.  It was stated to the patient to fast 30 minutes before the appointment and to also bring in her insurance card to the appointment.  The patient voiced understanding of appointment date, time, and instructions.  Sonali Patel NP and  notified of this matter.   normal...

## 2022-04-08 NOTE — NURSING
Patient here for blood draw from right chest port-accesses easily with good blood return-blood to lab-line flushed and left accessed for chemo today.   Pt roomed to cart 6, changed into gown and connected to monitor. Accompanied by family member.

## 2022-04-11 ENCOUNTER — HOSPITAL ENCOUNTER (OUTPATIENT)
Dept: RADIOLOGY | Facility: HOSPITAL | Age: 77
Discharge: HOME | End: 2022-04-11
Attending: INTERNAL MEDICINE
Payer: MEDICARE

## 2022-04-11 DIAGNOSIS — C50.411 MALIGNANT NEOPLASM OF UPPER-OUTER QUADRANT OF RIGHT BREAST IN FEMALE, ESTROGEN RECEPTOR NEGATIVE: ICD-10-CM

## 2022-04-11 DIAGNOSIS — Z17.1 MALIGNANT NEOPLASM OF UPPER-OUTER QUADRANT OF RIGHT BREAST IN FEMALE, ESTROGEN RECEPTOR NEGATIVE: ICD-10-CM

## 2022-04-11 PROCEDURE — 77065 DX MAMMO INCL CAD UNI: CPT | Mod: 26,LT,, | Performed by: RADIOLOGY

## 2022-04-11 PROCEDURE — 77065 DX MAMMO INCL CAD UNI: CPT | Mod: TC,LT

## 2022-04-11 PROCEDURE — 77061 MAMMO DIGITAL DIAGNOSTIC LEFT WITH TOMO: ICD-10-PCS | Mod: 26,LT,, | Performed by: RADIOLOGY

## 2022-04-11 PROCEDURE — 77065 MAMMO DIGITAL DIAGNOSTIC LEFT WITH TOMO: ICD-10-PCS | Mod: 26,LT,, | Performed by: RADIOLOGY

## 2022-04-11 PROCEDURE — 77061 BREAST TOMOSYNTHESIS UNI: CPT | Mod: 26,LT,, | Performed by: RADIOLOGY

## 2022-05-23 ENCOUNTER — PATIENT MESSAGE (OUTPATIENT)
Dept: INTERNAL MEDICINE | Facility: CLINIC | Age: 77
End: 2022-05-23
Payer: MEDICARE

## 2022-05-26 ENCOUNTER — HOSPITAL ENCOUNTER (EMERGENCY)
Facility: HOSPITAL | Age: 77
Discharge: ANOTHER HOSPITAL | End: 2022-05-26
Attending: EMERGENCY MEDICINE
Payer: MEDICARE

## 2022-05-26 VITALS
RESPIRATION RATE: 20 BRPM | TEMPERATURE: 99 F | HEIGHT: 62 IN | HEART RATE: 88 BPM | SYSTOLIC BLOOD PRESSURE: 162 MMHG | DIASTOLIC BLOOD PRESSURE: 70 MMHG | BODY MASS INDEX: 37.73 KG/M2 | WEIGHT: 205 LBS | OXYGEN SATURATION: 97 %

## 2022-05-26 DIAGNOSIS — U07.1 COVID-19: Primary | ICD-10-CM

## 2022-05-26 DIAGNOSIS — U07.1 COVID-19 VIRUS DETECTED: ICD-10-CM

## 2022-05-26 PROBLEM — J02.9 SORE THROAT: Status: ACTIVE | Noted: 2022-05-26

## 2022-05-26 PROBLEM — R13.10 ODYNOPHAGIA: Status: ACTIVE | Noted: 2022-05-26

## 2022-05-26 LAB
ALBUMIN SERPL BCP-MCNC: 4 G/DL (ref 3.5–5.2)
ALP SERPL-CCNC: 74 U/L (ref 55–135)
ALT SERPL W/O P-5'-P-CCNC: 14 U/L (ref 10–44)
ANION GAP SERPL CALC-SCNC: 9 MMOL/L (ref 8–16)
AST SERPL-CCNC: 18 U/L (ref 10–40)
BASOPHILS # BLD AUTO: 0.01 K/UL (ref 0–0.2)
BASOPHILS NFR BLD: 0.2 % (ref 0–1.9)
BILIRUB SERPL-MCNC: 0.6 MG/DL (ref 0.1–1)
BUN SERPL-MCNC: 16 MG/DL (ref 8–23)
CALCIUM SERPL-MCNC: 9.4 MG/DL (ref 8.7–10.5)
CHLORIDE SERPL-SCNC: 105 MMOL/L (ref 95–110)
CK SERPL-CCNC: 124 U/L (ref 20–180)
CO2 SERPL-SCNC: 26 MMOL/L (ref 23–29)
CREAT SERPL-MCNC: 1 MG/DL (ref 0.5–1.4)
CRP SERPL-MCNC: 36.7 MG/L (ref 0–8.2)
CTP QC/QA: YES
DIFFERENTIAL METHOD: ABNORMAL
EOSINOPHIL # BLD AUTO: 0 K/UL (ref 0–0.5)
EOSINOPHIL NFR BLD: 0.2 % (ref 0–8)
ERYTHROCYTE [DISTWIDTH] IN BLOOD BY AUTOMATED COUNT: 13.6 % (ref 11.5–14.5)
EST. GFR  (AFRICAN AMERICAN): >60 ML/MIN/1.73 M^2
EST. GFR  (NON AFRICAN AMERICAN): 54.5 ML/MIN/1.73 M^2
FERRITIN SERPL-MCNC: 246 NG/ML (ref 20–300)
GLUCOSE SERPL-MCNC: 174 MG/DL (ref 70–110)
HCT VFR BLD AUTO: 41.4 % (ref 37–48.5)
HGB BLD-MCNC: 13.2 G/DL (ref 12–16)
IMM GRANULOCYTES # BLD AUTO: 0.02 K/UL (ref 0–0.04)
IMM GRANULOCYTES NFR BLD AUTO: 0.4 % (ref 0–0.5)
LACTATE SERPL-SCNC: 1.2 MMOL/L (ref 0.5–2.2)
LDH SERPL L TO P-CCNC: 251 U/L (ref 110–260)
LYMPHOCYTES # BLD AUTO: 1.1 K/UL (ref 1–4.8)
LYMPHOCYTES NFR BLD: 23 % (ref 18–48)
MCH RBC QN AUTO: 29.5 PG (ref 27–31)
MCHC RBC AUTO-ENTMCNC: 31.9 G/DL (ref 32–36)
MCV RBC AUTO: 92 FL (ref 82–98)
MONOCYTES # BLD AUTO: 0.8 K/UL (ref 0.3–1)
MONOCYTES NFR BLD: 16.8 % (ref 4–15)
NEUTROPHILS # BLD AUTO: 2.9 K/UL (ref 1.8–7.7)
NEUTROPHILS NFR BLD: 59.4 % (ref 38–73)
NRBC BLD-RTO: 0 /100 WBC
PLATELET # BLD AUTO: 89 K/UL (ref 150–450)
PMV BLD AUTO: 11.4 FL (ref 9.2–12.9)
POTASSIUM SERPL-SCNC: 4.1 MMOL/L (ref 3.5–5.1)
PROT SERPL-MCNC: 7.5 G/DL (ref 6–8.4)
RBC # BLD AUTO: 4.48 M/UL (ref 4–5.4)
SARS-COV-2 RDRP RESP QL NAA+PROBE: POSITIVE
SODIUM SERPL-SCNC: 140 MMOL/L (ref 136–145)
TROPONIN I SERPL DL<=0.01 NG/ML-MCNC: 0.02 NG/ML (ref 0–0.03)
WBC # BLD AUTO: 4.83 K/UL (ref 3.9–12.7)

## 2022-05-26 PROCEDURE — 99285 EMERGENCY DEPT VISIT HI MDM: CPT | Mod: CR,CS,, | Performed by: EMERGENCY MEDICINE

## 2022-05-26 PROCEDURE — 84484 ASSAY OF TROPONIN QUANT: CPT | Performed by: EMERGENCY MEDICINE

## 2022-05-26 PROCEDURE — 96361 HYDRATE IV INFUSION ADD-ON: CPT | Mod: 59

## 2022-05-26 PROCEDURE — 82728 ASSAY OF FERRITIN: CPT | Performed by: EMERGENCY MEDICINE

## 2022-05-26 PROCEDURE — 31575 DIAGNOSTIC LARYNGOSCOPY: CPT

## 2022-05-26 PROCEDURE — 83615 LACTATE (LD) (LDH) ENZYME: CPT | Performed by: EMERGENCY MEDICINE

## 2022-05-26 PROCEDURE — 96374 THER/PROPH/DIAG INJ IV PUSH: CPT | Mod: 59

## 2022-05-26 PROCEDURE — 93010 EKG 12-LEAD: ICD-10-PCS | Mod: ,,, | Performed by: INTERNAL MEDICINE

## 2022-05-26 PROCEDURE — 99285 EMERGENCY DEPT VISIT HI MDM: CPT | Mod: 25

## 2022-05-26 PROCEDURE — 85025 COMPLETE CBC W/AUTO DIFF WBC: CPT | Performed by: EMERGENCY MEDICINE

## 2022-05-26 PROCEDURE — 86140 C-REACTIVE PROTEIN: CPT | Performed by: EMERGENCY MEDICINE

## 2022-05-26 PROCEDURE — 80053 COMPREHEN METABOLIC PANEL: CPT | Performed by: EMERGENCY MEDICINE

## 2022-05-26 PROCEDURE — 83605 ASSAY OF LACTIC ACID: CPT | Performed by: EMERGENCY MEDICINE

## 2022-05-26 PROCEDURE — 93010 ELECTROCARDIOGRAM REPORT: CPT | Mod: ,,, | Performed by: INTERNAL MEDICINE

## 2022-05-26 PROCEDURE — U0002 COVID-19 LAB TEST NON-CDC: HCPCS | Performed by: EMERGENCY MEDICINE

## 2022-05-26 PROCEDURE — 25000003 PHARM REV CODE 250: Performed by: EMERGENCY MEDICINE

## 2022-05-26 PROCEDURE — 99285 PR EMERGENCY DEPT VISIT,LEVEL V: ICD-10-PCS | Mod: CR,CS,, | Performed by: EMERGENCY MEDICINE

## 2022-05-26 PROCEDURE — 82550 ASSAY OF CK (CPK): CPT | Performed by: EMERGENCY MEDICINE

## 2022-05-26 PROCEDURE — 63600175 PHARM REV CODE 636 W HCPCS: Performed by: EMERGENCY MEDICINE

## 2022-05-26 PROCEDURE — 93005 ELECTROCARDIOGRAM TRACING: CPT

## 2022-05-26 RX ORDER — ACETAMINOPHEN 500 MG
1000 TABLET ORAL
Status: DISCONTINUED | OUTPATIENT
Start: 2022-05-26 | End: 2022-05-26 | Stop reason: HOSPADM

## 2022-05-26 RX ORDER — DEXAMETHASONE SODIUM PHOSPHATE 4 MG/ML
8 INJECTION, SOLUTION INTRA-ARTICULAR; INTRALESIONAL; INTRAMUSCULAR; INTRAVENOUS; SOFT TISSUE
Status: COMPLETED | OUTPATIENT
Start: 2022-05-26 | End: 2022-05-26

## 2022-05-26 RX ORDER — PANTOPRAZOLE SODIUM 40 MG/10ML
40 INJECTION, POWDER, LYOPHILIZED, FOR SOLUTION INTRAVENOUS
Status: DISCONTINUED | OUTPATIENT
Start: 2022-05-26 | End: 2022-05-26 | Stop reason: HOSPADM

## 2022-05-26 RX ADMIN — SODIUM CHLORIDE 1000 ML: 0.9 INJECTION, SOLUTION INTRAVENOUS at 01:05

## 2022-05-26 RX ADMIN — DEXAMETHASONE SODIUM PHOSPHATE 8 MG: 4 INJECTION INTRA-ARTICULAR; INTRALESIONAL; INTRAMUSCULAR; INTRAVENOUS; SOFT TISSUE at 02:05

## 2022-05-26 NOTE — ASSESSMENT & PLAN NOTE
77 year-old female with COVID-19 since Saturday who has worsening sore throat. Flexible laryngoscopy shows diffusely erythematous larynx with mild edema of post-cricoid and arytenoids. This presentation is likely viral laryngitis versus laryngopharyngeal reflux due to underlying GERD vs coughing-induced larynggeal trauma.     -- Continue conservative symptom management  -- Recommend PPI for 6-8 weeks  -- Patient may need enteral feeding if sore throat progresses  -- Please Secure Chat me for any questions, page ENT on-call if unresponsive or urgent

## 2022-05-26 NOTE — ED PROVIDER NOTES
Encounter Date: 5/26/2022       History     Chief Complaint   Patient presents with    COVID-19 Concerns     Pt c/o sore throat-had (+) covid test on Saturday.      78 y/o f, h/o PE on eliquis, breast cancer in remission, HTN, DM, MITCHELL, obesity, AVR, c/o URI sx x 6 days, primarily sore throat, myalgias and arthralgias, chills and generalized weakness.  She had a fever initially but it has since resolved.  Pt took a COVID test on Sat and it was positive.  She reports that her sore throat has worsened to the point that she cannot swallow anymore and hasn't taken any of her meds in 3 days.  No v/d.  Taking pain meds - last dose yesterday evening.    The history is provided by the patient.     Review of patient's allergies indicates:   Allergen Reactions    Dilaudid [hydromorphone (bulk)] Other (See Comments)     Other reaction(s): sedation    Hydromorphone Other (See Comments)     Other reaction(s): sedation    Cymbalta [duloxetine]      Dry mouth, agitation    Farxiga [dapagliflozin] Other (See Comments)     Recurrent candidal infection    Jardiance [empagliflozin] Hives    Byetta [exenatide] Rash     Other reaction(s): Rash     Past Medical History:   Diagnosis Date    Allergy     seasonal    Anxiety     Arthritis     Breast cancer 10/2012    left breast invasive ductal carcinoma    Colon polyp     Depression     Diabetes mellitus     Type 2    Diabetes mellitus, type 2     Diverticular disease     Diverticulitis 2009    Genetic testing 05/2017    negative Integrated BRACAnalysis    Hyperlipidemia     Hypertension     Morbid obesity     MITCHELL (obstructive sleep apnea)     Pulmonary embolism     Stenosis     Stenosis and insufficiency of lacrimal passages     Thyroid disease      Past Surgical History:   Procedure Laterality Date    BREAST BIOPSY Left 10/1/2012    left breast- invasive ductal carcinoma    BREAST BIOPSY Left 12/2017    BREAST LUMPECTOMY Left 2012    CARDIAC VALVE REPLACEMENT   "12/2013    CARDIAC VALVE SURGERY  2013    CARPAL TUNNEL RELEASE      Left    COLONOSCOPY N/A 9/29/2017    Procedure: COLONOSCOPY;  Surgeon: Phani Worley MD;  Location: [facility] (4TH FLR);  Service: Endoscopy;  Laterality: N/A;    DILATION AND CURETTAGE OF UTERUS  1999    Endometrial polyps    ENDOMETRIAL ABLATION  1999    Enodmetrial polyps    EYE SURGERY      INSERTION OF TUNNELED CENTRAL VENOUS CATHETER (CVC) WITH SUBCUTANEOUS PORT Right 2/1/2019    Procedure: [id number];  Surgeon: Gaston Flores MD;  Location: [facility] OR [address] [place] [address];  Service: General;  Laterality: Right;    left lumpectomy  2012    MEDIPORT REMOVAL Right 8/26/2019    Procedure: REMOVAL, CATHETER, CENTRAL VENOUS, TUNNELED, WITH PORT;  Surgeon: Gaston Flores MD;  Location: [facility] OR [address] [place] [address];  Service: General;  Laterality: Right;    MODIFIED RADICAL MASTECTOMY W/ AXILLARY LYMPH NODE DISSECTION Right 8/26/2019    Procedure: MASTECTOMY, MODIFIED RADICAL;  Surgeon: Gaston Flores MD;  Location: [facility] OR [address] [place] [address];  Service: General;  Laterality: Right;    SHOULDER SURGERY      SKIN BIOPSY       Family History   Problem Relation Age of Onset    Breast cancer Mother 62    Colon cancer Father     Cancer Father         Colon CA (cause of death); Prostate CA (no chemo)    Heart disease Father     No Known Problems Sister     Alcohol abuse Brother     Cancer Maternal Grandfather         Colon CA    No Known Problems Son     Cancer Brother         prostate CA, no chemo, "it was bad"    Anxiety disorder Son     SAL disease Son     Sleep disorder Son     Ovarian cancer Neg Hx     Melanoma Neg Hx     Psoriasis Neg Hx     Lupus Neg Hx     Eczema Neg Hx     Acne Neg Hx      Social History     Tobacco Use    Smoking status: Never Smoker    Smokeless tobacco: Never Used   Substance Use Topics    Alcohol use: No     Alcohol/week: 0.0 standard drinks    Drug use: No     Review of Systems   Constitutional: Positive " for appetite change, chills, fatigue and fever.   HENT: Positive for congestion, sore throat, trouble swallowing and voice change.    Respiratory: Negative for cough and shortness of breath.    Cardiovascular: Negative for chest pain, palpitations and leg swelling.   Gastrointestinal: Negative for abdominal pain, diarrhea and vomiting.   Musculoskeletal: Positive for arthralgias and myalgias.   Neurological: Negative for headaches.   All other systems reviewed and are negative.      Physical Exam     Initial Vitals [05/26/22 1038]   BP Pulse Resp Temp SpO2   136/66 93 16 99.8 °F (37.7 °C) 97 %      MAP       --         Physical Exam    Nursing note and vitals reviewed.  Constitutional: Vital signs are normal. She appears well-developed and well-nourished. She is not diaphoretic.  Non-toxic appearance. She does not appear ill. No distress.   HENT:   Head: Normocephalic and atraumatic.   Mouth/Throat: Uvula is midline and mucous membranes are normal. Mucous membranes are not dry. No trismus in the jaw. No dental abscesses or uvula swelling. Posterior oropharyngeal erythema present. No oropharyngeal exudate, posterior oropharyngeal edema or tonsillar abscesses.   + hoarse   Eyes: Conjunctivae and lids are normal.   Neck: Neck supple. No stridor present.   Normal range of motion.  Cardiovascular: Normal rate.   Pulmonary/Chest: No accessory muscle usage. No tachypnea. No respiratory distress.   Musculoskeletal:         General: No edema.      Cervical back: Normal range of motion and neck supple. No edema or erythema.     Neurological: She is alert and oriented to person, place, and time.   Skin: Skin is dry and intact. No pallor.   Psychiatric: She has a normal mood and affect. Her speech is normal and behavior is normal.         ED Course   Procedures  Labs Reviewed   CBC W/ AUTO DIFFERENTIAL - Abnormal; Notable for the following components:       Result Value    MCHC 31.9 (*)     Platelets 89 (*)     Mono % 16.8 (*)      All other components within normal limits   COMPREHENSIVE METABOLIC PANEL - Abnormal; Notable for the following components:    Glucose 174 (*)     eGFR if non  54.5 (*)     All other components within normal limits   C-REACTIVE PROTEIN - Abnormal; Notable for the following components:    CRP 36.7 (*)     All other components within normal limits   SARS-COV-2 RDRP GENE - Abnormal; Notable for the following components:    POC Rapid COVID Positive (*)     All other components within normal limits   FERRITIN   LACTATE DEHYDROGENASE   CK   LACTIC ACID, PLASMA   TROPONIN I        ECG Results          EKG 12-lead (Final result)  Result time 05/26/22 23:13:24    Final result by Interface, Lab In [facility] (05/26/22 23:13:24)                 Narrative:    Test Reason : U07.1,    Vent. Rate : 083 BPM     Atrial Rate : 083 BPM     P-R Int : 134 ms          QRS Dur : 100 ms      QT Int : 362 ms       P-R-T Axes : 056 -49 030 degrees     QTc Int : 425 ms    Normal sinus rhythm  Left anterior fascicular block  Minimal voltage criteria for LVH, may be normal variant  Abnormal R wave progression  Abnormal ECG  When compared with ECG of 09-FEB-2022 11:49,  No significant change was found  Confirmed by Marco Sánchez MD ([id number]) on 5/26/2022 11:13:19 PM    Referred By: JULIET FLOR           Confirmed By:Marco Sánchez MD                            Imaging Results          X-Ray Chest AP Portable (Final result)  Result time 05/26/22 13:51:53    Final result by Sanford Walker Jr., MD (05/26/22 13:51:53)                 Impression:      No convincing cardiopulmonary abnormality.  Postop changes are noted.      Electronically signed by: Sanford Walker MD  Date:    05/26/2022  Time:    13:51             Narrative:    EXAMINATION:  XR CHEST AP PORTABLE    CLINICAL HISTORY:  COVID-19;    TECHNIQUE:  Single frontal view of the chest was performed.    COMPARISON:  August 2019    FINDINGS:  Monitoring leads are in place.  Surgical  clips overlie the right hemithorax and axilla.  Heart is at the upper limits for normal in size.  Pulmonary vasculature not engorged.  Lungs are fairly well aerated.  No confluent consolidation.  Radiograph is under penetrated.  Degenerative change AC joints.                                 Medications   sodium chloride 0.9% bolus 1,000 mL (0 mLs Intravenous Stopped 5/26/22 1431)   dexamethasone injection 8 mg (8 mg Intravenous Given 5/26/22 1414)     Medical Decision Making:   Initial Assessment:   Acute COVID-19 illness with pharyngitis/laryngitis, inability to tolerate PO  No stridor and no signs of posterior pharyngeal edema on exam    Low grade temp  Appears ill/uncomfortable but nontoxic  Differential Diagnosis:   COVID-19  Dehydration  Severe pharyngitis  Inability to tolerate PO  Low suspicion for secondary infection  Clinical Tests:   Lab Tests: Ordered and Reviewed  Radiological Study: Reviewed and Ordered  Medical Tests: Ordered and Reviewed  ED Management:  Labs  IVF  Decadron  Tylenol  ENT consult given concerns about hoarseness, apparently case reports of epiglottitis w COVID  Other:   I have discussed this case with another health care provider.       <> Summary of the Discussion: ENT  4:05 PM  ENT has evaluated pt and scoped at bedside.  Pt does have mild-mod swelling of her arytenoids which likes ly explains severe dysphagi.  She is still unable to tolerate any PO here, can't even tolerate swallowing tylenol.  ENT recommends protonix given concern for reflux.  Will need to admit for IVF, monitoring.               ED Course as of 05/27/22 1214   Thu May 26, 2022   1452 ENT to come scope pt at bedside to evaluate for epiglottitis, laryngeal edema, etc.  Pt still not able to tolerate PO [AS]      ED Course User Index  [AS] Kathleen Gil MD             Clinical Impression:   Final diagnoses:  [U07.1] COVID-19 (Primary)          ED Disposition Condition    Transfer to Another Facility Stable               Kathleen Gil MD  05/27/22 6134

## 2022-05-26 NOTE — ED NOTES
Patient resting in stretcher and is in NAD at this time. Pt is awake alert and oriented x4, VSS, respirations even and unlabored. Pt updated on plan of care. Bed low and locked with side rails up x2, call bell in pt reach. Pt voices no needs at this time,  at bedside.  Will continue to monitor.

## 2022-05-26 NOTE — SUBJECTIVE & OBJECTIVE
Medications:  Continuous Infusions:  Scheduled Meds:   acetaminophen  1,000 mg Oral ED 1 Time    pantoprazole  40 mg Intravenous ED 1 Time     PRN Meds:     Current Facility-Administered Medications on File Prior to Encounter   Medication    alteplase injection 2 mg    heparin, porcine (PF) 100 unit/mL injection flush 500 Units    heparin, porcine (PF) 100 unit/mL injection flush 500 Units    sodium chloride 0.9% flush 10 mL     Current Outpatient Medications on File Prior to Encounter   Medication Sig    amoxicillin (AMOXIL) 500 MG capsule Take 1 capsule (500 mg total) by mouth 3 (three) times daily.    blood sugar diagnostic Strp True Metirx strips   - pt checks twice daily for uncontrolled glucoses E11.65    blood-glucose meter kit True Test or meter covered by insurance - pt checks glucose twice daily for uncontrolled glucoses E11.65    carvediloL (COREG) 25 MG tablet TAKE 1/2 TABLET TWICE DAILY WITH MEALS    CHOLECALCIFEROL, VITAMIN D3, (VITAMIN D3 ORAL) Take 3,000 capsules by mouth once daily. 3000 IU per day    ELIQUIS 2.5 mg Tab TAKE ONE TABLET BY MOUTH 2 TIMES A DAY    furosemide (LASIX) 20 MG tablet TAKE 1 TABLET BY MOUTH ONCE DAILY    gabapentin (NEURONTIN) 300 MG capsule TAKE 1 CAPSULE EVERY MORNING AND TAKE 2 CAPSULES AT BEDTIME    glimepiride (AMARYL) 2 MG tablet Take 1 tablet (2 mg total) by mouth before breakfast.    hydrocortisone 2.5 % cream Apply topically 2 (two) times daily as needed (rash under the breast). Mix 50/50 with ketoconazole cream.    ketoconazole (NIZORAL) 2 % cream AAA bid prn rash under the breast. Mix 50/50 with hydrocortisone cream.    lancets (ACCU-CHEK SOFTCLIX LANCETS) Misc TrueTest lancets for meter covered by insurance - pt checks twice daily for uncontrolled glucoses E11.65,    levothyroxine (SYNTHROID) 75 MCG tablet Take 1 tablet (75 mcg total) by mouth before breakfast.    metFORMIN (GLUCOPHAGE) 500 MG tablet Take 1 tablet (500 mg total) by mouth 2 (two) times daily with  meals.    mupirocin (BACTROBAN) 2 % ointment Apply topically 2 (two) times daily.    pravastatin (PRAVACHOL) 20 MG tablet TAKE 1 TABLET EVERY DAY       Review of patient's allergies indicates:   Allergen Reactions    Dilaudid [hydromorphone (bulk)] Other (See Comments)     Other reaction(s): sedation    Hydromorphone Other (See Comments)     Other reaction(s): sedation    Cymbalta [duloxetine]      Dry mouth, agitation    Farxiga [dapagliflozin] Other (See Comments)     Recurrent candidal infection    Jardiance [empagliflozin] Hives    Byetta [exenatide] Rash     Other reaction(s): Rash     Past Medical History:   Diagnosis Date    Allergy     seasonal    Anxiety     Arthritis     Breast cancer 10/2012    left breast invasive ductal carcinoma    Colon polyp     Depression     Diabetes mellitus     Type 2    Diabetes mellitus, type 2     Diverticular disease     Diverticulitis 2009    Genetic testing 05/2017    negative Integrated BRACAnalysis    Hyperlipidemia     Hypertension     Morbid obesity     MITCHELL (obstructive sleep apnea)     Pulmonary embolism     Stenosis     Stenosis and insufficiency of lacrimal passages     Thyroid disease      Past Surgical History:   Procedure Laterality Date    BREAST BIOPSY Left 10/1/2012    left breast- invasive ductal carcinoma    BREAST BIOPSY Left 12/2017    BREAST LUMPECTOMY Left 2012    CARDIAC VALVE REPLACEMENT  12/2013    CARDIAC VALVE SURGERY  2013    CARPAL TUNNEL RELEASE      Left    COLONOSCOPY N/A 9/29/2017    Procedure: COLONOSCOPY;  Surgeon: Phani Worley MD;  Location: [facility] (4TH FLR);  Service: Endoscopy;  Laterality: N/A;    DILATION AND CURETTAGE OF UTERUS  1999    Endometrial polyps    ENDOMETRIAL ABLATION  1999    Enodmetrial polyps    EYE SURGERY      INSERTION OF TUNNELED CENTRAL VENOUS CATHETER (CVC) WITH SUBCUTANEOUS PORT Right 2/1/2019    Procedure: [id number];  Surgeon: Gaston Flores MD;  Location: [facility] 2ND FLR;  Service:  General;  Laterality: Right;    left lumpectomy  2012    MEDIPORT REMOVAL Right 8/26/2019    Procedure: REMOVAL, CATHETER, CENTRAL VENOUS, TUNNELED, WITH PORT;  Surgeon: Gaston Flores MD;  Location: [facility] OR [address] [place] [address];  Service: General;  Laterality: Right;    MODIFIED RADICAL MASTECTOMY W/ AXILLARY LYMPH NODE DISSECTION Right 8/26/2019    Procedure: MASTECTOMY, MODIFIED RADICAL;  Surgeon: Gaston Flores MD;  Location: [facility] OR [address] [place] [address];  Service: General;  Laterality: Right;    SHOULDER SURGERY      SKIN BIOPSY       Family History       Problem Relation (Age of Onset)    Alcohol abuse Brother    Anxiety disorder Son    Breast cancer Mother (62)    Cancer Father, Maternal Grandfather, Brother    Colon cancer Father    SAL disease Son    Heart disease Father    No Known Problems Sister, Son    Sleep disorder Son          Tobacco Use    Smoking status: Never Smoker    Smokeless tobacco: Never Used   Substance and Sexual Activity    Alcohol use: No     Alcohol/week: 0.0 standard drinks    Drug use: No    Sexual activity: Yes     Partners: Male     Review of Systems  Objective:     Vital Signs (Most Recent):  Temp: 99 °F (37.2 °C) (05/26/22 1329)  Pulse: 86 (05/26/22 1517)  Resp: 20 (05/26/22 1517)  BP: (!) 158/72 (05/26/22 1430)  SpO2: 96 % (05/26/22 1517)   Vital Signs (24h Range):  Temp:  [99 °F (37.2 °C)-99.8 °F (37.7 °C)] 99 °F (37.2 °C)  Pulse:  [86-93] 86  Resp:  [16-20] 20  SpO2:  [96 %-97 %] 96 %  BP: (136-158)/(66-73) 158/72     Weight: 93 kg (205 lb)  Body mass index is 37.49 kg/m².    Date 05/26/22 0700 - 05/27/22 0659   Shift 5901-4545 3811-8674 2459-5068 24 Hour Total   INTAKE   IV Piggyback 1000   1000   Shift Total(mL/kg) 1000(10.8)   1000(10.8)   OUTPUT   Shift Total(mL/kg)       Weight (kg) 93 93 93 93     Physical Exam  Sitting comfortably in chair  No noisy breathing, phonation adequate  Posterior oropharynx with mild erythema  Neck is soft without lymphadenopathy    Flexible Laryngoscopy      Nasal Cavity/Nasopharynx: Normal mucosa, inferior turbinates and septum. No evidence of septal deviation or perforation. There are no visible lesions. Ofelia within normal limits.  Oropharynx: Pharyngeal wall without edema, lesions, or masses. Bilateral palatine tonsils within normal limits. Base of tongue symmetric without masses or lesions. Lingual tonsil within normal limits.  Hypopharynx: There is swelling of the postcricoid region. Diffuse erythema throughout.  Supraglottis: There is mild swelling of arytenoid.Epiglottis is crisp. There are no masses or lesions noted. False vocal folds without masses or lesions.  Glottis: True vocal folds without masses or lesions. Normal mobility and airway patency.  Subglottis: No masses, lesions, or stenosis noted.        Significant Labs:  BMP:   Recent Labs   Lab 05/26/22  1215   *      CO2 26   BUN 16   CREATININE 1.0   CALCIUM 9.4     CBC:   Recent Labs   Lab 05/26/22  1215   WBC 4.83   RBC 4.48   HGB 13.2   HCT 41.4   PLT 89*   MCV 92   MCH 29.5   MCHC 31.9*     Significant Diagnostics:  I have reviewed all pertinent imaging results/findings within the past 24 hours.

## 2022-05-26 NOTE — HPI
Ms. Narayan is a 77 year-old female with a history of HTN, DM, PE while on chemotherapy for breast cancer (on Eliquis) who [place] [facility] [place] worsening sore throat. She was diagnosed with COVID-19 on Saturday but started having progressively worse sore throat. She reports dysphonia. She denies any shortness of breath. She denies any acid reflux symptoms. She has not been able to swallow any solids or liquids for the past three days.

## 2022-05-26 NOTE — PROVIDER TRANSFER
Outside Transfer Acceptance Note / Regional Referral Center    Referring facility: [facility]   Referring provider: RYLEE TORRES  Accepting facility: [facility]  Accepting provider: JAZZY WING  Admitting provider: EFRAIN BARRIENTOS  Reason for transfer:  COVID capacity  Transfer diagnosis: odynophagia/COVID  Transfer specialty requested: Hospital Medicine  Transfer specialty notified: yes  Transfer level: NUMBER 1-5: 2  Bed type requested: tele  Isolation status: Airborne and Contact and Droplet   Admission class or status: OP- Observation      Narrative   77-year-old female with a history of pulmonary embolism (on Eliquis), breast cancer in remission, hypertension, diabetes, sleep apnea, aortic valve replacement, and obesity presented to the [facility] emergency department on May 26 with upper respiratory tract symptoms for 6 days.  She was COVID positive on May 21 on an outside test (results not available).  She noted sore throat, myalgias, arthralgias, chills, and weakness.  Sore throat has worsened to the point that she is unable to swallow and has not taken her medicines in 3 days.  No vomiting or diarrhea noted. On exam she had posterior or pharyngeal erythema with no oropharyngeal exudate/posterior or pharyngeal edema/tonsillar abscess.  She was hoarse.  She was seen by ENT in the emergency department and underwent scope evaluation.  She had mild swelling of her arytenoids and swelling of the postcricoid region with diffuse erythema throughout the hypopharynx.  She was unable to tolerate orals in the emergency department.  ENT recommended Protonix given the concern for reflux. In the emergency department she received normal saline, Protonix, and Decadron.  There was not evidence of airway compromise during her ED stay.  Case discussed with Hospital Medicine at [facility].  Patient will transfer there for further treatment.    COVID  positive  White blood cells 4.83, hemoglobin 13.2, hematocrit 41.4, platelets 89, sodium 140, potassium 4.1, chloride 105, CO2 26, BUN 16, creatinine 1, glucose 174, AST 18, ALT 14, troponin 0.016, , CRP 36.7, lactic acid 1.2    Chest x-ray showed no convincing cardiopulmonary abnormality.  Postop changes noted.    EKG showed sinus rhythm with left anterior fascicular block, minimal voltage criteria for LVH, inferior infarct pattern, age undetermined    Objective     Vitals: Temp: 99 °F (37.2 °C) (05/26/22 1329)  Pulse: 86 (05/26/22 1517)  Resp: 20 (05/26/22 1517)  BP: (!) 158/72 (05/26/22 1430)  SpO2: 96 % (05/26/22 1517)  Recent Labs:   CBC:   Recent Labs   Lab 05/26/22  1215   WBC 4.83   HGB 13.2   HCT 41.4   PLT 89*     CMP:   Recent Labs   Lab 05/26/22  1215      K 4.1      CO2 26   *   BUN 16   CREATININE 1.0   CALCIUM 9.4   PROT 7.5   ALBUMIN 4.0   BILITOT 0.6   ALKPHOS 74   AST 18   ALT 14   ANIONGAP 9   EGFRNONAA 54.5*     Recent imaging: see above   IV access:        Peripheral IV - Single Lumen 05/26/22 1300 20 G Left;Posterior Hand (Active)     Allergies:   Review of patient's allergies indicates:   Allergen Reactions    Dilaudid [hydromorphone (bulk)] Other (See Comments)     Other reaction(s): sedation    Hydromorphone Other (See Comments)     Other reaction(s): sedation    Cymbalta [duloxetine]      Dry mouth, agitation    Farxiga [dapagliflozin] Other (See Comments)     Recurrent candidal infection    Jardiance [empagliflozin] Hives    Byetta [exenatide] Rash     Other reaction(s): Rash      NPO: No      Anticoagulation:   Anticoagulants     None           Instructions    1. Admit to Hospital Medicine, COVID isolation  2. COVID protocols    Upon patient arrival to floor, please contact Hospital Medicine on call.     JONATHON Rivas MD  Hospital Medicine Staff  Cell: [phone]

## 2022-05-26 NOTE — CONSULTS
[facility] Dept  Otorhinolaryngology-Head & Neck Surgery  Consult Note    Patient Name: Alina Narayan  MRN: [id number]  Code Status: Prior  Admission Date: 5/26/2022  Hospital Length of Stay: 0 days  Attending Physician: Kathleen Gil MD  Primary Care Provider: Aarti Dunn MD    Patient information was obtained from patient and ER records.     Inpatient consult to ENT  Consult performed by: Bryce Campbell MD  Consult ordered by: Kathleen Gil MD        Subjective:     Chief Complaint/Reason for Admission: sore throat, odynophagia    History of Present Illness: Ms. Narayan is a 77 year-old female with a history of HTN, DM, PE while on chemotherapy for breast cancer (on Eliquis) who [place] [facility]. She was diagnosed with COVID-19 on Saturday but started having progressively worse sore throat. She reports dysphonia. She denies any shortness of breath. She denies any acid reflux symptoms. She has not been able to swallow any solids or liquids for the past three days.       Medications:  Continuous Infusions:  Scheduled Meds:   acetaminophen  1,000 mg Oral ED 1 Time    pantoprazole  40 mg Intravenous ED 1 Time     PRN Meds:     Current Facility-Administered Medications on File Prior to Encounter   Medication    alteplase injection 2 mg    heparin, porcine (PF) 100 unit/mL injection flush 500 Units    heparin, porcine (PF) 100 unit/mL injection flush 500 Units    sodium chloride 0.9% flush 10 mL     Current Outpatient Medications on File Prior to Encounter   Medication Sig    amoxicillin (AMOXIL) 500 MG capsule Take 1 capsule (500 mg total) by mouth 3 (three) times daily.    blood sugar diagnostic Strp True Metirx strips   - pt checks twice daily for uncontrolled glucoses E11.65    blood-glucose meter kit True Test or meter covered by insurance - pt checks glucose twice daily for uncontrolled glucoses E11.65    carvediloL (COREG) 25 MG tablet TAKE 1/2 TABLET  TWICE DAILY WITH MEALS    CHOLECALCIFEROL, VITAMIN D3, (VITAMIN D3 ORAL) Take 3,000 capsules by mouth once daily. 3000 IU per day    ELIQUIS 2.5 mg Tab TAKE ONE TABLET BY MOUTH 2 TIMES A DAY    furosemide (LASIX) 20 MG tablet TAKE 1 TABLET BY MOUTH ONCE DAILY    gabapentin (NEURONTIN) 300 MG capsule TAKE 1 CAPSULE EVERY MORNING AND TAKE 2 CAPSULES AT BEDTIME    glimepiride (AMARYL) 2 MG tablet Take 1 tablet (2 mg total) by mouth before breakfast.    hydrocortisone 2.5 % cream Apply topically 2 (two) times daily as needed (rash under the breast). Mix 50/50 with ketoconazole cream.    ketoconazole (NIZORAL) 2 % cream AAA bid prn rash under the breast. Mix 50/50 with hydrocortisone cream.    lancets (ACCU-CHEK SOFTCLIX LANCETS) Misc TrueTest lancets for meter covered by insurance - pt checks twice daily for uncontrolled glucoses E11.65,    levothyroxine (SYNTHROID) 75 MCG tablet Take 1 tablet (75 mcg total) by mouth before breakfast.    metFORMIN (GLUCOPHAGE) 500 MG tablet Take 1 tablet (500 mg total) by mouth 2 (two) times daily with meals.    mupirocin (BACTROBAN) 2 % ointment Apply topically 2 (two) times daily.    pravastatin (PRAVACHOL) 20 MG tablet TAKE 1 TABLET EVERY DAY       Review of patient's allergies indicates:   Allergen Reactions    Dilaudid [hydromorphone (bulk)] Other (See Comments)     Other reaction(s): sedation    Hydromorphone Other (See Comments)     Other reaction(s): sedation    Cymbalta [duloxetine]      Dry mouth, agitation    Farxiga [dapagliflozin] Other (See Comments)     Recurrent candidal infection    Jardiance [empagliflozin] Hives    Byetta [exenatide] Rash     Other reaction(s): Rash     Past Medical History:   Diagnosis Date    Allergy     seasonal    Anxiety     Arthritis     Breast cancer 10/2012    left breast invasive ductal carcinoma    Colon polyp     Depression     Diabetes mellitus     Type 2    Diabetes mellitus, type 2     Diverticular disease      Diverticulitis 2009    Genetic testing 05/2017    negative Integrated BRAC[employer]    Hyperlipidemia     Hypertension     Morbid obesity     MITCHELL (obstructive sleep apnea)     Pulmonary embolism     Stenosis     Stenosis and insufficiency of lacrimal passages     Thyroid disease      Past Surgical History:   Procedure Laterality Date    BREAST BIOPSY Left 10/1/2012    left breast- invasive ductal carcinoma    BREAST BIOPSY Left 12/2017    BREAST LUMPECTOMY Left 2012    CARDIAC VALVE REPLACEMENT  12/2013    CARDIAC VALVE SURGERY  2013    CARPAL TUNNEL RELEASE      Left    COLONOSCOPY N/A 9/29/2017    Procedure: COLONOSCOPY;  Surgeon: Phani Worley MD;  Location: [facility] (4TH FLR);  Service: Endoscopy;  Laterality: N/A;    DILATION AND CURETTAGE OF UTERUS  1999    Endometrial polyps    ENDOMETRIAL ABLATION  1999    Enodmetrial polyps    EYE SURGERY      INSERTION OF TUNNELED CENTRAL VENOUS CATHETER (CVC) WITH SUBCUTANEOUS PORT Right 2/1/2019    Procedure: [id number]-AXPN-F-CATH;  Surgeon: Gaston Flores MD;  Location: [facility] OR [address] [place] [address];  Service: General;  Laterality: Right;    left lumpectomy  2012    MEDIPORT REMOVAL Right 8/26/2019    Procedure: REMOVAL, CATHETER, CENTRAL VENOUS, TUNNELED, WITH PORT;  Surgeon: Gaston Flores MD;  Location: [facility] OR 2ND [facility];  Service: General;  Laterality: Right;    MODIFIED RADICAL MASTECTOMY W/ AXILLARY LYMPH NODE DISSECTION Right 8/26/2019    Procedure: MASTECTOMY, MODIFIED RADICAL;  Surgeon: Gaston Flores MD;  Location: [facility] OR [address] [place] [address];  Service: General;  Laterality: Right;    SHOULDER SURGERY      SKIN BIOPSY       Family History       Problem Relation (Age of Onset)    Alcohol abuse Brother    Anxiety disorder Son    Breast cancer Mother (62)    Cancer Father, Maternal Grandfather, Brother    Colon cancer Father    SAL disease Son    Heart disease Father    No Known Problems Sister, Son    Sleep disorder Son          Tobacco Use     Smoking status: Never Smoker    Smokeless tobacco: Never Used   Substance and Sexual Activity    Alcohol use: No     Alcohol/week: 0.0 standard drinks    Drug use: No    Sexual activity: Yes     Partners: Male     Review of Systems  Objective:     Vital Signs (Most Recent):  Temp: 99 °F (37.2 °C) (05/26/22 1329)  Pulse: 86 (05/26/22 1517)  Resp: 20 (05/26/22 1517)  BP: (!) 158/72 (05/26/22 1430)  SpO2: 96 % (05/26/22 1517)   Vital Signs (24h Range):  Temp:  [99 °F (37.2 °C)-99.8 °F (37.7 °C)] 99 °F (37.2 °C)  Pulse:  [86-93] 86  Resp:  [16-20] 20  SpO2:  [96 %-97 %] 96 %  BP: (136-158)/(66-73) 158/72     Weight: 93 kg (205 lb)  Body mass index is 37.49 kg/m².    Date 05/26/22 0700 - 05/27/22 0659   Shift 1874-5232 9202-6906 4605-4366 24 Hour Total   INTAKE   IV Piggyback 1000   1000   Shift Total(mL/kg) 1000(10.8)   1000(10.8)   OUTPUT   Shift Total(mL/kg)       Weight (kg) 93 93 93 93     Physical Exam  Sitting comfortably in chair  No noisy breathing, phonation adequate  Posterior oropharynx with mild erythema  Neck is soft without lymphadenopathy    Flexible Laryngoscopy     Nasal Cavity/Nasopharynx: Normal mucosa, inferior turbinates and septum. No evidence of septal deviation or perforation. There are no visible lesions. Ofelia within normal limits.  Oropharynx: Pharyngeal wall without edema, lesions, or masses. Bilateral palatine tonsils within normal limits. Base of tongue symmetric without masses or lesions. Lingual tonsil within normal limits.  Hypopharynx: There is swelling of the postcricoid region. Diffuse erythema throughout.  Supraglottis: There is mild swelling of arytenoid.Epiglottis is crisp. There are no masses or lesions noted. False vocal folds without masses or lesions.  Glottis: True vocal folds without masses or lesions. Normal mobility and airway patency.  Subglottis: No masses, lesions, or stenosis noted.        Significant Labs:  BMP:   Recent Labs   Lab 05/26/22  1215   *       CO2 26   BUN 16   CREATININE 1.0   CALCIUM 9.4     CBC:   Recent Labs   Lab 05/26/22  1215   WBC 4.83   RBC 4.48   HGB 13.2   HCT 41.4   PLT 89*   MCV 92   MCH 29.5   MCHC 31.9*     Significant Diagnostics:  I have reviewed all pertinent imaging results/findings within the past 24 hours.      Assessment/Plan:     * Sore throat  77 year-old female with COVID-19 since Saturday who has worsening sore throat. Flexible laryngoscopy shows diffusely erythematous larynx with mild edema of post-cricoid and arytenoids. This presentation is likely viral laryngitis versus laryngopharyngeal reflux due to underlying GERD vs coughing-induced larynggeal trauma.     -- Continue conservative symptom management  -- Recommend PPI for 6-8 weeks  -- Patient may need enteral feeding if sore throat progresses  -- Please Secure Chat me for any questions, page ENT on-call if unresponsive or urgent        VTE Risk Mitigation (From admission, onward)    None          Thank you for your consult. I will sign off. Please contact us if you have any additional questions.    Bryce Campbell MD  Otorhinolaryngology-Head & Neck Surgery  [facility]

## 2022-05-26 NOTE — ED NOTES
Pt care assumed at this time. Patient resting in stretcher and is in NAD at this time. Pt is awake alert and oriented x4, VSS, respirations even and unlabored. Pt updated on plan of care. Bed low and locked with side rails up x2, call bell in pt reach. Pt voices no needs at this time,  at bedside.  Will continue to monitor.

## 2022-05-26 NOTE — ED NOTES
Patient identifiers verified and correct for Alina Wilkesug  LOC: The patient is awake, alert and aware of environment with an appropriate affect, the patient is oriented x 3 and speaking appropriately.   APPEARANCE: Patient appears comfortable and in no acute distress, patient is clean and well groomed.  SKIN: The skin is warm and dry, color consistent with ethnicity, patient has normal skin turgor and moist mucus membranes, skin intact, no breakdown or bruising noted.   MUSCULOSKELETAL: Patient moving all extremities spontaneously, no swelling noted. Pt reports body aches.  RESPIRATORY: Airway is open and patent, respirations are spontaneous, patient has a normal effort and rate, no accessory muscle use noted, pt placed on continuous pulse ox with O2 sats noted at 97% on room air. Pt reports cough  CARDIAC: Pt placed on cardiac monitor. Patient has a normal rate and regular rhythm, no edema noted, capillary refill < 3 seconds.   GASTRO: Soft and non tender to palpation, no distention noted, normoactive bowel sounds present in all four quadrants. Pt states bowel movements have been regular.  : Pt denies any pain or frequency with urination.  NEURO: Pt opens eyes spontaneously, behavior appropriate to situation, follows commands, facial expression symmetrical, bilateral hand grasp equal and even, purposeful motor response noted, normal sensation in all extremities when touched with a finger.

## 2022-05-28 PROBLEM — R13.10 ODYNOPHAGIA: Status: RESOLVED | Noted: 2022-05-26 | Resolved: 2022-05-28

## 2022-07-07 ENCOUNTER — HOSPITAL ENCOUNTER (OUTPATIENT)
Dept: RADIOLOGY | Facility: HOSPITAL | Age: 77
Discharge: HOME | End: 2022-07-07
Attending: INTERNAL MEDICINE
Payer: MEDICARE

## 2022-07-07 ENCOUNTER — OFFICE VISIT (OUTPATIENT)
Dept: INTERNAL MEDICINE | Facility: CLINIC | Age: 77
End: 2022-07-07
Payer: MEDICARE

## 2022-07-07 VITALS
DIASTOLIC BLOOD PRESSURE: 68 MMHG | SYSTOLIC BLOOD PRESSURE: 132 MMHG | BODY MASS INDEX: 45.24 KG/M2 | WEIGHT: 245.81 LBS | HEIGHT: 62 IN | HEART RATE: 90 BPM | OXYGEN SATURATION: 96 %

## 2022-07-07 DIAGNOSIS — M17.0 PRIMARY OSTEOARTHRITIS OF BOTH KNEES: ICD-10-CM

## 2022-07-07 DIAGNOSIS — R21 RASH: ICD-10-CM

## 2022-07-07 DIAGNOSIS — Z95.2 S/P AVR (AORTIC VALVE REPLACEMENT): Primary | ICD-10-CM

## 2022-07-07 DIAGNOSIS — R60.0 LEG EDEMA, LEFT: ICD-10-CM

## 2022-07-07 DIAGNOSIS — E66.9 DIABETES MELLITUS TYPE 2 IN OBESE: ICD-10-CM

## 2022-07-07 DIAGNOSIS — E11.69 DIABETES MELLITUS TYPE 2 IN OBESE: ICD-10-CM

## 2022-07-07 PROCEDURE — 1101F PR PT FALLS ASSESS DOC 0-1 FALLS W/OUT INJ PAST YR: ICD-10-PCS | Mod: CPTII,S$GLB,, | Performed by: INTERNAL MEDICINE

## 2022-07-07 PROCEDURE — 1157F PR ADVANCE CARE PLAN OR EQUIV PRESENT IN MEDICAL RECORD: ICD-10-PCS | Mod: CPTII,S$GLB,, | Performed by: INTERNAL MEDICINE

## 2022-07-07 PROCEDURE — 73565 X-RAY EXAM OF KNEES: CPT | Mod: 26,,, | Performed by: RADIOLOGY

## 2022-07-07 PROCEDURE — 3078F PR MOST RECENT DIASTOLIC BLOOD PRESSURE < 80 MM HG: ICD-10-PCS | Mod: CPTII,S$GLB,, | Performed by: INTERNAL MEDICINE

## 2022-07-07 PROCEDURE — 99214 OFFICE O/P EST MOD 30 MIN: CPT | Mod: S$GLB,,, | Performed by: INTERNAL MEDICINE

## 2022-07-07 PROCEDURE — 1126F AMNT PAIN NOTED NONE PRSNT: CPT | Mod: CPTII,S$GLB,, | Performed by: INTERNAL MEDICINE

## 2022-07-07 PROCEDURE — 99999 PR PBB SHADOW E&M-EST. PATIENT-LVL V: CPT | Mod: PBBFAC,,, | Performed by: INTERNAL MEDICINE

## 2022-07-07 PROCEDURE — 99214 PR OFFICE/OUTPT VISIT, EST, LEVL IV, 30-39 MIN: ICD-10-PCS | Mod: S$GLB,,, | Performed by: INTERNAL MEDICINE

## 2022-07-07 PROCEDURE — 1101F PT FALLS ASSESS-DOCD LE1/YR: CPT | Mod: CPTII,S$GLB,, | Performed by: INTERNAL MEDICINE

## 2022-07-07 PROCEDURE — 3288F FALL RISK ASSESSMENT DOCD: CPT | Mod: CPTII,S$GLB,, | Performed by: INTERNAL MEDICINE

## 2022-07-07 PROCEDURE — 1157F ADVNC CARE PLAN IN RCRD: CPT | Mod: CPTII,S$GLB,, | Performed by: INTERNAL MEDICINE

## 2022-07-07 PROCEDURE — 73565 XR KNEE AP STANDING BILATERAL: ICD-10-PCS | Mod: 26,,, | Performed by: RADIOLOGY

## 2022-07-07 PROCEDURE — 1159F MED LIST DOCD IN RCRD: CPT | Mod: CPTII,S$GLB,, | Performed by: INTERNAL MEDICINE

## 2022-07-07 PROCEDURE — 3075F SYST BP GE 130 - 139MM HG: CPT | Mod: CPTII,S$GLB,, | Performed by: INTERNAL MEDICINE

## 2022-07-07 PROCEDURE — 99999 PR PBB SHADOW E&M-EST. PATIENT-LVL V: ICD-10-PCS | Mod: PBBFAC,,, | Performed by: INTERNAL MEDICINE

## 2022-07-07 PROCEDURE — 1126F PR PAIN SEVERITY QUANTIFIED, NO PAIN PRESENT: ICD-10-PCS | Mod: CPTII,S$GLB,, | Performed by: INTERNAL MEDICINE

## 2022-07-07 PROCEDURE — 3078F DIAST BP <80 MM HG: CPT | Mod: CPTII,S$GLB,, | Performed by: INTERNAL MEDICINE

## 2022-07-07 PROCEDURE — 3288F PR FALLS RISK ASSESSMENT DOCUMENTED: ICD-10-PCS | Mod: CPTII,S$GLB,, | Performed by: INTERNAL MEDICINE

## 2022-07-07 PROCEDURE — 1159F PR MEDICATION LIST DOCUMENTED IN MEDICAL RECORD: ICD-10-PCS | Mod: CPTII,S$GLB,, | Performed by: INTERNAL MEDICINE

## 2022-07-07 PROCEDURE — 3075F PR MOST RECENT SYSTOLIC BLOOD PRESS GE 130-139MM HG: ICD-10-PCS | Mod: CPTII,S$GLB,, | Performed by: INTERNAL MEDICINE

## 2022-07-07 PROCEDURE — 73565 X-RAY EXAM OF KNEES: CPT | Mod: TC

## 2022-07-07 RX ORDER — AMOXICILLIN 500 MG/1
TABLET, FILM COATED ORAL
Qty: 20 TABLET | Refills: 0 | Status: SHIPPED | OUTPATIENT
Start: 2022-07-07 | End: 2023-03-08

## 2022-07-07 NOTE — PROGRESS NOTES
Subjective:       Patient ID: Alina Narayan is a 77 y.o. female.    Chief Complaint: Follow-up    HPIPt is doing better - she is considering knee replacement.  No CP or SOB.  Review of Systems   Respiratory: Negative for shortness of breath (PND or orthopnea).    Cardiovascular: Negative for chest pain (arm pain or jaw pain).   Gastrointestinal: Negative for abdominal pain, diarrhea, nausea and vomiting.   Genitourinary: Negative for dysuria.   Neurological: Negative for seizures, syncope and headaches.       Objective:      Physical Exam  Constitutional:       General: She is not in acute distress.     Appearance: She is well-developed.   HENT:      Head: Normocephalic.   Eyes:      Pupils: Pupils are equal, round, and reactive to light.   Neck:      Thyroid: No thyromegaly.      Vascular: No JVD.   Cardiovascular:      Rate and Rhythm: Normal rate and regular rhythm.      Heart sounds: Normal heart sounds. No murmur heard.    No friction rub. No gallop.   Pulmonary:      Effort: Pulmonary effort is normal.      Breath sounds: Normal breath sounds. No wheezing or rales.   Abdominal:      General: Bowel sounds are normal. There is no distension.      Palpations: Abdomen is soft. There is no mass.      Tenderness: There is no abdominal tenderness. There is no guarding or rebound.   Musculoskeletal:      Cervical back: Neck supple.   Lymphadenopathy:      Cervical: No cervical adenopathy.   Skin:     General: Skin is warm and dry.   Neurological:      Mental Status: She is alert and oriented to person, place, and time.      Deep Tendon Reflexes: Reflexes are normal and symmetric.   Psychiatric:         Behavior: Behavior normal.         Thought Content: Thought content normal.         Judgment: Judgment normal.         Assessment:       1. S/P AVR (aortic valve replacement)    2. Primary osteoarthritis of both knees    3. Diabetes mellitus type 2 in obese    4. Leg edema, left    5. Rash        Plan:   S/P AVR (aortic  valve replacement)  -     Echo Saline Bubble? No    Primary osteoarthritis of both knees  -     Ambulatory referral/consult to Orthopedics; Future; Expected date: 07/14/2022  -     X-Ray Knee AP Standing Bilateral; Future; Expected date: 07/07/2022    Diabetes mellitus type 2 in obese  -     CBC Auto Differential; Future; Expected date: 07/07/2022  -     Comprehensive Metabolic Panel; Future; Expected date: 07/07/2022  -     Hemoglobin A1C; Future; Expected date: 07/07/2022    Leg edema, left  -     US Lower Extremity Veins Bilateral; Future; Expected date: 07/07/2022    Rash  -     Ambulatory referral/consult to Dermatology; Future; Expected date: 07/14/2022    Other orders  -     amoxicillin (AMOXIL) 500 MG Tab; 4 tablets one hour prior to dental work  Dispense: 20 tablet; Refill: 0

## 2022-07-08 DIAGNOSIS — I10 ESSENTIAL HYPERTENSION: ICD-10-CM

## 2022-07-08 DIAGNOSIS — Z95.2 S/P AVR: ICD-10-CM

## 2022-07-08 RX ORDER — CARVEDILOL 25 MG/1
TABLET ORAL
Qty: 90 TABLET | Refills: 3 | Status: SHIPPED | OUTPATIENT
Start: 2022-07-08 | End: 2023-03-08 | Stop reason: SDUPTHER

## 2022-07-08 NOTE — TELEPHONE ENCOUNTER
No new care gaps identified.  [facility] Embedded Care Gaps. Reference number: [id number]. 7/08/2022   1:10:48 PM CDT

## 2022-07-09 NOTE — TELEPHONE ENCOUNTER
Refill Decision Note   Alina Narayan  is requesting a refill authorization.  Brief Assessment and Rationale for Refill:  Approve     Medication Therapy Plan:       Medication Reconciliation Completed: No   Comments:     No Care Gaps recommended.     Note composed:8:38 PM 07/08/2022

## 2022-07-11 NOTE — PROGRESS NOTES
"Patient: Katheryn Goncalves   MRN: [id number]  : 1945 76 y.o.    Referring Physician: Jaydon Childs MD  Date of Visit: 2022    Diagnosis:   1. Dizziness and giddiness    2. Labyrinthitis of left ear          SUBJECTIVE: Pt reports to session today without an increase in vestibular symptoms, \"I do not experience dizziness, I experience more balance issues, is that common?\"      OBJECTIVE    Pre Pain:  No pain  Post Pain: No pain    Start Time: 1604  Stop Time: 1659  Time Calculation (min): 55 min  Injury History/Precautions/Daily Required Info  Document Type: daily treatment  Primary Therapist: Rodger Haryd PT  Chief Complaint/Reason for Visit : Imbalance  Referring Physician: Jaydon Childs MD  Existing Precautions/Restrictions: no known precautions/restrictions  History of present illness/functional impairment: Patient presents with intermittent imbalance during functional activit. April of last year had ENT evaluation with Dr. Cole following onset of vertigo s/p COVID vaccine, and was noted to have decreased hearing and will be getting hearing aids. Was felt that she had labyrinthitis which resolved within a month. Patient noted onset of imabalnce within the last 2 months after having an upper respiratory infection which she thinks she acquired from her grandchildren. Was COVID-negative. Returned to see Dr. Cole, who given the recurrent nature of her imbalance, wants her to go to vestibular therapy.  MRI within normal limits within the past year. Patient denies dizziness or vertigo since onset of imbalance. Notes veering laterally when walking with friends for recreation. Presents to OPPT with goals of normalizing balance to optimize function and safety,  Patient reported fall since last visit: No    Objective Measurements/Data Taken Today:  None taken    VESTIBULAR PT FLOWSHEET    P.T. TREATMENT LOG   Precautions:    Eval Date: 22 Progress Note:    POC expires: 22 Insurance " Subjective:   Patient ID:  Alina Narayan is a 76 y.o. female who presents for follow-up of CVD    HPI:Patient is here for valvular heart disease.The patient is here for CAD risk factors.      The patient has no chest pain, SOB, TIA, palpitations, syncope or pre-syncope.Patient does not exercise.In IM office BP great and mildly high in [place]        Review of Systems   Constitutional: Positive for malaise/fatigue. Negative for chills, decreased appetite, diaphoresis, fever, night sweats, weight gain and weight loss.   HENT: Negative for congestion, hoarse voice, nosebleeds, sore throat and tinnitus.    Eyes: Negative for blurred vision, double vision, vision loss in left eye, vision loss in right eye, visual disturbance and visual halos.   Cardiovascular: Negative for chest pain, claudication, cyanosis, dyspnea on exertion, irregular heartbeat, leg swelling, near-syncope, orthopnea, palpitations, paroxysmal nocturnal dyspnea and syncope.   Respiratory: Negative for cough, hemoptysis, shortness of breath, sleep disturbances due to breathing, snoring, sputum production and wheezing.    Endocrine: Negative for cold intolerance, heat intolerance, polydipsia, polyphagia and polyuria.   Hematologic/Lymphatic: Negative for adenopathy and bleeding problem. Does not bruise/bleed easily.   Skin: Negative for color change, dry skin, flushing, itching, nail changes, poor wound healing, rash, skin cancer, suspicious lesions and unusual hair distribution.   Musculoskeletal: Positive for arthritis, back pain, joint swelling, muscle weakness and stiffness. Negative for falls, gout, joint pain, muscle cramps and myalgias.   Gastrointestinal: Negative for abdominal pain, anorexia, change in bowel habit, constipation, diarrhea, dysphagia, heartburn, hematemesis, hematochezia, melena and vomiting.   Genitourinary: Negative for decreased libido, dysuria, hematuria, hesitancy and urgency.   Neurological: Negative for excessive daytime  "Limits:   Treatment Current Session Time   CANALITH  REPOSITIONING TREATMENT  (CPT 14825) Y/N Notes Not performed   CRT      NEURO RE-ED  (CPT 17570) Y/N Notes 53-67 Minutes   BPPV ASSESSMENT N No subjective c/o dizziness   Vestibulo oculomotor exam   (6/8) See objective section   VOR CANCELLATION                      Standing H/VVOR-C no HVOR SSV 30 sec x 2  VVOR SSV 30 sec x 2 looking up pt has retinal slip   Ambulation w/ H/VVOR-C     VOR / GAZE STABILITY     Seated H/VVOR  Retinal slip during L head turn at SSV   Standing H/VVOR no              Y              no Standing FA 5' from 1\" B on plain background  HVORx1: 120 bpm x60\", no dizziness or blurring, mild balance  VVORx1: 120 bpm x60\", no retinal sleip, no balance deficit  *pt reports 140 bpm for HEP; pt terminates     Standing, semi tandem, 5' from 1\" B on plain background  HVORx1: 105 bpm x60\", increase in lateral sway, no dizziness  VVORx1: 105 bpm x60\",  increase in lateral sway, no dizziness    SSV Standing on airex FA central and peripheral distraction with yellow wands  HVOR: 30 sec x 2 no difficulty   VVOR 30 sec x 2 no difficulty   Ambulation w/H/VVOR no HVOR: Central and peripheral distraction mild balance deficits  VVOR: Cental and peripheral distraction with wands mild balance deficits   H/VVOR on compliant surfaces     Functional VOR     HABITUATION     Ball circles     Repetitive functional movements     BALANCE- STATIC     On floor yes Semi tandem 2x60\"  Semi tandem with head turns x10  Semi tandem with head nods x10  Tandem 4x15\" EO   Airex foam y NBOS with head turns x10  NBOS with head nods x10  NBOS with EC 4x15\"  Semi tandem 4x30\"   Rockerboard     BALANCE- DYNAMIC     Ambulation-head turns/nods y Walking with head turns - 2x80 ft  Walking with head nods - 2x80 ft  Walking backwards - 2x80 ft    Ambulation - 180 & 360 degree turns y Walking with 180* turns - 2x80 ft   Retro ambulation EO     Ambulation with EC     Obstacles     Single leg " "sleepiness, dizziness, focal weakness, headaches, light-headedness, loss of balance, numbness, paresthesias, seizures, sensory change, tremors, vertigo and weakness.   Psychiatric/Behavioral: Negative for altered mental status, depression, hallucinations, memory loss, substance abuse and suicidal ideas. The patient does not have insomnia and is not nervous/anxious.    Allergic/Immunologic: Negative for environmental allergies and hives.       Objective: BP (!) 170/78 (BP Location: Left arm, Patient Position: Sitting, BP Method: Large (Automatic))   Pulse 71   Ht 5' 2" (1.575 m)   Wt 113.4 kg (250 lb)   BMI 45.73 kg/m²      Physical Exam  Constitutional:       Appearance: She is well-developed and well-nourished.   HENT:      Head: Normocephalic.   Eyes:      Extraocular Movements: EOM normal.      Pupils: Pupils are equal, round, and reactive to light.   Neck:      Thyroid: No thyromegaly.      Vascular: Normal carotid pulses. No carotid bruit, hepatojugular reflux or JVD.   Cardiovascular:      Rate and Rhythm: Normal rate and regular rhythm.      Pulses: Intact distal pulses.      Heart sounds: Murmur heard.    Harsh midsystolic murmur is present at the upper right sternal border radiating to the neck.  No friction rub. No gallop.    Pulmonary:      Effort: Pulmonary effort is normal. No tachypnea or respiratory distress.      Breath sounds: Normal breath sounds. No wheezing or rales.   Chest:      Chest wall: No tenderness.   Abdominal:      General: Bowel sounds are normal. There is no distension.      Palpations: Abdomen is soft. There is no mass.      Tenderness: There is no abdominal tenderness. There is no guarding or rebound.   Musculoskeletal:         General: No tenderness or edema. Normal range of motion.      Cervical back: Normal range of motion.   Lymphadenopathy:      Cervical: No cervical adenopathy.   Skin:     General: Skin is warm.      Findings: No erythema or rash.   Neurological:      " Mental Status: She is alert and oriented to person, place, and time.      Cranial Nerves: No cranial nerve deficit.      Coordination: Coordination normal.   Psychiatric:         Mood and Affect: Mood and affect normal.         Behavior: Behavior normal.         Thought Content: Thought content normal.         Judgment: Judgment normal.         Assessment:     1. S/P AVR (aortic valve replacement)    2. Essential hypertension    3. Hyperlipidemia, mixed    4. BMI 45.0-49.9, adult    5. Atherosclerosis of aorta    6. Pulmonary HTN    7. Bilateral pulmonary embolism    8. Fatty liver    9. 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 Stage 3 chronic kidney disease, unspecified whether stage 3a or 3b CKD    11. Malignant neoplasm of upper-outer quadrant of right breast in female, estrogen receptor negative    12. Degeneration of lumbar or lumbosacral intervertebral disc        Plan:   Discussed diet , achieving and maintaining ideal body weight, and exercise.   We reviewed meds in detail.  Reassured-Discussed goals, options, plan.  Omega-3 > 800 mg/d combined EPA/DHA.  Goal BP< 130/80.  Goal LDL < 70 or at least < 100  We have discussed the need for endocarditis prophylaxis.    Can repeat echo this year or next  An SGLT2I like Farxiga or Jardiance 10 mg would be much better that the Generic Amaryl for sugar but also to reduce CVD events especially Heart Failure       Alina was seen today for s/p avr (aortic valve replacement) .    Diagnoses and all orders for this visit:    S/P AVR (aortic valve replacement)  -     Lipid Panel; Future; Expected date: 04/09/2023  -     Comprehensive Metabolic Panel; Future; Expected date: 04/09/2023  -     EKG 12-lead; Future; Expected date: 02/10/2022  -     Echo; Future; Expected date: 08/09/2022    Essential hypertension  -     Comprehensive Metabolic Panel;  marches     Tandem     OKS     MSQ  SOT  FGA  DVA      (6/8) 0%    (6/13) 20/30   THER-EX   (CPT 81168) Y/N SETS REPS LOAD Not performed   CARDIOVASCULAR               THER ACT  (CPT 42542) Y/N  Not performed   Review pain, meds, falls, vitals, symptoms Y    *Subjective Measures   ABC        (6/8) 76%   Patient Education/HEP  (6/8) Results of physical exam, central vs peripheral signs, vestibular system anatomy and physiology, VOR. Demonstrates good understanding.     (6/13) Initiated HEP with VORx1, handout provided and reviewed.          ASSESSMENT: Pt was able to tolerate the entire session today. Pt begins the session with standing VOR exercises, progressing to semi tandem stance as pt reports that she self progressed to 140 bpm, attempting 150 bpm. PT educates pt that for her functional level, 120 bpm is sufficient, concentration will shift to stressing an additional variable with foot stance; pt agrees. Pt progresses to standing VOR with semi tandem at 105 bpm without an overt loss of balance, however appropriately challenged. Pt then transitions to static and dynamic balance exercises. Pt denies pain or vestibular symptoms post session. Pt would continue to benefit from skilled OP PT intervention in order to increase functional mobility and return to prior lifestyle.      PLAN: progress static/dynamic balance exercises   Future; Expected date: 04/09/2023  -     TSH; Future; Expected date: 04/09/2023  -     EKG 12-lead; Future; Expected date: 02/10/2022  -     Echo; Future; Expected date: 08/09/2022    Hyperlipidemia, mixed  -     Lipid Panel; Future; Expected date: 04/09/2023  -     Comprehensive Metabolic Panel; Future; Expected date: 04/09/2023  -     TSH; Future; Expected date: 04/09/2023    BMI 45.0-49.9, adult  -     TSH; Future; Expected date: 04/09/2023    Atherosclerosis of aorta    Pulmonary HTN  -     Echo; Future; Expected date: 08/09/2022    Bilateral pulmonary embolism    Fatty liver    Diabetes mellitus due to underlying condition with stage 3 chronic kidney disease, without long-term current use of insulin, unspecified whether stage 3a or 3b CKD  -     Comprehensive Metabolic Panel; Future; Expected date: 04/09/2023  -     Hemoglobin A1C; Future; Expected date: 04/09/2023    Stage 3 chronic kidney disease, unspecified whether stage 3a or 3b CKD  -     Comprehensive Metabolic Panel; Future; Expected date: 04/09/2023    Malignant neoplasm of upper-outer quadrant of right breast in female, estrogen receptor negative    Degeneration of lumbar or lumbosacral intervertebral disc            Follow up in about 15 months (around 5/9/2023) for with labs; ECG today; [facility] [place] to read.

## 2022-08-02 ENCOUNTER — TELEPHONE (OUTPATIENT)
Dept: INTERNAL MEDICINE | Facility: CLINIC | Age: 77
End: 2022-08-02
Payer: MEDICARE

## 2022-08-03 ENCOUNTER — PATIENT MESSAGE (OUTPATIENT)
Dept: ORTHOPEDICS | Facility: CLINIC | Age: 77
End: 2022-08-03
Payer: MEDICARE

## 2022-08-03 DIAGNOSIS — M25.562 ACUTE PAIN OF BOTH KNEES: Primary | ICD-10-CM

## 2022-08-03 DIAGNOSIS — M25.561 ACUTE PAIN OF BOTH KNEES: Primary | ICD-10-CM

## 2022-08-09 ENCOUNTER — HOSPITAL ENCOUNTER (OUTPATIENT)
Dept: CARDIOLOGY | Facility: HOSPITAL | Age: 77
Discharge: HOME | End: 2022-08-09
Attending: INTERNAL MEDICINE
Payer: MEDICARE

## 2022-08-09 VITALS
WEIGHT: 245 LBS | BODY MASS INDEX: 45.08 KG/M2 | SYSTOLIC BLOOD PRESSURE: 130 MMHG | HEART RATE: 84 BPM | HEIGHT: 62 IN | DIASTOLIC BLOOD PRESSURE: 70 MMHG

## 2022-08-09 DIAGNOSIS — I27.20 PULMONARY HTN: ICD-10-CM

## 2022-08-09 DIAGNOSIS — I10 ESSENTIAL HYPERTENSION: ICD-10-CM

## 2022-08-09 DIAGNOSIS — Z95.2 S/P AVR (AORTIC VALVE REPLACEMENT): ICD-10-CM

## 2022-08-09 LAB
ASCENDING AORTA: 3.24 CM
AV INDEX (PROSTH): 0.32
AV MEAN GRADIENT: 30 MMHG
AV PEAK GRADIENT: 43 MMHG
AV VALVE AREA: 0.87 CM2
AV VELOCITY RATIO: 0.29
BSA FOR ECHO PROCEDURE: 2.2 M2
CV ECHO LV RWT: 0.39 CM
DOP CALC AO PEAK VEL: 3.29 M/S
DOP CALC AO VTI: 71.62 CM
DOP CALC LVOT AREA: 2.7 CM2
DOP CALC LVOT DIAMETER: 1.85 CM
DOP CALC LVOT PEAK VEL: 0.95 M/S
DOP CALC LVOT STROKE VOLUME: 62.46 CM3
DOP CALC MV VTI: 37.01 CM
DOP CALCLVOT PEAK VEL VTI: 23.25 CM
E WAVE DECELERATION TIME: 199.23 MSEC
E/A RATIO: 0.89
E/E' RATIO: 18.31 M/S
ECHO LV POSTERIOR WALL: 0.9 CM (ref 0.6–1.1)
EJECTION FRACTION: 58 %
FRACTIONAL SHORTENING: 33 % (ref 28–44)
INTERVENTRICULAR SEPTUM: 0.92 CM (ref 0.6–1.1)
IVRT: 71.36 MSEC
LA MAJOR: 5.77 CM
LA MINOR: 5.59 CM
LA WIDTH: 4.43 CM
LEFT ATRIUM SIZE: 4.52 CM
LEFT ATRIUM VOLUME INDEX MOD: 33 ML/M2
LEFT ATRIUM VOLUME INDEX: 46.5 ML/M2
LEFT ATRIUM VOLUME MOD: 68.63 CM3
LEFT ATRIUM VOLUME: 96.65 CM3
LEFT INTERNAL DIMENSION IN SYSTOLE: 3.1 CM (ref 2.1–4)
LEFT VENTRICLE DIASTOLIC VOLUME INDEX: 48.28 ML/M2
LEFT VENTRICLE DIASTOLIC VOLUME: 100.42 ML
LEFT VENTRICLE MASS INDEX: 69 G/M2
LEFT VENTRICLE SYSTOLIC VOLUME INDEX: 18.2 ML/M2
LEFT VENTRICLE SYSTOLIC VOLUME: 37.95 ML
LEFT VENTRICULAR INTERNAL DIMENSION IN DIASTOLE: 4.66 CM (ref 3.5–6)
LEFT VENTRICULAR MASS: 142.79 G
LV LATERAL E/E' RATIO: 23.8 M/S
LV SEPTAL E/E' RATIO: 14.88 M/S
MV A" WAVE DURATION": 8.85 MSEC
MV MEAN GRADIENT: 1 MMHG
MV PEAK A VEL: 1.34 M/S
MV PEAK E VEL: 1.19 M/S
MV PEAK GRADIENT: 8 MMHG
MV STENOSIS PRESSURE HALF TIME: 61.41 MS
MV VALVE AREA BY CONTINUITY EQUATION: 1.69 CM2
MV VALVE AREA P 1/2 METHOD: 3.58 CM2
PISA TR MAX VEL: 2.64 M/S
PULM VEIN S/D RATIO: 1.02
PV PEAK D VEL: 0.48 M/S
PV PEAK S VEL: 0.49 M/S
RA MAJOR: 5.67 CM
RA PRESSURE: 3 MMHG
RA WIDTH: 4.26 CM
RIGHT VENTRICULAR END-DIASTOLIC DIMENSION: 3.62 CM
RV TISSUE DOPPLER FREE WALL SYSTOLIC VELOCITY 1 (APICAL 4 CHAMBER VIEW): 7.68 CM/S
SINUS: 2.44 CM
STJ: 2.14 CM
TDI LATERAL: 0.05 M/S
TDI SEPTAL: 0.08 M/S
TDI: 0.07 M/S
TR MAX PG: 28 MMHG
TRICUSPID ANNULAR PLANE SYSTOLIC EXCURSION: 2.08 CM
TV REST PULMONARY ARTERY PRESSURE: 31 MMHG

## 2022-08-09 PROCEDURE — C8929 TTE W OR WO FOL WCON,DOPPLER: HCPCS

## 2022-08-09 PROCEDURE — 25500020 PHARM REV CODE 255: Performed by: INTERNAL MEDICINE

## 2022-08-09 PROCEDURE — 93306 ECHO (CUPID ONLY): ICD-10-PCS | Mod: 26,,, | Performed by: INTERNAL MEDICINE

## 2022-08-09 PROCEDURE — 93306 TTE W/DOPPLER COMPLETE: CPT | Mod: 26,,, | Performed by: INTERNAL MEDICINE

## 2022-08-09 RX ADMIN — HUMAN ALBUMIN MICROSPHERES AND PERFLUTREN 0.66 MG: 10; .22 INJECTION, SOLUTION INTRAVENOUS at 01:08

## 2022-08-10 ENCOUNTER — TELEPHONE (OUTPATIENT)
Dept: ORTHOPEDICS | Facility: CLINIC | Age: 77
End: 2022-08-10
Payer: MEDICARE

## 2022-08-10 NOTE — TELEPHONE ENCOUNTER
I called and spoke to the patient to confirm her xray appointment at  before coming in to see Dr. Hollis.     The patient verbalized understanding and has no further questions.

## 2022-08-11 ENCOUNTER — OFFICE VISIT (OUTPATIENT)
Dept: ORTHOPEDICS | Facility: CLINIC | Age: 77
End: 2022-08-11
Payer: MEDICARE

## 2022-08-11 ENCOUNTER — HOSPITAL ENCOUNTER (OUTPATIENT)
Dept: RADIOLOGY | Facility: HOSPITAL | Age: 77
Discharge: HOME | End: 2022-08-11
Attending: ORTHOPAEDIC SURGERY
Payer: MEDICARE

## 2022-08-11 VITALS — BODY MASS INDEX: 45.29 KG/M2 | WEIGHT: 246.13 LBS | HEIGHT: 62 IN

## 2022-08-11 DIAGNOSIS — M25.562 ACUTE PAIN OF BOTH KNEES: ICD-10-CM

## 2022-08-11 DIAGNOSIS — M25.561 ACUTE PAIN OF BOTH KNEES: ICD-10-CM

## 2022-08-11 DIAGNOSIS — M17.0 PRIMARY OSTEOARTHRITIS OF BOTH KNEES: Primary | ICD-10-CM

## 2022-08-11 PROCEDURE — 3288F FALL RISK ASSESSMENT DOCD: CPT | Mod: CPTII,S$GLB,, | Performed by: ORTHOPAEDIC SURGERY

## 2022-08-11 PROCEDURE — 99999 PR PBB SHADOW E&M-EST. PATIENT-LVL IV: ICD-10-PCS | Mod: PBBFAC,,, | Performed by: ORTHOPAEDIC SURGERY

## 2022-08-11 PROCEDURE — 3288F PR FALLS RISK ASSESSMENT DOCUMENTED: ICD-10-PCS | Mod: CPTII,S$GLB,, | Performed by: ORTHOPAEDIC SURGERY

## 2022-08-11 PROCEDURE — 99214 OFFICE O/P EST MOD 30 MIN: CPT | Mod: S$GLB,,, | Performed by: ORTHOPAEDIC SURGERY

## 2022-08-11 PROCEDURE — 99214 PR OFFICE/OUTPT VISIT, EST, LEVL IV, 30-39 MIN: ICD-10-PCS | Mod: S$GLB,,, | Performed by: ORTHOPAEDIC SURGERY

## 2022-08-11 PROCEDURE — 99999 PR PBB SHADOW E&M-EST. PATIENT-LVL IV: CPT | Mod: PBBFAC,,, | Performed by: ORTHOPAEDIC SURGERY

## 2022-08-11 PROCEDURE — 1159F PR MEDICATION LIST DOCUMENTED IN MEDICAL RECORD: ICD-10-PCS | Mod: CPTII,S$GLB,, | Performed by: ORTHOPAEDIC SURGERY

## 2022-08-11 PROCEDURE — 73564 X-RAY EXAM KNEE 4 OR MORE: CPT | Mod: 26,50,, | Performed by: RADIOLOGY

## 2022-08-11 PROCEDURE — 1101F PT FALLS ASSESS-DOCD LE1/YR: CPT | Mod: CPTII,S$GLB,, | Performed by: ORTHOPAEDIC SURGERY

## 2022-08-11 PROCEDURE — 1101F PR PT FALLS ASSESS DOC 0-1 FALLS W/OUT INJ PAST YR: ICD-10-PCS | Mod: CPTII,S$GLB,, | Performed by: ORTHOPAEDIC SURGERY

## 2022-08-11 PROCEDURE — 1157F ADVNC CARE PLAN IN RCRD: CPT | Mod: CPTII,S$GLB,, | Performed by: ORTHOPAEDIC SURGERY

## 2022-08-11 PROCEDURE — 73564 XR KNEE ORTHO BILAT WITH FLEXION: ICD-10-PCS | Mod: 26,50,, | Performed by: RADIOLOGY

## 2022-08-11 PROCEDURE — 1157F PR ADVANCE CARE PLAN OR EQUIV PRESENT IN MEDICAL RECORD: ICD-10-PCS | Mod: CPTII,S$GLB,, | Performed by: ORTHOPAEDIC SURGERY

## 2022-08-11 PROCEDURE — 1159F MED LIST DOCD IN RCRD: CPT | Mod: CPTII,S$GLB,, | Performed by: ORTHOPAEDIC SURGERY

## 2022-08-11 PROCEDURE — 73564 X-RAY EXAM KNEE 4 OR MORE: CPT | Mod: TC,50

## 2022-08-11 NOTE — PROGRESS NOTES
Subjective:     HPI:   Alina Narayan is a 77 y.o. female who presents for eval B knee L>R pain ref by Dr Dunn    She has had years of bilateral knee pain left more severe than right moderate to severe global in nature activity related relieved with rest.    Of note she did have a fall on to the left anterior knee was hospitalized for 2 days with what sounds like a prepatellar hematoma she did not have any surgery.    Today she also complains of some burning pain intermittently down the left leg that is been going on for a year goes down from the anterior knee down to the anterior shin and into her foot.  She is on gabapentin    Medications: Aleve PRN a few times a month, ices knees in recliner, [place]    Injections: 2017 bilateral knee iaCSI with [place], the patient reported injections helped.  2015 the patient has had bilateral knee iaCSI which helped with her pain.   Offered CSI injections but patient worried about her blood sugars  Has had HA injections in the past with minimal relief    Physical Therapy: None. The [facility] [employer] and does water aerobics.     Bracing: None     Assistive Devices: Yes, Rollator, the patient stated that she uses the Rollator everywhere she goes.     Walking:   < 1 block    Limitations: General walking, difficulty going up/down steps, difficulty rising from sitting  and difficulty standing for long periods of time        Occupation: Retired - the patient retired from [employer]     Social support: The patient stated that they live at home with their . The patient stated that she doesn't have anyone in the area that would be able to help take care of them if they were to have surgery. The patient stated that her  is not in good shape to take care of her due to the patient's husbands legs.     Knows Molly Hull - previous TKA patient     ROS:  The updated medical history is in the chart and has been reviewed. A  review of systems is updated and there is no reported vision changes, ear/nose/mouth/throat complaints,  chest pain, shortness of breath, abdominal pain, urological complaints, fevers or chills, psychiatric complaints. Musculoskeletal and neurologcial symptoms are as documented. All other systems are negative.      Objective:   Exam:  There were no vitals filed for this visit.  Body mass index is 45.02 kg/m².    Physical examination included assessment of the patient's general appearance with particular attention to development, nutrition, body habitus, attention to grooming, and any evidence of distress.  Constitutional: The patient is a well-developed, well-nourished patient in no acute distress.   Cardiovascular: Vascular examination included warmth and capillary refill as well inspection for edema and assessment of pedal pulses. Pulses are palpable and regular.  Musculoskeletal: Gait was assessed as to whether it was steady, non-antalgic, and/or required the use of an assist device. The patient was also asked to walk independently and get onto the examination table.  Skin: The skin was examined for any obvious rashes or lesions in the extremity.  Neurologic: Sensation is intact to light touch in the extremity. The patient has good coordination without hyperreflexia and is alert and oriented to person, place and time and has normal mood and affect.     All of the above were examined and found to be within normal limits except for the following pertinent clinical findings:    No limp nonantalgic gait but does have bilateral Trendelenburg.  Both knees 0-115 degree knee range of motion 0° alignment which does correct to neutral.  She is tender palpation mediolateral patellofemoral joint lines with mild effusion knees are stable anterior-posterior varus valgus stresses no flexion contractures or extensor lags.    B hips: no pain with active straight leg raise but diffuse pain with IR/ER B hips      Imaging:    KNEE R  "ARTHRITIS    Indication:  Right knee pain  Exam Ordered: Radiographs of the right knee include a standing anteroposterior view, a standing posterioanterior view, a lateral view in full flexion, and a sunrise view  Details of Examination: Exam shows evidence of joint space narrowing, osteophyte formation, and subchondral sclerosis, all consistent with degenerative arthritis of the knee.  No other significant findings are noted.  Impression:  Degenerative Arthritis, Right Knee and KNEE L ARTHRITIS     Indication:  Left knee pain  Exam Ordered: Radiographs of the left knee include a standing anteroposterior view, a standing posterioanterior view, a lateral view in full flexion, and a sunrise view  Details of Examination: Exam shows evidence of joint space narrowing, osteophyte formation, and subchondral sclerosis, all consistent with degenerative arthritis of the knee.  No other significant findings are noted.  Impression:  Degenerative Arthritis, Left Knee    B knee grade 4 varus arthritis, bone on bone, L worse than R      Assessment:       ICD-10-CM ICD-9-CM   1. 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 We discussed the risks of conservative versus surgical management knee arthritis. Conservative management consisting of anti-inflammatory medications, glucosamine/chondroitin sulfate, weight loss, physical therapy, activity modification, as well as injections (lubricant versus corticosteroid) was discussed at length. At this point considering the patient's level of activity, pain, and radiographic findings I recommend continued conservative management of the knee arthritis and TKA when medically optimized    The patient was given a handout with treatment strategies for hip " and knee joint care prior to surgery from [employer], the [employer].   This included information regarding medications, injections, weight loss, exercise, braces, physical therapy, and alternative therapies.     I explained the potentially adverse gastric, cardiac, and renal effects of NSAIDs and explained that if the patient wishes to take it for longer that the patient should discuss this with their primary care physician to determine if it is safe to do so.    x Tylenol    Aleve   x Voltaren Gel   x [facility] non-op arthritis info    Bursitis info    Total Joint Info   x HEP: [facility] [employer] home exercise conditioning program    x PT    CSI: intra-articular steroid injection    CSI: greater trochanter bursitis    HA: hyaluronic acid injection    Brace:    x Referral:      Offered CSI injections but patient worried about her blood sugars  Has had HA injections in the past with minimal relief    Need BMI <45 and A1c <8.0 for TKA    Ref to bariatric medicine for wt loss options  Ref to pain clinic for gen block/RFFA    F/u 3 months with AP pelvis XR, recheck BMI    Orders Placed This Encounter   Procedures    Ambulatory referral/consult to Pain Clinic     Standing Status:   Future     Standing Expiration Date:   9/11/2023     Referral Priority:   Routine     Referral Type:   Consultation     Referral Reason:   Specialty Services Required     Requested Specialty:   Pain Medicine     Number of Visits Requested:   1    Ambulatory referral/consult to Physical/Occupational Therapy     Standing Status:   Future     Standing Expiration Date:   9/11/2023     Referral Priority:   Routine     Referral Type:   Physical Medicine     Referral Reason:   Specialty Services Required     Number of Visits Requested:   1    Ambulatory referral/consult to Bariatric Medicine     Standing Status:   Future     Standing Expiration Date:   9/11/2023     Referral Priority:   Routine     Referral Type:    Consultation     Referral Reason:   Specialty Services Required     Requested Specialty:   Bariatrics     Number of Visits Requested:   1             Past Medical History:   Diagnosis Date    Allergy     seasonal    Anxiety     Arthritis     Breast cancer 10/2012    left breast invasive ductal carcinoma    Colon polyp     Depression     Diabetes mellitus     Type 2    Diabetes mellitus, type 2     Diverticular disease     Diverticulitis 2009    Genetic testing 05/2017    negative Integrated BRACAnalysis    Hyperlipidemia     Hypertension     Morbid obesity     MITCHELL (obstructive sleep apnea)     Pulmonary embolism     Stenosis     Stenosis and insufficiency of lacrimal passages     Thyroid disease        Past Surgical History:   Procedure Laterality Date    BREAST BIOPSY Left 10/1/2012    left breast- invasive ductal carcinoma    BREAST BIOPSY Left 12/2017    BREAST LUMPECTOMY Left 2012    CARDIAC VALVE REPLACEMENT  12/2013    CARDIAC VALVE SURGERY  2013    CARPAL TUNNEL RELEASE      Left    COLONOSCOPY N/A 9/29/2017    Procedure: COLONOSCOPY;  Surgeon: Phani Worley MD;  [facility]: [facility] (4TH FLR);  Service: Endoscopy;  Laterality: N/A;    DILATION AND CURETTAGE OF UTERUS  1999    Endometrial polyps    ENDOMETRIAL ABLATION  1999    Enodmetrial polyps    EYE SURGERY      INSERTION OF TUNNELED CENTRAL VENOUS CATHETER (CVC) WITH SUBCUTANEOUS PORT Right 2/1/2019    Procedure: [id number];  Surgeon: Gaston Flores MD;  Location: [facility] OR 2[facility];  Service: General;  Laterality: Right;    left lumpectomy  2012    MEDIPORT REMOVAL Right 8/26/2019    Procedure: REMOVAL, CATHETER, CENTRAL VENOUS, TUNNELED, WITH PORT;  Surgeon: Gaston Flores MD;  Location: [facility] OR [facility];  Service: General;  Laterality: Right;    MODIFIED RADICAL MASTECTOMY W/ AXILLARY LYMPH NODE DISSECTION Right 8/26/2019    Procedure: MASTECTOMY, MODIFIED RADICAL;  Surgeon: Gaston Flores MD;   "Location: [facility] OR [address] [place] [address];  Service: General;  Laterality: Right;    SHOULDER SURGERY      SKIN BIOPSY         Family History   Problem Relation Age of Onset    Breast cancer Mother 62    Colon cancer Father     Cancer Father         Colon CA (cause of death); Prostate CA (no chemo)    Heart disease Father     No Known Problems Sister     Alcohol abuse Brother     Cancer Maternal Grandfather         Colon CA    No Known Problems Son     Cancer Brother         prostate CA, no chemo, "it was bad"    Anxiety disorder Son     SAL disease Son     Sleep disorder Son     Ovarian cancer Neg Hx     Melanoma Neg Hx     Psoriasis Neg Hx     Lupus Neg Hx     Eczema Neg Hx     Acne Neg Hx        Social History     Socioeconomic History    Marital status:      Spouse name: Srinath    Number of children: 2   Occupational History    Occupation: Retired   Tobacco Use    Smoking status: Never Smoker    Smokeless tobacco: Never Used   Substance and Sexual Activity    Alcohol use: No     Alcohol/week: 0.0 standard drinks    Drug use: No    Sexual activity: Yes     Partners: Male   Other Topics Concern    Are you pregnant or think you may be? No    Breast-feeding No     Social Determinants of Health     Financial Resource Strain: Low Risk     Difficulty of Paying Living Expenses: Not hard at all   Food Insecurity: No Food Insecurity    Worried About Running Out of Food in the Last Year: Never true    Ran Out of Food in the Last Year: Never true   Transportation Needs: No Transportation Needs    Lack of Transportation (Medical): No    Lack of Transportation (Non-Medical): No   Physical Activity: Unknown    Days of Exercise per Week: 0 days   Stress: No Stress Concern Present    Feeling of Stress : Only a little   Social Connections: Unknown    Frequency of Communication with Friends and Family: Three times a week    Frequency of Social Gatherings with Friends and Family: Once a week    Active " Member of Clubs or Organizations: Yes    Attends Club or Organization Meetings: More than 4 times per year    Marital Status:    Housing Stability: Unknown    Unable to Pay for Housing in the Last Year: No    Unstable Housing in the Last Year: No                  Pt seen for RN consult 5/29 for pressure ulcer > stage II. Pt admitted from [address] [place] [address] Rehab with hx quadriplegia with tracheotomy and on vent support presents with sepsis, hypernatremia & chronic resp failure. Pt seen for RN consult 5/29 for pressure ulcer > stage II. Pt admitted from [address] [place] [address] Rehab with hx quadriplegia with tracheotomy and on vent support presents with sepsis, hypernatremia & chronic resp failure. Pt with s/p IVF bolus 4200ml (5/28-5/30) due to dehydration; pt receiving IVF D5W @75ml/hr & free water 250ml q4h.  Pt received total volume 885ml x 24 hours=1062kcal & 53gm protein.

## 2022-08-19 NOTE — PROGRESS NOTES
[facility] [address] [place] [address]  Phone: [phone]  Fax: [phone]    JULIENNE Pablo CNP        September 5, 2018     Patient: Alfred Arreola   YOB: 1992   Date of Visit: 9/5/2018       To Whom It May Concern: It is my medical opinion that [employer] may return to work on 9/6/18. If you have any questions or concerns, please don't hesitate to call.     Sincerely,          JULIENNE Pablo CNP Summary:  2nd attempt to complete initial assessments    Interventions:  Left message briefly explaining the program and requesting a return call to discuss possible enrollment   Sent letter via pt portal and left a message regarding pt receiving am email    Plan:  Follow-up on 3-1 if no return call       Statement Selected

## 2022-08-22 ENCOUNTER — TELEPHONE (OUTPATIENT)
Dept: BARIATRICS | Facility: CLINIC | Age: 77
End: 2022-08-22
Payer: MEDICARE

## 2022-08-22 NOTE — TELEPHONE ENCOUNTER
Phoned pt in regard to scheduling consult for medical wl. Pt stated she is not interested in surgical wl or receiving medication for wl. Pt stated she has had cancer twice and has diabetes and does not want to take any more medications.      Gave pt the number to get resources on wl [phone].

## 2022-08-23 RX ORDER — LEVOTHYROXINE SODIUM 75 UG/1
TABLET ORAL
Qty: 90 TABLET | Refills: 1 | Status: SHIPPED | OUTPATIENT
Start: 2022-08-23 | End: 2023-03-08 | Stop reason: SDUPTHER

## 2022-08-23 NOTE — TELEPHONE ENCOUNTER
Refill Decision Note   Alina Wilkesug  is requesting a refill authorization.  Brief Assessment and Rationale for Refill:  Approve     Medication Therapy Plan:       Medication Reconciliation Completed: No   Comments:     No Care Gaps recommended.     Note composed:1:25 PM 08/23/2022

## 2022-08-23 NOTE — TELEPHONE ENCOUNTER
No new care gaps identified.  [facility] Embedded Care Gaps. Reference number: [id number]. 8/23/2022   10:50:39 AM DHAVALT

## 2022-08-26 ENCOUNTER — PATIENT MESSAGE (OUTPATIENT)
Dept: ORTHOPEDICS | Facility: CLINIC | Age: 77
End: 2022-08-26
Payer: MEDICARE

## 2022-08-26 ENCOUNTER — TELEPHONE (OUTPATIENT)
Dept: BARIATRICS | Facility: CLINIC | Age: 77
End: 2022-08-26
Payer: MEDICARE

## 2022-08-27 ENCOUNTER — TELEPHONE (OUTPATIENT)
Dept: ORTHOPEDICS | Facility: HOSPITAL | Age: 77
End: 2022-08-27
Payer: MEDICARE

## 2022-08-29 DIAGNOSIS — M25.551 RIGHT HIP PAIN: Primary | ICD-10-CM

## 2022-08-30 ENCOUNTER — HOSPITAL ENCOUNTER (OUTPATIENT)
Dept: RADIOLOGY | Facility: HOSPITAL | Age: 77
Discharge: HOME | End: 2022-08-30
Attending: ORTHOPAEDIC SURGERY
Payer: MEDICARE

## 2022-08-30 DIAGNOSIS — M25.551 RIGHT HIP PAIN: ICD-10-CM

## 2022-08-30 PROCEDURE — 72170 XR PELVIS ROUTINE AP: ICD-10-PCS | Mod: 26,,, | Performed by: RADIOLOGY

## 2022-08-30 PROCEDURE — 72170 X-RAY EXAM OF PELVIS: CPT | Mod: 26,,, | Performed by: RADIOLOGY

## 2022-08-30 PROCEDURE — 72170 X-RAY EXAM OF PELVIS: CPT | Mod: TC

## 2022-09-12 ENCOUNTER — TELEPHONE (OUTPATIENT)
Dept: ENDOSCOPY | Facility: HOSPITAL | Age: 77
End: 2022-09-12
Payer: MEDICARE

## 2022-09-12 NOTE — TELEPHONE ENCOUNTER
Pt needs to be scheduled for a Colonoscopy.  Pt is currently taking Eliquis.  Per endoscopy protocol it should be held x 2 days prior.  Ok to hold?  Please advise.  Thanks

## 2022-09-13 NOTE — PROGRESS NOTES
[facility]   Physical Therapy Initial Evaluation     Date: 9/14/2022   Name: Alina Narayan  Clinic Number: [id number]    Therapy Diagnosis:   Encounter Diagnoses   Name Primary?    Primary osteoarthritis of both knees     Chronic pain of both knees      Physician: Kameron Hollis III, *    Physician Orders: PT Eval and Treat   Medical Diagnosis from Referral: M17.0 (ICD-10-CM) - Primary osteoarthritis of both knees   Evaluation Date: 9/14/2022  Authorization Period Expiration: 8/11/2023  Plan of Care Expiration: 11/9/2022  Visit # / Visits authorized: 1/ pending auth   FOTO: 1/10    Precautions: Standard     Time In: 0105  Time Out: 0155  Total Appointment Time (timed & untimed codes): 50 minutes      SUBJECTIVE     Date of onset: Chronic in Nature been Years since     History of current condition - Alina reports: Pt states she recently saw Dr. Hollis for increased complaints of pain in both of her knees that is long standing.  Prior to Covid she was participating in water exercise at [place],  She states she was prescribed a cream for pain management for her knees. Pt states at her last visit with ortho she was told she was not a candidate for TKA due to multiple medical conditions.  Patient is concerned with her ability to tolerate land based exercises due to the pain she has in her knees with activity.  She is interested in aquatics therapy and would like to try this when there is an available opening in the schedule.      Falls: Major one in 2018     Imaging, x-rays: EXAMINATION:  XR KNEE ORTHO BILAT WITH FLEXION     CLINICAL HISTORY:  Pain in right knee     TECHNIQUE:  AP standing of both knees, PA flexion standing views of both knees, and Merchant views of both knees were performed.  Lateral views of both knees were also performed.     COMPARISON:  N 07/07/2022 one     FINDINGS:  DJD with narrowing of the medial tibiofemoral joint spaces bilaterally.  Slight narrowing of the  patellofemoral joint spaces also noted.  No fracture or dislocation.  No bone destruction identified    Prior Therapy: None for her knees   Social History:  lives with their spouse  Occupation: Retired   Prior Level of Function: Participated in water based exercises before Covid   Current Level of Function: Limited participation in ADLs and MRADLs at this time     Pain:  Current 1/10, worst 7/10, best 1/10   Location: bilateral knee  generalized   Description: Aching, Throbbing, and Grabbing  Aggravating Factors: Standing and Walking  Easing Factors: hot bath and rest    Patients goals: Be able to tolerate regular aquatic therapy exercises     Medical History:   Past Medical History:   Diagnosis Date    Allergy     seasonal    Anxiety     Arthritis     Breast cancer 10/2012    left breast invasive ductal carcinoma    Colon polyp     Depression     Diabetes mellitus     Type 2    Diabetes mellitus, type 2     Diverticular disease     Diverticulitis 2009    Genetic testing 05/2017    negative Integrated BRACAnalysis    Hyperlipidemia     Hypertension     Morbid obesity     MITCHELL (obstructive sleep apnea)     Pulmonary embolism     Stenosis     Stenosis and insufficiency of lacrimal passages     Thyroid disease        Surgical History:   Alina Narayan  has a past surgical history that includes Carpal tunnel release; Shoulder surgery; Dilation and curettage of uterus (1999); Endometrial ablation (1999); left lumpectomy (2012); Eye surgery; Skin biopsy; Cardiac valve replacement (12/2013); Colonoscopy (N/A, 9/29/2017); Breast lumpectomy (Left, 2012); Breast biopsy (Left, 10/1/2012); Breast biopsy (Left, 12/2017); Cardiac valuve replacement (2013); Insertion of tunneled central venous catheter (CVC) with subcutaneous port (Right, 2/1/2019); Modified radical mastectomy w/ axillary lymph node dissection (Right, 8/26/2019); and Mediport removal (Right, 8/26/2019).    Medications:   Alina has a current medication list which  includes the following prescription(s): amoxicillin, blood sugar diagnostic, blood-glucose meter, carvedilol, cholecalciferol (vitamin d3), eliquis, furosemide, gabapentin, glimepiride, hydrocortisone, ketoconazole, lancets, levothyroxine, metformin, mupirocin, pravastatin, and sutab, and the following Facility-Administered Medications: alteplase, heparin, porcine (pf), heparin, porcine (pf), and sodium chloride 0.9%.    Allergies:   Review of patient's allergies indicates:   Allergen Reactions    Dilaudid [hydromorphone (bulk)] Other (See Comments)     Other reaction(s): sedation    Hydromorphone Other (See Comments)     Other reaction(s): sedation    Cymbalta [duloxetine]      Dry mouth, agitation    Farxiga [dapagliflozin] Other (See Comments)     Recurrent candidal infection    Jardiance [empagliflozin] Hives    Byetta [exenatide] Rash     Other reaction(s): Rash          OBJECTIVE     Observation: Difficulty with sit to stand transition from chair in waiting area    Gait: ambulates with 3 wheel rollator    Posture: B Genu Recurvatum     RANGE OF MOTION: WFL into Knee Flexion and Extension B     MMT: 4/5 in Major muscle groups of hips and knee supporting musculature    Functional Tests:   TU.8 sec with 3 wheel rollator    30 sec sit to stand: 10 reps without UE assist    SLS: Unable to/refused to due to fear of knees buckling and falling          Limitation/Restriction for FOTO NT  Survey    Therapist reviewed FOTO scores for Alina Narayan on 2022.   FOTO documents entered into [employer] - see Media section.    Limitation Score: NT %         TREATMENT     Total Treatment time (time-based codes) separate from Evaluation: 00 minutes      Alina received the treatments listed below:          PATIENT EDUCATION AND HOME EXERCISES     Education provided:   - To provide at next visit     Written Home Exercises Provided: yes. To e provided at next visit. Exercises were reviewed and Alina was able to demonstrate them  prior to the end of the session.  Alina demonstrated good  understanding of the education provided. See EMR under Patient Instructions for exercises provided during therapy sessions.    ASSESSMENT     Alina is a 77 y.o. female referred to outpatient Physical Therapy with a medical diagnosis of chronic B knee pain. Patient presents with long history of knee pain with progressed OA in her knees that is limiting her current tolerance with ADLs, and MRADLs.  Pt is a good candidate to participate in aquatics therapy program to facilitate active participation in therapy.      Patient prognosis is Good.     Patientt will benefit from skilled outpatient Physical Therapy to address the deficits stated above and in the chart below, provide patient /family education, and to maximize patientt's level of independence.     Plan of care discussed with patient: Yes  Patient's spiritual, cultural and educational needs considered and patient is agreeable to the plan of care and goals as stated below:     Anticipated Barriers for therapy: Pain in knees     Medical Necessity is demonstrated by the following  History  Co-morbidities and personal factors that may impact the plan of care Co-morbidities:   diabetes, high BMI, history of cancer, HTN, and Open heart sx, Double masectomy     Personal Factors:   age     moderate   Examination  Body Structures and Functions, activity limitations and participation restrictions that may impact the plan of care Body Regions:   lower extremities    Body Systems:    ROM  strength  balance  gait  transfers  transitions    Participation Restrictions:   Self Limiting     Activity limitations:   Learning and applying knowledge  no deficits    General Tasks and Commands  no deficits    Communication  no deficits    Mobility  no deficits    Self care  no deficits    Domestic Life  no deficits    Interactions/Relationships  no deficits    Life Areas  no deficits    Community and Social Life  no deficits          low   Clinical Presentation stable and uncomplicated low   Decision Making/ Complexity Score: low     GOALS: Short Term Goals:  4 weeks  1.Report decreased B knee pain  < / =  3/10  to increase tolerance for ADLs and MRADLs.   2. Increase strength by 1/3 MMT grade in B knee and hip mm  to increase tolerance for ADL and work activities.  3. Pt to tolerate HEP to improve ROM and independence with ADL's      Long Term Goals: 8 weeks  1.Report decreased B knee pain < / = 1-2/10  to increase tolerance for ADLs and MRADLs   2. Patient goal: tolerate in all ADLs and MRADLs   3. Improve MMT in weak mm to 4+/5 for knee support   PLAN   Plan of care Certification: 9/14/2022 to 11/9/2022.    Outpatient Physical Therapy 1 times weekly for 8 weeks to include the following interventions: Patient Education, Therapeutic Activities, and Therapeutic Exercise.     Estelle Dc, PT      I CERTIFY THE NEED FOR THESE SERVICES FURNISHED UNDER THIS PLAN OF TREATMENT AND WHILE UNDER MY CARE   Physician's comments:     Physician's Signature: ___________________________________________________

## 2022-09-14 ENCOUNTER — PATIENT MESSAGE (OUTPATIENT)
Dept: ENDOSCOPY | Facility: HOSPITAL | Age: 77
End: 2022-09-14
Payer: MEDICARE

## 2022-09-14 ENCOUNTER — TELEPHONE (OUTPATIENT)
Dept: ENDOSCOPY | Facility: HOSPITAL | Age: 77
End: 2022-09-14
Payer: MEDICARE

## 2022-09-14 ENCOUNTER — CLINICAL SUPPORT (OUTPATIENT)
Dept: REHABILITATION | Facility: HOSPITAL | Age: 77
End: 2022-09-14
Attending: ORTHOPAEDIC SURGERY
Payer: MEDICARE

## 2022-09-14 DIAGNOSIS — G89.29 CHRONIC PAIN OF BOTH KNEES: ICD-10-CM

## 2022-09-14 DIAGNOSIS — M25.561 CHRONIC PAIN OF BOTH KNEES: ICD-10-CM

## 2022-09-14 DIAGNOSIS — M17.0 PRIMARY OSTEOARTHRITIS OF BOTH KNEES: ICD-10-CM

## 2022-09-14 DIAGNOSIS — M25.562 CHRONIC PAIN OF BOTH KNEES: ICD-10-CM

## 2022-09-14 DIAGNOSIS — Z12.11 SPECIAL SCREENING FOR MALIGNANT NEOPLASMS, COLON: Primary | ICD-10-CM

## 2022-09-14 PROCEDURE — 97161 PT EVAL LOW COMPLEX 20 MIN: CPT

## 2022-09-14 RX ORDER — SOD SULF/POT CHLORIDE/MAG SULF 1.479 G
12 TABLET ORAL DAILY
Qty: 24 TABLET | Refills: 0 | Status: SHIPPED | OUTPATIENT
Start: 2022-09-14 | End: 2023-03-08

## 2022-09-14 NOTE — PLAN OF CARE
[facility]   Physical Therapy Initial Evaluation     Date: 9/14/2022   Name: Alina Narayan  Clinic Number: [id number]    Therapy Diagnosis:   Encounter Diagnoses   Name Primary?    Primary osteoarthritis of both knees     Chronic pain of both knees      Physician: Kameron Hollis III, *    Physician Orders: PT Eval and Treat   Medical Diagnosis from Referral: M17.0 (ICD-10-CM) - Primary osteoarthritis of both knees   Evaluation Date: 9/14/2022  Authorization Period Expiration: 8/11/2023  Plan of Care Expiration: 11/9/2022  Visit # / Visits authorized: 1/ pending auth   FOTO: 1/10    Precautions: Standard     Time In: 0105  Time Out: 0155  Total Appointment Time (timed & untimed codes): 50 minutes      SUBJECTIVE     Date of onset: Chronic in Nature been Years since     History of current condition - Alina reports: Pt states she recently saw Dr. Hollis for increased complaints of pain in both of her knees that is long standing.  Prior to Covid she was participating in water exercise at [place],  She states she was prescribed a cream for pain management for her knees. Pt states at her last visit with ortho she was told she was not a candidate for TKA due to multiple medical conditions.  Patient is concerned with her ability to tolerate land based exercises due to the pain she has in her knees with activity.  She is interested in aquatics therapy and would like to try this when there is an available opening in the schedule.      Falls: Major one in 2018     Imaging, x-rays: EXAMINATION:  XR KNEE ORTHO BILAT WITH FLEXION     CLINICAL HISTORY:  Pain in right knee     TECHNIQUE:  AP standing of both knees, PA flexion standing views of both knees, and Merchant views of both knees were performed.  Lateral views of both knees were also performed.     COMPARISON:  N 07/07/2022 one     FINDINGS:  DJD with narrowing of the medial tibiofemoral joint spaces bilaterally.  Slight narrowing of the  patellofemoral joint spaces also noted.  No fracture or dislocation.  No bone destruction identified    Prior Therapy: None for her knees   Social History:  lives with their spouse  Occupation: Retired   Prior Level of Function: Participated in water based exercises before Covid   Current Level of Function: Limited participation in ADLs and MRADLs at this time     Pain:  Current 1/10, worst 7/10, best 1/10   Location: bilateral knee  generalized   Description: Aching, Throbbing, and Grabbing  Aggravating Factors: Standing and Walking  Easing Factors: hot bath and rest    Patients goals: Be able to tolerate regular aquatic therapy exercises     Medical History:   Past Medical History:   Diagnosis Date    Allergy     seasonal    Anxiety     Arthritis     Breast cancer 10/2012    left breast invasive ductal carcinoma    Colon polyp     Depression     Diabetes mellitus     Type 2    Diabetes mellitus, type 2     Diverticular disease     Diverticulitis 2009    Genetic testing 05/2017    negative Integrated [facility]AC[employer]    Hyperlipidemia     Hypertension     Morbid obesity     MITCHELL (obstructive sleep apnea)     Pulmonary embolism     Stenosis     Stenosis and insufficiency of lacrimal passages     Thyroid disease        Surgical History:   Alina Narayan  has a past surgical history that includes Carpal tunnel release; Shoulder surgery; Dilation and curettage of uterus (1999); Endometrial ablation (1999); left lumpectomy (2012); Eye surgery; Skin biopsy; Cardiac valve replacement (12/2013); Colonoscopy (N/A, 9/29/2017); Breast lumpectomy (Left, 2012); Breast biopsy (Left, 10/1/2012); Breast biopsy (Left, 12/2017); Cardiac valuve replacement (2013); Insertion of tunneled central venous catheter (CVC) with subcutaneous port (Right, 2/1/2019); Modified radical mastectomy w/ axillary lymph node dissection (Right, 8/26/2019); and Mediport removal (Right, 8/26/2019).    Medications:   Alina has a current medication list which  includes the following prescription(s): amoxicillin, blood sugar diagnostic, blood-glucose meter, carvedilol, cholecalciferol (vitamin d3), eliquis, furosemide, gabapentin, glimepiride, hydrocortisone, ketoconazole, lancets, levothyroxine, metformin, mupirocin, pravastatin, and sutab, and the following Facility-Administered Medications: alteplase, heparin, porcine (pf), heparin, porcine (pf), and sodium chloride 0.9%.    Allergies:   Review of patient's allergies indicates:   Allergen Reactions    Dilaudid [hydromorphone (bulk)] Other (See Comments)     Other reaction(s): sedation    Hydromorphone Other (See Comments)     Other reaction(s): sedation    Cymbalta [duloxetine]      Dry mouth, agitation    Farxiga [dapagliflozin] Other (See Comments)     Recurrent candidal infection    Jardiance [empagliflozin] Hives    Byetta [exenatide] Rash     Other reaction(s): Rash          OBJECTIVE     Observation: Difficulty with sit to stand transition from chair in waiting area    Gait: ambulates with 3 wheel rollator    Posture: B Genu Recurvatum     RANGE OF MOTION: WFL into Knee Flexion and Extension B     MMT: 4/5 in Major muscle groups of hips and knee supporting musculature    Functional Tests:   TU.8 sec with 3 wheel rollator    30 sec sit to stand: 10 reps without UE assist    SLS: Unable to/refused to due to fear of knees buckling and falling          Limitation/Restriction for FOTO NT  Survey    Therapist reviewed FOTO scores for Alina Narayan on 2022.   FOTO documents entered into [employer] - see Media section.    Limitation Score: NT %         TREATMENT     Total Treatment time (time-based codes) separate from Evaluation: 00 minutes      Alina received the treatments listed below:          PATIENT EDUCATION AND HOME EXERCISES     Education provided:   - To provide at next visit     Written Home Exercises Provided: yes. To e provided at next visit. Exercises were reviewed and Alina was able to demonstrate them  prior to the end of the session.  Alina demonstrated good  understanding of the education provided. See EMR under Patient Instructions for exercises provided during therapy sessions.    ASSESSMENT     Alina is a 77 y.o. female referred to outpatient Physical Therapy with a medical diagnosis of chronic B knee pain. Patient presents with long history of knee pain with progressed OA in her knees that is limiting her current tolerance with ADLs, and MRADLs.  Pt is a good candidate to participate in aquatics therapy program to facilitate active participation in therapy.      Patient prognosis is Good.     Patientt will benefit from skilled outpatient Physical Therapy to address the deficits stated above and in the chart below, provide patient /family education, and to maximize patientt's level of independence.     Plan of care discussed with patient: Yes  Patient's spiritual, cultural and educational needs considered and patient is agreeable to the plan of care and goals as stated below:     Anticipated Barriers for therapy: Pain in knees     Medical Necessity is demonstrated by the following  History  Co-morbidities and personal factors that may impact the plan of care Co-morbidities:   diabetes, high BMI, history of cancer, HTN, and Open heart sx, Double masectomy     Personal Factors:   age     moderate   Examination  Body Structures and Functions, activity limitations and participation restrictions that may impact the plan of care Body Regions:   lower extremities    Body Systems:    ROM  strength  balance  gait  transfers  transitions    Participation Restrictions:   Self Limiting     Activity limitations:   Learning and applying knowledge  no deficits    General Tasks and Commands  no deficits    Communication  no deficits    Mobility  no deficits    Self care  no deficits    Domestic Life  no deficits    Interactions/Relationships  no deficits    Life Areas  no deficits    Community and Social Life  no deficits          low   Clinical Presentation stable and uncomplicated low   Decision Making/ Complexity Score: low     GOALS: Short Term Goals:  4 weeks  1.Report decreased B knee pain  < / =  3/10  to increase tolerance for ADLs and MRADLs.   2. Increase strength by 1/3 MMT grade in B knee and hip mm  to increase tolerance for ADL and work activities.  3. Pt to tolerate HEP to improve ROM and independence with ADL's      Long Term Goals: 8 weeks  1.Report decreased B knee pain < / = 1-2/10  to increase tolerance for ADLs and MRADLs   2. Patient goal: tolerate in all ADLs and MRADLs   3. Improve MMT in weak mm to 4+/5 for knee support   PLAN   Plan of care Certification: 9/14/2022 to 11/9/2022.    Outpatient Physical Therapy 1 times weekly for 8 weeks to include the following interventions: Patient Education, Therapeutic Activities, and Therapeutic Exercise.     Estelle Dc, PT      I CERTIFY THE NEED FOR THESE SERVICES FURNISHED UNDER THIS PLAN OF TREATMENT AND WHILE UNDER MY CARE   Physician's comments:     Physician's Signature: ___________________________________________________

## 2022-09-27 ENCOUNTER — PATIENT MESSAGE (OUTPATIENT)
Dept: ENDOSCOPY | Facility: HOSPITAL | Age: 77
End: 2022-09-27
Payer: MEDICARE

## 2022-09-27 DIAGNOSIS — Z12.11 SPECIAL SCREENING FOR MALIGNANT NEOPLASMS, COLON: Primary | ICD-10-CM

## 2022-09-27 RX ORDER — SOD SULF/POT CHLORIDE/MAG SULF 1.479 G
12 TABLET ORAL DAILY
Qty: 24 TABLET | Refills: 0 | Status: SHIPPED | OUTPATIENT
Start: 2022-09-27 | End: 2022-10-03

## 2022-09-29 ENCOUNTER — TELEPHONE (OUTPATIENT)
Dept: ENDOSCOPY | Facility: HOSPITAL | Age: 77
End: 2022-09-29
Payer: MEDICARE

## 2022-09-29 DIAGNOSIS — Z12.11 SPECIAL SCREENING FOR MALIGNANT NEOPLASMS, COLON: Primary | ICD-10-CM

## 2022-09-29 RX ORDER — SOD SULF/POT CHLORIDE/MAG SULF 1.479 G
12 TABLET ORAL DAILY
Qty: 24 TABLET | Refills: 0 | Status: SHIPPED | OUTPATIENT
Start: 2022-09-29 | End: 2023-03-08

## 2022-09-29 NOTE — TELEPHONE ENCOUNTER
Re-ordered sutab at [facility] in [place]. Informed pt. Spoke with pharmacist and was informed med would be there on 9/30/22

## 2022-10-03 ENCOUNTER — TELEPHONE (OUTPATIENT)
Dept: CARDIOLOGY | Facility: CLINIC | Age: 77
End: 2022-10-03
Payer: MEDICARE

## 2022-10-03 ENCOUNTER — OFFICE VISIT (OUTPATIENT)
Dept: HEMATOLOGY/ONCOLOGY | Facility: CLINIC | Age: 77
End: 2022-10-03
Payer: MEDICARE

## 2022-10-03 ENCOUNTER — PATIENT MESSAGE (OUTPATIENT)
Dept: CARDIOLOGY | Facility: CLINIC | Age: 77
End: 2022-10-03
Payer: MEDICARE

## 2022-10-03 VITALS
HEIGHT: 62 IN | SYSTOLIC BLOOD PRESSURE: 137 MMHG | BODY MASS INDEX: 44.99 KG/M2 | HEART RATE: 85 BPM | DIASTOLIC BLOOD PRESSURE: 62 MMHG | RESPIRATION RATE: 16 BRPM | WEIGHT: 244.5 LBS | OXYGEN SATURATION: 97 %

## 2022-10-03 DIAGNOSIS — Z86.711 HISTORY OF PULMONARY EMBOLISM: ICD-10-CM

## 2022-10-03 DIAGNOSIS — C50.411 MALIGNANT NEOPLASM OF UPPER-OUTER QUADRANT OF RIGHT BREAST IN FEMALE, ESTROGEN RECEPTOR NEGATIVE: Primary | ICD-10-CM

## 2022-10-03 DIAGNOSIS — Z12.31 SCREENING MAMMOGRAM FOR HIGH-RISK PATIENT: ICD-10-CM

## 2022-10-03 DIAGNOSIS — Z17.1 MALIGNANT NEOPLASM OF UPPER-OUTER QUADRANT OF RIGHT BREAST IN FEMALE, ESTROGEN RECEPTOR NEGATIVE: Primary | ICD-10-CM

## 2022-10-03 PROBLEM — I26.99 BILATERAL PULMONARY EMBOLISM: Status: RESOLVED | Noted: 2019-05-08 | Resolved: 2022-10-03

## 2022-10-03 PROCEDURE — 1157F PR ADVANCE CARE PLAN OR EQUIV PRESENT IN MEDICAL RECORD: ICD-10-PCS | Mod: CPTII,S$GLB,, | Performed by: INTERNAL MEDICINE

## 2022-10-03 PROCEDURE — 1125F PR PAIN SEVERITY QUANTIFIED, PAIN PRESENT: ICD-10-PCS | Mod: CPTII,S$GLB,, | Performed by: INTERNAL MEDICINE

## 2022-10-03 PROCEDURE — 99999 PR PBB SHADOW E&M-EST. PATIENT-LVL IV: ICD-10-PCS | Mod: PBBFAC,,, | Performed by: INTERNAL MEDICINE

## 2022-10-03 PROCEDURE — 3078F PR MOST RECENT DIASTOLIC BLOOD PRESSURE < 80 MM HG: ICD-10-PCS | Mod: CPTII,S$GLB,, | Performed by: INTERNAL MEDICINE

## 2022-10-03 PROCEDURE — 3078F DIAST BP <80 MM HG: CPT | Mod: CPTII,S$GLB,, | Performed by: INTERNAL MEDICINE

## 2022-10-03 PROCEDURE — 3075F PR MOST RECENT SYSTOLIC BLOOD PRESS GE 130-139MM HG: ICD-10-PCS | Mod: CPTII,S$GLB,, | Performed by: INTERNAL MEDICINE

## 2022-10-03 PROCEDURE — 1159F MED LIST DOCD IN RCRD: CPT | Mod: CPTII,S$GLB,, | Performed by: INTERNAL MEDICINE

## 2022-10-03 PROCEDURE — 99213 PR OFFICE/OUTPT VISIT, EST, LEVL III, 20-29 MIN: ICD-10-PCS | Mod: S$GLB,,, | Performed by: INTERNAL MEDICINE

## 2022-10-03 PROCEDURE — 1125F AMNT PAIN NOTED PAIN PRSNT: CPT | Mod: CPTII,S$GLB,, | Performed by: INTERNAL MEDICINE

## 2022-10-03 PROCEDURE — 1159F PR MEDICATION LIST DOCUMENTED IN MEDICAL RECORD: ICD-10-PCS | Mod: CPTII,S$GLB,, | Performed by: INTERNAL MEDICINE

## 2022-10-03 PROCEDURE — 1157F ADVNC CARE PLAN IN RCRD: CPT | Mod: CPTII,S$GLB,, | Performed by: INTERNAL MEDICINE

## 2022-10-03 PROCEDURE — 99999 PR PBB SHADOW E&M-EST. PATIENT-LVL IV: CPT | Mod: PBBFAC,,, | Performed by: INTERNAL MEDICINE

## 2022-10-03 PROCEDURE — 99213 OFFICE O/P EST LOW 20 MIN: CPT | Mod: S$GLB,,, | Performed by: INTERNAL MEDICINE

## 2022-10-03 PROCEDURE — 3075F SYST BP GE 130 - 139MM HG: CPT | Mod: CPTII,S$GLB,, | Performed by: INTERNAL MEDICINE

## 2022-10-03 NOTE — PROGRESS NOTES
Subjective:       Patient ID: Alina Narayan is a 77 y.o. female.    Chief Complaint: No chief complaint on file.    HPI 76Y/O white female who returns for follow-up of triple negative carcinoma of the right breast.  She had complete pathologic response to neoadjuvant chemotherapy.      Saw Ortho for her left knee -will do PT and weight loss.  No other new issues today.      She has a history of pulmonary embolism diagnosed in May of 2019.She is on chronic anticoagulation with low-dose Eliquis.    She is also being followed a small right upper lobe pulmonary nodule.    Echo 8/9/2 - The left ventricle is normal in size with  The estimated ejection fraction is 58%.  There are segmental left ventricular wall motion abnormalities.  There is abnormal septal wall motion.  Grade II left ventricular diastolic dysfunction.  Moderate left atrial enlargement.  Normal right ventricular size with low normal right ventricular systolic function.  Moderate right atrial enlargement.  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  The aortic valve mean gradient is 30 mmHg with a dimensionless index of 0.32.The high gradients were obtained with contrast  There is mild to mild-moderate MR ( 1-2+).  Moderate tricuspid regurgitation.  Normal central venous pressure (3 mmHg).  The estimated PA systolic pressure is 31 mmHg.    Most recent breast cancer history:    abnormal mammogram of the right breast in December 2018.  She was having no breast symptoms at that time    That mammogram showed a 13 mm mass in the right axillary tail.  By ultrasound there was a 6 x 9 x 11 mm intramammary node and a 2nd 5 x 6 x 6 mm hypoechoic mass in the axillary tail.  On January 7, 2019 both masses were biopsied and both showed lymph nodes with metastatic carcinoma which was ER negative MO negative and HER2 negative.  Ki-67 was 40%.    MRI on January 15, 2019 showed 2 right axillary lymph nodes the largest measured 1.4 x 1.0 cm.  There were no  abnormalities in the right breast.    PET scan was then performed which showed only low-grade activity in the right axilla with an SUV of 1.32.      She has received 12 cycles of weekly Taxol and 4 cycles of CMF which she completed on July 30th.    On August 26, 2019 mastectomies performed showed complete pathological response in the breast and axilla.      Previous breast cancer history History: On September 25, 2012 she underwent a screening mammogram which showed an asymmetric density in the left breast at 2:00 position. A followup mammogram on the 27th showed an oval mass in that area ultrasound showed a 6 mm solid mass. Core needle biopsy on October 1 showed invasive carcinoma ER 90% positive MA 80% positive and HER-2 negative. On October 29 she underwent lumpectomy and sentinel lymph node biopsy. That showed a 7 mm low-grade (1+2+1) infiltrating carcinoma. 3 sentinel lymph nodes were negative. Final pathological stage TIB N0 stage IA.  She completed letrozole therapy in 2017  Review of Systems   Constitutional:  Positive for fatigue. Negative for activity change, appetite change, fever and unexpected weight change.   HENT:  Negative for mouth sores.    Eyes:  Negative for visual disturbance.   Respiratory:  Positive for shortness of breath. Negative for cough.    Cardiovascular:  Negative for chest pain.   Gastrointestinal:  Negative for abdominal pain and diarrhea.   Genitourinary:  Negative for frequency.   Musculoskeletal:  Positive for arthralgias (left knee). Negative for back pain.   Integumentary:  Negative for rash.   Neurological:  Negative for headaches.   Hematological:  Negative for adenopathy.   Psychiatric/Behavioral:  The patient is nervous/anxious.        Objective:      Physical Exam  Vitals reviewed.   Constitutional:       General: She is not in acute distress.     Appearance: She is obese. She is not ill-appearing.   Eyes:      General: No scleral icterus.  Cardiovascular:      Rate and  Rhythm: Normal rate and regular rhythm.   Pulmonary:      Effort: Pulmonary effort is normal. No respiratory distress.      Breath sounds: Normal breath sounds. No wheezing or rales.   Chest:   Breasts:     Left: Normal. No mass.       Abdominal:      Palpations: Abdomen is soft.   Lymphadenopathy:      Cervical: No cervical adenopathy.      Upper Body:      Right upper body: No supraclavicular or axillary adenopathy.      Left upper body: No supraclavicular or axillary adenopathy.   Neurological:      Mental Status: She is alert and oriented to person, place, and time.   Psychiatric:         Mood and Affect: Mood normal.         Behavior: Behavior normal.         Thought Content: Thought content normal.         Judgment: Judgment normal.       Assessment:       1. Malignant neoplasm of upper-outer quadrant of right breast in female, estrogen receptor negative    2. History of pulmonary embolism        Plan:       RTC 6 months   with mammogram    Route Chart for Scheduling    Med Onc Chart Routing      Follow up with physician 6 months.   Follow up with ALEKSANDRA    Infusion scheduling note    Injection scheduling note    Labs    Imaging Mammogram      Pharmacy appointment    Other referrals

## 2022-10-03 NOTE — TELEPHONE ENCOUNTER
"Dr Palm -  Mrs Narayan Is having a colonoscopy this Wed 10/5 and wants to know how long she can stop the Eliquis - She said she took the Sun AM dose but did not take any in the PM and none today.  Her concern is that she has diverticulosis and if she has polyps that need to be removed, they may want her to stay of of it longer. Also, she knows from last echo that her " heart overall is very strong" but wants to know if she should be concerned about anything else and does she need to take antibiotics before the colonoscopy?    Thanks - Krista   "

## 2022-10-10 ENCOUNTER — OFFICE VISIT (OUTPATIENT)
Dept: INTERNAL MEDICINE | Facility: CLINIC | Age: 77
End: 2022-10-10
Payer: MEDICARE

## 2022-10-10 ENCOUNTER — LAB VISIT (OUTPATIENT)
Dept: LAB | Facility: HOSPITAL | Age: 77
End: 2022-10-10
Attending: INTERNAL MEDICINE
Payer: MEDICARE

## 2022-10-10 VITALS
SYSTOLIC BLOOD PRESSURE: 124 MMHG | HEIGHT: 62 IN | OXYGEN SATURATION: 96 % | DIASTOLIC BLOOD PRESSURE: 70 MMHG | WEIGHT: 245.13 LBS | BODY MASS INDEX: 45.11 KG/M2 | HEART RATE: 86 BPM

## 2022-10-10 DIAGNOSIS — E66.9 DIABETES MELLITUS TYPE 2 IN OBESE: ICD-10-CM

## 2022-10-10 DIAGNOSIS — M79.10 MYALGIA: ICD-10-CM

## 2022-10-10 DIAGNOSIS — E11.69 DIABETES MELLITUS TYPE 2 IN OBESE: Primary | ICD-10-CM

## 2022-10-10 DIAGNOSIS — E66.9 DIABETES MELLITUS TYPE 2 IN OBESE: Primary | ICD-10-CM

## 2022-10-10 DIAGNOSIS — R91.1 SOLITARY PULMONARY NODULE: ICD-10-CM

## 2022-10-10 DIAGNOSIS — E11.69 DIABETES MELLITUS TYPE 2 IN OBESE: ICD-10-CM

## 2022-10-10 LAB
ALBUMIN SERPL BCP-MCNC: 4.1 G/DL (ref 3.5–5.2)
ALP SERPL-CCNC: 88 U/L (ref 55–135)
ALT SERPL W/O P-5'-P-CCNC: 12 U/L (ref 10–44)
ANION GAP SERPL CALC-SCNC: 5 MMOL/L (ref 8–16)
AST SERPL-CCNC: 14 U/L (ref 10–40)
BILIRUB SERPL-MCNC: 0.5 MG/DL (ref 0.1–1)
BUN SERPL-MCNC: 22 MG/DL (ref 8–23)
CALCIUM SERPL-MCNC: 10.1 MG/DL (ref 8.7–10.5)
CHLORIDE SERPL-SCNC: 103 MMOL/L (ref 95–110)
CK SERPL-CCNC: 59 U/L (ref 20–180)
CO2 SERPL-SCNC: 32 MMOL/L (ref 23–29)
CREAT SERPL-MCNC: 1.2 MG/DL (ref 0.5–1.4)
CRP SERPL-MCNC: 5.3 MG/L (ref 0–8.2)
ERYTHROCYTE [SEDIMENTATION RATE] IN BLOOD BY PHOTOMETRIC METHOD: 12 MM/HR (ref 0–36)
EST. GFR  (NO RACE VARIABLE): 46.6 ML/MIN/1.73 M^2
ESTIMATED AVG GLUCOSE: 166 MG/DL (ref 68–131)
GLUCOSE SERPL-MCNC: 228 MG/DL (ref 70–110)
HBA1C MFR BLD: 7.4 % (ref 4–5.6)
POTASSIUM SERPL-SCNC: 4.1 MMOL/L (ref 3.5–5.1)
PROT SERPL-MCNC: 7.3 G/DL (ref 6–8.4)
SODIUM SERPL-SCNC: 140 MMOL/L (ref 136–145)

## 2022-10-10 PROCEDURE — G0008 ADMIN INFLUENZA VIRUS VAC: HCPCS | Mod: S$GLB,,, | Performed by: INTERNAL MEDICINE

## 2022-10-10 PROCEDURE — 3288F PR FALLS RISK ASSESSMENT DOCUMENTED: ICD-10-PCS | Mod: CPTII,S$GLB,, | Performed by: INTERNAL MEDICINE

## 2022-10-10 PROCEDURE — 1101F PT FALLS ASSESS-DOCD LE1/YR: CPT | Mod: CPTII,S$GLB,, | Performed by: INTERNAL MEDICINE

## 2022-10-10 PROCEDURE — 99999 PR PBB SHADOW E&M-EST. PATIENT-LVL V: CPT | Mod: PBBFAC,,, | Performed by: INTERNAL MEDICINE

## 2022-10-10 PROCEDURE — 36415 COLL VENOUS BLD VENIPUNCTURE: CPT | Performed by: INTERNAL MEDICINE

## 2022-10-10 PROCEDURE — 90694 FLU VACCINE - QUADRIVALENT - ADJUVANTED: ICD-10-PCS | Mod: S$GLB,,, | Performed by: INTERNAL MEDICINE

## 2022-10-10 PROCEDURE — 82550 ASSAY OF CK (CPK): CPT | Performed by: INTERNAL MEDICINE

## 2022-10-10 PROCEDURE — 90694 VACC AIIV4 NO PRSRV 0.5ML IM: CPT | Mod: S$GLB,,, | Performed by: INTERNAL MEDICINE

## 2022-10-10 PROCEDURE — 1126F AMNT PAIN NOTED NONE PRSNT: CPT | Mod: CPTII,S$GLB,, | Performed by: INTERNAL MEDICINE

## 2022-10-10 PROCEDURE — 83036 HEMOGLOBIN GLYCOSYLATED A1C: CPT | Performed by: INTERNAL MEDICINE

## 2022-10-10 PROCEDURE — 1126F PR PAIN SEVERITY QUANTIFIED, NO PAIN PRESENT: ICD-10-PCS | Mod: CPTII,S$GLB,, | Performed by: INTERNAL MEDICINE

## 2022-10-10 PROCEDURE — G0008 FLU VACCINE - QUADRIVALENT - ADJUVANTED: ICD-10-PCS | Mod: S$GLB,,, | Performed by: INTERNAL MEDICINE

## 2022-10-10 PROCEDURE — 3078F DIAST BP <80 MM HG: CPT | Mod: CPTII,S$GLB,, | Performed by: INTERNAL MEDICINE

## 2022-10-10 PROCEDURE — 1157F ADVNC CARE PLAN IN RCRD: CPT | Mod: CPTII,S$GLB,, | Performed by: INTERNAL MEDICINE

## 2022-10-10 PROCEDURE — 3074F SYST BP LT 130 MM HG: CPT | Mod: CPTII,S$GLB,, | Performed by: INTERNAL MEDICINE

## 2022-10-10 PROCEDURE — 86140 C-REACTIVE PROTEIN: CPT | Performed by: INTERNAL MEDICINE

## 2022-10-10 PROCEDURE — 3074F PR MOST RECENT SYSTOLIC BLOOD PRESSURE < 130 MM HG: ICD-10-PCS | Mod: CPTII,S$GLB,, | Performed by: INTERNAL MEDICINE

## 2022-10-10 PROCEDURE — 85652 RBC SED RATE AUTOMATED: CPT | Performed by: INTERNAL MEDICINE

## 2022-10-10 PROCEDURE — 1157F PR ADVANCE CARE PLAN OR EQUIV PRESENT IN MEDICAL RECORD: ICD-10-PCS | Mod: CPTII,S$GLB,, | Performed by: INTERNAL MEDICINE

## 2022-10-10 PROCEDURE — 99999 PR PBB SHADOW E&M-EST. PATIENT-LVL V: ICD-10-PCS | Mod: PBBFAC,,, | Performed by: INTERNAL MEDICINE

## 2022-10-10 PROCEDURE — 3288F FALL RISK ASSESSMENT DOCD: CPT | Mod: CPTII,S$GLB,, | Performed by: INTERNAL MEDICINE

## 2022-10-10 PROCEDURE — 3078F PR MOST RECENT DIASTOLIC BLOOD PRESSURE < 80 MM HG: ICD-10-PCS | Mod: CPTII,S$GLB,, | Performed by: INTERNAL MEDICINE

## 2022-10-10 PROCEDURE — 80053 COMPREHEN METABOLIC PANEL: CPT | Performed by: INTERNAL MEDICINE

## 2022-10-10 PROCEDURE — 99214 OFFICE O/P EST MOD 30 MIN: CPT | Mod: 25,S$GLB,, | Performed by: INTERNAL MEDICINE

## 2022-10-10 PROCEDURE — 99214 PR OFFICE/OUTPT VISIT, EST, LEVL IV, 30-39 MIN: ICD-10-PCS | Mod: 25,S$GLB,, | Performed by: INTERNAL MEDICINE

## 2022-10-10 PROCEDURE — 1101F PR PT FALLS ASSESS DOC 0-1 FALLS W/OUT INJ PAST YR: ICD-10-PCS | Mod: CPTII,S$GLB,, | Performed by: INTERNAL MEDICINE

## 2022-10-10 NOTE — PROGRESS NOTES
Subjective:       Patient ID: Alina Narayan is a 77 y.o. female.    Chief Complaint: Follow-up and Diabetes    Follow-up  Pertinent negatives include no abdominal pain, chest pain (arm pain or jaw pain), headaches, nausea or vomiting.   Diabetes  Pertinent negatives for hypoglycemia include no headaches or seizures. Pertinent negatives for diabetes include no chest pain (arm pain or jaw pain). Pt doing ok.  Knees still an issue - plans to go back to [place] in the water.  No CP or SOB.    Review of Systems   Respiratory:  Negative for shortness of breath (PND or orthopnea).    Cardiovascular:  Negative for chest pain (arm pain or jaw pain).   Gastrointestinal:  Negative for abdominal pain, diarrhea, nausea and vomiting.   Genitourinary:  Negative for dysuria.   Neurological:  Negative for seizures, syncope and headaches.     Objective:      Physical Exam  Constitutional:       General: She is not in acute distress.     Appearance: She is well-developed.   HENT:      Head: Normocephalic.   Eyes:      Pupils: Pupils are equal, round, and reactive to light.   Neck:      Thyroid: No thyromegaly.      Vascular: No JVD.   Cardiovascular:      Rate and Rhythm: Normal rate and regular rhythm.      Heart sounds: Normal heart sounds. No murmur heard.    No friction rub. No gallop.   Pulmonary:      Effort: Pulmonary effort is normal.      Breath sounds: Normal breath sounds. No wheezing or rales.   Abdominal:      General: Bowel sounds are normal. There is no distension.      Palpations: Abdomen is soft. There is no mass.      Tenderness: There is no abdominal tenderness. There is no guarding or rebound.   Musculoskeletal:      Cervical back: Neck supple.   Lymphadenopathy:      Cervical: No cervical adenopathy.   Skin:     General: Skin is warm and dry.   Neurological:      Mental Status: She is alert and oriented to person, place, and time.      Deep Tendon Reflexes: Reflexes are normal and symmetric.   Psychiatric:          Behavior: Behavior normal.         Thought Content: Thought content normal.         Judgment: Judgment normal.       Assessment:       1. Diabetes mellitus type 2 in obese    2. Myalgia    3. Solitary pulmonary nodule          Plan:   Diabetes mellitus type 2 in obese  -     Comprehensive Metabolic Panel; Future; Expected date: 10/10/2022  -     Hemoglobin A1C; Future; Expected date: 10/10/2022  -     Ambulatory referral/consult to Bariatric Medicine; Future; Expected date: 10/17/2022    Myalgia  -     Sedimentation rate; Future; Expected date: 10/10/2022  -     C-Reactive Protein; Future; Expected date: 10/10/2022  -     CK; Future; Expected date: 10/10/2022    Solitary pulmonary nodule  -     CT Chest Without Contrast; Future; Expected date: 10/10/2022    Other orders  -     Influenza - Quadrivalent (Adjuvanted)

## 2022-10-13 ENCOUNTER — OFFICE VISIT (OUTPATIENT)
Dept: DERMATOLOGY | Facility: CLINIC | Age: 77
End: 2022-10-13
Payer: MEDICARE

## 2022-10-13 DIAGNOSIS — I87.2 VENOUS STASIS DERMATITIS, UNSPECIFIED LATERALITY: ICD-10-CM

## 2022-10-13 DIAGNOSIS — L30.4 INTERTRIGO: Primary | ICD-10-CM

## 2022-10-13 PROCEDURE — 1159F MED LIST DOCD IN RCRD: CPT | Mod: CPTII,S$GLB,, | Performed by: DERMATOLOGY

## 2022-10-13 PROCEDURE — 99999 PR PBB SHADOW E&M-EST. PATIENT-LVL III: CPT | Mod: PBBFAC,,, | Performed by: DERMATOLOGY

## 2022-10-13 PROCEDURE — 99214 PR OFFICE/OUTPT VISIT, EST, LEVL IV, 30-39 MIN: ICD-10-PCS | Mod: S$GLB,,, | Performed by: DERMATOLOGY

## 2022-10-13 PROCEDURE — 1159F PR MEDICATION LIST DOCUMENTED IN MEDICAL RECORD: ICD-10-PCS | Mod: CPTII,S$GLB,, | Performed by: DERMATOLOGY

## 2022-10-13 PROCEDURE — 1160F RVW MEDS BY RX/DR IN RCRD: CPT | Mod: CPTII,S$GLB,, | Performed by: DERMATOLOGY

## 2022-10-13 PROCEDURE — 1160F PR REVIEW ALL MEDS BY PRESCRIBER/CLIN PHARMACIST DOCUMENTED: ICD-10-PCS | Mod: CPTII,S$GLB,, | Performed by: DERMATOLOGY

## 2022-10-13 PROCEDURE — 1157F PR ADVANCE CARE PLAN OR EQUIV PRESENT IN MEDICAL RECORD: ICD-10-PCS | Mod: CPTII,S$GLB,, | Performed by: DERMATOLOGY

## 2022-10-13 PROCEDURE — 1157F ADVNC CARE PLAN IN RCRD: CPT | Mod: CPTII,S$GLB,, | Performed by: DERMATOLOGY

## 2022-10-13 PROCEDURE — 99999 PR PBB SHADOW E&M-EST. PATIENT-LVL III: ICD-10-PCS | Mod: PBBFAC,,, | Performed by: DERMATOLOGY

## 2022-10-13 PROCEDURE — 99214 OFFICE O/P EST MOD 30 MIN: CPT | Mod: S$GLB,,, | Performed by: DERMATOLOGY

## 2022-10-13 RX ORDER — DEXAMETHASONE SODIUM PHOSPHATE 4 MG/ML
INJECTION, SOLUTION INTRA-ARTICULAR; INTRALESIONAL; INTRAMUSCULAR; INTRAVENOUS; SOFT TISSUE
COMMUNITY
Start: 2022-05-26 | End: 2023-04-18

## 2022-10-13 NOTE — PATIENT INSTRUCTIONS
Flares:  Recommend white vinegar: water 1:1 compresses 2x/day followed by cool blow dry and then application of prescription medication.     Maintenance:  Cool blow dry after showering 1x/day then apply Zeasorb AF powder.     Legs: am lactin cream daily after shower

## 2022-10-13 NOTE — PROGRESS NOTES
Subjective:       Patient ID:  Alina Narayan is a 77 y.o. female who presents for   Chief Complaint   Patient presents with    Dry Skin     L ankle     History of Present Illness: The patient presents for follow up of skin check.    The patient was last seen on: 06/30/2021 for cryosurgery to ISK which have resolved and rash to L breast - which has improved   Pt also c/o spots on face wants checked    Other skin complaints:   Patient with new complaint of spot  Location: L ankle  Duration: 1 year  Symptoms: dry  Relieving factors/Previous treatments: OTC cream (unsure of name)    C/o rash under breast intermittent. Can be painful. Worse when skin is pulled with mammogram    Lab Results       Component                Value               Date                       HGBA1C                   7.4 (H)             10/10/2022                     Review of Systems   Skin:  Positive for itching, rash and dry skin.   Hematologic/Lymphatic: Bruises/bleeds easily (on eliquis).      Objective:    Physical Exam   Constitutional: She appears well-developed and well-nourished. She is obese.  No distress.   Neurological: She is alert and oriented to person, place, and time. She is not disoriented.   Psychiatric: She has a normal mood and affect.   Skin:   Areas Examined (abnormalities noted in diagram):   Head / Face Inspection Performed  Neck Inspection Performed  Chest / Axilla Inspection Performed  LLE Inspection Performed                 Diagram Legend     Erythematous scaling macule/papule c/w actinic keratosis       Vascular papule c/w angioma      Pigmented verrucoid papule/plaque c/w seborrheic keratosis      Yellow umbilicated papule c/w sebaceous hyperplasia      Irregularly shaped tan macule c/w lentigo     1-2 mm smooth white papules consistent with Milia      Movable subcutaneous cyst with punctum c/w epidermal inclusion cyst      Subcutaneous movable cyst c/w pilar cyst      Firm pink to brown papule c/w dermatofibroma       Pedunculated fleshy papule(s) c/w skin tag(s)      Evenly pigmented macule c/w junctional nevus     Mildly variegated pigmented, slightly irregular-bordered macule c/w mildly atypical nevus      Flesh colored to evenly pigmented papule c/w intradermal nevus       Pink pearly papule/plaque c/w basal cell carcinoma      Erythematous hyperkeratotic cursted plaque c/w SCC      Surgical scar with no sign of skin cancer recurrence      Open and closed comedones      Inflammatory papules and pustules      Verrucoid papule consistent consistent with wart     Erythematous eczematous patches and plaques     Dystrophic onycholytic nail with subungual debris c/w onychomycosis     Umbilicated papule    Erythematous-base heme-crusted tan verrucoid plaque consistent with inflamed seborrheic keratosis     Erythematous Silvery Scaling Plaque c/w Psoriasis     See annotation      Assessment / Plan:        Intertrigo  -     Ambulatory referral/consult to Dermatology  -     triamcinolone acetonide 0.1% (KENALOG) 0.1 % cream; AAA under breast bid after cool blow dry  Dispense: 1 each; Refill: 3    Flares:  Recommend white vinegar: water 1:1 compresses 2x/day followed by cool blow dry and then application of prescription medication.     Maintenance:  Cool blow dry after showering 1x/day then apply Zeasorb AF powder.       Venous stasis dermatitis, unspecified laterality/early elephantiasis nostra verrucosa  Had injury left LE years ago  ++ pedal edema  Legs: am lactin cream daily after shower  Rec wt loss           Follow up if symptoms worsen or fail to improve.

## 2022-10-22 NOTE — PLAN OF CARE
Problem: Adult Inpatient Plan of Care  Goal: Plan of Care Review  Did well today tolerated treatment well.         You can access the FollowMyHealth Patient Portal offered by [facility] by registering at the following website: [email or web address]. By joining [email or web address]’s FollowMyHealth portal, you will also be able to view your health information using other applications (apps) compatible with our system.

## 2022-11-06 PROBLEM — D70.1 CHEMOTHERAPY INDUCED NEUTROPENIA: Status: RESOLVED | Noted: 2019-07-23 | Resolved: 2022-11-06

## 2022-11-06 PROBLEM — T45.1X5A CHEMOTHERAPY INDUCED NEUTROPENIA: Status: RESOLVED | Noted: 2019-07-23 | Resolved: 2022-11-06

## 2022-11-16 DIAGNOSIS — M25.559 HIP PAIN: Primary | ICD-10-CM

## 2022-12-02 ENCOUNTER — HOSPITAL ENCOUNTER (OUTPATIENT)
Dept: RADIOLOGY | Facility: HOSPITAL | Age: 77
Discharge: HOME | End: 2022-12-02
Attending: INTERNAL MEDICINE
Payer: MEDICARE

## 2022-12-02 DIAGNOSIS — Z12.31 SCREENING MAMMOGRAM FOR HIGH-RISK PATIENT: ICD-10-CM

## 2022-12-02 PROCEDURE — 77063 BREAST TOMOSYNTHESIS BI: CPT | Mod: 26,,, | Performed by: RADIOLOGY

## 2022-12-02 PROCEDURE — 77067 SCR MAMMO BI INCL CAD: CPT | Mod: 26,,, | Performed by: RADIOLOGY

## 2022-12-02 PROCEDURE — 77067 MAMMO DIGITAL SCREENING LEFT WITH TOMO: ICD-10-PCS | Mod: 26,,, | Performed by: RADIOLOGY

## 2022-12-02 PROCEDURE — 77063 MAMMO DIGITAL SCREENING LEFT WITH TOMO: ICD-10-PCS | Mod: 26,,, | Performed by: RADIOLOGY

## 2022-12-02 PROCEDURE — 77063 BREAST TOMOSYNTHESIS BI: CPT | Mod: TC

## 2022-12-02 PROCEDURE — 77067 SCR MAMMO BI INCL CAD: CPT | Mod: TC

## 2022-12-09 ENCOUNTER — TELEPHONE (OUTPATIENT)
Dept: ENDOSCOPY | Facility: HOSPITAL | Age: 77
End: 2022-12-09
Payer: MEDICARE

## 2022-12-09 ENCOUNTER — PATIENT MESSAGE (OUTPATIENT)
Dept: ENDOSCOPY | Facility: HOSPITAL | Age: 77
End: 2022-12-09
Payer: MEDICARE

## 2022-12-09 DIAGNOSIS — Z86.010 ENCOUNTER FOR COLONOSCOPY DUE TO HISTORY OF COLONIC POLYP: Primary | ICD-10-CM

## 2022-12-09 DIAGNOSIS — Z12.11 ENCOUNTER FOR COLONOSCOPY DUE TO HISTORY OF COLONIC POLYP: Primary | ICD-10-CM

## 2022-12-09 NOTE — TELEPHONE ENCOUNTER
Pt is scheduled for a Colonoscopy on 1/5/23.  Pt is on Eliquis. Ok to hold 2 days prior?  Please advise.  Thanks

## 2022-12-09 NOTE — TELEPHONE ENCOUNTER
December 9, 2022  Álvaro Palm MD  to Me      12:54 PM   Yes 2 days fine,CJL        MS    9:51 AM  You routed this conversation to Álvaro Palm MD    Me     MS     9:51 AM  Note  Pt is scheduled for a Colonoscopy on 1/5/23.  Pt is on Eliquis. Ok to hold 2 days prior?  Please advise.  Thanks

## 2022-12-10 RX ORDER — CALCIUM CITRATE/VITAMIN D3 200MG-6.25
TABLET ORAL
Qty: 200 STRIP | Refills: 3 | Status: SHIPPED | OUTPATIENT
Start: 2022-12-10

## 2022-12-10 NOTE — TELEPHONE ENCOUNTER
Refill Decision Note   Alina Narayan  is requesting a refill authorization.  Brief Assessment and Rationale for Refill:  Approve    -Medication-Related Problems Identified: Requires labs  Medication Therapy Plan:       Medication Reconciliation Completed: No   Comments:     Provider Staff:     Action is required for this patient.   Please see care gap opportunities below in Care Due Message.     Thanks!  [facility]     Appointments      Date Provider   Last Visit   10/10/2022 Aarti Dunn MD   Next Visit   Visit date not found Aarti Dunn MD     Note composed:9:23 AM 12/10/2022           Note composed:9:23 AM 12/10/2022

## 2022-12-10 NOTE — TELEPHONE ENCOUNTER
Care Due:                  Date            Visit Type   Department     Provider  --------------------------------------------------------------------------------                                EP -                              PRIMARY      [facility] PRIMARY  Last Visit: 10-      CARE ([facility])   CARE           Aarti Dunn  Next Visit: None Scheduled  None         None Found                                                            Last  Test          Frequency    Reason                     Performed    Due Date  --------------------------------------------------------------------------------    Lipid Panel.  12 months..  pravastatin..............  03- 03-    [facility] Embedded Care Gaps. Reference number: [id number]. 12/09/2022   8:17:59 PM CST

## 2022-12-13 ENCOUNTER — HOSPITAL ENCOUNTER (OUTPATIENT)
Dept: RADIOLOGY | Facility: HOSPITAL | Age: 77
Discharge: HOME | End: 2022-12-13
Attending: INTERNAL MEDICINE
Payer: MEDICARE

## 2022-12-13 DIAGNOSIS — R91.1 SOLITARY PULMONARY NODULE: ICD-10-CM

## 2022-12-13 PROCEDURE — 71250 CT CHEST WITHOUT CONTRAST: ICD-10-PCS | Mod: 26,HCNC,, | Performed by: RADIOLOGY

## 2022-12-13 PROCEDURE — 71250 CT THORAX DX C-: CPT | Mod: TC,HCNC

## 2022-12-13 PROCEDURE — 71250 CT THORAX DX C-: CPT | Mod: 26,HCNC,, | Performed by: RADIOLOGY

## 2022-12-22 ENCOUNTER — PATIENT MESSAGE (OUTPATIENT)
Dept: INTERNAL MEDICINE | Facility: CLINIC | Age: 77
End: 2022-12-22
Payer: MEDICARE

## 2022-12-29 ENCOUNTER — TELEPHONE (OUTPATIENT)
Dept: INTERNAL MEDICINE | Facility: CLINIC | Age: 77
End: 2022-12-29

## 2022-12-29 DIAGNOSIS — M54.9 DORSALGIA, UNSPECIFIED: Primary | ICD-10-CM

## 2022-12-29 RX ORDER — PREDNISONE 20 MG/1
TABLET ORAL
Qty: 5 TABLET | Refills: 0 | Status: SHIPPED | OUTPATIENT
Start: 2022-12-29 | End: 2023-02-16

## 2022-12-29 RX ORDER — HYDROCODONE BITARTRATE AND ACETAMINOPHEN 5; 325 MG/1; MG/1
1 TABLET ORAL EVERY 6 HOURS PRN
Qty: 40 TABLET | Refills: 0 | Status: ON HOLD | OUTPATIENT
Start: 2022-12-29 | End: 2023-05-23 | Stop reason: HOSPADM

## 2022-12-29 RX ORDER — TIZANIDINE 4 MG/1
TABLET ORAL
Qty: 30 TABLET | Refills: 0 | Status: ON HOLD | OUTPATIENT
Start: 2022-12-29 | End: 2023-05-23 | Stop reason: HOSPADM

## 2022-12-30 NOTE — TELEPHONE ENCOUNTER
Pt with severe pain - hydrocodone sent , tizanidine and prednisone.  If glucose goes up she was advised to double glimepiride.    Please schedule MRI lumbar thanks.

## 2022-12-31 ENCOUNTER — PATIENT MESSAGE (OUTPATIENT)
Dept: INTERNAL MEDICINE | Facility: CLINIC | Age: 77
End: 2022-12-31
Payer: MEDICARE

## 2023-01-04 ENCOUNTER — TELEPHONE (OUTPATIENT)
Dept: INTERNAL MEDICINE | Facility: CLINIC | Age: 78
End: 2023-01-04
Payer: MEDICARE

## 2023-01-04 NOTE — TELEPHONE ENCOUNTER
----- Message from Pretty Paredes sent at 1/4/2023  8:58 AM CST -----  Contact: Mirza Medina/Elisabeth [phone]  Pharmacy states they have been calling for days re getting clarification on directions for predniSONE (DELTASONE) 20 MG tablet. Pharmacy states pt has not been able to take this medication without the directions being clarified.  Can you please assist? Pt has went to the pharmacy several times and cannot get her medication.

## 2023-01-05 ENCOUNTER — ANESTHESIA EVENT (OUTPATIENT)
Dept: ENDOSCOPY | Facility: HOSPITAL | Age: 78
End: 2023-01-05
Payer: MEDICARE

## 2023-01-05 ENCOUNTER — HOSPITAL ENCOUNTER (OUTPATIENT)
Facility: HOSPITAL | Age: 78
Discharge: HOME | End: 2023-01-05
Attending: COLON & RECTAL SURGERY | Admitting: COLON & RECTAL SURGERY
Payer: MEDICARE

## 2023-01-05 ENCOUNTER — ANESTHESIA (OUTPATIENT)
Dept: ENDOSCOPY | Facility: HOSPITAL | Age: 78
End: 2023-01-05
Payer: MEDICARE

## 2023-01-05 VITALS
OXYGEN SATURATION: 100 % | SYSTOLIC BLOOD PRESSURE: 137 MMHG | HEART RATE: 95 BPM | DIASTOLIC BLOOD PRESSURE: 64 MMHG | WEIGHT: 246 LBS | BODY MASS INDEX: 45.27 KG/M2 | RESPIRATION RATE: 16 BRPM | TEMPERATURE: 98 F | HEIGHT: 62 IN

## 2023-01-05 DIAGNOSIS — Z12.11 ENCOUNTER FOR SCREENING COLONOSCOPY: ICD-10-CM

## 2023-01-05 LAB
GLUCOSE SERPL-MCNC: 161 MG/DL (ref 70–110)
POCT GLUCOSE: 161 MG/DL (ref 70–110)

## 2023-01-05 PROCEDURE — 37000009 HC ANESTHESIA EA ADD 15 MINS: Mod: HCNC | Performed by: COLON & RECTAL SURGERY

## 2023-01-05 PROCEDURE — 88305 TISSUE EXAM BY PATHOLOGIST: CPT | Mod: 26,HCNC,, | Performed by: PATHOLOGY

## 2023-01-05 PROCEDURE — 45380 COLONOSCOPY AND BIOPSY: CPT | Mod: PT,59,HCNC | Performed by: COLON & RECTAL SURGERY

## 2023-01-05 PROCEDURE — 25000003 PHARM REV CODE 250: Mod: HCNC | Performed by: NURSE ANESTHETIST, CERTIFIED REGISTERED

## 2023-01-05 PROCEDURE — 45385 COLONOSCOPY W/LESION REMOVAL: CPT | Mod: PT,HCNC | Performed by: COLON & RECTAL SURGERY

## 2023-01-05 PROCEDURE — 88305 TISSUE EXAM BY PATHOLOGIST: CPT | Mod: HCNC | Performed by: PATHOLOGY

## 2023-01-05 PROCEDURE — 27201089 HC SNARE, DISP (ANY): Mod: HCNC | Performed by: COLON & RECTAL SURGERY

## 2023-01-05 PROCEDURE — 37000008 HC ANESTHESIA 1ST 15 MINUTES: Mod: HCNC | Performed by: COLON & RECTAL SURGERY

## 2023-01-05 PROCEDURE — 45380 COLONOSCOPY AND BIOPSY: CPT | Mod: PT,59,HCNC, | Performed by: COLON & RECTAL SURGERY

## 2023-01-05 PROCEDURE — E9220 PRA ENDO ANESTHESIA: ICD-10-PCS | Mod: PT,,, | Performed by: NURSE ANESTHETIST, CERTIFIED REGISTERED

## 2023-01-05 PROCEDURE — 45385 PR COLONOSCOPY,REMV LESN,SNARE: ICD-10-PCS | Mod: PT,HCNC,, | Performed by: COLON & RECTAL SURGERY

## 2023-01-05 PROCEDURE — 82962 GLUCOSE BLOOD TEST: CPT | Mod: HCNC | Performed by: COLON & RECTAL SURGERY

## 2023-01-05 PROCEDURE — E9220 PRA ENDO ANESTHESIA: HCPCS | Mod: PT,,, | Performed by: NURSE ANESTHETIST, CERTIFIED REGISTERED

## 2023-01-05 PROCEDURE — 45380 PR COLONOSCOPY,BIOPSY: ICD-10-PCS | Mod: PT,59,HCNC, | Performed by: COLON & RECTAL SURGERY

## 2023-01-05 PROCEDURE — 63600175 PHARM REV CODE 636 W HCPCS: Mod: HCNC | Performed by: NURSE ANESTHETIST, CERTIFIED REGISTERED

## 2023-01-05 PROCEDURE — 45385 COLONOSCOPY W/LESION REMOVAL: CPT | Mod: PT,HCNC,, | Performed by: COLON & RECTAL SURGERY

## 2023-01-05 PROCEDURE — 25000003 PHARM REV CODE 250: Mod: HCNC | Performed by: COLON & RECTAL SURGERY

## 2023-01-05 PROCEDURE — 27201012 HC FORCEPS, HOT/COLD, DISP: Mod: HCNC | Performed by: COLON & RECTAL SURGERY

## 2023-01-05 PROCEDURE — 88305 TISSUE EXAM BY PATHOLOGIST: ICD-10-PCS | Mod: 26,HCNC,, | Performed by: PATHOLOGY

## 2023-01-05 RX ORDER — PROPOFOL 10 MG/ML
VIAL (ML) INTRAVENOUS CONTINUOUS PRN
Status: DISCONTINUED | OUTPATIENT
Start: 2023-01-05 | End: 2023-01-05

## 2023-01-05 RX ORDER — LIDOCAINE HYDROCHLORIDE 20 MG/ML
INJECTION INTRAVENOUS
Status: DISCONTINUED | OUTPATIENT
Start: 2023-01-05 | End: 2023-01-05

## 2023-01-05 RX ORDER — KETOROLAC TROMETHAMINE 15 MG/ML
15 INJECTION, SOLUTION INTRAMUSCULAR; INTRAVENOUS ONCE
Status: DISCONTINUED | OUTPATIENT
Start: 2023-01-05 | End: 2023-01-05 | Stop reason: HOSPADM

## 2023-01-05 RX ORDER — PROPOFOL 10 MG/ML
VIAL (ML) INTRAVENOUS
Status: DISCONTINUED | OUTPATIENT
Start: 2023-01-05 | End: 2023-01-05

## 2023-01-05 RX ORDER — SODIUM CHLORIDE 9 MG/ML
INJECTION, SOLUTION INTRAVENOUS CONTINUOUS
Status: DISCONTINUED | OUTPATIENT
Start: 2023-01-05 | End: 2023-01-05 | Stop reason: HOSPADM

## 2023-01-05 RX ADMIN — SODIUM CHLORIDE: 0.9 INJECTION, SOLUTION INTRAVENOUS at 10:01

## 2023-01-05 RX ADMIN — LIDOCAINE HYDROCHLORIDE 30 MG: 20 INJECTION INTRAVENOUS at 11:01

## 2023-01-05 RX ADMIN — Medication 115 MCG/KG/MIN: at 11:01

## 2023-01-05 RX ADMIN — PROPOFOL 30 MG: 10 INJECTION, EMULSION INTRAVENOUS at 11:01

## 2023-01-05 RX ADMIN — SODIUM CHLORIDE: 9 INJECTION, SOLUTION INTRAVENOUS at 10:01

## 2023-01-05 NOTE — TRANSFER OF CARE
"Anesthesia Transfer of Care Note    Patient: Alina Narayan    Procedure(s) Performed: Procedure(s) (LRB):  COLONOSCOPY (N/A)    Patient location: PACU    Anesthesia Type: general    Transport from OR: Transported from OR on room air with adequate spontaneous ventilation    Post pain: adequate analgesia    Post assessment: no apparent anesthetic complications and tolerated procedure well    Post vital signs: stable    Level of consciousness: awake, alert and oriented    Nausea/Vomiting: no nausea/vomiting    Complications: none    Transfer of care protocol was followed      Last vitals:   Visit Vitals  /67(BP Location: Left arm, Patient Position: Lying)   Pulse 88   Temp 98   Resp 16   Ht 5' 2" (1.575 m)   Wt 111.6 kg (246 lb)   SpO2 99%   Breastfeeding No   BMI 44.99 kg/m²     "

## 2023-01-05 NOTE — ANESTHESIA PREPROCEDURE EVALUATION
[facility]  Anesthesia Pre-Operative Evaluation       Patient Name: Alina Narayan  YOB: 1945  MRN: [id number]  [facility]: [id number]      Code Status: Prior   Date of Procedure: 1/5/2023  Anesthesia: Choice Procedure: Procedure(s) (LRB):  COLONOSCOPY (N/A)  Pre-Operative Diagnosis: Encounter for colonoscopy due to history of colonic polyp [Z12.11, Z86.010]  Proceduralist: Surgeon(s) and Role:     * Eladia Martino MD - Primary Nurse: (Unknown)      SUBJECTIVE:   Alina Narayan is a 77 y.o. female who  has a past medical history of Allergy, Anxiety, Arthritis, BRCA1 negative, Breast cancer (10/2012), Colon polyp, Depression, Diabetes mellitus, Diabetes mellitus, type 2, Diverticular disease, Diverticulitis (2009), Genetic testing (05/2017), Hyperlipidemia, Hypertension, Morbid obesity, MITCHELL (obstructive sleep apnea), Pulmonary embolism, Stenosis, Stenosis and insufficiency of lacrimal passages, and Thyroid disease. No notes on file    No current facility-administered medications for this encounter.       she has a current medication list which includes the following long-term medication(s): blood-glucose meter, carvedilol, furosemide, gabapentin, glimepiride, hydrocortisone, ketoconazole, levothyroxine, metformin, pravastatin, and [DISCONTINUED] triamcinolone acetonide 0.1%.   ALLERGIES:     Review of patient's allergies indicates:   Allergen Reactions    Augmentin [amoxicillin-pot clavulanate] Diarrhea    Dilaudid [hydromorphone (bulk)] Other (See Comments)     Other reaction(s): sedation    Hydromorphone Other (See Comments)     Other reaction(s): sedation    Cymbalta [duloxetine]      Dry mouth, agitation    Farxiga [dapagliflozin] Other (See Comments)     Recurrent candidal infection    Jardiance [empagliflozin] Hives    Byetta [exenatide] Rash     Other reaction(s): Rash     LDA:      Lines/Drains/Airways     Central Venous Catheter Line  Duration                Port A Cath Single  Lumen -- days              MEDICATIONS:     Antibiotics (From admission, onward)    None        VTE Risk Mitigation (From admission, onward)    None        No current facility-administered medications for this encounter.     Current Outpatient Medications   Medication Sig Dispense Refill    amoxicillin (AMOXIL) 500 MG Tab 4 tablets one hour prior to dental work (Patient not taking: No sig reported) 20 tablet 0    blood-glucose meter kit True Test or meter covered by insurance - pt checks glucose twice daily for uncontrolled glucoses E11.65 1 each 0    carvediloL (COREG) 25 MG tablet TAKE 1/2 TABLET TWICE DAILY WITH MEALS 90 tablet 3    CHOLECALCIFEROL, VITAMIN D3, (VITAMIN D3 ORAL) Take 3,000 capsules by mouth once daily. 3000 IU per day      dexAMETHasone (DECADRON) 4 mg/mL injection       ELIQUIS 2.5 mg Tab TAKE ONE TABLET BY MOUTH 2 TIMES A DAY 60 tablet 12    furosemide (LASIX) 20 MG tablet TAKE 1 TABLET BY MOUTH ONCE DAILY 30 tablet 10    gabapentin (NEURONTIN) 300 MG capsule TAKE 1 CAPSULE EVERY MORNING AND TAKE 2 CAPSULES AT BEDTIME 270 capsule 3    glimepiride (AMARYL) 2 MG tablet TAKE 1 TABLET BEFORE BREAKFAST. 90 tablet 3    HYDROcodone-acetaminophen (NORCO) 5-325 mg per tablet Take 1 tablet by mouth every 6 (six) hours as needed for Pain. 40 tablet 0    hydrocortisone 2.5 % cream Apply topically 2 (two) times daily as needed (rash under the breast). Mix 50/50 with ketoconazole cream. 30 g 3    ketoconazole (NIZORAL) 2 % cream AAA bid prn rash under the breast. Mix 50/50 with hydrocortisone cream. 60 g 3    lancets (ACCU-CHEK SOFTCLIX LANCETS) Misc TrueTest lancets for meter covered by insurance - pt checks twice daily for uncontrolled glucoses E11.65, 200 each 3    levothyroxine (SYNTHROID) 75 MCG tablet TAKE 1 TABLET BEFORE BREAKFAST 90 tablet 1    metFORMIN (GLUCOPHAGE) 500 MG tablet Take 1 tablet (500 mg total) by mouth 2 (two) times daily with meals. 180 tablet 3    mupirocin  (BACTROBAN) 2 % ointment Apply topically 2 (two) times daily. 15 g 0    pravastatin (PRAVACHOL) 20 MG tablet TAKE 1 TABLET EVERY DAY 90 tablet 3    predniSONE (DELTASONE) 20 MG tablet One daily in the morning 5 tablet 0    sod sulf-pot chloride-mag sulf (SUTAB) 1.479-0.188- 0.225 gram tablet Take 12 tablets by mouth once daily. Take according to package instructions with indicated amount of water. 24 tablet 0    sod sulf-pot chloride-mag sulf (SUTAB) 1.479-0.188- 0.225 gram tablet Take 12 tablets by mouth once daily. BIN:[id number]:[id number] 24 tablet 0    tiZANidine (ZANAFLEX) 4 MG tablet One tab every 6-8 hours as needed pain 30 tablet 0    triamcinolone acetonide 0.1%-magnesium hydroxide 400 mg/5 ml-ciclopirox Apply to affected area under the breast twice daily as directed 60 g 3    TRUE METRIX GLUCOSE TEST STRIP Strp TEST BLOOD SUGAR TWICE DAILY 200 strip 3     Facility-Administered Medications Ordered in Other Encounters   Medication Dose Route Frequency Provider Last Rate Last Admin    alteplase injection 2 mg  2 mg Intra-Catheter PRN Erick Dwyer MD   2 mg at 03/11/19 1140    heparin, porcine (PF) 100 unit/mL injection flush 500 Units  500 Units Intravenous 1 time in Clinic/HOD Erick Dwyer MD        heparin, porcine (PF) 100 unit/mL injection flush 500 Units  500 Units Intravenous 1 time in Clinic/HOD Erick Dwyer MD        sodium chloride 0.9% flush 10 mL  10 mL Intravenous PRN Erick Dwyer MD   10 mL at 07/23/19 1157          History:   There are no hospital problems to display for this patient.    Surgical History:    has a past surgical history that includes Carpal tunnel release; Shoulder surgery; Dilation and curettage of uterus (1999); Endometrial ablation (1999); left lumpectomy (2012); Eye surgery; Skin biopsy; Cardiac valve replacement (12/2013); Colonoscopy (N/A, 09/29/2017); Breast lumpectomy (Left, 2012); Breast biopsy (Left, 10/01/2012); Breast biopsy  (Left, 12/2017); Cardiac valuve replacement (2013); Insertion of tunneled central venous catheter (CVC) with subcutaneous port (Right, 02/01/2019); Modified radical mastectomy w/ axillary lymph node dissection (Right, 08/26/2019); Mediport removal (Right, 08/26/2019); Mastectomy; Breast cyst aspiration; and Breast mass excision.   Social History:    reports being sexually active and has had partner(s) who are male.  reports that she has never smoked. She has never used smokeless tobacco. She reports that she does not drink alcohol and does not use drugs.     OBJECTIVE:     Vital Signs (Most Recent):    Vital Signs Range (Last 24H):          There is no height or weight on file to calculate BMI.   Wt Readings from Last 4 Encounters:   12/22/22 113.4 kg (250 lb)   10/10/22 111.2 kg (245 lb 2.4 oz)   10/03/22 110.9 kg (244 lb 7.8 oz)   08/11/22 111.7 kg (246 lb 2.3 oz)     Significant Labs:  Lab Results   Component Value Date    WBC 5.21 07/07/2022    HGB 12.5 07/07/2022    HCT 39.6 07/07/2022     (L) 07/07/2022     10/10/2022    K 4.1 10/10/2022     10/10/2022    CREATININE 1.2 10/10/2022    BUN 22 10/10/2022    CO2 32 (H) 10/10/2022     (H) 10/10/2022    CALCIUM 10.1 10/10/2022    MG 2.0 05/27/2022    PHOS 2.9 05/27/2022    ALKPHOS 88 10/10/2022    ALT 12 10/10/2022    AST 14 10/10/2022    ALBUMIN 4.1 10/10/2022    INR 0.9 09/02/2019    APTT <21.0 09/02/2019    HGBA1C 7.4 (H) 10/10/2022    CPK 59 10/10/2022    TROPONINI 0.016 05/26/2022    BNP 25 05/08/2019     No LMP recorded. Patient is postmenopausal.  No results found for this or any previous visit (from the past 72 hour(s)).    EKG:   Results for orders placed or performed during the hospital encounter of 05/26/22   EKG 12-lead    Collection Time: 05/26/22 12:25 PM    Narrative    Test Reason : U07.1,    Vent. Rate : 083 BPM     Atrial Rate : 083 BPM     P-R Int : 134 ms          QRS Dur : 100 ms      QT Int : 362 ms       P-R-T Axes :  "056 -49 030 degrees     QTc Int : 425 ms    Normal sinus rhythm  Left anterior fascicular block  Minimal voltage criteria for LVH, may be normal variant  Abnormal R wave progression  Abnormal ECG  When compared with ECG of 09-FEB-2022 11:49,  No significant change was found  Confirmed by Marco Sánchez MD ([id number]) on 5/26/2022 11:13:19 PM    Referred By: JULIET FLOR           Confirmed By:Marco Sánchez MD       TTE:  Results for orders placed or performed during the hospital encounter of 08/09/22   Echo   Result Value Ref Range    Ascending aorta 3.24 cm    STJ 2.14 cm    AV mean gradient 30 mmHg    Ao peak travis 3.29 m/s    Ao VTI 71.62 cm    IVRT 71.36 msec    IVS 0.92 0.6 - 1.1 cm    LA size 4.52 cm    Left Atrium Major Axis 5.77 cm    Left Atrium Minor Axis 5.59 cm    LVIDd 4.66 3.5 - 6.0 cm    LVIDs 3.10 2.1 - 4.0 cm    LVOT diameter 1.85 cm    LVOT peak VTI 23.25 cm    Posterior Wall 0.90 0.6 - 1.1 cm    MV Peak A Travis 1.34 m/s    E wave deceleration time 199.23 msec    MV Peak E Travis 1.19 m/s    PV Peak D Travis 0.48 m/s    PV Peak S Travis 0.49 m/s    RA Major Axis 5.67 cm    RA Width 4.26 cm    RVDD 3.62 cm    Sinus 2.44 cm    TAPSE 2.08 cm    TR Max Travis 2.64 m/s    TDI LATERAL 0.05 m/s    TDI SEPTAL 0.08 m/s    LA WIDTH 4.43 cm    MV stenosis pressure 1/2 time 61.41 ms    LV Diastolic Volume 100.42 mL    LV Systolic Volume 37.95 mL    RV S' 7.68 cm/s    LVOT peak travis 0.95 m/s    LA volume (mod) 68.63 cm3    MV "A" wave duration 8.85 msec    MV mean gradient 1 mmHg    MV peak gradient 8 mmHg    MV VTI 37.01 cm    LV LATERAL E/E' RATIO 23.80 m/s    LV SEPTAL E/E' RATIO 14.88 m/s    FS 33 %    LA volume 96.65 cm3    LV mass 142.79 g    Left Ventricle Relative Wall Thickness 0.39 cm    AV valve area 0.87 cm2    AV Velocity Ratio 0.29     AV index (prosthetic) 0.32     MV valve area p 1/2 method 3.58 cm2    MV valve area by continuity eq 1.69 cm2    E/A ratio 0.89     Mean e' 0.07 m/s    Pulm vein S/D ratio 1.02     LVOT " area 2.7 cm2    LVOT stroke volume 62.46 cm3    AV peak gradient 43 mmHg    E/E' ratio 18.31 m/s    Triscuspid Valve Regurgitation Peak Gradient 28 mmHg    BSA 2.2 m2    LV Systolic Volume Index 18.2 mL/m2    LV Diastolic Volume Index 48.28 mL/m2    LA Volume Index 46.5 mL/m2    LV Mass Index 69 g/m2    LA Volume Index (Mod) 33.0 mL/m2    Right Atrial Pressure (from IVC) 3 mmHg    EF 58 %    TV rest pulmonary artery pressure 31 mmHg    Narrative    · The left ventricle is normal in size with  · The estimated ejection fraction is 58%.  · There are segmental left ventricular wall motion abnormalities.  · There is abnormal septal wall motion.  · Grade II left ventricular diastolic dysfunction.  · Moderate left atrial enlargement.  · Normal right ventricular size with low normal right ventricular systolic   function.  · Moderate right atrial enlargement.  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  · The aortic valve mean gradient is 30 mmHg with a dimensionless index of   0.32.  · There is mild to mild-moderate MR ( 1-2+).  · Moderate tricuspid regurgitation.  · Normal central venous pressure (3 mmHg).  · The estimated PA systolic pressure is 31 mmHg.        EF   Date Value Ref Range Status   08/09/2022 58 % Final      Results for orders placed or performed during the hospital encounter of 10/19/18   2D echo with color flow doppler   Result Value Ref Range    EF + QEF 53 55 - 65    Mitral Valve Regurgitation MILD TO MODERATE     Est. PA Systolic Pressure 28.4     Tricuspid Valve Regurgitation MILD TO MODERATE      [place]:  No results found. However, due to the size of the patient record, not all encounters were searched. Please check Results Review for a complete set of results.  Stress Test:  No results found for this or any previous visit.     LHC:  No results found for this or any previous visit.     PFT:  No results found for: FEV1, FVC, YXV3FLZ, TLC, DLCO   ASSESSMENT/PLAN:         Pre-op  Assessment    I have reviewed the Patient Summary Reports.     I have reviewed the Nursing Notes.    I have reviewed the Medications.     Review of Systems  Anesthesia Hx:  No problems with previous Anesthesia    Hematology/Oncology:  Hematology Normal   Oncology Normal     EENT/Dental:EENT/Dental Normal   Cardiovascular:   Exercise tolerance: good Hypertension    Pulmonary:   Sleep Apnea    Renal/:   Chronic Renal Disease    Hepatic/GI:   Liver Disease,    Musculoskeletal:   Arthritis     Neurological:  Neurology Normal    Endocrine:   Diabetes    Dermatological:  Skin Normal    Psych:   Psychiatric History          Physical Exam  General: Well nourished    Airway:  Mallampati: III / II  Mouth Opening: Normal  TM Distance: Normal  Tongue: Normal  Neck ROM: Normal ROM    Dental:  Intact        Anesthesia Plan  Type of Anesthesia, risks & benefits discussed:    Anesthesia Type: Gen ETT, Gen Natural Airway  Intra-op Monitoring Plan: Standard ASA Monitors  Post Op Pain Control Plan: multimodal analgesia and IV/PO Opioids PRN  Induction:  IV  Airway Plan: Direct and Video, Post-Induction  Informed Consent: Informed consent signed with the Patient and all parties understand the risks and agree with anesthesia plan.  All questions answered.   ASA Score: 3  Day of Surgery Review of History & Physical: H&P completed by Anesthesiologist.  Anesthesia Plan Notes: Chart reviewed, patient interviewed and examined.  The anesthetic plan was explained.  Risks, benefits, and alternatives were discussed. Questions were answered and the consent was signed.        KAREY Varela M.D.         Ready For Surgery From Anesthesia Perspective.     .

## 2023-01-05 NOTE — H&P
COLONOSCOPY HISTORY AND PHYSICAL EXAM    Procedure : Colonoscopy      INDICATIONS: asymptomatic screening exam and personal history of colon polyps    Family Hx of CRC: yes, colon cancer father    Last Colonoscopy:  9/29/2017  Findings: The perianal and digital rectal examinations were normal.        Multiple small and large-mouthed diverticula were found in the        entire colon.        A 4 mm polyp was found in the cecum. The polyp was sessile. The        polyp was removed with a jumbo cold forceps. Resection and retrieval        were complete.        A 8 mm polyp was found in the ascending colon. The polyp was        semi-pedunculated. The polyp was removed with a cold snare.        Resection and retrieval were complete.        A 2 mm polyp was found in the descending colon. The polyp was        sessile. The polyp was removed with a jumbo cold forceps. Resection        and retrieval were complete.     Pathology  1. Cecum, polyp, polypectomy: Benign colonic mucosa with mild surface hyperplastic features. There is no significant inflammation and no findings of either active or chronic colitis, dyplasia or infectious organisms.   2. Ascending colon, 8 mm polyp, polypectomy: Tubular adenoma.   3. Descending colon, 2 mm polyp, polypectomy: Tubular adenoma.    Sedation Problems: NO  Fam Hx of Sedation Problems: NO     Past Medical History:   Diagnosis Date    Allergy     seasonal    Anxiety     Arthritis     BRCA1 negative     Breast cancer 10/2012    left breast invasive ductal carcinoma    Colon polyp     Depression     Diabetes mellitus     Type 2    Diabetes mellitus, type 2     Diverticular disease     Diverticulitis 2009    Genetic testing 05/2017    negative Integrated BRACAnalysis    Hyperlipidemia     Hypertension     Morbid obesity     MITCHELL (obstructive sleep apnea)     Pulmonary embolism     Stenosis     Stenosis and insufficiency of lacrimal passages     Thyroid disease      Family History   Problem  "Relation Age of Onset    Breast cancer Mother     Colon cancer Father     Cancer Father         Colon CA (cause of death); Prostate CA (no chemo)    Heart disease Father     No Known Problems Sister     Alcohol abuse Brother     Cancer Maternal Grandfather         Colon CA    No Known Problems Son     Cancer Brother         prostate CA, no chemo, "it was bad"    Anxiety disorder Son     SAL disease Son     Sleep disorder Son     Ovarian cancer Neg Hx     Melanoma Neg Hx     Psoriasis Neg Hx     Lupus Neg Hx     Eczema Neg Hx     Acne Neg Hx      Social History     Socioeconomic History    Marital status:      Spouse name: Srinath    Number of children: 2   Occupational History    Occupation: Retired   Tobacco Use    Smoking status: Never    Smokeless tobacco: Never   Substance and Sexual Activity    Alcohol use: No     Alcohol/week: 0.0 standard drinks    Drug use: No    Sexual activity: Yes     Partners: Male   Other Topics Concern    Are you pregnant or think you may be? No    Breast-feeding No     Social Determinants of Health     Financial Resource Strain: Low Risk     Difficulty of Paying Living Expenses: Not hard at all   Food Insecurity: No Food Insecurity    Worried About Running Out of Food in the Last Year: Never true    Ran Out of Food in the Last Year: Never true   Transportation Needs: No Transportation Needs    Lack of Transportation (Medical): No    Lack of Transportation (Non-Medical): No   Physical Activity: Insufficiently Active    Days of Exercise per Week: 2 days    Minutes of Exercise per Session: 40 min   Stress: Stress Concern Present    Feeling of Stress : To some extent   Social Connections: Unknown    Frequency of Communication with Friends and Family: Three times a week    Frequency of Social Gatherings with Friends and Family: Twice a week    Active Member of Clubs or Organizations: Yes    Attends Club or Organization Meetings: More than 4 times per year    Marital Status:  " "  Housing Stability: Unknown    Unable to Pay for Housing in the Last Year: No    Unstable Housing in the Last Year: No       Review of Systems - Negative except   Respiratory ROS: no dyspnea  Cardiovascular ROS: no exertional chest pain  Gastrointestinal ROS: NO abdominal discomfort,  NO rectal bleeding  Musculoskeletal ROS: no muscular pain  Neurological ROS: no recent stroke    Physical Exam:  /62 (BP Location: Left arm, Patient Position: Lying)   Pulse 96   Temp 98.1 °F (36.7 °C) (Temporal)   Resp 16   Ht 5' 2" (1.575 m)   Wt 111.6 kg (246 lb)   SpO2 99%   Breastfeeding No   BMI 44.99 kg/m²   General: no distress  Head: normocephalic  Mallampati Score   Neck: supple, symmetrical, trachea midline  Lungs:  normal respiratory effort  Heart: regular rate and rhythm  Abdomen: soft, non-tender non-distented; bowel sounds normal; no masses,  no organomegaly  Extremities: no cyanosis or edema, or clubbing    ASA:  III    PLAN  COLONOSCOPY.    SedationPlan :MAC    The details of the procedure, the possible need for biopsy or polypectomy and the potential risks including bleeding, perforation, missed polyps were discussed in detail.    Eugenie Layton MD MPA  Colorectal Surgery      "

## 2023-01-05 NOTE — PLAN OF CARE
Pt reports pain is significantly improved and would like the CT scan that Dr. Martino offered to be scheduled be cancelled. This was completed via telephone to Dr. Martino.

## 2023-01-05 NOTE — ANESTHESIA POSTPROCEDURE EVALUATION
Anesthesia Post Evaluation    Patient: Alina Narayan    Procedure(s) Performed: Procedure(s) (LRB):  COLONOSCOPY (N/A)    Final Anesthesia Type: general      Patient location during evaluation: PACU  Patient participation: Yes- Able to Participate  Level of consciousness: awake and alert  Post-procedure vital signs: reviewed and stable  Pain management: adequate  Airway patency: patent    PONV status at discharge: No PONV  Anesthetic complications: no      Cardiovascular status: blood pressure returned to baseline  Respiratory status: unassisted  Hydration status: euvolemic  Follow-up not needed.          Vitals Value Taken Time   /64 01/05/23 1208   Temp 36.6 °C (97.9 °F) 01/05/23 1140   Pulse 95 01/05/23 1208   Resp 16 01/05/23 1208   SpO2 100 % 01/05/23 1208         Event Time   Out of Recovery 12:29:10         Pain/Lin Score: Lin Score: 10 (1/5/2023 12:08 PM)

## 2023-01-05 NOTE — PROVATION PATIENT INSTRUCTIONS
Discharge Summary/Instructions after an Endoscopic Procedure  Patient Name: Alina Narayan  Patient MRN: [id number]  Patient YOB: 1945 Thursday, January 5, 2023  Eladia Martino MD  Dear patient,  As a result of recent federal legislation (The Federal Cures Act), you may   receive lab or pathology results from your procedure in your [facility]   account before your physician is able to contact you. Your physician or   their representative will relay the results to you with their   recommendations at their soonest availability.  Thank you,  RESTRICTIONS:  During your procedure today, you received medications for sedation.  These   medications may affect your judgment, balance and coordination.  Therefore,   for 24 hours, you have the following restrictions:   - DO NOT drive a car, operate machinery, make legal/financial decisions,   sign important papers or drink alcohol.    ACTIVITY:  Today: no heavy lifting, straining or running due to procedural   sedation/anesthesia.  The following day: return to full activity including work.  DIET:  Eat and drink normally unless instructed otherwise.     TREATMENT FOR COMMON SIDE EFFECTS:  - Mild abdominal pain, nausea, belching, bloating or excessive gas:  rest,   eat lightly and use a heating pad.  - Sore Throat: treat with throat lozenges and/or gargle with warm salt   water.  - Because air was used during the procedure, expelling large amounts of air   from your rectum or belching is normal.  - If a bowel prep was taken, you may not have a bowel movement for 1-3 days.    This is normal.  SYMPTOMS TO WATCH FOR AND REPORT TO YOUR PHYSICIAN:  1. Abdominal pain or bloating, other than gas cramps.  2. Chest pain.  3. Back pain.  4. Signs of infection such as: chills or fever occurring within 24 hours   after the procedure.  5. Rectal bleeding, which would show as bright red, maroon, or black stools.   (A tablespoon of blood from the rectum is not serious, especially if    hemorrhoids are present.)  6. Vomiting.  7. Weakness or dizziness.  GO DIRECTLY TO THE NEAREST EMERGENCY ROOM IF YOU HAVE ANY OF THE FOLLOWING:      Difficulty breathing              Chills and/or fever over 101 F   Persistent vomiting and/or vomiting blood   Severe abdominal pain   Severe chest pain   Black, tarry stools   Bleeding- more than one tablespoon   Any other symptom or condition that you feel may need urgent attention  Your doctor recommends these additional instructions:  If any biopsies were taken, your doctors clinic will contact you in 1 to 2   weeks with any results.  - Discharge patient to home.   - Resume previous diet.   - Continue present medications.   - Await pathology results.   - Repeat colonoscopy date to be determined after pending pathology results   are reviewed for surveillance.   - Return to primary care physician.   - Written discharge instructions were provided to the patient.   - The signs and symptoms of potential delayed complications were discussed   with the patient.   - Patient has a contact number available for emergencies.   - Return to normal activities tomorrow.  For questions, problems or results please call your physician - Eladia Martino MD at Work:  [phone].  [facility] [place], EMERGENCY ROOM PHONE NUMBER: [phone]  IF A COMPLICATION OR EMERGENCY SITUATION ARISES AND YOU ARE UNABLE TO REACH   YOUR PHYSICIAN - GO DIRECTLY TO THE EMERGENCY ROOM.  Eladia Martino MD  1/5/2023 11:37:11 AM  This report has been verified and signed electronically.  Dear patient,  As a result of recent federal legislation (The Federal Cures Act), you may   receive lab or pathology results from your procedure in your [facility]   account before your physician is able to contact you. Your physician or   their representative will relay the results to you with their   recommendations at their soonest availability.  Thank you,  PROVATION

## 2023-01-11 LAB
FINAL PATHOLOGIC DIAGNOSIS: NORMAL
GROSS: NORMAL
Lab: NORMAL

## 2023-02-01 DIAGNOSIS — Z78.0 MENOPAUSE: ICD-10-CM

## 2023-02-07 DIAGNOSIS — Z00.00 ENCOUNTER FOR MEDICARE ANNUAL WELLNESS EXAM: ICD-10-CM

## 2023-02-09 DIAGNOSIS — Z00.00 ENCOUNTER FOR MEDICARE ANNUAL WELLNESS EXAM: ICD-10-CM

## 2023-02-16 ENCOUNTER — PATIENT MESSAGE (OUTPATIENT)
Dept: INTERNAL MEDICINE | Facility: CLINIC | Age: 78
End: 2023-02-16

## 2023-02-16 ENCOUNTER — OFFICE VISIT (OUTPATIENT)
Dept: INTERNAL MEDICINE | Facility: CLINIC | Age: 78
End: 2023-02-16
Payer: MEDICARE

## 2023-02-16 VITALS
BODY MASS INDEX: 45.52 KG/M2 | HEART RATE: 84 BPM | SYSTOLIC BLOOD PRESSURE: 124 MMHG | WEIGHT: 247.38 LBS | DIASTOLIC BLOOD PRESSURE: 72 MMHG | OXYGEN SATURATION: 98 % | HEIGHT: 62 IN

## 2023-02-16 DIAGNOSIS — I70.0 ATHEROSCLEROSIS OF AORTA: ICD-10-CM

## 2023-02-16 DIAGNOSIS — E55.9 MILD VITAMIN D DEFICIENCY: ICD-10-CM

## 2023-02-16 DIAGNOSIS — N18.30 STAGE 3 CHRONIC KIDNEY DISEASE, UNSPECIFIED WHETHER STAGE 3A OR 3B CKD: ICD-10-CM

## 2023-02-16 DIAGNOSIS — E66.9 DIABETES MELLITUS TYPE 2 IN OBESE: ICD-10-CM

## 2023-02-16 DIAGNOSIS — D69.6 THROMBOCYTOPENIA, UNSPECIFIED: ICD-10-CM

## 2023-02-16 DIAGNOSIS — E11.69 DIABETES MELLITUS TYPE 2 IN OBESE: ICD-10-CM

## 2023-02-16 DIAGNOSIS — E66.01 MORBID (SEVERE) OBESITY DUE TO EXCESS CALORIES: ICD-10-CM

## 2023-02-16 DIAGNOSIS — I10 ESSENTIAL HYPERTENSION: ICD-10-CM

## 2023-02-16 DIAGNOSIS — E78.5 HYPERLIPIDEMIA, UNSPECIFIED HYPERLIPIDEMIA TYPE: ICD-10-CM

## 2023-02-16 PROCEDURE — 3078F DIAST BP <80 MM HG: CPT | Mod: HCNC,CPTII,S$GLB, | Performed by: INTERNAL MEDICINE

## 2023-02-16 PROCEDURE — 1159F PR MEDICATION LIST DOCUMENTED IN MEDICAL RECORD: ICD-10-PCS | Mod: HCNC,CPTII,S$GLB, | Performed by: INTERNAL MEDICINE

## 2023-02-16 PROCEDURE — 99214 OFFICE O/P EST MOD 30 MIN: CPT | Mod: HCNC,S$GLB,, | Performed by: INTERNAL MEDICINE

## 2023-02-16 PROCEDURE — 3288F PR FALLS RISK ASSESSMENT DOCUMENTED: ICD-10-PCS | Mod: HCNC,CPTII,S$GLB, | Performed by: INTERNAL MEDICINE

## 2023-02-16 PROCEDURE — 1157F PR ADVANCE CARE PLAN OR EQUIV PRESENT IN MEDICAL RECORD: ICD-10-PCS | Mod: HCNC,CPTII,S$GLB, | Performed by: INTERNAL MEDICINE

## 2023-02-16 PROCEDURE — 1157F ADVNC CARE PLAN IN RCRD: CPT | Mod: HCNC,CPTII,S$GLB, | Performed by: INTERNAL MEDICINE

## 2023-02-16 PROCEDURE — 99214 PR OFFICE/OUTPT VISIT, EST, LEVL IV, 30-39 MIN: ICD-10-PCS | Mod: HCNC,S$GLB,, | Performed by: INTERNAL MEDICINE

## 2023-02-16 PROCEDURE — 3078F PR MOST RECENT DIASTOLIC BLOOD PRESSURE < 80 MM HG: ICD-10-PCS | Mod: HCNC,CPTII,S$GLB, | Performed by: INTERNAL MEDICINE

## 2023-02-16 PROCEDURE — 3074F PR MOST RECENT SYSTOLIC BLOOD PRESSURE < 130 MM HG: ICD-10-PCS | Mod: HCNC,CPTII,S$GLB, | Performed by: INTERNAL MEDICINE

## 2023-02-16 PROCEDURE — 1159F MED LIST DOCD IN RCRD: CPT | Mod: HCNC,CPTII,S$GLB, | Performed by: INTERNAL MEDICINE

## 2023-02-16 PROCEDURE — 99999 PR PBB SHADOW E&M-EST. PATIENT-LVL V: CPT | Mod: PBBFAC,HCNC,, | Performed by: INTERNAL MEDICINE

## 2023-02-16 PROCEDURE — 3288F FALL RISK ASSESSMENT DOCD: CPT | Mod: HCNC,CPTII,S$GLB, | Performed by: INTERNAL MEDICINE

## 2023-02-16 PROCEDURE — 1101F PT FALLS ASSESS-DOCD LE1/YR: CPT | Mod: HCNC,CPTII,S$GLB, | Performed by: INTERNAL MEDICINE

## 2023-02-16 PROCEDURE — 3074F SYST BP LT 130 MM HG: CPT | Mod: HCNC,CPTII,S$GLB, | Performed by: INTERNAL MEDICINE

## 2023-02-16 PROCEDURE — 1101F PR PT FALLS ASSESS DOC 0-1 FALLS W/OUT INJ PAST YR: ICD-10-PCS | Mod: HCNC,CPTII,S$GLB, | Performed by: INTERNAL MEDICINE

## 2023-02-16 PROCEDURE — 99999 PR PBB SHADOW E&M-EST. PATIENT-LVL V: ICD-10-PCS | Mod: PBBFAC,HCNC,, | Performed by: INTERNAL MEDICINE

## 2023-02-16 PROCEDURE — 1126F AMNT PAIN NOTED NONE PRSNT: CPT | Mod: HCNC,CPTII,S$GLB, | Performed by: INTERNAL MEDICINE

## 2023-02-16 PROCEDURE — 1126F PR PAIN SEVERITY QUANTIFIED, NO PAIN PRESENT: ICD-10-PCS | Mod: HCNC,CPTII,S$GLB, | Performed by: INTERNAL MEDICINE

## 2023-02-16 NOTE — PROGRESS NOTES
Answers submitted by the patient for this visit:  Review of Systems Questionnaire (Submitted on 2/13/2023)  activity change: No  unexpected weight change: No  neck pain: No  hearing loss: No  rhinorrhea: No  trouble swallowing: No  eye discharge: No  visual disturbance: No  chest tightness: Yes  wheezing: No  chest pain: No  palpitations: No  blood in stool: No  constipation: Yes  vomiting: No  diarrhea: No  polydipsia: No  polyuria: No  difficulty urinating: No  hematuria: No  menstrual problem: No  dysuria: No  joint swelling: No  arthralgias: Yes  headaches: No  weakness: No  confusion: No  dysphoric mood: No  Subjective:       Patient ID: Alina Narayan is a 77 y.o. female.    Chief Complaint: Follow-up    Follow-up  Associated symptoms include arthralgias. Pertinent negatives include no abdominal pain, chest pain, headaches, joint swelling, nausea, neck pain, vomiting or weakness. Pt is doing better - chronic pain is improved.  She is very active creatively.  Just celebrated her 59th wedding anniversary.  No CP or SOB.  Review of Systems   Constitutional:  Negative for activity change and unexpected weight change.   HENT:  Negative for hearing loss, rhinorrhea and trouble swallowing.    Eyes:  Negative for discharge and visual disturbance.   Respiratory:  Positive for chest tightness. Negative for shortness of breath (PND or orthopnea) and wheezing.    Cardiovascular:  Negative for chest pain and palpitations.   Gastrointestinal:  Positive for constipation. Negative for abdominal pain, blood in stool, diarrhea, nausea and vomiting.   Endocrine: Negative for polydipsia and polyuria.   Genitourinary:  Negative for difficulty urinating, dysuria, hematuria and menstrual problem.   Musculoskeletal:  Positive for arthralgias. Negative for joint swelling and neck pain.   Neurological:  Negative for seizures, syncope, weakness and headaches.   Psychiatric/Behavioral:  Negative for confusion and dysphoric mood.       Objective:      Physical Exam  Constitutional:       General: She is not in acute distress.     Appearance: She is well-developed.   HENT:      Head: Normocephalic.   Eyes:      Pupils: Pupils are equal, round, and reactive to light.   Neck:      Thyroid: No thyromegaly.      Vascular: No JVD.   Cardiovascular:      Rate and Rhythm: Normal rate and regular rhythm.      Heart sounds: Normal heart sounds. No murmur heard.    No friction rub. No gallop.   Pulmonary:      Effort: Pulmonary effort is normal.      Breath sounds: Normal breath sounds. No wheezing or rales.   Abdominal:      General: Bowel sounds are normal. There is no distension.      Palpations: Abdomen is soft. There is no mass.      Tenderness: There is no abdominal tenderness. There is no guarding or rebound.   Musculoskeletal:      Cervical back: Neck supple.   Lymphadenopathy:      Cervical: No cervical adenopathy.   Skin:     General: Skin is warm and dry.   Neurological:      Mental Status: She is alert and oriented to person, place, and time.      Deep Tendon Reflexes: Reflexes are normal and symmetric.   Psychiatric:         Behavior: Behavior normal.         Thought Content: Thought content normal.         Judgment: Judgment normal.     L leg more swollen than R leg  Assessment:       1. BMI 45.0-49.9, adult    2. Essential hypertension    3. Hyperlipidemia, unspecified hyperlipidemia type    4. Diabetes mellitus type 2 in obese    5. Mild vitamin D deficiency    6. Morbid (severe) obesity due to excess calories    7. Atherosclerosis of aorta    8. Stage 3 chronic kidney disease, unspecified whether stage 3a or 3b CKD    9. Thrombocytopenia, unspecified          Plan:   BMI 45.0-49.9, adult  -     Ambulatory referral/consult to Bariatric Medicine; Future; Expected date: 02/23/2023    Essential hypertension  -     CBC Auto Differential; Future; Expected date: 02/16/2023  -     Comprehensive Metabolic Panel; Future; Expected date: 02/16/2023  -      TSH; Future; Expected date: 02/16/2023  Controlled - continue current meds    Hyperlipidemia, unspecified hyperlipidemia type  -     Lipid Panel; Future; Expected date: 02/16/2023    Diabetes mellitus type 2 in obese  -     Hemoglobin A1C; Future; Expected date: 02/16/2023  -     Urinalysis; Future; Expected date: 02/16/2023  -     Microalbumin/Creatinine Ratio, Urine; Future; Expected date: 02/16/2023    Mild vitamin D deficiency  -     Vitamin D; Future; Expected date: 02/16/2023    Morbid (severe) obesity due to excess calories  Bariatric medicine referral  Atherosclerosis of aorta  On statin  Stage 3 chronic kidney disease, unspecified whether stage 3a or 3b CKD  Monitored and stable  Thrombocytopenia, unspecified    Monitored and stable

## 2023-02-19 PROBLEM — E66.01 MORBID (SEVERE) OBESITY DUE TO EXCESS CALORIES: Status: ACTIVE | Noted: 2023-02-19

## 2023-02-19 PROBLEM — I27.20 PULMONARY HTN: Status: RESOLVED | Noted: 2019-05-10 | Resolved: 2023-02-19

## 2023-02-24 ENCOUNTER — PES CALL (OUTPATIENT)
Dept: ADMINISTRATIVE | Facility: CLINIC | Age: 78
End: 2023-02-24
Payer: MEDICARE

## 2023-03-02 ENCOUNTER — LAB VISIT (OUTPATIENT)
Dept: LAB | Facility: HOSPITAL | Age: 78
End: 2023-03-02
Attending: INTERNAL MEDICINE
Payer: MEDICARE

## 2023-03-02 DIAGNOSIS — I10 ESSENTIAL HYPERTENSION: ICD-10-CM

## 2023-03-02 DIAGNOSIS — E11.69 DIABETES MELLITUS TYPE 2 IN OBESE: ICD-10-CM

## 2023-03-02 DIAGNOSIS — E55.9 MILD VITAMIN D DEFICIENCY: ICD-10-CM

## 2023-03-02 DIAGNOSIS — E78.5 HYPERLIPIDEMIA, UNSPECIFIED HYPERLIPIDEMIA TYPE: ICD-10-CM

## 2023-03-02 DIAGNOSIS — E66.9 DIABETES MELLITUS TYPE 2 IN OBESE: ICD-10-CM

## 2023-03-02 LAB
25(OH)D3+25(OH)D2 SERPL-MCNC: 43 NG/ML (ref 30–96)
ALBUMIN SERPL BCP-MCNC: 4.1 G/DL (ref 3.5–5.2)
ALP SERPL-CCNC: 85 U/L (ref 55–135)
ALT SERPL W/O P-5'-P-CCNC: 12 U/L (ref 10–44)
ANION GAP SERPL CALC-SCNC: 6 MMOL/L (ref 8–16)
AST SERPL-CCNC: 15 U/L (ref 10–40)
BASOPHILS # BLD AUTO: 0.02 K/UL (ref 0–0.2)
BASOPHILS NFR BLD: 0.4 % (ref 0–1.9)
BILIRUB SERPL-MCNC: 0.5 MG/DL (ref 0.1–1)
BUN SERPL-MCNC: 24 MG/DL (ref 8–23)
CALCIUM SERPL-MCNC: 9.6 MG/DL (ref 8.7–10.5)
CHLORIDE SERPL-SCNC: 105 MMOL/L (ref 95–110)
CHOLEST SERPL-MCNC: 158 MG/DL (ref 120–199)
CHOLEST/HDLC SERPL: 3 {RATIO} (ref 2–5)
CO2 SERPL-SCNC: 31 MMOL/L (ref 23–29)
CREAT SERPL-MCNC: 1.1 MG/DL (ref 0.5–1.4)
DIFFERENTIAL METHOD: ABNORMAL
EOSINOPHIL # BLD AUTO: 0 K/UL (ref 0–0.5)
EOSINOPHIL NFR BLD: 0.9 % (ref 0–8)
ERYTHROCYTE [DISTWIDTH] IN BLOOD BY AUTOMATED COUNT: 13.7 % (ref 11.5–14.5)
EST. GFR  (NO RACE VARIABLE): 51.8 ML/MIN/1.73 M^2
GLUCOSE SERPL-MCNC: 133 MG/DL (ref 70–110)
HCT VFR BLD AUTO: 41.1 % (ref 37–48.5)
HDLC SERPL-MCNC: 52 MG/DL (ref 40–75)
HDLC SERPL: 32.9 % (ref 20–50)
HGB BLD-MCNC: 12.5 G/DL (ref 12–16)
IMM GRANULOCYTES # BLD AUTO: 0.01 K/UL (ref 0–0.04)
IMM GRANULOCYTES NFR BLD AUTO: 0.2 % (ref 0–0.5)
LDLC SERPL CALC-MCNC: 84.6 MG/DL (ref 63–159)
LYMPHOCYTES # BLD AUTO: 1.5 K/UL (ref 1–4.8)
LYMPHOCYTES NFR BLD: 31.3 % (ref 18–48)
MCH RBC QN AUTO: 28.8 PG (ref 27–31)
MCHC RBC AUTO-ENTMCNC: 30.4 G/DL (ref 32–36)
MCV RBC AUTO: 95 FL (ref 82–98)
MONOCYTES # BLD AUTO: 0.5 K/UL (ref 0.3–1)
MONOCYTES NFR BLD: 9.6 % (ref 4–15)
NEUTROPHILS # BLD AUTO: 2.7 K/UL (ref 1.8–7.7)
NEUTROPHILS NFR BLD: 57.6 % (ref 38–73)
NONHDLC SERPL-MCNC: 106 MG/DL
NRBC BLD-RTO: 0 /100 WBC
PLATELET # BLD AUTO: 116 K/UL (ref 150–450)
PMV BLD AUTO: 11.5 FL (ref 9.2–12.9)
POTASSIUM SERPL-SCNC: 4.1 MMOL/L (ref 3.5–5.1)
PROT SERPL-MCNC: 7.2 G/DL (ref 6–8.4)
RBC # BLD AUTO: 4.34 M/UL (ref 4–5.4)
SODIUM SERPL-SCNC: 142 MMOL/L (ref 136–145)
TRIGL SERPL-MCNC: 107 MG/DL (ref 30–150)
TSH SERPL DL<=0.005 MIU/L-ACNC: 2.72 UIU/ML (ref 0.4–4)
WBC # BLD AUTO: 4.69 K/UL (ref 3.9–12.7)

## 2023-03-02 PROCEDURE — 84443 ASSAY THYROID STIM HORMONE: CPT | Mod: HCNC | Performed by: INTERNAL MEDICINE

## 2023-03-02 PROCEDURE — 83036 HEMOGLOBIN GLYCOSYLATED A1C: CPT | Mod: HCNC | Performed by: INTERNAL MEDICINE

## 2023-03-02 PROCEDURE — 80061 LIPID PANEL: CPT | Mod: HCNC | Performed by: INTERNAL MEDICINE

## 2023-03-02 PROCEDURE — 36415 COLL VENOUS BLD VENIPUNCTURE: CPT | Mod: HCNC | Performed by: INTERNAL MEDICINE

## 2023-03-02 PROCEDURE — 82306 VITAMIN D 25 HYDROXY: CPT | Mod: HCNC | Performed by: INTERNAL MEDICINE

## 2023-03-02 PROCEDURE — 85025 COMPLETE CBC W/AUTO DIFF WBC: CPT | Mod: HCNC | Performed by: INTERNAL MEDICINE

## 2023-03-02 PROCEDURE — 80053 COMPREHEN METABOLIC PANEL: CPT | Mod: HCNC | Performed by: INTERNAL MEDICINE

## 2023-03-03 LAB
ESTIMATED AVG GLUCOSE: 166 MG/DL (ref 68–131)
HBA1C MFR BLD: 7.4 % (ref 4–5.6)

## 2023-03-07 NOTE — PROGRESS NOTES
Subjective:   Patient ID:  Alina Narayan is a 77 y.o. female who presents for follow-up of CVD    HPI:  Patient is here for valvular heart disease and CAD risk.  The patient has no chest pain, TIA, palpitations, syncope or pre-syncope.Patient does not exercise.She has chronic SOB /ARELLANO worsening.          Review of Systems   Constitutional: Negative for chills, decreased appetite, diaphoresis, fever, malaise/fatigue, night sweats, weight gain and weight loss.   HENT:  Negative for congestion, hoarse voice, nosebleeds, sore throat and tinnitus.    Eyes:  Negative for blurred vision, double vision, vision loss in left eye, vision loss in right eye, visual disturbance and visual halos.   Cardiovascular:  Positive for dyspnea on exertion. Negative for chest pain, claudication, cyanosis, irregular heartbeat, leg swelling, near-syncope, orthopnea, palpitations, paroxysmal nocturnal dyspnea and syncope.   Respiratory:  Positive for shortness of breath. Negative for cough, hemoptysis, sleep disturbances due to breathing, snoring, sputum production and wheezing.    Endocrine: Negative for cold intolerance, heat intolerance, polydipsia, polyphagia and polyuria.   Hematologic/Lymphatic: Negative for adenopathy and bleeding problem. Does not bruise/bleed easily.   Skin:  Negative for color change, dry skin, flushing, itching, nail changes, poor wound healing, rash, skin cancer, suspicious lesions and unusual hair distribution.   Musculoskeletal:  Negative for arthritis, back pain, falls, gout, joint pain, joint swelling, muscle cramps, muscle weakness, myalgias and stiffness.   Gastrointestinal:  Negative for abdominal pain, anorexia, change in bowel habit, constipation, diarrhea, dysphagia, heartburn, hematemesis, hematochezia, melena and vomiting.   Genitourinary:  Negative for decreased libido, dysuria, hematuria, hesitancy and urgency.   Neurological:  Negative for excessive daytime sleepiness, dizziness, focal weakness,  "headaches, light-headedness, loss of balance, numbness, paresthesias, seizures, sensory change, tremors, vertigo and weakness.   Psychiatric/Behavioral:  Negative for altered mental status, depression, hallucinations, memory loss, substance abuse and suicidal ideas. The patient does not have insomnia and is not nervous/anxious.    Allergic/Immunologic: Negative for environmental allergies and hives.     Objective: BP (!) 147/67 (BP Location: Left arm, Patient Position: Sitting, BP Method: Large (Automatic))   Pulse 71   Ht 5' 2" (1.575 m)   Wt 112.6 kg (248 lb 3.8 oz)   SpO2 96%   BMI 45.40 kg/m²      Physical Exam  Constitutional:       Appearance: She is well-developed.   HENT:      Head: Normocephalic.   Eyes:      Pupils: Pupils are equal, round, and reactive to light.   Neck:      Thyroid: No thyromegaly.      Vascular: Normal carotid pulses. No carotid bruit, hepatojugular reflux or JVD.   Cardiovascular:      Rate and Rhythm: Normal rate and regular rhythm.      Pulses: Intact distal pulses.      Heart sounds: Murmur heard.   Harsh midsystolic murmur is present with a grade of 1/6 at the upper right sternal border radiating to the neck.     No friction rub. No gallop.   Pulmonary:      Effort: Pulmonary effort is normal. No tachypnea or respiratory distress.      Breath sounds: Normal breath sounds. No wheezing or rales.   Chest:      Chest wall: No tenderness.   Abdominal:      General: Bowel sounds are normal. There is no distension.      Palpations: Abdomen is soft. There is no mass.      Tenderness: There is no abdominal tenderness. There is no guarding or rebound.   Musculoskeletal:         General: No tenderness. Normal range of motion.      Cervical back: Normal range of motion.   Lymphadenopathy:      Cervical: No cervical adenopathy.   Skin:     General: Skin is warm.      Findings: No erythema or rash.   Neurological:      Mental Status: She is alert and oriented to person, place, and time.     "  Cranial Nerves: No cranial nerve deficit.      Coordination: Coordination normal.   Psychiatric:         Behavior: Behavior normal.         Thought Content: Thought content normal.         Judgment: Judgment normal.       Assessment:     1. S/P AVR (aortic valve replacement)    2. Diabetes mellitus type 2 in obese    3. Degeneration of lumbar or lumbosacral intervertebral disc    4. Stage 3 chronic kidney disease, unspecified whether stage 3a or 3b CKD    5. Diabetes mellitus due to underlying condition with stage 3 chronic kidney disease, without long-term current use of insulin, unspecified whether stage 3a or 3b CKD    6. Vitamin D deficiency    7. History of breast cancer    8. BMI 45.0-49.9, adult    9. Atherosclerosis of aorta    10. Thrombocytopenia, unspecified    11. Fatty liver    12. Pain in both knees, unspecified chronicity    13. History of pulmonary embolism    14. Essential hypertension    15. Hyperlipidemia, mixed    16. S/P AVR    17. Hypothyroidism, unspecified type        Plan:   Discussed diet , achieving and maintaining ideal body weight, and exercise.   We reviewed meds in detail.  Reassured-Discussed goals, options, plan.  Omega-3 > 800 mg/d combined EPA/DHA.  Goal BP< 130/80.  Goal LDL < 70. ( Or at least < 100)  We have discussed the need for endocarditis prophylaxis.             Alina was seen today for establish care and annual exam.    Diagnoses and all orders for this visit:    S/P AVR (aortic valve replacement)  -     furosemide (LASIX) 20 MG tablet; Take 1 tablet (20 mg total) by mouth once daily.  -     Echo; Future; Expected date: 06/08/2023  -     Ambulatory referral/consult to Interventional Cardiology; Future; Expected date: 06/08/2023    Diabetes mellitus type 2 in obese  -     furosemide (LASIX) 20 MG tablet; Take 1 tablet (20 mg total) by mouth once daily.    Degeneration of lumbar or lumbosacral intervertebral disc    Stage 3 chronic kidney disease, unspecified whether stage  3a or 3b CKD    Diabetes mellitus due to underlying condition with stage 3 chronic kidney disease, without long-term current use of insulin, unspecified whether stage 3a or 3b CKD    Vitamin D deficiency    History of breast cancer    BMI 45.0-49.9, adult    Atherosclerosis of aorta    Thrombocytopenia, unspecified    Fatty liver    Pain in both knees, unspecified chronicity    History of pulmonary embolism    Essential hypertension  -     furosemide (LASIX) 20 MG tablet; Take 1 tablet (20 mg total) by mouth once daily.  -     carvediloL (COREG) 25 MG tablet; TAKE 1/2 TABLET TWICE DAILY WITH MEALS  -     Echo; Future; Expected date: 06/08/2023    Hyperlipidemia, mixed    S/P AVR  -     carvediloL (COREG) 25 MG tablet; TAKE 1/2 TABLET TWICE DAILY WITH MEALS    Hypothyroidism, unspecified type  -     levothyroxine (SYNTHROID) 75 MCG tablet; TAKE 1 TABLET BEFORE BREAKFAST            Follow up for 3 months [facility] to read and [facility]ve [facility] after.

## 2023-03-08 ENCOUNTER — OFFICE VISIT (OUTPATIENT)
Dept: CARDIOLOGY | Facility: CLINIC | Age: 78
End: 2023-03-08
Payer: MEDICARE

## 2023-03-08 VITALS
WEIGHT: 248.25 LBS | OXYGEN SATURATION: 96 % | DIASTOLIC BLOOD PRESSURE: 67 MMHG | HEIGHT: 62 IN | BODY MASS INDEX: 45.68 KG/M2 | HEART RATE: 71 BPM | SYSTOLIC BLOOD PRESSURE: 147 MMHG

## 2023-03-08 DIAGNOSIS — E78.2 HYPERLIPIDEMIA, MIXED: ICD-10-CM

## 2023-03-08 DIAGNOSIS — Z85.3 HISTORY OF BREAST CANCER: ICD-10-CM

## 2023-03-08 DIAGNOSIS — M51.37 DEGENERATION OF LUMBAR OR LUMBOSACRAL INTERVERTEBRAL DISC: ICD-10-CM

## 2023-03-08 DIAGNOSIS — M25.561 PAIN IN BOTH KNEES, UNSPECIFIED CHRONICITY: ICD-10-CM

## 2023-03-08 DIAGNOSIS — D69.6 THROMBOCYTOPENIA, UNSPECIFIED: ICD-10-CM

## 2023-03-08 DIAGNOSIS — E08.22 DIABETES MELLITUS DUE TO UNDERLYING CONDITION WITH STAGE 3 CHRONIC KIDNEY DISEASE, WITHOUT LONG-TERM CURRENT USE OF INSULIN, UNSPECIFIED WHETHER STAGE 3A OR 3B CKD: ICD-10-CM

## 2023-03-08 DIAGNOSIS — I70.0 ATHEROSCLEROSIS OF AORTA: ICD-10-CM

## 2023-03-08 DIAGNOSIS — E11.69 DIABETES MELLITUS TYPE 2 IN OBESE: ICD-10-CM

## 2023-03-08 DIAGNOSIS — M25.562 PAIN IN BOTH KNEES, UNSPECIFIED CHRONICITY: ICD-10-CM

## 2023-03-08 DIAGNOSIS — Z95.2 S/P AVR (AORTIC VALVE REPLACEMENT): Primary | ICD-10-CM

## 2023-03-08 DIAGNOSIS — E66.9 DIABETES MELLITUS TYPE 2 IN OBESE: ICD-10-CM

## 2023-03-08 DIAGNOSIS — E55.9 VITAMIN D DEFICIENCY: ICD-10-CM

## 2023-03-08 DIAGNOSIS — N18.30 STAGE 3 CHRONIC KIDNEY DISEASE, UNSPECIFIED WHETHER STAGE 3A OR 3B CKD: ICD-10-CM

## 2023-03-08 DIAGNOSIS — I10 ESSENTIAL HYPERTENSION: ICD-10-CM

## 2023-03-08 DIAGNOSIS — E03.9 HYPOTHYROIDISM, UNSPECIFIED TYPE: ICD-10-CM

## 2023-03-08 DIAGNOSIS — N18.30 DIABETES MELLITUS DUE TO UNDERLYING CONDITION WITH STAGE 3 CHRONIC KIDNEY DISEASE, WITHOUT LONG-TERM CURRENT USE OF INSULIN, UNSPECIFIED WHETHER STAGE 3A OR 3B CKD: ICD-10-CM

## 2023-03-08 DIAGNOSIS — K76.0 FATTY LIVER: ICD-10-CM

## 2023-03-08 DIAGNOSIS — Z86.711 HISTORY OF PULMONARY EMBOLISM: ICD-10-CM

## 2023-03-08 DIAGNOSIS — Z95.2 S/P AVR: ICD-10-CM

## 2023-03-08 PROCEDURE — 99215 OFFICE O/P EST HI 40 MIN: CPT | Mod: HCNC,S$GLB,, | Performed by: INTERNAL MEDICINE

## 2023-03-08 PROCEDURE — 1125F PR PAIN SEVERITY QUANTIFIED, PAIN PRESENT: ICD-10-PCS | Mod: HCNC,CPTII,S$GLB, | Performed by: INTERNAL MEDICINE

## 2023-03-08 PROCEDURE — 3288F PR FALLS RISK ASSESSMENT DOCUMENTED: ICD-10-PCS | Mod: HCNC,CPTII,S$GLB, | Performed by: INTERNAL MEDICINE

## 2023-03-08 PROCEDURE — 3078F PR MOST RECENT DIASTOLIC BLOOD PRESSURE < 80 MM HG: ICD-10-PCS | Mod: HCNC,CPTII,S$GLB, | Performed by: INTERNAL MEDICINE

## 2023-03-08 PROCEDURE — 1125F AMNT PAIN NOTED PAIN PRSNT: CPT | Mod: HCNC,CPTII,S$GLB, | Performed by: INTERNAL MEDICINE

## 2023-03-08 PROCEDURE — 99999 PR PBB SHADOW E&M-EST. PATIENT-LVL V: CPT | Mod: PBBFAC,HCNC,, | Performed by: INTERNAL MEDICINE

## 2023-03-08 PROCEDURE — 3078F DIAST BP <80 MM HG: CPT | Mod: HCNC,CPTII,S$GLB, | Performed by: INTERNAL MEDICINE

## 2023-03-08 PROCEDURE — 3077F PR MOST RECENT SYSTOLIC BLOOD PRESSURE >= 140 MM HG: ICD-10-PCS | Mod: HCNC,CPTII,S$GLB, | Performed by: INTERNAL MEDICINE

## 2023-03-08 PROCEDURE — 1101F PT FALLS ASSESS-DOCD LE1/YR: CPT | Mod: HCNC,CPTII,S$GLB, | Performed by: INTERNAL MEDICINE

## 2023-03-08 PROCEDURE — 99215 PR OFFICE/OUTPT VISIT, EST, LEVL V, 40-54 MIN: ICD-10-PCS | Mod: HCNC,S$GLB,, | Performed by: INTERNAL MEDICINE

## 2023-03-08 PROCEDURE — 1157F ADVNC CARE PLAN IN RCRD: CPT | Mod: HCNC,CPTII,S$GLB, | Performed by: INTERNAL MEDICINE

## 2023-03-08 PROCEDURE — 3288F FALL RISK ASSESSMENT DOCD: CPT | Mod: HCNC,CPTII,S$GLB, | Performed by: INTERNAL MEDICINE

## 2023-03-08 PROCEDURE — 1157F PR ADVANCE CARE PLAN OR EQUIV PRESENT IN MEDICAL RECORD: ICD-10-PCS | Mod: HCNC,CPTII,S$GLB, | Performed by: INTERNAL MEDICINE

## 2023-03-08 PROCEDURE — 3077F SYST BP >= 140 MM HG: CPT | Mod: HCNC,CPTII,S$GLB, | Performed by: INTERNAL MEDICINE

## 2023-03-08 PROCEDURE — 1101F PR PT FALLS ASSESS DOC 0-1 FALLS W/OUT INJ PAST YR: ICD-10-PCS | Mod: HCNC,CPTII,S$GLB, | Performed by: INTERNAL MEDICINE

## 2023-03-08 PROCEDURE — 1159F PR MEDICATION LIST DOCUMENTED IN MEDICAL RECORD: ICD-10-PCS | Mod: HCNC,CPTII,S$GLB, | Performed by: INTERNAL MEDICINE

## 2023-03-08 PROCEDURE — 99999 PR PBB SHADOW E&M-EST. PATIENT-LVL V: ICD-10-PCS | Mod: PBBFAC,HCNC,, | Performed by: INTERNAL MEDICINE

## 2023-03-08 PROCEDURE — 1159F MED LIST DOCD IN RCRD: CPT | Mod: HCNC,CPTII,S$GLB, | Performed by: INTERNAL MEDICINE

## 2023-03-08 RX ORDER — CARVEDILOL 25 MG/1
TABLET ORAL
Qty: 180 TABLET | Refills: 3 | Status: ON HOLD | OUTPATIENT
Start: 2023-03-08 | End: 2023-05-23 | Stop reason: HOSPADM

## 2023-03-08 RX ORDER — LEVOTHYROXINE SODIUM 75 UG/1
TABLET ORAL
Qty: 90 TABLET | Refills: 3 | Status: SHIPPED | OUTPATIENT
Start: 2023-03-08 | End: 2024-03-25 | Stop reason: SDUPTHER

## 2023-03-08 RX ORDER — FUROSEMIDE 20 MG/1
20 TABLET ORAL DAILY
Qty: 90 TABLET | Refills: 3 | Status: SHIPPED | OUTPATIENT
Start: 2023-03-08 | End: 2023-09-25 | Stop reason: SDUPTHER

## 2023-03-08 NOTE — PATIENT INSTRUCTIONS
Discussed diet , achieving and maintaining ideal body weight, and exercise.   We reviewed meds in detail.  Reassured-Discussed goals, options, plan.  Omega-3 > 800 mg/d combined EPA/DHA.  Goal BP< 130/80.  Goal LDL < 70. ( Or at least < 100)  We have discussed the need for endocarditis prophylaxis.

## 2023-03-16 ENCOUNTER — PATIENT MESSAGE (OUTPATIENT)
Dept: CARDIOLOGY | Facility: CLINIC | Age: 78
End: 2023-03-16
Payer: MEDICARE

## 2023-03-20 DIAGNOSIS — N18.9 CHRONIC KIDNEY DISEASE, UNSPECIFIED CKD STAGE: ICD-10-CM

## 2023-03-20 DIAGNOSIS — I35.0 NONRHEUMATIC AORTIC VALVE STENOSIS: Primary | ICD-10-CM

## 2023-03-23 ENCOUNTER — HOSPITAL ENCOUNTER (OUTPATIENT)
Dept: RADIOLOGY | Facility: HOSPITAL | Age: 78
Discharge: HOME | End: 2023-03-23
Attending: INTERNAL MEDICINE
Payer: MEDICARE

## 2023-03-23 DIAGNOSIS — I35.0 NONRHEUMATIC AORTIC VALVE STENOSIS: ICD-10-CM

## 2023-03-23 PROCEDURE — 74174 CTA ABD&PLVS W/CONTRAST: CPT | Mod: 26,HCNC,, | Performed by: RADIOLOGY

## 2023-03-23 PROCEDURE — 71275 CT ANGIOGRAPHY CHEST: CPT | Mod: TC,HCNC

## 2023-03-23 PROCEDURE — 25500020 PHARM REV CODE 255: Mod: HCNC | Performed by: INTERNAL MEDICINE

## 2023-03-23 PROCEDURE — 74174 CTA ABD&PLVS W/CONTRAST: CPT | Mod: TC,HCNC

## 2023-03-23 PROCEDURE — 71275 CTA CARDIAC TAVR_PARTNERS (XPD): ICD-10-PCS | Mod: 26,HCNC,, | Performed by: RADIOLOGY

## 2023-03-23 PROCEDURE — 74174 CTA CARDIAC TAVR_PARTNERS (XPD): ICD-10-PCS | Mod: 26,HCNC,, | Performed by: RADIOLOGY

## 2023-03-23 PROCEDURE — 71275 CT ANGIOGRAPHY CHEST: CPT | Mod: 26,HCNC,, | Performed by: RADIOLOGY

## 2023-03-23 RX ADMIN — IOHEXOL 100 ML: 350 INJECTION, SOLUTION INTRAVENOUS at 10:03

## 2023-03-24 DIAGNOSIS — T82.09XA PROSTHETIC VALVE DYSFUNCTION, INITIAL ENCOUNTER: Primary | ICD-10-CM

## 2023-03-24 RX ORDER — SODIUM CHLORIDE 9 MG/ML
INJECTION, SOLUTION INTRAVENOUS CONTINUOUS
Status: CANCELLED | OUTPATIENT
Start: 2023-03-24 | End: 2023-03-24

## 2023-03-24 RX ORDER — DIPHENHYDRAMINE HCL 50 MG
50 CAPSULE ORAL ONCE
Status: CANCELLED | OUTPATIENT
Start: 2023-03-24 | End: 2023-03-24

## 2023-03-24 RX ORDER — SODIUM CHLORIDE 0.9 % (FLUSH) 0.9 %
10 SYRINGE (ML) INJECTION
Status: DISCONTINUED | OUTPATIENT
Start: 2023-03-24 | End: 2023-03-24 | Stop reason: HOSPADM

## 2023-03-27 ENCOUNTER — HOSPITAL ENCOUNTER (OUTPATIENT)
Dept: CARDIOLOGY | Facility: HOSPITAL | Age: 78
Discharge: HOME | End: 2023-03-27
Attending: INTERNAL MEDICINE
Payer: MEDICARE

## 2023-03-27 ENCOUNTER — EDUCATION (OUTPATIENT)
Dept: CARDIOLOGY | Facility: CLINIC | Age: 78
End: 2023-03-27
Payer: MEDICARE

## 2023-03-27 ENCOUNTER — CLINICAL SUPPORT (OUTPATIENT)
Dept: CARDIOLOGY | Facility: CLINIC | Age: 78
End: 2023-03-27
Payer: MEDICARE

## 2023-03-27 ENCOUNTER — OFFICE VISIT (OUTPATIENT)
Dept: CARDIOLOGY | Facility: CLINIC | Age: 78
End: 2023-03-27
Payer: MEDICARE

## 2023-03-27 VITALS
HEART RATE: 70 BPM | HEIGHT: 62 IN | HEART RATE: 72 BPM | SYSTOLIC BLOOD PRESSURE: 140 MMHG | DIASTOLIC BLOOD PRESSURE: 70 MMHG | WEIGHT: 246.94 LBS | DIASTOLIC BLOOD PRESSURE: 64 MMHG | SYSTOLIC BLOOD PRESSURE: 148 MMHG | HEIGHT: 63 IN | BODY MASS INDEX: 43.75 KG/M2 | OXYGEN SATURATION: 98 % | BODY MASS INDEX: 45.64 KG/M2 | WEIGHT: 248 LBS

## 2023-03-27 DIAGNOSIS — Z95.2 S/P AVR (AORTIC VALVE REPLACEMENT): ICD-10-CM

## 2023-03-27 DIAGNOSIS — I35.0 NONRHEUMATIC AORTIC VALVE STENOSIS: ICD-10-CM

## 2023-03-27 DIAGNOSIS — I70.0 ATHEROSCLEROSIS OF AORTA: ICD-10-CM

## 2023-03-27 DIAGNOSIS — Z95.2 S/P AVR (AORTIC VALVE REPLACEMENT): Primary | ICD-10-CM

## 2023-03-27 DIAGNOSIS — N18.30 STAGE 3 CHRONIC KIDNEY DISEASE, UNSPECIFIED WHETHER STAGE 3A OR 3B CKD: ICD-10-CM

## 2023-03-27 DIAGNOSIS — Z85.3 HISTORY OF BREAST CANCER: ICD-10-CM

## 2023-03-27 LAB
ASCENDING AORTA: 3.89 CM
AV INDEX (PROSTH): 0.23
AV MEAN GRADIENT: 47 MMHG
AV PEAK GRADIENT: 71 MMHG
AV VALVE AREA: 0.73 CM2
AV VELOCITY RATIO: 0.24
BSA FOR ECHO PROCEDURE: 2.22 M2
CV ECHO LV RWT: 0.45 CM
DOP CALC AO PEAK VEL: 4.2 M/S
DOP CALC AO VTI: 100.4 CM
DOP CALC LVOT AREA: 3.2 CM2
DOP CALC LVOT DIAMETER: 2.01 CM
DOP CALC LVOT PEAK VEL: 0.99 M/S
DOP CALC LVOT STROKE VOLUME: 72.94 CM3
DOP CALCLVOT PEAK VEL VTI: 23 CM
E WAVE DECELERATION TIME: 221.46 MSEC
E/A RATIO: 0.87
E/E' RATIO: 11.22 M/S
ECHO LV POSTERIOR WALL: 1.03 CM (ref 0.6–1.1)
EJECTION FRACTION: 65 %
FRACTIONAL SHORTENING: 33 % (ref 28–44)
INTERVENTRICULAR SEPTUM: 1.07 CM (ref 0.6–1.1)
IVRT: 65.65 MSEC
LA MAJOR: 5.16 CM
LA MINOR: 5.33 CM
LA WIDTH: 4.62 CM
LEFT ATRIUM SIZE: 3.65 CM
LEFT ATRIUM VOLUME INDEX MOD: 34.8 ML/M2
LEFT ATRIUM VOLUME INDEX: 36 ML/M2
LEFT ATRIUM VOLUME MOD: 72.73 CM3
LEFT ATRIUM VOLUME: 75.16 CM3
LEFT INTERNAL DIMENSION IN SYSTOLE: 3.05 CM (ref 2.1–4)
LEFT VENTRICLE DIASTOLIC VOLUME INDEX: 45.81 ML/M2
LEFT VENTRICLE DIASTOLIC VOLUME: 95.75 ML
LEFT VENTRICLE MASS INDEX: 80 G/M2
LEFT VENTRICLE SYSTOLIC VOLUME INDEX: 17.4 ML/M2
LEFT VENTRICLE SYSTOLIC VOLUME: 36.31 ML
LEFT VENTRICULAR INTERNAL DIMENSION IN DIASTOLE: 4.57 CM (ref 3.5–6)
LEFT VENTRICULAR MASS: 168.08 G
LV LATERAL E/E' RATIO: 10.1 M/S
LV SEPTAL E/E' RATIO: 12.63 M/S
MV A" WAVE DURATION": 19.7 MSEC
MV PEAK A VEL: 1.16 M/S
MV PEAK E VEL: 1.01 M/S
MV STENOSIS PRESSURE HALF TIME: 64.22 MS
MV VALVE AREA P 1/2 METHOD: 3.43 CM2
PISA TR MAX VEL: 2.86 M/S
PULM VEIN S/D RATIO: 1.42
PV PEAK D VEL: 0.38 M/S
PV PEAK S VEL: 0.54 M/S
RA MAJOR: 4.77 CM
RA PRESSURE: 3 MMHG
RA WIDTH: 3.68 CM
RIGHT VENTRICULAR END-DIASTOLIC DIMENSION: 3.77 CM
RV TISSUE DOPPLER FREE WALL SYSTOLIC VELOCITY 1 (APICAL 4 CHAMBER VIEW): 9.37 CM/S
SINUS: 3.24 CM
STJ: 2.83 CM
TDI LATERAL: 0.1 M/S
TDI SEPTAL: 0.08 M/S
TDI: 0.09 M/S
TR MAX PG: 33 MMHG
TRICUSPID ANNULAR PLANE SYSTOLIC EXCURSION: 2.05 CM
TV REST PULMONARY ARTERY PRESSURE: 36 MMHG

## 2023-03-27 PROCEDURE — 3078F PR MOST RECENT DIASTOLIC BLOOD PRESSURE < 80 MM HG: ICD-10-PCS | Mod: HCNC,CPTII,S$GLB, | Performed by: INTERNAL MEDICINE

## 2023-03-27 PROCEDURE — 99204 OFFICE O/P NEW MOD 45 MIN: CPT | Mod: HCNC,S$GLB,, | Performed by: INTERNAL MEDICINE

## 2023-03-27 PROCEDURE — 1159F PR MEDICATION LIST DOCUMENTED IN MEDICAL RECORD: ICD-10-PCS | Mod: HCNC,CPTII,S$GLB, | Performed by: INTERNAL MEDICINE

## 2023-03-27 PROCEDURE — 1157F PR ADVANCE CARE PLAN OR EQUIV PRESENT IN MEDICAL RECORD: ICD-10-PCS | Mod: HCNC,CPTII,S$GLB, | Performed by: INTERNAL MEDICINE

## 2023-03-27 PROCEDURE — 1157F ADVNC CARE PLAN IN RCRD: CPT | Mod: HCNC,CPTII,S$GLB, | Performed by: INTERNAL MEDICINE

## 2023-03-27 PROCEDURE — 3078F DIAST BP <80 MM HG: CPT | Mod: HCNC,CPTII,S$GLB, | Performed by: INTERNAL MEDICINE

## 2023-03-27 PROCEDURE — 93306 ECHO (CUPID ONLY): ICD-10-PCS | Mod: 26,HCNC,, | Performed by: INTERNAL MEDICINE

## 2023-03-27 PROCEDURE — 25500020 PHARM REV CODE 255: Mod: HCNC | Performed by: INTERNAL MEDICINE

## 2023-03-27 PROCEDURE — 99203 PR OFFICE/OUTPT VISIT, NEW, LEVL III, 30-44 MIN: ICD-10-PCS | Mod: HCNC,S$GLB,, | Performed by: THORACIC SURGERY (CARDIOTHORACIC VASCULAR SURGERY)

## 2023-03-27 PROCEDURE — 99204 PR OFFICE/OUTPT VISIT, NEW, LEVL IV, 45-59 MIN: ICD-10-PCS | Mod: HCNC,S$GLB,, | Performed by: INTERNAL MEDICINE

## 2023-03-27 PROCEDURE — 3077F PR MOST RECENT SYSTOLIC BLOOD PRESSURE >= 140 MM HG: ICD-10-PCS | Mod: HCNC,CPTII,S$GLB, | Performed by: INTERNAL MEDICINE

## 2023-03-27 PROCEDURE — 1125F PR PAIN SEVERITY QUANTIFIED, PAIN PRESENT: ICD-10-PCS | Mod: HCNC,CPTII,S$GLB, | Performed by: INTERNAL MEDICINE

## 2023-03-27 PROCEDURE — 93306 TTE W/DOPPLER COMPLETE: CPT | Mod: 26,HCNC,, | Performed by: INTERNAL MEDICINE

## 2023-03-27 PROCEDURE — 99203 OFFICE O/P NEW LOW 30 MIN: CPT | Mod: HCNC,S$GLB,, | Performed by: THORACIC SURGERY (CARDIOTHORACIC VASCULAR SURGERY)

## 2023-03-27 PROCEDURE — 3077F SYST BP >= 140 MM HG: CPT | Mod: HCNC,CPTII,S$GLB, | Performed by: INTERNAL MEDICINE

## 2023-03-27 PROCEDURE — 99999 PR PBB SHADOW E&M-EST. PATIENT-LVL IV: CPT | Mod: PBBFAC,HCNC,, | Performed by: INTERNAL MEDICINE

## 2023-03-27 PROCEDURE — C8929 TTE W OR WO FOL WCON,DOPPLER: HCPCS | Mod: HCNC

## 2023-03-27 PROCEDURE — 99999 PR PBB SHADOW E&M-EST. PATIENT-LVL IV: ICD-10-PCS | Mod: PBBFAC,HCNC,, | Performed by: INTERNAL MEDICINE

## 2023-03-27 PROCEDURE — 1159F MED LIST DOCD IN RCRD: CPT | Mod: HCNC,CPTII,S$GLB, | Performed by: INTERNAL MEDICINE

## 2023-03-27 PROCEDURE — 1125F AMNT PAIN NOTED PAIN PRSNT: CPT | Mod: HCNC,CPTII,S$GLB, | Performed by: INTERNAL MEDICINE

## 2023-03-27 RX ADMIN — HUMAN ALBUMIN MICROSPHERES AND PERFLUTREN 0.11 MG: 10; .22 INJECTION, SOLUTION INTRAVENOUS at 01:03

## 2023-03-27 NOTE — ASSESSMENT & PLAN NOTE
S/p radiation and left lumpectomy prior to AVR in 2013. S/p right mastectomy in 2019 with subsequent chemo therapy. She did not have repeat radiation in 2019.

## 2023-03-27 NOTE — PROGRESS NOTES
OUTPATIENT CATHETERIZATION INSTRUCTIONS    You have been scheduled for a procedure in the catheterization lab on Thursday, April 13, 2023.     Please report to the Cardiology Waiting Area on the Third floor of the hospital and check in at 7 AM.  You will then be taken to the SSCU (Short Stay Cardiac Unit) and prepared for your procedure. Please be aware that this is not the time of your procedure but the time you are to arrive. The procedures are scheduled on an hourly basis; however, emergency cases take precedence over all other cases.       Nothing to eat or drink after 1 AM You may have clear liquids until the time of your admission which should be 2 hours prior to your procedure.          You are encouraged to drink at least 8 ounces of clear liquids prior to your admission to SSCU.          Clear liquids include water, black coffee, clear juices, and performance drinks - no pulp or milk.          Heart failure or dialysis patients please limit to 8 ounces (1 cup) of clear liquids up until 2 hours of the procedure.      2.   You may take your regular morning medications with water. If there are any medications that you should not take you will be instructed to hold them that morning. If you         are diabetic and on Metformin (Glucophage) do not take it the day before, the day of, and for 2 days after your procedure.    3.   Do not hold Eliquis (apixaban) prior to your procedure.      The procedure will take 1-2 hours to perform. After the procedure, you will return to SSCU on the third floor of the hospital. You will need to lie still (or keep your arm still) for the next 2 to 4 hours to minimize bleeding from the puncture site.  This time is determined by your physician.  Your family may remain in the room with you during this time.       You may be able to be discharged home that same afternoon if there is someone to drive you home and there are no complications.  Your doctor will determine, based on your  "progress, the date and time of your discharge. The results of your procedure will be discussed with you before you are discharged. Any further testing or procedures will be scheduled for you either before you leave or after your discharge..     Special Instructions:  Start taking 81mg Aspirin daily.  (Including day of procedure)  Plavix 75m tablets night before procedure and 1 tablet morning of procedure.            If you should have any questions, concerns, or need to change the date of your procedure, please call JORDAN Mack @ [phone]",                  THE ABOVE INSTRUCTIONS WERE GIVEN TO THE PATIENT VERBALLY AND THEY VERBALIZED UNDERSTANDING.  THEY DO NOT REQUIRE ANY SPECIAL NEEDS AND DO NOT HAVE ANY LEARNING BARRIERS.                          Directions for Reporting to Cardiology Waiting Area in the Hospital  If you park in the Parking Garage:  Take elevators to the1st floor of the parking garage.  Continue past the gift shop, coffee shop, and piano.  Take a right and go to the gold elevators. (Elevator B)  Take the elevator to the 3rd floor.  Follow the arrow on the sign on the wall that says Cath Lab Registration/EP Lab Registration.  Follow the long hallway all the way around until you come to a big open area.  This is the registration area.  Check in at Reception Desk.    OR    If family is dropping you off:  Have them drop you off at the front of the Hospital under the green overhang.  Enter through the doors and take a right.  Take the E elevators to the 3rd floor Cardiology Waiting Area.  Check in at the Reception Desk in the waiting room.             "

## 2023-03-27 NOTE — PROGRESS NOTES
Subjective:     Referring Physician: Dr Palm    HPI  Alina Narayan is a 77 y.o. female with a history of pulmonary embolism, s/p AVR (21 mm Trifecta), breast cancer (s/p mastectomy, radiation and chemo) who presents for evaluation of a failing 21 mm Trifecta valve, true ID 19. Patient reports increasing SOB and chest tightness for the past 3 months. She underwent AVR in 2013 with Dr Webb with a 21 mmTrifecta. Prior to AVR she underwent a lumpectomy of her left breast followed by multiple rounds of chest radiation. Following her AVR, she had recurrent breast cancer in her right breast and underwent right mastectomy. She also underwent chemo at that time, but did not have radiation. Following her right mastectomy she experienced a pulmonary embolism and continues on chronic Eliquis.     Alina Narayan is a 77 y.o. female referred by Dr Palm for evaluation of severe AS in a 21 mm Trifecta valve, true ID 19 (NYHA Class III sx).    The patient has undergone the following TAVR work-up:   ECHO (Date 3/27/23): SAW=  0.73 cm2 (DI 0.23), MG= 47 mmHg, Peak Dung= 4.2 m/s, EF= 65%. Acceleration time >140 ms.    [facility] (Date ): needs   STS: %   Frailty: 1/4   Iliacs are >7.1 on R and > 9.12 on L   LVOT area by CTA is 18 x 17, true ID 19.  R and L coronary only 5mm above valve plane with narrow SOV 27mm putting both coronaries in jeopardy  Incidental findings on CT: none significant   CT Surgery risk assessment: pending, seen by Dr Webb today  Rhythm issues: LAFB  PFTs: deferred  KCCQ/5 meter walk:   Comorbidities: s/p AVR, history of rbeast cancer with chest radiation (prior to AVR), history of pulmonary embolism on chronic OAC    NYHA: III CCS: 0    Review of Systems   Constitutional: Negative for chills and fever.   HENT:  Negative for hearing loss.    Eyes:  Negative for blurred vision, double vision and photophobia.   Cardiovascular:  Positive for dyspnea on exertion. Negative for chest pain, claudication, cyanosis,  irregular heartbeat, leg swelling, near-syncope, orthopnea, palpitations, paroxysmal nocturnal dyspnea and syncope.   Respiratory:  Negative for cough and hemoptysis.    Musculoskeletal:  Negative for falls.   Gastrointestinal:  Negative for abdominal pain, constipation, diarrhea, nausea and vomiting.   Genitourinary:  Negative for dysuria.   Neurological:  Negative for disturbances in coordination, dizziness, focal weakness, headaches, loss of balance and weakness.        Past Medical History:   Diagnosis Date    Allergy     seasonal    Anxiety     Arthritis     BRCA1 negative     Breast cancer 10/2012    left breast invasive ductal carcinoma    Colon polyp     Depression     Diabetes mellitus     Type 2    Diabetes mellitus, type 2     Diverticular disease     Diverticulitis 2009    Genetic testing 05/2017    negative Integrated BRACAnalysis    Hyperlipidemia     Hypertension     Morbid obesity     MITCHELL (obstructive sleep apnea)     Pulmonary embolism     Stenosis     Stenosis and insufficiency of lacrimal passages     Thyroid disease        Current Outpatient Medications:     carvediloL (COREG) 25 MG tablet, TAKE 1/2 TABLET TWICE DAILY WITH MEALS, Disp: 180 tablet, Rfl: 3    CHOLECALCIFEROL, VITAMIN D3, (VITAMIN D3 ORAL), Take 3,000 capsules by mouth once daily. 3000 IU per day, Disp: , Rfl:     ELIQUIS 2.5 mg Tab, TAKE ONE TABLET BY MOUTH 2 TIMES A DAY, Disp: 60 tablet, Rfl: 12    furosemide (LASIX) 20 MG tablet, Take 1 tablet (20 mg total) by mouth once daily., Disp: 90 tablet, Rfl: 3    gabapentin (NEURONTIN) 300 MG capsule, TAKE 1 CAPSULE EVERY MORNING AND TAKE 2 CAPSULES AT BEDTIME, Disp: 270 capsule, Rfl: 3    glimepiride (AMARYL) 2 MG tablet, TAKE 1 TABLET BEFORE BREAKFAST., Disp: 90 tablet, Rfl: 3    hydrocortisone 2.5 % cream, Apply topically 2 (two) times daily as needed (rash under the breast). Mix 50/50 with ketoconazole cream., Disp: 30 g, Rfl: 3    ketoconazole (NIZORAL) 2 % cream, AAA bid prn rash  under the breast. Mix 50/50 with hydrocortisone cream., Disp: 60 g, Rfl: 3    levothyroxine (SYNTHROID) 75 MCG tablet, TAKE 1 TABLET BEFORE BREAKFAST, Disp: 90 tablet, Rfl: 3    metFORMIN (GLUCOPHAGE) 500 MG tablet, TAKE 1 TABLET TWICE DAILY WITH MEALS, Disp: 180 tablet, Rfl: 0    pravastatin (PRAVACHOL) 20 MG tablet, TAKE 1 TABLET EVERY DAY, Disp: 90 tablet, Rfl: 3    tiZANidine (ZANAFLEX) 4 MG tablet, One tab every 6-8 hours as needed pain, Disp: 30 tablet, Rfl: 0    triamcinolone acetonide 0.1%-magnesium hydroxide 400 mg/5 ml-ciclopirox, Apply to affected area under the breast twice daily as directed, Disp: 60 g, Rfl: 3    blood-glucose meter kit, True Test or meter covered by insurance - pt checks glucose twice daily for uncontrolled glucoses E11.65, Disp: 1 each, Rfl: 0    dexAMETHasone (DECADRON) 4 mg/mL injection, , Disp: , Rfl:     HYDROcodone-acetaminophen (NORCO) 5-325 mg per tablet, Take 1 tablet by mouth every 6 (six) hours as needed for Pain. (Patient not taking: Reported on 3/27/2023), Disp: 40 tablet, Rfl: 0    lancets (ACCU-CHEK SOFTCLIX LANCETS) Misc, TrueTest lancets for meter covered by insurance - pt checks twice daily for uncontrolled glucoses E11.65,, Disp: 200 each, Rfl: 3    mupirocin (BACTROBAN) 2 % ointment, Apply topically 2 (two) times daily. (Patient not taking: Reported on 3/27/2023), Disp: 15 g, Rfl: 0    TRUE METRIX GLUCOSE TEST STRIP Strp, TEST BLOOD SUGAR TWICE DAILY, Disp: 200 strip, Rfl: 3  No current facility-administered medications for this visit.    Facility-Administered Medications Ordered in Other Visits:     alteplase injection 2 mg, 2 mg, Intra-Catheter, PRN, Erick Dwyer MD, 2 mg at 03/11/19 1140    heparin, porcine (PF) 100 unit/mL injection flush 500 Units, 500 Units, Intravenous, 1 time in Clinic/HOD, Erick Dwyer MD    heparin, porcine (PF) 100 unit/mL injection flush 500 Units, 500 Units, Intravenous, 1 time in Clinic/HOD, Erick Dwyer MD    sodium chloride 0.9%  "flush 10 mL, 10 mL, Intravenous, PRN, Erick Dwyer MD, 10 mL at 07/23/19 1157    Objective:    Physical Exam  Vitals reviewed.   Constitutional:       General: She is not in acute distress.     Appearance: She is well-developed. She is not diaphoretic.   HENT:      Head: Normocephalic and atraumatic.   Neck:      Vascular: No JVD.   Cardiovascular:      Rate and Rhythm: Normal rate and regular rhythm.      Pulses: Intact distal pulses.      Heart sounds: Murmur heard.   Harsh midsystolic murmur is present at the upper right sternal border radiating to the neck.   Pulmonary:      Effort: Pulmonary effort is normal. No respiratory distress.   Musculoskeletal:      Cervical back: Normal range of motion.      Right lower leg: No edema.      Left lower leg: No edema.   Skin:     General: Skin is warm and dry.   Neurological:      Mental Status: She is alert and oriented to person, place, and time.           Vitals:    03/27/23 1422   BP: (!) 148/64   BP Location: Left arm   Patient Position: Standing   BP Method: Large (Automatic)   Pulse: 72   SpO2: 98%   Weight: 112 kg (246 lb 14.6 oz)   Height: 5' 2.5" (1.588 m)     Body mass index is 44.44 kg/m².    Test(s) Reviewed  I have reviewed the following in detail:  [] Stress test   [] Angiography   [x] Echocardiogram   [x] Labs   [] Other       Chemistry        Component Value Date/Time     03/02/2023 0851    K 4.1 03/02/2023 0851     03/02/2023 0851    CO2 31 (H) 03/02/2023 0851    BUN 24 (H) 03/02/2023 0851    CREATININE 1.1 03/02/2023 0851     (H) 03/02/2023 0851        Component Value Date/Time    CALCIUM 9.6 03/02/2023 0851    ALKPHOS 85 03/02/2023 0851    AST 15 03/02/2023 0851    ALT 12 03/02/2023 0851    BILITOT 0.5 03/02/2023 0851    ESTGFRAFRICA 56.0 (A) 07/07/2022 1012    EGFRNONAA 48.5 (A) 07/07/2022 1012            TTE 03/27/2023  The left ventricle is normal in size with concentric remodeling and normal systolic function. The estimated " ejection fraction is 65%.  Normal right ventricular size with normal right ventricular systolic function.  Indeterminate left ventricular diastolic function.  Mild left atrial enlargement.  There is a 21mm [employer] Trifecta bovine pericardial bioprosthetic aortic valve present with evidence of stenosis. The mean gradient is 47 mmHg with a dimensionless index of 0.23. The acceleration time is well above 100ms (140+ ms)  Mild tricuspid regurgitation.  The estimated PA systolic pressure is 36 mmHg.  Normal central venous pressure (3 mmHg).  The aortic valve mean gradient is 47 mmHg with a dimensionless index of 0.23.    Assessment:   S/P AVR (aortic valve replacement)  Alina Narayan is a 77 y.o. female referred by Dr Palm for evaluation of severe AS in a 21 mm Trifecta valve, true ID 19 (NYHA Class III sx).    The patient has undergone the following TAVR work-up:   ECHO (Date 3/27/23): SAW=  0.73 cm2 (DI 0.23), MG= 47 mmHg, Peak Dung= 4.2 m/s, EF= 65%. Acceleration time >140 ms.    [facility] (Date ): needs   STS: %   Frailty: 1/4   Iliacs are >7.1 on R and > 9.12 on L   LVOT area by CTA is 18 x 17, true ID 19.  R and L coronary only 5mm above valve plane with narrow SOV 27mm putting both coronaries in jeopardy  Incidental findings on CT: none significant   CT Surgery risk assessment: pending, seen by Dr Webb today  Rhythm issues: LAFB  PFTs: deferred  KCCQ/5 meter walk:   Comorbidities: s/p AVR, history of rbeast cancer with chest radiation (prior to AVR), history of pulmonary embolism on chronic OAC    NYHA: III CCS: 0    Atherosclerosis of aorta  See imaging. Follow up with Cardiology.     Chronic kidney disease, stage III (moderate)  Cr 1.1    History of breast cancer  S/p radiation and left lumpectomy prior to AVR in 2013. S/p right mastectomy in 2019 with subsequent chemo therapy. She did not have repeat radiation in 2019.     Plan:     -Will proceed with coronary angiogram +/- intervention and coronary height  evaluation.   - Anti-platelet Therapy: ASA/Plavix. Will not hold Eliquis prior to.   - Access: right femoral.  - Creatinine/CrCl: 1.1  - Allergies: no contrast allergy  - All patient's questions were answered.  -The risks, benefits and alternatives of the procedure were explained to the patient.   -The risks of coronary angiography include but are not limited to: bleeding, infection, heart rhythm abnormalities, allergic reactions, kidney injury and potential need for dialysis, stroke and death.   - Should stenting be indicated, the patient has agreed to dual anti-platelet therapy for 1-consecutive year with a drug-eluting stent and a minimum of 1-month with the use of a bare metal stent  - Additionally, pt is aware that non-compliance is likely to result in stent clotting with heart attack, heart failure, and/or death  -The risks of moderate sedation include hypotension, respiratory depression, arrhythmias, bronchospasm, and death.   - Informed consent was obtained and the  patient is agreeable to proceed with the procedure.             Mila Billy PA-C  Valve and Structural Heart Disease  [facility]    Shared Decision Making:  This patient was seen by a multidisciplinary heart team involving both a Structural Heart Specialist (Interventional Cardiologist) and a Cardiothoracic Surgeon. The patient was involved in shared decision-making to define clearer goals for treatment and align health decisions with their current values.  The patient understands the risks and expected benefits of their treatment options.    Staff:  I have personally taken the history and examined this patient and agree with the fellow's note as stated above and amended it accordingly :-)

## 2023-03-27 NOTE — PROGRESS NOTES
Patient seen and examined in the multidisciplinary valve Clinic.  Studies reviewed jointly.  She is 10 years out from aortic valve replacement with a trifecta valve.  I reviewed her CT scan.  The aorta is well away from the sternum and she does not have significant aortic calcification.  Nonetheless, she is not enthusiastic about reoperation.  Certainly her comorbid conditions would make her at least moderate risk.  We will plan for diagnostic coronary angiography to better determine the relationship between the coronary ostia and the valve.

## 2023-03-27 NOTE — H&P (VIEW-ONLY)
Subjective:     Referring Physician: Dr Palm    HPI  Alina Narayan is a 77 y.o. female with a history of pulmonary embolism, s/p AVR (21 mm Trifecta), breast cancer (s/p mastectomy, radiation and chemo) who presents for evaluation of a failing 21 mm Trifecta valve, true ID 19. Patient reports increasing SOB and chest tightness for the past 3 months. She underwent AVR in 2013 with Dr Webb with a 21 mmTrifecta. Prior to AVR she underwent a lumpectomy of her left breast followed by multiple rounds of chest radiation. Following her AVR, she had recurrent breast cancer in her right breast and underwent right mastectomy. She also underwent chemo at that time, but did not have radiation. Following her right mastectomy she experienced a pulmonary embolism and continues on chronic Eliquis.     Alina Narayan is a 77 y.o. female referred by Dr Palm for evaluation of severe AS in a 21 mm Trifecta valve, true ID 19 (NYHA Class III sx).    The patient has undergone the following TAVR work-up:   ECHO (Date 3/27/23): SAW=  0.73 cm2 (DI 0.23), MG= 47 mmHg, Peak Dung= 4.2 m/s, EF= 65%. Acceleration time >140 ms.    [facility] (Date ): needs   STS: %   Frailty: 1/4   Iliacs are >7.1 on R and > 9.12 on L   LVOT area by CTA is 18 x 17, true ID 19.  R and L coronary only 5mm above valve plane with narrow SOV 27mm putting both coronaries in jeopardy  Incidental findings on CT: none significant   CT Surgery risk assessment: pending, seen by Dr Webb today  Rhythm issues: LAFB  PFTs: deferred  KCCQ/5 meter walk:   Comorbidities: s/p AVR, history of rbeast cancer with chest radiation (prior to AVR), history of pulmonary embolism on chronic OAC    NYHA: III CCS: 0    Review of Systems   Constitutional: Negative for chills and fever.   HENT:  Negative for hearing loss.    Eyes:  Negative for blurred vision, double vision and photophobia.   Cardiovascular:  Positive for dyspnea on exertion. Negative for chest pain, claudication, cyanosis,  irregular heartbeat, leg swelling, near-syncope, orthopnea, palpitations, paroxysmal nocturnal dyspnea and syncope.   Respiratory:  Negative for cough and hemoptysis.    Musculoskeletal:  Negative for falls.   Gastrointestinal:  Negative for abdominal pain, constipation, diarrhea, nausea and vomiting.   Genitourinary:  Negative for dysuria.   Neurological:  Negative for disturbances in coordination, dizziness, focal weakness, headaches, loss of balance and weakness.        Past Medical History:   Diagnosis Date    Allergy     seasonal    Anxiety     Arthritis     BRCA1 negative     Breast cancer 10/2012    left breast invasive ductal carcinoma    Colon polyp     Depression     Diabetes mellitus     Type 2    Diabetes mellitus, type 2     Diverticular disease     Diverticulitis 2009    Genetic testing 05/2017    negative Integrated BRACAnalysis    Hyperlipidemia     Hypertension     Morbid obesity     MITCHELL (obstructive sleep apnea)     Pulmonary embolism     Stenosis     Stenosis and insufficiency of lacrimal passages     Thyroid disease        Current Outpatient Medications:     carvediloL (COREG) 25 MG tablet, TAKE 1/2 TABLET TWICE DAILY WITH MEALS, Disp: 180 tablet, Rfl: 3    CHOLECALCIFEROL, VITAMIN D3, (VITAMIN D3 ORAL), Take 3,000 capsules by mouth once daily. 3000 IU per day, Disp: , Rfl:     ELIQUIS 2.5 mg Tab, TAKE ONE TABLET BY MOUTH 2 TIMES A DAY, Disp: 60 tablet, Rfl: 12    furosemide (LASIX) 20 MG tablet, Take 1 tablet (20 mg total) by mouth once daily., Disp: 90 tablet, Rfl: 3    gabapentin (NEURONTIN) 300 MG capsule, TAKE 1 CAPSULE EVERY MORNING AND TAKE 2 CAPSULES AT BEDTIME, Disp: 270 capsule, Rfl: 3    glimepiride (AMARYL) 2 MG tablet, TAKE 1 TABLET BEFORE BREAKFAST., Disp: 90 tablet, Rfl: 3    hydrocortisone 2.5 % cream, Apply topically 2 (two) times daily as needed (rash under the breast). Mix 50/50 with ketoconazole cream., Disp: 30 g, Rfl: 3    ketoconazole (NIZORAL) 2 % cream, AAA bid prn rash  under the breast. Mix 50/50 with hydrocortisone cream., Disp: 60 g, Rfl: 3    levothyroxine (SYNTHROID) 75 MCG tablet, TAKE 1 TABLET BEFORE BREAKFAST, Disp: 90 tablet, Rfl: 3    metFORMIN (GLUCOPHAGE) 500 MG tablet, TAKE 1 TABLET TWICE DAILY WITH MEALS, Disp: 180 tablet, Rfl: 0    pravastatin (PRAVACHOL) 20 MG tablet, TAKE 1 TABLET EVERY DAY, Disp: 90 tablet, Rfl: 3    tiZANidine (ZANAFLEX) 4 MG tablet, One tab every 6-8 hours as needed pain, Disp: 30 tablet, Rfl: 0    triamcinolone acetonide 0.1%-magnesium hydroxide 400 mg/5 ml-ciclopirox, Apply to affected area under the breast twice daily as directed, Disp: 60 g, Rfl: 3    blood-glucose meter kit, True Test or meter covered by insurance - pt checks glucose twice daily for uncontrolled glucoses E11.65, Disp: 1 each, Rfl: 0    dexAMETHasone (DECADRON) 4 mg/mL injection, , Disp: , Rfl:     HYDROcodone-acetaminophen (NORCO) 5-325 mg per tablet, Take 1 tablet by mouth every 6 (six) hours as needed for Pain. (Patient not taking: Reported on 3/27/2023), Disp: 40 tablet, Rfl: 0    lancets (ACCU-CHEK SOFTCLIX LANCETS) Misc, TrueTest lancets for meter covered by insurance - pt checks twice daily for uncontrolled glucoses E11.65,, Disp: 200 each, Rfl: 3    mupirocin (BACTROBAN) 2 % ointment, Apply topically 2 (two) times daily. (Patient not taking: Reported on 3/27/2023), Disp: 15 g, Rfl: 0    TRUE METRIX GLUCOSE TEST STRIP Strp, TEST BLOOD SUGAR TWICE DAILY, Disp: 200 strip, Rfl: 3  No current facility-administered medications for this visit.    Facility-Administered Medications Ordered in Other Visits:     alteplase injection 2 mg, 2 mg, Intra-Catheter, PRN, Erick Dwyer MD, 2 mg at 03/11/19 1140    heparin, porcine (PF) 100 unit/mL injection flush 500 Units, 500 Units, Intravenous, 1 time in Clinic/HOD, Erick Dwyer MD    heparin, porcine (PF) 100 unit/mL injection flush 500 Units, 500 Units, Intravenous, 1 time in Clinic/HOD, Erick Dwyer MD    sodium chloride 0.9%  "flush 10 mL, 10 mL, Intravenous, PRN, Erick Dwyer MD, 10 mL at 07/23/19 1157    Objective:    Physical Exam  Vitals reviewed.   Constitutional:       General: She is not in acute distress.     Appearance: She is well-developed. She is not diaphoretic.   HENT:      Head: Normocephalic and atraumatic.   Neck:      Vascular: No JVD.   Cardiovascular:      Rate and Rhythm: Normal rate and regular rhythm.      Pulses: Intact distal pulses.      Heart sounds: Murmur heard.   Harsh midsystolic murmur is present at the upper right sternal border radiating to the neck.   Pulmonary:      Effort: Pulmonary effort is normal. No respiratory distress.   Musculoskeletal:      Cervical back: Normal range of motion.      Right lower leg: No edema.      Left lower leg: No edema.   Skin:     General: Skin is warm and dry.   Neurological:      Mental Status: She is alert and oriented to person, place, and time.           Vitals:    03/27/23 1422   BP: (!) 148/64   BP Location: Left arm   Patient Position: Standing   BP Method: Large (Automatic)   Pulse: 72   SpO2: 98%   Weight: 112 kg (246 lb 14.6 oz)   Height: 5' 2.5" (1.588 m)     Body mass index is 44.44 kg/m².    Test(s) Reviewed  I have reviewed the following in detail:  [] Stress test   [] Angiography   [x] Echocardiogram   [x] Labs   [] Other       Chemistry        Component Value Date/Time     03/02/2023 0851    K 4.1 03/02/2023 0851     03/02/2023 0851    CO2 31 (H) 03/02/2023 0851    BUN 24 (H) 03/02/2023 0851    CREATININE 1.1 03/02/2023 0851     (H) 03/02/2023 0851        Component Value Date/Time    CALCIUM 9.6 03/02/2023 0851    ALKPHOS 85 03/02/2023 0851    AST 15 03/02/2023 0851    ALT 12 03/02/2023 0851    BILITOT 0.5 03/02/2023 0851    ESTGFRAFRICA 56.0 (A) 07/07/2022 1012    EGFRNONAA 48.5 (A) 07/07/2022 1012            TTE 03/27/2023  The left ventricle is normal in size with concentric remodeling and normal systolic function. The estimated " ejection fraction is 65%.  Normal right ventricular size with normal right ventricular systolic function.  Indeterminate left ventricular diastolic function.  Mild left atrial enlargement.  There is a 21mm [employer] [facility] Trifecta bovine pericardial bioprosthetic aortic valve present with evidence of stenosis. The mean gradient is 47 mmHg with a dimensionless index of 0.23. The acceleration time is well above 100ms (140+ ms)  Mild tricuspid regurgitation.  The estimated PA systolic pressure is 36 mmHg.  Normal central venous pressure (3 mmHg).  The aortic valve mean gradient is 47 mmHg with a dimensionless index of 0.23.    Assessment:   S/P AVR (aortic valve replacement)  Alina Narayan is a 77 y.o. female referred by Dr Palm for evaluation of severe AS in a 21 mm Trifecta valve, true ID 19 (NYHA Class III sx).    The patient has undergone the following TAVR work-up:   ECHO (Date 3/27/23): SAW=  0.73 cm2 (DI 0.23), MG= 47 mmHg, Peak Dung= 4.2 m/s, EF= 65%. Acceleration time >140 ms.    [facility] (Date ): needs   STS: %   Frailty: 1/4   Iliacs are >7.1 on R and > 9.12 on L   LVOT area by CTA is 18 x 17, true ID 19.  R and L coronary only 5mm above valve plane with narrow SOV 27mm putting both coronaries in jeopardy  Incidental findings on CT: none significant   CT Surgery risk assessment: pending, seen by Dr Webb today  Rhythm issues: LAFB  PFTs: deferred  KCCQ/5 meter walk:   Comorbidities: s/p AVR, history of rbeast cancer with chest radiation (prior to AVR), history of pulmonary embolism on chronic OAC    NYHA: III CCS: 0    Atherosclerosis of aorta  See imaging. Follow up with Cardiology.     Chronic kidney disease, stage III (moderate)  Cr 1.1    History of breast cancer  S/p radiation and left lumpectomy prior to AVR in 2013. S/p right mastectomy in 2019 with subsequent chemo therapy. She did not have repeat radiation in 2019.     Plan:     -Will proceed with coronary angiogram +/- intervention and coronary height  evaluation.   - Anti-platelet Therapy: ASA/Plavix. Will not hold Eliquis prior to.   - Access: right femoral.  - Creatinine/CrCl: 1.1  - Allergies: no contrast allergy  - All patient's questions were answered.  -The risks, benefits and alternatives of the procedure were explained to the patient.   -The risks of coronary angiography include but are not limited to: bleeding, infection, heart rhythm abnormalities, allergic reactions, kidney injury and potential need for dialysis, stroke and death.   - Should stenting be indicated, the patient has agreed to dual anti-platelet therapy for 1-consecutive year with a drug-eluting stent and a minimum of 1-month with the use of a bare metal stent  - Additionally, pt is aware that non-compliance is likely to result in stent clotting with heart attack, heart failure, and/or death  -The risks of moderate sedation include hypotension, respiratory depression, arrhythmias, bronchospasm, and death.   - Informed consent was obtained and the  patient is agreeable to proceed with the procedure.             Mila Billy PA-C  Valve and Structural Heart Disease  [facility]    Shared Decision Making:  This patient was seen by a multidisciplinary heart team involving both a Structural Heart Specialist (Interventional Cardiologist) and a Cardiothoracic Surgeon. The patient was involved in shared decision-making to define clearer goals for treatment and align health decisions with their current values.  The patient understands the risks and expected benefits of their treatment options.    Staff:  I have personally taken the history and examined this patient and agree with the fellow's note as stated above and amended it accordingly :-)

## 2023-03-27 NOTE — ASSESSMENT & PLAN NOTE
Alina Narayan is a 77 y.o. female referred by Dr Palm for evaluation of severe AS in a 21 mm Trifecta valve, true ID 19 (NYHA Class III sx).    The patient has undergone the following TAVR work-up:    ECHO (Date 3/27/23): SAW=  0.73 cm2 (DI 0.23), MG= 47 mmHg, Peak Dung= 4.2 m/s, EF= 65%. Acceleration time >140 ms.     LHC (Date ): needs    STS: %    Frailty: 1/4    Iliacs are >7.1 on R and > 9.12 on L    LVOT area by CTA is N/A, true ID 19   Incidental findings on CT: none significant    CT Surgery risk assessment: pending, seen by Dr Webb today   Rhythm issues: LAFB   PFTs: deferred   KCCQ/5 meter walk:    Comorbidities: s/p AVR, history of rbeast cancer with chest radiation (prior to AVR), history of pulmonary embolism on chronic OAC

## 2023-04-03 RX ORDER — CLOPIDOGREL BISULFATE 75 MG/1
TABLET ORAL
Qty: 30 TABLET | Refills: 11 | Status: ON HOLD | OUTPATIENT
Start: 2023-04-03 | End: 2023-04-13 | Stop reason: HOSPADM

## 2023-04-13 ENCOUNTER — HOSPITAL ENCOUNTER (OUTPATIENT)
Facility: HOSPITAL | Age: 78
Discharge: HOME | End: 2023-04-13
Attending: INTERNAL MEDICINE | Admitting: INTERNAL MEDICINE
Payer: MEDICARE

## 2023-04-13 VITALS
RESPIRATION RATE: 18 BRPM | HEIGHT: 62 IN | OXYGEN SATURATION: 95 % | WEIGHT: 242 LBS | TEMPERATURE: 98 F | SYSTOLIC BLOOD PRESSURE: 120 MMHG | BODY MASS INDEX: 44.53 KG/M2 | HEART RATE: 65 BPM | DIASTOLIC BLOOD PRESSURE: 59 MMHG

## 2023-04-13 DIAGNOSIS — T82.01XA: ICD-10-CM

## 2023-04-13 DIAGNOSIS — T82.09XA PROSTHETIC VALVE DYSFUNCTION, INITIAL ENCOUNTER: ICD-10-CM

## 2023-04-13 LAB
ABO + RH BLD: NORMAL
ANION GAP SERPL CALC-SCNC: 9 MMOL/L (ref 8–16)
APTT PPP: 31.6 SEC (ref 21–32)
BASOPHILS # BLD AUTO: 0.02 K/UL (ref 0–0.2)
BASOPHILS NFR BLD: 0.5 % (ref 0–1.9)
BLD GP AB SCN CELLS X3 SERPL QL: NORMAL
BUN SERPL-MCNC: 18 MG/DL (ref 8–23)
CALCIUM SERPL-MCNC: 9.4 MG/DL (ref 8.7–10.5)
CHLORIDE SERPL-SCNC: 105 MMOL/L (ref 95–110)
CO2 SERPL-SCNC: 25 MMOL/L (ref 23–29)
CREAT SERPL-MCNC: 1.1 MG/DL (ref 0.5–1.4)
DIFFERENTIAL METHOD: ABNORMAL
EOSINOPHIL # BLD AUTO: 0 K/UL (ref 0–0.5)
EOSINOPHIL NFR BLD: 0.7 % (ref 0–8)
ERYTHROCYTE [DISTWIDTH] IN BLOOD BY AUTOMATED COUNT: 13.5 % (ref 11.5–14.5)
EST. GFR  (NO RACE VARIABLE): 51.4 ML/MIN/1.73 M^2
GLUCOSE SERPL-MCNC: 156 MG/DL (ref 70–110)
HCT VFR BLD AUTO: 39.6 % (ref 37–48.5)
HGB BLD-MCNC: 12.3 G/DL (ref 12–16)
IMM GRANULOCYTES # BLD AUTO: 0.03 K/UL (ref 0–0.04)
IMM GRANULOCYTES NFR BLD AUTO: 0.7 % (ref 0–0.5)
INR PPP: 1.1 (ref 0.8–1.2)
LYMPHOCYTES # BLD AUTO: 1.3 K/UL (ref 1–4.8)
LYMPHOCYTES NFR BLD: 29.7 % (ref 18–48)
MCH RBC QN AUTO: 28.9 PG (ref 27–31)
MCHC RBC AUTO-ENTMCNC: 31.1 G/DL (ref 32–36)
MCV RBC AUTO: 93 FL (ref 82–98)
MONOCYTES # BLD AUTO: 0.4 K/UL (ref 0.3–1)
MONOCYTES NFR BLD: 9.8 % (ref 4–15)
NEUTROPHILS # BLD AUTO: 2.6 K/UL (ref 1.8–7.7)
NEUTROPHILS NFR BLD: 58.6 % (ref 38–73)
NRBC BLD-RTO: 0 /100 WBC
PLATELET # BLD AUTO: 102 K/UL (ref 150–450)
PLATELET RESPONSE PLAVIX: 174 PRU (ref 194–418)
PMV BLD AUTO: 11.7 FL (ref 9.2–12.9)
POCT GLUCOSE: 162 MG/DL (ref 70–110)
POTASSIUM SERPL-SCNC: 3.7 MMOL/L (ref 3.5–5.1)
PROTHROMBIN TIME: 10.9 SEC (ref 9–12.5)
RBC # BLD AUTO: 4.26 M/UL (ref 4–5.4)
SODIUM SERPL-SCNC: 139 MMOL/L (ref 136–145)
SPECIMEN OUTDATE: NORMAL
WBC # BLD AUTO: 4.37 K/UL (ref 3.9–12.7)

## 2023-04-13 PROCEDURE — 25500020 PHARM REV CODE 255: Mod: HCNC | Performed by: INTERNAL MEDICINE

## 2023-04-13 PROCEDURE — 93005 ELECTROCARDIOGRAM TRACING: CPT | Mod: HCNC

## 2023-04-13 PROCEDURE — 93454 PR CATH PLACE/CORONARY ANGIO, IMG SUPER/INTERP: ICD-10-PCS | Mod: 26,HCNC,, | Performed by: INTERNAL MEDICINE

## 2023-04-13 PROCEDURE — 93010 EKG 12-LEAD: ICD-10-PCS | Mod: HCNC,,, | Performed by: INTERNAL MEDICINE

## 2023-04-13 PROCEDURE — C1760 CLOSURE DEV, VASC: HCPCS | Mod: HCNC | Performed by: INTERNAL MEDICINE

## 2023-04-13 PROCEDURE — 86900 BLOOD TYPING SEROLOGIC ABO: CPT | Mod: HCNC | Performed by: PHYSICIAN ASSISTANT

## 2023-04-13 PROCEDURE — 99152 MOD SED SAME PHYS/QHP 5/>YRS: CPT | Mod: HCNC,,, | Performed by: INTERNAL MEDICINE

## 2023-04-13 PROCEDURE — 85025 COMPLETE CBC W/AUTO DIFF WBC: CPT | Mod: HCNC | Performed by: PHYSICIAN ASSISTANT

## 2023-04-13 PROCEDURE — 85730 THROMBOPLASTIN TIME PARTIAL: CPT | Mod: HCNC | Performed by: PHYSICIAN ASSISTANT

## 2023-04-13 PROCEDURE — 85576 BLOOD PLATELET AGGREGATION: CPT | Mod: HCNC | Performed by: PHYSICIAN ASSISTANT

## 2023-04-13 PROCEDURE — 99152 PR MOD CONSCIOUS SEDATION, SAME PHYS, 5+ YRS, FIRST 15 MIN: ICD-10-PCS | Mod: HCNC,,, | Performed by: INTERNAL MEDICINE

## 2023-04-13 PROCEDURE — C1769 GUIDE WIRE: HCPCS | Mod: HCNC | Performed by: INTERNAL MEDICINE

## 2023-04-13 PROCEDURE — 99152 MOD SED SAME PHYS/QHP 5/>YRS: CPT | Mod: HCNC | Performed by: INTERNAL MEDICINE

## 2023-04-13 PROCEDURE — 93010 ELECTROCARDIOGRAM REPORT: CPT | Mod: HCNC,,, | Performed by: INTERNAL MEDICINE

## 2023-04-13 PROCEDURE — 82962 GLUCOSE BLOOD TEST: CPT | Mod: HCNC | Performed by: INTERNAL MEDICINE

## 2023-04-13 PROCEDURE — 93454 CORONARY ARTERY ANGIO S&I: CPT | Mod: HCNC | Performed by: INTERNAL MEDICINE

## 2023-04-13 PROCEDURE — C1894 INTRO/SHEATH, NON-LASER: HCPCS | Mod: HCNC | Performed by: INTERNAL MEDICINE

## 2023-04-13 PROCEDURE — 63600175 PHARM REV CODE 636 W HCPCS: Mod: HCNC | Performed by: INTERNAL MEDICINE

## 2023-04-13 PROCEDURE — 93454 CORONARY ARTERY ANGIO S&I: CPT | Mod: 26,HCNC,, | Performed by: INTERNAL MEDICINE

## 2023-04-13 PROCEDURE — 25000003 PHARM REV CODE 250: Mod: HCNC | Performed by: INTERNAL MEDICINE

## 2023-04-13 PROCEDURE — 85610 PROTHROMBIN TIME: CPT | Mod: HCNC | Performed by: PHYSICIAN ASSISTANT

## 2023-04-13 PROCEDURE — 25000003 PHARM REV CODE 250: Mod: HCNC | Performed by: PHYSICIAN ASSISTANT

## 2023-04-13 PROCEDURE — 80048 BASIC METABOLIC PNL TOTAL CA: CPT | Mod: HCNC | Performed by: PHYSICIAN ASSISTANT

## 2023-04-13 RX ORDER — HEPARIN SOD,PORCINE/0.9 % NACL 1000/500ML
INTRAVENOUS SOLUTION INTRAVENOUS
Status: DISCONTINUED | OUTPATIENT
Start: 2023-04-13 | End: 2023-04-13 | Stop reason: HOSPADM

## 2023-04-13 RX ORDER — SODIUM CHLORIDE 9 MG/ML
INJECTION, SOLUTION INTRAVENOUS CONTINUOUS
Status: ACTIVE | OUTPATIENT
Start: 2023-04-13 | End: 2023-04-13

## 2023-04-13 RX ORDER — FENTANYL CITRATE 50 UG/ML
INJECTION, SOLUTION INTRAMUSCULAR; INTRAVENOUS
Status: DISCONTINUED | OUTPATIENT
Start: 2023-04-13 | End: 2023-04-13 | Stop reason: HOSPADM

## 2023-04-13 RX ORDER — DIPHENHYDRAMINE HCL 50 MG
50 CAPSULE ORAL ONCE
Status: COMPLETED | OUTPATIENT
Start: 2023-04-13 | End: 2023-04-13

## 2023-04-13 RX ORDER — ASPIRIN 81 MG/1
81 TABLET ORAL DAILY
COMMUNITY

## 2023-04-13 RX ORDER — MIDAZOLAM HYDROCHLORIDE 1 MG/ML
INJECTION INTRAMUSCULAR; INTRAVENOUS
Status: DISCONTINUED | OUTPATIENT
Start: 2023-04-13 | End: 2023-04-13 | Stop reason: HOSPADM

## 2023-04-13 RX ORDER — CEFAZOLIN SODIUM 1 G/3ML
INJECTION, POWDER, FOR SOLUTION INTRAMUSCULAR; INTRAVENOUS
Status: DISCONTINUED | OUTPATIENT
Start: 2023-04-13 | End: 2023-04-13 | Stop reason: HOSPADM

## 2023-04-13 RX ORDER — SODIUM CHLORIDE 9 MG/ML
INJECTION, SOLUTION INTRAVENOUS CONTINUOUS
Status: DISCONTINUED | OUTPATIENT
Start: 2023-04-13 | End: 2023-04-13

## 2023-04-13 RX ORDER — LIDOCAINE HYDROCHLORIDE 20 MG/ML
INJECTION, SOLUTION INFILTRATION; PERINEURAL
Status: DISCONTINUED | OUTPATIENT
Start: 2023-04-13 | End: 2023-04-13 | Stop reason: HOSPADM

## 2023-04-13 RX ORDER — ONDANSETRON 8 MG/1
8 TABLET, ORALLY DISINTEGRATING ORAL EVERY 8 HOURS PRN
Status: DISCONTINUED | OUTPATIENT
Start: 2023-04-13 | End: 2023-04-13 | Stop reason: HOSPADM

## 2023-04-13 RX ORDER — ACETAMINOPHEN 325 MG/1
650 TABLET ORAL EVERY 4 HOURS PRN
Status: DISCONTINUED | OUTPATIENT
Start: 2023-04-13 | End: 2023-04-13 | Stop reason: HOSPADM

## 2023-04-13 RX ADMIN — DIPHENHYDRAMINE HYDROCHLORIDE 50 MG: 50 CAPSULE ORAL at 08:04

## 2023-04-13 RX ADMIN — SODIUM CHLORIDE: 9 INJECTION, SOLUTION INTRAVENOUS at 08:04

## 2023-04-13 NOTE — Clinical Note
The catheter was inserted into the ostium   left main. An angiography was performed of the left coronary arteries. Multiple views were taken.  And was removed n/a

## 2023-04-13 NOTE — INTERVAL H&P NOTE
Subjective:      HPI  Alina Narayan is a 77 y.o. female with a history of pulmonary embolism, s/p AVR (21 mm Trifecta), breast cancer (s/p mastectomy, radiation and chemo) who presents for evaluation of a failing 21 mm Trifecta valve, true ID 19. Patient reports increasing SOB and chest tightness for the past 3 months. She underwent AVR in 2013 with Dr Webb with a 21 mmTrifecta. Prior to AVR she underwent a lumpectomy of her left breast followed by multiple rounds of chest radiation. Following her AVR, she had recurrent breast cancer in her right breast and underwent right mastectomy. She also underwent chemo at that time, but did not have radiation. Following her right mastectomy she experienced a pulmonary embolism and continues on chronic Eliquis.      Alina Narayan is a 77 y.o. female referred by Dr Palm for evaluation of severe AS in a 21 mm Trifecta valve, true ID 19 (NYHA Class III sx).     The patient has undergone the following TAVR work-up:   ECHO (Date 3/27/23): SAW=  0.73 cm2 (DI 0.23), MG= 47 mmHg, Peak Dung= 4.2 m/s, EF= 65%. Acceleration time >140 ms.    LHC (Date ): Scheduled for [facility] today  STS: %   Frailty: 1/4   Iliacs are >7.1 on R and > 9.12 on L   LVOT area by CTA is 18 x 17, true ID 19.  R and L coronary only 5mm above valve plane with narrow SOV 27mm putting both coronaries in jeopardy  Incidental findings on CT: none significant   CT Surgery risk assessment: pending, seen by Dr Webb today  Rhythm issues: LAFB  PFTs: deferred  KCCQ/5 meter walk:   Comorbidities: s/p AVR, history of rbeast cancer with chest radiation (prior to AVR), history of pulmonary embolism on chronic OAC     NYHA: III CCS: 0     Review of Systems   Constitutional: Negative for chills and fever.   HENT:  Negative for hearing loss.    Eyes:  Negative for blurred vision, double vision and photophobia.   Cardiovascular:  Positive for dyspnea on exertion. Negative for chest pain, claudication, cyanosis, irregular  heartbeat, leg swelling, near-syncope, orthopnea, palpitations, paroxysmal nocturnal dyspnea and syncope.   Respiratory:  Negative for cough and hemoptysis.    Musculoskeletal:  Negative for falls.   Gastrointestinal:  Negative for abdominal pain, constipation, diarrhea, nausea and vomiting.   Genitourinary:  Negative for dysuria.   Neurological:  Negative for disturbances in coordination, dizziness, focal weakness, headaches, loss of balance and weakness.           Past Medical History:   Diagnosis Date    Allergy       seasonal    Anxiety      Arthritis      BRCA1 negative      Breast cancer 10/2012     left breast invasive ductal carcinoma    Colon polyp      Depression      Diabetes mellitus       Type 2    Diabetes mellitus, type 2      Diverticular disease      Diverticulitis 2009    Genetic testing 05/2017     negative Integrated BRACAnalysis    Hyperlipidemia      Hypertension      Morbid obesity      MITCHELL (obstructive sleep apnea)      Pulmonary embolism      Stenosis      Stenosis and insufficiency of lacrimal passages      Thyroid disease           Current Outpatient Medications:     carvediloL (COREG) 25 MG tablet, TAKE 1/2 TABLET TWICE DAILY WITH MEALS, Disp: 180 tablet, Rfl: 3    CHOLECALCIFEROL, VITAMIN D3, (VITAMIN D3 ORAL), Take 3,000 capsules by mouth once daily. 3000 IU per day, Disp: , Rfl:     ELIQUIS 2.5 mg Tab, TAKE ONE TABLET BY MOUTH 2 TIMES A DAY, Disp: 60 tablet, Rfl: 12    furosemide (LASIX) 20 MG tablet, Take 1 tablet (20 mg total) by mouth once daily., Disp: 90 tablet, Rfl: 3    gabapentin (NEURONTIN) 300 MG capsule, TAKE 1 CAPSULE EVERY MORNING AND TAKE 2 CAPSULES AT BEDTIME, Disp: 270 capsule, Rfl: 3    glimepiride (AMARYL) 2 MG tablet, TAKE 1 TABLET BEFORE BREAKFAST., Disp: 90 tablet, Rfl: 3    hydrocortisone 2.5 % cream, Apply topically 2 (two) times daily as needed (rash under the breast). Mix 50/50 with ketoconazole cream., Disp: 30 g, Rfl: 3    ketoconazole (NIZORAL) 2 % cream,  AAA bid prn rash under the breast. Mix 50/50 with hydrocortisone cream., Disp: 60 g, Rfl: 3    levothyroxine (SYNTHROID) 75 MCG tablet, TAKE 1 TABLET BEFORE BREAKFAST, Disp: 90 tablet, Rfl: 3    metFORMIN (GLUCOPHAGE) 500 MG tablet, TAKE 1 TABLET TWICE DAILY WITH MEALS, Disp: 180 tablet, Rfl: 0    pravastatin (PRAVACHOL) 20 MG tablet, TAKE 1 TABLET EVERY DAY, Disp: 90 tablet, Rfl: 3    tiZANidine (ZANAFLEX) 4 MG tablet, One tab every 6-8 hours as needed pain, Disp: 30 tablet, Rfl: 0    triamcinolone acetonide 0.1%-magnesium hydroxide 400 mg/5 ml-ciclopirox, Apply to affected area under the breast twice daily as directed, Disp: 60 g, Rfl: 3    blood-glucose meter kit, True Test or meter covered by insurance - pt checks glucose twice daily for uncontrolled glucoses E11.65, Disp: 1 each, Rfl: 0    dexAMETHasone (DECADRON) 4 mg/mL injection, , Disp: , Rfl:     HYDROcodone-acetaminophen (NORCO) 5-325 mg per tablet, Take 1 tablet by mouth every 6 (six) hours as needed for Pain. (Patient not taking: Reported on 3/27/2023), Disp: 40 tablet, Rfl: 0    lancets (ACCU-CHEK SOFTCLIX LANCETS) Misc, TrueTest lancets for meter covered by insurance - pt checks twice daily for uncontrolled glucoses E11.65,, Disp: 200 each, Rfl: 3    mupirocin (BACTROBAN) 2 % ointment, Apply topically 2 (two) times daily. (Patient not taking: Reported on 3/27/2023), Disp: 15 g, Rfl: 0    TRUE METRIX GLUCOSE TEST STRIP Strp, TEST BLOOD SUGAR TWICE DAILY, Disp: 200 strip, Rfl: 3  No current facility-administered medications for this visit.     Facility-Administered Medications Ordered in Other Visits:     alteplase injection 2 mg, 2 mg, Intra-Catheter, PRN, Erick Dwyer MD, 2 mg at 03/11/19 1140    heparin, porcine (PF) 100 unit/mL injection flush 500 Units, 500 Units, Intravenous, 1 time in Clinic/HOD, Erick Dwyer MD    heparin, porcine (PF) 100 unit/mL injection flush 500 Units, 500 Units, Intravenous, 1 time in Clinic/HOD, Erick Dwyer MD     sodium chloride 0.9% flush 10 mL, 10 mL, Intravenous, PRN, Erick Dwyer MD, 10 mL at 07/23/19 1157     Objective:    Physical Exam  Vitals reviewed.   Constitutional:       General: She is not in acute distress.     Appearance: She is well-developed. She is not diaphoretic.   HENT:      Head: Normocephalic and atraumatic.   Neck:      Vascular: No JVD.   Cardiovascular:      Rate and Rhythm: Normal rate and regular rhythm.      Pulses: Intact distal pulses.      Heart sounds: Murmur heard.   Harsh midsystolic murmur is present at the upper right sternal border radiating to the neck.   Pulmonary:      Effort: Pulmonary effort is normal. No respiratory distress.   Musculoskeletal:      Cervical back: Normal range of motion.      Right lower leg: No edema.      Left lower leg: No edema.   Skin:     General: Skin is warm and dry.   Neurological:      Mental Status: She is alert and oriented to person, place, and time.         Body mass index is 44.44 kg/m².     Test(s) Reviewed  I have reviewed the following in detail:  []  Stress test   []  Angiography   [x]  Echocardiogram   [x]  Labs   []  Other          4/13/23  Today:   CBC, CMP, PTT, INR, Type and screen, platelet response plavix: Pending      Chemistry               Component Value Date/Time      03/02/2023 0851     K 4.1 03/02/2023 0851      03/02/2023 0851     CO2 31 (H) 03/02/2023 0851     BUN 24 (H) 03/02/2023 0851     CREATININE 1.1 03/02/2023 0851      (H) 03/02/2023 0851               Component Value Date/Time     CALCIUM 9.6 03/02/2023 0851     ALKPHOS 85 03/02/2023 0851     AST 15 03/02/2023 0851     ALT 12 03/02/2023 0851     BILITOT 0.5 03/02/2023 0851     ESTGFRAFRICA 56.0 (A) 07/07/2022 1012     EGFRNONAA 48.5 (A) 07/07/2022 1012               TTE 03/27/2023  The left ventricle is normal in size with concentric remodeling and normal systolic function. The estimated ejection fraction is 65%.  Normal right ventricular size with  normal right ventricular systolic function.  Indeterminate left ventricular diastolic function.  Mild left atrial enlargement.  There is a 21mm [employer] Trifecta bovine pericardial bioprosthetic aortic valve present with evidence of stenosis. The mean gradient is 47 mmHg with a dimensionless index of 0.23. The acceleration time is well above 100ms (140+ ms)  Mild tricuspid regurgitation.  The estimated PA systolic pressure is 36 mmHg.  Normal central venous pressure (3 mmHg).  The aortic valve mean gradient is 47 mmHg with a dimensionless index of 0.23.     Assessment:   S/P AVR (aortic valve replacement)  Alina Narayan is a 77 y.o. female referred by Dr Palm for evaluation of severe AS in a 21 mm Trifecta valve, true ID 19 (NYHA Class III sx).     The patient has undergone the following TAVR work-up:   ECHO (Date 3/27/23): SAW=  0.73 cm2 (DI 0.23), MG= 47 mmHg, Peak Dung= 4.2 m/s, EF= 65%. Acceleration time >140 ms.    [facility] (Date ): patient is scheduled for [facility] today  STS: %   Frailty: 1/4   Iliacs are >7.1 on R and > 9.12 on L   LVOT area by CTA is 18 x 17, true ID 19.  R and L coronary only 5mm above valve plane with narrow SOV 27mm putting both coronaries in jeopardy  Incidental findings on CT: none significant   CT Surgery risk assessment: pending, seen by Dr Webb today  Rhythm issues: LAFB  PFTs: deferred  KCCQ/5 meter walk:   Comorbidities: s/p AVR, history of rbeast cancer with chest radiation (prior to AVR), history of pulmonary embolism on chronic OAC     NYHA: III CCS: 0     Atherosclerosis of aorta  See imaging. Follow up with Cardiology.      Chronic kidney disease, stage III (moderate)  Cr 1.1     History of breast cancer  S/p radiation and left lumpectomy prior to AVR in 2013. S/p right mastectomy in 2019 with subsequent chemo therapy. She did not have repeat radiation in 2019.      Plan:      -Will proceed with coronary angiogram +/- intervention and coronary height evaluation.   - Anti-platelet  Therapy: ASA/Plavix. Will not hold Eliquis prior to.   - Access: right femoral.  - Creatinine/CrCl: 1.1  - Allergies: no contrast allergy  - All patient's questions were answered.  -The risks, benefits and alternatives of the procedure were explained to the patient.   -The risks of coronary angiography include but are not limited to: bleeding, infection, heart rhythm abnormalities, allergic reactions, kidney injury and potential need for dialysis, stroke and death.   - Should stenting be indicated, the patient has agreed to dual anti-platelet therapy for 1-consecutive year with a drug-eluting stent and a minimum of 1-month with the use of a bare metal stent  - Additionally, pt is aware that non-compliance is likely to result in stent clotting with heart attack, heart failure, and/or death  -The risks of moderate sedation include hypotension, respiratory depression, arrhythmias, bronchospasm, and death.   - Informed consent was obtained and the  patient is agreeable to proceed with the procedure.

## 2023-04-13 NOTE — Clinical Note
An angiography was performed of the right coronary arteries. Multiple views were taken.  And was removed

## 2023-04-13 NOTE — BRIEF OP NOTE
[facility] - Cath Lab  Cardiac Catheterization Procedures   Brief Op Note    SUMMARY     Alina Narayan  [id number]    Date of Procedure: 4/13/2023    Procedures: [facility]    Indications: TAVR workup    Pre-Procedure Diagnosis: Severe AS    Post-Procedure Diagnosis: Non obstructive CAD    Providers: JABARI BOWMAN MD    Assisting Providers: MARITZA MEJIA, Sanford Clay MD      Anesthesia: RN IV Sedation Procedural Sedation    Complications: None; patient tolerated the procedure well.    Estimated Blood Loss (EBL): Minimal           Implants: None    Findings: Non obstructive CAD    Recommendations:   Post procedural care with close monitoring per protocol.   Can dc Plavix   Aggressive risk factor and life style modifications.   Medication compliance.   Plan of care explained to the patient who voiced understanding.   Follow up in clinic as scheduled.   Please call with any questions.   Full op note to follow.     Condition: stable    Disposition: PACU - hemodynamically stable.    Attestation: I was present and scrubbed for the entire procedure.    Sanford Clay MD

## 2023-04-13 NOTE — Clinical Note
The site was marked. The groin and right radial was prepped. The site was prepped with ChloraPrep. The site was clipped. The patient was positioned supine.

## 2023-04-13 NOTE — PLAN OF CARE
Received report from JORDAN Walsh. Patient s/p [facility], AAOx3. VSS, no c/o pain or discomfort at this time, resp even and unlabored. Gauze/tegaderm dressing to R groin is CDI. No active bleeding. No hematoma noted. Post procedure protocol reviewed with patient and patient's family. Understanding verbalized. Family members at bedside. Nurse call bell within reach. Will continue to monitor per post procedure protocol.

## 2023-04-13 NOTE — PLAN OF CARE
Patient arrived to room. PIV placed x2, labs sent. Admit assessment completed. Plan of care discussed with patient. Will monitor. Call light within reach, instructed in use. POC

## 2023-04-13 NOTE — PLAN OF CARE
Patient discharged per MD orders. Instructions given on medications, wound care, activity, signs of infection, when to call MD, and follow up appointments. Pt verbalized understanding. Telemetry and PIV removed. Patient and family taken byw c to private vehicle.

## 2023-04-14 NOTE — DISCHARGE SUMMARY
[facility] - Short Stay Cardiac Unit  Cardiology  Discharge Summary      Patient Name: Alina Narayan  MRN: [id number]  Admission Date: 4/13/2023  Hospital Length of Stay: 0 days  Discharge Date and Time: 4/13/2023  2:30 PM  Attending Physician: No att. providers found    Discharging Provider: Sanford Clay MD  Primary Care Physician: Aarti Dunn MD    HPI:   No notes on file    Procedure(s) (LRB):  ANGIOGRAM, CORONARY ARTERY (Left)     Indwelling Lines/Drains at time of discharge:  Lines/Drains/Airways     Central Venous Catheter Line  Duration                Port A Cath Single Lumen -- days                Hospital Course:  No notes on file    Goals of Care Treatment Preferences:  Code Status: Full Code    Living Will: Yes              Consults:     Significant Diagnostic Studies: Angiography: Non obstructive CAD    Pending Diagnostic Studies:     None          There are no hospital problems to display for this patient.    No new Assessment & Plan notes have been filed under this hospital service since the last note was generated.  Service: Cardiology      Discharged Condition: good    Disposition: Home or Self Care    Follow Up:   Follow-up Information     Eze Sales MD Follow up.    Specialty: Interventional Cardiology  Contact information:  [id number]  [facility] [address]  [phone]                       Patient Instructions:   No discharge procedures on file.  Medications:  Reconciled Home Medications:      Medication List      CONTINUE taking these medications    aspirin 81 MG EC tablet  Commonly known as: ECOTRIN  Take 81 mg by mouth once daily.     blood-glucose meter kit  True Test or meter covered by insurance - pt checks glucose twice daily for uncontrolled glucoses E11.65     carvediloL 25 MG tablet  Commonly known as: COREG  TAKE 1/2 TABLET TWICE DAILY WITH MEALS     ELIQUIS 2.5 mg Tab  Generic drug: apixaban  TAKE ONE TABLET BY MOUTH 2 TIMES A DAY     furosemide 20 MG  tablet  Commonly known as: LASIX  Take 1 tablet (20 mg total) by mouth once daily.     gabapentin 300 MG capsule  Commonly known as: NEURONTIN  TAKE 1 CAPSULE EVERY MORNING AND TAKE 2 CAPSULES AT BEDTIME     glimepiride 2 MG tablet  Commonly known as: AMARYL  TAKE 1 TABLET BEFORE BREAKFAST.     hydrocortisone 2.5 % cream  Apply topically 2 (two) times daily as needed (rash under the breast). Mix 50/50 with ketoconazole cream.     ketoconazole 2 % cream  Commonly known as: NIZORAL  AAA bid prn rash under the breast. Mix 50/50 with hydrocortisone cream.     lancets Misc  Commonly known as: ACCU-CHEK SOFTCLIX LANCETS  TrueTest lancets for meter covered by insurance - pt checks twice daily for uncontrolled glucoses E11.65,     levothyroxine 75 MCG tablet  Commonly known as: SYNTHROID  TAKE 1 TABLET BEFORE BREAKFAST     metFORMIN 500 MG tablet  Commonly known as: GLUCOPHAGE  TAKE 1 TABLET TWICE DAILY WITH MEALS     pravastatin 20 MG tablet  Commonly known as: PRAVACHOL  TAKE 1 TABLET EVERY DAY     tiZANidine 4 MG tablet  Commonly known as: ZANAFLEX  One tab every 6-8 hours as needed pain     triamcinolone acetonide 0.1%-magnesium hydroxide 400 mg/5 ml-ciclopirox  Apply to affected area under the breast twice daily as directed     TRUE METRIX GLUCOSE TEST STRIP Strp  Generic drug: blood sugar diagnostic  TEST BLOOD SUGAR TWICE DAILY     VITAMIN D3 ORAL  Take 3,000 capsules by mouth once daily. 3000 IU per day        STOP taking these medications    clopidogreL 75 mg tablet  Commonly known as: PLAVIX        ASK your doctor about these medications    dexAMETHasone 4 mg/mL injection  Commonly known as: DECADRON     HYDROcodone-acetaminophen 5-325 mg per tablet  Commonly known as: NORCO  Take 1 tablet by mouth every 6 (six) hours as needed for Pain.     mupirocin 2 % ointment  Commonly known as: BACTROBAN  Apply topically 2 (two) times daily.            Time spent on the discharge of patient: 40 minutes    Sanford Kingston  MD Obed  Cardiology  [facility] - Short Stay Cardiac Unit

## 2023-04-18 ENCOUNTER — TELEPHONE (OUTPATIENT)
Dept: CARDIOTHORACIC SURGERY | Facility: CLINIC | Age: 78
End: 2023-04-18
Payer: MEDICARE

## 2023-04-18 ENCOUNTER — OFFICE VISIT (OUTPATIENT)
Dept: HEMATOLOGY/ONCOLOGY | Facility: CLINIC | Age: 78
End: 2023-04-18
Payer: MEDICARE

## 2023-04-18 VITALS
TEMPERATURE: 98 F | BODY MASS INDEX: 45.84 KG/M2 | OXYGEN SATURATION: 95 % | SYSTOLIC BLOOD PRESSURE: 139 MMHG | DIASTOLIC BLOOD PRESSURE: 63 MMHG | HEIGHT: 62 IN | RESPIRATION RATE: 18 BRPM | WEIGHT: 249.13 LBS | HEART RATE: 72 BPM

## 2023-04-18 DIAGNOSIS — Z85.3 HISTORY OF BREAST CANCER: Primary | ICD-10-CM

## 2023-04-18 PROCEDURE — 3078F PR MOST RECENT DIASTOLIC BLOOD PRESSURE < 80 MM HG: ICD-10-PCS | Mod: HCNC,CPTII,S$GLB, | Performed by: INTERNAL MEDICINE

## 2023-04-18 PROCEDURE — 1101F PR PT FALLS ASSESS DOC 0-1 FALLS W/OUT INJ PAST YR: ICD-10-PCS | Mod: HCNC,CPTII,S$GLB, | Performed by: INTERNAL MEDICINE

## 2023-04-18 PROCEDURE — 1125F AMNT PAIN NOTED PAIN PRSNT: CPT | Mod: HCNC,CPTII,S$GLB, | Performed by: INTERNAL MEDICINE

## 2023-04-18 PROCEDURE — 3075F PR MOST RECENT SYSTOLIC BLOOD PRESS GE 130-139MM HG: ICD-10-PCS | Mod: HCNC,CPTII,S$GLB, | Performed by: INTERNAL MEDICINE

## 2023-04-18 PROCEDURE — 3288F PR FALLS RISK ASSESSMENT DOCUMENTED: ICD-10-PCS | Mod: HCNC,CPTII,S$GLB, | Performed by: INTERNAL MEDICINE

## 2023-04-18 PROCEDURE — 99213 PR OFFICE/OUTPT VISIT, EST, LEVL III, 20-29 MIN: ICD-10-PCS | Mod: HCNC,S$GLB,, | Performed by: INTERNAL MEDICINE

## 2023-04-18 PROCEDURE — 99999 PR PBB SHADOW E&M-EST. PATIENT-LVL IV: ICD-10-PCS | Mod: PBBFAC,HCNC,, | Performed by: INTERNAL MEDICINE

## 2023-04-18 PROCEDURE — 3078F DIAST BP <80 MM HG: CPT | Mod: HCNC,CPTII,S$GLB, | Performed by: INTERNAL MEDICINE

## 2023-04-18 PROCEDURE — 3075F SYST BP GE 130 - 139MM HG: CPT | Mod: HCNC,CPTII,S$GLB, | Performed by: INTERNAL MEDICINE

## 2023-04-18 PROCEDURE — 1159F MED LIST DOCD IN RCRD: CPT | Mod: HCNC,CPTII,S$GLB, | Performed by: INTERNAL MEDICINE

## 2023-04-18 PROCEDURE — 1159F PR MEDICATION LIST DOCUMENTED IN MEDICAL RECORD: ICD-10-PCS | Mod: HCNC,CPTII,S$GLB, | Performed by: INTERNAL MEDICINE

## 2023-04-18 PROCEDURE — 3288F FALL RISK ASSESSMENT DOCD: CPT | Mod: HCNC,CPTII,S$GLB, | Performed by: INTERNAL MEDICINE

## 2023-04-18 PROCEDURE — 1125F PR PAIN SEVERITY QUANTIFIED, PAIN PRESENT: ICD-10-PCS | Mod: HCNC,CPTII,S$GLB, | Performed by: INTERNAL MEDICINE

## 2023-04-18 PROCEDURE — 99999 PR PBB SHADOW E&M-EST. PATIENT-LVL IV: CPT | Mod: PBBFAC,HCNC,, | Performed by: INTERNAL MEDICINE

## 2023-04-18 PROCEDURE — 1157F PR ADVANCE CARE PLAN OR EQUIV PRESENT IN MEDICAL RECORD: ICD-10-PCS | Mod: HCNC,CPTII,S$GLB, | Performed by: INTERNAL MEDICINE

## 2023-04-18 PROCEDURE — 99213 OFFICE O/P EST LOW 20 MIN: CPT | Mod: HCNC,S$GLB,, | Performed by: INTERNAL MEDICINE

## 2023-04-18 PROCEDURE — 1157F ADVNC CARE PLAN IN RCRD: CPT | Mod: HCNC,CPTII,S$GLB, | Performed by: INTERNAL MEDICINE

## 2023-04-18 PROCEDURE — 1101F PT FALLS ASSESS-DOCD LE1/YR: CPT | Mod: HCNC,CPTII,S$GLB, | Performed by: INTERNAL MEDICINE

## 2023-04-18 NOTE — TELEPHONE ENCOUNTER
Received return call from pt. Scheduled for next available appointment which is next Thursday. Pt verbalized understanding of appointment date, time, and location.

## 2023-04-18 NOTE — TELEPHONE ENCOUNTER
Called pt to schedule consultation appointment to discuss SAVR vs. TAVR. No answer, left VM with request for call back and provided my direct line.

## 2023-04-18 NOTE — PROGRESS NOTES
Subjective:       Patient ID: Alina Narayan is a 78 y.o. female.    Chief Complaint: No chief complaint on file.      HPI 79 Y/O white female who returns for follow-up of triple negative carcinoma of the right breast.  She had complete pathologic response to neoadjuvant chemotherapy.      Has  left knee and shoulder pain.  Had angiogram for her aortic valve - prior AVR , apparently needs surgery.Some chronic shortness of breath.      She has a history of pulmonary embolism diagnosed in May of 2019.She is on chronic anticoagulation with low-dose Eliquis.    She is also being followed a small right upper lobe pulmonary nodule.    Echo 8/9/2 - The left ventricle is normal in size with  The estimated ejection fraction is 58%.  There are segmental left ventricular wall motion abnormalities.  There is abnormal septal wall motion.  Grade II left ventricular diastolic dysfunction.  Moderate left atrial enlargement.  Normal right ventricular size with low normal right ventricular systolic function.  Moderate right atrial enlargement.  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  The aortic valve mean gradient is 30 mmHg with a dimensionless index of 0.32.The high gradients were obtained with contrast  There is mild to mild-moderate MR ( 1-2+).  Moderate tricuspid regurgitation.  Normal central venous pressure (3 mmHg).  The estimated PA systolic pressure is 31 mmHg.    Most recent breast cancer history:    abnormal mammogram of the right breast in December 2018.  She was having no breast symptoms at that time    That mammogram showed a 13 mm mass in the right axillary tail.  By ultrasound there was a 6 x 9 x 11 mm intramammary node and a 2nd 5 x 6 x 6 mm hypoechoic mass in the axillary tail.  On January 7, 2019 both masses were biopsied and both showed lymph nodes with metastatic carcinoma which was ER negative HI negative and HER2 negative.  Ki-67 was 40%.    MRI on January 15, 2019 showed 2 right axillary lymph  nodes the largest measured 1.4 x 1.0 cm.  There were no abnormalities in the right breast.    PET scan was then performed which showed only low-grade activity in the right axilla with an SUV of 1.32.      She has received 12 cycles of weekly Taxol and 4 cycles of CMF which she completed on July 30th.    On August 26, 2019 mastectomies performed showed complete pathological response in the breast and axilla.      Previous breast cancer history History: On September 25, 2012 she underwent a screening mammogram which showed an asymmetric density in the left breast at 2:00 position. A followup mammogram on the 27th showed an oval mass in that area ultrasound showed a 6 mm solid mass. Core needle biopsy on October 1 showed invasive carcinoma ER 90% positive NH 80% positive and HER-2 negative. On October 29 she underwent lumpectomy and sentinel lymph node biopsy. That showed a 7 mm low-grade (1+2+1) infiltrating carcinoma. 3 sentinel lymph nodes were negative. Final pathological stage TIB N0 stage IA.  She completed letrozole therapy in 2017  Review of Systems   Constitutional:  Positive for fatigue. Negative for activity change, appetite change, fever and unexpected weight change.   HENT:  Negative for mouth sores.    Eyes:  Negative for visual disturbance.   Respiratory:  Positive for shortness of breath. Negative for cough.    Cardiovascular:  Negative for chest pain.   Gastrointestinal:  Negative for abdominal pain and diarrhea.   Genitourinary:  Negative for frequency.   Musculoskeletal:  Positive for arthralgias (left knee). Negative for back pain.   Integumentary:  Negative for rash.   Neurological:  Negative for headaches.   Hematological:  Negative for adenopathy.   Psychiatric/Behavioral:  The patient is nervous/anxious.        Objective:      Physical Exam  Vitals reviewed.   Constitutional:       General: She is not in acute distress.     Appearance: She is obese. She is not ill-appearing.   Eyes:       General: No scleral icterus.  Cardiovascular:      Rate and Rhythm: Normal rate and regular rhythm.   Pulmonary:      Effort: Pulmonary effort is normal. No respiratory distress.      Breath sounds: Normal breath sounds. No wheezing or rales.   Chest:   Breasts:     Left: Normal. No mass.       Abdominal:      Palpations: Abdomen is soft.   Lymphadenopathy:      Cervical: No cervical adenopathy.      Upper Body:      Right upper body: No supraclavicular or axillary adenopathy.      Left upper body: No supraclavicular or axillary adenopathy.   Neurological:      Mental Status: She is alert and oriented to person, place, and time.   Psychiatric:         Mood and Affect: Mood normal.         Behavior: Behavior normal.         Thought Content: Thought content normal.         Judgment: Judgment normal.       Assessment:       1. History of breast cancer        Plan:       RTC 4 months   Due for mammogram in December.    Route Chart for Scheduling    Med Onc Chart Routing      Follow up with physician 4 months.   Follow up with ALEKSANDRA    Infusion scheduling note    Injection scheduling note    Labs    Imaging    Pharmacy appointment    Other referrals

## 2023-04-19 ENCOUNTER — TELEPHONE (OUTPATIENT)
Dept: CARDIOLOGY | Facility: CLINIC | Age: 78
End: 2023-04-19
Payer: MEDICARE

## 2023-04-19 NOTE — TELEPHONE ENCOUNTER
----- Message from Álvaro Palm MD sent at 4/13/2023  2:48 PM CDT -----  Krista,Please tell Ms Narayan that Dr Sales can do the TAVR procedure on many of the bioprosthetic valves , but the position of hers in relation to her heart arteries would almost certainly lead to closing off one or both of her heart arteries , which would be too dangerous . Unfortunately , her best option will be with another surgery,Chip  ----- Message -----  From: Skylar Garcia RN  Sent: 4/13/2023   2:22 PM CDT  To: Álvaro Palm MD, Venkat Webb MD

## 2023-04-19 NOTE — TELEPHONE ENCOUNTER
Spoke with pt and relayed Dr Palm's msg below to her. She verbalized understanding and said she has an appt olya see Dr Webb on 4/27 which she intends to keep.        MD Milagros Allen MA; Eze Sales MD; Venkat Webb MD; JORDAN Hazel,Please tell Ms Narayan that Dr Sales can do the TAVR procedure on many of the bioprosthetic valves , but the position of hers in relation to her heart arteries would almost certainly lead to closing off one or both of her heart arteries , which would be too dangerous . Unfortunately , her best option will be with another surgery,Chip

## 2023-04-27 ENCOUNTER — OFFICE VISIT (OUTPATIENT)
Dept: CARDIOTHORACIC SURGERY | Facility: CLINIC | Age: 78
End: 2023-04-27
Payer: MEDICARE

## 2023-04-27 VITALS
HEART RATE: 67 BPM | BODY MASS INDEX: 44.93 KG/M2 | DIASTOLIC BLOOD PRESSURE: 65 MMHG | WEIGHT: 244.19 LBS | HEIGHT: 62 IN | SYSTOLIC BLOOD PRESSURE: 149 MMHG | RESPIRATION RATE: 18 BRPM | OXYGEN SATURATION: 99 %

## 2023-04-27 DIAGNOSIS — T82.09XD PROSTHETIC VALVE DYSFUNCTION, SUBSEQUENT ENCOUNTER: ICD-10-CM

## 2023-04-27 DIAGNOSIS — Z95.2 S/P AVR (AORTIC VALVE REPLACEMENT): ICD-10-CM

## 2023-04-27 DIAGNOSIS — I35.0 NONRHEUMATIC AORTIC VALVE STENOSIS: Primary | ICD-10-CM

## 2023-04-27 DIAGNOSIS — I35.0 NONRHEUMATIC AORTIC VALVE STENOSIS: ICD-10-CM

## 2023-04-27 DIAGNOSIS — Z95.2 S/P AVR (AORTIC VALVE REPLACEMENT): Primary | ICD-10-CM

## 2023-04-27 PROBLEM — T82.09XA PROSTHETIC VALVE DYSFUNCTION: Status: ACTIVE | Noted: 2023-04-27

## 2023-04-27 PROCEDURE — 1126F AMNT PAIN NOTED NONE PRSNT: CPT | Mod: HCNC,CPTII,S$GLB, | Performed by: THORACIC SURGERY (CARDIOTHORACIC VASCULAR SURGERY)

## 2023-04-27 PROCEDURE — 3078F DIAST BP <80 MM HG: CPT | Mod: HCNC,CPTII,S$GLB, | Performed by: THORACIC SURGERY (CARDIOTHORACIC VASCULAR SURGERY)

## 2023-04-27 PROCEDURE — 99999 PR PBB SHADOW E&M-EST. PATIENT-LVL IV: CPT | Mod: PBBFAC,HCNC,, | Performed by: THORACIC SURGERY (CARDIOTHORACIC VASCULAR SURGERY)

## 2023-04-27 PROCEDURE — 1159F PR MEDICATION LIST DOCUMENTED IN MEDICAL RECORD: ICD-10-PCS | Mod: HCNC,CPTII,S$GLB, | Performed by: THORACIC SURGERY (CARDIOTHORACIC VASCULAR SURGERY)

## 2023-04-27 PROCEDURE — 3077F SYST BP >= 140 MM HG: CPT | Mod: HCNC,CPTII,S$GLB, | Performed by: THORACIC SURGERY (CARDIOTHORACIC VASCULAR SURGERY)

## 2023-04-27 PROCEDURE — 99214 PR OFFICE/OUTPT VISIT, EST, LEVL IV, 30-39 MIN: ICD-10-PCS | Mod: HCNC,S$GLB,, | Performed by: THORACIC SURGERY (CARDIOTHORACIC VASCULAR SURGERY)

## 2023-04-27 PROCEDURE — 3077F PR MOST RECENT SYSTOLIC BLOOD PRESSURE >= 140 MM HG: ICD-10-PCS | Mod: HCNC,CPTII,S$GLB, | Performed by: THORACIC SURGERY (CARDIOTHORACIC VASCULAR SURGERY)

## 2023-04-27 PROCEDURE — 1157F PR ADVANCE CARE PLAN OR EQUIV PRESENT IN MEDICAL RECORD: ICD-10-PCS | Mod: HCNC,CPTII,S$GLB, | Performed by: THORACIC SURGERY (CARDIOTHORACIC VASCULAR SURGERY)

## 2023-04-27 PROCEDURE — 99214 OFFICE O/P EST MOD 30 MIN: CPT | Mod: HCNC,S$GLB,, | Performed by: THORACIC SURGERY (CARDIOTHORACIC VASCULAR SURGERY)

## 2023-04-27 PROCEDURE — 1157F ADVNC CARE PLAN IN RCRD: CPT | Mod: HCNC,CPTII,S$GLB, | Performed by: THORACIC SURGERY (CARDIOTHORACIC VASCULAR SURGERY)

## 2023-04-27 PROCEDURE — 1126F PR PAIN SEVERITY QUANTIFIED, NO PAIN PRESENT: ICD-10-PCS | Mod: HCNC,CPTII,S$GLB, | Performed by: THORACIC SURGERY (CARDIOTHORACIC VASCULAR SURGERY)

## 2023-04-27 PROCEDURE — 99999 PR PBB SHADOW E&M-EST. PATIENT-LVL IV: ICD-10-PCS | Mod: PBBFAC,HCNC,, | Performed by: THORACIC SURGERY (CARDIOTHORACIC VASCULAR SURGERY)

## 2023-04-27 PROCEDURE — 3078F PR MOST RECENT DIASTOLIC BLOOD PRESSURE < 80 MM HG: ICD-10-PCS | Mod: HCNC,CPTII,S$GLB, | Performed by: THORACIC SURGERY (CARDIOTHORACIC VASCULAR SURGERY)

## 2023-04-27 PROCEDURE — 1159F MED LIST DOCD IN RCRD: CPT | Mod: HCNC,CPTII,S$GLB, | Performed by: THORACIC SURGERY (CARDIOTHORACIC VASCULAR SURGERY)

## 2023-04-27 NOTE — PROGRESS NOTES
"Subjective:      Patient ID: Alina Narayan is a 78 y.o. female.    Chief Complaint: No chief complaint on file.      HPI:  Alina Narayan is a 78 y.o. female who presents as an initial consult for redo SAVR vs TAVR secondary to aortic stenosis.  She has a medical history significant for pulmonary embolism, s/p AVR (21 mm Trifecta), breast cancer (s/p mastectomy, radiation and chemo) who presents for evaluation of a failing 21 mm Trifecta valve, true ID 19. She underwent AVR in December 2013 with Dr Webb with a 21 mmTrifecta. Prior to AVR she underwent a lumpectomy of her left breast followed by multiple rounds of chest radiation. Following her AVR, she had recurrent breast cancer in her right breast and underwent right mastectomy. She also underwent chemo at that time, but did not have radiation. Following her right mastectomy she experienced a pulmonary embolism and continues on chronic Eliquis. Today, she report progressive dyspnea on exertion and chest pain that started approximately three months ago.  She underwent a ECHO which revealed a 21mm St. You Trifecta bovine pericardial bioprosthetic aortic valve present with evidence of stenosis. The mean gradient is 47 mmHg with a dimensionless index of 0.23. The acceleration time is well above 100ms (140+ ms)  She was referred to Dr. Sales for possible TAVR evaluation.  She is referred by Dr. Palm.    On 3/27/2023, Dr. Webb "Patient seen and examined in the multidisciplinary valve Clinic.  Studies reviewed jointly.  She is 10 years out from aortic valve replacement with a trifecta valve.  I reviewed her CT scan.  The aorta is well away from the sternum and she does not have significant aortic calcification.  Nonetheless, she is not enthusiastic about reoperation.  Certainly her comorbid conditions would make her at least moderate risk.  We will plan for diagnostic coronary angiography to better determine the relationship between the coronary ostia and the " "valve."     Family and social history reviewed    Review of patient's allergies indicates:   Allergen Reactions    Augmentin [amoxicillin-pot clavulanate] Diarrhea    Dilaudid [hydromorphone (bulk)] Other (See Comments)     Other reaction(s): sedation    Hydromorphone Other (See Comments)     Other reaction(s): sedation    Cymbalta [duloxetine]      Dry mouth, agitation    Farxiga [dapagliflozin] Other (See Comments)     Recurrent candidal infection    Jardiance [empagliflozin] Hives    Byetta [exenatide] Rash     Other reaction(s): Rash     Past Medical History:   Diagnosis Date    Allergy     seasonal    Anxiety     Arthritis     BRCA1 negative     Breast cancer 10/2012    left breast invasive ductal carcinoma    Colon polyp     Depression     Diabetes mellitus     Type 2    Diabetes mellitus, type 2     Diverticular disease     Diverticulitis 2009    Genetic testing 05/2017    negative Integrated BRACAnalysis    Hyperlipidemia     Hypertension     Morbid obesity     MITCHELL (obstructive sleep apnea)     Pulmonary embolism     Stenosis     Stenosis and insufficiency of lacrimal passages     Thyroid disease      Past Surgical History:   Procedure Laterality Date    BREAST BIOPSY Left 10/01/2012    left breast- invasive ductal carcinoma    BREAST BIOPSY Left 12/2017    BREAST CYST ASPIRATION      BREAST LUMPECTOMY Left 2012    BREAST MASS EXCISION      CARDIAC VALVE REPLACEMENT  12/2013    CARDIAC VALVE SURGERY  2013    CARPAL TUNNEL RELEASE      Left    COLONOSCOPY N/A 09/29/2017    Procedure: COLONOSCOPY;  Surgeon: Phani Worley MD;  Location: [facility] ([address] [place] [address]);  Service: Endoscopy;  Laterality: N/A;    COLONOSCOPY N/A 1/5/2023    Procedure: COLONOSCOPY;  Surgeon: Eladia Martino MD;  Location: [facility] ([address] [place] [address]);  Service: Endoscopy;  Laterality: N/A;  instr via portal  ok to hole Eliquis-see encounter 12/9-MS  1/4 pateint cannot come earlier-rt    CORONARY ANGIOGRAPHY Left 4/13/2023    Procedure: " ANGIOGRAM, CORONARY ARTERY;  Surgeon: Eze Sales MD;  Location: [facility] CATH LAB;  Service: Cardiology;  Laterality: Left;  low bleeding risk 2.9%    DILATION AND CURETTAGE OF UTERUS  1999    Endometrial polyps    ENDOMETRIAL ABLATION  1999    Enodmetrial polyps    EYE SURGERY      INSERTION OF TUNNELED CENTRAL VENOUS CATHETER (CVC) WITH SUBCUTANEOUS PORT Right 02/01/2019    Procedure: [id number];  Surgeon: Gaston Flores MD;  Location: [facility] OR [address] [place] [address];  Service: General;  Laterality: Right;    left lumpectomy  2012    MASTECTOMY      MEDIPORT REMOVAL Right 08/26/2019    Procedure: REMOVAL, CATHETER, CENTRAL VENOUS, TUNNELED, WITH PORT;  Surgeon: Gaston Flores MD;  Location: [facility] OR [address] [place] [address];  Service: General;  Laterality: Right;    MODIFIED RADICAL MASTECTOMY W/ AXILLARY LYMPH NODE DISSECTION Right 08/26/2019    Procedure: MASTECTOMY, MODIFIED RADICAL;  Surgeon: Gaston Flores MD;  Location: [facility] OR [address] [place] [address];  Service: General;  Laterality: Right;    SHOULDER SURGERY      SKIN BIOPSY       Family History       Problem Relation (Age of Onset)    Alcohol abuse Brother    Anxiety disorder Son    Breast cancer Mother    Cancer Father, Maternal Grandfather, Brother    Colon cancer Father    SAL disease Son    Heart disease Father    No Known Problems Sister, Son    Sleep disorder Son          Social History     Socioeconomic History    Marital status:      Spouse name: Srinath    Number of children: 2   Occupational History    Occupation: Retired   Tobacco Use    Smoking status: Never    Smokeless tobacco: Never   Substance and Sexual Activity    Alcohol use: No     Alcohol/week: 0.0 standard drinks    Drug use: Never    Sexual activity: Yes     Partners: Male   Other Topics Concern    Are you pregnant or think you may be? No    Breast-feeding No     Social Determinants of Health     Financial Resource Strain: Low Risk     Difficulty of Paying Living Expenses: Not hard at all   Food  Insecurity: No Food Insecurity    Worried About Running Out of Food in the Last Year: Never true    Ran Out of Food in the Last Year: Never true   Transportation Needs: No Transportation Needs    Lack of Transportation (Medical): No    Lack of Transportation (Non-Medical): No   Physical Activity: Inactive    Days of Exercise per Week: 0 days    Minutes of Exercise per Session: 10 min   Stress: Stress Concern Present    Feeling of Stress : To some extent   Social Connections: Unknown    Frequency of Communication with Friends and Family: Three times a week    Frequency of Social Gatherings with Friends and Family: Once a week    Active Member of Clubs or Organizations: Yes    Attends Club or Organization Meetings: Patient refused    Marital Status:    Housing Stability: Low Risk     Unable to Pay for Housing in the Last Year: No    Number of Places Lived in the Last Year: 1    Unstable Housing in the Last Year: No       Current Outpatient Medications:     aspirin (ECOTRIN) 81 MG EC tablet, Take 81 mg by mouth once daily., Disp: , Rfl:     blood-glucose meter kit, True Test or meter covered by insurance - pt checks glucose twice daily for uncontrolled glucoses E11.65, Disp: 1 each, Rfl: 0    carvediloL (COREG) 25 MG tablet, TAKE 1/2 TABLET TWICE DAILY WITH MEALS, Disp: 180 tablet, Rfl: 3    CHOLECALCIFEROL, VITAMIN D3, (VITAMIN D3 ORAL), Take 3,000 capsules by mouth once daily. 3000 IU per day, Disp: , Rfl:     ELIQUIS 2.5 mg Tab, TAKE ONE TABLET BY MOUTH 2 TIMES A DAY, Disp: 60 tablet, Rfl: 12    furosemide (LASIX) 20 MG tablet, Take 1 tablet (20 mg total) by mouth once daily., Disp: 90 tablet, Rfl: 3    gabapentin (NEURONTIN) 300 MG capsule, TAKE 1 CAPSULE EVERY MORNING AND TAKE 2 CAPSULES AT BEDTIME, Disp: 270 capsule, Rfl: 3    glimepiride (AMARYL) 2 MG tablet, TAKE 1 TABLET BEFORE BREAKFAST., Disp: 90 tablet, Rfl: 3    HYDROcodone-acetaminophen (NORCO) 5-325 mg per tablet, Take 1 tablet by mouth every 6  (six) hours as needed for Pain., Disp: 40 tablet, Rfl: 0    hydrocortisone 2.5 % cream, Apply topically 2 (two) times daily as needed (rash under the breast). Mix 50/50 with ketoconazole cream., Disp: 30 g, Rfl: 3    ketoconazole (NIZORAL) 2 % cream, AAA bid prn rash under the breast. Mix 50/50 with hydrocortisone cream., Disp: 60 g, Rfl: 3    lancets (ACCU-CHEK SOFTCLIX LANCETS) Misc, TrueTest lancets for meter covered by insurance - pt checks twice daily for uncontrolled glucoses E11.65,, Disp: 200 each, Rfl: 3    levothyroxine (SYNTHROID) 75 MCG tablet, TAKE 1 TABLET BEFORE BREAKFAST, Disp: 90 tablet, Rfl: 3    metFORMIN (GLUCOPHAGE) 500 MG tablet, TAKE 1 TABLET TWICE DAILY WITH MEALS, Disp: 180 tablet, Rfl: 0    mupirocin (BACTROBAN) 2 % ointment, Apply topically 2 (two) times daily., Disp: 15 g, Rfl: 0    pravastatin (PRAVACHOL) 20 MG tablet, TAKE 1 TABLET EVERY DAY, Disp: 90 tablet, Rfl: 3    tiZANidine (ZANAFLEX) 4 MG tablet, One tab every 6-8 hours as needed pain, Disp: 30 tablet, Rfl: 0    triamcinolone acetonide 0.1%-magnesium hydroxide 400 mg/5 ml-ciclopirox, Apply to affected area under the breast twice daily as directed, Disp: 60 g, Rfl: 3    TRUE METRIX GLUCOSE TEST STRIP Strp, TEST BLOOD SUGAR TWICE DAILY, Disp: 200 strip, Rfl: 3  No current facility-administered medications for this visit.    Facility-Administered Medications Ordered in Other Visits:     alteplase injection 2 mg, 2 mg, Intra-Catheter, PRN, Erick Dwyer MD, 2 mg at 03/11/19 1140    heparin, porcine (PF) 100 unit/mL injection flush 500 Units, 500 Units, Intravenous, 1 time in Clinic/HOD, Erick Dwyer MD    heparin, porcine (PF) 100 unit/mL injection flush 500 Units, 500 Units, Intravenous, 1 time in Clinic/HOD, Erick Dwyer MD    sodium chloride 0.9% flush 10 mL, 10 mL, Intravenous, PRN, Erick Dwyer MD, 10 mL at 07/23/19 1157  Current medications Reviewed    Review of Systems   Constitutional:  Positive for activity change and  fatigue. Negative for appetite change and fever.   HENT:  Negative for nosebleeds.    Respiratory:  Positive for shortness of breath. Negative for cough.    Cardiovascular:  Positive for leg swelling. Negative for chest pain and palpitations.   Gastrointestinal:  Negative for abdominal distention, abdominal pain and nausea.   Genitourinary:  Negative for frequency.   Musculoskeletal:  Negative for arthralgias and myalgias.   Skin:  Negative for rash.   Neurological:  Negative for dizziness and numbness.   Hematological:  Does not bruise/bleed easily.   Objective:   Physical Exam  HENT:      Head: Normocephalic and atraumatic.   Eyes:      Extraocular Movements: Extraocular movements intact.   Cardiovascular:      Rate and Rhythm: Normal rate and regular rhythm.   Pulmonary:      Effort: Pulmonary effort is normal.   Abdominal:      General: Abdomen is flat.      Palpations: Abdomen is soft.   Musculoskeletal:         General: Normal range of motion.      Cervical back: Normal range of motion.   Skin:     General: Skin is warm and dry.      Capillary Refill: Capillary refill takes less than 2 seconds.   Neurological:      General: No focal deficit present.       Diagnostic Results: Reviewed  CATH:  Findings:  The LM coronary ostium is below the sewing ring of the trifecta valve and 50% occluded by it.  The RCA ostium is at the level of the sewing ring and not occluded.  Both the LM and RCA would be occluded by TAVR.  There is minor luminal irregularity in the LCA and RCA.  The femoral artery is normal.  Summary:  Failing 21mm Trifecta valve with true ID 19.  Both RCA and LM in jeopardy of occlusion if TAVR is attempted.  Patient prosthesis mismatch likely if TAVR is attemped.  Recommendations:  From the interventional perspective, SAVR is the safest option in this lady.  Will discuss it with Dr. Webb and the Heart Valve Team.    CT TAVR:  Impression:  Mild atherosclerosis of the aorta and its branches.   Measurements of the vasculature as above.  Cholelithiasis.  Additional stable findings as above.    ECHO:  The left ventricle is normal in size with concentric remodeling and normal systolic function. The estimated ejection fraction is 65%.  Normal right ventricular size with normal right ventricular systolic function.  Indeterminate left ventricular diastolic function.  Mild left atrial enlargement.  There is a 21mm St. You Trifecta bovine pericardial bioprosthetic aortic valve present with evidence of stenosis. The mean gradient is 47 mmHg with a dimensionless index of 0.23. The acceleration time is well above 100ms (140+ ms)  Mild tricuspid regurgitation.  The estimated PA systolic pressure is 36 mmHg.  Normal central venous pressure (3 mmHg).  The aortic valve mean gradient is 47 mmHg with a dimensionless index of 0.23.  Assessment:   Aortic Stenosis   S/P AVR 2013  Plan:     CTS Attending Note:    I have personally taken the history and examined this patient and agree with the ALEKSANDRA's note as stated above.  Very pleasant 78-year-old woman with previous aortic valve replacement.  This was done by me in 2013 and a 21 mm trifecta was utilized.  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  She is symptomatic for this, with intermittent chest pain and lifestyle limiting dyspnea on exertion.  She has undergone a thorough heart team evaluation.  Transcatheter is not a good option for her due to the type of valve in place and the anatomic location of her coronary ostia.  We discussed surgical aortic valve replacement.  We discussed the challenges of the postoperative period and recovery.  She wishes to proceed. We discussed the risks and benefits of the proposed procedure, including but not limited to death, stroke, respiratory complications, renal complications, arrythmia, need for permanent pacemaker, and infection. We discussed the STS predicted risk. Her questions have been answered, and she wishes to proceed.  We  will plan for a Perceval valve.

## 2023-04-28 ENCOUNTER — TELEPHONE (OUTPATIENT)
Dept: CARDIOTHORACIC SURGERY | Facility: CLINIC | Age: 78
End: 2023-04-28
Payer: MEDICARE

## 2023-04-28 DIAGNOSIS — Z95.2 S/P AVR (AORTIC VALVE REPLACEMENT): Primary | ICD-10-CM

## 2023-04-28 DIAGNOSIS — Z01.818 PRE-OP TESTING: ICD-10-CM

## 2023-04-28 DIAGNOSIS — I35.0 NONRHEUMATIC AORTIC VALVE STENOSIS: ICD-10-CM

## 2023-04-28 DIAGNOSIS — R79.1 ABNORMAL COAGULATION PROFILE: ICD-10-CM

## 2023-04-28 DIAGNOSIS — T82.09XD PROSTHETIC VALVE DYSFUNCTION, SUBSEQUENT ENCOUNTER: ICD-10-CM

## 2023-04-28 DIAGNOSIS — R79.9 ABNORMAL FINDING OF BLOOD CHEMISTRY, UNSPECIFIED: ICD-10-CM

## 2023-04-28 NOTE — TELEPHONE ENCOUNTER
Returned call to pt discuss surgery dates. Pt would like to reschedule to Wednesday, May 10. Advised pt I will reschedule her surgery to that date and schedule her pre-op testing appointments for Thursday, May 4. Reviewed pt's medications. Pt will need to take last dose of Eliquis on Tuesday, May 2. Will follow up with pt once pre-op appointments are scheduled and will confirm hold instructions for diabetes medications. Pt verbalized understanding.       ----- Message from Karen Martinez sent at 4/28/2023 11:50 AM CDT -----  Regarding: Rescheduling Procedure/Questions  Contact: @[phone]  Patient is requesting to change procedure date to the 10th if still available- and has questions about procedure. Please call and advise @[phone]

## 2023-05-01 ENCOUNTER — TELEPHONE (OUTPATIENT)
Dept: CARDIOTHORACIC SURGERY | Facility: CLINIC | Age: 78
End: 2023-05-01
Payer: MEDICARE

## 2023-05-01 NOTE — TELEPHONE ENCOUNTER
Called pt to review pre-op testing appointments which have been scheduled for this Thursday, May 4. Reminded pt to take last dose of Eliquis on Tuesday, May 2. Pt will need to hold AM doses of metformin and glimeperide morning of surgery. Pt confirms she takes metformin only in the morning and at lunch. Pt verbalized understanding of medication instructions and pre-op appointment details.

## 2023-05-04 ENCOUNTER — HOSPITAL ENCOUNTER (OUTPATIENT)
Dept: CARDIOLOGY | Facility: CLINIC | Age: 78
Discharge: HOME | End: 2023-05-04
Payer: MEDICARE

## 2023-05-04 ENCOUNTER — HOSPITAL ENCOUNTER (OUTPATIENT)
Dept: PULMONOLOGY | Facility: CLINIC | Age: 78
Discharge: HOME | End: 2023-05-04
Payer: MEDICARE

## 2023-05-04 ENCOUNTER — HOSPITAL ENCOUNTER (OUTPATIENT)
Dept: VASCULAR SURGERY | Facility: CLINIC | Age: 78
Discharge: HOME | End: 2023-05-04
Attending: THORACIC SURGERY (CARDIOTHORACIC VASCULAR SURGERY)
Payer: MEDICARE

## 2023-05-04 ENCOUNTER — HOSPITAL ENCOUNTER (OUTPATIENT)
Dept: RADIOLOGY | Facility: HOSPITAL | Age: 78
Discharge: HOME | End: 2023-05-04
Attending: THORACIC SURGERY (CARDIOTHORACIC VASCULAR SURGERY)
Payer: MEDICARE

## 2023-05-04 ENCOUNTER — OFFICE VISIT (OUTPATIENT)
Dept: CARDIOTHORACIC SURGERY | Facility: CLINIC | Age: 78
End: 2023-05-04
Payer: MEDICARE

## 2023-05-04 VITALS
HEIGHT: 62 IN | OXYGEN SATURATION: 98 % | SYSTOLIC BLOOD PRESSURE: 156 MMHG | HEART RATE: 78 BPM | DIASTOLIC BLOOD PRESSURE: 70 MMHG | BODY MASS INDEX: 44.91 KG/M2 | RESPIRATION RATE: 18 BRPM | WEIGHT: 244.06 LBS

## 2023-05-04 DIAGNOSIS — T82.09XD PROSTHETIC VALVE DYSFUNCTION, SUBSEQUENT ENCOUNTER: ICD-10-CM

## 2023-05-04 DIAGNOSIS — I35.0 NONRHEUMATIC AORTIC VALVE STENOSIS: ICD-10-CM

## 2023-05-04 DIAGNOSIS — Z01.818 PRE-OP TESTING: ICD-10-CM

## 2023-05-04 DIAGNOSIS — Z95.2 S/P AVR (AORTIC VALVE REPLACEMENT): ICD-10-CM

## 2023-05-04 DIAGNOSIS — Z95.2 S/P AVR (AORTIC VALVE REPLACEMENT): Primary | ICD-10-CM

## 2023-05-04 LAB
DLCO ADJ PRE: 13.34 ML/(MIN*MMHG) (ref 13.14–24.6)
DLCO SINGLE BREATH LLN: 13.14
DLCO SINGLE BREATH PRE REF: 68.2 %
DLCO SINGLE BREATH REF: 18.87
DLCOC SBVA LLN: 2.57
DLCOC SBVA PRE REF: 108.9 %
DLCOC SBVA REF: 4.1
DLCOC SINGLE BREATH LLN: 13.14
DLCOC SINGLE BREATH PRE REF: 70.7 %
DLCOC SINGLE BREATH REF: 18.87
DLCOCSBVAULN: 5.63
DLCOCSINGLEBREATHULN: 24.6
DLCOSINGLEBREATHULN: 24.6
DLCOVA LLN: 2.57
DLCOVA PRE REF: 105.1 %
DLCOVA PRE: 4.31 ML/(MIN*MMHG*L) (ref 2.57–5.63)
DLCOVA REF: 4.1
DLCOVAULN: 5.63
DLVAADJ PRE: 4.47 ML/(MIN*MMHG*L) (ref 2.57–5.63)
FEF 25 75 LLN: 0.66
FEF 25 75 PRE REF: 94.9 %
FEF 25 75 REF: 1.54
FEV05 LLN: 0.64
FEV05 REF: 1.49
FEV1 FVC LLN: 63
FEV1 FVC PRE REF: 102.6 %
FEV1 FVC REF: 77
FEV1 LLN: 1.31
FEV1 PRE REF: 78.7 %
FEV1 REF: 1.85
FVC LLN: 1.71
FVC PRE REF: 75.8 %
FVC REF: 2.42
IVC PRE: 1.84 L (ref 1.71–3.17)
IVC SINGLE BREATH LLN: 1.71
IVC SINGLE BREATH PRE REF: 76.1 %
IVC SINGLE BREATH REF: 2.42
IVCSINGLEBREATHULN: 3.17
MVV LLN: 57
MVV PRE REF: 95.6 %
MVV REF: 67
PEF LLN: 3.07
PEF PRE REF: 92 %
PEF REF: 4.68
PHYSICIAN COMMENT: ABNORMAL
PRE DLCO: 12.86 ML/(MIN*MMHG) (ref 13.14–24.6)
PRE FEF 25 75: 1.46 L/S (ref 0.66–2.86)
PRE FET 100: 6.17 SEC
PRE FEV05 REF: 83.6 %
PRE FEV1 FVC: 79.5 % (ref 63.04–89.97)
PRE FEV1: 1.46 L (ref 1.31–2.38)
PRE FEV5: 1.25 L (ref 0.64–2.35)
PRE FVC: 1.83 L (ref 1.71–3.17)
PRE MVV: 63.86 L/MIN (ref 56.8–76.84)
PRE PEF: 4.3 L/S (ref 3.07–6.28)
VA PRE: 2.99 L (ref 4.45–4.45)
VA SINGLE BREATH LLN: 4.45
VA SINGLE BREATH PRE REF: 67 %
VA SINGLE BREATH REF: 4.45
VASINGLEBREATHULN: 4.45

## 2023-05-04 PROCEDURE — 3077F PR MOST RECENT SYSTOLIC BLOOD PRESSURE >= 140 MM HG: ICD-10-PCS | Mod: CPTII,S$GLB,, | Performed by: THORACIC SURGERY (CARDIOTHORACIC VASCULAR SURGERY)

## 2023-05-04 PROCEDURE — 99499 NO LOS: ICD-10-PCS | Mod: S$GLB,,, | Performed by: THORACIC SURGERY (CARDIOTHORACIC VASCULAR SURGERY)

## 2023-05-04 PROCEDURE — 71046 XR CHEST PA AND LATERAL PRE-OP: ICD-10-PCS | Mod: 26,,, | Performed by: RADIOLOGY

## 2023-05-04 PROCEDURE — 99999 PR PBB SHADOW E&M-EST. PATIENT-LVL III: CPT | Mod: PBBFAC,,, | Performed by: THORACIC SURGERY (CARDIOTHORACIC VASCULAR SURGERY)

## 2023-05-04 PROCEDURE — 94729 PR C02/MEMBANE DIFFUSE CAPACITY: ICD-10-PCS | Mod: S$GLB,,, | Performed by: INTERNAL MEDICINE

## 2023-05-04 PROCEDURE — 94010 BREATHING CAPACITY TEST: CPT | Mod: S$GLB,,, | Performed by: INTERNAL MEDICINE

## 2023-05-04 PROCEDURE — 94010 BREATHING CAPACITY TEST: ICD-10-PCS | Mod: S$GLB,,, | Performed by: INTERNAL MEDICINE

## 2023-05-04 PROCEDURE — 99499 UNLISTED E&M SERVICE: CPT | Mod: S$GLB,,, | Performed by: THORACIC SURGERY (CARDIOTHORACIC VASCULAR SURGERY)

## 2023-05-04 PROCEDURE — 1126F PR PAIN SEVERITY QUANTIFIED, NO PAIN PRESENT: ICD-10-PCS | Mod: CPTII,S$GLB,, | Performed by: THORACIC SURGERY (CARDIOTHORACIC VASCULAR SURGERY)

## 2023-05-04 PROCEDURE — 71046 X-RAY EXAM CHEST 2 VIEWS: CPT | Mod: 26,,, | Performed by: RADIOLOGY

## 2023-05-04 PROCEDURE — 1157F PR ADVANCE CARE PLAN OR EQUIV PRESENT IN MEDICAL RECORD: ICD-10-PCS | Mod: CPTII,S$GLB,, | Performed by: THORACIC SURGERY (CARDIOTHORACIC VASCULAR SURGERY)

## 2023-05-04 PROCEDURE — 3077F SYST BP >= 140 MM HG: CPT | Mod: CPTII,S$GLB,, | Performed by: THORACIC SURGERY (CARDIOTHORACIC VASCULAR SURGERY)

## 2023-05-04 PROCEDURE — 93010 ELECTROCARDIOGRAM REPORT: CPT | Mod: S$GLB,,, | Performed by: INTERNAL MEDICINE

## 2023-05-04 PROCEDURE — 1157F ADVNC CARE PLAN IN RCRD: CPT | Mod: CPTII,S$GLB,, | Performed by: THORACIC SURGERY (CARDIOTHORACIC VASCULAR SURGERY)

## 2023-05-04 PROCEDURE — 99999 PR PBB SHADOW E&M-EST. PATIENT-LVL III: ICD-10-PCS | Mod: PBBFAC,,, | Performed by: THORACIC SURGERY (CARDIOTHORACIC VASCULAR SURGERY)

## 2023-05-04 PROCEDURE — 93005 EKG 12-LEAD: ICD-10-PCS | Mod: S$GLB,,, | Performed by: THORACIC SURGERY (CARDIOTHORACIC VASCULAR SURGERY)

## 2023-05-04 PROCEDURE — 3078F DIAST BP <80 MM HG: CPT | Mod: CPTII,S$GLB,, | Performed by: THORACIC SURGERY (CARDIOTHORACIC VASCULAR SURGERY)

## 2023-05-04 PROCEDURE — 93010 EKG 12-LEAD: ICD-10-PCS | Mod: S$GLB,,, | Performed by: INTERNAL MEDICINE

## 2023-05-04 PROCEDURE — 93880 PR DUPLEX SCAN EXTRACRANIAL,BILAT: ICD-10-PCS | Mod: S$GLB,,, | Performed by: SURGERY

## 2023-05-04 PROCEDURE — 93005 ELECTROCARDIOGRAM TRACING: CPT | Mod: S$GLB,,, | Performed by: THORACIC SURGERY (CARDIOTHORACIC VASCULAR SURGERY)

## 2023-05-04 PROCEDURE — 3078F PR MOST RECENT DIASTOLIC BLOOD PRESSURE < 80 MM HG: ICD-10-PCS | Mod: CPTII,S$GLB,, | Performed by: THORACIC SURGERY (CARDIOTHORACIC VASCULAR SURGERY)

## 2023-05-04 PROCEDURE — 93880 EXTRACRANIAL BILAT STUDY: CPT | Mod: S$GLB,,, | Performed by: SURGERY

## 2023-05-04 PROCEDURE — 1126F AMNT PAIN NOTED NONE PRSNT: CPT | Mod: CPTII,S$GLB,, | Performed by: THORACIC SURGERY (CARDIOTHORACIC VASCULAR SURGERY)

## 2023-05-04 PROCEDURE — 94729 DIFFUSING CAPACITY: CPT | Mod: S$GLB,,, | Performed by: INTERNAL MEDICINE

## 2023-05-04 PROCEDURE — 71046 X-RAY EXAM CHEST 2 VIEWS: CPT | Mod: TC,FY

## 2023-05-04 RX ORDER — ASPIRIN 325 MG
325 TABLET, DELAYED RELEASE (ENTERIC COATED) ORAL DAILY
Status: CANCELLED | OUTPATIENT
Start: 2023-05-05

## 2023-05-04 RX ORDER — ATORVASTATIN CALCIUM 40 MG/1
40 TABLET, FILM COATED ORAL NIGHTLY
Status: CANCELLED | OUTPATIENT
Start: 2023-05-04

## 2023-05-04 RX ORDER — DOCUSATE SODIUM 100 MG/1
100 CAPSULE, LIQUID FILLED ORAL 2 TIMES DAILY
Status: CANCELLED | OUTPATIENT
Start: 2023-05-04

## 2023-05-04 RX ORDER — POLYETHYLENE GLYCOL 3350 17 G/17G
17 POWDER, FOR SOLUTION ORAL DAILY
Status: CANCELLED | OUTPATIENT
Start: 2023-05-05

## 2023-05-04 RX ORDER — OXYCODONE HYDROCHLORIDE 10 MG/1
10 TABLET ORAL EVERY 4 HOURS PRN
Status: CANCELLED | OUTPATIENT
Start: 2023-05-04

## 2023-05-04 RX ORDER — PROPOFOL 10 MG/ML
0-50 INJECTION, EMULSION INTRAVENOUS CONTINUOUS
Status: CANCELLED | OUTPATIENT
Start: 2023-05-04

## 2023-05-04 RX ORDER — SODIUM CHLORIDE 0.9 % (FLUSH) 0.9 %
10 SYRINGE (ML) INJECTION
Status: CANCELLED | OUTPATIENT
Start: 2023-05-04

## 2023-05-04 RX ORDER — MUPIROCIN 20 MG/G
1 OINTMENT TOPICAL
Status: CANCELLED | OUTPATIENT
Start: 2023-05-04

## 2023-05-04 RX ORDER — METOPROLOL TARTRATE 25 MG/1
25 TABLET, FILM COATED ORAL
Status: CANCELLED | OUTPATIENT
Start: 2023-05-04

## 2023-05-04 RX ORDER — POTASSIUM CHLORIDE 14.9 MG/ML
60 INJECTION INTRAVENOUS
Status: CANCELLED | OUTPATIENT
Start: 2023-05-04

## 2023-05-04 RX ORDER — NAPROXEN SODIUM 220 MG/1
81 TABLET, FILM COATED ORAL DAILY
Status: CANCELLED | OUTPATIENT
Start: 2023-05-05

## 2023-05-04 RX ORDER — FENTANYL CITRATE 50 UG/ML
25 INJECTION, SOLUTION INTRAMUSCULAR; INTRAVENOUS
Status: CANCELLED | OUTPATIENT
Start: 2023-05-04 | End: 2023-05-06

## 2023-05-04 RX ORDER — FENTANYL CITRATE 50 UG/ML
50 INJECTION, SOLUTION INTRAMUSCULAR; INTRAVENOUS
Status: CANCELLED | OUTPATIENT
Start: 2023-05-07

## 2023-05-04 RX ORDER — ACETAMINOPHEN 325 MG/1
650 TABLET ORAL EVERY 4 HOURS PRN
Status: CANCELLED | OUTPATIENT
Start: 2023-05-04

## 2023-05-04 RX ORDER — MAGNESIUM SULFATE HEPTAHYDRATE 40 MG/ML
2 INJECTION, SOLUTION INTRAVENOUS
Status: CANCELLED | OUTPATIENT
Start: 2023-05-04

## 2023-05-04 RX ORDER — OXYCODONE HYDROCHLORIDE 5 MG/1
5 TABLET ORAL EVERY 4 HOURS PRN
Status: CANCELLED | OUTPATIENT
Start: 2023-05-04

## 2023-05-04 RX ORDER — MAGNESIUM SULFATE/D5W 2 G/50 ML
4 INTRAVENOUS SOLUTION, PIGGYBACK (ML) INTRAVENOUS
Status: CANCELLED | OUTPATIENT
Start: 2023-05-04

## 2023-05-04 RX ORDER — IPRATROPIUM BROMIDE AND ALBUTEROL SULFATE 2.5; .5 MG/3ML; MG/3ML
3 SOLUTION RESPIRATORY (INHALATION) EVERY 4 HOURS PRN
Status: CANCELLED | OUTPATIENT
Start: 2023-05-04 | End: 2023-05-05

## 2023-05-04 RX ORDER — VANCOMYCIN HCL IN 5 % DEXTROSE 1.5G/250ML
1500 PLASTIC BAG, INJECTION (ML) INTRAVENOUS
Status: CANCELLED | OUTPATIENT
Start: 2023-05-04 | End: 2023-05-06

## 2023-05-04 RX ORDER — POTASSIUM CHLORIDE 14.9 MG/ML
20 INJECTION INTRAVENOUS
Status: CANCELLED | OUTPATIENT
Start: 2023-05-04

## 2023-05-04 RX ORDER — LIDOCAINE HYDROCHLORIDE 10 MG/ML
1 INJECTION, SOLUTION EPIDURAL; INFILTRATION; INTRACAUDAL; PERINEURAL
Status: CANCELLED | OUTPATIENT
Start: 2023-05-04

## 2023-05-04 RX ORDER — ONDANSETRON 2 MG/ML
4 INJECTION INTRAMUSCULAR; INTRAVENOUS EVERY 12 HOURS PRN
Status: CANCELLED | OUTPATIENT
Start: 2023-05-04

## 2023-05-04 RX ORDER — SODIUM CHLORIDE 9 MG/ML
INJECTION, SOLUTION INTRAVENOUS CONTINUOUS
Status: CANCELLED | OUTPATIENT
Start: 2023-05-04

## 2023-05-04 RX ORDER — BISACODYL 10 MG
10 SUPPOSITORY, RECTAL RECTAL DAILY PRN
Status: CANCELLED | OUTPATIENT
Start: 2023-05-04

## 2023-05-04 RX ORDER — METOCLOPRAMIDE HYDROCHLORIDE 5 MG/ML
5 INJECTION INTRAMUSCULAR; INTRAVENOUS EVERY 6 HOURS PRN
Status: CANCELLED | OUTPATIENT
Start: 2023-05-04

## 2023-05-04 RX ORDER — MUPIROCIN 20 MG/G
1 OINTMENT TOPICAL 2 TIMES DAILY
Status: CANCELLED | OUTPATIENT
Start: 2023-05-04 | End: 2023-05-09

## 2023-05-04 RX ORDER — DEXTROSE MONOHYDRATE, SODIUM CHLORIDE, AND POTASSIUM CHLORIDE 50; 1.49; 4.5 G/1000ML; G/1000ML; G/1000ML
INJECTION, SOLUTION INTRAVENOUS CONTINUOUS
Status: CANCELLED | OUTPATIENT
Start: 2023-05-04

## 2023-05-04 RX ORDER — ALBUMIN HUMAN 50 G/1000ML
25 SOLUTION INTRAVENOUS ONCE AS NEEDED
Status: CANCELLED | OUTPATIENT
Start: 2023-05-04 | End: 2034-09-30

## 2023-05-04 RX ORDER — POTASSIUM CHLORIDE 20 MEQ/1
20 TABLET, EXTENDED RELEASE ORAL EVERY 12 HOURS
Status: CANCELLED | OUTPATIENT
Start: 2023-05-04

## 2023-05-04 RX ORDER — ASPIRIN 300 MG/1
300 SUPPOSITORY RECTAL ONCE AS NEEDED
Status: CANCELLED | OUTPATIENT
Start: 2023-05-04 | End: 2034-09-30

## 2023-05-04 RX ORDER — POTASSIUM CHLORIDE 29.8 MG/ML
40 INJECTION INTRAVENOUS
Status: CANCELLED | OUTPATIENT
Start: 2023-05-04

## 2023-05-04 RX ORDER — FAMOTIDINE 20 MG/1
20 TABLET, FILM COATED ORAL 2 TIMES DAILY
Status: CANCELLED | OUTPATIENT
Start: 2023-05-04

## 2023-05-04 RX ORDER — IPRATROPIUM BROMIDE AND ALBUTEROL SULFATE 2.5; .5 MG/3ML; MG/3ML
3 SOLUTION RESPIRATORY (INHALATION) EVERY 4 HOURS
Status: CANCELLED | OUTPATIENT
Start: 2023-05-04 | End: 2023-05-05

## 2023-05-04 RX ORDER — NAPROXEN SODIUM 220 MG/1
81 TABLET, FILM COATED ORAL ONCE
Status: CANCELLED | OUTPATIENT
Start: 2023-05-04 | End: 2023-05-04

## 2023-05-04 RX ORDER — FENTANYL CITRATE 50 UG/ML
25 INJECTION, SOLUTION INTRAMUSCULAR; INTRAVENOUS
Status: CANCELLED | OUTPATIENT
Start: 2023-05-04

## 2023-05-04 RX ORDER — FAMOTIDINE 10 MG/ML
20 INJECTION INTRAVENOUS 2 TIMES DAILY
Status: CANCELLED | OUTPATIENT
Start: 2023-05-04

## 2023-05-04 RX ORDER — VANCOMYCIN HCL IN 5 % DEXTROSE 1.5G/250ML
1500 PLASTIC BAG, INJECTION (ML) INTRAVENOUS
Status: CANCELLED | OUTPATIENT
Start: 2023-05-04

## 2023-05-04 NOTE — PROGRESS NOTES
Subjective:      Patient ID: Alina Narayan is a 78 y.o. female.    Chief Complaint: No chief complaint on file.      HPI:  Alina Narayan is a 78 y.o. female who presents for pre-op re-do AVR. She has a medical history significant for pulmonary embolism, s/p AVR (21 mm Trifecta), breast cancer (s/p mastectomy, radiation and chemo) who presents for evaluation of a failing 21 mm Trifecta valve, true ID 19. She underwent AVR in December 2013 with Dr Webb with a 21 mmTrifecta. Prior to AVR she underwent a lumpectomy of her left breast followed by multiple rounds of chest radiation. Following her AVR, she had recurrent breast cancer in her right breast and underwent right mastectomy. She also underwent chemo at that time, but did not have radiation. Following her right mastectomy she experienced a pulmonary embolism and continues on chronic Eliquis. Today, she report progressive dyspnea on exertion and chest pain that started approximately three months ago.  She underwent a ECHO which revealed a 21mm [employer] You Trifecta bovine pericardial bioprosthetic aortic valve present with evidence of stenosis. The mean gradient is 47 mmHg with a dimensionless index of 0.23. The acceleration time is well above 100ms (140+ ms)  She was referred to Dr. Sales for possible TAVR evaluation.  She is referred by Dr. Palm.      Family and social history reviewed    Current Outpatient Medications   Medication Instructions    aspirin (ECOTRIN) 81 mg, Oral, Daily    blood-glucose meter kit True Test or meter covered by insurance - pt checks glucose twice daily for uncontrolled glucoses E11.65    carvediloL (COREG) 25 MG tablet TAKE 1/2 TABLET TWICE DAILY WITH MEALS    CHOLECALCIFEROL, VITAMIN D3, (VITAMIN D3 ORAL) 3,000 capsules, Oral, Daily, 3000 IU per day    ELIQUIS 2.5 mg Tab TAKE ONE TABLET BY MOUTH 2 TIMES A DAY    furosemide (LASIX) 20 mg, Oral, Daily    gabapentin (NEURONTIN) 300 MG capsule TAKE 1 CAPSULE EVERY MORNING AND TAKE 2  CAPSULES AT BEDTIME    glimepiride (AMARYL) 2 MG tablet TAKE 1 TABLET BEFORE BREAKFAST.    HYDROcodone-acetaminophen (NORCO) 5-325 mg per tablet 1 tablet, Oral, Every 6 hours PRN    hydrocortisone 2.5 % cream Topical (Top), 2 times daily PRN, Mix 50/50 with ketoconazole cream.    ketoconazole (NIZORAL) 2 % cream AAA bid prn rash under the breast. Mix 50/50 with hydrocortisone cream.    lancets (ACCU-CHEK SOFTCLIX LANCETS) Misc TrueTest lancets for meter covered by insurance - pt checks twice daily for uncontrolled glucoses E11.65,    levothyroxine (SYNTHROID) 75 MCG tablet TAKE 1 TABLET BEFORE BREAKFAST    metFORMIN (GLUCOPHAGE) 500 MG tablet TAKE 1 TABLET TWICE DAILY WITH MEALS    mupirocin (BACTROBAN) 2 % ointment Topical (Top), 2 times daily    pravastatin (PRAVACHOL) 20 MG tablet TAKE 1 TABLET EVERY DAY    tiZANidine (ZANAFLEX) 4 MG tablet One tab every 6-8 hours as needed pain    triamcinolone acetonide 0.1%-magnesium hydroxide 400 mg/5 ml-ciclopirox Apply to affected area under the breast twice daily as directed    TRUE METRIX GLUCOSE TEST STRIP Strp TEST BLOOD SUGAR TWICE DAILY         Review of patient's allergies indicates:   Allergen Reactions    Augmentin [amoxicillin-pot clavulanate] Diarrhea    Dilaudid [hydromorphone (bulk)] Other (See Comments)     Other reaction(s): sedation    Hydromorphone Other (See Comments)     Other reaction(s): sedation    Cymbalta [duloxetine]      Dry mouth, agitation    Farxiga [dapagliflozin] Other (See Comments)     Recurrent candidal infection    Jardiance [empagliflozin] Hives    Byetta [exenatide] Rash     Other reaction(s): Rash     Past Medical History:   Diagnosis Date    Allergy     seasonal    Anxiety     Arthritis     BRCA1 negative     Breast cancer 10/2012    left breast invasive ductal carcinoma    Colon polyp     Depression     Diabetes mellitus     Type 2    Diabetes mellitus, type 2     Diverticular disease     Diverticulitis 2009    Genetic testing 05/2017     negative Integrated BRACAnalysis    Hyperlipidemia     Hypertension     Morbid obesity     MITCHELL (obstructive sleep apnea)     Pulmonary embolism     Stenosis     Stenosis and insufficiency of lacrimal passages     Thyroid disease      Past Surgical History:   Procedure Laterality Date    BREAST BIOPSY Left 10/01/2012    left breast- invasive ductal carcinoma    BREAST BIOPSY Left 12/2017    BREAST CYST ASPIRATION      BREAST LUMPECTOMY Left 2012    BREAST MASS EXCISION      CARDIAC VALVE REPLACEMENT  12/2013    CARDIAC VALVE SURGERY  2013    CARPAL TUNNEL RELEASE      Left    COLONOSCOPY N/A 09/29/2017    Procedure: COLONOSCOPY;  Surgeon: Phani Worley MD;  Location: [facility] ENDO (4TH FLR);  Service: Endoscopy;  Laterality: N/A;    COLONOSCOPY N/A 1/5/2023    Procedure: COLONOSCOPY;  Surgeon: Eladia Martino MD;  Location: [facility] ENDO (4TH FLR);  Service: Endoscopy;  Laterality: N/A;  instr via portal  ok to [facility] Eliquis-see encounter 12/9-MS  1/4 pateint cannot come earlier-rt    CORONARY ANGIOGRAPHY Left 4/13/2023    Procedure: ANGIOGRAM, CORONARY ARTERY;  Surgeon: Eze Sales MD;  Location: [facility] CATH LAB;  Service: Cardiology;  Laterality: Left;  low bleeding risk 2.9%    DILATION AND CURETTAGE OF UTERUS  1999    Endometrial polyps    ENDOMETRIAL ABLATION  1999    Enodmetrial polyps    EYE SURGERY      INSERTION OF TUNNELED CENTRAL VENOUS CATHETER (CVC) WITH SUBCUTANEOUS PORT Right 02/01/2019    Procedure: [id number];  Surgeon: Gaston Flores MD;  [facility];  Service: General;  Laterality: Right;    left lumpectomy  2012    MASTECTOMY      MEDIPORT REMOVAL Right 08/26/2019    Procedure: REMOVAL, CATHETER, CENTRAL VENOUS, TUNNELED, WITH PORT;  Surgeon: Gaston Flores MD;  Location: [facility] OR [address] [place] [address];  Service: General;  Laterality: Right;    MODIFIED RADICAL MASTECTOMY W/ AXILLARY LYMPH NODE DISSECTION Right 08/26/2019    Procedure: MASTECTOMY, MODIFIED RADICAL;   Surgeon: Gaston Flores MD;  Location: [address];  Service: General;  Laterality: Right;    SHOULDER SURGERY      SKIN BIOPSY       Family History       Problem Relation (Age of Onset)    Alcohol abuse Brother    Anxiety disorder Son    Breast cancer Mother    Cancer Father, Maternal Grandfather, Brother    Colon cancer Father    SAL disease Son    Heart disease Father    No Known Problems Sister, Son    Sleep disorder Son          Social History     Socioeconomic History    Marital status:      Spouse name: Srinath    Number of children: 2   Occupational History    Occupation: Retired   Tobacco Use    Smoking status: Never    Smokeless tobacco: Never   Substance and Sexual Activity    Alcohol use: No     Alcohol/week: 0.0 standard drinks    Drug use: Never    Sexual activity: Yes     Partners: Male   Other Topics Concern    Are you pregnant or think you may be? No    Breast-feeding No     Social Determinants of Health     Financial Resource Strain: Low Risk     Difficulty of Paying Living Expenses: Not hard at all   Food Insecurity: No Food Insecurity    Worried About Running Out of Food in the Last Year: Never true    Ran Out of Food in the Last Year: Never true   Transportation Needs: No Transportation Needs    Lack of Transportation (Medical): No    Lack of Transportation (Non-Medical): No   Physical Activity: Inactive    Days of Exercise per Week: 0 days    Minutes of Exercise per Session: 10 min   Stress: Stress Concern Present    Feeling of Stress : To some extent   Social Connections: Unknown    Frequency of Communication with Friends and Family: Three times a week    Frequency of Social Gatherings with Friends and Family: Once a week    Active Member of Clubs or Organizations: Yes    Attends Club or Organization Meetings: Patient refused    Marital Status:    Housing Stability: Low Risk     Unable to Pay for Housing in the Last Year: No    Number of Places Lived in the Last Year: 1     Unstable Housing in the Last Year: No       Current medications Reviewed    Review of Systems   Constitutional:  Negative for activity change.   HENT:  Negative for nosebleeds.    Eyes:  Negative for visual disturbance.   Respiratory:  Negative for shortness of breath.    Cardiovascular:  Negative for chest pain.   Gastrointestinal:  Negative for nausea.   Musculoskeletal:  Negative for gait problem.   Skin:  Negative for color change.   Neurological:  Negative for dizziness.   Hematological:  Does not bruise/bleed easily.   Psychiatric/Behavioral:  Negative for sleep disturbance.    Objective:   Physical Exam  Constitutional:       General: She is not in acute distress.  HENT:      Head: Normocephalic and atraumatic.   Eyes:      Pupils: Pupils are equal, round, and reactive to light.   Cardiovascular:      Rate and Rhythm: Normal rate.   Pulmonary:      Effort: Pulmonary effort is normal. No respiratory distress.   Abdominal:      General: There is no distension.   Musculoskeletal:         General: Normal range of motion.      Cervical back: Normal range of motion.   Skin:     Coloration: Skin is not pale.   Neurological:      General: No focal deficit present.      Mental Status: She is alert.   Psychiatric:         Mood and Affect: Mood normal.         Behavior: Behavior normal.       Diagnostic Results: reviewed   [facility] 4/13/23  The LM coronary ostium is below the sewing ring of the trifecta valve and 50% occluded by it.  The RCA ostium is at the level of the sewing ring and not occluded.  Both the LM and RCA would be occluded by TAVR.  There is minor luminal irregularity in the LCA and RCA.  The femoral artery is normal.    Failing 21mm Trifecta valve with true ID 19.  Both RCA and LM in jeopardy of occlusion if TAVR is attempted.  Patient prosthesis mismatch likely if TAVR is attemped.    TTE 3/27/23  The left ventricle is normal in size with concentric remodeling and normal systolic function. The estimated  ejection fraction is 65%.  Normal right ventricular size with normal right ventricular systolic function.  Indeterminate left ventricular diastolic function.  Mild left atrial enlargement.  There is a 21mm [employer]. You Trifecta bovine pericardial bioprosthetic aortic valve present with evidence of stenosis. The mean gradient is 47 mmHg with a dimensionless index of 0.23. The acceleration time is well above 100ms (140+ ms)  Mild tricuspid regurgitation.  The estimated PA systolic pressure is 36 mmHg.  Normal central venous pressure (3 mmHg).  The aortic valve mean gradient is 47 mmHg with a dimensionless index of 0.23.    CTA TAVR 3/23/23  Mild atherosclerosis of the aorta and its branches.  Measurements of the vasculature as above.  Cholelithiasis.    Assessment:   S/P AVR with bioprosthetic stenosis   CTS Attending Note:    I have personally taken the history and examined this patient and agree with the ALEKSANDRA's note as stated above.  Very pleasant 78-year-old woman with degeneration of a trifecta aortic prosthesis.  She is not a candidate for transcatheter valve.  We plan redo sternotomy and redo aortic valve replacement.  We will plan for a Perceval valve.  Her questions have been answered, and she wishes to proceed.

## 2023-05-04 NOTE — PATIENT INSTRUCTIONS
"Pt verbally instructed and written instructions given for surgery.      PREPARING FOR SURGERY    Your surgery has been scheduled for:    Day: Wednesday   Date:  May 10    Arrival Time: 0600    Start Time: 0800    You should report to the second floor surgery center, located on the [facility] side of the second floor of the [facility]. The phone number is [phone].         PLEASE NOTE    If you are allergic to any medications, please inform your doctor or the nurse responsible for your care.  Tell the doctor if you take aspirin, products containing aspirin, herbal medications or blood thinners, such as Coumadin, Pradaxa, or Plavix.  Notify your doctor if you are diabetic and provide information about the medications you take.  Arrange for someone to drive you home following surgery. You will not be allowed to leave the surgical facility alone or drive yourself home following sedation and anesthesia.  If you have not already done so, please bring a list of your medications with you the day of your surgery.          THE NIGHT BEFORE SURGERY    Eat a light supper on the night before your surgery, no greasy or fatty foods.    DO NOT EAT OR DRINK ANYTHING AFTER MIDNIGHT, INCLUDING GUM, HARD CANDY, MINTS, OR CHEWING TOBACCO    Take a complete shower. Wash your body from the neck down with Hibiclens (chlorhexidine gluconate) soap. Hibiclens soap may be purchased over the counter at the pharmacy. Keep the soap away from your eyes, ears, and mouth. After washing with Hibiclens, rinse thoroughly. You may also use any soap labeled "antibacterial". Shampoo your hair with your regular shampoo.         THE DAY OF SURGERY    Take another shower with Hibiclens or any antibacterial soap, to reduce the change of infection.    Medications to take the morning of surgery: carvedilol and levothyroxine with a small sip of water.     Diabetic medication instructions: hold AM dose of metformin and glimepiride    You may " brush your teeth and rinse your mouth, but do not shallow any water.    Do not apply perfume, powder, body lotions or deodorant on the day of surgery.    Do not wear any makeup, including mascara and false eyelashes.    Nail polish should be removed.    Wear comfortable clothes, such as button front shirt and loose-fitting pants.    Leave all jewelry, including body piercings and valuables at home.    Hairpins and clasps must be removed before you enter the operating room.    You may wear glasses, dentures and hearing aids before and after surgery. They may need to be removed before going into the operating room. Contact lenses worn before surgery must be removed before entering the operating room. Please bring a case for your hearing aids, glasses and/or contacts.    Bring any devices you will need after surgery such as crutches or canes.    If you have sleep apnea, please bring your CPAP machine.    If you have an implantable device, such as a pacemaker or AICD, please bring the device information card, if you have one.       If you have any questions or concerns, please don't hesitate to call.

## 2023-05-09 ENCOUNTER — TELEPHONE (OUTPATIENT)
Dept: CARDIOTHORACIC SURGERY | Facility: CLINIC | Age: 78
End: 2023-05-09
Payer: MEDICARE

## 2023-05-09 ENCOUNTER — RESEARCH ENCOUNTER (OUTPATIENT)
Dept: RESEARCH | Facility: HOSPITAL | Age: 78
End: 2023-05-09
Payer: MEDICARE

## 2023-05-09 ENCOUNTER — ANESTHESIA EVENT (OUTPATIENT)
Dept: SURGERY | Facility: HOSPITAL | Age: 78
DRG: 220 | End: 2023-05-09
Payer: MEDICARE

## 2023-05-09 ENCOUNTER — PATIENT MESSAGE (OUTPATIENT)
Dept: INTERNAL MEDICINE | Facility: CLINIC | Age: 78
End: 2023-05-09
Payer: MEDICARE

## 2023-05-09 RX ORDER — ACETAMINOPHEN 500 MG
1000 TABLET ORAL ONCE
Status: CANCELLED | OUTPATIENT
Start: 2023-05-09 | End: 2023-05-09

## 2023-05-09 NOTE — ANESTHESIA PREPROCEDURE EVALUATION
[facility]  Anesthesia Pre-Operative Evaluation         Patient Name: Alina Narayan  YOB: 1945  MRN: [id number]    SUBJECTIVE:     Pre-operative evaluation for Procedure(s) (LRB):  Replacement-valve-aortic, redo sternotomy (N/A)     05/09/2023    Alina Narayan is a 78 y.o. female w/ a significant PMHx of NIDDM, [place], [place], HTN, Chronic PE, hypothyroid, breats CA, s/p AVR in 2013 AVR. Prior to AVR she underwent a lumpectomy of her left breast followed by multiple rounds of chest radiation. Following her AVR, she had recurrent breast cancer in her right breast and underwent right mastectomy. She also underwent chemo at that time, but did not have radiation. Following her right mastectomy she experienced a pulmonary embolism and continues on chronic Eliquis. Today, she report progressive dyspnea on exertion and chest pain that started approximately three months ago.  She underwent a ECHO which revealed a 21mm [employer] You Trifecta bovine pericardial bioprosthetic aortic valve present with evidence of stenosis. The mean gradient is 47 mmHg with a dimensionless index of 0.23. The acceleration time is well above 100ms (140+ ms)       Pt now presents for the above procedure.      LDA: None documented.    Prev airway:   Placement Date: 12/02/13; Placement Time: 0739; Method of Intubation: Direct laryngoscopy; Inserted by: Anesthesia Resident; Airway Device: Endotracheal Tube; Mask Ventilation: Easy - oral; Intubated: Postinduction; Blade: Vitale #2; Airway Device Size: 8.0; Style: Cuffed; Cuff Inflation: Minimal occlusive pressure; Placement Verified By: Auscultation, Capnometry; Grade: Grade I; Complicating Factors: None; Intubation Findings: Positive EtCO2, Bilateral breath sounds, Atraumatic/Condition of teeth unchanged;  Depth of Insertion: 22; Securment: Lips; Complications: None; Breath Sounds: Equal Bilateral; Insertion Attempts: 1; Removal Date: 12/02/13;  Removal Time: 2225; Removal Indication  & Assessment: removed per order; Name of Person who Removed: Debra RRT    Drips: None documented.    Patient Active Problem List   Diagnosis    Essential hypertension    Hyperlipidemia, mixed    Diverticulosis    Family history of colon cancer    Obstructive sleep apnea    Nonrheumatic aortic valve stenosis    Diabetes mellitus type 2 in obese    S/P AVR (aortic valve replacement)    Acquired hypothyroidism    Hearing loss, sensorineural    Degeneration of lumbar or lumbosacral intervertebral disc    Chronic kidney disease, stage III (moderate)    Diabetes mellitus due to underlying condition with stage 3 chronic kidney disease, without long-term current use of insulin    Vitamin D deficiency    Hemarthrosis of left knee    Contusion, knee and lower leg, left, initial encounter    Fall    Left anterior knee pain    Edema    History of breast cancer    BMI 45.0-49.9, adult    Atherosclerosis of aorta    Thrombocytopenia, unspecified    Fatty liver    Disorder of rotator cuff of both shoulders    Soft tissue swelling    Lightheadedness    Bleeding    Hematoma (nontraumatic) of breast    Deformity due to mastectomy    Pulmonary nodule    Adjustment disorder with mixed anxiety and depressed mood    COVID-19 virus infection    Bilateral knee pain    History of pulmonary embolism    Morbid (severe) obesity due to excess calories    Prosthetic valve dysfunction    Pre-op testing       Review of patient's allergies indicates:   Allergen Reactions    Augmentin [amoxicillin-pot clavulanate] Diarrhea    Dilaudid [hydromorphone (bulk)] Other (See Comments)     Other reaction(s): sedation    Hydromorphone Other (See Comments)     Other reaction(s): sedation    Cymbalta [duloxetine]      Dry mouth, agitation    Farxiga [dapagliflozin] Other (See Comments)     Recurrent candidal infection    Jardiance [empagliflozin] Hives    Byetta [exenatide] Rash     Other reaction(s): Rash        Current Inpatient Medications:      Current Facility-Administered Medications on File Prior to Encounter   Medication Dose Route Frequency Provider Last Rate Last Admin    alteplase injection 2 mg  2 mg Intra-Catheter PRN Erick Dwyer MD   2 mg at 03/11/19 1140    heparin, porcine (PF) 100 unit/mL injection flush 500 Units  500 Units Intravenous 1 time in Clinic/HOD Erick Dwyer MD        heparin, porcine (PF) 100 unit/mL injection flush 500 Units  500 Units Intravenous 1 time in Clinic/HOD Erick Dwyer MD        sodium chloride 0.9% flush 10 mL  10 mL Intravenous PRN Erick Dwyer MD   10 mL at 07/23/19 1157     Current Outpatient Medications on File Prior to Encounter   Medication Sig Dispense Refill    aspirin (ECOTRIN) 81 MG EC tablet Take 81 mg by mouth once daily.      blood-glucose meter kit True Test or meter covered by insurance - pt checks glucose twice daily for uncontrolled glucoses E11.65 1 each 0    carvediloL (COREG) 25 MG tablet TAKE 1/2 TABLET TWICE DAILY WITH MEALS 180 tablet 3    CHOLECALCIFEROL, VITAMIN D3, (VITAMIN D3 ORAL) Take 3,000 capsules by mouth once daily. 3000 IU per day      ELIQUIS 2.5 mg Tab TAKE ONE TABLET BY MOUTH 2 TIMES A DAY 60 tablet 12    furosemide (LASIX) 20 MG tablet Take 1 tablet (20 mg total) by mouth once daily. 90 tablet 3    gabapentin (NEURONTIN) 300 MG capsule TAKE 1 CAPSULE EVERY MORNING AND TAKE 2 CAPSULES AT BEDTIME 270 capsule 3    glimepiride (AMARYL) 2 MG tablet TAKE 1 TABLET BEFORE BREAKFAST. 90 tablet 3    HYDROcodone-acetaminophen (NORCO) 5-325 mg per tablet Take 1 tablet by mouth every 6 (six) hours as needed for Pain. (Patient not taking: Reported on 4/27/2023) 40 tablet 0    hydrocortisone 2.5 % cream Apply topically 2 (two) times daily as needed (rash under the breast). Mix 50/50 with ketoconazole cream. 30 g 3    ketoconazole (NIZORAL) 2 % cream AAA bid prn rash under the breast. Mix 50/50 with hydrocortisone cream. 60 g 3     lancets (ACCU-CHEK SOFTCLIX LANCETS) Misc TrueTest lancets for meter covered by insurance - pt checks twice daily for uncontrolled glucoses E11.65, 200 each 3    levothyroxine (SYNTHROID) 75 MCG tablet TAKE 1 TABLET BEFORE BREAKFAST 90 tablet 3    metFORMIN (GLUCOPHAGE) 500 MG tablet TAKE 1 TABLET TWICE DAILY WITH MEALS 180 tablet 0    mupirocin (BACTROBAN) 2 % ointment Apply topically 2 (two) times daily. 15 g 0    pravastatin (PRAVACHOL) 20 MG tablet TAKE 1 TABLET EVERY DAY 90 tablet 3    tiZANidine (ZANAFLEX) 4 MG tablet One tab every 6-8 hours as needed pain (Patient not taking: Reported on 4/27/2023) 30 tablet 0    triamcinolone acetonide 0.1%-magnesium hydroxide 400 mg/5 ml-ciclopirox Apply to affected area under the breast twice daily as directed 60 g 3    TRUE METRIX GLUCOSE TEST STRIP Strp TEST BLOOD SUGAR TWICE DAILY 200 strip 3    [DISCONTINUED] triamcinolone acetonide 0.1% (KENALOG) 0.1 % cream Apply topically 2 (two) times daily. 45 g 0       Past Surgical History:   Procedure Laterality Date    BREAST BIOPSY Left 10/01/2012    left breast- invasive ductal carcinoma    BREAST BIOPSY Left 12/2017    BREAST CYST ASPIRATION      BREAST LUMPECTOMY Left 2012    BREAST MASS EXCISION      CARDIAC VALVE REPLACEMENT  12/2013    CARDIAC VALVE SURGERY  2013    CARPAL TUNNEL RELEASE      Left    COLONOSCOPY N/A 09/29/2017    Procedure: COLONOSCOPY;  Surgeon: Phani Worley MD;  Location: [address]);  Service: Endoscopy;  Laterality: N/A;    COLONOSCOPY N/A 1/5/2023    Procedure: COLONOSCOPY;  Surgeon: Eladia Martino MD;  Location: [address]);  Service: Endoscopy;  Laterality: N/A;  instr via portal  ok to barbara Eliquis-see encounter 12/9-MS  1/4 pateint cannot come earlier-rt    CORONARY ANGIOGRAPHY Left 4/13/2023    Procedure: ANGIOGRAM, CORONARY ARTERY;  Surgeon: Eze Sales MD;  Location: [facility] CATH LAB;  Service: Cardiology;  Laterality: Left;  low bleeding risk  2.9%    DILATION AND CURETTAGE OF UTERUS  1999    Endometrial polyps    ENDOMETRIAL ABLATION  1999    Enodmetrial polyps    EYE SURGERY      INSERTION OF TUNNELED CENTRAL VENOUS CATHETER (CVC) WITH SUBCUTANEOUS PORT Right 02/01/2019    Procedure: [id number];  Surgeon: Gaston Flores MD;  Location: [facility] OR [address] [place] [address];  Service: General;  Laterality: Right;    left lumpectomy  2012    MASTECTOMY      MEDIPORT REMOVAL Right 08/26/2019    Procedure: REMOVAL, CATHETER, CENTRAL VENOUS, TUNNELED, WITH PORT;  Surgeon: Gaston Flores MD;  Location: [facility] OR [address] [place] [address];  Service: General;  Laterality: Right;    MODIFIED RADICAL MASTECTOMY W/ AXILLARY LYMPH NODE DISSECTION Right 08/26/2019    Procedure: MASTECTOMY, MODIFIED RADICAL;  Surgeon: Gaston Flores MD;  Location: [facility] OR [address] [place] [address];  Service: General;  Laterality: Right;    SHOULDER SURGERY      SKIN BIOPSY         Social History     Socioeconomic History    Marital status:      Spouse name: Srinath    Number of children: 2   Occupational History    Occupation: Retired   Tobacco Use    Smoking status: Never    Smokeless tobacco: Never   Substance and Sexual Activity    Alcohol use: No     Alcohol/week: 0.0 standard drinks    Drug use: Never    Sexual activity: Yes     Partners: Male   Other Topics Concern    Are you pregnant or think you may be? No    Breast-feeding No     Social Determinants of Health     Financial Resource Strain: Low Risk     Difficulty of Paying Living Expenses: Not hard at all   Food Insecurity: No Food Insecurity    Worried About Running Out of Food in the Last Year: Never true    Ran Out of Food in the Last Year: Never true   Transportation Needs: No Transportation Needs    Lack of Transportation (Medical): No    Lack of Transportation (Non-Medical): No   Physical Activity: Inactive    Days of Exercise per Week: 0 days    Minutes of Exercise per Session: 10 min   Stress: Stress Concern Present     Feeling of Stress : To some extent   Social Connections: Unknown    Frequency of Communication with Friends and Family: Three times a week    Frequency of Social Gatherings with Friends and Family: Once a week    Active Member of Clubs or Organizations: Yes    Attends Club or Organization Meetings: Patient refused    Marital Status:    Housing Stability: Low Risk     Unable to Pay for Housing in the Last Year: No    Number of Places Lived in the Last Year: 1    Unstable Housing in the Last Year: No       OBJECTIVE:     Vital Signs Range (Last 24H):         CBC:   No results for input(s): WBC, RBC, HGB, HCT, PLT, MCV, MCH, MCHC in the last 72 hours.    CMP: No results for input(s): NA, K, CL, CO2, BUN, CREATININE, GLU, MG, PHOS, CALCIUM, ALBUMIN, PROT, ALKPHOS, ALT, AST, BILITOT in the last 72 hours.    INR:  No results for input(s): PT, INR, PROTIME, APTT in the last 72 hours.    Diagnostic Studies: No relevant studies.    EKG:   Results for orders placed or performed during the hospital encounter of 05/04/23   EKG 12-lead    Collection Time: 05/04/23  3:18 PM    Narrative    Test Reason : Z95.2,I35.0,T82.09XD,Z01.818,    Vent. Rate : 075 BPM     Atrial Rate : 075 BPM     P-R Int : 152 ms          QRS Dur : 102 ms      QT Int : 388 ms       P-R-T Axes : 056 -40 047 degrees     QTc Int : 433 ms    Normal sinus rhythm  Left axis deviation  Minimal voltage criteria for LVH, may be normal variant  Abnormal ECG  When compared with ECG of 13-APR-2023 07:11,  No significant change was found  Confirmed by Wenceslao Garcia MD ([id number]) on 5/4/2023 3:26:41 PM    Referred By: JERRY BACA           Confirmed By:Wenceslao Garcia MD        2D ECHO:   Results for orders placed during the hospital encounter of 03/27/23    Echo Saline Bubble? No    Interpretation Summary  · The left ventricle is normal in size with concentric remodeling and normal systolic function. The estimated ejection fraction is 65%.  · Normal  right ventricular size with normal right ventricular systolic function.  · Indeterminate left ventricular diastolic function.  · Mild left atrial enlargement.  · There is a 21mm St. You Trifecta bovine pericardial bioprosthetic aortic valve present with evidence of stenosis. The mean gradient is 47 mmHg with a dimensionless index of 0.23. The acceleration time is well above 100ms (140+ ms)  · Mild tricuspid regurgitation.  · The estimated PA systolic pressure is 36 mmHg.  · Normal central venous pressure (3 mmHg).  · The aortic valve mean gradient is 47 mmHg with a dimensionless index of 0.23.         ASSESSMENT/PLAN:         Pre-op Assessment    I have reviewed the Patient Summary Reports.     I have reviewed the Nursing Notes.    I have reviewed the Medications.     Review of Systems  Anesthesia Hx:  No problems with previous Anesthesia  History of prior surgery of interest to airway management or planning: Denies Family Hx of Anesthesia complications.   Denies Personal Hx of Anesthesia complications.   Social:  Non-Smoker    Hematology/Oncology:     Oncology Normal     Cardiovascular:   Hypertension Valvular problems/Murmurs, AS  Denies Angina. hyperlipidemia ECG has been reviewed.    Pulmonary:   Denies Shortness of breath.  Denies Recent URI. Sleep Apnea    Renal/:   Chronic Renal Disease, CKD    Hepatic/GI:   Denies GERD. Liver Disease,    Musculoskeletal:   Arthritis     Neurological:   Denies CVA. Denies Seizures.    Endocrine:   Diabetes, type 2 Hypothyroidism    Psych:   depression          Physical Exam  General: Well nourished, Cooperative and Alert    Airway:  Mallampati: II   Mouth Opening: Normal  TM Distance: Normal  Tongue: Normal  Neck ROM: Normal ROM    Dental:  Intact        Anesthesia Plan  Type of Anesthesia, risks & benefits discussed:    Anesthesia Type: Gen ETT, Regional  Intra-op Monitoring Plan: Standard ASA Monitors, Art Line and Central Line  Post Op Pain Control Plan: multimodal  analgesia  Induction:  IV  Airway Plan: Direct, Post-Induction  Informed Consent: Informed consent signed with the Patient and all parties understand the risks and agree with anesthesia plan.  All questions answered.   ASA Score: 4  Day of Surgery Review of History & Physical: H&P Update referred to the surgeon/provider.    Ready For Surgery From Anesthesia Perspective.     .

## 2023-05-09 NOTE — TELEPHONE ENCOUNTER
Called pt and informed her of arrival time for surgery.  Pt instructed to report to [facility] at 6:00 tomorrow morning.  Pt reminded to perform shower with antibacterial soap tonight and tomorrow morning, and to become NPO at midnight.  Pt verbalized understanding.

## 2023-05-09 NOTE — PROGRESS NOTES
Study name:  Corcym MANTRA     IRB:  2021.298    PI: Dr. DANNY Webb    Study Visit: Initial Contact      05/09/2023  Telephone call conducted: Spoke with  to introduce the MANTRA study.  Explain that this trial is a post-market study to monitor the ongoing safety and performance of the Corcym devices and accessories used for aortic, mitral, and tricuspid valvular diseases after implant. All Corcym devices and accessories utilized are FDA approved.    Study follow-up will consist of observation from [facility] at day 30 post-op and then annually for 5-10 years.  verbalized understanding.    Education provided:  Protocol, required testing/procedures, and follow-up requirements/schedule.     Materials dispensed: Coordinator contact information. Patient scheduled for Valve surgery on 10MAY2023, at 6AM- will meet pt in Pre-op holding area to Consent pt.      Deisy Gross RN, CCM, [facility]

## 2023-05-10 ENCOUNTER — ANESTHESIA (OUTPATIENT)
Dept: SURGERY | Facility: HOSPITAL | Age: 78
DRG: 220 | End: 2023-05-10
Payer: MEDICARE

## 2023-05-10 ENCOUNTER — RESEARCH ENCOUNTER (OUTPATIENT)
Dept: RESEARCH | Facility: HOSPITAL | Age: 78
End: 2023-05-10
Payer: MEDICARE

## 2023-05-10 ENCOUNTER — HOSPITAL ENCOUNTER (INPATIENT)
Facility: HOSPITAL | Age: 78
LOS: 9 days | Discharge: NURSING, REHAB OR LONG-TERM CARE | DRG: 220 | End: 2023-05-19
Attending: THORACIC SURGERY (CARDIOTHORACIC VASCULAR SURGERY) | Admitting: THORACIC SURGERY (CARDIOTHORACIC VASCULAR SURGERY)
Payer: MEDICARE

## 2023-05-10 DIAGNOSIS — Z95.2 S/P AVR (AORTIC VALVE REPLACEMENT): Primary | ICD-10-CM

## 2023-05-10 DIAGNOSIS — Z00.6 RESEARCH SUBJECT: ICD-10-CM

## 2023-05-10 DIAGNOSIS — Z95.2 S/P AVR (AORTIC VALVE REPLACEMENT): ICD-10-CM

## 2023-05-10 DIAGNOSIS — E78.2 HYPERLIPIDEMIA, MIXED: ICD-10-CM

## 2023-05-10 DIAGNOSIS — R00.1 BRADYCARDIA: ICD-10-CM

## 2023-05-10 DIAGNOSIS — I49.9 ARRHYTHMIA: ICD-10-CM

## 2023-05-10 DIAGNOSIS — I35.0 NONRHEUMATIC AORTIC VALVE STENOSIS: ICD-10-CM

## 2023-05-10 DIAGNOSIS — D62 ACUTE BLOOD LOSS ANEMIA: ICD-10-CM

## 2023-05-10 DIAGNOSIS — Z95.2 S/P AVR: Primary | ICD-10-CM

## 2023-05-10 DIAGNOSIS — Z86.711 HISTORY OF PULMONARY EMBOLISM: ICD-10-CM

## 2023-05-10 DIAGNOSIS — Z00.6 RESEARCH STUDY PATIENT: Primary | ICD-10-CM

## 2023-05-10 DIAGNOSIS — E03.9 ACQUIRED HYPOTHYROIDISM: ICD-10-CM

## 2023-05-10 DIAGNOSIS — E66.9 DIABETES MELLITUS TYPE 2 IN OBESE: ICD-10-CM

## 2023-05-10 DIAGNOSIS — Z98.890 HISTORY OF HEART SURGERY: ICD-10-CM

## 2023-05-10 DIAGNOSIS — E11.69 DIABETES MELLITUS TYPE 2 IN OBESE: ICD-10-CM

## 2023-05-10 DIAGNOSIS — Z74.09 IMPAIRED MOBILITY: ICD-10-CM

## 2023-05-10 LAB
ABO + RH BLD: NORMAL
ABO + RH BLD: NORMAL
ALBUMIN SERPL BCP-MCNC: 2.6 G/DL (ref 3.5–5.2)
ALLENS TEST: ABNORMAL
ALP SERPL-CCNC: 41 U/L (ref 55–135)
ALT SERPL W/O P-5'-P-CCNC: 13 U/L (ref 10–44)
ANION GAP SERPL CALC-SCNC: 10 MMOL/L (ref 8–16)
ANION GAP SERPL CALC-SCNC: 7 MMOL/L (ref 8–16)
APTT PPP: 36.8 SEC (ref 21–32)
AST SERPL-CCNC: 41 U/L (ref 10–40)
BASOPHILS # BLD AUTO: 0.04 K/UL (ref 0–0.2)
BASOPHILS NFR BLD: 0.2 % (ref 0–1.9)
BILIRUB SERPL-MCNC: 1 MG/DL (ref 0.1–1)
BLD GP AB SCN CELLS X3 SERPL QL: NORMAL
BLD GP AB SCN CELLS X3 SERPL QL: NORMAL
BUN SERPL-MCNC: 20 MG/DL (ref 8–23)
BUN SERPL-MCNC: 23 MG/DL (ref 8–23)
CALCIUM SERPL-MCNC: 8.5 MG/DL (ref 8.7–10.5)
CALCIUM SERPL-MCNC: 8.7 MG/DL (ref 8.7–10.5)
CHLORIDE SERPL-SCNC: 109 MMOL/L (ref 95–110)
CHLORIDE SERPL-SCNC: 110 MMOL/L (ref 95–110)
CO2 SERPL-SCNC: 21 MMOL/L (ref 23–29)
CO2 SERPL-SCNC: 22 MMOL/L (ref 23–29)
CREAT SERPL-MCNC: 0.9 MG/DL (ref 0.5–1.4)
CREAT SERPL-MCNC: 1.3 MG/DL (ref 0.5–1.4)
DELSYS: ABNORMAL
DELSYS: ABNORMAL
DIFFERENTIAL METHOD: ABNORMAL
EOSINOPHIL # BLD AUTO: 0 K/UL (ref 0–0.5)
EOSINOPHIL NFR BLD: 0 % (ref 0–8)
ERYTHROCYTE [DISTWIDTH] IN BLOOD BY AUTOMATED COUNT: 14.5 % (ref 11.5–14.5)
ERYTHROCYTE [SEDIMENTATION RATE] IN BLOOD BY WESTERGREN METHOD: 20 MM/H
EST. GFR  (NO RACE VARIABLE): 42.1 ML/MIN/1.73 M^2
EST. GFR  (NO RACE VARIABLE): >60 ML/MIN/1.73 M^2
FIO2: 100
FIO2: 55
FIO2: 70
GLUCOSE SERPL-MCNC: 128 MG/DL (ref 70–110)
GLUCOSE SERPL-MCNC: 167 MG/DL (ref 70–110)
GLUCOSE SERPL-MCNC: 169 MG/DL (ref 70–110)
GLUCOSE SERPL-MCNC: 170 MG/DL (ref 70–110)
GLUCOSE SERPL-MCNC: 188 MG/DL (ref 70–110)
GLUCOSE SERPL-MCNC: 196 MG/DL (ref 70–110)
GLUCOSE SERPL-MCNC: 215 MG/DL (ref 70–110)
GLUCOSE SERPL-MCNC: 235 MG/DL (ref 70–110)
GLUCOSE SERPL-MCNC: 235 MG/DL (ref 70–110)
GLUCOSE SERPL-MCNC: 252 MG/DL (ref 70–110)
HCO3 UR-SCNC: 21.8 MMOL/L (ref 24–28)
HCO3 UR-SCNC: 25.6 MMOL/L (ref 24–28)
HCO3 UR-SCNC: 26.8 MMOL/L (ref 24–28)
HCO3 UR-SCNC: 26.9 MMOL/L (ref 24–28)
HCO3 UR-SCNC: 27 MMOL/L (ref 24–28)
HCO3 UR-SCNC: 27.3 MMOL/L (ref 24–28)
HCO3 UR-SCNC: 28.2 MMOL/L (ref 24–28)
HCO3 UR-SCNC: 28.3 MMOL/L (ref 24–28)
HCO3 UR-SCNC: 28.3 MMOL/L (ref 24–28)
HCO3 UR-SCNC: 28.5 MMOL/L (ref 24–28)
HCO3 UR-SCNC: 28.8 MMOL/L (ref 24–28)
HCT VFR BLD AUTO: 30.9 % (ref 37–48.5)
HCT VFR BLD CALC: 20 %PCV (ref 36–54)
HCT VFR BLD CALC: 21 %PCV (ref 36–54)
HCT VFR BLD CALC: 23 %PCV (ref 36–54)
HCT VFR BLD CALC: 24 %PCV (ref 36–54)
HCT VFR BLD CALC: 28 %PCV (ref 36–54)
HCT VFR BLD CALC: 30 %PCV (ref 36–54)
HCT VFR BLD CALC: 32 %PCV (ref 36–54)
HGB BLD-MCNC: 10.1 G/DL (ref 12–16)
IMM GRANULOCYTES # BLD AUTO: 0.33 K/UL (ref 0–0.04)
IMM GRANULOCYTES NFR BLD AUTO: 1.3 % (ref 0–0.5)
INR PPP: 1.2 (ref 0.8–1.2)
LDH SERPL L TO P-CCNC: 0.56 MMOL/L (ref 0.36–1.25)
LDH SERPL L TO P-CCNC: 1.49 MMOL/L (ref 0.36–1.25)
LDH SERPL L TO P-CCNC: 2.19 MMOL/L (ref 0.36–1.25)
LDH SERPL L TO P-CCNC: 2.27 MMOL/L (ref 0.36–1.25)
LDH SERPL L TO P-CCNC: 2.84 MMOL/L (ref 0.36–1.25)
LDH SERPL L TO P-CCNC: 2.92 MMOL/L (ref 0.36–1.25)
LYMPHOCYTES # BLD AUTO: 1.8 K/UL (ref 1–4.8)
LYMPHOCYTES NFR BLD: 7.1 % (ref 18–48)
MAGNESIUM SERPL-MCNC: 2.9 MG/DL (ref 1.6–2.6)
MAGNESIUM SERPL-MCNC: 3.1 MG/DL (ref 1.6–2.6)
MAGNESIUM SERPL-MCNC: 3.1 MG/DL (ref 1.6–2.6)
MCH RBC QN AUTO: 29.2 PG (ref 27–31)
MCHC RBC AUTO-ENTMCNC: 32.7 G/DL (ref 32–36)
MCV RBC AUTO: 89 FL (ref 82–98)
MODE: ABNORMAL
MODE: ABNORMAL
MONOCYTES # BLD AUTO: 1.9 K/UL (ref 0.3–1)
MONOCYTES NFR BLD: 7.4 % (ref 4–15)
NEUTROPHILS # BLD AUTO: 21 K/UL (ref 1.8–7.7)
NEUTROPHILS NFR BLD: 84 % (ref 38–73)
NRBC BLD-RTO: 0 /100 WBC
PCO2 BLDA: 36.7 MMHG (ref 35–45)
PCO2 BLDA: 39.4 MMHG (ref 35–45)
PCO2 BLDA: 41.3 MMHG (ref 35–45)
PCO2 BLDA: 41.3 MMHG (ref 35–45)
PCO2 BLDA: 43.4 MMHG (ref 35–45)
PCO2 BLDA: 44.4 MMHG (ref 35–45)
PCO2 BLDA: 44.9 MMHG (ref 35–45)
PCO2 BLDA: 45.1 MMHG (ref 35–45)
PCO2 BLDA: 46.6 MMHG (ref 35–45)
PCO2 BLDA: 47.6 MMHG (ref 35–45)
PCO2 BLDA: 51.1 MMHG (ref 35–45)
PEEP: 5
PH SMN: 7.31 [PH] (ref 7.35–7.45)
PH SMN: 7.38 [PH] (ref 7.35–7.45)
PH SMN: 7.38 [PH] (ref 7.35–7.45)
PH SMN: 7.39 [PH] (ref 7.35–7.45)
PH SMN: 7.39 [PH] (ref 7.35–7.45)
PH SMN: 7.4 [PH] (ref 7.35–7.45)
PH SMN: 7.4 [PH] (ref 7.35–7.45)
PH SMN: 7.42 [PH] (ref 7.35–7.45)
PH SMN: 7.42 [PH] (ref 7.35–7.45)
PH SMN: 7.43 [PH] (ref 7.35–7.45)
PH SMN: 7.46 [PH] (ref 7.35–7.45)
PHOSPHATE SERPL-MCNC: 2.5 MG/DL (ref 2.7–4.5)
PHOSPHATE SERPL-MCNC: 2.5 MG/DL (ref 2.7–4.5)
PHOSPHATE SERPL-MCNC: 2.9 MG/DL (ref 2.7–4.5)
PLATELET # BLD AUTO: 69 K/UL (ref 150–450)
PLATELET BLD QL SMEAR: ABNORMAL
PMV BLD AUTO: 11.7 FL (ref 9.2–12.9)
PO2 BLDA: 185 MMHG (ref 80–100)
PO2 BLDA: 256 MMHG (ref 80–100)
PO2 BLDA: 261 MMHG (ref 80–100)
PO2 BLDA: 29 MMHG (ref 40–60)
PO2 BLDA: 295 MMHG (ref 80–100)
PO2 BLDA: 348 MMHG (ref 80–100)
PO2 BLDA: 365 MMHG (ref 80–100)
PO2 BLDA: 395 MMHG (ref 80–100)
PO2 BLDA: 434 MMHG (ref 80–100)
PO2 BLDA: 485 MMHG (ref 80–100)
PO2 BLDA: 579 MMHG (ref 80–100)
POC BE: -1 MMOL/L
POC BE: -3 MMOL/L
POC BE: 2 MMOL/L
POC BE: 2 MMOL/L
POC BE: 3 MMOL/L
POC BE: 4 MMOL/L
POC BE: 4 MMOL/L
POC BE: 5 MMOL/L
POC IONIZED CALCIUM: 0.97 MMOL/L (ref 1.06–1.42)
POC IONIZED CALCIUM: 1 MMOL/L (ref 1.06–1.42)
POC IONIZED CALCIUM: 1.01 MMOL/L (ref 1.06–1.42)
POC IONIZED CALCIUM: 1.05 MMOL/L (ref 1.06–1.42)
POC IONIZED CALCIUM: 1.07 MMOL/L (ref 1.06–1.42)
POC IONIZED CALCIUM: 1.09 MMOL/L (ref 1.06–1.42)
POC IONIZED CALCIUM: 1.18 MMOL/L (ref 1.06–1.42)
POC IONIZED CALCIUM: 1.19 MMOL/L (ref 1.06–1.42)
POC IONIZED CALCIUM: 1.21 MMOL/L (ref 1.06–1.42)
POC IONIZED CALCIUM: 1.28 MMOL/L (ref 1.06–1.42)
POC SATURATED O2: 100 % (ref 95–100)
POC SATURATED O2: 49 % (ref 95–100)
POC TCO2: 23 MMOL/L (ref 23–27)
POC TCO2: 27 MMOL/L (ref 24–29)
POC TCO2: 28 MMOL/L (ref 23–27)
POC TCO2: 29 MMOL/L (ref 23–27)
POC TCO2: 30 MMOL/L (ref 23–27)
POCT GLUCOSE: 118 MG/DL (ref 70–110)
POCT GLUCOSE: 123 MG/DL (ref 70–110)
POCT GLUCOSE: 133 MG/DL (ref 70–110)
POCT GLUCOSE: 136 MG/DL (ref 70–110)
POCT GLUCOSE: 142 MG/DL (ref 70–110)
POCT GLUCOSE: 147 MG/DL (ref 70–110)
POCT GLUCOSE: 150 MG/DL (ref 70–110)
POCT GLUCOSE: 169 MG/DL (ref 70–110)
POTASSIUM BLD-SCNC: 3.4 MMOL/L (ref 3.5–5.1)
POTASSIUM BLD-SCNC: 3.6 MMOL/L (ref 3.5–5.1)
POTASSIUM BLD-SCNC: 3.7 MMOL/L (ref 3.5–5.1)
POTASSIUM BLD-SCNC: 3.8 MMOL/L (ref 3.5–5.1)
POTASSIUM BLD-SCNC: 3.8 MMOL/L (ref 3.5–5.1)
POTASSIUM BLD-SCNC: 3.9 MMOL/L (ref 3.5–5.1)
POTASSIUM BLD-SCNC: 4 MMOL/L (ref 3.5–5.1)
POTASSIUM BLD-SCNC: 5.8 MMOL/L (ref 3.5–5.1)
POTASSIUM SERPL-SCNC: 3.9 MMOL/L (ref 3.5–5.1)
POTASSIUM SERPL-SCNC: 4.5 MMOL/L (ref 3.5–5.1)
POTASSIUM SERPL-SCNC: 4.5 MMOL/L (ref 3.5–5.1)
PROT SERPL-MCNC: 4.1 G/DL (ref 6–8.4)
PROTHROMBIN TIME: 13.2 SEC (ref 9–12.5)
RBC # BLD AUTO: 3.46 M/UL (ref 4–5.4)
SAMPLE: ABNORMAL
SAMPLE: NORMAL
SITE: ABNORMAL
SODIUM BLD-SCNC: 138 MMOL/L (ref 136–145)
SODIUM BLD-SCNC: 138 MMOL/L (ref 136–145)
SODIUM BLD-SCNC: 139 MMOL/L (ref 136–145)
SODIUM BLD-SCNC: 139 MMOL/L (ref 136–145)
SODIUM BLD-SCNC: 140 MMOL/L (ref 136–145)
SODIUM BLD-SCNC: 141 MMOL/L (ref 136–145)
SODIUM BLD-SCNC: 141 MMOL/L (ref 136–145)
SODIUM BLD-SCNC: 142 MMOL/L (ref 136–145)
SODIUM SERPL-SCNC: 139 MMOL/L (ref 136–145)
SODIUM SERPL-SCNC: 140 MMOL/L (ref 136–145)
SPECIMEN OUTDATE: NORMAL
SPECIMEN OUTDATE: NORMAL
VT: 340
WBC # BLD AUTO: 24.97 K/UL (ref 3.9–12.7)

## 2023-05-10 PROCEDURE — 33405 PR REPLACE AORT VALV,PROSTH VALV: ICD-10-PCS | Mod: ,,, | Performed by: THORACIC SURGERY (CARDIOTHORACIC VASCULAR SURGERY)

## 2023-05-10 PROCEDURE — D9220A PRA ANESTHESIA: Mod: ,,, | Performed by: STUDENT IN AN ORGANIZED HEALTH CARE EDUCATION/TRAINING PROGRAM

## 2023-05-10 PROCEDURE — 37000009 HC ANESTHESIA EA ADD 15 MINS: Performed by: THORACIC SURGERY (CARDIOTHORACIC VASCULAR SURGERY)

## 2023-05-10 PROCEDURE — 82330 ASSAY OF CALCIUM: CPT

## 2023-05-10 PROCEDURE — 84100 ASSAY OF PHOSPHORUS: CPT | Mod: 91 | Performed by: SURGERY

## 2023-05-10 PROCEDURE — 86920 COMPATIBILITY TEST SPIN: CPT | Performed by: THORACIC SURGERY (CARDIOTHORACIC VASCULAR SURGERY)

## 2023-05-10 PROCEDURE — 94761 N-INVAS EAR/PLS OXIMETRY MLT: CPT

## 2023-05-10 PROCEDURE — 94640 AIRWAY INHALATION TREATMENT: CPT

## 2023-05-10 PROCEDURE — 99900026 HC AIRWAY MAINTENANCE (STAT)

## 2023-05-10 PROCEDURE — 27100026 HC SHUNT SENSOR, TERUMO

## 2023-05-10 PROCEDURE — 27000191 HC C-V MONITORING

## 2023-05-10 PROCEDURE — 63600175 PHARM REV CODE 636 W HCPCS: Mod: JZ,JG | Performed by: SURGERY

## 2023-05-10 PROCEDURE — P9016 RBC LEUKOCYTES REDUCED: HCPCS | Performed by: THORACIC SURGERY (CARDIOTHORACIC VASCULAR SURGERY)

## 2023-05-10 PROCEDURE — 85520 HEPARIN ASSAY: CPT

## 2023-05-10 PROCEDURE — 93320 PR DOPPLER ECHO HEART,COMPLETE: ICD-10-PCS | Mod: 26,59,, | Performed by: STUDENT IN AN ORGANIZED HEALTH CARE EDUCATION/TRAINING PROGRAM

## 2023-05-10 PROCEDURE — 37799 UNLISTED PX VASCULAR SURGERY: CPT

## 2023-05-10 PROCEDURE — 93010 EKG 12-LEAD: ICD-10-PCS | Mod: ,,, | Performed by: INTERNAL MEDICINE

## 2023-05-10 PROCEDURE — 27000221 HC OXYGEN, UP TO 24 HOURS

## 2023-05-10 PROCEDURE — 93325 DOPPLER ECHO COLOR FLOW MAPG: CPT | Mod: 26,59,, | Performed by: STUDENT IN AN ORGANIZED HEALTH CARE EDUCATION/TRAINING PROGRAM

## 2023-05-10 PROCEDURE — 63600175 PHARM REV CODE 636 W HCPCS: Performed by: PHYSICIAN ASSISTANT

## 2023-05-10 PROCEDURE — 94002 VENT MGMT INPAT INIT DAY: CPT

## 2023-05-10 PROCEDURE — 63600175 PHARM REV CODE 636 W HCPCS: Performed by: STUDENT IN AN ORGANIZED HEALTH CARE EDUCATION/TRAINING PROGRAM

## 2023-05-10 PROCEDURE — 27201037 HC PRESSURE MONITORING SET UP

## 2023-05-10 PROCEDURE — 93005 ELECTROCARDIOGRAM TRACING: CPT

## 2023-05-10 PROCEDURE — 88305 TISSUE EXAM BY PATHOLOGIST: CPT | Performed by: PATHOLOGY

## 2023-05-10 PROCEDURE — 85014 HEMATOCRIT: CPT

## 2023-05-10 PROCEDURE — 80053 COMPREHEN METABOLIC PANEL: CPT | Performed by: STUDENT IN AN ORGANIZED HEALTH CARE EDUCATION/TRAINING PROGRAM

## 2023-05-10 PROCEDURE — 36000712 HC OR TIME LEV V 1ST 15 MIN: Performed by: THORACIC SURGERY (CARDIOTHORACIC VASCULAR SURGERY)

## 2023-05-10 PROCEDURE — 36620 INSERTION CATHETER ARTERY: CPT | Mod: 59,,, | Performed by: STUDENT IN AN ORGANIZED HEALTH CARE EDUCATION/TRAINING PROGRAM

## 2023-05-10 PROCEDURE — 36620 PR INSERT CATH,ART,PERCUT,SHORTTERM: ICD-10-PCS | Mod: 59,,, | Performed by: STUDENT IN AN ORGANIZED HEALTH CARE EDUCATION/TRAINING PROGRAM

## 2023-05-10 PROCEDURE — 25000003 PHARM REV CODE 250: Performed by: PHYSICIAN ASSISTANT

## 2023-05-10 PROCEDURE — 85730 THROMBOPLASTIN TIME PARTIAL: CPT | Performed by: PHYSICIAN ASSISTANT

## 2023-05-10 PROCEDURE — 25000003 PHARM REV CODE 250: Performed by: STUDENT IN AN ORGANIZED HEALTH CARE EDUCATION/TRAINING PROGRAM

## 2023-05-10 PROCEDURE — 27801617 HC VALVE, PERCEVAL

## 2023-05-10 PROCEDURE — 27100088 HC CELL SAVER

## 2023-05-10 PROCEDURE — 84132 ASSAY OF SERUM POTASSIUM: CPT | Performed by: PHYSICIAN ASSISTANT

## 2023-05-10 PROCEDURE — 85610 PROTHROMBIN TIME: CPT | Performed by: PHYSICIAN ASSISTANT

## 2023-05-10 PROCEDURE — 83605 ASSAY OF LACTIC ACID: CPT

## 2023-05-10 PROCEDURE — 86900 BLOOD TYPING SEROLOGIC ABO: CPT | Mod: 91 | Performed by: THORACIC SURGERY (CARDIOTHORACIC VASCULAR SURGERY)

## 2023-05-10 PROCEDURE — 82800 BLOOD PH: CPT

## 2023-05-10 PROCEDURE — 84295 ASSAY OF SERUM SODIUM: CPT

## 2023-05-10 PROCEDURE — 88305 TISSUE EXAM BY PATHOLOGIST: ICD-10-PCS | Mod: 26,,, | Performed by: PATHOLOGY

## 2023-05-10 PROCEDURE — 83735 ASSAY OF MAGNESIUM: CPT | Performed by: PHYSICIAN ASSISTANT

## 2023-05-10 PROCEDURE — 36000713 HC OR TIME LEV V EA ADD 15 MIN: Performed by: THORACIC SURGERY (CARDIOTHORACIC VASCULAR SURGERY)

## 2023-05-10 PROCEDURE — 93320 DOPPLER ECHO COMPLETE: CPT | Mod: 26,59,, | Performed by: STUDENT IN AN ORGANIZED HEALTH CARE EDUCATION/TRAINING PROGRAM

## 2023-05-10 PROCEDURE — 36556 INSERT NON-TUNNEL CV CATH: CPT | Mod: 59,,, | Performed by: STUDENT IN AN ORGANIZED HEALTH CARE EDUCATION/TRAINING PROGRAM

## 2023-05-10 PROCEDURE — 84100 ASSAY OF PHOSPHORUS: CPT | Performed by: PHYSICIAN ASSISTANT

## 2023-05-10 PROCEDURE — 88305 TISSUE EXAM BY PATHOLOGIST: CPT | Mod: 26,,, | Performed by: PATHOLOGY

## 2023-05-10 PROCEDURE — P9045 ALBUMIN (HUMAN), 5%, 250 ML: HCPCS | Mod: JZ,JG | Performed by: SURGERY

## 2023-05-10 PROCEDURE — 33530 CORONARY ARTERY BYPASS/REOP: CPT | Mod: ,,, | Performed by: THORACIC SURGERY (CARDIOTHORACIC VASCULAR SURGERY)

## 2023-05-10 PROCEDURE — 27000175 HC ADULT BYPASS PUMP

## 2023-05-10 PROCEDURE — 93312 TEE: ICD-10-PCS | Mod: 26,59,, | Performed by: STUDENT IN AN ORGANIZED HEALTH CARE EDUCATION/TRAINING PROGRAM

## 2023-05-10 PROCEDURE — 20000000 HC ICU ROOM

## 2023-05-10 PROCEDURE — 88311 PR  DECALCIFY TISSUE: ICD-10-PCS | Mod: 26,,, | Performed by: PATHOLOGY

## 2023-05-10 PROCEDURE — D9220A PRA ANESTHESIA: ICD-10-PCS | Mod: ,,, | Performed by: STUDENT IN AN ORGANIZED HEALTH CARE EDUCATION/TRAINING PROGRAM

## 2023-05-10 PROCEDURE — 25000242 PHARM REV CODE 250 ALT 637 W/ HCPCS: Performed by: PHYSICIAN ASSISTANT

## 2023-05-10 PROCEDURE — 27000445 HC TEMPORARY PACEMAKER LEADS

## 2023-05-10 PROCEDURE — 25000003 PHARM REV CODE 250: Performed by: THORACIC SURGERY (CARDIOTHORACIC VASCULAR SURGERY)

## 2023-05-10 PROCEDURE — 83735 ASSAY OF MAGNESIUM: CPT | Mod: 91 | Performed by: SURGERY

## 2023-05-10 PROCEDURE — 33530 PR CABG, REOPERATE >1 MON ORIG: ICD-10-PCS | Mod: ,,, | Performed by: THORACIC SURGERY (CARDIOTHORACIC VASCULAR SURGERY)

## 2023-05-10 PROCEDURE — 99900035 HC TECH TIME PER 15 MIN (STAT)

## 2023-05-10 PROCEDURE — C1729 CATH, DRAINAGE: HCPCS | Performed by: THORACIC SURGERY (CARDIOTHORACIC VASCULAR SURGERY)

## 2023-05-10 PROCEDURE — 86900 BLOOD TYPING SEROLOGIC ABO: CPT | Performed by: PHYSICIAN ASSISTANT

## 2023-05-10 PROCEDURE — 33405 REPLACEMENT AORTIC VALVE OPN: CPT | Mod: ,,, | Performed by: THORACIC SURGERY (CARDIOTHORACIC VASCULAR SURGERY)

## 2023-05-10 PROCEDURE — 82962 GLUCOSE BLOOD TEST: CPT | Performed by: THORACIC SURGERY (CARDIOTHORACIC VASCULAR SURGERY)

## 2023-05-10 PROCEDURE — 27202608 HC CANNULA, MISC

## 2023-05-10 PROCEDURE — 85025 COMPLETE CBC W/AUTO DIFF WBC: CPT | Performed by: PHYSICIAN ASSISTANT

## 2023-05-10 PROCEDURE — 37000008 HC ANESTHESIA 1ST 15 MINUTES: Performed by: THORACIC SURGERY (CARDIOTHORACIC VASCULAR SURGERY)

## 2023-05-10 PROCEDURE — 82803 BLOOD GASES ANY COMBINATION: CPT

## 2023-05-10 PROCEDURE — 36556 CENTRAL LINE: ICD-10-PCS | Mod: 59,,, | Performed by: STUDENT IN AN ORGANIZED HEALTH CARE EDUCATION/TRAINING PROGRAM

## 2023-05-10 PROCEDURE — 88311 DECALCIFY TISSUE: CPT | Performed by: PATHOLOGY

## 2023-05-10 PROCEDURE — 93312 ECHO TRANSESOPHAGEAL: CPT | Mod: 26,59,, | Performed by: STUDENT IN AN ORGANIZED HEALTH CARE EDUCATION/TRAINING PROGRAM

## 2023-05-10 PROCEDURE — 80048 BASIC METABOLIC PNL TOTAL CA: CPT | Mod: XB | Performed by: SURGERY

## 2023-05-10 PROCEDURE — 93010 ELECTROCARDIOGRAM REPORT: CPT | Mod: ,,, | Performed by: INTERNAL MEDICINE

## 2023-05-10 PROCEDURE — 93325 PR DOPPLER COLOR FLOW VELOCITY MAP: ICD-10-PCS | Mod: 26,59,, | Performed by: STUDENT IN AN ORGANIZED HEALTH CARE EDUCATION/TRAINING PROGRAM

## 2023-05-10 PROCEDURE — 27201423 OPTIME MED/SURG SUP & DEVICES STERILE SUPPLY: Performed by: THORACIC SURGERY (CARDIOTHORACIC VASCULAR SURGERY)

## 2023-05-10 PROCEDURE — 27200953 HC CARDIOPLEGIA SYSTEM

## 2023-05-10 PROCEDURE — 84132 ASSAY OF SERUM POTASSIUM: CPT

## 2023-05-10 PROCEDURE — 88311 DECALCIFY TISSUE: CPT | Mod: 26,,, | Performed by: PATHOLOGY

## 2023-05-10 DEVICE — IMPLANTABLE DEVICE: Type: IMPLANTABLE DEVICE | Site: AORTA | Status: FUNCTIONAL

## 2023-05-10 RX ORDER — MUPIROCIN 20 MG/G
1 OINTMENT TOPICAL
Status: COMPLETED | OUTPATIENT
Start: 2023-05-10 | End: 2023-05-10

## 2023-05-10 RX ORDER — ALBUMIN HUMAN 50 G/1000ML
25 SOLUTION INTRAVENOUS ONCE AS NEEDED
Status: DISCONTINUED | OUTPATIENT
Start: 2023-05-10 | End: 2023-05-19 | Stop reason: HOSPADM

## 2023-05-10 RX ORDER — PROPOFOL 10 MG/ML
0-50 INJECTION, EMULSION INTRAVENOUS CONTINUOUS
Status: DISCONTINUED | OUTPATIENT
Start: 2023-05-10 | End: 2023-05-11

## 2023-05-10 RX ORDER — ESMOLOL HYDROCHLORIDE 10 MG/ML
INJECTION INTRAVENOUS
Status: DISCONTINUED | OUTPATIENT
Start: 2023-05-10 | End: 2023-05-10

## 2023-05-10 RX ORDER — ALBUMIN HUMAN 50 G/1000ML
SOLUTION INTRAVENOUS
Status: COMPLETED
Start: 2023-05-10 | End: 2023-05-10

## 2023-05-10 RX ORDER — POLYETHYLENE GLYCOL 3350 17 G/17G
17 POWDER, FOR SOLUTION ORAL DAILY
Status: DISCONTINUED | OUTPATIENT
Start: 2023-05-11 | End: 2023-05-19 | Stop reason: HOSPADM

## 2023-05-10 RX ORDER — NITROGLYCERIN 5 MG/ML
INJECTION, SOLUTION INTRAVENOUS
Status: DISCONTINUED | OUTPATIENT
Start: 2023-05-10 | End: 2023-05-10

## 2023-05-10 RX ORDER — SODIUM CHLORIDE 9 MG/ML
INJECTION, SOLUTION INTRAVENOUS CONTINUOUS
Status: DISCONTINUED | OUTPATIENT
Start: 2023-05-10 | End: 2023-05-10

## 2023-05-10 RX ORDER — ONDANSETRON 2 MG/ML
4 INJECTION INTRAMUSCULAR; INTRAVENOUS EVERY 12 HOURS PRN
Status: DISCONTINUED | OUTPATIENT
Start: 2023-05-10 | End: 2023-05-19 | Stop reason: HOSPADM

## 2023-05-10 RX ORDER — ROCURONIUM BROMIDE 10 MG/ML
INJECTION, SOLUTION INTRAVENOUS
Status: DISCONTINUED | OUTPATIENT
Start: 2023-05-10 | End: 2023-05-10

## 2023-05-10 RX ORDER — MAGNESIUM SULFATE HEPTAHYDRATE 40 MG/ML
4 INJECTION, SOLUTION INTRAVENOUS
Status: DISCONTINUED | OUTPATIENT
Start: 2023-05-10 | End: 2023-05-16

## 2023-05-10 RX ORDER — FENTANYL CITRATE 50 UG/ML
25 INJECTION, SOLUTION INTRAMUSCULAR; INTRAVENOUS
Status: DISCONTINUED | OUTPATIENT
Start: 2023-05-10 | End: 2023-05-11

## 2023-05-10 RX ORDER — PHENYLEPHRINE HCL IN 0.9% NACL 1 MG/10 ML
SYRINGE (ML) INTRAVENOUS
Status: DISCONTINUED | OUTPATIENT
Start: 2023-05-10 | End: 2023-05-10

## 2023-05-10 RX ORDER — ASPIRIN 325 MG
325 TABLET ORAL DAILY
Status: DISCONTINUED | OUTPATIENT
Start: 2023-05-10 | End: 2023-05-10

## 2023-05-10 RX ORDER — DEXTROSE MONOHYDRATE, SODIUM CHLORIDE, AND POTASSIUM CHLORIDE 50; 1.49; 4.5 G/1000ML; G/1000ML; G/1000ML
INJECTION, SOLUTION INTRAVENOUS CONTINUOUS
Status: DISCONTINUED | OUTPATIENT
Start: 2023-05-10 | End: 2023-05-11

## 2023-05-10 RX ORDER — IPRATROPIUM BROMIDE AND ALBUTEROL SULFATE 2.5; .5 MG/3ML; MG/3ML
3 SOLUTION RESPIRATORY (INHALATION) EVERY 4 HOURS
Status: COMPLETED | OUTPATIENT
Start: 2023-05-10 | End: 2023-05-11

## 2023-05-10 RX ORDER — METOPROLOL TARTRATE 25 MG/1
25 TABLET, FILM COATED ORAL
Status: COMPLETED | OUTPATIENT
Start: 2023-05-10 | End: 2023-05-10

## 2023-05-10 RX ORDER — SODIUM CHLORIDE 0.9 % (FLUSH) 0.9 %
10 SYRINGE (ML) INJECTION
Status: DISCONTINUED | OUTPATIENT
Start: 2023-05-10 | End: 2023-05-19 | Stop reason: HOSPADM

## 2023-05-10 RX ORDER — METOCLOPRAMIDE HYDROCHLORIDE 5 MG/ML
5 INJECTION INTRAMUSCULAR; INTRAVENOUS EVERY 6 HOURS PRN
Status: DISCONTINUED | OUTPATIENT
Start: 2023-05-10 | End: 2023-05-19 | Stop reason: HOSPADM

## 2023-05-10 RX ORDER — IPRATROPIUM BROMIDE AND ALBUTEROL SULFATE 2.5; .5 MG/3ML; MG/3ML
3 SOLUTION RESPIRATORY (INHALATION) EVERY 4 HOURS PRN
Status: ACTIVE | OUTPATIENT
Start: 2023-05-10 | End: 2023-05-11

## 2023-05-10 RX ORDER — NOREPINEPHRINE BITARTRATE 1 MG/ML
INJECTION, SOLUTION INTRAVENOUS
Status: DISCONTINUED | OUTPATIENT
Start: 2023-05-10 | End: 2023-05-10

## 2023-05-10 RX ORDER — DEXAMETHASONE SODIUM PHOSPHATE 4 MG/ML
INJECTION, SOLUTION INTRA-ARTICULAR; INTRALESIONAL; INTRAMUSCULAR; INTRAVENOUS; SOFT TISSUE
Status: DISCONTINUED | OUTPATIENT
Start: 2023-05-10 | End: 2023-05-10

## 2023-05-10 RX ORDER — OXYCODONE HYDROCHLORIDE 5 MG/1
5 TABLET ORAL EVERY 4 HOURS PRN
Status: DISCONTINUED | OUTPATIENT
Start: 2023-05-10 | End: 2023-05-12

## 2023-05-10 RX ORDER — POTASSIUM CHLORIDE 29.8 MG/ML
40 INJECTION INTRAVENOUS
Status: DISCONTINUED | OUTPATIENT
Start: 2023-05-10 | End: 2023-05-16

## 2023-05-10 RX ORDER — ACETAMINOPHEN 325 MG/1
650 TABLET ORAL EVERY 4 HOURS PRN
Status: DISCONTINUED | OUTPATIENT
Start: 2023-05-10 | End: 2023-05-11

## 2023-05-10 RX ORDER — OXYCODONE HYDROCHLORIDE 10 MG/1
10 TABLET ORAL EVERY 4 HOURS PRN
Status: DISCONTINUED | OUTPATIENT
Start: 2023-05-10 | End: 2023-05-12

## 2023-05-10 RX ORDER — ALBUMIN HUMAN 50 G/1000ML
25 SOLUTION INTRAVENOUS ONCE
Status: COMPLETED | OUTPATIENT
Start: 2023-05-11 | End: 2023-05-11

## 2023-05-10 RX ORDER — HEPARIN SODIUM 1000 [USP'U]/ML
INJECTION, SOLUTION INTRAVENOUS; SUBCUTANEOUS
Status: DISCONTINUED | OUTPATIENT
Start: 2023-05-10 | End: 2023-05-10

## 2023-05-10 RX ORDER — FENTANYL CITRATE 50 UG/ML
50 INJECTION, SOLUTION INTRAMUSCULAR; INTRAVENOUS
Status: DISCONTINUED | OUTPATIENT
Start: 2023-05-13 | End: 2023-05-11

## 2023-05-10 RX ORDER — VASOPRESSIN 20 [USP'U]/ML
INJECTION, SOLUTION INTRAMUSCULAR; SUBCUTANEOUS
Status: DISCONTINUED | OUTPATIENT
Start: 2023-05-10 | End: 2023-05-10

## 2023-05-10 RX ORDER — TRANEXAMIC ACID 100 MG/ML
INJECTION, SOLUTION INTRAVENOUS
Status: DISCONTINUED | OUTPATIENT
Start: 2023-05-10 | End: 2023-05-10

## 2023-05-10 RX ORDER — ALBUMIN HUMAN 50 G/1000ML
12.5 SOLUTION INTRAVENOUS ONCE
Status: COMPLETED | OUTPATIENT
Start: 2023-05-10 | End: 2023-05-10

## 2023-05-10 RX ORDER — FENTANYL CITRATE 50 UG/ML
INJECTION, SOLUTION INTRAMUSCULAR; INTRAVENOUS
Status: DISCONTINUED | OUTPATIENT
Start: 2023-05-10 | End: 2023-05-10

## 2023-05-10 RX ORDER — ONDANSETRON 2 MG/ML
INJECTION INTRAMUSCULAR; INTRAVENOUS
Status: DISCONTINUED | OUTPATIENT
Start: 2023-05-10 | End: 2023-05-10

## 2023-05-10 RX ORDER — DOCUSATE SODIUM 100 MG/1
100 CAPSULE, LIQUID FILLED ORAL 2 TIMES DAILY
Status: DISCONTINUED | OUTPATIENT
Start: 2023-05-10 | End: 2023-05-19 | Stop reason: HOSPADM

## 2023-05-10 RX ORDER — FAMOTIDINE 10 MG/ML
20 INJECTION INTRAVENOUS 2 TIMES DAILY
Status: DISCONTINUED | OUTPATIENT
Start: 2023-05-10 | End: 2023-05-11

## 2023-05-10 RX ORDER — NAPROXEN SODIUM 220 MG/1
81 TABLET, FILM COATED ORAL DAILY
Status: DISCONTINUED | OUTPATIENT
Start: 2023-05-10 | End: 2023-05-10

## 2023-05-10 RX ORDER — EPINEPHRINE 1 MG/ML
INJECTION, SOLUTION, CONCENTRATE INTRAVENOUS
Status: DISCONTINUED | OUTPATIENT
Start: 2023-05-10 | End: 2023-05-10

## 2023-05-10 RX ORDER — BISACODYL 10 MG
10 SUPPOSITORY, RECTAL RECTAL DAILY PRN
Status: DISCONTINUED | OUTPATIENT
Start: 2023-05-10 | End: 2023-05-19 | Stop reason: HOSPADM

## 2023-05-10 RX ORDER — LIDOCAINE HYDROCHLORIDE 10 MG/ML
1 INJECTION, SOLUTION EPIDURAL; INFILTRATION; INTRACAUDAL; PERINEURAL
Status: DISCONTINUED | OUTPATIENT
Start: 2023-05-10 | End: 2023-05-10

## 2023-05-10 RX ORDER — ASPIRIN 325 MG
325 TABLET ORAL DAILY
Status: DISCONTINUED | OUTPATIENT
Start: 2023-05-10 | End: 2023-05-12

## 2023-05-10 RX ORDER — LIDOCAINE HYDROCHLORIDE 20 MG/ML
INJECTION, SOLUTION EPIDURAL; INFILTRATION; INTRACAUDAL; PERINEURAL
Status: DISCONTINUED | OUTPATIENT
Start: 2023-05-10 | End: 2023-05-10

## 2023-05-10 RX ORDER — MUPIROCIN 20 MG/G
1 OINTMENT TOPICAL 2 TIMES DAILY
Status: COMPLETED | OUTPATIENT
Start: 2023-05-10 | End: 2023-05-15

## 2023-05-10 RX ORDER — PROPOFOL 10 MG/ML
INJECTION, EMULSION INTRAVENOUS
Status: COMPLETED
Start: 2023-05-10 | End: 2023-05-10

## 2023-05-10 RX ORDER — PROPOFOL 10 MG/ML
VIAL (ML) INTRAVENOUS
Status: DISCONTINUED | OUTPATIENT
Start: 2023-05-10 | End: 2023-05-10

## 2023-05-10 RX ORDER — MIDAZOLAM HYDROCHLORIDE 1 MG/ML
INJECTION, SOLUTION INTRAMUSCULAR; INTRAVENOUS
Status: DISCONTINUED | OUTPATIENT
Start: 2023-05-10 | End: 2023-05-10

## 2023-05-10 RX ORDER — ACETAMINOPHEN 325 MG/1
975 TABLET ORAL EVERY 8 HOURS
Status: DISCONTINUED | OUTPATIENT
Start: 2023-05-10 | End: 2023-05-11

## 2023-05-10 RX ORDER — TRANEXAMIC ACID 100 MG/ML
INJECTION, SOLUTION INTRAVENOUS CONTINUOUS PRN
Status: DISCONTINUED | OUTPATIENT
Start: 2023-05-10 | End: 2023-05-10

## 2023-05-10 RX ORDER — NAPROXEN SODIUM 220 MG/1
81 TABLET, FILM COATED ORAL ONCE
Status: DISCONTINUED | OUTPATIENT
Start: 2023-05-10 | End: 2023-05-10

## 2023-05-10 RX ORDER — PROPOFOL 10 MG/ML
VIAL (ML) INTRAVENOUS CONTINUOUS PRN
Status: DISCONTINUED | OUTPATIENT
Start: 2023-05-10 | End: 2023-05-10

## 2023-05-10 RX ORDER — ASPIRIN 300 MG/1
300 SUPPOSITORY RECTAL ONCE AS NEEDED
Status: DISCONTINUED | OUTPATIENT
Start: 2023-05-10 | End: 2023-05-19 | Stop reason: HOSPADM

## 2023-05-10 RX ORDER — LEVOTHYROXINE SODIUM 75 UG/1
75 TABLET ORAL
Status: DISCONTINUED | OUTPATIENT
Start: 2023-05-11 | End: 2023-05-19 | Stop reason: HOSPADM

## 2023-05-10 RX ORDER — ACETAMINOPHEN 10 MG/ML
INJECTION, SOLUTION INTRAVENOUS
Status: DISCONTINUED | OUTPATIENT
Start: 2023-05-10 | End: 2023-05-10

## 2023-05-10 RX ORDER — KETAMINE HCL IN 0.9 % NACL 50 MG/5 ML
SYRINGE (ML) INTRAVENOUS
Status: DISCONTINUED | OUTPATIENT
Start: 2023-05-10 | End: 2023-05-10

## 2023-05-10 RX ORDER — NAPROXEN SODIUM 220 MG/1
81 TABLET, FILM COATED ORAL DAILY
Status: DISCONTINUED | OUTPATIENT
Start: 2023-05-11 | End: 2023-05-10

## 2023-05-10 RX ORDER — NOREPINEPHRINE BITARTRATE/D5W 4MG/250ML
0-3 PLASTIC BAG, INJECTION (ML) INTRAVENOUS CONTINUOUS
Status: DISCONTINUED | OUTPATIENT
Start: 2023-05-10 | End: 2023-05-11

## 2023-05-10 RX ORDER — POTASSIUM CHLORIDE 14.9 MG/ML
60 INJECTION INTRAVENOUS
Status: DISCONTINUED | OUTPATIENT
Start: 2023-05-10 | End: 2023-05-16

## 2023-05-10 RX ORDER — MAGNESIUM SULFATE HEPTAHYDRATE 40 MG/ML
2 INJECTION, SOLUTION INTRAVENOUS
Status: DISCONTINUED | OUTPATIENT
Start: 2023-05-10 | End: 2023-05-16

## 2023-05-10 RX ORDER — POTASSIUM CHLORIDE 14.9 MG/ML
20 INJECTION INTRAVENOUS
Status: DISCONTINUED | OUTPATIENT
Start: 2023-05-10 | End: 2023-05-16

## 2023-05-10 RX ORDER — ALBUMIN HUMAN 50 G/1000ML
25 SOLUTION INTRAVENOUS ONCE
Status: COMPLETED | OUTPATIENT
Start: 2023-05-10 | End: 2023-05-10

## 2023-05-10 RX ADMIN — ALBUMIN (HUMAN) 12.5 G: 12.5 SOLUTION INTRAVENOUS at 08:05

## 2023-05-10 RX ADMIN — ROCURONIUM BROMIDE 30 MG: 50 INJECTION, SOLUTION INTRAVENOUS at 02:05

## 2023-05-10 RX ADMIN — Medication 20 MCG/KG/MIN: at 04:05

## 2023-05-10 RX ADMIN — EPINEPHRINE 60 MCG: 1 INJECTION, SOLUTION, CONCENTRATE INTRAVENOUS at 02:05

## 2023-05-10 RX ADMIN — MUPIROCIN 1 G: 20 OINTMENT TOPICAL at 07:05

## 2023-05-10 RX ADMIN — IPRATROPIUM BROMIDE AND ALBUTEROL SULFATE 3 ML: .5; 3 SOLUTION RESPIRATORY (INHALATION) at 11:05

## 2023-05-10 RX ADMIN — LIDOCAINE HYDROCHLORIDE 100 MG: 20 INJECTION, SOLUTION EPIDURAL; INFILTRATION; INTRACAUDAL at 03:05

## 2023-05-10 RX ADMIN — NITROGLYCERIN 100 MCG: 5 INJECTION, SOLUTION INTRAVENOUS at 02:05

## 2023-05-10 RX ADMIN — NOREPINEPHRINE BITARTRATE 8 MCG: 1 INJECTION, SOLUTION, CONCENTRATE INTRAVENOUS at 02:05

## 2023-05-10 RX ADMIN — ACETAMINOPHEN 1000 MG: 10 INJECTION INTRAVENOUS at 04:05

## 2023-05-10 RX ADMIN — ACETAMINOPHEN 975 MG: 325 TABLET ORAL at 09:05

## 2023-05-10 RX ADMIN — Medication 100 MCG: at 10:05

## 2023-05-10 RX ADMIN — CALCIUM CHLORIDE 0.5 G: 100 INJECTION, SOLUTION INTRAVENOUS at 02:05

## 2023-05-10 RX ADMIN — FENTANYL CITRATE 100 MCG: 50 INJECTION, SOLUTION INTRAMUSCULAR; INTRAVENOUS at 04:05

## 2023-05-10 RX ADMIN — INSULIN HUMAN 10 UNITS: 100 INJECTION, SOLUTION PARENTERAL at 12:05

## 2023-05-10 RX ADMIN — ROCURONIUM BROMIDE 100 MG: 50 INJECTION, SOLUTION INTRAVENOUS at 08:05

## 2023-05-10 RX ADMIN — LIDOCAINE HYDROCHLORIDE 100 MG: 20 INJECTION, SOLUTION EPIDURAL; INFILTRATION; INTRACAUDAL at 02:05

## 2023-05-10 RX ADMIN — PROPOFOL 100 MG: 10 INJECTION, EMULSION INTRAVENOUS at 02:05

## 2023-05-10 RX ADMIN — LIDOCAINE HYDROCHLORIDE 100 MG: 20 INJECTION, SOLUTION EPIDURAL; INFILTRATION; INTRACAUDAL at 10:05

## 2023-05-10 RX ADMIN — ESMOLOL HYDROCHLORIDE 20 MG: 100 INJECTION, SOLUTION INTRAVENOUS at 08:05

## 2023-05-10 RX ADMIN — EPINEPHRINE 0.04 MCG/KG/MIN: 1 INJECTION INTRAMUSCULAR; INTRAVENOUS; SUBCUTANEOUS at 02:05

## 2023-05-10 RX ADMIN — IPRATROPIUM BROMIDE AND ALBUTEROL SULFATE 3 ML: .5; 3 SOLUTION RESPIRATORY (INHALATION) at 07:05

## 2023-05-10 RX ADMIN — NOREPINEPHRINE BITARTRATE 8 MCG: 1 INJECTION, SOLUTION, CONCENTRATE INTRAVENOUS at 12:05

## 2023-05-10 RX ADMIN — ASPIRIN 325 MG: 325 TABLET ORAL at 09:05

## 2023-05-10 RX ADMIN — VASOPRESSIN 1 UNITS: 20 INJECTION INTRAVENOUS at 11:05

## 2023-05-10 RX ADMIN — DEXTROSE, SODIUM CHLORIDE, AND POTASSIUM CHLORIDE: 5; .45; .15 INJECTION INTRAVENOUS at 06:05

## 2023-05-10 RX ADMIN — FAMOTIDINE 20 MG: 10 INJECTION, SOLUTION INTRAVENOUS at 08:05

## 2023-05-10 RX ADMIN — CALCIUM CHLORIDE 0.5 G: 100 INJECTION, SOLUTION INTRAVENOUS at 04:05

## 2023-05-10 RX ADMIN — ALBUMIN (HUMAN) 25 G: 12.5 SOLUTION INTRAVENOUS at 10:05

## 2023-05-10 RX ADMIN — Medication 100 MCG: at 09:05

## 2023-05-10 RX ADMIN — METOPROLOL TARTRATE 25 MG: 25 TABLET, FILM COATED ORAL at 07:05

## 2023-05-10 RX ADMIN — FENTANYL CITRATE 200 MCG: 50 INJECTION, SOLUTION INTRAMUSCULAR; INTRAVENOUS at 08:05

## 2023-05-10 RX ADMIN — HEPARIN SODIUM 32000 UNITS: 1000 INJECTION, SOLUTION INTRAVENOUS; SUBCUTANEOUS at 12:05

## 2023-05-10 RX ADMIN — VANCOMYCIN HYDROCHLORIDE 1500 MG: 1.5 INJECTION, POWDER, LYOPHILIZED, FOR SOLUTION INTRAVENOUS at 07:05

## 2023-05-10 RX ADMIN — MUPIROCIN 1 G: 20 OINTMENT TOPICAL at 08:05

## 2023-05-10 RX ADMIN — ROCURONIUM BROMIDE 30 MG: 50 INJECTION, SOLUTION INTRAVENOUS at 01:05

## 2023-05-10 RX ADMIN — DEXAMETHASONE SODIUM PHOSPHATE 4 MG: 4 INJECTION INTRA-ARTICULAR; INTRALESIONAL; INTRAMUSCULAR; INTRAVENOUS; SOFT TISSUE at 09:05

## 2023-05-10 RX ADMIN — Medication 50 MG: at 08:05

## 2023-05-10 RX ADMIN — TRANEXAMIC ACID 1 MG/KG/HR: 100 INJECTION, SOLUTION INTRAVENOUS at 08:05

## 2023-05-10 RX ADMIN — FENTANYL CITRATE 25 MCG: 50 INJECTION, SOLUTION INTRAMUSCULAR; INTRAVENOUS at 06:05

## 2023-05-10 RX ADMIN — MIDAZOLAM 2 MG: 1 INJECTION INTRAMUSCULAR; INTRAVENOUS at 08:05

## 2023-05-10 RX ADMIN — VASOPRESSIN 1 UNITS: 20 INJECTION INTRAVENOUS at 02:05

## 2023-05-10 RX ADMIN — SODIUM CHLORIDE 8 UNITS/HR: 9 INJECTION, SOLUTION INTRAVENOUS at 12:05

## 2023-05-10 RX ADMIN — VASOPRESSIN 0.5 UNITS: 20 INJECTION INTRAVENOUS at 11:05

## 2023-05-10 RX ADMIN — PROPOFOL 20 MCG/KG/MIN: 10 INJECTION, EMULSION INTRAVENOUS at 08:05

## 2023-05-10 RX ADMIN — ROCURONIUM BROMIDE 30 MG: 50 INJECTION, SOLUTION INTRAVENOUS at 09:05

## 2023-05-10 RX ADMIN — METHADONE HYDROCHLORIDE 10 MG: 10 INJECTION, SOLUTION INTRAMUSCULAR; INTRAVENOUS; SUBCUTANEOUS at 09:05

## 2023-05-10 RX ADMIN — NOREPINEPHRINE BITARTRATE 0.02 MCG/KG/MIN: 1 INJECTION, SOLUTION, CONCENTRATE INTRAVENOUS at 10:05

## 2023-05-10 RX ADMIN — LIDOCAINE HYDROCHLORIDE 100 MG: 20 INJECTION, SOLUTION EPIDURAL; INFILTRATION; INTRACAUDAL at 08:05

## 2023-05-10 RX ADMIN — VASOPRESSIN 2 UNITS: 20 INJECTION INTRAVENOUS at 03:05

## 2023-05-10 RX ADMIN — TRANEXAMIC ACID 1000 MG: 100 INJECTION INTRAVENOUS at 09:05

## 2023-05-10 RX ADMIN — PROPOFOL 40 MG: 10 INJECTION, EMULSION INTRAVENOUS at 08:05

## 2023-05-10 RX ADMIN — CEFAZOLIN 2 G: 2 INJECTION, POWDER, FOR SOLUTION INTRAMUSCULAR; INTRAVENOUS at 07:05

## 2023-05-10 NOTE — ANESTHESIA PROCEDURE NOTES
Arterial    Diagnosis: Aortic stenosis    Patient location during procedure: done in OR  Procedure start time: 5/10/2023 8:35 AM  Timeout: 5/10/2023 8:35 AM  Procedure end time: 5/10/2023 8:40 AM    Staffing  Authorizing Provider: Phani Banks MD  Performing Provider: Omero Mahoney MD    Anesthesiologist was present at the time of the procedure.    Preanesthetic Checklist  Completed: patient identified, IV checked, site marked, risks and benefits discussed, surgical consent, monitors and equipment checked, pre-op evaluation, timeout performed and anesthesia consent givenArterial  Skin Prep: chlorhexidine gluconate  Local Infiltration: none  Orientation: left  Location: brachial    Catheter Size: 20 G  Catheter placement by Ultrasound guidance. Heme positive aspiration all ports.   Vessel Caliber: medium, patent, compressibility normal  Needle advanced into vessel with real time Ultrasound guidance.  Guidewire confirmed in vessel.  Sterile sheath used.Insertion Attempts: 2  Assessment  Dressing: sutured in place and taped  Patient: Tolerated well

## 2023-05-10 NOTE — H&P
[facility]  Critical Care - Surgery  History & Physical    Patient Name: Alina Narayan  MRN: [id number]  Admission Date: 5/10/2023  Code Status: Full Code  Attending Physician: Venkat Webb MD   Primary Care Provider: Aarti Dunn MD   Principal Problem: <principal problem not specified>    Subjective:     HPI:  Alina Narayan is a 78 y.o. female who presents for pre-op re-do AVR. She has a medical history significant for pulmonary embolism on chronic AC, s/p AVR (21 mm Trifecta) in 2003, breast cancer (s/p mastectomy, radiation and chemo) who presents for evaluation of a failing 21 mm Trifecta valve, true ID 19. She underwent AVR in December 2013 with Dr Webb with a 21 mmTrifecta. The patient presents to the SICU s/p redo sternotomy AVR  with Dr. Webb on 05/10/2023. When aorta clamp was removed and BIBIANA evaluation was done, mitral valve evaluated. Parish stitch placed but then BIBIANA showed mitral stenosis so stitch removed which resolved the stenosis. MR with improvement after AV replacement so decision made to leave mitral valve as is. CPB run ended without issues. On admission, they are intubated, sedated with propofol, and in stable condition. Inotropic and vasopressor requirements upon admission are 0.04 mcg/kg/min of epinephrine, 0.04 mcg/kg/min of norepinephrine to maintain blood pressure at a MAP 60-80 and SBP < 140. She is paced at 80bpm. Central access includes a LIJ CVC, arterial access includes a left brachial arterial line. They also have 2 pleural and 2 mediastinal chest tubes with appropriate output.    Intraoperatively, they received 2L of crystalloid, 747 of cell saver, 1 PRBCs. Urine output intraoperatively was 500 cc. The pre-operative echocardiogram was notable for EF 65%, normal RV function. Post-operative echo was normal biventricular function.    Immediate post-operative plans include hemodynamic stabilization and weaning of cardiac and respiratory support.  Plan to wean vasopressors and inotropes as tolerated, and wean ventilator support with goal of early extubation, and closely monitor fluid status with strict Is/Os and continued fluid resuscitation as needed.       Hospital/ICU Course:  No notes on file    Follow-up For: Procedure(s) (LRB):  Replacement-valve-aortic, redo sternotomy (N/A)    Post-Operative Day: Day of Surgery     Past Medical History:   Diagnosis Date    Allergy     seasonal    Anxiety     Arthritis     BRCA1 negative     Breast cancer 10/2012    left breast invasive ductal carcinoma    Colon polyp     Depression     Diabetes mellitus     Type 2    Diabetes mellitus, type 2     Diverticular disease     Diverticulitis 2009    Genetic testing 05/2017    negative Integrated BRACAnalysis    Hyperlipidemia     Hypertension     Morbid obesity     MITCHELL (obstructive sleep apnea)     Pulmonary embolism     Stenosis     Stenosis and insufficiency of lacrimal passages     Thyroid disease        Past Surgical History:   Procedure Laterality Date    BREAST BIOPSY Left 10/01/2012    left breast- invasive ductal carcinoma    BREAST BIOPSY Left 12/2017    BREAST CYST ASPIRATION      BREAST LUMPECTOMY Left 2012    BREAST MASS EXCISION      CARDIAC VALVE REPLACEMENT  12/2013    CARDIAC VALVE SURGERY  2013    CARPAL TUNNEL RELEASE      Left    COLONOSCOPY N/A 09/29/2017    Procedure: COLONOSCOPY;  Surgeon: Phani Worley MD;  Location: [facility] ([address] [place] [address]);  Service: Endoscopy;  Laterality: N/A;    COLONOSCOPY N/A 1/5/2023    Procedure: COLONOSCOPY;  Surgeon: Eladia Martino MD;  Location: [facility] ([address] [place] [address]);  Service: Endoscopy;  Laterality: N/A;  instr via portal  ok to hole Eliquis-see encounter 12/9-MS  1/4 pateint cannot come earlier-rt    CORONARY ANGIOGRAPHY Left 4/13/2023    Procedure: ANGIOGRAM, CORONARY ARTERY;  Surgeon: Eze Sales MD;  Location: [facility] CATH LAB;  Service: Cardiology;  Laterality: Left;  low bleeding risk 2.9%    DILATION AND  CURETTAGE OF UTERUS  1999    Endometrial polyps    ENDOMETRIAL ABLATION  1999    Enodmetrial polyps    EYE SURGERY      INSERTION OF TUNNELED CENTRAL VENOUS CATHETER (CVC) WITH SUBCUTANEOUS PORT Right 02/01/2019    Procedure: [id number];  Surgeon: Gaston Flores MD;  Location: [facility] OR [address] [place] [address];  Service: General;  Laterality: Right;    left lumpectomy  2012    MASTECTOMY      MEDIPORT REMOVAL Right 08/26/2019    Procedure: REMOVAL, CATHETER, CENTRAL VENOUS, TUNNELED, WITH PORT;  Surgeon: Gaston Flores MD;  Location: [facility] OR [address] [place] [address];  Service: General;  Laterality: Right;    MODIFIED RADICAL MASTECTOMY W/ AXILLARY LYMPH NODE DISSECTION Right 08/26/2019    Procedure: MASTECTOMY, MODIFIED RADICAL;  Surgeon: Gaston Flores MD;  Location: [facility] OR [address] [place] [address];  Service: General;  Laterality: Right;    SHOULDER SURGERY      SKIN BIOPSY         Review of patient's allergies indicates:   Allergen Reactions    Augmentin [amoxicillin-pot clavulanate] Diarrhea    Dilaudid [hydromorphone (bulk)] Other (See Comments)     Other reaction(s): sedation    Hydromorphone Other (See Comments)     Other reaction(s): sedation    Cymbalta [duloxetine]      Dry mouth, agitation    Farxiga [dapagliflozin] Other (See Comments)     Recurrent candidal infection    Jardiance [empagliflozin] Hives    Byetta [exenatide] Rash     Other reaction(s): Rash       Family History       Problem Relation (Age of Onset)    Alcohol abuse Brother    Anxiety disorder Son    Breast cancer Mother    Cancer Father, Maternal Grandfather, Brother    Colon cancer Father    SAL disease Son    Heart disease Father    No Known Problems Sister, Son    Sleep disorder Son          Tobacco Use    Smoking status: Never    Smokeless tobacco: Never   Substance and Sexual Activity    Alcohol use: No     Alcohol/week: 0.0 standard drinks    Drug use: Never    Sexual activity: Yes     Partners: Male      Review of Systems   Unable to perform ROS: Intubated    Objective:     Vital Signs (Most Recent):  Temp: 97.7 °F (36.5 °C) (05/10/23 0657)  Pulse: 77 (05/10/23 0657)  Resp: 20 (05/10/23 0657)  BP: 128/61 (05/10/23 0657)  SpO2: 100 % (05/10/23 0657) Vital Signs (24h Range):  Temp:  [97.7 °F (36.5 °C)] 97.7 °F (36.5 °C)  Pulse:  [77] 77  Resp:  [20] 20  SpO2:  [100 %] 100 %  BP: (128)/(61) 128/61     Weight: 110.7 kg (244 lb)  Body mass index is 44.63 kg/m².    No intake or output data in the 24 hours ending 05/10/23 1003       Physical Exam  Constitutional:       Comments: sedated   HENT:      Head: Normocephalic and atraumatic.      Right Ear: External ear normal.      Left Ear: External ear normal.      Nose: Nose normal.      Mouth/Throat:      Mouth: Mucous membranes are moist.      Pharynx: Oropharynx is clear.   Eyes:      Extraocular Movements: Extraocular movements intact.      Conjunctiva/sclera: Conjunctivae normal.   Neck:      Comments: LIJ CVC    Cardiovascular:      Rate and Rhythm: Normal rate and regular rhythm.      Pulses: Normal pulses.      Heart sounds: Normal heart sounds.   Pulmonary:      Comments: intubated  Abdominal:      General: There is no distension.      Palpations: Abdomen is soft.   Genitourinary:     Comments: vizcaino  Musculoskeletal:         General: No swelling.      Right lower leg: No edema.      Left lower leg: No edema.   Skin:     General: Skin is warm and dry.   Neurological:      Comments: sedated          Vents:       Lines/Drains/Airways       Central Venous Catheter Line  Duration                  Port A Cath Single Lumen -- days              Airway  Duration                  Airway - Non-Surgical 05/10/23 0845 <1 day              Arterial Line  Duration             Arterial Line 05/10/23 0835 Left Other (Comment) <1 day              Peripheral Intravenous Line  Duration                  Peripheral IV - Single Lumen 05/10/23 0737 20 G Left;Posterior Hand <1 day                    Significant Labs:    CBC/Anemia Profile:  No  results for input(s): WBC, HGB, HCT, PLT, MCV, RDW, IRON, FERRITIN, RETIC, FOLATE, [id number], OCCULTBLOOD in the last 48 hours.     Chemistries:  No results for input(s): NA, K, CL, CO2, BUN, CREATININE, CALCIUM, ALBUMIN, PROT, BILITOT, ALKPHOS, ALT, AST, GLUCOSE, MG, PHOS in the last 48 hours.    All pertinent labs within the past 24 hours have been reviewed.    Significant Imaging: I have reviewed all pertinent imaging results/findings within the past 24 hours.    Assessment/Plan:     Cardiac/Vascular  Nonrheumatic aortic valve stenosis  Initial AVR with Tracey 2013.       Neuro/Psych:     - Sedation: propofol    - Pain:    - Scheduled Tylenol 1g q8h   - Fentanyl PRN while intubated, Oxy PRN              Cardiac:     - S/P redo sternotomy with redo AVR with Dr. Webb on 5/10/2023    - BP Goal: MAP 60-80, SBP < 140     - Cleviprex PRN    - Pressors: epi 0.04 and levo 0.04    - Anti-HTNs: Will start when appropriate    - Rhythm: Paced at 80bpm due to hypotension with native rate of 50-60 in OR    - Beta blocker: Will start when appropriate    - Statin: takes pravastatin at home       Pulmonary:     - Goal SpO2 >92%    - Will wean ventilator support as tolerated to extubate    - Chest Tubes x 2 (2 Meds & 2 Pleural)    - ABGs PRN      Renal:    - Trend BUN/Cr     - Maintain Macdonald, record strict Is/Os    Recent Labs   Lab 05/04/23  1309   BUN 22   CREATININE 1.2         FEN / GI:     - Daily CMP, PRN K/Mag/Phos per protocol     - Replace electrolytes as needed    - Nutrition: NPO pending extubation    - Bowel Regimen: Miralax, docusate      ID:     - Afebrile    - WBC stable    - Abx: Complete perioperative cefazolin 2g Q8H x 5 doses    Recent Labs   Lab 05/04/23  1309   WBC 5.33         Heme/Onc:     - Hgb 12.6 pre-operatively    - CBC daily    - ASA 325mg daily, initiate within 6hr of surgery     Recent Labs   Lab 05/04/23  1309   HGB 12.6   *   APTT 26.2   INR 1.0         Endocrine:     - CTS Goal BG  110-140    - HgbA1c: 7.3    - Endocrinology consulted for insulin management      PPx:   Feeding:  Analgesia/Sedation:  Thromboembolic Prevention:   HOB >30: Yes  Stress Ulcer:   Glucose Control: Yes, insulin management per Endocrinology     Lines/Drains/Airway:   ETT   Left brachail arterial line   LIJ CVC   Macdonald   Chest Tubes: 2 (2 Mediastinal, 2 Pleural)    Pacing Wires: Temporary ventricular pacing wires      Dispo/Code Status/Palliative:     - Continue SICU Care    - Full Code      Hyperlipidemia, mixed  Continue statin     Renal/  Chronic kidney disease, stage III (moderate)  sCr 1.2 prior to surgery with eGFR 46.     -- avoid nephrotoxic agents, renally dose medications  -- maintain MAP >65     Hematology  History of pulmonary embolism  On chronic eliquis, will hold in immediate post-operative setting. Discuss AC plan with surgeon when risk of bleeding decreases.     Thrombocytopenia, unspecified  Platelets 113 prior to surgery      Endocrine  Acquired hypothyroidism  Continue synthroid     Diabetes mellitus type 2 in obese  A1c currently 7.3, takes metformin and glimipride at home. Will hold while inpatient.     -- endocrinology consulted for insulin management   -- BG goal 110-140  -- hypoglycemia protocol             Critical secondary to Patient has a condition that poses threat to life and bodily function: AVR and MVR     Critical care was time spent personally by me on the following activities: development of treatment plan with patient or surrogate and bedside caregivers, discussions with consultants, evaluation of patient's response to treatment, examination of patient, ordering and performing treatments and interventions, ordering and review of laboratory studies, ordering and review of radiographic studies, pulse oximetry, re-evaluation of patient's condition.  This critical care time did not overlap with that of any other provider or involve time for any procedures.     Lan Vega,  MD  Critical Care - Surgery  [facility] - [facility]

## 2023-05-10 NOTE — ASSESSMENT & PLAN NOTE
sCr 1.2 prior to surgery with eGFR 46.     -- avoid nephrotoxic agents, renally dose medications  -- maintain MAP >65

## 2023-05-10 NOTE — PROGRESS NOTES
Autotransfusion/Rapid Infusion Record:      05/10/2023  Autotransfusionist:  King Carlisle    Surgeon(s) and Role:     * Venkat Webb MD - Primary     * Bradley Morris MD - Resident - Observing     * Ca Silver MD - Fellow  Anesthesiologist:  No responsible provider has been recorded for the case.    Past Medical History:   Diagnosis Date    Allergy     seasonal    Anxiety     Arthritis     BRCA1 negative     Breast cancer 10/2012    left breast invasive ductal carcinoma    Colon polyp     Depression     Diabetes mellitus     Type 2    Diabetes mellitus, type 2     Diverticular disease     Diverticulitis 2009    Genetic testing 05/2017    negative Integrated BRACAnalysis    Hyperlipidemia     Hypertension     Morbid obesity     MITCHELL (obstructive sleep apnea)     Pulmonary embolism     Stenosis     Stenosis and insufficiency of lacrimal passages     Thyroid disease        Procedure(s) (LRB):  Replacement-valve-aortic, redo sternotomy (N/A)     6:01 PM    Equipment:    Cell Saver     R.I.S.  : Aqua Access Model: CATSmart or CATSplus : Shippable   Model: [id number]     Serial number: [id number]   Serial number:    Disposable lot #: [id number]   Disposable lot #:      Were extra cardiotomies used for cell saver?  no   if yes, #:      Solutions:  Anticoagulant: ACD-A   Expiration date: 02/24 Volume used: 1000   Wash solution: 0.9% NaCl   Expiration date: 02/25 Volume used: 5375     Cell saver checklist  Time completed: 08:00          [x]   Circuit assembled correctly     [x]   Cell saver powered and operational     [x]   Vacuum connected, functional, adjust to max -150mmHg     [x]   Anticoagulant drip rate adjusted     [x]   Transfer bag properly labeled with patient name, expiration time, volume,       anticoagulant, OR number, and initials     [x]   Cell saver disinfected after use (completed at end of case)       Cell Saver volumes:    Total volume processed:     4858 mL     Total  volume pRBCs recovered     747 mL     Volume pRBCs infused     747 mL         RIS checklist   Time completed:  []   RIS circuit assembled correctly     []   RIS power and operational     []   RIS disinfected after use (completed at end of case)       RIS volumes:    Total volume infused:    (see anesthesia record for blood       product information)    mL       Additional comments:

## 2023-05-10 NOTE — PROGRESS NOTES
Sponsor: [employer]  Study Title: Corcym Mitral, Aortic aNd Tricuspid post-maRket Study in a reAl- world setting - MANTRA  IRB Number: [id number]  : Wiliam Webb MD  Present for Discussion: Patient, Spouse, Son and Deisy Gross [facility]      Participant ID: [id number]  Date Consent Signed: 05/10/2023    Prior to the Informed Consent (IC) being signed, or any study protocol required data collection, testing, procedure, or intervention being performed, the following was done and/or discussed:   Subject was given a copy of the IC for review  Purpose of the study and the qualifications to participate  Study design, follow-up schedule, and tests or procedures done at each visit  Confidentiality and HIPPA Authorization for Release of Medical Records for the research trial/ subject's rights/ research related injury  Risks, Benefits, Alternative Treatments, Compensation, and Costs  Participation in the research trial is voluntary and subject may withdraw at any time  Contact information for study related questions      Subject verbalizes understanding of the above: YES  Contact information for CRC and PI given to subject: YES  Subject able to adequately summarize: the purpose of the study, the risks associated with the study, and all procedures, testing, and follow-ups associated with the study: YES    Subject signed the informed consent form for the [facility] research study with an IRB approval date of 14-DEC-2022. Each page of the consent form was reviewed with Patient (and pt's family) and all questions answered satisfactorily. Subject signed the consent form and received a copy of same. The original consent was scanned into electronic medical records (Epic) and filed into the subject's research study binder.     This post-market study is to monitor ongoing safety and performance of the Corcym devices and accessories used for aortic, mitral, and tricuspid valvular diseases after implant. Dr. Webb was  contacted and approved the study team consenting this subject for the trial.    The subject is anticipating to have the Perceval Plus device implanted during their procedure.    For Study Team:   Was the KCCQ-12 administered? YES  Was the EQ-5D-5L administered? YES    Deisy Gross RN, CCM, CRC

## 2023-05-10 NOTE — ASSESSMENT & PLAN NOTE
On chronic eliquis, will hold in immediate post-operative setting. Discuss AC plan with surgeon when risk of bleeding decreases.

## 2023-05-10 NOTE — ANESTHESIA PROCEDURE NOTES
Central Line    Diagnosis: Aortic stenosis  Patient location during procedure: done in OR  Timeout: 5/10/2023 9:00 AM  Procedure end time: 5/10/2023 9:30 AM    Staffing  Authorizing Provider: Phani Banks MD  Performing Provider: Phani Banks MD    Staffing  Performed: anesthesiologist   Anesthesiologist: Phani Banks MD  Resident/CRNA: Omero Mahoney MD  Anesthesiologist was present at the time of the procedure.  Preanesthetic Checklist  Completed: patient identified, IV checked, site marked, risks and benefits discussed, surgical consent, monitors and equipment checked, pre-op evaluation, timeout performed and anesthesia consent given  Indication   Indication: vascular access, med administration, hemodynamic monitoring     Anesthesia   general anesthesia    Central Line   Skin Prep: skin prepped with Betadine, skin prep agent completely dried prior to procedure  Sterile Barriers Followed: Yes    All five maximal barriers used- gloves, gown, cap, mask, and large sterile sheet    hand hygiene performed prior to central venous catheter insertion  Location: left internal jugular.   Catheter type: introducer (MAC)  Catheter Size: 9 Fr  Inserted Catheter Length: 11.5 cm  Ultrasound: vascular probe with ultrasound   Vessel Caliber: large, patent  Vascular Doppler:  not done, compressibility normal  Needle advanced into vessel with real time Ultrasound guidance.  Guidewire confirmed in vessel.  sterile gel and probe cover used in ultrasound-guided central venous catheter insertion  Manometry: none  Insertion Attempts: 2   Securement:line sutured, chlorhexidine patch, sterile dressing applied and blood return through all ports    Post-Procedure    Adverse Events:none      Guidewire Guidewire removed intact.   Additional Notes  First attempt performed on the right IJ by Resident Iman. Kink in the guidewire and line not advancing appropriately. Line pulled on that side, pressure held for 5+ mins and pressure  dressing applied. 2nd attempt done on the left IJ.

## 2023-05-10 NOTE — INTERVAL H&P NOTE
The patient has been examined and the H&P has been reviewed:    I concur with the findings and no changes have occurred since H&P was written.    Surgery risks, benefits and alternative options discussed and understood by patient/family.          There are no hospital problems to display for this patient.    Ca Silver MD  Cardiothoracic Surgery Fellow   [phone]

## 2023-05-10 NOTE — SUBJECTIVE & OBJECTIVE
Follow-up For: Procedure(s) (LRB):  Replacement-valve-aortic, redo sternotomy (N/A)    Post-Operative Day: Day of Surgery     Past Medical History:   Diagnosis Date    Allergy     seasonal    Anxiety     Arthritis     BRCA1 negative     Breast cancer 10/2012    left breast invasive ductal carcinoma    Colon polyp     Depression     Diabetes mellitus     Type 2    Diabetes mellitus, type 2     Diverticular disease     Diverticulitis 2009    Genetic testing 05/2017    negative Integrated BRACAnalysis    Hyperlipidemia     Hypertension     Morbid obesity     MITCHELL (obstructive sleep apnea)     Pulmonary embolism     Stenosis     Stenosis and insufficiency of lacrimal passages     Thyroid disease        Past Surgical History:   Procedure Laterality Date    BREAST BIOPSY Left 10/01/2012    left breast- invasive ductal carcinoma    BREAST BIOPSY Left 12/2017    BREAST CYST ASPIRATION      BREAST LUMPECTOMY Left 2012    BREAST MASS EXCISION      CARDIAC VALVE REPLACEMENT  12/2013    CARDIAC VALVE SURGERY  2013    CARPAL TUNNEL RELEASE      Left    COLONOSCOPY N/A 09/29/2017    Procedure: COLONOSCOPY;  Surgeon: Phani Worley MD;  Location: [facility] ([address] [place] [address]);  Service: Endoscopy;  Laterality: N/A;    COLONOSCOPY N/A 1/5/2023    Procedure: COLONOSCOPY;  Surgeon: Eladia Martino MD;  Location: [facility] ENDO ([address] [place] [address]);  Service: Endoscopy;  Laterality: N/A;  instr via portal  ok to hole Eliquis-see encounter 12/9-MS  1/4 pateint cannot come earlier-rt    CORONARY ANGIOGRAPHY Left 4/13/2023    Procedure: ANGIOGRAM, CORONARY ARTERY;  Surgeon: Eze Sales MD;  Location: [facility] CATH LAB;  Service: Cardiology;  Laterality: Left;  low bleeding risk 2.9%    DILATION AND CURETTAGE OF UTERUS  1999    Endometrial polyps    ENDOMETRIAL ABLATION  1999    Enodmetrial polyps    EYE SURGERY      INSERTION OF TUNNELED CENTRAL VENOUS CATHETER (CVC) WITH SUBCUTANEOUS PORT Right 02/01/2019    Procedure: [id number];  Surgeon:  Gaston Flores MD;  Location: [address];  Service: General;  Laterality: Right;    left lumpectomy  2012    MASTECTOMY      MEDIPORT REMOVAL Right 08/26/2019    Procedure: REMOVAL, CATHETER, CENTRAL VENOUS, TUNNELED, WITH PORT;  Surgeon: Gaston Flores MD;  Location: [facility] OR [address] [place] [address];  Service: General;  Laterality: Right;    MODIFIED RADICAL MASTECTOMY W/ AXILLARY LYMPH NODE DISSECTION Right 08/26/2019    Procedure: MASTECTOMY, MODIFIED RADICAL;  Surgeon: Gaston Flores MD;  Location: [address];  Service: General;  Laterality: Right;    SHOULDER SURGERY      SKIN BIOPSY         Review of patient's allergies indicates:   Allergen Reactions    Augmentin [amoxicillin-pot clavulanate] Diarrhea    Dilaudid [hydromorphone (bulk)] Other (See Comments)     Other reaction(s): sedation    Hydromorphone Other (See Comments)     Other reaction(s): sedation    Cymbalta [duloxetine]      Dry mouth, agitation    Farxiga [dapagliflozin] Other (See Comments)     Recurrent candidal infection    Jardiance [empagliflozin] Hives    Byetta [exenatide] Rash     Other reaction(s): Rash       Family History       Problem Relation (Age of Onset)    Alcohol abuse Brother    Anxiety disorder Son    Breast cancer Mother    Cancer Father, Maternal Grandfather, Brother    Colon cancer Father    SAL disease Son    Heart disease Father    No Known Problems Sister, Son    Sleep disorder Son          Tobacco Use    Smoking status: Never    Smokeless tobacco: Never   Substance and Sexual Activity    Alcohol use: No     Alcohol/week: 0.0 standard drinks    Drug use: Never    Sexual activity: Yes     Partners: Male      Review of Systems   Unable to perform ROS: Intubated   Objective:     Vital Signs (Most Recent):  Temp: 97.7 °F (36.5 °C) (05/10/23 0657)  Pulse: 77 (05/10/23 0657)  Resp: 20 (05/10/23 0657)  BP: 128/61 (05/10/23 0657)  SpO2: 100 % (05/10/23 0657) Vital Signs (24h Range):  Temp:  [97.7 °F (36.5 °C)] 97.7 °F (36.5  °C)  Pulse:  [77] 77  Resp:  [20] 20  SpO2:  [100 %] 100 %  BP: (128)/(61) 128/61     Weight: 110.7 kg (244 lb)  Body mass index is 44.63 kg/m².    No intake or output data in the 24 hours ending 05/10/23 1003       Physical Exam  Constitutional:       Comments: sedated   HENT:      Head: Normocephalic and atraumatic.      Right Ear: External ear normal.      Left Ear: External ear normal.      Nose: Nose normal.      Mouth/Throat:      Mouth: Mucous membranes are moist.      Pharynx: Oropharynx is clear.   Eyes:      Extraocular Movements: Extraocular movements intact.      Conjunctiva/sclera: Conjunctivae normal.   Neck:      Comments: [facility] CVC    Cardiovascular:      Rate and Rhythm: Normal rate and regular rhythm.      Pulses: Normal pulses.      Heart sounds: Normal heart sounds.   Pulmonary:      Comments: intubated  Abdominal:      General: There is no distension.      Palpations: Abdomen is soft.   Genitourinary:     Comments: vizcaino  Musculoskeletal:         General: No swelling.      Right lower leg: No edema.      Left lower leg: No edema.   Skin:     General: Skin is warm and dry.   Neurological:      Comments: sedated          Vents:       Lines/Drains/Airways       Central Venous Catheter Line  Duration                  Port A Cath Single Lumen -- days              Airway  Duration                  Airway - Non-Surgical 05/10/23 0845 <1 day              Arterial Line  Duration             Arterial Line 05/10/23 0835 Left Other (Comment) <1 day              Peripheral Intravenous Line  Duration                  Peripheral IV - Single Lumen 05/10/23 0737 20 G Left;Posterior Hand <1 day                    Significant Labs:    CBC/Anemia Profile:  No results for input(s): WBC, HGB, HCT, PLT, MCV, RDW, IRON, FERRITIN, RETIC, FOLATE, [id number], OCCULTBLOOD in the last 48 hours.     Chemistries:  No results for input(s): NA, K, CL, CO2, BUN, CREATININE, CALCIUM, ALBUMIN, PROT, BILITOT, ALKPHOS, ALT, AST,  GLUCOSE, MG, PHOS in the last 48 hours.    All pertinent labs within the past 24 hours have been reviewed.    Significant Imaging: I have reviewed all pertinent imaging results/findings within the past 24 hours.

## 2023-05-10 NOTE — TRANSFER OF CARE
"Anesthesia Transfer of Care Note    Patient: Alina Narayan    Procedure(s) Performed: Procedure(s) (LRB):  Replacement-valve-aortic, redo sternotomy (N/A)    Patient location: ICU    Anesthesia Type: general    Transport from OR: Upon arrival to PACU/ICU, patient attached to ventilator and auscultated to confirm bilateral breath sounds and adequate TV. Continuous ECG monitoring in transport. Continuous SpO2 monitoring in transport. Transported from OR intubated on 100% O2  with adequate ventilation controlled by transport ventilator    Post pain: adequate analgesia    Post assessment: no apparent anesthetic complications and tolerated procedure well    Post vital signs: stable    Level of consciousness: sedated    Nausea/Vomiting: no nausea/vomiting    Complications: none    Transfer of care protocol was followed      Last vitals:   Visit Vitals  /61 (BP Location: Left arm, Patient Position: Lying)   Pulse 80   Temp 36.5 °C (97.7 °F) (Oral)   Resp 20   Ht 5' 2" (1.575 m)   Wt 110.7 kg (244 lb)   SpO2 100%   Breastfeeding No   BMI 44.63 kg/m²     "

## 2023-05-10 NOTE — NURSING
Patient arrived intubated and sedated via bed with anesthesia staff. Levo Epi Propofol and Insulin infusing. 4 CTs, CVC, art line, V wires present. Handoff given with Ayo Perales, and Gary at bedside.

## 2023-05-10 NOTE — ASSESSMENT & PLAN NOTE
Initial AVR with Tracey 2013.       Neuro/Psych:     - Sedation: propofol    - Pain:    - Scheduled Tylenol 1g q8h   - Fentanyl PRN while intubated, Oxy PRN              Cardiac:     - S/P redo sternotomy with redo AVR with Dr. Webb on 5/10/2023    - BP Goal: MAP 60-80, SBP < 140     - Cleviprex PRN    - Pressors: epi 0.04 and levo 0.04    - Anti-HTNs: Will start when appropriate    - Rhythm: NSR    - Beta blocker: Will start when appropriate    - Statin: takes pravastatin at home       Pulmonary:     - Goal SpO2 >92%    - Will wean ventilator support as tolerated to extubate    - Chest Tubes x 2 (2 Meds & 2 Pleural)    - ABGs PRN      Renal:    - Trend BUN/Cr     - Maintain Macdonald, record strict Is/Os    Recent Labs   Lab 05/04/23  1309   BUN 22   CREATININE 1.2         FEN / GI:     - Daily CMP, PRN K/Mag/Phos per protocol     - Replace electrolytes as needed    - Nutrition: NPO pending extubation    - Bowel Regimen: Miralax, docusate      ID:     - Afebrile    - WBC stable    - Abx: Complete perioperative cefazolin 2g Q8H x 5 doses    Recent Labs   Lab 05/04/23  1309   WBC 5.33         Heme/Onc:     - Hgb 12.6 pre-operatively    - CBC daily    - ASA 325mg daily, initiate within 6hr of surgery     Recent Labs   Lab 05/04/23  1309   HGB 12.6   *   APTT 26.2   INR 1.0         Endocrine:     - CTS Goal -140    - HgbA1c: 7.3    - Endocrinology consulted for insulin management      PPx:   Feeding:  Analgesia/Sedation:  Thromboembolic Prevention:   HOB >30: Yes  Stress Ulcer:   Glucose Control: Yes, insulin management per Endocrinology     Lines/Drains/Airway:   ETT   Left brachail arterial line   LIJ CVC   Macdonald   Chest Tubes: 2 (2 Mediastinal, 2 Pleural)    Pacing Wires: Temporary ventricular pacing wires      Dispo/Code Status/Palliative:     - Continue SICU Care    - Full Code

## 2023-05-10 NOTE — NURSING
Nurses Note -- 4 Eyes      5/10/2023   6:15 PM      Skin assessed during: Transfer from OR      [x] No Altered Skin Integrity Present    []Prevention Measures Documented      [] Yes- Altered Skin Integrity Present or Discovered   [] LDA Added if Not in Epic (Describe Wound)   [] New Altered Skin Integrity was Present on Admit and Documented in LDA   [] Wound Image Taken    Wound Care Consulted? No    Attending Nurse:  Alcira Kingston RN     Second RN/Staff Member:  Harman Talley RN

## 2023-05-10 NOTE — ASSESSMENT & PLAN NOTE
A1c currently 7.3, takes metformin and glimipride at home. Will hold while inpatient.     -- endocrinology consulted for insulin management   -- BG goal 110-140  -- hypoglycemia protocol

## 2023-05-10 NOTE — ANESTHESIA PROCEDURE NOTES
Intubation    Date/Time: 5/10/2023 8:45 AM  Performed by: Omero Mahoney MD  Authorized by: Phani Banks MD     Intubation:     Induction:  Intravenous    Intubated:  Postinduction    Mask Ventilation:  Easy with oral airway    Attempts:  1    Attempted By:  Resident anesthesiologist    Method of Intubation:  Video laryngoscopy    Blade:  Genet 3    Laryngeal View Grade: Grade I - full view of cords      Difficult Airway Encountered?: No      Complications:  None    Airway Device:  Oral endotracheal tube    Airway Device Size:  7.5    Style/Cuff Inflation:  Cuffed (inflated to minimal occlusive pressure)    Tube secured:  23    Secured at:  The lips    Placement Verified By:  Capnometry    Complicating Factors:  Obesity, short neck and poor neck/head extension    Findings Post-Intubation:  BS equal bilateral and atraumatic/condition of teeth unchanged

## 2023-05-10 NOTE — ANESTHESIA PROCEDURE NOTES
[place]    Diagnosis: ICD-10 code I35. 0  Patient location during procedure: OR  Procedure start time: 5/10/2023 11:19 AM  Timeout: 5/10/2023 11:19 AM  Procedure end time: 5/10/2023 11:19 AM  Exam type: Baseline    Staffing  Performed: anesthesiologist and resident     Anesthesiologist: Phani Banks MD      Resident:Ken Epps MD  Anesthesiologist Present  Yes      Setup & Induction  Probe Insertion: easy  Exam: completeDoppler Echo: 2D, 3D, color flow mapping, continuous wave Doppler and pulse wave Doppler.  Exam     Left Heart  Left Atruim: dilated  Left appendage velocity:31    Left Ventricle: cm, mildy abnormal (men 6.0-6.3; women 5.3-5.6)  LV Wall Thickness (posterior wall):cm, mildly abnormal (men 1.1-1.3 cm; women 1.0-1.2 cm)    LVAD:no  Estimated Ejection Fraction: > 55% normal  Regional Wall Abnormalities: no RWMA            Right Heart  Right Ventricle: normal  Right Ventricle Function: normal  Right Atria:  normal    Intra Atrial Septum  PFO: no shunt by color flow doppler  no IAS aneurysm  no lipomatous hypertrophy  no Atrial Septal Defect (Asd)    Right Ventricle  Size: normal, Free Wall Thickness: normal    Aortic Valve:  Stenosis: severe.  Morphology: prosthetic valve  Prosthesis: bioprosthetic    Regurgitation: no aortic valve regurgitation      Mitral Valve:   Morphology:rheumatic  Restricted: P2  Jet Description: moderate and eccentricStenosis: no significant stenosis.    Tricuspid Valve:  Morphology: normal  Regurgitation: none    Pulmonic Valve:  Morphology:normal  Regurgitation(color flow): none    Great Vessels  Ascending Aorta Atherosclerosis: 2=mild dz (<3mm)  Aortic Arch Atherosclerosis: 2=mild dz (<3mm)  IABP: no  Descending Aorta Atherosclerosis: 2=mild dz (<3mm)  Aorta    Descending aorta IABP: no    Effusions none    SummaryFindings discussed with surgeon.    Other Findings   Pre CPB:    Normal biventricular function   Severe aortic valve stenosis in bioprosthetic valve SAW by  VTI 0.81   No AI   Moderate posteriorly directed MR VC 0.4 MR regurgitant volume 45 cc   No PFO   Mild to moderate TR   No effusions   Aorta intact     Post CPB  AVR with laminar flow. Mean gradient 5   Normal biventricular function   No effusions   Aorta intact

## 2023-05-10 NOTE — HPI
Alina Narayan is a 78 y.o. female who presents for pre-op re-do AVR. She has a medical history significant for pulmonary embolism on chronic AC, s/p AVR (21 mm Trifecta) in 2003, breast cancer (s/p mastectomy, radiation and chemo) who presents for evaluation of a failing 21 mm Trifecta valve, true ID 19. She underwent AVR in December 2013 with Dr Webb with a 21 mmTrifecta. The patient presents to the SICU s/p redo sternotomy AVR  with Dr. Webb on 05/10/2023. When aorta clamp was removed and BBIIANA evaluation was done, patient noted to have mitral stenosis so decision made for MVR. CPB run ended without issues. On admission, they are intubated, sedated with propofol, and in stable condition. Inotropic and vasopressor requirements upon admission are 0.04 mcg/kg/min of epinephrine, 0.04 mcg/kg/min of norepinephrine to maintain blood pressure at a MAP 60-80 and SBP < 140. Central access includes a LIJ CVC, arterial access includes a left brachial arterial line. They also have 2 pleural and 2 mediastinal chest tubes with appropriate output.    Intraoperatively, they received 2L of crystalloid, 747 of cell saver, 1 PRBCs. Urine output intraoperatively was 500 cc. The pre-operative echocardiogram was notable for EF 65%, normal RV function. Post-operative echo was normal biventricular function.    Immediate post-operative plans include hemodynamic stabilization and weaning of cardiac and respiratory support. Plan to wean vasopressors and inotropes as tolerated, and wean ventilator support with goal of early extubation, and closely monitor fluid status with strict Is/Os and continued fluid resuscitation as needed.

## 2023-05-11 PROBLEM — T14.8XXA SURGICAL WOUND PRESENT: Status: ACTIVE | Noted: 2023-05-11

## 2023-05-11 LAB
ALBUMIN SERPL BCP-MCNC: 4.1 G/DL (ref 3.5–5.2)
ALLENS TEST: ABNORMAL
ALP SERPL-CCNC: 34 U/L (ref 55–135)
ALT SERPL W/O P-5'-P-CCNC: 9 U/L (ref 10–44)
ANION GAP SERPL CALC-SCNC: 11 MMOL/L (ref 8–16)
ANION GAP SERPL CALC-SCNC: 8 MMOL/L (ref 8–16)
ANION GAP SERPL CALC-SCNC: 9 MMOL/L (ref 8–16)
ANION GAP SERPL CALC-SCNC: 9 MMOL/L (ref 8–16)
APTT PPP: 32.7 SEC (ref 21–32)
AST SERPL-CCNC: 28 U/L (ref 10–40)
BACTERIA #/AREA URNS AUTO: ABNORMAL /HPF
BASOPHILS # BLD AUTO: 0.01 K/UL (ref 0–0.2)
BASOPHILS NFR BLD: 0.1 % (ref 0–1.9)
BILIRUB SERPL-MCNC: 0.6 MG/DL (ref 0.1–1)
BILIRUB UR QL STRIP: NEGATIVE
BUN SERPL-MCNC: 23 MG/DL (ref 8–23)
BUN SERPL-MCNC: 23 MG/DL (ref 8–23)
BUN SERPL-MCNC: 24 MG/DL (ref 8–23)
BUN SERPL-MCNC: 25 MG/DL (ref 8–23)
CALCIUM SERPL-MCNC: 7.8 MG/DL (ref 8.7–10.5)
CALCIUM SERPL-MCNC: 8.3 MG/DL (ref 8.7–10.5)
CALCIUM SERPL-MCNC: 8.3 MG/DL (ref 8.7–10.5)
CALCIUM SERPL-MCNC: 8.6 MG/DL (ref 8.7–10.5)
CHLORIDE SERPL-SCNC: 106 MMOL/L (ref 95–110)
CHLORIDE SERPL-SCNC: 108 MMOL/L (ref 95–110)
CHLORIDE SERPL-SCNC: 109 MMOL/L (ref 95–110)
CHLORIDE SERPL-SCNC: 109 MMOL/L (ref 95–110)
CLARITY UR REFRACT.AUTO: ABNORMAL
CO2 SERPL-SCNC: 20 MMOL/L (ref 23–29)
CO2 SERPL-SCNC: 21 MMOL/L (ref 23–29)
CO2 SERPL-SCNC: 21 MMOL/L (ref 23–29)
CO2 SERPL-SCNC: 22 MMOL/L (ref 23–29)
COLOR UR AUTO: YELLOW
CREAT SERPL-MCNC: 1.3 MG/DL (ref 0.5–1.4)
CREAT SERPL-MCNC: 1.5 MG/DL (ref 0.5–1.4)
CREAT UR-MCNC: 79 MG/DL (ref 15–325)
DELSYS: ABNORMAL
DIFFERENTIAL METHOD: ABNORMAL
EOSINOPHIL # BLD AUTO: 0 K/UL (ref 0–0.5)
EOSINOPHIL NFR BLD: 0 % (ref 0–8)
ERYTHROCYTE [DISTWIDTH] IN BLOOD BY AUTOMATED COUNT: 15 % (ref 11.5–14.5)
ERYTHROCYTE [SEDIMENTATION RATE] IN BLOOD BY WESTERGREN METHOD: 20 MM/H
EST. GFR  (NO RACE VARIABLE): 35.4 ML/MIN/1.73 M^2
EST. GFR  (NO RACE VARIABLE): 42.1 ML/MIN/1.73 M^2
FIBRINOGEN PPP-MCNC: 160 MG/DL (ref 182–400)
FIO2: 40
GLUCOSE SERPL-MCNC: 138 MG/DL (ref 70–110)
GLUCOSE SERPL-MCNC: 141 MG/DL (ref 70–110)
GLUCOSE SERPL-MCNC: 143 MG/DL (ref 70–110)
GLUCOSE SERPL-MCNC: 166 MG/DL (ref 70–110)
GLUCOSE UR QL STRIP: NEGATIVE
HCO3 UR-SCNC: 21.6 MMOL/L (ref 24–28)
HCO3 UR-SCNC: 21.8 MMOL/L (ref 24–28)
HCO3 UR-SCNC: 22.4 MMOL/L (ref 24–28)
HCO3 UR-SCNC: 23.3 MMOL/L (ref 24–28)
HCT VFR BLD AUTO: 23.2 % (ref 37–48.5)
HCT VFR BLD CALC: 19 %PCV (ref 36–54)
HCT VFR BLD CALC: 22 %PCV (ref 36–54)
HCT VFR BLD CALC: 23 %PCV (ref 36–54)
HGB BLD-MCNC: 7.8 G/DL (ref 12–16)
HGB UR QL STRIP: ABNORMAL
HYALINE CASTS UR QL AUTO: 4 /LPF
IMM GRANULOCYTES # BLD AUTO: 0.08 K/UL (ref 0–0.04)
IMM GRANULOCYTES NFR BLD AUTO: 0.7 % (ref 0–0.5)
INR PPP: 1.1 (ref 0.8–1.2)
KETONES UR QL STRIP: NEGATIVE
LDH SERPL L TO P-CCNC: 1.28 MMOL/L (ref 0.36–1.25)
LEUKOCYTE ESTERASE UR QL STRIP: NEGATIVE
LYMPHOCYTES # BLD AUTO: 0.7 K/UL (ref 1–4.8)
LYMPHOCYTES NFR BLD: 6.3 % (ref 18–48)
MAGNESIUM SERPL-MCNC: 2.4 MG/DL (ref 1.6–2.6)
MAGNESIUM SERPL-MCNC: 2.5 MG/DL (ref 1.6–2.6)
MAGNESIUM SERPL-MCNC: 2.6 MG/DL (ref 1.6–2.6)
MAGNESIUM SERPL-MCNC: 2.9 MG/DL (ref 1.6–2.6)
MCH RBC QN AUTO: 29.4 PG (ref 27–31)
MCHC RBC AUTO-ENTMCNC: 33.6 G/DL (ref 32–36)
MCV RBC AUTO: 88 FL (ref 82–98)
MICROSCOPIC COMMENT: ABNORMAL
MIN VOL: 7.4
MODE: ABNORMAL
MONOCYTES # BLD AUTO: 1.6 K/UL (ref 0.3–1)
MONOCYTES NFR BLD: 14.1 % (ref 4–15)
NEUTROPHILS # BLD AUTO: 8.7 K/UL (ref 1.8–7.7)
NEUTROPHILS NFR BLD: 78.8 % (ref 38–73)
NITRITE UR QL STRIP: NEGATIVE
NRBC BLD-RTO: 0 /100 WBC
PCO2 BLDA: 33.8 MMHG (ref 35–45)
PCO2 BLDA: 35.7 MMHG (ref 35–45)
PCO2 BLDA: 39.4 MMHG (ref 35–45)
PCO2 BLDA: 43.2 MMHG (ref 35–45)
PEEP: 5.4
PH SMN: 7.32 [PH] (ref 7.35–7.45)
PH SMN: 7.38 [PH] (ref 7.35–7.45)
PH SMN: 7.39 [PH] (ref 7.35–7.45)
PH SMN: 7.42 [PH] (ref 7.35–7.45)
PH UR STRIP: 5 [PH] (ref 5–8)
PHOSPHATE SERPL-MCNC: 3.5 MG/DL (ref 2.7–4.5)
PHOSPHATE SERPL-MCNC: 3.8 MG/DL (ref 2.7–4.5)
PHOSPHATE SERPL-MCNC: 4 MG/DL (ref 2.7–4.5)
PHOSPHATE SERPL-MCNC: 4.1 MG/DL (ref 2.7–4.5)
PIP: 19
PLATELET # BLD AUTO: 51 K/UL (ref 150–450)
PMV BLD AUTO: 11.9 FL (ref 9.2–12.9)
PO2 BLDA: 148 MMHG (ref 80–100)
PO2 BLDA: 151 MMHG (ref 80–100)
PO2 BLDA: 165 MMHG (ref 80–100)
PO2 BLDA: 34 MMHG (ref 40–60)
POC BE: -2 MMOL/L
POC BE: -3 MMOL/L
POC BE: -3 MMOL/L
POC BE: -4 MMOL/L
POC IONIZED CALCIUM: 1.13 MMOL/L (ref 1.06–1.42)
POC IONIZED CALCIUM: 1.17 MMOL/L (ref 1.06–1.42)
POC IONIZED CALCIUM: 1.19 MMOL/L (ref 1.06–1.42)
POC SATURATED O2: 100 % (ref 95–100)
POC SATURATED O2: 60 % (ref 95–100)
POC SATURATED O2: 99 % (ref 95–100)
POC SATURATED O2: 99 % (ref 95–100)
POC TCO2: 23 MMOL/L (ref 23–27)
POC TCO2: 23 MMOL/L (ref 23–27)
POC TCO2: 24 MMOL/L (ref 23–27)
POC TCO2: 24 MMOL/L (ref 24–29)
POCT GLUCOSE: 103 MG/DL (ref 70–110)
POCT GLUCOSE: 112 MG/DL (ref 70–110)
POCT GLUCOSE: 136 MG/DL (ref 70–110)
POCT GLUCOSE: 143 MG/DL (ref 70–110)
POCT GLUCOSE: 144 MG/DL (ref 70–110)
POCT GLUCOSE: 145 MG/DL (ref 70–110)
POCT GLUCOSE: 146 MG/DL (ref 70–110)
POCT GLUCOSE: 148 MG/DL (ref 70–110)
POCT GLUCOSE: 149 MG/DL (ref 70–110)
POCT GLUCOSE: 152 MG/DL (ref 70–110)
POCT GLUCOSE: 152 MG/DL (ref 70–110)
POCT GLUCOSE: 158 MG/DL (ref 70–110)
POCT GLUCOSE: 159 MG/DL (ref 70–110)
POCT GLUCOSE: 98 MG/DL (ref 70–110)
POTASSIUM BLD-SCNC: 3.9 MMOL/L (ref 3.5–5.1)
POTASSIUM BLD-SCNC: 4.2 MMOL/L (ref 3.5–5.1)
POTASSIUM BLD-SCNC: 4.5 MMOL/L (ref 3.5–5.1)
POTASSIUM SERPL-SCNC: 3.8 MMOL/L (ref 3.5–5.1)
POTASSIUM SERPL-SCNC: 3.9 MMOL/L (ref 3.5–5.1)
POTASSIUM SERPL-SCNC: 3.9 MMOL/L (ref 3.5–5.1)
POTASSIUM SERPL-SCNC: 4.2 MMOL/L (ref 3.5–5.1)
POTASSIUM SERPL-SCNC: 4.3 MMOL/L (ref 3.5–5.1)
POTASSIUM SERPL-SCNC: 4.4 MMOL/L (ref 3.5–5.1)
POTASSIUM SERPL-SCNC: 4.6 MMOL/L (ref 3.5–5.1)
PROT SERPL-MCNC: 5.1 G/DL (ref 6–8.4)
PROT UR QL STRIP: ABNORMAL
PROTHROMBIN TIME: 12.2 SEC (ref 9–12.5)
RBC # BLD AUTO: 2.65 M/UL (ref 4–5.4)
RBC #/AREA URNS AUTO: 9 /HPF (ref 0–4)
SAMPLE: ABNORMAL
SITE: ABNORMAL
SODIUM BLD-SCNC: 140 MMOL/L (ref 136–145)
SODIUM BLD-SCNC: 141 MMOL/L (ref 136–145)
SODIUM BLD-SCNC: 143 MMOL/L (ref 136–145)
SODIUM SERPL-SCNC: 137 MMOL/L (ref 136–145)
SODIUM SERPL-SCNC: 138 MMOL/L (ref 136–145)
SODIUM SERPL-SCNC: 139 MMOL/L (ref 136–145)
SODIUM SERPL-SCNC: 139 MMOL/L (ref 136–145)
SODIUM UR-SCNC: 46 MMOL/L (ref 20–250)
SP GR UR STRIP: 1.03 (ref 1–1.03)
SP02: 100
SQUAMOUS #/AREA URNS AUTO: 0 /HPF
UNSPECIFIED CRY UR QL COMP ASSIST: 7
URN SPEC COLLECT METH UR: ABNORMAL
VT: 372
WBC # BLD AUTO: 10.99 K/UL (ref 3.9–12.7)
WBC #/AREA URNS AUTO: 4 /HPF (ref 0–5)

## 2023-05-11 PROCEDURE — 84100 ASSAY OF PHOSPHORUS: CPT | Mod: 91

## 2023-05-11 PROCEDURE — 83735 ASSAY OF MAGNESIUM: CPT | Mod: 91 | Performed by: STUDENT IN AN ORGANIZED HEALTH CARE EDUCATION/TRAINING PROGRAM

## 2023-05-11 PROCEDURE — 82330 ASSAY OF CALCIUM: CPT

## 2023-05-11 PROCEDURE — 80048 BASIC METABOLIC PNL TOTAL CA: CPT | Mod: XB | Performed by: THORACIC SURGERY (CARDIOTHORACIC VASCULAR SURGERY)

## 2023-05-11 PROCEDURE — 81001 URINALYSIS AUTO W/SCOPE: CPT | Performed by: SURGERY

## 2023-05-11 PROCEDURE — 25000003 PHARM REV CODE 250: Performed by: NURSE PRACTITIONER

## 2023-05-11 PROCEDURE — 80048 BASIC METABOLIC PNL TOTAL CA: CPT | Mod: 91,XB

## 2023-05-11 PROCEDURE — 85610 PROTHROMBIN TIME: CPT | Performed by: PHYSICIAN ASSISTANT

## 2023-05-11 PROCEDURE — 20000000 HC ICU ROOM

## 2023-05-11 PROCEDURE — 94150 VITAL CAPACITY TEST: CPT

## 2023-05-11 PROCEDURE — 99900026 HC AIRWAY MAINTENANCE (STAT)

## 2023-05-11 PROCEDURE — P9045 ALBUMIN (HUMAN), 5%, 250 ML: HCPCS | Mod: JZ,JG

## 2023-05-11 PROCEDURE — P9045 ALBUMIN (HUMAN), 5%, 250 ML: HCPCS | Mod: JZ,JG | Performed by: STUDENT IN AN ORGANIZED HEALTH CARE EDUCATION/TRAINING PROGRAM

## 2023-05-11 PROCEDURE — 94799 UNLISTED PULMONARY SVC/PX: CPT

## 2023-05-11 PROCEDURE — 63600175 PHARM REV CODE 636 W HCPCS: Performed by: NURSE PRACTITIONER

## 2023-05-11 PROCEDURE — 99223 PR INITIAL HOSPITAL CARE,LEVL III: ICD-10-PCS | Mod: ,,, | Performed by: NURSE PRACTITIONER

## 2023-05-11 PROCEDURE — 83605 ASSAY OF LACTIC ACID: CPT

## 2023-05-11 PROCEDURE — 80053 COMPREHEN METABOLIC PANEL: CPT | Performed by: STUDENT IN AN ORGANIZED HEALTH CARE EDUCATION/TRAINING PROGRAM

## 2023-05-11 PROCEDURE — 83735 ASSAY OF MAGNESIUM: CPT | Mod: 91 | Performed by: THORACIC SURGERY (CARDIOTHORACIC VASCULAR SURGERY)

## 2023-05-11 PROCEDURE — 94640 AIRWAY INHALATION TREATMENT: CPT

## 2023-05-11 PROCEDURE — P9045 ALBUMIN (HUMAN), 5%, 250 ML: HCPCS | Mod: JZ,JG | Performed by: SURGERY

## 2023-05-11 PROCEDURE — 84132 ASSAY OF SERUM POTASSIUM: CPT | Mod: 91 | Performed by: PHYSICIAN ASSISTANT

## 2023-05-11 PROCEDURE — 27000513 HC SENSOR FLOTRAC

## 2023-05-11 PROCEDURE — 63600175 PHARM REV CODE 636 W HCPCS: Mod: JZ,JG | Performed by: SURGERY

## 2023-05-11 PROCEDURE — 99223 1ST HOSP IP/OBS HIGH 75: CPT | Mod: ,,, | Performed by: NURSE PRACTITIONER

## 2023-05-11 PROCEDURE — 63600175 PHARM REV CODE 636 W HCPCS: Mod: JZ,JG

## 2023-05-11 PROCEDURE — 27000221 HC OXYGEN, UP TO 24 HOURS

## 2023-05-11 PROCEDURE — 84100 ASSAY OF PHOSPHORUS: CPT | Mod: 91 | Performed by: STUDENT IN AN ORGANIZED HEALTH CARE EDUCATION/TRAINING PROGRAM

## 2023-05-11 PROCEDURE — 84295 ASSAY OF SERUM SODIUM: CPT

## 2023-05-11 PROCEDURE — C9248 INJ, CLEVIDIPINE BUTYRATE: HCPCS | Mod: JZ,JG

## 2023-05-11 PROCEDURE — 27200667 HC PACEMAKER, TEMPORARY MONITORING, PER SHIFT

## 2023-05-11 PROCEDURE — 37799 UNLISTED PX VASCULAR SURGERY: CPT

## 2023-05-11 PROCEDURE — 25000242 PHARM REV CODE 250 ALT 637 W/ HCPCS: Performed by: PHYSICIAN ASSISTANT

## 2023-05-11 PROCEDURE — 63600175 PHARM REV CODE 636 W HCPCS: Performed by: PHYSICIAN ASSISTANT

## 2023-05-11 PROCEDURE — 99291 CRITICAL CARE FIRST HOUR: CPT | Mod: ,,, | Performed by: ANESTHESIOLOGY

## 2023-05-11 PROCEDURE — 99291 PR CRITICAL CARE, E/M 30-74 MINUTES: ICD-10-PCS | Mod: ,,, | Performed by: ANESTHESIOLOGY

## 2023-05-11 PROCEDURE — 25000003 PHARM REV CODE 250: Performed by: STUDENT IN AN ORGANIZED HEALTH CARE EDUCATION/TRAINING PROGRAM

## 2023-05-11 PROCEDURE — 63600175 PHARM REV CODE 636 W HCPCS: Performed by: STUDENT IN AN ORGANIZED HEALTH CARE EDUCATION/TRAINING PROGRAM

## 2023-05-11 PROCEDURE — 36600 WITHDRAWAL OF ARTERIAL BLOOD: CPT

## 2023-05-11 PROCEDURE — 25000003 PHARM REV CODE 250: Performed by: PHYSICIAN ASSISTANT

## 2023-05-11 PROCEDURE — 94761 N-INVAS EAR/PLS OXIMETRY MLT: CPT

## 2023-05-11 PROCEDURE — 85384 FIBRINOGEN ACTIVITY: CPT | Performed by: SURGERY

## 2023-05-11 PROCEDURE — 82570 ASSAY OF URINE CREATININE: CPT | Performed by: SURGERY

## 2023-05-11 PROCEDURE — 82800 BLOOD PH: CPT

## 2023-05-11 PROCEDURE — 99900035 HC TECH TIME PER 15 MIN (STAT)

## 2023-05-11 PROCEDURE — 83735 ASSAY OF MAGNESIUM: CPT | Performed by: PHYSICIAN ASSISTANT

## 2023-05-11 PROCEDURE — 83735 ASSAY OF MAGNESIUM: CPT | Mod: 91

## 2023-05-11 PROCEDURE — 80048 BASIC METABOLIC PNL TOTAL CA: CPT | Mod: 91,XB | Performed by: STUDENT IN AN ORGANIZED HEALTH CARE EDUCATION/TRAINING PROGRAM

## 2023-05-11 PROCEDURE — 84132 ASSAY OF SERUM POTASSIUM: CPT

## 2023-05-11 PROCEDURE — C9248 INJ, CLEVIDIPINE BUTYRATE: HCPCS | Mod: JZ,JG | Performed by: STUDENT IN AN ORGANIZED HEALTH CARE EDUCATION/TRAINING PROGRAM

## 2023-05-11 PROCEDURE — 25000003 PHARM REV CODE 250

## 2023-05-11 PROCEDURE — 85014 HEMATOCRIT: CPT

## 2023-05-11 PROCEDURE — 85025 COMPLETE CBC W/AUTO DIFF WBC: CPT | Performed by: PHYSICIAN ASSISTANT

## 2023-05-11 PROCEDURE — 82803 BLOOD GASES ANY COMBINATION: CPT

## 2023-05-11 PROCEDURE — 84100 ASSAY OF PHOSPHORUS: CPT | Performed by: PHYSICIAN ASSISTANT

## 2023-05-11 PROCEDURE — 84300 ASSAY OF URINE SODIUM: CPT | Performed by: SURGERY

## 2023-05-11 PROCEDURE — 94003 VENT MGMT INPAT SUBQ DAY: CPT

## 2023-05-11 PROCEDURE — 84132 ASSAY OF SERUM POTASSIUM: CPT | Performed by: STUDENT IN AN ORGANIZED HEALTH CARE EDUCATION/TRAINING PROGRAM

## 2023-05-11 PROCEDURE — 85730 THROMBOPLASTIN TIME PARTIAL: CPT | Performed by: PHYSICIAN ASSISTANT

## 2023-05-11 PROCEDURE — 84100 ASSAY OF PHOSPHORUS: CPT | Mod: 91 | Performed by: THORACIC SURGERY (CARDIOTHORACIC VASCULAR SURGERY)

## 2023-05-11 RX ORDER — FUROSEMIDE 10 MG/ML
20 INJECTION INTRAMUSCULAR; INTRAVENOUS ONCE
Status: COMPLETED | OUTPATIENT
Start: 2023-05-11 | End: 2023-05-11

## 2023-05-11 RX ORDER — ALBUMIN HUMAN 50 G/1000ML
25 SOLUTION INTRAVENOUS ONCE
Status: COMPLETED | OUTPATIENT
Start: 2023-05-11 | End: 2023-05-11

## 2023-05-11 RX ORDER — DEXMEDETOMIDINE HYDROCHLORIDE 4 UG/ML
0-1.4 INJECTION, SOLUTION INTRAVENOUS CONTINUOUS
Status: DISCONTINUED | OUTPATIENT
Start: 2023-05-11 | End: 2023-05-11

## 2023-05-11 RX ORDER — FAMOTIDINE 10 MG/ML
20 INJECTION INTRAVENOUS DAILY
Status: DISCONTINUED | OUTPATIENT
Start: 2023-05-12 | End: 2023-05-11

## 2023-05-11 RX ORDER — ACETAMINOPHEN 500 MG
1000 TABLET ORAL EVERY 8 HOURS
Status: DISCONTINUED | OUTPATIENT
Start: 2023-05-11 | End: 2023-05-12

## 2023-05-11 RX ORDER — DEXTROSE 40 %
15 GEL (GRAM) ORAL
Status: DISCONTINUED | OUTPATIENT
Start: 2023-05-11 | End: 2023-05-19 | Stop reason: HOSPADM

## 2023-05-11 RX ORDER — GLUCAGON 1 MG
1 KIT INJECTION
Status: DISCONTINUED | OUTPATIENT
Start: 2023-05-11 | End: 2023-05-14

## 2023-05-11 RX ORDER — PRAVASTATIN SODIUM 20 MG/1
20 TABLET ORAL DAILY
Status: DISCONTINUED | OUTPATIENT
Start: 2023-05-11 | End: 2023-05-19 | Stop reason: HOSPADM

## 2023-05-11 RX ORDER — INSULIN ASPART 100 [IU]/ML
0-5 INJECTION, SOLUTION INTRAVENOUS; SUBCUTANEOUS
Status: DISCONTINUED | OUTPATIENT
Start: 2023-05-11 | End: 2023-05-12

## 2023-05-11 RX ORDER — DEXTROSE 40 %
30 GEL (GRAM) ORAL
Status: DISCONTINUED | OUTPATIENT
Start: 2023-05-11 | End: 2023-05-19 | Stop reason: HOSPADM

## 2023-05-11 RX ORDER — FAMOTIDINE 20 MG/1
20 TABLET, FILM COATED ORAL DAILY
Status: DISCONTINUED | OUTPATIENT
Start: 2023-05-11 | End: 2023-05-13

## 2023-05-11 RX ORDER — HYDROMORPHONE HYDROCHLORIDE 1 MG/ML
0.5 INJECTION, SOLUTION INTRAMUSCULAR; INTRAVENOUS; SUBCUTANEOUS EVERY 4 HOURS PRN
Status: DISCONTINUED | OUTPATIENT
Start: 2023-05-11 | End: 2023-05-15

## 2023-05-11 RX ADMIN — ALBUMIN (HUMAN) 25 G: 12.5 SOLUTION INTRAVENOUS at 05:05

## 2023-05-11 RX ADMIN — IPRATROPIUM BROMIDE AND ALBUTEROL SULFATE 3 ML: .5; 3 SOLUTION RESPIRATORY (INHALATION) at 12:05

## 2023-05-11 RX ADMIN — INSULIN HUMAN 1 UNITS/HR: 1 INJECTION, SOLUTION INTRAVENOUS at 10:05

## 2023-05-11 RX ADMIN — MUPIROCIN 1 G: 20 OINTMENT TOPICAL at 08:05

## 2023-05-11 RX ADMIN — INSULIN HUMAN 1.55 UNITS/HR: 1 INJECTION, SOLUTION INTRAVENOUS at 02:05

## 2023-05-11 RX ADMIN — DEXMEDETOMIDINE HYDROCHLORIDE 0.2 MCG/KG/HR: 4 INJECTION INTRAVENOUS at 09:05

## 2023-05-11 RX ADMIN — INSULIN HUMAN 2.1 UNITS/HR: 1 INJECTION, SOLUTION INTRAVENOUS at 03:05

## 2023-05-11 RX ADMIN — CEFAZOLIN 2 G: 2 INJECTION, POWDER, FOR SOLUTION INTRAMUSCULAR; INTRAVENOUS at 08:05

## 2023-05-11 RX ADMIN — POLYETHYLENE GLYCOL 3350 17 G: 17 POWDER, FOR SOLUTION ORAL at 08:05

## 2023-05-11 RX ADMIN — PROPOFOL 5 MCG/KG/MIN: 10 INJECTION, EMULSION INTRAVENOUS at 07:05

## 2023-05-11 RX ADMIN — IPRATROPIUM BROMIDE AND ALBUTEROL SULFATE 3 ML: .5; 3 SOLUTION RESPIRATORY (INHALATION) at 05:05

## 2023-05-11 RX ADMIN — HYDROMORPHONE HYDROCHLORIDE 0.5 MG: 1 INJECTION, SOLUTION INTRAMUSCULAR; INTRAVENOUS; SUBCUTANEOUS at 09:05

## 2023-05-11 RX ADMIN — CLEVIPIDINE 6 MG/HR: 0.5 EMULSION INTRAVENOUS at 02:05

## 2023-05-11 RX ADMIN — PROPOFOL 15 MCG/KG/MIN: 10 INJECTION, EMULSION INTRAVENOUS at 01:05

## 2023-05-11 RX ADMIN — ALBUMIN (HUMAN) 25 G: 12.5 SOLUTION INTRAVENOUS at 12:05

## 2023-05-11 RX ADMIN — CLEVIPIDINE 50 MG: 0.5 EMULSION INTRAVENOUS at 03:05

## 2023-05-11 RX ADMIN — ACETAMINOPHEN 975 MG: 325 TABLET ORAL at 05:05

## 2023-05-11 RX ADMIN — FENTANYL CITRATE 25 MCG: 50 INJECTION, SOLUTION INTRAMUSCULAR; INTRAVENOUS at 08:05

## 2023-05-11 RX ADMIN — HYDROMORPHONE HYDROCHLORIDE 0.5 MG: 1 INJECTION, SOLUTION INTRAMUSCULAR; INTRAVENOUS; SUBCUTANEOUS at 03:05

## 2023-05-11 RX ADMIN — OXYCODONE HYDROCHLORIDE 10 MG: 10 TABLET ORAL at 11:05

## 2023-05-11 RX ADMIN — FAMOTIDINE 20 MG: 10 INJECTION, SOLUTION INTRAVENOUS at 08:05

## 2023-05-11 RX ADMIN — IPRATROPIUM BROMIDE AND ALBUTEROL SULFATE 3 ML: .5; 3 SOLUTION RESPIRATORY (INHALATION) at 03:05

## 2023-05-11 RX ADMIN — EPINEPHRINE 0.01 MCG/KG/MIN: 1 INJECTION INTRAMUSCULAR; INTRAVENOUS; SUBCUTANEOUS at 08:05

## 2023-05-11 RX ADMIN — PRAVASTATIN SODIUM 20 MG: 20 TABLET ORAL at 08:05

## 2023-05-11 RX ADMIN — OXYCODONE 5 MG: 5 TABLET ORAL at 07:05

## 2023-05-11 RX ADMIN — OXYCODONE HYDROCHLORIDE 10 MG: 10 TABLET ORAL at 05:05

## 2023-05-11 RX ADMIN — CEFAZOLIN 2 G: 2 INJECTION, POWDER, FOR SOLUTION INTRAMUSCULAR; INTRAVENOUS at 03:05

## 2023-05-11 RX ADMIN — ACETAMINOPHEN 1000 MG: 500 TABLET ORAL at 03:05

## 2023-05-11 RX ADMIN — IPRATROPIUM BROMIDE AND ALBUTEROL SULFATE 3 ML: .5; 3 SOLUTION RESPIRATORY (INHALATION) at 07:05

## 2023-05-11 RX ADMIN — ASPIRIN 325 MG: 325 TABLET ORAL at 08:05

## 2023-05-11 RX ADMIN — INSULIN ASPART 1 UNITS: 100 INJECTION, SOLUTION INTRAVENOUS; SUBCUTANEOUS at 06:05

## 2023-05-11 RX ADMIN — ACETAMINOPHEN 1000 MG: 500 TABLET ORAL at 09:05

## 2023-05-11 RX ADMIN — LEVOTHYROXINE SODIUM 75 MCG: 75 TABLET ORAL at 05:05

## 2023-05-11 RX ADMIN — FUROSEMIDE 20 MG: 10 INJECTION, SOLUTION INTRAMUSCULAR; INTRAVENOUS at 03:05

## 2023-05-11 RX ADMIN — CEFAZOLIN 2 G: 2 INJECTION, POWDER, FOR SOLUTION INTRAMUSCULAR; INTRAVENOUS at 11:05

## 2023-05-11 RX ADMIN — MUPIROCIN 1 G: 20 OINTMENT TOPICAL at 09:05

## 2023-05-11 RX ADMIN — ALBUMIN (HUMAN) 25 G: 12.5 SOLUTION INTRAVENOUS at 09:05

## 2023-05-11 NOTE — BRIEF OP NOTE
[facility] - Surgical Intensive Care  Cardiothoracic Surgery  Operative Note    SUMMARY     Date of Procedure: 5/10/2023     Procedure: Procedure(s) (LRB):  1)  Re-Replacement-valve-aortic with a small Corcym Perceval bovine pericardial bioprosthesis  12906-03    2)  Redo sternotomy  53124    Surgeon(s) and Role:     * Jerry Webb MD - Primary     * Bradley Morris MD - Resident - Observing     * Ca Silver MD - Fellow    Assisting Surgeon: None    Pre-Operative Diagnosis: S/P AVR (aortic valve replacement) [Z95.2]  Nonrheumatic aortic valve stenosis [I35.0]  Prosthetic valve dysfunction, subsequent encounter [T82.09XD]    Post-Operative Diagnosis: Post-Op Diagnosis Codes:     * S/P AVR (aortic valve replacement) [Z95.2]     * Nonrheumatic aortic valve stenosis [I35.0]     * Prosthetic valve dysfunction, subsequent encounter [T82.09XD]    Anesthesia: General    Operative Findings (including complications, if any):  Calcification and degeneration of prosthetic valve.      Estimated Blood Loss (EBL): 100 mL         Implants:   Implant Name Type Inv. Item Serial No.  Lot No. LRB No. Used Action   corcym perceval plus aortic valve size small   [id number] [id number]  N/A 1 Implanted       Specimens:   Specimen (24h ago, onward)       Start     Ordered    05/10/23 1425  Specimen to Pathology, Surgery Cardiovascular  Once        Comments: Pre-op Diagnosis: S/P AVR (aortic valve replacement) [Z95.2]Nonrheumatic aortic valve stenosis [I35.0]Prosthetic valve dysfunction, subsequent encounter [T82.09XD]Procedure(s):Replacement-valve-aortic, redo sternotomy Number of specimens: 1Name of specimens: 1.) aortic valve for permanent     References:    Click here for ordering Quick Tip   Question Answer Comment   Procedure Type: Cardiovascular    Which provider would you like to cc? JERRY WEBB    Release to patient Immediate        05/10/23 1425                    Attestation:  I was present and  scrubbed for the procedure.

## 2023-05-11 NOTE — CONSULTS
[facility]  Endocrinology  Diabetes Consult Note    Consult Requested by: Venkat Webb MD   Reason for admit: Nonrheumatic aortic valve stenosis    HISTORY OF PRESENT ILLNESS:  Reason for Consult: Management of T2DM, Hyperglycemia     Surgical Procedure and Date: AVR 5/10/22    Diabetes diagnosis year: ~ 2010    Home Diabetes Medications:  metformin 500 mg BID and glimepiride 2 mg daily     How often checking glucose at home? One   BG readings on regimen: 's  Hypoglycemia on the regimen?  No  Missed doses on regimen?  No    Diabetes Complications include:     Hyperglycemia and Diabetic peripheral neuropathy     Complicating diabetes co morbidities:   HTN; Hypothryroidism; HLD      HPI:   Patient is a 78 y.o. female with a diagnosis of pulmonary embolism on chronic AC, s/p AVR (21 mm Trifecta) in 2003, breast cancer (s/p mastectomy, radiation and chemo), and type 2 DM.  Patient is s/p AVR. Endocrinology consulted for BG/ DM management.     Lab Results   Component Value Date    HGBA1C 7.3 (H) 05/04/2023             Interval HPI:   Overnight events: Received in ICU. Remains intubated and sedated. Pressor support. 1 Day Post-Op. BG stable on IV insulin infusion at 1.5-2.7 u/hr.   Eating:,Diet NPO Except for: Sips with Medication  Nausea: No  Hypoglycemia and intervention: No  Fever: No  TPN and/or TF: No    PMH, PSH, FH, SH  reviewed     ROS:  Unable to obtain due to: Sedation,Intubation,Altered mental status,Critical illness,Reviewed ROS from note dated 5/10/23 per Dr. Silver.     Review of Systems  Constitutional: Negative for weight changes.  Eyes: Negative for visual disturbance.  Respiratory: Negative for cough.   Cardiovascular: Negative for chest pain.  Gastrointestinal: Negative for nausea.  Endocrine: Negative for polyuria, polydipsia.  Musculoskeletal: Negative for back pain.  Skin: Negative for rash.  Neurological: Negative for syncope.  Psychiatric/Behavioral: Negative for  depression.      Current Medications and/or Treatments Impacting Glycemic Control  Immunotherapy:    Immunosuppressants       None          Steroids:   Hormones (From admission, onward)      None          Pressors:    Autonomic Drugs (From admission, onward)      Start     Stop Route Frequency Ordered    05/10/23 1700  NORepinephrine 4 mg in dextrose 5% 250 mL infusion (premix) (titrating)        Question Answer Comment   Begin at (in mcg/kg/min): 0.02    Titrate by: (in mcg/kg/min) 0.02    Titrate interval: (in minutes) 5    Titrate to maintain: (MAP or SBP) MAP    Greater than: (in mmHg) 60    Maximum dose: (in mcg/kg/min) 3        -- IV Continuous 05/10/23 1655    05/10/23 1700  EPINEPHrine 5 mg in dextrose 5% 250 mL infusion (premix)        Question Answer Comment   Begin at (in mcg/kg/min): 0.02    Titrate by: (in mcg/kg/min) 0.02    Titrate interval: (in minutes) 5    Titrate to maintain: (SBP or MAP or Cardiac Index) MAP    Greater than: (in mmHg) 60    Maximum dose: (in mcg/kg/min) 2        -- IV Continuous 05/10/23 1655          Hyperglycemia/Diabetes Medications:   Antihyperglycemics (From admission, onward)      Start     Stop Route Frequency Ordered    05/10/23 1700  insulin regular in 0.9 % NaCl 100 unit/100 mL (1 unit/mL) infusion        Question Answer Comment   Insulin rate changes (DO NOT MODIFY ANSWER) [email or web address] [email or web address] [email or web address]    Enter initial dose (Units/hr): 1        -- IV Continuous 05/10/23 1656             PHYSICAL EXAMINATION:  Vitals:    05/11/23 0808   BP:    Pulse:    Resp: (!) 43   Temp: 97.7 °F (36.5 °C)     Body mass index is 49.03 kg/m².     Physical Exam  Constitutional: Well developed, well nourished, NAD.  ENT: External ears no masses with nose patent  Neck: Supple; trachea midline  Cardiovascular: Normal heart sounds, no LE edema. DP +2 bilaterally.  Lungs: Normal effort; lungs anterior bilaterally clear to auscultation.  Abdomen:  Soft, no masses, no hernias.  Hypoactive BS noted.  MS: No clubbing or cyanosis of nails noted; unable to assess gait.  Skin: No rashes, lesions, or ulcers; no nodules.  Mid-sternal incision with telfa island dressing, CDI.  CT x 2.  Psychiatric: Good judgement and insight; normal mood and affect.  Neurological: Cranial nerves are grossly intact. Normal vibration sense in the bilateral lower extremities.  Foot: Nails in good condition, no amputations noted.             Labs Reviewed and Include   Recent Labs   Lab 05/11/23  0235 05/11/23  0546 05/11/23  1525   *   < > 166*   CALCIUM 8.3*   < > 8.3*   ALBUMIN 4.1  --   --    PROT 5.1*  --   --       < > 139   K 4.6   < > 4.2   CO2 22*   < > 21*      < > 109   BUN 23   < > 24*   CREATININE 1.3   < > 1.5*   ALKPHOS 34*  --   --    ALT 9*  --   --    AST 28  --   --    BILITOT 0.6  --   --     < > = values in this interval not displayed.     Lab Results   Component Value Date    WBC 10.99 05/11/2023    HGB 7.8 (L) 05/11/2023    HCT 19 (LL) 05/11/2023    MCV 88 05/11/2023    PLT 51 (L) 05/11/2023     No results for input(s): TSH, FREET4 in the last 168 hours.  Lab Results   Component Value Date    HGBA1C 7.3 (H) 05/04/2023       Nutritional status:   Body mass index is 49.03 kg/m².  Lab Results   Component Value Date    ALBUMIN 4.1 05/11/2023    ALBUMIN 2.6 (L) 05/10/2023    ALBUMIN 4.1 05/04/2023     No results found for: PREALBUMIN    Estimated Creatinine Clearance: 38.4 mL/min (A) (based on SCr of 1.5 mg/dL (H)).    Accu-Checks  Recent Labs     05/11/23  0308 05/11/23  0401 05/11/23  0459 05/11/23  0545 05/11/23  0740 05/11/23  0751 05/11/23  0752 05/11/23  0813 05/11/23  0858 05/11/23  0958   POCTGLUCOSE 148* 148* 152* 144* 98 159* 145* 136* 148* 152*        ASSESSMENT and PLAN    Cardiac/Vascular  * Nonrheumatic aortic valve stenosis  Managed per primary team  Avoid hypoglycemia        S/P AVR  Managed per primary  Optimize BG control to improve  wound healing      Hyperlipidemia, mixed  On statin per ada guidelines    Endocrine  Acquired hypothyroidism  Levothyroxine 75 mcg daily   Lab Results   Component Value Date    TSH 2.724 03/02/2023    FREET4 0.99 08/11/2020         Diabetes mellitus type 2 in obese  Endocrinology consulted for BG management.   BG goal 110-140 CTS        - IIP  - Requires intensive BG monitoring.   - BG checks q1hr  - Hypoglycemia protocol in place    - Notify endocrine if patient becomes hypokalemic (K < 3.3) and/or is not responding to replacement.   - Notify endocrine if patient requiring > 20 units/hr.    ADDENDUM:  Endocrinology consulted for BG management.   BG goal 110-140 (CTS Protocol)    - Transition drip at 1 units/hr with step-down parameters. (Based on needs while on IIP)  - Novolog (aspart) insulin prn for BG excursions LDC SSI (150/50).  - BG checks q4hr  - Hypoglycemia protocol in place    ** Please notify Endocrine for any change and/or advance in diet**  ** Please call Endocrine for any BG related issues **    Discharge Planning:   TBD. Please notify endocrinology prior to discharge.        ** Please notify Endocrine for any change and/or advance in diet**  ** Please call Endocrine for any BG related issues **    Discharge Planning:   TBD. Please notify endocrinology prior to discharge.          Orthopedic  Surgical wound present  Optimize BG for surgical wound healing.             Plan discussed with patient, family, and RN at bedside.        Ankit Newberry, DNP, FNP  Endocrinology  [facility]

## 2023-05-11 NOTE — HPI
Reason for Consult: Management of T2DM, Hyperglycemia     Surgical Procedure and Date: AVR 5/10/22    Diabetes diagnosis year: ~ 2010    Home Diabetes Medications:  metformin 500 mg BID and glimepiride 2 mg daily     How often checking glucose at home? One   BG readings on regimen: 's  Hypoglycemia on the regimen?  No  Missed doses on regimen?  No    Diabetes Complications include:     Hyperglycemia and Diabetic peripheral neuropathy     Complicating diabetes co morbidities:   HTN; Hypothryroidism; HLD      HPI:   Patient is a 78 y.o. female with a diagnosis of pulmonary embolism on chronic AC, s/p AVR (21 mm Trifecta) in 2003, breast cancer (s/p mastectomy, radiation and chemo), and type 2 DM.  Patient is s/p AVR. Endocrinology consulted for BG/ DM management.     Lab Results   Component Value Date    HGBA1C 7.3 (H) 05/04/2023

## 2023-05-11 NOTE — CARE UPDATE
Dr. Marrero and Dr. Vega at the bedside. Updated on UOP, gtt rates, Lab values, chest tube outputs, Vital signs. Albumin 5% 500 mL ordered.    yes...

## 2023-05-11 NOTE — SUBJECTIVE & OBJECTIVE
Interval History/Significant Events: UOP dropped to < 20cc/hr overnight. 1.5L albumin given without any increase. 20mg IV Lasix x 1 push with 420cc UOP. Remains paced, increased from 80 to 86bpm. Intrinsic rate in 30s-40s and patient becomes hypotensive. Currently on epi at 0.04.     Follow-up For: Procedure(s) (LRB):  Replacement-valve-aortic, redo sternotomy (N/A)    Post-Operative Day: 1 Day Post-Op    Objective:     Vital Signs (Most Recent):  Temp: 97.8 °F (36.6 °C) (05/11/23 0315)  Pulse: 85 (05/11/23 0704)  Resp: 20 (05/11/23 0704)  BP: (!) 122/59 (05/11/23 0704)  SpO2: 100 % (05/11/23 0704) Vital Signs (24h Range):  Temp:  [97.5 °F (36.4 °C)-97.9 °F (36.6 °C)] 97.8 °F (36.6 °C)  Pulse:  [76-90] 85  Resp:  [20-25] 20  SpO2:  [97 %-100 %] 100 %  BP: (111-138)/(59-74) 122/59  Arterial Line BP: ()/(50-75) 112/55     Weight: 121.6 kg (268 lb 1.3 oz)  Body mass index is 49.03 kg/m².      Intake/Output Summary (Last 24 hours) at 5/11/2023 0731  Last data filed at 5/11/2023 0600  Gross per 24 hour   Intake 3299.24 ml   Output 1475 ml   Net 1824.24 ml          Physical Exam  Constitutional:       Comments: sedated   HENT:      Head: Normocephalic and atraumatic.      Right Ear: External ear normal.      Left Ear: External ear normal.      Nose: Nose normal.      Mouth/Throat:      Mouth: Mucous membranes are moist.      Pharynx: Oropharynx is clear.   Eyes:      Extraocular Movements: Extraocular movements intact.      Conjunctiva/sclera: Conjunctivae normal.   Neck:      Comments: LIJ CVC    Cardiovascular:      Rate and Rhythm: Normal rate and regular rhythm.      Pulses: Normal pulses.      Heart sounds: Normal heart sounds.   Pulmonary:      Comments: intubated  Abdominal:      General: There is no distension.      Palpations: Abdomen is soft.   Genitourinary:     Comments: vizcaino  Musculoskeletal:         General: No swelling.      Right lower leg: No edema.      Left lower leg: No edema.   Skin:      General: Skin is warm and dry.   Neurological:      Comments: sedated          Vents:  Vent Mode: A/C (05/11/23 0704)  Ventilator Initiated: Yes (05/10/23 1720)  Set Rate: 20 BPM (05/11/23 0704)  Vt Set: 340 mL (05/11/23 0704)  PEEP/CPAP: 5 cmH20 (05/11/23 0704)  Oxygen Concentration (%): 40 (05/11/23 0704)  Peak Airway Pressure: 21 cmH20 (05/11/23 0704)  Plateau Pressure: 0 cmH20 (05/11/23 0704)  Total Ve: 7.09 L/m (05/11/23 0704)  Negative Inspiratory Force (cm H2O): 0 (05/11/23 0704)  F/VT Ratio<105 (RSBI): (!) 56.34 (05/11/23 0704)    Lines/Drains/Airways       Central Venous Catheter Line  Duration                  Port A Cath Single Lumen -- days     Introducer with Double Lumen 05/10/23 1033 Internal Jugular Left <1 day    Introducer 05/10/23 1033 <1 day    Percutaneous Central Line Insertion/Assessment - Triple Lumen  05/10/23 1033 Internal Jugular Left <1 day              Drain  Duration                  Chest Tube 05/10/23 1627 Tube - 1 Right Pleural 19 Fr. <1 day         Chest Tube 05/10/23 1628 Tube - 2 Left Pleural 19 Fr. <1 day         Chest Tube 05/10/23 1629 other (see comment) Anterior Mediastinal 19 Fr. <1 day         Chest Tube 05/10/23 1629 other (see comment) Other (Comment) Mediastinal 19 Fr. <1 day         Urethral Catheter 05/10/23 0915 Non-latex;Temperature probe 14 Fr. <1 day              Airway  Duration                  Airway - Non-Surgical 05/10/23 0845 <1 day              Arterial Line  Duration             Arterial Line 05/10/23 0835 Left Other (Comment) <1 day              Line  Duration                  Pacer Wires 05/10/23 1631 <1 day              Peripheral Intravenous Line  Duration                  Peripheral IV - Single Lumen 05/10/23 0737 20 G Left;Posterior Hand <1 day                    Significant Labs:    CBC/Anemia Profile:  Recent Labs   Lab 05/10/23  1730 05/10/23  1928 05/11/23  0235 05/11/23  0251 05/11/23  0717   WBC 24.97*  --  10.99  --   --    HGB 10.1*  --  7.8*   --   --    HCT 30.9*   < > 23.2* 22* 19*   PLT 69*  --  51*  --   --    MCV 89  --  88  --   --    RDW 14.5  --  15.0*  --   --     < > = values in this interval not displayed.        Chemistries:  Recent Labs   Lab 05/10/23  1730 05/10/23  2011 05/10/23  2149 05/11/23  0235 05/11/23  0546     --  139 139  --    K 3.9   < > 4.5 4.6 4.4     --  110 109  --    CO2 21*  --  22* 22*  --    BUN 20  --  23 23  --    CREATININE 0.9  --  1.3 1.3  --    CALCIUM 8.7  --  8.5* 8.3*  --    ALBUMIN 2.6*  --   --  4.1  --    PROT 4.1*  --   --  5.1*  --    BILITOT 1.0  --   --  0.6  --    ALKPHOS 41*  --   --  34*  --    ALT 13  --   --  9*  --    AST 41*  --   --  28  --    MG 3.1*  3.1*  --  2.9* 2.9*  --    PHOS 2.5*  2.5*  --  2.9 3.5  --     < > = values in this interval not displayed.       All pertinent labs within the past 24 hours have been reviewed.    Significant Imaging:  I have reviewed all pertinent imaging results/findings within the past 24 hours.

## 2023-05-11 NOTE — ASSESSMENT & PLAN NOTE
Endocrinology consulted for BG management.   BG goal 110-140 CTS        - IIP  - Requires intensive BG monitoring.   - BG checks q1hr  - Hypoglycemia protocol in place    - Notify endocrine if patient becomes hypokalemic (K < 3.3) and/or is not responding to replacement.   - Notify endocrine if patient requiring > 20 units/hr.      ** Please notify Endocrine for any change and/or advance in diet**  ** Please call Endocrine for any BG related issues **    Discharge Planning:   TBD. Please notify endocrinology prior to discharge.

## 2023-05-11 NOTE — CARE UPDATE
Dr. Webb at bedside. MD aware of UOP, gtts, CVP, lab results, vital signs. Plan of Care reviewed with  at the bedside.

## 2023-05-11 NOTE — PT/OT/SLP PROGRESS
Occupational Therapy      Patient Name:  Alina Narayan   MRN:  [id number]    Pt remained intubated this morning and was extubated this afternoon. OT unable to return again this date and will attempt OT evaluation next date as appropriate.   5/11/2023

## 2023-05-11 NOTE — PLAN OF CARE
[facility]  Initial Discharge Assessment       Primary Care Provider: Aarti Dunn MD    Admission Diagnosis: S/P AVR (aortic valve replacement) [Z95.2]  Nonrheumatic aortic valve stenosis [I35.0]  Prosthetic valve dysfunction, subsequent encounter [T82.09XD]    Admission Date: 5/10/2023  Expected Discharge Date: 5/19/2023    Discharge Barriers Identified: None    Payor: HU[employer] MANAGED MEDICARE / Plan: HU[employer] MEDICARE HMO / Product Type: Capitation /     Extended Emergency Contact Information  Primary Emergency Contact: Srinath Narayan  Address: [address] [place] [address] [place] [address] [place]  Mobile Phone: [phone]  Relation: Spouse    Discharge Plan A:  (TBD)  Discharge Plan B:  (TBD)      [facility] - [place], [place] - [address]  [address] [address]  Phone: [phone] Fax: [phone]    [facility] - [place] - [address]  [address]  [place] [address]  Phone: [phone] Fax: [phone]    [id number] - [place] - [address]  [address] [place] [address]  Phone: [phone] Fax: [phone]      Initial Assessment (most recent)       Adult Discharge Assessment - 05/11/23 0936          Discharge Assessment    Assessment Type Discharge Planning Assessment     Confirmed/corrected address, phone number and insurance Yes     Confirmed Demographics Correct on Facesheet     Source of Information family     When was your last doctors appointment? --   02/24/23    Communicated DARIN with patient/caregiver Date not available/Unable to determine     People in Home spouse;child(arya), adult     Do you expect to return to your current living situation? Yes     Do you have help at home or someone to help you manage your care at home? Yes     Prior to hospitilization cognitive status: No Deficits     Current cognitive status: Coma/Sedated/Intubated     Home Layout  Able to live on 1st floor     Equipment Currently Used at Home none     Readmission within 30 days? No     Patient currently being followed by outpatient case management? No     Do you currently have service(s) that help you manage your care at home? No     Do you take prescription medications? Yes     Do you have prescription coverage? Yes     Do you have any problems affording any of your prescribed medications? No     Is the patient taking medications as prescribed? yes     Who is going to help you get home at discharge? SPOUSE KALEE MOREIRA      How do you get to doctors appointments? family or friend will provide     Are you on dialysis? No     Do you take coumadin? No   ELIQUIS    Discharge Plan A --   TBD    Discharge Plan B --   TBD    DME Needed Upon Discharge  none     Discharge Plan discussed with: Spouse/sig other     Discharge Barriers Identified None        Physical Activity    On average, how many days per week do you engage in moderate to strenuous exercise (like a brisk walk)? 0 days     On average, how many minutes do you engage in exercise at this level? 0 min        Financial Resource Strain    How hard is it for you to pay for the very basics like food, housing, medical care, and heating? Not hard at all        Housing Stability    In the last 12 months, was there a time when you were not able to pay the mortgage or rent on time? No     In the last 12 months, how many places have you lived? 1     In the last 12 months, was there a time when you did not have a steady place to sleep or slept in a shelter (including now)? No        Transportation Needs    In the past 12 months, has lack of transportation kept you from medical appointments or from getting medications? No     In the past 12 months, has lack of transportation kept you from meetings, work, or from getting things needed for daily living? No        Food Insecurity    Within the past 12 months, you worried that your food would  run out before you got the money to buy more. Never true     Within the past 12 months, the food you bought just didn't last and you didn't have money to get more. Never true        Stress    Do you feel stress - tense, restless, nervous, or anxious, or unable to sleep at night because your mind is troubled all the time - these days? Patient refused        Social Connections    In a typical week, how many times do you talk on the phone with family, friends, or neighbors? Three times a week     How often do you get together with friends or relatives? Three times a week     How often do you attend Mormon or Amish services? Never     Do you belong to any clubs or organizations such as Mormon groups, unions, fraternal or athletic groups, or school groups? No     How often do you attend meetings of the clubs or organizations you belong to? Never     Are you , , , , never , or living with a partner?         Alcohol Use    Q1: How often do you have a drink containing alcohol? Never     Q2: How many drinks containing alcohol do you have on a typical day when you are drinking? Patient does not drink     Q3: How often do you have six or more drinks on one occasion? Never                 Patient lives in a 1 story house with her spouse and adult son she is not on dialysis and does not take coumadin (ELIQUIS)she has a ride home

## 2023-05-11 NOTE — CARE UPDATE
Patient extubated. Respiratory and Dr. Vega at the bedside. Alert and Oriented x4. Placed on 2 L Nasal Canula, tolerated well.

## 2023-05-11 NOTE — NURSING
"      SICU PLAN OF CARE NOTE    Dx: S/P AVR and Mitral repair    Shift Events: 1.5L Albumin, minimal urine output. Pt responded well to lasix later in shift    Goals of Care: MAP 60-80 SBP < 140 Extubate    Neuro: Arouses to Voice, Follows Commands, and Moves All Extremities    Vital Signs: /62 (BP Location: Right arm, Patient Position: Lying)   Pulse 88   Temp 97.8 °F (36.6 °C) (Oral)   Resp 20   Ht 5' 2" (1.575 m)   Wt 121.6 kg (268 lb 1.3 oz)   SpO2 100%   Breastfeeding No   BMI 49.03 kg/m²     Respiratory: Ventilator ACVC 40% 5 PEEP    Diet: NPO    Gtts: Propfol, Insulin, and Epinephrine    Urine Output: Urinary Catheter 792 cc/shift    Drains: Chest Tube, total output 243 cc /  shift    Restraints BL soft wrist restraints intact, no skin breakdown noted.       Labs/Accuchecks: K q4h; Hourly accu.    Skin: No new skin breakdown noted. Pt repositioned q2hrs and prn. Pt on specialty bed set for patient weight. Protective foams intact.       "

## 2023-05-11 NOTE — SUBJECTIVE & OBJECTIVE
Interval HPI:   Overnight events: Received in ICU. Remains intubated and sedated. Pressor support. 1 Day Post-Op. BG stable on IV insulin infusion at 1.5-2.7 u/hr.   Eating:,Diet NPO Except for: Sips with Medication  Nausea: No  Hypoglycemia and intervention: No  Fever: No  TPN and/or TF: No    PMH, PSH, FH, SH  reviewed     ROS:  Unable to obtain due to: Sedation,Intubation,Altered mental status,Critical illness,Reviewed ROS from note dated 5/10/23 per Dr. Silver.     Review of Systems  Constitutional: Negative for weight changes.  Eyes: Negative for visual disturbance.  Respiratory: Negative for cough.   Cardiovascular: Negative for chest pain.  Gastrointestinal: Negative for nausea.  Endocrine: Negative for polyuria, polydipsia.  Musculoskeletal: Negative for back pain.  Skin: Negative for rash.  Neurological: Negative for syncope.  Psychiatric/Behavioral: Negative for depression.      Current Medications and/or Treatments Impacting Glycemic Control  Immunotherapy:    Immunosuppressants       None          Steroids:   Hormones (From admission, onward)      None          Pressors:    Autonomic Drugs (From admission, onward)      Start     Stop Route Frequency Ordered    05/10/23 1700  NORepinephrine 4 mg in dextrose 5% 250 mL infusion (premix) (titrating)        Question Answer Comment   Begin at (in mcg/kg/min): 0.02    Titrate by: (in mcg/kg/min) 0.02    Titrate interval: (in minutes) 5    Titrate to maintain: (MAP or SBP) MAP    Greater than: (in mmHg) 60    Maximum dose: (in mcg/kg/min) 3        -- IV Continuous 05/10/23 1655    05/10/23 1700  EPINEPHrine 5 mg in dextrose 5% 250 mL infusion (premix)        Question Answer Comment   Begin at (in mcg/kg/min): 0.02    Titrate by: (in mcg/kg/min) 0.02    Titrate interval: (in minutes) 5    Titrate to maintain: (SBP or MAP or Cardiac Index) MAP    Greater than: (in mmHg) 60    Maximum dose: (in mcg/kg/min) 2        -- IV Continuous 05/10/23 1655           Hyperglycemia/Diabetes Medications:   Antihyperglycemics (From admission, onward)      Start     Stop Route Frequency Ordered    05/10/23 1700  insulin regular in 0.9 % NaCl 100 unit/100 mL (1 unit/mL) infusion        Question Answer Comment   Insulin rate changes (DO NOT MODIFY ANSWER) [email or web address] [id number]    Enter initial dose (Units/hr): 1        -- IV Continuous 05/10/23 1656             PHYSICAL EXAMINATION:  Vitals:    05/11/23 0808   BP:    Pulse:    Resp: (!) 43   Temp: 97.7 °F (36.5 °C)     Body mass index is 49.03 kg/m².     Physical Exam  Constitutional: Well developed, well nourished, NAD.  ENT: External ears no masses with nose patent  Neck: Supple; trachea midline  Cardiovascular: Normal heart sounds, no LE edema. DP +2 bilaterally.  Lungs: Normal effort; lungs anterior bilaterally clear to auscultation.  Abdomen: Soft, no masses, no hernias.  Hypoactive BS noted.  MS: No clubbing or cyanosis of nails noted; unable to assess gait.  Skin: No rashes, lesions, or ulcers; no nodules.  Mid-sternal incision with telfa island dressing, CDI.  CT x 2.  Psychiatric: Good judgement and insight; normal mood and affect.  Neurological: Cranial nerves are grossly intact. Normal vibration sense in the bilateral lower extremities.  Foot: Nails in good condition, no amputations noted.

## 2023-05-11 NOTE — ANESTHESIA POSTPROCEDURE EVALUATION
Anesthesia Post Evaluation    Patient: Alina Narayan    Procedure(s) Performed: Procedure(s) (LRB):  Replacement-valve-aortic, redo sternotomy (N/A)    Final Anesthesia Type: general      Patient location during evaluation: ICU  Patient participation: No - Unable to Participate, Intubation  Level of consciousness: sedated  Post-procedure vital signs: reviewed and stable  Pain management: adequate  Airway patency: patent    PONV status at discharge: No PONV  Anesthetic complications: no      Cardiovascular status: hemodynamically stable  Respiratory status: ETT, intubated and ventilator  Hydration status: euvolemic  Follow-up not needed.          Vitals Value Taken Time   /61 05/11/23 0602   Temp 36.6 °C (97.8 °F) 05/11/23 0315   Pulse 85 05/11/23 0641   Resp 20 05/11/23 0641   SpO2 100 % 05/11/23 0641   Vitals shown include unvalidated device data.      No case tracking events are documented in the log.      Pain/Lin Score: Pain Rating Prior to Med Admin: 7 (5/11/2023  5:08 AM)

## 2023-05-11 NOTE — OP NOTE
DATE OF PROCEDURE:  05/10/2023    ATTENDING SURGEON:  Venkat Webb M.D.     ASSISTANT: Ca Silver MD, (Cardiothoracic resident)     PREOPERATIVE DIAGNOSES:  1.  Severe prosthetic aortic stenosis  2.  Previous aortic valve replacement  3.  Breast cancer  4.  Thoracic irradiation  5.  Diabetes mellitus  6.  Morbid obesity    POSTOPERATIVE DIAGNOSES:  1.  Severe prosthetic aortic stenosis  2.  Previous aortic valve replacement  3.  Breast cancer  4.  Thoracic irradiation  5.  Diabetes mellitus  6.  Morbid obesity    OPERATION PERFORMED:      1)  Aortic valve re-replacement with a small Corcym Perceval bovine pericardial bioprosthesis    2)  Redo sternotomy     ANESTHESIA:  General endotracheal.     ESTIMATED BLOOD LOSS:  100 mL.     BRIEF HISTORY:  Ms. Narayan is a very pleasant 78-year-old woman with past medical history significant for aortic valve replacement in 2013.  She is also undergone treatment for breast cancer including resection with chest irradiation.  Around the time of treatment for her breast cancer, she had a pulmonary embolus.  Unfortunately, over the past several years she is had progressive dyspnea on exertion.  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  She was evaluated for transcatheter valve, but due to the location of the coronary ostia and the nature of the valve in place this was felt to be a very poor option.  She now presents for redo sternotomy and redo aortic valve replacement.      PROCEDURE IN DETAIL:  After obtaining informed and written consent, the patient   was brought to the Operating Room and placed on the operating table in supine   position.  After induction of adequate general endotracheal anesthesia, the   patient's chest, abdomen, pelvis and bilateral lower extremities were prepped   and draped in the usual sterile fashion. A wire was placed in the right common femoral artery using Seldinger technique. Its  position in the descending aorta was confirmed using BIBIANA. We then turned our attention to the chest, where the patient's previous upper midline   skin incision was reopened.  Dissection was carried down to the level of the   sternum.  The wires were divided, but not removed.  The oscillating saw was then   used to divide the anterior table of the sternum.  The wires were removed, and   the posterior table of the sternum was divided using the straight Calzada scissors.    Once the sternum had been safely traversed, the mammary retractor was placed   and the retrosternal adhesions were taken down first on the left and then on the   right using the electrocautery device.  The sternal retractor was then placed,   and we began the intrapericardial portion of the dissection.  Not surprisingly, the   adhesions were at least moderate.  We began at the diaphragmatic surface of the   heart, and carefully worked our way down to the inferior vena cava.  We then   proceeded along the right atrial free wall.  The right atrial free wall was   fairly densely adherent to the pericardium.  Eventually, we were able to work our way to  the superior vena cava.  We   were able to expose the anterior surface of the superior vena cava.  We then   turned our attention to the aorta.  We were able to dissect distally   until we reached the origin of the innominate artery.  At this time, we had   adequate exposure for cannulation.  This portion of the operation was challenging due to the density of adhesions and was extremely time consuming.  It took roughly 90 minutes to obtain adequate exposure for cannulation.  The patient was systemically heparinized.      Cannulation sutures were placed in the aorta and in the right atrial free wall.  The aortic cannula was   inserted, followed by the venous.  Antegrade and retrograde cardioplegia   catheters were placed.  The patient was then put on cardiopulmonary bypass.    Once on bypass, the aorta was   from the pulmonary artery.  A left ventricular vent was placed through the right superior pulmonary vein.  Additional dissection of the proximal aortic root was performed.  The aortic   crossclamp was applied, and the heart was arrested using cold blood-enhanced   antegrade cardioplegia.  A prompt electromechanical arrest was achieved.  Once   the cardioplegia was all in, an transverse aortotomy was made.  The valve exposure   sutures were placed, and the aortic valve was visualized.     All 3 leaflets of the prosthetic valve were heavily calcified.  The sewing ring was well-incorporated into the annulus.  The valve sutures were grasped with a tonsil clamp.  The pseudo endothelium was dissected from the sewing ring.  The valve sutures were then divided sharply using an 11 blade.  In this manner we carefully worked our way around the circumference of the trifecta valve, identifying a suture, exposing it, and then carefully dividing it.  Eventually, all the sutures were removed.  A Monroe dissectorr was then used to carefully dissect the prosthesis away from the aortic annulus.  It was densely adherent to the annulus.  There was no indication of previous endocarditis at the annular level.  Eventually, the valve was dissected free.  Extensive annular debridement was then performed, including removal of the sub annular pledgets.  Once the annulus was satisfactory, the ventricle was irrigated with a L of cold saline.  The intraoperative echo had demonstrated moderate to severe mitral regurgitation.  Through the left ventricular outflow tract, a 5-0 Ethibond suture was used to place an Parish stitch.    The annulus was sized, and a small Perceval valve was selected.  While the valve   was being prepared, the three guide sutures were placed at the amy of all 3   commissures.  Their relation to each other was confirmed by reinserting the   small sizer to confirm appropriate spacing.  Once the valve was prepared,  the   sutures were passed through the guide loops and it was slid into position.  It   was deployed. It deployed nicely and the position was satisfactory after evaluating above and below the valve.  The valve was irrigated with   warm saline as it was balloon dilated to 4 atmospheres for 30 seconds.  The   positioning was reevaluated and was confirmed to be acceptable.  The aortotomy   was then closed in two layers using running 4-0 Prolene suture.  Once the   aortotomy was closed, the hot shot was given retrograde.  De-airing maneuvers   were performed, and the aortic cross-clamp was removed.  The patient was   subsequently weaned from cardiopulmonary bypass.  The patient did separate   easily from bypass.  Once off bypass, all surgical sites were inspected.  There   was good hemostasis.  The valve was evaluated using BIBIANA.  The aortic prosthesis appeared to be functioning properly. There were no perivalvular leaks. The degree of mitral regurgitation was reduced, but the mean gradient through the mitral valve was 6-7 mmHg.  I felt that this was unacceptable.  The patient was placed back on cardiopulmonary bypass.  The aortic cross-clamp was applied, and the heart was arrested using cold blood enhanced retrograde cardioplegia with topical cooling.  Once the cardioplegia was all in, the aortotomy was reopened.  Through the valve, we were able to visualize the Parish stitch.  It was removed.  The aortotomy was then closed in 2 layers using running 4-0 Prolene suture.  The hotshot was given retrograde.  De-airing maneuvers   were performed, and the aortic cross-clamp was removed.  The patient was   subsequently weaned from cardiopulmonary bypass.  The patient did separate   easily from bypass.  Once off bypass, all surgical sites were inspected.  There   was good hemostasis.  The valve was evaluated using BIBIANA.  The aortic prosthesis appeared to be functioning properly. There were no perivalvular leaks. The degree of  mitral regurgitation was reduced as before, but there was no significant gradient.  The test dose of protamine was   administered, and this was well tolerated.  The total dose was then given.    CHCF through the total dose, all cannulas were removed.     All surgical sites were again inspected, and   again there was good hemostasis.  Ventricular pacing wires were placed.  Drains   were placed.  After again confirming adequate hemostasis, the patient's chest   was closed using #6 stainless steel wires to reapproximate the sternum.  The   overlying soft tissues were reapproximated using absorbable suture material.    The patient's chest was washed and dried, and a dry dressing was applied.  The   patient tolerated the procedure well, there were no complications.  At the   conclusion of the case, sponge and instrument counts were correct.

## 2023-05-11 NOTE — NURSING
1915: No UO noted; MD notified and at bedside. 500cc albumin ordered and administered. Minimal response following albumin administration, additional 500cc ordered. Labs and ABG completed with results reviewed with MD David.    0000: Urine output remains 10-20cc/hour, MD David and Adriel aware, orders received for 500 Albumin. Trending labs and ABG's q4H. Inquired about Lasix IVP and Dobutamine or Milrinone gtt, no new orders at this time.    0245: All vitals signs, labs, drips, and urine output reviewed with MD Morel. Verbal orders to increase Epi gtt to 0.04, use cleviprex as needed for SBP > 140. Minimal response in urine output, Lasix IVP ordered. See MAR for details

## 2023-05-11 NOTE — PROGRESS NOTES
[facility] - [facility]ensive [facility]  Critical Care - Surgery  Progress Note    Patient Name: Alina Narayan  MRN: [id number]  Admission Date: 5/10/2023  Hospital Length of Stay: 1 days  Code Status: Full Code  Attending Provider: Venkat Webb MD  Primary Care Provider: Aarti Dunn MD   Principal Problem: <principal problem not specified>    Subjective:     Hospital/ICU Course:  No notes on file    Interval History/Significant Events: UOP dropped to < 20cc/hr overnight. 1.5L albumin given without any increase. 20mg IV Lasix x 1 push with 420cc UOP. Remains paced, increased from 80 to 86bpm. Intrinsic rate in 30s-40s and patient becomes hypotensive. Currently on epi at 0.04.     Follow-up For: Procedure(s) (LRB):  Replacement-valve-aortic, redo sternotomy (N/A)    Post-Operative Day: 1 Day Post-Op    Objective:     Vital Signs (Most Recent):  Temp: 97.8 °F (36.6 °C) (05/11/23 0315)  Pulse: 85 (05/11/23 0704)  Resp: 20 (05/11/23 0704)  BP: (!) 122/59 (05/11/23 0704)  SpO2: 100 % (05/11/23 0704) Vital Signs (24h Range):  Temp:  [97.5 °F (36.4 °C)-97.9 °F (36.6 °C)] 97.8 °F (36.6 °C)  Pulse:  [76-90] 85  Resp:  [20-25] 20  SpO2:  [97 %-100 %] 100 %  BP: (111-138)/(59-74) 122/59  Arterial Line BP: ()/(50-75) 112/55     Weight: 121.6 kg (268 lb 1.3 oz)  Body mass index is 49.03 kg/m².      Intake/Output Summary (Last 24 hours) at 5/11/2023 0731  Last data filed at 5/11/2023 0600  Gross per 24 hour   Intake 3299.24 ml   Output 1475 ml   Net 1824.24 ml          Physical Exam  Constitutional:       Comments: sedated   HENT:      Head: Normocephalic and atraumatic.      Right Ear: External ear normal.      Left Ear: External ear normal.      Nose: Nose normal.      Mouth/Throat:      Mouth: Mucous membranes are moist.      Pharynx: Oropharynx is clear.   Eyes:      Extraocular Movements: Extraocular movements intact.      Conjunctiva/sclera: Conjunctivae normal.   Neck:      Comments: [facility]J CVC    Cardiovascular:       Rate and Rhythm: Normal rate and regular rhythm.      Pulses: Normal pulses.      Heart sounds: Normal heart sounds.   Pulmonary:      Comments: intubated  Abdominal:      General: There is no distension.      Palpations: Abdomen is soft.   Genitourinary:     Comments: vizcaino  Musculoskeletal:         General: No swelling.      Right lower leg: No edema.      Left lower leg: No edema.   Skin:     General: Skin is warm and dry.   Neurological:      Comments: sedated          Vents:  Vent Mode: A/C (05/11/23 0704)  Ventilator Initiated: Yes (05/10/23 1720)  Set Rate: 20 BPM (05/11/23 0704)  Vt Set: 340 mL (05/11/23 0704)  PEEP/CPAP: 5 cmH20 (05/11/23 0704)  Oxygen Concentration (%): 40 (05/11/23 0704)  Peak Airway Pressure: 21 cmH20 (05/11/23 0704)  Plateau Pressure: 0 cmH20 (05/11/23 0704)  Total Ve: 7.09 L/m (05/11/23 0704)  Negative Inspiratory Force (cm H2O): 0 (05/11/23 0704)  F/VT Ratio<105 (RSBI): (!) 56.34 (05/11/23 0704)    Lines/Drains/Airways       Central Venous Catheter Line  Duration                  Port A Cath Single Lumen -- days     Introducer with Double Lumen 05/10/23 1033 Internal Jugular Left <1 day    Introducer 05/10/23 1033 <1 day    Percutaneous Central Line Insertion/Assessment - Triple Lumen  05/10/23 1033 Internal Jugular Left <1 day              Drain  Duration                  Chest Tube 05/10/23 1627 Tube - 1 Right Pleural 19 Fr. <1 day         Chest Tube 05/10/23 1628 Tube - 2 Left Pleural 19 Fr. <1 day         Chest Tube 05/10/23 1629 other (see comment) Anterior Mediastinal 19 Fr. <1 day         Chest Tube 05/10/23 1629 other (see comment) Other (Comment) Mediastinal 19 Fr. <1 day         Urethral Catheter 05/10/23 0915 Non-latex;Temperature probe 14 Fr. <1 day              Airway  Duration                  Airway - Non-Surgical 05/10/23 0845 <1 day              Arterial Line  Duration             Arterial Line 05/10/23 0835 Left Other (Comment) <1 day              Line  Duration                   Pacer Wires 05/10/23 1631 <1 day              Peripheral Intravenous Line  Duration                  Peripheral IV - Single Lumen 05/10/23 0737 20 G Left;Posterior Hand <1 day                    Significant Labs:    CBC/Anemia Profile:  Recent Labs   Lab 05/10/23  1730 05/10/23  1928 05/11/23  0235 05/11/23  0251 05/11/23  0717   WBC 24.97*  --  10.99  --   --    HGB 10.1*  --  7.8*  --   --    HCT 30.9*   < > 23.2* 22* 19*   PLT 69*  --  51*  --   --    MCV 89  --  88  --   --    RDW 14.5  --  15.0*  --   --     < > = values in this interval not displayed.        Chemistries:  Recent Labs   Lab 05/10/23  1730 05/10/23  2011 05/10/23  2149 05/11/23  0235 05/11/23  0546     --  139 139  --    K 3.9   < > 4.5 4.6 4.4     --  110 109  --    CO2 21*  --  22* 22*  --    BUN 20  --  23 23  --    CREATININE 0.9  --  1.3 1.3  --    CALCIUM 8.7  --  8.5* 8.3*  --    ALBUMIN 2.6*  --   --  4.1  --    PROT 4.1*  --   --  5.1*  --    BILITOT 1.0  --   --  0.6  --    ALKPHOS 41*  --   --  34*  --    ALT 13  --   --  9*  --    AST 41*  --   --  28  --    MG 3.1*  3.1*  --  2.9* 2.9*  --    PHOS 2.5*  2.5*  --  2.9 3.5  --     < > = values in this interval not displayed.       All pertinent labs within the past 24 hours have been reviewed.    Significant Imaging:  I have reviewed all pertinent imaging results/findings within the past 24 hours.    Assessment/Plan:     Cardiac/Vascular  Nonrheumatic aortic valve stenosis  Initial AVR with Tracey Chew.       Neuro/Psych:     - Sedation: propofol    - Pain:    - Scheduled Tylenol 1g q8h   - Fentanyl PRN while intubated, Oxy PRN              Cardiac:     - S/P redo sternotomy with redo AVR with Dr. Webb on 5/10/2023    - BP Goal: MAP 60-80, SBP < 140     - Cleviprex PRN    - Pressors: epi 0.04 and levo 0.04 on admit; levo weaned to off overnight     - Anti-HTNs: Will start when appropriate    - Rhythm: pacer dependent at rate of 86     - Beta  blocker: Will start when appropriate    - Statin: takes pravastatin at home       Pulmonary:     - Goal SpO2 >92%    - Will wean ventilator support as tolerated to extubate    - Chest Tubes x 2 (2 Meds & 2 Pleural)    - ABGs PRN      Renal:    - Trend BUN/Cr     - Maintain Macdonald, record strict Is/Os    Recent Labs   Lab 05/10/23  1730 05/10/23  2149 05/11/23  0235   BUN 20 23 23   CREATININE 0.9 1.3 1.3         FEN / GI:     - Daily CMP, PRN K/Mag/Phos per protocol     - Replace electrolytes as needed    - Nutrition: NPO pending extubation    - Bowel Regimen: Miralax, docusate      ID:     - Afebrile    - WBC stable    - Abx: Complete perioperative cefazolin 2g Q8H x 5 doses    Recent Labs   Lab 05/04/23  1309 05/10/23  1730 05/11/23  0235   WBC 5.33 24.97* 10.99         Heme/Onc:     - Hgb 12.6 pre-operatively    - CBC daily    - ASA 325mg daily, initiate within 6hr of surgery     Recent Labs   Lab 05/04/23  1309 05/10/23  1730 05/11/23  0235   HGB 12.6 10.1* 7.8*   * 69* 51*   APTT 26.2 36.8* 32.7*   INR 1.0 1.2 1.1         Endocrine:     - CTS Goal -140    - HgbA1c: 7.3    - Endocrinology consulted for insulin management      PPx:   Feeding: NPO  Analgesia/Sedation: propofol/fentanyl  Thromboembolic Prevention: SCDs   HOB >30: Yes  Stress Ulcer: pepcid  Glucose Control: Yes, insulin management per Endocrinology     Lines/Drains/Airway:   ETT   Left brachail arterial line   LIJ CVC   Macdonald   Chest Tubes: 2 (2 Mediastinal, 2 Pleural)    Pacing Wires: Temporary ventricular pacing wires      Dispo/Code Status/Palliative:     - Continue SICU Care    - Full Code      Hyperlipidemia, mixed  Continue statin     Renal/  Chronic kidney disease, stage III (moderate)  sCr 1.2 prior to surgery with eGFR 46.     -- avoid nephrotoxic agents, renally dose medications  -- maintain MAP >65     Hematology  History of pulmonary embolism  On chronic eliquis, will hold in immediate post-operative setting.  Discuss AC plan with surgeon when risk of bleeding decreases.     Thrombocytopenia, unspecified  Platelets 113 prior to surgery      Endocrine  Acquired hypothyroidism  Continue synthroid     Diabetes mellitus type 2 in obese  A1c currently 7.3, takes metformin and glimipride at home. Will hold while inpatient.     -- endocrinology consulted for insulin management   -- BG goal 110-140  -- hypoglycemia protocol          Critical secondary to Patient has a condition that poses threat to life and bodily function: AVR      Critical care was time spent personally by me on the following activities: development of treatment plan with patient or surrogate and bedside caregivers, discussions with consultants, evaluation of patient's response to treatment, examination of patient, ordering and performing treatments and interventions, ordering and review of laboratory studies, ordering and review of radiographic studies, pulse oximetry, re-evaluation of patient's condition.  This critical care time did not overlap with that of any other provider or involve time for any procedures.     Lan Vega MD  Critical Care - Surgery  [facility] - [facility]

## 2023-05-11 NOTE — PROGRESS NOTES
"      SICU PLAN OF CARE NOTE    Dx: Nonrheumatic aortic valve stenosis    Shift Events: Extubated, tolerated well. Gtts titrated per orders, Epi at .01 and Insulin at .1, 1L total of albumin given. Up to chair. Plan for EP consult discussed for tomorrow.     Patient and family updated on plan of care, no updates at this time.     Goals of Care: MAPs 60-80, SBP <140  Accuchecks Q4, promote mobility and pain relief, Come off epi.    Neuro: AAO x4, Follows Commands, and Moves All Extremities    Vital Signs: BP (!) 108/56 (BP Location: Left arm, Patient Position: Lying)   Pulse 87   Temp 97.4 °F (36.3 °C) (Oral)   Resp (!) 21   Ht 5' 2" (1.575 m)   Wt 121.6 kg (268 lb 1.3 oz)   SpO2 99%   Breastfeeding No   BMI 49.03 kg/m²     Respiratory: Nasal Cannula    Diet: Clear Liquid    Gtts: Insulin and Epinephrine    Urine Output: Urinary Catheter 427 cc/shift    Drains:   Chest Tube 1, total output 108 cc /  shift   Chest Tube 2, total output 55 cc /  shift    Labs/Accuchecks: Daily labs, Q4 accuchecks, Q12 Potassium.    Skin: Tina bed in use and plugged in, waffle overlay placed. Heel pumps and foams in use. Up in chair. No breakdown noted.       "

## 2023-05-11 NOTE — ASSESSMENT & PLAN NOTE
Levothyroxine 75 mcg daily   Lab Results   Component Value Date    TSH 2.724 03/02/2023    FREET4 0.99 08/11/2020

## 2023-05-11 NOTE — ASSESSMENT & PLAN NOTE
Initial AVR with Tracey 2013.       Neuro/Psych:     - Sedation: propofol    - Pain:    - Scheduled Tylenol 1g q8h   - Fentanyl PRN while intubated, Oxy PRN              Cardiac:     - S/P redo sternotomy with redo AVR with Dr. Webb on 5/10/2023    - BP Goal: MAP 60-80, SBP < 140     - Cleviprex PRN    - Pressors: epi 0.04 and levo 0.04 on admit; levo weaned to off overnight     - Anti-HTNs: Will start when appropriate    - Rhythm: pacer dependent at rate of 86     - Beta blocker: Will start when appropriate    - Statin: takes pravastatin at home       Pulmonary:     - Goal SpO2 >92%    - Will wean ventilator support as tolerated to extubate    - Chest Tubes x 2 (2 Meds & 2 Pleural)    - ABGs PRN      Renal:    - Trend BUN/Cr     - Maintain Macdonald, record strict Is/Os    Recent Labs   Lab 05/10/23  1730 05/10/23  2149 05/11/23  0235   BUN 20 23 23   CREATININE 0.9 1.3 1.3         FEN / GI:     - Daily CMP, PRN K/Mag/Phos per protocol     - Replace electrolytes as needed    - Nutrition: NPO pending extubation    - Bowel Regimen: Miralax, docusate      ID:     - Afebrile    - WBC stable    - Abx: Complete perioperative cefazolin 2g Q8H x 5 doses    Recent Labs   Lab 05/04/23  1309 05/10/23  1730 05/11/23  0235   WBC 5.33 24.97* 10.99         Heme/Onc:     - Hgb 12.6 pre-operatively    - CBC daily    - ASA 325mg daily, initiate within 6hr of surgery     Recent Labs   Lab 05/04/23  1309 05/10/23  1730 05/11/23  0235   HGB 12.6 10.1* 7.8*   * 69* 51*   APTT 26.2 36.8* 32.7*   INR 1.0 1.2 1.1         Endocrine:     - CTS Goal -140    - HgbA1c: 7.3    - Endocrinology consulted for insulin management      PPx:   Feeding: NPO  Analgesia/Sedation: propofol/fentanyl  Thromboembolic Prevention: SCDs   HOB >30: Yes  Stress Ulcer: pepcid  Glucose Control: Yes, insulin management per Endocrinology     Lines/Drains/Airway:   ETT   Left brachail arterial line   [facility] CVC   Macdonald   Chest Tubes: 2 (2  Mediastinal, 2 Pleural)    Pacing Wires: Temporary ventricular pacing wires      Dispo/Code Status/Palliative:     - Continue SICU Care    - Full Code

## 2023-05-11 NOTE — PLAN OF CARE
Patient seen and examined on morning rounds with critical care team.  She is on minimal drips.  Oxygenating well.  We will plan for extubation today.

## 2023-05-12 PROBLEM — D62 ACUTE BLOOD LOSS ANEMIA: Status: ACTIVE | Noted: 2023-05-12

## 2023-05-12 PROBLEM — R00.1 BRADYCARDIA: Status: ACTIVE | Noted: 2023-05-12

## 2023-05-12 LAB
ALBUMIN SERPL BCP-MCNC: 4 G/DL (ref 3.5–5.2)
ALLENS TEST: ABNORMAL
ALLENS TEST: NORMAL
ALLENS TEST: NORMAL
ALP SERPL-CCNC: 46 U/L (ref 55–135)
ALT SERPL W/O P-5'-P-CCNC: 7 U/L (ref 10–44)
ANION GAP SERPL CALC-SCNC: 9 MMOL/L (ref 8–16)
APTT PPP: 31.6 SEC (ref 21–32)
AST SERPL-CCNC: 22 U/L (ref 10–40)
BASOPHILS # BLD AUTO: 0.01 K/UL (ref 0–0.2)
BASOPHILS NFR BLD: 0.1 % (ref 0–1.9)
BILIRUB SERPL-MCNC: 0.3 MG/DL (ref 0.1–1)
BUN SERPL-MCNC: 26 MG/DL (ref 8–23)
CALCIUM SERPL-MCNC: 7.9 MG/DL (ref 8.7–10.5)
CHLORIDE SERPL-SCNC: 107 MMOL/L (ref 95–110)
CO2 SERPL-SCNC: 22 MMOL/L (ref 23–29)
CREAT SERPL-MCNC: 1.4 MG/DL (ref 0.5–1.4)
DELSYS: NORMAL
DIFFERENTIAL METHOD: ABNORMAL
EOSINOPHIL # BLD AUTO: 0 K/UL (ref 0–0.5)
EOSINOPHIL NFR BLD: 0 % (ref 0–8)
ERYTHROCYTE [DISTWIDTH] IN BLOOD BY AUTOMATED COUNT: 15.3 % (ref 11.5–14.5)
EST. GFR  (NO RACE VARIABLE): 38.5 ML/MIN/1.73 M^2
FLOW: 2
GLUCOSE SERPL-MCNC: 146 MG/DL (ref 70–110)
HCO3 UR-SCNC: 22.2 MMOL/L (ref 24–28)
HCT VFR BLD AUTO: 20.2 % (ref 37–48.5)
HCT VFR BLD CALC: 23 %PCV (ref 36–54)
HGB BLD-MCNC: 6.4 G/DL (ref 12–16)
IMM GRANULOCYTES # BLD AUTO: 0.07 K/UL (ref 0–0.04)
IMM GRANULOCYTES NFR BLD AUTO: 0.6 % (ref 0–0.5)
INR PPP: 1.1 (ref 0.8–1.2)
LDH SERPL L TO P-CCNC: 0.68 MMOL/L (ref 0.36–1.25)
LDH SERPL L TO P-CCNC: 0.97 MMOL/L (ref 0.36–1.25)
LYMPHOCYTES # BLD AUTO: 1 K/UL (ref 1–4.8)
LYMPHOCYTES NFR BLD: 9.2 % (ref 18–48)
MAGNESIUM SERPL-MCNC: 2.5 MG/DL (ref 1.6–2.6)
MCH RBC QN AUTO: 29 PG (ref 27–31)
MCHC RBC AUTO-ENTMCNC: 31.7 G/DL (ref 32–36)
MCV RBC AUTO: 91 FL (ref 82–98)
MODE: NORMAL
MONOCYTES # BLD AUTO: 1.4 K/UL (ref 0.3–1)
MONOCYTES NFR BLD: 12.7 % (ref 4–15)
NEUTROPHILS # BLD AUTO: 8.5 K/UL (ref 1.8–7.7)
NEUTROPHILS NFR BLD: 77.4 % (ref 38–73)
NRBC BLD-RTO: 0 /100 WBC
PCO2 BLDA: 39.1 MMHG (ref 35–45)
PH SMN: 7.36 [PH] (ref 7.35–7.45)
PHOSPHATE SERPL-MCNC: 4.4 MG/DL (ref 2.7–4.5)
PLATELET # BLD AUTO: 49 K/UL (ref 150–450)
PMV BLD AUTO: 12.6 FL (ref 9.2–12.9)
PO2 BLDA: 101 MMHG (ref 80–100)
POC BE: -3 MMOL/L
POC IONIZED CALCIUM: 1.16 MMOL/L (ref 1.06–1.42)
POC SATURATED O2: 98 % (ref 95–100)
POC TCO2: 23 MMOL/L (ref 23–27)
POCT GLUCOSE: 131 MG/DL (ref 70–110)
POCT GLUCOSE: 144 MG/DL (ref 70–110)
POCT GLUCOSE: 154 MG/DL (ref 70–110)
POCT GLUCOSE: 158 MG/DL (ref 70–110)
POCT GLUCOSE: 166 MG/DL (ref 70–110)
POCT GLUCOSE: 173 MG/DL (ref 70–110)
POTASSIUM BLD-SCNC: 3.9 MMOL/L (ref 3.5–5.1)
POTASSIUM SERPL-SCNC: 3.8 MMOL/L (ref 3.5–5.1)
POTASSIUM SERPL-SCNC: 3.9 MMOL/L (ref 3.5–5.1)
POTASSIUM SERPL-SCNC: 4 MMOL/L (ref 3.5–5.1)
PROT SERPL-MCNC: 5.3 G/DL (ref 6–8.4)
PROTHROMBIN TIME: 11.8 SEC (ref 9–12.5)
RBC # BLD AUTO: 2.21 M/UL (ref 4–5.4)
SAMPLE: ABNORMAL
SAMPLE: NORMAL
SAMPLE: NORMAL
SITE: ABNORMAL
SITE: NORMAL
SITE: NORMAL
SODIUM BLD-SCNC: 139 MMOL/L (ref 136–145)
SODIUM SERPL-SCNC: 138 MMOL/L (ref 136–145)
SP02: 100
WBC # BLD AUTO: 10.97 K/UL (ref 3.9–12.7)

## 2023-05-12 PROCEDURE — 63600175 PHARM REV CODE 636 W HCPCS: Performed by: NURSE PRACTITIONER

## 2023-05-12 PROCEDURE — 97166 OT EVAL MOD COMPLEX 45 MIN: CPT

## 2023-05-12 PROCEDURE — 84295 ASSAY OF SERUM SODIUM: CPT

## 2023-05-12 PROCEDURE — 99291 PR CRITICAL CARE, E/M 30-74 MINUTES: ICD-10-PCS | Mod: ,,, | Performed by: ANESTHESIOLOGY

## 2023-05-12 PROCEDURE — 94761 N-INVAS EAR/PLS OXIMETRY MLT: CPT

## 2023-05-12 PROCEDURE — 99900035 HC TECH TIME PER 15 MIN (STAT)

## 2023-05-12 PROCEDURE — 99232 SBSQ HOSP IP/OBS MODERATE 35: CPT | Mod: ,,, | Performed by: NURSE PRACTITIONER

## 2023-05-12 PROCEDURE — 85014 HEMATOCRIT: CPT

## 2023-05-12 PROCEDURE — 84132 ASSAY OF SERUM POTASSIUM: CPT | Mod: 91 | Performed by: PHYSICIAN ASSISTANT

## 2023-05-12 PROCEDURE — 25000003 PHARM REV CODE 250: Performed by: THORACIC SURGERY (CARDIOTHORACIC VASCULAR SURGERY)

## 2023-05-12 PROCEDURE — 83605 ASSAY OF LACTIC ACID: CPT

## 2023-05-12 PROCEDURE — 97530 THERAPEUTIC ACTIVITIES: CPT

## 2023-05-12 PROCEDURE — 20000000 HC ICU ROOM

## 2023-05-12 PROCEDURE — P9016 RBC LEUKOCYTES REDUCED: HCPCS | Performed by: THORACIC SURGERY (CARDIOTHORACIC VASCULAR SURGERY)

## 2023-05-12 PROCEDURE — 63600175 PHARM REV CODE 636 W HCPCS: Performed by: THORACIC SURGERY (CARDIOTHORACIC VASCULAR SURGERY)

## 2023-05-12 PROCEDURE — 25000003 PHARM REV CODE 250: Performed by: PHYSICIAN ASSISTANT

## 2023-05-12 PROCEDURE — 27000221 HC OXYGEN, UP TO 24 HOURS

## 2023-05-12 PROCEDURE — 97161 PT EVAL LOW COMPLEX 20 MIN: CPT

## 2023-05-12 PROCEDURE — 85730 THROMBOPLASTIN TIME PARTIAL: CPT | Performed by: PHYSICIAN ASSISTANT

## 2023-05-12 PROCEDURE — 99291 CRITICAL CARE FIRST HOUR: CPT | Mod: ,,, | Performed by: ANESTHESIOLOGY

## 2023-05-12 PROCEDURE — 94799 UNLISTED PULMONARY SVC/PX: CPT

## 2023-05-12 PROCEDURE — 82803 BLOOD GASES ANY COMBINATION: CPT

## 2023-05-12 PROCEDURE — 37799 UNLISTED PX VASCULAR SURGERY: CPT

## 2023-05-12 PROCEDURE — 63600175 PHARM REV CODE 636 W HCPCS: Performed by: STUDENT IN AN ORGANIZED HEALTH CARE EDUCATION/TRAINING PROGRAM

## 2023-05-12 PROCEDURE — 36430 TRANSFUSION BLD/BLD COMPNT: CPT

## 2023-05-12 PROCEDURE — 84100 ASSAY OF PHOSPHORUS: CPT | Performed by: PHYSICIAN ASSISTANT

## 2023-05-12 PROCEDURE — 80053 COMPREHEN METABOLIC PANEL: CPT | Performed by: STUDENT IN AN ORGANIZED HEALTH CARE EDUCATION/TRAINING PROGRAM

## 2023-05-12 PROCEDURE — 85610 PROTHROMBIN TIME: CPT | Performed by: PHYSICIAN ASSISTANT

## 2023-05-12 PROCEDURE — 63600175 PHARM REV CODE 636 W HCPCS

## 2023-05-12 PROCEDURE — 25000003 PHARM REV CODE 250: Performed by: STUDENT IN AN ORGANIZED HEALTH CARE EDUCATION/TRAINING PROGRAM

## 2023-05-12 PROCEDURE — 99233 PR SUBSEQUENT HOSPITAL CARE,LEVL III: ICD-10-PCS | Mod: ,,, | Performed by: INTERNAL MEDICINE

## 2023-05-12 PROCEDURE — 97535 SELF CARE MNGMENT TRAINING: CPT

## 2023-05-12 PROCEDURE — 83735 ASSAY OF MAGNESIUM: CPT | Performed by: PHYSICIAN ASSISTANT

## 2023-05-12 PROCEDURE — 99232 PR SUBSEQUENT HOSPITAL CARE,LEVL II: ICD-10-PCS | Mod: ,,, | Performed by: NURSE PRACTITIONER

## 2023-05-12 PROCEDURE — 82800 BLOOD PH: CPT

## 2023-05-12 PROCEDURE — 84132 ASSAY OF SERUM POTASSIUM: CPT

## 2023-05-12 PROCEDURE — 25000003 PHARM REV CODE 250

## 2023-05-12 PROCEDURE — 82330 ASSAY OF CALCIUM: CPT

## 2023-05-12 PROCEDURE — 85025 COMPLETE CBC W/AUTO DIFF WBC: CPT | Performed by: PHYSICIAN ASSISTANT

## 2023-05-12 PROCEDURE — 99233 SBSQ HOSP IP/OBS HIGH 50: CPT | Mod: ,,, | Performed by: INTERNAL MEDICINE

## 2023-05-12 PROCEDURE — 94660 CPAP INITIATION&MGMT: CPT

## 2023-05-12 RX ORDER — HYDROCODONE BITARTRATE AND ACETAMINOPHEN 10; 325 MG/1; MG/1
1 TABLET ORAL EVERY 6 HOURS PRN
Status: DISCONTINUED | OUTPATIENT
Start: 2023-05-12 | End: 2023-05-19 | Stop reason: HOSPADM

## 2023-05-12 RX ORDER — HYDROCODONE BITARTRATE AND ACETAMINOPHEN 500; 5 MG/1; MG/1
TABLET ORAL
Status: DISCONTINUED | OUTPATIENT
Start: 2023-05-12 | End: 2023-05-15

## 2023-05-12 RX ORDER — HYDROCODONE BITARTRATE AND ACETAMINOPHEN 5; 325 MG/1; MG/1
1 TABLET ORAL EVERY 6 HOURS PRN
Status: DISCONTINUED | OUTPATIENT
Start: 2023-05-12 | End: 2023-05-19 | Stop reason: HOSPADM

## 2023-05-12 RX ORDER — AMOXICILLIN 250 MG
1 CAPSULE ORAL DAILY
Status: DISCONTINUED | OUTPATIENT
Start: 2023-05-12 | End: 2023-05-19 | Stop reason: HOSPADM

## 2023-05-12 RX ORDER — HEPARIN SODIUM 5000 [USP'U]/ML
5000 INJECTION, SOLUTION INTRAVENOUS; SUBCUTANEOUS EVERY 8 HOURS
Status: DISCONTINUED | OUTPATIENT
Start: 2023-05-12 | End: 2023-05-12

## 2023-05-12 RX ORDER — FUROSEMIDE 10 MG/ML
20 INJECTION INTRAMUSCULAR; INTRAVENOUS 2 TIMES DAILY
Status: DISCONTINUED | OUTPATIENT
Start: 2023-05-12 | End: 2023-05-12

## 2023-05-12 RX ORDER — INSULIN ASPART 100 [IU]/ML
0-10 INJECTION, SOLUTION INTRAVENOUS; SUBCUTANEOUS
Status: DISCONTINUED | OUTPATIENT
Start: 2023-05-12 | End: 2023-05-14

## 2023-05-12 RX ORDER — ASPIRIN 325 MG
325 TABLET ORAL DAILY
Status: DISCONTINUED | OUTPATIENT
Start: 2023-05-12 | End: 2023-05-16

## 2023-05-12 RX ORDER — HEPARIN SODIUM 5000 [USP'U]/ML
7500 INJECTION, SOLUTION INTRAVENOUS; SUBCUTANEOUS EVERY 8 HOURS
Status: DISCONTINUED | OUTPATIENT
Start: 2023-05-12 | End: 2023-05-15

## 2023-05-12 RX ORDER — GABAPENTIN 300 MG/1
300 CAPSULE ORAL 3 TIMES DAILY
Status: DISCONTINUED | OUTPATIENT
Start: 2023-05-12 | End: 2023-05-19 | Stop reason: HOSPADM

## 2023-05-12 RX ORDER — FUROSEMIDE 10 MG/ML
40 INJECTION INTRAMUSCULAR; INTRAVENOUS 2 TIMES DAILY
Status: DISCONTINUED | OUTPATIENT
Start: 2023-05-12 | End: 2023-05-13

## 2023-05-12 RX ORDER — LIDOCAINE 50 MG/G
1 PATCH TOPICAL
Status: DISCONTINUED | OUTPATIENT
Start: 2023-05-12 | End: 2023-05-19 | Stop reason: HOSPADM

## 2023-05-12 RX ADMIN — HEPARIN SODIUM 7500 UNITS: 5000 INJECTION INTRAVENOUS; SUBCUTANEOUS at 09:05

## 2023-05-12 RX ADMIN — FUROSEMIDE 20 MG: 10 INJECTION, SOLUTION INTRAMUSCULAR; INTRAVENOUS at 09:05

## 2023-05-12 RX ADMIN — ASPIRIN 325 MG: 325 TABLET ORAL at 09:05

## 2023-05-12 RX ADMIN — MUPIROCIN 1 G: 20 OINTMENT TOPICAL at 08:05

## 2023-05-12 RX ADMIN — FAMOTIDINE 20 MG: 20 TABLET, FILM COATED ORAL at 09:05

## 2023-05-12 RX ADMIN — INSULIN ASPART 2 UNITS: 100 INJECTION, SOLUTION INTRAVENOUS; SUBCUTANEOUS at 07:05

## 2023-05-12 RX ADMIN — GABAPENTIN 300 MG: 300 CAPSULE ORAL at 08:05

## 2023-05-12 RX ADMIN — FUROSEMIDE 40 MG: 10 INJECTION, SOLUTION INTRAMUSCULAR; INTRAVENOUS at 08:05

## 2023-05-12 RX ADMIN — MUPIROCIN 1 G: 20 OINTMENT TOPICAL at 09:05

## 2023-05-12 RX ADMIN — DOCUSATE SODIUM 100 MG: 100 CAPSULE, LIQUID FILLED ORAL at 09:05

## 2023-05-12 RX ADMIN — PRAVASTATIN SODIUM 20 MG: 20 TABLET ORAL at 09:05

## 2023-05-12 RX ADMIN — HEPARIN SODIUM 7500 UNITS: 5000 INJECTION INTRAVENOUS; SUBCUTANEOUS at 03:05

## 2023-05-12 RX ADMIN — CEFAZOLIN 2 G: 2 INJECTION, POWDER, FOR SOLUTION INTRAMUSCULAR; INTRAVENOUS at 03:05

## 2023-05-12 RX ADMIN — SENNOSIDES AND DOCUSATE SODIUM 1 TABLET: 50; 8.6 TABLET ORAL at 09:05

## 2023-05-12 RX ADMIN — INSULIN ASPART 2 UNITS: 100 INJECTION, SOLUTION INTRAVENOUS; SUBCUTANEOUS at 12:05

## 2023-05-12 RX ADMIN — GABAPENTIN 300 MG: 300 CAPSULE ORAL at 09:05

## 2023-05-12 RX ADMIN — HYDROMORPHONE HYDROCHLORIDE 0.5 MG: 1 INJECTION, SOLUTION INTRAMUSCULAR; INTRAVENOUS; SUBCUTANEOUS at 07:05

## 2023-05-12 RX ADMIN — OXYCODONE HYDROCHLORIDE 10 MG: 10 TABLET ORAL at 05:05

## 2023-05-12 RX ADMIN — DOCUSATE SODIUM 100 MG: 100 CAPSULE, LIQUID FILLED ORAL at 08:05

## 2023-05-12 RX ADMIN — HYDROCODONE BITARTRATE AND ACETAMINOPHEN 1 TABLET: 5; 325 TABLET ORAL at 05:05

## 2023-05-12 RX ADMIN — HYDROMORPHONE HYDROCHLORIDE 0.5 MG: 1 INJECTION, SOLUTION INTRAMUSCULAR; INTRAVENOUS; SUBCUTANEOUS at 01:05

## 2023-05-12 RX ADMIN — POLYETHYLENE GLYCOL 3350 17 G: 17 POWDER, FOR SOLUTION ORAL at 09:05

## 2023-05-12 RX ADMIN — LIDOCAINE 1 PATCH: 50 PATCH TOPICAL at 09:05

## 2023-05-12 RX ADMIN — INSULIN ASPART 1 UNITS: 100 INJECTION, SOLUTION INTRAVENOUS; SUBCUTANEOUS at 03:05

## 2023-05-12 RX ADMIN — LEVOTHYROXINE SODIUM 75 MCG: 75 TABLET ORAL at 05:05

## 2023-05-12 RX ADMIN — HYDROMORPHONE HYDROCHLORIDE 0.5 MG: 1 INJECTION, SOLUTION INTRAMUSCULAR; INTRAVENOUS; SUBCUTANEOUS at 08:05

## 2023-05-12 RX ADMIN — HYDROCODONE BITARTRATE AND ACETAMINOPHEN 1 TABLET: 10; 325 TABLET ORAL at 09:05

## 2023-05-12 NOTE — PLAN OF CARE
Problem: Occupational Therapy  Goal: Occupational Therapy Goal  Description: Goals to be met by: 5/26/23     Patient will increase functional independence with ADLs by performing:    Feeding with Modified [place].  UE Dressing with Minimal A  LE Dressing with Moderate Assistance.  Grooming while standing at sink with Contact Guard Assistance.  Toileting from bedside commode with Moderate Assistance for hygiene and clothing management.   Toilet transfer to bedside commode with Minimal Assistance.    Outcome: Ongoing, Progressing

## 2023-05-12 NOTE — PT/OT/SLP EVAL
Physical Therapy Co-Evaluation and Co-Treatment    Patient Name:  Alina Narayan   MRN:  [id number]  Admit Date: 5/10/2023  Admitting Diagnosis:  Nonrheumatic aortic valve stenosis   Length of Stay: 2 days  Recent Surgery: Procedure(s) (LRB):  Replacement-valve-aortic, redo sternotomy (N/A) 2 Days Post-Op    Recommendations:     Discharge Recommendations:  rehabilitation facility   Discharge Equipment Recommendations: to be determined by next level of care   Barriers to discharge: None    Plan:     During this hospitalization, patient to be seen 5 x/week to address the identified rehab impairments via gait training, therapeutic activities, therapeutic exercises, neuromuscular re-education and progress towards the established goals.  Plan of Care Expires:     Plan of Care Reviewed with: patient, spouse    Assessment:     Alina Narayan is a 78 y.o. female admitted with a medical diagnosis of Nonrheumatic aortic valve stenosis. Pt tolerated initial evaluation well today. Patient is s/p aortic valve replacement on 5/10/2023. Pt demonstrated good functional strength and tolerance to upright activity on this date, requiring minimal physical assist for transfers and ambulation. VS remained stable throughout session. Pt did have generalized unsteadiness when standing requiring minimal assist throughout ambulation trial for patient safety. Pt with decreased overall endurance as well as increased low back and chest pain, however, and only able to ambulate a total of on this date. Pt will continue to benefit from skilled PT services during this hospital admit to continue to improve transfer ability and efficiency as well as continue to progress pt's ambulation distance and cardiopulmonary endurance to maximize pt's functional independence and return to PLOF.       Problem List: weakness, impaired endurance, impaired self care skills, impaired functional mobility, gait instability, impaired balance, decreased upper extremity function,  "decreased lower extremity function, decreased safety awareness, pain, impaired cardiopulmonary response to activity, impaired skin, edema.  Rehab Prognosis: Good; patient would benefit from acute skilled PT services to address these deficits and reach maximum level of function.      Goals:   Multidisciplinary Problems       Physical Therapy Goals          Problem: Physical Therapy    Goal Priority Disciplines Outcome Goal Variances Interventions   Physical Therapy Goal     PT, PT/OT Ongoing, Progressing     Description: Goals to be met by: 2023     Patient will increase functional independence with mobility by performin. Sit to stand transfer with Supervision  2. Gait  x 100 feet with Contact Guard Assistance using platform RW or LRAD.   3. Stand for 5 minutes with Stand-by Assistance using UE support prn while performing dynamic balance task  4. Lower extremity exercise program x30 reps per handout, with independence                         Subjective     RN notified prior to session.  present upon PT entrance into room. Patient agreeable to PT evaluation.    Chief Complaint: No chief complaint on file.  Patient/Family Comments/goals: "I can't do it I can't walk"  Pain/Comfort:  Pain Rating 1: 10/10  Location - Side 1: Bilateral  Location - Orientation 1: generalized  Location 1:  (back and chest)  Pain Addressed 1: Pre-medicate for activity, Distraction  Pain Rating Post-Intervention 1: 10/10    Social History:  Residence: lives with their spouse 1-story house with threshold to enter and no HR. Pt's bathroom has a tub/shower combo.  Support available: family  Equipment Used: walker, rolling, bath bench, grab bar  Equipment Owned (not using): None  Prior level of function: independent; occasionally ambulates with RW but primarily independent with mobility  Hand Dominance: right.   Work: Retired.   Drive: yes.   Managing Medicines/Managing Home: yes.   Hobbies: making jewelry    Patient reports " "they will have assistance from  upon discharge.    Objective:     Additional staff present: OT; OT for co-evaluation due to suspected patient need for two skilled therapists to appropriately assess patient's functional deficits as well as ensure patient safety, accommodate for limited activity tolerance, and provide appropriate, skilled assistance to maximize functional potential during evaluation.    Patient found up in chair with: telemetry, blood pressure cuff, pulse ox (continuous), oxygen, central line, vizcaino catheter, chest tube, arterial line, external pacer, flotrac     General Precautions: Standard, Cardiac fall, sternal   Orthopedic Precautions:N/A   Braces: N/A   Body mass index is 46.21 kg/m².  Oxygen Device: Nasal Cannula 2L  Vitals: BP (!) 96/54 (BP Location: Left arm, Patient Position: Lying)   Pulse 80   Temp 97.8 °F (36.6 °C) (Oral)   Resp (!) 24   Ht 5' 2" (1.575 m)   Wt 114.6 kg (252 lb 10.4 oz)   SpO2 97%   Breastfeeding No   BMI 46.21 kg/m²     Exams:  Cognition:   Alert and Cooperative  AxOx4  Command following: Follows multistep verbal commands  Fluency: clear/fluent  Hearing: Intact  Vision:  Intact  Skin Integrity: Visible skin intact  Postural Assessment: slouched posture and rounded shoulders  Physical Exam:    Left UE Left LE Right UE Right LE   Edema absent absent absent absent   ROM AROM WFL except shoulder ROM limited 2/2 masectomy AROM WFL AROM WFL except shoulder ROM limited 2/2 old RTC AROM WFL   Strength decreased ROM , adequate strength 5/5 decreased ROM , adequate strength 5/5   Sensation intact to light touch intact to light touch intact to light touch intact to light touch   Coordination normal normal normal normal     Outcome Measures:  AM-PAC 6 CLICK MOBILITY  Turning over in bed (including adjusting bedclothes, sheets and blankets)?: 2  Sitting down on and standing up from a chair with arms (e.g., wheelchair, bedside commode, etc.): 3  Moving from lying on " back to sitting on the side of the bed?: 2  Moving to and from a bed to a chair (including a wheelchair)?: 2  Need to walk in hospital room?: 2  Climbing 3-5 steps with a railing?: 2  Basic Mobility Total Score: 13     Functional Mobility:    Bed Mobility:   Pt found/returned to bedside chair    Transfers:   Sit <> Stand Transfer: contact guard assistance with no assistive device   Stand <> Sit Transfer: contact guard assistance with no assistive device   [id number] from bedside chair    Standing Balance:  Static Standing Balance: Fair- : requires minimal assistance or UE support in order to stand without LOB  Dynamic Standing Balance: Poor+ : able to stand with minimal assistance and reach ipsilaterally. Unable to weight shift.  Assistance Level Required: Minimal Assistance  Patient used: hand-held assist   Time: 3 minutes  Postural deviations noted: slouched posture and rounded shoulders  Comments: Time in unsupported standing focused on cardiopulmonary tolerance to unsupported standing as well as strength/endurance to perform dynamic balance activity safely with posterior LOB and vital signs stable. Pt able to respond to with hip strategy to internal/external perturbations with appropriate anticipatory and reactive responses with no LOB. Bilateral UE support needed throughout and pt able to complete balance challenges with anterior reaches inside ANALY with consistent posterior LOB while performing standing balance task at bedside table. PT providing reaching tasks and facilitating appropriate balance/reaching response as OT assisted with ADLs.      Gait:   Patient ambulated: 4 steps fwd/4 steps bwd   Patient required: minimal assist and of 2 persons  Patient used:  hand-held assist   Gait Pattern observed: reciprocal gait  Gait Deviation(s): unsteady gait, decreased step length, wide base of support, decreased weight shift, decreased arm swing, shuffle gait, flexed posture, and decreased rebeca  Impairments due to:  impaired balance, pain, decreased strength, and decreased endurance  all lines remained intact throughout ambulation trial  Comments: Pt with minimal foot clearance, requiring min A of 2 persons for balance to clear BLE, weight shift, prevent posterior LOB. Pt extremely pain limited and self limiting requiring max verbal cues and encouragement to perform.    Education:  Time provided for education, counseling and discussion of health disposition in regards to patient's current status  All questions answered within PT scope of practice and to patient's satisfaction  PT role in POC to address current functional deficits  Pt educated on proper body mechanics, safety techniques, and energy conservation with PT facilitation and cueing throughout session  Call nursing/pct to transfer to chair/use bathroom. Pt stated understanding.  Whiteboard updated with therapist name and pt's current mobility status documented above  Safe to perform step transfer to/from chair/bedside commode assist x 2 for safety and HHA w/ nursing/PCT present  Importance of OOB tolerance prn hrs/day to improve lung ventilation and expansion as well as strengthen postural musculature  Pt educated on Post-op sternal precautions, including no lifting > 5 lbs, pulling or pushing with BUEs. Able to move arms within a pain free range.    Patient left up in chair with all lines intact, call button in reach, RN notified, and family present.      History:     Past Medical History:   Diagnosis Date    Allergy     seasonal    Anxiety     Arthritis     BRCA1 negative     Breast cancer 10/2012    left breast invasive ductal carcinoma    Colon polyp     Depression     Diabetes mellitus     Type 2    Diabetes mellitus, type 2     Diverticular disease     Diverticulitis 2009    Genetic testing 05/2017    negative Integrated BRACAnalysis    Hyperlipidemia     Hypertension     Morbid obesity     MITCHELL (obstructive sleep apnea)     Pulmonary embolism     Stenosis      Stenosis and insufficiency of lacrimal passages     Thyroid disease        Past Surgical History:   Procedure Laterality Date    AORTIC VALVE REPLACEMENT N/A 5/10/2023    Procedure: Replacement-valve-aortic, redo sternotomy;  Surgeon: Venkat Webb MD;  Location: [facility] OR [address] [place] [address];  Service: Cardiothoracic;  Laterality: N/A;  Redo sternotomy    BREAST BIOPSY Left 10/01/2012    left breast- invasive ductal carcinoma    BREAST BIOPSY Left 12/2017    BREAST CYST ASPIRATION      BREAST LUMPECTOMY Left 2012    BREAST MASS EXCISION      CARDIAC VALVE REPLACEMENT  12/2013    CARDIAC VALVE SURGERY  2013    CARPAL TUNNEL RELEASE      Left    COLONOSCOPY N/A 09/29/2017    Procedure: COLONOSCOPY;  Surgeon: Phani Worley MD;  Location: [facility] ENDO (4TH FLR);  Service: Endoscopy;  Laterality: N/A;    COLONOSCOPY N/A 1/5/2023    Procedure: COLONOSCOPY;  Surgeon: Eladia Martino MD;  Location: [facility] ENDO (4TH FLR);  Service: Endoscopy;  Laterality: N/A;  instr via portal  ok to hole Eliquis-see encounter 12/9-MS  1/4 pateint cannot come earlier-rt    CORONARY ANGIOGRAPHY Left 4/13/2023    Procedure: ANGIOGRAM, CORONARY ARTERY;  Surgeon: Eze Sales MD;  Location: [facility] CATH LAB;  Service: Cardiology;  Laterality: Left;  low bleeding risk 2.9%    DILATION AND CURETTAGE OF UTERUS  1999    Endometrial polyps    ENDOMETRIAL ABLATION  1999    Enodmetrial polyps    EYE SURGERY      INSERTION OF TUNNELED CENTRAL VENOUS CATHETER (CVC) WITH SUBCUTANEOUS PORT Right 02/01/2019    Procedure: [id number];  Surgeon: Gaston Flores MD;  Location: [address];  Service: General;  Laterality: Right;    left lumpectomy  2012    MASTECTOMY      MEDIPORT REMOVAL Right 08/26/2019    Procedure: REMOVAL, CATHETER, CENTRAL VENOUS, TUNNELED, WITH PORT;  Surgeon: Gaston Flores MD;  Location: [address];  Service: General;  Laterality: Right;    MODIFIED RADICAL MASTECTOMY W/ AXILLARY LYMPH NODE DISSECTION Right  "08/26/2019    Procedure: MASTECTOMY, MODIFIED RADICAL;  Surgeon: Gaston Flores MD;  Location: [facility] OR [address] [place] [address];  Service: General;  Laterality: Right;    SHOULDER SURGERY      SKIN BIOPSY         Family History   Problem Relation Age of Onset    Breast cancer Mother     Colon cancer Father     Cancer Father         Colon CA (cause of death); Prostate CA (no chemo)    Heart disease Father     No Known Problems Sister     Alcohol abuse Brother     Cancer Maternal Grandfather         Colon CA    No Known Problems Son     Cancer Brother         prostate CA, no chemo, "it was bad"    Anxiety disorder Son     SAL disease Son     Sleep disorder Son     Ovarian cancer Neg Hx     Melanoma Neg Hx     Psoriasis Neg Hx     Lupus Neg Hx     Eczema Neg Hx     Acne Neg Hx        Social History     Socioeconomic History    Marital status:      Spouse name: Srinath    Number of children: 2   Occupational History    Occupation: Retired   Tobacco Use    Smoking status: Never    Smokeless tobacco: Never   Substance and Sexual Activity    Alcohol use: No     Alcohol/week: 0.0 standard drinks    Drug use: Never    Sexual activity: Yes     Partners: Male   Other Topics Concern    Are you pregnant or think you may be? No    Breast-feeding No     Social Determinants of Health     Financial Resource Strain: Low Risk     Difficulty of Paying Living Expenses: Not hard at all   Food Insecurity: No Food Insecurity    Worried About Running Out of Food in the Last Year: Never true    Ran Out of Food in the Last Year: Never true   Transportation Needs: No Transportation Needs    Lack of Transportation (Medical): No    Lack of Transportation (Non-Medical): No   Physical Activity: Inactive    Days of Exercise per Week: 0 days    Minutes of Exercise per Session: 0 min   Stress: Unknown    Feeling of Stress : Patient refused   Social Connections: Moderately Isolated    Frequency of Communication with Friends and Family: Three times a week    " Frequency of Social Gatherings with Friends and Family: Three times a week    Attends Evangelical Services: Never    Active Member of Clubs or Organizations: No    Attends Club or Organization Meetings: Never    Marital Status:    Housing Stability: Low Risk     Unable to Pay for Housing in the Last Year: No    Number of Places Lived in the Last Year: 1    Unstable Housing in the Last Year: No       Time Tracking:     PT Received On: 05/12/23  PT Start Time: 0852     PT Stop Time: 0915  PT Total Time (min): 23 min     Billable Minutes: Evaluation 10 minutes and Therapeutic Activity 13 minutes    5/12/2023

## 2023-05-12 NOTE — ASSESSMENT & PLAN NOTE
Endocrinology consulted for BG management.   BG goal 110-140 (CTS Protocol)    - Transition drip at 0.9 units/hr with step-down parameters.   - Novolog (aspart) insulin prn for BG excursions [facility] (150/25).  - BG checks AC/HS/0200  - Hypoglycemia protocol in place    ** Please notify Endocrine for any change and/or advance in diet**  ** Please call Endocrine for any BG related issues **    Discharge Planning:   TBD. Please notify endocrinology prior to discharge.

## 2023-05-12 NOTE — NURSING
"      SICU PLAN OF CARE NOTE    Dx: Nonrheumatic aortic valve stenosis    Vital Signs: BP (!) 96/54 (BP Location: Left arm, Patient Position: Lying)   Pulse 86   Temp 98.4 °F (36.9 °C) (Oral)   Resp 14   Ht 5' 2" (1.575 m)   Wt 114.6 kg (252 lb 10.4 oz)   SpO2 95%   Breastfeeding No   BMI 46.21 kg/m²     SHIFT EVENTS:    Received 1 unit PRBCs. Received PRN dilaudid and oxy for pain. Refused CPAP.    Neuro: AAO x4, Follows Commands, and Moves All Extremities    Respiratory: Nasal Cannula    Cardiac: V-paced rate 86 10 and 2. Completely paced for entire shift.    Diet: Cardiac Diet    Gtts: Insulin and Epinephrine     Urine Output: Urinary Catheter  15-20 ml/ hour. MD Willams aware. 270 ml/shift total.    Drains:     Chest Tube, total output 40 cc /  shift    Chest Tube, total output 30 cc /  shift    Labs/Accuchecks: Accuchecks Q4H and daily labs.    SKIN NOTE:  Skin precautions maintained including:  Sacrum and heels with foam dressing in place for pressure protection. Frequent weight shift encouraged Q2 hr to prevent breakdown. Bed plugged in and mattress inflated; continuous rotational turning bed feature. Adhesive use limited. Heels elevated off bed. Pressure points protected and positioning supports utilized.  Skin-to-device areas padded. Skin-to-skin areas padded    POC reviewed with patient and family; questions and concerns addressed. See flow sheets for full assessment details.    "

## 2023-05-12 NOTE — PLAN OF CARE
Discharge Recommendation: Rehab.    Evaluation completed today. PT goals appropriate.    Patient is safe to perform bed <> chair transfer with assist x 1 with nursing staff.    Please continue Progressive Mobility Protocol as appropriate.    Marga Beltre, PT, DPT  2023  Pager: [phone]    Problem: Physical Therapy  Goal: Physical Therapy Goal  Description: Goals to be met by: 2023     Patient will increase functional independence with mobility by performin. Sit to stand transfer with Supervision  2. Gait  x 100 feet with Contact Guard Assistance using platform RW or LRAD.   3. Stand for 5 minutes with Stand-by Assistance using UE support prn while performing dynamic balance task  4. Lower extremity exercise program x30 reps per handout, with independence    Outcome: Ongoing, Progressing

## 2023-05-12 NOTE — CONSULTS
CARDIAC ELECTROPHYSIOLOGY CONSULTATION NOTE    PATIENT: Alina Narayan  MRN: [id number]  : 1945  DATE OF SERVICE: 2023    REFERRING PRACTITIONER: Venkat Webb MD  PRIMARY CARE PROVIDER: Aarti Dunn MD  ATTENDING PHYSICIAN: Venkat Webb MD    HPI:     HPI:  78 y.o. female with PMH of  pulmonary embolism on chronic AC, AVR (21 mm Trifecta) in , breast cancer (s/p mastectomy, radiation and chemo) who  presents for pre-op re-do AVR. S/p  re-do bio-AVR for severe bioprosthetic AS on may 10th by Dr Webb. The patient underwent procedure on 5/10/23 and has epicardial leads since procedure. She was extubated yesterday.  Initially on epi 0.04 and levo 0.04 on admit; levo weaned off overnight, epi remains at 0.01 on morning of . Today in the morning, with anemia Hb 6.4 s/p transfusion of 1 unit or RBC.  EP consulted for bradtcardia.     TM sinus bradycardia 50's.   Patient denied any symptoms     PPM epicardial leads:   Treshold 7 rate 80 output 15    Active Problems:     Patient Active Problem List   Diagnosis    Essential hypertension    Hyperlipidemia, mixed    Diverticulosis    Family history of colon cancer    Obstructive sleep apnea    Nonrheumatic aortic valve stenosis    Diabetes mellitus type 2 in obese    S/P AVR    Acquired hypothyroidism    Hearing loss, sensorineural    Degeneration of lumbar or lumbosacral intervertebral disc    Chronic kidney disease, stage III (moderate)    Diabetes mellitus due to underlying condition with stage 3 chronic kidney disease, without long-term current use of insulin    Vitamin D deficiency    Hemarthrosis of left knee    Contusion, knee and lower leg, left, initial encounter    Fall    Left anterior knee pain    Edema    History of breast cancer    BMI 45.0-49.9, adult    Atherosclerosis of aorta    Thrombocytopenia, unspecified    Fatty liver    Disorder of rotator cuff of both shoulders    Soft tissue swelling    Lightheadedness     Bleeding    Hematoma (nontraumatic) of breast    Deformity due to mastectomy    Pulmonary nodule    Adjustment disorder with mixed anxiety and depressed mood    COVID-19 virus infection    Bilateral knee pain    History of pulmonary embolism    Morbid (severe) obesity due to excess calories    Prosthetic valve dysfunction    Pre-op testing    Surgical wound present    Acute blood loss anemia     Past Medical History:     Past Medical History:   Diagnosis Date    Allergy     seasonal    Anxiety     Arthritis     BRCA1 negative     Breast cancer 10/2012    left breast invasive ductal carcinoma    Colon polyp     Depression     Diabetes mellitus     Type 2    Diabetes mellitus, type 2     Diverticular disease     Diverticulitis 2009    Genetic testing 05/2017    negative Integrated BRACAnalysis    Hyperlipidemia     Hypertension     Morbid obesity     MITCHELL (obstructive sleep apnea)     Pulmonary embolism     Stenosis     Stenosis and insufficiency of lacrimal passages     Thyroid disease      Past Surgical History:     Past Surgical History:   Procedure Laterality Date    AORTIC VALVE REPLACEMENT N/A 5/10/2023    Procedure: Replacement-valve-aortic, redo sternotomy;  Surgeon: Venkat Webb MD;  Location: [facility] OR [address] [place] [address];  Service: Cardiothoracic;  Laterality: N/A;  Redo sternotomy    BREAST BIOPSY Left 10/01/2012    left breast- invasive ductal carcinoma    BREAST BIOPSY Left 12/2017    BREAST CYST ASPIRATION      BREAST LUMPECTOMY Left 2012    BREAST MASS EXCISION      CARDIAC VALVE REPLACEMENT  12/2013    CARDIAC VALVE SURGERY  2013    CARPAL TUNNEL RELEASE      Left    COLONOSCOPY N/A 09/29/2017    Procedure: COLONOSCOPY;  Surgeon: Phani Worley MD;  Location: [facility] ([address] [place] [address]);  Service: Endoscopy;  Laterality: N/A;    COLONOSCOPY N/A 1/5/2023    Procedure: COLONOSCOPY;  Surgeon: Eladia Martino MD;  Location: [facility] ENDO ([address] [place] [address]);  Service: Endoscopy;  Laterality: N/A;  instr via portal  ok to hole  Eliquis-see encounter 12/9-MS  1/4 pateint cannot come earlier-rt    CORONARY ANGIOGRAPHY Left 4/13/2023    Procedure: ANGIOGRAM, CORONARY ARTERY;  Surgeon: Eze Sales MD;  Location: [facility] CATH LAB;  Service: Cardiology;  Laterality: Left;  low bleeding risk 2.9%    DILATION AND CURETTAGE OF UTERUS  1999    Endometrial polyps    ENDOMETRIAL ABLATION  1999    Enodmetrial polyps    EYE SURGERY      INSERTION OF TUNNELED CENTRAL VENOUS CATHETER (CVC) WITH SUBCUTANEOUS PORT Right 02/01/2019    Procedure: [id number];  Surgeon: Gaston Flores MD;  Location: [address];  Service: General;  Laterality: Right;    left lumpectomy  2012    MASTECTOMY      MEDIPORT REMOVAL Right 08/26/2019    Procedure: REMOVAL, CATHETER, CENTRAL VENOUS, TUNNELED, WITH PORT;  Surgeon: Gaston Flores MD;  Location: [address];  Service: General;  Laterality: Right;    MODIFIED RADICAL MASTECTOMY W/ AXILLARY LYMPH NODE DISSECTION Right 08/26/2019    Procedure: MASTECTOMY, MODIFIED RADICAL;  Surgeon: Gaston Flores MD;  Location: [address];  Service: General;  Laterality: Right;    SHOULDER SURGERY      SKIN BIOPSY       Social History/Family History:     Social History     Socioeconomic History    Marital status:      Spouse name: Srinath    Number of children: 2   Occupational History    Occupation: Retired   Tobacco Use    Smoking status: Never    Smokeless tobacco: Never   Substance and Sexual Activity    Alcohol use: No     Alcohol/week: 0.0 standard drinks    Drug use: Never    Sexual activity: Yes     Partners: Male   Other Topics Concern    Are you pregnant or think you may be? No    Breast-feeding No     Social Determinants of Health     Financial Resource Strain: Low Risk     Difficulty of Paying Living Expenses: Not hard at all   Food Insecurity: No Food Insecurity    Worried About Running Out of Food in the Last Year: Never true    Ran Out of Food in the Last Year: Never true  "  Transportation Needs: No Transportation Needs    Lack of Transportation (Medical): No    Lack of Transportation (Non-Medical): No   Physical Activity: Inactive    Days of Exercise per Week: 0 days    Minutes of Exercise per Session: 0 min   Stress: Unknown    Feeling of Stress : Patient refused   Social Connections: Moderately Isolated    Frequency of Communication with Friends and Family: Three times a week    Frequency of Social Gatherings with Friends and Family: Three times a week    Attends Yazdanism Services: Never    Active Member of Clubs or Organizations: No    Attends Club or Organization Meetings: Never    Marital Status:    Housing Stability: Low Risk     Unable to Pay for Housing in the Last Year: No    Number of Places Lived in the Last Year: 1    Unstable Housing in the Last Year: No     Family History   Problem Relation Age of Onset    Breast cancer Mother     Colon cancer Father     Cancer Father         Colon CA (cause of death); Prostate CA (no chemo)    Heart disease Father     No Known Problems Sister     Alcohol abuse Brother     Cancer Maternal Grandfather         Colon CA    No Known Problems Son     Cancer Brother         prostate CA, no chemo, "it was bad"    Anxiety disorder Son     SAL disease Son     Sleep disorder Son     Ovarian cancer Neg Hx     Melanoma Neg Hx     Psoriasis Neg Hx     Lupus Neg Hx     Eczema Neg Hx     Acne Neg Hx      Medications:     Medications Prior to Admission   Medication Sig Dispense Refill Last Dose    aspirin (ECOTRIN) 81 MG EC tablet Take 81 mg by mouth once daily.   5/9/2023    carvediloL (COREG) 25 MG tablet TAKE 1/2 TABLET TWICE DAILY WITH MEALS 180 tablet 3 5/10/2023    CHOLECALCIFEROL, VITAMIN D3, (VITAMIN D3 ORAL) Take 3,000 capsules by mouth once daily. 3000 IU per day   5/9/2023    furosemide (LASIX) 20 MG tablet Take 1 tablet (20 mg total) by mouth once daily. 90 tablet 3 5/9/2023    gabapentin (NEURONTIN) 300 MG capsule TAKE 1 CAPSULE " EVERY MORNING AND TAKE 2 CAPSULES AT BEDTIME 270 capsule 3 5/9/2023    glimepiride (AMARYL) 2 MG tablet TAKE 1 TABLET BEFORE BREAKFAST. 90 tablet 3 5/9/2023    hydrocortisone 2.5 % cream Apply topically 2 (two) times daily as needed (rash under the breast). Mix 50/50 with ketoconazole cream. 30 g 3 Past Month    ketoconazole (NIZORAL) 2 % cream AAA bid prn rash under the breast. Mix 50/50 with hydrocortisone cream. 60 g 3 Past Month    levothyroxine (SYNTHROID) 75 MCG tablet TAKE 1 TABLET BEFORE BREAKFAST 90 tablet 3 5/10/2023    pravastatin (PRAVACHOL) 20 MG tablet TAKE 1 TABLET EVERY DAY 90 tablet 3 5/9/2023    blood-glucose meter kit True Test or meter covered by insurance - pt checks glucose twice daily for uncontrolled glucoses E11.65 1 each 0     ELIQUIS 2.5 mg Tab TAKE ONE TABLET BY MOUTH 2 TIMES A DAY 60 tablet 12 5/3/2023    HYDROcodone-acetaminophen (NORCO) 5-325 mg per tablet Take 1 tablet by mouth every 6 (six) hours as needed for Pain. (Patient not taking: Reported on 4/27/2023) 40 tablet 0 More than a month    lancets (ACCU-CHEK SOFTCLIX LANCETS) Misc TrueTest lancets for meter covered by insurance - pt checks twice daily for uncontrolled glucoses E11.65, 200 each 3     metFORMIN (GLUCOPHAGE) 500 MG tablet TAKE 1 TABLET TWICE DAILY WITH MEALS 180 tablet 0 5/8/2023    mupirocin (BACTROBAN) 2 % ointment Apply topically 2 (two) times daily. 15 g 0 More than a month    tiZANidine (ZANAFLEX) 4 MG tablet One tab every 6-8 hours as needed pain (Patient not taking: Reported on 4/27/2023) 30 tablet 0 Unknown    triamcinolone acetonide 0.1%-magnesium hydroxide 400 mg/5 ml-ciclopirox Apply to affected area under the breast twice daily as directed 60 g 3 More than a month    TRUE METRIX GLUCOSE TEST STRIP Strp TEST BLOOD SUGAR TWICE DAILY 200 strip 3        Allergies:     Review of patient's allergies indicates:   Allergen Reactions    Augmentin [amoxicillin-pot clavulanate] Diarrhea    Dilaudid [hydromorphone  "(bulk)] Other (See Comments)     Other reaction(s): sedation    Hydromorphone Other (See Comments)     Other reaction(s): sedation    Cymbalta [duloxetine]      Dry mouth, agitation    Jardiance [empagliflozin] Hives    Byetta [exenatide] Rash     Other reaction(s): Rash     Review of Systems:   Review of Systems   All other systems reviewed and are negative.     Physical Exam:   VITALS: BP (!) 96/54 (BP Location: Left arm, Patient Position: Lying)   Pulse 60   Temp 97.8 °F (36.6 °C) (Oral)   Resp 13   Ht 5' 2" (1.575 m)   Wt 114.6 kg (252 lb 10.4 oz)   SpO2 98%   Breastfeeding No   BMI 46.21 kg/m²        Eyes: Sclerae anicteric. Ears/nose/throat: Mucous membranes moist. Neck: No thyromegaly, lymphadenopathy, or jugular venous distention. Respiratory: Lungs clear to auscultation bilaterally. Cardiovascular: Heart was regular with normal first and second heart sounds. No S3 or S4. GI: Soft, nontender, nondistended, with normal bowel sounds. Musculoskeletal: extremities without cyanosis, clubbing or pitting edema. Neurologic: Patient is alert and oriented x3 and there is no focal strength deficit. Skin: no rashes, cuts, sores. Psychiatric: normal affect. Hematologic: no abnormal bruising or bleeding.    Laboratory:     BUN/Cr/glu/ALT/AST/amyl/lip:  26/1.4/--/7/22/--/-- (05/12 0247)  WBC/Hgb/Hct/Plts:  10.97/6.4/20.2/49 (05/12 0247)  PT/INR/PTT:  11.8/1.1/31.6 (05/12 0247)    Diagnostic Studies:   TTE:  Echo Saline Bubble? No    Result Date: 3/27/2023  · The left ventricle is normal in size with concentric remodeling and   normal systolic function. The estimated ejection fraction is 65%.  · Normal right ventricular size with normal right ventricular systolic   function.  · Indeterminate left ventricular diastolic function.  · Mild left atrial enlargement.  · There is a 21mm [employer] You Trifecta bovine pericardial bioprosthetic   aortic valve present with evidence of stenosis. The mean gradient is 47   mmHg with a " dimensionless index of 0.23. The acceleration time is well   above 100ms (140+ ms)  · Mild tricuspid regurgitation.  · The estimated PA systolic pressure is 36 mmHg.  · Normal central venous pressure (3 mmHg).  · The aortic valve mean gradient is 47 mmHg with a dimensionless index of   0.23.     ISCHEMIC WORKUP:  4/13/23.C   Non obstructive CAD  There is minor luminal irregularity in the LCA and RCA    Plan:   Sinus bradycardia   Asymptomatic   Has PPM Epicardial leads postprocedure:  Treshold 7 rate 80 output 15  PPM turned down to 50 rate. No hemodynamic changes     Plan:  Will continue to monitor   Leave PPM rate at 50    Sanford Kingston MD  [facility]  PGY 4 Cardiology Fellow

## 2023-05-12 NOTE — ASSESSMENT & PLAN NOTE
Initial AVR with Tracey 2013.       Neuro/Psych:     - Sedation: none    - Pain:    - Scheduled Tylenol 1g q8h   - dilaudid PRN              Cardiac:     - S/P redo sternotomy with redo AVR with Dr. Webb on 5/10/2023    - BP Goal: MAP 60-80, SBP < 140     - Cleviprex PRN    - Pressors: epi 0.04 and levo 0.04 on admit; levo weaned to off overnight, epi at 0.01 on morning of 5/12     - Anti-HTNs: Will start when appropriate    - Rhythm: pacer dependent at rate of 86     - Beta blocker: Will start when appropriate    - Statin: takes pravastatin at home       Pulmonary:     - Goal SpO2 >92%    - Will wean ventilator support as tolerated to extubate    - Chest Tubes x 2 (2 Meds & 2 Pleural)    - ABGs PRN      Renal:    - Trend BUN/Cr     - Maintain Macdonald, record strict Is/Os    Recent Labs   Lab 05/11/23  1525 05/11/23  2225 05/12/23  0247   BUN 24* 25* 26*   CREATININE 1.5* 1.5* 1.4         FEN / GI:     - Daily CMP, PRN K/Mag/Phos per protocol     - Replace electrolytes as needed    - Nutrition: diabetic diet with fluid restriction     - Bowel Regimen: Miralax, docusate      ID:     - Afebrile    - WBC stable    - Abx: Completed perioperative cefazolin 2g Q8H x 5 doses    Recent Labs   Lab 05/10/23  1730 05/11/23  0235 05/12/23  0247   WBC 24.97* 10.99 10.97         Heme/Onc:     - Hgb 12.6 pre-operatively    - CBC daily    - ASA 325mg daily, initiated within 6hr of surgery     Recent Labs   Lab 05/10/23  1730 05/11/23  0235 05/12/23  0247   HGB 10.1* 7.8* 6.4*   PLT 69* 51* 49*   APTT 36.8* 32.7* 31.6   INR 1.2 1.1 1.1         Endocrine:     - CTS Goal -140    - HgbA1c: 7.3    - Endocrinology consulted for insulin management      PPx:   Feeding: diabetic diet   Analgesia/Sedation: dilaudid PRN   Thromboembolic Prevention: SCDs   HOB >30: Yes  Stress Ulcer: pepcid  Glucose Control: Yes, insulin management per Endocrinology     Lines/Drains/Airway:   Left brachial arterial line   [facility]  CVC   Macdonald   Chest Tubes: 2 (2 Mediastinal, 2 Pleural)    Pacing Wires: Temporary ventricular pacing wires      Dispo/Code Status/Palliative:     - Continue SICU Care    - Full Code

## 2023-05-12 NOTE — PROGRESS NOTES
Dr. Webb at the bedside, updated on gtt rates, output, vital signs, pacing status, lab values, etc.

## 2023-05-12 NOTE — PHYSICIAN QUERY
PT Name: Alina Narayan  MR #: [id number]    DOCUMENTATION CLARIFICATION      CDS/: Corinne Cavazos RN             Contact information: [email or web address]  Withdrawn.  Answered on PN 5/13 arrhythmia     This form is a permanent document in the medical record.      Query Date: May 12, 2023    By submitting this query, we are merely seeking further clarification of documentation. Please utilize your independent clinical judgment when addressing the question(s) below.    The Medical Record contains the following:   Indicators  Supporting Clinical Findings Location in Medical Record    Anemia documented     x H&H  05/10/23 17:30 05/11/23 02:35 05/12/23 02:47   Hemoglobin 10.1 (L) 7.8 (L) 6.4 (L)   Hematocrit 30.9 (L) 23.2 (L) 20.2 (L)      Lab   x BP     HR Vital Signs (24h Range):  Pulse:  [76-90] 85  BP: (111-138)/(59-74) 122/59  Arterial Line BP: ()/(50-75) 112/55      Pulse:  [84-93] 86  BP: ()/(52-60) 96/54  Arterial Line BP: ()/(42-65) 114/50 Pn 5/11       CC          PN 5/12       CC    Bleeding     x Procedure/Surgery Performed/EBL OPERATION PERFORMED:    1)  Aortic valve re-replacement with a small Corcym Perceval bovine pericardial bioprosthesis  2)  Redo sternotomy    ESTIMATED BLOOD LOSS:     100 mL.   Op Note 5/10      CTS        PN 5/12       CC   x Transfusion(s) Intraoperatively, they received 2L of crystalloid, 747 of cell saver, 1 PRBCs.     HgB low this morning at 6.4, being transfused 1u pRBC.   H&P 5/10       CC    PN 5/12       CC   x Acute/Chronic illness 1.  Severe prosthetic aortic stenosis  2.  Previous aortic valve replacement  3.  Breast cancer  4.  Thoracic irradiation  5.  Diabetes mellitus  6.  Morbid obesity   Op Note 5/10      CTS    Treatments              Other       Provider, please specify diagnosis or diagnoses associated with above clinical findings.   [   ] Acute blood loss anemia expected post-operatively      [   ] Other Hematological Diagnosis (please  specify): _________________              Please document in your progress notes daily for the duration of treatment, until resolved, and include in your discharge summary.    Form No. [id number]

## 2023-05-12 NOTE — PROGRESS NOTES
[facility]  Critical Care - Surgery  Progress Note    Patient Name: Alina Narayan  MRN: [id number]  Admission Date: 5/10/2023  Hospital Length of Stay: 2 days  Code Status: Full Code  Attending Provider: Venkat Webb MD  Primary Care Provider: Aarti Dunn MD   Principal Problem: Nonrheumatic aortic valve stenosis    Subjective:     Hospital/ICU Course:  No notes on file    Interval History/Significant Events: NAEON. HgB low this morning at 6.4, being transfused 1u pRBC. Patient up in chair this morning. Lactate normalized. Cr 1.5> 1.4. UOP has been about 20cc/hr. On epi at 0.01     Follow-up For: Procedure(s) (LRB):  Replacement-valve-aortic, redo sternotomy (N/A)    Post-Operative Day: 2 Days Post-Op    Objective:     Vital Signs (Most Recent):  Temp: 98.4 °F (36.9 °C) (05/11/23 1915)  Pulse: 86 (05/12/23 0600)  Resp: 14 (05/12/23 0600)  BP: (!) 96/54 (05/12/23 0403)  SpO2: 95 % (05/12/23 0600) Vital Signs (24h Range):  Temp:  [97.4 °F (36.3 °C)-98.4 °F (36.9 °C)] 98.4 °F (36.9 °C)  Pulse:  [84-93] 86  Resp:  [11-43] 14  SpO2:  [95 %-100 %] 95 %  BP: ()/(52-60) 96/54  Arterial Line BP: ()/(42-65) 114/50     Weight: 114.6 kg (252 lb 10.4 oz)  Body mass index is 46.21 kg/m².      Intake/Output Summary (Last 24 hours) at 5/12/2023 0630  Last data filed at 5/12/2023 0600  Gross per 24 hour   Intake 1775.33 ml   Output 945 ml   Net 830.33 ml          Physical Exam  Constitutional:       General: She is not in acute distress.     Appearance: Normal appearance.   HENT:      Head: Normocephalic and atraumatic.      Right Ear: External ear normal.      Left Ear: External ear normal.      Nose: Nose normal.      Mouth/Throat:      Mouth: Mucous membranes are moist.      Pharynx: Oropharynx is clear.   Eyes:      Extraocular Movements: Extraocular movements intact.      Conjunctiva/sclera: Conjunctivae normal.   Neck:      Comments: LIJ CVC    Cardiovascular:      Rate and Rhythm:  Normal rate and regular rhythm.      Pulses: Normal pulses.      Heart sounds: Normal heart sounds.   Pulmonary:      Comments: On 2LNC   Abdominal:      General: There is no distension.      Palpations: Abdomen is soft.   Genitourinary:     Comments: vizcaino  Musculoskeletal:         General: No swelling.      Cervical back: Normal range of motion and neck supple.      Right lower leg: No edema.      Left lower leg: No edema.   Skin:     General: Skin is warm and dry.   Neurological:      General: No focal deficit present.      Mental Status: She is alert and oriented to person, place, and time. Mental status is at baseline.          Vents:  Vent Mode: Spont (05/11/23 1215)  Ventilator Initiated: Yes (05/10/23 1720)  Set Rate: 20 BPM (05/11/23 1026)  Vt Set: 340 mL (05/11/23 1026)  Pressure Support: 5 cmH20 (05/11/23 1215)  PEEP/CPAP: 5 cmH20 (05/11/23 1215)  Oxygen Concentration (%): 100 (05/11/23 1915)  Peak Airway Pressure: 10 cmH20 (05/11/23 1215)  Plateau Pressure: 0 cmH20 (05/11/23 1215)  Total Ve: 4.48 L/m (05/11/23 1215)  Negative Inspiratory Force (cm H2O): -50 (05/11/23 1215)  F/VT Ratio<105 (RSBI): (!) 31.95 (Simultaneous filing. User may not have seen previous data.) (05/11/23 1215)    Lines/Drains/Airways       Central Venous Catheter Line  Duration                  Port A Cath Single Lumen -- days     Introducer with Double Lumen 05/10/23 1033 Internal Jugular Left 1 day    Introducer 05/10/23 1033 1 day    Percutaneous Central Line Insertion/Assessment - Triple Lumen  05/10/23 1033 Internal Jugular Left 1 day              Drain  Duration                  Chest Tube 05/10/23 1627 Tube - 1 Right Pleural 19 Fr. 1 day         Chest Tube 05/10/23 1628 Tube - 2 Left Pleural 19 Fr. 1 day         Chest Tube 05/10/23 1629 other (see comment) Anterior Mediastinal 19 Fr. 1 day         Chest Tube 05/10/23 1629 other (see comment) Other (Comment) Mediastinal 19 Fr. 1 day         Urethral Catheter 05/10/23 0915  Non-latex;Temperature probe 14 Fr. 1 day              Airway  Duration                  Airway - Non-Surgical 05/10/23 0845 1 day              Arterial Line  Duration             Arterial Line 05/10/23 0835 Left Other (Comment) 1 day              Line  Duration                  Pacer Wires 05/10/23 1631 1 day              Peripheral Intravenous Line  Duration                  Peripheral IV - Single Lumen 05/10/23 0737 20 G Left;Posterior Hand 1 day                    Significant Labs:    CBC/Anemia Profile:  Recent Labs   Lab 05/10/23  1730 05/10/23  1928 05/11/23  0235 05/11/23  0251 05/11/23  0717 05/12/23  0247 05/12/23  0404   WBC 24.97*  --  10.99  --   --  10.97  --    HGB 10.1*  --  7.8*  --   --  6.4*  --    HCT 30.9*   < > 23.2*   < > 19* 20.2* 23*   PLT 69*  --  51*  --   --  49*  --    MCV 89  --  88  --   --  91  --    RDW 14.5  --  15.0*  --   --  15.3*  --     < > = values in this interval not displayed.        Chemistries:  Recent Labs   Lab 05/10/23  1730 05/10/23  2011 05/11/23  0235 05/11/23  0546 05/11/23  1525 05/11/23  1950 05/11/23  2225 05/12/23  0247      < > 139   < > 139  --  137 138   K 3.9   < > 4.6   < > 4.2 3.9 3.8  3.9 3.9      < > 109   < > 109  --  108 107   CO2 21*   < > 22*   < > 21*  --  20* 22*   BUN 20   < > 23   < > 24*  --  25* 26*   CREATININE 0.9   < > 1.3   < > 1.5*  --  1.5* 1.4   CALCIUM 8.7   < > 8.3*   < > 8.3*  --  7.8* 7.9*   ALBUMIN 2.6*  --  4.1  --   --   --   --  4.0   PROT 4.1*  --  5.1*  --   --   --   --  5.3*   BILITOT 1.0  --  0.6  --   --   --   --  0.3   ALKPHOS 41*  --  34*  --   --   --   --  46*   ALT 13  --  9*  --   --   --   --  7*   AST 41*  --  28  --   --   --   --  22   MG 3.1*  3.1*   < > 2.9*   < > 2.5  --  2.4 2.5   PHOS 2.5*  2.5*   < > 3.5   < > 4.0  --  3.8 4.4    < > = values in this interval not displayed.       All pertinent labs within the past 24 hours have been reviewed.    Significant Imaging:  I have reviewed all  pertinent imaging results/findings within the past 24 hours.    Assessment/Plan:     Cardiac/Vascular  * Nonrheumatic aortic valve stenosis  Initial AVR with Tracey 2013.       Neuro/Psych:     - Sedation: none    - Pain:    - Scheduled Tylenol 1g q8h   - dilaudid PRN              Cardiac:     - S/P redo sternotomy with redo AVR with Dr. Webb on 5/10/2023    - BP Goal: MAP 60-80, SBP < 140     - Cleviprex PRN    - Pressors: epi 0.04 and levo 0.04 on admit; levo weaned to off overnight, epi at 0.01 on morning of 5/12     - Anti-HTNs: Will start when appropriate    - Rhythm: pacer dependent at rate of 86     - Beta blocker: Will start when appropriate    - Statin: takes pravastatin at home       Pulmonary:     - Goal SpO2 >92%    - Will wean ventilator support as tolerated to extubate    - Chest Tubes x 2 (2 Meds & 2 Pleural)    - ABGs PRN      Renal:    - Trend BUN/Cr     - Maintain Macdonald, record strict Is/Os    Recent Labs   Lab 05/11/23  1525 05/11/23  2225 05/12/23  0247   BUN 24* 25* 26*   CREATININE 1.5* 1.5* 1.4         FEN / GI:     - Daily CMP, PRN K/Mag/Phos per protocol     - Replace electrolytes as needed    - Nutrition: diabetic diet with fluid restriction     - Bowel Regimen: Miralax, docusate      ID:     - Afebrile    - WBC stable    - Abx: Completed perioperative cefazolin 2g Q8H x 5 doses    Recent Labs   Lab 05/10/23  1730 05/11/23  0235 05/12/23  0247   WBC 24.97* 10.99 10.97         Heme/Onc:     - Hgb 12.6 pre-operatively    - CBC daily    - ASA 325mg daily, initiated within 6hr of surgery     Recent Labs   Lab 05/10/23  1730 05/11/23  0235 05/12/23  0247   HGB 10.1* 7.8* 6.4*   PLT 69* 51* 49*   APTT 36.8* 32.7* 31.6   INR 1.2 1.1 1.1         Endocrine:     - CTS Goal -140    - HgbA1c: 7.3    - Endocrinology consulted for insulin management      PPx:   Feeding: diabetic diet   Analgesia/Sedation: dilaudid PRN   Thromboembolic Prevention: SCDs   HOB >30: Yes  Stress Ulcer:  pepcid  Glucose Control: Yes, insulin management per Endocrinology     Lines/Drains/Airway:   Left brachial arterial line   LIJ CVC   Macdonald   Chest Tubes: 2 (2 Mediastinal, 2 Pleural)    Pacing Wires: Temporary ventricular pacing wires      Dispo/Code Status/Palliative:     - Continue SICU Care    - Full Code    Renal/  Chronic kidney disease, stage III (moderate)  sCr 1.2 prior to surgery with eGFR 46.     -- avoid nephrotoxic agents, renally dose medications  -- maintain MAP >65     Hematology  History of pulmonary embolism  On chronic eliquis, will hold in immediate post-operative setting. Discuss AC plan with surgeon when risk of bleeding decreases.     Thrombocytopenia, unspecified  Platelets 113 prior to surgery. Downtrend on POD 1 to 51K in setting of perceval valve     Endocrine  Acquired hypothyroidism  Continue synthroid     Diabetes mellitus type 2 in obese  A1c currently 7.3, takes metformin and glimipride at home. Will hold while inpatient.     -- endocrinology consulted for insulin management   -- BG goal 110-140  -- hypoglycemia protocol          Critical secondary to Patient has a condition that poses threat to life and bodily function: AVR      Critical care was time spent personally by me on the following activities: development of treatment plan with patient or surrogate and bedside caregivers, discussions with consultants, evaluation of patient's response to treatment, examination of patient, ordering and performing treatments and interventions, ordering and review of laboratory studies, ordering and review of radiographic studies, pulse oximetry, re-evaluation of patient's condition.  This critical care time did not overlap with that of any other provider or involve time for any procedures.     Lan Vega MD  Critical Care - Surgery  [facility]

## 2023-05-12 NOTE — PT/OT/SLP EVAL
"Occupational Therapy   Co-Evaluation    Name: Alina Narayan  MRN: [id number]  Admitting Diagnosis: Nonrheumatic aortic valve stenosis  Recent Surgery: Procedure(s) (LRB):  Replacement-valve-aortic, redo sternotomy (N/A) 2 Days Post-Op    Recommendations:     Discharge Recommendations: rehabilitation facility (May progress)  Discharge Equipment Recommendations:   (TBD closer to d/c)  Barriers to discharge:       Assessment:     Alina Narayan is a 78 y.o. female with a medical diagnosis of Nonrheumatic aortic valve stenosis.  She presents very groggy from pain meds. Pt able to stand and take 2-3 steps, but unable to tolerate functional task in standing. Performance deficits affecting function: weakness, impaired functional mobility, gait instability, impaired endurance, decreased safety awareness, impaired cardiopulmonary response to activity, impaired balance, impaired self care skills, decreased upper extremity function, pain. Pt benefits from co-treatment with PT to accommodate pt's activity tolerance and progression with therapy.  '     Rehab Prognosis: Good; patient would benefit from acute skilled OT services to address these deficits and reach maximum level of function.       Plan:     Patient to be seen 5 x/week to address the above listed problems via self-care/home management, therapeutic activities, therapeutic exercises  Plan of Care Expires: 06/11/23  Plan of Care Reviewed with: patient, spouse    Subjective     Chief Complaint: "I've never felt so helpless."  Patient/Family Comments/goals: to get better and go home    Occupational Profile:  Living Environment: live with spouse in [place] with  entrance and tub/shower combo  Previous level of function: (I) with ADL (except assistance needed with donning socks/shoes) and ambulation; drives  Roles and Routines: caretaker to self; enjoys making jewelry and cooking  Equipment Used at Home: walker, rolling, bath bench, grab bar (pt owns, but does not use, a BSC and " w/c)  Assistance upon Discharge: spouse    Pain/Comfort:  Pain Rating 1: 10/10  Location - Side 1: Bilateral  Location - Orientation 1: generalized  Location 1:  (back and chest)  Pain Addressed 1: Pre-medicate for activity, Reposition, Distraction  Pain Rating Post-Intervention 1: 10/10    Patients cultural, spiritual, Church conflicts given the current situation: no    Objective:     Communicated with: RN prior to session.  Patient found up in chair with blood pressure cuff, pulse ox (continuous), telemetry, central line, vizcaino catheter, chest tube, arterial line, external pacer upon OT entry to room.    General Precautions: Standard, fall, sternal  Orthopedic Precautions:    Braces:    Respiratory Status: Room air    Occupational Performance:    Bed Mobility:    NT    Functional Mobility/Transfers:  Patient completed Sit <> Stand Transfer with contact guard assistance  with  hand-held assist   Functional Mobility: Min A x 2 with B HHA x 3 steps fwd and back    Activities of Daily Living:  Grooming: moderate assistance    Upper Body Dressing: maximal assistance    Lower Body Dressing: total assistance    Toileting: total assistance      Cognitive/Visual Perceptual:  Pt is groggy, but oriented and following commands    Physical Exam:  No postural abdormalities  R UE proximal ROM limited to ~115* 2* mastectomy  L UE proximal ROM limited to ~100* 2* tear  R hand  decreased 2* neuropathy per pt    AMPAC 6 Click ADL:  AMPAC Total Score: 9    Treatment & Education:  Pt ed on OT POC  Pt ed on sternal precautions during ADL  Pt ed on ROM ex's 3x daily for increased overall strength and endurance    Patient left up in chair with all lines intact, call button in reach, RN notified, and spouse present    GOALS:   Multidisciplinary Problems       Occupational Therapy Goals          Problem: Occupational Therapy    Goal Priority Disciplines Outcome Interventions   Occupational Therapy Goal     OT, PT/OT Ongoing,  Progressing    Description: Goals to be met by: 5/26/23     Patient will increase functional independence with ADLs by performing:    Feeding with Modified [place]man.  UE Dressing with Minimal A  LE Dressing with Moderate Assistance.  Grooming while standing at sink with Contact Guard Assistance.  Toileting from bedside commode with Moderate Assistance for hygiene and clothing management.   Toilet transfer to bedside commode with Minimal Assistance.                         History:     Past Medical History:   Diagnosis Date    Allergy     seasonal    Anxiety     Arthritis     BRCA1 negative     Breast cancer 10/2012    left breast invasive ductal carcinoma    Colon polyp     Depression     Diabetes mellitus     Type 2    Diabetes mellitus, type 2     Diverticular disease     Diverticulitis 2009    Genetic testing 05/2017    negative Integrated BRACAnalysis    Hyperlipidemia     Hypertension     Morbid obesity     MITCHELL (obstructive sleep apnea)     Pulmonary embolism     Stenosis     Stenosis and insufficiency of lacrimal passages     Thyroid disease          Past Surgical History:   Procedure Laterality Date    AORTIC VALVE REPLACEMENT N/A 5/10/2023    Procedure: Replacement-valve-aortic, redo sternotomy;  Surgeon: Venkat Webb MD;  Location: [facility] OR [address] [place] [address];  Service: Cardiothoracic;  Laterality: N/A;  Redo sternotomy    BREAST BIOPSY Left 10/01/2012    left breast- invasive ductal carcinoma    BREAST BIOPSY Left 12/2017    BREAST CYST ASPIRATION      BREAST LUMPECTOMY Left 2012    BREAST MASS EXCISION      CARDIAC VALVE REPLACEMENT  12/2013    CARDIAC VALVE SURGERY  2013    CARPAL TUNNEL RELEASE      Left    COLONOSCOPY N/A 09/29/2017    Procedure: COLONOSCOPY;  Surgeon: Phani Worley MD;  Location: [facility] ([address] [place] [address]);  Service: Endoscopy;  Laterality: N/A;    COLONOSCOPY N/A 1/5/2023    Procedure: COLONOSCOPY;  Surgeon: Eladia Martino MD;  Location: [facility] ([address] [place] [address]);  Service: Endoscopy;   Laterality: N/A;  instr via portal  ok to hole Eliquis-see encounter 12/9-MS  1/4 pateint cannot come earlier-rt    CORONARY ANGIOGRAPHY Left 4/13/2023    Procedure: ANGIOGRAM, CORONARY ARTERY;  Surgeon: Eze Sales MD;  Location: [facility] CATH LAB;  Service: Cardiology;  Laterality: Left;  low bleeding risk 2.9%    DILATION AND CURETTAGE OF UTERUS  1999    Endometrial polyps    ENDOMETRIAL ABLATION  1999    Enodmetrial polyps    EYE SURGERY      INSERTION OF TUNNELED CENTRAL VENOUS CATHETER (CVC) WITH SUBCUTANEOUS PORT Right 02/01/2019    Procedure: [id number];  Surgeon: Gaston Flores MD;  Location: [facility] OR [address] [place] [address];  Service: General;  Laterality: Right;    left lumpectomy  2012    MASTECTOMY      MEDIPORT REMOVAL Right 08/26/2019    Procedure: REMOVAL, CATHETER, CENTRAL VENOUS, TUNNELED, WITH PORT;  Surgeon: Gaston Flores MD;  Location: [facility] OR [address] [place] [address];  Service: General;  Laterality: Right;    MODIFIED RADICAL MASTECTOMY W/ AXILLARY LYMPH NODE DISSECTION Right 08/26/2019    Procedure: MASTECTOMY, MODIFIED RADICAL;  Surgeon: Gaston Flores MD;  Location: [address];  Service: General;  Laterality: Right;    SHOULDER SURGERY      SKIN BIOPSY         Time Tracking:     OT Date of Treatment: 05/12/23  OT Start Time: 0852  OT Stop Time: 0915  OT Total Time (min): 23 min    Billable Minutes:Evaluation 10  Self Care/Home Management 10    5/12/2023

## 2023-05-12 NOTE — PROGRESS NOTES
"[facility]  Endocrinology  Progress Note    Admit Date: 5/10/2023     Reason for Consult: Management of T2DM, Hyperglycemia     Surgical Procedure and Date: AVR 5/10/22    Diabetes diagnosis year: ~     Home Diabetes Medications:  metformin 500 mg BID and glimepiride 2 mg daily     How often checking glucose at home? One   BG readings on regimen: 's  Hypoglycemia on the regimen?  No  Missed doses on regimen?  No    Diabetes Complications include:     Hyperglycemia and Diabetic peripheral neuropathy     Complicating diabetes co morbidities:   HTN; Hypothryroidism; HLD      HPI:   Patient is a 78 y.o. female with a diagnosis of pulmonary embolism on chronic AC, s/p AVR (21 mm Trifecta) in , breast cancer (s/p mastectomy, radiation and chemo), and type 2 DM.  Patient is s/p AVR. Endocrinology consulted for BG/ DM management.     Lab Results   Component Value Date    HGBA1C 7.3 (H) 2023             Interval HPI:   Overnight events: No acute events overnight. Patient in room 14652/39395 A. Blood glucose stable. BG at and above goal on current insulin regimen (Transition Insulin Drip). Steroid use- None. 2 Days Post-Op  Renal function- Normal   Vasopressors-  Epinephrine       Endocrine will continue to follow and manage insulin orders inpatient.         Diet diabetic [facility]; 2000 Calorie; Fluid - 1500mL     Eatin%  Nausea: No  Hypoglycemia and intervention: No  Fever: No  TPN and/or TF: No      BP (!) 96/54 (BP Location: Left arm, Patient Position: Lying)   Pulse 61   Temp 97.8 °F (36.6 °C) (Oral)   Resp (!) 31   Ht 5' 2" (1.575 m)   Wt 114.6 kg (252 lb 10.4 oz)   SpO2 96%   Breastfeeding No   BMI 46.21 kg/m²     Labs Reviewed and Include    Recent Labs   Lab 23  0247 23  0749   *  --    CALCIUM 7.9*  --    ALBUMIN 4.0  --    PROT 5.3*  --      --    K 3.9 4.0   CO2 22*  --      --    BUN 26*  --    CREATININE 1.4  --  "   ALKPHOS 46*  --    ALT 7*  --    AST 22  --    BILITOT 0.3  --      Lab Results   Component Value Date    WBC 10.97 05/12/2023    HGB 6.4 (L) 05/12/2023    HCT 23 (L) 05/12/2023    MCV 91 05/12/2023    PLT 49 (L) 05/12/2023     No results for input(s): TSH, FREET4 in the last 168 hours.  Lab Results   Component Value Date    HGBA1C 7.3 (H) 05/04/2023       Nutritional status:   Body mass index is 46.21 kg/m².  Lab Results   Component Value Date    ALBUMIN 4.0 05/12/2023    ALBUMIN 4.1 05/11/2023    ALBUMIN 2.6 (L) 05/10/2023     No results found for: PREALBUMIN    Estimated Creatinine Clearance: 39.7 mL/min (based on SCr of 1.4 mg/dL).    Accu-Checks  Recent Labs     05/11/23  0752 05/11/23  0813 05/11/23  0858 05/11/23  0958 05/11/23  1417 05/11/23  1800 05/11/23  2222 05/12/23  0008 05/12/23  0356 05/12/23  0750   POCTGLUCOSE 145* 136* 148* 152* 143* 158* 149* 131* 166* 173*       Current Medications and/or Treatments Impacting Glycemic Control  Immunotherapy:    Immunosuppressants       None          Steroids:   Hormones (From admission, onward)      None          Pressors:    Autonomic Drugs (From admission, onward)      Start     Stop Route Frequency Ordered    05/10/23 1700  EPINEPHrine 5 mg in dextrose 5% 250 mL infusion (premix)        Question Answer Comment   Begin at (in mcg/kg/min): 0.02    Titrate by: (in mcg/kg/min) 0.02    Titrate interval: (in minutes) 5    Titrate to maintain: (SBP or MAP or Cardiac Index) MAP    Greater than: (in mmHg) 60    Maximum dose: (in mcg/kg/min) 2        -- IV Continuous 05/10/23 1655          Hyperglycemia/Diabetes Medications:   Antihyperglycemics (From admission, onward)      Start     Stop Route Frequency Ordered    05/12/23 1004  insulin aspart U-100 pen 0-10 Units         -- SubQ As needed (PRN) 05/12/23 0904    05/12/23 0915  insulin regular in 0.9 % NaCl 100 unit/100 mL (1 unit/mL) infusion        Question:  Enter initial dose (Units/hr):  Answer:  0.9    -- IV  Continuous 05/12/23 0904            ASSESSMENT and PLAN    Cardiac/Vascular  * Nonrheumatic aortic valve stenosis  Managed per primary team  Avoid hypoglycemia        S/P AVR  Managed per primary  Optimize BG control to improve wound healing      Hyperlipidemia, mixed  On statin per ada guidelines    Endocrine  Acquired hypothyroidism  Levothyroxine 75 mcg daily   Lab Results   Component Value Date    TSH 2.724 03/02/2023    FREET4 0.99 08/11/2020         Diabetes mellitus type 2 in obese  Endocrinology consulted for BG management.   BG goal 110-140 (CTS Protocol)    - Transition drip at 0.9 units/hr with step-down parameters.   - Novolog (aspart) insulin prn for BG excursions [facility] SSI (150/25).  - BG checks AC/HS/0200  - Hypoglycemia protocol in place    ** Please notify Endocrine for any change and/or advance in diet**  ** Please call Endocrine for any BG related issues **    Discharge Planning:   TBD. Please notify endocrinology prior to discharge.               Ankit Newberry, DNP, FNP  Endocrinology  [facility]

## 2023-05-12 NOTE — SUBJECTIVE & OBJECTIVE
Interval History/Significant Events: NAEON. HgB low this morning at 6.4, being transfused 1u pRBC. Patient up in chair this morning. Lactate normalized. Cr 1.5> 1.4. UOP has been about 20cc/hr. On epi at 0.01     Follow-up For: Procedure(s) (LRB):  Replacement-valve-aortic, redo sternotomy (N/A)    Post-Operative Day: 2 Days Post-Op    Objective:     Vital Signs (Most Recent):  Temp: 98.4 °F (36.9 °C) (05/11/23 1915)  Pulse: 86 (05/12/23 0600)  Resp: 14 (05/12/23 0600)  BP: (!) 96/54 (05/12/23 0403)  SpO2: 95 % (05/12/23 0600) Vital Signs (24h Range):  Temp:  [97.4 °F (36.3 °C)-98.4 °F (36.9 °C)] 98.4 °F (36.9 °C)  Pulse:  [84-93] 86  Resp:  [11-43] 14  SpO2:  [95 %-100 %] 95 %  BP: ()/(52-60) 96/54  Arterial Line BP: ()/(42-65) 114/50     Weight: 114.6 kg (252 lb 10.4 oz)  Body mass index is 46.21 kg/m².      Intake/Output Summary (Last 24 hours) at 5/12/2023 0630  Last data filed at 5/12/2023 0600  Gross per 24 hour   Intake 1775.33 ml   Output 945 ml   Net 830.33 ml          Physical Exam  Constitutional:       General: She is not in acute distress.     Appearance: Normal appearance.   HENT:      Head: Normocephalic and atraumatic.      Right Ear: External ear normal.      Left Ear: External ear normal.      Nose: Nose normal.      Mouth/Throat:      Mouth: Mucous membranes are moist.      Pharynx: Oropharynx is clear.   Eyes:      Extraocular Movements: Extraocular movements intact.      Conjunctiva/sclera: Conjunctivae normal.   Neck:      Comments: LIJ CVC    Cardiovascular:      Rate and Rhythm: Normal rate and regular rhythm.      Pulses: Normal pulses.      Heart sounds: Normal heart sounds.   Pulmonary:      Comments: On 2LNC   Abdominal:      General: There is no distension.      Palpations: Abdomen is soft.   Genitourinary:     Comments: [email or web address]  Musculoskeletal:         General: No swelling.      Cervical back: Normal range of motion and neck supple.      Right lower leg: No edema.      Left  lower leg: No edema.   Skin:     General: Skin is warm and dry.   Neurological:      General: No focal deficit present.      Mental Status: She is alert and oriented to person, place, and time. Mental status is at baseline.          Vents:  Vent Mode: Spont (05/11/23 1215)  Ventilator Initiated: Yes (05/10/23 1720)  Set Rate: 20 BPM (05/11/23 1026)  Vt Set: 340 mL (05/11/23 1026)  Pressure Support: 5 cmH20 (05/11/23 1215)  PEEP/CPAP: 5 cmH20 (05/11/23 1215)  Oxygen Concentration (%): 100 (05/11/23 1915)  Peak Airway Pressure: 10 cmH20 (05/11/23 1215)  Plateau Pressure: 0 cmH20 (05/11/23 1215)  Total Ve: 4.48 L/m (05/11/23 1215)  Negative Inspiratory Force (cm H2O): -50 (05/11/23 1215)  F/VT Ratio<105 (RSBI): (!) 31.95 (Simultaneous filing. User may not have seen previous data.) (05/11/23 1215)    Lines/Drains/Airways       Central Venous Catheter Line  Duration                  Port A Cath Single Lumen -- days     Introducer with Double Lumen 05/10/23 1033 Internal Jugular Left 1 day    Introducer 05/10/23 1033 1 day    Percutaneous Central Line Insertion/Assessment - Triple Lumen  05/10/23 1033 Internal Jugular Left 1 day              Drain  Duration                  Chest Tube 05/10/23 1627 Tube - 1 Right Pleural 19 Fr. 1 day         Chest Tube 05/10/23 1628 Tube - 2 Left Pleural 19 Fr. 1 day         Chest Tube 05/10/23 1629 other (see comment) Anterior Mediastinal 19 Fr. 1 day         Chest Tube 05/10/23 1629 other (see comment) Other (Comment) Mediastinal 19 Fr. 1 day         Urethral Catheter 05/10/23 0915 Non-latex;Temperature probe 14 Fr. 1 day              Airway  Duration                  Airway - Non-Surgical 05/10/23 0845 1 day              Arterial Line  Duration             Arterial Line 05/10/23 0835 Left Other (Comment) 1 day              Line  Duration                  Pacer Wires 05/10/23 1631 1 day              Peripheral Intravenous Line  Duration                  Peripheral IV - Single Lumen  05/10/23 0737 20 G Left;Posterior Hand 1 day                    Significant Labs:    CBC/Anemia Profile:  Recent Labs   Lab 05/10/23  1730 05/10/23  1928 05/11/23  0235 05/11/23  0251 05/11/23  0717 05/12/23  0247 05/12/23  0404   WBC 24.97*  --  10.99  --   --  10.97  --    HGB 10.1*  --  7.8*  --   --  6.4*  --    HCT 30.9*   < > 23.2*   < > 19* 20.2* 23*   PLT 69*  --  51*  --   --  49*  --    MCV 89  --  88  --   --  91  --    RDW 14.5  --  15.0*  --   --  15.3*  --     < > = values in this interval not displayed.        Chemistries:  Recent Labs   Lab 05/10/23  1730 05/10/23  2011 05/11/23  0235 05/11/23  0546 05/11/23  1525 05/11/23  1950 05/11/23  2225 05/12/23  0247      < > 139   < > 139  --  137 138   K 3.9   < > 4.6   < > 4.2 3.9 3.8  3.9 3.9      < > 109   < > 109  --  108 107   CO2 21*   < > 22*   < > 21*  --  20* 22*   BUN 20   < > 23   < > 24*  --  25* 26*   CREATININE 0.9   < > 1.3   < > 1.5*  --  1.5* 1.4   CALCIUM 8.7   < > 8.3*   < > 8.3*  --  7.8* 7.9*   ALBUMIN 2.6*  --  4.1  --   --   --   --  4.0   PROT 4.1*  --  5.1*  --   --   --   --  5.3*   BILITOT 1.0  --  0.6  --   --   --   --  0.3   ALKPHOS 41*  --  34*  --   --   --   --  46*   ALT 13  --  9*  --   --   --   --  7*   AST 41*  --  28  --   --   --   --  22   MG 3.1*  3.1*   < > 2.9*   < > 2.5  --  2.4 2.5   PHOS 2.5*  2.5*   < > 3.5   < > 4.0  --  3.8 4.4    < > = values in this interval not displayed.       All pertinent labs within the past 24 hours have been reviewed.    Significant Imaging:  I have reviewed all pertinent imaging results/findings within the past 24 hours.

## 2023-05-12 NOTE — SUBJECTIVE & OBJECTIVE
"Interval HPI:   Overnight events: No acute events overnight. Patient in room 00346/13868 A. Blood glucose stable. BG at and above goal on current insulin regimen (Transition Insulin Drip). Steroid use- None. 2 Days Post-Op  Renal function- Normal   Vasopressors-  Epinephrine       Endocrine will continue to follow and manage insulin orders inpatient.         Diet diabetic [facility]; 2000 Calorie; Fluid - 1500mL     Eatin%  Nausea: No  Hypoglycemia and intervention: No  Fever: No  TPN and/or TF: No      BP (!) 96/54 (BP Location: Left arm, Patient Position: Lying)   Pulse 61   Temp 97.8 °F (36.6 °C) (Oral)   Resp (!) 31   Ht 5' 2" (1.575 m)   Wt 114.6 kg (252 lb 10.4 oz)   SpO2 96%   Breastfeeding No   BMI 46.21 kg/m²     Labs Reviewed and Include    Recent Labs   Lab 23  0247 23  0749   *  --    CALCIUM 7.9*  --    ALBUMIN 4.0  --    PROT 5.3*  --      --    K 3.9 4.0   CO2 22*  --      --    BUN 26*  --    CREATININE 1.4  --    ALKPHOS 46*  --    ALT 7*  --    AST 22  --    BILITOT 0.3  --      Lab Results   Component Value Date    WBC 10.97 2023    HGB 6.4 (L) 2023    HCT 23 (L) 2023    MCV 91 2023    PLT 49 (L) 2023     No results for input(s): TSH, FREET4 in the last 168 hours.  Lab Results   Component Value Date    HGBA1C 7.3 (H) 2023       Nutritional status:   Body mass index is 46.21 kg/m².  Lab Results   Component Value Date    ALBUMIN 4.0 2023    ALBUMIN 4.1 2023    ALBUMIN 2.6 (L) 05/10/2023     No results found for: PREALBUMIN    Estimated Creatinine Clearance: 39.7 mL/min (based on SCr of 1.4 mg/dL).    Accu-Checks  Recent Labs     23  0752 23  0813 23  0858 23  0958 23  1417 23  1800 23  2222 23  0008 23  0356 23  0750   POCTGLUCOSE 145* 136* 148* 152* 143* 158* 149* 131* 166* 173*       Current Medications and/or Treatments Impacting Glycemic " Control  Immunotherapy:    Immunosuppressants       None          Steroids:   Hormones (From admission, onward)      None          Pressors:    Autonomic Drugs (From admission, onward)      Start     Stop Route Frequency Ordered    05/10/23 1700  EPINEPHrine 5 mg in dextrose 5% 250 mL infusion (premix)        Question Answer Comment   Begin at (in mcg/kg/min): 0.02    Titrate by: (in mcg/kg/min) 0.02    Titrate interval: (in minutes) 5    Titrate to maintain: (SBP or MAP or Cardiac Index) MAP    Greater than: (in mmHg) 60    Maximum dose: (in mcg/kg/min) 2        -- IV Continuous 05/10/23 1655          Hyperglycemia/Diabetes Medications:   Antihyperglycemics (From admission, onward)      Start     Stop Route Frequency Ordered    05/12/23 1004  insulin aspart U-100 pen 0-10 Units         -- SubQ As needed (PRN) 05/12/23 0904    05/12/23 0915  insulin regular in 0.9 % NaCl 100 unit/100 mL (1 unit/mL) infusion        Question:  Enter initial dose (Units/hr):  Answer:  0.9    -- IV Continuous 05/12/23 0904

## 2023-05-13 LAB
ALBUMIN SERPL BCP-MCNC: 3.6 G/DL (ref 3.5–5.2)
ALP SERPL-CCNC: 64 U/L (ref 55–135)
ALT SERPL W/O P-5'-P-CCNC: 6 U/L (ref 10–44)
ANION GAP SERPL CALC-SCNC: 10 MMOL/L (ref 8–16)
APTT PPP: 29.6 SEC (ref 21–32)
AST SERPL-CCNC: 22 U/L (ref 10–40)
BASOPHILS # BLD AUTO: 0.01 K/UL (ref 0–0.2)
BASOPHILS NFR BLD: 0.1 % (ref 0–1.9)
BILIRUB SERPL-MCNC: 0.5 MG/DL (ref 0.1–1)
BLD PROD TYP BPU: NORMAL
BLOOD UNIT EXPIRATION DATE: NORMAL
BLOOD UNIT TYPE CODE: 5100
BLOOD UNIT TYPE: NORMAL
BUN SERPL-MCNC: 31 MG/DL (ref 8–23)
CALCIUM SERPL-MCNC: 8.1 MG/DL (ref 8.7–10.5)
CHLORIDE SERPL-SCNC: 107 MMOL/L (ref 95–110)
CO2 SERPL-SCNC: 22 MMOL/L (ref 23–29)
CODING SYSTEM: NORMAL
CREAT SERPL-MCNC: 1.5 MG/DL (ref 0.5–1.4)
CROSSMATCH INTERPRETATION: NORMAL
DIFFERENTIAL METHOD: ABNORMAL
DISPENSE STATUS: NORMAL
EOSINOPHIL # BLD AUTO: 0 K/UL (ref 0–0.5)
EOSINOPHIL NFR BLD: 0.3 % (ref 0–8)
ERYTHROCYTE [DISTWIDTH] IN BLOOD BY AUTOMATED COUNT: 14.8 % (ref 11.5–14.5)
EST. GFR  (NO RACE VARIABLE): 35.4 ML/MIN/1.73 M^2
GLUCOSE SERPL-MCNC: 123 MG/DL (ref 70–110)
HCT VFR BLD AUTO: 23.1 % (ref 37–48.5)
HGB BLD-MCNC: 7.5 G/DL (ref 12–16)
IMM GRANULOCYTES # BLD AUTO: 0.06 K/UL (ref 0–0.04)
IMM GRANULOCYTES NFR BLD AUTO: 0.7 % (ref 0–0.5)
INR PPP: 1.1 (ref 0.8–1.2)
LYMPHOCYTES # BLD AUTO: 0.9 K/UL (ref 1–4.8)
LYMPHOCYTES NFR BLD: 10.8 % (ref 18–48)
MAGNESIUM SERPL-MCNC: 2.3 MG/DL (ref 1.6–2.6)
MCH RBC QN AUTO: 29.3 PG (ref 27–31)
MCHC RBC AUTO-ENTMCNC: 32.5 G/DL (ref 32–36)
MCV RBC AUTO: 90 FL (ref 82–98)
MONOCYTES # BLD AUTO: 1.1 K/UL (ref 0.3–1)
MONOCYTES NFR BLD: 12.9 % (ref 4–15)
NEUTROPHILS # BLD AUTO: 6.5 K/UL (ref 1.8–7.7)
NEUTROPHILS NFR BLD: 75.2 % (ref 38–73)
NRBC BLD-RTO: 0 /100 WBC
NUM UNITS TRANS FFP: NORMAL
PHOSPHATE SERPL-MCNC: 3 MG/DL (ref 2.7–4.5)
PLATELET # BLD AUTO: 53 K/UL (ref 150–450)
PMV BLD AUTO: 11.8 FL (ref 9.2–12.9)
POCT GLUCOSE: 122 MG/DL (ref 70–110)
POCT GLUCOSE: 130 MG/DL (ref 70–110)
POCT GLUCOSE: 139 MG/DL (ref 70–110)
POCT GLUCOSE: 186 MG/DL (ref 70–110)
POCT GLUCOSE: 209 MG/DL (ref 70–110)
POCT GLUCOSE: 211 MG/DL (ref 70–110)
POTASSIUM SERPL-SCNC: 3.8 MMOL/L (ref 3.5–5.1)
POTASSIUM SERPL-SCNC: 3.8 MMOL/L (ref 3.5–5.1)
POTASSIUM SERPL-SCNC: 4 MMOL/L (ref 3.5–5.1)
PROT SERPL-MCNC: 5.4 G/DL (ref 6–8.4)
PROTHROMBIN TIME: 11.3 SEC (ref 9–12.5)
RBC # BLD AUTO: 2.56 M/UL (ref 4–5.4)
SODIUM SERPL-SCNC: 139 MMOL/L (ref 136–145)
WBC # BLD AUTO: 8.63 K/UL (ref 3.9–12.7)

## 2023-05-13 PROCEDURE — 27000221 HC OXYGEN, UP TO 24 HOURS

## 2023-05-13 PROCEDURE — 63600175 PHARM REV CODE 636 W HCPCS

## 2023-05-13 PROCEDURE — 94799 UNLISTED PULMONARY SVC/PX: CPT

## 2023-05-13 PROCEDURE — 94761 N-INVAS EAR/PLS OXIMETRY MLT: CPT

## 2023-05-13 PROCEDURE — 63600175 PHARM REV CODE 636 W HCPCS: Performed by: PHYSICIAN ASSISTANT

## 2023-05-13 PROCEDURE — 99900035 HC TECH TIME PER 15 MIN (STAT)

## 2023-05-13 PROCEDURE — 20600001 HC STEP DOWN PRIVATE ROOM

## 2023-05-13 PROCEDURE — 99233 PR SUBSEQUENT HOSPITAL CARE,LEVL III: ICD-10-PCS | Mod: ,,, | Performed by: INTERNAL MEDICINE

## 2023-05-13 PROCEDURE — 85610 PROTHROMBIN TIME: CPT | Performed by: PHYSICIAN ASSISTANT

## 2023-05-13 PROCEDURE — 80053 COMPREHEN METABOLIC PANEL: CPT | Performed by: STUDENT IN AN ORGANIZED HEALTH CARE EDUCATION/TRAINING PROGRAM

## 2023-05-13 PROCEDURE — 25000003 PHARM REV CODE 250: Performed by: STUDENT IN AN ORGANIZED HEALTH CARE EDUCATION/TRAINING PROGRAM

## 2023-05-13 PROCEDURE — 85025 COMPLETE CBC W/AUTO DIFF WBC: CPT | Performed by: PHYSICIAN ASSISTANT

## 2023-05-13 PROCEDURE — 27200667 HC PACEMAKER, TEMPORARY MONITORING, PER SHIFT

## 2023-05-13 PROCEDURE — 25000003 PHARM REV CODE 250: Performed by: PHYSICIAN ASSISTANT

## 2023-05-13 PROCEDURE — 25000003 PHARM REV CODE 250: Performed by: THORACIC SURGERY (CARDIOTHORACIC VASCULAR SURGERY)

## 2023-05-13 PROCEDURE — 83735 ASSAY OF MAGNESIUM: CPT | Performed by: PHYSICIAN ASSISTANT

## 2023-05-13 PROCEDURE — 85730 THROMBOPLASTIN TIME PARTIAL: CPT | Performed by: PHYSICIAN ASSISTANT

## 2023-05-13 PROCEDURE — 63600175 PHARM REV CODE 636 W HCPCS: Performed by: THORACIC SURGERY (CARDIOTHORACIC VASCULAR SURGERY)

## 2023-05-13 PROCEDURE — 63600175 PHARM REV CODE 636 W HCPCS: Performed by: STUDENT IN AN ORGANIZED HEALTH CARE EDUCATION/TRAINING PROGRAM

## 2023-05-13 PROCEDURE — 84132 ASSAY OF SERUM POTASSIUM: CPT | Performed by: PHYSICIAN ASSISTANT

## 2023-05-13 PROCEDURE — 84100 ASSAY OF PHOSPHORUS: CPT | Performed by: PHYSICIAN ASSISTANT

## 2023-05-13 PROCEDURE — 94660 CPAP INITIATION&MGMT: CPT

## 2023-05-13 PROCEDURE — 99232 PR SUBSEQUENT HOSPITAL CARE,LEVL II: ICD-10-PCS | Mod: ,,, | Performed by: NURSE PRACTITIONER

## 2023-05-13 PROCEDURE — 99233 SBSQ HOSP IP/OBS HIGH 50: CPT | Mod: ,,, | Performed by: ANESTHESIOLOGY

## 2023-05-13 PROCEDURE — 99232 SBSQ HOSP IP/OBS MODERATE 35: CPT | Mod: ,,, | Performed by: NURSE PRACTITIONER

## 2023-05-13 PROCEDURE — 99233 SBSQ HOSP IP/OBS HIGH 50: CPT | Mod: ,,, | Performed by: INTERNAL MEDICINE

## 2023-05-13 PROCEDURE — 99233 PR SUBSEQUENT HOSPITAL CARE,LEVL III: ICD-10-PCS | Mod: ,,, | Performed by: ANESTHESIOLOGY

## 2023-05-13 RX ORDER — FUROSEMIDE 10 MG/ML
80 INJECTION INTRAMUSCULAR; INTRAVENOUS 2 TIMES DAILY
Status: DISCONTINUED | OUTPATIENT
Start: 2023-05-13 | End: 2023-05-14

## 2023-05-13 RX ADMIN — MUPIROCIN 1 G: 20 OINTMENT TOPICAL at 08:05

## 2023-05-13 RX ADMIN — PRAVASTATIN SODIUM 20 MG: 20 TABLET ORAL at 08:05

## 2023-05-13 RX ADMIN — LEVOTHYROXINE SODIUM 75 MCG: 75 TABLET ORAL at 06:05

## 2023-05-13 RX ADMIN — FUROSEMIDE 40 MG: 10 INJECTION, SOLUTION INTRAMUSCULAR; INTRAVENOUS at 08:05

## 2023-05-13 RX ADMIN — FUROSEMIDE 80 MG: 10 INJECTION, SOLUTION INTRAMUSCULAR; INTRAVENOUS at 08:05

## 2023-05-13 RX ADMIN — DOCUSATE SODIUM 100 MG: 100 CAPSULE, LIQUID FILLED ORAL at 08:05

## 2023-05-13 RX ADMIN — GABAPENTIN 300 MG: 300 CAPSULE ORAL at 03:05

## 2023-05-13 RX ADMIN — INSULIN ASPART 4 UNITS: 100 INJECTION, SOLUTION INTRAVENOUS; SUBCUTANEOUS at 12:05

## 2023-05-13 RX ADMIN — GABAPENTIN 300 MG: 300 CAPSULE ORAL at 08:05

## 2023-05-13 RX ADMIN — HEPARIN SODIUM 7500 UNITS: 5000 INJECTION INTRAVENOUS; SUBCUTANEOUS at 03:05

## 2023-05-13 RX ADMIN — POTASSIUM CHLORIDE 20 MEQ: 200 INJECTION, SOLUTION INTRAVENOUS at 01:05

## 2023-05-13 RX ADMIN — LIDOCAINE 1 PATCH: 50 PATCH TOPICAL at 09:05

## 2023-05-13 RX ADMIN — HYDROCODONE BITARTRATE AND ACETAMINOPHEN 1 TABLET: 5; 325 TABLET ORAL at 05:05

## 2023-05-13 RX ADMIN — POLYETHYLENE GLYCOL 3350 17 G: 17 POWDER, FOR SOLUTION ORAL at 08:05

## 2023-05-13 RX ADMIN — SENNOSIDES AND DOCUSATE SODIUM 1 TABLET: 50; 8.6 TABLET ORAL at 08:05

## 2023-05-13 RX ADMIN — HYDROCODONE BITARTRATE AND ACETAMINOPHEN 1 TABLET: 10; 325 TABLET ORAL at 04:05

## 2023-05-13 RX ADMIN — HEPARIN SODIUM 7500 UNITS: 5000 INJECTION INTRAVENOUS; SUBCUTANEOUS at 06:05

## 2023-05-13 RX ADMIN — ASPIRIN 325 MG: 325 TABLET ORAL at 08:05

## 2023-05-13 RX ADMIN — HEPARIN SODIUM 7500 UNITS: 5000 INJECTION INTRAVENOUS; SUBCUTANEOUS at 09:05

## 2023-05-13 RX ADMIN — INSULIN ASPART 2 UNITS: 100 INJECTION, SOLUTION INTRAVENOUS; SUBCUTANEOUS at 09:05

## 2023-05-13 NOTE — HOSPITAL COURSE
Alina Narayan is a 78 y.o. female who presents for pre-op re-do AVR. She has a medical history significant for pulmonary embolism on chronic AC, s/p AVR (21 mm Trifecta) in 2003, breast cancer (s/p mastectomy, radiation and chemo) who presents for evaluation of a failing 21 mm Trifecta valve, true ID 19. She underwent AVR in December 2013 with Dr Webb with a 21 mmTrifecta. The patient presents to the SICU s/p redo sternotomy AVR  with Dr. Webb on 05/10/2023. When aorta clamp was removed and BIBIANA evaluation was done, patient noted to have mitral stenosis so decision made for MVR. CPB run ended without issues. On admission, they are intubated, sedated with propofol, and in stable condition. Inotropic and vasopressor requirements upon admission are 0.04 mcg/kg/min of epinephrine, 0.04 mcg/kg/min of norepinephrine to maintain blood pressure at a MAP 60-80 and SBP < 140. Central access includes a LIJ CVC, arterial access includes a left brachial arterial line. They also have 2 pleural and 2 mediastinal chest tubes with appropriate output.     Intraoperatively, they received 2L of crystalloid, 747 of cell saver, 1 PRBCs. Urine output intraoperatively was 500 cc. The pre-operative echocardiogram was notable for EF 65%, normal RV function. Post-operative echo was normal biventricular function.     Immediate post-operative plans include hemodynamic stabilization and weaning of cardiac and respiratory support. Plan to wean vasopressors and inotropes as tolerated, and wean ventilator support with goal of early extubation, and closely monitor fluid status with strict Is/Os and continued fluid resuscitation as needed.

## 2023-05-13 NOTE — PROGRESS NOTES
"[facility] - [facility]  Endocrinology  Progress Note    Admit Date: 5/10/2023     Reason for Consult: Management of T2DM, Hyperglycemia     Surgical Procedure and Date: AVR 5/10/22    Diabetes diagnosis year: ~     Home Diabetes Medications:  metformin 500 mg BID and glimepiride 2 mg daily     How often checking glucose at home? One   BG readings on regimen: 's  Hypoglycemia on the regimen?  No  Missed doses on regimen?  No    Diabetes Complications include:     Hyperglycemia and Diabetic peripheral neuropathy     Complicating diabetes co morbidities:   HTN; Hypothryroidism; HLD      HPI:   Patient is a 78 y.o. female with a diagnosis of pulmonary embolism on chronic AC, s/p AVR (21 mm Trifecta) in , breast cancer (s/p mastectomy, radiation and chemo), and type 2 DM.  Patient is s/p AVR. Endocrinology consulted for BG/ DM management.     Lab Results   Component Value Date    HGBA1C 7.3 (H) 2023             Interval HPI:   Overnight events: No acute events overnight. Patient in room 43470/86857 A. Blood glucose stable. BG at goal on current insulin regimen (Transition Insulin Drip). Steroid use- None. 3 Days Post-Op  Renal function- Normal   Vasopressors-  Epinephrine       Endocrine will continue to follow and manage insulin orders inpatient.         Diet diabetic [facility]; 2000 Calorie; Fluid - 1500mL     Eatin%  Nausea: No  Hypoglycemia and intervention: No  Fever: No  TPN and/or TF: No      BP (!) 96/54 (BP Location: Left arm, Patient Position: Lying)   Pulse 75   Temp 98.7 °F (37.1 °C) (Oral)   Resp (!) 34   Ht 5' 2" (1.575 m)   Wt 114.6 kg (252 lb 10.4 oz)   SpO2 (!) 92%   Breastfeeding No   BMI 46.21 kg/m²     Labs Reviewed and Include    Recent Labs   Lab 23  0419 23  0858   *  --    CALCIUM 8.1*  --    ALBUMIN 3.6  --    PROT 5.4*  --      --    K 4.0 3.8   CO2 22*  --      --    BUN 31*  --    CREATININE 1.5*  --  "   ALKPHOS 64  --    ALT 6*  --    AST 22  --    BILITOT 0.5  --      Lab Results   Component Value Date    WBC 8.63 05/13/2023    HGB 7.5 (L) 05/13/2023    HCT 23.1 (L) 05/13/2023    MCV 90 05/13/2023    PLT 53 (L) 05/13/2023     No results for input(s): TSH, FREET4 in the last 168 hours.  Lab Results   Component Value Date    HGBA1C 7.3 (H) 05/04/2023       Nutritional status:   Body mass index is 46.21 kg/m².  Lab Results   Component Value Date    ALBUMIN 3.6 05/13/2023    ALBUMIN 4.0 05/12/2023    ALBUMIN 4.1 05/11/2023     No results found for: PREALBUMIN    Estimated Creatinine Clearance: 37 mL/min (A) (based on SCr of 1.5 mg/dL (H)).    Accu-Checks  Recent Labs     05/11/23  2222 05/12/23  0008 05/12/23  0356 05/12/23  0750 05/12/23  1235 05/12/23  1647 05/12/23  1956 05/13/23  0038 05/13/23  0418 05/13/23  0907   POCTGLUCOSE 149* 131* 166* 173* 154* 144* 158* 139* 130* 122*       Current Medications and/or Treatments Impacting Glycemic Control  Immunotherapy:    Immunosuppressants       None          Steroids:   Hormones (From admission, onward)      None          Pressors:    Autonomic Drugs (From admission, onward)      None          Hyperglycemia/Diabetes Medications:   Antihyperglycemics (From admission, onward)      Start     Stop Route Frequency Ordered    05/13/23 0945  insulin regular in 0.9 % NaCl 100 unit/100 mL (1 unit/mL) infusion        Question:  Enter initial dose (Units/hr):  Answer:  0.8    -- IV Continuous 05/13/23 0941    05/12/23 1004  insulin aspart U-100 pen 0-10 Units         -- SubQ As needed (PRN) 05/12/23 0904            ASSESSMENT and PLAN    Cardiac/Vascular  * Nonrheumatic aortic valve stenosis  Managed per primary team  Avoid hypoglycemia        S/P AVR  Managed per primary  Optimize BG control to improve wound healing      Hyperlipidemia, mixed  On statin per ada guidelines    Endocrine  Acquired hypothyroidism  Levothyroxine 75 mcg daily   Lab Results   Component Value Date     TSH 2.724 03/02/2023    FREET4 0.99 08/11/2020         Diabetes mellitus type 2 in obese  Endocrinology consulted for BG management.   BG goal 110-140 (CTS Protocol)    - Transition drip at 0.8 units/hr with step-down parameters.   - Novolog (aspart) insulin prn for BG excursions [facility] SSI (150/25).  - BG checks AC/HS/0200  - Hypoglycemia protocol in place    ** Please notify Endocrine for any change and/or advance in diet**  ** Please call Endocrine for any BG related issues **    Discharge Planning:   TBD. Please notify endocrinology prior to discharge.               Ankit Newberry, DNP, FNP  Endocrinology  [facility]

## 2023-05-13 NOTE — PROGRESS NOTES
CARDIAC ELECTROPHYSIOLOGY CONSULTATION NOTE    PATIENT: Alina Narayan  MRN: [id number]  : 1945  DATE OF SERVICE: 2023    REFERRING PRACTITIONER: Venkat Webb MD  PRIMARY CARE PROVIDER: Aarti Dunn MD  ATTENDING PHYSICIAN: Venkat Webb MD    HPI:     HPI:  78 y.o. female with PMH of  pulmonary embolism on chronic AC, AVR (21 mm Trifecta) in , breast cancer (s/p mastectomy, radiation and chemo) who  presents for pre-op re-do AVR. S/p  re-do bio-AVR for severe bioprosthetic AS on may 10th by Dr Webb. The patient underwent procedure on 5/10/23 and has epicardial leads since procedure. She was extubated yesterday.  Initially on epi 0.04 and levo 0.04 on admit; levo weaned off overnight, epi remains at 0.01 on morning of . Today in the morning, with anemia Hb 6.4 s/p transfusion of 1 unit or RBC.  EP consulted for bradycardia. PM was decreased to rate of 50 bpm yesterday. No hemodynamic changes. Pt remained on NSR throughout the night with HR in the 60's     No overnight events.   TM sinus rhythm 60's  Patient denied any symptoms. Eating, on NSR, with  HR in the 70's    PPM epicardial leads:   Treshold 7 rate 80 output 15    Active Problems:     Patient Active Problem List   Diagnosis    Essential hypertension    Hyperlipidemia, mixed    Diverticulosis    Family history of colon cancer    Obstructive sleep apnea    Nonrheumatic aortic valve stenosis    Diabetes mellitus type 2 in obese    S/P AVR    Acquired hypothyroidism    Hearing loss, sensorineural    Degeneration of lumbar or lumbosacral intervertebral disc    Chronic kidney disease, stage III (moderate)    Diabetes mellitus due to underlying condition with stage 3 chronic kidney disease, without long-term current use of insulin    Vitamin D deficiency    Hemarthrosis of left knee    Contusion, knee and lower leg, left, initial encounter    Fall    Left anterior knee pain    Edema    History of breast cancer    BMI  45.0-49.9, adult    Atherosclerosis of aorta    Thrombocytopenia, unspecified    Fatty liver    Disorder of rotator cuff of both shoulders    Soft tissue swelling    Lightheadedness    Bleeding    Hematoma (nontraumatic) of breast    Deformity due to mastectomy    Pulmonary nodule    Adjustment disorder with mixed anxiety and depressed mood    COVID-19 virus infection    Bilateral knee pain    History of pulmonary embolism    Morbid (severe) obesity due to excess calories    Prosthetic valve dysfunction    Pre-op testing    Surgical wound present    Acute blood loss anemia    Bradycardia     Past Medical History:     Past Medical History:   Diagnosis Date    Allergy     seasonal    Anxiety     Arthritis     BRCA1 negative     Breast cancer 10/2012    left breast invasive ductal carcinoma    Colon polyp     Depression     Diabetes mellitus     Type 2    Diabetes mellitus, type 2     Diverticular disease     Diverticulitis 2009    Genetic testing 05/2017    negative Integrated BRACAnalysis    Hyperlipidemia     Hypertension     Morbid obesity     MITCHELL (obstructive sleep apnea)     Pulmonary embolism     Stenosis     Stenosis and insufficiency of lacrimal passages     Thyroid disease      Past Surgical History:     Past Surgical History:   Procedure Laterality Date    AORTIC VALVE REPLACEMENT N/A 5/10/2023    Procedure: Replacement-valve-aortic, redo sternotomy;  Surgeon: Venkat Webb MD;  Location: [facility] OR [address] [place] [address];  Service: Cardiothoracic;  Laterality: N/A;  Redo sternotomy    BREAST BIOPSY Left 10/01/2012    left breast- invasive ductal carcinoma    BREAST BIOPSY Left 12/2017    BREAST CYST ASPIRATION      BREAST LUMPECTOMY Left 2012    BREAST MASS EXCISION      CARDIAC VALVE REPLACEMENT  12/2013    CARDIAC VALVE SURGERY  2013    CARPAL TUNNEL RELEASE      Left    COLONOSCOPY N/A 09/29/2017    Procedure: COLONOSCOPY;  Surgeon: Phani Worley MD;  Location: [facility] (4TH FLR);  Service: Endoscopy;   Laterality: N/A;    COLONOSCOPY N/A 1/5/2023    Procedure: COLONOSCOPY;  Surgeon: Eladia Martino MD;  Location: [facility] ENDO (4TH FLR);  Service: Endoscopy;  Laterality: N/A;  instr via portal  ok to hole Eliquis-see encounter 12/9-MS  1/4 pateint cannot come earlier-rt    CORONARY ANGIOGRAPHY Left 4/13/2023    Procedure: ANGIOGRAM, CORONARY ARTERY;  Surgeon: Eze Sales MD;  Location: [facility] CATH LAB;  Service: Cardiology;  Laterality: Left;  low bleeding risk 2.9%    DILATION AND CURETTAGE OF UTERUS  1999    Endometrial polyps    ENDOMETRIAL ABLATION  1999    Enodmetrial polyps    EYE SURGERY      INSERTION OF TUNNELED CENTRAL VENOUS CATHETER (CVC) WITH SUBCUTANEOUS PORT Right 02/01/2019    Procedure: [id number];  Surgeon: Gaston Flores MD;  Location: [facility] OR 2ND FLR;  Service: General;  Laterality: Right;    left lumpectomy  2012    MASTECTOMY      MEDIPORT REMOVAL Right 08/26/2019    Procedure: REMOVAL, CATHETER, CENTRAL VENOUS, TUNNELED, WITH PORT;  Surgeon: Gaston Flores MD;  [facility];  Service: General;  Laterality: Right;    MODIFIED RADICAL MASTECTOMY W/ AXILLARY LYMPH NODE DISSECTION Right 08/26/2019    Procedure: MASTECTOMY, MODIFIED RADICAL;  Surgeon: Gaston Flores MD;  Location: [facility] OR [address] [place] [address];  Service: General;  Laterality: Right;    SHOULDER SURGERY      SKIN BIOPSY       Social History/Family History:     Social History     Socioeconomic History    Marital status:      Spouse name: Srinath    Number of children: 2   Occupational History    Occupation: Retired   Tobacco Use    Smoking status: Never    Smokeless tobacco: Never   Substance and Sexual Activity    Alcohol use: No     Alcohol/week: 0.0 standard drinks    Drug use: Never    Sexual activity: Yes     Partners: Male   Other Topics Concern    Are you pregnant or think you may be? No    Breast-feeding No     Social Determinants of Health     Financial Resource Strain: Low Risk      "Difficulty of Paying Living Expenses: Not hard at all   Food Insecurity: No Food Insecurity    Worried About Running Out of Food in the Last Year: Never true    Ran Out of Food in the Last Year: Never true   Transportation Needs: No Transportation Needs    Lack of Transportation (Medical): No    Lack of Transportation (Non-Medical): No   Physical Activity: Inactive    Days of Exercise per Week: 0 days    Minutes of Exercise per Session: 0 min   Stress: Unknown    Feeling of Stress : Patient refused   Social Connections: Moderately Isolated    Frequency of Communication with Friends and Family: Three times a week    Frequency of Social Gatherings with Friends and Family: Three times a week    Attends Denominational Services: Never    Active Member of Clubs or Organizations: No    Attends Club or Organization Meetings: Never    Marital Status:    Housing Stability: Low Risk     Unable to Pay for Housing in the Last Year: No    Number of Places Lived in the Last Year: 1    Unstable Housing in the Last Year: No     Family History   Problem Relation Age of Onset    Breast cancer Mother     Colon cancer Father     Cancer Father         Colon CA (cause of death); Prostate CA (no chemo)    Heart disease Father     No Known Problems Sister     Alcohol abuse Brother     Cancer Maternal Grandfather         Colon CA    No Known Problems Son     Cancer Brother         prostate CA, no chemo, "it was bad"    Anxiety disorder Son     SAL disease Son     Sleep disorder Son     Ovarian cancer Neg Hx     Melanoma Neg Hx     Psoriasis Neg Hx     Lupus Neg Hx     Eczema Neg Hx     Acne Neg Hx      Medications:     Medications Prior to Admission   Medication Sig Dispense Refill Last Dose    aspirin (ECOTRIN) 81 MG EC tablet Take 81 mg by mouth once daily.   5/9/2023    carvediloL (COREG) 25 MG tablet TAKE 1/2 TABLET TWICE DAILY WITH MEALS 180 tablet 3 5/10/2023    CHOLECALCIFEROL, VITAMIN D3, (VITAMIN D3 ORAL) Take 3,000 capsules by " mouth once daily. 3000 IU per day   5/9/2023    furosemide (LASIX) 20 MG tablet Take 1 tablet (20 mg total) by mouth once daily. 90 tablet 3 5/9/2023    gabapentin (NEURONTIN) 300 MG capsule TAKE 1 CAPSULE EVERY MORNING AND TAKE 2 CAPSULES AT BEDTIME 270 capsule 3 5/9/2023    glimepiride (AMARYL) 2 MG tablet TAKE 1 TABLET BEFORE BREAKFAST. 90 tablet 3 5/9/2023    hydrocortisone 2.5 % cream Apply topically 2 (two) times daily as needed (rash under the breast). Mix 50/50 with ketoconazole cream. 30 g 3 Past Month    ketoconazole (NIZORAL) 2 % cream AAA bid prn rash under the breast. Mix 50/50 with hydrocortisone cream. 60 g 3 Past Month    levothyroxine (SYNTHROID) 75 MCG tablet TAKE 1 TABLET BEFORE BREAKFAST 90 tablet 3 5/10/2023    pravastatin (PRAVACHOL) 20 MG tablet TAKE 1 TABLET EVERY DAY 90 tablet 3 5/9/2023    blood-glucose meter kit True Test or meter covered by insurance - pt checks glucose twice daily for uncontrolled glucoses E11.65 1 each 0     ELIQUIS 2.5 mg Tab TAKE ONE TABLET BY MOUTH 2 TIMES A DAY 60 tablet 12 5/3/2023    HYDROcodone-acetaminophen (NORCO) 5-325 mg per tablet Take 1 tablet by mouth every 6 (six) hours as needed for Pain. (Patient not taking: Reported on 4/27/2023) 40 tablet 0 More than a month    lancets (ACCU-CHEK SOFTCLIX LANCETS) Misc TrueTest lancets for meter covered by insurance - pt checks twice daily for uncontrolled glucoses E11.65, 200 each 3     metFORMIN (GLUCOPHAGE) 500 MG tablet TAKE 1 TABLET TWICE DAILY WITH MEALS 180 tablet 0 5/8/2023    mupirocin (BACTROBAN) 2 % ointment Apply topically 2 (two) times daily. 15 g 0 More than a month    tiZANidine (ZANAFLEX) 4 MG tablet One tab every 6-8 hours as needed pain (Patient not taking: Reported on 4/27/2023) 30 tablet 0 Unknown    triamcinolone acetonide 0.1%-magnesium hydroxide 400 mg/5 ml-ciclopirox Apply to affected area under the breast twice daily as directed 60 g 3 More than a month    TRUE METRIX GLUCOSE TEST STRIP Strp  "TEST BLOOD SUGAR TWICE DAILY 200 strip 3        Allergies:     Review of patient's allergies indicates:   Allergen Reactions    Augmentin [amoxicillin-pot clavulanate] Diarrhea    Dilaudid [hydromorphone (bulk)] Other (See Comments)     Other reaction(s): sedation    Hydromorphone Other (See Comments)     Other reaction(s): sedation    Cymbalta [duloxetine]      Dry mouth, agitation    Jardiance [empagliflozin] Hives    Byetta [exenatide] Rash     Other reaction(s): Rash     Review of Systems:   Review of Systems   All other systems reviewed and are negative.     Physical Exam:   VITALS: BP (!) 96/54 (BP Location: Left arm, Patient Position: Lying)   Pulse 69   Temp 97.7 °F (36.5 °C)   Resp 12   Ht 5' 2" (1.575 m)   Wt 114.6 kg (252 lb 10.4 oz)   SpO2 98%   Breastfeeding No   BMI 46.21 kg/m²        Eyes: Sclerae anicteric. Ears/nose/throat: Mucous membranes moist. Neck: No thyromegaly, lymphadenopathy, or jugular venous distention. Respiratory: Lungs clear to auscultation bilaterally. R hemihorax chest tubes. Cardiovascular: Heart was regular with normal first and second heart sounds. No S3 or S4. GI: Soft, nontender, nondistended, with normal bowel sounds. Musculoskeletal: extremities without cyanosis, clubbing or pitting edema. Neurologic: Patient is alert and oriented x3. Skin: no rashes, cuts, sores. Psychiatric: normal affect. Hematologic: no abnormal bruising or bleeding.    Laboratory:     BUN/Cr/glu/ALT/AST/amyl/lip:  31/1.5/--/6/22/--/-- (05/13 0419)  WBC/Hgb/Hct/Plts:  8.63/7.5/23.1/53 (05/13 0419)  PT/INR/PTT:  11.3/1.1/29.6 (05/13 0419)    Diagnostic Studies:   TTE:  Echo Saline Bubble? No    Result Date: 3/27/2023  · The left ventricle is normal in size with concentric remodeling and   normal systolic function. The estimated ejection fraction is 65%.  · Normal right ventricular size with normal right ventricular systolic   function.  · Indeterminate left ventricular diastolic function.  · Mild " left atrial enlargement.  · There is a 21mm [employer] You Trifecta bovine pericardial bioprosthetic   aortic valve present with evidence of stenosis. The mean gradient is 47   mmHg with a dimensionless index of 0.23. The acceleration time is well   above 100ms (140+ ms)  · Mild tricuspid regurgitation.  · The estimated PA systolic pressure is 36 mmHg.  · Normal central venous pressure (3 mmHg).  · The aortic valve mean gradient is 47 mmHg with a dimensionless index of   0.23.     ISCHEMIC WORKUP:  4/13/23.[facility]   Non obstructive CAD  There is minor luminal irregularity in the LCA and RCA    Plan:   Sinus bradycardia   Asymptomatic   Has PPM Epicardial leads postprocedure  TM NSR rate of 60's   Hemodynamically stable through the night.     Assessment: EP was consulted for bradycardia. Had PM epicardial leads. PM rate was decreased to rate of 50 bpm yesterday. Pt remained on NSR throughout the night with HR in the 60's. This am eating, on NSR with rates in the 70's     Plan:  We will sign off     We appreciate the opportunity to assist in the care of Mrs. Alina Narayan. We will sign off. Should you have any further questions, please do not hesitate to call.       Sanford Kingston MD  [facility]  PGY 4 Cardiology Fellow

## 2023-05-13 NOTE — ASSESSMENT & PLAN NOTE
S/p redo AVR with bioprosthetic valve with Dr. Webb on 5/10. Initial AVR with Tracey 2013.       Neuro/Psych:     - Sedation: none    - Pain:    - Scheduled Gabapentin 300 TID   - Norco 5/10 PRN   - dilaudid 0.5 PRN              Cardiac:     - S/P redo sternotomy with redo AVR with Dr. Webb on 5/10/2023    - BP Goal: MAP 60-80, SBP < 140     - Pressors: none     - Anti-HTNs: Will start when appropriate    - Rhythm: NSR now without episodes of bradycardia for the last 24 hours    - Beta blocker: Will start when appropriate    - Statin: pravastatin (home med)      Pulmonary:     - Goal SpO2 >92%, on 2 L/min, sating %    - Wean from nasal canula    - CXR this morning stable    - Chest Tubes x 2 (2 Pleural), R - 80 mL, L 10 - mL, potentially remove today      Renal:    - Trend BUN/Cr     - Lasix 40 BID    - Potentially remove vizcaino today    Recent Labs   Lab 05/11/23 2225 05/12/23  0247 05/13/23  0419   BUN 25* 26* 31*   CREATININE 1.5* 1.4 1.5*         FEN / GI:     - Daily CMP, PRN K/Mag/Phos per protocol     - Replace electrolytes as needed    - Nutrition: diabetic diet with fluid restriction     - Bowel Regimen: Miralax, docusate      ID:     - Afebrile    - WBC stable    - Abx: Completed perioperative cefazolin 2g Q8H x 5 doses    Recent Labs   Lab 05/11/23 0235 05/12/23  0247 05/13/23  0419   WBC 10.99 10.97 8.63         Heme/Onc:     - Hgb 12.6 pre-operatively    - CBC daily    - ASA 325mg daily, initiated within 6hr of surgery     Recent Labs   Lab 05/11/23 0235 05/12/23  0247 05/13/23  0419   HGB 7.8* 6.4* 7.5*   PLT 51* 49* 53*   APTT 32.7* 31.6 29.6   INR 1.1 1.1 1.1         Endocrine:     - CTS Goal -140    - HgbA1c: 7.3    - Endocrinology consulted for insulin management      PPx:   Feeding: diabetic diet   Analgesia/Sedation: gabapentin, norco, dilaudid PRN   Thromboembolic Prevention: SCDs/Heparin 7500 TID   HOB >30: Yes  Stress Ulcer: none indicated  Glucose Control: Yes,  insulin management per Endocrinology     Lines/Drains/Airway:   Left brachial arterial line   LIJ CVC   Macdonald   Chest Tubes: 2 (2 Pleural)    Pacing Wires: Temporary ventricular pacing wires      Dispo/Code Status/Palliative:     - Potential stepdown to the floor today    - Full Code

## 2023-05-13 NOTE — ASSESSMENT & PLAN NOTE
Endocrinology consulted for BG management.   BG goal 110-140 (CTS Protocol)    - Transition drip at 0.8 units/hr with step-down parameters.   - Novolog (aspart) insulin prn for BG excursions [facility] (150/25).  - BG checks AC/HS/0200  - Hypoglycemia protocol in place    ** Please notify Endocrine for any change and/or advance in diet**  ** Please call Endocrine for any BG related issues **    Discharge Planning:   TBD. Please notify endocrinology prior to discharge.

## 2023-05-13 NOTE — PROGRESS NOTES
[facility]  Critical Care - Surgery  Progress Note    Patient Name: Alina Narayan  MRN: [id number]  Admission Date: 5/10/2023  Hospital Length of Stay: 3 days  Code Status: Full Code  Attending Provider: Venkat Webb MD  Primary Care Provider: Aarti Dunn MD   Principal Problem: Nonrheumatic aortic valve stenosis    Subjective:     Hospital/ICU Course:  Alina Narayan is a 78 y.o. female who presents for pre-op re-do AVR. She has a medical history significant for pulmonary embolism on chronic AC, s/p AVR (21 mm Trifecta) in 2003, breast cancer (s/p mastectomy, radiation and chemo) who presents for evaluation of a failing 21 mm Trifecta valve, true ID 19. She underwent AVR in December 2013 with Dr Webb with a 21 mmTrifecta. The patient presents to the SICU s/p redo sternotomy AVR  with Dr. Webb on 05/10/2023. When aorta clamp was removed and BIBIANA evaluation was done, patient noted to have mitral stenosis so decision made for MVR. CPB run ended without issues. On admission, they are intubated, sedated with propofol, and in stable condition. Inotropic and vasopressor requirements upon admission are 0.04 mcg/kg/min of epinephrine, 0.04 mcg/kg/min of norepinephrine to maintain blood pressure at a MAP 60-80 and SBP < 140. Central access includes a LIJ CVC, arterial access includes a left brachial arterial line. They also have 2 pleural and 2 mediastinal chest tubes with appropriate output.     Intraoperatively, they received 2L of crystalloid, 747 of cell saver, 1 PRBCs. Urine output intraoperatively was 500 cc. The pre-operative echocardiogram was notable for EF 65%, normal RV function. Post-operative echo was normal biventricular function.     Immediate post-operative plans include hemodynamic stabilization and weaning of cardiac and respiratory support. Plan to wean vasopressors and inotropes as tolerated, and wean ventilator support with goal of early extubation, and closely  monitor fluid status with strict Is/Os and continued fluid resuscitation as needed.       Interval History/Significant Events: NAEON. Slept well. Stayed on the 2 L of NC. Saturations %. No bradycardic episodes. VSS. HR in the 60s. MAPs in the 70s. Good UOP overnight after 40 of lasix at 2100 (~100 mL/hr). Hgb 7.5 this morning after receiving 1 unit of pRBCs yesterday.     Follow-up For: Procedure(s) (LRB):  Replacement-valve-aortic, redo sternotomy (N/A)    Post-Operative Day: 3 Days Post-Op    Objective:     Vital Signs (Most Recent):  Temp: 97.7 °F (36.5 °C) (05/12/23 2301)  Pulse: 70 (05/13/23 0500)  Resp: 17 (05/13/23 0500)  BP: (!) 96/54 (05/12/23 0403)  SpO2: 100 % (05/13/23 0500) Vital Signs (24h Range):  Temp:  [97.7 °F (36.5 °C)-98 °F (36.7 °C)] 97.7 °F (36.5 °C)  Pulse:  [53-89] 70  Resp:  [12-35] 17  SpO2:  [93 %-100 %] 100 %  Arterial Line BP: ()/(40-57) 122/48     Weight: 114.6 kg (252 lb 10.4 oz)  Body mass index is 46.21 kg/m².      Intake/Output Summary (Last 24 hours) at 5/13/2023 0558  Last data filed at 5/13/2023 0458  Gross per 24 hour   Intake 607.39 ml   Output 1360 ml   Net -752.61 ml          Physical Exam  Constitutional:       General: She is not in acute distress.     Appearance: Normal appearance.   HENT:      Head: Normocephalic.   Neck:      Comments: LIJ CVC    Cardiovascular:      Rate and Rhythm: Normal rate and regular rhythm.      Pulses: Normal pulses.      Heart sounds: Normal heart sounds.   Pulmonary:      Comments: On 2LNC. B/l pleural chest tubes in place.  Abdominal:      General: There is no distension.      Palpations: Abdomen is soft.   Genitourinary:     Comments: vizcaino  Musculoskeletal:         General: No swelling.      Right lower leg: No edema.      Left lower leg: No edema.   Skin:     General: Skin is warm and dry.   Neurological:      General: No focal deficit present.      Mental Status: She is alert and oriented to person, place, and time. Mental  status is at baseline.            Lines/Drains/Airways       Central Venous Catheter Line  Duration                  Port A Cath Single Lumen -- days     Introducer with Double Lumen 05/10/23 1033 Internal Jugular Left 2 days    Introducer 05/10/23 1033 2 days    Percutaneous Central Line Insertion/Assessment - Triple Lumen  05/10/23 1033 Internal Jugular Left 2 days              Drain  Duration                  Chest Tube 05/10/23 1627 Tube - 1 Right Pleural 19 Fr. 2 days         Chest Tube 05/10/23 1628 Tube - 2 Left Pleural 19 Fr. 2 days         Urethral Catheter 05/10/23 0915 Non-latex;Temperature probe 14 Fr. 2 days              Arterial Line  Duration             Arterial Line 05/10/23 0835 Left Other (Comment) 2 days              Line  Duration                  Pacer Wires 05/10/23 1631 2 days              Peripheral Intravenous Line  Duration                  Peripheral IV - Single Lumen 05/10/23 0737 20 G Left;Posterior Hand 2 days                    Significant Labs:    CBC/Anemia Profile:  Recent Labs   Lab 05/12/23  0247 05/12/23  0404 05/13/23  0419   WBC 10.97  --  8.63   HGB 6.4*  --  7.5*   HCT 20.2* 23* 23.1*   PLT 49*  --  53*   MCV 91  --  90   RDW 15.3*  --  14.8*        Chemistries:  Recent Labs   Lab 05/11/23  2225 05/12/23  0247 05/12/23  0749 05/12/23  2100 05/13/23  0419    138  --   --  139   K 3.8  3.9 3.9 4.0 3.8 4.0    107  --   --  107   CO2 20* 22*  --   --  22*   BUN 25* 26*  --   --  31*   CREATININE 1.5* 1.4  --   --  1.5*   CALCIUM 7.8* 7.9*  --   --  8.1*   ALBUMIN  --  4.0  --   --  3.6   PROT  --  5.3*  --   --  5.4*   BILITOT  --  0.3  --   --  0.5   ALKPHOS  --  46*  --   --  64   ALT  --  7*  --   --  6*   AST  --  22  --   --  22   MG 2.4 2.5  --   --  2.3   PHOS 3.8 4.4  --   --  3.0         Significant Imaging:  I have reviewed all pertinent imaging results/findings within the past 24 hours.    Assessment/Plan:     Cardiac/Vascular  * Nonrheumatic aortic valve  stenosis  S/p redo AVR with bioprosthetic valve with Dr. Webb on 5/10. Initial AVR with Tracey 2013.       Neuro/Psych:     - Sedation: none    - Pain:    - Scheduled Gabapentin 300 TID   - Norco 5/10 PRN   - dilaudid 0.5 PRN              Cardiac:     - S/P redo sternotomy with redo AVR with Dr. Webb on 5/10/2023    - BP Goal: MAP 60-80, SBP < 140     - Pressors: none     - Anti-HTNs: Will start when appropriate    - Rhythm: NSR now without episodes of bradycardia for the last 24 hours    - Beta blocker: Will start when appropriate    - Statin: pravastatin (home med)      Pulmonary:     - Goal SpO2 >92%, on 2 L/min, sating %    - Wean from nasal canula    - CXR this morning stable    - Chest Tubes x 2 (2 Pleural), R - 80 mL, L 10 - mL, potentially remove today      Renal:    - Trend BUN/Cr     - Lasix 40 BID    - Potentially remove vizcaino today    Recent Labs   Lab 05/11/23 2225 05/12/23  0247 05/13/23  0419   BUN 25* 26* 31*   CREATININE 1.5* 1.4 1.5*         FEN / GI:     - Daily CMP, PRN K/Mag/Phos per protocol     - Replace electrolytes as needed    - Nutrition: diabetic diet with fluid restriction     - Bowel Regimen: Miralax, docusate      ID:     - Afebrile    - WBC stable    - Abx: Completed perioperative cefazolin 2g Q8H x 5 doses    Recent Labs   Lab 05/11/23 0235 05/12/23  0247 05/13/23  0419   WBC 10.99 10.97 8.63         Heme/Onc:     - Hgb 12.6 pre-operatively    - CBC daily    - ASA 325mg daily, initiated within 6hr of surgery     Recent Labs   Lab 05/11/23 0235 05/12/23  0247 05/13/23  0419   HGB 7.8* 6.4* 7.5*   PLT 51* 49* 53*   APTT 32.7* 31.6 29.6   INR 1.1 1.1 1.1         Endocrine:     - CTS Goal -140    - HgbA1c: 7.3    - Endocrinology consulted for insulin management      PPx:   Feeding: diabetic diet   Analgesia/Sedation: gabapentin, norco, dilaudid PRN   Thromboembolic Prevention: SCDs/Heparin 7500 TID   HOB >30: Yes  Stress Ulcer: none indicated  Glucose  Control: Yes, insulin management per Endocrinology     Lines/Drains/Airway:   Left brachial arterial line   LIJ CVC   Macdonald   Chest Tubes: 2 (2 Pleural)    Pacing Wires: Temporary ventricular pacing wires      Dispo/Code Status/Palliative:     - Potential stepdown to the floor today    - Full Code          Critical secondary to Patient has a condition that poses threat to life and bodily function: s/p AVR     Critical care was time spent personally by me on the following activities: development of treatment plan with patient or surrogate and bedside caregivers, discussions with consultants, evaluation of patient's response to treatment, examination of patient, ordering and performing treatments and interventions, ordering and review of laboratory studies, ordering and review of radiographic studies, pulse oximetry, re-evaluation of patient's condition.  This critical care time did not overlap with that of any other provider or involve time for any procedures.     Bradley Morris MD  Critical Care - Surgery  [facility]

## 2023-05-13 NOTE — SUBJECTIVE & OBJECTIVE
"Interval HPI:   Overnight events: No acute events overnight. Patient in room 22130/97238 A. Blood glucose stable. BG at goal on current insulin regimen (Transition Insulin Drip). Steroid use- None. 3 Days Post-Op  Renal function- Normal   Vasopressors-  Epinephrine       Endocrine will continue to follow and manage insulin orders inpatient.         Diet diabetic [facility]; 2000 Calorie; Fluid - 1500mL     Eatin%  Nausea: No  Hypoglycemia and intervention: No  Fever: No  TPN and/or TF: No      BP (!) 96/54 (BP Location: Left arm, Patient Position: Lying)   Pulse 75   Temp 98.7 °F (37.1 °C) (Oral)   Resp (!) 34   Ht 5' 2" (1.575 m)   Wt 114.6 kg (252 lb 10.4 oz)   SpO2 (!) 92%   Breastfeeding No   BMI 46.21 kg/m²     Labs Reviewed and Include    Recent Labs   Lab 23  0419 23  0858   *  --    CALCIUM 8.1*  --    ALBUMIN 3.6  --    PROT 5.4*  --      --    K 4.0 3.8   CO2 22*  --      --    BUN 31*  --    CREATININE 1.5*  --    ALKPHOS 64  --    ALT 6*  --    AST 22  --    BILITOT 0.5  --      Lab Results   Component Value Date    WBC 8.63 2023    HGB 7.5 (L) 2023    HCT 23.1 (L) 2023    MCV 90 2023    PLT 53 (L) 2023     No results for input(s): TSH, FREET4 in the last 168 hours.  Lab Results   Component Value Date    HGBA1C 7.3 (H) 2023       Nutritional status:   Body mass index is 46.21 kg/m².  Lab Results   Component Value Date    ALBUMIN 3.6 2023    ALBUMIN 4.0 2023    ALBUMIN 4.1 2023     No results found for: PREALBUMIN    Estimated Creatinine Clearance: 37 mL/min (A) (based on SCr of 1.5 mg/dL (H)).    Accu-Checks  Recent Labs     23  2222 23  0008 23  0356 23  0750 23  1235 23  1647 23  1956 23  0038 23  0418 23  0907   POCTGLUCOSE 149* 131* 166* 173* 154* 144* 158* 139* 130* 122*       Current Medications and/or Treatments Impacting Glycemic " Control  Immunotherapy:    Immunosuppressants       None          Steroids:   Hormones (From admission, onward)      None          Pressors:    Autonomic Drugs (From admission, onward)      None          Hyperglycemia/Diabetes Medications:   Antihyperglycemics (From admission, onward)      Start     Stop Route Frequency Ordered    05/13/23 0945  insulin regular in 0.9 % NaCl 100 unit/100 mL (1 unit/mL) infusion        Question:  Enter initial dose (Units/hr):  Answer:  0.8    -- IV Continuous 05/13/23 0941    05/12/23 1004  insulin aspart U-100 pen 0-10 Units         -- SubQ As needed (PRN) 05/12/23 0980

## 2023-05-13 NOTE — SUBJECTIVE & OBJECTIVE
Interval History/Significant Events: NAEON. Slept well. Stayed on the 2 L of NC. Saturations %. No bradycardic episodes. VSS. HR in the 60s. MAPs in the 70s. Good UOP overnight after 40 of lasix at 2100 (~100 mL/hr). Hgb 7.5 this morning after receiving 1 unit of pRBCs yesterday.     Follow-up For: Procedure(s) (LRB):  Replacement-valve-aortic, redo sternotomy (N/A)    Post-Operative Day: 3 Days Post-Op    Objective:     Vital Signs (Most Recent):  Temp: 97.7 °F (36.5 °C) (05/12/23 2301)  Pulse: 70 (05/13/23 0500)  Resp: 17 (05/13/23 0500)  BP: (!) 96/54 (05/12/23 0403)  SpO2: 100 % (05/13/23 0500) Vital Signs (24h Range):  Temp:  [97.7 °F (36.5 °C)-98 °F (36.7 °C)] 97.7 °F (36.5 °C)  Pulse:  [53-89] 70  Resp:  [12-35] 17  SpO2:  [93 %-100 %] 100 %  Arterial Line BP: ()/(40-57) 122/48     Weight: 114.6 kg (252 lb 10.4 oz)  Body mass index is 46.21 kg/m².      Intake/Output Summary (Last 24 hours) at 5/13/2023 0558  Last data filed at 5/13/2023 0458  Gross per 24 hour   Intake 607.39 ml   Output 1360 ml   Net -752.61 ml          Physical Exam  Constitutional:       General: She is not in acute distress.     Appearance: Normal appearance.   HENT:      Head: Normocephalic.   Neck:      Comments: LIJ CVC    Cardiovascular:      Rate and Rhythm: Normal rate and regular rhythm.      Pulses: Normal pulses.      Heart sounds: Normal heart sounds.   Pulmonary:      Comments: On 2LNC. B/l pleural chest tubes in place.  Abdominal:      General: There is no distension.      Palpations: Abdomen is soft.   Genitourinary:     Comments: vizcaino  Musculoskeletal:         General: No swelling.      Right lower leg: No edema.      Left lower leg: No edema.   Skin:     General: Skin is warm and dry.   Neurological:      General: No focal deficit present.      Mental Status: She is alert and oriented to person, place, and time. Mental status is at baseline.            Lines/Drains/Airways       Central Venous Catheter Line   Duration                  Port A Cath Single Lumen -- days     Introducer with Double Lumen 05/10/23 1033 Internal Jugular Left 2 days    Introducer 05/10/23 1033 2 days    Percutaneous Central Line Insertion/Assessment - Triple Lumen  05/10/23 1033 Internal Jugular Left 2 days              Drain  Duration                  Chest Tube 05/10/23 1627 Tube - 1 Right Pleural 19 Fr. 2 days         Chest Tube 05/10/23 1628 Tube - 2 Left Pleural 19 Fr. 2 days         Urethral Catheter 05/10/23 0915 Non-latex;Temperature probe 14 Fr. 2 days              Arterial Line  Duration             Arterial Line 05/10/23 0835 Left Other (Comment) 2 days              Line  Duration                  Pacer Wires 05/10/23 1631 2 days              Peripheral Intravenous Line  Duration                  Peripheral IV - Single Lumen 05/10/23 0737 20 G Left;Posterior Hand 2 days                    Significant Labs:    CBC/Anemia Profile:  Recent Labs   Lab 05/12/23  0247 05/12/23  0404 05/13/23  0419   WBC 10.97  --  8.63   HGB 6.4*  --  7.5*   HCT 20.2* 23* 23.1*   PLT 49*  --  53*   MCV 91  --  90   RDW 15.3*  --  14.8*        Chemistries:  Recent Labs   Lab 05/11/23  2225 05/12/23  0247 05/12/23  0749 05/12/23  2100 05/13/23  0419    138  --   --  139   K 3.8  3.9 3.9 4.0 3.8 4.0    107  --   --  107   CO2 20* 22*  --   --  22*   BUN 25* 26*  --   --  31*   CREATININE 1.5* 1.4  --   --  1.5*   CALCIUM 7.8* 7.9*  --   --  8.1*   ALBUMIN  --  4.0  --   --  3.6   PROT  --  5.3*  --   --  5.4*   BILITOT  --  0.3  --   --  0.5   ALKPHOS  --  46*  --   --  64   ALT  --  7*  --   --  6*   AST  --  22  --   --  22   MG 2.4 2.5  --   --  2.3   PHOS 3.8 4.4  --   --  3.0         Significant Imaging:  I have reviewed all pertinent imaging results/findings within the past 24 hours.

## 2023-05-14 LAB
ALBUMIN SERPL BCP-MCNC: 3.4 G/DL (ref 3.5–5.2)
ALP SERPL-CCNC: 103 U/L (ref 55–135)
ALT SERPL W/O P-5'-P-CCNC: 18 U/L (ref 10–44)
ANION GAP SERPL CALC-SCNC: 9 MMOL/L (ref 8–16)
APTT PPP: 26.8 SEC (ref 21–32)
AST SERPL-CCNC: 42 U/L (ref 10–40)
BASOPHILS # BLD AUTO: 0.01 K/UL (ref 0–0.2)
BASOPHILS NFR BLD: 0.1 % (ref 0–1.9)
BILIRUB SERPL-MCNC: 0.6 MG/DL (ref 0.1–1)
BLD PROD TYP BPU: NORMAL
BLOOD UNIT EXPIRATION DATE: NORMAL
BLOOD UNIT TYPE CODE: 5100
BLOOD UNIT TYPE: NORMAL
BUN SERPL-MCNC: 31 MG/DL (ref 8–23)
CALCIUM SERPL-MCNC: 8.6 MG/DL (ref 8.7–10.5)
CHLORIDE SERPL-SCNC: 104 MMOL/L (ref 95–110)
CO2 SERPL-SCNC: 26 MMOL/L (ref 23–29)
CODING SYSTEM: NORMAL
CREAT SERPL-MCNC: 1.3 MG/DL (ref 0.5–1.4)
CROSSMATCH INTERPRETATION: NORMAL
DIFFERENTIAL METHOD: ABNORMAL
DISPENSE STATUS: NORMAL
EOSINOPHIL # BLD AUTO: 0.1 K/UL (ref 0–0.5)
EOSINOPHIL NFR BLD: 0.7 % (ref 0–8)
ERYTHROCYTE [DISTWIDTH] IN BLOOD BY AUTOMATED COUNT: 14.6 % (ref 11.5–14.5)
EST. GFR  (NO RACE VARIABLE): 42.1 ML/MIN/1.73 M^2
GLUCOSE SERPL-MCNC: 129 MG/DL (ref 70–110)
HCT VFR BLD AUTO: 24 % (ref 37–48.5)
HGB BLD-MCNC: 7.8 G/DL (ref 12–16)
IMM GRANULOCYTES # BLD AUTO: 0.12 K/UL (ref 0–0.04)
IMM GRANULOCYTES NFR BLD AUTO: 1.7 % (ref 0–0.5)
INR PPP: 1 (ref 0.8–1.2)
LYMPHOCYTES # BLD AUTO: 1.1 K/UL (ref 1–4.8)
LYMPHOCYTES NFR BLD: 15.5 % (ref 18–48)
MAGNESIUM SERPL-MCNC: 2 MG/DL (ref 1.6–2.6)
MAGNESIUM SERPL-MCNC: 2.1 MG/DL (ref 1.6–2.6)
MCH RBC QN AUTO: 29.1 PG (ref 27–31)
MCHC RBC AUTO-ENTMCNC: 32.5 G/DL (ref 32–36)
MCV RBC AUTO: 90 FL (ref 82–98)
MONOCYTES # BLD AUTO: 0.7 K/UL (ref 0.3–1)
MONOCYTES NFR BLD: 10.2 % (ref 4–15)
NEUTROPHILS # BLD AUTO: 5 K/UL (ref 1.8–7.7)
NEUTROPHILS NFR BLD: 71.8 % (ref 38–73)
NRBC BLD-RTO: 0 /100 WBC
NUM UNITS TRANS PACKED RBC: NORMAL
PHOSPHATE SERPL-MCNC: 2.4 MG/DL (ref 2.7–4.5)
PHOSPHATE SERPL-MCNC: 2.6 MG/DL (ref 2.7–4.5)
PLATELET # BLD AUTO: 76 K/UL (ref 150–450)
PMV BLD AUTO: 11.9 FL (ref 9.2–12.9)
POCT GLUCOSE: 155 MG/DL (ref 70–110)
POCT GLUCOSE: 165 MG/DL (ref 70–110)
POCT GLUCOSE: 186 MG/DL (ref 70–110)
POCT GLUCOSE: 203 MG/DL (ref 70–110)
POTASSIUM SERPL-SCNC: 3.5 MMOL/L (ref 3.5–5.1)
POTASSIUM SERPL-SCNC: 3.8 MMOL/L (ref 3.5–5.1)
POTASSIUM SERPL-SCNC: 3.8 MMOL/L (ref 3.5–5.1)
PROT SERPL-MCNC: 5.7 G/DL (ref 6–8.4)
PROTHROMBIN TIME: 10.9 SEC (ref 9–12.5)
RBC # BLD AUTO: 2.68 M/UL (ref 4–5.4)
SODIUM SERPL-SCNC: 139 MMOL/L (ref 136–145)
WBC # BLD AUTO: 6.96 K/UL (ref 3.9–12.7)

## 2023-05-14 PROCEDURE — 94660 CPAP INITIATION&MGMT: CPT

## 2023-05-14 PROCEDURE — 25000003 PHARM REV CODE 250: Performed by: NURSE PRACTITIONER

## 2023-05-14 PROCEDURE — 27000221 HC OXYGEN, UP TO 24 HOURS

## 2023-05-14 PROCEDURE — 80053 COMPREHEN METABOLIC PANEL: CPT | Performed by: STUDENT IN AN ORGANIZED HEALTH CARE EDUCATION/TRAINING PROGRAM

## 2023-05-14 PROCEDURE — 84132 ASSAY OF SERUM POTASSIUM: CPT | Mod: 91 | Performed by: PHYSICIAN ASSISTANT

## 2023-05-14 PROCEDURE — 25000003 PHARM REV CODE 250

## 2023-05-14 PROCEDURE — 25000003 PHARM REV CODE 250: Performed by: PHYSICIAN ASSISTANT

## 2023-05-14 PROCEDURE — 25000003 PHARM REV CODE 250: Performed by: SURGERY

## 2023-05-14 PROCEDURE — 99233 PR SUBSEQUENT HOSPITAL CARE,LEVL III: ICD-10-PCS | Mod: ,,, | Performed by: ANESTHESIOLOGY

## 2023-05-14 PROCEDURE — 84100 ASSAY OF PHOSPHORUS: CPT | Mod: 91 | Performed by: THORACIC SURGERY (CARDIOTHORACIC VASCULAR SURGERY)

## 2023-05-14 PROCEDURE — 83735 ASSAY OF MAGNESIUM: CPT | Performed by: PHYSICIAN ASSISTANT

## 2023-05-14 PROCEDURE — 85610 PROTHROMBIN TIME: CPT | Performed by: PHYSICIAN ASSISTANT

## 2023-05-14 PROCEDURE — 25000003 PHARM REV CODE 250: Performed by: STUDENT IN AN ORGANIZED HEALTH CARE EDUCATION/TRAINING PROGRAM

## 2023-05-14 PROCEDURE — 25000003 PHARM REV CODE 250: Performed by: THORACIC SURGERY (CARDIOTHORACIC VASCULAR SURGERY)

## 2023-05-14 PROCEDURE — 94761 N-INVAS EAR/PLS OXIMETRY MLT: CPT

## 2023-05-14 PROCEDURE — 99232 SBSQ HOSP IP/OBS MODERATE 35: CPT | Mod: ,,, | Performed by: NURSE PRACTITIONER

## 2023-05-14 PROCEDURE — 84100 ASSAY OF PHOSPHORUS: CPT | Performed by: PHYSICIAN ASSISTANT

## 2023-05-14 PROCEDURE — 63600175 PHARM REV CODE 636 W HCPCS: Performed by: STUDENT IN AN ORGANIZED HEALTH CARE EDUCATION/TRAINING PROGRAM

## 2023-05-14 PROCEDURE — 63600175 PHARM REV CODE 636 W HCPCS: Performed by: PHYSICIAN ASSISTANT

## 2023-05-14 PROCEDURE — 20000000 HC ICU ROOM

## 2023-05-14 PROCEDURE — 83735 ASSAY OF MAGNESIUM: CPT | Mod: 91 | Performed by: THORACIC SURGERY (CARDIOTHORACIC VASCULAR SURGERY)

## 2023-05-14 PROCEDURE — 63600175 PHARM REV CODE 636 W HCPCS

## 2023-05-14 PROCEDURE — 99232 PR SUBSEQUENT HOSPITAL CARE,LEVL II: ICD-10-PCS | Mod: ,,, | Performed by: NURSE PRACTITIONER

## 2023-05-14 PROCEDURE — 20600001 HC STEP DOWN PRIVATE ROOM

## 2023-05-14 PROCEDURE — 85025 COMPLETE CBC W/AUTO DIFF WBC: CPT | Performed by: PHYSICIAN ASSISTANT

## 2023-05-14 PROCEDURE — 63600175 PHARM REV CODE 636 W HCPCS: Performed by: NURSE PRACTITIONER

## 2023-05-14 PROCEDURE — 99233 SBSQ HOSP IP/OBS HIGH 50: CPT | Mod: ,,, | Performed by: ANESTHESIOLOGY

## 2023-05-14 PROCEDURE — 99900035 HC TECH TIME PER 15 MIN (STAT)

## 2023-05-14 PROCEDURE — 85730 THROMBOPLASTIN TIME PARTIAL: CPT | Performed by: PHYSICIAN ASSISTANT

## 2023-05-14 RX ORDER — FUROSEMIDE 10 MG/ML
60 INJECTION INTRAMUSCULAR; INTRAVENOUS 2 TIMES DAILY
Status: DISCONTINUED | OUTPATIENT
Start: 2023-05-14 | End: 2023-05-15

## 2023-05-14 RX ORDER — GLUCAGON 1 MG
1 KIT INJECTION
Status: DISCONTINUED | OUTPATIENT
Start: 2023-05-14 | End: 2023-05-19 | Stop reason: HOSPADM

## 2023-05-14 RX ORDER — INSULIN ASPART 100 [IU]/ML
1-10 INJECTION, SOLUTION INTRAVENOUS; SUBCUTANEOUS
Status: DISCONTINUED | OUTPATIENT
Start: 2023-05-14 | End: 2023-05-19 | Stop reason: HOSPADM

## 2023-05-14 RX ORDER — SODIUM,POTASSIUM PHOSPHATES 280-250MG
2 POWDER IN PACKET (EA) ORAL ONCE
Status: COMPLETED | OUTPATIENT
Start: 2023-05-14 | End: 2023-05-14

## 2023-05-14 RX ORDER — METOPROLOL TARTRATE 25 MG/1
12.5 TABLET ORAL 2 TIMES DAILY
Status: DISCONTINUED | OUTPATIENT
Start: 2023-05-14 | End: 2023-05-15

## 2023-05-14 RX ORDER — POTASSIUM CHLORIDE 20 MEQ/1
20 TABLET, EXTENDED RELEASE ORAL ONCE
Status: COMPLETED | OUTPATIENT
Start: 2023-05-14 | End: 2023-05-14

## 2023-05-14 RX ORDER — INSULIN ASPART 100 [IU]/ML
5 INJECTION, SOLUTION INTRAVENOUS; SUBCUTANEOUS
Status: DISCONTINUED | OUTPATIENT
Start: 2023-05-14 | End: 2023-05-19 | Stop reason: HOSPADM

## 2023-05-14 RX ADMIN — PRAVASTATIN SODIUM 20 MG: 20 TABLET ORAL at 09:05

## 2023-05-14 RX ADMIN — INSULIN ASPART 5 UNITS: 100 INJECTION, SOLUTION INTRAVENOUS; SUBCUTANEOUS at 04:05

## 2023-05-14 RX ADMIN — SENNOSIDES AND DOCUSATE SODIUM 1 TABLET: 50; 8.6 TABLET ORAL at 09:05

## 2023-05-14 RX ADMIN — LEVOTHYROXINE SODIUM 75 MCG: 75 TABLET ORAL at 06:05

## 2023-05-14 RX ADMIN — FUROSEMIDE 60 MG: 10 INJECTION, SOLUTION INTRAMUSCULAR; INTRAVENOUS at 08:05

## 2023-05-14 RX ADMIN — DOCUSATE SODIUM 100 MG: 100 CAPSULE, LIQUID FILLED ORAL at 08:05

## 2023-05-14 RX ADMIN — INSULIN DETEMIR 16 UNITS: 100 INJECTION, SOLUTION SUBCUTANEOUS at 10:05

## 2023-05-14 RX ADMIN — HEPARIN SODIUM 7500 UNITS: 5000 INJECTION INTRAVENOUS; SUBCUTANEOUS at 03:05

## 2023-05-14 RX ADMIN — MUPIROCIN 1 G: 20 OINTMENT TOPICAL at 09:05

## 2023-05-14 RX ADMIN — BISACODYL 10 MG: 10 SUPPOSITORY RECTAL at 01:05

## 2023-05-14 RX ADMIN — HEPARIN SODIUM 7500 UNITS: 5000 INJECTION INTRAVENOUS; SUBCUTANEOUS at 06:05

## 2023-05-14 RX ADMIN — GABAPENTIN 300 MG: 300 CAPSULE ORAL at 03:05

## 2023-05-14 RX ADMIN — DOCUSATE SODIUM 100 MG: 100 CAPSULE, LIQUID FILLED ORAL at 09:05

## 2023-05-14 RX ADMIN — SODIUM PHOSPHATE, MONOBASIC, MONOHYDRATE AND SODIUM PHOSPHATE, DIBASIC, ANHYDROUS 15 MMOL: 276; 142 INJECTION, SOLUTION INTRAVENOUS at 06:05

## 2023-05-14 RX ADMIN — MUPIROCIN 1 G: 20 OINTMENT TOPICAL at 08:05

## 2023-05-14 RX ADMIN — INSULIN ASPART 5 UNITS: 100 INJECTION, SOLUTION INTRAVENOUS; SUBCUTANEOUS at 10:05

## 2023-05-14 RX ADMIN — ASPIRIN 325 MG: 325 TABLET ORAL at 09:05

## 2023-05-14 RX ADMIN — POTASSIUM CHLORIDE 20 MEQ: 200 INJECTION, SOLUTION INTRAVENOUS at 06:05

## 2023-05-14 RX ADMIN — METOPROLOL TARTRATE 12.5 MG: 25 TABLET, FILM COATED ORAL at 09:05

## 2023-05-14 RX ADMIN — FUROSEMIDE 60 MG: 10 INJECTION, SOLUTION INTRAMUSCULAR; INTRAVENOUS at 09:05

## 2023-05-14 RX ADMIN — POLYETHYLENE GLYCOL 3350 17 G: 17 POWDER, FOR SOLUTION ORAL at 09:05

## 2023-05-14 RX ADMIN — HEPARIN SODIUM 7500 UNITS: 5000 INJECTION INTRAVENOUS; SUBCUTANEOUS at 09:05

## 2023-05-14 RX ADMIN — POTASSIUM CHLORIDE 20 MEQ: 1500 TABLET, EXTENDED RELEASE ORAL at 09:05

## 2023-05-14 RX ADMIN — LIDOCAINE 1 PATCH: 50 PATCH TOPICAL at 09:05

## 2023-05-14 RX ADMIN — POTASSIUM & SODIUM PHOSPHATES POWDER PACK 280-160-250 MG 2 PACKET: 280-160-250 PACK at 09:05

## 2023-05-14 RX ADMIN — METOPROLOL TARTRATE 12.5 MG: 25 TABLET, FILM COATED ORAL at 08:05

## 2023-05-14 RX ADMIN — INSULIN ASPART 5 UNITS: 100 INJECTION, SOLUTION INTRAVENOUS; SUBCUTANEOUS at 11:05

## 2023-05-14 RX ADMIN — GABAPENTIN 300 MG: 300 CAPSULE ORAL at 08:05

## 2023-05-14 RX ADMIN — GABAPENTIN 300 MG: 300 CAPSULE ORAL at 09:05

## 2023-05-14 RX ADMIN — POTASSIUM CHLORIDE 20 MEQ: 200 INJECTION, SOLUTION INTRAVENOUS at 12:05

## 2023-05-14 NOTE — PLAN OF CARE
[facility] Care Plan    POC reviewed with Alina Narayan and family at 0300. Pt verbalized understanding. Questions and concerns addressed. No acute events overnight. Pt progressing toward goals. Will continue to monitor. See below and flowsheets for full assessment and VS info.           Is this a stroke patient? no    Neuro:  Winner Coma Scale  Best Eye Response: 4-->(E4) spontaneous  Best Motor Response: 6-->(M6) obeys commands  Best Verbal Response: 5-->(V5) oriented  Winner Coma Scale Score: 15  Assessment Qualifiers: no eye obstruction present  Pupil PERRLA: yes     24hr Temp:  [97.7 °F (36.5 °C)-98.8 °F (37.1 °C)]     CV:   Rhythm: normal sinus rhythm  BP goals:   SBP < 140  MAP > 65    Resp:      Vent Mode: Spont  Set Rate: 20 BPM  Oxygen Concentration (%): 28  Vt Set: 340 mL  PEEP/CPAP: 5 cmH20  Pressure Support: 5 cmH20    Plan: N/A    GI/:     Diet/Nutrition Received: other (see comments) (cardiac diet)  Last Bowel Movement: 05/09/23  Voiding Characteristics: external catheter    Intake/Output Summary (Last 24 hours) at 5/14/2023 0719  Last data filed at 5/14/2023 0645  Gross per 24 hour   Intake 704.94 ml   Output 2845 ml   Net -2140.06 ml          Labs/Accuchecks:  Recent Labs   Lab 05/14/23  0345   WBC 6.96   RBC 2.68*   HGB 7.8*   HCT 24.0*   PLT 76*      Recent Labs   Lab 05/14/23  0345      K 3.5   CO2 26      BUN 31*   CREATININE 1.3   ALKPHOS 103   ALT 18   AST 42*   BILITOT 0.6      Recent Labs   Lab 05/14/23  0345   INR 1.0   APTT 26.8    No results for input(s): CPK, CPKMB, TROPONINI, MB in the last 168 hours.    Electrolytes: Electrolytes replaced  Accuchecks: Q4H    Gtts:   insulin regular 1 units/mL infusion orderable (TRANSFER) 0.7 Units/hr (05/14/23 7779)       LDA/Wounds:  Lines/Drains/Airways       Central Venous Catheter Line  Duration                  Port A Cath Single Lumen -- days     Introducer with Double Lumen 05/10/23 1033 Internal Jugular Left 3 days    Percutaneous  Central Line Insertion/Assessment - Triple Lumen  05/10/23 1033 Internal Jugular Left 3 days              Drain  Duration             Female External Urinary Catheter 05/13/23 1200 <1 day              Line  Duration                  Pacer Wires 05/10/23 1631 3 days              Peripheral Intravenous Line  Duration                  Peripheral IV - Single Lumen 05/10/23 0737 20 G Left;Posterior Hand 3 days                  Wounds: No  Wound care consulted: No

## 2023-05-14 NOTE — PROGRESS NOTES
"[facility]  Endocrinology  Progress Note    Admit Date: 5/10/2023     Reason for Consult: Management of T2DM, Hyperglycemia     Surgical Procedure and Date: AVR 5/10/22    Diabetes diagnosis year: ~     Home Diabetes Medications:  metformin 500 mg BID and glimepiride 2 mg daily     How often checking glucose at home? One   BG readings on regimen: 's  Hypoglycemia on the regimen?  No  Missed doses on regimen?  No    Diabetes Complications include:     Hyperglycemia and Diabetic peripheral neuropathy     Complicating diabetes co morbidities:   HTN; Hypothryroidism; HLD      HPI:   Patient is a 78 y.o. female with a diagnosis of pulmonary embolism on chronic AC, s/p AVR (21 mm Trifecta) in , breast cancer (s/p mastectomy, radiation and chemo), and type 2 DM.  Patient is s/p AVR. Endocrinology consulted for BG/ DM management.     Lab Results   Component Value Date    HGBA1C 7.3 (H) 2023             Interval HPI:   Overnight events: No acute events overnight. Patient in room 28017/80769 A. Blood glucose stable. BG at goal on current insulin regimen (Transition Insulin Drip). Steroid use- None. 4 Days Post-Op  Renal function- Normal   Vasopressors-  None       Endocrine will continue to follow and manage insulin orders inpatient.         Diet diabetic [facility]; 2000 Calorie; Fluid - 1500mL     Eatin%  Nausea: No  Hypoglycemia and intervention: No  Fever: No  TPN and/or TF: No      BP (!) 150/65   Pulse 82   Temp 98.8 °F (37.1 °C) (Oral)   Resp 20   Ht 5' 2" (1.575 m)   Wt 114.6 kg (252 lb 10.4 oz)   SpO2 (!) 92%   Breastfeeding No   BMI 46.21 kg/m²     Labs Reviewed and Include    Recent Labs   Lab 23  0345   *   CALCIUM 8.6*   ALBUMIN 3.4*   PROT 5.7*      K 3.5   CO2 26      BUN 31*   CREATININE 1.3   ALKPHOS 103   ALT 18   AST 42*   BILITOT 0.6     Lab Results   Component Value Date    WBC 6.96 2023    HGB 7.8 (L) " 05/14/2023    HCT 24.0 (L) 05/14/2023    MCV 90 05/14/2023    PLT 76 (L) 05/14/2023     No results for input(s): TSH, FREET4 in the last 168 hours.  Lab Results   Component Value Date    HGBA1C 7.3 (H) 05/04/2023       Nutritional status:   Body mass index is 46.21 kg/m².  Lab Results   Component Value Date    ALBUMIN 3.4 (L) 05/14/2023    ALBUMIN 3.6 05/13/2023    ALBUMIN 4.0 05/12/2023     No results found for: PREALBUMIN    Estimated Creatinine Clearance: 42.7 mL/min (based on SCr of 1.3 mg/dL).    Accu-Checks  Recent Labs     05/12/23  1235 05/12/23  1647 05/12/23  1956 05/13/23  0038 05/13/23  0418 05/13/23  0907 05/13/23  1215 05/13/23  1729 05/13/23  2058 05/14/23  0124   POCTGLUCOSE 154* 144* 158* 139* 130* 122* 209* 211* 186* 155*       Current Medications and/or Treatments Impacting Glycemic Control  Immunotherapy:    Immunosuppressants       None          Steroids:   Hormones (From admission, onward)      None          Pressors:    Autonomic Drugs (From admission, onward)      Start     Stop Route Frequency Ordered    05/14/23 0930  metoprolol tartrate (LOPRESSOR) split tablet 12.5 mg         -- Oral 2 times daily 05/14/23 0819          Hyperglycemia/Diabetes Medications:   Antihyperglycemics (From admission, onward)      Start     Stop Route Frequency Ordered    05/14/23 1130  insulin aspart U-100 pen 5 Units         -- SubQ 3 times daily with meals 05/14/23 0858    05/14/23 1000  insulin detemir U-100 (Levemir) pen 16 Units         -- SubQ Daily 05/14/23 0858    05/14/23 0957  insulin aspart U-100 pen 1-10 Units         -- SubQ Before meals & nightly PRN 05/14/23 0858            ASSESSMENT and PLAN    Cardiac/Vascular  * Nonrheumatic aortic valve stenosis  Managed per primary team  Avoid hypoglycemia        S/P AVR  Managed per primary  Optimize BG control to improve wound healing      Hyperlipidemia, mixed  On statin per ada guidelines    Endocrine  Acquired hypothyroidism  Levothyroxine 75 mcg daily    Lab Results   Component Value Date    TSH 2.724 03/02/2023    FREET4 0.99 08/11/2020         Diabetes mellitus type 2 in obese  Endocrinology consulted for BG management.   BG goal 110-140 (CTS Protocol)     - D/C Transition Drip  - Levemir (Insulin Detemir) 16 units daily  - Novolog (Insulin Aspart) 5 units TIDWM and prn for BG excursions [facility] SSI (150/25)  - BG checks AC/HS  - Hypoglycemia protocol in place  - If blood glucose greater than 300, please ask patient not to eat food or drink anything other than water until correctional insulin has brought it back below 250    ** Please notify Endocrine for any change and/or advance in diet**  ** Please call Endocrine for any BG related issues **    Discharge Planning:   TBD. Please notify endocrinology prior to discharge.               Ankit Newberry, DNP, FNP  Endocrinology  [facility]

## 2023-05-14 NOTE — SUBJECTIVE & OBJECTIVE
Interval History/Significant Events: NAEON. HR remained in the 70s throughout the night. HDS. Good urine output. Step down orders in place.    Follow-up For: Procedure(s) (LRB):  Replacement-valve-aortic, redo sternotomy (N/A)    Post-Operative Day: 4 Days Post-Op    Objective:     Vital Signs (Most Recent):  Temp: 98.8 °F (37.1 °C) (05/14/23 0400)  Pulse: 77 (05/14/23 0445)  Resp: 11 (05/14/23 0445)  BP: (!) 143/60 (05/14/23 0400)  SpO2: (!) 94 % (05/14/23 0445) Vital Signs (24h Range):  Temp:  [97.7 °F (36.5 °C)-98.8 °F (37.1 °C)] 98.8 °F (37.1 °C)  Pulse:  [70-80] 77  Resp:  [11-36] 11  SpO2:  [90 %-100 %] 94 %  BP: (115-191)/(56-69) 143/60  Arterial Line BP: (109-133)/(45-55) 133/55     Weight: 114.6 kg (252 lb 10.4 oz)  Body mass index is 46.21 kg/m².      Intake/Output Summary (Last 24 hours) at 5/14/2023 0630  Last data filed at 5/14/2023 0448  Gross per 24 hour   Intake 707.79 ml   Output 2270 ml   Net -1562.21 ml          Physical Exam  Vitals reviewed.   Constitutional:       Appearance: Normal appearance.   Cardiovascular:      Rate and Rhythm: Normal rate.      Pulses: Normal pulses.   Pulmonary:      Effort: Pulmonary effort is normal.   Abdominal:      General: There is no distension.      Palpations: Abdomen is soft. There is no mass.      Tenderness: There is no abdominal tenderness. There is no guarding.   Skin:     General: Skin is warm and dry.   Neurological:      General: No focal deficit present.      Mental Status: She is alert and oriented to person, place, and time.            Lines/Drains/Airways       Central Venous Catheter Line  Duration                  Port A Cath Single Lumen -- days     Introducer with Double Lumen 05/10/23 1033 Internal Jugular Left 3 days    Percutaneous Central Line Insertion/Assessment - Triple Lumen  05/10/23 1033 Internal Jugular Left 3 days              Drain  Duration             Female External Urinary Catheter 05/13/23 1200 <1 day              Line   Duration                  Pacer Wires 05/10/23 1631 3 days              Peripheral Intravenous Line  Duration                  Peripheral IV - Single Lumen 05/10/23 0737 20 G Left;Posterior Hand 3 days                    Significant Labs:    CBC/Anemia Profile:  Recent Labs   Lab 05/13/23 0419 05/14/23 0345   WBC 8.63 6.96   HGB 7.5* 7.8*   HCT 23.1* 24.0*   PLT 53* 76*   MCV 90 90   RDW 14.8* 14.6*        Chemistries:  Recent Labs   Lab 05/13/23 0419 05/13/23 0858 05/13/23 2056 05/14/23 0345     --   --  139   K 4.0 3.8 3.8 3.5     --   --  104   CO2 22*  --   --  26   BUN 31*  --   --  31*   CREATININE 1.5*  --   --  1.3   CALCIUM 8.1*  --   --  8.6*   ALBUMIN 3.6  --   --  3.4*   PROT 5.4*  --   --  5.7*   BILITOT 0.5  --   --  0.6   ALKPHOS 64  --   --  103   ALT 6*  --   --  18   AST 22  --   --  42*   MG 2.3  --   --  2.0   PHOS 3.0  --   --  2.4*

## 2023-05-14 NOTE — ASSESSMENT & PLAN NOTE
Endocrinology consulted for BG management.   BG goal 110-140 (CTS Protocol)     - D/C Transition Drip  - Levemir (Insulin Detemir) 16 units daily  - Novolog (Insulin Aspart) 5 units TIDWM and prn for BG excursions [facility] SSI (150/25)  - BG checks AC/HS  - Hypoglycemia protocol in place  - If blood glucose greater than 300, please ask patient not to eat food or drink anything other than water until correctional insulin has brought it back below 250    ** Please notify Endocrine for any change and/or advance in diet**  ** Please call Endocrine for any BG related issues **    Discharge Planning:   TBD. Please notify endocrinology prior to discharge.

## 2023-05-14 NOTE — SUBJECTIVE & OBJECTIVE
"Interval HPI:   Overnight events: No acute events overnight. Patient in room 03569/13240 A. Blood glucose stable. BG at goal on current insulin regimen (Transition Insulin Drip). Steroid use- None. 4 Days Post-Op  Renal function- Normal   Vasopressors-  None       Endocrine will continue to follow and manage insulin orders inpatient.         Diet diabetic [facility]; 2000 Calorie; Fluid - 1500mL     Eatin%  Nausea: No  Hypoglycemia and intervention: No  Fever: No  TPN and/or TF: No      BP (!) 150/65   Pulse 82   Temp 98.8 °F (37.1 °C) (Oral)   Resp 20   Ht 5' 2" (1.575 m)   Wt 114.6 kg (252 lb 10.4 oz)   SpO2 (!) 92%   Breastfeeding No   BMI 46.21 kg/m²     Labs Reviewed and Include    Recent Labs   Lab 23  0345   *   CALCIUM 8.6*   ALBUMIN 3.4*   PROT 5.7*      K 3.5   CO2 26      BUN 31*   CREATININE 1.3   ALKPHOS 103   ALT 18   AST 42*   BILITOT 0.6     Lab Results   Component Value Date    WBC 6.96 2023    HGB 7.8 (L) 2023    HCT 24.0 (L) 2023    MCV 90 2023    PLT 76 (L) 2023     No results for input(s): TSH, FREET4 in the last 168 hours.  Lab Results   Component Value Date    HGBA1C 7.3 (H) 2023       Nutritional status:   Body mass index is 46.21 kg/m².  Lab Results   Component Value Date    ALBUMIN 3.4 (L) 2023    ALBUMIN 3.6 2023    ALBUMIN 4.0 2023     No results found for: PREALBUMIN    Estimated Creatinine Clearance: 42.7 mL/min (based on SCr of 1.3 mg/dL).    Accu-Checks  Recent Labs     23  1235 23  1647 23  1956 23  0038 23  0418 23  0907 23  1215 23  1729 23  2058 23  0124   POCTGLUCOSE 154* 144* 158* 139* 130* 122* 209* 211* 186* 155*       Current Medications and/or Treatments Impacting Glycemic Control  Immunotherapy:    Immunosuppressants       None          Steroids:   Hormones (From admission, onward)      None          Pressors:  "   Autonomic Drugs (From admission, onward)      Start     Stop Route Frequency Ordered    05/14/23 0930  metoprolol tartrate (LOPRESSOR) split tablet 12.5 mg         -- Oral 2 times daily 05/14/23 0819          Hyperglycemia/Diabetes Medications:   Antihyperglycemics (From admission, onward)      Start     Stop Route Frequency Ordered    05/14/23 1130  insulin aspart U-100 pen 5 Units         -- SubQ 3 times daily with meals 05/14/23 0858    05/14/23 1000  insulin detemir U-100 (Levemir) pen 16 Units         -- SubQ Daily 05/14/23 0858    05/14/23 0957  insulin aspart U-100 pen 1-10 Units         -- SubQ Before meals & nightly PRN 05/14/23 0858

## 2023-05-14 NOTE — ASSESSMENT & PLAN NOTE
S/p redo AVR with bioprosthetic valve with Dr. Webb on 5/10. Initial AVR with Tracey 2013.       Neuro/Psych:     - Sedation: none    - Pain:    - Scheduled Gabapentin 300 TID   - Norco 5/10 PRN   - dilaudid 0.5 PRN              Cardiac:     - S/P redo sternotomy with redo AVR with Dr. Webb on 5/10/2023    - BP Goal: MAP 60-80, SBP < 140     - Rhythm: NSR now without episodes of bradycardia for the last 48 hours    - Beta blocker: Will start when appropriate    - Statin: pravastatin (home med)    - Pacer wires still in place, potentially will remove today given 48 hours with out bradycardic event      Pulmonary:     - Goal SpO2 >92%, used CPAP overnight. On 1 L NC this morning    - Wean from nasal canula      Renal:    - Trend BUN/Cr     - Lasix 80 BID    - Good UOP - 2125 mL last 24 hrs    Recent Labs   Lab 05/12/23 0247 05/13/23 0419 05/14/23  0345   BUN 26* 31* 31*   CREATININE 1.4 1.5* 1.3         FEN / GI:     - Daily CMP, PRN K/Mag/Phos per protocol     - Replace electrolytes as needed    - Nutrition: diabetic diet with fluid restriction     - Bowel Regimen: Miralax, docusate      ID:     - Afebrile    - WBC stable    - Abx: Completed perioperative cefazolin 2g Q8H x 5 doses    Recent Labs   Lab 05/12/23  0247 05/13/23 0419 05/14/23  0345   WBC 10.97 8.63 6.96         Heme/Onc:     - Hgb 12.6 pre-operatively    - CBC daily    - ASA 325mg daily, initiated within 6hr of surgery     Recent Labs   Lab 05/12/23  0247 05/13/23 0419 05/14/23  0345   HGB 6.4* 7.5* 7.8*   PLT 49* 53* 76*   APTT 31.6 29.6 26.8   INR 1.1 1.1 1.0         Endocrine:     - CTS Goal -140    - HgbA1c: 7.3    - Endocrinology consulted for insulin management      PPx:   Feeding: diabetic diet   Analgesia/Sedation: gabapentin, norco, dilaudid PRN   Thromboembolic Prevention: SCDs/Heparin 7500 TID   HOB >30: Yes  Stress Ulcer: none indicated  Glucose Control: Yes, insulin management per Endocrinology      Lines/Drains/Airway:   Left brachial arterial line   LIJ CVC   Macdonald   Chest Tubes: none   Pacing Wires: Temporary ventricular pacing wires      Dispo/Code Status/Palliative:     - Stepdown orders in, awaiting bed.    - Full Code

## 2023-05-14 NOTE — PROGRESS NOTES
[facility] - [facility]ensive [facility]  Critical Care - Surgery  Progress Note    Patient Name: Alina Narayan  MRN: [id number]  Admission Date: 5/10/2023  Hospital Length of Stay: 4 days  Code Status: Full Code  Attending Provider: Venkat Webb MD  Primary Care Provider: Aarti Dunn MD   Principal Problem: Nonrheumatic aortic valve stenosis    Subjective:     Hospital/ICU Course:  Alina Narayan is a 78 y.o. female who presents for pre-op re-do AVR. She has a medical history significant for pulmonary embolism on chronic AC, s/p AVR (21 mm Trifecta) in 2003, breast cancer (s/p mastectomy, radiation and chemo) who presents for evaluation of a failing 21 mm Trifecta valve, true ID 19. She underwent AVR in December 2013 with Dr Webb with a 21 mmTrifecta. The patient presents to the SICU s/p redo sternotomy AVR  with Dr. Webb on 05/10/2023. When aorta clamp was removed and BIBIANA evaluation was done, patient noted to have mitral stenosis so decision made for MVR. CPB run ended without issues. On admission, they are intubated, sedated with propofol, and in stable condition. Inotropic and vasopressor requirements upon admission are 0.04 mcg/kg/min of epinephrine, 0.04 mcg/kg/min of norepinephrine to maintain blood pressure at a MAP 60-80 and SBP < 140. Central access includes a LIJ CVC, arterial access includes a left brachial arterial line. They also have 2 pleural and 2 mediastinal chest tubes with appropriate output.     Intraoperatively, they received 2L of crystalloid, 747 of cell saver, 1 PRBCs. Urine output intraoperatively was 500 cc. The pre-operative echocardiogram was notable for EF 65%, normal RV function. Post-operative echo was normal biventricular function.     Immediate post-operative plans include hemodynamic stabilization and weaning of cardiac and respiratory support. Plan to wean vasopressors and inotropes as tolerated, and wean ventilator support with goal of early extubation, and closely  monitor fluid status with strict Is/Os and continued fluid resuscitation as needed.       Interval History/Significant Events: NAEON. HR remained in the 70s throughout the night. HDS. Good urine output. Step down orders in place.    Follow-up For: Procedure(s) (LRB):  Replacement-valve-aortic, redo sternotomy (N/A)    Post-Operative Day: 4 Days Post-Op    Objective:     Vital Signs (Most Recent):  Temp: 98.8 °F (37.1 °C) (05/14/23 0400)  Pulse: 77 (05/14/23 0445)  Resp: 11 (05/14/23 0445)  BP: (!) 143/60 (05/14/23 0400)  SpO2: (!) 94 % (05/14/23 0445) Vital Signs (24h Range):  Temp:  [97.7 °F (36.5 °C)-98.8 °F (37.1 °C)] 98.8 °F (37.1 °C)  Pulse:  [70-80] 77  Resp:  [11-36] 11  SpO2:  [90 %-100 %] 94 %  BP: (115-191)/(56-69) 143/60  Arterial Line BP: (109-133)/(45-55) 133/55     Weight: 114.6 kg (252 lb 10.4 oz)  Body mass index is 46.21 kg/m².      Intake/Output Summary (Last 24 hours) at 5/14/2023 0630  Last data filed at 5/14/2023 0448  Gross per 24 hour   Intake 707.79 ml   Output 2270 ml   Net -1562.21 ml          Physical Exam  Vitals reviewed.   Constitutional:       Appearance: Normal appearance.   Cardiovascular:      Rate and Rhythm: Normal rate.      Pulses: Normal pulses.   Pulmonary:      Effort: Pulmonary effort is normal.   Abdominal:      General: There is no distension.      Palpations: Abdomen is soft. There is no mass.      Tenderness: There is no abdominal tenderness. There is no guarding.   Skin:     General: Skin is warm and dry.   Neurological:      General: No focal deficit present.      Mental Status: She is alert and oriented to person, place, and time.            Lines/Drains/Airways       Central Venous Catheter Line  Duration                  Port A Cath Single Lumen -- days     Introducer with Double Lumen 05/10/23 1033 Internal Jugular Left 3 days    Percutaneous Central Line Insertion/Assessment - Triple Lumen  05/10/23 1033 Internal Jugular Left 3 days              Drain  Duration              Female External Urinary Catheter 05/13/23 1200 <1 day              Line  Duration                  Pacer Wires 05/10/23 1631 3 days              Peripheral Intravenous Line  Duration                  Peripheral IV - Single Lumen 05/10/23 0737 20 G Left;Posterior Hand 3 days                    Significant Labs:    CBC/Anemia Profile:  Recent Labs   Lab 05/13/23 0419 05/14/23  0345   WBC 8.63 6.96   HGB 7.5* 7.8*   HCT 23.1* 24.0*   PLT 53* 76*   MCV 90 90   RDW 14.8* 14.6*        Chemistries:  Recent Labs   Lab 05/13/23 0419 05/13/23  0858 05/13/23 2056 05/14/23  0345     --   --  139   K 4.0 3.8 3.8 3.5     --   --  104   CO2 22*  --   --  26   BUN 31*  --   --  31*   CREATININE 1.5*  --   --  1.3   CALCIUM 8.1*  --   --  8.6*   ALBUMIN 3.6  --   --  3.4*   PROT 5.4*  --   --  5.7*   BILITOT 0.5  --   --  0.6   ALKPHOS 64  --   --  103   ALT 6*  --   --  18   AST 22  --   --  42*   MG 2.3  --   --  2.0   PHOS 3.0  --   --  2.4*         Assessment/Plan:     Cardiac/Vascular  * Nonrheumatic aortic valve stenosis  S/p redo AVR with bioprosthetic valve with Dr. Webb on 5/10. Initial AVR with Tracey 2013.       Neuro/Psych:     - Sedation: none    - Pain:    - Scheduled Gabapentin 300 TID   - Norco 5/10 PRN   - dilaudid 0.5 PRN              Cardiac:     - S/P redo sternotomy with redo AVR with Dr. Webb on 5/10/2023    - BP Goal: MAP 60-80, SBP < 140     - Rhythm: NSR now without episodes of bradycardia for the last 48 hours    - Beta blocker: Will start when appropriate    - Statin: pravastatin (home med)    - Pacer wires still in place, potentially will remove today given 48 hours with out bradycardic event      Pulmonary:     - Goal SpO2 >92%, used CPAP overnight. On 1 L NC this morning    - Wean from nasal canula      Renal:    - Trend BUN/Cr     - Lasix 80 BID    - Good UOP - 2125 mL last 24 hrs    Recent Labs   Lab 05/12/23  0247 05/13/23  0419 05/14/23  0345   BUN 26* 31* 31*    CREATININE 1.4 1.5* 1.3         FEN / GI:     - Daily CMP, PRN K/Mag/Phos per protocol     - Replace electrolytes as needed    - Nutrition: diabetic diet with fluid restriction     - Bowel Regimen: Miralax, docusate      ID:     - Afebrile    - WBC stable    - Abx: Completed perioperative cefazolin 2g Q8H x 5 doses    Recent Labs   Lab 05/12/23  0247 05/13/23  0419 05/14/23  0345   WBC 10.97 8.63 6.96         Heme/Onc:     - Hgb 12.6 pre-operatively    - CBC daily    - ASA 325mg daily, initiated within 6hr of surgery     Recent Labs   Lab 05/12/23  0247 05/13/23  0419 05/14/23  0345   HGB 6.4* 7.5* 7.8*   PLT 49* 53* 76*   APTT 31.6 29.6 26.8   INR 1.1 1.1 1.0         Endocrine:     - CTS Goal -140    - HgbA1c: 7.3    - Endocrinology consulted for insulin management      PPx:   Feeding: diabetic diet   Analgesia/Sedation: gabapentin, norco, dilaudid PRN   Thromboembolic Prevention: SCDs/Heparin 7500 TID   HOB >30: Yes  Stress Ulcer: none indicated  Glucose Control: Yes, insulin management per Endocrinology     Lines/Drains/Airway:   Left brachial arterial line   LIJ CVC   Macdonald   Chest Tubes: none   Pacing Wires: Temporary ventricular pacing wires      Dispo/Code Status/Palliative:     - Stepdown orders in, awaiting bed.    - Full Code      Critical secondary to Patient has a condition that poses threat to life and bodily function: s/p AVR     Critical care was time spent personally by me on the following activities: development of treatment plan with patient or surrogate and bedside caregivers, discussions with consultants, evaluation of patient's response to treatment, examination of patient, ordering and performing treatments and interventions, ordering and review of laboratory studies, ordering and review of radiographic studies, pulse oximetry, re-evaluation of patient's condition.  This critical care time did not overlap with that of any other provider or involve time for any procedures.      Bradley Morris MD  Critical Care - Surgery  [facility]

## 2023-05-15 LAB
ALBUMIN SERPL BCP-MCNC: 3.1 G/DL (ref 3.5–5.2)
ALP SERPL-CCNC: 110 U/L (ref 55–135)
ALT SERPL W/O P-5'-P-CCNC: 36 U/L (ref 10–44)
ANION GAP SERPL CALC-SCNC: 11 MMOL/L (ref 8–16)
APTT PPP: 26.4 SEC (ref 21–32)
AST SERPL-CCNC: 62 U/L (ref 10–40)
BASOPHILS # BLD AUTO: 0.02 K/UL (ref 0–0.2)
BASOPHILS NFR BLD: 0.3 % (ref 0–1.9)
BILIRUB SERPL-MCNC: 0.6 MG/DL (ref 0.1–1)
BUN SERPL-MCNC: 32 MG/DL (ref 8–23)
CALCIUM SERPL-MCNC: 8.7 MG/DL (ref 8.7–10.5)
CHLORIDE SERPL-SCNC: 104 MMOL/L (ref 95–110)
CO2 SERPL-SCNC: 27 MMOL/L (ref 23–29)
CREAT SERPL-MCNC: 1.3 MG/DL (ref 0.5–1.4)
DIFFERENTIAL METHOD: ABNORMAL
EOSINOPHIL # BLD AUTO: 0.1 K/UL (ref 0–0.5)
EOSINOPHIL NFR BLD: 1.1 % (ref 0–8)
ERYTHROCYTE [DISTWIDTH] IN BLOOD BY AUTOMATED COUNT: 14.6 % (ref 11.5–14.5)
EST. GFR  (NO RACE VARIABLE): 42.1 ML/MIN/1.73 M^2
GLUCOSE SERPL-MCNC: 129 MG/DL (ref 70–110)
HCT VFR BLD AUTO: 24.3 % (ref 37–48.5)
HGB BLD-MCNC: 7.4 G/DL (ref 12–16)
IMM GRANULOCYTES # BLD AUTO: 0.08 K/UL (ref 0–0.04)
IMM GRANULOCYTES NFR BLD AUTO: 1.2 % (ref 0–0.5)
INR PPP: 1 (ref 0.8–1.2)
LYMPHOCYTES # BLD AUTO: 1.4 K/UL (ref 1–4.8)
LYMPHOCYTES NFR BLD: 22 % (ref 18–48)
MAGNESIUM SERPL-MCNC: 2 MG/DL (ref 1.6–2.6)
MCH RBC QN AUTO: 28.9 PG (ref 27–31)
MCHC RBC AUTO-ENTMCNC: 30.5 G/DL (ref 32–36)
MCV RBC AUTO: 95 FL (ref 82–98)
MONOCYTES # BLD AUTO: 0.8 K/UL (ref 0.3–1)
MONOCYTES NFR BLD: 12.1 % (ref 4–15)
NEUTROPHILS # BLD AUTO: 4.1 K/UL (ref 1.8–7.7)
NEUTROPHILS NFR BLD: 63.3 % (ref 38–73)
NRBC BLD-RTO: 1 /100 WBC
PHOSPHATE SERPL-MCNC: 3.2 MG/DL (ref 2.7–4.5)
PLATELET # BLD AUTO: 87 K/UL (ref 150–450)
PMV BLD AUTO: 11.7 FL (ref 9.2–12.9)
POCT GLUCOSE: 133 MG/DL (ref 70–110)
POCT GLUCOSE: 136 MG/DL (ref 70–110)
POCT GLUCOSE: 146 MG/DL (ref 70–110)
POCT GLUCOSE: 183 MG/DL (ref 70–110)
POCT GLUCOSE: 192 MG/DL (ref 70–110)
POTASSIUM SERPL-SCNC: 4.1 MMOL/L (ref 3.5–5.1)
POTASSIUM SERPL-SCNC: 4.1 MMOL/L (ref 3.5–5.1)
PROT SERPL-MCNC: 5.4 G/DL (ref 6–8.4)
PROTHROMBIN TIME: 10.9 SEC (ref 9–12.5)
RBC # BLD AUTO: 2.56 M/UL (ref 4–5.4)
SODIUM SERPL-SCNC: 142 MMOL/L (ref 136–145)
WBC # BLD AUTO: 6.54 K/UL (ref 3.9–12.7)

## 2023-05-15 PROCEDURE — 85610 PROTHROMBIN TIME: CPT | Performed by: PHYSICIAN ASSISTANT

## 2023-05-15 PROCEDURE — 85025 COMPLETE CBC W/AUTO DIFF WBC: CPT | Performed by: PHYSICIAN ASSISTANT

## 2023-05-15 PROCEDURE — 25000003 PHARM REV CODE 250: Performed by: PHYSICIAN ASSISTANT

## 2023-05-15 PROCEDURE — 84132 ASSAY OF SERUM POTASSIUM: CPT | Performed by: THORACIC SURGERY (CARDIOTHORACIC VASCULAR SURGERY)

## 2023-05-15 PROCEDURE — 94660 CPAP INITIATION&MGMT: CPT

## 2023-05-15 PROCEDURE — 85730 THROMBOPLASTIN TIME PARTIAL: CPT | Performed by: PHYSICIAN ASSISTANT

## 2023-05-15 PROCEDURE — 84100 ASSAY OF PHOSPHORUS: CPT | Performed by: PHYSICIAN ASSISTANT

## 2023-05-15 PROCEDURE — 99233 PR SUBSEQUENT HOSPITAL CARE,LEVL III: ICD-10-PCS | Mod: GC,,, | Performed by: INTERNAL MEDICINE

## 2023-05-15 PROCEDURE — 63600175 PHARM REV CODE 636 W HCPCS: Performed by: STUDENT IN AN ORGANIZED HEALTH CARE EDUCATION/TRAINING PROGRAM

## 2023-05-15 PROCEDURE — 99900035 HC TECH TIME PER 15 MIN (STAT)

## 2023-05-15 PROCEDURE — 20600001 HC STEP DOWN PRIVATE ROOM

## 2023-05-15 PROCEDURE — 25000003 PHARM REV CODE 250

## 2023-05-15 PROCEDURE — 97535 SELF CARE MNGMENT TRAINING: CPT

## 2023-05-15 PROCEDURE — 97116 GAIT TRAINING THERAPY: CPT

## 2023-05-15 PROCEDURE — 25000003 PHARM REV CODE 250: Performed by: THORACIC SURGERY (CARDIOTHORACIC VASCULAR SURGERY)

## 2023-05-15 PROCEDURE — 99233 SBSQ HOSP IP/OBS HIGH 50: CPT | Mod: GC,,, | Performed by: INTERNAL MEDICINE

## 2023-05-15 PROCEDURE — 63600175 PHARM REV CODE 636 W HCPCS: Performed by: NURSE PRACTITIONER

## 2023-05-15 PROCEDURE — 63600175 PHARM REV CODE 636 W HCPCS

## 2023-05-15 PROCEDURE — 94761 N-INVAS EAR/PLS OXIMETRY MLT: CPT

## 2023-05-15 PROCEDURE — 83735 ASSAY OF MAGNESIUM: CPT | Performed by: PHYSICIAN ASSISTANT

## 2023-05-15 PROCEDURE — 80053 COMPREHEN METABOLIC PANEL: CPT | Performed by: STUDENT IN AN ORGANIZED HEALTH CARE EDUCATION/TRAINING PROGRAM

## 2023-05-15 PROCEDURE — 92610 EVALUATE SWALLOWING FUNCTION: CPT

## 2023-05-15 PROCEDURE — 25000003 PHARM REV CODE 250: Performed by: STUDENT IN AN ORGANIZED HEALTH CARE EDUCATION/TRAINING PROGRAM

## 2023-05-15 PROCEDURE — 27000221 HC OXYGEN, UP TO 24 HOURS

## 2023-05-15 RX ORDER — METOPROLOL TARTRATE 25 MG/1
12.5 TABLET ORAL ONCE
Status: COMPLETED | OUTPATIENT
Start: 2023-05-15 | End: 2023-05-15

## 2023-05-15 RX ORDER — METOPROLOL TARTRATE 25 MG/1
25 TABLET, FILM COATED ORAL 2 TIMES DAILY
Status: DISCONTINUED | OUTPATIENT
Start: 2023-05-15 | End: 2023-05-19 | Stop reason: HOSPADM

## 2023-05-15 RX ADMIN — MUPIROCIN 1 G: 20 OINTMENT TOPICAL at 08:05

## 2023-05-15 RX ADMIN — HYDROCODONE BITARTRATE AND ACETAMINOPHEN 1 TABLET: 5; 325 TABLET ORAL at 01:05

## 2023-05-15 RX ADMIN — DOCUSATE SODIUM 100 MG: 100 CAPSULE, LIQUID FILLED ORAL at 08:05

## 2023-05-15 RX ADMIN — HEPARIN SODIUM 7500 UNITS: 5000 INJECTION INTRAVENOUS; SUBCUTANEOUS at 05:05

## 2023-05-15 RX ADMIN — INSULIN ASPART 5 UNITS: 100 INJECTION, SOLUTION INTRAVENOUS; SUBCUTANEOUS at 04:05

## 2023-05-15 RX ADMIN — PRAVASTATIN SODIUM 20 MG: 20 TABLET ORAL at 08:05

## 2023-05-15 RX ADMIN — METOPROLOL TARTRATE 12.5 MG: 25 TABLET, FILM COATED ORAL at 11:05

## 2023-05-15 RX ADMIN — SENNOSIDES AND DOCUSATE SODIUM 1 TABLET: 50; 8.6 TABLET ORAL at 08:05

## 2023-05-15 RX ADMIN — ASPIRIN 325 MG: 325 TABLET ORAL at 08:05

## 2023-05-15 RX ADMIN — FUROSEMIDE 60 MG: 10 INJECTION, SOLUTION INTRAMUSCULAR; INTRAVENOUS at 08:05

## 2023-05-15 RX ADMIN — GABAPENTIN 300 MG: 300 CAPSULE ORAL at 08:05

## 2023-05-15 RX ADMIN — GABAPENTIN 300 MG: 300 CAPSULE ORAL at 04:05

## 2023-05-15 RX ADMIN — INSULIN ASPART 5 UNITS: 100 INJECTION, SOLUTION INTRAVENOUS; SUBCUTANEOUS at 08:05

## 2023-05-15 RX ADMIN — INSULIN ASPART 5 UNITS: 100 INJECTION, SOLUTION INTRAVENOUS; SUBCUTANEOUS at 12:05

## 2023-05-15 RX ADMIN — INSULIN ASPART 1 UNITS: 100 INJECTION, SOLUTION INTRAVENOUS; SUBCUTANEOUS at 10:05

## 2023-05-15 RX ADMIN — HEPARIN SODIUM 7500 UNITS: 5000 INJECTION INTRAVENOUS; SUBCUTANEOUS at 02:05

## 2023-05-15 RX ADMIN — LIDOCAINE 1 PATCH: 50 PATCH TOPICAL at 10:05

## 2023-05-15 RX ADMIN — FUROSEMIDE 60 MG: 40 TABLET ORAL at 06:05

## 2023-05-15 RX ADMIN — LEVOTHYROXINE SODIUM 75 MCG: 75 TABLET ORAL at 05:05

## 2023-05-15 RX ADMIN — POLYETHYLENE GLYCOL 3350 17 G: 17 POWDER, FOR SOLUTION ORAL at 08:05

## 2023-05-15 RX ADMIN — METOPROLOL TARTRATE 25 MG: 25 TABLET, FILM COATED ORAL at 08:05

## 2023-05-15 RX ADMIN — APIXABAN 2.5 MG: 2.5 TABLET, FILM COATED ORAL at 08:05

## 2023-05-15 RX ADMIN — INSULIN DETEMIR 16 UNITS: 100 INJECTION, SOLUTION SUBCUTANEOUS at 08:05

## 2023-05-15 RX ADMIN — METOPROLOL TARTRATE 12.5 MG: 25 TABLET, FILM COATED ORAL at 08:05

## 2023-05-15 NOTE — CARE UPDATE
Care Update:     No acute events overnight. Patient on the SICU in room 74418/66010 A. Blood glucose stable. BG at goal on current insulin regimen (SSI, prandial, and basal insulin ). Steroid use- None. 5 Days Post-Op  Renal function- Normal   Vasopressors-  None       Diet diabetic [facility]; 2000 Calorie; Fluid - 1500mL     POCT Glucose   Date Value Ref Range Status   05/15/2023 146 (H) 70 - 110 mg/dL Final   05/15/2023 136 (H) 70 - 110 mg/dL Final   05/14/2023 165 (H) 70 - 110 mg/dL Final   05/14/2023 203 (H) 70 - 110 mg/dL Final   05/14/2023 186 (H) 70 - 110 mg/dL Final   05/14/2023 155 (H) 70 - 110 mg/dL Final   05/13/2023 186 (H) 70 - 110 mg/dL Final   05/13/2023 211 (H) 70 - 110 mg/dL Final   05/13/2023 209 (H) 70 - 110 mg/dL Final   05/13/2023 122 (H) 70 - 110 mg/dL Final   05/13/2023 130 (H) 70 - 110 mg/dL Final   05/13/2023 139 (H) 70 - 110 mg/dL Final   05/12/2023 158 (H) 70 - 110 mg/dL Final   05/12/2023 144 (H) 70 - 110 mg/dL Final   05/12/2023 154 (H) 70 - 110 mg/dL Final     Lab Results   Component Value Date    HGBA1C 7.3 (H) 05/04/2023       Endocrinology consulted for BG management.   BG goal 140-180    Diabetes Medications               glimepiride (AMARYL) 2 MG tablet TAKE 1 TABLET BEFORE BREAKFAST.    metFORMIN (GLUCOPHAGE) 500 MG tablet TAKE 1 TABLET TWICE DAILY WITH MEALS          Hospital Medications    BG checks AC/HS           dextrose 40 % gel 15,000 mg 15,000 mg, Oral, As needed (PRN)    dextrose 40 % gel 30,000 mg 30,000 mg, Oral, As needed (PRN)    glucagon (human recombinant) injection 1 mg 1 mg, Intramuscular, As needed (PRN), Turn patient on their side, give IM, and NOTIFY MD IMMEDIATELY.<BR><BR>Feed the patient as soon as patient awakens and is able to swallow.    insulin aspart U-100 pen 1-10 Units 1-10 Units, Subcutaneous, Before meals &amp; nightly PRN, **MODERATE CORRECTION DOSE**<BR>Blood Glucose<BR>mg/dL                  Pre-meal                2200<BR>151-200                 2 units                    1 unit<BR>201-250                4 units                    2 units  <BR>251-300                6 units                    3 units  <BR>301-350                8 units                    4 units <BR>&gt;350                     10 units                  5 units<BR>Administer subcutaneously if needed at times designated by monitoring schedule. <BR>DO NOT HOLD correction dose insulin for patients who are  NPO.<BR>&quot;HIGH ALERT MEDICATION&quot; - Administer with meals or TF/TPN.    insulin aspart U-100 pen 5 Units 5 Units, Subcutaneous, 3 times daily with meals, Administer subcutaneously with meals. HOLD prandial (mealtime) insulin if patient is NPO, unable to eat, or if Blood Glucose less than 70 mg/dL.<BR><BR>If patient ate prior to BG check, administer scheduled Novolog only (do not cover with correction dose at this time).<BR>&quot;HIGH ALERT MEDICATION&quot; - Administer with meals or TF/TPN.    insulin detemir U-100 (Levemir) pen 16 Units 16 Units, Subcutaneous, Daily, DO NOT HOLD basal insulin when patient is NPO.<BR>&quot;HIGH ALERT MEDICATION&quot;              ** Please notify Endocrine for any change and/or advance in diet**  ** Please call Endocrine for any BG related issues **    Discharge Planning:   TBD. Please notify endocrinology prior to discharge.      Ankit Newberry DNP, FNP-C  Department of Endocrinology  Inpatient Glycemic Management

## 2023-05-15 NOTE — PT/OT/SLP EVAL
Speech Language Pathology Evaluation  Bedside Swallow/Discharge Summary     Patient Name:  Alina Narayan   MRN:  [id number]  Admitting Diagnosis: Nonrheumatic aortic valve stenosis    Recommendations:                 General Recommendations:  ENT evaluation and follow up as an out patient should vocal quality not fully resume in approx 2-4 weeks   Diet recommendations:  Regular, Thin   Aspiration Precautions: Standard aspiration precautions   General Precautions: Standard,    Communication strategies:  none    Assessment:     Alina Narayan is a 78 y.o. female with an SLP diagnosis of Dysphonia.  Pt may benefit from ongoing monitoring from ENT and speech services from an outpatient stand point. Pt otherwise able to functionally communicate wants and needs as well as tolerate regular unrestricted diet without concern.        History:     Past Medical History:   Diagnosis Date    Allergy     seasonal    Anxiety     Arthritis     BRCA1 negative     Breast cancer 10/2012    left breast invasive ductal carcinoma    Colon polyp     Depression     Diabetes mellitus     Type 2    Diabetes mellitus, type 2     Diverticular disease     Diverticulitis 2009    Genetic testing 05/2017    negative Integrated BRACAnalysis    Hyperlipidemia     Hypertension     Morbid obesity     MITCHELL (obstructive sleep apnea)     Pulmonary embolism     Stenosis     Stenosis and insufficiency of lacrimal passages     Thyroid disease        Past Surgical History:   Procedure Laterality Date    AORTIC VALVE REPLACEMENT N/A 5/10/2023    Procedure: Replacement-valve-aortic, redo sternotomy;  Surgeon: Venkat Webb MD;  Location: [facility] OR [address] [place] [address];  Service: Cardiothoracic;  Laterality: N/A;  Redo sternotomy    BREAST BIOPSY Left 10/01/2012    left breast- invasive ductal carcinoma    BREAST BIOPSY Left 12/2017    BREAST CYST ASPIRATION      BREAST LUMPECTOMY Left 2012    BREAST MASS EXCISION      CARDIAC VALVE REPLACEMENT  12/2013    CARDIAC VALVE SURGERY   2013    CARPAL TUNNEL RELEASE      Left    COLONOSCOPY N/A 09/29/2017    Procedure: COLONOSCOPY;  Surgeon: Phani Worley MD;  Location: [facility] ENDO (4TH FLR);  Service: Endoscopy;  Laterality: N/A;    COLONOSCOPY N/A 1/5/2023    Procedure: COLONOSCOPY;  Surgeon: Eladia Martino MD;  Location: [facility] ENDO (4TH FLR);  Service: Endoscopy;  Laterality: N/A;  instr via portal  ok to barbara Eliquis-see encounter 12/9-MS  1/4 pateint cannot come earlier-rt    CORONARY ANGIOGRAPHY Left 4/13/2023    Procedure: ANGIOGRAM, CORONARY ARTERY;  Surgeon: Eze Sales MD;  Location: [facility] CATH LAB;  Service: Cardiology;  Laterality: Left;  low bleeding risk 2.9%    DILATION AND CURETTAGE OF UTERUS  1999    Endometrial polyps    ENDOMETRIAL ABLATION  1999    Enodmetrial polyps    EYE SURGERY      INSERTION OF TUNNELED CENTRAL VENOUS CATHETER (CVC) WITH SUBCUTANEOUS PORT Right 02/01/2019    Procedure: [id number];  Surgeon: Gaston Flores MD;  Location: [facility] OR [address] [place] [address];  Service: General;  Laterality: Right;    left lumpectomy  2012    MASTECTOMY      MEDIPORT REMOVAL Right 08/26/2019    Procedure: REMOVAL, CATHETER, CENTRAL VENOUS, TUNNELED, WITH PORT;  Surgeon: Gaston Flores MD;  Location: [facility] OR [address] [place] [address];  Service: General;  Laterality: Right;    MODIFIED RADICAL MASTECTOMY W/ AXILLARY LYMPH NODE DISSECTION Right 08/26/2019    Procedure: MASTECTOMY, MODIFIED RADICAL;  Surgeon: Gaston Flores MD;  Location: [facility] OR [address] [place] [address];  Service: General;  Laterality: Right;    SHOULDER SURGERY      SKIN BIOPSY       Social History: Patient lives with spouse   Prior Intubation HX:  5/10-5/11  Modified Barium Swallow: none prior   Prior diet: regular solids and thin liquids       Subjective     Pt awake and alert upright in bedside chair upon arrival   Spouse at the bedside   RN in agreement with SLP seeing at this time     Pain/Comfort:  Pain Rating 1: 0/10  Pain Rating Post-Intervention 1: 0/10    Respiratory  Status: Room air    Objective:     Oral Musculature Evaluation  Oral Musculature: WFL  Volitional Cough: strong  Volitional Swallow: prompt  Voice Prior to PO Intake: mildly  hoarse: able to achieve strong clear phonation with cueing and max effort    Bedside Swallow Eval:   Consistencies Assessed:  Thin liquids    Solids        Oral Phase:   WFL    Pharyngeal Phase:   no overt clinical signs/symptoms of aspiration  no overt clinical signs/symptoms of pharyngeal dysphagia    Compensatory Strategies  None    Treatment: Pt complaining of hoarseness since surgical intervention. Pt stimulable for true strong phonation with SLP cueing and model to access diaphragmatic breathing to assist in phonatory support. Pt with guarded posture and complaining of chest/ab pain s/p cardiac surgery. SLP offered education rE: laryngeal anatomy and physiology. SLP discussed continuing to monitor vocal quality and following up as needed as an out patient. Voicing likely improve as physical strength improves given ability to achieve strong voicing today. Pt to continue regular diet without concern or restrictions. No further skilled speech therapy services warranted at this time in the acute setting.       Plan:     SLP Follow-Up:  No       Discharge recommendations:  rehabilitation facility   Barriers to Discharge:  None    Time Tracking:     SLP Treatment Date:   05/15/23  Speech Start Time:  1002  Speech Stop Time:  1016     Speech Total Time (min):  14 min    Billable Minutes: Eval 6  and Self Care/Home Management Training 8    05/15/2023

## 2023-05-15 NOTE — PT/OT/SLP PROGRESS
Occupational Therapy   Co-Treatment    Name: Alina Narayan  MRN: [id number]  Admitting Diagnosis:  Nonrheumatic aortic valve stenosis  5 Days Post-Op    Recommendations:     Discharge Recommendations: rehabilitation facility  Discharge Equipment Recommendations:   (TBD at next level of care)  Barriers to discharge:  None    Assessment:     Alina Narayan is a 78 y.o. female with a medical diagnosis of Nonrheumatic aortic valve stenosis s/p AVR 5/11  She presents with significant progress towards goals as evidenced by ability to mobilize to bathroom, and complete grooming tasks in standing. Pt further mobilized around room with CGA. Performance deficits affecting function are impaired self care skills, impaired balance, decreased upper extremity function, weakness, impaired functional mobility, impaired endurance, gait instability, impaired cardiopulmonary response to activity, pain. Pt benefits from co-treatment with PT to accommodate pt's activity tolerance and progression with therapy.    Rehab Prognosis:  Good; patient would benefit from acute skilled OT services to address these deficits and reach maximum level of function.       Plan:     Patient to be seen 5 x/week to address the above listed problems via self-care/home management, therapeutic activities, therapeutic exercises  Plan of Care Expires: 06/11/23  Plan of Care Reviewed with: patient, spouse    Subjective     Chief Complaint: Hand weakness/swelling  Patient/Family Comments/goals: to go to rehab to get stronger  Pain/Comfort:  Pain Rating 1: 7/10  Location - Side 1: Bilateral  Location - Orientation 1: generalized  Location 1: chest  Pain Addressed 1: Reposition, Distraction, Nurse notified  Pain Rating Post-Intervention 1: 7/10    Objective:     Communicated with: RN prior to session.  Patient found up in chair with blood pressure cuff, pulse ox (continuous), telemetry, peripheral IV, PureWick upon OT entry to room.    General Precautions: Standard, fall,  sternal    Orthopedic Precautions:   Braces:    Respiratory Status: Room air     Occupational Performance:     Bed Mobility:    NT     Functional Mobility/Transfers:  Patient completed Sit <> Stand Transfer with contact guard assistance  with  no assistive device   Functional Mobility: CGA to/from bathroom and around room with HHA    Activities of Daily Living:  Feeding:  Set-up A 2* impaired fine motor from swelling  Grooming: contact guard assistance for tasks for opening toothpaste and holding comb  Upper Body Dressing: moderate assistance       AMPAC 6 Click ADL: 14    Treatment & Education:  Pt ed on OT POC  Pt re-ed on sternal precautions  Pt ed on ROM ex's 3x daily for increased overall strength and endurance  Pt re-ed on fist pumps above heart for edema management. Pt provided with squeeze ball    Patient left up in chair with all lines intact, call button in reach, RN notified, and spouse present    GOALS:   Multidisciplinary Problems       Occupational Therapy Goals          Problem: Occupational Therapy    Goal Priority Disciplines Outcome Interventions   Occupational Therapy Goal     OT, PT/OT Ongoing, Progressing    Description: Goals to be met by: 5/26/23     Patient will increase functional independence with ADLs by performing:    Feeding with Modified [place].  UE Dressing with Minimal A  LE Dressing with Moderate Assistance.  Grooming while standing at sink with Contact Guard Assistance.  Toileting from bedside commode with Moderate Assistance for hygiene and clothing management.   Toilet transfer to bedside commode with Minimal Assistance.                         Time Tracking:     OT Date of Treatment: 05/15/23  OT Start Time: 1100  OT Stop Time: 1113  OT Total Time (min): 13 min    Billable Minutes:Self Care/Home Management 13               5/15/2023

## 2023-05-15 NOTE — PROGRESS NOTES
[facility] - Surgical Intensive Care  Critical Care - Surgery  Progress Note    Patient Name: Alina Narayan  MRN: [id number]  Admission Date: 5/10/2023  Hospital Length of Stay: 5 days  Code Status: Full Code  Attending Provider: Venkat Webb MD  Primary Care Provider: Aarti Dunn MD   Principal Problem: Nonrheumatic aortic valve stenosis    Subjective:     Hospital/ICU Course:  Alina Narayan is a 78 y.o. female who presents for pre-op re-do AVR. She has a medical history significant for pulmonary embolism on chronic AC, s/p AVR (21 mm Trifecta) in 2003, breast cancer (s/p mastectomy, radiation and chemo) who presents for evaluation of a failing 21 mm Trifecta valve, true ID 19. She underwent AVR in December 2013 with Dr Webb with a 21 mmTrifecta. The patient presents to the SICU s/p redo sternotomy AVR  with Dr. Webb on 05/10/2023. When aorta clamp was removed and BIBIANA evaluation was done, patient noted to have mitral stenosis so decision made for MVR. CPB run ended without issues. On admission, they are intubated, sedated with propofol, and in stable condition. Inotropic and vasopressor requirements upon admission are 0.04 mcg/kg/min of epinephrine, 0.04 mcg/kg/min of norepinephrine to maintain blood pressure at a MAP 60-80 and SBP < 140. Central access includes a LIJ CVC, arterial access includes a left brachial arterial line. They also have 2 pleural and 2 mediastinal chest tubes with appropriate output.     Intraoperatively, they received 2L of crystalloid, 747 of cell saver, 1 PRBCs. Urine output intraoperatively was 500 cc. The pre-operative echocardiogram was notable for EF 65%, normal RV function. Post-operative echo was normal biventricular function.     Immediate post-operative plans include hemodynamic stabilization and weaning of cardiac and respiratory support. Plan to wean vasopressors and inotropes as tolerated, and wean ventilator support with goal of early extubation, and closely  monitor fluid status with strict Is/Os and continued fluid resuscitation as needed.       Interval History/Significant Events: NAEON. Slept well overnight. Wearing CPAP. Pain well controlled. AVSS. No episodes of bradycardia for 72 hours now.     Follow-up For: Procedure(s) (LRB):  Replacement-valve-aortic, redo sternotomy (N/A)    Post-Operative Day: 5 Days Post-Op    Objective:     Vital Signs (Most Recent):  Temp: 98.5 °F (36.9 °C) (05/15/23 0300)  Pulse: 83 (05/15/23 0430)  Resp: (!) 27 (05/15/23 0430)  BP: 135/60 (05/15/23 0400)  SpO2: (!) 92 % (05/15/23 0430) Vital Signs (24h Range):  Temp:  [97.7 °F (36.5 °C)-98.5 °F (36.9 °C)] 98.5 °F (36.9 °C)  Pulse:  [78-93] 83  Resp:  [11-41] 27  SpO2:  [92 %-97 %] 92 %  BP: (105-150)/(50-67) 135/60     Weight: 114.6 kg (252 lb 10.4 oz)  Body mass index is 46.21 kg/m².      Intake/Output Summary (Last 24 hours) at 5/15/2023 0518  Last data filed at 5/14/2023 2100  Gross per 24 hour   Intake 801.58 ml   Output 2750 ml   Net -1948.42 ml          Physical Exam  Vitals reviewed.   Constitutional:       Appearance: Normal appearance.   Cardiovascular:      Rate and Rhythm: Normal rate.      Pulses: Normal pulses.   Pulmonary:      Effort: Pulmonary effort is normal.   Abdominal:      General: There is no distension.      Palpations: Abdomen is soft. There is no mass.      Tenderness: There is no abdominal tenderness. There is no guarding.   Skin:     General: Skin is warm and dry.   Neurological:      General: No focal deficit present.      Mental Status: She is alert and oriented to person, place, and time.            Lines/Drains/Airways       Central Venous Catheter Line  Duration                  Port A Cath Single Lumen -- days              Drain  Duration             Female External Urinary Catheter 05/13/23 1200 1 day              Peripheral Intravenous Line  Duration                  Peripheral IV - Single Lumen 05/14/23 1145 20 G;1 3/4 in Left;Anterior Upper Arm <1  day                    Significant Labs:    CBC/Anemia Profile:  Recent Labs   Lab 05/14/23  0345 05/15/23  0322   WBC 6.96 6.54   HGB 7.8* 7.4*   HCT 24.0* 24.3*   PLT 76* 87*   MCV 90 95   RDW 14.6* 14.6*        Chemistries:  Recent Labs   Lab 05/14/23  0345 05/14/23  1049 05/14/23  2012 05/15/23  0322     --   --  142   K 3.5 3.8 3.8 4.1     --   --  104   CO2 26  --   --  27   BUN 31*  --   --  32*   CREATININE 1.3  --   --  1.3   CALCIUM 8.6*  --   --  8.7   ALBUMIN 3.4*  --   --  3.1*   PROT 5.7*  --   --  5.4*   BILITOT 0.6  --   --  0.6   ALKPHOS 103  --   --  110   ALT 18  --   --  36   AST 42*  --   --  62*   MG 2.0  --  2.1 2.0   PHOS 2.4*  --  2.6* 3.2     Assessment/Plan:     Cardiac/Vascular  * Nonrheumatic aortic valve stenosis  S/p redo AVR with bioprosthetic valve with Dr. Webb on 5/10. Initial AVR with Tracey 2013.       Neuro/Psych:     - Sedation: none    - Pain:    - Scheduled Gabapentin 300 TID   - Norco 5/10 PRN             Cardiac:     - S/P redo sternotomy with redo AVR with Dr. Webb on 5/10/2023    - BP Goal: MAP 60-80, SBP < 140     - Rhythm: NSR now without episodes of bradycardia for the last 72 hours    - Beta blocker: lopressor 12.5 BID    - Statin: pravastatin (home med)      Pulmonary:     - Goal SpO2 >92%, used CPAP overnight. On RA this morning      Renal:    - Trend BUN/Cr     - Lasix 60 BID    - Good UOP - 2050 mL last 24 hrs    Recent Labs   Lab 05/13/23  0419 05/14/23  0345 05/15/23  0322   BUN 31* 31* 32*   CREATININE 1.5* 1.3 1.3         FEN / GI:     - Daily CMP, PRN K/Mag/Phos per protocol     - Replace electrolytes as needed    - Nutrition: diabetic diet with fluid restriction     - Bowel Regimen: Miralax, docusate    - 1x BM yesterday      ID:     - Afebrile    - WBC stable    - Abx: Completed perioperative cefazolin 2g Q8H x 5 doses    Recent Labs   Lab 05/13/23  0419 05/14/23  0345 05/15/23  0322   WBC 8.63 6.96 6.54         Heme/Onc:     - Hgb  12.6 pre-operatively    - CBC daily    - ASA 325mg daily, initiated within 6hr of surgery    Recent Labs   Lab 05/13/23  0419 05/14/23  0345 05/15/23  0322   HGB 7.5* 7.8* 7.4*   PLT 53* 76* 87*   APTT 29.6 26.8 26.4   INR 1.1 1.0 1.0         Endocrine:     - CTS Goal -140    - HgbA1c: 7.3    - Endocrinology consulted for insulin management      PPx:   Feeding: diabetic diet   Analgesia/Sedation: gabapentin, norco PRN   Thromboembolic Prevention: SCDs/Heparin 7500 TID   HOB >30: Yes  Stress Ulcer: none indicated  Glucose Control: Yes, insulin management per Endocrinology     Lines/Drains/Airway:   Left brachial arterial line   LIJ CVC   [place]   Chest Tubes: none   Pacing Wires: Temporary ventricular pacing wires      Dispo/Code Status/Palliative:     - Stepdown orders in, awaiting bed.    - Full Code      Critical secondary to Patient has a condition that poses threat to life and bodily function: s/p AVR     Critical care was time spent personally by me on the following activities: development of treatment plan with patient or surrogate and bedside caregivers, discussions with consultants, evaluation of patient's response to treatment, examination of patient, ordering and performing treatments and interventions, ordering and review of laboratory studies, ordering and review of radiographic studies, pulse oximetry, re-evaluation of patient's condition.  This critical care time did not overlap with that of any other provider or involve time for any procedures.     Bradley Morris MD  Critical Care - Surgery  [facility] - Surgical Intensive [facility]

## 2023-05-15 NOTE — PLAN OF CARE
05/15/23 1144   Post-Acute Status   Post-Acute Authorization Placement  (Rehab / SNF placement)   Post-Acute Placement Status Referrals Sent     The SW met with the patient and her  at bedside to follow-up regarding recs for rehab placement. The SW discussed with the patient and her  that the patient has recs for d/c placement for rehab placement. The SW informed the patient and her  that [employer] may not cover rehab. The patient reported that she does not understand why [employer] would not cover her rehab placement. The patient reported that her friend recently went into a rehab facility with [employer]. The patient reported that her preference was to try rehab placement first. The SW provided the patient with a list of rehab facilities the patient's preference is [facility]. The SW discussed with the patient and her  Plan a Rehab and Plan B in the event her insurance does not cover rehab placement which is SNF or Home with Health. The SW informed the patient that at a rehab facility that the patient would receive intensive therapy three hours a day vs SNF which is therapy for one hour to one hour and half  daily. The SW provided the patient and her  with a list of SNF from [facility]'s website. The patient reported that some of the Skilled facilities list were horrible.  The patient informed the SW that she would like to place her name on the list for [facility]. The patient reported that she wanted more time to talk additional SNF options with her .     The SW faxed a rehab referral to [facility] via [facility] for review.     The SW faxed a SNF referral to [facility] via [facility] for review.    The SW informed the patient's care team about the above information by secure chat.       12:42 PM  [facility]- submitting for auth     The SW will continue to follow.       Pj Mcneal LMSW  Case Management [facility]  Ext: [phone]

## 2023-05-15 NOTE — CONSULTS
Inpatient consult to Physical Medicine Rehab  Consult performed by: Shalini Alba NP  Consult ordered by: Thea Castaneda PA-C  Reason for consult: Assess rehab needs      Inpatient consult to Physical Medicine Rehab  Consult performed by: Shalini Alba NP  Consult ordered by: Venkat Webb MD  Reason for consult: Assess rehab needs    Reviewed patient history and current admission.  Rehab team following.  Full consult to follow.    DAVE Welch, FNP-C  Physical Medicine & Rehabilitation   05/15/2023

## 2023-05-15 NOTE — SUBJECTIVE & OBJECTIVE
Interval History/Significant Events: NAEON. Slept well overnight. Wearing CPAP. Pain well controlled. AVSS. No episodes of bradycardia for 72 hours now.     Follow-up For: Procedure(s) (LRB):  Replacement-valve-aortic, redo sternotomy (N/A)    Post-Operative Day: 5 Days Post-Op    Objective:     Vital Signs (Most Recent):  Temp: 98.5 °F (36.9 °C) (05/15/23 0300)  Pulse: 83 (05/15/23 0430)  Resp: (!) 27 (05/15/23 0430)  BP: 135/60 (05/15/23 0400)  SpO2: (!) 92 % (05/15/23 0430) Vital Signs (24h Range):  Temp:  [97.7 °F (36.5 °C)-98.5 °F (36.9 °C)] 98.5 °F (36.9 °C)  Pulse:  [78-93] 83  Resp:  [11-41] 27  SpO2:  [92 %-97 %] 92 %  BP: (105-150)/(50-67) 135/60     Weight: 114.6 kg (252 lb 10.4 oz)  Body mass index is 46.21 kg/m².      Intake/Output Summary (Last 24 hours) at 5/15/2023 0518  Last data filed at 5/14/2023 2100  Gross per 24 hour   Intake 801.58 ml   Output 2750 ml   Net -1948.42 ml          Physical Exam  Vitals reviewed.   Constitutional:       Appearance: Normal appearance.   Cardiovascular:      Rate and Rhythm: Normal rate.      Pulses: Normal pulses.   Pulmonary:      Effort: Pulmonary effort is normal.   Abdominal:      General: There is no distension.      Palpations: Abdomen is soft. There is no mass.      Tenderness: There is no abdominal tenderness. There is no guarding.   Skin:     General: Skin is warm and dry.   Neurological:      General: No focal deficit present.      Mental Status: She is alert and oriented to person, place, and time.            Lines/Drains/Airways       Central Venous Catheter Line  Duration                  Port A Cath Single Lumen -- days              Drain  Duration             Female External Urinary Catheter 05/13/23 1200 1 day              Peripheral Intravenous Line  Duration                  Peripheral IV - Single Lumen 05/14/23 1145 20 G;1 3/4 in Left;Anterior Upper Arm <1 day                    Significant Labs:    CBC/Anemia Profile:  Recent Labs   Lab  05/14/23  0345 05/15/23  0322   WBC 6.96 6.54   HGB 7.8* 7.4*   HCT 24.0* 24.3*   PLT 76* 87*   MCV 90 95   RDW 14.6* 14.6*        Chemistries:  Recent Labs   Lab 05/14/23 0345 05/14/23  1049 05/14/23  2012 05/15/23  0322     --   --  142   K 3.5 3.8 3.8 4.1     --   --  104   CO2 26  --   --  27   BUN 31*  --   --  32*   CREATININE 1.3  --   --  1.3   CALCIUM 8.6*  --   --  8.7   ALBUMIN 3.4*  --   --  3.1*   PROT 5.7*  --   --  5.4*   BILITOT 0.6  --   --  0.6   ALKPHOS 103  --   --  110   ALT 18  --   --  36   AST 42*  --   --  62*   MG 2.0  --  2.1 2.0   PHOS 2.4*  --  2.6* 3.2

## 2023-05-15 NOTE — ASSESSMENT & PLAN NOTE
S/p redo AVR with bioprosthetic valve with Dr. Webb on 5/10. Initial AVR with Tracey 2013.       Neuro/Psych:     - Sedation: none    - Pain:    - Scheduled Gabapentin 300 TID   - Norco 5/10 PRN             Cardiac:     - S/P redo sternotomy with redo AVR with Dr. Webb on 5/10/2023    - BP Goal: MAP 60-80, SBP < 140     - Rhythm: NSR now without episodes of bradycardia for the last 72 hours    - Beta blocker: lopressor 12.5 BID    - Statin: pravastatin (home med)      Pulmonary:     - Goal SpO2 >92%, used CPAP overnight. On RA this morning      Renal:    - Trend BUN/Cr     - Lasix 60 BID    - Good UOP - 2050 mL last 24 hrs    Recent Labs   Lab 05/13/23 0419 05/14/23  0345 05/15/23  0322   BUN 31* 31* 32*   CREATININE 1.5* 1.3 1.3         FEN / GI:     - Daily CMP, PRN K/Mag/Phos per protocol     - Replace electrolytes as needed    - Nutrition: diabetic diet with fluid restriction     - Bowel Regimen: Miralax, docusate    - 1x BM yesterday      ID:     - Afebrile    - WBC stable    - Abx: Completed perioperative cefazolin 2g Q8H x 5 doses    Recent Labs   Lab 05/13/23  0419 05/14/23  0345 05/15/23  0322   WBC 8.63 6.96 6.54         Heme/Onc:     - Hgb 12.6 pre-operatively    - CBC daily    - ASA 325mg daily, initiated within 6hr of surgery    Recent Labs   Lab 05/13/23  0419 05/14/23  0345 05/15/23  0322   HGB 7.5* 7.8* 7.4*   PLT 53* 76* 87*   APTT 29.6 26.8 26.4   INR 1.1 1.0 1.0         Endocrine:     - CTS Goal -140    - HgbA1c: 7.3    - Endocrinology consulted for insulin management      PPx:   Feeding: diabetic diet   Analgesia/Sedation: gabapentin, norco PRN   Thromboembolic Prevention: SCDs/Heparin 7500 TID   HOB >30: Yes  Stress Ulcer: none indicated  Glucose Control: Yes, insulin management per Endocrinology     Lines/Drains/Airway:   Left brachial arterial line   LIJ CVC   Macdonald   Chest Tubes: none   Pacing Wires: Temporary ventricular pacing wires      Dispo/Code Status/Palliative:      - Stepdown orders in, awaiting bed.    - Full Code

## 2023-05-15 NOTE — PLAN OF CARE
Pt vss, afebrile, sats >94% on RA. MAP 60-80. Ac/hs, see MAR for insulin admin. Labs monitored, electrolytes replaced. Frequent rounds made in order to ensure pain control. No complaints of pain or discomfort reported at this time.  No skin breakdown noted to sacrum, buttock, and heels, turn q2, preventative measures in place. POC reviewed with pt and family, no questions at this time. Transfer orders in place, awaiting bed assignment.

## 2023-05-15 NOTE — PT/OT/SLP PROGRESS
Physical Therapy   Progress Note    Patient Name:  Alina Narayan  MRN: [id number]    Admit Date: 5/10/2023  Admitting Diagnosis:  Nonrheumatic aortic valve stenosis  Length of Stay: 5 days  Recent Surgery: Procedure(s) (LRB):  Replacement-valve-aortic, redo sternotomy (N/A) 5 Days Post-Op    Recommendations:     Discharge Recommendations: Inpatient Rehabilitation Facility   Equipment recommendations:  TBD  Barriers to discharge: Increased level of skilled assistance required and Fall risk     Assessment:     Alina Narayan is a 78 y.o. female admitted to [facility] on 5/10/2023 with medical diagnosis of Nonrheumatic aortic valve stenosis. Pt presents with weakness, impaired endurance, impaired self care skills, impaired functional mobility, gait instability, impaired balance, pain, impaired cardiopulmonary response to activity. Pt is progressing towards goals, but has not yet reached prior level of function. Alina Narayan would benefit from continued acute PT intervention to improve quality of life, focus on recovery of impairments, provide patient/caregiver education, reduce fall risk, and maximize (I) and safety with functional mobility. Once medically stable, recommending pt discharge to Inpatient Rehabilitation Facility .      Rehab Prognosis: Good    Plan:     During this hospitalization, patient to be seen 5 x/week to address the identified rehab impairments via gait training, therapeutic activities, therapeutic exercises, neuromuscular re-education and progress towards stated goals.     Plan of Care Expires:  06/12/23  Plan of Care reviewed with: patient and spouse    This plan of care has been discussed with the patient/caregiver, who was included in its development and is in agreement with the identified goals and treatment plan.     Subjective     Communicated with RN prior to session.  Patient found up in chair upon PT entry to room, agreeable to therapy session. Pt's  present during session.    Patient/Family  Comments/goals: to improve strength and regain independence     Pain/Comfort:  Pain Rating 1: 7/10  Location 1: chest  Pain Addressed 1: Reposition, Distraction, Nurse notified  Pain Rating Post-Intervention 1: 7/10    Patients cultural, spiritual, Spiritism conflicts given the current situation: None identified     Objective:     Patient found with: blood pressure cuff, pulse ox (continuous), telemetry, peripheral IV, PureWick    General Precautions: Standard, fall, sternal   Orthopedic Precautions:N/A   Braces: N/A   Oxygen Device: room air    Cognition:  Pt is Alert and Cooperative during session.    Therapist provided skilled verbal and tactile cueing to facilitate the following functional mobility tasks. Listed tasks are focused on recovery of impairments and improving pt's independence and efficiency with bed mobility, transfers and ambulation as able.     Bed Mobility:  N/T 2/2 pt sitting up in chair upon PT arrival     Transfers:   Sit <> Stand Transfer: Minimal Assistance from bedside chair with no AD                 Gait:  Distance: ~8 ft. + ~18 ft.   Assistance level: Contact Guard Assistance with HHA   Assistive Device:  HHA from therapist   Gait Assessment: decreased step length , decreased gait speed, narrow base of support, and unsteady gait     Balance:  Standing:  Dynamic: FAIR-: Needs CONTACT GUARD with HHA during gait; unable to ambulate without assistance     Outcome Measure: AM-PAC 6 CLICK MOBILITY  Total Score:17     Patient/Caregiver Education and Additional Therapeutic Activities/Exercises     Therapist facilitated progression of gait training to improve gait stability, endurance, and independence with functional ambulation.     Provided pt/caregiver education regarding:   PT POC and goals for therapy   Therapist reinforced education re:   Post-op sternal precautions, including no lifting > 5 lbs, pulling or pushing with BUEs.    Modifying daily activities and functional mobility tasks to  maintain sternal precautions appropriately   Importance of participation in therapy and engaging in increased OOB mobility with assistance to improve endurance   Safety with mobility and fall risk   Safe management of AD as needed   Energy conservation techniques   Instruction on use of call button and importance of calling nursing staff for assistance with mobility     Patient/caregiver able to verbalize understanding; will follow-up with pt/caregiver during current admit for additional questions/concerns within scope of practice.     White board updated.     Patient left up in chair with all lines intact, call button in reach, and RN notified.    Goals:     Multidisciplinary Problems       Physical Therapy Goals          Problem: Physical Therapy    Goal Priority Disciplines Outcome Goal Variances Interventions   Physical Therapy Goal     PT, PT/OT Ongoing, Progressing     Description: Goals to be met by: 2023     Patient will increase functional independence with mobility by performin. Sit to stand transfer with Supervision  2. Gait  x 100 feet with Contact Guard Assistance using platform RW or LRAD.   3. Stand for 5 minutes with Stand-by Assistance using UE support prn while performing dynamic balance task  4. Lower extremity exercise program x30 reps per handout, with independence                       Time Tracking:     PT Received On: 05/15/23  PT Start Time: 1058  PT Stop Time: 1115  PT Total Time (min): 15 min     Billable Minutes: Gait Training 10 min    05/15/2023

## 2023-05-16 PROBLEM — Z74.09 IMPAIRED MOBILITY: Status: ACTIVE | Noted: 2023-05-16

## 2023-05-16 LAB
ALBUMIN SERPL BCP-MCNC: 3.4 G/DL (ref 3.5–5.2)
ALP SERPL-CCNC: 123 U/L (ref 55–135)
ALT SERPL W/O P-5'-P-CCNC: 37 U/L (ref 10–44)
ANION GAP SERPL CALC-SCNC: 11 MMOL/L (ref 8–16)
APTT PPP: 26.1 SEC (ref 21–32)
ASCENDING AORTA: 2.4 CM
AST SERPL-CCNC: 45 U/L (ref 10–40)
AV INDEX (PROSTH): 0.52
AV MEAN GRADIENT: 14 MMHG
AV PEAK GRADIENT: 25 MMHG
AV VALVE AREA: 1.79 CM2
AV VELOCITY RATIO: 0.44
BASOPHILS # BLD AUTO: 0.02 K/UL (ref 0–0.2)
BASOPHILS NFR BLD: 0.4 % (ref 0–1.9)
BILIRUB SERPL-MCNC: 0.6 MG/DL (ref 0.1–1)
BSA FOR ECHO PROCEDURE: 2.23 M2
BUN SERPL-MCNC: 33 MG/DL (ref 8–23)
CALCIUM SERPL-MCNC: 9 MG/DL (ref 8.7–10.5)
CHLORIDE SERPL-SCNC: 102 MMOL/L (ref 95–110)
CO2 SERPL-SCNC: 28 MMOL/L (ref 23–29)
CREAT SERPL-MCNC: 1.2 MG/DL (ref 0.5–1.4)
CV ECHO LV RWT: 0.49 CM
DIFFERENTIAL METHOD: ABNORMAL
DOP CALC AO PEAK VEL: 2.5 M/S
DOP CALC AO VTI: 47.19 CM
DOP CALC LVOT AREA: 3.5 CM2
DOP CALC LVOT DIAMETER: 2.1 CM
DOP CALC LVOT PEAK VEL: 1.1 M/S
DOP CALC LVOT STROKE VOLUME: 84.47 CM3
DOP CALCLVOT PEAK VEL VTI: 24.4 CM
E WAVE DECELERATION TIME: 255.05 MSEC
E/A RATIO: 0.78
E/E' RATIO: 13.69 M/S
ECHO LV POSTERIOR WALL: 1.03 CM (ref 0.6–1.1)
EJECTION FRACTION: 63 %
EOSINOPHIL # BLD AUTO: 0.1 K/UL (ref 0–0.5)
EOSINOPHIL NFR BLD: 1.1 % (ref 0–8)
ERYTHROCYTE [DISTWIDTH] IN BLOOD BY AUTOMATED COUNT: 14.7 % (ref 11.5–14.5)
EST. GFR  (NO RACE VARIABLE): 46.3 ML/MIN/1.73 M^2
FRACTIONAL SHORTENING: 32 % (ref 28–44)
GLUCOSE SERPL-MCNC: 123 MG/DL (ref 70–110)
HCT VFR BLD AUTO: 25.2 % (ref 37–48.5)
HGB BLD-MCNC: 7.8 G/DL (ref 12–16)
IMM GRANULOCYTES # BLD AUTO: 0.08 K/UL (ref 0–0.04)
IMM GRANULOCYTES NFR BLD AUTO: 1.4 % (ref 0–0.5)
INR PPP: 1 (ref 0.8–1.2)
INTERVENTRICULAR SEPTUM: 1.04 CM (ref 0.6–1.1)
LA MAJOR: 6.08 CM
LA MINOR: 6.28 CM
LA WIDTH: 3.96 CM
LEFT ATRIUM SIZE: 3.47 CM
LEFT ATRIUM VOLUME INDEX MOD: 40.6 ML/M2
LEFT ATRIUM VOLUME INDEX: 34.2 ML/M2
LEFT ATRIUM VOLUME MOD: 85.57 CM3
LEFT ATRIUM VOLUME: 72.16 CM3
LEFT INTERNAL DIMENSION IN SYSTOLE: 2.87 CM (ref 2.1–4)
LEFT VENTRICLE DIASTOLIC VOLUME INDEX: 37.34 ML/M2
LEFT VENTRICLE DIASTOLIC VOLUME: 78.78 ML
LEFT VENTRICLE MASS INDEX: 68 G/M2
LEFT VENTRICLE SYSTOLIC VOLUME INDEX: 14.9 ML/M2
LEFT VENTRICLE SYSTOLIC VOLUME: 31.49 ML
LEFT VENTRICULAR INTERNAL DIMENSION IN DIASTOLE: 4.2 CM (ref 3.5–6)
LEFT VENTRICULAR MASS: 144.04 G
LV LATERAL E/E' RATIO: 14.83 M/S
LV SEPTAL E/E' RATIO: 12.71 M/S
LYMPHOCYTES # BLD AUTO: 1.5 K/UL (ref 1–4.8)
LYMPHOCYTES NFR BLD: 27.1 % (ref 18–48)
MAGNESIUM SERPL-MCNC: 2.1 MG/DL (ref 1.6–2.6)
MCH RBC QN AUTO: 28.9 PG (ref 27–31)
MCHC RBC AUTO-ENTMCNC: 31 G/DL (ref 32–36)
MCV RBC AUTO: 93 FL (ref 82–98)
MONOCYTES # BLD AUTO: 0.8 K/UL (ref 0.3–1)
MONOCYTES NFR BLD: 14.1 % (ref 4–15)
MV PEAK A VEL: 1.14 M/S
MV PEAK E VEL: 0.89 M/S
MV STENOSIS PRESSURE HALF TIME: 73.96 MS
MV VALVE AREA P 1/2 METHOD: 2.97 CM2
NEUTROPHILS # BLD AUTO: 3.2 K/UL (ref 1.8–7.7)
NEUTROPHILS NFR BLD: 55.9 % (ref 38–73)
NRBC BLD-RTO: 0 /100 WBC
PHOSPHATE SERPL-MCNC: 3.8 MG/DL (ref 2.7–4.5)
PISA TR MAX VEL: 2.85 M/S
PLATELET # BLD AUTO: 111 K/UL (ref 150–450)
PMV BLD AUTO: 11.2 FL (ref 9.2–12.9)
POCT GLUCOSE: 125 MG/DL (ref 70–110)
POCT GLUCOSE: 141 MG/DL (ref 70–110)
POCT GLUCOSE: 172 MG/DL (ref 70–110)
POCT GLUCOSE: 174 MG/DL (ref 70–110)
POTASSIUM SERPL-SCNC: 3.6 MMOL/L (ref 3.5–5.1)
PROT SERPL-MCNC: 6.1 G/DL (ref 6–8.4)
PROTHROMBIN TIME: 10.8 SEC (ref 9–12.5)
RA MAJOR: 5.6 CM
RA WIDTH: 4.28 CM
RBC # BLD AUTO: 2.7 M/UL (ref 4–5.4)
RIGHT VENTRICULAR END-DIASTOLIC DIMENSION: 3.14 CM
SINUS: 2.6 CM
SODIUM SERPL-SCNC: 141 MMOL/L (ref 136–145)
STJ: 2.21 CM
TDI LATERAL: 0.06 M/S
TDI SEPTAL: 0.07 M/S
TDI: 0.07 M/S
TR MAX PG: 32 MMHG
TRICUSPID ANNULAR PLANE SYSTOLIC EXCURSION: 1.03 CM
WBC # BLD AUTO: 5.68 K/UL (ref 3.9–12.7)

## 2023-05-16 PROCEDURE — 97535 SELF CARE MNGMENT TRAINING: CPT

## 2023-05-16 PROCEDURE — 25000003 PHARM REV CODE 250: Performed by: THORACIC SURGERY (CARDIOTHORACIC VASCULAR SURGERY)

## 2023-05-16 PROCEDURE — 85730 THROMBOPLASTIN TIME PARTIAL: CPT | Performed by: PHYSICIAN ASSISTANT

## 2023-05-16 PROCEDURE — 20600001 HC STEP DOWN PRIVATE ROOM

## 2023-05-16 PROCEDURE — 94799 UNLISTED PULMONARY SVC/PX: CPT

## 2023-05-16 PROCEDURE — 25000003 PHARM REV CODE 250: Performed by: PHYSICIAN ASSISTANT

## 2023-05-16 PROCEDURE — 80053 COMPREHEN METABOLIC PANEL: CPT | Performed by: STUDENT IN AN ORGANIZED HEALTH CARE EDUCATION/TRAINING PROGRAM

## 2023-05-16 PROCEDURE — 25500020 PHARM REV CODE 255: Performed by: THORACIC SURGERY (CARDIOTHORACIC VASCULAR SURGERY)

## 2023-05-16 PROCEDURE — 85610 PROTHROMBIN TIME: CPT | Performed by: PHYSICIAN ASSISTANT

## 2023-05-16 PROCEDURE — 84100 ASSAY OF PHOSPHORUS: CPT | Performed by: PHYSICIAN ASSISTANT

## 2023-05-16 PROCEDURE — 97530 THERAPEUTIC ACTIVITIES: CPT

## 2023-05-16 PROCEDURE — 99233 SBSQ HOSP IP/OBS HIGH 50: CPT | Mod: GC,,, | Performed by: INTERNAL MEDICINE

## 2023-05-16 PROCEDURE — 85025 COMPLETE CBC W/AUTO DIFF WBC: CPT | Performed by: PHYSICIAN ASSISTANT

## 2023-05-16 PROCEDURE — 94660 CPAP INITIATION&MGMT: CPT

## 2023-05-16 PROCEDURE — 25000003 PHARM REV CODE 250: Performed by: STUDENT IN AN ORGANIZED HEALTH CARE EDUCATION/TRAINING PROGRAM

## 2023-05-16 PROCEDURE — 99222 1ST HOSP IP/OBS MODERATE 55: CPT | Mod: ,,, | Performed by: NURSE PRACTITIONER

## 2023-05-16 PROCEDURE — 93010 EKG 12-LEAD: ICD-10-PCS | Mod: ,,, | Performed by: INTERNAL MEDICINE

## 2023-05-16 PROCEDURE — 93010 ELECTROCARDIOGRAM REPORT: CPT | Mod: ,,, | Performed by: INTERNAL MEDICINE

## 2023-05-16 PROCEDURE — 25000003 PHARM REV CODE 250

## 2023-05-16 PROCEDURE — 99900035 HC TECH TIME PER 15 MIN (STAT)

## 2023-05-16 PROCEDURE — 97116 GAIT TRAINING THERAPY: CPT

## 2023-05-16 PROCEDURE — 83735 ASSAY OF MAGNESIUM: CPT | Performed by: PHYSICIAN ASSISTANT

## 2023-05-16 PROCEDURE — 99233 PR SUBSEQUENT HOSPITAL CARE,LEVL III: ICD-10-PCS | Mod: GC,,, | Performed by: INTERNAL MEDICINE

## 2023-05-16 PROCEDURE — 93005 ELECTROCARDIOGRAM TRACING: CPT

## 2023-05-16 PROCEDURE — 94761 N-INVAS EAR/PLS OXIMETRY MLT: CPT

## 2023-05-16 PROCEDURE — 99222 PR INITIAL HOSPITAL CARE,LEVL II: ICD-10-PCS | Mod: ,,, | Performed by: NURSE PRACTITIONER

## 2023-05-16 PROCEDURE — 63600175 PHARM REV CODE 636 W HCPCS: Performed by: NURSE PRACTITIONER

## 2023-05-16 PROCEDURE — 27000221 HC OXYGEN, UP TO 24 HOURS

## 2023-05-16 RX ORDER — ASPIRIN 81 MG/1
81 TABLET ORAL DAILY
Status: DISCONTINUED | OUTPATIENT
Start: 2023-05-17 | End: 2023-05-19 | Stop reason: HOSPADM

## 2023-05-16 RX ORDER — ASPIRIN 81 MG/1
81 TABLET ORAL DAILY
Status: DISCONTINUED | OUTPATIENT
Start: 2023-05-16 | End: 2023-05-16

## 2023-05-16 RX ORDER — POTASSIUM CHLORIDE 20 MEQ/1
40 TABLET, EXTENDED RELEASE ORAL ONCE
Status: COMPLETED | OUTPATIENT
Start: 2023-05-16 | End: 2023-05-16

## 2023-05-16 RX ORDER — POTASSIUM CHLORIDE 20 MEQ/1
40 TABLET, EXTENDED RELEASE ORAL 2 TIMES DAILY
Status: DISCONTINUED | OUTPATIENT
Start: 2023-05-16 | End: 2023-05-17

## 2023-05-16 RX ADMIN — HYDROCODONE BITARTRATE AND ACETAMINOPHEN 1 TABLET: 5; 325 TABLET ORAL at 09:05

## 2023-05-16 RX ADMIN — INSULIN ASPART 2 UNITS: 100 INJECTION, SOLUTION INTRAVENOUS; SUBCUTANEOUS at 12:05

## 2023-05-16 RX ADMIN — GABAPENTIN 300 MG: 300 CAPSULE ORAL at 04:05

## 2023-05-16 RX ADMIN — METOPROLOL TARTRATE 25 MG: 25 TABLET, FILM COATED ORAL at 09:05

## 2023-05-16 RX ADMIN — POTASSIUM CHLORIDE 40 MEQ: 1500 TABLET, EXTENDED RELEASE ORAL at 06:05

## 2023-05-16 RX ADMIN — INSULIN ASPART 5 UNITS: 100 INJECTION, SOLUTION INTRAVENOUS; SUBCUTANEOUS at 12:05

## 2023-05-16 RX ADMIN — APIXABAN 2.5 MG: 2.5 TABLET, FILM COATED ORAL at 09:05

## 2023-05-16 RX ADMIN — GABAPENTIN 300 MG: 300 CAPSULE ORAL at 09:05

## 2023-05-16 RX ADMIN — FUROSEMIDE 60 MG: 40 TABLET ORAL at 08:05

## 2023-05-16 RX ADMIN — LEVOTHYROXINE SODIUM 75 MCG: 75 TABLET ORAL at 06:05

## 2023-05-16 RX ADMIN — INSULIN ASPART 5 UNITS: 100 INJECTION, SOLUTION INTRAVENOUS; SUBCUTANEOUS at 04:05

## 2023-05-16 RX ADMIN — GABAPENTIN 300 MG: 300 CAPSULE ORAL at 08:05

## 2023-05-16 RX ADMIN — POTASSIUM CHLORIDE 40 MEQ: 1500 TABLET, EXTENDED RELEASE ORAL at 11:05

## 2023-05-16 RX ADMIN — HYDROCODONE BITARTRATE AND ACETAMINOPHEN 1 TABLET: 5; 325 TABLET ORAL at 08:05

## 2023-05-16 RX ADMIN — SENNOSIDES AND DOCUSATE SODIUM 1 TABLET: 50; 8.6 TABLET ORAL at 08:05

## 2023-05-16 RX ADMIN — HUMAN ALBUMIN MICROSPHERES AND PERFLUTREN 0.11 MG: 10; .22 INJECTION, SOLUTION INTRAVENOUS at 03:05

## 2023-05-16 RX ADMIN — METOPROLOL TARTRATE 25 MG: 25 TABLET, FILM COATED ORAL at 08:05

## 2023-05-16 RX ADMIN — ASPIRIN 325 MG: 325 TABLET ORAL at 08:05

## 2023-05-16 RX ADMIN — DOCUSATE SODIUM 100 MG: 100 CAPSULE, LIQUID FILLED ORAL at 09:05

## 2023-05-16 RX ADMIN — INSULIN DETEMIR 16 UNITS: 100 INJECTION, SOLUTION SUBCUTANEOUS at 08:05

## 2023-05-16 RX ADMIN — APIXABAN 2.5 MG: 2.5 TABLET, FILM COATED ORAL at 08:05

## 2023-05-16 RX ADMIN — INSULIN ASPART 2 UNITS: 100 INJECTION, SOLUTION INTRAVENOUS; SUBCUTANEOUS at 04:05

## 2023-05-16 RX ADMIN — FUROSEMIDE 60 MG: 40 TABLET ORAL at 06:05

## 2023-05-16 RX ADMIN — PRAVASTATIN SODIUM 20 MG: 20 TABLET ORAL at 08:05

## 2023-05-16 RX ADMIN — DOCUSATE SODIUM 100 MG: 100 CAPSULE, LIQUID FILLED ORAL at 08:05

## 2023-05-16 RX ADMIN — POTASSIUM CHLORIDE 40 MEQ: 1500 TABLET, EXTENDED RELEASE ORAL at 09:05

## 2023-05-16 RX ADMIN — POLYETHYLENE GLYCOL 3350 17 G: 17 POWDER, FOR SOLUTION ORAL at 08:05

## 2023-05-16 RX ADMIN — INSULIN ASPART 5 UNITS: 100 INJECTION, SOLUTION INTRAVENOUS; SUBCUTANEOUS at 08:05

## 2023-05-16 NOTE — SUBJECTIVE & OBJECTIVE
Interval History/Significant Events: NAEON. Pain well controlled. Received 1 dose of norco 5. Awaiting stepdown. Case management submitted referrals for rehab/SNF placement yesterday.    Follow-up For: Procedure(s) (LRB):  Replacement-valve-aortic, redo sternotomy (N/A)    Post-Operative Day: 6 Days Post-Op    Objective:     Vital Signs (Most Recent):  Temp: 98.3 °F (36.8 °C) (05/16/23 0300)  Pulse: 82 (05/16/23 0530)  Resp: 12 (05/16/23 0530)  BP: (!) 117/55 (05/16/23 0500)  SpO2: (!) 93 % (05/16/23 0530) Vital Signs (24h Range):  Temp:  [98.2 °F (36.8 °C)-98.5 °F (36.9 °C)] 98.3 °F (36.8 °C)  Pulse:  [75-93] 82  Resp:  [10-40] 12  SpO2:  [86 %-97 %] 93 %  BP: ()/(51-65) 117/55     Weight: 114.1 kg (251 lb 8.7 oz)  Body mass index is 46.01 kg/m².      Intake/Output Summary (Last 24 hours) at 5/16/2023 0600  Last data filed at 5/16/2023 0400  Gross per 24 hour   Intake --   Output 2050 ml   Net -2050 ml          Physical Exam  Vitals reviewed.   Constitutional:       Appearance: Normal appearance.   Cardiovascular:      Rate and Rhythm: Normal rate.      Pulses: Normal pulses.   Pulmonary:      Effort: Pulmonary effort is normal.   Abdominal:      General: There is no distension.      Palpations: Abdomen is soft. There is no mass.      Tenderness: There is no abdominal tenderness. There is no guarding.   Skin:     General: Skin is warm and dry.   Neurological:      General: No focal deficit present.      Mental Status: She is alert and oriented to person, place, and time.          Vents:  Vent Mode: Spont (05/11/23 1215)  Ventilator Initiated: Yes (05/10/23 1720)  Set Rate: 20 BPM (05/11/23 1026)  Vt Set: 340 mL (05/11/23 1026)  Pressure Support: 5 cmH20 (05/11/23 1215)  PEEP/CPAP: 5 cmH20 (05/11/23 1215)  Oxygen Concentration (%): 28 (05/13/23 0406)  Peak Airway Pressure: 10 cmH20 (05/11/23 1215)  Plateau Pressure: 0 cmH20 (05/11/23 1215)  Total Ve: 4.48 L/m (05/11/23 1215)  Negative Inspiratory Force (cm  H2O): -50 (05/11/23 1215)  F/VT Ratio<105 (RSBI): (!) 31.95 (Simultaneous filing. User may not have seen previous data.) (05/11/23 1215)    Lines/Drains/Airways       Central Venous Catheter Line  Duration                  Port A Cath Single Lumen -- days              Drain  Duration             Female External Urinary Catheter 05/13/23 1200 2 days              Peripheral Intravenous Line  Duration                  Peripheral IV - Single Lumen 05/14/23 1145 20 G;1 3/4 in Left;Anterior Upper Arm 1 day                    Significant Labs:    CBC/Anemia Profile:  Recent Labs   Lab 05/15/23  0322 05/16/23  0348   WBC 6.54 5.68   HGB 7.4* 7.8*   HCT 24.3* 25.2*   PLT 87* 111*   MCV 95 93   RDW 14.6* 14.7*        Chemistries:  Recent Labs   Lab 05/14/23  2012 05/15/23  0322 05/15/23  1802 05/16/23  0348   NA  --  142  --  141   K 3.8 4.1 4.1 3.6   CL  --  104  --  102   CO2  --  27  --  28   BUN  --  32*  --  33*   CREATININE  --  1.3  --  1.2   CALCIUM  --  8.7  --  9.0   ALBUMIN  --  3.1*  --  3.4*   PROT  --  5.4*  --  6.1   BILITOT  --  0.6  --  0.6   ALKPHOS  --  110  --  123   ALT  --  36  --  37   AST  --  62*  --  45*   MG 2.1 2.0  --  2.1   PHOS 2.6* 3.2  --  3.8

## 2023-05-16 NOTE — ASSESSMENT & PLAN NOTE
S/p redo AVR with bioprosthetic valve with Dr. Webb on 5/10. Initial AVR with Tracey 2013.       Neuro/Psych:     - Sedation: none    - Pain:    - Scheduled Gabapentin 300 TID   - Norco 5/10 PRN             Cardiac:     - S/P redo sternotomy with redo AVR with Dr. Webb on 5/10/2023    - BP Goal: MAP 60-80, SBP < 140     - Rhythm: NSR    - Beta blocker: lopressor 25 BID    - Statin: pravastatin (home med)    - Eliquis 2.5 BID (history of PE)      Pulmonary:     - Goal SpO2 >92%, used CPAP overnight. On RA this morning      Renal:    - Trend BUN/Cr     - Lasix 60 PO BID    - Good UOP - 1950 mL last 24 hrs    Recent Labs   Lab 05/14/23  0345 05/15/23  0322 05/16/23  0348   BUN 31* 32* 33*   CREATININE 1.3 1.3 1.2         FEN / GI:     - Daily CMP, PRN K/Mag/Phos per protocol     - Replace electrolytes as needed    - Nutrition: diabetic diet with fluid restriction     - Bowel Regimen: Miralax, docusate    - Last BM 5/14      ID:     - Afebrile    - WBC stable    - Abx: Completed perioperative cefazolin 2g Q8H x 5 doses    Recent Labs   Lab 05/14/23  0345 05/15/23  0322 05/16/23  0348   WBC 6.96 6.54 5.68         Heme/Onc:     - Hgb 12.6 pre-operatively    - CBC daily    - ASA 325mg daily, initiated within 6hr of surgery    - 2.5 Eliquis BID for Hx of PE    Recent Labs   Lab 05/14/23  0345 05/15/23  0322 05/16/23  0348   HGB 7.8* 7.4* 7.8*   PLT 76* 87* 111*   APTT 26.8 26.4 26.1   INR 1.0 1.0 1.0         Endocrine:     - CTS Goal -140    - HgbA1c: 7.3    - Endocrinology consulted for insulin management      PPx:   Feeding: diabetic diet   Analgesia/Sedation: gabapentin, norco PRN   Thromboembolic Prevention: SCDs/Heparin 7500 TID   HOB >30: Yes  Stress Ulcer: none indicated  Glucose Control: Yes, insulin management per Endocrinology     Lines/Drains/Airway:   PIV      Dispo/Code Status/Palliative:     - Stepdown orders in, awaiting bed.    - Full Code

## 2023-05-16 NOTE — PT/OT/SLP PROGRESS
Occupational Therapy   Co-Treatment    Name: Alina Narayan  MRN: [id number]  Admitting Diagnosis:  Nonrheumatic aortic valve stenosis  6 Days Post-Op    Recommendations:     Discharge Recommendations: rehabilitation facility  Discharge Equipment Recommendations:  none  Barriers to discharge:  None    Assessment:     Alina Narayan is a 78 y.o. female with a medical diagnosis of Nonrheumatic aortic valve stenosis.  She presents with progress towards goals as evidenced by decreased assistance with  dressing/toileting and improved tolerance for functional tasks in standing. Performance deficits affecting function are weakness, impaired functional mobility, impaired endurance, gait instability, pain, decreased upper extremity function, impaired balance, impaired self care skills, impaired cardiopulmonary response to activity. Pt benefits from co-treatment with PT to accommodate pt's activity tolerance and progression with therapy.      Rehab Prognosis:  Good; patient would benefit from acute skilled OT services to address these deficits and reach maximum level of function.       Plan:     Patient to be seen 5 x/week to address the above listed problems via self-care/home management, therapeutic exercises, therapeutic activities  Plan of Care Expires: 06/11/23  Plan of Care Reviewed with: patient, spouse, son    Subjective     Chief Complaint: SOB  Patient/Family Comments/goals: to get better and go home  Pain/Comfort:  Pain Rating 1: 10/10  Location 1:  (chest and B LE)  Pain Addressed 1: Reposition, Distraction  Pain Rating Post-Intervention 1: 0/10 (none reported)    Objective:     Communicated with: RN prior to session.  Patient found supine with blood pressure cuff, pulse ox (continuous), telemetry, PureWick (IV heplock) upon OT entry to room.    General Precautions: Standard, fall, sternal    Orthopedic Precautions:   Braces:    Respiratory Status: Room air     Occupational Performance:     Bed Mobility:  **pt sleeps in  reclining lift chair at home  Patient completed Scooting/Bridging with contact guard assistance to EOB  Patient completed Supine to Sit with maximal assistance     Functional Mobility/Transfers:  Patient completed Sit <> Stand Transfer with contact guard assistance  with  no assistive device x 2 trials from EOB and one trial from toilet  Patient completed Toilet Transfer Step Transfer technique with contact guard assistance with  hand-held assist  Functional Mobility: CGA to/from bathroom and in hallway to simulate HH distances    Activities of Daily Living:  Grooming: modified independence for tasks with SBA provided for static standing  Upper Body Dressing: minimum assistance    Lower Body Dressing: moderate assistance (pt required assistance PTA with donning socks/shoes)  Toileting: moderate assistance        AMPAC 6 Click ADL: 18    Treatment & Education:  Pt ed on OT POC  Pt re-ed on sternal precautions  Pt ed on continued ADL and mobility with family as well as pacing activities  Pt ed on ROM ex's 3x daily for increased overall strength and endurance    Patient left up in chair with all lines intact, call button in reach, RN notified, and spouse and son present    GOALS:   Multidisciplinary Problems       Occupational Therapy Goals          Problem: Occupational Therapy    Goal Priority Disciplines Outcome Interventions   Occupational Therapy Goal     OT, PT/OT Ongoing, Progressing    Description: Goals to be met by: 5/26/23     Patient will increase functional independence with ADLs by performing:    Feeding with Modified [place].  UE Dressing with Minimal A  LE Dressing with Moderate Assistance.  Grooming while standing at sink with Contact Guard Assistance.  Toileting from bedside commode with Moderate Assistance for hygiene and clothing management.   Toilet transfer to bedside commode with Minimal Assistance.                         Time Tracking:     OT Date of Treatment: 05/16/23  OT Start Time:  0854  OT Stop Time: 0921  OT Total Time (min): 27 min    Billable Minutes:Self Care/Home Management 15  Therapeutic Activity 8               5/16/2023

## 2023-05-16 NOTE — RESPIRATORY THERAPY
RAPID RESPONSE RESPIRATORY CHART CHECK       Chart check completed.  Please call [phone] for further concerns or assistance.

## 2023-05-16 NOTE — HOSPITAL COURSE
5/15/23: Participated w/ PT. Sit to stand Oliver. Ambulated 8ft and 18 ft CGA w/ HHA. [facility] 17.   SLP following for dysphonia. SLP rec ENT evaluation and follow up as an out patient should vocal quality not fully resume in approx 2-4 weeks. Diet reg and thin.

## 2023-05-16 NOTE — PLAN OF CARE
"      SICU PLAN OF CARE NOTE    Dx: Nonrheumatic aortic valve stenosis    Shift Events: FRACISCO.  Pt voiding via purewick catheter.  Stepdown orders, awaiting bed on CSU.    Goals of Care: MAP 60-80    Neuro: AAO x4, Follows Commands, and Moves All Extremities    Vital Signs: BP (!) 117/55 (BP Location: Left arm, Patient Position: Lying)   Pulse 82   Temp 98.3 °F (36.8 °C) (Oral)   Resp 12   Ht 5' 2" (1.575 m)   Wt 114.6 kg (252 lb 10.4 oz)   SpO2 (!) 93%   Breastfeeding No   BMI 46.21 kg/m²     Cardiac: NSR    Respiratory: Room Air    Diet: Cardiac Diet    Gtts: none    Urine Output: Voids Spontaneously 1100 cc/shift    Labs/Accuchecks: accuchecks ACHS and before bedtime; daily labs.    Skin: Pt skin dry and intact.  Foam dressing in place on sacrum.  Pt able to shift weight independently in bed.  Waffle mattress in place.  Steri-strips intact over midsternal incision, no drainage.      View flowsheet for full assessment details.      "

## 2023-05-16 NOTE — NURSING TRANSFER
Nursing Transfer Note      5/16/2023     Reason patient is being transferred: Step down    Transfer To: 325    Transfer via wheelchair    Transfer with cardiac monitoring    Transported by RN    Telemetry: Box Number 0820, Rate 80, Rhythm NS, and Telemetry  Mary Anne    Medicines sent: Insulin Aspart, Insulin Levemir, and Mupirocin.     Any special needs or follow-up needed: None    Chart send with patient: Yes    Notified: spouse, son at bedside with patient    Patient reassessed at: 5/16/23 0945    Upon arrival to floor: cardiac monitor on, patient oriented to room, call bell in reach, purewick placed. Patient in chair.  and son at bedside. Sola, RN in room with patient.

## 2023-05-16 NOTE — PLAN OF CARE
"   05/16/23 1017   Post-Acute Status   Post-Acute Authorization Placement  (SNF)   Post-Acute Placement Status Referrals Sent      SW met with the patient and family to review discharge recommendation of SNF and is agreeable to plan    Provided list of facilities in-network with patient's payor plan. Notified that referral sent to below listed facilities from in-network list based on proximity to home/family support:   [facility]  2. [facility]  3. [facility]      The patient instructed to identify preference.    Preferred Facility: (if more than 1, listed in order of descending preference)  [facility]     If an additional preferred facility not listed above is identified, additional referral to be sent. If above facilities unable to accept, will send additional referrals to in-network providers.      10:32 AM  The SW manually faxed the patient's referral to [facility].     10:43 AM  The SW completed the patient's PASRR and emailed to the SSCs that are covering the unit to call in the LOCET on this patient's behalf.       11:40 AM  Your fax has been successfully sent to [phone] at [phone].  ------------------------------------------------------------  From: [id number]  ------------------------------------------------------------  5/16/2023 10:31:12 AM Transmission Record   Sent to [phone] with remote ID "   Result: (0/339;0/0) Success   Page record: 1 - 65   Elapsed time: 23:10 on channel 27    11:44 AM  SSC completed the LOCET via phone. SW faxed PASRR to obtain the 142 for NH admission.          Your fax has been successfully sent to [phone] at [phone].  ------------------------------------------------------------  From: [id number]  ------------------------------------------------------------  5/16/2023 11:39:56 AM Transmission Record   Sent to [phone] with remote ID "Fax "   Result: (0/339;0/0) Success   Page record: 1 - " "6   Elapsed time: 02:30 on channel 37    12:11 pm     The SW received a email stating to verify the patient's name. The SW updated the patient's PASRR and re- faxed it to obtain the 142 for NH admission.     Your fax has been successfully sent to [phone] at [phone].  ------------------------------------------------------------  From: [id number]  ------------------------------------------------------------  5/16/2023 12:32:26 PM Transmission Record   Sent to [phone] with remote ID "Fax "   Result: (0/339;0/0) Success   Page record: 1 - 6   Elapsed time: 02:29 on channel 10      3:44 PM  The SW placed the patient's 142 form into [facility] for review.     The SW wrote a hand-out to the floor that the patient stepped down to.     The SW will continue to follow.      Pj Mcneal LMSW  Case Management [facility]  Ext: [phone]    "

## 2023-05-16 NOTE — CARE UPDATE
-Glucose Goal 140-180    -A1C:   Hemoglobin A1C   Date Value Ref Range Status   05/04/2023 7.3 (H) 4.0 - 5.6 % Final     Comment:     ADA Screening Guidelines:  5.7-6.4%  Consistent with prediabetes  >or=6.5%  Consistent with diabetes    High levels of fetal hemoglobin interfere with the HbA1C  assay. Heterozygous hemoglobin variants (HbS, HgC, etc)do  not significantly interfere with this assay.   However, presence of multiple variants may affect accuracy.           -HOME REGIMEN:   metformin 500 mg BID and glimepiride 2 mg daily     -GLUCOSE TREND FOR THE PAST 24HRS:   Recent Labs   Lab 05/15/23  0322 05/15/23  0750 05/15/23  1250 05/15/23  1633 05/15/23  2204 05/16/23  0806   POCTGLUCOSE 136* 146* 183* 133* 192* 125*         -NO HYPOGYCEMIAS NOTED     - Diet  Diet diabetic [facility]; 2000 Calorie; Fluid - 1500mL    T2DM.  S/p CTS sx.  BG mostly at goal on insulin regimen.      Plan:   - Levemir (Insulin Detemir) 16 units daily  - Novolog (Insulin Aspart) 5 units TIDWM and prn for BG excursions [facility] SSI (150/25)  - BG checks AC/HS  - Hypoglycemia protocol in place    ** Please notify Endocrine for any change and/or advance in diet**  ** Please call Endocrine for any BG related issues **     Discharge Planning:   TBD. Please notify endocrinology prior to discharge.

## 2023-05-16 NOTE — CONSULTS
[facility]  Physical Medicine & Rehab  Consult Note    Patient Name: Alina Narayan  MRN: [id number]  Admission Date: 5/10/2023  Hospital Length of Stay: 6 days  Attending Physician: Venkat Webb MD     Inpatient consult to Physical Medicine & Rehabilitation  Consult performed by: Shalini Alba NP  Consult requested by:  Venkat Webb MD    Collaborating Physician: Antoinette Elena MD  Reason for Consult:  Assess rehabilitation needs     Consults  Subjective:     Principal Problem: Nonrheumatic aortic valve stenosis    HPI: Alina Narayan is a 78-year-old female with PMHx of pulmonary embolism on chronic AC, s/p AVR (21 mm Trifecta) in 2003, breast cancer (s/p mastectomy, radiation and chemo). She underwent AVR in December 2013 with Dr Webb with a 21 mmTrifecta. Patient presented to [facility] [place] 5/10/23 for evaluation of a failing 21 mm Trifecta valve. S/p redo sternotomy AVR with Dr. Webb on 5/10/23. On Eliquis BID for hx of PE.     Functional History: Patient lives with spouse in a single story home with 1 step to enter.  Prior to admission, [facility]. DME: RW as needed but mostly I.       Hospital Course: 5/15/23: Participated w/ PT. Sit to stand Oliver. Ambulated 8ft and 18 ft CGA w/ HHA. [facility] 17.   SLP following for dysphonia. SLP rec ENT evaluation and follow up as an out patient should vocal quality not fully resume in approx 2-4 weeks. Diet reg and thin.       Past Medical History:   Diagnosis Date    Allergy     seasonal    Anxiety     Arthritis     BRCA1 negative     Breast cancer 10/2012    left breast invasive ductal carcinoma    Colon polyp     Depression     Diabetes mellitus     Type 2    Diabetes mellitus, type 2     Diverticular disease     Diverticulitis 2009    Genetic testing 05/2017    negative Integrated BRACAnalysis    Hyperlipidemia     Hypertension     Morbid obesity     MITCHELL (obstructive sleep apnea)     Pulmonary embolism     Stenosis     Stenosis  and insufficiency of lacrimal passages     Thyroid disease      Past Surgical History:   Procedure Laterality Date    AORTIC VALVE REPLACEMENT N/A 5/10/2023    Procedure: Replacement-valve-aortic, redo sternotomy;  Surgeon: Venkat Webb MD;  Location: [facility] OR 2ND [facility];  Service: Cardiothoracic;  Laterality: N/A;  Redo sternotomy    BREAST BIOPSY Left 10/01/2012    left breast- invasive ductal carcinoma    BREAST BIOPSY Left 12/2017    BREAST CYST ASPIRATION      BREAST LUMPECTOMY Left 2012    BREAST MASS EXCISION      CARDIAC VALVE REPLACEMENT  12/2013    CARDIAC VALVE SURGERY  2013    CARPAL TUNNEL RELEASE      Left    COLONOSCOPY N/A 09/29/2017    Procedure: COLONOSCOPY;  Surgeon: Phani Worley MD;  Location: [facility] ENDO (4TH FLR);  Service: Endoscopy;  Laterality: N/A;    COLONOSCOPY N/A 1/5/2023    Procedure: COLONOSCOPY;  Surgeon: Eladia Martino MD;  Location: [facility] ENDO (4TH FLR);  Service: Endoscopy;  Laterality: N/A;  instr via portal  ok to hole Eliquis-see encounter 12/9-MS  1/4 pateint cannot come earlier-rt    CORONARY ANGIOGRAPHY Left 4/13/2023    Procedure: ANGIOGRAM, CORONARY ARTERY;  Surgeon: Eze Sales MD;  Location: [facility] CATH LAB;  Service: Cardiology;  Laterality: Left;  low bleeding risk 2.9%    DILATION AND CURETTAGE OF UTERUS  1999    Endometrial polyps    ENDOMETRIAL ABLATION  1999    Enodmetrial polyps    EYE SURGERY      INSERTION OF TUNNELED CENTRAL VENOUS CATHETER (CVC) WITH SUBCUTANEOUS PORT Right 02/01/2019    Procedure: [id number];  Surgeon: Gaston Flores MD;  Location: [address];  Service: General;  Laterality: Right;    left lumpectomy  2012    MASTECTOMY      MEDIPORT REMOVAL Right 08/26/2019    Procedure: REMOVAL, CATHETER, CENTRAL VENOUS, TUNNELED, WITH PORT;  Surgeon: Gaston Flores MD;  Location: [address];  Service: General;  Laterality: Right;    MODIFIED RADICAL MASTECTOMY W/ AXILLARY LYMPH NODE  DISSECTION Right 08/26/2019    Procedure: MASTECTOMY, MODIFIED RADICAL;  Surgeon: Gaston Flores MD;  Location: [facility] OR [address] [place] [address];  Service: General;  Laterality: Right;    SHOULDER SURGERY      SKIN BIOPSY       Review of patient's allergies indicates:   Allergen Reactions    Augmentin [amoxicillin-pot clavulanate] Diarrhea    Dilaudid [hydromorphone (bulk)] Other (See Comments)     Other reaction(s): sedation    Hydromorphone Other (See Comments)     Other reaction(s): sedation    Cymbalta [duloxetine]      Dry mouth, agitation    Jardiance [empagliflozin] Hives    Byetta [exenatide] Rash     Other reaction(s): Rash       Scheduled Medications:    apixaban  2.5 mg Oral BID    aspirin  325 mg Oral Daily    docusate sodium  100 mg Oral BID    furosemide  60 mg Oral BID    gabapentin  300 mg Oral TID    insulin aspart U-100  5 Units Subcutaneous TIDWM    insulin detemir U-100  16 Units Subcutaneous Daily    levothyroxine  75 mcg Oral Before breakfast    LIDOcaine  1 patch Transdermal Q24H    metoprolol tartrate  25 mg Oral BID    polyethylene glycol  17 g Oral Daily    pravastatin  20 mg Oral Daily    senna-docusate 8.6-50 mg  1 tablet Oral Daily       PRN Medications: albumin human 5%, aspirin, bisacodyL, dextrose 10%, dextrose 10%, dextrose 10%, dextrose 10%, dextrose, dextrose, glucagon (human recombinant), HYDROcodone-acetaminophen, HYDROcodone-acetaminophen, insulin aspart U-100, magnesium sulfate IVPB, magnesium sulfate IVPB, metoclopramide HCl, ondansetron, potassium chloride in water **AND** potassium chloride in water **AND** potassium chloride in water, sodium chloride 0.9%, sodium phosphate IVPB, sodium phosphate IVPB, sodium phosphate IVPB    Family History       Problem Relation (Age of Onset)    Alcohol abuse Brother    Anxiety disorder Son    Breast cancer Mother    Cancer Father, Maternal Grandfather, Brother    Colon cancer Father    SAL disease Son    Heart disease Father     No Known Problems Sister, Son    Sleep disorder Son          Tobacco Use    Smoking status: Never    Smokeless tobacco: Never   Substance and Sexual Activity    Alcohol use: No     Alcohol/week: 0.0 standard drinks    Drug use: Never    Sexual activity: Yes     Partners: Male     Review of Systems   Constitutional:  Positive for activity change. Negative for fatigue and fever.   HENT:  Negative for sore throat and trouble swallowing.    Eyes:  Negative for visual disturbance.   Respiratory:  Negative for cough and shortness of breath.    Cardiovascular:  Negative for chest pain and leg swelling.   Gastrointestinal:  Negative for abdominal distention and abdominal pain.   Genitourinary:  Negative for difficulty urinating.   Musculoskeletal:  Positive for gait problem. Negative for back pain.   Skin:  Negative for color change.   Neurological:  Positive for weakness. Negative for dizziness, light-headedness and headaches.   Psychiatric/Behavioral:  Negative for agitation and confusion.      Objective:     Vital Signs (Most Recent):  Temp: 98.5 °F (36.9 °C) (05/16/23 0715)  Pulse: 88 (05/16/23 0745)  Resp: 18 (05/16/23 0836)  BP: (!) 124/59 (05/16/23 0700)  SpO2: 96 % (05/16/23 0745)    Vital Signs (24h Range):  Temp:  [98.2 °F (36.8 °C)-98.5 °F (36.9 °C)] 98.5 °F (36.9 °C)  Pulse:  [75-93] 88  Resp:  [10-40] 18  SpO2:  [86 %-96 %] 96 %  BP: ()/(51-65) 124/59     Body mass index is 46.01 kg/m².     Physical Exam  Vitals and nursing note reviewed.   Constitutional:       Appearance: Normal appearance. She is well-developed.   HENT:      Head: Normocephalic and atraumatic.      Nose: Nose normal.   Eyes:      Pupils: Pupils are equal, round, and reactive to light.   Pulmonary:      Effort: Pulmonary effort is normal. No respiratory distress.   Abdominal:      General: Bowel sounds are normal.   Musculoskeletal:      Cervical back: Normal range of motion and neck supple.      Comments: Deconditioned   Skin:      General: Skin is warm and dry.   Neurological:      Mental Status: She is alert and oriented to person, place, and time. Mental status is at baseline.      Motor: Weakness present.      Gait: Gait abnormal.   Psychiatric:         Mood and Affect: Mood normal.         Behavior: Behavior normal.     Diagnostic Results:   Labs: Reviewed  ECG: Reviewed  CT: Reviewed    Assessment/Plan:     * Nonrheumatic aortic valve stenosis  - S/p redo sternotomy AVR with Dr. Webb on 5/10/23    Impaired mobility  - Related to prolonged/acute hospital course.     Recommendations  -  Encourage mobility, OOB in chair at least 3 hours per day, and early ambulation as appropriate  -  PT/OT evaluate and treat  -  Pain management  -  Monitor for and prevent skin breakdown and pressure ulcers  · Early mobility, repositioning/weight shifting every 20-30 minutes when sitting, turn patient every 2 hours, proper mattress/overlay and chair cushioning, pressure relief/heel protector boots  -  DVT prophylaxis    -  Reviewed discharge options (IP rehab, SNF, HH therapy, and OP therapy)    Acute blood loss anemia  - H&H stable    History of pulmonary embolism  - On Eliquis BID for hx of PE.     PM&R Recommendation:     At this time, the PM&R team has reviewed this patient's ongoing medical case including inpatient diagnosis, medical history, clinical examination, labs, vitals, current social and functional history to provide the post-acute recommendation as follows:     RECOMMENDATIONS: inpatient rehabilitation due to good motivation/participation with therapies, has been determined to tolerate 3 hours of therapy and good potential for recovery.     The patient will be admitted for comprehensive interdisciplinary inpatient rehabilitation to address the impairments due to medical diagnosis of Debility. The patient will benefit from an inpatient rehabilitation program to promote functional recovery, implement compensatory strategies and will undergo  assessment for needs for durable medical equipment for safe discharge to the community. This patient will benefit from a coordinated interdisciplinary rehabilitation program services that require close monitoring and treatment with 24-hour rehabilitative nursing and physical/occupational therapies for 3 hours/day for 5 days/week.This interdisciplinary program will be performed under the direction of a physiatrist.    MEDICAL STABILITY:     At this time, no barriers for post-acute rehab admission.    We will continue to follow.    Thank you for your consult.     Shalini Alba NP  Department of Physical Medicine & Rehab  [facility] - Surgical Intensive Care

## 2023-05-16 NOTE — PROGRESS NOTES
[facility] - [facility]  Critical Care - Surgery  Progress Note    Patient Name: Alina Narayan  MRN: [id number]  Admission Date: 5/10/2023  Hospital Length of Stay: 6 days  Code Status: Full Code  Attending Provider: Venkat Webb MD  Primary Care Provider: Aarti Dunn MD   Principal Problem: Nonrheumatic aortic valve stenosis    Subjective:     Hospital/ICU Course:  Alina Narayan is a 78 y.o. female who presents for pre-op re-do AVR. She has a medical history significant for pulmonary embolism on chronic AC, s/p AVR (21 mm Trifecta) in 2003, breast cancer (s/p mastectomy, radiation and chemo) who presents for evaluation of a failing 21 mm Trifecta valve, true ID 19. She underwent AVR in December 2013 with Dr Webb with a 21 mmTrifecta. The patient presents to the SICU s/p redo sternotomy AVR  with Dr. Webb on 05/10/2023. When aorta clamp was removed and BIBIANA evaluation was done, patient noted to have mitral stenosis so decision made for MVR. CPB run ended without issues. On admission, they are intubated, sedated with propofol, and in stable condition. Inotropic and vasopressor requirements upon admission are 0.04 mcg/kg/min of epinephrine, 0.04 mcg/kg/min of norepinephrine to maintain blood pressure at a MAP 60-80 and SBP < 140. Central access includes a LIJ CVC, arterial access includes a left brachial arterial line. They also have 2 pleural and 2 mediastinal chest tubes with appropriate output.     Intraoperatively, they received 2L of crystalloid, 747 of cell saver, 1 PRBCs. Urine output intraoperatively was 500 cc. The pre-operative echocardiogram was notable for EF 65%, normal RV function. Post-operative echo was normal biventricular function.     Immediate post-operative plans include hemodynamic stabilization and weaning of cardiac and respiratory support. Plan to wean vasopressors and inotropes as tolerated, and wean ventilator support with goal of early extubation, and closely  monitor fluid status with strict Is/Os and continued fluid resuscitation as needed.       Interval History/Significant Events: NAEON. Pain well controlled. Received 1 dose of norco 5. Awaiting stepdown. Case management submitted referrals for rehab/SNF placement yesterday.    Follow-up For: Procedure(s) (LRB):  Replacement-valve-aortic, redo sternotomy (N/A)    Post-Operative Day: 6 Days Post-Op    Objective:     Vital Signs (Most Recent):  Temp: 98.3 °F (36.8 °C) (05/16/23 0300)  Pulse: 82 (05/16/23 0530)  Resp: 12 (05/16/23 0530)  BP: (!) 117/55 (05/16/23 0500)  SpO2: (!) 93 % (05/16/23 0530) Vital Signs (24h Range):  Temp:  [98.2 °F (36.8 °C)-98.5 °F (36.9 °C)] 98.3 °F (36.8 °C)  Pulse:  [75-93] 82  Resp:  [10-40] 12  SpO2:  [86 %-97 %] 93 %  BP: ()/(51-65) 117/55     Weight: 114.1 kg (251 lb 8.7 oz)  Body mass index is 46.01 kg/m².      Intake/Output Summary (Last 24 hours) at 5/16/2023 0600  Last data filed at 5/16/2023 0400  Gross per 24 hour   Intake --   Output 2050 ml   Net -2050 ml          Physical Exam  Vitals reviewed.   Constitutional:       Appearance: Normal appearance.   Cardiovascular:      Rate and Rhythm: Normal rate.      Pulses: Normal pulses.   Pulmonary:      Effort: Pulmonary effort is normal.   Abdominal:      General: There is no distension.      Palpations: Abdomen is soft. There is no mass.      Tenderness: There is no abdominal tenderness. There is no guarding.   Skin:     General: Skin is warm and dry.   Neurological:      General: No focal deficit present.      Mental Status: She is alert and oriented to person, place, and time.          Vents:  Vent Mode: Spont (05/11/23 1215)  Ventilator Initiated: Yes (05/10/23 1720)  Set Rate: 20 BPM (05/11/23 1026)  Vt Set: 340 mL (05/11/23 1026)  Pressure Support: 5 cmH20 (05/11/23 1215)  PEEP/CPAP: 5 cmH20 (05/11/23 1215)  Oxygen Concentration (%): 28 (05/13/23 0406)  Peak Airway Pressure: 10 cmH20 (05/11/23 1215)  Plateau Pressure: 0  cmH20 (05/11/23 1215)  Total Ve: 4.48 L/m (05/11/23 1215)  Negative Inspiratory Force (cm H2O): -50 (05/11/23 1215)  F/VT Ratio<105 (RSBI): (!) 31.95 (Simultaneous filing. User may not have seen previous data.) (05/11/23 1215)    Lines/Drains/Airways       Central Venous Catheter Line  Duration                  Port A Cath Single Lumen -- days              Drain  Duration             Female External Urinary Catheter 05/13/23 1200 2 days              Peripheral Intravenous Line  Duration                  Peripheral IV - Single Lumen 05/14/23 1145 20 G;1 3/4 in Left;Anterior Upper Arm 1 day                    Significant Labs:    CBC/Anemia Profile:  Recent Labs   Lab 05/15/23  0322 05/16/23  0348   WBC 6.54 5.68   HGB 7.4* 7.8*   HCT 24.3* 25.2*   PLT 87* 111*   MCV 95 93   RDW 14.6* 14.7*        Chemistries:  Recent Labs   Lab 05/14/23  2012 05/15/23  0322 05/15/23  1802 05/16/23  0348   NA  --  142  --  141   K 3.8 4.1 4.1 3.6   CL  --  104  --  102   CO2  --  27  --  28   BUN  --  32*  --  33*   CREATININE  --  1.3  --  1.2   CALCIUM  --  8.7  --  9.0   ALBUMIN  --  3.1*  --  3.4*   PROT  --  5.4*  --  6.1   BILITOT  --  0.6  --  0.6   ALKPHOS  --  110  --  123   ALT  --  36  --  37   AST  --  62*  --  45*   MG 2.1 2.0  --  2.1   PHOS 2.6* 3.2  --  3.8     Assessment/Plan:     Cardiac/Vascular  * Nonrheumatic aortic valve stenosis  S/p redo AVR with bioprosthetic valve with Dr. Webb on 5/10. Initial AVR with Tracey 2013.       Neuro/Psych:     - Sedation: none    - Pain:    - Scheduled Gabapentin 300 TID   - Norco 5/10 PRN             Cardiac:     - S/P redo sternotomy with redo AVR with Dr. Webb on 5/10/2023    - BP Goal: MAP 60-80, SBP < 140     - Rhythm: NSR    - Beta blocker: lopressor 25 BID    - Statin: pravastatin (home med)    - Eliquis 2.5 BID (history of PE)      Pulmonary:     - Goal SpO2 >92%, used CPAP overnight. On RA this morning      Renal:    - Trend BUN/Cr     - Lasix 60 PO BID    -  Good UOP - 1950 mL last 24 hrs    Recent Labs   Lab 05/14/23  0345 05/15/23  0322 05/16/23  0348   BUN 31* 32* 33*   CREATININE 1.3 1.3 1.2         FEN / GI:     - Daily CMP, PRN K/Mag/Phos per protocol     - Replace electrolytes as needed    - Nutrition: diabetic diet with fluid restriction     - Bowel Regimen: Miralax, docusate    - Last BM 5/14      ID:     - Afebrile    - WBC stable    - Abx: Completed perioperative cefazolin 2g Q8H x 5 doses    Recent Labs   Lab 05/14/23  0345 05/15/23  0322 05/16/23  0348   WBC 6.96 6.54 5.68         Heme/Onc:     - Hgb 12.6 pre-operatively    - CBC daily    - ASA 325mg daily, initiated within 6hr of surgery    - 2.5 Eliquis BID for Hx of PE    Recent Labs   Lab 05/14/23  0345 05/15/23  0322 05/16/23  0348   HGB 7.8* 7.4* 7.8*   PLT 76* 87* 111*   APTT 26.8 26.4 26.1   INR 1.0 1.0 1.0         Endocrine:     - CTS Goal -140    - HgbA1c: 7.3    - Endocrinology consulted for insulin management      PPx:   Feeding: diabetic diet   Analgesia/Sedation: gabapentin, norco PRN   Thromboembolic Prevention: SCDs/Heparin 7500 TID   HOB >30: Yes  Stress Ulcer: none indicated  Glucose Control: Yes, insulin management per Endocrinology     Lines/Drains/Airway:   PIV      Dispo/Code Status/Palliative:     - Stepdown orders in, awaiting bed.    - Full Code      Critical secondary to Patient has a condition that poses threat to life and bodily function: s/p AVR     Critical care was time spent personally by me on the following activities: development of treatment plan with patient or surrogate and bedside caregivers, discussions with consultants, evaluation of patient's response to treatment, examination of patient, ordering and performing treatments and interventions, ordering and review of laboratory studies, ordering and review of radiographic studies, pulse oximetry, re-evaluation of patient's condition.  This critical care time did not overlap with that of any other provider or  involve time for any procedures.     Bradley Morris MD  Critical Care - Surgery  [facility] - [facility]

## 2023-05-16 NOTE — HPI
Alina Narayan is a 78-year-old female with PMHx of pulmonary embolism on chronic AC, s/p AVR (21 mm Trifecta) in 2003, breast cancer (s/p mastectomy, radiation and chemo). She underwent AVR in December 2013 with Dr Webb with a 21 mmTrifecta. Patient presented to [facility] on 5/10/23 for evaluation of a failing 21 mm Trifecta valve. S/p redo sternotomy AVR with Dr. Webb on 5/10/23. On Eliquis BID for hx of PE.     Functional History: Patient lives with spouse in a single story home with 1 step to enter.  Prior to admission, Rayne. DME: RW as needed but mostly I.

## 2023-05-16 NOTE — PT/OT/SLP PROGRESS
Physical Therapy Treatment    Patient Name:  Alina Narayan   MRN:  [id number]  Admit Date: 5/10/2023  Admitting Diagnosis:  Nonrheumatic aortic valve stenosis   Length of Stay: 6 days  Recent Surgery: Procedure(s) (LRB):  Replacement-valve-aortic, redo sternotomy (N/A) 6 Days Post-Op    Recommendations:     Discharge Recommendations:  rehabilitation facility   Discharge Equipment Recommendations: none       Plan:     During this hospitalization, patient to be seen 5 x/week to address the listed problems via gait training, therapeutic activities, therapeutic exercises, neuromuscular re-education  Plan of Care Expires:  06/10/23  Plan of Care Reviewed with: patient, spouse, son    Assessment:     Alina Narayan is a 78 y.o. female admitted with a medical diagnosis of Nonrheumatic aortic valve stenosis. Pt found alert and cooperative. VSS throughout session. Pt progressing towards goals, but not at PLOF. Pt tolerated session well but continues with impaired endurance and gait instability.   Pt is improving with therapy evidenced by increased gait distance and improved activity tolerance. Pt would benefit from continued PT services to improve overall functional mobility. Pt is an excellent IP rehab candidate due to qualifying diagnosis, good activity tolerance, decline from PLOF, good family support, and excellent motivation. Pt can tolerate 3 hours of therapy daily. Recommend d/c to Rehab to maximize functional independence.         Problem List: weakness, impaired endurance, impaired functional mobility, gait instability, impaired balance, decreased upper extremity function, pain, impaired cardiopulmonary response to activity, edema.  Rehab Prognosis: Good     GOALS:   Multidisciplinary Problems       Physical Therapy Goals          Problem: Physical Therapy    Goal Priority Disciplines Outcome Goal Variances Interventions   Physical Therapy Goal     PT, PT/OT Ongoing, Progressing     Description: Goals to be met by:  "2023     Patient will increase functional independence with mobility by performin. Sit to stand transfer with Supervision  2. Gait  x 100 feet with Contact Guard Assistance using platform RW or LRAD.   3. Stand for 5 minutes with Stand-by Assistance using UE support prn while performing dynamic balance task  4. Lower extremity exercise program x30 reps per handout, with independence                         Subjective   Communicated with RN prior to session.  Patient found HOB elevated upon PT entry to room, agreeable to evaluation. Alina Narayan's  and son present during session.    Chief Complaint: pain  Patient/Family Comments/goals: to get better  Pain/Comfort:  Pain Rating 1: 10/10  Location 1:  (sternal)  Pain Addressed 1: Reposition, Distraction  Pain Rating Post-Intervention 1: 10/10    Objective:   Patient found with: telemetry, pulse ox (continuous), blood pressure cuff, peripheral IV, PureWick   General Precautions: Standard, Cardiac fall, sternal   Orthopedic Precautions:N/A   Braces: N/A   Oxygen Device: Room Air  Vitals: /65 (BP Location: Left arm, Patient Position: Lying)   Pulse 81   Temp 98.1 °F (36.7 °C) (Oral)   Resp 20   Ht 5' 2" (1.575 m)   Wt 114.1 kg (251 lb 8.7 oz)   SpO2 95%   Breastfeeding No   BMI 46.01 kg/m²     Outcome Measures:  AM-PAC 6 CLICK MOBILITY  Turning over in bed (including adjusting bedclothes, sheets and blankets)?: 2  Sitting down on and standing up from a chair with arms (e.g., wheelchair, bedside commode, etc.): 3  Moving from lying on back to sitting on the side of the bed?: 2  Moving to and from a bed to a chair (including a wheelchair)?: 3  Need to walk in hospital room?: 3  Climbing 3-5 steps with a railing?: 2  Basic Mobility Total Score: 15         Functional Mobility:  Additional staff present: OT - due to pt requiring 2 skilled therapists to safely perform functional mobility and to accommodate pt's activity tolerance    Bed " Mobility:   Supine to Sit: maximal assistance; HOB elevated  Scooting anteriorly to EOB to have both feet planted on floor: contact guard assistance    Sitting Balance at Edge of Bed:  Assistance Level Required: Supervision  Time: 5 minutes  Comments:   Worked on activity tolerance sitting EOB and worked on tolerance to positional change     Transfers:   Sit <> Stand Transfer: contact guard assistance with no assistive device x 3 trials (EOB x 2 and toilet x 1)        Gait:  Patient ambulated: 10ft + 10ft +104ft   Toileting and ADLs between trials 1 and 2  Seated rest break between trials 2 and 3   Patient required: contact guard  Patient used:  L HHA  Gait Pattern observed: reciprocal gait  Gait Deviation(s): decreased step length, flexed posture, and decreased rebeca  Impairments due to: impaired balance, pain, decreased strength, and decreased endurance  Comments:   Portable monitor intact and RN present  No overt LOB but pt with occasionally missteps that required CGA to correct  Mild SOB present  External stability provided by L HHA  Verbal cuing for pacing, deep breathing, upright posture, and purposeful steps         Therapeutic Activities, Exercises, and Education:   Educated pt on PT role/POC  Educated pt on importance of OOB activity and daily ambulation   Educated pt on sternal precautions  Pt verbalized understanding     T/f to chair to increase tolerance to OOB activity and to create optimal positioning for lung expansion       Patient left up in chair with all lines intact, call button in reach, and RN notified and  and son present     Time Tracking:     PT Received On: 05/16/23  PT Start Time: 0852     PT Stop Time: 0920  PT Total Time (min): 28 min       Billable Minutes:   Gait Training 23    Treatment Type: Treatment  PT/PTA: PT

## 2023-05-16 NOTE — PROGRESS NOTES
patient seen and examined on morning rounds with critical care team. She has done well from an operative standpoint, but her degree of her mobility and comorbid orthopedic problems remain an issue. She is being evaluated by rehab.

## 2023-05-16 NOTE — SUBJECTIVE & OBJECTIVE
Past Medical History:   Diagnosis Date    Allergy     seasonal    Anxiety     Arthritis     BRCA1 negative     Breast cancer 10/2012    left breast invasive ductal carcinoma    Colon polyp     Depression     Diabetes mellitus     Type 2    Diabetes mellitus, type 2     Diverticular disease     Diverticulitis 2009    Genetic testing 05/2017    negative Integrated BRACAnalysis    Hyperlipidemia     Hypertension     Morbid obesity     MITCHELL (obstructive sleep apnea)     Pulmonary embolism     Stenosis     Stenosis and insufficiency of lacrimal passages     Thyroid disease      Past Surgical History:   Procedure Laterality Date    AORTIC VALVE REPLACEMENT N/A 5/10/2023    Procedure: Replacement-valve-aortic, redo sternotomy;  Surgeon: Venkat Webb MD;  Location: [facility] OR [facility] F[facility];  Service: Cardiothoracic;  Laterality: N/A;  Redo sternotomy    BREAST BIOPSY Left 10/01/2012    left breast- invasive ductal carcinoma    BREAST BIOPSY Left 12/2017    BREAST CYST ASPIRATION      BREAST LUMPECTOMY Left 2012    BREAST MASS EXCISION      CARDIAC VALVE REPLACEMENT  12/2013    CARDIAC VALVE SURGERY  2013    CARPAL TUNNEL RELEASE      Left    COLONOSCOPY N/A 09/29/2017    Procedure: COLONOSCOPY;  Surgeon: Phani Worley MD;  Location: [facility] ([address] [place] [address]);  Service: Endoscopy;  Laterality: N/A;    COLONOSCOPY N/A 1/5/2023    Procedure: COLONOSCOPY;  Surgeon: Eladia Martino MD;  [facility]  Service: Endoscopy;  Laterality: N/A;  instr via portal  ok to hole Eliquis-see encounter 12/9-MS  1/4 pateint cannot come earlier-rt    CORONARY ANGIOGRAPHY Left 4/13/2023    Procedure: ANGIOGRAM, CORONARY ARTERY;  Surgeon: Eze Sales MD;  Location: [facility] CATH LAB;  Service: Cardiology;  Laterality: Left;  low bleeding risk 2.9%    DILATION AND CURETTAGE OF UTERUS  1999    Endometrial polyps    ENDOMETRIAL ABLATION  1999    Enodmetrial polyps    EYE SURGERY      INSERTION OF TUNNELED CENTRAL VENOUS CATHETER  (CVC) WITH SUBCUTANEOUS PORT Right 02/01/2019    Procedure: [id number];  Surgeon: Gaston Flores MD;  [facility];  Service: General;  Laterality: Right;    left lumpectomy  2012    MASTECTOMY      MEDIPORT REMOVAL Right 08/26/2019    Procedure: REMOVAL, CATHETER, CENTRAL VENOUS, TUNNELED, WITH PORT;  Surgeon: Gaston Flores MD;  [facility];  Service: General;  Laterality: Right;    MODIFIED RADICAL MASTECTOMY W/ AXILLARY LYMPH NODE DISSECTION Right 08/26/2019    Procedure: MASTECTOMY, MODIFIED RADICAL;  Surgeon: Gaston Flores MD;  [facility];  Service: General;  Laterality: Right;    SHOULDER SURGERY      SKIN BIOPSY       Review of patient's allergies indicates:   Allergen Reactions    Augmentin [amoxicillin-pot clavulanate] Diarrhea    Dilaudid [hydromorphone (bulk)] Other (See Comments)     Other reaction(s): sedation    Hydromorphone Other (See Comments)     Other reaction(s): sedation    Cymbalta [duloxetine]      Dry mouth, agitation    Jardiance [empagliflozin] Hives    Byetta [exenatide] Rash     Other reaction(s): Rash       Scheduled Medications:    apixaban  2.5 mg Oral BID    aspirin  325 mg Oral Daily    docusate sodium  100 mg Oral BID    furosemide  60 mg Oral BID    gabapentin  300 mg Oral TID    insulin aspart U-100  5 Units Subcutaneous TIDWM    insulin detemir U-100  16 Units Subcutaneous Daily    levothyroxine  75 mcg Oral Before breakfast    LIDOcaine  1 patch Transdermal Q24H    metoprolol tartrate  25 mg Oral BID    polyethylene glycol  17 g Oral Daily    pravastatin  20 mg Oral Daily    senna-docusate 8.6-50 mg  1 tablet Oral Daily       PRN Medications: albumin human 5%, aspirin, bisacodyL, dextrose 10%, dextrose 10%, dextrose 10%, dextrose 10%, dextrose, dextrose, glucagon (human recombinant), HYDROcodone-acetaminophen, HYDROcodone-acetaminophen, insulin aspart U-100, magnesium sulfate IVPB, magnesium sulfate IVPB,  metoclopramide HCl, ondansetron, potassium chloride in water **AND** potassium chloride in water **AND** potassium chloride in water, sodium chloride 0.9%, sodium phosphate IVPB, sodium phosphate IVPB, sodium phosphate IVPB    Family History       Problem Relation (Age of Onset)    Alcohol abuse Brother    Anxiety disorder Son    Breast cancer Mother    Cancer Father, Maternal Grandfather, Brother    Colon cancer Father    SAL disease Son    Heart disease Father    No Known Problems Sister, Son    Sleep disorder Son          Tobacco Use    Smoking status: Never    Smokeless tobacco: Never   Substance and Sexual Activity    Alcohol use: No     Alcohol/week: 0.0 standard drinks    Drug use: Never    Sexual activity: Yes     Partners: Male     Review of Systems   Constitutional:  Positive for activity change. Negative for fatigue and fever.   HENT:  Negative for sore throat and trouble swallowing.    Eyes:  Negative for visual disturbance.   Respiratory:  Negative for cough and shortness of breath.    Cardiovascular:  Negative for chest pain and leg swelling.   Gastrointestinal:  Negative for abdominal distention and abdominal pain.   Genitourinary:  Negative for difficulty urinating.   Musculoskeletal:  Positive for gait problem. Negative for back pain.   Skin:  Negative for color change.   Neurological:  Positive for weakness. Negative for dizziness, light-headedness and headaches.   Psychiatric/Behavioral:  Negative for agitation and confusion.    Objective:     Vital Signs (Most Recent):  Temp: 98.5 °F (36.9 °C) (05/16/23 0715)  Pulse: 88 (05/16/23 0745)  Resp: 18 (05/16/23 0836)  BP: (!) 124/59 (05/16/23 0700)  SpO2: 96 % (05/16/23 0745)    Vital Signs (24h Range):  Temp:  [98.2 °F (36.8 °C)-98.5 °F (36.9 °C)] 98.5 °F (36.9 °C)  Pulse:  [75-93] 88  Resp:  [10-40] 18  SpO2:  [86 %-96 %] 96 %  BP: ()/(51-65) 124/59     Body mass index is 46.01 kg/m².     Physical Exam  Vitals and nursing note reviewed.    Constitutional:       Appearance: Normal appearance. She is well-developed.   HENT:      Head: Normocephalic and atraumatic.      Nose: Nose normal.   Eyes:      Pupils: Pupils are equal, round, and reactive to light.   Pulmonary:      Effort: Pulmonary effort is normal. No respiratory distress.   Abdominal:      General: Bowel sounds are normal.   Musculoskeletal:      Cervical back: Normal range of motion and neck supple.      Comments: Deconditioned   Skin:     General: Skin is warm and dry.   Neurological:      Mental Status: She is alert and oriented to person, place, and time. Mental status is at baseline.      Motor: Weakness present.      Gait: Gait abnormal.   Psychiatric:         Mood and Affect: Mood normal.         Behavior: Behavior normal.        NEUROLOGICAL EXAMINATION:     MENTAL STATUS   Oriented to person, place, and time.     CRANIAL NERVES     CN III, IV, VI   Pupils are equal, round, and reactive to light.    Diagnostic Results: Labs: Reviewed  ECG: Reviewed  CT: Reviewed

## 2023-05-16 NOTE — PLAN OF CARE
9:22 AM  The SW received a message from Anne ([phone]) with [employer] stating that the patient's auth was unable to be approved by the medical director. Anne stated that the provider is offering a peer to peer before a formal denial. Anne reported that the patient's peer to peer has to be scheduled by 5-17-23 at 9:00am. The SW sent the patient's MD informing about the peer to peer request and time deadline. The SW is awaiting a response from the patient's MD .     10:17 AM  The patient's care team reported to move forward with SNF placement.     The SW will continue to follow.     Pj Mcneal LMSW  Case Management [facility]  Ext: [phone]

## 2023-05-16 NOTE — SUBJECTIVE & OBJECTIVE
Past Medical History:   Diagnosis Date    Allergy     seasonal    Anxiety     Arthritis     BRCA1 negative     Breast cancer 10/2012    left breast invasive ductal carcinoma    Colon polyp     Depression     Diabetes mellitus     Type 2    Diabetes mellitus, type 2     Diverticular disease     Diverticulitis 2009    Genetic testing 05/2017    negative Integrated BRACAnalysis    Hyperlipidemia     Hypertension     Morbid obesity     MITCHELL (obstructive sleep apnea)     Pulmonary embolism     Stenosis     Stenosis and insufficiency of lacrimal passages     Thyroid disease      Past Surgical History:   Procedure Laterality Date    AORTIC VALVE REPLACEMENT N/A 5/10/2023    Procedure: Replacement-valve-aortic, redo sternotomy;  Surgeon: Venkat Webb MD;  Location: [facility];  Service: Cardiothoracic;  Laterality: N/A;  Redo sternotomy    BREAST BIOPSY Left 10/01/2012    left breast- invasive ductal carcinoma    BREAST BIOPSY Left 12/2017    BREAST CYST ASPIRATION      BREAST LUMPECTOMY Left 2012    BREAST MASS EXCISION      CARDIAC VALVE REPLACEMENT  12/2013    CARDIAC VALVE SURGERY  2013    CARPAL TUNNEL RELEASE      Left    COLONOSCOPY N/A 09/29/2017    Procedure: COLONOSCOPY;  Surgeon: Phani Worley MD;  Location: [facility] ([address] [place] [address]);  Service: Endoscopy;  Laterality: N/A;    COLONOSCOPY N/A 1/5/2023    Procedure: COLONOSCOPY;  Surgeon: Eladia Martino MD;  [facility]  Service: Endoscopy;  Laterality: N/A;  instr via portal  ok to hole Eliquis-see encounter 12/9-MS  1/4 pateint cannot come earlier-rt    CORONARY ANGIOGRAPHY Left 4/13/2023    Procedure: ANGIOGRAM, CORONARY ARTERY;  Surgeon: Eze Sales MD;  Location: [facility] CATH LAB;  Service: Cardiology;  Laterality: Left;  low bleeding risk 2.9%    DILATION AND CURETTAGE OF UTERUS  1999    Endometrial polyps    ENDOMETRIAL ABLATION  1999    Enodmetrial polyps    EYE SURGERY      INSERTION OF TUNNELED CENTRAL VENOUS CATHETER  (CVC) WITH SUBCUTANEOUS PORT Right 02/01/2019    Procedure: [id number];  Surgeon: Gaston Flores MD;  [facility];  Service: General;  Laterality: Right;    left lumpectomy  2012    MASTECTOMY      MEDIPORT REMOVAL Right 08/26/2019    Procedure: REMOVAL, CATHETER, CENTRAL VENOUS, TUNNELED, WITH PORT;  Surgeon: Gaston Flores MD;  [facility];  Service: General;  Laterality: Right;    MODIFIED RADICAL MASTECTOMY W/ AXILLARY LYMPH NODE DISSECTION Right 08/26/2019    Procedure: MASTECTOMY, MODIFIED RADICAL;  Surgeon: Gaston Flores MD;  [facility];  Service: General;  Laterality: Right;    SHOULDER SURGERY      SKIN BIOPSY       Review of patient's allergies indicates:   Allergen Reactions    Augmentin [amoxicillin-pot clavulanate] Diarrhea    Dilaudid [hydromorphone (bulk)] Other (See Comments)     Other reaction(s): sedation    Hydromorphone Other (See Comments)     Other reaction(s): sedation    Cymbalta [duloxetine]      Dry mouth, agitation    Jardiance [empagliflozin] Hives    Byetta [exenatide] Rash     Other reaction(s): Rash       Scheduled Medications:    apixaban  2.5 mg Oral BID    aspirin  325 mg Oral Daily    docusate sodium  100 mg Oral BID    furosemide  60 mg Oral BID    gabapentin  300 mg Oral TID    insulin aspart U-100  5 Units Subcutaneous TIDWM    insulin detemir U-100  16 Units Subcutaneous Daily    levothyroxine  75 mcg Oral Before breakfast    LIDOcaine  1 patch Transdermal Q24H    metoprolol tartrate  25 mg Oral BID    polyethylene glycol  17 g Oral Daily    pravastatin  20 mg Oral Daily    senna-docusate 8.6-50 mg  1 tablet Oral Daily       PRN Medications: albumin human 5%, aspirin, bisacodyL, dextrose 10%, dextrose 10%, dextrose 10%, dextrose 10%, dextrose, dextrose, glucagon (human recombinant), HYDROcodone-acetaminophen, HYDROcodone-acetaminophen, insulin aspart U-100, magnesium sulfate IVPB, magnesium sulfate IVPB,  metoclopramide HCl, ondansetron, potassium chloride in water **AND** potassium chloride in water **AND** potassium chloride in water, sodium chloride 0.9%, sodium phosphate IVPB, sodium phosphate IVPB, sodium phosphate IVPB    Family History       Problem Relation (Age of Onset)    Alcohol abuse Brother    Anxiety disorder Son    Breast cancer Mother    Cancer Father, Maternal Grandfather, Brother    Colon cancer Father    SAL disease Son    Heart disease Father    No Known Problems Sister, Son    Sleep disorder Son          Tobacco Use    Smoking status: Never    Smokeless tobacco: Never   Substance and Sexual Activity    Alcohol use: No     Alcohol/week: 0.0 standard drinks    Drug use: Never    Sexual activity: Yes     Partners: Male     Review of Systems   Constitutional:  Positive for activity change. Negative for fatigue and fever.   HENT:  Negative for sore throat and trouble swallowing.    Eyes:  Negative for visual disturbance.   Respiratory:  Negative for cough and shortness of breath.    Cardiovascular:  Negative for chest pain and leg swelling.   Gastrointestinal:  Negative for abdominal distention and abdominal pain.   Genitourinary:  Negative for difficulty urinating.   Musculoskeletal:  Positive for gait problem. Negative for back pain.   Skin:  Negative for color change.   Neurological:  Positive for weakness. Negative for dizziness, light-headedness and headaches.   Psychiatric/Behavioral:  Negative for agitation and confusion.    Objective:     Vital Signs (Most Recent):  Temp: 98.5 °F (36.9 °C) (05/16/23 0715)  Pulse: 88 (05/16/23 0745)  Resp: 18 (05/16/23 0836)  BP: (!) 124/59 (05/16/23 0700)  SpO2: 96 % (05/16/23 0745)    Vital Signs (24h Range):  Temp:  [98.2 °F (36.8 °C)-98.5 °F (36.9 °C)] 98.5 °F (36.9 °C)  Pulse:  [75-93] 88  Resp:  [10-40] 18  SpO2:  [86 %-96 %] 96 %  BP: ()/(51-65) 124/59     Body mass index is 46.01 kg/m².     Physical Exam  Vitals and nursing note reviewed.    Constitutional:       Appearance: Normal appearance. She is well-developed.   HENT:      Head: Normocephalic and atraumatic.      Nose: Nose normal.      Mouth/Throat:      Mouth: Mucous membranes are moist.   Eyes:      Pupils: Pupils are equal, round, and reactive to light.   Pulmonary:      Effort: Pulmonary effort is normal. No respiratory distress.   Abdominal:      General: Bowel sounds are normal.      Palpations: Abdomen is soft.   Musculoskeletal:      Cervical back: Normal range of motion and neck supple.      Comments: Deconditioned   Skin:     General: Skin is warm and dry.   Neurological:      Mental Status: She is alert and oriented to person, place, and time. Mental status is at baseline.      Motor: Weakness present.      Gait: Gait abnormal.   Psychiatric:         Mood and Affect: Mood normal.         Thought Content: Thought content normal.        NEUROLOGICAL EXAMINATION:     MENTAL STATUS   Oriented to person, place, and time.     CRANIAL NERVES     CN III, IV, VI   Pupils are equal, round, and reactive to light.    Diagnostic Results: Labs: Reviewed  ECG: Reviewed  CT: Reviewed

## 2023-05-17 LAB
ANION GAP SERPL CALC-SCNC: 13 MMOL/L (ref 8–16)
BASOPHILS # BLD AUTO: 0.02 K/UL (ref 0–0.2)
BASOPHILS NFR BLD: 0.3 % (ref 0–1.9)
BUN SERPL-MCNC: 31 MG/DL (ref 8–23)
CALCIUM SERPL-MCNC: 9.4 MG/DL (ref 8.7–10.5)
CHLORIDE SERPL-SCNC: 102 MMOL/L (ref 95–110)
CO2 SERPL-SCNC: 25 MMOL/L (ref 23–29)
CREAT SERPL-MCNC: 1.3 MG/DL (ref 0.5–1.4)
DIFFERENTIAL METHOD: ABNORMAL
EOSINOPHIL # BLD AUTO: 0.1 K/UL (ref 0–0.5)
EOSINOPHIL NFR BLD: 1.6 % (ref 0–8)
ERYTHROCYTE [DISTWIDTH] IN BLOOD BY AUTOMATED COUNT: 14.7 % (ref 11.5–14.5)
EST. GFR  (NO RACE VARIABLE): 42.1 ML/MIN/1.73 M^2
GLUCOSE SERPL-MCNC: 114 MG/DL (ref 70–110)
HCT VFR BLD AUTO: 26.6 % (ref 37–48.5)
HGB BLD-MCNC: 8 G/DL (ref 12–16)
IMM GRANULOCYTES # BLD AUTO: 0.14 K/UL (ref 0–0.04)
IMM GRANULOCYTES NFR BLD AUTO: 2.2 % (ref 0–0.5)
LYMPHOCYTES # BLD AUTO: 1.5 K/UL (ref 1–4.8)
LYMPHOCYTES NFR BLD: 23.8 % (ref 18–48)
MAGNESIUM SERPL-MCNC: 2 MG/DL (ref 1.6–2.6)
MCH RBC QN AUTO: 28.7 PG (ref 27–31)
MCHC RBC AUTO-ENTMCNC: 30.1 G/DL (ref 32–36)
MCV RBC AUTO: 95 FL (ref 82–98)
MONOCYTES # BLD AUTO: 1 K/UL (ref 0.3–1)
MONOCYTES NFR BLD: 15.6 % (ref 4–15)
NEUTROPHILS # BLD AUTO: 3.6 K/UL (ref 1.8–7.7)
NEUTROPHILS NFR BLD: 56.5 % (ref 38–73)
NRBC BLD-RTO: 0 /100 WBC
PHOSPHATE SERPL-MCNC: 3.8 MG/DL (ref 2.7–4.5)
PLATELET # BLD AUTO: 130 K/UL (ref 150–450)
PMV BLD AUTO: 11.3 FL (ref 9.2–12.9)
POCT GLUCOSE: 120 MG/DL (ref 70–110)
POCT GLUCOSE: 172 MG/DL (ref 70–110)
POCT GLUCOSE: 201 MG/DL (ref 70–110)
POCT GLUCOSE: 264 MG/DL (ref 70–110)
POTASSIUM SERPL-SCNC: 4.6 MMOL/L (ref 3.5–5.1)
RBC # BLD AUTO: 2.79 M/UL (ref 4–5.4)
SODIUM SERPL-SCNC: 140 MMOL/L (ref 136–145)
WBC # BLD AUTO: 6.3 K/UL (ref 3.9–12.7)

## 2023-05-17 PROCEDURE — 36415 COLL VENOUS BLD VENIPUNCTURE: CPT | Performed by: PHYSICIAN ASSISTANT

## 2023-05-17 PROCEDURE — 83735 ASSAY OF MAGNESIUM: CPT | Performed by: PHYSICIAN ASSISTANT

## 2023-05-17 PROCEDURE — 99900035 HC TECH TIME PER 15 MIN (STAT)

## 2023-05-17 PROCEDURE — 94660 CPAP INITIATION&MGMT: CPT

## 2023-05-17 PROCEDURE — 94761 N-INVAS EAR/PLS OXIMETRY MLT: CPT

## 2023-05-17 PROCEDURE — 25000003 PHARM REV CODE 250: Performed by: PHYSICIAN ASSISTANT

## 2023-05-17 PROCEDURE — 97530 THERAPEUTIC ACTIVITIES: CPT

## 2023-05-17 PROCEDURE — 80048 BASIC METABOLIC PNL TOTAL CA: CPT | Performed by: PHYSICIAN ASSISTANT

## 2023-05-17 PROCEDURE — 97535 SELF CARE MNGMENT TRAINING: CPT

## 2023-05-17 PROCEDURE — 20600001 HC STEP DOWN PRIVATE ROOM

## 2023-05-17 PROCEDURE — 84100 ASSAY OF PHOSPHORUS: CPT | Performed by: PHYSICIAN ASSISTANT

## 2023-05-17 PROCEDURE — 25000003 PHARM REV CODE 250: Performed by: STUDENT IN AN ORGANIZED HEALTH CARE EDUCATION/TRAINING PROGRAM

## 2023-05-17 PROCEDURE — 85025 COMPLETE CBC W/AUTO DIFF WBC: CPT | Performed by: PHYSICIAN ASSISTANT

## 2023-05-17 RX ORDER — FUROSEMIDE 40 MG/1
40 TABLET ORAL 2 TIMES DAILY
Status: DISCONTINUED | OUTPATIENT
Start: 2023-05-17 | End: 2023-05-18

## 2023-05-17 RX ORDER — POTASSIUM CHLORIDE 20 MEQ/1
20 TABLET, EXTENDED RELEASE ORAL 2 TIMES DAILY
Status: DISCONTINUED | OUTPATIENT
Start: 2023-05-17 | End: 2023-05-18

## 2023-05-17 RX ADMIN — GABAPENTIN 300 MG: 300 CAPSULE ORAL at 04:05

## 2023-05-17 RX ADMIN — APIXABAN 2.5 MG: 2.5 TABLET, FILM COATED ORAL at 08:05

## 2023-05-17 RX ADMIN — SENNOSIDES AND DOCUSATE SODIUM 1 TABLET: 50; 8.6 TABLET ORAL at 08:05

## 2023-05-17 RX ADMIN — LEVOTHYROXINE SODIUM 75 MCG: 75 TABLET ORAL at 06:05

## 2023-05-17 RX ADMIN — INSULIN ASPART 5 UNITS: 100 INJECTION, SOLUTION INTRAVENOUS; SUBCUTANEOUS at 08:05

## 2023-05-17 RX ADMIN — INSULIN ASPART 2 UNITS: 100 INJECTION, SOLUTION INTRAVENOUS; SUBCUTANEOUS at 04:05

## 2023-05-17 RX ADMIN — GABAPENTIN 300 MG: 300 CAPSULE ORAL at 08:05

## 2023-05-17 RX ADMIN — HYDROCODONE BITARTRATE AND ACETAMINOPHEN 1 TABLET: 5; 325 TABLET ORAL at 11:05

## 2023-05-17 RX ADMIN — DOCUSATE SODIUM 100 MG: 100 CAPSULE, LIQUID FILLED ORAL at 08:05

## 2023-05-17 RX ADMIN — POTASSIUM CHLORIDE 20 MEQ: 1500 TABLET, EXTENDED RELEASE ORAL at 08:05

## 2023-05-17 RX ADMIN — PRAVASTATIN SODIUM 20 MG: 20 TABLET ORAL at 08:05

## 2023-05-17 RX ADMIN — HYDROCODONE BITARTRATE AND ACETAMINOPHEN 1 TABLET: 5; 325 TABLET ORAL at 09:05

## 2023-05-17 RX ADMIN — INSULIN DETEMIR 16 UNITS: 100 INJECTION, SOLUTION SUBCUTANEOUS at 08:05

## 2023-05-17 RX ADMIN — HYDROCODONE BITARTRATE AND ACETAMINOPHEN 1 TABLET: 10; 325 TABLET ORAL at 08:05

## 2023-05-17 RX ADMIN — FUROSEMIDE 40 MG: 40 TABLET ORAL at 05:05

## 2023-05-17 RX ADMIN — METOPROLOL TARTRATE 25 MG: 25 TABLET, FILM COATED ORAL at 08:05

## 2023-05-17 RX ADMIN — FUROSEMIDE 40 MG: 40 TABLET ORAL at 08:05

## 2023-05-17 RX ADMIN — INSULIN ASPART 5 UNITS: 100 INJECTION, SOLUTION INTRAVENOUS; SUBCUTANEOUS at 04:05

## 2023-05-17 RX ADMIN — GABAPENTIN 300 MG: 300 CAPSULE ORAL at 09:05

## 2023-05-17 RX ADMIN — INSULIN ASPART 3 UNITS: 100 INJECTION, SOLUTION INTRAVENOUS; SUBCUTANEOUS at 10:05

## 2023-05-17 RX ADMIN — INSULIN ASPART 2 UNITS: 100 INJECTION, SOLUTION INTRAVENOUS; SUBCUTANEOUS at 11:05

## 2023-05-17 RX ADMIN — ASPIRIN 81 MG: 81 TABLET, COATED ORAL at 08:05

## 2023-05-17 RX ADMIN — INSULIN ASPART 5 UNITS: 100 INJECTION, SOLUTION INTRAVENOUS; SUBCUTANEOUS at 11:05

## 2023-05-17 NOTE — PROGRESS NOTES
[facility]  Cardiothoracic Surgery  Progress Note    Patient Name: Alina Narayan  MRN: [id number]  Admission Date: 5/10/2023  Hospital Length of Stay: 7 days  Code Status: Full Code   Attending Physician: Venkat Webb MD   Referring Provider: Venkat Webb MD  Principal Problem:Nonrheumatic aortic valve stenosis      Subjective:     Post-Op Info:  Procedure(s) (LRB):  Replacement-valve-aortic, redo sternotomy (N/A)   7 Days Post-Op     Interval History: NAEON. SNF submitting for authorization. Patient cannot use a regular walker, only a platform walker.     Review of Systems   Constitutional: Negative for malaise/fatigue.   Cardiovascular:  Negative for chest pain.   Respiratory:  Negative for shortness of breath.    Medications:  Continuous Infusions:  Scheduled Meds:   apixaban  2.5 mg Oral BID    aspirin  81 mg Oral Daily    docusate sodium  100 mg Oral BID    furosemide  40 mg Oral BID    gabapentin  300 mg Oral TID    insulin aspart U-100  5 Units Subcutaneous TIDWM    insulin detemir U-100  16 Units Subcutaneous Daily    levothyroxine  75 mcg Oral Before breakfast    LIDOcaine  1 patch Transdermal Q24H    metoprolol tartrate  25 mg Oral BID    polyethylene glycol  17 g Oral Daily    potassium chloride  40 mEq Oral BID    pravastatin  20 mg Oral Daily    senna-docusate 8.6-50 mg  1 tablet Oral Daily     PRN Meds:albumin human 5%, aspirin, bisacodyL, dextrose 10%, dextrose 10%, dextrose 10%, dextrose 10%, dextrose, dextrose, glucagon (human recombinant), HYDROcodone-acetaminophen, HYDROcodone-acetaminophen, insulin aspart U-100, metoclopramide HCl, ondansetron, sodium chloride 0.9%     Objective:     Vital Signs (Most Recent):  Temp: 97.8 °F (36.6 °C) (05/17/23 0739)  Pulse: 84 (05/17/23 0739)  Resp: 18 (05/17/23 0815)  BP: (!) 128/55 (05/17/23 0739)  SpO2: (!) 94 % (05/17/23 0739) Vital Signs (24h Range):  Temp:  [97.4 °F (36.3 °C)-98.2 °F (36.8 °C)] 97.8 °F (36.6  °C)  Pulse:  [73-91] 84  Resp:  [18-24] 18  SpO2:  [89 %-96 %] 94 %  BP: (110-134)/(54-68) 128/55     Weight: 113.9 kg (251 lb)  Body mass index is 45.91 kg/m².    SpO2: (!) 94 %       Intake/Output - Last 3 Shifts         05/15 0700 05/16 0659 05/16 0700 05/17 0659 05/17 0700  05/18 0659    P.O.  740     I.V. (mL/kg)       IV Piggyback       Total Intake(mL/kg)  740 (6.5)     Urine (mL/kg/hr) 2050 (0.7) 950 (0.3)     Other 0      Stool  1     Total Output 2050 951     Net -2050 -211                    Lines/Drains/Airways       Drain  Duration             Female External Urinary Catheter 05/13/23 1200 3 days              Peripheral Intravenous Line  Duration                  Peripheral IV - Single Lumen 05/14/23 1145 20 G;1 3/4 in Left;Anterior Upper Arm 2 days                     Physical Exam  Constitutional:       General: She is not in acute distress.  HENT:      Head: Normocephalic and atraumatic.   Eyes:      Pupils: Pupils are equal, round, and reactive to light.   Cardiovascular:      Rate and Rhythm: Normal rate.   Pulmonary:      Effort: Pulmonary effort is normal. No respiratory distress.   Musculoskeletal:         General: Normal range of motion.      Cervical back: Normal range of motion.   Skin:     Coloration: Skin is not pale.   Neurological:      General: No focal deficit present.      Mental Status: She is alert.   Psychiatric:         Mood and Affect: Mood normal.         Behavior: Behavior normal.          Significant Labs:  BMP:   Recent Labs   Lab 05/17/23  0500 05/17/23  0823   GLU  --  114*   NA  --  140   K  --  4.6   CL  --  102   CO2  --  25   BUN  --  31*   CREATININE  --  1.3   CALCIUM  --  9.4   MG 2.0  --      CBC:   Recent Labs   Lab 05/17/23  0500   WBC 6.30   RBC 2.79*   HGB 8.0*   HCT 26.6*   *   MCV 95   MCH 28.7   MCHC 30.1*     CMP:   Recent Labs   Lab 05/16/23  0348 05/17/23  0823   * 114*   CALCIUM 9.0 9.4   ALBUMIN 3.4*  --    PROT 6.1  --     140   K  3.6 4.6   CO2 28 25    102   BUN 33* 31*   CREATININE 1.2 1.3   ALKPHOS 123  --    ALT 37  --    AST 45*  --    BILITOT 0.6  --        Significant Diagnostics:  I have reviewed all pertinent imaging results/findings within the past 24 hours.    Assessment/Plan:     Impaired mobility  PT/OT     Bradycardia  NSR on monitor   Telemetry     Acute blood loss anemia  Expected post operatively   CBC daily     History of pulmonary embolism  On home Eliquis     Fatty liver  Liver enzymes normal on CMP    Thrombocytopenia, unspecified  Post operative  CBC daily     BMI 45.0-49.9, adult  Diabetic / cardiac diet   PT/OT    Chronic kidney disease, stage III (moderate)  BMP daily   Creatinine 1.3    Acquired hypothyroidism  Synthroid     S/P AVR  ASA  Statin   BB   Eliquis   Bowel regimen   PT/OT   Lasix and K   Pain regimen         Diabetes mellitus type 2 in obese  Endocrine following     Obstructive sleep apnea  CPAP at night     Hyperlipidemia, mixed  Statin     Essential hypertension  BP stable off meds at this time       Dispo: CSU. SNF auth pending     Thea Castaneda PA-C  Cardiothoracic Surgery  [facility]

## 2023-05-17 NOTE — PLAN OF CARE
[facility]  Discharge Reassessment    Primary Care Provider: Aarti Dunn MD    Expected Discharge Date: 5/19/2023    Reassessment (most recent)       Discharge Reassessment - 05/17/23 1001          Discharge Reassessment    Assessment Type Discharge Planning Reassessment     Discharge Plan discussed with: Patient;Spouse/sig other     Communicated DARIN with patient/caregiver Yes     Discharge Plan A Skilled Nursing Facility     Discharge Plan B Home Health     DME Needed Upon Discharge  none     Transition of Care Barriers None        Post-Acute Status    Post-Acute Authorization Placement     Post-Acute Placement Status Pending payor review/awaiting authorization (if required)                 Per Arlen with [facility] they can take pt either tomorrow 5/18 or Friday 5/19 and will submit for auth.  SW relayed this to pt and  at bedside who voiced agreement with plan although pt stated she hopes to make enough progress over the next day or two to go straight home.  SW encouraged pt to move around as much as she is allowed in order to meet her goal.  Meanwhile [facility] submitting for auth.    UPDATE 10:18 AM  ALBERTO informed treatment team of rolling walker that SW noticed in pt's room and CTS GAIL Troy confirmed that pt is not supposed to be using it do to sternal precautions.  ALBERTO and RN Coral went to bedside and relayed this to pt and , to which pt reported that she had read about bringing her own walker on her pre-op paperwork.  Treatment team notified of this.  Meanwhile pt and  both voiced understanding that pt is not to use the walker without platforms until further notice and confirmed that she has not used it while she has been in the hospital.  ALBERTO will continue to follow.      Eladia Diaz, TWAN  [facility]  [id number]

## 2023-05-17 NOTE — PLAN OF CARE
[facility]     Department of Hospital Medicine     [address] [place] [address]     [phone] after hours  [phone] fax                                        FACILITY TRANSFER ORDERS     Patient Name: Alina Narayan  YOB: [birth year]    Admit to:  SNF    Diagnoses:  Active Hospital Problems    Diagnosis  POA    *Nonrheumatic aortic valve stenosis [I35.0]  Yes    Impaired mobility [Z74.09]  Yes    Acute blood loss anemia [D62]  No     Expected post operatively         Bradycardia [R00.1]  No    Surgical wound present [T14.8XXA]  No    History of pulmonary embolism [Z86.711]  Yes    Fatty liver [K76.0]  Yes    Thrombocytopenia, unspecified [D69.6]  Yes    BMI 45.0-49.9, adult [Z68.42]  Not Applicable    Chronic kidney disease, stage III (moderate) [N18.30]  Yes    Acquired hypothyroidism [E03.9]  Yes    Diabetes mellitus type 2 in obese [E11.69, E66.9]  Yes    S/P AVR [Z95.2]  Not Applicable     Aortic valve replacement with a 21-mm [employer] Trifecta   bovine pericardial prosthesis.          Obstructive sleep apnea [G47.33]  Yes    Hyperlipidemia, mixed [E78.2]  Yes    Essential hypertension [I10]  Yes      Resolved Hospital Problems   No resolved problems to display.       Vital Signs: Routine.    Allergies:  Review of patient's allergies indicates:   Allergen Reactions    Augmentin [amoxicillin-pot clavulanate] Diarrhea    Dilaudid [hydromorphone (bulk)] Other (See Comments)     Other reaction(s): sedation    Hydromorphone Other (See Comments)     Other reaction(s): sedation    Cymbalta [duloxetine]      Dry mouth, agitation    Jardiance [empagliflozin] Hives    Byetta [exenatide] Rash     Other reaction(s): Rash       Diet: cardiac diet and diabetic diet: 2000 calorie     Acitivities: Activity as tolerated, no lifting more than 5 pounds, no pushing or pulling with arms, No driving for 4 weeks.      Nursing: Ok to shower. No baths or  soaking in water. Wash incision daily with soap and water, pat dry. No lotions or creams. Notify MD of any erythema, drainage, or signs of dehiscence.     Labs: BMP, CBC     CONSULTS:       Physical Therapy to evaluate and treat     Occupational Therapy to evaluate and treat    DIABETES CARE:    SN to perform and educate Diabetic management with blood glucose monitoring:      Fingerstick blood sugar AC and HS    Report CBG < 60 or > 350 to physician.                                          Insulin Sliding Scale          Glucose  Novolog Insulin Subcutaneous        0 - 60   Orange juice or glucose tablet, hold insulin      No insulin   201-250  2 units   251-300  4 units   301-350  6 units   351-400  8 units   >400   10 units then call physician    Medications:   Scheduled Meds:   apixaban  2.5 mg Oral BID    aspirin  81 mg Oral Daily    docusate sodium  100 mg Oral BID    furosemide  20 mg Oral Daily    gabapentin  300 mg Oral TID    insulin aspart U-100  5 Units Subcutaneous TIDWM    insulin detemir U-100  16 Units Subcutaneous Daily    levothyroxine  75 mcg Oral Before breakfast    LIDOcaine  1 patch Transdermal Q24H    metoprolol tartrate  25 mg Oral BID    polyethylene glycol  17 g Oral Daily    potassium chloride  20 mEq Oral BID    pravastatin  20 mg Oral Daily    senna-docusate 8.6-50 mg  1 tablet Oral Daily       HYDROcodone-acetaminophen 5-325mg tablet PO Q6H PRN pain     _________________________________  Lela Workman PA-C  05/18/2023

## 2023-05-17 NOTE — SUBJECTIVE & OBJECTIVE
Interval History: NAEON. [facility] submitting for authorization. Patient cannot use a regular walker, only a platform walker.     Review of Systems   Constitutional: Negative for malaise/fatigue.   Cardiovascular:  Negative for chest pain.   Respiratory:  Negative for shortness of breath.    Medications:  Continuous Infusions:  Scheduled Meds:   apixaban  2.5 mg Oral BID    aspirin  81 mg Oral Daily    docusate sodium  100 mg Oral BID    furosemide  40 mg Oral BID    gabapentin  300 mg Oral TID    insulin aspart U-100  5 Units Subcutaneous TIDWM    insulin detemir U-100  16 Units Subcutaneous Daily    levothyroxine  75 mcg Oral Before breakfast    LIDOcaine  1 patch Transdermal Q24H    metoprolol tartrate  25 mg Oral BID    polyethylene glycol  17 g Oral Daily    potassium chloride  40 mEq Oral BID    pravastatin  20 mg Oral Daily    senna-docusate 8.6-50 mg  1 tablet Oral Daily     PRN Meds:albumin human 5%, aspirin, bisacodyL, dextrose 10%, dextrose 10%, dextrose 10%, dextrose 10%, dextrose, dextrose, glucagon (human recombinant), HYDROcodone-acetaminophen, HYDROcodone-acetaminophen, insulin aspart U-100, metoclopramide HCl, ondansetron, sodium chloride 0.9%     Objective:     Vital Signs (Most Recent):  Temp: 97.8 °F (36.6 °C) (05/17/23 0739)  Pulse: 84 (05/17/23 0739)  Resp: 18 (05/17/23 0815)  BP: (!) 128/55 (05/17/23 0739)  SpO2: (!) 94 % (05/17/23 0739) Vital Signs (24h Range):  Temp:  [97.4 °F (36.3 °C)-98.2 °F (36.8 °C)] 97.8 °F (36.6 °C)  Pulse:  [73-91] 84  Resp:  [18-24] 18  SpO2:  [89 %-96 %] 94 %  BP: (110-134)/(54-68) 128/55     Weight: 113.9 kg (251 lb)  Body mass index is 45.91 kg/m².    SpO2: (!) 94 %       Intake/Output - Last 3 Shifts         05/15 0700 05/16 0659 05/16 0700 05/17 0659 05/17 0700 05/18 0659    P.O.  740     I.V. (mL/kg)       IV Piggyback       Total Intake(mL/kg)  740 (6.5)     Urine (mL/kg/hr) 2050 (0.7) 950 (0.3)     Other 0      Stool  1     Total Output 2050 951     Net -2050  -211                    Lines/Drains/Airways       Drain  Duration             Female External Urinary Catheter 05/13/23 1200 3 days              Peripheral Intravenous Line  Duration                  Peripheral IV - Single Lumen 05/14/23 1145 20 G;1 3/4 in Left;Anterior Upper Arm 2 days                     Physical Exam  Constitutional:       General: She is not in acute distress.  HENT:      Head: Normocephalic and atraumatic.   Eyes:      Pupils: Pupils are equal, round, and reactive to light.   Cardiovascular:      Rate and Rhythm: Normal rate.   Pulmonary:      Effort: Pulmonary effort is normal. No respiratory distress.   Musculoskeletal:         General: Normal range of motion.      Cervical back: Normal range of motion.   Skin:     Coloration: Skin is not pale.   Neurological:      General: No focal deficit present.      Mental Status: She is alert.   Psychiatric:         Mood and Affect: Mood normal.         Behavior: Behavior normal.          Significant Labs:  BMP:   Recent Labs   Lab 05/17/23  0500 05/17/23  0823   GLU  --  114*   NA  --  140   K  --  4.6   CL  --  102   CO2  --  25   BUN  --  31*   CREATININE  --  1.3   CALCIUM  --  9.4   MG 2.0  --      CBC:   Recent Labs   Lab 05/17/23  0500   WBC 6.30   RBC 2.79*   HGB 8.0*   HCT 26.6*   *   MCV 95   MCH 28.7   MCHC 30.1*     CMP:   Recent Labs   Lab 05/16/23  0348 05/17/23  0823   * 114*   CALCIUM 9.0 9.4   ALBUMIN 3.4*  --    PROT 6.1  --     140   K 3.6 4.6   CO2 28 25    102   BUN 33* 31*   CREATININE 1.2 1.3   ALKPHOS 123  --    ALT 37  --    AST 45*  --    BILITOT 0.6  --        Significant Diagnostics:  I have reviewed all pertinent imaging results/findings within the past 24 hours.

## 2023-05-17 NOTE — PT/OT/SLP PROGRESS
"Occupational Therapy   Treatment    Name: Alina Narayan  MRN: [id number]  Admitting Diagnosis:  Nonrheumatic aortic valve stenosis  7 Days Post-Op    Recommendations:     Discharge Recommendations: rehabilitation facility  Discharge Equipment Recommendations:  none  Barriers to discharge:  None    Assessment:     Alina Narayan is a 78 y.o. female with a medical diagnosis of Nonrheumatic aortic valve stenosis.  She presents with multiple questions regarding sternal precautions and inability to use rolling walker. Patient and  educated on sternal precautions and reasoning to prevent dehiscence and verbalized understanding. Performance deficits affecting function are weakness, impaired endurance, impaired functional mobility, impaired self care skills, gait instability, impaired balance, impaired cardiopulmonary response to activity.     Rehab Prognosis:  Good; patient would benefit from acute skilled OT services to address these deficits and reach maximum level of function.       Plan:     Patient to be seen 5 x/week to address the above listed problems via self-care/home management, therapeutic activities, therapeutic exercises, neuromuscular re-education  Plan of Care Expires: 06/11/12  Plan of Care Reviewed with: patient, spouse    Subjective     Chief Complaint: "I've never felt like such an old woman"  Patient/Family Comments/goals: get better and go home   Pain/Comfort:  Pain Rating 1: 0/10    Objective:     Communicated with: nursing prior to session.  Patient found up in chair with telemetry, PureWick, oxygen upon OT entry to room.  in room throughout entirety of session    General Precautions: Standard, fall, sternal    Orthopedic Precautions:N/A  Braces: N/A  Respiratory Status: Nasal cannula, flow 1.5 L/min     Occupational Performance:     Bed Mobility:  not attempted on this date     Functional Mobility/Transfers:  Patient completed Sit <> Stand Transfer with contact guard assistance  with  no " assistive device   Patient completed Toilet Transfer Step Transfer technique with contact guard assistance with  no AD  Functional Mobility: Patient completed in home ambulation of ~75 feet with hand held assistance (per patient's request)     Activities of Daily Living:  Toileting: maximal assistance for anaya hygiene and clothing management on toilet       [facility] 6 Click ADL: 18    Treatment & Education:  Patient and  educated on sternal precautions and reasons to prevent dehiscence. Patient's  with continued confusion on why patient could not use rolling walker. Extensive education on this date with eventually verbalizing understanding. Patient 100% compliant with sternal precautions during treatment.    Patient educated on:   -purpose of OT and OT POC  -facilitation and education on proper body mechanics, energy conservation, and safety  -importance of early mobility and out of bed activities with staff assist  -overall benefits of therapy       Patient left up in chair with all lines intact, call button in reach, and PCA and  present    GOALS:   Multidisciplinary Problems       Occupational Therapy Goals          Problem: Occupational Therapy    Goal Priority Disciplines Outcome Interventions   Occupational Therapy Goal     OT, PT/OT Ongoing, Progressing    Description: Goals to be met by: 5/26/23     Patient will increase functional independence with ADLs by performing:    Feeding with Modified [place].  UE Dressing with Minimal A  LE Dressing with Moderate Assistance.  Grooming while standing at sink with Contact Guard Assistance.  Toileting from bedside commode with Moderate Assistance for hygiene and clothing management.   Toilet transfer to bedside commode with Minimal Assistance.                         Time Tracking:     OT Date of Treatment: 05/17/23  OT Start Time: 1351  OT Stop Time: 1425  OT Total Time (min): 34 min    Billable Minutes:Self Care/Home Management 14  Therapeutic  Activity 20    OT/WENDI: OT          5/17/2023

## 2023-05-17 NOTE — CARE UPDATE
-Glucose Goal 140-180    -A1C:   Hemoglobin A1C   Date Value Ref Range Status   05/04/2023 7.3 (H) 4.0 - 5.6 % Final     Comment:     ADA Screening Guidelines:  5.7-6.4%  Consistent with prediabetes  >or=6.5%  Consistent with diabetes    High levels of fetal hemoglobin interfere with the HbA1C  assay. Heterozygous hemoglobin variants (HbS, HgC, etc)do  not significantly interfere with this assay.   However, presence of multiple variants may affect accuracy.           -HOME REGIMEN:   metformin 500 mg BID and glimepiride 2 mg daily     -GLUCOSE TREND FOR THE PAST 24HRS:   Recent Labs   Lab 05/15/23  2204 05/16/23  0806 05/16/23  1200 05/16/23  1647 05/16/23  2120 05/17/23  0737   POCTGLUCOSE 192* 125* 174* 172* 141* 120*           -NO HYPOGYCEMIAS NOTED     - Diet  Diet diabetic [facility]; 2000 Calorie; Fluid - 1500mL    T2DM.  S/p CTS sx.  BG mostly at goal on insulin regimen.      Plan:   - Levemir (Insulin Detemir) 16 units daily  - Novolog (Insulin Aspart) 5 units TIDWM and prn for BG excursions MDC SSI (150/25)  - BG checks AC/HS  - Hypoglycemia protocol in place    ** Please notify Endocrine for any change and/or advance in diet**  ** Please call Endocrine for any BG related issues **     Discharge Planning:   TBD. Please notify endocrinology prior to discharge. Anticipate pt can return on home regimen.

## 2023-05-18 ENCOUNTER — TELEPHONE (OUTPATIENT)
Dept: CARDIOLOGY | Facility: CLINIC | Age: 78
End: 2023-05-18
Payer: MEDICARE

## 2023-05-18 LAB
ANION GAP SERPL CALC-SCNC: 9 MMOL/L (ref 8–16)
BASOPHILS # BLD AUTO: 0.02 K/UL (ref 0–0.2)
BASOPHILS NFR BLD: 0.3 % (ref 0–1.9)
BUN SERPL-MCNC: 37 MG/DL (ref 8–23)
CALCIUM SERPL-MCNC: 9.5 MG/DL (ref 8.7–10.5)
CHLORIDE SERPL-SCNC: 103 MMOL/L (ref 95–110)
CO2 SERPL-SCNC: 28 MMOL/L (ref 23–29)
CREAT SERPL-MCNC: 1.4 MG/DL (ref 0.5–1.4)
DIFFERENTIAL METHOD: ABNORMAL
EOSINOPHIL # BLD AUTO: 0.1 K/UL (ref 0–0.5)
EOSINOPHIL NFR BLD: 1.4 % (ref 0–8)
ERYTHROCYTE [DISTWIDTH] IN BLOOD BY AUTOMATED COUNT: 14.8 % (ref 11.5–14.5)
EST. GFR  (NO RACE VARIABLE): 38.5 ML/MIN/1.73 M^2
GLUCOSE SERPL-MCNC: 132 MG/DL (ref 70–110)
HCT VFR BLD AUTO: 28 % (ref 37–48.5)
HGB BLD-MCNC: 8.4 G/DL (ref 12–16)
IMM GRANULOCYTES # BLD AUTO: 0.22 K/UL (ref 0–0.04)
IMM GRANULOCYTES NFR BLD AUTO: 3 % (ref 0–0.5)
LYMPHOCYTES # BLD AUTO: 1.7 K/UL (ref 1–4.8)
LYMPHOCYTES NFR BLD: 23 % (ref 18–48)
MAGNESIUM SERPL-MCNC: 2.1 MG/DL (ref 1.6–2.6)
MCH RBC QN AUTO: 28.9 PG (ref 27–31)
MCHC RBC AUTO-ENTMCNC: 30 G/DL (ref 32–36)
MCV RBC AUTO: 96 FL (ref 82–98)
MONOCYTES # BLD AUTO: 1.1 K/UL (ref 0.3–1)
MONOCYTES NFR BLD: 14.5 % (ref 4–15)
NEUTROPHILS # BLD AUTO: 4.2 K/UL (ref 1.8–7.7)
NEUTROPHILS NFR BLD: 57.8 % (ref 38–73)
NRBC BLD-RTO: 0 /100 WBC
PHOSPHATE SERPL-MCNC: 4.8 MG/DL (ref 2.7–4.5)
PLATELET # BLD AUTO: 146 K/UL (ref 150–450)
PMV BLD AUTO: 11.1 FL (ref 9.2–12.9)
POCT GLUCOSE: 139 MG/DL (ref 70–110)
POCT GLUCOSE: 151 MG/DL (ref 70–110)
POCT GLUCOSE: 184 MG/DL (ref 70–110)
POCT GLUCOSE: 203 MG/DL (ref 70–110)
POTASSIUM SERPL-SCNC: 4.5 MMOL/L (ref 3.5–5.1)
RBC # BLD AUTO: 2.91 M/UL (ref 4–5.4)
SODIUM SERPL-SCNC: 140 MMOL/L (ref 136–145)
WBC # BLD AUTO: 7.3 K/UL (ref 3.9–12.7)

## 2023-05-18 PROCEDURE — 25000003 PHARM REV CODE 250: Performed by: PHYSICIAN ASSISTANT

## 2023-05-18 PROCEDURE — 85025 COMPLETE CBC W/AUTO DIFF WBC: CPT | Performed by: PHYSICIAN ASSISTANT

## 2023-05-18 PROCEDURE — 25000003 PHARM REV CODE 250: Performed by: STUDENT IN AN ORGANIZED HEALTH CARE EDUCATION/TRAINING PROGRAM

## 2023-05-18 PROCEDURE — 99900035 HC TECH TIME PER 15 MIN (STAT)

## 2023-05-18 PROCEDURE — 83735 ASSAY OF MAGNESIUM: CPT | Performed by: PHYSICIAN ASSISTANT

## 2023-05-18 PROCEDURE — 94761 N-INVAS EAR/PLS OXIMETRY MLT: CPT

## 2023-05-18 PROCEDURE — 97535 SELF CARE MNGMENT TRAINING: CPT

## 2023-05-18 PROCEDURE — 36415 COLL VENOUS BLD VENIPUNCTURE: CPT | Performed by: PHYSICIAN ASSISTANT

## 2023-05-18 PROCEDURE — 97530 THERAPEUTIC ACTIVITIES: CPT

## 2023-05-18 PROCEDURE — 20600001 HC STEP DOWN PRIVATE ROOM

## 2023-05-18 PROCEDURE — 84100 ASSAY OF PHOSPHORUS: CPT | Performed by: PHYSICIAN ASSISTANT

## 2023-05-18 PROCEDURE — 94660 CPAP INITIATION&MGMT: CPT

## 2023-05-18 PROCEDURE — 80048 BASIC METABOLIC PNL TOTAL CA: CPT | Performed by: PHYSICIAN ASSISTANT

## 2023-05-18 RX ORDER — FUROSEMIDE 20 MG/1
20 TABLET ORAL DAILY
Status: DISCONTINUED | OUTPATIENT
Start: 2023-05-19 | End: 2023-05-19 | Stop reason: HOSPADM

## 2023-05-18 RX ORDER — POTASSIUM CHLORIDE 20 MEQ/1
20 TABLET, EXTENDED RELEASE ORAL DAILY
Status: DISCONTINUED | OUTPATIENT
Start: 2023-05-19 | End: 2023-05-19 | Stop reason: HOSPADM

## 2023-05-18 RX ADMIN — DOCUSATE SODIUM 100 MG: 100 CAPSULE, LIQUID FILLED ORAL at 08:05

## 2023-05-18 RX ADMIN — GABAPENTIN 300 MG: 300 CAPSULE ORAL at 09:05

## 2023-05-18 RX ADMIN — HYDROCODONE BITARTRATE AND ACETAMINOPHEN 1 TABLET: 5; 325 TABLET ORAL at 09:05

## 2023-05-18 RX ADMIN — INSULIN DETEMIR 16 UNITS: 100 INJECTION, SOLUTION SUBCUTANEOUS at 08:05

## 2023-05-18 RX ADMIN — INSULIN ASPART 4 UNITS: 100 INJECTION, SOLUTION INTRAVENOUS; SUBCUTANEOUS at 11:05

## 2023-05-18 RX ADMIN — APIXABAN 2.5 MG: 2.5 TABLET, FILM COATED ORAL at 09:05

## 2023-05-18 RX ADMIN — DOCUSATE SODIUM 100 MG: 100 CAPSULE, LIQUID FILLED ORAL at 09:05

## 2023-05-18 RX ADMIN — METOPROLOL TARTRATE 25 MG: 25 TABLET, FILM COATED ORAL at 09:05

## 2023-05-18 RX ADMIN — INSULIN ASPART 5 UNITS: 100 INJECTION, SOLUTION INTRAVENOUS; SUBCUTANEOUS at 11:05

## 2023-05-18 RX ADMIN — FUROSEMIDE 40 MG: 40 TABLET ORAL at 08:05

## 2023-05-18 RX ADMIN — METOPROLOL TARTRATE 25 MG: 25 TABLET, FILM COATED ORAL at 08:05

## 2023-05-18 RX ADMIN — PRAVASTATIN SODIUM 20 MG: 20 TABLET ORAL at 08:05

## 2023-05-18 RX ADMIN — GABAPENTIN 300 MG: 300 CAPSULE ORAL at 08:05

## 2023-05-18 RX ADMIN — APIXABAN 2.5 MG: 2.5 TABLET, FILM COATED ORAL at 08:05

## 2023-05-18 RX ADMIN — SENNOSIDES AND DOCUSATE SODIUM 1 TABLET: 50; 8.6 TABLET ORAL at 08:05

## 2023-05-18 RX ADMIN — INSULIN ASPART 5 UNITS: 100 INJECTION, SOLUTION INTRAVENOUS; SUBCUTANEOUS at 08:05

## 2023-05-18 RX ADMIN — LEVOTHYROXINE SODIUM 75 MCG: 75 TABLET ORAL at 05:05

## 2023-05-18 RX ADMIN — ASPIRIN 81 MG: 81 TABLET, COATED ORAL at 08:05

## 2023-05-18 RX ADMIN — POLYETHYLENE GLYCOL 3350 17 G: 17 POWDER, FOR SOLUTION ORAL at 08:05

## 2023-05-18 RX ADMIN — GABAPENTIN 300 MG: 300 CAPSULE ORAL at 02:05

## 2023-05-18 RX ADMIN — INSULIN ASPART 2 UNITS: 100 INJECTION, SOLUTION INTRAVENOUS; SUBCUTANEOUS at 05:05

## 2023-05-18 RX ADMIN — POTASSIUM CHLORIDE 20 MEQ: 1500 TABLET, EXTENDED RELEASE ORAL at 08:05

## 2023-05-18 RX ADMIN — HYDROCODONE BITARTRATE AND ACETAMINOPHEN 1 TABLET: 10; 325 TABLET ORAL at 11:05

## 2023-05-18 RX ADMIN — INSULIN ASPART 5 UNITS: 100 INJECTION, SOLUTION INTRAVENOUS; SUBCUTANEOUS at 05:05

## 2023-05-18 NOTE — ASSESSMENT & PLAN NOTE
ASA  Statin   BB   Eliquis   Bowel regimen   PT/OT   Lasix and K   Pain regimen   Transfer to SNF tomorrow

## 2023-05-18 NOTE — TELEPHONE ENCOUNTER
----- Message from Álvaro Palm MD sent at 5/16/2023  4:05 PM CDT -----  Please let her know that I just read echo and heart looked excellent,Chip  PS --She probably is in hospital now??

## 2023-05-18 NOTE — PT/OT/SLP PROGRESS
Occupational Therapy   Treatment    Name: Alina Narayan  MRN: [id number]  Admitting Diagnosis:  Nonrheumatic aortic valve stenosis  8 Days Post-Op    Recommendations:     Discharge Recommendations: rehabilitation facility  Discharge Equipment Recommendations:  none  Barriers to discharge:  None    Assessment:     Alina Narayan is a 78 y.o. female with a medical diagnosis of Nonrheumatic aortic valve stenosis.  She presents with limitations by pain on this date, but completes within home distances with hand held assistance x2 people. Performance deficits affecting function are weakness, impaired self care skills, impaired functional mobility, pain, impaired cardiopulmonary response to activity, impaired fine motor, decreased upper extremity function.     Rehab Prognosis:  Good; patient would benefit from acute skilled OT services to address these deficits and reach maximum level of function.       Plan:     Patient to be seen 5 x/week to address the above listed problems via self-care/home management, therapeutic activities, therapeutic exercises, neuromuscular re-education  Plan of Care Expires: 05/26/23  Plan of Care Reviewed with: patient    Subjective     Chief Complaint: chest pain at incision site  Patient/Family Comments/goals: get better  Pain/Comfort:  Pain Rating 1: 8/10  Location 1: chest  Pain Addressed 1: Nurse notified, Reposition  Pain Rating Post-Intervention 1: other (see comments) (not rated)    Objective:     Communicated with: nursing prior to session.  Patient found up in chair with telemetry upon OT entry to room.  present throughout entire session.     General Precautions: Standard, fall, sternal    Orthopedic Precautions:N/A  Braces: N/A  Respiratory Status: Room air per nursing with oxygen as needed     Occupational Performance:     Bed Mobility:  not attempted on this date     Functional Mobility/Transfers:  Patient completed Sit <> Stand Transfer with contact guard assistance  with   hand-held assist   Patient completed Toilet Transfer Step Transfer technique with minimum assistance with  hand-held assist  Functional Mobility: Patient ambulated within home distance of ~50 feet with contact guard assistance with hand-held assist of 2 people (OT and ) per patient request with 2 rest breaks. No LOB noted. SOB after activity and oxygen utilized.     Activities of Daily Living:  Upper Body Dressing: moderate assistance to down gown like jacket   Toileting: maximal assistance for clothing management and anaya hygiene       [facility] 6 Click ADL: 17    Treatment & Education:  Patient able to recall 3/3 sternal precautions at initiation of session.     Patient continues to furniture walk while ambulating within room, despite cueing. Patient educated on the fall risk of relying on unstable surfaces for support as well as potential risk of breaking sternal precautions.     Patient educated on:   -purpose of OT and OT POC  -facilitation and education on proper body mechanics, energy conservation, and safety  -importance of early mobility and out of bed activities with staff assist  -overall benefits of therapy       Patient left up in chair with all lines intact, call button in reach, and  present    GOALS:   Multidisciplinary Problems       Occupational Therapy Goals          Problem: Occupational Therapy    Goal Priority Disciplines Outcome Interventions   Occupational Therapy Goal     OT, PT/OT Ongoing, Progressing    Description: Goals to be met by: 5/26/23     Patient will increase functional independence with ADLs by performing:    Feeding with Modified [place].  UE Dressing with Minimal A  LE Dressing with Moderate Assistance.  Grooming while standing at sink with Contact Guard Assistance.  Toileting from bedside commode with Moderate Assistance for hygiene and clothing management.   Toilet transfer to bedside commode with Minimal Assistance.                         Time Tracking:      OT Date of Treatment: 05/18/23  OT Start Time: 1049  OT Stop Time: 1112  OT Total Time (min): 23 min    Billable Minutes:Self Care/Home Management 10  Therapeutic Activity 13    OT/WENDI: OT          5/18/2023

## 2023-05-18 NOTE — SUBJECTIVE & OBJECTIVE
Interval History: Cr steadily increasing to 1.4<1.3<1.2. decrease lasix to 20 daily from 40 BID. Patient will be transferred to [facility] tomorrow.    Review of Systems   Constitutional: Negative for malaise/fatigue.   Cardiovascular:  Negative for chest pain, claudication, dyspnea on exertion, irregular heartbeat, leg swelling and palpitations.   Respiratory:  Negative for cough and shortness of breath.    Hematologic/Lymphatic: Negative for bleeding problem.   Gastrointestinal:  Negative for abdominal pain.   Genitourinary:  Negative for dysuria.   Neurological:  Negative for headaches and weakness.   Medications:  Continuous Infusions:  Scheduled Meds:   apixaban  2.5 mg Oral BID    aspirin  81 mg Oral Daily    docusate sodium  100 mg Oral BID    [START ON 5/19/2023] furosemide  20 mg Oral Daily    gabapentin  300 mg Oral TID    insulin aspart U-100  5 Units Subcutaneous TIDWM    insulin detemir U-100  16 Units Subcutaneous Daily    levothyroxine  75 mcg Oral Before breakfast    LIDOcaine  1 patch Transdermal Q24H    metoprolol tartrate  25 mg Oral BID    polyethylene glycol  17 g Oral Daily    potassium chloride  20 mEq Oral BID    pravastatin  20 mg Oral Daily    senna-docusate 8.6-50 mg  1 tablet Oral Daily     PRN Meds:albumin human 5%, aspirin, bisacodyL, dextrose 10%, dextrose 10%, dextrose 10%, dextrose 10%, dextrose, dextrose, glucagon (human recombinant), HYDROcodone-acetaminophen, HYDROcodone-acetaminophen, insulin aspart U-100, metoclopramide HCl, ondansetron, sodium chloride 0.9%     Objective:     Vital Signs (Most Recent):  Temp: 97.9 °F (36.6 °C) (05/18/23 0719)  Pulse: 107 (05/18/23 1043)  Resp: 17 (05/18/23 0719)  BP: (!) 126/59 (05/18/23 0719)  SpO2: (!) 92 % (05/18/23 0719) Vital Signs (24h Range):  Temp:  [97.3 °F (36.3 °C)-98.5 °F (36.9 °C)] 97.9 °F (36.6 °C)  Pulse:  [] 107  Resp:  [16-20] 17  SpO2:  [90 %-97 %] 92 %  BP: (116-130)/(55-68) 126/59     Weight: 119.2 kg (262 lb 12.6  oz)  Body mass index is 48.06 kg/m².    SpO2: (!) 92 %       Intake/Output - Last 3 Shifts         05/16 0700  05/17 0659 05/17 0700  05/18 0659 05/18 0700  05/19 0659    P.O. 740 804     Total Intake(mL/kg) 740 (6.5) 804 (6.7)     Urine (mL/kg/hr) 950 (0.3) 1050 (0.4)     Other       Stool 1      Total Output 951 1050     Net -211 -246            Urine Occurrence  1 x     Stool Occurrence   0 x            Lines/Drains/Airways       Drain  Duration             Female External Urinary Catheter 05/13/23 1200 4 days              Peripheral Intravenous Line  Duration                  Peripheral IV - Single Lumen 05/14/23 1145 20 G;1 3/4 in Left;Anterior Upper Arm 3 days                     Physical Exam  Constitutional:       General: She is not in acute distress.  HENT:      Head: Normocephalic and atraumatic.   Eyes:      Pupils: Pupils are equal, round, and reactive to light.   Cardiovascular:      Rate and Rhythm: Normal rate.   Pulmonary:      Effort: Pulmonary effort is normal. No respiratory distress.   Musculoskeletal:         General: Normal range of motion.      Cervical back: Normal range of motion.   Skin:     Coloration: Skin is not pale.      Comments: MSI c/d/I   Chest tube site c/d/i   Neurological:      General: No focal deficit present.      Mental Status: She is alert.   Psychiatric:         Mood and Affect: Mood normal.         Behavior: Behavior normal.          Significant Labs:  CBC:   Recent Labs   Lab 05/18/23  0350   WBC 7.30   RBC 2.91*   HGB 8.4*   HCT 28.0*   *   MCV 96   MCH 28.9   MCHC 30.0*     CMP:   Recent Labs   Lab 05/18/23  0350   *   CALCIUM 9.5      K 4.5   CO2 28      BUN 37*   CREATININE 1.4     Coagulation: No results for input(s): PT, INR, APTT in the last 48 hours.    Significant Diagnostics:  I have reviewed all pertinent imaging results/findings within the past 24 hours.

## 2023-05-18 NOTE — HOSPITAL COURSE
On 5/10/23  the patient was taken to the Operating Room for the above stated procedure. Please see the previously dictated operative report for complete details. Postoperatively, the patient was taken from the  Operating Room to the ICU where the vital signs were monitored and pain was kept under control. The patient was weaned from the drips and extubated in the ICU per protocol. Once hemodynamically stable, the patient was transferred to the Cardiac Step-Down floor for continued strengthening and ambulation. On postoperative day 9, the patient was ready for not discharge to home and will be sent to a  [facility] center to continue to optimize strength and conditioning. At the time of transfer, the patient was unable to ambulating unassisted. Pain was well controlled with oral analgesics and the patient was tolerating the diet.     NYHA class 2    MOBILITY AND ACTIVITY: As tolerated. Patient may shower. No heavy lifting of greater than 5 pounds and no driving.     DIET: A Cardiac diet      WOUND CARE INSTRUCTIONS: Check for redness, swelling and drainage around the  incision or wound. Patient is to call for any obvious bleeding, drainage, pus from the wound, unusual problems or difficulties or temperature of greater than 101   degrees.     FOLLOWUP: Follow up with  in approximately 3 weeks. Prior to this  appointment, the patient will have a chest x-ray and EKG.     Patient not placed on Ace-Inhibitor at the time of discharge due to potential for hypotension         DISCHARGE CONDITION: At the time of discharge, the patient was in sinus rhythm and afebrile with stable vital signs.

## 2023-05-18 NOTE — HPI
Ms. Narayan is a very pleasant 78-year-old woman with past medical history significant for aortic valve replacement in 2013.  She is also undergone treatment for breast cancer including resection with chest irradiation.  Around the time of treatment for her breast cancer, she had a pulmonary embolus.  Unfortunately, over the past several years she is had progressive dyspnea on exertion.  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  She was evaluated for transcatheter valve, but due to the location of the coronary ostia and the nature of the valve in place this was felt to be a very poor option.  She now presents for redo sternotomy and redo aortic valve replacement.

## 2023-05-18 NOTE — CARE UPDATE
-Glucose Goal 140-180    -A1C:   Hemoglobin A1C   Date Value Ref Range Status   05/04/2023 7.3 (H) 4.0 - 5.6 % Final     Comment:     ADA Screening Guidelines:  5.7-6.4%  Consistent with prediabetes  >or=6.5%  Consistent with diabetes    High levels of fetal hemoglobin interfere with the HbA1C  assay. Heterozygous hemoglobin variants (HbS, HgC, etc)do  not significantly interfere with this assay.   However, presence of multiple variants may affect accuracy.           -HOME REGIMEN:   metformin 500 mg BID and glimepiride 2 mg daily     -GLUCOSE TREND FOR THE PAST 24HRS:   Recent Labs   Lab 05/16/23  2120 05/17/23  0737 05/17/23  1116 05/17/23  1635 05/17/23  2235 05/18/23  0720   POCTGLUCOSE 141* 120* 172* 201* 264* 139*           -NO HYPOGYCEMIAS NOTED     - Diet  Diet diabetic [facility]; 2000 Calorie; Fluid - 1500mL    T2DM.  S/p CTS sx.  BG reasonably well controlled on insulin regimen.  Will consider adjusting mealtime insulin if continues to have prandial elevations.      Plan:   - Levemir (Insulin Detemir) 16 units daily  - Novolog (Insulin Aspart) 5 units TIDWM and prn for BG excursions MDC SSI (150/25)  - BG checks AC/HS  - Hypoglycemia protocol in place    ** Please notify Endocrine for any change and/or advance in diet**  ** Please call Endocrine for any BG related issues **     Discharge Planning:   TBD. Please notify endocrinology prior to discharge. Anticipate pt can return on home regimen.

## 2023-05-18 NOTE — PROGRESS NOTES
[facility] - [facility]iology Step[facility]  Cardiothoracic Surgery  Progress Note    Patient Name: Alina Narayan  MRN: [id number]  Admission Date: 5/10/2023  Hospital Length of Stay: 8 days  Code Status: Full Code   Attending Physician: Venkat Webb MD   Referring Provider: Venkat Webb MD  Principal Problem:Nonrheumatic aortic valve stenosis            Subjective:     Post-Op Info:  Procedure(s) (LRB):  Replacement-valve-aortic, redo sternotomy (N/A)   8 Days Post-Op     Interval History: Cr steadily increasing to 1.4<1.3<1.2. decrease lasix to 20 daily from 40 BID. Patient will be transferred to [facility] tomorrow.    Review of Systems   Constitutional: Negative for malaise/fatigue.   Cardiovascular:  Negative for chest pain, claudication, dyspnea on exertion, irregular heartbeat, leg swelling and palpitations.   Respiratory:  Negative for cough and shortness of breath.    Hematologic/Lymphatic: Negative for bleeding problem.   Gastrointestinal:  Negative for abdominal pain.   Genitourinary:  Negative for dysuria.   Neurological:  Negative for headaches and weakness.   Medications:  Continuous Infusions:  Scheduled Meds:   apixaban  2.5 mg Oral BID    aspirin  81 mg Oral Daily    docusate sodium  100 mg Oral BID    [START ON 5/19/2023] furosemide  20 mg Oral Daily    gabapentin  300 mg Oral TID    insulin aspart U-100  5 Units Subcutaneous TIDWM    insulin detemir U-100  16 Units Subcutaneous Daily    levothyroxine  75 mcg Oral Before breakfast    LIDOcaine  1 patch Transdermal Q24H    metoprolol tartrate  25 mg Oral BID    polyethylene glycol  17 g Oral Daily    potassium chloride  20 mEq Oral BID    pravastatin  20 mg Oral Daily    senna-docusate 8.6-50 mg  1 tablet Oral Daily     PRN Meds:albumin human 5%, aspirin, bisacodyL, dextrose 10%, dextrose 10%, dextrose 10%, dextrose 10%, dextrose, dextrose, glucagon (human recombinant), HYDROcodone-acetaminophen, HYDROcodone-acetaminophen, insulin aspart  U-100, metoclopramide HCl, ondansetron, sodium chloride 0.9%     Objective:     Vital Signs (Most Recent):  Temp: 97.9 °F (36.6 °C) (05/18/23 0719)  Pulse: 107 (05/18/23 1043)  Resp: 17 (05/18/23 0719)  BP: (!) 126/59 (05/18/23 0719)  SpO2: (!) 92 % (05/18/23 0719) Vital Signs (24h Range):  Temp:  [97.3 °F (36.3 °C)-98.5 °F (36.9 °C)] 97.9 °F (36.6 °C)  Pulse:  [] 107  Resp:  [16-20] 17  SpO2:  [90 %-97 %] 92 %  BP: (116-130)/(55-68) 126/59     Weight: 119.2 kg (262 lb 12.6 oz)  Body mass index is 48.06 kg/m².    SpO2: (!) 92 %       Intake/Output - Last 3 Shifts         05/16 0700  05/17 0659 05/17 0700  05/18 0659 05/18 0700  05/19 0659    P.O. 740 804     Total Intake(mL/kg) 740 (6.5) 804 (6.7)     Urine (mL/kg/hr) 950 (0.3) 1050 (0.4)     Other       Stool 1      Total Output 951 1050     Net -211 -246            Urine Occurrence  1 x     Stool Occurrence   0 x            Lines/Drains/Airways       Drain  Duration             Female External Urinary Catheter 05/13/23 1200 4 days              Peripheral Intravenous Line  Duration                  Peripheral IV - Single Lumen 05/14/23 1145 20 G;1 3/4 in Left;Anterior Upper Arm 3 days                     Physical Exam  Constitutional:       General: She is not in acute distress.  HENT:      Head: Normocephalic and atraumatic.   Eyes:      Pupils: Pupils are equal, round, and reactive to light.   Cardiovascular:      Rate and Rhythm: Normal rate.   Pulmonary:      Effort: Pulmonary effort is normal. No respiratory distress.   Musculoskeletal:         General: Normal range of motion.      Cervical back: Normal range of motion.   Skin:     Coloration: Skin is not pale.      Comments: MSI c/d/I   Chest tube site c/d/i   Neurological:      General: No focal deficit present.      Mental Status: She is alert.   Psychiatric:         Mood and Affect: Mood normal.         Behavior: Behavior normal.          Significant Labs:  CBC:   Recent Labs   Lab 05/18/23  0350    WBC 7.30   RBC 2.91*   HGB 8.4*   HCT 28.0*   *   MCV 96   MCH 28.9   MCHC 30.0*     CMP:   Recent Labs   Lab 05/18/23  0350   *   CALCIUM 9.5      K 4.5   CO2 28      BUN 37*   CREATININE 1.4     Coagulation: No results for input(s): PT, INR, APTT in the last 48 hours.    Significant Diagnostics:  I have reviewed all pertinent imaging results/findings within the past 24 hours.    Assessment/Plan:     Impaired mobility  PT/OT     Bradycardia  NSR on monitor   Telemetry     Acute blood loss anemia  Expected post operatively   CBC daily     History of pulmonary embolism  On home Eliquis     Fatty liver  Liver enzymes normal on CMP    Thrombocytopenia, unspecified  Post operative  CBC daily     BMI 45.0-49.9, adult  Diabetic / cardiac diet   PT/OT    Chronic kidney disease, stage III (moderate)  BMP daily   Creatinine 1.4    Acquired hypothyroidism  Synthroid     S/P AVR  ASA  Statin   BB   Eliquis   Bowel regimen   PT/OT   Lasix and K   Pain regimen   Transfer to SNF tomorrow      Diabetes mellitus type 2 in obese  Endocrine following     Obstructive sleep apnea  CPAP at night     Hyperlipidemia, mixed  Statin     Essential hypertension  BP stable off meds at this time         Lela Workman PA-C  Cardiothoracic Surgery  [facility] - Cardiology Stepdown

## 2023-05-18 NOTE — PLAN OF CARE
05/18/23 0954   Post-Acute Status   Post-Acute Authorization Placement   Post-Acute Placement Status Pending Bed Availability  (admit to [facility] 5/19)     Per Marion with [facility] they have auth and will have a bed tomorrow 5/19.  Treatment team notified and stated that they will update pt.      Eladia Diaz LMSW  [facility]  [id number]

## 2023-05-18 NOTE — TELEPHONE ENCOUNTER
Spoke with pt and relayed message from Dr Palm below.      MD Milagros Allen MA  Please let her know that I just read echo and heart looked excellent,Chip   PS --She probably is in hospital now??

## 2023-05-18 NOTE — PT/OT/SLP PROGRESS
Physical Therapy      Patient Name:  Alina Narayan   MRN:  [id number]    Patient not seen today secondary to pt in bathroom upon PT arrival this AM and requesting additional time prior to therapy session. Pt working with OT later in AM. Therapist unable to follow-up for additional PM attempt. Will follow up at next scheduled visit.     5/18/2023

## 2023-05-19 ENCOUNTER — HOSPITAL ENCOUNTER (INPATIENT)
Facility: HOSPITAL | Age: 78
LOS: 5 days | Discharge: HOME | DRG: 949 | End: 2023-05-24
Attending: HOSPITALIST | Admitting: HOSPITALIST
Payer: MEDICARE

## 2023-05-19 VITALS
OXYGEN SATURATION: 94 % | SYSTOLIC BLOOD PRESSURE: 132 MMHG | TEMPERATURE: 98 F | HEART RATE: 84 BPM | DIASTOLIC BLOOD PRESSURE: 60 MMHG | RESPIRATION RATE: 16 BRPM | BODY MASS INDEX: 48.32 KG/M2 | HEIGHT: 62 IN | WEIGHT: 262.56 LBS

## 2023-05-19 DIAGNOSIS — I35.0 NODULAR CALCIFIC AORTIC VALVE STENOSIS: ICD-10-CM

## 2023-05-19 LAB
ANION GAP SERPL CALC-SCNC: 7 MMOL/L (ref 8–16)
BASOPHILS # BLD AUTO: 0.02 K/UL (ref 0–0.2)
BASOPHILS NFR BLD: 0.3 % (ref 0–1.9)
BUN SERPL-MCNC: 32 MG/DL (ref 8–23)
CALCIUM SERPL-MCNC: 9.3 MG/DL (ref 8.7–10.5)
CHLORIDE SERPL-SCNC: 102 MMOL/L (ref 95–110)
CO2 SERPL-SCNC: 29 MMOL/L (ref 23–29)
CREAT SERPL-MCNC: 1.2 MG/DL (ref 0.5–1.4)
DIFFERENTIAL METHOD: ABNORMAL
EOSINOPHIL # BLD AUTO: 0.1 K/UL (ref 0–0.5)
EOSINOPHIL NFR BLD: 2.1 % (ref 0–8)
ERYTHROCYTE [DISTWIDTH] IN BLOOD BY AUTOMATED COUNT: 15 % (ref 11.5–14.5)
EST. GFR  (NO RACE VARIABLE): 46.3 ML/MIN/1.73 M^2
FINAL PATHOLOGIC DIAGNOSIS: NORMAL
GLUCOSE SERPL-MCNC: 131 MG/DL (ref 70–110)
GROSS: NORMAL
HCT VFR BLD AUTO: 25.6 % (ref 37–48.5)
HGB BLD-MCNC: 8 G/DL (ref 12–16)
IMM GRANULOCYTES # BLD AUTO: 0.15 K/UL (ref 0–0.04)
IMM GRANULOCYTES NFR BLD AUTO: 2.4 % (ref 0–0.5)
LYMPHOCYTES # BLD AUTO: 1.4 K/UL (ref 1–4.8)
LYMPHOCYTES NFR BLD: 23.3 % (ref 18–48)
Lab: NORMAL
MAGNESIUM SERPL-MCNC: 2.2 MG/DL (ref 1.6–2.6)
MCH RBC QN AUTO: 28.9 PG (ref 27–31)
MCHC RBC AUTO-ENTMCNC: 31.3 G/DL (ref 32–36)
MCV RBC AUTO: 92 FL (ref 82–98)
MONOCYTES # BLD AUTO: 0.7 K/UL (ref 0.3–1)
MONOCYTES NFR BLD: 11.1 % (ref 4–15)
NEUTROPHILS # BLD AUTO: 3.8 K/UL (ref 1.8–7.7)
NEUTROPHILS NFR BLD: 60.8 % (ref 38–73)
NRBC BLD-RTO: 0 /100 WBC
PHOSPHATE SERPL-MCNC: 4.5 MG/DL (ref 2.7–4.5)
PLATELET # BLD AUTO: 163 K/UL (ref 150–450)
PMV BLD AUTO: 10.7 FL (ref 9.2–12.9)
POCT GLUCOSE: 145 MG/DL (ref 70–110)
POCT GLUCOSE: 157 MG/DL (ref 70–110)
POCT GLUCOSE: 163 MG/DL (ref 70–110)
POCT GLUCOSE: 167 MG/DL (ref 70–110)
POTASSIUM SERPL-SCNC: 4.4 MMOL/L (ref 3.5–5.1)
RBC # BLD AUTO: 2.77 M/UL (ref 4–5.4)
SARS-COV-2 RNA RESP QL NAA+PROBE: NOT DETECTED
SODIUM SERPL-SCNC: 138 MMOL/L (ref 136–145)
WBC # BLD AUTO: 6.19 K/UL (ref 3.9–12.7)

## 2023-05-19 PROCEDURE — 80048 BASIC METABOLIC PNL TOTAL CA: CPT | Performed by: PHYSICIAN ASSISTANT

## 2023-05-19 PROCEDURE — 11000004 HC SNF PRIVATE

## 2023-05-19 PROCEDURE — 97530 THERAPEUTIC ACTIVITIES: CPT

## 2023-05-19 PROCEDURE — 87635 SARS-COV-2 COVID-19 AMP PRB: CPT | Performed by: NURSE PRACTITIONER

## 2023-05-19 PROCEDURE — 85025 COMPLETE CBC W/AUTO DIFF WBC: CPT | Performed by: PHYSICIAN ASSISTANT

## 2023-05-19 PROCEDURE — 25000003 PHARM REV CODE 250: Performed by: HOSPITALIST

## 2023-05-19 PROCEDURE — 25000003 PHARM REV CODE 250: Performed by: STUDENT IN AN ORGANIZED HEALTH CARE EDUCATION/TRAINING PROGRAM

## 2023-05-19 PROCEDURE — 83735 ASSAY OF MAGNESIUM: CPT | Performed by: PHYSICIAN ASSISTANT

## 2023-05-19 PROCEDURE — 99900035 HC TECH TIME PER 15 MIN (STAT)

## 2023-05-19 PROCEDURE — 25000003 PHARM REV CODE 250: Performed by: PHYSICIAN ASSISTANT

## 2023-05-19 PROCEDURE — 25000003 PHARM REV CODE 250

## 2023-05-19 PROCEDURE — 94761 N-INVAS EAR/PLS OXIMETRY MLT: CPT

## 2023-05-19 PROCEDURE — 84100 ASSAY OF PHOSPHORUS: CPT | Performed by: PHYSICIAN ASSISTANT

## 2023-05-19 PROCEDURE — 97535 SELF CARE MNGMENT TRAINING: CPT

## 2023-05-19 RX ORDER — LEVOTHYROXINE SODIUM 75 UG/1
75 TABLET ORAL
Status: DISCONTINUED | OUTPATIENT
Start: 2023-05-20 | End: 2023-05-24 | Stop reason: HOSPADM

## 2023-05-19 RX ORDER — INSULIN ASPART 100 [IU]/ML
5 INJECTION, SOLUTION INTRAVENOUS; SUBCUTANEOUS
Status: DISCONTINUED | OUTPATIENT
Start: 2023-05-20 | End: 2023-05-24 | Stop reason: HOSPADM

## 2023-05-19 RX ORDER — GABAPENTIN 300 MG/1
300 CAPSULE ORAL 3 TIMES DAILY
Status: DISCONTINUED | OUTPATIENT
Start: 2023-05-19 | End: 2023-05-24 | Stop reason: HOSPADM

## 2023-05-19 RX ORDER — METOPROLOL TARTRATE 25 MG/1
25 TABLET, FILM COATED ORAL 2 TIMES DAILY
Status: DISCONTINUED | OUTPATIENT
Start: 2023-05-19 | End: 2023-05-24 | Stop reason: HOSPADM

## 2023-05-19 RX ORDER — CALCIUM CARBONATE 200(500)MG
500 TABLET,CHEWABLE ORAL 2 TIMES DAILY PRN
Status: DISCONTINUED | OUTPATIENT
Start: 2023-05-19 | End: 2023-05-24 | Stop reason: HOSPADM

## 2023-05-19 RX ORDER — DOCUSATE SODIUM 100 MG/1
100 CAPSULE, LIQUID FILLED ORAL 2 TIMES DAILY
Status: DISCONTINUED | OUTPATIENT
Start: 2023-05-19 | End: 2023-05-22

## 2023-05-19 RX ORDER — FUROSEMIDE 20 MG/1
20 TABLET ORAL DAILY
Status: DISCONTINUED | OUTPATIENT
Start: 2023-05-20 | End: 2023-05-24 | Stop reason: HOSPADM

## 2023-05-19 RX ORDER — DEXTROSE 40 %
30 GEL (GRAM) ORAL
Status: DISCONTINUED | OUTPATIENT
Start: 2023-05-19 | End: 2023-05-24 | Stop reason: HOSPADM

## 2023-05-19 RX ORDER — LIDOCAINE 50 MG/G
1 PATCH TOPICAL
Status: DISCONTINUED | OUTPATIENT
Start: 2023-05-20 | End: 2023-05-24 | Stop reason: HOSPADM

## 2023-05-19 RX ORDER — POLYETHYLENE GLYCOL 3350 17 G/17G
17 POWDER, FOR SOLUTION ORAL DAILY
Status: DISCONTINUED | OUTPATIENT
Start: 2023-05-20 | End: 2023-05-24 | Stop reason: HOSPADM

## 2023-05-19 RX ORDER — POTASSIUM CHLORIDE 20 MEQ/1
20 TABLET, EXTENDED RELEASE ORAL 2 TIMES DAILY
Status: DISCONTINUED | OUTPATIENT
Start: 2023-05-19 | End: 2023-05-23

## 2023-05-19 RX ORDER — TALC
6 POWDER (GRAM) TOPICAL NIGHTLY PRN
Status: DISCONTINUED | OUTPATIENT
Start: 2023-05-19 | End: 2023-05-24 | Stop reason: HOSPADM

## 2023-05-19 RX ORDER — GLUCAGON 1 MG
1 KIT INJECTION
Status: DISCONTINUED | OUTPATIENT
Start: 2023-05-19 | End: 2023-05-24 | Stop reason: HOSPADM

## 2023-05-19 RX ORDER — AMOXICILLIN 250 MG
1 CAPSULE ORAL 2 TIMES DAILY
Status: DISCONTINUED | OUTPATIENT
Start: 2023-05-19 | End: 2023-05-24 | Stop reason: HOSPADM

## 2023-05-19 RX ORDER — ASPIRIN 81 MG/1
81 TABLET ORAL DAILY
Status: DISCONTINUED | OUTPATIENT
Start: 2023-05-20 | End: 2023-05-24 | Stop reason: HOSPADM

## 2023-05-19 RX ORDER — ACETAMINOPHEN 325 MG/1
650 TABLET ORAL EVERY 6 HOURS PRN
Status: DISCONTINUED | OUTPATIENT
Start: 2023-05-19 | End: 2023-05-24 | Stop reason: HOSPADM

## 2023-05-19 RX ORDER — INSULIN ASPART 100 [IU]/ML
0-5 INJECTION, SOLUTION INTRAVENOUS; SUBCUTANEOUS
Status: DISCONTINUED | OUTPATIENT
Start: 2023-05-19 | End: 2023-05-24 | Stop reason: HOSPADM

## 2023-05-19 RX ORDER — PRAVASTATIN SODIUM 20 MG/1
20 TABLET ORAL DAILY
Status: DISCONTINUED | OUTPATIENT
Start: 2023-05-20 | End: 2023-05-24 | Stop reason: HOSPADM

## 2023-05-19 RX ORDER — DEXTROSE 40 %
15 GEL (GRAM) ORAL
Status: DISCONTINUED | OUTPATIENT
Start: 2023-05-19 | End: 2023-05-24 | Stop reason: HOSPADM

## 2023-05-19 RX ADMIN — SENNOSIDES AND DOCUSATE SODIUM 1 TABLET: 50; 8.6 TABLET ORAL at 09:05

## 2023-05-19 RX ADMIN — SENNOSIDES AND DOCUSATE SODIUM 1 TABLET: 50; 8.6 TABLET ORAL at 10:05

## 2023-05-19 RX ADMIN — APIXABAN 2.5 MG: 2.5 TABLET, FILM COATED ORAL at 10:05

## 2023-05-19 RX ADMIN — INSULIN ASPART 5 UNITS: 100 INJECTION, SOLUTION INTRAVENOUS; SUBCUTANEOUS at 12:05

## 2023-05-19 RX ADMIN — LIDOCAINE 1 PATCH: 50 PATCH TOPICAL at 09:05

## 2023-05-19 RX ADMIN — POTASSIUM CHLORIDE 20 MEQ: 1500 TABLET, EXTENDED RELEASE ORAL at 09:05

## 2023-05-19 RX ADMIN — DOCUSATE SODIUM 100 MG: 100 CAPSULE, LIQUID FILLED ORAL at 09:05

## 2023-05-19 RX ADMIN — METOPROLOL TARTRATE 25 MG: 25 TABLET, FILM COATED ORAL at 09:05

## 2023-05-19 RX ADMIN — INSULIN DETEMIR 16 UNITS: 100 INJECTION, SOLUTION SUBCUTANEOUS at 09:05

## 2023-05-19 RX ADMIN — HYDROCODONE BITARTRATE AND ACETAMINOPHEN 1 TABLET: 5; 325 TABLET ORAL at 09:05

## 2023-05-19 RX ADMIN — APIXABAN 2.5 MG: 2.5 TABLET, FILM COATED ORAL at 09:05

## 2023-05-19 RX ADMIN — LEVOTHYROXINE SODIUM 75 MCG: 75 TABLET ORAL at 05:05

## 2023-05-19 RX ADMIN — DOCUSATE SODIUM 100 MG: 100 CAPSULE, LIQUID FILLED ORAL at 10:05

## 2023-05-19 RX ADMIN — PRAVASTATIN SODIUM 20 MG: 20 TABLET ORAL at 09:05

## 2023-05-19 RX ADMIN — INSULIN ASPART 5 UNITS: 100 INJECTION, SOLUTION INTRAVENOUS; SUBCUTANEOUS at 09:05

## 2023-05-19 RX ADMIN — INSULIN ASPART 5 UNITS: 100 INJECTION, SOLUTION INTRAVENOUS; SUBCUTANEOUS at 04:05

## 2023-05-19 RX ADMIN — POTASSIUM CHLORIDE 20 MEQ: 1500 TABLET, EXTENDED RELEASE ORAL at 10:05

## 2023-05-19 RX ADMIN — ASPIRIN 81 MG: 81 TABLET, COATED ORAL at 09:05

## 2023-05-19 RX ADMIN — GABAPENTIN 300 MG: 300 CAPSULE ORAL at 09:05

## 2023-05-19 RX ADMIN — GABAPENTIN 300 MG: 300 CAPSULE ORAL at 10:05

## 2023-05-19 RX ADMIN — HYDROCODONE BITARTRATE AND ACETAMINOPHEN 1 TABLET: 10; 325 TABLET ORAL at 04:05

## 2023-05-19 RX ADMIN — FUROSEMIDE 20 MG: 20 TABLET ORAL at 09:05

## 2023-05-19 RX ADMIN — METOPROLOL TARTRATE 25 MG: 25 TABLET, FILM COATED ORAL at 10:05

## 2023-05-19 RX ADMIN — GABAPENTIN 300 MG: 300 CAPSULE ORAL at 04:05

## 2023-05-19 NOTE — PLAN OF CARE
05/19/23 1142   Post-Acute Status   Post-Acute Authorization Placement   Post-Acute Placement Status Pending medical clearance/testing  (Covid test)     Per Arlen with [facility] they are ready to accept pt today pending Covid test.  Covid swab collected and sent to lab.  Escalated to lab leadership to expedite.      Eladia Diaz LMSW  [facility]  [id number]

## 2023-05-19 NOTE — DISCHARGE SUMMARY
[facility] - Cardiology Stepdown  Cardiothoracic Surgery  Discharge Summary      Patient Name: Alina Narayan  MRN: [id number]  Admission Date: 5/10/2023  Hospital Length of Stay: 9 days  Discharge Date and Time:  05/19/2023 8:43 AM  Attending Physician: Venkat Webb MD   Discharging Provider: Adrianne Wilson NP  Primary Care Provider: Aarti Dunn MD    HPI:   Ms. Narayan is a very pleasant 78-year-old woman with past medical history significant for aortic valve replacement in 2013.  She is also undergone treatment for breast cancer including resection with chest irradiation.  Around the time of treatment for her breast cancer, she had a pulmonary embolus.  Unfortunately, over the past several years she is had progressive dyspnea on exertion.  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  She was evaluated for transcatheter valve, but due to the location of the coronary ostia and the nature of the valve in place this was felt to be a very poor option.  She now presents for redo sternotomy and redo aortic valve replacement.      Procedure(s) (LRB):  Replacement-valve-aortic, redo sternotomy (N/A)      Indwelling Lines/Drains at time of discharge:   Lines/Drains/Airways     Drain  Duration           Female External Urinary Catheter 05/13/23 1200 5 days              Hospital Course: On 5/10/23  the patient was taken to the Operating Room for the above stated procedure. Please see the previously dictated operative report for complete details. Postoperatively, the patient was taken from the  Operating Room to the ICU where the vital signs were monitored and pain was kept under control. The patient was weaned from the drips and extubated in the ICU per protocol. Once hemodynamically stable, the patient was transferred to the Cardiac Step-Down floor for continued strengthening and ambulation. On postoperative day 9, the patient was ready for not discharge to home  and will be sent to a  [facility] nursing [facility] center to continue to optimize strength and conditioning. At the time of transfer, the patient was unable to ambulating unassisted. Pain was well controlled with oral analgesics and the patient was tolerating the diet.     NYHA class 2    MOBILITY AND ACTIVITY: As tolerated. Patient may shower. No heavy lifting of greater than 5 pounds and no driving.     DIET: A Cardiac diet      WOUND CARE INSTRUCTIONS: Check for redness, swelling and drainage around the  incision or wound. Patient is to call for any obvious bleeding, drainage, pus from the wound, unusual problems or difficulties or temperature of greater than 101   degrees.     FOLLOWUP: Follow up with  in approximately 3 weeks. Prior to this  appointment, the patient will have a chest x-ray and EKG.     Patient not placed on Ace-Inhibitor at the time of discharge due to potential for hypotension         DISCHARGE CONDITION: At the time of discharge, the patient was in sinus rhythm and afebrile with stable vital signs.         Goals of Care Treatment Preferences:  Code Status: Full Code    Living Will: Yes              Consults (From admission, onward)        Status Ordering Provider     Inpatient consult to Physical Medicine Rehab  Once        Provider:  (Not yet assigned)    Completed GURINDER IZQUIERDO     Inpatient consult to Physical Medicine Rehab  Once        Provider:  (Not yet assigned)    Completed JERRY BACA     Inpatient consult to [facility] team [facility]  Once        Provider:  (Not yet assigned)    JERRY Griggs     Inpatient consult to Electrophysiology  Once        Provider:  (Not yet assigned)    Completed VANESA POOL     Inpatient consult to Endocrinology  Once        Provider:  (Not yet assigned)    Completed JOANNA HUGHES     Consult to Endocrinology  Once        Provider:  (Not yet assigned)    Completed GURINDER IZQUIERDO     Consult Case  Management/Social Work  Once        Provider:  (Not yet assigned)    Acknowledged GURINDER IZQUIERDO              Pending Diagnostic Studies:     Procedure Component Value Units Date/Time    Specimen to Pathology, Surgery Cardiovascular [[id number]] Collected: 05/10/23 1425    Order Status: Sent Lab Status: In process Updated: 05/11/23 0735    Specimen: Tissue           No new Assessment & Plan notes have been filed under this hospital service since the last note was generated.  Service: Cardiothoracic Surgery    Final Active Diagnoses:    Diagnosis Date Noted POA    PRINCIPAL PROBLEM:  Nonrheumatic aortic valve stenosis [I35.0] 11/07/2013 Yes    Impaired mobility [Z74.09] 05/16/2023 Yes    Acute blood loss anemia [D62] 05/12/2023 No    Bradycardia [R00.1] 05/12/2023 No    Surgical wound present [T14.8XXA] 05/11/2023 No    History of pulmonary embolism [Z86.711] 10/03/2022 Yes    Fatty liver [K76.0] 08/13/2019 Yes    Thrombocytopenia, unspecified [D69.6]  Yes    BMI 45.0-49.9, adult [Z68.42] 09/30/2018 Not Applicable    Chronic kidney disease, stage III (moderate) [N18.30] 09/12/2017 Yes    Acquired hypothyroidism [E03.9] 12/02/2013 Yes    Diabetes mellitus type 2 in obese [E11.69, E66.9] 12/02/2013 Yes    S/P AVR [Z95.2] 12/02/2013 Not Applicable    Obstructive sleep apnea [G47.33] 05/13/2013 Yes    Hyperlipidemia, mixed [E78.2] 07/06/2012 Yes    Essential hypertension [I10] 07/06/2012 Yes      Problems Resolved During this Admission:      Discharged Condition: stable    Disposition: Skilled Nursing Facility    Follow Up:    Patient Instructions:     Please see plan of care note for discharge medication details.    Time spent on the discharge of patient: 40 minutes    Adrianne Wilson NP  Cardiothoracic Surgery  [facility] - Card[facility] Step[facility]

## 2023-05-19 NOTE — PLAN OF CARE
"[facility] - Cardiology Stepdown  Discharge Final Note    Primary Care Provider: Aarti Dunn MD    Expected Discharge Date: 5/19/2023    Final Discharge Note (most recent)       Final Note - 05/19/23 1344          Final Note    Assessment Type Final Discharge Note     Anticipated Discharge Disposition Skilled Nursing Facility        Post-Acute Status    Post-Acute Authorization Placement     Post-Acute Placement Status Set-up Complete/Auth obtained                 Covid negative.  Transportation scheduled via wheelchair van for 5:00pm to transfer to [facility].  JORDAN Aponte to call report around 4:00pm to [phone] .  JORDAN Aponte was notified of the above.  SW also notified pt and  at bedside of transfer.    UPDATE 3:56 PM  SW arrived early to  up pt but SW confirmed with Marion with [facility] that pt's room still needs to be cleaned.  SW relayed this to pt and  at bedside but pt stated that she does not want to wait anymore and inquired why she was told earlier today that her bed was ready when it wasn't.  SW assured pt that her room will be ready soon and explained that having a bed available today does not necessarily mean that her bed is ready "now".  SW further explained that bed availability is based on discharges and if the person in her bed just left then her room will still need to be cleared before she can go there.  Pt to take a shower with 's assistance prior to transportation coming back.      Eladia Diaz LMSW  [facility]  [id number]      "

## 2023-05-19 NOTE — NURSING
Called report to Becky at [facility] at this time. PIV removed. Pain medication given before transfer. Bed ready, waiting on transportation at this time. All questions, comments, and concerns from pt and  answered at this time.

## 2023-05-19 NOTE — PLAN OF CARE
[facility]     Department of Hospital Medicine     [address] [place] [address]     [phone] after hours  [phone] fax                                        FACILITY TRANSFER ORDERS     Patient Name: Alina Narayan  YOB: [birth year]    Admit to:  SNF    Diagnoses:  Active Hospital Problems    Diagnosis  POA    *Nonrheumatic aortic valve stenosis [I35.0]  Yes    Impaired mobility [Z74.09]  Yes    Acute blood loss anemia [D62]  No     Expected post operatively       Bradycardia [R00.1]  No    Surgical wound present [T14.8XXA]  No    History of pulmonary embolism [Z86.711]  Yes    Fatty liver [K76.0]  Yes    Thrombocytopenia, unspecified [D69.6]  Yes    BMI 45.0-49.9, adult [Z68.42]  Not Applicable    Chronic kidney disease, stage III (moderate) [N18.30]  Yes    Acquired hypothyroidism [E03.9]  Yes    Diabetes mellitus type 2 in obese [E11.69, E66.9]  Yes    S/P AVR [Z95.2]  Not Applicable     Aortic valve replacement with a 21-mm [employer] Trifecta   bovine pericardial prosthesis.        Obstructive sleep apnea [G47.33]  Yes    Hyperlipidemia, mixed [E78.2]  Yes    Essential hypertension [I10]  Yes      Resolved Hospital Problems   No resolved problems to display.       Vital Signs: Routine.    Allergies:  Review of patient's allergies indicates:   Allergen Reactions    Augmentin [amoxicillin-pot clavulanate] Diarrhea    Dilaudid [hydromorphone (bulk)] Other (See Comments)     Other reaction(s): sedation    Hydromorphone Other (See Comments)     Other reaction(s): sedation    Cymbalta [duloxetine]      Dry mouth, agitation    Jardiance [empagliflozin] Hives    Byetta [exenatide] Rash     Other reaction(s): Rash       Diet: cardiac diet and diabetic diet: 2000 calorie     Acitivities: Activity as tolerated, no lifting more than 5 pounds, no pushing or pulling with arms, No driving for 4 weeks.      Nursing: Ok to shower. No baths or  soaking in water. Wash incision daily with soap and water, pat dry. No lotions or creams. Notify MD of any erythema, drainage, or signs of dehiscence.     Labs: BMP, CBC     CONSULTS:       Physical Therapy to evaluate and treat     Occupational Therapy to evaluate and treat      Okay to use CPAP at night.    Expiratory pressure: per RT, patient can use her own if available     DIABETES CARE:    SN to perform and educate Diabetic management with blood glucose monitoring:      Fingerstick blood sugar AC and HS    Report CBG < 60 or > 350 to physician.                                          Insulin Sliding Scale          Glucose  Novolog Insulin Subcutaneous        0 - 60   Orange juice or glucose tablet, hold insulin      No insulin   201-250  2 units   251-300  4 units   301-350  6 units   351-400  8 units   >400   10 units then call physician    Medications:   Scheduled Meds:   apixaban  2.5 mg Oral BID    aspirin  81 mg Oral Daily    docusate sodium  100 mg Oral BID    furosemide  20 mg Oral Daily    gabapentin  300 mg Oral TID    insulin aspart U-100  5 Units Subcutaneous TIDWM    insulin detemir U-100  16 Units Subcutaneous Daily    levothyroxine  75 mcg Oral Before breakfast    LIDOcaine  1 patch Transdermal Q24H    metoprolol tartrate  25 mg Oral BID    polyethylene glycol  17 g Oral Daily    potassium chloride  20 mEq Oral BID    pravastatin  20 mg Oral Daily    senna-docusate 8.6-50 mg  1 tablet Oral Daily       HYDROcodone-acetaminophen 5-325mg tablet PO Q6H PRN pain     _________________________________  Adrianne Wilson NP  05/19/2023

## 2023-05-19 NOTE — PT/OT/SLP PROGRESS
Occupational Therapy   Treatment    Name: Alina Narayan  MRN: [id number]  Admitting Diagnosis:  Nonrheumatic aortic valve stenosis  9 Days Post-Op    Recommendations:     Discharge Recommendations: rehabilitation facility  Discharge Equipment Recommendations:  none  Barriers to discharge:  None    Assessment:     Alina Narayan is a 78 y.o. female with a medical diagnosis of Nonrheumatic aortic valve stenosis.  She presents with good participation and motivation. She completed toileting with Min (A), toilet transfer with CGA and HHA, and functional ambulation x2 trials simulating household distances with CGA and HHA. 1 seated rest break required in between functional ambulation trials due to fatigue and SOB. She was able to recall 3/3 sternal precautions with no cueing required to adhere to throughout. Pt remains limited in ADLs, functional mobility, and functional transfers and is currently not performing tasks at OF. Pt is at risk for falls. Goals remain appropriate. . Performance deficits affecting function are weakness, impaired endurance, impaired self care skills, impaired functional mobility, gait instability, impaired balance, pain, impaired cardiopulmonary response to activity, decreased upper extremity function, impaired fine motor, impaired skin.     Rehab Prognosis:  Good; patient would benefit from acute skilled OT services to address these deficits and reach maximum level of function.       Plan:     Patient to be seen 5 x/week to address the above listed problems via self-care/home management, therapeutic activities, therapeutic exercises, neuromuscular re-education  Plan of Care Expires: 05/26/23  Plan of Care Reviewed with: patient, spouse    Subjective     Chief Complaint: decreased endurance  Patient/Family Comments/goals: to get better  Pain/Comfort:  Pain Rating 1: 5/10  Location - Side 1: Bilateral  Location - Orientation 1: anterior  Location 1:  (sternum- incision site)    Objective:      Communicated with: Fay ORTEGA prior to session.  Patient found up in chair with telemetry upon OT entry to room.    General Precautions: Standard, fall, sternal    Orthopedic Precautions:N/A  Braces: N/A  Respiratory Status: Room air     Occupational Performance:     Bed Mobility:    Not assessed- pt sitting in chair and returned to chair end of session    Functional Mobility/Transfers:  Patient completed Sit <> Stand Transfer with contact guard assistance and minimum assistance  with  no assistive device   Minimum progressing to contact guard (A) from bedside chair, contact guard (A) from toilet  Patient completed Bed <> Chair Transfer using Step Transfer technique with contact guard assistance with no assistive device and hand-held assist  Patient completed Toilet Transfer Step Transfer technique with contact guard assistance with  no AD and hand-held assist  Functional Mobility: Pt engaged in functional mobility to simulate household/community distances 8 ft + 40 ft + 62 ft with CGA and HHA (L) UE in order to maximize functional endurance and standing balance required for engagement in occupations of choice   SOB noted with exertion  1 seated rest break required    Activities of Daily Living:  Grooming: stand by assistance to wash hands at sink  Toileting: minimum assistance required to cleanse anteriorly sitting on toilet- pt states she is unable to maintain grasp of toilet paper with (L) UE 2/2 weakness      AMPA 6 Click ADL: 18    Treatment & Education:  -Sternal precautions and application to functional tasks / ADLs reviewed:  including no lifting > 5 lbs, pulling or pushing with BUEs  -Education on energy conservation and task modification to maximize safety and (I) during ADLs and mobility  -Education on importance of OOB activity to improve overall activity tolerance and promote recovery  -Pt educated to call for assistance and to transfer with hospital staff only  -Provided education regarding role  of OT, POC, & discharge recommendations with pt verbalizing understanding.  Pt had no further questions & when asked whether there were any concerns pt reported none.     Patient left up in chair with all lines intact, call button in reach, and RN present    GOALS:   Multidisciplinary Problems       Occupational Therapy Goals          Problem: Occupational Therapy    Goal Priority Disciplines Outcome Interventions   Occupational Therapy Goal     OT, PT/OT Ongoing, Progressing    Description: Goals to be met by: 5/26/23     Patient will increase functional independence with ADLs by performing:    Feeding with Modified [place].  UE Dressing with Minimal A  LE Dressing with Moderate Assistance.  Grooming while standing at sink with Contact Guard Assistance.  Toileting from bedside commode with Moderate Assistance for hygiene and clothing management.   Toilet transfer to bedside commode with Minimal Assistance.                         Time Tracking:     OT Date of Treatment: 05/19/23  OT Start Time: 1132  OT Stop Time: 1200  OT Total Time (min): 28 min    Billable Minutes:Self Care/Home Management 8  Therapeutic Activity 20    OT/WENDI: OT          5/19/2023

## 2023-05-20 LAB
POCT GLUCOSE: 143 MG/DL (ref 70–110)
POCT GLUCOSE: 148 MG/DL (ref 70–110)
POCT GLUCOSE: 167 MG/DL (ref 70–110)
POCT GLUCOSE: 248 MG/DL (ref 70–110)

## 2023-05-20 PROCEDURE — 97530 THERAPEUTIC ACTIVITIES: CPT

## 2023-05-20 PROCEDURE — 25000003 PHARM REV CODE 250: Performed by: HOSPITALIST

## 2023-05-20 PROCEDURE — 97535 SELF CARE MNGMENT TRAINING: CPT

## 2023-05-20 PROCEDURE — 11000004 HC SNF PRIVATE

## 2023-05-20 PROCEDURE — 97162 PT EVAL MOD COMPLEX 30 MIN: CPT

## 2023-05-20 PROCEDURE — 63600175 PHARM REV CODE 636 W HCPCS: Performed by: HOSPITALIST

## 2023-05-20 PROCEDURE — 97165 OT EVAL LOW COMPLEX 30 MIN: CPT

## 2023-05-20 RX ADMIN — GABAPENTIN 300 MG: 300 CAPSULE ORAL at 02:05

## 2023-05-20 RX ADMIN — FUROSEMIDE 20 MG: 20 TABLET ORAL at 08:05

## 2023-05-20 RX ADMIN — POTASSIUM CHLORIDE 20 MEQ: 1500 TABLET, EXTENDED RELEASE ORAL at 08:05

## 2023-05-20 RX ADMIN — APIXABAN 2.5 MG: 2.5 TABLET, FILM COATED ORAL at 09:05

## 2023-05-20 RX ADMIN — GABAPENTIN 300 MG: 300 CAPSULE ORAL at 08:05

## 2023-05-20 RX ADMIN — DOCUSATE SODIUM 100 MG: 100 CAPSULE, LIQUID FILLED ORAL at 09:05

## 2023-05-20 RX ADMIN — SENNOSIDES AND DOCUSATE SODIUM 1 TABLET: 50; 8.6 TABLET ORAL at 08:05

## 2023-05-20 RX ADMIN — GABAPENTIN 300 MG: 300 CAPSULE ORAL at 09:05

## 2023-05-20 RX ADMIN — INSULIN ASPART 2 UNITS: 100 INJECTION, SOLUTION INTRAVENOUS; SUBCUTANEOUS at 05:05

## 2023-05-20 RX ADMIN — SENNOSIDES AND DOCUSATE SODIUM 1 TABLET: 50; 8.6 TABLET ORAL at 09:05

## 2023-05-20 RX ADMIN — DOCUSATE SODIUM 100 MG: 100 CAPSULE, LIQUID FILLED ORAL at 08:05

## 2023-05-20 RX ADMIN — APIXABAN 2.5 MG: 2.5 TABLET, FILM COATED ORAL at 08:05

## 2023-05-20 RX ADMIN — INSULIN ASPART 5 UNITS: 100 INJECTION, SOLUTION INTRAVENOUS; SUBCUTANEOUS at 12:05

## 2023-05-20 RX ADMIN — POTASSIUM CHLORIDE 20 MEQ: 1500 TABLET, EXTENDED RELEASE ORAL at 09:05

## 2023-05-20 RX ADMIN — ASPIRIN 81 MG: 81 TABLET, COATED ORAL at 08:05

## 2023-05-20 RX ADMIN — METOPROLOL TARTRATE 25 MG: 25 TABLET, FILM COATED ORAL at 08:05

## 2023-05-20 RX ADMIN — LEVOTHYROXINE SODIUM 75 MCG: 75 TABLET ORAL at 06:05

## 2023-05-20 RX ADMIN — INSULIN ASPART 1 UNITS: 100 INJECTION, SOLUTION INTRAVENOUS; SUBCUTANEOUS at 09:05

## 2023-05-20 RX ADMIN — ACETAMINOPHEN 650 MG: 325 TABLET ORAL at 03:05

## 2023-05-20 RX ADMIN — INSULIN DETEMIR 16 UNITS: 100 INJECTION, SOLUTION SUBCUTANEOUS at 08:05

## 2023-05-20 RX ADMIN — PRAVASTATIN SODIUM 20 MG: 20 TABLET ORAL at 08:05

## 2023-05-20 RX ADMIN — INSULIN ASPART 5 UNITS: 100 INJECTION, SOLUTION INTRAVENOUS; SUBCUTANEOUS at 08:05

## 2023-05-20 RX ADMIN — METOPROLOL TARTRATE 25 MG: 25 TABLET, FILM COATED ORAL at 09:05

## 2023-05-20 NOTE — PLAN OF CARE
Problem: Physical Therapy  Goal: Physical Therapy Goal  Description: Goals to be met by: 2023    Patient will increase functional independence with mobility by performin. Sit to stand transfer with [place]  2. Bed to chair transfer with [place] using No Assistive Device  3. Gait  x 150 feet with Supervision using No Assistive Device.   4. Wheelchair propulsion x150 feet using bilateral lower extremities with set-up assistance   5. Ascend/descend 4 steps with handrails for balance only (while maintaining sternal precautions) and  Stand-by Assistance   6. Ascend/Descend 4 inch curb step with Stand-by Assistance using No Assistive Device.  7.  object from the floor with modified independence with reacher      Outcome: Ongoing, Progressing     PT evaluated patient and established goals for patient today.

## 2023-05-20 NOTE — NURSING
Pt arrived to floor for skilled services with admitting diagnosis of Nonrheumatic aortic (valve) stenos* via wheelchair. Pt required 1 person assistance from wheelchair to bed. Pt is AAOx4, continent of B/B. Pt is on a diabetic 2000/cardiac diet. Skin assessment done: Incision to chest with steri strips, dried drainage, Guaze and Tegaderm to lower abdomen; Blanchable redness to bilateral thighs.  Family informed of arrival. POC reviewed with patient. Pt denies pain at this time and is educated to use call light and not get OOB w/o assistance

## 2023-05-20 NOTE — PT/OT/SLP EVAL
"Occupational Therapy   Evaluation and Treatment    Name: Alina Narayan  MRN: [id number]  Admit Date: 5/19/2023  Recent Surgeries: Aortic valve re-replacement with a small Corcym Perceval bovine pericardial bioprosthesis; Redo sternotomyon 5/10/2023     General Precautions: Standard, sternal, fall  Orthopedic Precautions:N/A   Braces: N/A    Recommendations:     Discharge Recommendations: home health OT  Level of Assistance Recommended: 24 hours light assistance  Discharge Equipment Recommendations:  none  Barriers to discharge:  None    Assessment:     Alina Narayan is a 78 y.o. female with a medical diagnosis of non-rheumatic aortic valve stenosis, nodular calcific aortic valve stenosis.  Pt tolerated session well and without incident, but she requires assistance for ADLs and mobility and is performing below her functional baseline.  She would continue to benefit from skilled OT services at SNF for maximal gains in functional independence.  She presents with the following.  Performance deficits affecting function: weakness, impaired endurance, impaired self care skills, impaired functional mobility, gait instability, impaired balance, decreased lower extremity function, decreased upper extremity function, pain, impaired fine motor, impaired skin, other (comment), decreased ROM, edema (sternal precautions).      Rehab Potential is good    Activity Tolerance: Good    Plan:     Patient to be seen 5 x/week to address the above listed problems via self-care/home management, therapeutic activities, therapeutic exercises  Plan of Care Expires: 06/19/23  Plan of Care Reviewed with: patient, spouse    Subjective     Chief Complaint: decreased level of function  Patient/Family Comments/goals: "I'm not used to people doing things for me."    Occupational Profile:  Living Environment: Pt lives with her  in a [facility] with a threshold to enter.  She has a step-in shower with grab bar and her toilet has grab bar.    Previous level " of function: Independent for ADLs except (A) to darryn/doff footwear; Independent - Mod I to ambulate.  Pt uses the 3-wheeled walker at the gym and a transport chair for long distances.  Roles and Routines: wife, pt likes to cook  Equipment Used at Home: walker, rolling, shower chair, grab bar, bath bench, other (see comments), bedside commode (transport chair, pt does not use bedside commode, recliner)  Assistance upon Discharge: Her     Pain/Comfort:  Pain Rating 1: 0/10 (at rest; not rated during mobility)  Location - Orientation 1: generalized  Location 1: sternal  Pain Addressed 1: Distraction, Reposition, Pre-medicate for activity  Pain Rating Post-Intervention 1: other (see comments) (unchanged)    Patients cultural, spiritual, Congregational conflicts given the current situation: no    Objective:     Communicated with: nursing prior to session.  Patient found HOB elevated with PureWick with her  present upon OT entry to room.    Occupational Performance:     Bed Mobility:    Patient completed Supine to Sit to the R with minimum assistance for trunk management  Pt scooted to EOB to place her feet on the floor with CGA     Functional Mobility/Transfers:  Patient completed Sit <> Stand Transfer from EOB, toilet, shower bench,and w/c with contact guard assistance  with  no assistive device   Patient completed Toilet Transfer Step Transfer technique with contact guard assistance with  no AD  Patient completed  Shower Transfer Step Transfer technique with contact guard assistance with no AD  Pt completed Shower <> Wheelchair t/f via stand pivot with CGA with no AD  Functional Mobility: Pt ambulated ~15 ft total from EOB to the toilet and then to the shower with CGA with no AD.  She required cueing to adhere to her sternal precautions during transfers.     Activities of Daily Living:  Feeding:  setup assistance of tray table to drink beverage while HOB elevated  Grooming: stand by assistance to perform oral  care while standing at sink   Bathing: moderate assistance to shower with (A) to wash her LE while seated and to thoroughly wash her privates in standing; nursing had covered her surgical site with waterproof bag   Upper Body Dressing: maximal assistance to darryn/doff pullover shirt, which was a tight fit.  Max A to darryn buttoned robe while seated.   Lower Body Dressing: maximal assistance to darryn underwear at toilet; total assistance to darryn non-skid socks while seated   Toileting: Max to doff damp brief while standing.  Max A to perform hygiene while standing at toilet; pt able to urinate; she's unable to maintain grasp of wipe while performing internal rotation (therapist observed while pt performed hygiene with her , per pt request)    Cognitive/Visual Perceptual:  Cognitive/Psychosocial Skills:     -       Oriented to: Person, Place, Time, and Situation   -       Follows Commands/attention:Follows multistep  commands  -       Communication: clear/fluent    Physical Exam:  Dominant hand:    -       Pt has predominantly used her L hand since her R-sided mastectomy.  Since her cardiac surgery, pt has experienced weak grasp with her L hand.   Upper Extremity Range of Motion:     -       Right Upper Extremity: WFL except internal rotation  -       Left Upper Extremity: WFL  Upper Extremity Strength:    -       Right Upper Extremity: not formally assessed due to her sternal precautions  -       Left Upper Extremity: not formally assessed due to her sternal precautions   Strength:    -       Right Upper Extremity: WFL  -       Left Upper Extremity:  grasp notably weaker   Fine Motor Coordination: Intact during oral care; pt unable to maintain grasp of wipe with L hand while performing perineal care     AMPAC 6 Click ADL:  AMPAC Total Score: 14    Treatment & Education:  Pt and her  edu on role of OT, POC, her sternal precautions, safety when performing self care tasks, benefit of performing OOB  activity, and safety when performing functional transfers and mobility.  - White board updated  - Self care tasks completed-- as noted above      Patient left up in chair with all lines intact, call button in reach, and her  present    GOALS:   Multidisciplinary Problems       Occupational Therapy Goals          Problem: Occupational Therapy    Goal Priority Disciplines Outcome Interventions   Occupational Therapy Goal     OT, PT/OT Ongoing, Progressing    Description: Goals to be met by: 6/19/2023     Patient will increase functional independence with ADLs by performing:    UE Dressing with Stand-by Assistance.  LE Dressing with Minimal Assistance.  Grooming while seated at sink with Modified [place].  Toileting from toilet with Minimal Assistance for hygiene and clothing management.   Bathing from shower chair/bench with Minimal Assistance.  Supine to sit with Stand-by Assistance.  Step transfer with Stand-by Assistance with no AD.  Toilet transfer to toilet with Stand-by Assistance with no AD.                         History:     Past Medical History:   Diagnosis Date    Allergy     seasonal    Anxiety     Arthritis     BRCA1 negative     Breast cancer 10/2012    left breast invasive ductal carcinoma    Colon polyp     Depression     Diabetes mellitus     Type 2    Diabetes mellitus, type 2     Diverticular disease     Diverticulitis 2009    Genetic testing 05/2017    negative Integrated BRACAnalysis    Hyperlipidemia     Hypertension     Morbid obesity     MITCHELL (obstructive sleep apnea)     Pulmonary embolism     Stenosis     Stenosis and insufficiency of lacrimal passages     Thyroid disease          Past Surgical History:   Procedure Laterality Date    AORTIC VALVE REPLACEMENT N/A 5/10/2023    Procedure: Replacement-valve-aortic, redo sternotomy;  Surgeon: Venkat Webb MD;  Location: [facility] OR [address] [place] [address];  Service: Cardiothoracic;  Laterality: N/A;  Redo sternotomy    BREAST BIOPSY Left 10/01/2012     left breast- invasive ductal carcinoma    BREAST BIOPSY Left 12/2017    BREAST CYST ASPIRATION      BREAST LUMPECTOMY Left 2012    BREAST MASS EXCISION      CARDIAC VALVE REPLACEMENT  12/2013    CARDIAC VALVE SURGERY  2013    CARPAL TUNNEL RELEASE      Left    COLONOSCOPY N/A 09/29/2017    Procedure: COLONOSCOPY;  Surgeon: Phani Worley MD;  Location: [facility] ENDO (4TH FLR);  Service: Endoscopy;  Laterality: N/A;    COLONOSCOPY N/A 1/5/2023    Procedure: COLONOSCOPY;  Surgeon: Eladia Martino MD;  Location: [facility] ENDO (4TH FLR);  Service: Endoscopy;  Laterality: N/A;  instr via portal  ok to hole Eliquis-see encounter 12/9-MS  1/4 pateint cannot come earlier-rt    CORONARY ANGIOGRAPHY Left 4/13/2023    Procedure: ANGIOGRAM, CORONARY ARTERY;  Surgeon: Eze Sales MD;  Location: [facility] CATH LAB;  Service: Cardiology;  Laterality: Left;  low bleeding risk 2.9%    DILATION AND CURETTAGE OF UTERUS  1999    Endometrial polyps    ENDOMETRIAL ABLATION  1999    Enodmetrial polyps    EYE SURGERY      INSERTION OF TUNNELED CENTRAL VENOUS CATHETER (CVC) WITH SUBCUTANEOUS PORT Right 02/01/2019    Procedure: ZUCKLUCEH-FLYO-L-CATH;  Surgeon: Gaston Flores MD;  Location: [facility] OR [address] [place] [address];  Service: General;  Laterality: Right;    left lumpectomy  2012    MASTECTOMY      MEDIPORT REMOVAL Right 08/26/2019    Procedure: REMOVAL, CATHETER, CENTRAL VENOUS, TUNNELED, WITH PORT;  Surgeon: Gaston Flores MD;  [facility];  Service: General;  Laterality: Right;    MODIFIED RADICAL MASTECTOMY W/ AXILLARY LYMPH NODE DISSECTION Right 08/26/2019    Procedure: MASTECTOMY, MODIFIED RADICAL;  Surgeon: Gaston Flores MD;  [facility];  Service: General;  Laterality: Right;    SHOULDER SURGERY      SKIN BIOPSY         Time Tracking:     OT Date of Treatment: 05/20/23  OT Start Time: 0834  OT Stop Time: 0951  OT Total Time (min): 77 min    Billable Minutes:Evaluation 15 min  Self Care/Home  Management 45 min  Therapeutic Activity 17 min    5/20/2023

## 2023-05-20 NOTE — PLAN OF CARE
Problem: Occupational Therapy  Goal: Occupational Therapy Goal  Description: Goals to be met by: 6/19/2023     Patient will increase functional independence with ADLs by performing:    UE Dressing with Stand-by Assistance.  LE Dressing with Minimal Assistance.  Grooming while seated at sink with Modified [place].  Toileting from toilet with Minimal Assistance for hygiene and clothing management.   Bathing from shower chair/bench with Minimal Assistance.  Supine to sit with Stand-by Assistance.  Step transfer with Stand-by Assistance with no AD.  Toilet transfer to toilet with Stand-by Assistance with no AD.    Outcome: Ongoing, Progressing     Pt evaluated and OT goals established.

## 2023-05-20 NOTE — PT/OT/SLP EVAL
Physical Therapy Evaluation    Patient Name:  Alina Narayan   MRN:  [id number]  Admit Date: 5/19/2023  Admitting Diagnosis: Nonrheumatic aortic valve stenosis  Recent Surgeries: on 5/10/2023  1) Aortic valve re-replacement with a small Corcym Perceval bovine pericardial bioprosthesis     2)  Redo sternotomy    General Precautions: Standard, sternal, fall   Orthopedic Precautions:N/A   Braces: N/A     Recommendations:     Discharge Recommendations:  home health PT   Level of Assistance Recommended: Intermittent assistance   Discharge Equipment Recommendations: none   Barriers to discharge: None    Assessment:     Alina Narayan is a 78 y.o. female admitted to SNF s/p aortic valve re-replacement and redo sternotomy. She recalls her sternal precautions correctly and maintains precautions throughout PT session. Today, she required minimal assistance to transfer and minimal assistance to ambulate 70 ft with left hand-held assistance. Patient reported lightheadedness and chest racing after ambulation. Rehab tech brought chair. Patient's /63, HR 79, and SpO2 98% on RA.  Performance deficits affecting patient's function include the following:  weakness, impaired functional mobility, impaired cardiopulmonary response to activity, impaired endurance, gait instability, pain, impaired fine motor, impaired balance, impaired skin, impaired self care skills, decreased lower extremity function. Patient was ambulating household distances independently and occasionally using rolling walker to ambulate community distances. Patient will benefit from skilled physical therapy services to address the mentioned deficits in order to increase safety and independence with functional mobility.    Rehab Potential is good     Activity Tolerance: Fair    Plan:     Patient to be seen 5 x/week to address the above listed problems via gait training, therapeutic activities, therapeutic exercises, neuromuscular re-education, wheelchair  management/training     Plan of Care Expires: 06/19/23  Plan of Care Reviewed with: patient, spouse    Social History   Living Environment:  Pt lives with  who is showing signs of dementia per patient  Pt lives in a single story home with threshold to enter     Pt has a walk-in shower    Prior Level of Function:   Patient was ambulating household distances independently. She was occasionally used rolling walker or rollator for community distances. She required assistance for lower body dressing.     DME used: walker, rolling, grab bar, rollator, shower chair, raised toilet (transport chair; tripod walker)  Patient reports 0 falls.      Roles/Repsonsibilities:   Works: no.   Managing Medicines/Managing Home: yes.   Hobbies: goal to pool with friends .    Upon discharge, patient will have assistance from  and occasional assistance from youngest son (Clint)     Subjective     Communicated with RN and OT prior to session. Patient found sitting in chair with no active lines upon PT entry to room. Patient's  present during session.  Pt is agreeable to evaluation.     Chief Complaint: sternal pain   Patient/Family Comments/goals: to regain independence   Pain/Comfort:  Pain Rating 1: 5/10  Location 1: sternal  Pain Addressed 1: Pre-medicate for activity, Reposition, Nurse notified  Pain Rating Post-Intervention 1: 5/10    Patients cultural, spiritual, Catholic conflicts given the current situation: no    Objective:     Exams:  Cognition:      Alert and Oriented X 4 to Person, Place, Time, and Situation  Command following: Follows multistep  commands  Fluency: clear/fluent  Skin integrity:  sternal incision dry     RLE ROM:   Hip Flexion: WFL  Knee Extension: WFL  Knee Flexion: WFL  Ankle Dorsiflexion: WFL  Ankle Plantarflexion: WFL    LLE ROM:   Hip Flexion: WFL  Knee Extension: WFL  Knee Flexion: WFL  Ankle Dorsiflexion: WFL  Ankle Plantarflexion: WFL    RLE Strength: grossly 4/5       LLE Strength:  "grossly 4/5     Outcome Measures  AM-PAC 6 CLICK MOBILITY Score:15     Functional Mobility:     05/20/23 1013   Prior Functioning: Everyday Activities   Self Care Needed some help  (with lower body dress)   Indoor Mobility (Ambulation) Independent   Stairs Needed some help   Functional Cognition Independent   Prior Device Use   (occasionally used rolling walker or rollator)   Sit to Stand   Assistance Needed Physical assistance   Physical Assistance Level 25% or less   CARE Score - Sit to Stand 3   Sit to Stand Discharge Goal   Discharge Goal 6   Chair/Bed-to-Chair Transfer   Assistance Needed Physical assistance   Physical Assistance Level 25% or less   CARE Score - Chair/Bed-to-Chair Transfer 3   Chair/Bed-to-Chair Transfer Discharge Goal   Discharge Goal 6   Car Transfer   Reason if not Attempted Environmental limitations   CARE Score - Car Transfer 10   Walk 10 Feet   Assistance Needed Physical assistance   Physical Assistance Level 25% or less   Comment   (left hand held assistance)   CARE Score - Walk 10 Feet 3   Walk 10 Feet Discharge Goal   Discharge Goal 6   Walk 50 Feet with Two Turns   Assistance Needed Physical assistance   Physical Assistance Level 25% or less   Comment Patient ambulates 70 ft with left hand-held assistance for balance. She demos decreased bilateral step length and slow rebeca. She stops due to "lightheadedness and racing heart". Patient's HR 79 bpm, BP 1303/63, SpO2 98% on RA   CARE Score - Walk 50 Feet with Two Turns 3   Walk 50 Feet with Two Turns Discharge Goal   Discharge Goal 4   Walk 150 Feet   Comment Patient fatigues after ambulating 70 ft   Reason if not Attempted Safety concerns   CARE Score - Walk 150 Feet 88   Walk 150 Feet Discharge Goal   Discharge Goal 4   Walking 10 Feet on Uneven Surfaces   Assistance Needed Physical assistance   Physical Assistance Level 25% or less   CARE Score - Walking 10 Feet on Uneven Surfaces 3   Walking 10 Feet on Uneven Surfaces Discharge " Goal   Discharge Goal 4   1 Step (Curb)   Assistance Needed Physical assistance   Physical Assistance Level 25% or less   Comment left hand held assistance   CARE Score - 1 Step (Curb) 3   4 Steps   Comment patient fatigues after 1 step   Reason if not Attempted Safety concerns   CARE Score - 4 Steps 88   12 Steps   Comment patient fatigues after 1 step   Reason if not Attempted Safety concerns   CARE Score - 12 Steps 88   Picking Up Object   Comment unable to perform safely due to impaired standing balance and decreased strenght   Reason if not Attempted Safety concerns   CARE Score - Picking Up Object 88   Picking Up Object Discharge Goal   Discharge Goal 6   Uses a Wheelchair/Scooter?   Uses a Wheelchair/Scooter? Yes   Wheel 50 Feet with Two Turns   Assistance Needed Physical assistance   Physical Assistance Level 51%-75%   Comment Patient reports achy pain in knees after propelling wheelchair 23 ft with bilateral lower extremities   CARE Score - Wheel 50 Feet with Two Turns 2   Wheel 50 Feet with Two Turns Discharge Goal   Discharge Goal 5   Wheel 150 Feet   Assistance Needed Physical assistance   Physical Assistance Level 76% or more   Comment Patient reports achy pain in knees after propelling wheelchair 23 ft with bilateral lower extremities   CARE Score - Wheel 150 Feet 2   Wheel 150 Feet Discharge Goal   Discharge Goal 5       Transfers:       Toilet Transfer:  minimum assistance  using Stand Pivot    Education:   Patient educated on PT POC and role of PT in skilled nursing facility  Patient educated on the importance of mobility to prevent functional decline during rehabilitation stay  Patient was instructed to utilize staff assistance for mobility/transfers  Patient is appropriate to transfer and ambulate with minimal assistance with RN/PCT  Sternal Precautions: patient able to voice 3/3 precautions without assistance. Patient able to maintain precautions throughout session without  verbal cues.  Prasad  board updated to include patient's safest level of mobility with staff assistance  Patient educated on Fall risk, home safety, plan of care, sternal precautions, and transfer training by explanation.  Patient was receptive to education and needs reinforcement.       Patient left  sitting on toilet  with call button in reach, RN notified, and patient's  present.    GOALS:   Multidisciplinary Problems       Physical Therapy Goals          Problem: Physical Therapy    Goal Priority Disciplines Outcome Goal Variances Interventions   Physical Therapy Goal     PT, PT/OT Ongoing, Progressing     Description: Goals to be met by: 2023    Patient will increase functional independence with mobility by performin. Sit to stand transfer with [place]  2. Bed to chair transfer with [place] using No Assistive Device  3. Gait  x 150 feet with Supervision using No Assistive Device.   4. Wheelchair propulsion x150 feet using bilateral lower extremities with set-up assistance   5. Ascend/descend 4 steps with handrails for balance only (while maintaining sternal precautions) and  Stand-by Assistance   6. Ascend/Descend 4 inch curb step with Stand-by Assistance using No Assistive Device.  7.  object from the floor with modified independence with reacher                           History:     Past Medical History:   Diagnosis Date    Allergy     seasonal    Anxiety     Arthritis     BRCA1 negative     Breast cancer 10/2012    left breast invasive ductal carcinoma    Colon polyp     Depression     Diabetes mellitus     Type 2    Diabetes mellitus, type 2     Diverticular disease     Diverticulitis     Genetic testing 2017    negative Integrated BRACAnalysis    Hyperlipidemia     Hypertension     Morbid obesity     MITCHELL (obstructive sleep apnea)     Pulmonary embolism     Stenosis     Stenosis and insufficiency of lacrimal passages     Thyroid disease        Past Surgical History:   Procedure  Laterality Date    AORTIC VALVE REPLACEMENT N/A 5/10/2023    Procedure: Replacement-valve-aortic, redo sternotomy;  Surgeon: Venkat Webb MD;  Location: [facility] OR [address] [place] [address];  Service: Cardiothoracic;  Laterality: N/A;  Redo sternotomy    BREAST BIOPSY Left 10/01/2012    left breast- invasive ductal carcinoma    BREAST BIOPSY Left 12/2017    BREAST CYST ASPIRATION      BREAST LUMPECTOMY Left 2012    BREAST MASS EXCISION      CARDIAC VALVE REPLACEMENT  12/2013    CARDIAC VALVE SURGERY  2013    CARPAL TUNNEL RELEASE      Left    COLONOSCOPY N/A 09/29/2017    Procedure: COLONOSCOPY;  Surgeon: Phani Worley MD;  Location: [facility] ENDO (4TH FLR);  Service: Endoscopy;  Laterality: N/A;    COLONOSCOPY N/A 1/5/2023    Procedure: COLONOSCOPY;  Surgeon: Eladia Martino MD;  Location: [facility] ENDO (4TH FLR);  Service: Endoscopy;  Laterality: N/A;  instr via portal  ok to hole Eliquis-see encounter 12/9-MS  1/4 pateint cannot come earlier-rt    CORONARY ANGIOGRAPHY Left 4/13/2023    Procedure: ANGIOGRAM, CORONARY ARTERY;  Surgeon: Eze Sales MD;  Location: [facility] CATH LAB;  Service: Cardiology;  Laterality: Left;  low bleeding risk 2.9%    DILATION AND CURETTAGE OF UTERUS  1999    Endometrial polyps    ENDOMETRIAL ABLATION  1999    Enodmetrial polyps    EYE SURGERY      INSERTION OF TUNNELED CENTRAL VENOUS CATHETER (CVC) WITH SUBCUTANEOUS PORT Right 02/01/2019    Procedure: [id number];  Surgeon: Gaston Flores MD;  Location: [address];  Service: General;  Laterality: Right;    left lumpectomy  2012    MASTECTOMY      MEDIPORT REMOVAL Right 08/26/2019    Procedure: REMOVAL, CATHETER, CENTRAL VENOUS, TUNNELED, WITH PORT;  Surgeon: Gaston Flores MD;  Location: [address];  Service: General;  Laterality: Right;    MODIFIED RADICAL MASTECTOMY W/ AXILLARY LYMPH NODE DISSECTION Right 08/26/2019    Procedure: MASTECTOMY, MODIFIED RADICAL;  Surgeon: Gaston Flores MD;  Location: [address]  FLR;  Service: General;  Laterality: Right;    SHOULDER SURGERY      SKIN BIOPSY         Time Tracking:     PT Received On: 05/20/23  PT Start Time: 1013     PT Stop Time: 1048  PT Total Time (min): 35 min     Billable Minutes: Evaluation 1 procedure      05/20/2023

## 2023-05-21 LAB
POCT GLUCOSE: 123 MG/DL (ref 70–110)
POCT GLUCOSE: 147 MG/DL (ref 70–110)
POCT GLUCOSE: 170 MG/DL (ref 70–110)
POCT GLUCOSE: 223 MG/DL (ref 70–110)

## 2023-05-21 PROCEDURE — 25000003 PHARM REV CODE 250: Performed by: HOSPITALIST

## 2023-05-21 PROCEDURE — 11000004 HC SNF PRIVATE

## 2023-05-21 RX ADMIN — INSULIN ASPART 2 UNITS: 100 INJECTION, SOLUTION INTRAVENOUS; SUBCUTANEOUS at 12:05

## 2023-05-21 RX ADMIN — POLYETHYLENE GLYCOL 3350 17 G: 17 POWDER, FOR SOLUTION ORAL at 08:05

## 2023-05-21 RX ADMIN — APIXABAN 2.5 MG: 2.5 TABLET, FILM COATED ORAL at 08:05

## 2023-05-21 RX ADMIN — INSULIN ASPART 5 UNITS: 100 INJECTION, SOLUTION INTRAVENOUS; SUBCUTANEOUS at 12:05

## 2023-05-21 RX ADMIN — ACETAMINOPHEN 650 MG: 325 TABLET ORAL at 05:05

## 2023-05-21 RX ADMIN — GABAPENTIN 300 MG: 300 CAPSULE ORAL at 05:05

## 2023-05-21 RX ADMIN — GABAPENTIN 300 MG: 300 CAPSULE ORAL at 09:05

## 2023-05-21 RX ADMIN — ACETAMINOPHEN 650 MG: 325 TABLET ORAL at 07:05

## 2023-05-21 RX ADMIN — FUROSEMIDE 20 MG: 20 TABLET ORAL at 08:05

## 2023-05-21 RX ADMIN — APIXABAN 2.5 MG: 2.5 TABLET, FILM COATED ORAL at 09:05

## 2023-05-21 RX ADMIN — PRAVASTATIN SODIUM 20 MG: 20 TABLET ORAL at 08:05

## 2023-05-21 RX ADMIN — GABAPENTIN 300 MG: 300 CAPSULE ORAL at 08:05

## 2023-05-21 RX ADMIN — POTASSIUM CHLORIDE 20 MEQ: 1500 TABLET, EXTENDED RELEASE ORAL at 09:05

## 2023-05-21 RX ADMIN — LEVOTHYROXINE SODIUM 75 MCG: 75 TABLET ORAL at 06:05

## 2023-05-21 RX ADMIN — METOPROLOL TARTRATE 25 MG: 25 TABLET, FILM COATED ORAL at 09:05

## 2023-05-21 RX ADMIN — ASPIRIN 81 MG: 81 TABLET, COATED ORAL at 08:05

## 2023-05-21 RX ADMIN — INSULIN ASPART 5 UNITS: 100 INJECTION, SOLUTION INTRAVENOUS; SUBCUTANEOUS at 08:05

## 2023-05-21 RX ADMIN — INSULIN DETEMIR 16 UNITS: 100 INJECTION, SOLUTION SUBCUTANEOUS at 08:05

## 2023-05-21 RX ADMIN — POTASSIUM CHLORIDE 20 MEQ: 1500 TABLET, EXTENDED RELEASE ORAL at 08:05

## 2023-05-21 RX ADMIN — INSULIN ASPART 5 UNITS: 100 INJECTION, SOLUTION INTRAVENOUS; SUBCUTANEOUS at 05:05

## 2023-05-21 RX ADMIN — METOPROLOL TARTRATE 25 MG: 25 TABLET, FILM COATED ORAL at 08:05

## 2023-05-21 RX ADMIN — DOCUSATE SODIUM 100 MG: 100 CAPSULE, LIQUID FILLED ORAL at 08:05

## 2023-05-21 RX ADMIN — SENNOSIDES AND DOCUSATE SODIUM 1 TABLET: 50; 8.6 TABLET ORAL at 08:05

## 2023-05-22 ENCOUNTER — DOCUMENTATION ONLY (OUTPATIENT)
Dept: CARDIOTHORACIC SURGERY | Facility: CLINIC | Age: 78
End: 2023-05-22
Payer: MEDICARE

## 2023-05-22 LAB
ANION GAP SERPL CALC-SCNC: 9 MMOL/L (ref 8–16)
BASOPHILS # BLD AUTO: 0.03 K/UL (ref 0–0.2)
BASOPHILS NFR BLD: 0.5 % (ref 0–1.9)
BUN SERPL-MCNC: 26 MG/DL (ref 8–23)
CALCIUM SERPL-MCNC: 9.9 MG/DL (ref 8.7–10.5)
CHLORIDE SERPL-SCNC: 105 MMOL/L (ref 95–110)
CO2 SERPL-SCNC: 26 MMOL/L (ref 23–29)
CREAT SERPL-MCNC: 1 MG/DL (ref 0.5–1.4)
DIFFERENTIAL METHOD: ABNORMAL
EOSINOPHIL # BLD AUTO: 0.1 K/UL (ref 0–0.5)
EOSINOPHIL NFR BLD: 2.1 % (ref 0–8)
ERYTHROCYTE [DISTWIDTH] IN BLOOD BY AUTOMATED COUNT: 15 % (ref 11.5–14.5)
EST. GFR  (NO RACE VARIABLE): 57.7 ML/MIN/1.73 M^2
GLUCOSE SERPL-MCNC: 111 MG/DL (ref 70–110)
HCT VFR BLD AUTO: 27.2 % (ref 37–48.5)
HGB BLD-MCNC: 7.8 G/DL (ref 12–16)
IMM GRANULOCYTES # BLD AUTO: 0.06 K/UL (ref 0–0.04)
IMM GRANULOCYTES NFR BLD AUTO: 1 % (ref 0–0.5)
LYMPHOCYTES # BLD AUTO: 1.5 K/UL (ref 1–4.8)
LYMPHOCYTES NFR BLD: 23.5 % (ref 18–48)
MAGNESIUM SERPL-MCNC: 2.1 MG/DL (ref 1.6–2.6)
MCH RBC QN AUTO: 28.1 PG (ref 27–31)
MCHC RBC AUTO-ENTMCNC: 28.7 G/DL (ref 32–36)
MCV RBC AUTO: 98 FL (ref 82–98)
MONOCYTES # BLD AUTO: 0.5 K/UL (ref 0.3–1)
MONOCYTES NFR BLD: 8.6 % (ref 4–15)
NEUTROPHILS # BLD AUTO: 4 K/UL (ref 1.8–7.7)
NEUTROPHILS NFR BLD: 64.3 % (ref 38–73)
NRBC BLD-RTO: 0 /100 WBC
PHOSPHATE SERPL-MCNC: 4.6 MG/DL (ref 2.7–4.5)
PLATELET # BLD AUTO: 203 K/UL (ref 150–450)
PMV BLD AUTO: 10.4 FL (ref 9.2–12.9)
POCT GLUCOSE: 140 MG/DL (ref 70–110)
POCT GLUCOSE: 141 MG/DL (ref 70–110)
POCT GLUCOSE: 167 MG/DL (ref 70–110)
POCT GLUCOSE: 178 MG/DL (ref 70–110)
POTASSIUM SERPL-SCNC: 4.5 MMOL/L (ref 3.5–5.1)
RBC # BLD AUTO: 2.78 M/UL (ref 4–5.4)
SODIUM SERPL-SCNC: 140 MMOL/L (ref 136–145)
WBC # BLD AUTO: 6.26 K/UL (ref 3.9–12.7)

## 2023-05-22 PROCEDURE — 85025 COMPLETE CBC W/AUTO DIFF WBC: CPT | Performed by: HOSPITALIST

## 2023-05-22 PROCEDURE — 94761 N-INVAS EAR/PLS OXIMETRY MLT: CPT

## 2023-05-22 PROCEDURE — 84100 ASSAY OF PHOSPHORUS: CPT | Performed by: HOSPITALIST

## 2023-05-22 PROCEDURE — 25000003 PHARM REV CODE 250: Performed by: NURSE PRACTITIONER

## 2023-05-22 PROCEDURE — 11000004 HC SNF PRIVATE

## 2023-05-22 PROCEDURE — 97110 THERAPEUTIC EXERCISES: CPT | Mod: CQ

## 2023-05-22 PROCEDURE — 80048 BASIC METABOLIC PNL TOTAL CA: CPT | Performed by: HOSPITALIST

## 2023-05-22 PROCEDURE — 97116 GAIT TRAINING THERAPY: CPT | Mod: CQ

## 2023-05-22 PROCEDURE — 97110 THERAPEUTIC EXERCISES: CPT | Mod: CO

## 2023-05-22 PROCEDURE — 83735 ASSAY OF MAGNESIUM: CPT | Performed by: HOSPITALIST

## 2023-05-22 PROCEDURE — 36415 COLL VENOUS BLD VENIPUNCTURE: CPT | Performed by: HOSPITALIST

## 2023-05-22 PROCEDURE — 25000003 PHARM REV CODE 250: Performed by: HOSPITALIST

## 2023-05-22 PROCEDURE — 97530 THERAPEUTIC ACTIVITIES: CPT | Mod: CO

## 2023-05-22 RX ORDER — HYDROCODONE BITARTRATE AND ACETAMINOPHEN 5; 325 MG/1; MG/1
1 TABLET ORAL EVERY 6 HOURS PRN
Status: DISCONTINUED | OUTPATIENT
Start: 2023-05-22 | End: 2023-05-24 | Stop reason: HOSPADM

## 2023-05-22 RX ORDER — POTASSIUM CHLORIDE 20 MEQ/1
20 TABLET, EXTENDED RELEASE ORAL 2 TIMES DAILY
Qty: 60 TABLET | Refills: 3 | Status: CANCELLED | OUTPATIENT
Start: 2023-05-22

## 2023-05-22 RX ADMIN — POTASSIUM CHLORIDE 20 MEQ: 1500 TABLET, EXTENDED RELEASE ORAL at 09:05

## 2023-05-22 RX ADMIN — GABAPENTIN 300 MG: 300 CAPSULE ORAL at 04:05

## 2023-05-22 RX ADMIN — PRAVASTATIN SODIUM 20 MG: 20 TABLET ORAL at 09:05

## 2023-05-22 RX ADMIN — GABAPENTIN 300 MG: 300 CAPSULE ORAL at 09:05

## 2023-05-22 RX ADMIN — INSULIN ASPART 5 UNITS: 100 INJECTION, SOLUTION INTRAVENOUS; SUBCUTANEOUS at 04:05

## 2023-05-22 RX ADMIN — INSULIN ASPART 5 UNITS: 100 INJECTION, SOLUTION INTRAVENOUS; SUBCUTANEOUS at 09:05

## 2023-05-22 RX ADMIN — APIXABAN 2.5 MG: 2.5 TABLET, FILM COATED ORAL at 09:05

## 2023-05-22 RX ADMIN — HYDROCODONE BITARTRATE AND ACETAMINOPHEN 1 TABLET: 5; 325 TABLET ORAL at 04:05

## 2023-05-22 RX ADMIN — METOPROLOL TARTRATE 25 MG: 25 TABLET, FILM COATED ORAL at 09:05

## 2023-05-22 RX ADMIN — LEVOTHYROXINE SODIUM 75 MCG: 75 TABLET ORAL at 05:05

## 2023-05-22 RX ADMIN — ASPIRIN 81 MG: 81 TABLET, COATED ORAL at 09:05

## 2023-05-22 RX ADMIN — INSULIN DETEMIR 16 UNITS: 100 INJECTION, SOLUTION SUBCUTANEOUS at 09:05

## 2023-05-22 RX ADMIN — INSULIN ASPART 5 UNITS: 100 INJECTION, SOLUTION INTRAVENOUS; SUBCUTANEOUS at 12:05

## 2023-05-22 RX ADMIN — HYDROCODONE BITARTRATE AND ACETAMINOPHEN 1 TABLET: 5; 325 TABLET ORAL at 09:05

## 2023-05-22 RX ADMIN — SENNOSIDES AND DOCUSATE SODIUM 1 TABLET: 50; 8.6 TABLET ORAL at 09:05

## 2023-05-22 RX ADMIN — FUROSEMIDE 20 MG: 20 TABLET ORAL at 09:05

## 2023-05-22 NOTE — PT/OT/SLP PROGRESS
"Physical Therapy Treatment    Patient Name:  Alina Narayan   MRN:  [id number]  Admit Date: 5/19/2023  Admitting Diagnosis: Nonrheumatic aortic valve stenosis  Recent Surgeries: on 5/10/2023  1) Aortic valve re-replacement with a small Corcym Perceval bovine pericardial bioprosthesis  2)  Redo sternotomy    General Precautions: Standard, sternal, fall  Orthopedic Precautions: N/A  Braces: N/A    Recommendations:     Discharge Recommendations: home health PT  Level of Assistance Recommended at Discharge: Intermittent assistance   Discharge Equipment Recommendations: none  Barriers to discharge: None    Assessment:     Alina Narayan is a 78 y.o. female admitted with a medical diagnosis of s/p aortic valve re-replacement and redo sternotomy .     Pt was agreeable and motivated for therapy. Pt increase total gait distance with 1 standing rest break. Pt completed curb training and therex with no issues. Occasional cues required during session with good compliance. Pt continues to benefit from therapy to improve functional endurance, strength, and mobility.     Performance deficits affecting function: weakness, impaired functional mobility, impaired cardiopulmonary response to activity, impaired endurance, gait instability, pain, impaired fine motor, impaired balance, impaired skin, impaired self care skills, decreased lower extremity function.    Rehab Potential is good    Activity Tolerance: Good    Plan:     Patient to be seen 5 x/week to address the above listed problems via gait training, therapeutic activities, therapeutic exercises, neuromuscular re-education, wheelchair management/training    Plan of Care Expires: 06/19/23  Plan of Care Reviewed with: patient, spouse    Subjective     "This isn't my first surgery".   Pt reports being upset about conflicting instruction for showering/bathing on sternal incision.     Pain/Comfort:  Location - Orientation 1: generalized  Location 1: sternal  Pain Addressed 1: Pre-medicate " "for activity    Patient's cultural, spiritual, Temple conflicts given the current situation:  no    Objective:     Communicated with nurse prior to session.  Patient found  up in wheelchair in therapy gym  with  (no lines) upon PT entry     Therapeutic Activities and Exercises:   Pt able to recall 3/3 sternal precautions with no issues.  Seated BLE therex x10 reps: ankle pumps, LAQ, marching, and glute set  Patient educated on role of therapy, goals of session, and benefits of out of bed mobility.   Instructed on use of call button and importance of calling nursing staff for assistance with mobility   Questions/concerns addressed within PTA scope of practice  Pt verbalized understanding.    Functional Mobility:  Transfers:   Sit <> Stand Transfer: contact guard assistance with no assistive device   Wheelchair> Chair Transfer: contact guard assistance with no assistive device using Stand Pivot technique   Gait:  Pt ambulated 2x ~75ft with  Oliver-CGA  and left hand-held assist. Rehab tech following with wheelchair  1 standing rest breaks & no overt LOB  Pt reported about being anxious of L knee buckling  No lightheaded reports, reports increase heart rate but not racing like feeling like prior session.  Gait Deviation(s): occasional unsteady gait with slight lateral sway, decrease rebeca, decrease step length  Verbal/tactile cues for pacing and posture and control breathing.   SpO2 95% and Heart 90-91 bpm following gait trial.  Curb:  Pt ascended/descended 4" curb step with  L HHA  with Minimal Assistance.   Cues for sequencing     AM-PAC 6 CLICK MOBILITY  15    Patient left up in chair with call button in reach and spouse present.    GOALS:   Multidisciplinary Problems       Physical Therapy Goals          Problem: Physical Therapy    Goal Priority Disciplines Outcome Goal Variances Interventions   Physical Therapy Goal     PT, PT/OT Ongoing, Progressing     Description: Goals to be met by: 6/20/2023    Patient " will increase functional independence with mobility by performin. Sit to stand transfer with [place]  2. Bed to chair transfer with [place] using No Assistive Device  3. Gait  x 150 feet with Supervision using No Assistive Device.   4. Wheelchair propulsion x150 feet using bilateral lower extremities with set-up assistance   5. Ascend/descend 4 steps with handrails for balance only (while maintaining sternal precautions) and  Stand-by Assistance   6. Ascend/Descend 4 inch curb step with Stand-by Assistance using No Assistive Device.  7.  object from the floor with modified independence with reacher                           Time Tracking:     PT Received On: 23  PT Start Time: 1100  PT Stop Time: 1129  PT Total Time (min): 29 min    Billable Minutes: Gait Training 15 and Therapeutic Exercise 14    Treatment Type: Treatment  PT/PTA: PTA     Number of PTA visits since last PT visit: 2023

## 2023-05-22 NOTE — PROGRESS NOTES
Met with pt's  in clinic lobby and reviewed wound care instructions for pt's midsternal and chest tube site incisions, s/p AVR.  Reviewed daily inspection and cleaning of surgical incisions and instructed pt to call the clinic with any questions or concerns.  Pt's  provided with a hand-out (see below) which contains detailed instructions on daily wound care inspection and care. Pt's  informed of pt's post-op appts on June 13. Pt's  informed we are not able to make any decisions on discharge planning while pt is admitted to rehab.  Pt's  verbalized understanding of all instructions.  Called pt's cell phone and reviewed this information with her as well and she verbalized understanding.        Showering   If your incisions are healing and there is no drainage - it is ok to take a shower.  Shower with your back to the shower spray.  It is ok for your incision to get wet, but the shower spray should not directly hit your chest.  Do not soak in a tub.  The water temperature should be warm -- not too hot or cold. Extreme water temperatures can cause you to feel faint.  It is expected that you wash your incisions when you shower, however only use soap and water to cleanse the sites.  -Use normal bar soap, not perfumed soap or body wash. During your recovery, do not try a new brand of soap.  -Place soapy water on your hand or a clean washcloth and gently wash your incisions using an up-and-down motion.  -Do not apply ointments, oils, or salves to your incisions.  -Pat the skin gently to dry.     1. Wash your hands before and after caring for or touching your incisions.  2. Look in the mirror daily. Inspect your incisions for redness, drainage and warmth. Your incisions should be healing; there should NOT be an increase in opening.  3. Gently wash your incisions everyday with soap and warm water using a clean washcloth, or your hand and light touch.  4. Gently pat dry with a clean  towel.  5. You do not need to cover your incisions unless they are draining.  If you are experiencing drainage, it is important to call your doctor.  Monday - Friday, from 8:00am - 5pm, call [phone].  Outside of these hours, please call [phone], which is a 24-hour nurse care advice line.     When to call your doctor:  -Increased drainage or oozing from the incision(s)  -Increased opening of the incision line(s) - there should not be gaps or pulling apart areas of the incision(s)  -Redness along the incision(s) - if the incisions were red or pink when you left the hospital, they should be improving and not becoming more red  -Warmth along the incision line(s)  -Increased body temperature / Fever - greater than 101 degrees Fahrenheit  -If you have diabetes and your blood sugar levels begin to vary

## 2023-05-22 NOTE — CLINICAL REVIEW
Clinical Pharmacy Chart Review Note      Admit Date: 5/19/2023   LOS: 3 days       Alina Narayan is a 78 y.o. female admitted to [facility] for PT/OT after hospitalization for nonrheumatic aortic valve stenosis.    Review of patient's allergies indicates:   Allergen Reactions    Augmentin [amoxicillin-pot clavulanate] Diarrhea    Dilaudid [hydromorphone (bulk)] Other (See Comments)     Other reaction(s): sedation    Hydromorphone Other (See Comments)     Other reaction(s): sedation    Cymbalta [duloxetine]      Dry mouth, agitation    Jardiance [empagliflozin] Hives    Byetta [exenatide] Rash     Other reaction(s): Rash     Patient Active Problem List    Diagnosis Date Noted    Impaired mobility 05/16/2023    Acute blood loss anemia 05/12/2023    Bradycardia 05/12/2023    Surgical wound present 05/11/2023    Pre-op testing 05/04/2023    Prosthetic valve dysfunction 04/27/2023    Morbid (severe) obesity due to excess calories 02/19/2023    History of pulmonary embolism 10/03/2022    Bilateral knee pain 09/14/2022    COVID-19 virus infection 05/26/2022    Adjustment disorder with mixed anxiety and depressed mood 11/16/2021    Pulmonary nodule 07/01/2020    Deformity due to mastectomy 10/16/2019    Hematoma (nontraumatic) of breast 09/02/2019    Bleeding 09/01/2019    Lightheadedness 08/31/2019    Fatty liver 08/13/2019    Disorder of rotator cuff of both shoulders 08/13/2019    Soft tissue swelling 08/13/2019    Atherosclerosis of aorta 12/18/2018    Thrombocytopenia, unspecified     BMI 45.0-49.9, adult 09/30/2018    History of breast cancer 07/17/2018    Edema     Left anterior knee pain 02/08/2018    Hemarthrosis of left knee 02/02/2018    Contusion, knee and lower leg, left, initial encounter 02/02/2018    Fall 02/02/2018    Vitamin D deficiency 11/22/2017    Chronic kidney disease, stage III (moderate) 09/12/2017    Diabetes mellitus due to underlying condition with stage 3 chronic kidney disease, without long-term  current use of insulin 09/12/2017    Degeneration of lumbar or lumbosacral intervertebral disc 10/01/2015    Hearing loss, sensorineural 07/18/2014    Diabetes mellitus type 2 in obese 12/02/2013    S/P AVR 12/02/2013    Acquired hypothyroidism 12/02/2013    Nonrheumatic aortic valve stenosis 11/07/2013    Obstructive sleep apnea 05/13/2013    Essential hypertension 07/06/2012    Hyperlipidemia, mixed 07/06/2012    Diverticulosis 07/06/2012    Family history of colon cancer 07/06/2012       Scheduled Meds:    apixaban  2.5 mg Oral BID    aspirin  81 mg Oral Daily    furosemide  20 mg Oral Daily    gabapentin  300 mg Oral TID    insulin aspart U-100  5 Units Subcutaneous TIDWM    insulin detemir U-100  16 Units Subcutaneous Daily    levothyroxine  75 mcg Oral Before breakfast    LIDOcaine  1 patch Transdermal Q24H    metoprolol tartrate  25 mg Oral BID    polyethylene glycol  17 g Oral Daily    potassium chloride  20 mEq Oral BID    pravastatin  20 mg Oral Daily    senna-docusate 8.6-50 mg  1 tablet Oral BID     Continuous Infusions:   PRN Meds: acetaminophen, acetaminophen, calcium carbonate, dextrose 10%, dextrose 10%, dextrose, dextrose, glucagon (human recombinant), HYDROcodone-acetaminophen, insulin aspart U-100, melatonin    OBJECTIVE:     Vital Signs (Last 24H)  Temp:  [97.7 °F (36.5 °C)-97.9 °F (36.6 °C)]   Pulse:  [78-92]   Resp:  [16-18]   BP: (119-128)/(57-62)   SpO2:  [91 %-96 %]     Laboratory:  CBC:   Recent Labs   Lab 05/18/23 0350 05/19/23 0629 05/22/23 0418   WBC 7.30 6.19 6.26   RBC 2.91* 2.77* 2.78*   HGB 8.4* 8.0* 7.8*   HCT 28.0* 25.6* 27.2*   * 163 203   MCV 96 92 98   MCH 28.9 28.9 28.1   MCHC 30.0* 31.3* 28.7*     BMP:   Recent Labs   Lab 05/18/23 0350 05/19/23 0629 05/22/23 0418   * 131* 111*    138 140   K 4.5 4.4 4.5    102 105   CO2 28 29 26   BUN 37* 32* 26*   CREATININE 1.4 1.2 1.0   CALCIUM 9.5 9.3 9.9   MG 2.1 2.2 2.1     CMP:   Recent Labs   Lab  05/16/23  0348 05/17/23  0823 05/18/23  0350 05/19/23  0629 05/22/23  0418   *   < > 132* 131* 111*   CALCIUM 9.0   < > 9.5 9.3 9.9   ALBUMIN 3.4*  --   --   --   --    PROT 6.1  --   --   --   --       < > 140 138 140   K 3.6   < > 4.5 4.4 4.5   CO2 28   < > 28 29 26      < > 103 102 105   BUN 33*   < > 37* 32* 26*   CREATININE 1.2   < > 1.4 1.2 1.0   ALKPHOS 123  --   --   --   --    ALT 37  --   --   --   --    AST 45*  --   --   --   --    BILITOT 0.6  --   --   --   --     < > = values in this interval not displayed.     LFTs:   Recent Labs   Lab 05/16/23 0348   ALT 37   AST 45*   ALKPHOS 123   BILITOT 0.6   PROT 6.1   ALBUMIN 3.4*     Lab Results   Component Value Date    HGBA1C 7.3 (H) 05/04/2023     Lab Results   Component Value Date    TSH 2.724 03/02/2023           ASSESSMENT/PLAN:     I have reviewed the medications in compliance with CMS Regulation F756 of the [place]. Based on information gathered, the following items may need to be addressed:    **According to PMH and home medication list, patient takes the following medications at home. These medications are not currently ordered at [facility]:  Carvedilol 12.5 mg twice daily  Glimepiride 2 mg daily before breakfast  Metformin 500 mg twice daily with meals    Medications reviewed by PharmD, please re-consult if needed.      Eladia Weir, Pharm. D.  Clinical Pharmacist  [facility]

## 2023-05-22 NOTE — PT/OT/SLP PROGRESS
Occupational Therapy   Treatment    Name: Alina Narayan  MRN: [id number]  Admit Date: 5/19/2023  Admitting Diagnosis:   Nonrheumatic aortic valve stenosis    General Precautions: Standard, sternal, fall   Orthopedic Precautions: N/A   Braces: N/A    Recommendations:     Discharge Recommendations:  home health OT  Level of Assistance Recommended at Discharge: 24 hours light assistance for ADL's and homemaking tasks  Discharge Equipment Recommendations: none  Barriers to discharge:  None    Assessment:     Alina Narayan is a 78 y.o. female with a medical diagnosis of  Nonrheumatic aortic valve stenosis .  She presents with limitations in performance of self-care, functional mobility, and ADLs. Performance deficits affecting function are weakness, impaired endurance, impaired self care skills, impaired functional mobility, gait instability, impaired balance, decreased lower extremity function, decreased upper extremity function, pain, impaired fine motor, impaired skin, other (comment), decreased ROM, edema. Pt tolerated Tx without incident and is making progress but continues to require assist to perform self care tasks, functional mobility and functional transfers. Pt would continue to benefit from OT intervention to further functional (I)ce and safety.     Rehab Potential is good    Activity tolerance:  Good    Plan:     Patient to be seen 5 x/week to address the above listed problems via self-care/home management, therapeutic activities, therapeutic exercises    Plan of Care Expires: 06/19/23  Plan of Care Reviewed with: patient, spouse    Subjective     Communicated with: Nursing prior to session.     Pain/Comfort:  Pain Rating 1: 4/10  Location - Orientation 1: generalized  Location 1: sternal  Pain Addressed 1: Pre-medicate for activity, Distraction  Pain Rating Post-Intervention 1: 7/10    Patient's cultural, spiritual, Hinduism conflicts given the current situation:  no    Objective:     Patient found  up in  bedside chair with  present  with  (no active lines) upon OT entry to room.    Bed Mobility:    Pt seated in chair at onset of treatment session.     Functional Mobility/Transfers:  Patient completed Sit <> Stand Transfer with stand by assistance  with  no assistive device 2x  Patient completed Bedside Chair <> Chair Transfer using Step Transfer technique with contact guard assistance with no assistive device    AMPAC 6 Click ADL: 14    OT Exercises: Pt performed L hand/digits exercise with min resistance for  strength. Exercises performed in order increase independence when performing self care tasks.       Treatment & Education:   Pt performed towel finger walks at adjustable rehab counter using L digits due to weakened/limited use of L hand. Activity with focus on strengthening hand and and proprioceptive input.    Pt participated in standing activity with sup and RW. Pt at raised counter to perform fine motor and visual scanning activity with focus on standing tolerance, functional reaching, dynamic standing bal, crossing midline, and to promote independence with homemaking and self care tasks. Pt tolerated standing for 6 min and 59 sec     Pt educated on:  - role of OT  - level of assistance  - energy conservation and task modification to maximized independence with ADL's and mobility   -  safety while performing functional transfers and self care tasks  - progress towards OT goals      Patient left up in chair in therapy gym with  PTA present    GOALS:   Multidisciplinary Problems       Occupational Therapy Goals          Problem: Occupational Therapy    Goal Priority Disciplines Outcome Interventions   Occupational Therapy Goal     OT, PT/OT Ongoing, Progressing    Description: Goals to be met by: 6/19/2023     Patient will increase functional independence with ADLs by performing:    UE Dressing with Stand-by Assistance.  LE Dressing with Minimal Assistance.  Grooming while seated at sink with  Modified [place].  Toileting from toilet with Minimal Assistance for hygiene and clothing management.   Bathing from shower chair/bench with Minimal Assistance.  Supine to sit with Stand-by Assistance.  Step transfer with Stand-by Assistance with no AD.  Toilet transfer to toilet with Stand-by Assistance with no AD.                         Time Tracking:     OT Date of Treatment: 05/22/23  OT Start Time: 1026    OT Stop Time: 1058  OT Total Time (min): 32 min    Billable Minutes:Therapeutic Activity 22  Therapeutic Exercise 10    5/22/2023  A client care conference was performed between the LOTR and MOORE, prior to treatment by MOORE, to discuss the patient's status, treatment plan and established goals.

## 2023-05-22 NOTE — PLAN OF CARE
Family Training     Patient Name:  Alina Narayan   MRN:  [id number]  Admit Date: 5/19/2023      Family training scheduled this date. Pt's family/caregiver agreeable to attend training Tuesday, 5/23/23 at 10am.     5/22/2023

## 2023-05-22 NOTE — PLAN OF CARE
[facility] - Skilled Nursing      HOME HEALTH ORDERS  FACE TO FACE ENCOUNTER    Patient Name: Alina Narayan  YOB: 1945    PCP: Aarti Dunn MD   PCP Address: [facility], [address] / [place] [address]  PCP Phone Number: [phone]  PCP Fax: [phone]    Encounter Date: 5/19/23    Admit to Home Health    Diagnoses:  Active Hospital Problems    Diagnosis  POA    *Prosthetic valve dysfunction [T82.09XA]  Yes    Impaired mobility [Z74.09]  Yes    Acute blood loss anemia [D62]  Yes     Expected post operatively         Morbid (severe) obesity due to excess calories [E66.01]  Yes    Diabetes mellitus due to underlying condition with stage 3 chronic kidney disease, without long-term current use of insulin [E08.22, N18.30]  Yes    Chronic kidney disease, stage III (moderate) [N18.30]  Yes    S/P AVR [Z95.2]  Not Applicable     Aortic valve replacement with a 21-mm [employer] You Trifecta   bovine pericardial prosthesis.          Essential hypertension [I10]  Yes    Hyperlipidemia, mixed [E78.2]  Yes      Resolved Hospital Problems   No resolved problems to display.       Follow Up Appointments:  Future Appointments   Date Time Provider Department Center   6/13/2023  9:30 AM EKG, APPT [facility] EKG [facility]   6/13/2023  9:45 AM [facility] [id number] LB LIMIT [facility] XRAY OP [facility]   6/13/2023 10:00 AM Venkat Webb MD [facility] CARDVAS [facility]   6/13/2023  2:30 PM ECHO, [facility] ECHOSTR [facility]   7/24/2023  2:30 PM Shalini Maher MD [facility] BARIAT [facility]   8/22/2023 10:20 AM Erick Dwyer MD [facility] HEMONCORY [facility]       Allergies:  Review of patient's allergies indicates:   Allergen Reactions    Augmentin [amoxicillin-pot clavulanate] Diarrhea    Dilaudid [hydromorphone (bulk)] Other (See Comments)     Other reaction(s): sedation    Hydromorphone Other (See Comments)     Other reaction(s): sedation    Cymbalta [duloxetine]      Dry mouth, agitation    Jardiance [empagliflozin] Hives     Filipemarshall [exenatide] Rash     Other reaction(s): Rash       Medications: Review discharge medications with patient and family and provide education.      I have seen and examined this patient within the last 30 days. My clinical findings that support the need for the home health skilled services and home bound status are the following:no   Weakness/numbness causing balance and gait disturbance due to Surgery making it taxing to leave home.     Diet:   cardiac diet and diabetic diet 2000 calorie    Referrals/ Consults  Physical Therapy to evaluate and treat. Evaluate for home safety and equipment needs; Establish/upgrade home exercise program. Perform / instruct on therapeutic exercises, gait training, transfer training, and Range of Motion.  Occupational Therapy to evaluate and treat. Evaluate home environment for safety and equipment needs. Perform/Instruct on transfers, ADL training, ROM, and therapeutic exercises.  Aide to provide assistance with personal care, ADLs, and vital signs.    Activities:    Activity as tolerated, no lifting more than 5 pounds, no pushing or pulling with arms, No driving for 4 weeks    Nursing:   Agency to admit patient within 24 hours of hospital discharge unless specified on physician order or at patient request    SN to complete comprehensive assessment including routine vital signs. Instruct on disease process and s/s of complications to report to MD. Review/verify medication list sent home with the patient at time of discharge  and instruct patient/caregiver as needed. Frequency may be adjusted depending on start of care date.     Skilled nurse to perform up to 3 visits PRN for symptoms related to diagnosis    Notify MD if SBP > 160 or < 90; DBP > 90 or < 50; HR > 120 or < 50; Temp > 101; O2 < 88%    Ok to schedule additional visits based on staff availability and patient request on consecutive days within the home health episode.    Showering   If your incisions are healing and  there is no drainage - it is ok to take a shower.  Shower with your back to the shower spray.  It is ok for your incision to get wet, but the shower spray should not directly hit your chest.  Do not soak in a tub.  The water temperature should be warm -- not too hot or cold. Extreme water temperatures can cause you to feel faint.  It is expected that you wash your incisions when you shower, however only use soap and water to cleanse the sites.  -Use normal bar soap, not perfumed soap or body wash. During your recovery, do not try a new brand of soap.  -Place soapy water on your hand or a clean washcloth and gently wash your incisions using an up-and-down motion.  -Do not apply ointments, oils, or salves to your incisions.  -Pat the skin gently to dry.     1. Wash your hands before and after caring for or touching your incisions.  2. Look in the mirror daily. Inspect your incisions for redness, drainage and warmth. Your incisions should be healing; there should NOT be an increase in opening.  3. Gently wash your incisions everyday with soap and warm water using a clean washcloth, or your hand and light touch.  4. Gently pat dry with a clean towel.  5. You do not need to cover your incisions unless they are draining.  If you are experiencing drainage, it is important to call your doctor.  Monday - Friday, from 8:00am - 5pm, call [phone].  Outside of these hours, please call [phone], which is a 24-hour nurse care advice line.      Miscellaneous   Diabetic Care:   SN to perform and educate Diabetic management with blood glucose monitoring:, Fingerstick blood sugar AC and HS, and Report CBG < 60 or > 350 to physician.  Heart Failure:      SN to instruct on the following:    Instruct on the definition of CHF.   Instruct on the signs/sympoms of CHF to be reported.   Instruct on and monitor daily weights.   Instruct on factors that cause exacerbation.   Instruct on action, dose, schedule, and side effects of  medications.   Instruct on diet as prescribed.   Instruct on activity allowed.   Instruct on life-style modifications for life long management of CHF   SN to assess compliance with daily weights, diet, medications, fluid retention,    safety precautions, activities permitted and life-style modifications.   Additional 1-2 SN visits per week as needed for signs and symptoms     of CHF exacerbation.      For Weight Gain > 2-3 lbs in 1 day or 4-6 lbs over 1 week notify CTS    Home Health Aide:  Nursing Three times weekly, Physical Therapy Three times weekly, Occupational Therapy Three times weekly, and Home Health Aide Three times weekly    Wound Care Orders    Ok to shower. No baths or soaking in water. Wash incision daily with soap and water, pat dry. No lotions or creams. Notify MD of any erythema, drainage, or signs of dehiscence.     I certify that this patient is confined to her home and needs intermittent skilled nursing care, physical therapy, and occupational therapy.

## 2023-05-22 NOTE — PROGRESS NOTES
[facility]                                  Skilled Nursing Facility                   Progress Note     Admit Date: 5/19/2023  DARIN 6/2/2023  Principal Problem:  Prosthetic valve dysfunction   HPI obtained from patient interview and chart review     Chief Complaint: Establish Care/ Initial Visit     HPI:   Ms. Narayan is a 78-year-old female with PMHx of diabetes type 2, depression, anxiety, HTN, HLD, morbid obesity, MITCHELL, pulmonary embolism thyroid disease and aortic valve replacement in 2013 who presents to SNF following hospitalization for Severe prosthetic aortic stenosis s/p Aortic valve re-replacement with a small Corcym Perceval bovine pericardial bioprosthesis with Redo sternotomy on 5/10 with Dr. Webb.  Admission to SNF for secondary weakness and debility.    Patient originally presented to CTS clinic for evaluation.  Per [facility] notes, patient also undergone treatment for breast cancer including resection with chest irradiation.  Around the time of treatment for her breast cancer, she had a pulmonary embolus.  Unfortunately, over the past several years she is had progressive dyspnea on exertion.  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  She was evaluated for transcatheter valve, but due to the location of the coronary ostia and the nature of the valve in place this was felt to be a very poor option.  She now presents for redo sternotomy and redo aortic valve replacement.On 5/10/23  the patient was taken to the Operating Room for the above stated procedure. Please see the previously dictated operative report for complete details. Postoperatively, the patient was taken from the  Operating Room to the ICU where the vital signs were monitored and pain was kept under control. The patient was weaned from the drips and extubated in the ICU per protocol. Once hemodynamically stable,  "the patient was transferred to the Cardiac Step-Down floor for continued strengthening and ambulation. On postoperative day 9, the patient was ready for not discharge to home and will be sent to a [facility] nursing facility to continue to optimize strength and conditioning. At the time of transfer, the patient was unable to ambulating unassisted. Pain was well controlled with oral analgesics and the patient was tolerating the diet."    Today, patient has multiple complaints about her transfer from [facility] an admission to SNF.  I explained why some things did not go according to plan.  Patient states would like to discharge on Wednesday.  She states that she walked 275 ft with therapy today.  Her postoperative pain is now controlled with the initiation of PRN hydrocodone.  Patient has had multiple bowel movements today.  She has a good appetite.  All today's lab review, BUN/creatinine improving.     Patient will be treated at [facility] with PT and OT to improve functional status and ability to perform ADLs.     Past Medical History: Patient has a past medical history of Allergy, Anxiety, Arthritis, BRCA1 negative, Breast cancer (10/2012), Colon polyp, Depression, Diabetes mellitus, Diabetes mellitus, type 2, Diverticular disease, Diverticulitis (2009), Genetic testing (05/2017), Hyperlipidemia, Hypertension, Morbid obesity, MITCHELL (obstructive sleep apnea), Pulmonary embolism, Stenosis, Stenosis and insufficiency of lacrimal passages, and Thyroid disease.    Past Surgical History: Patient has a past surgical history that includes Carpal tunnel release; Shoulder surgery; Dilation and curettage of uterus (1999); Endometrial ablation (1999); left lumpectomy (2012); Eye surgery; Skin biopsy; Cardiac valve replacement (12/2013); Colonoscopy (N/A, 09/29/2017); Breast lumpectomy (Left, 2012); Breast biopsy (Left, 10/01/2012); Breast biopsy (Left, 12/2017); Cardiac valuve replacement (2013); Insertion of tunneled central venous " catheter (CVC) with subcutaneous port (Right, 02/01/2019); Modified radical mastectomy w/ axillary lymph node dissection (Right, 08/26/2019); Mediport removal (Right, 08/26/2019); Mastectomy; Breast cyst aspiration; Breast mass excision; Colonoscopy (N/A, 1/5/2023); Coronary angiography (Left, 4/13/2023); and Aortic valve replacement (N/A, 5/10/2023).    Social History: Patient reports that she has never smoked. She has never used smokeless tobacco. She reports that she does not drink alcohol and does not use drugs.    Family History: family history includes Alcohol abuse in her brother; Anxiety disorder in her son; Breast cancer in her mother; Cancer in her brother, father, and maternal grandfather; Colon cancer in her father; SAL disease in her son; Heart disease in her father; No Known Problems in her sister and son; Sleep disorder in her son.    Allergies: Patient is allergic to augmentin [amoxicillin-pot clavulanate], dilaudid [hydromorphone (bulk)], hydromorphone, cymbalta [duloxetine], jardiance [empagliflozin], and byetta [exenatide].    ROS  Constitutional: Negative for fever   Eyes: Negative for blurred vision, double vision   Respiratory: Negative for cough, shortness of breath   Cardiovascular: Negative for chest pain, palpitations, and leg swelling.   Gastrointestinal: Negative for abdominal pain, constipation, diarrhea, nausea, vomiting.   Genitourinary: Negative for dysuria, frequency   Musculoskeletal:  + generalized weakness.  Postoperative Sternal chest pain.  Skin: Negative for itching and rash.   Neurological: Negative for dizziness, headaches.  Numbness to left hand  Psychiatric/Behavioral: Negative for depression. The patient is nervous/anxious.      24 hour Vital Sign Range   Temp:  [97.7 °F (36.5 °C)]   Pulse:  [78-85]   Resp:  [16-18]   BP: (119)/(57)   SpO2:  [91 %-96 %]     Current BMI: Body mass index is 44.88 kg/m².    PEx  Constitutional: Patient appears debilitated.  No distress  noted  HENT:   Head: Normocephalic and atraumatic.   Eyes: Pupils are equal, round  Neck: Normal range of motion. Neck supple.   Cardiovascular: Normal rate, regular rhythm and normal heart sounds.    Pulmonary/Chest: Effort normal and breath sounds are clear  Abdominal: Soft. Bowel sounds are normal.   Musculoskeletal: Normal range of motion.   Neurological: Alert and oriented to person, place, and time.   Psychiatric: Normal mood and affect. Behavior is normal.   Skin: Skin is warm and dry. Full skin assessment completed.  Midsternal incision with Steri-Strips, fully approximated, no drainage noted, no signs of infection.  For chest tube sites to lower chest wall, not approximated, serosanguineous drainage, old dressing removed today, applied lightly taped gauze to allow sites to dry up.     Recent Labs   Lab 05/22/23  0418      K 4.5      CO2 26   BUN 26*   CREATININE 1.0   MG 2.1       Recent Labs   Lab 05/22/23 0418   WBC 6.26   RBC 2.78*   HGB 7.8*   HCT 27.2*      MCV 98   MCH 28.1   MCHC 28.7*       Recent Labs   Lab 05/20/23  2025 05/21/23  0722 05/21/23  1104 05/21/23  1612 05/21/23  2030 05/22/23  0712   POCTGLUCOSE 248* 147* 223* 123* 170* 140*        Assessment and Plan:    Severe prosthetic aortic stenosis  S/P AVR  - continue ASA 81 mg daily, pravastatin 20 mg daily, metoprolol 25 mg BID, apixaban 2.5 mg BID  - Lasix 20 mg daily along with potassium 20 mEq BID  - PT/OT, sternal precautions  - wash incisional site daily with soap and water, do not allow shower pressure to directly hit incision  - follow-up with CTS at 3 weeks postop.    Acute postoperative pain  - initiated Norco 5/325 q.6 hours PRN, acetaminophen 650 mg q.6 hours PRN  - bowel regimen in place     Essential hypertension  - home regimen with carvedilol 12.5 mg BID  - BP stable off meds at this time    Diabetes mellitus type 2 in obese  - last A1c 7.3 on 05/04/2023  - Accu-Cheks AC/HS, diabetic diet  - home regimen  with glimepiride 2 mg daily, metformin 500 mg BID  - continue detemir 16 units daily, aspart 5 units TID WM, LD SSI PRN    Chronic kidney disease, stage III (moderate)  - continue to monitor twice weekly BMPs, avoid nephrotoxic agents, renally dose medications when appropriate    Acute blood loss anemia  - Expected post operatively   - continue monitor twice weekly CBCs      History of pulmonary embolism  - continue home Eliquis      Severe obesity with comorbidity  - Body mass index is 44.88 kg/m².. Obesity complicates all aspects of disease management from diagnostic modalities to treatment. Weight loss encouraged and health benefits explained to patient.        Acquired hypothyroidism  - continue levothyroxine 75 mcg daily    Obstructive sleep apnea  - CPAP at night      Hyperlipidemia, mixed  - continue pravastatin 20 mg daily    Chronic pain  -  right shoulder, axilla, and right proximal arm.  - continue gabapentin 300 mg TID       Anticipate disposition:  Home with home health      Follow-up needed during SNF stay-    Follow-up needed after discharge from SNF: PCP, CTS    Future Appointments   Date Time Provider Department Center   6/13/2023  9:30 AM EKG, APPT [facility] EKG [facility]   6/13/2023  9:45 AM [facility] [id number] LB LIMIT [facility] XRAY OP [facility]   6/13/2023 10:00 AM Venkat Webb MD [facility] CARDVAS [facility]   6/13/2023  2:30 PM ECHO, [facility] ECHOSTR [facility]   7/24/2023  2:30 PM Shalini Maher MD [facility] BARIAT [facility]   8/22/2023 10:20 AM Erick Dwyer MD [facility] [id number] [facility]         I certify that SNF services are required to be given on an inpatient basis because Alina Narayan needs for skilled nursing care and/or skilled rehabilitation are required on a daily basis and such services can only practically be provided in a skilled nursing facility setting and are for an ongoing condition for which she received inpatient care in the hospital.     Total time of the visit 121 minutes   Non  physical exam/ non charting time: 71 minutes   Description of non physical exam/non charting time: counseling patient on clinical conditions and therapies provided.  Extensive chart review completed including all consultation notes.  All pertinent laboratory and radiographical images reviewed.        Kathleen Randall NP  Department of Hospital Medicine   [facility]     DOS: 5/22/2023       Patient note was created using MModal Dictation.  Any errors in syntax or even information may not have been identified and edited on initial review prior to signing this note.

## 2023-05-22 NOTE — PLAN OF CARE
Problem: Occupational Therapy  Goal: Occupational Therapy Goal  Description: Goals to be met by: 6/19/2023     Patient will increase functional independence with ADLs by performing:    UE Dressing with Stand-by Assistance.  LE Dressing with Minimal Assistance.  Grooming while seated at sink with Modified [place].  Toileting from toilet with Minimal Assistance for hygiene and clothing management.   Bathing from shower chair/bench with Minimal Assistance.  Supine to sit with Stand-by Assistance.  Step transfer with Stand-by Assistance with no AD.  Toilet transfer to toilet with Stand-by Assistance with no AD.    Outcome: Ongoing, Progressing

## 2023-05-23 LAB
POCT GLUCOSE: 111 MG/DL (ref 70–110)
POCT GLUCOSE: 135 MG/DL (ref 70–110)
POCT GLUCOSE: 140 MG/DL (ref 70–110)
POCT GLUCOSE: 196 MG/DL (ref 70–110)

## 2023-05-23 PROCEDURE — 97535 SELF CARE MNGMENT TRAINING: CPT | Mod: CO

## 2023-05-23 PROCEDURE — 25000003 PHARM REV CODE 250: Performed by: HOSPITALIST

## 2023-05-23 PROCEDURE — 25000003 PHARM REV CODE 250: Performed by: NURSE PRACTITIONER

## 2023-05-23 PROCEDURE — 11000004 HC SNF PRIVATE

## 2023-05-23 PROCEDURE — 94761 N-INVAS EAR/PLS OXIMETRY MLT: CPT

## 2023-05-23 PROCEDURE — 97530 THERAPEUTIC ACTIVITIES: CPT | Mod: CO

## 2023-05-23 PROCEDURE — 97116 GAIT TRAINING THERAPY: CPT

## 2023-05-23 PROCEDURE — 97110 THERAPEUTIC EXERCISES: CPT

## 2023-05-23 RX ORDER — METOPROLOL TARTRATE 25 MG/1
25 TABLET, FILM COATED ORAL 2 TIMES DAILY
Qty: 60 TABLET | Refills: 3 | Status: SHIPPED | OUTPATIENT
Start: 2023-05-23 | End: 2023-05-24 | Stop reason: SDUPTHER

## 2023-05-23 RX ORDER — HYDROCODONE BITARTRATE AND ACETAMINOPHEN 5; 325 MG/1; MG/1
1 TABLET ORAL EVERY 6 HOURS PRN
Qty: 20 TABLET | Refills: 0 | Status: SHIPPED | OUTPATIENT
Start: 2023-05-23 | End: 2023-05-23 | Stop reason: SDUPTHER

## 2023-05-23 RX ORDER — HYDROCODONE BITARTRATE AND ACETAMINOPHEN 5; 325 MG/1; MG/1
1 TABLET ORAL EVERY 6 HOURS PRN
Qty: 20 TABLET | Refills: 0 | Status: SHIPPED | OUTPATIENT
Start: 2023-05-23 | End: 2023-06-13

## 2023-05-23 RX ORDER — ACETAMINOPHEN 325 MG/1
650 TABLET ORAL EVERY 6 HOURS PRN
Refills: 0 | COMMUNITY
Start: 2023-05-23 | End: 2023-06-13

## 2023-05-23 RX ADMIN — HYDROCODONE BITARTRATE AND ACETAMINOPHEN 1 TABLET: 5; 325 TABLET ORAL at 08:05

## 2023-05-23 RX ADMIN — FUROSEMIDE 20 MG: 20 TABLET ORAL at 08:05

## 2023-05-23 RX ADMIN — Medication 6 MG: at 08:05

## 2023-05-23 RX ADMIN — METOPROLOL TARTRATE 25 MG: 25 TABLET, FILM COATED ORAL at 08:05

## 2023-05-23 RX ADMIN — LEVOTHYROXINE SODIUM 75 MCG: 75 TABLET ORAL at 05:05

## 2023-05-23 RX ADMIN — INSULIN ASPART 5 UNITS: 100 INJECTION, SOLUTION INTRAVENOUS; SUBCUTANEOUS at 04:05

## 2023-05-23 RX ADMIN — GABAPENTIN 300 MG: 300 CAPSULE ORAL at 08:05

## 2023-05-23 RX ADMIN — INSULIN DETEMIR 16 UNITS: 100 INJECTION, SOLUTION SUBCUTANEOUS at 08:05

## 2023-05-23 RX ADMIN — INSULIN ASPART 5 UNITS: 100 INJECTION, SOLUTION INTRAVENOUS; SUBCUTANEOUS at 08:05

## 2023-05-23 RX ADMIN — APIXABAN 2.5 MG: 2.5 TABLET, FILM COATED ORAL at 08:05

## 2023-05-23 RX ADMIN — PRAVASTATIN SODIUM 20 MG: 20 TABLET ORAL at 08:05

## 2023-05-23 RX ADMIN — POTASSIUM CHLORIDE 20 MEQ: 1500 TABLET, EXTENDED RELEASE ORAL at 08:05

## 2023-05-23 RX ADMIN — INSULIN ASPART 5 UNITS: 100 INJECTION, SOLUTION INTRAVENOUS; SUBCUTANEOUS at 11:05

## 2023-05-23 RX ADMIN — GABAPENTIN 300 MG: 300 CAPSULE ORAL at 03:05

## 2023-05-23 RX ADMIN — ASPIRIN 81 MG: 81 TABLET, COATED ORAL at 08:05

## 2023-05-23 NOTE — PLAN OF CARE
Continue diabetic diet, recommend boost glucose BID and MVI,  Repeat HgA1c RD following  Goals: PO to meet 75% of EEN/EPN with ONS by next RD follow up  Nutrition Goal Status: new  Communication of RD Recs: other (comment) (POC)     Assessment and Plan  Increased nutrient needs(protein,calories) related to wound healing as evidenced by post- op sternal wound.     New     Plan  Carbohydrate modified diet  Collaboration with other providers  Commercial beverage( protein,calories) boost glucose BID

## 2023-05-23 NOTE — PLAN OF CARE
IDT meeting held with Alina Givens Sylvia J., Ai STEEN, and Ellen MELENDEZ. Patient and spouse present. Patient will discharge Wednesday 5/24 per her choice.

## 2023-05-23 NOTE — CONSULTS
"  [facility] [place] - Skilled Nursing  Adult Nutrition  Consult Note    SUMMARY   Recommendations  Continue diabetic diet, recommend boost glucose BID and MVI,  Repeat HgA1c RD following  Goals: PO to meet 75% of EEN/EPN with ONS by next RD follow up  Nutrition Goal Status: new  Communication of RD Recs: other (comment) (POC)    Assessment and Plan  Increased nutrient needs(protein,calories) related to wound healing as evidenced by post- op sternal wound.    New    Plan  Carbohydrate modified diet  Collaboration with other providers  Commercial beverage( protein,calories) boost glucose BID  Malnutrition Assessment 5/22     Skin (Micronutrient): wounds unhealed           Orbital Region (Subcutaneous Fat Loss): well nourished  Upper Arm Region (Subcutaneous Fat Loss): well nourished   Hinduism Region (Muscle Loss): mild depletion  Clavicle and Acromion Bone Region (Muscle Loss): mild depletion  Anterior Thigh Region (Muscle Loss): mild depletion                 Reason for Assessment    Reason For Assessment: consult  Diagnosis:  (aortic valve sysfunction, s/p AVR)  Relevant Medical History: AVR, DM HTN, HLD, CPAP, MITCHELL, CKD3, Diverticulitis, morbid obesity, anxiety,  Interdisciplinary Rounds: attended  General Information Comments: patient is interested in discharging as soon as possible, RD attended IDT rounds with patient and spouse present. weigh has been stable past year. NFPE completed.  Nutrition Discharge Planning: DC on heart healthy diabetic diet    Nutrition Risk Screen    Nutrition Risk Screen: large or nonhealing wound, burn or pressure injury    Nutrition/Diet History    Patient Reported Diet/Restrictions/Preferences: diabetic diet  Typical Food/Fluid Intake: regular meals  Spiritual, Cultural Beliefs, Mandaen Practices, Values that Affect Care: no  Food Allergies: NKFA  Factors Affecting Nutritional Intake: None identified at this time    Anthropometrics    Temp: 97.9 °F (36.6 °C)  Height: 5' 2" (157.5 " cm)  Height (inches): 62 in  Weight Method: Standard Scale  Weight: 111.3 kg (245 lb 6 oz)  Weight (lb): 245.37 lb  Ideal Body Weight (IBW), Female: 110 lb  % Ideal Body Weight, Female (lb): 223.06 %  BMI (Calculated): 44.9  BMI Grade: greater than 40 - morbid obesity  Usual Body Weight (UBW), k kg  % Usual Body Weight: 98.7  % Weight Change From Usual Weight: -1.51 %       Lab/Procedures/Meds    Pertinent Labs Reviewed: reviewed  Pertinent Labs Comments: Hg 7.8, Hct 27.2, BUN 26, glucose 111, PO4 4.6  Pertinent Medications Reviewed: reviewed  Pertinent Medications Comments: apixaban, lasix, ASA, KCl, levothyroxine, insulin, statin, senna-docusate, polyethylene glycol,    Estimated/Assessed Needs    Weight Used For Calorie Calculations: 111.3 kg (245 lb 6 oz)  Energy Calorie Requirements (kcal): 2226  Energy Need Method: Kcal/kg (x 20 kcal/kg  post op)  Protein Requirements: 75-85g  Weight Used For Protein Calculations: 50 kg (110 lb 3.7 oz) (IBW kg 2/2 obesity x 1.5.-1.7g/kg)  Fluid Requirements (mL): 2226 or per MD     RDA Method (mL): 2226  CHO Requirement: 278g      Nutrition Prescription Ordered    Current Diet Order:  diabetic  Nutrition Order Comments: po 50-75%    Evaluation of Received Nutrient/Fluid Intake    I/O: no data  Energy Calories Required: not meeting needs  Protein Required: not meeting needs  Fluid Required: meeting needs  Comments: LBM   Tolerance: tolerating  % Intake of Estimated Energy Needs: 50 - 75 %  % Meal Intake: 50 - 75 %    Nutrition Risk    Level of Risk/Frequency of Follow-up: low (one time per week)       Monitor and Evaluation    Food and Nutrient Intake: food and beverage intake  Food and Nutrient Adminstration: diet order  Anthropometric Measurements: weight change  Biochemical Data, Medical Tests and Procedures: glucose/endocrine profile, gastrointestinal profile, electrolyte and renal panel  Nutrition-Focused Physical Findings: skin       Nutrition  Follow-Up    RD Follow-up?: Yes

## 2023-05-23 NOTE — PT/OT/SLP PROGRESS
Occupational Therapy   Treatment/ Family Training/ Discharge Note    Name: Alina Narayan  MRN: [id number]  Admit Date: 5/19/2023  Admitting Diagnosis:  Prosthetic valve dysfunction    General Precautions: Standard, sternal, fall   Orthopedic Precautions: N/A   Braces: N/A    Recommendations:     Discharge Recommendations:  home health OT  Level of Assistance Recommended at Discharge: 24 hours light assistance for ADL's and homemaking tasks  Discharge Equipment Recommendations: none  Barriers to discharge:  None    Assessment:     Alina Narayan is a 78 y.o. female with a medical diagnosis of Prosthetic valve dysfunction.  She presents with limitations in performance of self-care, functional mobility, and ADLs  . Performance deficits affecting function are weakness, impaired endurance, impaired self care skills, impaired functional mobility, gait instability, impaired balance, decreased lower extremity function, decreased upper extremity function, pain, impaired fine motor, impaired skin, other (comment), decreased ROM, edema.    Family training took place this treatment session with pt's . Pt's  refused demonstration of all ADLs. Pt and pt's  report that pt's  has been assisting her with all ADLs while she has been here and is aware of level of assistance required for pt to perform ADLs safely. Pt tolerated Tx without incident and is making progress but continues to require assist to perform self care tasks, functional mobility and functional transfers. Pt would continue to benefit from OT intervention to further functional (I)ce and safety. Pt to discharge 5/24/23 with home health recommended.    Rehab Potential is good    Activity tolerance:  Good    Plan:     Patient to be seen 5 x/week to address the above listed problems via self-care/home management, therapeutic activities, therapeutic exercises    Plan of Care Expires: 06/19/23  Plan of Care Reviewed with: patient, spouse    Subjective      Communicated with: Nursing prior to session.     Pain/Comfort:  Pain Rating 1: 5/10  Location - Orientation 1: generalized  Location 1: sternal (with breathing/ deep breaths)  Pain Addressed 1: Pre-medicate for activity, Reposition, Distraction  Pain Rating Post-Intervention 1: 5/10    Patient's cultural, spiritual, Latter day conflicts given the current situation:  no    Objective:     Patient found  up in bedside chair  with  (no active lines) upon OT entry to room.    Bed Mobility:    Pt seated in chair at onset of treatment session.     Functional Mobility/Transfers:  Patient completed Sit <> Stand Transfer with supervision  with  no assistive device   Patient completed Toilet Transfer Step Transfer technique with supervision with  no AD  Functional Mobility: Pt ambulated in [place] with hand held assistance tolerating a total of 50 ft before seated break secondary to fatigue.     Activities of Daily Living:  Toileting: maximal assistance with pt's  performing anaya care with pt able to manage underwear over hips    AMPA 6 Click ADL: 14    OT Exercises: educated on therex to perform at home for  strength     Treatment & Education:  Pt and pt's  educated on:  - role of OT  - level of assistance  - energy conservation and task modification to maximized independence with ADL's and mobility   -  safety while performing functional transfers and self care tasks  - progress towards OT goals      Patient left up in chair therapy gym with   and PT present    GOALS:   Multidisciplinary Problems       Occupational Therapy Goals          Problem: Occupational Therapy    Goal Priority Disciplines Outcome Interventions   Occupational Therapy Goal     OT, PT/OT Ongoing, Progressing    Description: Goals to be met by: 6/19/2023     Patient will increase functional independence with ADLs by performing:    UE Dressing with Stand-by Assistance. -NOT MET  LE Dressing with Minimal Assistance. -NOT  MET  Grooming while seated at sink with Modified [place]. -NOT MET  Toileting from toilet with Minimal Assistance for hygiene and clothing management.  -NOT MET  Bathing from shower chair/bench with Minimal Assistance. -NOT MET  Supine to sit with Stand-by Assistance. -NOT MET  Step transfer with Stand-by Assistance with no AD. -MET  Toilet transfer to toilet with Stand-by Assistance with no AD.-MET                         Time Tracking:     OT Date of Treatment: 05/23/23  OT Start Time: 1002    OT Stop Time: 1032  OT Total Time (min): 30 min    Billable Minutes:Self Care/Home Management 12  Therapeutic Activity 18    5/23/2023

## 2023-05-23 NOTE — PT/OT/SLP PROGRESS
Physical Therapy Treatment/ Discharge Summary    Patient Name:  Alina Narayan   MRN:  [id number]  Admit Date: 5/19/2023  Admitting Diagnosis: Prosthetic valve dysfunction  General Precautions: Standard, sternal, fall  Orthopedic Precautions: N/A  Braces: N/A    Recommendations:     Discharge Recommendations: home health PT  Level of Assistance Recommended at Discharge: Intermittent assistance   Discharge Equipment Recommendations: none  Barriers to discharge: None    Assessment:     Alina Narayan is a 78 y.o. female admitted with a medical diagnosis of Prosthetic valve dysfunction . Pt's family completed family training. They verb and demo understanding on how to provide appropriate assistance/guarding for the following tasks: transfers, ambulation, and curb step. They were also informed on DME recommendations and that HHA is recommended for safety upon D/C. Pt's family verb and demonstrated understanding.      Performance deficits affecting function: weakness, impaired endurance, impaired self care skills, impaired functional mobility, gait instability, impaired balance, decreased upper extremity function, decreased lower extremity function, impaired cardiopulmonary response to activity.    Rehab Potential is good    Activity Tolerance: Good    Plan:     Patient to be seen 5 x/week to address the above listed problems via gait training, therapeutic activities, therapeutic exercises, neuromuscular re-education, wheelchair management/training    Plan of Care Expires: 06/19/23  Plan of Care Reviewed with: patient    Subjective     Pt. Agreeable to work with PT.     Pain/Comfort:  Pain Rating 1: 0/10  Pain Rating Post-Intervention 1: 0/10    Patient's cultural, spiritual, Gnosticist conflicts given the current situation:  no    Objective:     Communicated with pt's nurse prior to session.  Patient found up in chair with   upon PT entry to room.     Therapeutic Activities and Exercises: Seated Mini-eliptical with resistance  "set at high for 15mins to help improve B l/E MMT and endurance.    Functional Mobility:  Transfers:     Sit to Stand:  stand by assistance with hand-held assist  Gait: ~80ft x 2 trials with pt providing SBA while pt's spouse provided HHA for safety. Pt with no LOB episodes  Stairs:  Pt ascended/descended 4" curb step with HHA with no handrails with Contact Guard Assistance and Minimal Assistance. Pt completed 1st trial with PT and then 2nd trial with pt's spouse with PT SBA for safety.    AM-PAC 6 CLICK MOBILITY  17    Patient left up in chair with call button in reach.    GOALS:   Multidisciplinary Problems       Physical Therapy Goals          Problem: Physical Therapy    Goal Priority Disciplines Outcome Goal Variances Interventions   Physical Therapy Goal     PT, PT/OT Ongoing, Progressing     Description: Goals to be met by: 2023    Patient will increase functional independence with mobility by performin. Sit to stand transfer with Tony-not met  2. Bed to chair transfer with Tony using No Assistive Device-not met  3. Gait  x 150 feet with Supervision using No Assistive Device. -not met  4. Wheelchair propulsion x150 feet using bilateral lower extremities with set-up assistance -not met  5. Ascend/descend 4 steps with handrails for balance only (while maintaining sternal precautions) and  Stand-by Assistance -not met  6. Ascend/Descend 4 inch curb step with Stand-by Assistance using No Assistive Device.-not met  7.  object from the floor with modified independence with reacher  -not met                         Time Tracking:     PT Received On: 23  PT Start Time: 1033  PT Stop Time: 1100  PT Total Time (min): 27 min    Billable Minutes: Gait Training 12 and Therapeutic Exercise 15    Treatment Type: Treatment  PT/PTA: PT     Number of PTA visits since last PT visit: 0     2023  "

## 2023-05-23 NOTE — PLAN OF CARE
Problem: Adult Inpatient Plan of Care  Goal: Plan of Care Review  5/23/2023 1313 by Yi He LPN  Outcome: Ongoing, Progressing  5/23/2023 1004 by Yi He LPN  Outcome: Ongoing, Progressing  Goal: Patient-Specific Goal (Individualized)  5/23/2023 1313 by Yi He LPN  Outcome: Ongoing, Progressing  5/23/2023 1004 by Yi He LPN  Outcome: Ongoing, Progressing  Goal: Absence of Hospital-Acquired Illness or Injury  5/23/2023 1313 by Yi He LPN  Outcome: Ongoing, Progressing  5/23/2023 1004 by Yi He LPN  Outcome: Ongoing, Progressing  Goal: Optimal Comfort and Wellbeing  5/23/2023 1313 by Yi He LPN  Outcome: Ongoing, Progressing  5/23/2023 1004 by Yi He LPN  Outcome: Ongoing, Progressing  Goal: Readiness for Transition of Care  5/23/2023 1313 by Yi He LPN  Outcome: Ongoing, Progressing  5/23/2023 1004 by Yi He LPN  Outcome: Ongoing, Progressing

## 2023-05-23 NOTE — PROGRESS NOTES
[facility]                                  Skilled Nursing Facility                   Progress Note     Admit Date: 5/19/2023  DARIN 5/24/2023  Principal Problem:  Prosthetic valve dysfunction   HPI obtained from patient interview and chart review     Chief Complaint: Re-evaluation of medical treatment and therapy status    HPI:   Ms. Narayan is a 78-year-old female with PMHx of diabetes type 2, depression, anxiety, HTN, HLD, morbid obesity, MITCHELL, pulmonary embolism thyroid disease and aortic valve replacement in 2013 who presents to SNF following hospitalization for Severe prosthetic aortic stenosis s/p Aortic valve re-replacement with a small Corcym Perceval bovine pericardial bioprosthesis with Redo sternotomy on 5/10 with Dr. Webb.  Admission to SNF for secondary weakness and debility.    Interval history:  24 hr vital sign ranges listed below.  Patient denies shortness of breath, abdominal discomfort, nausea, or vomiting.  Patient reports an adequate appetite.  Patient denies dysuria.  Patient reports having regular bowel movements.  Patient progessing with PT/OT- ambulated 2x ~75ft with  Oliver-CGA  and left hand-held assist. Continuing to follow and treat all acute and chronic conditions.    Past Medical History: Patient has a past medical history of Allergy, Anxiety, Arthritis, BRCA1 negative, Breast cancer (10/2012), Colon polyp, Depression, Diabetes mellitus, Diabetes mellitus, type 2, Diverticular disease, Diverticulitis (2009), Genetic testing (05/2017), Hyperlipidemia, Hypertension, Morbid obesity, MITCHELL (obstructive sleep apnea), Pulmonary embolism, Stenosis, Stenosis and insufficiency of lacrimal passages, and Thyroid disease.    Past Surgical History: Patient has a past surgical history that includes Carpal tunnel release; Shoulder surgery; Dilation and curettage of uterus (1999); Endometrial ablation (1999); left lumpectomy  (2012); Eye surgery; Skin biopsy; Cardiac valve replacement (12/2013); Colonoscopy (N/A, 09/29/2017); Breast lumpectomy (Left, 2012); Breast biopsy (Left, 10/01/2012); Breast biopsy (Left, 12/2017); Cardiac valuve replacement (2013); Insertion of tunneled central venous catheter (CVC) with subcutaneous port (Right, 02/01/2019); Modified radical mastectomy w/ axillary lymph node dissection (Right, 08/26/2019); Mediport removal (Right, 08/26/2019); Mastectomy; Breast cyst aspiration; Breast mass excision; Colonoscopy (N/A, 1/5/2023); Coronary angiography (Left, 4/13/2023); and Aortic valve replacement (N/A, 5/10/2023).    Social History: Patient reports that she has never smoked. She has never used smokeless tobacco. She reports that she does not drink alcohol and does not use drugs.    Family History: family history includes Alcohol abuse in her brother; Anxiety disorder in her son; Breast cancer in her mother; Cancer in her brother, father, and maternal grandfather; Colon cancer in her father; SAL disease in her son; Heart disease in her father; No Known Problems in her sister and son; Sleep disorder in her son.    Allergies: Patient is allergic to augmentin [amoxicillin-pot clavulanate], dilaudid [hydromorphone (bulk)], hydromorphone, cymbalta [duloxetine], jardiance [empagliflozin], and byetta [exenatide].    ROS  Constitutional: Negative for fever   Eyes: Negative for blurred vision, double vision   Respiratory: Negative for cough, shortness of breath   Cardiovascular: Negative for chest pain, palpitations, and leg swelling.   Gastrointestinal: Negative for abdominal pain, constipation, diarrhea, nausea, vomiting.   Genitourinary: Negative for dysuria, frequency   Musculoskeletal:  + generalized weakness.  Postoperative Sternal chest pain.  Skin: Negative for itching and rash.   Neurological: Negative for dizziness, headaches.  Numbness to left hand  Psychiatric/Behavioral: Negative for depression. The patient is  nervous/anxious.      24 hour Vital Sign Range   Temp:  [98.1 °F (36.7 °C)-98.5 °F (36.9 °C)]   Pulse:  [83-93]   Resp:  [18-20]   BP: (128-137)/(66-82)   SpO2:  [96 %-98 %]     Current BMI: Body mass index is 44.88 kg/m².    PEx  Constitutional: Patient appears debilitated.  No distress noted  HENT:   Head: Normocephalic and atraumatic.   Eyes: Pupils are equal, round  Neck: Normal range of motion. Neck supple.   Cardiovascular: Normal rate, regular rhythm and normal heart sounds.    Pulmonary/Chest: Effort normal and breath sounds are clear  Abdominal: Soft. Bowel sounds are normal.   Musculoskeletal: Normal range of motion.   Neurological: Alert and oriented to person, place, and time.   Psychiatric: Normal mood and affect. Behavior is normal.   Skin: Skin is warm and dry.   Midsternal incision with Steri-Strips, fully approximated, no drainage noted, no signs of infection.  4 chest tube sites to lower chest wall, not approximated, serosanguineous drainage, drying up, look better today     No results for input(s): GLUCOSE, NA, K, CL, CO2, BUN, CREATININE, MG in the last 24 hours.    Invalid input(s):  CALCIUM      No results for input(s): WBC, RBC, HGB, HCT, PLT, MCV, MCH, MCHC in the last 24 hours.      Recent Labs   Lab 05/21/23  2030 05/22/23  0712 05/22/23  1137 05/22/23  1606 05/22/23  2132 05/23/23  0715   POCTGLUCOSE 170* 140* 178* 167* 141* 111*        Assessment and Plan:    Severe prosthetic aortic stenosis  S/P AVR  - continue ASA 81 mg daily, pravastatin 20 mg daily, metoprolol 25 mg BID, apixaban 2.5 mg BID  - Lasix 20 mg daily along with potassium 20 mEq BID  - wash incisional site daily with soap and water, do not allow shower pressure to directly hit incision  - follow-up with CTS at 3 weeks postop.  - stable, continue PT/OT, sternal precautions    Acute postoperative pain  - improved, continue Norco 5/325 q.6 hours PRN, acetaminophen 650 mg q.6 hours PRN  - bowel regimen in place     Essential  hypertension  - home regimen with carvedilol 12.5 mg BID  - BP stable off meds at this time    Diabetes mellitus type 2 in obese  - last A1c 7.3 on 05/04/2023  - Accu-Cheks AC/HS, diabetic diet  - home regimen with glimepiride 2 mg daily, metformin 500 mg BID  - stable, continue detemir 16 units daily, aspart 5 units TID WM, LD SSI PRN    Chronic kidney disease, stage III (moderate)  - stable, continue to monitor twice weekly BMPs, avoid nephrotoxic agents, renally dose medications when appropriate    Acute blood loss anemia  - Expected post operatively   - stable, continue monitor twice weekly CBCs      History of pulmonary embolism  - continue home Eliquis      Severe obesity with comorbidity  - Body mass index is 44.88 kg/m².. Obesity complicates all aspects of disease management from diagnostic modalities to treatment. Weight loss encouraged and health benefits explained to patient.        Acquired hypothyroidism  - continue levothyroxine 75 mcg daily    Obstructive sleep apnea  - CPAP at night      Hyperlipidemia, mixed  - continue pravastatin 20 mg daily    Chronic pain  -  right shoulder, axilla, and right proximal arm.  - continue gabapentin 300 mg TID       Anticipate disposition:  Home with home health      Follow-up needed during SNF stay-    Follow-up needed after discharge from SNF: PCP, CTS    Future Appointments   Date Time Provider Department Center   6/13/2023  9:30 AM EKG, APPT [facility] EKG [facility]   6/13/2023  9:45 AM [facility] [id number] LIMIT [facility] XRAY OP [facility]   6/13/2023 10:00 AM Venkat Webb MD [facility] CARDVAS [facility]   6/13/2023  2:30 PM ECHO, [facility] ECHOSTR [facility]   7/24/2023  2:30 PM Shalini Maher MD [facility] BARIAT [facility]   8/22/2023 10:20 AM Erick Dwyer MD [facility] [id number] Jb Randall NP  Department of Hospital Medicine   [facility]     DOS: 5/23/2023       Patient note was created using MModal Dictation.  Any  errors in syntax or even information may not have been identified and edited on initial review prior to signing this note.

## 2023-05-23 NOTE — PLAN OF CARE
Patient discharging tomorrow 5/24 at 11am via personal car with . No DME required. [facility] set to admit patient 5/25, patient is aware they do not currently have a aide to provide, patient states that's ok she would still prefer [facility] and  will be there to help with ADLs and bathing.

## 2023-05-23 NOTE — TREATMENT PLAN
[facility] DME recommendations    Alina Narayan  MRN: [id number]    [x]  No DME needed    [x] Home health PT and OT      AMILCAR Willard 5/23/2023

## 2023-05-24 VITALS
SYSTOLIC BLOOD PRESSURE: 122 MMHG | OXYGEN SATURATION: 95 % | WEIGHT: 250.38 LBS | HEIGHT: 62 IN | RESPIRATION RATE: 18 BRPM | BODY MASS INDEX: 46.07 KG/M2 | TEMPERATURE: 98 F | DIASTOLIC BLOOD PRESSURE: 63 MMHG | HEART RATE: 91 BPM

## 2023-05-24 LAB — POCT GLUCOSE: 131 MG/DL (ref 70–110)

## 2023-05-24 PROCEDURE — 25000003 PHARM REV CODE 250: Performed by: HOSPITALIST

## 2023-05-24 PROCEDURE — 99305 1ST NF CARE MODERATE MDM 35: CPT | Mod: AI,,, | Performed by: HOSPITALIST

## 2023-05-24 PROCEDURE — 99305 PR NURSING FACILITY CARE, INIT, MOD SEVERITY: ICD-10-PCS | Mod: AI,,, | Performed by: HOSPITALIST

## 2023-05-24 PROCEDURE — 25000003 PHARM REV CODE 250: Performed by: NURSE PRACTITIONER

## 2023-05-24 RX ORDER — METOPROLOL TARTRATE 25 MG/1
25 TABLET, FILM COATED ORAL 2 TIMES DAILY
Qty: 60 TABLET | Refills: 3 | Status: SHIPPED | OUTPATIENT
Start: 2023-05-24 | End: 2023-06-16 | Stop reason: ALTCHOICE

## 2023-05-24 RX ADMIN — HYDROCODONE BITARTRATE AND ACETAMINOPHEN 1 TABLET: 5; 325 TABLET ORAL at 09:05

## 2023-05-24 RX ADMIN — METOPROLOL TARTRATE 25 MG: 25 TABLET, FILM COATED ORAL at 08:05

## 2023-05-24 RX ADMIN — INSULIN DETEMIR 16 UNITS: 100 INJECTION, SOLUTION SUBCUTANEOUS at 09:05

## 2023-05-24 RX ADMIN — PRAVASTATIN SODIUM 20 MG: 20 TABLET ORAL at 08:05

## 2023-05-24 RX ADMIN — GABAPENTIN 300 MG: 300 CAPSULE ORAL at 08:05

## 2023-05-24 RX ADMIN — LEVOTHYROXINE SODIUM 75 MCG: 75 TABLET ORAL at 05:05

## 2023-05-24 RX ADMIN — FUROSEMIDE 20 MG: 20 TABLET ORAL at 08:05

## 2023-05-24 RX ADMIN — ASPIRIN 81 MG: 81 TABLET, COATED ORAL at 08:05

## 2023-05-24 RX ADMIN — APIXABAN 2.5 MG: 2.5 TABLET, FILM COATED ORAL at 08:05

## 2023-05-24 RX ADMIN — INSULIN ASPART 5 UNITS: 100 INJECTION, SOLUTION INTRAVENOUS; SUBCUTANEOUS at 09:05

## 2023-05-24 NOTE — NURSING
Admission Diagnosis: Nonrheumatic aortic (valve) stenos*     POC reviewed with pt, pt verbalized understanding. Safety maintained throughout shift, bed locked and in lowest position, call light in reach. Pt remained free of fall/trauma. VSS, afebrile this shift.    Pt pain  Last Documentation = Comfort/Acceptable Pain Level: 3 (05/24/23 1059)    Last Documentation = Pain Rating (0-10): Activity: 5 (05/23/23 2000)    Last Documentation = Pain Rating (0-10): Rest: 0 (05/24/23 1059)    Skin assessment completed prior to discharge   Location: Midsternal chest incision, with steristrips clean/dry/intact; upper abdomen site x 4, open to air   Description: Incisional chest  Recommendation: Follow instructions provided by Surgeon    AVS reviewed with patient prior to discharge. Discharge instruction, follow up appointments and medication instructions given to pt, pt verbalized understanding.  Rx sent to patients pharmacy.       * No surgery found *      OUTCOME: Condition has improved and patient is now ready for discharge.    DISPOSITION: Home-Health Care [facility] [place]    FOLLOWUP: With primary care provider

## 2023-05-24 NOTE — HOSPITAL COURSE
Patient progressed well with PT and OT- last PT note states that patient ambulated***. Patient had no significant events during their stay at SNF. Home health was set up. DME was ordered if needed. Follow up appointment to be made by patient within one week. All prescriptions and discharge instructions were ordered to be given to the patient prior to discharge.     PEx  Constitutional: Patient appears debilitated.  No distress noted  HENT:   Head: Normocephalic and atraumatic.   Eyes: Pupils are equal, round  Neck: Normal range of motion. Neck supple.   Cardiovascular: Normal rate, regular rhythm and normal heart sounds.    Pulmonary/Chest: Effort normal and breath sounds are clear  Abdominal: Soft. Bowel sounds are normal.   Musculoskeletal: Normal range of motion.   Neurological: Alert and oriented to person, place, and time.   Psychiatric: Normal mood and affect. Behavior is normal.   Skin: Skin is warm and dry.   Midsternal incision with Steri-Strips, fully approximated, no drainage noted, no signs of infection.  4 chest tube sites to lower chest wall, not approximated, serosanguineous drainage, drying up, look better today

## 2023-05-24 NOTE — H&P
"Hospital Medicine  Skilled Nursing Facility   History and Physical Exam      Date of Service: 05/24/2023      Patient Name: Alina Narayan  MRN: [id number]  Admission Date: 5/19/2023  Attending Physician: Greg Osborne MD  Primary Care Provider: Aarti Dunn MD  Code Status: Full Code      Principal problem:  Prosthetic valve dysfunction      Chief Complaint:   Chief Complaint   Patient presents with    Valve Replacement     Admitted to OS for rehabilitation following hospital stay for redo aortic valve replacement.           HPI:   "Ms. Narayan is a 78-year-old female with PMHx of diabetes type 2, depression, anxiety, HTN, HLD, morbid obesity, MITCHELL, pulmonary embolism thyroid disease and aortic valve replacement in 2013 who presents to SNF following hospitalization for Severe prosthetic aortic stenosis s/p Aortic valve re-replacement with a small Corcym Perceval bovine pericardial bioprosthesis with Redo sternotomy on 5/10 with Dr. Webb.  Admission to SNF for secondary weakness and debility.     Patient originally presented to CTS clinic for evaluation.  Per [facility] [place], patient also undergone treatment for breast cancer including resection with chest irradiation.  Around the time of treatment for her breast cancer, she had a pulmonary embolus.  Unfortunately, over the past several years she is had progressive dyspnea on exertion.  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  She was evaluated for transcatheter valve, but due to the location of the coronary ostia and the nature of the valve in place this was felt to be a very poor option.  She now presents for redo sternotomy and redo aortic valve replacement.On 5/10/23  the patient was taken to the Operating Room for the above stated procedure. Please see the previously dictated operative report for complete details. Postoperatively, the patient was taken from the  Operating Room to the ICU where the " "vital signs were monitored and pain was kept under control. The patient was weaned from the drips and extubated in the ICU per protocol. Once hemodynamically stable, the patient was transferred to the Cardiac Step-Down floor for continued strengthening and ambulation. On postoperative day 9, the patient was ready for not discharge to home and will be sent to a [facility] nursing facility to continue to optimize strength and conditioning. At the time of transfer, the patient was unable to ambulating unassisted. Pain was well controlled with oral analgesics and the patient was tolerating the diet."     Today, patient has multiple complaints about her transfer from [facility] an admission to SNF.  I explained why some things did not go according to plan.  Patient states would like to discharge on Wednesday.  She states that she walked 275 ft with therapy today.  Her postoperative pain is now controlled with the initiation of PRN hydrocodone.  Patient has had multiple bowel movements today.  She has a good appetite.  All today's lab review, BUN/creatinine improving." Per Kathleen Randall NP on 5/22/23.     She is doing okay today. Having chest soreness from the surgery and some upper back pain due to the prolonged surgery. She is working well with therapy. She is ready to discharge this AM.     Patient admitted with skilled services with PT and OT to improve functional status and ability to perform ADLs.       Past Medical History:   Past Medical History:   Diagnosis Date    Allergy     seasonal    Anxiety     Arthritis     BRCA1 negative     Breast cancer 10/2012    left breast invasive ductal carcinoma    Colon polyp     Depression     Diabetes mellitus     Type 2    Diabetes mellitus, type 2     Diverticular disease     Diverticulitis 2009    Genetic testing 05/2017    negative Integrated BRACAnalysis    Hyperlipidemia     Hypertension     Morbid obesity     MITCHELL (obstructive sleep apnea)     Pulmonary embolism     Stenosis     " Stenosis and insufficiency of lacrimal passages     Thyroid disease        Past Surgical History:   Past Surgical History:   Procedure Laterality Date    AORTIC VALVE REPLACEMENT N/A 5/10/2023    Procedure: Replacement-valve-aortic, redo sternotomy;  Surgeon: Venkat Webb MD;  Location: [facility] OR [address] [place] [address];  Service: Cardiothoracic;  Laterality: N/A;  Redo sternotomy    BREAST BIOPSY Left 10/01/2012    left breast- invasive ductal carcinoma    BREAST BIOPSY Left 12/2017    BREAST CYST ASPIRATION      BREAST LUMPECTOMY Left 2012    BREAST MASS EXCISION      CARDIAC VALVE REPLACEMENT  12/2013    CARDIAC VALVE SURGERY  2013    CARPAL TUNNEL RELEASE      Left    COLONOSCOPY N/A 09/29/2017    Procedure: COLONOSCOPY;  Surgeon: Phani Worley MD;  Location: [facility] ENDO (4TH FLR);  Service: Endoscopy;  Laterality: N/A;    COLONOSCOPY N/A 1/5/2023    Procedure: COLONOSCOPY;  Surgeon: Eladia Martino MD;  Location: [facility] ENDO (4TH FLR);  Service: Endoscopy;  Laterality: N/A;  instr via portal  ok to hole Eliquis-see encounter 12/9-MS  1/4 pateint cannot come earlier-rt    CORONARY ANGIOGRAPHY Left 4/13/2023    Procedure: ANGIOGRAM, CORONARY ARTERY;  Surgeon: Eze Sales MD;  Location: [facility] CATH LAB;  Service: Cardiology;  Laterality: Left;  low bleeding risk 2.9%    DILATION AND CURETTAGE OF UTERUS  1999    Endometrial polyps    ENDOMETRIAL ABLATION  1999    Enodmetrial polyps    EYE SURGERY      INSERTION OF TUNNELED CENTRAL VENOUS CATHETER (CVC) WITH SUBCUTANEOUS PORT Right 02/01/2019    Procedure: [id number];  Surgeon: Gaston Flores MD;  Location: [address];  Service: General;  Laterality: Right;    left lumpectomy  2012    MASTECTOMY      MEDIPORT REMOVAL Right 08/26/2019    Procedure: REMOVAL, CATHETER, CENTRAL VENOUS, TUNNELED, WITH PORT;  Surgeon: Gaston Flores MD;  Location: [address];  Service: General;  Laterality: Right;    MODIFIED RADICAL MASTECTOMY W/ AXILLARY LYMPH  NODE DISSECTION Right 08/26/2019    Procedure: MASTECTOMY, MODIFIED RADICAL;  Surgeon: Gaston Flores MD;  Location: [facility] OR [address] [place] [address];  Service: General;  Laterality: Right;    SHOULDER SURGERY      SKIN BIOPSY         Social History:   Tobacco Use    Smoking status: Never    Smokeless tobacco: Never   Substance and Sexual Activity    Alcohol use: No     Alcohol/week: 0.0 standard drinks    Drug use: Never    Sexual activity: Yes     Partners: Male       Family History:   Family History       Problem Relation (Age of Onset)    Alcohol abuse Brother    Anxiety disorder Son    Breast cancer Mother    Cancer Father, Maternal Grandfather, Brother    Colon cancer Father    SAL disease Son    Heart disease Father    No Known Problems Sister, Son    Sleep disorder Son            Current Facility-Administered Medications on File Prior to Encounter   Medication    alteplase injection 2 mg    sodium chloride 0.9% flush 10 mL     Current Outpatient Medications on File Prior to Encounter   Medication Sig    aspirin (ECOTRIN) 81 MG EC tablet Take 81 mg by mouth once daily.    blood-glucose meter kit True Test or meter covered by insurance - pt checks glucose twice daily for uncontrolled glucoses E11.65    CHOLECALCIFEROL, VITAMIN D3, (VITAMIN D3 ORAL) Take 3,000 capsules by mouth once daily. 3000 IU per day    ELIQUIS 2.5 mg Tab TAKE ONE TABLET BY MOUTH 2 TIMES A DAY    furosemide (LASIX) 20 MG tablet Take 1 tablet (20 mg total) by mouth once daily.    gabapentin (NEURONTIN) 300 MG capsule TAKE 1 CAPSULE EVERY MORNING AND TAKE 2 CAPSULES AT BEDTIME    glimepiride (AMARYL) 2 MG tablet TAKE 1 TABLET BEFORE BREAKFAST.    lancets (ACCU-CHEK SOFTCLIX LANCETS) Misc TrueTest lancets for meter covered by insurance - pt checks twice daily for uncontrolled glucoses E11.65,    levothyroxine (SYNTHROID) 75 MCG tablet TAKE 1 TABLET BEFORE BREAKFAST    metFORMIN (GLUCOPHAGE) 500 MG tablet TAKE 1 TABLET TWICE DAILY WITH MEALS    pravastatin  (PRAVACHOL) 20 MG tablet TAKE 1 TABLET EVERY DAY    triamcinolone acetonide 0.1%-magnesium hydroxide 400 mg/5 ml-ciclopirox Apply to affected area under the breast twice daily as directed    TRUE METRIX GLUCOSE TEST STRIP Strp TEST BLOOD SUGAR TWICE DAILY    [DISCONTINUED] triamcinolone acetonide 0.1% (KENALOG) 0.1 % cream Apply topically 2 (two) times daily.       Allergies:   Review of patient's allergies indicates:   Allergen Reactions    Augmentin [amoxicillin-pot clavulanate] Diarrhea    Dilaudid [hydromorphone (bulk)] Other (See Comments)     Other reaction(s): sedation    Hydromorphone Other (See Comments)     Other reaction(s): sedation    Cymbalta [duloxetine]      Dry mouth, agitation    Jardiance [empagliflozin] Hives    Byetta [exenatide] Rash     Other reaction(s): Rash       ROS:  Review of Systems   Respiratory:  Negative for cough and shortness of breath.    Cardiovascular:  Positive for chest pain. Negative for leg swelling.   Gastrointestinal:  Negative for constipation and nausea.   Musculoskeletal:  Positive for back pain.   Neurological:  Positive for weakness.   Psychiatric/Behavioral:  Positive for sleep disturbance.        Objective:  Temp:  [97 °F (36.1 °C)-98.4 °F (36.9 °C)]   Pulse:  [68-91]   Resp:  [18]   BP: (122)/(63-68)   SpO2:  [93 %-98 %]     Body mass index is 45.79 kg/m².      Physical Exam  Vitals and nursing note reviewed.   Constitutional:       General: She is not in acute distress.     Appearance: She is well-developed. She is obese.   Pulmonary:      Effort: Pulmonary effort is normal. No respiratory distress.   Chest:      Comments: Sternotomy incision with steri-strips in place  Neurological:      Mental Status: She is alert and oriented to person, place, and time.   Psychiatric:         Mood and Affect: Mood normal.         Behavior: Behavior normal.         Thought Content: Thought content normal.       Significant Labs:   A1C:   Recent Labs   Lab 03/02/23  0851  05/04/23  1309   HGBA1C 7.4* 7.3*     TSH:   Recent Labs   Lab 03/02/23  0851   TSH 2.724     CBC:  Recent Labs   Lab 05/22/23  0418   WBC 6.26   HGB 7.8*   HCT 27.2*      MCV 98     BMP:  Recent Labs   Lab 05/22/23  0418   *      K 4.5      CO2 26   BUN 26*   CREATININE 1.0   CALCIUM 9.9   MG 2.1   PHOS 4.6*         Significant Imaging: I have reviewed all pertinent imaging results/findings completed during prior hospitalization.      Assessment and Plan:  Active Diagnoses:    Diagnosis Date Noted POA    PRINCIPAL PROBLEM:  Prosthetic valve dysfunction [T82.09XA] 04/27/2023 Yes    Impaired mobility [Z74.09] 05/16/2023 Yes    Acute blood loss anemia [D62] 05/12/2023 Yes    Morbid (severe) obesity due to excess calories [E66.01] 02/19/2023 Yes    Diabetes mellitus due to underlying condition with stage 3 chronic kidney disease, without long-term current use of insulin [E08.22, N18.30] 09/12/2017 Yes    Chronic kidney disease, stage III (moderate) [N18.30] 09/12/2017 Yes    S/P AVR [Z95.2] 12/02/2013 Not Applicable    Essential hypertension [I10] 07/06/2012 Yes    Hyperlipidemia, mixed [E78.2] 07/06/2012 Yes      Problems Resolved During this Admission:       Severe prosthetic aortic stenosis  S/P AVR  - continue ASA 81 mg daily, pravastatin 20 mg daily, metoprolol 25 mg BID, apixaban 2.5 mg BID  - Lasix 20 mg daily along with potassium 20 mEq BID  - wash incisional site daily with soap and water, do not allow shower pressure to directly hit incision  - follow-up with CTS at 3 weeks postop.  - Continue PT/OT, sternal precautions  - Continue Norco 5/325 q.6 hours PRN, acetaminophen 650 mg q.6 hours PRN     Essential hypertension  - home regimen with carvedilol 12.5 mg BID  - BP stable off meds at this time     Diabetes mellitus type 2 in obese  - last A1c 7.3 on 05/04/2023  - Accu-Cheks AC/HS, diabetic diet  - home regimen with glimepiride 2 mg daily, metformin 500 mg BID  - Continue detemir  16 units daily, aspart 5 units TID WM, LD SSI PRN     Chronic kidney disease, stage III (moderate)  - Continue to monitor twice weekly BMPs, avoid nephrotoxic agents, renally dose medications when appropriate     Acute blood loss anemia  - Expected post operatively   - continue monitor twice weekly CBCs      History of pulmonary embolism  - continue home Eliquis      Severe obesity with comorbidity  - Body mass index is 44.88 kg/m².. Obesity complicates all aspects of disease management from diagnostic modalities to treatment. Weight loss encouraged and health benefits explained to patient.         Acquired hypothyroidism  - continue levothyroxine 75 mcg daily     Obstructive sleep apnea  - CPAP at night      Hyperlipidemia, mixed  - continue pravastatin 20 mg daily     Chronic pain  -  right shoulder, axilla, and right proximal arm.  - continue gabapentin 300 mg TID      Anticipated Disposition:  Home with home health      Future Appointments   Date Time Provider Department Center   6/2/2023 10:30 AM Aarti Dunn MD [facility]   6/13/2023  9:30 AM EKG, APPT [facility] EKG [facility]   6/13/2023  9:45 AM [facility] [id number] LIMIT [facility] XRAY OP [facility]   6/13/2023 10:00 AM Venkat Webb MD [facility] CARDVAS [facility]   6/13/2023  2:30 PM ECHO, [facility] ECHOSTR [facility]   7/24/2023  2:30 PM Shalini Maher MD [facility] BARIAT [facility]   8/22/2023 10:20 AM Erick Dwyer MD [facility] [id number] [facility]       I certify that SNF services are required to be given on an inpatient basis because Alina Narayan needs for skilled nursing care and/or skilled rehabilitation are required on a daily basis and such services can only practically be provided in a skilled nursing facility setting and are for an ongoing condition for which she received inpatient care in the hospital.       Greg Osborne MD  Department of Hospital Medicine   [facility] - Skilled Nursing

## 2023-05-24 NOTE — DISCHARGE SUMMARY
[facility] Medicine  Discharge Summary      Patient Name: Alina Narayan  MRN: [id number]  [facility]: [id number]  Patient Class: IP- SNF  Admission Date: 5/19/2023  Hospital Length of Stay: 5 days  Discharge Date and Time: 5/24/2023 12:40 PM  Attending Physician: Carlie att. providers found   Discharging Provider: Kathleen Randall NP  Primary Care Provider: Aarti Dunn MD    Primary Care Team: [facility] 8 KATHLEEN RANDALL     HPI:   Ms. Narayan is a 78-year-old female with PMHx of diabetes type 2, depression, anxiety, HTN, HLD, morbid obesity, MITCHELL, pulmonary embolism thyroid disease and aortic valve replacement in 2013 who presents to SNF following hospitalization for Severe prosthetic aortic stenosis s/p Aortic valve re-replacement with a small Corcym Perceval bovine pericardial bioprosthesis with Redo sternotomy on 5/10 with Dr. Webb.  Admission to SNF for secondary weakness and debility.     Patient originally presented to CTS clinic for evaluation.  Per [facility] notes, patient also undergone treatment for breast cancer including resection with chest irradiation.  Around the time of treatment for her breast cancer, she had a pulmonary embolus.  Unfortunately, over the past several years she is had progressive dyspnea on exertion.  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  She was evaluated for transcatheter valve, but due to the location of the coronary ostia and the nature of the valve in place this was felt to be a very poor option.  She now presents for redo sternotomy and redo aortic valve replacement.On 5/10/23  the patient was taken to the Operating Room for the above stated procedure. Please see the previously dictated operative report for complete details. Postoperatively, the patient was taken from the  Operating Room to the ICU where the vital signs were monitored and pain was kept under control. The patient was weaned from the drips and  "extubated in the ICU per protocol. Once hemodynamically stable, the patient was transferred to the Cardiac Step-Down floor for continued strengthening and ambulation. On postoperative day 9, the patient was ready for not discharge to home and will be sent to a skill nursing facility to continue to optimize strength and conditioning. At the time of transfer, the patient was unable to ambulating unassisted. Pain was well controlled with oral analgesics and the patient was tolerating the diet."     Today, patient has multiple complaints about her transfer from [facility] an admission to SNF.  I explained why some things did not go according to plan.  Patient states would like to discharge on Wednesday.  She states that she walked 275 ft with therapy today.  Her postoperative pain is now controlled with the initiation of PRN hydrocodone.  Patient has had multiple bowel movements today.  She has a good appetite.  All today's lab review, BUN/creatinine improving.     Patient will be treated at [facility] with PT and OT to improve functional status and ability to perform ADLs.     Hospital Course:   Patient progressed well with PT and OT- last PT note states that patient ambulated ~80ft x 2 trials with pt providing SBA while pt's spouse provided HHA for safety. Pt with no LOB episodes. Patient had no significant events during their stay at SNF.  Patient's pain was well controlled at time of discharge.  Patient elected to discharge early from SNF, she did not wish to stay a full course.  Home health was set up. DME was ordered if needed. Follow up appointment to be made by patient within one week. All prescriptions and discharge instructions were ordered to be given to the patient prior to discharge.     PEx  Constitutional: Patient appears debilitated.  No distress noted  HENT:   Head: Normocephalic and atraumatic.   Eyes: Pupils are equal, round  Neck: Normal range of motion. Neck supple.   Cardiovascular: Normal rate, regular " rhythm and normal heart sounds.    Pulmonary/Chest: Effort normal and breath sounds are clear  Abdominal: Soft. Bowel sounds are normal.   Musculoskeletal: Normal range of motion.   Neurological: Alert and oriented to person, place, and time.   Psychiatric: Normal mood and affect. Behavior is normal.   Skin: Skin is warm and dry.   Midsternal incision with Steri-Strips, fully approximated, no drainage noted, no signs of infection.  4 chest tube sites to lower chest wall, not approximated, serosanguineous drainage, drying up, look better today           Goals of Care Treatment Preferences:  Code Status: Full Code    Living Will: Yes              Consults:   Consults (From admission, onward)        Status Ordering Provider     Inpatient consult to Registered Dietitian/Nutritionist  Once        Provider:  (Not yet assigned)    KARIN Hsieh new Assessment & Plan notes have been filed under this hospital service since the last note was generated.  Service: Hospital Medicine    Final Active Diagnoses:    Diagnosis Date Noted POA    PRINCIPAL PROBLEM:  Prosthetic valve dysfunction [T82.09XA] 04/27/2023 Yes    Impaired mobility [Z74.09] 05/16/2023 Yes    Acute blood loss anemia [D62] 05/12/2023 Yes    Morbid (severe) obesity due to excess calories [E66.01] 02/19/2023 Yes    Diabetes mellitus due to underlying condition with stage 3 chronic kidney disease, without long-term current use of insulin [E08.22, N18.30] 09/12/2017 Yes    Chronic kidney disease, stage III (moderate) [N18.30] 09/12/2017 Yes    S/P AVR [Z95.2] 12/02/2013 Not Applicable    Essential hypertension [I10] 07/06/2012 Yes    Hyperlipidemia, mixed [E78.2] 07/06/2012 Yes      Problems Resolved During this Admission:       Discharged Condition: good    Disposition: Home or Self Care    Follow Up:    Patient Instructions:      No driving until:   Order Comments: Cleared by PCP     Notify your health care provider if you  experience any of the following:  temperature >100.4     Notify your health care provider if you experience any of the following:  persistent nausea and vomiting or diarrhea     Notify your health care provider if you experience any of the following:  severe uncontrolled pain     Notify your health care provider if you experience any of the following:  redness, tenderness, or signs of infection (pain, swelling, redness, odor or green/yellow discharge around incision site)     Notify your health care provider if you experience any of the following:  difficulty breathing or increased cough     Notify your health care provider if you experience any of the following:  severe persistent headache     Notify your health care provider if you experience any of the following:  worsening rash     Notify your health care provider if you experience any of the following:  persistent dizziness, light-headedness, or visual disturbances     Notify your health care provider if you experience any of the following:  increased confusion or weakness     Activity as tolerated       Significant Diagnostic Studies: N/A    Pending Diagnostic Studies:     None         Medications:  Reconciled Home Medications:      Medication List      START taking these medications    acetaminophen 325 MG tablet  Commonly known as: TYLENOL  Take 2 tablets (650 mg total) by mouth every 6 (six) hours as needed for Pain.     metoprolol tartrate 25 MG tablet  Commonly known as: LOPRESSOR  Take 1 tablet (25 mg total) by mouth 2 (two) times daily.        CONTINUE taking these medications    aspirin 81 MG EC tablet  Commonly known as: ECOTRIN  Take 81 mg by mouth once daily.     blood-glucose meter kit  True Test or meter covered by insurance - pt checks glucose twice daily for uncontrolled glucoses E11.65     ELIQUIS 2.5 mg Tab  Generic drug: apixaban  TAKE ONE TABLET BY MOUTH 2 TIMES A DAY     furosemide 20 MG tablet  Commonly known as: LASIX  Take 1 tablet (20  mg total) by mouth once daily.     gabapentin 300 MG capsule  Commonly known as: NEURONTIN  TAKE 1 CAPSULE EVERY MORNING AND TAKE 2 CAPSULES AT BEDTIME     glimepiride 2 MG tablet  Commonly known as: AMARYL  TAKE 1 TABLET BEFORE BREAKFAST.     HYDROcodone-acetaminophen 5-325 mg per tablet  Commonly known as: NORCO  Take 1 tablet by mouth every 6 (six) hours as needed for Pain.     lancets Misc  Commonly known as: ACCU-CHEK SOFTCLIX LANCETS  TrueTest lancets for meter covered by insurance - pt checks twice daily for uncontrolled glucoses E11.65,     levothyroxine 75 MCG tablet  Commonly known as: SYNTHROID  TAKE 1 TABLET BEFORE BREAKFAST     metFORMIN 500 MG tablet  Commonly known as: GLUCOPHAGE  TAKE 1 TABLET TWICE DAILY WITH MEALS     pravastatin 20 MG tablet  Commonly known as: PRAVACHOL  TAKE 1 TABLET EVERY DAY     triamcinolone acetonide 0.1%-magnesium hydroxide 400 mg/5 ml-ciclopirox  Apply to affected area under the breast twice daily as directed     TRUE METRIX GLUCOSE TEST STRIP Strp  Generic drug: blood sugar diagnostic  TEST BLOOD SUGAR TWICE DAILY     VITAMIN D3 ORAL  Take 3,000 capsules by mouth once daily. 3000 IU per day        STOP taking these medications    carvediloL 25 MG tablet  Commonly known as: COREG     hydrocortisone 2.5 % cream     ketoconazole 2 % cream  Commonly known as: NIZORAL     mupirocin 2 % ointment  Commonly known as: BACTROBAN     tiZANidine 4 MG tablet  Commonly known as: ZANAFLEX            Indwelling Lines/Drains at time of discharge:   Lines/Drains/Airways     Drain  Duration           Female External Urinary Catheter 05/13/23 1200 11 days                Time spent on the discharge of patient: 38 minutes         Kathleen Randall NP  Department of [place] [facility]  [facility] - Skilled Nursing

## 2023-05-25 ENCOUNTER — PATIENT OUTREACH (OUTPATIENT)
Dept: ADMINISTRATIVE | Facility: CLINIC | Age: 78
End: 2023-05-25
Payer: MEDICARE

## 2023-05-25 PROCEDURE — G0180 MD CERTIFICATION HHA PATIENT: HCPCS | Mod: ,,, | Performed by: HOSPITALIST

## 2023-05-25 PROCEDURE — G0180 PR HOME HEALTH MD CERTIFICATION: ICD-10-PCS | Mod: ,,, | Performed by: HOSPITALIST

## 2023-05-25 NOTE — PROGRESS NOTES
C3 nurse spoke with Alina Narayan she is with PT and request call back in an hour    for a TCC post hospital discharge follow up call. The patient has a scheduled HOSFU appointment with Aarti Dunn MD   on 06/02/2023 @ 10:30AM .

## 2023-05-25 NOTE — PROGRESS NOTES
C3 nurse spoke with Alina Narayan   for a TCC post hospital discharge follow up call. The patient has a scheduled HOSFU appointment with Aarti Dunn MD   on 06/02/2023 @ 10:30AM.

## 2023-05-26 ENCOUNTER — TELEPHONE (OUTPATIENT)
Dept: INTERNAL MEDICINE | Facility: CLINIC | Age: 78
End: 2023-05-26
Payer: MEDICARE

## 2023-05-26 NOTE — TELEPHONE ENCOUNTER
----- Message from Raoul Us sent at 5/25/2023  2:40 PM CDT -----  Contact: victor hugo [phone]  Victor Hugo from [facility] home health nurse stated pt complaining of uti symptoms burn when urinating.    Please call and advise

## 2023-06-02 ENCOUNTER — LAB VISIT (OUTPATIENT)
Dept: LAB | Facility: HOSPITAL | Age: 78
End: 2023-06-02
Attending: INTERNAL MEDICINE
Payer: MEDICARE

## 2023-06-02 ENCOUNTER — OFFICE VISIT (OUTPATIENT)
Dept: INTERNAL MEDICINE | Facility: CLINIC | Age: 78
End: 2023-06-02
Payer: MEDICARE

## 2023-06-02 VITALS
HEART RATE: 85 BPM | HEIGHT: 62 IN | WEIGHT: 238.75 LBS | SYSTOLIC BLOOD PRESSURE: 130 MMHG | OXYGEN SATURATION: 98 % | DIASTOLIC BLOOD PRESSURE: 70 MMHG | BODY MASS INDEX: 43.94 KG/M2

## 2023-06-02 DIAGNOSIS — G56.22 NEUROPATHY, ULNAR AT ELBOW, LEFT: Primary | ICD-10-CM

## 2023-06-02 DIAGNOSIS — E66.9 DIABETES MELLITUS TYPE 2 IN OBESE: ICD-10-CM

## 2023-06-02 DIAGNOSIS — I35.0 NONRHEUMATIC AORTIC VALVE STENOSIS: ICD-10-CM

## 2023-06-02 DIAGNOSIS — D64.9 ANEMIA, UNSPECIFIED TYPE: ICD-10-CM

## 2023-06-02 DIAGNOSIS — E11.69 DIABETES MELLITUS TYPE 2 IN OBESE: ICD-10-CM

## 2023-06-02 LAB
ALBUMIN SERPL BCP-MCNC: 3.9 G/DL (ref 3.5–5.2)
ALP SERPL-CCNC: 125 U/L (ref 55–135)
ALT SERPL W/O P-5'-P-CCNC: 10 U/L (ref 10–44)
ANION GAP SERPL CALC-SCNC: 8 MMOL/L (ref 8–16)
AST SERPL-CCNC: 15 U/L (ref 10–40)
BASOPHILS # BLD AUTO: 0.03 K/UL (ref 0–0.2)
BASOPHILS NFR BLD: 0.6 % (ref 0–1.9)
BILIRUB SERPL-MCNC: 0.5 MG/DL (ref 0.1–1)
BUN SERPL-MCNC: 20 MG/DL (ref 8–23)
CALCIUM SERPL-MCNC: 9.8 MG/DL (ref 8.7–10.5)
CHLORIDE SERPL-SCNC: 104 MMOL/L (ref 95–110)
CO2 SERPL-SCNC: 28 MMOL/L (ref 23–29)
CREAT SERPL-MCNC: 1.1 MG/DL (ref 0.5–1.4)
DIFFERENTIAL METHOD: ABNORMAL
EOSINOPHIL # BLD AUTO: 0.1 K/UL (ref 0–0.5)
EOSINOPHIL NFR BLD: 2.3 % (ref 0–8)
ERYTHROCYTE [DISTWIDTH] IN BLOOD BY AUTOMATED COUNT: 14.4 % (ref 11.5–14.5)
EST. GFR  (NO RACE VARIABLE): 51.4 ML/MIN/1.73 M^2
FERRITIN SERPL-MCNC: 123 NG/ML (ref 20–300)
GLUCOSE SERPL-MCNC: 85 MG/DL (ref 70–110)
HCT VFR BLD AUTO: 32.6 % (ref 37–48.5)
HGB BLD-MCNC: 9.5 G/DL (ref 12–16)
IMM GRANULOCYTES # BLD AUTO: 0.02 K/UL (ref 0–0.04)
IMM GRANULOCYTES NFR BLD AUTO: 0.4 % (ref 0–0.5)
INR PPP: 1 (ref 0.8–1.2)
IRON SERPL-MCNC: 50 UG/DL (ref 30–160)
LYMPHOCYTES # BLD AUTO: 1.2 K/UL (ref 1–4.8)
LYMPHOCYTES NFR BLD: 24 % (ref 18–48)
MCH RBC QN AUTO: 28 PG (ref 27–31)
MCHC RBC AUTO-ENTMCNC: 29.1 G/DL (ref 32–36)
MCV RBC AUTO: 96 FL (ref 82–98)
MONOCYTES # BLD AUTO: 0.5 K/UL (ref 0.3–1)
MONOCYTES NFR BLD: 10.3 % (ref 4–15)
NEUTROPHILS # BLD AUTO: 3 K/UL (ref 1.8–7.7)
NEUTROPHILS NFR BLD: 62.4 % (ref 38–73)
NRBC BLD-RTO: 0 /100 WBC
PLATELET # BLD AUTO: 167 K/UL (ref 150–450)
PMV BLD AUTO: 11.2 FL (ref 9.2–12.9)
POTASSIUM SERPL-SCNC: 3.9 MMOL/L (ref 3.5–5.1)
PROT SERPL-MCNC: 7.2 G/DL (ref 6–8.4)
PROTHROMBIN TIME: 11.2 SEC (ref 9–12.5)
RBC # BLD AUTO: 3.39 M/UL (ref 4–5.4)
SATURATED IRON: 15 % (ref 20–50)
SODIUM SERPL-SCNC: 140 MMOL/L (ref 136–145)
TOTAL IRON BINDING CAPACITY: 336 UG/DL (ref 250–450)
TRANSFERRIN SERPL-MCNC: 227 MG/DL (ref 200–375)
WBC # BLD AUTO: 4.87 K/UL (ref 3.9–12.7)

## 2023-06-02 PROCEDURE — 85610 PROTHROMBIN TIME: CPT | Performed by: INTERNAL MEDICINE

## 2023-06-02 PROCEDURE — 1101F PT FALLS ASSESS-DOCD LE1/YR: CPT | Mod: CPTII,S$GLB,, | Performed by: INTERNAL MEDICINE

## 2023-06-02 PROCEDURE — 3288F FALL RISK ASSESSMENT DOCD: CPT | Mod: CPTII,S$GLB,, | Performed by: INTERNAL MEDICINE

## 2023-06-02 PROCEDURE — 3075F PR MOST RECENT SYSTOLIC BLOOD PRESS GE 130-139MM HG: ICD-10-PCS | Mod: CPTII,S$GLB,, | Performed by: INTERNAL MEDICINE

## 2023-06-02 PROCEDURE — 3078F PR MOST RECENT DIASTOLIC BLOOD PRESSURE < 80 MM HG: ICD-10-PCS | Mod: CPTII,S$GLB,, | Performed by: INTERNAL MEDICINE

## 2023-06-02 PROCEDURE — 99495 TRANSJ CARE MGMT MOD F2F 14D: CPT | Mod: S$GLB,,, | Performed by: INTERNAL MEDICINE

## 2023-06-02 PROCEDURE — 3078F DIAST BP <80 MM HG: CPT | Mod: CPTII,S$GLB,, | Performed by: INTERNAL MEDICINE

## 2023-06-02 PROCEDURE — 82728 ASSAY OF FERRITIN: CPT | Performed by: INTERNAL MEDICINE

## 2023-06-02 PROCEDURE — 80053 COMPREHEN METABOLIC PANEL: CPT | Performed by: INTERNAL MEDICINE

## 2023-06-02 PROCEDURE — 1157F PR ADVANCE CARE PLAN OR EQUIV PRESENT IN MEDICAL RECORD: ICD-10-PCS | Mod: CPTII,S$GLB,, | Performed by: INTERNAL MEDICINE

## 2023-06-02 PROCEDURE — 1125F PR PAIN SEVERITY QUANTIFIED, PAIN PRESENT: ICD-10-PCS | Mod: CPTII,S$GLB,, | Performed by: INTERNAL MEDICINE

## 2023-06-02 PROCEDURE — 85025 COMPLETE CBC W/AUTO DIFF WBC: CPT | Performed by: INTERNAL MEDICINE

## 2023-06-02 PROCEDURE — 1157F ADVNC CARE PLAN IN RCRD: CPT | Mod: CPTII,S$GLB,, | Performed by: INTERNAL MEDICINE

## 2023-06-02 PROCEDURE — 99495 TCM SERVICES (MODERATE COMPLEXITY): ICD-10-PCS | Mod: S$GLB,,, | Performed by: INTERNAL MEDICINE

## 2023-06-02 PROCEDURE — 1101F PR PT FALLS ASSESS DOC 0-1 FALLS W/OUT INJ PAST YR: ICD-10-PCS | Mod: CPTII,S$GLB,, | Performed by: INTERNAL MEDICINE

## 2023-06-02 PROCEDURE — 3288F PR FALLS RISK ASSESSMENT DOCUMENTED: ICD-10-PCS | Mod: CPTII,S$GLB,, | Performed by: INTERNAL MEDICINE

## 2023-06-02 PROCEDURE — 36415 COLL VENOUS BLD VENIPUNCTURE: CPT | Performed by: INTERNAL MEDICINE

## 2023-06-02 PROCEDURE — 99999 PR PBB SHADOW E&M-EST. PATIENT-LVL IV: CPT | Mod: PBBFAC,,, | Performed by: INTERNAL MEDICINE

## 2023-06-02 PROCEDURE — 84466 ASSAY OF TRANSFERRIN: CPT | Performed by: INTERNAL MEDICINE

## 2023-06-02 PROCEDURE — 1125F AMNT PAIN NOTED PAIN PRSNT: CPT | Mod: CPTII,S$GLB,, | Performed by: INTERNAL MEDICINE

## 2023-06-02 PROCEDURE — 99999 PR PBB SHADOW E&M-EST. PATIENT-LVL IV: ICD-10-PCS | Mod: PBBFAC,,, | Performed by: INTERNAL MEDICINE

## 2023-06-02 PROCEDURE — 3075F SYST BP GE 130 - 139MM HG: CPT | Mod: CPTII,S$GLB,, | Performed by: INTERNAL MEDICINE

## 2023-06-02 NOTE — PROGRESS NOTES
Subjective:       Patient ID: Alina Narayan is a 78 y.o. female.    Chief Complaint: Hospital Follow Up    HPIPt doing well s/p redo aortic valve replacement.  No CP or SOB. Has some numbness of 4th and 5th fingers on the left since surgery.    Review of Systems   Respiratory:  Negative for shortness of breath (PND or orthopnea).    Cardiovascular:  Negative for chest pain (arm pain or jaw pain).   Gastrointestinal:  Negative for abdominal pain, diarrhea, nausea and vomiting.   Genitourinary:  Negative for dysuria.   Neurological:  Negative for seizures, syncope and headaches.     Objective:      Physical Exam  Constitutional:       General: She is not in acute distress.     Appearance: She is well-developed.   HENT:      Head: Normocephalic.   Eyes:      Pupils: Pupils are equal, round, and reactive to light.   Neck:      Thyroid: No thyromegaly.      Vascular: No JVD.   Cardiovascular:      Rate and Rhythm: Normal rate and regular rhythm.      Heart sounds: Normal heart sounds. No murmur heard.    No friction rub. No gallop.   Pulmonary:      Effort: Pulmonary effort is normal.      Breath sounds: Normal breath sounds. No wheezing or rales.   Abdominal:      General: Bowel sounds are normal. There is no distension.      Palpations: Abdomen is soft. There is no mass.      Tenderness: There is no abdominal tenderness. There is no guarding or rebound.   Musculoskeletal:      Cervical back: Neck supple.   Lymphadenopathy:      Cervical: No cervical adenopathy.   Skin:     General: Skin is warm and dry.   Neurological:      Mental Status: She is alert and oriented to person, place, and time.      Deep Tendon Reflexes: Reflexes are normal and symmetric.   Psychiatric:         Behavior: Behavior normal.         Thought Content: Thought content normal.         Judgment: Judgment normal.       Assessment:       1. 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 Anemia, unspecified type    3. 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  No.  Diagnostic tests reviewed/disposition: I have reviewed all completed as well as pending diagnostic tests at the time of discharge.  Disease/illness education: yes  Home health/community services discussion/referrals: Patient has home health established at [facility] .   Establishment or re-establishment of referral orders for community resources:  reordered home health with PT .   Discussion with other health care providers: No discussion with other health care providers necessary.

## 2023-06-06 ENCOUNTER — TELEPHONE (OUTPATIENT)
Dept: FAMILY MEDICINE | Facility: CLINIC | Age: 78
End: 2023-06-06
Payer: MEDICARE

## 2023-06-10 ENCOUNTER — EXTERNAL HOME HEALTH (OUTPATIENT)
Dept: HOME HEALTH SERVICES | Facility: HOSPITAL | Age: 78
End: 2023-06-10
Payer: MEDICARE

## 2023-06-13 ENCOUNTER — HOSPITAL ENCOUNTER (OUTPATIENT)
Dept: RADIOLOGY | Facility: HOSPITAL | Age: 78
Discharge: HOME | End: 2023-06-13
Attending: THORACIC SURGERY (CARDIOTHORACIC VASCULAR SURGERY)
Payer: MEDICARE

## 2023-06-13 ENCOUNTER — PATIENT MESSAGE (OUTPATIENT)
Dept: CARDIOLOGY | Facility: CLINIC | Age: 78
End: 2023-06-13
Payer: MEDICARE

## 2023-06-13 ENCOUNTER — OFFICE VISIT (OUTPATIENT)
Dept: CARDIOTHORACIC SURGERY | Facility: CLINIC | Age: 78
End: 2023-06-13
Payer: MEDICARE

## 2023-06-13 ENCOUNTER — HOSPITAL ENCOUNTER (OUTPATIENT)
Dept: CARDIOLOGY | Facility: HOSPITAL | Age: 78
Discharge: HOME | End: 2023-06-13
Attending: THORACIC SURGERY (CARDIOTHORACIC VASCULAR SURGERY)
Payer: MEDICARE

## 2023-06-13 ENCOUNTER — RESEARCH ENCOUNTER (OUTPATIENT)
Dept: RESEARCH | Facility: HOSPITAL | Age: 78
End: 2023-06-13
Payer: MEDICARE

## 2023-06-13 ENCOUNTER — HOSPITAL ENCOUNTER (OUTPATIENT)
Dept: CARDIOLOGY | Facility: CLINIC | Age: 78
Discharge: HOME | End: 2023-06-13
Payer: MEDICARE

## 2023-06-13 VITALS
BODY MASS INDEX: 43.79 KG/M2 | DIASTOLIC BLOOD PRESSURE: 60 MMHG | SYSTOLIC BLOOD PRESSURE: 136 MMHG | HEIGHT: 62 IN | WEIGHT: 238 LBS | HEART RATE: 70 BPM

## 2023-06-13 VITALS
BODY MASS INDEX: 43.67 KG/M2 | HEART RATE: 86 BPM | OXYGEN SATURATION: 99 % | RESPIRATION RATE: 18 BRPM | SYSTOLIC BLOOD PRESSURE: 136 MMHG | DIASTOLIC BLOOD PRESSURE: 60 MMHG | HEIGHT: 62 IN

## 2023-06-13 DIAGNOSIS — Z00.6 RESEARCH SUBJECT: ICD-10-CM

## 2023-06-13 DIAGNOSIS — Z00.6 RESEARCH STUDY PATIENT: ICD-10-CM

## 2023-06-13 DIAGNOSIS — Z95.2 S/P AVR (AORTIC VALVE REPLACEMENT): ICD-10-CM

## 2023-06-13 DIAGNOSIS — Z95.2 S/P AVR: Primary | ICD-10-CM

## 2023-06-13 LAB
ASCENDING AORTA: 3.07 CM
AV INDEX (PROSTH): 0.97
AV MEAN GRADIENT: 7 MMHG
AV PEAK GRADIENT: 12 MMHG
AV VELOCITY RATIO: 0.83
BSA FOR ECHO PROCEDURE: 2.17 M2
CV ECHO LV RWT: 0.43 CM
DOP CALC AO PEAK VEL: 1.76 M/S
DOP CALC AO VTI: 34.73 CM
DOP CALC LVOT PEAK VEL: 1.46 M/S
DOP CALCLVOT PEAK VEL VTI: 33.78 CM
E/E' RATIO: 14.38 M/S
ECHO LV POSTERIOR WALL: 0.89 CM (ref 0.6–1.1)
EJECTION FRACTION: 55 %
FRACTIONAL SHORTENING: 34 % (ref 28–44)
INTERVENTRICULAR SEPTUM: 0.99 CM (ref 0.6–1.1)
IVRT: 71.36 MSEC
LA MAJOR: 4.96 CM
LA MINOR: 4.66 CM
LA WIDTH: 4.04 CM
LEFT ATRIUM SIZE: 4 CM
LEFT ATRIUM VOLUME INDEX MOD: 30.9 ML/M2
LEFT ATRIUM VOLUME INDEX: 32 ML/M2
LEFT ATRIUM VOLUME MOD: 63.68 CM3
LEFT ATRIUM VOLUME: 66.01 CM3
LEFT INTERNAL DIMENSION IN SYSTOLE: 2.73 CM (ref 2.1–4)
LEFT VENTRICLE DIASTOLIC VOLUME INDEX: 37.38 ML/M2
LEFT VENTRICLE DIASTOLIC VOLUME: 77 ML
LEFT VENTRICLE MASS INDEX: 60 G/M2
LEFT VENTRICLE SYSTOLIC VOLUME INDEX: 13.5 ML/M2
LEFT VENTRICLE SYSTOLIC VOLUME: 27.88 ML
LEFT VENTRICULAR INTERNAL DIMENSION IN DIASTOLE: 4.16 CM (ref 3.5–6)
LEFT VENTRICULAR MASS: 124.03 G
LV LATERAL E/E' RATIO: 14.38 M/S
LV SEPTAL E/E' RATIO: 14.38 M/S
MV A" WAVE DURATION": 15.13 MSEC
MV PEAK E VEL: 1.15 M/S
PISA TR MAX VEL: 2.5 M/S
PULM VEIN S/D RATIO: 1.79
PV PEAK D VEL: 0.33 M/S
PV PEAK S VEL: 0.59 M/S
RA MAJOR: 4.74 CM
RA PRESSURE: 3 MMHG
RA WIDTH: 3.11 CM
RIGHT VENTRICULAR END-DIASTOLIC DIMENSION: 3.29 CM
SINUS: 3.34 CM
STJ: 2.91 CM
TDI LATERAL: 0.08 M/S
TDI SEPTAL: 0.08 M/S
TDI: 0.08 M/S
TR MAX PG: 25 MMHG
TRICUSPID ANNULAR PLANE SYSTOLIC EXCURSION: 1.51 CM
TV REST PULMONARY ARTERY PRESSURE: 28 MMHG

## 2023-06-13 PROCEDURE — 1126F PR PAIN SEVERITY QUANTIFIED, NO PAIN PRESENT: ICD-10-PCS | Mod: CPTII,S$GLB,, | Performed by: THORACIC SURGERY (CARDIOTHORACIC VASCULAR SURGERY)

## 2023-06-13 PROCEDURE — 71046 X-RAY EXAM CHEST 2 VIEWS: CPT | Mod: 26,,, | Performed by: RADIOLOGY

## 2023-06-13 PROCEDURE — 99999 PR PBB SHADOW E&M-EST. PATIENT-LVL IV: CPT | Mod: PBBFAC,,, | Performed by: THORACIC SURGERY (CARDIOTHORACIC VASCULAR SURGERY)

## 2023-06-13 PROCEDURE — 71046 XR CHEST PA AND LATERAL: ICD-10-PCS | Mod: 26,,, | Performed by: RADIOLOGY

## 2023-06-13 PROCEDURE — 93306 TTE W/DOPPLER COMPLETE: CPT

## 2023-06-13 PROCEDURE — 3075F PR MOST RECENT SYSTOLIC BLOOD PRESS GE 130-139MM HG: ICD-10-PCS | Mod: CPTII,S$GLB,, | Performed by: THORACIC SURGERY (CARDIOTHORACIC VASCULAR SURGERY)

## 2023-06-13 PROCEDURE — 1126F AMNT PAIN NOTED NONE PRSNT: CPT | Mod: CPTII,S$GLB,, | Performed by: THORACIC SURGERY (CARDIOTHORACIC VASCULAR SURGERY)

## 2023-06-13 PROCEDURE — 99024 PR POST-OP FOLLOW-UP VISIT: ICD-10-PCS | Mod: S$GLB,,, | Performed by: THORACIC SURGERY (CARDIOTHORACIC VASCULAR SURGERY)

## 2023-06-13 PROCEDURE — 99024 POSTOP FOLLOW-UP VISIT: CPT | Mod: S$GLB,,, | Performed by: THORACIC SURGERY (CARDIOTHORACIC VASCULAR SURGERY)

## 2023-06-13 PROCEDURE — 99999 PR PBB SHADOW E&M-EST. PATIENT-LVL IV: ICD-10-PCS | Mod: PBBFAC,,, | Performed by: THORACIC SURGERY (CARDIOTHORACIC VASCULAR SURGERY)

## 2023-06-13 PROCEDURE — 1159F MED LIST DOCD IN RCRD: CPT | Mod: CPTII,S$GLB,, | Performed by: THORACIC SURGERY (CARDIOTHORACIC VASCULAR SURGERY)

## 2023-06-13 PROCEDURE — 93306 TTE W/DOPPLER COMPLETE: CPT | Mod: 26,,, | Performed by: INTERNAL MEDICINE

## 2023-06-13 PROCEDURE — 93005 ELECTROCARDIOGRAM TRACING: CPT | Mod: S$GLB,,, | Performed by: THORACIC SURGERY (CARDIOTHORACIC VASCULAR SURGERY)

## 2023-06-13 PROCEDURE — 3078F DIAST BP <80 MM HG: CPT | Mod: CPTII,S$GLB,, | Performed by: THORACIC SURGERY (CARDIOTHORACIC VASCULAR SURGERY)

## 2023-06-13 PROCEDURE — 93010 EKG 12-LEAD: ICD-10-PCS | Mod: S$GLB,,, | Performed by: INTERNAL MEDICINE

## 2023-06-13 PROCEDURE — 3075F SYST BP GE 130 - 139MM HG: CPT | Mod: CPTII,S$GLB,, | Performed by: THORACIC SURGERY (CARDIOTHORACIC VASCULAR SURGERY)

## 2023-06-13 PROCEDURE — 1157F ADVNC CARE PLAN IN RCRD: CPT | Mod: CPTII,S$GLB,, | Performed by: THORACIC SURGERY (CARDIOTHORACIC VASCULAR SURGERY)

## 2023-06-13 PROCEDURE — 1157F PR ADVANCE CARE PLAN OR EQUIV PRESENT IN MEDICAL RECORD: ICD-10-PCS | Mod: CPTII,S$GLB,, | Performed by: THORACIC SURGERY (CARDIOTHORACIC VASCULAR SURGERY)

## 2023-06-13 PROCEDURE — 93010 ELECTROCARDIOGRAM REPORT: CPT | Mod: S$GLB,,, | Performed by: INTERNAL MEDICINE

## 2023-06-13 PROCEDURE — 93005 EKG 12-LEAD: ICD-10-PCS | Mod: S$GLB,,, | Performed by: THORACIC SURGERY (CARDIOTHORACIC VASCULAR SURGERY)

## 2023-06-13 PROCEDURE — 3078F PR MOST RECENT DIASTOLIC BLOOD PRESSURE < 80 MM HG: ICD-10-PCS | Mod: CPTII,S$GLB,, | Performed by: THORACIC SURGERY (CARDIOTHORACIC VASCULAR SURGERY)

## 2023-06-13 PROCEDURE — 71046 X-RAY EXAM CHEST 2 VIEWS: CPT | Mod: TC,FY

## 2023-06-13 PROCEDURE — 1159F PR MEDICATION LIST DOCUMENTED IN MEDICAL RECORD: ICD-10-PCS | Mod: CPTII,S$GLB,, | Performed by: THORACIC SURGERY (CARDIOTHORACIC VASCULAR SURGERY)

## 2023-06-13 PROCEDURE — 93306 ECHO (CUPID ONLY): ICD-10-PCS | Mod: 26,,, | Performed by: INTERNAL MEDICINE

## 2023-06-13 NOTE — PROGRESS NOTES
Patient seen and examined. Patient is progressively increasing activity. No significant complaints.     Sternum: stable, incision CDI  Chest xray: Acceptable post op chest  EKG: NSR     Assessment:  1)  Aortic valve re-replacement with a small Corcym Perceval bovine pericardial bioprosthesis  2)  Redo sternotomy     Plan:  Can begin driving   Can begin cardiac rehab 6 weeks after operation   We will refer to cardiology to assume care       No scheduled appointment, RTC prn    CTS Attending Note:    I have personally taken the history and examined this patient and agree with the ALEKSANDRA's note as stated above.  Doing quite well all whose considered.  Her mobility is limited by orthopedic issues.  She is cleared for water aerobics.

## 2023-06-13 NOTE — PROGRESS NOTES
Corcym Mitral, Aortic and Tricuspid post-market study in the real world setting (MANTRA)      Subject ID:[id number]    [id number]: Spoke with Adrianne Wilson NP. Pt seen in clinic today. NP reports pt NYHA score of II.    Deisy Gross RN, CCM, CRC

## 2023-06-13 NOTE — LETTER
June 13, 2023        Álvaro Palm MD  [address]  [facility] [address]             [facility] 2nd Fl  [address]  [facility] [address]  Phone: [phone]   Patient: Alina Narayan   MR Number: [id number]   YOB: 1945   Date of Visit: 6/13/2023       Dear Dr. Palm:    Thank you for referring Alina Narayan to me for evaluation. Below are the relevant portions of my assessment and plan of care.            If you have questions, please do not hesitate to call me. I look forward to following Alina along with you.    Sincerely,      Venkat Webb MD           CC  MD Aarti Connolly MD

## 2023-06-14 ENCOUNTER — RESEARCH ENCOUNTER (OUTPATIENT)
Dept: RESEARCH | Facility: HOSPITAL | Age: 78
End: 2023-06-14
Payer: MEDICARE

## 2023-06-14 NOTE — PROGRESS NOTES
Trial: Corcym MANTRA (2021.298)  PI: Dr. Webb  Date: 06/14/2023  Visit: 30 Day Follow-Up    Participant ID: [id number]    06/14/2023: Spoke with the patient on the phone for the 30-day follow-up for the Corcym MANTRA Study. MANTRA is a post-market study is to monitor ongoing safety and performance of the Corcym devices and accessories used for aortic, mitral, and tricuspid valvular diseases after implant.       Current list of medications obtained and verified with patient: Yes    Patient Has not had any recent hospitalizations, ED visits, or other adverse events since discharge.     Patient answered questionnaire for KCCQ-12 and EQ-5D-5L. Documents attached to note.     NYHA class determined be the team, as Class II    EKG performed on 6-13-23, with a heart rate of 92, and rhythm noted to be Sinus rhythm    Research staff contact information reviewed again with patient, should any questions or concerns arise. Their next follow up visit is at the 12 month point.      ECHO completed on 6-13-23, and results reviewed by Dr. Webb.    Blood test not ordered.    Patient reports on her After visit summary from Appointment with  on [id number] it stated to discontinued Tylenol and gabapentin. Patient would like to know why were these medications discontinued Patient is also trying to get an appt with . Patient states the Lenka was going to help with these two issues. Inform pt that I would message Lenka and ask her to call pt back with resolution of mentioned issues.     Deisy Gross RN, CCM, CRC

## 2023-06-15 NOTE — PROGRESS NOTES
Subjective:   Patient ID:  Alina Narayan is a 78 y.o. female who presents for follow-up of AVR    HPI: Patient is here for valvular heart disease.   The patient has no chest pain, SOB, TIA, palpitations, syncope or pre-syncope.Patient does not exercise.Just released from redo AVR        Review of Systems   Constitutional: Negative for chills, decreased appetite, diaphoresis, fever, malaise/fatigue, night sweats, weight gain and weight loss.   HENT:  Negative for congestion, hoarse voice, nosebleeds, sore throat and tinnitus.    Eyes:  Negative for blurred vision, double vision, vision loss in left eye, vision loss in right eye, visual disturbance and visual halos.   Cardiovascular:  Negative for chest pain, claudication, cyanosis, dyspnea on exertion, irregular heartbeat, leg swelling, near-syncope, orthopnea, palpitations, paroxysmal nocturnal dyspnea and syncope.   Respiratory:  Negative for cough, hemoptysis, shortness of breath, sleep disturbances due to breathing, snoring, sputum production and wheezing.    Endocrine: Negative for cold intolerance, heat intolerance, polydipsia, polyphagia and polyuria.   Hematologic/Lymphatic: Negative for adenopathy and bleeding problem. Does not bruise/bleed easily.   Skin:  Negative for color change, dry skin, flushing, itching, nail changes, poor wound healing, rash, skin cancer, suspicious lesions and unusual hair distribution.   Musculoskeletal:  Negative for arthritis, back pain, falls, gout, joint pain, joint swelling, muscle cramps, muscle weakness, myalgias and stiffness.   Gastrointestinal:  Negative for abdominal pain, anorexia, change in bowel habit, constipation, diarrhea, dysphagia, heartburn, hematemesis, hematochezia, melena and vomiting.   Genitourinary:  Negative for decreased libido, dysuria, hematuria, hesitancy and urgency.   Neurological:  Negative for excessive daytime sleepiness, dizziness, focal weakness, headaches, light-headedness, loss of balance,  "numbness, paresthesias, seizures, sensory change, tremors, vertigo and weakness.   Psychiatric/Behavioral:  Negative for altered mental status, depression, hallucinations, memory loss, substance abuse and suicidal ideas. The patient does not have insomnia and is not nervous/anxious.    Allergic/Immunologic: Negative for environmental allergies and hives.     Objective: BP (!) 140/66 (BP Location: Left arm, Patient Position: Sitting, BP Method: Large (Automatic))   Pulse 87   Ht 5' 2" (1.575 m)   Wt 108 kg (238 lb 1.6 oz)   SpO2 97%   BMI 43.55 kg/m²      Physical Exam  Constitutional:       Appearance: She is well-developed.   HENT:      Head: Normocephalic.   Eyes:      Pupils: Pupils are equal, round, and reactive to light.   Neck:      Thyroid: No thyromegaly.      Vascular: Normal carotid pulses. No carotid bruit, hepatojugular reflux or JVD.   Cardiovascular:      Rate and Rhythm: Normal rate and regular rhythm.      Pulses: Intact distal pulses.      Heart sounds: Normal heart sounds. No murmur heard.    No friction rub. No gallop.   Pulmonary:      Effort: Pulmonary effort is normal. No tachypnea or respiratory distress.      Breath sounds: Normal breath sounds. No wheezing or rales.   Chest:      Chest wall: No tenderness.   Abdominal:      General: Bowel sounds are normal. There is no distension.      Palpations: Abdomen is soft. There is no mass.      Tenderness: There is no abdominal tenderness. There is no guarding or rebound.   Musculoskeletal:         General: No tenderness. Normal range of motion.      Cervical back: Normal range of motion.   Lymphadenopathy:      Cervical: No cervical adenopathy.   Skin:     General: Skin is warm.      Findings: No erythema or rash.   Neurological:      Mental Status: She is alert and oriented to person, place, and time.      Cranial Nerves: No cranial nerve deficit.      Coordination: Coordination normal.   Psychiatric:         Behavior: Behavior normal.        "  Thought Content: Thought content normal.         Judgment: Judgment normal.       Assessment:     1. S/P AVR    2. Essential hypertension    3. Hyperlipidemia, mixed    4. Diabetes mellitus due to underlying condition with stage 3 chronic kidney disease, without long-term current use of insulin, unspecified whether stage 3a or 3b CKD    5. Stage 3 chronic kidney disease, unspecified whether stage 3a or 3b CKD    6. Diabetes mellitus type 2 in obese    7. Atherosclerosis of aorta        Plan:   Discussed diet , achieving and maintaining ideal body weight, and exercise.   We reviewed meds in detail.  Reassured-Discussed goals, options, plan.  We have discussed the need for endocarditis prophylaxis.     Go back to Carvedilol 25 mg half twice instead of metoprolol           Alina was seen today for establish care, follow-up, chest pain, dizziness and shortness of breath.    Diagnoses and all orders for this visit:    S/P AVR  -     TSH; Future; Expected date: 03/16/2024  -     CBC Auto Differential; Future; Expected date: 03/16/2024  -     EKG 12-lead; Future; Expected date: 03/16/2024  -     carvediloL (COREG) 25 MG tablet; Take 1 tablet (25 mg total) by mouth 2 (two) times daily. .5-1 twice daily or as directed    Essential hypertension  -     Comprehensive Metabolic Panel; Future; Expected date: 03/16/2024  -     TSH; Future; Expected date: 03/16/2024  -     EKG 12-lead; Future; Expected date: 03/16/2024  -     carvediloL (COREG) 25 MG tablet; Take 1 tablet (25 mg total) by mouth 2 (two) times daily. .5-1 twice daily or as directed    Hyperlipidemia, mixed  -     Lipid Panel; Future; Expected date: 03/16/2024  -     Comprehensive Metabolic Panel; Future; Expected date: 03/16/2024  -     TSH; Future; Expected date: 03/16/2024    Diabetes mellitus due to underlying condition with stage 3 chronic kidney disease, without long-term current use of insulin, unspecified whether stage 3a or 3b CKD  -     Hemoglobin A1C;  Future; Expected date: 03/16/2024    Stage 3 chronic kidney disease, unspecified whether stage 3a or 3b CKD  -     Comprehensive Metabolic Panel; Future; Expected date: 03/16/2024    Diabetes mellitus type 2 in obese    Atherosclerosis of aorta            Follow up in about 9 months (around 3/16/2024) for with ECG and labs.

## 2023-06-16 ENCOUNTER — OFFICE VISIT (OUTPATIENT)
Dept: CARDIOLOGY | Facility: CLINIC | Age: 78
End: 2023-06-16
Payer: MEDICARE

## 2023-06-16 VITALS
SYSTOLIC BLOOD PRESSURE: 140 MMHG | OXYGEN SATURATION: 97 % | BODY MASS INDEX: 43.82 KG/M2 | HEART RATE: 87 BPM | HEIGHT: 62 IN | WEIGHT: 238.13 LBS | DIASTOLIC BLOOD PRESSURE: 66 MMHG

## 2023-06-16 DIAGNOSIS — N18.30 STAGE 3 CHRONIC KIDNEY DISEASE, UNSPECIFIED WHETHER STAGE 3A OR 3B CKD: ICD-10-CM

## 2023-06-16 DIAGNOSIS — E11.69 DIABETES MELLITUS TYPE 2 IN OBESE: ICD-10-CM

## 2023-06-16 DIAGNOSIS — E08.22 DIABETES MELLITUS DUE TO UNDERLYING CONDITION WITH STAGE 3 CHRONIC KIDNEY DISEASE, WITHOUT LONG-TERM CURRENT USE OF INSULIN, UNSPECIFIED WHETHER STAGE 3A OR 3B CKD: ICD-10-CM

## 2023-06-16 DIAGNOSIS — N18.30 DIABETES MELLITUS DUE TO UNDERLYING CONDITION WITH STAGE 3 CHRONIC KIDNEY DISEASE, WITHOUT LONG-TERM CURRENT USE OF INSULIN, UNSPECIFIED WHETHER STAGE 3A OR 3B CKD: ICD-10-CM

## 2023-06-16 DIAGNOSIS — I10 ESSENTIAL HYPERTENSION: ICD-10-CM

## 2023-06-16 DIAGNOSIS — E78.2 HYPERLIPIDEMIA, MIXED: ICD-10-CM

## 2023-06-16 DIAGNOSIS — I70.0 ATHEROSCLEROSIS OF AORTA: ICD-10-CM

## 2023-06-16 DIAGNOSIS — E66.9 DIABETES MELLITUS TYPE 2 IN OBESE: ICD-10-CM

## 2023-06-16 DIAGNOSIS — Z95.2 S/P AVR: Primary | ICD-10-CM

## 2023-06-16 PROCEDURE — 1111F DSCHRG MED/CURRENT MED MERGE: CPT | Mod: CPTII,S$GLB,, | Performed by: INTERNAL MEDICINE

## 2023-06-16 PROCEDURE — 99999 PR PBB SHADOW E&M-EST. PATIENT-LVL IV: ICD-10-PCS | Mod: PBBFAC,,, | Performed by: INTERNAL MEDICINE

## 2023-06-16 PROCEDURE — 3288F FALL RISK ASSESSMENT DOCD: CPT | Mod: CPTII,S$GLB,, | Performed by: INTERNAL MEDICINE

## 2023-06-16 PROCEDURE — 1125F PR PAIN SEVERITY QUANTIFIED, PAIN PRESENT: ICD-10-PCS | Mod: CPTII,S$GLB,, | Performed by: INTERNAL MEDICINE

## 2023-06-16 PROCEDURE — 3077F PR MOST RECENT SYSTOLIC BLOOD PRESSURE >= 140 MM HG: ICD-10-PCS | Mod: CPTII,S$GLB,, | Performed by: INTERNAL MEDICINE

## 2023-06-16 PROCEDURE — 99999 PR PBB SHADOW E&M-EST. PATIENT-LVL IV: CPT | Mod: PBBFAC,,, | Performed by: INTERNAL MEDICINE

## 2023-06-16 PROCEDURE — 1157F PR ADVANCE CARE PLAN OR EQUIV PRESENT IN MEDICAL RECORD: ICD-10-PCS | Mod: CPTII,S$GLB,, | Performed by: INTERNAL MEDICINE

## 2023-06-16 PROCEDURE — 99215 PR OFFICE/OUTPT VISIT, EST, LEVL V, 40-54 MIN: ICD-10-PCS | Mod: S$GLB,,, | Performed by: INTERNAL MEDICINE

## 2023-06-16 PROCEDURE — 1159F MED LIST DOCD IN RCRD: CPT | Mod: CPTII,S$GLB,, | Performed by: INTERNAL MEDICINE

## 2023-06-16 PROCEDURE — 1159F PR MEDICATION LIST DOCUMENTED IN MEDICAL RECORD: ICD-10-PCS | Mod: CPTII,S$GLB,, | Performed by: INTERNAL MEDICINE

## 2023-06-16 PROCEDURE — 1160F RVW MEDS BY RX/DR IN RCRD: CPT | Mod: CPTII,S$GLB,, | Performed by: INTERNAL MEDICINE

## 2023-06-16 PROCEDURE — 1111F PR DISCHARGE MEDS RECONCILED W/ CURRENT OUTPATIENT MED LIST: ICD-10-PCS | Mod: CPTII,S$GLB,, | Performed by: INTERNAL MEDICINE

## 2023-06-16 PROCEDURE — 1160F PR REVIEW ALL MEDS BY PRESCRIBER/CLIN PHARMACIST DOCUMENTED: ICD-10-PCS | Mod: CPTII,S$GLB,, | Performed by: INTERNAL MEDICINE

## 2023-06-16 PROCEDURE — 3077F SYST BP >= 140 MM HG: CPT | Mod: CPTII,S$GLB,, | Performed by: INTERNAL MEDICINE

## 2023-06-16 PROCEDURE — 1125F AMNT PAIN NOTED PAIN PRSNT: CPT | Mod: CPTII,S$GLB,, | Performed by: INTERNAL MEDICINE

## 2023-06-16 PROCEDURE — 3078F DIAST BP <80 MM HG: CPT | Mod: CPTII,S$GLB,, | Performed by: INTERNAL MEDICINE

## 2023-06-16 PROCEDURE — 99215 OFFICE O/P EST HI 40 MIN: CPT | Mod: S$GLB,,, | Performed by: INTERNAL MEDICINE

## 2023-06-16 PROCEDURE — 1101F PR PT FALLS ASSESS DOC 0-1 FALLS W/OUT INJ PAST YR: ICD-10-PCS | Mod: CPTII,S$GLB,, | Performed by: INTERNAL MEDICINE

## 2023-06-16 PROCEDURE — 3288F PR FALLS RISK ASSESSMENT DOCUMENTED: ICD-10-PCS | Mod: CPTII,S$GLB,, | Performed by: INTERNAL MEDICINE

## 2023-06-16 PROCEDURE — 1157F ADVNC CARE PLAN IN RCRD: CPT | Mod: CPTII,S$GLB,, | Performed by: INTERNAL MEDICINE

## 2023-06-16 PROCEDURE — 3078F PR MOST RECENT DIASTOLIC BLOOD PRESSURE < 80 MM HG: ICD-10-PCS | Mod: CPTII,S$GLB,, | Performed by: INTERNAL MEDICINE

## 2023-06-16 PROCEDURE — 1101F PT FALLS ASSESS-DOCD LE1/YR: CPT | Mod: CPTII,S$GLB,, | Performed by: INTERNAL MEDICINE

## 2023-06-16 RX ORDER — CARVEDILOL 25 MG/1
25 TABLET ORAL 2 TIMES DAILY
Qty: 180 TABLET | Refills: 3
Start: 2023-06-16 | End: 2024-03-25

## 2023-06-16 NOTE — PATIENT INSTRUCTIONS
Discussed diet , achieving and maintaining ideal body weight, and exercise.   We reviewed meds in detail.  Reassured-Discussed goals, options, plan.  We have discussed the need for endocarditis prophylaxis.  Go back to Carvedilol 25 mg half twice instead of metoprolol

## 2023-06-21 ENCOUNTER — PATIENT MESSAGE (OUTPATIENT)
Dept: ADMINISTRATIVE | Facility: OTHER | Age: 78
End: 2023-06-21
Payer: MEDICARE

## 2023-06-21 ENCOUNTER — OUTPATIENT CASE MANAGEMENT (OUTPATIENT)
Dept: ADMINISTRATIVE | Facility: OTHER | Age: 78
End: 2023-06-21
Payer: MEDICARE

## 2023-06-21 NOTE — LETTER
"                [place]:  Milagros Dean RN, [facility]- [facility] Nurse   Tel:[phone] Mon-Fri, 8 am- 4:30 pm    Lidia Hercules Community Health Worker -[facility]   TEL: [phone], Mon-Fri, 8 am- 4:30 pm    [facility] On Call, 24/7 Nurse Help Line (non emergent)- TEL:  [phone]    [facility] Technical Issue Support Team--TEL:  [phone], Mon-Fri 9 am- 5 pm.    [email or web address] online patient portal    [facility] Financial Assistance TEL:  [phone]    Digital Medicine Program- TEL:  [phone]        [employer] Managed Care:    [employer] Haylie Life Line Benefit- TEL:  [phone]    [employer] First 24 Hour Nurse Line--TEL:  [phone]    [employer] Managed Care,  Over-the-Counter Benefit-- TEL:  [phone]  On line:  [employer] select "Over-the-Counter (OTC) items from the "Shop OTC & Supplies drop down at the top of the page    [employer] Managed Care, Sneakaties Exercise- [facility]  On line [employer] Exercise and Health Education Resources        [email or web address]    [employer] Managed Care, "Mom's Meal", TEL: [phone]    [employer] Managed Care - Transportation Reservation-- TEL:  [phone]  Mon-Fri, 8 am-5 pm, 3 business days notice required  "

## 2023-06-21 NOTE — LETTER
Alina Narayan  [address] [place] [id number]    Dear Alina Narayan,     Welcome to [facility]s Outpatient Care Management Program. We are here to assist patients with multiple long-term (chronic) conditions who often need more personalized healthcare.    It was a pleasure talking with you today. My name is Milagros Dean RN. I look forward to working with you as your Care Manager. I will be contacting you by telephone routinely to help coordinate care and resolve issues.    My goal is to help you function at the healthiest and highest level possible. You can contact me directly at [phone].    As an [facility] patient with [employer], some of the services we provide, at no cost to you, include:      Development of an individualized care plan with a Registered Nurse    Connection with a    Assistance from a Community Health Worker   Connection with available resources and services     Coordinate communication among your care team members    Provide coaching and education    Help you understand your doctors treatment plan   Help you obtain information about your insurance coverage.     All services provided by [facility]s Outpatient Care Managers and other care team members are coordinated with and communicated to your primary care team.      As part of your enrollment, you will be receiving education materials and more information about these services in your [facility] account, by phone, or through the mail. If you do not wish to participate or receive information, you can Opt Out by contacting our office at [phone].      Sincerely,        Milagros Dean RN  [facility]   Outpatient Care Management

## 2023-06-21 NOTE — PROGRESS NOTES
Outpatient Care Management  Initial Patient Assessment    Patient: Alina Narayan  MRN: [id number]  Date of Service: 06/21/2023  Completed by: Milagros Dean RN  Referral Date: 05/15/2023  Program: High Risk  Status: Ongoing  Effective Dates: 6/21/2023 - present  Responsible Staff: Milagros Dean RN        Reason for Visit   Patient presents with    OPCM Enrollment Call    Initial Assessment       Brief Summary:  Alina Narayan was referred to OPCM 5/15/2023 at time of hosp 5/10-5/19/2023 for scheduled AVR 5/10/2023 d/t severe stenosis of prosthetic valve. D/cd from Hosp to IP SNF 5/19-5/24/2023. D/cd home with [facility] SN/PT. Initial AVR 2013, then subsequent breast ca with treatment then PE. Alina is AAOx4, lives with her  of 60 years. She is use to being independent with her ADLs and most IADLs. Limited amb d/t pain to ruben knees. States she needs knee replacements but doesn't plan to have anymore surgery after having 2 open heart surgeries. Her , Srinath is her primary caregiver. Patient qualifies for program based on score at time of referral 71.7%.   Active problem list, medical, surgical and social history reviewed. Active comorbidities include HTN, Arthrosclerosis aorta, HLD, DM2, Hypothyroidism, CKD3, Breast CA, Anemia.  Areas of need identified by patient include getting stronger in her legs as she recovers from redo AVR. No needs for OPCM SW identified.   Next steps: F/u next week to provide education on maximizing strength/recovery from AVR 5/10/2023.    Disability Status  Is the patient alert and oriented (person, place, time, and situation)?: Alert and oriented x 4  Hearing Difficulty or Deaf: no  Visual Difficulty or Blind: no  Vision Management: -- (Glasses)  Difficulty Concentrating, Remembering or Making Decisions: no  Communication Difficulty: no  Eating/Swallowing Difficulty: no  Walking or Climbing Stairs Difficulty: yes  Walking or Climbing Stairs: ambulation difficulty, assistance 1 person;  transferring difficulty, assistance 1 person (Stand by assistance. Is not to use a walker for now as she recovers from AVR.)  Mobility Management: -- (Cautious amb in home.)  Dressing/Bathing Difficulty: yes  Dressing/Bathing: bathing difficulty, assistance 1 person; dressing difficulty, assistance 1 person ([facility]usb is assisting pt--- only just started to bath with minimal assistance. Has walk in shower.)  Dressing/Bathing Management: -- ( assists patient as needed.)  Difficulty Managing Errands Independently: yes  Errands Management: -- ( is performing household chores, cooking, grocery shopping. Pt states she can start driving but has not done so yet.)  Equipment Currently Used at Home: walker, rolling; walker, standard; cane, straight; glucometer; blood pressure machine; grab bar  Change in Functional Status Since Onset of Current Illness/Injury: yes        Spiritual Beliefs  Spiritual, Cultural Beliefs, Buddhist Practices, Values that Affect Care: no      Social History     Socioeconomic History    Marital status:      Spouse name: Srinath    Number of children: 2   Occupational History    Occupation: Retired   Tobacco Use    Smoking status: Never    Smokeless tobacco: Never   Substance and Sexual Activity    Alcohol use: No     Alcohol/week: 0.0 standard drinks    Drug use: Never    Sexual activity: Yes     Partners: Male   Other Topics Concern    Are you pregnant or think you may be? No    Breast-feeding No     Social Determinants of Health     Financial Resource Strain: Low Risk     Difficulty of Paying Living Expenses: Not hard at all   Food Insecurity: No Food Insecurity    Worried About Running Out of Food in the Last Year: Never true    Ran Out of Food in the Last Year: Never true   Transportation Needs: No Transportation Needs    Lack of Transportation (Medical): No    Lack of Transportation (Non-Medical): No   Physical Activity: Inactive    Days of Exercise per Week: 0 days    Minutes  of Exercise per Session: 10 min   Stress: No Stress Concern Present    Feeling of Stress : Only a little   Social Connections: Moderately Integrated    Frequency of Communication with Friends and Family: Three times a week    Frequency of Social Gatherings with Friends and Family: Once a week    Attends Hoahaoism Services: Never    Active Member of Clubs or Organizations: Yes    Attends Club or Organization Meetings: More than 4 times per year    Marital Status:    Housing Stability: Low Risk     Unable to Pay for Housing in the Last Year: No    Number of Places Lived in the Last Year: 1    Unstable Housing in the Last Year: No       Roles and Relationships  Primary Source of Support/Comfort: spouse  Name of Support/Comfort Primary Source: Srinath Narayan  Primary Roles/Responsibilities: retired      Advance Directives (For Healthcare)  Advance Directive  (If Adv Dir status is received, view document under Adv Dir in header or Chart Review Media tab): Advance Directive currently in Epic. (Living will on chart)  Patient Requests Assistance: Patient will do independently        Patient Reported Insurance  Verified current insurance plan:: [employer]vant[employer]  [employer] benefits discussed:: OTC Prescription Discounts; Well [employer]        Depression Patient Health Questionnaire 5/23/2023 3/27/2023 7/16/2021 3/1/2019 12/18/2018 9/11/2017   Over the last two weeks how often have you been bothered by little interest or pleasure in doing things Not at all Not at all Several days Several days Several days Not at all   Over the last two weeks how often have you been bothered by feeling down, depressed or hopeless Not at all Not at all Several days Several days Several days Several days   PHQ-2 Total Score 0 0 2 2 2 1   Over the last two weeks how often have you been bothered by trouble falling or staying asleep, or sleeping too much Not at all - - - - -   Over the last two weeks how often have you been bothered by feeling  tired or having little energy Not at all - - - - -   Over the last two weeks how often have you been bothered by a poor appetite or overeating Not at all - - - - -   Over the last two weeks how often have you been bothered by feeling bad about yourself - or that you are a failure or have let yourself or your family down Not at all - - - - -   Over the last two weeks how often have you been bothered by trouble concentrating on things, such as reading the newspaper or watching television Not at all - - - - -   Over the last two weeks how often have you been bothered by moving or speaking so slowly that other people could have noticed. Or the opposite - being so fidgety or restless that you have been moving around a lot more than usual. Not at all - - - - -   Over the last two weeks how often have you been bothered by thoughts that you would be better off dead, or of hurting yourself Not at all - - - - -   Total Score 0 - - - - -   Interpretation Minimal or None - - - - -       Learning Assessment       05/19/2023 1935 [facility] (5/19/2023 - 5/24/2023)   Created by Becky Moon, RN - RN (Nurse) Status: Complete                 PRIMARY LEARNER     Primary Learner Name:  Alina Narayan SJ - 05/19/2023 1935    Relationship:  Patient SJ - 05/19/2023 1935    Does the primary learner have any barriers to learning?:  No Barriers SJ - 05/19/2023 1935    What is the preferred language of the primary learner?:  English SJ - 05/19/2023 1935    Is an  required?:  No SJ - 05/19/2023 1935    How does the primary learner prefer to learn new concepts?:  Listening SJ - 05/19/2023 1935    How often do you need to have someone help you read instructions, pamphlets, or written material from your doctor or pharmacy?:  Never SJ - 05/19/2023 1935        CO-LEARNER #1     No question answered        CO-LEARNER #2     No question answered        SPECIAL TOPICS     No question answered        ANSWERED BY:     No  question answered        Edit History       Becky Moon, RN - RN (Nurse)   05/19/2023 1935

## 2023-06-23 ENCOUNTER — PATIENT OUTREACH (OUTPATIENT)
Dept: ADMINISTRATIVE | Facility: OTHER | Age: 78
End: 2023-06-23
Payer: MEDICARE

## 2023-06-23 NOTE — PROGRESS NOTES
This Community Health Worker (CHW) competed Social Determinant of Health (SDOH)  Questionnaire with patient/caregiver via telephone today.  Notified OPCM THANG LANG RN, of completion.      Patient denied any SDOH needs at this time.

## 2023-06-26 ENCOUNTER — PATIENT MESSAGE (OUTPATIENT)
Dept: INTERNAL MEDICINE | Facility: CLINIC | Age: 78
End: 2023-06-26
Payer: MEDICARE

## 2023-06-26 VITALS — DIASTOLIC BLOOD PRESSURE: 74 MMHG | SYSTOLIC BLOOD PRESSURE: 134 MMHG

## 2023-07-03 ENCOUNTER — OUTPATIENT CASE MANAGEMENT (OUTPATIENT)
Dept: ADMINISTRATIVE | Facility: OTHER | Age: 78
End: 2023-07-03
Payer: MEDICARE

## 2023-07-05 ENCOUNTER — DOCUMENT SCAN (OUTPATIENT)
Dept: HOME HEALTH SERVICES | Facility: HOSPITAL | Age: 78
End: 2023-07-05
Payer: MEDICARE

## 2023-07-18 ENCOUNTER — OUTPATIENT CASE MANAGEMENT (OUTPATIENT)
Dept: ADMINISTRATIVE | Facility: OTHER | Age: 78
End: 2023-07-18
Payer: MEDICARE

## 2023-07-19 ENCOUNTER — TELEPHONE (OUTPATIENT)
Dept: CARDIOLOGY | Facility: CLINIC | Age: 78
End: 2023-07-19
Payer: MEDICARE

## 2023-07-19 NOTE — TELEPHONE ENCOUNTER
MD Milagros Allen MA  Caller: Unspecified (Yesterday,  5:58 PM)  Avoid heavy weight lifting,CJL           Previous Messages       ----- Message -----   From: Milagros Partida MA   Sent: 7/19/2023   2:18 PM CDT   To: Álvaro Palm MD   Subject: FW: Question regarding stretching of UEs.           ----- Message -----   From: Milagros Dean RN   Sent: 7/18/2023   6:07 PM CDT   To: Néstor GARCIA Staff   Subject: Question regarding stretching of UEs.             Dr. Álvaro Palm/Staff:     Mrs. Narayan has returned to  [facility] to exercise in the pool. She has soreness to the sternum s/p AVR 5/10/2023 but would like to know if it is okay for her to stretch her UEs outward away from her chest wall? Any exercise limitations to be mindful of 2.5 months post-op?     She would like to hear from Dr. Palm if possible regarding her question.   We thought the question would be more appropriate to ask Cardio instead of the Thoracic Surgeon.     Thank you,   iMlagros Dean, JAMARI, RN, CCM [facility]   [email or web address]   TEL:  [phone]

## 2023-07-19 NOTE — TELEPHONE ENCOUNTER
----- Message from Álvaro Palm MD sent at 7/19/2023  2:40 PM CDT -----  Regarding: RE: Question regarding stretching of UEs.  Avoid heavy weight lifting,CJL  ----- Message -----  From: Milagros Partida MA  Sent: 7/19/2023   2:18 PM CDT  To: Álvaro Palm MD  Subject: FW: Question regarding stretching of UEs.          ----- Message -----  From: Milagros Dean RN  Sent: 7/18/2023   6:07 PM CDT  To: Néstor GARCIA Staff  Subject: Question regarding stretching of UEs.            Dr. Álvaro Palm/Staff:    Mrs. Narayan has returned to  [employer] [employer] [facility] to exercise in the pool. She has soreness to the sternum s/p AVR 5/10/2023 but would like to know if it is okay for her to stretch her UEs outward away from her chest wall? Any exercise limitations to be mindful of 2.5 months post-op?    She would like to hear from Dr. Palm if possible regarding her question.   We thought the question would be more appropriate to ask Cardio instead of the Thoracic Surgeon.     Thank you,  JAMARI Mason, RN, CCM [facility]  [email or web address]  TEL:  [phone]

## 2023-08-01 LAB
LEFT EYE DM RETINOPATHY: NEGATIVE
RIGHT EYE DM RETINOPATHY: NEGATIVE

## 2023-08-19 DIAGNOSIS — M47.27 OSTEOARTHRITIS OF SPINE WITH RADICULOPATHY, LUMBOSACRAL REGION: ICD-10-CM

## 2023-08-19 DIAGNOSIS — M51.36 DEGENERATIVE LUMBAR DISC: ICD-10-CM

## 2023-08-19 NOTE — TELEPHONE ENCOUNTER
Care Due:                  Date            Visit Type   Department     Provider  --------------------------------------------------------------------------------                                [facility] PRIMARY  Last Visit: 06-      FOLLOW UP    CARE           Aarti GOMEZ                              FOLLOWUP/OF  [facility] PRIMARY  Next Visit: 09-      FICE VISIT   CARE           Aarti Dunn                                                            Last  Test          Frequency    Reason                     Performed    Due Date  --------------------------------------------------------------------------------    HBA1C.......  6 months...  glimepiride, metFORMIN...  05-   10-    [facility] Embedded Care Due Messages. Reference number: [id number].   8/19/2023 10:40:43 AM CDT

## 2023-08-21 ENCOUNTER — OUTPATIENT CASE MANAGEMENT (OUTPATIENT)
Dept: ADMINISTRATIVE | Facility: OTHER | Age: 78
End: 2023-08-21
Payer: MEDICARE

## 2023-08-22 ENCOUNTER — OFFICE VISIT (OUTPATIENT)
Dept: HEMATOLOGY/ONCOLOGY | Facility: CLINIC | Age: 78
End: 2023-08-22
Payer: MEDICARE

## 2023-08-22 ENCOUNTER — PATIENT MESSAGE (OUTPATIENT)
Dept: INTERNAL MEDICINE | Facility: CLINIC | Age: 78
End: 2023-08-22
Payer: MEDICARE

## 2023-08-22 VITALS
WEIGHT: 239.63 LBS | DIASTOLIC BLOOD PRESSURE: 58 MMHG | OXYGEN SATURATION: 97 % | HEART RATE: 88 BPM | HEIGHT: 62 IN | SYSTOLIC BLOOD PRESSURE: 123 MMHG | BODY MASS INDEX: 44.1 KG/M2 | TEMPERATURE: 98 F | RESPIRATION RATE: 18 BRPM

## 2023-08-22 DIAGNOSIS — Z12.31 ENCOUNTER FOR SCREENING MAMMOGRAM FOR HIGH-RISK PATIENT: ICD-10-CM

## 2023-08-22 DIAGNOSIS — Z85.3 HISTORY OF BREAST CANCER: Primary | ICD-10-CM

## 2023-08-22 PROCEDURE — 3078F DIAST BP <80 MM HG: CPT | Mod: HCNC,CPTII,S$GLB, | Performed by: INTERNAL MEDICINE

## 2023-08-22 PROCEDURE — 99999 PR PBB SHADOW E&M-EST. PATIENT-LVL IV: CPT | Mod: PBBFAC,HCNC,, | Performed by: INTERNAL MEDICINE

## 2023-08-22 PROCEDURE — 3078F PR MOST RECENT DIASTOLIC BLOOD PRESSURE < 80 MM HG: ICD-10-PCS | Mod: HCNC,CPTII,S$GLB, | Performed by: INTERNAL MEDICINE

## 2023-08-22 PROCEDURE — 1159F PR MEDICATION LIST DOCUMENTED IN MEDICAL RECORD: ICD-10-PCS | Mod: HCNC,CPTII,S$GLB, | Performed by: INTERNAL MEDICINE

## 2023-08-22 PROCEDURE — 1101F PT FALLS ASSESS-DOCD LE1/YR: CPT | Mod: HCNC,CPTII,S$GLB, | Performed by: INTERNAL MEDICINE

## 2023-08-22 PROCEDURE — 1125F AMNT PAIN NOTED PAIN PRSNT: CPT | Mod: HCNC,CPTII,S$GLB, | Performed by: INTERNAL MEDICINE

## 2023-08-22 PROCEDURE — 3288F PR FALLS RISK ASSESSMENT DOCUMENTED: ICD-10-PCS | Mod: HCNC,CPTII,S$GLB, | Performed by: INTERNAL MEDICINE

## 2023-08-22 PROCEDURE — 99213 OFFICE O/P EST LOW 20 MIN: CPT | Mod: HCNC,S$GLB,, | Performed by: INTERNAL MEDICINE

## 2023-08-22 PROCEDURE — 3074F SYST BP LT 130 MM HG: CPT | Mod: HCNC,CPTII,S$GLB, | Performed by: INTERNAL MEDICINE

## 2023-08-22 PROCEDURE — 1125F PR PAIN SEVERITY QUANTIFIED, PAIN PRESENT: ICD-10-PCS | Mod: HCNC,CPTII,S$GLB, | Performed by: INTERNAL MEDICINE

## 2023-08-22 PROCEDURE — 99213 PR OFFICE/OUTPT VISIT, EST, LEVL III, 20-29 MIN: ICD-10-PCS | Mod: HCNC,S$GLB,, | Performed by: INTERNAL MEDICINE

## 2023-08-22 PROCEDURE — 1157F PR ADVANCE CARE PLAN OR EQUIV PRESENT IN MEDICAL RECORD: ICD-10-PCS | Mod: HCNC,CPTII,S$GLB, | Performed by: INTERNAL MEDICINE

## 2023-08-22 PROCEDURE — 1157F ADVNC CARE PLAN IN RCRD: CPT | Mod: HCNC,CPTII,S$GLB, | Performed by: INTERNAL MEDICINE

## 2023-08-22 PROCEDURE — 3074F PR MOST RECENT SYSTOLIC BLOOD PRESSURE < 130 MM HG: ICD-10-PCS | Mod: HCNC,CPTII,S$GLB, | Performed by: INTERNAL MEDICINE

## 2023-08-22 PROCEDURE — 1101F PR PT FALLS ASSESS DOC 0-1 FALLS W/OUT INJ PAST YR: ICD-10-PCS | Mod: HCNC,CPTII,S$GLB, | Performed by: INTERNAL MEDICINE

## 2023-08-22 PROCEDURE — 99999 PR PBB SHADOW E&M-EST. PATIENT-LVL IV: ICD-10-PCS | Mod: PBBFAC,HCNC,, | Performed by: INTERNAL MEDICINE

## 2023-08-22 PROCEDURE — 3288F FALL RISK ASSESSMENT DOCD: CPT | Mod: HCNC,CPTII,S$GLB, | Performed by: INTERNAL MEDICINE

## 2023-08-22 PROCEDURE — 1159F MED LIST DOCD IN RCRD: CPT | Mod: HCNC,CPTII,S$GLB, | Performed by: INTERNAL MEDICINE

## 2023-08-22 NOTE — PROGRESS NOTES
Outpatient Care Management  Plan of Care Follow Up Visit    Patient: Alina Narayan  MRN: [id number]  Date of Service: 08/21/2023  Completed by: Milagros Dean RN  Referral Date: 05/15/2023    Reason for Visit   Patient presents with    OPCM Chart Review    Case Closure       Brief Summary:   OPCM RN follow-up call completed.   CAD Care Plan completed  Patient agrees with case closure. No further needs identified.

## 2023-08-22 NOTE — PROGRESS NOTES
Subjective:       Patient ID: Alina Narayan is a 78 y.o. female.    Chief Complaint: No chief complaint on file.      HPI 79 Y/O white female who returns for follow-up of triple negative carcinoma of the right breast.  She had complete pathologic response to neoadjuvant chemotherapy.    She had AVR 5/10/23.She is gradually recovering from that.    Recently noted a pea sized nodule in left axilla which comes and goes. She has alaos noted some axillary soreness on the right.  Her  is having memory issues which has been stressful for her.      She has a history of pulmonary embolism diagnosed in May of 2019.She is on chronic anticoagulation with low-dose Eliquis.    She is also being followed a small right upper lobe pulmonary nodule.      Most recent breast cancer history:    abnormal mammogram of the right breast in December 2018.  She was having no breast symptoms at that time    That mammogram showed a 13 mm mass in the right axillary tail.  By ultrasound there was a 6 x 9 x 11 mm intramammary node and a 2nd 5 x 6 x 6 mm hypoechoic mass in the axillary tail.  On January 7, 2019 both masses were biopsied and both showed lymph nodes with metastatic carcinoma which was ER negative MO negative and HER2 negative.  Ki-67 was 40%.    MRI on January 15, 2019 showed 2 right axillary lymph nodes the largest measured 1.4 x 1.0 cm.  There were no abnormalities in the right breast.    PET scan was then performed which showed only low-grade activity in the right axilla with an SUV of 1.32.      She has received 12 cycles of weekly Taxol and 4 cycles of CMF which she completed on July 30th.    On August 26, 2019 right mastectomy performed showed complete pathological response in the breast and axilla.      Previous breast cancer history History: On September 25, 2012 she underwent a screening mammogram which showed an asymmetric density in the left breast at 2:00 position. A followup mammogram on the 27th showed an oval  mass in that area ultrasound showed a 6 mm solid mass. Core needle biopsy on October 1 showed invasive carcinoma ER 90% positive OK 80% positive and HER-2 negative. On October 29 she underwent lumpectomy and sentinel lymph node biopsy. That showed a 7 mm low-grade (1+2+1) infiltrating carcinoma. 3 sentinel lymph nodes were negative. Final pathological stage TIB N0 stage IA.  She completed letrozole therapy in 2017  Review of Systems   Constitutional:  Negative for activity change, appetite change, fever and unexpected weight change.   HENT:  Negative for mouth sores.    Eyes:  Negative for visual disturbance.   Respiratory:  Positive for shortness of breath. Negative for cough.    Cardiovascular:  Positive for chest pain (chest wall).   Gastrointestinal:  Negative for abdominal pain and diarrhea.   Genitourinary:  Negative for frequency.   Musculoskeletal:  Negative for back pain.   Integumentary:  Negative for rash.   Neurological:  Negative for headaches.   Hematological:  Negative for adenopathy.   Psychiatric/Behavioral:  The patient is nervous/anxious.          Objective:      Physical Exam  Vitals reviewed.   Constitutional:       General: She is not in acute distress.     Appearance: She is obese. She is not ill-appearing.   Eyes:      General: No scleral icterus.  Cardiovascular:      Rate and Rhythm: Normal rate and regular rhythm.   Pulmonary:      Effort: Pulmonary effort is normal. No respiratory distress.      Breath sounds: Normal breath sounds. No wheezing or rales.   Chest:   Breasts:     Left: Normal. No mass.       Abdominal:      Palpations: Abdomen is soft.   Lymphadenopathy:      Cervical: No cervical adenopathy.      Upper Body:      Right upper body: No supraclavicular or axillary adenopathy.      Left upper body: No supraclavicular or axillary adenopathy.   Neurological:      Mental Status: She is alert and oriented to person, place, and time.   Psychiatric:         Mood and Affect: Mood  normal.         Behavior: Behavior normal.         Thought Content: Thought content normal.         Judgment: Judgment normal.       Assessment:       1. History of breast cancer        Plan:       RTC 4 months   Due for mammogram in December.    Route Chart for Scheduling    Med Onc Chart Routing      Follow up with physician 4 months.   Follow up with ALEKSANDRA    Infusion scheduling note    Injection scheduling note    Labs None   Scheduling:  Preferred lab:  Lab interval:     Imaging Mammogram      Pharmacy appointment    Other referrals     No additional referrals needed

## 2023-08-23 RX ORDER — GABAPENTIN 300 MG/1
CAPSULE ORAL
Qty: 270 CAPSULE | Refills: 3 | Status: SHIPPED | OUTPATIENT
Start: 2023-08-23

## 2023-09-07 ENCOUNTER — HOSPITAL ENCOUNTER (OUTPATIENT)
Dept: RADIOLOGY | Facility: CLINIC | Age: 78
Discharge: HOME | End: 2023-09-07
Attending: INTERNAL MEDICINE
Payer: MEDICARE

## 2023-09-07 DIAGNOSIS — Z78.0 MENOPAUSE: ICD-10-CM

## 2023-09-07 PROCEDURE — 77080 DXA BONE DENSITY AXIAL: CPT | Mod: TC,HCNC

## 2023-09-07 PROCEDURE — 77080 DXA BONE DENSITY AXIAL: CPT | Mod: 26,HCNC,, | Performed by: INTERNAL MEDICINE

## 2023-09-07 PROCEDURE — 77080 DEXA BONE DENSITY SPINE HIP: ICD-10-PCS | Mod: 26,HCNC,, | Performed by: INTERNAL MEDICINE

## 2023-09-25 ENCOUNTER — LAB VISIT (OUTPATIENT)
Dept: LAB | Facility: HOSPITAL | Age: 78
End: 2023-09-25
Attending: INTERNAL MEDICINE
Payer: MEDICARE

## 2023-09-25 ENCOUNTER — OFFICE VISIT (OUTPATIENT)
Dept: INTERNAL MEDICINE | Facility: CLINIC | Age: 78
End: 2023-09-25
Payer: MEDICARE

## 2023-09-25 VITALS
OXYGEN SATURATION: 96 % | HEIGHT: 62 IN | HEART RATE: 89 BPM | SYSTOLIC BLOOD PRESSURE: 134 MMHG | DIASTOLIC BLOOD PRESSURE: 60 MMHG | BODY MASS INDEX: 44.42 KG/M2 | WEIGHT: 241.38 LBS

## 2023-09-25 DIAGNOSIS — E66.9 DIABETES MELLITUS TYPE 2 IN OBESE: ICD-10-CM

## 2023-09-25 DIAGNOSIS — E11.69 DIABETES MELLITUS TYPE 2 IN OBESE: ICD-10-CM

## 2023-09-25 DIAGNOSIS — I10 ESSENTIAL HYPERTENSION: ICD-10-CM

## 2023-09-25 DIAGNOSIS — I89.0 LYMPHEDEMA: Primary | ICD-10-CM

## 2023-09-25 DIAGNOSIS — I26.99 BILATERAL PULMONARY EMBOLISM: ICD-10-CM

## 2023-09-25 DIAGNOSIS — E66.2 CLASS 3 OBESITY WITH ALVEOLAR HYPOVENTILATION AND BODY MASS INDEX (BMI) OF 40.0 TO 44.9 IN ADULT, UNSPECIFIED WHETHER SERIOUS COMORBIDITY PRESENT: ICD-10-CM

## 2023-09-25 DIAGNOSIS — Z95.2 S/P AVR (AORTIC VALVE REPLACEMENT): ICD-10-CM

## 2023-09-25 LAB
ALBUMIN SERPL BCP-MCNC: 4 G/DL (ref 3.5–5.2)
ALP SERPL-CCNC: 97 U/L (ref 55–135)
ALT SERPL W/O P-5'-P-CCNC: 10 U/L (ref 10–44)
ANION GAP SERPL CALC-SCNC: 7 MMOL/L (ref 8–16)
AST SERPL-CCNC: 15 U/L (ref 10–40)
BASOPHILS # BLD AUTO: 0.02 K/UL (ref 0–0.2)
BASOPHILS NFR BLD: 0.4 % (ref 0–1.9)
BILIRUB SERPL-MCNC: 0.3 MG/DL (ref 0.1–1)
BUN SERPL-MCNC: 25 MG/DL (ref 8–23)
CALCIUM SERPL-MCNC: 9.9 MG/DL (ref 8.7–10.5)
CHLORIDE SERPL-SCNC: 103 MMOL/L (ref 95–110)
CO2 SERPL-SCNC: 31 MMOL/L (ref 23–29)
CREAT SERPL-MCNC: 1.1 MG/DL (ref 0.5–1.4)
DIFFERENTIAL METHOD: ABNORMAL
EOSINOPHIL # BLD AUTO: 0 K/UL (ref 0–0.5)
EOSINOPHIL NFR BLD: 0.7 % (ref 0–8)
ERYTHROCYTE [DISTWIDTH] IN BLOOD BY AUTOMATED COUNT: 14.2 % (ref 11.5–14.5)
EST. GFR  (NO RACE VARIABLE): 51.4 ML/MIN/1.73 M^2
ESTIMATED AVG GLUCOSE: 160 MG/DL (ref 68–131)
GLUCOSE SERPL-MCNC: 113 MG/DL (ref 70–110)
HBA1C MFR BLD: 7.2 % (ref 4–5.6)
HCT VFR BLD AUTO: 40.4 % (ref 37–48.5)
HGB BLD-MCNC: 12.2 G/DL (ref 12–16)
IMM GRANULOCYTES # BLD AUTO: 0.02 K/UL (ref 0–0.04)
IMM GRANULOCYTES NFR BLD AUTO: 0.4 % (ref 0–0.5)
LYMPHOCYTES # BLD AUTO: 1.7 K/UL (ref 1–4.8)
LYMPHOCYTES NFR BLD: 37.1 % (ref 18–48)
MCH RBC QN AUTO: 27.5 PG (ref 27–31)
MCHC RBC AUTO-ENTMCNC: 30.2 G/DL (ref 32–36)
MCV RBC AUTO: 91 FL (ref 82–98)
MONOCYTES # BLD AUTO: 0.5 K/UL (ref 0.3–1)
MONOCYTES NFR BLD: 10.3 % (ref 4–15)
NEUTROPHILS # BLD AUTO: 2.3 K/UL (ref 1.8–7.7)
NEUTROPHILS NFR BLD: 51.1 % (ref 38–73)
NRBC BLD-RTO: 0 /100 WBC
PLATELET # BLD AUTO: 112 K/UL (ref 150–450)
PMV BLD AUTO: 11.7 FL (ref 9.2–12.9)
POTASSIUM SERPL-SCNC: 4.3 MMOL/L (ref 3.5–5.1)
PROT SERPL-MCNC: 7.4 G/DL (ref 6–8.4)
RBC # BLD AUTO: 4.43 M/UL (ref 4–5.4)
SODIUM SERPL-SCNC: 141 MMOL/L (ref 136–145)
TSH SERPL DL<=0.005 MIU/L-ACNC: 1.66 UIU/ML (ref 0.4–4)
WBC # BLD AUTO: 4.58 K/UL (ref 3.9–12.7)

## 2023-09-25 PROCEDURE — 3078F DIAST BP <80 MM HG: CPT | Mod: HCNC,CPTII,S$GLB, | Performed by: INTERNAL MEDICINE

## 2023-09-25 PROCEDURE — 80053 COMPREHEN METABOLIC PANEL: CPT | Mod: HCNC | Performed by: INTERNAL MEDICINE

## 2023-09-25 PROCEDURE — 36415 COLL VENOUS BLD VENIPUNCTURE: CPT | Mod: HCNC | Performed by: INTERNAL MEDICINE

## 2023-09-25 PROCEDURE — 85025 COMPLETE CBC W/AUTO DIFF WBC: CPT | Mod: HCNC | Performed by: INTERNAL MEDICINE

## 2023-09-25 PROCEDURE — 3078F PR MOST RECENT DIASTOLIC BLOOD PRESSURE < 80 MM HG: ICD-10-PCS | Mod: HCNC,CPTII,S$GLB, | Performed by: INTERNAL MEDICINE

## 2023-09-25 PROCEDURE — 99999 PR PBB SHADOW E&M-EST. PATIENT-LVL V: CPT | Mod: PBBFAC,HCNC,, | Performed by: INTERNAL MEDICINE

## 2023-09-25 PROCEDURE — 1159F MED LIST DOCD IN RCRD: CPT | Mod: HCNC,CPTII,S$GLB, | Performed by: INTERNAL MEDICINE

## 2023-09-25 PROCEDURE — 3075F PR MOST RECENT SYSTOLIC BLOOD PRESS GE 130-139MM HG: ICD-10-PCS | Mod: HCNC,CPTII,S$GLB, | Performed by: INTERNAL MEDICINE

## 2023-09-25 PROCEDURE — 99999 PR PBB SHADOW E&M-EST. PATIENT-LVL V: ICD-10-PCS | Mod: PBBFAC,HCNC,, | Performed by: INTERNAL MEDICINE

## 2023-09-25 PROCEDURE — 3288F FALL RISK ASSESSMENT DOCD: CPT | Mod: HCNC,CPTII,S$GLB, | Performed by: INTERNAL MEDICINE

## 2023-09-25 PROCEDURE — 1101F PT FALLS ASSESS-DOCD LE1/YR: CPT | Mod: HCNC,CPTII,S$GLB, | Performed by: INTERNAL MEDICINE

## 2023-09-25 PROCEDURE — 99214 OFFICE O/P EST MOD 30 MIN: CPT | Mod: HCNC,S$GLB,, | Performed by: INTERNAL MEDICINE

## 2023-09-25 PROCEDURE — 1125F PR PAIN SEVERITY QUANTIFIED, PAIN PRESENT: ICD-10-PCS | Mod: HCNC,CPTII,S$GLB, | Performed by: INTERNAL MEDICINE

## 2023-09-25 PROCEDURE — 1101F PR PT FALLS ASSESS DOC 0-1 FALLS W/OUT INJ PAST YR: ICD-10-PCS | Mod: HCNC,CPTII,S$GLB, | Performed by: INTERNAL MEDICINE

## 2023-09-25 PROCEDURE — 84443 ASSAY THYROID STIM HORMONE: CPT | Mod: HCNC | Performed by: INTERNAL MEDICINE

## 2023-09-25 PROCEDURE — 1125F AMNT PAIN NOTED PAIN PRSNT: CPT | Mod: HCNC,CPTII,S$GLB, | Performed by: INTERNAL MEDICINE

## 2023-09-25 PROCEDURE — 3075F SYST BP GE 130 - 139MM HG: CPT | Mod: HCNC,CPTII,S$GLB, | Performed by: INTERNAL MEDICINE

## 2023-09-25 PROCEDURE — 1157F PR ADVANCE CARE PLAN OR EQUIV PRESENT IN MEDICAL RECORD: ICD-10-PCS | Mod: HCNC,CPTII,S$GLB, | Performed by: INTERNAL MEDICINE

## 2023-09-25 PROCEDURE — 3288F PR FALLS RISK ASSESSMENT DOCUMENTED: ICD-10-PCS | Mod: HCNC,CPTII,S$GLB, | Performed by: INTERNAL MEDICINE

## 2023-09-25 PROCEDURE — 1159F PR MEDICATION LIST DOCUMENTED IN MEDICAL RECORD: ICD-10-PCS | Mod: HCNC,CPTII,S$GLB, | Performed by: INTERNAL MEDICINE

## 2023-09-25 PROCEDURE — 83036 HEMOGLOBIN GLYCOSYLATED A1C: CPT | Mod: HCNC | Performed by: INTERNAL MEDICINE

## 2023-09-25 PROCEDURE — 99214 PR OFFICE/OUTPT VISIT, EST, LEVL IV, 30-39 MIN: ICD-10-PCS | Mod: HCNC,S$GLB,, | Performed by: INTERNAL MEDICINE

## 2023-09-25 PROCEDURE — 1157F ADVNC CARE PLAN IN RCRD: CPT | Mod: HCNC,CPTII,S$GLB, | Performed by: INTERNAL MEDICINE

## 2023-09-25 RX ORDER — METFORMIN HYDROCHLORIDE 500 MG/1
500 TABLET ORAL 2 TIMES DAILY WITH MEALS
Qty: 180 TABLET | Refills: 3 | Status: SHIPPED | OUTPATIENT
Start: 2023-09-25

## 2023-09-25 RX ORDER — GLIMEPIRIDE 2 MG/1
2 TABLET ORAL
Qty: 90 TABLET | Refills: 3 | Status: SHIPPED | OUTPATIENT
Start: 2023-09-25

## 2023-09-25 RX ORDER — FUROSEMIDE 20 MG/1
20 TABLET ORAL DAILY
Qty: 90 TABLET | Refills: 3 | Status: SHIPPED | OUTPATIENT
Start: 2023-09-25

## 2023-09-25 RX ORDER — AMOXICILLIN 500 MG/1
TABLET, FILM COATED ORAL
Qty: 28 TABLET | Refills: 1 | Status: SHIPPED | OUTPATIENT
Start: 2023-09-25

## 2023-09-25 NOTE — PROGRESS NOTES
Subjective:       Patient ID: Alina Narayan is a 78 y.o. female.    Chief Complaint: Follow-up, Diabetes, and Arm Pain (right)    Follow-up  Associated symptoms include arthralgias, chest pain and weakness. Pertinent negatives include no abdominal pain, headaches, joint swelling, nausea, neck pain or vomiting.   Diabetes  Pertinent negatives for hypoglycemia include no confusion, headaches or seizures. Associated symptoms include chest pain and weakness. Pertinent negatives for diabetes include no polydipsia and no polyuria.   Arm Pain   Associated symptoms include chest pain.   Pt is doing well overall.  No CP or SOB. Feels blood sugars are improved.   Review of Systems   Constitutional:  Negative for activity change and unexpected weight change.   HENT:  Negative for hearing loss, rhinorrhea and trouble swallowing.    Eyes:  Positive for visual disturbance. Negative for discharge.   Respiratory:  Positive for chest tightness. Negative for shortness of breath (PND or orthopnea) and wheezing.    Cardiovascular:  Positive for chest pain. Negative for palpitations.   Gastrointestinal:  Negative for abdominal pain, blood in stool, constipation, diarrhea, nausea and vomiting.   Endocrine: Negative for polydipsia and polyuria.   Genitourinary:  Positive for dysuria. Negative for difficulty urinating, hematuria and menstrual problem.   Musculoskeletal:  Positive for arthralgias. Negative for joint swelling and neck pain.   Neurological:  Positive for weakness. Negative for seizures, syncope and headaches.   Psychiatric/Behavioral:  Negative for confusion and dysphoric mood.        Objective:      Physical Exam  Constitutional:       General: She is not in acute distress.     Appearance: She is well-developed.   HENT:      Head: Normocephalic.   Eyes:      Pupils: Pupils are equal, round, and reactive to light.   Neck:      Thyroid: No thyromegaly.      Vascular: No JVD.   Cardiovascular:      Rate and Rhythm: Normal rate  and regular rhythm.      Heart sounds: Normal heart sounds. No murmur heard.     No friction rub. No gallop.   Pulmonary:      Effort: Pulmonary effort is normal.      Breath sounds: Normal breath sounds. No wheezing or rales.   Abdominal:      General: Bowel sounds are normal. There is no distension.      Palpations: Abdomen is soft. There is no mass.      Tenderness: There is no abdominal tenderness. There is no guarding or rebound.   Musculoskeletal:      Cervical back: Neck supple.   Lymphadenopathy:      Cervical: No cervical adenopathy.   Skin:     General: Skin is warm and dry.   Neurological:      Mental Status: She is alert and oriented to person, place, and time.      Deep Tendon Reflexes: Reflexes are normal and symmetric.   Psychiatric:         Behavior: Behavior normal.         Thought Content: Thought content normal.         Judgment: Judgment normal.         Assessment:       1. Lymphedema    2. S/P AVR (aortic valve replacement)    3. Diabetes mellitus type 2 in obese    4. Essential hypertension    5. Bilateral pulmonary embolism    6. Class 3 obesity with alveolar hypoventilation and body mass index (BMI) of 40.0 to 44.9 in adult, unspecified whether serious comorbidity present        Plan:   Lymphedema  -     Ambulatory referral/consult to Physical/Occupational Therapy; Future; Expected date: 10/02/2023    S/P AVR (aortic valve replacement)  -     furosemide (LASIX) 20 MG tablet; Take 1 tablet (20 mg total) by mouth once daily.  Dispense: 90 tablet; Refill: 3    Diabetes mellitus type 2 in obese  -     furosemide (LASIX) 20 MG tablet; Take 1 tablet (20 mg total) by mouth once daily.  Dispense: 90 tablet; Refill: 3  -     CBC Auto Differential; Future; Expected date: 09/25/2023  -     Comprehensive Metabolic Panel; Future; Expected date: 09/25/2023  -     TSH; Future; Expected date: 09/25/2023  -     Hemoglobin A1C; Future; Expected date: 09/25/2023    Essential hypertension  -     furosemide  (LASIX) 20 MG tablet; Take 1 tablet (20 mg total) by mouth once daily.  Dispense: 90 tablet; Refill: 3    Bilateral pulmonary embolism  -     apixaban (ELIQUIS) 2.5 mg Tab; Take 1 tablet (2.5 mg total) by mouth 2 (two) times daily.  Dispense: 180 tablet; Refill: 3    Class 3 obesity with alveolar hypoventilation and body mass index (BMI) of 40.0 to 44.9 in adult, unspecified whether serious comorbidity present  -     apixaban (ELIQUIS) 2.5 mg Tab; Take 1 tablet (2.5 mg total) by mouth 2 (two) times daily.  Dispense: 180 tablet; Refill: 3    Other orders  -     metFORMIN (GLUCOPHAGE) 500 MG tablet; Take 1 tablet (500 mg total) by mouth 2 (two) times daily with meals.  Dispense: 180 tablet; Refill: 3  -     glimepiride (AMARYL) 2 MG tablet; Take 1 tablet (2 mg total) by mouth daily with breakfast.  Dispense: 90 tablet; Refill: 3  -     amoxicillin (AMOXIL) 500 MG Tab; 4 tablets one hour prior to dental appointment  Dispense: 28 tablet; Refill: 1        Answers submitted by the patient for this visit:  Review of Systems Questionnaire (Submitted on 9/22/2023)  activity change: No  unexpected weight change: No  neck pain: No  hearing loss: No  rhinorrhea: No  trouble swallowing: No  eye discharge: No  visual disturbance: Yes  chest tightness: Yes  wheezing: No  chest pain: Yes  palpitations: No  blood in stool: No  constipation: No  vomiting: No  diarrhea: No  polydipsia: No  polyuria: No  difficulty urinating: No  hematuria: No  menstrual problem: No  dysuria: Yes  joint swelling: No  arthralgias: Yes  headaches: No  weakness: Yes  confusion: No  dysphoric mood: No

## 2023-10-08 NOTE — PROGRESS NOTES
Lab shows resolved anemia - platelets have been up and down but they are stable.  BLood sugar is stable to improved.

## 2023-10-16 ENCOUNTER — PATIENT OUTREACH (OUTPATIENT)
Dept: ADMINISTRATIVE | Facility: HOSPITAL | Age: 78
End: 2023-10-16
Payer: MEDICARE

## 2023-10-16 NOTE — LETTER
AUTHORIZATION FOR RELEASE OF   CONFIDENTIAL INFORMATION    Dear Dr. Roberts,    We are seeing Alina Narayan, date of birth 1945, in the clinic at [facility] INTERNAL MEDICINE. Aarti Dunn MD is the patient's PCP. Alina Narayan has an outstanding lab/procedure at the time we reviewed her chart. In order to help keep her health information updated, she has authorized us to request the following medical record(s):        (  )  MAMMOGRAM                                      (  )  COLONOSCOPY      (  )  PAP SMEAR                                          (  )  OUTSIDE LAB RESULTS     (  )  DEXA SCAN                                          ( x )  EYE EXAM            (  )  FOOT EXAM                                          (  )  ENTIRE RECORD     (  )  OUTSIDE IMMUNIZATIONS                 (  )  _______________         Please fax records to Aarti Dunn MD, [phone]        Patient Name: Alina Narayan  : 1945  Patient Phone #: [phone]

## 2023-10-18 ENCOUNTER — OFFICE VISIT (OUTPATIENT)
Dept: INTERNAL MEDICINE | Facility: CLINIC | Age: 78
End: 2023-10-18
Payer: MEDICARE

## 2023-10-18 VITALS
HEIGHT: 62 IN | SYSTOLIC BLOOD PRESSURE: 128 MMHG | DIASTOLIC BLOOD PRESSURE: 68 MMHG | WEIGHT: 241.63 LBS | HEART RATE: 79 BPM | OXYGEN SATURATION: 98 % | BODY MASS INDEX: 44.46 KG/M2

## 2023-10-18 DIAGNOSIS — I35.0 NONRHEUMATIC AORTIC VALVE STENOSIS: ICD-10-CM

## 2023-10-18 DIAGNOSIS — K76.0 FATTY LIVER: ICD-10-CM

## 2023-10-18 DIAGNOSIS — R91.1 PULMONARY NODULE: ICD-10-CM

## 2023-10-18 DIAGNOSIS — N18.30 STAGE 3 CHRONIC KIDNEY DISEASE, UNSPECIFIED WHETHER STAGE 3A OR 3B CKD: ICD-10-CM

## 2023-10-18 DIAGNOSIS — N18.30 DIABETES MELLITUS DUE TO UNDERLYING CONDITION WITH STAGE 3 CHRONIC KIDNEY DISEASE, WITHOUT LONG-TERM CURRENT USE OF INSULIN, UNSPECIFIED WHETHER STAGE 3A OR 3B CKD: ICD-10-CM

## 2023-10-18 DIAGNOSIS — I70.0 ATHEROSCLEROSIS OF AORTA: ICD-10-CM

## 2023-10-18 DIAGNOSIS — E11.69 DIABETES MELLITUS TYPE 2 IN OBESE: ICD-10-CM

## 2023-10-18 DIAGNOSIS — G47.33 OBSTRUCTIVE SLEEP APNEA: ICD-10-CM

## 2023-10-18 DIAGNOSIS — F43.23 ADJUSTMENT DISORDER WITH MIXED ANXIETY AND DEPRESSED MOOD: ICD-10-CM

## 2023-10-18 DIAGNOSIS — Z99.89 DEPENDENCE ON OTHER ENABLING MACHINES AND DEVICES: ICD-10-CM

## 2023-10-18 DIAGNOSIS — Z00.00 ENCOUNTER FOR MEDICARE ANNUAL WELLNESS EXAM: ICD-10-CM

## 2023-10-18 DIAGNOSIS — D69.6 THROMBOCYTOPENIA, UNSPECIFIED: ICD-10-CM

## 2023-10-18 DIAGNOSIS — E78.2 HYPERLIPIDEMIA, MIXED: ICD-10-CM

## 2023-10-18 DIAGNOSIS — Z86.711 HISTORY OF PULMONARY EMBOLISM: ICD-10-CM

## 2023-10-18 DIAGNOSIS — Z00.00 ENCOUNTER FOR PREVENTIVE HEALTH EXAMINATION: Primary | ICD-10-CM

## 2023-10-18 DIAGNOSIS — Z95.2 S/P AVR: ICD-10-CM

## 2023-10-18 DIAGNOSIS — Z85.3 HISTORY OF BREAST CANCER: ICD-10-CM

## 2023-10-18 DIAGNOSIS — E08.22 DIABETES MELLITUS DUE TO UNDERLYING CONDITION WITH STAGE 3 CHRONIC KIDNEY DISEASE, WITHOUT LONG-TERM CURRENT USE OF INSULIN, UNSPECIFIED WHETHER STAGE 3A OR 3B CKD: ICD-10-CM

## 2023-10-18 DIAGNOSIS — R26.9 ABNORMALITY OF GAIT AND MOBILITY: ICD-10-CM

## 2023-10-18 DIAGNOSIS — E03.9 ACQUIRED HYPOTHYROIDISM: ICD-10-CM

## 2023-10-18 DIAGNOSIS — E66.9 DIABETES MELLITUS TYPE 2 IN OBESE: ICD-10-CM

## 2023-10-18 DIAGNOSIS — I10 ESSENTIAL HYPERTENSION: ICD-10-CM

## 2023-10-18 PROBLEM — S80.02XA CONTUSION, KNEE AND LOWER LEG, LEFT, INITIAL ENCOUNTER: Status: RESOLVED | Noted: 2018-02-02 | Resolved: 2023-10-18

## 2023-10-18 PROBLEM — S80.12XA CONTUSION, KNEE AND LOWER LEG, LEFT, INITIAL ENCOUNTER: Status: RESOLVED | Noted: 2018-02-02 | Resolved: 2023-10-18

## 2023-10-18 PROBLEM — R42 LIGHTHEADEDNESS: Status: RESOLVED | Noted: 2019-08-31 | Resolved: 2023-10-18

## 2023-10-18 PROBLEM — R58 BLEEDING: Status: RESOLVED | Noted: 2019-09-01 | Resolved: 2023-10-18

## 2023-10-18 PROBLEM — R22.9 SOFT TISSUE SWELLING: Status: RESOLVED | Noted: 2019-08-13 | Resolved: 2023-10-18

## 2023-10-18 PROBLEM — T14.8XXA SURGICAL WOUND PRESENT: Status: RESOLVED | Noted: 2023-05-11 | Resolved: 2023-10-18

## 2023-10-18 PROBLEM — Z01.818 PRE-OP TESTING: Status: RESOLVED | Noted: 2023-05-04 | Resolved: 2023-10-18

## 2023-10-18 PROCEDURE — 3074F SYST BP LT 130 MM HG: CPT | Mod: HCNC,CPTII,S$GLB, | Performed by: NURSE PRACTITIONER

## 2023-10-18 PROCEDURE — G0439 PR MEDICARE ANNUAL WELLNESS SUBSEQUENT VISIT: ICD-10-PCS | Mod: HCNC,S$GLB,, | Performed by: NURSE PRACTITIONER

## 2023-10-18 PROCEDURE — 3078F PR MOST RECENT DIASTOLIC BLOOD PRESSURE < 80 MM HG: ICD-10-PCS | Mod: HCNC,CPTII,S$GLB, | Performed by: NURSE PRACTITIONER

## 2023-10-18 PROCEDURE — 99999 PR PBB SHADOW E&M-EST. PATIENT-LVL V: CPT | Mod: PBBFAC,HCNC,, | Performed by: NURSE PRACTITIONER

## 2023-10-18 PROCEDURE — 3288F PR FALLS RISK ASSESSMENT DOCUMENTED: ICD-10-PCS | Mod: HCNC,CPTII,S$GLB, | Performed by: NURSE PRACTITIONER

## 2023-10-18 PROCEDURE — 1159F PR MEDICATION LIST DOCUMENTED IN MEDICAL RECORD: ICD-10-PCS | Mod: HCNC,CPTII,S$GLB, | Performed by: NURSE PRACTITIONER

## 2023-10-18 PROCEDURE — 3074F PR MOST RECENT SYSTOLIC BLOOD PRESSURE < 130 MM HG: ICD-10-PCS | Mod: HCNC,CPTII,S$GLB, | Performed by: NURSE PRACTITIONER

## 2023-10-18 PROCEDURE — 1160F RVW MEDS BY RX/DR IN RCRD: CPT | Mod: HCNC,CPTII,S$GLB, | Performed by: NURSE PRACTITIONER

## 2023-10-18 PROCEDURE — 99999 PR PBB SHADOW E&M-EST. PATIENT-LVL V: ICD-10-PCS | Mod: PBBFAC,HCNC,, | Performed by: NURSE PRACTITIONER

## 2023-10-18 PROCEDURE — 3078F DIAST BP <80 MM HG: CPT | Mod: HCNC,CPTII,S$GLB, | Performed by: NURSE PRACTITIONER

## 2023-10-18 PROCEDURE — G0439 PPPS, SUBSEQ VISIT: HCPCS | Mod: HCNC,S$GLB,, | Performed by: NURSE PRACTITIONER

## 2023-10-18 PROCEDURE — 1157F PR ADVANCE CARE PLAN OR EQUIV PRESENT IN MEDICAL RECORD: ICD-10-PCS | Mod: HCNC,CPTII,S$GLB, | Performed by: NURSE PRACTITIONER

## 2023-10-18 PROCEDURE — 1170F FXNL STATUS ASSESSED: CPT | Mod: HCNC,CPTII,S$GLB, | Performed by: NURSE PRACTITIONER

## 2023-10-18 PROCEDURE — 1125F PR PAIN SEVERITY QUANTIFIED, PAIN PRESENT: ICD-10-PCS | Mod: HCNC,CPTII,S$GLB, | Performed by: NURSE PRACTITIONER

## 2023-10-18 PROCEDURE — 1160F PR REVIEW ALL MEDS BY PRESCRIBER/CLIN PHARMACIST DOCUMENTED: ICD-10-PCS | Mod: HCNC,CPTII,S$GLB, | Performed by: NURSE PRACTITIONER

## 2023-10-18 PROCEDURE — 1159F MED LIST DOCD IN RCRD: CPT | Mod: HCNC,CPTII,S$GLB, | Performed by: NURSE PRACTITIONER

## 2023-10-18 PROCEDURE — 1170F PR FUNCTIONAL STATUS ASSESSED: ICD-10-PCS | Mod: HCNC,CPTII,S$GLB, | Performed by: NURSE PRACTITIONER

## 2023-10-18 PROCEDURE — 3288F FALL RISK ASSESSMENT DOCD: CPT | Mod: HCNC,CPTII,S$GLB, | Performed by: NURSE PRACTITIONER

## 2023-10-18 PROCEDURE — 1101F PT FALLS ASSESS-DOCD LE1/YR: CPT | Mod: HCNC,CPTII,S$GLB, | Performed by: NURSE PRACTITIONER

## 2023-10-18 PROCEDURE — 1101F PR PT FALLS ASSESS DOC 0-1 FALLS W/OUT INJ PAST YR: ICD-10-PCS | Mod: HCNC,CPTII,S$GLB, | Performed by: NURSE PRACTITIONER

## 2023-10-18 PROCEDURE — 1157F ADVNC CARE PLAN IN RCRD: CPT | Mod: HCNC,CPTII,S$GLB, | Performed by: NURSE PRACTITIONER

## 2023-10-18 PROCEDURE — 1125F AMNT PAIN NOTED PAIN PRSNT: CPT | Mod: HCNC,CPTII,S$GLB, | Performed by: NURSE PRACTITIONER

## 2023-10-18 NOTE — PATIENT INSTRUCTIONS
Counseling and Referral of Other Preventative  (Italic type indicates deductible and co-insurance are waived)    Patient Name: Alina Narayan  Today's Date: 10/18/2023    Health Maintenance       Date Due Completion Date    Influenza Vaccine (1) 09/01/2023 10/10/2022    COVID-19 Vaccine (3 - 2023-24 season) 12/12/2112 (Originally 9/1/2023) 1/27/2021    Diabetes Urine Screening 03/02/2024 3/2/2023    Lipid Panel 03/02/2024 3/2/2023    Hemoglobin A1c 03/25/2024 9/25/2023    TETANUS VACCINE 02/26/2025 2/26/2015    Colonoscopy 01/05/2028 1/5/2023    DEXA Scan 09/07/2028 9/7/2023        No orders of the defined types were placed in this encounter.    The following information is provided to all patients.  This information is to help you find resources for any of the problems found today that may be affecting your health:                Living healthy guide: www.[facility].[place].gov      Understanding Diabetes: www.diabetes.org      Eating healthy: www.cdc.gov/healthyweight      CDC home safety checklist: www.cdc.gov/steadi/patient.html      Agency on Aging: www.goea.[place].gov      Alcoholics anonymous (AA): www.aa.org      Physical Activity: www.mervin.nih.gov/qt1udwh      Tobacco use: www.quitwithusla.org

## 2023-11-03 ENCOUNTER — PATIENT OUTREACH (OUTPATIENT)
Dept: ADMINISTRATIVE | Facility: HOSPITAL | Age: 78
End: 2023-11-03
Payer: MEDICARE

## 2023-12-15 ENCOUNTER — HOSPITAL ENCOUNTER (OUTPATIENT)
Dept: RADIOLOGY | Facility: HOSPITAL | Age: 78
Discharge: HOME | End: 2023-12-15
Attending: INTERNAL MEDICINE
Payer: MEDICARE

## 2023-12-15 DIAGNOSIS — Z12.31 ENCOUNTER FOR SCREENING MAMMOGRAM FOR HIGH-RISK PATIENT: ICD-10-CM

## 2023-12-15 PROCEDURE — 77067 MAMMO DIGITAL SCREENING LEFT WITH TOMO: ICD-10-PCS | Mod: 26,52,HCNC, | Performed by: RADIOLOGY

## 2023-12-15 PROCEDURE — 77063 BREAST TOMOSYNTHESIS BI: CPT | Mod: 26,52,HCNC, | Performed by: RADIOLOGY

## 2023-12-15 PROCEDURE — 77067 SCR MAMMO BI INCL CAD: CPT | Mod: TC,52,HCNC

## 2023-12-15 PROCEDURE — 77063 MAMMO DIGITAL SCREENING LEFT WITH TOMO: ICD-10-PCS | Mod: 26,52,HCNC, | Performed by: RADIOLOGY

## 2023-12-15 PROCEDURE — 77067 SCR MAMMO BI INCL CAD: CPT | Mod: 26,52,HCNC, | Performed by: RADIOLOGY

## 2023-12-19 ENCOUNTER — OFFICE VISIT (OUTPATIENT)
Dept: HEMATOLOGY/ONCOLOGY | Facility: CLINIC | Age: 78
End: 2023-12-19
Payer: MEDICARE

## 2023-12-19 VITALS
TEMPERATURE: 98 F | HEART RATE: 97 BPM | WEIGHT: 244.69 LBS | HEIGHT: 62 IN | SYSTOLIC BLOOD PRESSURE: 132 MMHG | BODY MASS INDEX: 45.03 KG/M2 | DIASTOLIC BLOOD PRESSURE: 60 MMHG | RESPIRATION RATE: 20 BRPM

## 2023-12-19 DIAGNOSIS — G89.29 CHRONIC PAIN OF RIGHT UPPER EXTREMITY: ICD-10-CM

## 2023-12-19 DIAGNOSIS — M79.601 CHRONIC PAIN OF RIGHT UPPER EXTREMITY: ICD-10-CM

## 2023-12-19 DIAGNOSIS — Z85.3 HISTORY OF BREAST CANCER: Primary | ICD-10-CM

## 2023-12-19 PROCEDURE — 99999 PR PBB SHADOW E&M-EST. PATIENT-LVL III: CPT | Mod: PBBFAC,HCNC,, | Performed by: INTERNAL MEDICINE

## 2023-12-19 PROCEDURE — 3078F DIAST BP <80 MM HG: CPT | Mod: HCNC,CPTII,S$GLB, | Performed by: INTERNAL MEDICINE

## 2023-12-19 PROCEDURE — 1159F PR MEDICATION LIST DOCUMENTED IN MEDICAL RECORD: ICD-10-PCS | Mod: HCNC,CPTII,S$GLB, | Performed by: INTERNAL MEDICINE

## 2023-12-19 PROCEDURE — 3078F PR MOST RECENT DIASTOLIC BLOOD PRESSURE < 80 MM HG: ICD-10-PCS | Mod: HCNC,CPTII,S$GLB, | Performed by: INTERNAL MEDICINE

## 2023-12-19 PROCEDURE — 1125F AMNT PAIN NOTED PAIN PRSNT: CPT | Mod: HCNC,CPTII,S$GLB, | Performed by: INTERNAL MEDICINE

## 2023-12-19 PROCEDURE — 99213 OFFICE O/P EST LOW 20 MIN: CPT | Mod: HCNC,S$GLB,, | Performed by: INTERNAL MEDICINE

## 2023-12-19 PROCEDURE — 1101F PT FALLS ASSESS-DOCD LE1/YR: CPT | Mod: HCNC,CPTII,S$GLB, | Performed by: INTERNAL MEDICINE

## 2023-12-19 PROCEDURE — 3075F PR MOST RECENT SYSTOLIC BLOOD PRESS GE 130-139MM HG: ICD-10-PCS | Mod: HCNC,CPTII,S$GLB, | Performed by: INTERNAL MEDICINE

## 2023-12-19 PROCEDURE — 1160F PR REVIEW ALL MEDS BY PRESCRIBER/CLIN PHARMACIST DOCUMENTED: ICD-10-PCS | Mod: HCNC,CPTII,S$GLB, | Performed by: INTERNAL MEDICINE

## 2023-12-19 PROCEDURE — 99999 PR PBB SHADOW E&M-EST. PATIENT-LVL III: ICD-10-PCS | Mod: PBBFAC,HCNC,, | Performed by: INTERNAL MEDICINE

## 2023-12-19 PROCEDURE — 3288F PR FALLS RISK ASSESSMENT DOCUMENTED: ICD-10-PCS | Mod: HCNC,CPTII,S$GLB, | Performed by: INTERNAL MEDICINE

## 2023-12-19 PROCEDURE — 99213 PR OFFICE/OUTPT VISIT, EST, LEVL III, 20-29 MIN: ICD-10-PCS | Mod: HCNC,S$GLB,, | Performed by: INTERNAL MEDICINE

## 2023-12-19 PROCEDURE — 1101F PR PT FALLS ASSESS DOC 0-1 FALLS W/OUT INJ PAST YR: ICD-10-PCS | Mod: HCNC,CPTII,S$GLB, | Performed by: INTERNAL MEDICINE

## 2023-12-19 PROCEDURE — 1160F RVW MEDS BY RX/DR IN RCRD: CPT | Mod: HCNC,CPTII,S$GLB, | Performed by: INTERNAL MEDICINE

## 2023-12-19 PROCEDURE — 3288F FALL RISK ASSESSMENT DOCD: CPT | Mod: HCNC,CPTII,S$GLB, | Performed by: INTERNAL MEDICINE

## 2023-12-19 PROCEDURE — 1157F ADVNC CARE PLAN IN RCRD: CPT | Mod: HCNC,CPTII,S$GLB, | Performed by: INTERNAL MEDICINE

## 2023-12-19 PROCEDURE — 3075F SYST BP GE 130 - 139MM HG: CPT | Mod: HCNC,CPTII,S$GLB, | Performed by: INTERNAL MEDICINE

## 2023-12-19 PROCEDURE — 1159F MED LIST DOCD IN RCRD: CPT | Mod: HCNC,CPTII,S$GLB, | Performed by: INTERNAL MEDICINE

## 2023-12-19 PROCEDURE — 1157F PR ADVANCE CARE PLAN OR EQUIV PRESENT IN MEDICAL RECORD: ICD-10-PCS | Mod: HCNC,CPTII,S$GLB, | Performed by: INTERNAL MEDICINE

## 2023-12-19 PROCEDURE — 1125F PR PAIN SEVERITY QUANTIFIED, PAIN PRESENT: ICD-10-PCS | Mod: HCNC,CPTII,S$GLB, | Performed by: INTERNAL MEDICINE

## 2023-12-19 NOTE — PROGRESS NOTES
Subjective:       Patient ID: Alina Narayan is a 78 y.o. female.    Chief Complaint: No chief complaint on file.      HPI 77 Y/O white female who returns for follow-up of triple negative carcinoma of the right breast.  She had complete pathologic response to neoadjuvant chemotherapy.    Her biggest complaint is some chronic back pain.  She has also been some right arm pain and was referred to physical therapy however permanent not been made yet.  She also has some knee pain. Has chronic right chest wall sorenewss since her valve surgery.   has dementia which is gradually progressing that has been a big stressor for her.          She had AVR 5/10/23.  She has a history of pulmonary embolism diagnosed in May of 2019.She is on chronic anticoagulation with low-dose Eliquis.    She is also being followed a small right upper lobe pulmonary nodule.        Previous breast cancer history History:   On September 25, 2012 she underwent a screening mammogram which showed an asymmetric density in the left breast at 2:00 position.  Core needle biopsy on October 1, 2012 showed invasive carcinoma ER 90% positive PA 80% positive and HER-2 negative.   On October 29, 2012 she underwent lumpectomy and sentinel lymph node biopsy. That pathology showed a 7 mm low-grade (1+2+1) infiltrating carcinoma. 3 sentinel lymph nodes were negative. Final pathological stage TIB N0 stage IA.  She completed letrozole therapy in 2017    Recent breast cancer history:  abnormal mammogram of the right breast in December 2018 which showed a 13 mm mass in the right axillary tail.    By ultrasound there was a 6 x 9 x 11 mm intramammary node and a 2nd 5 x 6 x 6 mm hypoechoic mass in the axillary tail.    On January 7, 2019 both masses were biopsied and both the breast mass and  lymph nodes biopsy showed carcinoma which was ER negative PA negative and HER2 negative.  Ki-67 was 40%.    MRI on January 15, 2019 showed 2 right axillary lymph nodes the  largest measured 1.4 x 1.0 cm.  There were no abnormalities in the right breast.    PET scan was then performed which showed only low-grade activity in the right axilla with an SUV of 1.32.      She  received 12 cycles of weekly Taxol and 4 cycles of CMF which she completed on July 30th, 2019.    On August 26, 2019 right mastectomy performed showed complete pathological response in the right breast and axilla.      Review of Systems   Constitutional:  Negative for activity change, appetite change, fever and unexpected weight change.   HENT:  Negative for mouth sores.    Eyes:  Negative for visual disturbance.   Respiratory:  Positive for shortness of breath. Negative for cough.    Cardiovascular:  Positive for chest pain (chest wall).   Gastrointestinal:  Negative for abdominal pain and diarrhea.   Genitourinary:  Negative for frequency.   Musculoskeletal:  Negative for back pain.   Integumentary:  Negative for rash.   Neurological:  Negative for headaches.   Hematological:  Negative for adenopathy.   Psychiatric/Behavioral:  The patient is nervous/anxious.          Objective:      Physical Exam  Vitals reviewed.   Constitutional:       General: She is not in acute distress.     Appearance: She is obese. She is not ill-appearing.   Eyes:      General: No scleral icterus.  Cardiovascular:      Rate and Rhythm: Normal rate and regular rhythm.   Pulmonary:      Effort: Pulmonary effort is normal. No respiratory distress.      Breath sounds: Normal breath sounds. No wheezing or rales.   Chest:   Breasts:     Left: Normal. No mass.       Abdominal:      Palpations: Abdomen is soft.   Lymphadenopathy:      Cervical: No cervical adenopathy.      Upper Body:      Right upper body: No supraclavicular or axillary adenopathy.      Left upper body: No supraclavicular or axillary adenopathy.   Skin:     Findings: No rash.   Neurological:      Mental Status: She is alert and oriented to person, place, and time.   Psychiatric:          Mood and Affect: Mood normal.         Behavior: Behavior normal.         Thought Content: Thought content normal.         Judgment: Judgment normal.         Assessment:     Mammogram - pending  1. History of breast cancer        Plan:       RTC 6 months       Route Chart for Scheduling    Med Onc Chart Routing      Follow up with physician 6 months.   Follow up with ALEKSANDRA    Infusion scheduling note    Injection scheduling note    Labs None   Scheduling:  Preferred lab:  Lab interval:     Imaging None      Pharmacy appointment No pharmacy appointment needed      Other referrals no referral to Oncology Primary Care needed -  no Massage appointment needed    Additional referrals needed  PT

## 2023-12-21 ENCOUNTER — PATIENT MESSAGE (OUTPATIENT)
Dept: INTERNAL MEDICINE | Facility: CLINIC | Age: 78
End: 2023-12-21
Payer: MEDICARE

## 2024-01-17 ENCOUNTER — PATIENT MESSAGE (OUTPATIENT)
Dept: CARDIOLOGY | Facility: CLINIC | Age: 79
End: 2024-01-17
Payer: MEDICARE

## 2024-01-17 DIAGNOSIS — Z95.2 S/P AVR: ICD-10-CM

## 2024-01-17 DIAGNOSIS — I10 ESSENTIAL HYPERTENSION: ICD-10-CM

## 2024-01-17 RX ORDER — CARVEDILOL 25 MG/1
25 TABLET ORAL 2 TIMES DAILY
Qty: 180 TABLET | Refills: 3 | Status: CANCELLED
Start: 2024-01-17

## 2024-01-17 RX ORDER — CARVEDILOL 12.5 MG/1
12.5 TABLET ORAL 2 TIMES DAILY
COMMUNITY
End: 2024-03-25

## 2024-01-18 RX ORDER — CARVEDILOL 12.5 MG/1
12.5 TABLET ORAL 2 TIMES DAILY WITH MEALS
Qty: 180 TABLET | Refills: 3 | Status: SHIPPED | OUTPATIENT
Start: 2024-01-18

## 2024-01-30 ENCOUNTER — OFFICE VISIT (OUTPATIENT)
Dept: ORTHOPEDICS | Facility: CLINIC | Age: 79
End: 2024-01-30
Payer: MEDICARE

## 2024-01-30 VITALS — BODY MASS INDEX: 43.24 KG/M2 | WEIGHT: 235 LBS | HEIGHT: 62 IN

## 2024-01-30 DIAGNOSIS — M25.562 ACUTE PAIN OF LEFT KNEE: ICD-10-CM

## 2024-01-30 DIAGNOSIS — M17.0 PRIMARY OSTEOARTHRITIS OF BOTH KNEES: Primary | ICD-10-CM

## 2024-01-30 PROCEDURE — 1159F MED LIST DOCD IN RCRD: CPT | Mod: HCNC,CPTII,S$GLB, | Performed by: PHYSICIAN ASSISTANT

## 2024-01-30 PROCEDURE — 99214 OFFICE O/P EST MOD 30 MIN: CPT | Mod: HCNC,S$GLB,, | Performed by: PHYSICIAN ASSISTANT

## 2024-01-30 PROCEDURE — 1157F ADVNC CARE PLAN IN RCRD: CPT | Mod: HCNC,CPTII,S$GLB, | Performed by: PHYSICIAN ASSISTANT

## 2024-01-30 PROCEDURE — 1160F RVW MEDS BY RX/DR IN RCRD: CPT | Mod: HCNC,CPTII,S$GLB, | Performed by: PHYSICIAN ASSISTANT

## 2024-01-30 PROCEDURE — 1101F PT FALLS ASSESS-DOCD LE1/YR: CPT | Mod: HCNC,CPTII,S$GLB, | Performed by: PHYSICIAN ASSISTANT

## 2024-01-30 PROCEDURE — 3288F FALL RISK ASSESSMENT DOCD: CPT | Mod: HCNC,CPTII,S$GLB, | Performed by: PHYSICIAN ASSISTANT

## 2024-01-30 PROCEDURE — 99999 PR PBB SHADOW E&M-EST. PATIENT-LVL IV: CPT | Mod: PBBFAC,HCNC,, | Performed by: PHYSICIAN ASSISTANT

## 2024-01-30 PROCEDURE — 1125F AMNT PAIN NOTED PAIN PRSNT: CPT | Mod: HCNC,CPTII,S$GLB, | Performed by: PHYSICIAN ASSISTANT

## 2024-01-30 NOTE — PROGRESS NOTES
SUBJECTIVE:     Chief Complaint   Patient presents with    Left Knee - Pain    Right Knee - Pain       History of Present Illness:  Alina Narayan is a 78 y.o. year old female here with complaints of constant bilateral knee pain which started years ago.  She had episode of severe pain a couple of weeks ago when she felt a pop in her left knee. She was seen in the ED for this and advised to keep her knee elevated and limit weightbearing. The pain is located in the medial aspect of the knee.  The pain is described as achy. There is not catching or locking.  Aggravating factors include standing and walking.  Previous treatments include rest, elevation, tyelenol which have provided minimal relief.  There is not a history of previous injury or surgery to the knee.  Was not a surgical candidate a  couple of years ago.  Since then she has had cardiac valve replacement and underwent chemo for breast cancer. She also has history of PE. She ambulates with a walker.    Review of patient's allergies indicates:   Allergen Reactions    Augmentin [amoxicillin-pot clavulanate] Diarrhea    Dilaudid [hydromorphone (bulk)] Other (See Comments)     Other reaction(s): sedation    Hydromorphone Other (See Comments)     Other reaction(s): sedation    Cymbalta [duloxetine]      Dry mouth, agitation    Jardiance [empagliflozin] Hives    Byetta [exenatide] Rash     Other reaction(s): Rash         Current Outpatient Medications   Medication Sig Dispense Refill    amoxicillin (AMOXIL) 500 MG Tab 4 tablets one hour prior to dental appointment 28 tablet 1    apixaban (ELIQUIS) 2.5 mg Tab Take 1 tablet (2.5 mg total) by mouth 2 (two) times daily. 180 tablet 3    aspirin (ECOTRIN) 81 MG EC tablet Take 81 mg by mouth once daily.      carvediloL (COREG) 12.5 MG tablet Take 1 tablet (12.5 mg total) by mouth 2 (two) times daily with meals. 180 tablet 3    carvediloL (COREG) 12.5 MG tablet Take 12.5 mg by mouth 2 (two) times daily.      carvediloL  (COREG) 25 MG tablet Take 1 tablet (25 mg total) by mouth 2 (two) times daily. .5-1 twice daily or as directed 180 tablet 3    CHOLECALCIFEROL, VITAMIN D3, (VITAMIN D3 ORAL) Take 3,000 capsules by mouth once daily. 3000 IU per day      furosemide (LASIX) 20 MG tablet Take 1 tablet (20 mg total) by mouth once daily. 90 tablet 3    gabapentin (NEURONTIN) 300 MG capsule TAKE 1 CAPSULE EVERY MORNING AND TAKE 2 CAPSULES AT BEDTIME 270 capsule 3    glimepiride (AMARYL) 2 MG tablet Take 1 tablet (2 mg total) by mouth daily with breakfast. 90 tablet 3    lancets (ACCU-CHEK SOFTCLIX LANCETS) Misc TrueTest lancets for meter covered by insurance - pt checks twice daily for uncontrolled glucoses E11.65, 200 each 3    levothyroxine (SYNTHROID) 75 MCG tablet TAKE 1 TABLET BEFORE BREAKFAST 90 tablet 3    metFORMIN (GLUCOPHAGE) 500 MG tablet Take 1 tablet (500 mg total) by mouth 2 (two) times daily with meals. 180 tablet 3    pravastatin (PRAVACHOL) 20 MG tablet TAKE 1 TABLET EVERY DAY 90 tablet 3    triamcinolone acetonide 0.1%-magnesium hydroxide 400 mg/5 ml-ciclopirox Apply to affected area under the breast twice daily as directed 60 g 3    TRUE METRIX GLUCOSE TEST STRIP Strp TEST BLOOD SUGAR TWICE DAILY 200 strip 3    blood-glucose meter kit True Test or meter covered by insurance - pt checks glucose twice daily for uncontrolled glucoses E11.65 1 each 0     No current facility-administered medications for this visit.       Past Medical History:   Diagnosis Date    Allergy     seasonal    Anxiety     Arthritis     BRCA1 negative     Breast cancer 10/2012    left breast invasive ductal carcinoma    Colon polyp     Depression     Diabetes mellitus     Type 2    Diabetes mellitus, type 2     Diverticular disease     Diverticulitis 2009    Genetic testing 05/2017    negative Integrated BRACAnalysis    Hyperlipidemia     Hypertension     Morbid obesity     MITCHELL (obstructive sleep apnea)     Pulmonary embolism     Stenosis     Stenosis  and insufficiency of lacrimal passages     Thyroid disease        Past Surgical History:   Procedure Laterality Date    AORTIC VALVE REPLACEMENT N/A 5/10/2023    Procedure: Replacement-valve-aortic, redo sternotomy;  Surgeon: Venkat Webb MD;  Location: [facility] OR [facility];  Service: Cardiothoracic;  Laterality: N/A;  Redo sternotomy    BREAST BIOPSY Left 10/01/2012    left breast- invasive ductal carcinoma    BREAST BIOPSY Left 12/2017    BREAST CYST ASPIRATION      BREAST LUMPECTOMY Left 2012    BREAST MASS EXCISION      CARDIAC VALVE REPLACEMENT  12/2013    CARDIAC VALVE SURGERY  2013    CARPAL TUNNEL RELEASE      Left    COLONOSCOPY N/A 09/29/2017    Procedure: COLONOSCOPY;  Surgeon: Phani Worley MD;  Location: [facility] ENDO (4TH FLR);  Service: Endoscopy;  Laterality: N/A;    COLONOSCOPY N/A 1/5/2023    Procedure: COLONOSCOPY;  Surgeon: Eladia Martino MD;  Location: [facility] ENDO (4TH FLR);  Service: Endoscopy;  Laterality: N/A;  instr via portal  ok to hole Eliquis-see encounter 12/9-MS  1/4 pateint cannot come earlier-rt    CORONARY ANGIOGRAPHY Left 4/13/2023    Procedure: ANGIOGRAM, CORONARY ARTERY;  Surgeon: Eze Sales MD;  Location: [facility] CATH LAB;  Service: Cardiology;  Laterality: Left;  low bleeding risk 2.9%    DILATION AND CURETTAGE OF UTERUS  1999    Endometrial polyps    ENDOMETRIAL ABLATION  1999    Enodmetrial polyps    EYE SURGERY      INSERTION OF TUNNELED CENTRAL VENOUS CATHETER (CVC) WITH SUBCUTANEOUS PORT Right 02/01/2019    Procedure: NULIVDEEB-AVCF-C-CATH;  Surgeon: Gaston Flores MD;  Location: [address];  Service: General;  Laterality: Right;    left lumpectomy  2012    MASTECTOMY      MEDIPORT REMOVAL Right 08/26/2019    Procedure: REMOVAL, CATHETER, CENTRAL VENOUS, TUNNELED, WITH PORT;  Surgeon: Gaston Flores MD;  Location: [address];  Service: General;  Laterality: Right;    MODIFIED RADICAL MASTECTOMY W/ AXILLARY LYMPH NODE DISSECTION Right 08/26/2019  "   Procedure: MASTECTOMY, MODIFIED RADICAL;  Surgeon: Gaston Flores MD;  Location: [facility] OR [address] [place] [address];  Service: General;  Laterality: Right;    SHOULDER SURGERY      SKIN BIOPSY           Review of Systems:  ROS:  Constitutional: no fever or chills  Eyes: no visual changes  ENT: no nasal congestion or sore throat  Respiratory: no cough or shortness of breath  Musculoskeletal: no arthralgias or myalgias      OBJECTIVE:     PHYSICAL EXAM:  Height: 5' 2.01" (157.5 cm) Weight: 106.6 kg (235 lb)   General: Well developed, well nourished, in no acute distress.  Neurological: Mood & affect are normal.  HEENT: NCAT, sclera nonicteric   Lungs: Respirations are equal and unlabored.   CV: 2+ bilateral upper and lower extremity pulses.   Skin: Intact throughout with no rashes, erythema, or lesions  Extremities: No LE edema, no erythema or warmth of the skin in either lower extremity.    left Knee Exam:  Knee Range of Motion: 0-110   Effusion: none  Condition of skin: intact  Location of tenderness: Medial joint line   Strength: 5 of 5 quadriceps strength and 5 of 5 hamstring strength  Stability:  stable to testing  Varus /Valgus stress:  normal      right  Knee Exam:  Knee Range of Motion:0-120   Effusion:none  Condition of skin: intact  Location of tenderness: None   Strength:5 of 5 quadriceps strength and 5 of 5 hamstring strength  Stability: stable to testing  Varus /Valgus stress: normal    IMAGING:    Previous X-rays of the bilateral knee and recent left knee x-ray, personally reviewed by me, demonstrate severe OA left knee.  No fracture or dislocation.       ASSESSMENT     1. Primary osteoarthritis of both knees    2. Acute pain of left knee        PLAN:     We discussed with the patient at length all the different treatment options available for the knee including NSAIDS, acetaminophen, rest, ice, knee strengthening exercise, steroid injections for temporary relief, Viscosupplementation, and knee arthroplasty.     - " We discussed options at length today.  She understands risks of surgery given her underlying medical condition  - did not have much relief with HA in the past  - told she could not have steroids in past- A1c has improved however and I suspect that was the reason.  Offered to try this today but she declined  - Ok to be FWBAT.  Continue use of cane or walker for support  - Knee brace provided to help with buckling and instability  - She would like to return to aquatic therapy  - Follow up as needed

## 2024-02-12 DIAGNOSIS — I35.0 NONRHEUMATIC AORTIC VALVE STENOSIS: ICD-10-CM

## 2024-02-12 DIAGNOSIS — Z00.6 RESEARCH STUDY PATIENT: Primary | ICD-10-CM

## 2024-02-22 RX ORDER — PRAVASTATIN SODIUM 20 MG/1
TABLET ORAL
Qty: 90 TABLET | Refills: 0 | Status: SHIPPED | OUTPATIENT
Start: 2024-02-22 | End: 2024-03-25 | Stop reason: SDUPTHER

## 2024-02-22 NOTE — TELEPHONE ENCOUNTER
Care Due:                  Date            Visit Type   Department     Provider  --------------------------------------------------------------------------------                                MYCHART                              FOLLOWUP/OF  [facility] PRIMARY  Last Visit: 09-      FICE VISIT   CARE           Aarti Dunn                               -                              PRIMARY      [facility] INTERNAL  Next Visit: 03-      CARE ([facility])   MEDICINE       aArti Dunn                                                            Last  Test          Frequency    Reason                     Performed    Due Date  --------------------------------------------------------------------------------    HBA1C.......  6 months...  glimepiride, metFORMIN...  09- 03-    Lipid Panel.  12 months..  pravastatin..............  03- 02-    [facility] Embedded Care Due Messages. Reference number: [id number].   2/22/2024 1:12:54 PM CST

## 2024-02-23 NOTE — TELEPHONE ENCOUNTER
Provider Staff:  Action required for this patient    Requires labs hba1c; lipid panel      Please see care gap opportunities below in Care Due Message.    Thanks!  [facility]     Appointments      Date Provider   Last Visit   9/25/2023 Aarti Dunn MD   Next Visit   3/25/2024 Aarti Dunn MD     Refill Decision Note   Alina Narayan  is requesting a refill authorization.  Brief Assessment and Rationale for Refill:  Approve     Medication Therapy Plan: hba1c; lipid panel      Extended chart review required: Yes   Comments:     Note composed:8:40 PM 02/22/2024

## 2024-03-25 ENCOUNTER — OFFICE VISIT (OUTPATIENT)
Dept: INTERNAL MEDICINE | Facility: CLINIC | Age: 79
End: 2024-03-25
Payer: MEDICARE

## 2024-03-25 VITALS
BODY MASS INDEX: 45.74 KG/M2 | DIASTOLIC BLOOD PRESSURE: 74 MMHG | HEART RATE: 74 BPM | WEIGHT: 248.56 LBS | OXYGEN SATURATION: 97 % | SYSTOLIC BLOOD PRESSURE: 136 MMHG | HEIGHT: 62 IN

## 2024-03-25 DIAGNOSIS — Z85.3 HISTORY OF BREAST CANCER: ICD-10-CM

## 2024-03-25 DIAGNOSIS — R19.05 PERIUMBILICAL MASS: ICD-10-CM

## 2024-03-25 DIAGNOSIS — E55.9 MILD VITAMIN D DEFICIENCY: ICD-10-CM

## 2024-03-25 DIAGNOSIS — E78.2 HYPERLIPIDEMIA, MIXED: ICD-10-CM

## 2024-03-25 DIAGNOSIS — E03.9 HYPOTHYROIDISM, UNSPECIFIED TYPE: ICD-10-CM

## 2024-03-25 DIAGNOSIS — N18.31 DIABETES MELLITUS DUE TO UNDERLYING CONDITION WITH STAGE 3A CHRONIC KIDNEY DISEASE, WITHOUT LONG-TERM CURRENT USE OF INSULIN: ICD-10-CM

## 2024-03-25 DIAGNOSIS — I70.0 ATHEROSCLEROSIS OF AORTA: ICD-10-CM

## 2024-03-25 DIAGNOSIS — D69.6 THROMBOCYTOPENIA, UNSPECIFIED: ICD-10-CM

## 2024-03-25 DIAGNOSIS — E08.22 DIABETES MELLITUS DUE TO UNDERLYING CONDITION WITH STAGE 3A CHRONIC KIDNEY DISEASE, WITHOUT LONG-TERM CURRENT USE OF INSULIN: ICD-10-CM

## 2024-03-25 DIAGNOSIS — R53.81 DEBILITY: Primary | ICD-10-CM

## 2024-03-25 DIAGNOSIS — E03.9 ACQUIRED HYPOTHYROIDISM: ICD-10-CM

## 2024-03-25 DIAGNOSIS — E11.69 DIABETES MELLITUS TYPE 2 IN OBESE: ICD-10-CM

## 2024-03-25 DIAGNOSIS — E66.9 DIABETES MELLITUS TYPE 2 IN OBESE: ICD-10-CM

## 2024-03-25 DIAGNOSIS — I10 ESSENTIAL HYPERTENSION: ICD-10-CM

## 2024-03-25 PROCEDURE — 1101F PT FALLS ASSESS-DOCD LE1/YR: CPT | Mod: HCNC,CPTII,S$GLB, | Performed by: INTERNAL MEDICINE

## 2024-03-25 PROCEDURE — 3075F SYST BP GE 130 - 139MM HG: CPT | Mod: HCNC,CPTII,S$GLB, | Performed by: INTERNAL MEDICINE

## 2024-03-25 PROCEDURE — 1125F AMNT PAIN NOTED PAIN PRSNT: CPT | Mod: HCNC,CPTII,S$GLB, | Performed by: INTERNAL MEDICINE

## 2024-03-25 PROCEDURE — 99999 PR PBB SHADOW E&M-EST. PATIENT-LVL V: CPT | Mod: PBBFAC,HCNC,, | Performed by: INTERNAL MEDICINE

## 2024-03-25 PROCEDURE — 99214 OFFICE O/P EST MOD 30 MIN: CPT | Mod: HCNC,S$GLB,, | Performed by: INTERNAL MEDICINE

## 2024-03-25 PROCEDURE — 1159F MED LIST DOCD IN RCRD: CPT | Mod: HCNC,CPTII,S$GLB, | Performed by: INTERNAL MEDICINE

## 2024-03-25 PROCEDURE — 3288F FALL RISK ASSESSMENT DOCD: CPT | Mod: HCNC,CPTII,S$GLB, | Performed by: INTERNAL MEDICINE

## 2024-03-25 PROCEDURE — 1157F ADVNC CARE PLAN IN RCRD: CPT | Mod: HCNC,CPTII,S$GLB, | Performed by: INTERNAL MEDICINE

## 2024-03-25 PROCEDURE — 3078F DIAST BP <80 MM HG: CPT | Mod: HCNC,CPTII,S$GLB, | Performed by: INTERNAL MEDICINE

## 2024-03-25 RX ORDER — PRAVASTATIN SODIUM 20 MG/1
20 TABLET ORAL DAILY
Qty: 90 TABLET | Refills: 3 | Status: SHIPPED | OUTPATIENT
Start: 2024-03-25

## 2024-03-25 RX ORDER — LEVOTHYROXINE SODIUM 75 UG/1
TABLET ORAL
Qty: 90 TABLET | Refills: 3 | Status: SHIPPED | OUTPATIENT
Start: 2024-03-25

## 2024-03-25 RX ORDER — KETOCONAZOLE 20 MG/G
CREAM TOPICAL 2 TIMES DAILY
Qty: 60 G | Refills: 3 | Status: SHIPPED | OUTPATIENT
Start: 2024-03-25

## 2024-03-25 RX ORDER — HYDROCORTISONE 25 MG/G
CREAM TOPICAL 2 TIMES DAILY
Qty: 28 G | Refills: 2 | Status: SHIPPED | OUTPATIENT
Start: 2024-03-25

## 2024-03-25 NOTE — PROGRESS NOTES
Subjective:       Patient ID: Alina Narayan is a 78 y.o. female.    Chief Complaint: Follow-up, Diabetes, and Neck Pain    Follow-up  Associated symptoms include neck pain. Pertinent negatives include no abdominal pain, chest pain (arm pain or jaw pain), headaches, nausea or vomiting.   Diabetes  Pertinent negatives for hypoglycemia include no headaches or seizures. Pertinent negatives for diabetes include no chest pain (arm pain or jaw pain).   Neck Pain   Pertinent negatives include no chest pain (arm pain or jaw pain) or headaches.   Pt is feeling well except her mobility is poor - not able to walk much or get into the pool.  No CP or SOB.  Has a possible hernia on the R side near her umbilicus.  Review of Systems   Respiratory:  Negative for shortness of breath (PND or orthopnea).    Cardiovascular:  Negative for chest pain (arm pain or jaw pain).   Gastrointestinal:  Negative for abdominal pain, diarrhea, nausea and vomiting.   Genitourinary:  Negative for dysuria.   Musculoskeletal:  Positive for neck pain.   Neurological:  Negative for seizures, syncope and headaches.       Objective:      Physical Exam  Constitutional:       General: She is not in acute distress.     Appearance: She is well-developed.   HENT:      Head: Normocephalic.   Eyes:      Pupils: Pupils are equal, round, and reactive to light.   Neck:      Thyroid: No thyromegaly.      Vascular: No JVD.   Cardiovascular:      Rate and Rhythm: Normal rate and regular rhythm.      Heart sounds: Normal heart sounds. No murmur heard.     No friction rub. No gallop.   Pulmonary:      Effort: Pulmonary effort is normal.      Breath sounds: Normal breath sounds. No wheezing or rales.   Abdominal:      General: Bowel sounds are normal. There is no distension.      Palpations: Abdomen is soft. There is no mass.      Tenderness: There is no abdominal tenderness. There is no guarding or rebound.   Musculoskeletal:      Cervical back: Neck supple.    Lymphadenopathy:      Cervical: No cervical adenopathy.   Skin:     General: Skin is warm and dry.   Neurological:      Mental Status: She is alert and oriented to person, place, and time.      Deep Tendon Reflexes: Reflexes are normal and symmetric.   Psychiatric:         Behavior: Behavior normal.         Thought Content: Thought content normal.         Judgment: Judgment normal.         Assessment:       1. Debility    2. Acquired hypothyroidism    3. Essential hypertension    4. History of breast cancer    5. Hyperlipidemia, mixed    6. Diabetes mellitus type 2 in obese    7. Mild vitamin D deficiency    8. Periumbilical mass    9. Hypothyroidism, unspecified type    10. Thrombocytopenia, unspecified    11. Diabetes mellitus due to underlying condition with stage 3a chronic kidney disease, without long-term current use of insulin    12. Atherosclerosis of aorta        Plan:   Debility  -     Ambulatory referral/consult to Physical/Occupational Therapy; Future; Expected date: 04/01/2024    Acquired hypothyroidism  -     TSH; Future; Expected date: 03/25/2024    Essential hypertension  -     CBC Auto Differential; Future; Expected date: 03/25/2024  -     Comprehensive Metabolic Panel; Future; Expected date: 03/25/2024    History of breast cancer  LICO  Hyperlipidemia, mixed  -     Lipid Panel; Future; Expected date: 03/25/2024    Diabetes mellitus type 2 in obese  -     Hemoglobin A1C; Future; Expected date: 03/25/2024  -     Urinalysis; Future; Expected date: 03/25/2024  -     Microalbumin/Creatinine Ratio, Urine; Future; Expected date: 03/25/2024    Mild vitamin D deficiency  -     Vitamin D; Future; Expected date: 03/25/2024    Periumbilical mass  -     CT Abdomen Pelvis  Without Contrast; Future; Expected date: 03/25/2024    Hypothyroidism, unspecified type  -     levothyroxine (SYNTHROID) 75 MCG tablet; TAKE 1 TABLET BEFORE BREAKFAST  Dispense: 90 tablet; Refill: 3    Thrombocytopenia, unspecified  Stable  and monitored  Diabetes mellitus due to underlying condition with stage 3a chronic kidney disease, without long-term current use of insulin  Controlled - continue current meds    Atherosclerosis of aorta  Stable on statin  Other orders  -     ketoconazole (NIZORAL) 2 % cream; Apply topically 2 (two) times daily.  Dispense: 60 g; Refill: 3  -     hydrocortisone 2.5 % cream; Apply topically 2 (two) times daily.  Dispense: 28 g; Refill: 2  -     pravastatin (PRAVACHOL) 20 MG tablet; Take 1 tablet (20 mg total) by mouth once daily.  Dispense: 90 tablet; Refill: 3

## 2024-04-10 NOTE — PROGRESS NOTES
"[facility]   Occupational Therapy Initial Evaluation     Encounter Date: 4/11/2024   Name: Alina Narayan  Clinic Number: [id number]    Therapy Diagnosis: No diagnosis found.  Referring Provider: Aarti Dunn MD    Physician Orders: Eval & Treat; Aquatic Therapy   Medical Diagnosis from Referral: R53.81 (ICD-10-CM) - Debility  Evaluation date: 4/11/2024  Authorization Period Expiration: 3/25/2025  Plan of Care Expiration: 6/8/2024  Visit # / Visits authorized: 1/ 1    Precautions: Standard, Diabetes, and Fall, FWBAT    Time In: 09:02  Time Out: 10:05  Total Appointment Time (timed & untimed codes): 63 minutes    SUBJECTIVE   History of current condition, based on chart review and Alina's report: multiple medical issues, including cancer s/p mastectomy, cardiac problems s/p valve replacements. Now with swelling (L)UE, difficulties (R) UE, affecting ability to pull self up with the steps at the pool at the gym. Reports being afraid of falling, using rollator; has bone on bone in both knees. Not a candidate for knee surgery because of the other medical issues. Has not left her house, but for medical appointments, since x-mas. She has a sedentary lifestyle, although keeping occupied with several hobbies. She reports concerns about spouse' health.      Alina Narayan states: "although I am also 80, my mind wants me to do everything that I was able to do before. I am getting dependent on my , but he is 80 and he's starting with Dementia and I need to keep my wits about me. I need to be moving." "Pain might hit me and my legs might give out". "I have a hernia; I don't want to rupture it, so I don't want to strain it." "I might use my recliner from time to time to help me to get to standing".    Prior Level of Function: Activities patient can no longer perform/has great difficulty with include: getting in/out of the car, standing, walking.     Occupational Profile  Home access: lives with " spouse; living room is her craft room. They are in a one story home with one step to enter. Has rails in the [place].   Family dynamic: 2 sons; they live in [place] [facility]. Her grandson is autistic.  Occupations/hobbies/homemaking: retired (), water coloring, oil painting, making jewelry, scrap booking, junk journaling, does most of the cooking (simple)/ might grill.    Driving status: active, but difficulties getting in and out.  Current Self Care Routine: water coloring.  Current Exercise: none currently; used to go to the pool at the fitness center.  Has pulley for UE.  DME: rollator, transport chair, shower seat in walk in shower, potty chair (does not use). Used reachers to  from floor.  Disturbed sleep: sleep apnea; uses CPAP. Slept in electric recliner for a long time, now with a new bed that can be adjusted.  Mental health: might feel decreased mood, especially seeing spouse with declining cognition.  Prior Therapy: PT in the past for knees.  Comfort level with pool: high, but difficulty with steps, hence not using pool in the fitness center.     Patient Specific Activities based on Personal goals:  Activity  (To be rated first session after eval)    Performance Satisfaction   Standing tolerance (cooking, restaurant)     2.  Stairs at the pool     3.  Laundry     4.  Shopping/groceries     5.  Getting in/out the car.          Total Score     Scorin-10; Unable/Unsatisfied - Able/Satisfied    Pain:      Pain Related Behaviors Observed: yes; currently rating pain as 0/10 (due to being at rest in seated).  Functional Pain Scale Rating 0-10: within the last 24 hours  Date 2024   Average 4/10   Worst 5/10   Best 0/10   Composite score 3/10   [BERNABE  is 1 point or 15-20% change in interference composite score (Shivain et al. 2008)]    Location: knees, left UE, right UE, chest.   Description: Burning, Throbbing, Grabbing, Deep, Sharp, and Shooting legs might give  out.   Functional Exacerbations: moving, walking, transferring, touching  Easing Factors: sitting down, might take pain medication, focus on other activities.    Medical History:   Past Medical History:   Diagnosis Date    Allergy     seasonal    Anxiety     Arthritis     BRCA1 negative     Breast cancer 10/2012    left breast invasive ductal carcinoma    Colon polyp     Depression     Diabetes mellitus     Type 2    Diabetes mellitus, type 2     Diverticular disease     Diverticulitis 2009    Genetic testing 05/2017    negative Integrated BRACAnalysis    Hyperlipidemia     Hypertension     Morbid obesity     MITCHELL (obstructive sleep apnea)     Pulmonary embolism     Stenosis     Stenosis and insufficiency of lacrimal passages     Thyroid disease       Surgical History:   Alina  has a past surgical history that includes Carpal tunnel release; Shoulder surgery; Dilation and curettage of uterus (1999); Endometrial ablation (1999); left lumpectomy (2012); Eye surgery; Skin biopsy; Cardiac valve replacement (12/2013); Colonoscopy (N/A, 09/29/2017); Breast lumpectomy (Left, 2012); Breast biopsy (Left, 10/01/2012); Breast biopsy (Left, 12/2017); Cardiac valuve replacement (2013); Insertion of tunneled central venous catheter (CVC) with subcutaneous port (Right, 02/01/2019); Modified radical mastectomy w/ axillary lymph node dissection (Right, 08/26/2019); Mediport removal (Right, 08/26/2019); Mastectomy; Breast cyst aspiration; Breast mass excision; Colonoscopy (N/A, 1/5/2023); Coronary angiography (Left, 4/13/2023); and Aortic valve replacement (N/A, 5/10/2023).    Medications:   Alina has a current medication list which includes the following prescription(s): amoxicillin, apixaban, aspirin, blood-glucose meter, carvedilol, cholecalciferol (vitamin d3), furosemide, gabapentin, glimepiride, hydrocortisone, ketoconazole, lancets, levothyroxine, metformin, pravastatin, triamcinolone acetonide 0.1%-magnesium hydroxide 400 mg/5  ml-ciclopirox, true metrix glucose test strip, and [DISCONTINUED] triamcinolone acetonide 0.1%.    Allergies:   Review of patient's allergies indicates:   Allergen Reactions    Augmentin [amoxicillin-pot clavulanate] Diarrhea    Dilaudid [hydromorphone (bulk)] Other (See Comments)     Other reaction(s): sedation    Hydromorphone Other (See Comments)     Other reaction(s): sedation    Cymbalta [duloxetine]      Dry mouth, agitation    Jardiance [empagliflozin] Hives    Byetta [exenatide] Rash     Other reaction(s): Rash     Pool contraindications, including but not limited to, incontinence, seizures, fever/GI issues were reviewed with the patient. Patient agrees that based on their knowledge and medical history, they are appropriate for Aquatic Therapy.     OBJECTIVE     COGNITIVE Exam  Behaviors: pleasant, cooperative  Memory: not tested; able to follow multi step commands during eval.  Safety awareness/insight to disability: impaired judgment  Coping skills/emotional control: focus on alternative activites. Appropriate to situation during eval.    Dominant hand: right    Active ROM  UE 2024    Right Left Comments   Hands Minimal limited Minimally limited Arthritis in hands, affecting    Wrist WFL WFL    Elbows WFL WFL    Shoulders Limited Limited Limited flexion, IR, abduction   Low back Limited Limited    Hip limited Limited Pain with touching (R) hip   Knee Limited limited 0-90(R), 0-82 (L)   Ankle WFL WFL      Upper Extremity Strength - Manual Muscle Testing  Motion Tested 2024    Right Left    Shoulder Flexion 4/5 4+/5   Shoulder Extension 4+/5 4+/5   Shoulder Abduction 4/5 4/5   Shoulder IR 4+/5 4+/5   Shoulder ER 4/5 4/5   Elbow Flexion 5/5 5/5   Elbow Extension 5/5 5/5     Lower Extremity Strength - Manual Muscle Testing  Motion Tested 2024    Right  Left    Hip Flexion 4-/5 4-/5   Knee Extension 3+/5 3+/5   Knee Flexion 4/5 4/5     Balance, Endurance, and Functional Testin times  sit-stand  Norms  (adults 18-65 y/o) [facility]   2024   >12 sec= fall risk for general elderly  >16 sec= fall risk for Parkinson's disease  >10 sec= balance/vestibular dysfunction (<61 y/o)  >14.2 sec= balance/vestibular dysfunction (>61 y/o)  >12 sec= fall risk for CVA 20.16 seconds    (without UE used to push up from chair)     Timed Up and Go  Norms  (adults 18-65 y/o) [facility]  2024   Minimum standards/norms:    Norms: fall risk > 13.5 seconds.  Average: Males 7.3 sec, Females 8.1 sec 22.70 seconds    (without UE used to push up from chair)     Four Square Step Test  Norms   [facility]   2024   Healthy Adults Mean Score: 6.29 +/- 1.13 seconds   (Anoop et  al., 2018) marking dynamic balance and coordination 15.53 seconds     comments With use of rollator to balance using (L) hand, stepping on blue lines.       Endurance Testin Minute Step Test  Norm for age:  Age (years) 75-79 - Female - 84 steps caleb physical independence  [facility]   2024   Time Completed  48.83 seconds   Number of Reps 44   Heart Rate 94 bpm   Reason for Stop point:  Decline in maintaining pace safely , Shortness of Breath, Increased discomfort, Fear of increased pain.      During physical testing, participation level was consistent.    TREATMENT & HOME EXERCISES/EDUCATION      Education provided:   - Functional pain scale  - YOEL rate of perceived exertion scale  - Use of aquatic therapy as tool to improve functional performance of ADLs/IADLs  - Pool contraindications    Written Home Exercises Provided: yes.    Alina demonstrated fair  understanding of the education provided.     See EMR under Patient Instructions for exercises provided.     ASSESSMENT     Alina presented to clinic today, and participated in Occupational Evaluation. Patient is unable to fully participate in recreational, and home-based activities because of decreased functional  strength, decreased AROM, decreased endurance, limited positional tolerance, fear of  re-injury, and fear of increased pain. She will benefit from participating in a conditioning  program designed  to increase functional strength, flexibility, and endurance in order to meet functional goals.     Alina's prognosis is Fair due to co morbidities, but open to education. She will benefit from skilled outpatient Occupational Therapy to address the limitations and goals stated above and in the chart below, provide patient/family education, and to maximize patient's level of functional independence.    Plan of care discussed with patient: Yes  Pt's spiritual, cultural and educational needs considered and patient is agreeable to the plan of care and goals as stated below:     Medical necessity is demonstrated by the following problem list:  Profile and History Assessment of Occupational Performance Level of Clinical Decision Making Complexity Score   Occupational Profile:   Alina Narayan is a 79 y.o. female who lives with their spouse and is retired Alina Narayan has difficulty with  ADLs and IADLs as listed previously, which  affecting her daily functional abilities. Her main goal for therapy is to be able to move around better, to be able to do more for self.    Comorbidities:    has a past medical history of Allergy, Anxiety, Arthritis, BRCA1 negative, Breast cancer, Colon polyp, Depression, Diabetes mellitus, Diabetes mellitus, type 2, Diverticular disease, Diverticulitis, Genetic testing, Hyperlipidemia, Hypertension, Morbid obesity, MITCHELL (obstructive sleep apnea), Pulmonary embolism, Stenosis, Stenosis and insufficiency of lacrimal passages, and Thyroid disease.    Medical and Therapy History Review:   Expanded Performance Deficits    Physical:  Joint Mobility  Joint Stability  Muscle Power/Strength  Muscle Endurance   Strength    Cognitive:  No Deficits    Psychosocial:   Pain avoidance, social isolation   Habits  Routines  Rituals Clinical Decision Making:  low    Assessment Process:  Detailed  Assessments    Modification/Need for Assistance:  Not Necessary    Intervention Selection:  Limited Treatment Options       low  Based on PMHX, co morbidities , data from assessments and functional level of assistance required with task and clinical presentation directly impacting function.        GOALS:    Short Term Goals:  4 weeks  Status: initiated   Demonstrate correct use of breathing techniques during session to promote muscles of inspiration and expiration and to improve aerobic endurance for ADL performance, as evidenced by coordination of breath with movement, with minimal cues needed.    Demonstrate improved proprioception and body mechanics as demonstrated by self-correction of compensatory techniques in movement and form during session, with minimal cues.     Able to name two different active methods to managing stress and pain, as compared to prior method of symptom management.     Alina Narayan will importance & demonstrate use of energy conservation & pacing strategies during therapy session to improve tolerance for work and home demands.      Tolerate Home Activity Plan (HAP)/ Home Exercise Program (HEP) to promote safety and independence with ADLs/IADLs, at least 2/5 days outside of days receiving therapy sessions.       Long Term Goals:  8 weeks  Status: Initiated   Alina Narayan will verbalize plan for independence with scheduling their week to include functional exercise and implementation of HEP, considering pacing & mindfulness concepts.      Demonstrate increased activity tolerance to the level needed for work, home, and community activities, including standing to complete meal preparation, sitting during , attending neighborhood meetings, waiting at transportation hub or in queue for restaurant and/or movies, ability to attend Faith, festivals, concerts, as evidenced by increasing to 80 steps in the 2 Minute Step Test.      Completes 5x sit to stand test in 16 seconds or less to  improve ability to participate in community mobility.       Alina Narayan will demonstrate improved balance and coordination for safety in management of ADLs as demonstrated by  completion of Four Square Step Test in 12 seconds.     Alina will demonstrate improved functional mobility as demonstrated by completion of TUG in 16 seconds.     Implementation of HEP/HAP and  educational components into daily schedule, per Alina's report.    Improved perception of function as evidenced by 3 point change in at least 2/5 activities related to personal goals.        Patient Specific Personal Functional Long Term Goals:   Vocational: n/a    Recreational : return to pool, and meet with friends   Daily Living Activities: home chores: laundry, cooking   Measured by patient's self-reported performance and satisfaction of personal FRP goals, listed above in subjective section.   Total Score = sum of the activity performance scores / number of activities  Minimum detectable change (90%CI) for average score = 2 points  Minimum detectable change (90%CI) for single activity score  = 3 points    PLAN   Plan of care Certification: 4/11/2024 to 6/8/2024.    Outpatient Occupational Therapy 2 times weekly for 8 weeks to include the following interventions: manual therapy, aquatic therapy, patient education, therapeutic exercise, therapeutic activities, neuromuscular re-education, and self-care/home management.    Patient may be seen by [facility] as part of rehabilitation team.    MAXINE Mueller/L, CEAS-I  4/11/2024

## 2024-04-11 ENCOUNTER — CLINICAL SUPPORT (OUTPATIENT)
Dept: REHABILITATION | Facility: HOSPITAL | Age: 79
End: 2024-04-11
Attending: INTERNAL MEDICINE
Payer: MEDICARE

## 2024-04-11 DIAGNOSIS — R53.81 DEBILITY: ICD-10-CM

## 2024-04-11 PROCEDURE — 97165 OT EVAL LOW COMPLEX 30 MIN: CPT | Mod: HCNC

## 2024-04-11 NOTE — PATIENT INSTRUCTIONS
DIAPHRAGMATIC BREATHING     The diaphragm is a dome shaped muscle that forms the floor of the rib cage. It is the most efficient muscle for breathing and relaxation, although most people are not used to using the diaphragm. Diaphragmatic or belly breathing is an important technique to learn because it helps settle down or relax the autonomic nervous system. The correct use of diaphragmatic breathing can help to quiet brain activity resulting in the relaxation of all the muscles and organs of the body. This is accomplished by slow rhythmic breathing concentrated in the diaphragm muscle rather than the chest.    How to do proper relaxation breathing:    Start by lying on your back or reclining in a chair in a relaxed position. Place one hand on your chest and the other on your abdomen.  Relax your jaw by placing your tongue on the roof of your mouth and keeping your teeth slightly apart.   Take a deep breath in, letting the abdomen expand and rise while you keep your upper chest, neck and shoulders relaxed.   As you breathe out, allow your abdomen and chest to fall. Exhale completely.  It doesn't matter if you breathe in/out through your nose and/or mouth. Do whichever feels comfortable.  Remember to breathe slowly.  Do not force your breathing. Do not hold your breath.  Repeat for 5-10 reps, and as needed for pain and stress management throughout the day (10 reps 5x a day)

## 2024-04-16 ENCOUNTER — CLINICAL SUPPORT (OUTPATIENT)
Dept: REHABILITATION | Facility: HOSPITAL | Age: 79
End: 2024-04-16
Payer: MEDICARE

## 2024-04-16 DIAGNOSIS — R52 PAIN AGGRAVATED BY ACTIVITIES OF DAILY LIVING: Primary | ICD-10-CM

## 2024-04-16 DIAGNOSIS — Z74.09 IMPAIRED MOBILITY: ICD-10-CM

## 2024-04-16 NOTE — PROGRESS NOTES
"[facility]  Occupational Therapy Treatment Note       Name: Alina Narayan  MRN:[id number]  Encounter Date: 4/16/2024    Therapy Diagnosis: No diagnosis found.  Referring provider: Aarti Dunn MD    Physician Orders: Eval & Treat; Aquatic Therapy   Medical Diagnosis from Referral: R53.81 (ICD-10-CM) - Debility  Evaluation date: 4/11/2024  Authorization Period Expiration: 3/25/2025  Plan of Care Expiration: 6/8/2024  Visit # / Visits authorized: 1 / 20 (plus eval)      Precautions: Standard, Diabetes, and Fall, FWBAT    WENDI Visit #: 0/5     Time In:1100  Time Out: 1200  Total Appointment Time: 60 minutes    Subjective   She reports "I was nervous today, it takes more and more for me to leave my house, but I missed the pool. I used to go all the time."      Response to previous treatment: Ms. Narayan noted: no complaints     Functional change: Eval only at this time.    Alina was partially compliant with parts of home exercise program given previously.   Writing therapist reminded Alina of importance of completion of exercises and activities outside of session to attain goals.         Pain Related Behaviors Observed: yes; currently rating pain as 0/10 (due to being at rest in seated).  Functional Pain Scale Rating 0-10: within the last 24 hours  Date 4/11/2024   Average 4/10   Worst 5/10   Best 0/10   Composite score 3/10   [BERNABE  is 1 point or 15-20% change in interference composite score (Shivain et al. 2008)]     Location: knees, left UE, right UE, chest.   Description: Burning, Throbbing, Grabbing, Deep, Sharp, and Shooting legs might give out.   Functional Exacerbations: moving, walking, transferring, touching  Easing Factors: sitting down, might take pain medication, focus on other activities.    Patient Specific Activities based on Personal goals:       Activity  4/16/2024     Performance Satisfaction   Standing tolerance (cooking, restaurant) 3  2   2.  Stairs at the pool  1  1   3.  Laundry  3 " " 4   4.  Shopping/groceries  2  2   5.  Getting in/out the car.  3  3           Total Score  2.4 2    Scorin-10; Unable/Unsatisfied - Able/Satisfied    Objective     Objective Measures updated at progress report unless specified.     Treatment     She received the treatments listed below:     Aquatic therapeutic activities in order to increase participation in, endurance for, and performance with vocational, selfcare ADLs, and leisure activities in order to improve quality of life, for 60 minutes, including:    Introduction to stretching as tool to manage discomfort, as well as importance, and use of YOEL scale to guide pacing.    Personal goals reviewed and updated as needed. Alina rated performance of activities related to personal goals, as well as satisfaction of said performance. (Refer to subjective section for details).    Additionally, Alina completed seated diaphragmatic breathing. X3 minutes. Endorsed limitation 2* "tightness in chest" - both above and below water level.      FUNCTIONAL MOBILITY TRAINING x 2 laps each at beginning and 1 lap each at end of session: Walk forward/backward/lateral.    For improved functional gait, squatting, sitting tolerance, functional balance, bed mobility & rolling, bending over, cooking & cleaning:  LE EX x 15x2  Sit to stand from stool  Squat  Heel raise with gluteal set  NP:  Hip abduction/adduction  Hip flex/ext      Improve overhead reaching/activities, standing tolerance, lifting mechanics, showering/bathing, dressing, cooking, reaching, pushing & fine motor skills:  UE EX/CORE  x 10  Shoulder flex/ext TA activation paddles open  Shoulder horizontal abd/add TA activation paddles open  Mini squat with push/pull red kickboard    Improved activity tolerance and community accessibility:   ENDURANCE  Bicycle in // bars 1 min   Torso twist // bars 1 min   Thoracolumbar extension/flexion  1 min     No environmental, cultural, spiritual, developmental or education needs " expressed or noted.    Patient Education and Home Exercises     Education provided:   - Role of aquatic therapy  - Hydration post therapy  - Progress towards goals   - Pool contraindications  - Importance of completion of exercises and activities outside of session to attain goals   - FRP Educational Topics: Physical & Psychological Pain Cycles, Diaphragmatic Breathing, Pursed Lip Breathing, and Activity Pacing for Pain & Debility      Written Home Exercises Provided: yes.  Exercises were reviewed and Alina was able to demonstrate them prior to the end of the session.  Alina demonstrated fair  understanding of the HEP provided.     See EMR under Patient Instructions for exercises provided during therapy sessions.     Assessment     Ms. Narayan presents with good engagement, endorsed anxiety prior to arrival re: access 2* difficulty managing steps at other aquatic location. Tolerated exercise well, and open to feedback. Noted to have difficulty with breath work and coordinating movement with breath. Handout of diaphragmatic breathwork provided today.     Alina is progressing well towards her goals and status of goals can be seen below. Patient's prognosis is Good.     Alina would continue to benefit from skilled outpatient occupational therapy to address the deficits listed on initial evaluation, provide patient education, and to maximize patient's level of independence in the home and community environment.     Anticipated barriers to occupational therapy: psychosocial stressors; comorbidities     GOALS:     Short Term Goals:  4 weeks  Status: initiated   Demonstrate correct use of breathing techniques during session to promote muscles of inspiration and expiration and to improve aerobic endurance for ADL performance, as evidenced by coordination of breath with movement, with minimal cues needed.     Demonstrate improved proprioception and body mechanics as demonstrated by self-correction of compensatory techniques in  movement and form during session, with minimal cues.      Able to name two different active methods to managing stress and pain, as compared to prior method of symptom management.      Alina Narayan will importance & demonstrate use of energy conservation & pacing strategies during therapy session to improve tolerance for work and home demands.       Tolerate Home Activity Plan (HAP)/ Home Exercise Program (HEP) to promote safety and independence with ADLs/IADLs, at least 2/5 days outside of days receiving therapy sessions.         Long Term Goals:  8 weeks  Status: Initiated   Alina Narayan will verbalize plan for independence with scheduling their week to include functional exercise and implementation of HEP, considering pacing & mindfulness concepts.       Demonstrate increased activity tolerance to the level needed for work, home, and community activities, including standing to complete meal preparation, sitting during , attending neighborhood meetings, waiting at transportation hub or in queue for restaurant and/or movies, ability to attend Gnosticist, festivals, concerts, as evidenced by increasing to 80 steps in the 2 Minute Step Test.       Completes 5x sit to stand test in 16 seconds or less to improve ability to participate in community mobility.        Alina Narayan will demonstrate improved balance and coordination for safety in management of ADLs as demonstrated by  completion of Four Square Step Test in 12 seconds.      Alina will demonstrate improved functional mobility as demonstrated by completion of TUG in 16 seconds.      Implementation of HEP/HAP and  educational components into daily schedule, per Alina's report.     Improved perception of function as evidenced by 3 point change in at least 2/5 activities related to personal goals.           Patient Specific Personal Functional Long Term Goals:   Vocational: n/a    Recreational : return to pool, and meet with friends   Daily Living  Activities: home chores: laundry, cooking   Measured by patient's self-reported performance and satisfaction of personal FRP goals, listed above in subjective section.   Total Score = sum of the activity performance scores / number of activities  Minimum detectable change (90%CI) for average score = 2 points  Minimum detectable change (90%CI) for single activity score  = 3 points      Plan     Continue per plan of care.     Updates/Grading for next session: continue intro to exercise     Tanisha Rush, OT   4/16/2024

## 2024-04-18 ENCOUNTER — CLINICAL SUPPORT (OUTPATIENT)
Dept: REHABILITATION | Facility: HOSPITAL | Age: 79
End: 2024-04-18
Payer: MEDICARE

## 2024-04-18 DIAGNOSIS — R52 PAIN AGGRAVATED BY ACTIVITIES OF DAILY LIVING: Primary | ICD-10-CM

## 2024-04-18 PROCEDURE — 97113 AQUATIC THERAPY/EXERCISES: CPT | Mod: HCNC

## 2024-04-18 NOTE — PROGRESS NOTES
"[facility]  Occupational Therapy Treatment Note       Name: Alina Narayan  MRN:[id number]  Encounter Date: 2024    Therapy Diagnosis:   Encounter Diagnosis   Name Primary?    Pain aggravated by activities of daily living Yes     Referring provider: Aarti Dunn MD    Physician Orders: Eval & Treat; Aquatic Therapy   Medical Diagnosis from Referral: R53.81 (ICD-10-CM) - Debility  Evaluation date: 2024  Authorization Period Expiration: 3/25/2025  Plan of Care Expiration: 2024  Visit # / Visits authorized:  (plus eval)      Precautions: Standard, Diabetes, and Fall, FWBAT    WENDI Visit #: 0/5     Time In:1000  Time Out: 1100  Total Appointment Time: 60 minutes    Subjective   She reports "I feel like I'm always tight, I don't think I ever relax. Its been so long since I've been able to exercise"     Response to previous treatment: Ms. Narayan noted: no complaints     Functional change: Eval only at this time.    Alina was partially compliant with parts of home exercise program given previously.   HEP to date: diaphragmatic breath supine/sitting, whole body stretch routine         currently rating pain as 4/10  Location: knees, left UE, right UE, chest.   Description: Burning, Throbbing, Grabbing, Deep, Sharp, and Shooting legs might give out.     Patient Specific Activities based on Personal goals:       Activity  2024     Performance Satisfaction   Standing tolerance (cooking, restaurant) 3  2   2.  Stairs at the pool  1  1   3.  Laundry  3  4   4.  Shopping/groceries  2  2   5.  Getting in/out the car.  3  3           Total Score  2.4 2    Scorin-10; Unable/Unsatisfied - Able/Satisfied    Objective     Objective Measures updated at progress report unless specified.     Treatment     She received the treatments listed below:     Aquatic therapeutic activities in order to increase participation in, endurance for, and performance with vocational, selfcare ADLs, and leisure " activities in order to improve quality of life, for 60 minutes, including:    Continued use of stretching as tool to manage discomfort, as well as importance, and use of YOEL scale to guide pacing.   Stretches reviewed: neck, shoulder and trap mm stretch     FUNCTIONAL MOBILITY TRAINING x 2 laps each at beginning and 1 lap each at end of session: Walk forward/backward/lateral.    For improved functional gait, squatting, sitting tolerance, functional balance, bed mobility & rolling, bending over, cooking & cleaning:  LE EX x 15x2  Sit to stand from stool  Squat  Heel raise with gluteal set  NP:  Hip abduction/adduction  Hip flex/ext      Improve overhead reaching/activities, standing tolerance, lifting mechanics, showering/bathing, dressing, cooking, reaching, pushing & fine motor skills:  UE EX/CORE  x 10  Shoulder flex/ext TA activation paddles open  Shoulder horizontal abd/add TA activation paddles open  Mini squat with push/pull red kickboard    Improved activity tolerance and community accessibility:   ENDURANCE  Bicycle in // bars 1 min   Torso twist // bars 1 min   Thoracolumbar extension/flexion  1 min     No environmental, cultural, spiritual, developmental or education needs expressed or noted.    Patient Education and Home Exercises     Education provided:   - Role of aquatic therapy  - Hydration post therapy  - Progress towards goals   - Pool contraindications  - Importance of completion of exercises and activities outside of session to attain goals   - FRP Educational Topics: Physical & Psychological Pain Cycles, Diaphragmatic Breathing, Pursed Lip Breathing, and Activity Pacing for Pain & Debility      Written Home Exercises Provided: yes.  Exercises were reviewed and Alina was able to demonstrate them prior to the end of the session.  Alina demonstrated fair  understanding of the HEP provided.     See EMR under Patient Instructions for exercises provided during therapy sessions.     Assessment     Ms.  Sylvain presents with good engagement, tightness in upper body present throughout, good response to exercise, though external cues to note tension and decreased coordination of movement and breath required.     Alina is progressing well towards her goals and status of goals can be seen below. Patient's prognosis is Good.     Alina would continue to benefit from skilled outpatient occupational therapy to address the deficits listed on initial evaluation, provide patient education, and to maximize patient's level of independence in the home and community environment.     Anticipated barriers to occupational therapy: psychosocial stressors; comorbidities     GOALS:     Short Term Goals:  4 weeks  Status: initiated   Demonstrate correct use of breathing techniques during session to promote muscles of inspiration and expiration and to improve aerobic endurance for ADL performance, as evidenced by coordination of breath with movement, with minimal cues needed.     Demonstrate improved proprioception and body mechanics as demonstrated by self-correction of compensatory techniques in movement and form during session, with minimal cues.      Able to name two different active methods to managing stress and pain, as compared to prior method of symptom management.      Alina Narayan will importance & demonstrate use of energy conservation & pacing strategies during therapy session to improve tolerance for work and home demands.       Tolerate Home Activity Plan (HAP)/ Home Exercise Program (HEP) to promote safety and independence with ADLs/IADLs, at least 2/5 days outside of days receiving therapy sessions.         Long Term Goals:  8 weeks  Status: Initiated   Alina Narayan will verbalize plan for independence with scheduling their week to include functional exercise and implementation of HEP, considering pacing & mindfulness concepts.       Demonstrate increased activity tolerance to the level needed for work, home, and community  activities, including standing to complete meal preparation, sitting during , attending neighborhood meetings, waiting at transportation hub or in queue for restaurant and/or movies, ability to attend Baptism, festivals, concerts, as evidenced by increasing to 80 steps in the 2 Minute Step Test.       Completes 5x sit to stand test in 16 seconds or less to improve ability to participate in community mobility.        Alina Narayan will demonstrate improved balance and coordination for safety in management of ADLs as demonstrated by  completion of Four Square Step Test in 12 seconds.      Alina will demonstrate improved functional mobility as demonstrated by completion of TUG in 16 seconds.      Implementation of HEP/HAP and  educational components into daily schedule, per Alina's report.     Improved perception of function as evidenced by 3 point change in at least 2/5 activities related to personal goals.           Patient Specific Personal Functional Long Term Goals:   Vocational: n/a    Recreational : return to pool, and meet with friends   Daily Living Activities: home chores: laundry, cooking   Measured by patient's self-reported performance and satisfaction of personal FRP goals, listed above in subjective section.   Total Score = sum of the activity performance scores / number of activities  Minimum detectable change (90%CI) for average score = 2 points  Minimum detectable change (90%CI) for single activity score  = 3 points      Plan     Continue per plan of care.     Updates/Grading for next session: continue intro to exercise     Tanisha Rush OT   4/18/2024

## 2024-04-22 NOTE — PROGRESS NOTES
Subjective:      Patient ID: Alina Narayan is a 79 y.o. female.    Chief Complaint: No chief complaint on file.      HPI:  Alina Narayan is a 79 y.o. female who presents for Mantra trial follow up. Patient is s/p AVR 5/10/23. Patient presented today in a wheelchair but this is due to orthopedic issues. Otherwise she is doing well from a cardiac standpoint.     NYHA 1   Family and social history reviewed    Review of patient's allergies indicates:   Allergen Reactions    Augmentin [amoxicillin-pot clavulanate] Diarrhea    Dilaudid [hydromorphone (bulk)] Other (See Comments)     Other reaction(s): sedation    Hydromorphone Other (See Comments)     Other reaction(s): sedation    Cymbalta [duloxetine]      Dry mouth, agitation    Jardiance [empagliflozin] Hives    Byetta [exenatide] Rash     Other reaction(s): Rash     Past Medical History:   Diagnosis Date    Allergy     seasonal    Anxiety     Arthritis     BRCA1 negative     Breast cancer 10/2012    left breast invasive ductal carcinoma    Colon polyp     Depression     Diabetes mellitus     Type 2    Diabetes mellitus, type 2     Diverticular disease     Diverticulitis 2009    Genetic testing 05/2017    negative Integrated BRACAnalysis    Hyperlipidemia     Hypertension     Morbid obesity     MITCHELL (obstructive sleep apnea)     Pulmonary embolism     Stenosis     Stenosis and insufficiency of lacrimal passages     Thyroid disease      Past Surgical History:   Procedure Laterality Date    AORTIC VALVE REPLACEMENT N/A 5/10/2023    Procedure: Replacement-valve-aortic, redo sternotomy;  Surgeon: Venkat Webb MD;  Location: [facility] OR [address] [place] [address];  Service: Cardiothoracic;  Laterality: N/A;  Redo sternotomy    BREAST BIOPSY Left 10/01/2012    left breast- invasive ductal carcinoma    BREAST BIOPSY Left 12/2017    BREAST CYST ASPIRATION      BREAST LUMPECTOMY Left 2012    BREAST MASS EXCISION      CARDIAC VALVE REPLACEMENT  12/2013    CARDIAC VALVE SURGERY  2013    CARPAL  TUNNEL RELEASE      Left    COLONOSCOPY N/A 09/29/2017    Procedure: COLONOSCOPY;  Surgeon: Phani Worley MD;  Location: [facility] ENDO (4TH FLR);  Service: Endoscopy;  Laterality: N/A;    COLONOSCOPY N/A 1/5/2023    Procedure: COLONOSCOPY;  Surgeon: Eladia Martino MD;  Location: [facility] ENDO (4TH FLR);  Service: Endoscopy;  Laterality: N/A;  instr via portal  ok to barbara Anayais-see encounter 12/9-MS  1/4 pateint cannot come earlier-rt    CORONARY ANGIOGRAPHY Left 4/13/2023    Procedure: ANGIOGRAM, CORONARY ARTERY;  Surgeon: Eze Sales MD;  Location: [facility] CATH LAB;  Service: Cardiology;  Laterality: Left;  low bleeding risk 2.9%    DILATION AND CURETTAGE OF UTERUS  1999    Endometrial polyps    ENDOMETRIAL ABLATION  1999    Enodmetrial polyps    EYE SURGERY      INSERTION OF TUNNELED CENTRAL VENOUS CATHETER (CVC) WITH SUBCUTANEOUS PORT Right 02/01/2019    Procedure: JAPETJUAU-AFCP-G-CATH;  Surgeon: Gaston Flores MD;  [facility];  Service: General;  Laterality: Right;    left lumpectomy  2012    MASTECTOMY      MEDIPORT REMOVAL Right 08/26/2019    Procedure: REMOVAL, CATHETER, CENTRAL VENOUS, TUNNELED, WITH PORT;  Surgeon: Gaston Flores MD;  [facility];  Service: General;  Laterality: Right;    MODIFIED RADICAL MASTECTOMY W/ AXILLARY LYMPH NODE DISSECTION Right 08/26/2019    Procedure: MASTECTOMY, MODIFIED RADICAL;  Surgeon: Gaston Flores MD;  [facility];  Service: General;  Laterality: Right;    SHOULDER SURGERY      SKIN BIOPSY       Family History       Problem Relation (Age of Onset)    Alcohol abuse Brother    Anxiety disorder Son    Breast cancer Mother (62)    Cancer Father, Maternal Grandfather, Brother    Colon cancer Father    SAL disease Son    Heart disease Father    No Known Problems Sister, Son    Sleep disorder Son          Social History     Socioeconomic History    Marital status:      Spouse name: Srinath    Carlita of  children: 2   Occupational History    Occupation: Retired   Tobacco Use    Smoking status: Never    Smokeless tobacco: Never   Substance and Sexual Activity    Alcohol use: No     Alcohol/week: 0.0 standard drinks of alcohol    Drug use: Never    Sexual activity: Yes     Partners: Male   Other Topics Concern    Are you pregnant or think you may be? No    Breast-feeding No     Social Determinants of Health     Financial Resource Strain: Low Risk  (3/22/2024)    Overall Financial Resource Strain (CARDIA)     Difficulty of Paying Living Expenses: Not hard at all   Food Insecurity: No Food Insecurity (3/22/2024)    Hunger Vital Sign     Worried About Running Out of Food in the Last Year: Never true     Ran Out of Food in the Last Year: Never true   Transportation Needs: No Transportation Needs (3/22/2024)    PRAPARE - Transportation     Lack of Transportation (Medical): No     Lack of Transportation (Non-Medical): No   Physical Activity: Inactive (3/22/2024)    Exercise Vital Sign     Days of Exercise per Week: 0 days     Minutes of Exercise per Session: 0 min   Stress: No Stress Concern Present (3/22/2024)    [employer] - Occupational Stress Questionnaire     Feeling of Stress : Only a little   Social Connections: Unknown (3/22/2024)    Social Connection and Isolation Panel [NHANES]     Frequency of Communication with Friends and Family: Twice a week     Frequency of Social Gatherings with Friends and Family: Patient declined     Attends [employer] Services: More than 4 times per year     Active Member of Clubs or Organizations: Yes     Attends Club or Organization Meetings: More than 4 times per year     Marital Status:    Housing Stability: Low Risk  (3/22/2024)    Housing Stability Vital Sign     Unable to Pay for Housing in the Last Year: No     Number of Places Lived in the Last Year: 1     Unstable Housing in the Last Year: No       Current medications Reviewed    Review of  Systems   Constitutional:  Negative for fatigue.   HENT:  Negative for nosebleeds.    Eyes:  Negative for visual disturbance.   Respiratory:  Negative for shortness of breath.    Cardiovascular:  Negative for chest pain.   Gastrointestinal:  Negative for nausea.   Musculoskeletal:  Positive for arthralgias, gait problem and joint swelling.   Skin:  Negative for color change.   Neurological:  Negative for dizziness.   Hematological:  Does not bruise/bleed easily.   Psychiatric/Behavioral:  Negative for sleep disturbance.      Objective:   Physical Exam  Constitutional:       General: She is not in acute distress.  HENT:      Head: Normocephalic and atraumatic.   Eyes:      Pupils: Pupils are equal, round, and reactive to light.   Cardiovascular:      Rate and Rhythm: Normal rate.   Pulmonary:      Effort: Pulmonary effort is normal. No respiratory distress.   Musculoskeletal:         General: No swelling.      Cervical back: Normal range of motion.   Skin:     Coloration: Skin is not pale.   Neurological:      General: No focal deficit present.      Mental Status: She is alert.   Psychiatric:         Mood and Affect: Mood normal.         Behavior: Behavior normal.         Diagnostic Results:   TTE 4/23/24    Left Ventricle: The left ventricle is normal in size. Normal wall thickness. Normal wall motion. There is normal systolic function with a visually estimated ejection fraction of 55 - 60%. There is indeterminate diastolic function.    Right Ventricle: Normal right ventricular cavity size. Wall thickness is normal. Right ventricle wall motion  is normal. Systolic function is normal.    The left atrium is mildly dilated.    Aortic Valve: There is a bioprosthetic valve in the aortic position. It is reported to be a small Corcym Perceval bovine pericardial valve. Aortic valve area by VTI is 1.30 cm². Aortic valve peak velocity is 1.92 m/s. Mean gradient is 8 mmHg. The dimensionless index is 0.41.    Tricuspid Valve:  There is mild to moderate regurgitation.    Pulmonary Artery: The estimated pulmonary artery systolic pressure is 34 mmHg.    IVC/SVC: Normal venous pressure at 3 mmHg.    Assessment:   S/P AVR   Plan:     CTS Attending Note:    I have personally taken the history and examined this patient and agree with the ALEKSANDRA's note as stated above.  79-year-old woman status post aortic valve replacement in May of 2023.  She is here for follow-up as part of a long term registry for her aortic valve.  I reviewed the echo.  The valve demonstrates excellent function.  The mean gradient is 8.  She came to clinic today in a wheelchair due to severe bilateral knee pain.  She uses a walker at home because of her knee pain affecting her gait.  We will ask Dr. Monge to see her to consider knee replacement.

## 2024-04-23 ENCOUNTER — DOCUMENTATION ONLY (OUTPATIENT)
Dept: RESEARCH | Facility: HOSPITAL | Age: 79
End: 2024-04-23
Payer: MEDICARE

## 2024-04-23 ENCOUNTER — RESEARCH ENCOUNTER (OUTPATIENT)
Dept: RESEARCH | Facility: HOSPITAL | Age: 79
End: 2024-04-23
Payer: MEDICARE

## 2024-04-23 ENCOUNTER — HOSPITAL ENCOUNTER (OUTPATIENT)
Dept: CARDIOLOGY | Facility: CLINIC | Age: 79
Discharge: HOME | End: 2024-04-23
Payer: MEDICARE

## 2024-04-23 ENCOUNTER — HOSPITAL ENCOUNTER (OUTPATIENT)
Dept: CARDIOLOGY | Facility: HOSPITAL | Age: 79
Discharge: HOME | End: 2024-04-23
Attending: THORACIC SURGERY (CARDIOTHORACIC VASCULAR SURGERY)
Payer: MEDICARE

## 2024-04-23 ENCOUNTER — OFFICE VISIT (OUTPATIENT)
Dept: CARDIOTHORACIC SURGERY | Facility: CLINIC | Age: 79
End: 2024-04-23
Payer: MEDICARE

## 2024-04-23 VITALS
HEART RATE: 72 BPM | BODY MASS INDEX: 46.25 KG/M2 | HEIGHT: 62 IN | SYSTOLIC BLOOD PRESSURE: 133 MMHG | DIASTOLIC BLOOD PRESSURE: 61 MMHG | WEIGHT: 251.31 LBS | OXYGEN SATURATION: 95 %

## 2024-04-23 VITALS
BODY MASS INDEX: 45.64 KG/M2 | DIASTOLIC BLOOD PRESSURE: 85 MMHG | HEART RATE: 63 BPM | WEIGHT: 248 LBS | HEIGHT: 62 IN | SYSTOLIC BLOOD PRESSURE: 130 MMHG

## 2024-04-23 DIAGNOSIS — M25.562 PAIN IN BOTH KNEES, UNSPECIFIED CHRONICITY: Primary | ICD-10-CM

## 2024-04-23 DIAGNOSIS — I35.0 NONRHEUMATIC AORTIC VALVE STENOSIS: ICD-10-CM

## 2024-04-23 DIAGNOSIS — Z00.6 RESEARCH STUDY PATIENT: ICD-10-CM

## 2024-04-23 DIAGNOSIS — M25.561 PAIN IN BOTH KNEES, UNSPECIFIED CHRONICITY: Primary | ICD-10-CM

## 2024-04-23 DIAGNOSIS — M25.062 HEMARTHROSIS OF LEFT KNEE: ICD-10-CM

## 2024-04-23 DIAGNOSIS — M17.0 PRIMARY OSTEOARTHRITIS OF BOTH KNEES: Primary | ICD-10-CM

## 2024-04-23 DIAGNOSIS — Z95.2 S/P AVR: Primary | ICD-10-CM

## 2024-04-23 LAB
ASCENDING AORTA: 3.32 CM
AV INDEX (PROSTH): 0.41
AV MEAN GRADIENT: 8 MMHG
AV PEAK GRADIENT: 15 MMHG
AV VALVE AREA BY VELOCITY RATIO: 1.14 CM²
AV VALVE AREA: 1.3 CM²
AV VELOCITY RATIO: 0.36
BSA FOR ECHO PROCEDURE: 2.22 M2
CV ECHO LV RWT: 0.36 CM
DOP CALC AO PEAK VEL: 1.92 M/S
DOP CALC AO VTI: 36.75 CM
DOP CALC LVOT AREA: 3.1 CM2
DOP CALC LVOT DIAMETER: 2 CM
DOP CALC LVOT PEAK VEL: 0.7 M/S
DOP CALC LVOT STROKE VOLUME: 47.79 CM3
DOP CALCLVOT PEAK VEL VTI: 15.22 CM
E WAVE DECELERATION TIME: 244.47 MSEC
E/A RATIO: 0.92
E/E' RATIO: 13.1 M/S
ECHO LV POSTERIOR WALL: 0.88 CM (ref 0.6–1.1)
FRACTIONAL SHORTENING: 22 % (ref 28–44)
INTERVENTRICULAR SEPTUM: 0.9 CM (ref 0.6–1.1)
LA MAJOR: 6.04 CM
LA MINOR: 5.06 CM
LA WIDTH: 4.02 CM
LEFT ATRIUM SIZE: 4.16 CM
LEFT ATRIUM VOLUME INDEX MOD: 37 ML/M2
LEFT ATRIUM VOLUME INDEX: 37.5 ML/M2
LEFT ATRIUM VOLUME MOD: 77.4 CM3
LEFT ATRIUM VOLUME: 78.28 CM3
LEFT INTERNAL DIMENSION IN SYSTOLE: 3.81 CM (ref 2.1–4)
LEFT VENTRICLE DIASTOLIC VOLUME INDEX: 53.97 ML/M2
LEFT VENTRICLE DIASTOLIC VOLUME: 112.8 ML
LEFT VENTRICLE MASS INDEX: 72 G/M2
LEFT VENTRICLE SYSTOLIC VOLUME INDEX: 29.8 ML/M2
LEFT VENTRICLE SYSTOLIC VOLUME: 62.36 ML
LEFT VENTRICULAR INTERNAL DIMENSION IN DIASTOLE: 4.9 CM (ref 3.5–6)
LEFT VENTRICULAR MASS: 150.72 G
LV LATERAL E/E' RATIO: 11.91 M/S
LV SEPTAL E/E' RATIO: 14.56 M/S
MV PEAK A VEL: 1.43 M/S
MV PEAK E VEL: 1.31 M/S
MV STENOSIS PRESSURE HALF TIME: 70.9 MS
MV VALVE AREA P 1/2 METHOD: 3.1 CM2
OHS CV RV/LV RATIO: 0.83 CM
OHS QRS DURATION: 100 MS
OHS QTC CALCULATION: 417 MS
PISA TR MAX VEL: 2.79 M/S
RA MAJOR: 4.95 CM
RA PRESSURE ESTIMATED: 3 MMHG
RA WIDTH: 3.57 CM
RIGHT VENTRICULAR END-DIASTOLIC DIMENSION: 4.05 CM
RV TB RVSP: 6 MMHG
SINUS: 2.5 CM
STJ: 2.32 CM
TDI LATERAL: 0.11 M/S
TDI SEPTAL: 0.09 M/S
TDI: 0.1 M/S
TR MAX PG: 31 MMHG
TRICUSPID ANNULAR PLANE SYSTOLIC EXCURSION: 1.48 CM
TV REST PULMONARY ARTERY PRESSURE: 34 MMHG
Z-SCORE OF LEFT VENTRICULAR DIMENSION IN END DIASTOLE: -2.74
Z-SCORE OF LEFT VENTRICULAR DIMENSION IN END SYSTOLE: -0.27

## 2024-04-23 PROCEDURE — 1157F ADVNC CARE PLAN IN RCRD: CPT | Mod: HCNC,CPTII,S$GLB, | Performed by: THORACIC SURGERY (CARDIOTHORACIC VASCULAR SURGERY)

## 2024-04-23 PROCEDURE — 99024 POSTOP FOLLOW-UP VISIT: CPT | Mod: HCNC,S$GLB,, | Performed by: THORACIC SURGERY (CARDIOTHORACIC VASCULAR SURGERY)

## 2024-04-23 PROCEDURE — 93010 ELECTROCARDIOGRAM REPORT: CPT | Mod: HCNC,S$GLB,, | Performed by: INTERNAL MEDICINE

## 2024-04-23 PROCEDURE — 3078F DIAST BP <80 MM HG: CPT | Mod: HCNC,CPTII,S$GLB, | Performed by: THORACIC SURGERY (CARDIOTHORACIC VASCULAR SURGERY)

## 2024-04-23 PROCEDURE — 93306 TTE W/DOPPLER COMPLETE: CPT | Mod: 26,HCNC,, | Performed by: INTERNAL MEDICINE

## 2024-04-23 PROCEDURE — 1126F AMNT PAIN NOTED NONE PRSNT: CPT | Mod: HCNC,CPTII,S$GLB, | Performed by: THORACIC SURGERY (CARDIOTHORACIC VASCULAR SURGERY)

## 2024-04-23 PROCEDURE — 93005 ELECTROCARDIOGRAM TRACING: CPT | Mod: HCNC,S$GLB,, | Performed by: THORACIC SURGERY (CARDIOTHORACIC VASCULAR SURGERY)

## 2024-04-23 PROCEDURE — 99999 PR PBB SHADOW E&M-EST. PATIENT-LVL IV: CPT | Mod: PBBFAC,HCNC,, | Performed by: THORACIC SURGERY (CARDIOTHORACIC VASCULAR SURGERY)

## 2024-04-23 PROCEDURE — C8929 TTE W OR WO FOL WCON,DOPPLER: HCPCS | Mod: HCNC

## 2024-04-23 PROCEDURE — 1159F MED LIST DOCD IN RCRD: CPT | Mod: HCNC,CPTII,S$GLB, | Performed by: THORACIC SURGERY (CARDIOTHORACIC VASCULAR SURGERY)

## 2024-04-23 PROCEDURE — 3075F SYST BP GE 130 - 139MM HG: CPT | Mod: HCNC,CPTII,S$GLB, | Performed by: THORACIC SURGERY (CARDIOTHORACIC VASCULAR SURGERY)

## 2024-04-23 NOTE — PROGRESS NOTES
Trial: Corcym MANTRA (2021.298)  PI: Dr. Webb  Date: 04/23/2024  Visit: Annual Visit (1 Year Follow-Up)    Participant ID: [id number]    04/23/2024: Met with patient in clinic for the 1 year follow-up for the Corcym MANTRA Study. MANTRA is a post-market study is to monitor ongoing safety and performance of the Corcym devices and accessories used for aortic, mitral, and tricuspid valvular diseases after implant.     Current list of medications obtained and verified with patient: Yes    Patient had 1 ED visit on 1-14-24 due to knee pain- was ordered for Aquatic therapy- patient discharged same day, no other adverse events.    Patient answered questionnaire for KCCQ-12 and EQ-5D-5L. Documents attached to note. Patient reports the majority of limitations that she is having due to her knee pain. Patient is hopeful that her knee pain may improve after aquatic therapy.     NYHA class determined be the team, as Class 1    EKG performed on 4-23-24, with a heart rate of 71, and rhythm noted to be Normal Sinus Rhythm.     Research staff contact information reviewed again with patient, should any questions or concerns arise. Their next follow up visit is at the 24 month point.      ECHO completed on 4-23-24, and results reviewed by Dr. Tracey Gross, RN, CCM, CRC

## 2024-04-23 NOTE — PROGRESS NOTES
Jose Mitral, Aortic and Tricuspid post-market study in the real world setting (MANTRA)      4-23-24: Spoke with MD Zoya reports per TTE that was performed on today 4-23-24, that patient does not have PVL and she does not have Central Regurgitation.    Deisy Gross RN, CCM, CRC

## 2024-04-30 ENCOUNTER — CLINICAL SUPPORT (OUTPATIENT)
Dept: REHABILITATION | Facility: HOSPITAL | Age: 79
End: 2024-04-30
Payer: MEDICARE

## 2024-04-30 DIAGNOSIS — R52 PAIN AGGRAVATED BY ACTIVITIES OF DAILY LIVING: Primary | ICD-10-CM

## 2024-04-30 PROCEDURE — 97113 AQUATIC THERAPY/EXERCISES: CPT | Mod: HCNC

## 2024-04-30 NOTE — PROGRESS NOTES
"[facility]  Occupational Therapy Treatment Note       Name: Alina Narayan  MRN:[id number]  Encounter Date: 2024    Therapy Diagnosis:   Encounter Diagnosis   Name Primary?    Pain aggravated by activities of daily living Yes       Referring provider: Aarti Dunn MD    Physician Orders: Eval & Treat; Aquatic Therapy   Medical Diagnosis from Referral: R53.81 (ICD-10-CM) - Debility  Evaluation date: 2024  Authorization Period Expiration: 3/25/2025  Plan of Care Expiration: 2024  Visit # / Visits authorized: 3 / 20 (plus eval)      Precautions: Standard, Diabetes, and Fall, FWBAT    WENDI Visit #: 0/5     Time In:1100  Time Out: 1200  Total Appointment Time: 60 minutes  Total Billable Time: 30 min     Subjective   She reports "my knees always hurt. I don't think its possible for them to stop hurting"     Response to previous treatment: Ms. Narayan noted: no complaints     Functional change: increased awareness of tension  in body.     Alina was partially compliant with parts of home exercise program given previously.   HEP to date: diaphragmatic breath supine/sitting, whole body stretch routine         currently rating pain as 6/10  Location: knees, left UE, right UE, chest.   Description: Burning, Throbbing, Grabbing, Deep, Sharp, and Shooting legs might give out.     Patient Specific Activities based on Personal goals:       Activity  2024     Performance Satisfaction   Standing tolerance (cooking, restaurant) 3  2   2.  Stairs at the pool  1  1   3.  Laundry  3  4   4.  Shopping/groceries  2  2   5.  Getting in/out the car.  3  3           Total Score  2.4 2    Scorin-10; Unable/Unsatisfied - Able/Satisfied    Objective     Objective Measures updated at progress report unless specified.     Treatment     She received the treatments listed below:     Aquatic therapeutic activities in order to increase participation in, endurance for, and performance with vocational, selfcare " ADLs, and leisure activities in order to improve quality of life, for 60 minutes, including:    Continued use of stretching as tool to manage discomfort, as well as importance, and use of YOEL scale to guide pacing.   Stretches reviewed: neck, shoulder and trap mm stretch     FUNCTIONAL MOBILITY TRAINING x 2 laps each at beginning and 1 lap each at end of session: Walk forward/backward/lateral.    For improved functional gait, squatting, sitting tolerance, functional balance, bed mobility & rolling, bending over, cooking & cleaning:  LE EX x 25  Sit to stand from stool  Squat  Heel raise with gluteal set  Hip abduction/adduction  Hip flex/ext      Improve overhead reaching/activities, standing tolerance, lifting mechanics, showering/bathing, dressing, cooking, reaching, pushing & fine motor skills:  UE EX/CORE  x 20  Shoulder flex/ext TA activation paddles open  Shoulder horizontal abd/add TA activation paddles open  Mini squat with push/pull red kickboard    Improved activity tolerance and community accessibility:   ENDURANCE  Bicycle in // bars 2 min   Torso twist // bars 1 min   Thoracolumbar extension/flexion  1 min     No environmental, cultural, spiritual, developmental or education needs expressed or noted.    Patient Education and Home Exercises     Education provided:   - Role of aquatic therapy  - Hydration post therapy  - Progress towards goals   - Pool contraindications  - Importance of completion of exercises and activities outside of session to attain goals   - FRP Educational Topics: Physical & Psychological Pain Cycles, Diaphragmatic Breathing, Pursed Lip Breathing, and Activity Pacing for Pain & Debility      Written Home Exercises Provided: yes.  Exercises were reviewed and Alina was able to demonstrate them prior to the end of the session.  Alina demonstrated fair  understanding of the HEP provided.     See EMR under Patient Instructions for exercises provided during therapy sessions.      Assessment     Ms. Narayan tolerated today's session well. Endorsed increased pain since starting aquatic therapy. Continues to require external cues to note tension and decreased coordination of movement and breath.      Alina is progressing well towards her goals and status of goals can be seen below. Patient's prognosis is Good.     Alina would continue to benefit from skilled outpatient occupational therapy to address the deficits listed on initial evaluation, provide patient education, and to maximize patient's level of independence in the home and community environment.     Anticipated barriers to occupational therapy: psychosocial stressors; comorbidities     GOALS:     Short Term Goals:  4 weeks  Status: initiated   Demonstrate correct use of breathing techniques during session to promote muscles of inspiration and expiration and to improve aerobic endurance for ADL performance, as evidenced by coordination of breath with movement, with minimal cues needed.     Demonstrate improved proprioception and body mechanics as demonstrated by self-correction of compensatory techniques in movement and form during session, with minimal cues.      Able to name two different active methods to managing stress and pain, as compared to prior method of symptom management.      Alina Narayan will importance & demonstrate use of energy conservation & pacing strategies during therapy session to improve tolerance for work and home demands.       Tolerate Home Activity Plan (HAP)/ Home Exercise Program (HEP) to promote safety and independence with ADLs/IADLs, at least 2/5 days outside of days receiving therapy sessions.         Long Term Goals:  8 weeks  Status: Initiated   Alina Narayan will verbalize plan for independence with scheduling their week to include functional exercise and implementation of HEP, considering pacing & mindfulness concepts.       Demonstrate increased activity tolerance to the level needed for work, home,  and community activities, including standing to complete meal preparation, sitting during , attending neighborhood meetings, waiting at transportation hub or in queue for restaurant and/or movies, ability to attend Religious, festivals, concerts, as evidenced by increasing to 80 steps in the 2 Minute Step Test.       Completes 5x sit to stand test in 16 seconds or less to improve ability to participate in community mobility.        Alina Narayan will demonstrate improved balance and coordination for safety in management of ADLs as demonstrated by  completion of Four Square Step Test in 12 seconds.      Alina will demonstrate improved functional mobility as demonstrated by completion of TUG in 16 seconds.      Implementation of HEP/HAP and  educational components into daily schedule, per Alina's report.     Improved perception of function as evidenced by 3 point change in at least 2/5 activities related to personal goals.           Patient Specific Personal Functional Long Term Goals:   Vocational: n/a    Recreational : return to pool, and meet with friends   Daily Living Activities: home chores: laundry, cooking   Measured by patient's self-reported performance and satisfaction of personal FRP goals, listed above in subjective section.   Total Score = sum of the activity performance scores / number of activities  Minimum detectable change (90%CI) for average score = 2 points  Minimum detectable change (90%CI) for single activity score  = 3 points      Plan     Continue per plan of care.     Updates/Grading for next session: continue intro to exercise     Tanisha Rush OT   4/30/2024

## 2024-05-03 ENCOUNTER — DOCUMENTATION ONLY (OUTPATIENT)
Dept: REHABILITATION | Facility: OTHER | Age: 79
End: 2024-05-03
Payer: MEDICARE

## 2024-05-03 NOTE — PROGRESS NOTES
[facility]  [facility]  Occupational Therapy Missed Treatment      Patient Name:  Alina Narayan   MRN:  [id number]    Patient not seen on this date 2* request for cancellation. Writing therapist spoke with patient via phone as Ms Narayan shared concerns about management of her care while present on [facility]. Alina verbalized request to defer next week's aquatic OT session for personal trial of [facility], and potential independent implementation of HEP. Writing therapist to cancel apt for 5/7 &5/9 and to contact Ms Narayan to clarify potential discharge or continuation of care. At this time, she remains appropriate for the aquatic setting, and has goals which are well suited to the aquatic setting.     Discharge Disposition Recommendation: TBEUGENIO Rush, OT  5/3/2024

## 2024-05-09 ENCOUNTER — DOCUMENTATION ONLY (OUTPATIENT)
Dept: REHABILITATION | Facility: HOSPITAL | Age: 79
End: 2024-05-09
Payer: MEDICARE

## 2024-05-09 NOTE — PROGRESS NOTES
[facility]   Discharge Note    Name: Alina Narayan  Clinic Number: [id number]    Referring provider: Aarti Dunn MD     Physician Orders: Eval & Treat; Aquatic Therapy   Medical Diagnosis from Referral: R53.81 (ICD-10-CM) - Debility  Evaluation date: 4/11/2024  Authorization Period Expiration: 3/25/2025  Plan of Care Expiration: 6/8/2024  Visit # / Visits authorized: 3 / 20 (plus eval)      Precautions: Standard, Diabetes, and Fall, FWBAT     WENDI Visit #: 0/5     ASSESSMENT      Writing therapist spoke with Ms Narayan today via phone. She is requesting self-discharge at this time, stating that she has been successfully able to complete aquatic HEP independently at [facility] x2/week and feels prepared to continue independently.     Discharge reason: Patient requested discharge    Goals:   Short Term Goals:  4 weeks  Status: initiated   Demonstrate correct use of breathing techniques during session to promote muscles of inspiration and expiration and to improve aerobic endurance for ADL performance, as evidenced by coordination of breath with movement, with minimal cues needed.     Demonstrate improved proprioception and body mechanics as demonstrated by self-correction of compensatory techniques in movement and form during session, with minimal cues.      Able to name two different active methods to managing stress and pain, as compared to prior method of symptom management.      Alina Narayan will importance & demonstrate use of energy conservation & pacing strategies during therapy session to improve tolerance for work and home demands.       Tolerate Home Activity Plan (HAP)/ Home Exercise Program (HEP) to promote safety and independence with ADLs/IADLs, at least 2/5 days outside of days receiving therapy sessions.         Long Term Goals:  8 weeks  Status: Initiated   Alina Narayan will verbalize plan for independence with scheduling their week to include functional  exercise and implementation of HEP, considering pacing & mindfulness concepts.       Demonstrate increased activity tolerance to the level needed for work, home, and community activities, including standing to complete meal preparation, sitting during , attending neighborhood meetings, waiting at transportation hub or in queue for restaurant and/or movies, ability to attend Rastafari, festivals, concerts, as evidenced by increasing to 80 steps in the 2 Minute Step Test.       Completes 5x sit to stand test in 16 seconds or less to improve ability to participate in community mobility.        Alina LEXI Sylvain will demonstrate improved balance and coordination for safety in management of ADLs as demonstrated by  completion of Four Square Step Test in 12 seconds.      Alina will demonstrate improved functional mobility as demonstrated by completion of TUG in 16 seconds.      Implementation of HEP/HAP and  educational components into daily schedule, per Alina's report.     Improved perception of function as evidenced by 3 point change in at least 2/5 activities related to personal goals.           Patient Specific Personal Functional Long Term Goals:   Vocational: n/a    Recreational : return to pool, and meet with friends   Daily Living Activities: home chores: laundry, cooking   Measured by patient's self-reported performance and satisfaction of personal FRP goals, listed above in subjective section.   Total Score = sum of the activity performance scores / number of activities  Minimum detectable change (90%CI) for average score = 2 points  Minimum detectable change (90%CI) for single activity score  = 3 points    PLAN   This patient is discharged from Occupational Therapy      Tanisha Rush, OT

## 2024-05-29 ENCOUNTER — OFFICE VISIT (OUTPATIENT)
Dept: ORTHOPEDICS | Facility: CLINIC | Age: 79
End: 2024-05-29
Payer: MEDICARE

## 2024-05-29 ENCOUNTER — HOSPITAL ENCOUNTER (OUTPATIENT)
Dept: RADIOLOGY | Facility: HOSPITAL | Age: 79
Discharge: HOME | End: 2024-05-29
Attending: ORTHOPAEDIC SURGERY
Payer: MEDICARE

## 2024-05-29 VITALS — WEIGHT: 252.31 LBS | HEIGHT: 62 IN | BODY MASS INDEX: 46.43 KG/M2

## 2024-05-29 DIAGNOSIS — G89.29 CHRONIC PAIN OF BOTH KNEES: ICD-10-CM

## 2024-05-29 DIAGNOSIS — M25.561 CHRONIC PAIN OF BOTH KNEES: ICD-10-CM

## 2024-05-29 DIAGNOSIS — M17.0 PRIMARY OSTEOARTHRITIS OF BOTH KNEES: ICD-10-CM

## 2024-05-29 DIAGNOSIS — M25.562 CHRONIC PAIN OF BOTH KNEES: ICD-10-CM

## 2024-05-29 DIAGNOSIS — M17.0 PRIMARY OSTEOARTHRITIS OF BOTH KNEES: Primary | ICD-10-CM

## 2024-05-29 PROCEDURE — 73562 X-RAY EXAM OF KNEE 3: CPT | Mod: 26,50,HCNC, | Performed by: RADIOLOGY

## 2024-05-29 PROCEDURE — 99213 OFFICE O/P EST LOW 20 MIN: CPT | Mod: HCNC,S$GLB,, | Performed by: ORTHOPAEDIC SURGERY

## 2024-05-29 PROCEDURE — 1157F ADVNC CARE PLAN IN RCRD: CPT | Mod: HCNC,CPTII,S$GLB, | Performed by: ORTHOPAEDIC SURGERY

## 2024-05-29 PROCEDURE — 99999 PR PBB SHADOW E&M-EST. PATIENT-LVL IV: CPT | Mod: PBBFAC,HCNC,, | Performed by: ORTHOPAEDIC SURGERY

## 2024-05-29 PROCEDURE — 1159F MED LIST DOCD IN RCRD: CPT | Mod: HCNC,CPTII,S$GLB, | Performed by: ORTHOPAEDIC SURGERY

## 2024-05-29 PROCEDURE — 1125F AMNT PAIN NOTED PAIN PRSNT: CPT | Mod: HCNC,CPTII,S$GLB, | Performed by: ORTHOPAEDIC SURGERY

## 2024-05-29 PROCEDURE — 73562 X-RAY EXAM OF KNEE 3: CPT | Mod: TC,50,HCNC

## 2024-05-29 NOTE — PROGRESS NOTES
Subjective     Patient ID: Alina Narayan is a 79 y.o. female.    Chief Complaint: Pain of the Right Knee and Pain of the Left Knee    HPI    Alina Narayan is a 79 y.o. female here with a with a past medical history of CKD, aortic stenosis s/p AVR 5/10/23 ([facility]), breast cancer (triple negative carcinoma, 2 cycles of weekly Taxol and 4 cycles of CMF completed 2019, 2019 right mastectomyfollowed by Dr. Erick Dwyer ([facility]/Onc)), DM (last A1c 7.4), hypothyroidism, HPLD, HTN, obesity, MITCHELL (CPAP), and previous PE (May 2019, on chronic eliquis) history of bilateral knee pain (R > L). The patient is a  retiree, previously schoolteacher. There was not a history of trauma.  The pain is moderate The pain is located in the lateral aspect of the knee. There is is not radiation to the distal lower extremity.  The pain is described as sharp and burning. The patient has not had prior surgery. It is aggravated by standing and walking. It is alleviated by rest. There is numbness or tingling of the lower extremity (baseline neuropathy).  There is not back pain.  She  has tried medications or injections. They have not helped for long periods of time.  She does have difficulty getting in or out of a car, getting dressed, or going up or down stairs.  The patient does use an assistive device, walker and wheelchair for longer distances.    She lives at home with her . She does not smoke. She denies history of joint infection or recurrent infection.    Past Medical History:   Diagnosis Date    Allergy     seasonal    Anxiety     Arthritis     BRCA1 negative     Breast cancer 10/2012    left breast invasive ductal carcinoma    Colon polyp     Depression     Diabetes mellitus     Type 2    Diabetes mellitus, type 2     Diverticular disease     Diverticulitis 2009    Genetic testing 05/2017    negative Integrated BRACAnalysis    Hyperlipidemia     Hypertension     Morbid obesity     MITCHELL (obstructive sleep apnea)     Pulmonary embolism      Stenosis     Stenosis and insufficiency of lacrimal passages     Thyroid disease      Past Surgical History:   Procedure Laterality Date    AORTIC VALVE REPLACEMENT N/A 5/10/2023    Procedure: Replacement-valve-aortic, redo sternotomy;  Surgeon: Venkat Webb MD;  Location: [facility] OR [address] [place] [address];  Service: Cardiothoracic;  Laterality: N/A;  Redo sternotomy    BREAST BIOPSY Left 10/01/2012    left breast- invasive ductal carcinoma    BREAST BIOPSY Left 12/2017    BREAST CYST ASPIRATION      BREAST LUMPECTOMY Left 2012    BREAST MASS EXCISION      CARDIAC VALVE REPLACEMENT  12/2013    CARDIAC VALVE SURGERY  2013    CARPAL TUNNEL RELEASE      Left    COLONOSCOPY N/A 09/29/2017    Procedure: COLONOSCOPY;  Surgeon: Phani Worley MD;  Location: [facility] ENDO (4TH FLR);  Service: Endoscopy;  Laterality: N/A;    COLONOSCOPY N/A 1/5/2023    Procedure: COLONOSCOPY;  Surgeon: Eladia Martino MD;  Location: [facility] ENDO (4TH FLR);  Service: Endoscopy;  Laterality: N/A;  instr via portal  ok to hole Eliquis-see encounter 12/9-MS  1/4 pateint cannot come earlier-rt    CORONARY ANGIOGRAPHY Left 4/13/2023    Procedure: ANGIOGRAM, CORONARY ARTERY;  Surgeon: Eze Sales MD;  Location: [facility] CATH LAB;  Service: Cardiology;  Laterality: Left;  low bleeding risk 2.9%    DILATION AND CURETTAGE OF UTERUS  1999    Endometrial polyps    ENDOMETRIAL ABLATION  1999    Enodmetrial polyps    EYE SURGERY      INSERTION OF TUNNELED CENTRAL VENOUS CATHETER (CVC) WITH SUBCUTANEOUS PORT Right 02/01/2019    Procedure: [id number];  Surgeon: Gaston Flores MD;  Location: [address];  Service: General;  Laterality: Right;    left lumpectomy  2012    MASTECTOMY      MEDIPORT REMOVAL Right 08/26/2019    Procedure: REMOVAL, CATHETER, CENTRAL VENOUS, TUNNELED, WITH PORT;  Surgeon: Gaston Flores MD;  Location: [address];  Service: General;  Laterality: Right;    MODIFIED RADICAL MASTECTOMY W/ AXILLARY LYMPH NODE  DISSECTION Right 08/26/2019    Procedure: MASTECTOMY, MODIFIED RADICAL;  Surgeon: Gaston Flores MD;  Location: [facility] OR [address] [place] [address];  Service: General;  Laterality: Right;    SHOULDER SURGERY      SKIN BIOPSY         Review of Systems   Constitutional: Negative for chills, fever and night sweats.   HENT:  Negative for hearing loss.    Eyes:  Negative for blurred vision and double vision.   Cardiovascular:  Negative for chest pain, claudication and leg swelling.   Respiratory:  Negative for shortness of breath.    Endocrine: Negative for polydipsia, polyphagia and polyuria.   Hematologic/Lymphatic: Negative for adenopathy and bleeding problem. Does not bruise/bleed easily.   Skin:  Negative for poor wound healing.   Gastrointestinal:  Negative for diarrhea and heartburn.   Genitourinary:  Negative for bladder incontinence.   Neurological:  Negative for focal weakness, headaches, numbness, paresthesias and sensory change.   Psychiatric/Behavioral:  The patient is not nervous/anxious.    Allergic/Immunologic: Negative for persistent infections.          Objective         General Musculoskeletal Exam   Gait: normal       Right Knee Exam     Inspection   Erythema: absent  Scars: absent  Swelling: absent  Effusion: absent  Deformity: absent  Bruising: absent    Tenderness   The patient is tender to palpation of the medial joint line and lateral joint line.    Range of Motion   Extension:  0   Flexion:  110     Tests   Ligament Examination   Lachman: normal (-1 to 2mm)   MCL - Valgus: normal (0 to 2mm)  LCL - Varus: normal  Patella   Passive Patellar Tilt: neutral    Other   Sensation: normal    Left Knee Exam     Inspection   Erythema: absent  Scars: absent  Swelling: absent  Effusion: absent  Deformity: absent  Bruising: absent    Tenderness   The patient tender to palpation of the medial joint line and lateral joint line.    Range of Motion   Extension:  0   Flexion:  110     Tests   Stability   Lachman: normal (-1 to  2mm)   MCL - Valgus: normal (0 to 2mm)  LCL - Varus: normal (0 to 2mm)  Patella   Passive Patellar Tilt: neutral    Other   Sensation: normal    Muscle Strength   Right Lower Extremity   Hip Abduction: 5/5   Quadriceps:  5/5   Hamstrin/5   Left Lower Extremity   Hip Abduction: 5/5   Quadriceps:  5/5   Hamstrin/5     Reflexes     Left Side  Quadriceps:  2+    Right Side   Quadriceps:  2+    Vascular Exam     Right Pulses  Dorsalis Pedis:      2+          Left Pulses  Dorsalis Pedis:      2+          Edema  Right Lower Leg: absent  Left Lower Leg: absent      Physical Exam  Cardiovascular:      Pulses:           Dorsalis pedis pulses are 2+ on the right side and 2+ on the left side.   Musculoskeletal:      Right knee: No swelling, deformity or effusion.      Left knee: No swelling, deformity or effusion.      Right lower leg: No edema.      Left lower leg: No edema.          Radiographs taken today and reviewed by me demonstrate severe arthritic change of the bilateral KNEE(s).There  is not bone destruction.  There is not a fracture. The medial compartment is most involved.  There is a varus deformity.  The changes are tricompartmental.         Assessment and Plan     Encounter Diagnoses   Name Primary?    Chronic pain of both knees     Primary osteoarthritis of both knees Yes         Alina was seen today for pain and pain.    Diagnoses and all orders for this visit:    Primary osteoarthritis of both knees  -     Ambulatory referral/consult to Pain Clinic; Future    Chronic pain of both knees  -     Ambulatory referral/consult to Orthopedics  -     Ambulatory referral/consult to Pain Clinic; Future      Options discussed.  She does not desire surgery at present, and I agree.  Will send to pain management for possible RFA

## 2024-06-05 NOTE — ED TRIAGE NOTES
Presents to ER with an exacerbation of Diverticulitis with pain in her left lower abdomen for 3 days.  States that she had diarrhea x3 yesterday.  Patient's name and date of birth checked and is correct.  LOC: The patient is awake, alert and aware of environment with an appropriate affect, the patient is oriented x 3 and speaking appropriately.  APPEARANCE: Patient resting comfortably and in no acute distress, patient is clean and well groomed, patient's clothing is properly fastened.  CARDIOVASCULAR:  Heart rate regular and even with no peripheral edema noted.  SKIN: The skin is warm and dry, patient has normal skin turgor and moist mucus membranes, skin intact, no breakdown or brusing noted. MUSKULOSKELETAL: Patient moving all extremities well, no obvious swelling or deformities noted.  RESPIRATORY: Airway is open and patent, respirations are spontaneous, patient has a normal effort and rate.    no

## 2024-06-18 ENCOUNTER — LAB VISIT (OUTPATIENT)
Dept: LAB | Facility: HOSPITAL | Age: 79
End: 2024-06-18
Payer: MEDICARE

## 2024-06-18 ENCOUNTER — OFFICE VISIT (OUTPATIENT)
Dept: HEMATOLOGY/ONCOLOGY | Facility: CLINIC | Age: 79
End: 2024-06-18
Payer: MEDICARE

## 2024-06-18 VITALS
OXYGEN SATURATION: 98 % | BODY MASS INDEX: 46.09 KG/M2 | TEMPERATURE: 98 F | DIASTOLIC BLOOD PRESSURE: 61 MMHG | HEART RATE: 84 BPM | SYSTOLIC BLOOD PRESSURE: 138 MMHG | HEIGHT: 62 IN | WEIGHT: 250.44 LBS

## 2024-06-18 DIAGNOSIS — Z85.3 HISTORY OF BREAST CANCER: ICD-10-CM

## 2024-06-18 DIAGNOSIS — Z85.3 HISTORY OF BREAST CANCER: Primary | ICD-10-CM

## 2024-06-18 DIAGNOSIS — Z12.31 ENCOUNTER FOR SCREENING MAMMOGRAM FOR HIGH-RISK PATIENT: ICD-10-CM

## 2024-06-18 LAB
ALBUMIN SERPL BCP-MCNC: 3.7 G/DL (ref 3.5–5.2)
ALP SERPL-CCNC: 88 U/L (ref 55–135)
ALT SERPL W/O P-5'-P-CCNC: 11 U/L (ref 10–44)
ANION GAP SERPL CALC-SCNC: 6 MMOL/L (ref 8–16)
AST SERPL-CCNC: 13 U/L (ref 10–40)
BILIRUB SERPL-MCNC: 0.4 MG/DL (ref 0.1–1)
BUN SERPL-MCNC: 23 MG/DL (ref 8–23)
CALCIUM SERPL-MCNC: 9.6 MG/DL (ref 8.7–10.5)
CHLORIDE SERPL-SCNC: 103 MMOL/L (ref 95–110)
CO2 SERPL-SCNC: 28 MMOL/L (ref 23–29)
CREAT SERPL-MCNC: 1.4 MG/DL (ref 0.5–1.4)
ERYTHROCYTE [DISTWIDTH] IN BLOOD BY AUTOMATED COUNT: 13.4 % (ref 11.5–14.5)
EST. GFR  (NO RACE VARIABLE): 38.3 ML/MIN/1.73 M^2
GLUCOSE SERPL-MCNC: 247 MG/DL (ref 70–110)
HCT VFR BLD AUTO: 38.2 % (ref 37–48.5)
HGB BLD-MCNC: 12.1 G/DL (ref 12–16)
IMM GRANULOCYTES # BLD AUTO: 0.03 K/UL (ref 0–0.04)
MCH RBC QN AUTO: 29.6 PG (ref 27–31)
MCHC RBC AUTO-ENTMCNC: 31.7 G/DL (ref 32–36)
MCV RBC AUTO: 93 FL (ref 82–98)
NEUTROPHILS # BLD AUTO: 2.8 K/UL (ref 1.8–7.7)
PLATELET # BLD AUTO: 108 K/UL (ref 150–450)
PMV BLD AUTO: 11.1 FL (ref 9.2–12.9)
POTASSIUM SERPL-SCNC: 4.7 MMOL/L (ref 3.5–5.1)
PROT SERPL-MCNC: 7 G/DL (ref 6–8.4)
RBC # BLD AUTO: 4.09 M/UL (ref 4–5.4)
SODIUM SERPL-SCNC: 137 MMOL/L (ref 136–145)
WBC # BLD AUTO: 4.62 K/UL (ref 3.9–12.7)

## 2024-06-18 PROCEDURE — 3075F SYST BP GE 130 - 139MM HG: CPT | Mod: HCNC,CPTII,S$GLB, | Performed by: INTERNAL MEDICINE

## 2024-06-18 PROCEDURE — 85027 COMPLETE CBC AUTOMATED: CPT | Mod: HCNC | Performed by: INTERNAL MEDICINE

## 2024-06-18 PROCEDURE — 99999 PR PBB SHADOW E&M-EST. PATIENT-LVL IV: CPT | Mod: PBBFAC,HCNC,, | Performed by: INTERNAL MEDICINE

## 2024-06-18 PROCEDURE — 99213 OFFICE O/P EST LOW 20 MIN: CPT | Mod: HCNC,S$GLB,, | Performed by: INTERNAL MEDICINE

## 2024-06-18 PROCEDURE — 1157F ADVNC CARE PLAN IN RCRD: CPT | Mod: HCNC,CPTII,S$GLB, | Performed by: INTERNAL MEDICINE

## 2024-06-18 PROCEDURE — 3078F DIAST BP <80 MM HG: CPT | Mod: HCNC,CPTII,S$GLB, | Performed by: INTERNAL MEDICINE

## 2024-06-18 PROCEDURE — 1159F MED LIST DOCD IN RCRD: CPT | Mod: HCNC,CPTII,S$GLB, | Performed by: INTERNAL MEDICINE

## 2024-06-18 PROCEDURE — 1125F AMNT PAIN NOTED PAIN PRSNT: CPT | Mod: HCNC,CPTII,S$GLB, | Performed by: INTERNAL MEDICINE

## 2024-06-18 PROCEDURE — 80053 COMPREHEN METABOLIC PANEL: CPT | Mod: HCNC | Performed by: INTERNAL MEDICINE

## 2024-06-18 PROCEDURE — 3288F FALL RISK ASSESSMENT DOCD: CPT | Mod: HCNC,CPTII,S$GLB, | Performed by: INTERNAL MEDICINE

## 2024-06-18 PROCEDURE — 36415 COLL VENOUS BLD VENIPUNCTURE: CPT | Mod: HCNC | Performed by: INTERNAL MEDICINE

## 2024-06-18 PROCEDURE — 1101F PT FALLS ASSESS-DOCD LE1/YR: CPT | Mod: HCNC,CPTII,S$GLB, | Performed by: INTERNAL MEDICINE

## 2024-06-18 NOTE — PROGRESS NOTES
Subjective:       Patient ID: Alina Narayan is a 79 y.o. female.    Chief Complaint: No chief complaint on file.      HPI 78 Y/O white female who returns for follow-up of triple negative carcinoma of the right breast.  She had complete pathologic response to neoadjuvant chemotherapy.    Her biggest issue has been bilateral knee pain which limits her standing or walking.  She presents today in a wheelchair because of that.  She is seen orthopedics and was referred to pain management for radiofrequency ablation; however, she did not want to undergo that procedure.    Her activities been limited and she is trying to get back to some pool exercise.    She also has significant stress related to some decline in her 's mental acuity.    Recent blood work in March showed some mild thrombocytopenia of 103,000 and hemoglobin 11.8.          She had AVR 5/10/23.  She has a history of pulmonary embolism diagnosed in May of 2019.She is on chronic anticoagulation with low-dose Eliquis.    She is also being followed a small right upper lobe pulmonary nodule.        Previous breast cancer history History:   On September 25, 2012 she underwent a screening mammogram which showed an asymmetric density in the left breast at 2:00 position.  Core needle biopsy on October 1, 2012 showed invasive carcinoma ER 90% positive MA 80% positive and HER-2 negative.   On October 29, 2012 she underwent lumpectomy and sentinel lymph node biopsy. That pathology showed a 7 mm low-grade (1+2+1) infiltrating carcinoma. 3 sentinel lymph nodes were negative. Final pathological stage TIB N0 stage IA.  She completed letrozole therapy in 2017    Recent breast cancer history:  abnormal mammogram of the right breast in December 2018 which showed a 13 mm mass in the right axillary tail.    By ultrasound there was a 6 x 9 x 11 mm intramammary node and a 2nd 5 x 6 x 6 mm hypoechoic mass in the axillary tail.    On January 7, 2019 both masses were biopsied and  both the breast mass and  lymph nodes biopsy showed carcinoma which was ER negative MS negative and HER2 negative.  Ki-67 was 40%.    MRI on January 15, 2019 showed 2 right axillary lymph nodes the largest measured 1.4 x 1.0 cm.  There were no abnormalities in the right breast.    PET scan was then performed which showed only low-grade activity in the right axilla with an SUV of 1.32.      She  received 12 cycles of weekly Taxol and 4 cycles of CMF which she completed on July 30th, 2019.    On August 26, 2019 right mastectomy performed showed complete pathological response in the right breast and axilla.      Review of Systems   Constitutional:  Negative for activity change, appetite change, fever and unexpected weight change.   HENT:  Negative for mouth sores.    Eyes:  Negative for visual disturbance.   Respiratory:  Positive for shortness of breath. Negative for cough.    Cardiovascular:  Negative for chest pain.   Gastrointestinal:  Negative for abdominal pain and diarrhea.   Genitourinary:  Negative for frequency.   Musculoskeletal:  Positive for arthralgias (Knees). Negative for back pain.   Integumentary:  Negative for rash.   Neurological:  Negative for headaches.   Hematological:  Negative for adenopathy.   Psychiatric/Behavioral:  Positive for dysphoric mood. The patient is nervous/anxious.          Objective:      Physical Exam  Vitals reviewed.   Constitutional:       General: She is not in acute distress.     Appearance: She is obese. She is not ill-appearing.   Eyes:      General: No scleral icterus.  Cardiovascular:      Rate and Rhythm: Normal rate and regular rhythm.   Pulmonary:      Effort: Pulmonary effort is normal. No respiratory distress.      Breath sounds: Normal breath sounds. No wheezing or rales.   Chest:   Breasts:     Left: Normal. No mass.       Abdominal:      Palpations: Abdomen is soft.      Tenderness: There is no abdominal tenderness.   Lymphadenopathy:      Cervical: No cervical  adenopathy.      Upper Body:      Right upper body: No supraclavicular or axillary adenopathy.      Left upper body: No supraclavicular or axillary adenopathy.   Skin:     Findings: No rash.   Neurological:      Mental Status: She is alert and oriented to person, place, and time.   Psychiatric:         Mood and Affect: Mood normal.         Behavior: Behavior normal.         Thought Content: Thought content normal.         Judgment: Judgment normal.         Assessment:       1. History of breast cancer        Plan:     Check lab today.    She declines psychology.  RTC 6 months   with mammogram    Route Chart for Scheduling    Med Onc Chart Routing      Follow up with physician 6 months.   Follow up with ALEKSNADRA    Infusion scheduling note    Injection scheduling note    Labs None   Scheduling:  Preferred lab:  Lab interval:     Imaging Mammogram      Pharmacy appointment No pharmacy appointment needed      Other referrals no referral to Oncology Primary Care needed -  no Massage appointment needed    No additional referrals needed

## 2024-06-25 ENCOUNTER — OFFICE VISIT (OUTPATIENT)
Dept: INTERNAL MEDICINE | Facility: CLINIC | Age: 79
End: 2024-06-25
Payer: MEDICARE

## 2024-06-25 VITALS
SYSTOLIC BLOOD PRESSURE: 130 MMHG | BODY MASS INDEX: 46.17 KG/M2 | WEIGHT: 250.88 LBS | HEART RATE: 87 BPM | HEIGHT: 62 IN | OXYGEN SATURATION: 97 % | DIASTOLIC BLOOD PRESSURE: 64 MMHG

## 2024-06-25 DIAGNOSIS — N18.32 STAGE 3B CHRONIC KIDNEY DISEASE: ICD-10-CM

## 2024-06-25 DIAGNOSIS — E11.69 DIABETES MELLITUS TYPE 2 IN OBESE: ICD-10-CM

## 2024-06-25 DIAGNOSIS — E66.9 DIABETES MELLITUS TYPE 2 IN OBESE: ICD-10-CM

## 2024-06-25 DIAGNOSIS — I89.0 LYMPHEDEMA: ICD-10-CM

## 2024-06-25 DIAGNOSIS — E78.2 HYPERLIPIDEMIA, MIXED: ICD-10-CM

## 2024-06-25 DIAGNOSIS — I10 ESSENTIAL HYPERTENSION: Primary | ICD-10-CM

## 2024-06-25 PROCEDURE — 3288F FALL RISK ASSESSMENT DOCD: CPT | Mod: HCNC,CPTII,S$GLB, | Performed by: INTERNAL MEDICINE

## 2024-06-25 PROCEDURE — 99999 PR PBB SHADOW E&M-EST. PATIENT-LVL IV: CPT | Mod: PBBFAC,HCNC,, | Performed by: INTERNAL MEDICINE

## 2024-06-25 PROCEDURE — 3075F SYST BP GE 130 - 139MM HG: CPT | Mod: HCNC,CPTII,S$GLB, | Performed by: INTERNAL MEDICINE

## 2024-06-25 PROCEDURE — 99214 OFFICE O/P EST MOD 30 MIN: CPT | Mod: HCNC,S$GLB,, | Performed by: INTERNAL MEDICINE

## 2024-06-25 PROCEDURE — 3078F DIAST BP <80 MM HG: CPT | Mod: HCNC,CPTII,S$GLB, | Performed by: INTERNAL MEDICINE

## 2024-06-25 PROCEDURE — 1157F ADVNC CARE PLAN IN RCRD: CPT | Mod: HCNC,CPTII,S$GLB, | Performed by: INTERNAL MEDICINE

## 2024-06-25 PROCEDURE — 1159F MED LIST DOCD IN RCRD: CPT | Mod: HCNC,CPTII,S$GLB, | Performed by: INTERNAL MEDICINE

## 2024-06-25 PROCEDURE — 1101F PT FALLS ASSESS-DOCD LE1/YR: CPT | Mod: HCNC,CPTII,S$GLB, | Performed by: INTERNAL MEDICINE

## 2024-06-25 PROCEDURE — 1125F AMNT PAIN NOTED PAIN PRSNT: CPT | Mod: HCNC,CPTII,S$GLB, | Performed by: INTERNAL MEDICINE

## 2024-06-25 RX ORDER — HYDROCODONE BITARTRATE AND ACETAMINOPHEN 5; 325 MG/1; MG/1
TABLET ORAL
Qty: 20 TABLET | Refills: 0 | Status: SHIPPED | OUTPATIENT
Start: 2024-06-25

## 2024-06-25 RX ORDER — TOPIRAMATE 25 MG/1
TABLET ORAL
Qty: 180 TABLET | Refills: 1 | Status: SHIPPED | OUTPATIENT
Start: 2024-06-25

## 2024-06-25 RX ORDER — GLIMEPIRIDE 4 MG/1
4 TABLET ORAL
Qty: 90 TABLET | Refills: 3 | Status: SHIPPED | OUTPATIENT
Start: 2024-06-25 | End: 2025-06-25

## 2024-06-25 NOTE — PROGRESS NOTES
Subjective:       Patient ID: Alina Narayan is a 79 y.o. female.    Chief Complaint: Follow-up, Diabetes, Knee Pain, Arm Pain, and Hip Pain    Follow-up  Pertinent negatives include no abdominal pain, chest pain (arm pain or jaw pain), headaches, nausea or vomiting.   Diabetes  Pertinent negatives for hypoglycemia include no headaches or seizures. Pertinent negatives for diabetes include no chest pain (arm pain or jaw pain).   Knee Pain     Arm Pain   Pertinent negatives include no chest pain (arm pain or jaw pain).   Hip Pain     Her blood sugars are uncontrolled.  No CP or SOB. Knees are painful - not surgical candidate.  Thinking about going to pain management.    Review of Systems   Respiratory:  Negative for shortness of breath (PND or orthopnea).    Cardiovascular:  Negative for chest pain (arm pain or jaw pain).   Gastrointestinal:  Negative for abdominal pain, diarrhea, nausea and vomiting.   Genitourinary:  Negative for dysuria.   Neurological:  Negative for seizures, syncope and headaches.       Objective:      Physical Exam  Constitutional:       General: She is not in acute distress.     Appearance: She is well-developed.   HENT:      Head: Normocephalic.   Eyes:      Pupils: Pupils are equal, round, and reactive to light.   Neck:      Thyroid: No thyromegaly.      Vascular: No JVD.   Cardiovascular:      Rate and Rhythm: Normal rate and regular rhythm.      Heart sounds: Normal heart sounds. No murmur heard.     No friction rub. No gallop.   Pulmonary:      Effort: Pulmonary effort is normal.      Breath sounds: Normal breath sounds. No wheezing or rales.   Abdominal:      General: Bowel sounds are normal. There is no distension.      Palpations: Abdomen is soft. There is no mass.      Tenderness: There is no abdominal tenderness. There is no guarding or rebound.   Musculoskeletal:      Cervical back: Neck supple.   Lymphadenopathy:      Cervical: No cervical adenopathy.   Skin:     General: Skin is warm  and dry.   Neurological:      Mental Status: She is alert and oriented to person, place, and time.      Deep Tendon Reflexes: Reflexes are normal and symmetric.   Psychiatric:         Behavior: Behavior normal.         Thought Content: Thought content normal.         Judgment: Judgment normal.         Assessment:       1. Essential hypertension    2. Hyperlipidemia, mixed    3. Lymphedema    4. Diabetes mellitus type 2 in obese    5. Stage 3b chronic kidney disease        Plan:   Essential hypertension  Controlled - continue current meds    Hyperlipidemia, mixed  Controlled - continue current meds    Lymphedema  -     Ambulatory referral/consult to Physical/Occupational Therapy; Future; Expected date: 07/02/2024    Diabetes mellitus type 2 in obese  -     CBC Auto Differential; Future; Expected date: 06/25/2024  -     Comprehensive Metabolic Panel; Future; Expected date: 06/25/2024  -     Hemoglobin A1C; Future; Expected date: 06/25/2024  Double up glimepiride  Stage 3b chronic kidney disease    Other orders  -     glimepiride (AMARYL) 4 MG tablet; Take 1 tablet (4 mg total) by mouth before breakfast.  Dispense: 90 tablet; Refill: 3  -     topiramate (TOPAMAX) 25 MG tablet; One at bedtime for 7 days then two at bedtime  Dispense: 180 tablet; Refill: 1- to help with losing weight  -     HYDROcodone-acetaminophen (NORCO) 5-325 mg per tablet; One daily as needed for pain  Dispense: 20 tablet; Refill: 0- use for severe pain

## 2024-07-01 ENCOUNTER — CLINICAL SUPPORT (OUTPATIENT)
Dept: REHABILITATION | Facility: HOSPITAL | Age: 79
End: 2024-07-01
Attending: INTERNAL MEDICINE
Payer: MEDICARE

## 2024-07-01 DIAGNOSIS — I89.0 LYMPHEDEMA: ICD-10-CM

## 2024-07-01 PROCEDURE — 97167 OT EVAL HIGH COMPLEX 60 MIN: CPT

## 2024-07-01 PROCEDURE — 97535 SELF CARE MNGMENT TRAINING: CPT

## 2024-07-03 NOTE — PLAN OF CARE
"[facility]  Occupational Therapy Initial Evaluation  Lymphedema      Name: Alina Narayan  Clinic Number: [id number]    Therapy Diagnosis:   Encounter Diagnosis   Name Primary?    Lymphedema      Physician: Aarti Dunn MD    Physician Orders: Eval and Treat  Medical Diagnosis: I89.0 (ICD-10-CM) - Lymphedema   Evaluation Date: 7/1/2024  Insurance Authorization Period Expiration: 6/25/2025  Plan of Care Certification Period: 9/22/2024  Visit # / Visits authorized: 1 / 1  FOTO: see media, 1 / 3    Precautions:  Standard, cardiac     Time In: 9:00   Time Out: 10:00  Total Appointment Time (timed & untimed codes): 60 minutes    Subjective      Date of onset: 2019  History of current condition - Alina reports: Lymphedema of RUE only. Hx of b/l breast cancer.  S/p L lumpectomy, XRT, and sentinel LN biopsy. S/p R mastectomy with re-operation with 33 LN's removed. Of note, she is s/p aortic valve replacement and revision this year. She reports lymphedema of RUE only that developed a few months after the mastectomy. She purchased a medical grade sleeve at S but stopped wearing due to donning difficult. She wears tubigrip approximately 1-2 days per week that does not alleviate swelling but slightly improves pain. She has constant pain in R axilla and the swelling "flares up more than it used to." She loves to oil paint but has great difficulty recently due to pain and heaviness of RUE.     Diagnosis: triple negative carcinoma of the right breast.   Surgery:   August 2019- right mastectomy, ALND  October 2012- left lumpectomy, SLNB  Radiation:   L only  Previous Lymphedema Therapy: no  Wears Compression: tubigrip only       Pain:  Functional Pain Scale Rating 0-10:   7/10 on average  5/10 at best  8/10 at worst  Location: knees, BUE, back  Description: burning (RUE) aching (LUE, back, knees)  Aggravating Factors: bending, reaching overhead  Easing Factors: denies    Occupation:  retired " "    Functional Limitations/Social History:    Previous functional status includes: Independent with all ADLs.   Recent falls: no    Current Functional Status   Home/Living environment: lives with their spouse    - 1 story home, 1 step to enter    - DME: RW, transport WC      Limitation of Functional Status as follows:   ADLs/IADLs:     - Feeding: none    - Bathing: none    - Dressing/Grooming: none    - Home Management: none    - Driving: none    Patient's Goals for Therapy: "control swelling and pain, know how to deal with it."    Past Medical History/Physical Systems Review:   Alina Narayan  has a past medical history of Allergy, Anxiety, Arthritis, BRCA1 negative, Breast cancer, Colon polyp, Depression, Diabetes mellitus, Diabetes mellitus, type 2, Diverticular disease, Diverticulitis, Genetic testing, Hyperlipidemia, Hypertension, Morbid obesity, MITCHELL (obstructive sleep apnea), Pulmonary embolism, Stenosis, Stenosis and insufficiency of lacrimal passages, and Thyroid disease.    Alina Narayan  has a past surgical history that includes Carpal tunnel release; Shoulder surgery; Dilation and curettage of uterus (1999); Endometrial ablation (1999); left lumpectomy (2012); Eye surgery; Skin biopsy; Cardiac valve replacement (12/2013); Colonoscopy (N/A, 09/29/2017); Breast lumpectomy (Left, 2012); Breast biopsy (Left, 10/01/2012); Breast biopsy (Left, 12/2017); Cardiac valuve replacement (2013); Insertion of tunneled central venous catheter (CVC) with subcutaneous port (Right, 02/01/2019); Modified radical mastectomy w/ axillary lymph node dissection (Right, 08/26/2019); Mediport removal (Right, 08/26/2019); Mastectomy; Breast cyst aspiration; Breast mass excision; Colonoscopy (N/A, 1/5/2023); Coronary angiography (Left, 4/13/2023); and Aortic valve replacement (N/A, 5/10/2023).    Alina has a current medication list which includes the following prescription(s): amoxicillin, apixaban, blood-glucose meter, carvedilol, " cholecalciferol (vitamin d3), furosemide, gabapentin, glimepiride, hydrocodone-acetaminophen, hydrocortisone, ketoconazole, lancets, levothyroxine, metformin, pravastatin, topiramate, triamcinolone acetonide 0.1%-magnesium hydroxide 400 mg/5 ml-ciclopirox, true metrix glucose test strip, and [DISCONTINUED] triamcinolone acetonide 0.1%.    Review of patient's allergies indicates:   Allergen Reactions    Augmentin [amoxicillin-pot clavulanate] Diarrhea    Dilaudid [hydromorphone (bulk)] Other (See Comments)     Other reaction(s): sedation    Hydromorphone Other (See Comments)     Other reaction(s): sedation    Cymbalta [duloxetine]      Dry mouth, agitation    Jardiance [empagliflozin] Hives    Byetta [exenatide] Rash     Other reaction(s): Rash        Pt denies: CHF, infection, severe PAD  Pt admits medically managed/treated: CKD (on diuretic) PE (on anticoagulation, 2/2 chemo) breast cancer (follows with Dr. Dwyer)    Objective      Patient received from waiting room, ambulating with RW, in NAD.  Mental status: alert, oriented to person, place, and time  Appearance: Well groomed  Behavior:  calm and cooperative  Attention Span and Concentration:  Normal    Affected Areas: RUE  Refill: day   Stemmer Sign: positive  Shape: increased girth across RUE  Tissue Texture: soft, pliable, pitting  Skin Integrity: intact  Sensation: intact  Circulation: intact      UE Girth Measurements: taken in seated position   LANDMARK RIGHT UE  7/3/24 LEFT UE  7/3/24   Axilla - -   E + 20 cm 49.5 cm 48.5 cm   E + 10 cm 42.5 cm 46.5 cm   Elbow 28.5 cm 32.5 cm   W + 20 cm 28.5 cm 33.0 cm   W + 15 cm 27.5 cm 33.0 cm   W + 10 cm 24.0 cm 28.5 cm   Wrist 17.5 cm 18.0 cm   MCP 19.5 cm 19.5 cm     Picture of BUE in seated position; taken via Epic Haiku on therapist's cell phone with verbal consent from patient:              Limitation/Restriction for FOTO Upper Quadrant Edema Survey    Therapist reviewed FOTO scores for Alina Narayan on 7/1/2024.    FOTO documents entered into EPIC - see Media section.    Limitation Score: see media         Treatment      Total Treatment time (time-based codes) separate from Evaluation: 15 minutes    Alina received the treatments listed below:          Self-care/home management techniques to improve functional performance for 15  minutes, including:    Pt was educated in potential compression needs.  Demo of products including socks, garments, and Inelastic Velcro wraps.   Discussed cost/coverage and authorization per insurance with Durable Medical Equipment(DME) provider.  Compression require orders from referring provider and coverage or purchase of products from DME or self order.      Discussed wear schedule, don/doff, wash and management of products.  Size and compression class and AM/PM needs.    Consideration for Edema Wear as form of temporary compression use until securing compression needs.   Consideration for shorts/tights or capris style compression to compliment lower leg compression to support size/shape of LE.   Product information provided.   Vendor list provided.    Informed insurance coverage of compression is per DME provider and typically Medicare and Medicare group plans may not cover cost beyond pair of standard sized knee high garments.   Commitment to attendance as well as commitment to securing compression needs is critical to edema management.     Patient Education and Home Exercises      Education provided:   1. Educated on definition of lymphedema.  2. Explained the Complete Decongestive Therapy protocol in depth  3. Educated on Phase 1 and 2 of protocol.  4. Reviewed treatment frequency and likely duration of weeks  5.Contraindications for treatment.  6. Plan of care and goals.  7. Educated on home management protocols.   8. Role of OT, goals for OT, scheduling/cancellations, insurance limitations.  9. Provided lymphedema educational pamphlet        Assessment      Alina is a 79 y.o. female  referred to outpatient Occupational Therapy with a medical diagnosis of lymphedema. This patient presents with stage 2 lymphedema due to breast cancer, mastectomy, ALND.  Patient's deficits include swelling of the RUE, increased pain of R axilla, decreased ROM of R shoulder, increased stiffness in the RUE, as well as difficulty performing ADLs , and placing the pt at higher risk of infection. Therapist's recommendation includes elevation, exercise, manual lymphatic drainage, soft tissue mobilization, scar management, pneumatic compression pump, multi-layer bandaging, and compression garments for 6 weeks to reduce swelling. Alina would benefit from complete decongestive therapy to reduce size of RUE, reduce risk of wounds and poor skin integrity as well as education to self-maintain reduction with use of compression garments.    Anticipated barriers to occupational therapy: none    Plan of care discussed with patient: Yes  Patient's spiritual, cultural and educational needs considered and patient is agreeable to the plan of care and goals as stated below:     Medical Necessity is demonstrated by the following  Occupational Profile/History  Co-morbidities and personal factors that may impact the plan of care [] LOW: Brief chart review  [] MODERATE: Expanded chart review   [x] HIGH: Extensive chart review    Moderate / High Support Documentation: see PMH     Examination  Performance deficits relating to physical, cognitive or psychosocial skills that result in activity limitations and/or participation restrictions  [] LOW: addressing 1-3 Performance deficits  [x] MODERATE: 3-5 Performance deficits  [] HIGH: 5+ Performance deficits (please support below)    Moderate / High Support Documentation:    Physical:  Joint Mobility  Muscle Power/Strength  Muscle Endurance  Skin Integrity/Scar Formation  Edema  Pain    Cognitive:  No Deficits    Psychosocial:    No Deficits     Treatment Options [] LOW: Limited options  []  MODERATE: Several options  [x] HIGH: Multiple options      Decision Making/ Complexity Score: high       The following goals were discussed with the patient and patient is in agreement with them as to be addressed in the treatment plan.     Goals:     Short Term Goals: (3 weeks)  Goals: Progressing / MET   1. Patient will demonstrate 100% knowledge of lymphedema precautions and signs of infection to allow for reduced lymphedema risk, infection risk, and/or exacerbation of condition.  NOT MET   2. Patient will tolerate daily activities wearing multilayered bandaging to allow for lymphatic drainage support, skin elasticity, and reduction in shape and size of limb.  NOT MET   3. Patient and/or caregiver will order/obtain appropriate compression garments to maintain lymphatic and venous support.  NOT MET   4. Patient will show decreased total girth measurement in RUE by up to 2 cm to allow for lower extremity symmetry, shoe and clothing choice, and ability to apply needed compression. NOT MET     Long Term Goals: (6 weeks)  Goals: Progressing / MET   1. Patient and/or caregiver to darryn/doff compression garment independently and with daily compliance to assist in lymphedema management, skin elasticity, and tissue density NOT MET   2. Patient and/or caregiver will be independent in use of pneumatic compression pump or self manual lymphatic drainage techniques to areas within reach to enhance lymphatic drainage and skin condition.  NOT MET   3. Patient to be independent and compliant with home exercise program to allow for increased function in affected limb. NOT MET   4. Patient will show decreased total girth measurement in RUE by up to 4 cm  to allow for lower extremity symmetry, shoe and clothing choice, and ability to apply needed compression daily. NOT MET        Plan     Plan of Care Certification: 7/1/2024 to 9/22/2024.     Therapy Track: COMPLETE DECONGESTIVE THERAPY  Interventions: manual lymphatic drainage,  multi-layer bandaging, compression garments, therapeutic exercise, self bandaging and manual lymphatic drainage training, home exercise programming.      Outpatient Occupational Therapy 2 times weekly for 6 weeks to include the following interventions: Manual therapy/joint mobilizations, Therapeutic exercises/activities., and Edema Control.    Fay Brooks, OT, CLWT      I CERTIFY THE NEED FOR THESE SERVICES FURNISHED UNDER THIS PLAN OF TREATMENT AND WHILE UNDER MY CARE   Physician's comments:     Physician's Signature: ___________________________________________________

## 2024-07-18 ENCOUNTER — CLINICAL SUPPORT (OUTPATIENT)
Dept: REHABILITATION | Facility: HOSPITAL | Age: 79
End: 2024-07-18
Payer: MEDICARE

## 2024-07-18 DIAGNOSIS — I89.0 LYMPHEDEMA OF RIGHT UPPER EXTREMITY: Primary | ICD-10-CM

## 2024-07-18 PROCEDURE — 97140 MANUAL THERAPY 1/> REGIONS: CPT

## 2024-07-18 PROCEDURE — 97110 THERAPEUTIC EXERCISES: CPT

## 2024-07-22 NOTE — PROGRESS NOTES
[facility]  Occupational Therapy Treatment Note  Lymphedema    Date: 7/18/2024  Name: Alina Narayan  Clinic Number: [id number]    Therapy Diagnosis: No diagnosis found.  Physician: Aarti Dunn MD    Physician Orders: Eval and Treat  Medical Diagnosis: I89.0 (ICD-10-CM) - Lymphedema   Evaluation Date: 7/1/2024  Insurance Authorization Period Expiration: 6/25/2025  Plan of Care Certification Period: 9/22/2024  Visit # / Visits authorized: 4 / 20 per referral  FOTO: see media, 1 / 3     Precautions:  Standard, cardiac     Time In: 12:30  Time Out: 1:30  Total Billable Time: 60 minutes    SUBJECTIVE     Pt reports: no concerns  Alina reported wearing compression outside of therapy visits: yes  She was compliant with home exercise program given last session.   Response to previous treatment:N/A  Functional change: none    Pain: did not quantify, pain to touch R axilla      OBJECTIVE     Pt arrived ambulating with rollator, in NAD.    Compression garment status: owns 20-30 mm hg sleeves  Compression Rx status: not requested  Pneumatic pump status: not referred     Objective Measures updated at progress report unless specified.    Treatment     Alina received the treatments listed below:         Manual therapy techniques were applied for 50 minutes, including:    MANUAL LYMPHATIC DRAINAGE (MLD):    While supine with LEs elevated stimulation at terminus, along GI region, B axillary regions, drainage of entire RUE ikrit upper arm, cubital region, forearm, hand, and digits with return proximally.      MULTILAYERED BANDAGING:    Issued supplies and bandaged RUE   Use of Cavilon moisturizer for dry skin  Cotton stockinet: size 9  Cellona padding MCP to axilla  1-6cm 1-8cm and 1-10cm Rosidal K rolls MCP to axilla      Pt to leave intact 24-48 hrs as tolerated, discontinue with any problems, return rolled bandages next session. Wash and wear schedules confirmed.     Therapeutic exercise was  completed for 10 minutes, including:    Shoulder flexion 3 x 10  Elbow flexion 3 x 10  Wrist flexion 3 x 10    Pt to complete BID with compression on, in pain free range.    Patient Education and Home Exercises      Education provided:   - see above  - Progress towards goals     See above    Assessment     Initiated bandaging on RUE. Pt to wear sleeves or edema wear size medium daily until next session.     Pt would continue to benefit from skilled OT.      Alina is progressing well towards her goals and there are no updates to goals at this time. Pt prognosis is Good.     Pt will continue to benefit from skilled outpatient occupational therapy to address the deficits listed in the problem list on initial evaluation provide pt/family education and to maximize pt's level of independence in the home and community environment.     Pt's spiritual, cultural and educational needs considered and pt agreeable to plan of care and goals.    Anticipated barriers to occupational therapy: none    Goals:    Short Term Goals: (3 weeks)  Goals: Progressing / MET   1. Patient will demonstrate 100% knowledge of lymphedema precautions and signs of infection to allow for reduced lymphedema risk, infection risk, and/or exacerbation of condition.  NOT MET   2. Patient will tolerate daily activities wearing multilayered bandaging to allow for lymphatic drainage support, skin elasticity, and reduction in shape and size of limb.  NOT MET   3. Patient and/or caregiver will order/obtain appropriate compression garments to maintain lymphatic and venous support.  NOT MET   4. Patient will show decreased total girth measurement in RUE by up to 2 cm to allow for lower extremity symmetry, shoe and clothing choice, and ability to apply needed compression. NOT MET      Long Term Goals: (6 weeks)  Goals: Progressing / MET   1. Patient and/or caregiver to darryn/doff compression garment independently and with daily compliance to assist in lymphedema  management, skin elasticity, and tissue density NOT MET   2. Patient and/or caregiver will be independent in use of pneumatic compression pump or self manual lymphatic drainage techniques to areas within reach to enhance lymphatic drainage and skin condition.  NOT MET   3. Patient to be independent and compliant with home exercise program to allow for increased function in affected limb. NOT MET   4. Patient will show decreased total girth measurement in RUE by up to 4 cm  to allow for lower extremity symmetry, shoe and clothing choice, and ability to apply needed compression daily. NOT MET          PLAN     Updates/Grading for next session: none    Fay Brooks, OT, CLWT

## 2024-07-23 ENCOUNTER — LAB VISIT (OUTPATIENT)
Dept: LAB | Facility: HOSPITAL | Age: 79
End: 2024-07-23
Attending: INTERNAL MEDICINE
Payer: MEDICARE

## 2024-07-23 DIAGNOSIS — E66.9 DIABETES MELLITUS TYPE 2 IN OBESE: ICD-10-CM

## 2024-07-23 DIAGNOSIS — E11.69 DIABETES MELLITUS TYPE 2 IN OBESE: ICD-10-CM

## 2024-07-23 LAB
ALBUMIN SERPL BCP-MCNC: 3.8 G/DL (ref 3.5–5.2)
ALP SERPL-CCNC: 86 U/L (ref 55–135)
ALT SERPL W/O P-5'-P-CCNC: 10 U/L (ref 10–44)
ANION GAP SERPL CALC-SCNC: 8 MMOL/L (ref 8–16)
AST SERPL-CCNC: 14 U/L (ref 10–40)
BASOPHILS # BLD AUTO: 0.02 K/UL (ref 0–0.2)
BASOPHILS NFR BLD: 0.4 % (ref 0–1.9)
BILIRUB SERPL-MCNC: 0.4 MG/DL (ref 0.1–1)
BUN SERPL-MCNC: 25 MG/DL (ref 8–23)
CALCIUM SERPL-MCNC: 9.3 MG/DL (ref 8.7–10.5)
CHLORIDE SERPL-SCNC: 105 MMOL/L (ref 95–110)
CO2 SERPL-SCNC: 29 MMOL/L (ref 23–29)
CREAT SERPL-MCNC: 1.3 MG/DL (ref 0.5–1.4)
DIFFERENTIAL METHOD BLD: ABNORMAL
EOSINOPHIL # BLD AUTO: 0 K/UL (ref 0–0.5)
EOSINOPHIL NFR BLD: 0.6 % (ref 0–8)
ERYTHROCYTE [DISTWIDTH] IN BLOOD BY AUTOMATED COUNT: 13.7 % (ref 11.5–14.5)
EST. GFR  (NO RACE VARIABLE): 41.8 ML/MIN/1.73 M^2
ESTIMATED AVG GLUCOSE: 174 MG/DL (ref 68–131)
GLUCOSE SERPL-MCNC: 145 MG/DL (ref 70–110)
HBA1C MFR BLD: 7.7 % (ref 4–5.6)
HCT VFR BLD AUTO: 38.9 % (ref 37–48.5)
HGB BLD-MCNC: 11.7 G/DL (ref 12–16)
IMM GRANULOCYTES # BLD AUTO: 0.03 K/UL (ref 0–0.04)
IMM GRANULOCYTES NFR BLD AUTO: 0.6 % (ref 0–0.5)
LYMPHOCYTES # BLD AUTO: 1.8 K/UL (ref 1–4.8)
LYMPHOCYTES NFR BLD: 33.3 % (ref 18–48)
MCH RBC QN AUTO: 28.7 PG (ref 27–31)
MCHC RBC AUTO-ENTMCNC: 30.1 G/DL (ref 32–36)
MCV RBC AUTO: 96 FL (ref 82–98)
MONOCYTES # BLD AUTO: 0.6 K/UL (ref 0.3–1)
MONOCYTES NFR BLD: 11.3 % (ref 4–15)
NEUTROPHILS # BLD AUTO: 2.9 K/UL (ref 1.8–7.7)
NEUTROPHILS NFR BLD: 53.8 % (ref 38–73)
NRBC BLD-RTO: 0 /100 WBC
PLATELET # BLD AUTO: 100 K/UL (ref 150–450)
PMV BLD AUTO: 12.1 FL (ref 9.2–12.9)
POTASSIUM SERPL-SCNC: 4.7 MMOL/L (ref 3.5–5.1)
PROT SERPL-MCNC: 7 G/DL (ref 6–8.4)
RBC # BLD AUTO: 4.07 M/UL (ref 4–5.4)
SODIUM SERPL-SCNC: 142 MMOL/L (ref 136–145)
WBC # BLD AUTO: 5.31 K/UL (ref 3.9–12.7)

## 2024-07-23 PROCEDURE — 85025 COMPLETE CBC W/AUTO DIFF WBC: CPT | Mod: HCNC | Performed by: INTERNAL MEDICINE

## 2024-07-23 PROCEDURE — 80053 COMPREHEN METABOLIC PANEL: CPT | Mod: HCNC | Performed by: INTERNAL MEDICINE

## 2024-07-23 PROCEDURE — 36415 COLL VENOUS BLD VENIPUNCTURE: CPT | Mod: HCNC | Performed by: INTERNAL MEDICINE

## 2024-07-23 PROCEDURE — 83036 HEMOGLOBIN GLYCOSYLATED A1C: CPT | Mod: HCNC | Performed by: INTERNAL MEDICINE

## 2024-07-25 ENCOUNTER — CLINICAL SUPPORT (OUTPATIENT)
Dept: REHABILITATION | Facility: HOSPITAL | Age: 79
End: 2024-07-25
Payer: MEDICARE

## 2024-07-25 DIAGNOSIS — I89.0 LYMPHEDEMA OF RIGHT UPPER EXTREMITY: Primary | ICD-10-CM

## 2024-07-25 PROCEDURE — 97140 MANUAL THERAPY 1/> REGIONS: CPT

## 2024-07-25 NOTE — PROGRESS NOTES
[facility]  Occupational Therapy Treatment Note  Lymphedema    Date: 7/25/2024  Name: Alina Narayan  Clinic Number: [id number]    Therapy Diagnosis:   Encounter Diagnosis   Name Primary?    Lymphedema of right upper extremity Yes     Physician: Aarti Dunn MD    Physician Orders: Eval and Treat  Medical Diagnosis: I89.0 (ICD-10-CM) - Lymphedema   Evaluation Date: 7/1/2024  Insurance Authorization Period Expiration: 6/25/2025  Plan of Care Certification Period: 9/22/2024  Visit # / Visits authorized: 5 / 20 per referral  FOTO: see media, 1 / 3     Precautions:  Standard, cardiac     Time In: 12:30  Time Out: 1:30  Total Billable Time: 60 minutes    SUBJECTIVE     Pt reports: significant pain in bandages, bruising on hand after.   Alina reported wearing compression outside of therapy visits: yes  She was compliant with home exercise program given last session.   Response to previous treatment:N/A  Functional change: none    Pain: did not quantify, pain to touch R axilla      OBJECTIVE     Pt arrived ambulating with rollator, in NAD.    Compression garment status: owns 20-30 mm hg sleeves  Compression Rx status: not requested  Pneumatic pump status: not referred     Objective Measures updated at progress report unless specified.    Treatment     Alina received the treatments listed below:         Manual therapy techniques were applied for 60 minutes, including:    MANUAL LYMPHATIC DRAINAGE (MLD):    While supine with LEs elevated stimulation at terminus, along GI region, B axillary regions, drainage of entire RUE kirit upper arm, cubital region, forearm, hand, and digits with return proximally.    Sensory desensitization/re-education- R axilla, R humeral region  Soft tissue mobilization- R axilla  Scar mobilization- R axilla      Patient Education and Home Exercises      Education provided:   - see above  - Progress towards goals     See above    Assessment     Pt reported pain and  slight bruising after bandaging therefore deferred this session. She reports easy bruising is her baseline though unclear cause as bandages were applied in normal pattern. 20-30 mm hg sleeve donned at end of session and fits properly. Pt to wear PRN as swelling is currently sporadic. Sensory desensitization and re-education techniques included for significant pain/tenderness to touch of R axilla and humeral region. Soft tissue and scar mobiliztion included to promote shoulder flexion in pain free range. Plan to focus on sensory/soft tissue/scar deficits and edema PRN.    Pt would continue to benefit from skilled OT.      Alina is progressing well towards her goals and there are no updates to goals at this time. Pt prognosis is Good.     Pt will continue to benefit from skilled outpatient occupational therapy to address the deficits listed in the problem list on initial evaluation provide pt/family education and to maximize pt's level of independence in the home and community environment.     Pt's spiritual, cultural and educational needs considered and pt agreeable to plan of care and goals.    Anticipated barriers to occupational therapy: none    Goals:    Short Term Goals: (3 weeks)  Goals: Progressing / MET   1. Patient will demonstrate 100% knowledge of lymphedema precautions and signs of infection to allow for reduced lymphedema risk, infection risk, and/or exacerbation of condition.  NOT MET   2. Patient will tolerate daily activities wearing multilayered bandaging to allow for lymphatic drainage support, skin elasticity, and reduction in shape and size of limb.  NOT MET   3. Patient and/or caregiver will order/obtain appropriate compression garments to maintain lymphatic and venous support.  NOT MET   4. Patient will show decreased total girth measurement in RUE by up to 2 cm to allow for lower extremity symmetry, shoe and clothing choice, and ability to apply needed compression. NOT MET      Long Term Goals:  (6 weeks)  Goals: Progressing / MET   1. Patient and/or caregiver to darryn/doff compression garment independently and with daily compliance to assist in lymphedema management, skin elasticity, and tissue density NOT MET   2. Patient and/or caregiver will be independent in use of pneumatic compression pump or self manual lymphatic drainage techniques to areas within reach to enhance lymphatic drainage and skin condition.  NOT MET   3. Patient to be independent and compliant with home exercise program to allow for increased function in affected limb. NOT MET   4. Patient will show decreased total girth measurement in RUE by up to 4 cm  to allow for lower extremity symmetry, shoe and clothing choice, and ability to apply needed compression daily. NOT MET          PLAN     Updates/Grading for next session: none    Fay Brooks, OT, CLWT

## 2024-07-30 ENCOUNTER — CLINICAL SUPPORT (OUTPATIENT)
Dept: REHABILITATION | Facility: HOSPITAL | Age: 79
End: 2024-07-30
Payer: MEDICARE

## 2024-07-30 DIAGNOSIS — I89.0 LYMPHEDEMA OF RIGHT UPPER EXTREMITY: Primary | ICD-10-CM

## 2024-07-30 PROCEDURE — 97140 MANUAL THERAPY 1/> REGIONS: CPT

## 2024-07-30 NOTE — PROGRESS NOTES
[facility]  Occupational Therapy Treatment Note  Lymphedema    Date: 7/30/2024  Name: Alina Narayan  Clinic Number: [id number]    Therapy Diagnosis:   Encounter Diagnosis   Name Primary?    Lymphedema of right upper extremity Yes     Physician: Aarti Dunn MD    Physician Orders: Eval and Treat  Medical Diagnosis: I89.0 (ICD-10-CM) - Lymphedema   Evaluation Date: 7/1/2024  Insurance Authorization Period Expiration: 6/25/2025  Plan of Care Certification Period: 9/22/2024  Visit # / Visits authorized: 6 / 20 per referral  FOTO: see media, 1 / 3     Precautions:  Standard, cardiac     Time In: 1:00  Time Out: 2:00  Total Billable Time: 60 minutes    SUBJECTIVE     Pt reports: no improvement in pain despite completing sensory techniques since last session.  Alina reported wearing compression outside of therapy visits: no  She was compliant with home exercise program given last session.   Response to previous treatment:N/A  Functional change: none    Pain: did not quantify, pain to touch R axilla      OBJECTIVE     Pt arrived ambulating with rollator, in NAD.    Compression garment status: owns 20-30 mm hg sleeves  Compression Rx status: not requested  Pneumatic pump status: not referred     Objective Measures updated at progress report unless specified.    Treatment     Alina received the treatments listed below:       Manual therapy techniques were applied for 60 minutes, including:    Sensory desensitization/re-education- R axilla- superior and lateral to incision  Soft tissue mobilization- R axilla, with shoulder in all planes of motion- flexion, scaption, abduction, external rotation         Patient Education and Home Exercises      Education provided:   - see above  - Progress towards goals     See above    Assessment     Pt denied improvement in paresthesia/pain in axilla despite sensory desensitization techniques though during assessment it was determined pt is unable to reach  affected area. Session focused on targeting area out of reach. Pt to place towel in axilla and squeeze to apply pressure to affected area. Pt also to resume R shoulder flexion wall walks daily.     Pt would continue to benefit from skilled OT.      Alina is progressing well towards her goals and there are no updates to goals at this time. Pt prognosis is Good.     Pt will continue to benefit from skilled outpatient occupational therapy to address the deficits listed in the problem list on initial evaluation provide pt/family education and to maximize pt's level of independence in the home and community environment.     Pt's spiritual, cultural and educational needs considered and pt agreeable to plan of care and goals.    Anticipated barriers to occupational therapy: none    Goals:    Short Term Goals: (3 weeks)  Goals: Progressing / MET   1. Patient will demonstrate 100% knowledge of lymphedema precautions and signs of infection to allow for reduced lymphedema risk, infection risk, and/or exacerbation of condition.  NOT MET   2. Patient will tolerate daily activities wearing multilayered bandaging to allow for lymphatic drainage support, skin elasticity, and reduction in shape and size of limb.  NOT MET   3. Patient and/or caregiver will order/obtain appropriate compression garments to maintain lymphatic and venous support.  NOT MET   4. Patient will show decreased total girth measurement in RUE by up to 2 cm to allow for lower extremity symmetry, shoe and clothing choice, and ability to apply needed compression. NOT MET      Long Term Goals: (6 weeks)  Goals: Progressing / MET   1. Patient and/or caregiver to darryn/doff compression garment independently and with daily compliance to assist in lymphedema management, skin elasticity, and tissue density NOT MET   2. Patient and/or caregiver will be independent in use of pneumatic compression pump or self manual lymphatic drainage techniques to areas within reach to  enhance lymphatic drainage and skin condition.  NOT MET   3. Patient to be independent and compliant with home exercise program to allow for increased function in affected limb. NOT MET   4. Patient will show decreased total girth measurement in RUE by up to 4 cm  to allow for lower extremity symmetry, shoe and clothing choice, and ability to apply needed compression daily. NOT MET          PLAN     Updates/Grading for next session: none    Fay Brooks, OT, CLWT

## 2024-08-06 ENCOUNTER — CLINICAL SUPPORT (OUTPATIENT)
Dept: REHABILITATION | Facility: HOSPITAL | Age: 79
End: 2024-08-06
Payer: MEDICARE

## 2024-08-06 DIAGNOSIS — I89.0 LYMPHEDEMA OF RIGHT UPPER EXTREMITY: Primary | ICD-10-CM

## 2024-08-06 PROCEDURE — 97140 MANUAL THERAPY 1/> REGIONS: CPT

## 2024-08-06 PROCEDURE — 97110 THERAPEUTIC EXERCISES: CPT

## 2024-08-07 ENCOUNTER — TELEPHONE (OUTPATIENT)
Dept: HEMATOLOGY/ONCOLOGY | Facility: CLINIC | Age: 79
End: 2024-08-07
Payer: MEDICARE

## 2024-08-07 DIAGNOSIS — M79.601 RIGHT ARM PAIN: Primary | ICD-10-CM

## 2024-08-08 ENCOUNTER — CLINICAL SUPPORT (OUTPATIENT)
Dept: REHABILITATION | Facility: HOSPITAL | Age: 79
End: 2024-08-08
Payer: MEDICARE

## 2024-08-08 DIAGNOSIS — L76.82 AXILLARY WEB SYNDROME: ICD-10-CM

## 2024-08-08 DIAGNOSIS — I89.0 LYMPHEDEMA OF RIGHT UPPER EXTREMITY: Primary | ICD-10-CM

## 2024-08-08 DIAGNOSIS — L90.5 AXILLARY WEB SYNDROME: ICD-10-CM

## 2024-08-08 PROCEDURE — 97140 MANUAL THERAPY 1/> REGIONS: CPT

## 2024-08-12 ENCOUNTER — CLINICAL SUPPORT (OUTPATIENT)
Dept: REHABILITATION | Facility: HOSPITAL | Age: 79
End: 2024-08-12
Payer: MEDICARE

## 2024-08-12 DIAGNOSIS — L90.5 AXILLARY WEB SYNDROME: Primary | ICD-10-CM

## 2024-08-12 DIAGNOSIS — L76.82 AXILLARY WEB SYNDROME: Primary | ICD-10-CM

## 2024-08-12 PROCEDURE — 97140 MANUAL THERAPY 1/> REGIONS: CPT

## 2024-08-12 NOTE — PROGRESS NOTES
[facility]  Occupational Therapy Treatment Note  Lymphedema    Date: 8/12/2024  Name: Alina Narayan  Clinic Number: [id number]    Therapy Diagnosis:   Encounter Diagnosis   Name Primary?    Axillary web syndrome Yes       Physician: Aarti Dunn MD    Physician Orders: Eval and Treat  Medical Diagnosis: I89.0 (ICD-10-CM) - Lymphedema   Evaluation Date: 7/1/2024  Insurance Authorization Period Expiration: 6/25/2025  Plan of Care Certification Period: 9/22/2024  Visit # / Visits authorized: 9 / 20   FOTO: see media, 1 / 3     Precautions:  Standard, cardiac     Time In: 10:00  Time Out: 11:00  Total Billable Time: 60 minutes    SUBJECTIVE     Pt reports: Continued pain in R humeral region. Tightness across chest incision.  Alina reported wearing compression outside of therapy visits: no  She was compliant with home exercise program given last session.   Response to previous treatment: no change  Functional change: none    Pain: did not quantify, pain to touch R axilla and humeral region      OBJECTIVE     Pt arrived ambulating with rollator, in NAD.    Compression garment status: owns 20-30 mm hg sleeves  Compression Rx status: received, sent to [facility]  Pneumatic pump status: not referred             Treatment     Alina received the treatments listed below:       Manual therapy techniques were applied for 60 minutes, including:    Sensory desensitization/re-education- R axilla- superior and lateral to incision, R humeral region     Visible cording is now present crossing the R axilla and running deeply into the medial upper arm. Patient was positioned supine with RUE elevated and supported on 2 pillows above her head. Repeated Longitudinal stretching was performed with stabilization at one end of the cord in the axilla and pulling up into the medial upper arm. Frequent rest breaks were taken to lower the RUE for rest. Post treatment there is less tension visible on the medial most  cord crossing the axilla.    Soft tissue mobilizations were then performed about the medial 4 inches of the mastectomy incision. The entire incision is adheared to the underlying tissues and upon palpation feels like skin on ribs with little to no adipose tissue under the incision and rolls of adipose above and below the incision. Following couble C techniques, there was improved tissue mobility in the treated segment.      Patient Education and Home Exercises      Education provided:   - see above  - Progress towards goals     See above    Assessment     Five axillary cords noted. Increased time spent on soft tissue mobilization in axilla.     Pt has stage 1 lymphedema secondary to mastectomy/breast cancer and is in the active (phase 1) stage of treatment. A compression garment is required to reduce and maintain limb girth and prevent progression of lymphedema to prevent infections, wounds, pain, and orthopedic issues.       Pt would continue to benefit from skilled OT.      Alina is progressing well towards her goals and there are no updates to goals at this time. Pt prognosis is Good.     Pt will continue to benefit from skilled outpatient occupational therapy to address the deficits listed in the problem list on initial evaluation provide pt/family education and to maximize pt's level of independence in the home and community environment.     Pt's spiritual, cultural and educational needs considered and pt agreeable to plan of care and goals.    Anticipated barriers to occupational therapy: none    Goals:    Short Term Goals: (3 weeks)  Goals: Progressing / MET   1. Patient will demonstrate 100% knowledge of lymphedema precautions and signs of infection to allow for reduced lymphedema risk, infection risk, and/or exacerbation of condition.  NOT MET   2. Patient will tolerate daily activities wearing multilayered bandaging to allow for lymphatic drainage support, skin elasticity, and reduction in shape and size  of limb.  NOT MET   3. Patient and/or caregiver will order/obtain appropriate compression garments to maintain lymphatic and venous support.  NOT MET   4. Patient will show decreased total girth measurement in RUE by up to 2 cm to allow for lower extremity symmetry, shoe and clothing choice, and ability to apply needed compression. NOT MET      Long Term Goals: (6 weeks)  Goals: Progressing / MET   1. Patient and/or caregiver to darryn/doff compression garment independently and with daily compliance to assist in lymphedema management, skin elasticity, and tissue density NOT MET   2. Patient and/or caregiver will be independent in use of pneumatic compression pump or self manual lymphatic drainage techniques to areas within reach to enhance lymphatic drainage and skin condition.  NOT MET   3. Patient to be independent and compliant with home exercise program to allow for increased function in affected limb. NOT MET   4. Patient will show decreased total girth measurement in RUE by up to 4 cm  to allow for lower extremity symmetry, shoe and clothing choice, and ability to apply needed compression daily. NOT MET          PLAN     Updates/Grading for next session: none    Eladia Vitale OT, MIGUEL

## 2024-08-20 ENCOUNTER — CLINICAL SUPPORT (OUTPATIENT)
Dept: REHABILITATION | Facility: HOSPITAL | Age: 79
End: 2024-08-20
Payer: MEDICARE

## 2024-08-20 DIAGNOSIS — L90.5 AXILLARY WEB SYNDROME: Primary | ICD-10-CM

## 2024-08-20 DIAGNOSIS — L76.82 AXILLARY WEB SYNDROME: Primary | ICD-10-CM

## 2024-08-20 PROCEDURE — 97140 MANUAL THERAPY 1/> REGIONS: CPT

## 2024-08-20 NOTE — PROGRESS NOTES
[facility]  Occupational Therapy Treatment Note  Lymphedema    Date: 8/20/2024  Name: Alina Narayan  Clinic Number: [id number]    Therapy Diagnosis:   Encounter Diagnosis   Name Primary?    Axillary web syndrome Yes       Physician: Aarti Dunn MD    Physician Orders: Eval and Treat  Medical Diagnosis: I89.0 (ICD-10-CM) - Lymphedema   Evaluation Date: 7/1/2024  Insurance Authorization Period Expiration: 6/25/2025  Plan of Care Certification Period: 9/22/2024  Visit # / Visits authorized: 10 / 20   FOTO: see media, 1 / 3     Precautions:  Standard, cardiac     Time In: 11:00  Time Out: 12:00  Total Billable Time: 60 minutes    SUBJECTIVE     Pt reports: Continued pain in R humeral region. Tightness across chest incision.  Alina reported wearing compression outside of therapy visits: no  She was compliant with home exercise program given last session.   Response to previous treatment: no change  Functional change: none    Pain: did not quantify, pain to touch R axilla and humeral region      OBJECTIVE     Pt arrived ambulating with rollator, in NAD.    Compression garment status: owns 20-30 mm hg sleeves  Compression Rx status: received, sent to [facility]  Pneumatic pump status: not referred       Treatment     Alina received the treatments listed below:       Manual therapy techniques were applied for 60 minutes, including:    Sensory desensitization/re-education- R axilla- superior and lateral to incision, R humeral region     Visible cording remains present crossing the R axilla and running deeply into the medial upper arm. Patient was positioned supine with RUE elevated and supported on 2 pillows above her head. Patient easily lifts the RUE overhead while supine this date.  Repeated Longitudinal stretching was performed with stabilization at one end of the cord in the axilla and pulling up into the medial upper arm. Less frequent rest breaks were required to lower the RUE for rest.  Post treatment the most medial cord is released and there  is less tension visible on the remaining cords  crossing the axilla.    Soft tissue mobilizations were then performed about the entirety of the mastectomy incision. The entire incision is adheared to the underlying tissues and upon palpation feels like skin on ribs with little to no adipose tissue under the incision and rolls of adipose above and below the incision. Following couble C techniques, there was improved tissue mobility in the treated segment.      Patient Education and Home Exercises      Education provided:   - see above  - Progress towards goals     See above    Assessment     Five axillary cords noted. Increased time spent on soft tissue mobilization in axilla.     Pt has stage 1 lymphedema secondary to mastectomy/breast cancer and is in the active (phase 1) stage of treatment. A compression garment is required to reduce and maintain limb girth and prevent progression of lymphedema to prevent infections, wounds, pain, and orthopedic issues.       Pt would continue to benefit from skilled OT.      Alina is progressing well towards her goals and there are no updates to goals at this time. Pt prognosis is Good.     Pt will continue to benefit from skilled outpatient occupational therapy to address the deficits listed in the problem list on initial evaluation provide pt/family education and to maximize pt's level of independence in the home and community environment.     Pt's spiritual, cultural and educational needs considered and pt agreeable to plan of care and goals.    Anticipated barriers to occupational therapy: none    Goals:    Short Term Goals: (3 weeks)  Goals: Progressing / MET   1. Patient will demonstrate 100% knowledge of lymphedema precautions and signs of infection to allow for reduced lymphedema risk, infection risk, and/or exacerbation of condition.  NOT MET   2. Patient will tolerate daily activities wearing multilayered  bandaging to allow for lymphatic drainage support, skin elasticity, and reduction in shape and size of limb.  NOT MET   3. Patient and/or caregiver will order/obtain appropriate compression garments to maintain lymphatic and venous support.  NOT MET   4. Patient will show decreased total girth measurement in RUE by up to 2 cm to allow for lower extremity symmetry, shoe and clothing choice, and ability to apply needed compression. NOT MET      Long Term Goals: (6 weeks)  Goals: Progressing / MET   1. Patient and/or caregiver to darryn/doff compression garment independently and with daily compliance to assist in lymphedema management, skin elasticity, and tissue density NOT MET   2. Patient and/or caregiver will be independent in use of pneumatic compression pump or self manual lymphatic drainage techniques to areas within reach to enhance lymphatic drainage and skin condition.  NOT MET   3. Patient to be independent and compliant with home exercise program to allow for increased function in affected limb. NOT MET   4. Patient will show decreased total girth measurement in RUE by up to 4 cm  to allow for lower extremity symmetry, shoe and clothing choice, and ability to apply needed compression daily. NOT MET          PLAN     Updates/Grading for next session: continued work on axillary cording, incision and ROM.  Eladia Vitale, OT, DONR

## 2024-08-27 ENCOUNTER — CLINICAL SUPPORT (OUTPATIENT)
Dept: REHABILITATION | Facility: HOSPITAL | Age: 79
End: 2024-08-27
Payer: MEDICARE

## 2024-08-27 DIAGNOSIS — L90.5 AXILLARY WEB SYNDROME: Primary | ICD-10-CM

## 2024-08-27 DIAGNOSIS — L76.82 AXILLARY WEB SYNDROME: Primary | ICD-10-CM

## 2024-08-27 PROCEDURE — 97140 MANUAL THERAPY 1/> REGIONS: CPT

## 2024-08-27 NOTE — PROGRESS NOTES
[facility]  Occupational Therapy Treatment Note  Lymphedema    Date: 8/27/2024  Name: Alina Narayan  Clinic Number: [id number]    Therapy Diagnosis:   Encounter Diagnosis   Name Primary?    Axillary web syndrome Yes       Physician: Aarti Dunn MD    Physician Orders: Eval and Treat  Medical Diagnosis: I89.0 (ICD-10-CM) - Lymphedema   Evaluation Date: 7/1/2024  Insurance Authorization Period Expiration: 6/25/2025  Plan of Care Certification Period: 9/22/2024  Visit # / Visits authorized: 11 / 20   FOTO: see media, 1 / 3     Precautions:  Standard, cardiac     Time In: 1:05  Time Out: 2:00  Total Billable Time: 55 minutes    SUBJECTIVE     Pt reports: Continued pain in R humeral region. Wore her sleeve yesterday due to arm swelling.   Alina reported wearing compression outside of therapy visits: no  She was compliant with home exercise program given last session.   Response to previous treatment: no change  Functional change: none    Pain: did not quantify, pain to touch R axilla and humeral region      OBJECTIVE     Pt arrived ambulating with rollator, in NAD.    Compression garment status: owns 20-30 mm hg sleeves  Compression Rx status: received, sent to [facility]  Pneumatic pump status: not referred       Treatment     Alina received the treatments listed below:       Manual therapy techniques were applied for 55 minutes, including:      Visible cording remains present crossing the R axilla and running deeply into the medial upper arm. Patient was positioned supine with RUE elevated and supported on 2 pillows above her head. Patient easily lifts the RUE overhead while supine this date.  Repeated Longitudinal stretching was performed with stabilization at one end of the cord in the axilla and pulling up into the medial upper arm. Less frequent rest breaks were required to lower the RUE for rest.     Soft tissue mobilizations were then performed about the entirety of the mastectomy  incision. The entire incision is adheared to the underlying tissues and upon palpation feels like skin on ribs with little to no adipose tissue under the incision and rolls of adipose above and below the incision. Following double C techniques, there was improved tissue mobility in the treated segment.      Patient Education and Home Exercises      Education provided:   - see above  - Progress towards goals     See above    Assessment     Denies improvement in symptoms. Cording and nerve pain persist. Plan to spend next session on sensory desensitization to increase tolerance to soft tissue mobility.     Pt has stage 1 lymphedema secondary to mastectomy/breast cancer and is in the active (phase 1) stage of treatment. A compression garment is required to reduce and maintain limb girth and prevent progression of lymphedema to prevent infections, wounds, pain, and orthopedic issues.       Pt would continue to benefit from skilled OT.      Alina is progressing well towards her goals and there are no updates to goals at this time. Pt prognosis is Good.     Pt will continue to benefit from skilled outpatient occupational therapy to address the deficits listed in the problem list on initial evaluation provide pt/family education and to maximize pt's level of independence in the home and community environment.     Pt's spiritual, cultural and educational needs considered and pt agreeable to plan of care and goals.    Anticipated barriers to occupational therapy: none    Goals:    Short Term Goals: (3 weeks)  Goals: Progressing / MET   1. Patient will demonstrate 100% knowledge of lymphedema precautions and signs of infection to allow for reduced lymphedema risk, infection risk, and/or exacerbation of condition.  NOT MET   2. Patient will tolerate daily activities wearing multilayered bandaging to allow for lymphatic drainage support, skin elasticity, and reduction in shape and size of limb.  NOT MET   3. Patient and/or  caregiver will order/obtain appropriate compression garments to maintain lymphatic and venous support.  NOT MET   4. Patient will show decreased total girth measurement in RUE by up to 2 cm to allow for lower extremity symmetry, shoe and clothing choice, and ability to apply needed compression. NOT MET      Long Term Goals: (6 weeks)  Goals: Progressing / MET   1. Patient and/or caregiver to darryn/doff compression garment independently and with daily compliance to assist in lymphedema management, skin elasticity, and tissue density NOT MET   2. Patient and/or caregiver will be independent in use of pneumatic compression pump or self manual lymphatic drainage techniques to areas within reach to enhance lymphatic drainage and skin condition.  NOT MET   3. Patient to be independent and compliant with home exercise program to allow for increased function in affected limb. NOT MET   4. Patient will show decreased total girth measurement in RUE by up to 4 cm  to allow for lower extremity symmetry, shoe and clothing choice, and ability to apply needed compression daily. NOT MET          PLAN     Updates/Grading for next session: continued work on axillary cording, incision and ROM.  Fay Brooks, OT, MIGUEL

## 2024-09-03 ENCOUNTER — CLINICAL SUPPORT (OUTPATIENT)
Dept: REHABILITATION | Facility: HOSPITAL | Age: 79
End: 2024-09-03
Payer: MEDICARE

## 2024-09-03 DIAGNOSIS — L76.82 AXILLARY WEB SYNDROME: Primary | ICD-10-CM

## 2024-09-03 DIAGNOSIS — L90.5 AXILLARY WEB SYNDROME: Primary | ICD-10-CM

## 2024-09-03 PROCEDURE — 97140 MANUAL THERAPY 1/> REGIONS: CPT

## 2024-09-03 NOTE — PROGRESS NOTES
[facility]  Occupational Therapy Treatment Note  Lymphedema    Date: 9/3/2024  Name: Alina Narayan  Clinic Number: [id number]    Therapy Diagnosis:   Encounter Diagnosis   Name Primary?    Axillary web syndrome Yes       Physician: Aarti Dunn MD    Physician Orders: Eval and Treat  Medical Diagnosis: I89.0 (ICD-10-CM) - Lymphedema   Evaluation Date: 7/1/2024  Insurance Authorization Period Expiration: 6/25/2025  Plan of Care Certification Period: 9/22/2024  Visit # / Visits authorized: 12 / 20   FOTO: see media, 1 / 3     Precautions:  Standard, cardiac     Time In: 10:40  Time Out: 11:40  Total Billable Time: 60 minutes    SUBJECTIVE     Pt reports: Continued pain. Denies improvement of pain or tightness of R axilla.   Alina reported wearing compression outside of therapy visits: no  She was compliant with home exercise program given last session.   Response to previous treatment: no change  Functional change: none    Pain: did not quantify, pain to touch R axilla and humeral region      OBJECTIVE     Pt arrived ambulating with rollator, in NAD.    Compression garment status: owns 20-30 mm hg sleeves  Compression Rx status: received, sent to [employer]  Pneumatic pump status: not referred       Treatment     Alina received the treatments listed below:       Manual therapy techniques were applied for 60 minutes, including:      Visible cording remains present crossing the R axilla and running deeply into the medial upper arm. Patient was positioned supine with RUE elevated and supported on 2 pillows above her head. Patient easily lifts the RUE overhead while supine this date.  Repeated Longitudinal stretching was performed with stabilization at one end of the cord in the axilla and pulling up into the medial upper arm. Less frequent rest breaks were required to lower the RUE for rest.     Soft tissue mobilizations were then performed about the entirety of the mastectomy incision. The  entire incision is adheared to the underlying tissues and upon palpation feels like skin on ribs with little to no adipose tissue under the incision and rolls of adipose above and below the incision. Following double C techniques, there was improved tissue mobility in the treated segment.      Patient Education and Home Exercises      Education provided:   - see above  - Progress towards goals     See above    Assessment     Less tension observed on inferior cords.    Pt has stage 1 lymphedema secondary to mastectomy/breast cancer and is in the active (phase 1) stage of treatment. A compression garment is required to reduce and maintain limb girth and prevent progression of lymphedema to prevent infections, wounds, pain, and orthopedic issues.       Pt would continue to benefit from skilled OT.      Alina is progressing well towards her goals and there are no updates to goals at this time. Pt prognosis is Good.     Pt will continue to benefit from skilled outpatient occupational therapy to address the deficits listed in the problem list on initial evaluation provide pt/family education and to maximize pt's level of independence in the home and community environment.     Pt's spiritual, cultural and educational needs considered and pt agreeable to plan of care and goals.    Anticipated barriers to occupational therapy: none    Goals:    Short Term Goals: (3 weeks)  Goals: Progressing / MET   1. Patient will demonstrate 100% knowledge of lymphedema precautions and signs of infection to allow for reduced lymphedema risk, infection risk, and/or exacerbation of condition.  NOT MET   2. Patient will tolerate daily activities wearing multilayered bandaging to allow for lymphatic drainage support, skin elasticity, and reduction in shape and size of limb.  NOT MET   3. Patient and/or caregiver will order/obtain appropriate compression garments to maintain lymphatic and venous support.  NOT MET   4. Patient will show  decreased total girth measurement in RUE by up to 2 cm to allow for lower extremity symmetry, shoe and clothing choice, and ability to apply needed compression. NOT MET      Long Term Goals: (6 weeks)  Goals: Progressing / MET   1. Patient and/or caregiver to darryn/doff compression garment independently and with daily compliance to assist in lymphedema management, skin elasticity, and tissue density NOT MET   2. Patient and/or caregiver will be independent in use of pneumatic compression pump or self manual lymphatic drainage techniques to areas within reach to enhance lymphatic drainage and skin condition.  NOT MET   3. Patient to be independent and compliant with home exercise program to allow for increased function in affected limb. NOT MET   4. Patient will show decreased total girth measurement in RUE by up to 4 cm  to allow for lower extremity symmetry, shoe and clothing choice, and ability to apply needed compression daily. NOT MET          PLAN     Updates/Grading for next session: continued work on axillary cording, incision and ROM.  Fay Brooks, OT, MIGUEL

## 2024-09-09 DIAGNOSIS — M51.36 DEGENERATIVE LUMBAR DISC: ICD-10-CM

## 2024-09-09 DIAGNOSIS — M47.27 OSTEOARTHRITIS OF SPINE WITH RADICULOPATHY, LUMBOSACRAL REGION: ICD-10-CM

## 2024-09-10 ENCOUNTER — PATIENT MESSAGE (OUTPATIENT)
Dept: CARDIOLOGY | Facility: CLINIC | Age: 79
End: 2024-09-10
Payer: MEDICARE

## 2024-09-10 RX ORDER — METFORMIN HYDROCHLORIDE 500 MG/1
500 TABLET ORAL 2 TIMES DAILY WITH MEALS
Qty: 180 TABLET | Refills: 1 | Status: SHIPPED | OUTPATIENT
Start: 2024-09-10

## 2024-09-10 RX ORDER — GABAPENTIN 300 MG/1
CAPSULE ORAL
Qty: 270 CAPSULE | Refills: 3 | Status: SHIPPED | OUTPATIENT
Start: 2024-09-10

## 2024-09-10 NOTE — TELEPHONE ENCOUNTER
No care due was identified.  [facility] Embedded Care Due Messages. Reference number: [id number].   9/09/2024 7:05:28 PM CDT

## 2024-09-10 NOTE — TELEPHONE ENCOUNTER
Refill Routing Note   Medication(s) are not appropriate for processing by [facility] for the following reason(s):        Outside of protocol; GABAPENTIN  Drug-disease interaction; METFORMIN    [facility] action(s):  Route  Defer        Medication Therapy Plan: metFORMIN and Diabetes mellitus due to underlying condition with stage 3a chronic kidney disease, without long-term current use of insulin      Appointments  past 12m or future 3m with PCP    Date Provider   Last Visit   6/25/2024 Aarti Dunn MD   Next Visit   Visit date not found Aarti Dunn MD   ED visits in past 90 days: 0        Note composed:9:13 AM 09/10/2024

## 2024-09-17 ENCOUNTER — CLINICAL SUPPORT (OUTPATIENT)
Dept: REHABILITATION | Facility: HOSPITAL | Age: 79
End: 2024-09-17
Payer: MEDICARE

## 2024-09-17 DIAGNOSIS — L90.5 AXILLARY WEB SYNDROME: Primary | ICD-10-CM

## 2024-09-17 DIAGNOSIS — L76.82 AXILLARY WEB SYNDROME: Primary | ICD-10-CM

## 2024-09-17 PROCEDURE — 97140 MANUAL THERAPY 1/> REGIONS: CPT

## 2024-09-18 NOTE — PROGRESS NOTES
[facility]  Occupational Therapy Treatment Note  Lymphedema    Date: 9/17/2024  Name: Alina Narayan  Clinic Number: [id number]    Therapy Diagnosis:   Encounter Diagnosis   Name Primary?    Axillary web syndrome Yes       Physician: Aarti Dunn MD    Physician Orders: Eval and Treat  Medical Diagnosis: I89.0 (ICD-10-CM) - Lymphedema   Evaluation Date: 7/1/2024  Insurance Authorization Period Expiration: 6/25/2025  Plan of Care Certification Period: 9/22/2024  Visit # / Visits authorized: 13 / 20   FOTO: see media, 1 / 3     Precautions:  Standard, cardiac     Time In: 10:00  Time Out: 11:00  Total Billable Time: 60 minutes    SUBJECTIVE     Pt reports: Continued pain. Denies improvement of pain or tightness of R axilla.   Alina reported wearing compression outside of therapy visits: no  She was compliant with home exercise program given last session.   Response to previous treatment: no change  Functional change: none    Pain: did not quantify, pain to touch R axilla and humeral region      OBJECTIVE     Pt arrived ambulating with rollator, in NAD.    Compression garment status: owns 20-30 mm hg sleeves  Compression Rx status: received, sent to [employer]  Pneumatic pump status: not referred       Treatment     Alina received the treatments listed below:       Manual therapy techniques were applied for 60 minutes, including:      Patient was positioned supine with RUE elevated and supported on 2 pillows above her head. Patient easily lifts the RUE overhead while supine this date.  Repeated Longitudinal stretching was performed with stabilization at one end of the cord in the axilla and pulling up into the medial upper arm. Less frequent rest breaks were required to lower the RUE for rest.     Sensory desensitization techniques performed across entirety of R axilla    Soft tissue mobilizations were then performed about the entirety of the mastectomy incision via double C  techniques      Patient Education and Home Exercises      Education provided:   - see above  - Progress towards goals     See above    Assessment     Visible cording remains present crossing the R axilla and running deeply into the medial upper arm. Pt denies improvement in pulling of axilla, pain to touch, nerve pain. Plan to hold therapy and reassess in 2-3 weeks.     Pt has stage 1 lymphedema secondary to mastectomy/breast cancer and is in the active (phase 1) stage of treatment. A compression garment is required to reduce and maintain limb girth and prevent progression of lymphedema to prevent infections, wounds, pain, and orthopedic issues.       Pt would continue to benefit from skilled OT.      Alina is progressing well towards her goals and there are no updates to goals at this time. Pt prognosis is Good.     Pt will continue to benefit from skilled outpatient occupational therapy to address the deficits listed in the problem list on initial evaluation provide pt/family education and to maximize pt's level of independence in the home and community environment.     Pt's spiritual, cultural and educational needs considered and pt agreeable to plan of care and goals.    Anticipated barriers to occupational therapy: none    Goals:    Short Term Goals: (3 weeks)  Goals: Progressing / MET   1. Patient will demonstrate 100% knowledge of lymphedema precautions and signs of infection to allow for reduced lymphedema risk, infection risk, and/or exacerbation of condition.  NOT MET   2. Patient will tolerate daily activities wearing multilayered bandaging to allow for lymphatic drainage support, skin elasticity, and reduction in shape and size of limb.  NOT MET   3. Patient and/or caregiver will order/obtain appropriate compression garments to maintain lymphatic and venous support.  NOT MET   4. Patient will show decreased total girth measurement in RUE by up to 2 cm to allow for lower extremity symmetry, shoe and  clothing choice, and ability to apply needed compression. NOT MET      Long Term Goals: (6 weeks)  Goals: Progressing / MET   1. Patient and/or caregiver to darryn/doff compression garment independently and with daily compliance to assist in lymphedema management, skin elasticity, and tissue density NOT MET   2. Patient and/or caregiver will be independent in use of pneumatic compression pump or self manual lymphatic drainage techniques to areas within reach to enhance lymphatic drainage and skin condition.  NOT MET   3. Patient to be independent and compliant with home exercise program to allow for increased function in affected limb. NOT MET   4. Patient will show decreased total girth measurement in RUE by up to 4 cm  to allow for lower extremity symmetry, shoe and clothing choice, and ability to apply needed compression daily. NOT MET          PLAN     Updates/Grading for next session: continued work on axillary cording, incision and ROM.  Fay Brooks, OT, DONR

## 2024-10-17 DIAGNOSIS — E66.2 CLASS 3 OBESITY WITH ALVEOLAR HYPOVENTILATION AND BODY MASS INDEX (BMI) OF 40.0 TO 44.9 IN ADULT, UNSPECIFIED WHETHER SERIOUS COMORBIDITY PRESENT: ICD-10-CM

## 2024-10-17 DIAGNOSIS — I26.99 BILATERAL PULMONARY EMBOLISM: ICD-10-CM

## 2024-10-17 DIAGNOSIS — E66.813 CLASS 3 OBESITY WITH ALVEOLAR HYPOVENTILATION AND BODY MASS INDEX (BMI) OF 40.0 TO 44.9 IN ADULT, UNSPECIFIED WHETHER SERIOUS COMORBIDITY PRESENT: ICD-10-CM

## 2024-10-24 ENCOUNTER — CLINICAL SUPPORT (OUTPATIENT)
Dept: REHABILITATION | Facility: HOSPITAL | Age: 79
End: 2024-10-24
Payer: MEDICARE

## 2024-10-24 DIAGNOSIS — L76.82 AXILLARY WEB SYNDROME: Primary | ICD-10-CM

## 2024-10-24 DIAGNOSIS — L90.5 AXILLARY WEB SYNDROME: Primary | ICD-10-CM

## 2024-10-24 PROCEDURE — 97140 MANUAL THERAPY 1/> REGIONS: CPT

## 2024-10-24 NOTE — PROGRESS NOTES
[facility]  Occupational Therapy Treatment Note/ Discharge Note  Lymphedema    Date: 10/24/2024  Name: Alina Narayan  Clinic Number: [id number]    Therapy Diagnosis:   Encounter Diagnosis   Name Primary?    Axillary web syndrome Yes       Physician: Aarti Dunn MD    Physician Orders: Eval and Treat  Medical Diagnosis: I89.0 (ICD-10-CM) - Lymphedema   Evaluation Date: 7/1/2024  Insurance Authorization Period Expiration: 6/25/2025  Plan of Care Certification Period: 9/22/2024  Visit # / Visits authorized: 14 / 20   FOTO: see media, 1 / 3     Precautions:  Standard, cardiac     Time In: 11:00  Time Out: 11:45  Total Billable Time: 45 minutes    SUBJECTIVE     Pt reports: Continued pain nerve pain in the R axilla, inner upper arm.  Unprovoked zings and zaps of pain that are shooting but not long lasting. In addition patient reports pain when anything touches the innermost upper arm. Patient reports she received the 3 compression sleeves measured for and ordered and had her  assist in trying one on. After 5 mins the Mediven [place] sleeve, EW upper arm was slipping down. She has not opened the other 2 packages and would like to know how to return them to order something else. She found an old lymphidivas sleeve, and is wearing it today. She additionally found several lengths of tubigrip issued to her ages ago by another therapist and report this makes the skin of the arm feel better having something on it.  Alina reported wearing compression outside of therapy visits: yes, she arrives wearing lymphedivas arm sleeve.  She was compliant with home exercise program given last session.   Response to previous treatment: no change  Functional change: none    Pain: 2/10 at rest pain to touch R axilla and inner medial arm. Occasional 7/10 shooting pain      OBJECTIVE     Pt arrived ambulating with rollator, in NAD.    Compression garment status: owns 20-30 mm hg sleeves  Compression Rx  status: received, sent to [employer], received, reports they do not stay up and would like to return.  Pneumatic pump status: not referred   Girth measurements were taken for baseline   RUE:  Elbow + 10cm  46.5cm  Elbow               29.5cm  Wrist + 20cm    33.0cm  Wrist + 10cm     25.5cm  Wrist                  17.0cm  MCP                   17.0cm    Patient was measured for lymphadiva class 1 sleeve:  D: 46.5cm  L  C: 32.0cm L  B: 16.5cm M  Length: 44.0cm Long  20-30mmhg  Patient chose color pattern [place], Daina and if a 3rd sleeve is available, Tranquility.      Treatment     Alina received the treatments listed below:       Manual therapy techniques were applied for 45 minutes, including:      Patient was positioned supine with RUE elevated and supported on 2 pillows above her head. Patient easily lifts the RUE overhead while supine this date.    ROM in supine: R shoulder AROM flexion 160, AROM abduction 160    Sensory desensitization techniques performed across entirety of R axilla.  Patient was instructed in self stretch of the skin tugging in an upward direction above the area of discomfort in the medial arm.    A fire alarm went off in the building and patient and therapist had to exit after 45 mins treatment. Patient declined walking back to the treatment room following the evacuation.      Patient Education and Home Exercises      Education provided:   - see above  - Progress towards goals     See above    Assessment      Pt reports continued pulling of axilla, pain to touch, nerve pain in R medial upper arm/axilla/R lateral chest wall. Therapy techniques have not relieved these symptoms. Patient has found comfort in use of her old arm sleeves/tubigrip and was encouraged to continue daytime use of same. Patient will be contacted by Fay Brooks OT with return policy/procedure for the uncomfortable Mediven sleeves and will assist in ordering of new lymphadivas sleeve.  Pt has stage 1 lymphedema  secondary to mastectomy/breast cancer and is in the active (phase 1) stage of treatment. A compression garment is required to reduce and maintain limb girth and prevent progression of lymphedema to prevent infections, wounds, pain, and orthopedic issues.       Pt would no longer benefit from skilled OT.        Pt will  no longer benefit from skilled outpatient occupational therapy to address the deficits listed in the problem list on initial evaluation provide pt/family education and to maximize pt's level of independence in the home and community environment.     Pt's spiritual, cultural and educational needs considered and pt agreeable to plan of care and goals.    Anticipated barriers to occupational therapy: none    Goals:    Short Term Goals: (3 weeks)  Goals: Progressing / MET   1. Patient will demonstrate 100% knowledge of lymphedema precautions and signs of infection to allow for reduced lymphedema risk, infection risk, and/or exacerbation of condition.   MET  10/24/24   2. Patient will tolerate daily activities wearing multilayered bandaging to allow for lymphatic drainage support, skin elasticity, and reduction in shape and size of limb.   MET 10/24/24 Patient is wearing old sleeve/tubigrip   3. Patient and/or caregiver will order/obtain appropriate compression garments to maintain lymphatic and venous support.  MET 10/24/24 new sleeves received, however patient would like to exchange   4. Patient will show decreased total girth measurement in RUE by up to 2 cm to allow for lower extremity symmetry, shoe and clothing choice, and ability to apply needed compression. NOT MET      Long Term Goals: (6 weeks)  Goals: Progressing / MET   1. Patient and/or caregiver to darryn/doff compression garment independently and with daily compliance to assist in lymphedema management, skin elasticity, and tissue density MET 10/24/24requires assistance from .   2. Patient and/or caregiver will be independent in use of  pneumatic compression pump or self manual lymphatic drainage techniques to areas within reach to enhance lymphatic drainage and skin condition.  NOT MET    N/A   3. Patient to be independent and compliant with home exercise program to allow for increased function in affected limb. MET  10/24/24   4. Patient will show decreased total girth measurement in RUE by up to 4 cm  to allow for lower extremity symmetry, shoe and clothing choice, and ability to apply needed compression daily. NOT MET          PLAN     Patient will continue to self manage the nerve pain/edema in RUE with self skin massage, and wearing compression via tubigrip or compression sleeve. Patient and Fay Brooks OT will be in contact to see about returning the Mediven sleeves in exchange for the Lymphadivas sleeves.   Patient is discharged from OT at this time.    Eladia Vitale OT, MIGUEL

## 2024-11-04 ENCOUNTER — PATIENT MESSAGE (OUTPATIENT)
Dept: INTERNAL MEDICINE | Facility: CLINIC | Age: 79
End: 2024-11-04
Payer: MEDICARE

## 2024-11-11 ENCOUNTER — TELEPHONE (OUTPATIENT)
Dept: INTERNAL MEDICINE | Facility: CLINIC | Age: 79
End: 2024-11-11
Payer: MEDICARE

## 2024-11-11 ENCOUNTER — PATIENT MESSAGE (OUTPATIENT)
Dept: INTERNAL MEDICINE | Facility: CLINIC | Age: 79
End: 2024-11-11
Payer: MEDICARE

## 2024-11-11 DIAGNOSIS — Z74.09 IMPAIRED MOBILITY: ICD-10-CM

## 2024-11-11 DIAGNOSIS — G47.33 OBSTRUCTIVE SLEEP APNEA: Primary | ICD-10-CM

## 2024-11-13 ENCOUNTER — PATIENT MESSAGE (OUTPATIENT)
Dept: REHABILITATION | Facility: HOSPITAL | Age: 79
End: 2024-11-13
Payer: MEDICARE

## 2024-12-12 ENCOUNTER — OFFICE VISIT (OUTPATIENT)
Dept: INTERNAL MEDICINE | Facility: CLINIC | Age: 79
End: 2024-12-12
Payer: MEDICARE

## 2024-12-12 VITALS
HEIGHT: 62 IN | SYSTOLIC BLOOD PRESSURE: 132 MMHG | DIASTOLIC BLOOD PRESSURE: 60 MMHG | WEIGHT: 252.19 LBS | OXYGEN SATURATION: 99 % | RESPIRATION RATE: 18 BRPM | TEMPERATURE: 98 F | HEART RATE: 85 BPM | BODY MASS INDEX: 46.41 KG/M2

## 2024-12-12 DIAGNOSIS — H90.A22 SENSORINEURAL HEARING LOSS (SNHL) OF LEFT EAR WITH RESTRICTED HEARING OF RIGHT EAR: ICD-10-CM

## 2024-12-12 DIAGNOSIS — E03.9 ACQUIRED HYPOTHYROIDISM: ICD-10-CM

## 2024-12-12 DIAGNOSIS — I10 ESSENTIAL HYPERTENSION: ICD-10-CM

## 2024-12-12 DIAGNOSIS — E78.2 HYPERLIPIDEMIA, MIXED: ICD-10-CM

## 2024-12-12 DIAGNOSIS — E55.9 VITAMIN D DEFICIENCY: ICD-10-CM

## 2024-12-12 DIAGNOSIS — I70.0 ATHEROSCLEROSIS OF AORTA: ICD-10-CM

## 2024-12-12 DIAGNOSIS — Z99.89 DEPENDENCE ON OTHER ENABLING MACHINES AND DEVICES: ICD-10-CM

## 2024-12-12 DIAGNOSIS — Z86.711 HISTORY OF PULMONARY EMBOLISM: ICD-10-CM

## 2024-12-12 DIAGNOSIS — R91.1 PULMONARY NODULE: ICD-10-CM

## 2024-12-12 DIAGNOSIS — Z85.3 HISTORY OF BREAST CANCER: ICD-10-CM

## 2024-12-12 DIAGNOSIS — N18.32 STAGE 3B CHRONIC KIDNEY DISEASE: ICD-10-CM

## 2024-12-12 DIAGNOSIS — Z95.2 S/P AVR: ICD-10-CM

## 2024-12-12 DIAGNOSIS — N18.31 DIABETES MELLITUS DUE TO UNDERLYING CONDITION WITH STAGE 3A CHRONIC KIDNEY DISEASE, WITHOUT LONG-TERM CURRENT USE OF INSULIN: ICD-10-CM

## 2024-12-12 DIAGNOSIS — D69.6 THROMBOCYTOPENIA, UNSPECIFIED: ICD-10-CM

## 2024-12-12 DIAGNOSIS — I89.0 LYMPHEDEMA OF RIGHT UPPER EXTREMITY: ICD-10-CM

## 2024-12-12 DIAGNOSIS — E66.01 SEVERE OBESITY (BMI >= 40): ICD-10-CM

## 2024-12-12 DIAGNOSIS — K76.0 FATTY LIVER: ICD-10-CM

## 2024-12-12 DIAGNOSIS — R26.9 ABNORMALITY OF GAIT AND MOBILITY: ICD-10-CM

## 2024-12-12 DIAGNOSIS — E66.9 DIABETES MELLITUS TYPE 2 IN OBESE: ICD-10-CM

## 2024-12-12 DIAGNOSIS — Z00.00 ENCOUNTER FOR PREVENTIVE HEALTH EXAMINATION: ICD-10-CM

## 2024-12-12 DIAGNOSIS — E11.69 DIABETES MELLITUS TYPE 2 IN OBESE: ICD-10-CM

## 2024-12-12 DIAGNOSIS — F43.23 ADJUSTMENT DISORDER WITH MIXED ANXIETY AND DEPRESSED MOOD: ICD-10-CM

## 2024-12-12 DIAGNOSIS — Z80.0 FAMILY HISTORY OF COLON CANCER: ICD-10-CM

## 2024-12-12 DIAGNOSIS — G47.33 OBSTRUCTIVE SLEEP APNEA: ICD-10-CM

## 2024-12-12 DIAGNOSIS — M51.379 DEGENERATION OF INTERVERTEBRAL DISC OF LUMBOSACRAL REGION, UNSPECIFIED WHETHER PAIN PRESENT: Primary | ICD-10-CM

## 2024-12-12 DIAGNOSIS — I35.0 NONRHEUMATIC AORTIC VALVE STENOSIS: ICD-10-CM

## 2024-12-12 DIAGNOSIS — E08.22 DIABETES MELLITUS DUE TO UNDERLYING CONDITION WITH STAGE 3A CHRONIC KIDNEY DISEASE, WITHOUT LONG-TERM CURRENT USE OF INSULIN: ICD-10-CM

## 2024-12-12 PROCEDURE — 99999 PR PBB SHADOW E&M-EST. PATIENT-LVL V: CPT | Mod: PBBFAC,HCNC,, | Performed by: PHYSICIAN ASSISTANT

## 2024-12-12 NOTE — PATIENT INSTRUCTIONS
Counseling and Referral of Other Preventative  (Italic type indicates deductible and co-insurance are waived)    Patient Name: Alina Narayan  Today's Date: 12/12/2024    Health Maintenance       Date Due Completion Date    RSV Vaccine (Age 60+ and Pregnant patients) (1 - 1-dose 75+ series) Never done ---    COVID-19 Vaccine (3 - 2024-25 season) 12/12/2112 (Originally 9/1/2024) 1/27/2021    Hemoglobin A1c 01/23/2025 7/23/2024    TETANUS VACCINE 02/26/2025 2/26/2015    Diabetes Urine Screening 03/27/2025 3/27/2024    Lipid Panel 03/27/2025 3/27/2024    Colonoscopy 01/05/2028 1/5/2023    DEXA Scan 09/07/2028 9/7/2023        No orders of the defined types were placed in this encounter.    The following information is provided to all patients.  This information is to help you find resources for any of the problems found today that may be affecting your health:                  Living healthy guide: www.[facility].[place].gov      Understanding Diabetes: www.diabetes.org      Eating healthy: www.cdc.gov/healthyweight      CDC home safety checklist: www.cdc.gov/steadi/patient.html      Agency on Aging: www.goea.[place].gov      Alcoholics anonymous (AA): www.aa.org      Physical Activity: www.mervin.nih.gov/gr2ajaz      Tobacco use: www.quitwithusla.org

## 2024-12-12 NOTE — PROGRESS NOTES
"  Alina Narayan presented for a  Medicare AWV and comprehensive Health Risk Assessment today. The following components were reviewed and updated:    Medical history  Family History  Social history  Allergies and Current Medications  Health Risk Assessment  Health Maintenance  Care Team         ** See Completed Assessments for Annual Wellness Visit within the encounter summary.**         The following assessments were completed:  Living Situation  CAGE  Depression Screening  Timed Get Up and Go  Whisper Test  Cognitive Function Screening  Nutrition Screening  ADL Screening  PAQ Screening      Opioid documentation:      Patient does have a current opioid prescription.      Patient  Only uses prn so does not need further discussion regarding opioid medication use, only uses 2-3 times/month.         Vitals:    12/12/24 1027   BP: 132/60   Pulse: 85   Resp: 18   Temp: 98.4 °F (36.9 °C)   TempSrc: Oral   SpO2: 99%   Weight: 114.4 kg (252 lb 3.3 oz)   Height: 5' 2" (1.575 m)     Body mass index is 46.13 kg/m².  Physical Exam  Vitals and nursing note reviewed.   Constitutional:       Appearance: Normal appearance. She is well-developed.   HENT:      Head: Normocephalic.      Right Ear: External ear normal.      Left Ear: External ear normal.   Eyes:      Pupils: Pupils are equal, round, and reactive to light.   Cardiovascular:      Rate and Rhythm: Normal rate and regular rhythm.      Heart sounds: Normal heart sounds. No murmur heard.     No friction rub. No gallop.   Pulmonary:      Effort: Pulmonary effort is normal. No respiratory distress.      Breath sounds: Normal breath sounds.   Abdominal:      Palpations: Abdomen is soft.      Tenderness: There is no abdominal tenderness.   Skin:     General: Skin is warm and dry.   Neurological:      Mental Status: She is alert.               Diagnoses and health risks identified today and associated recommendations/orders:    1. Degeneration of intervertebral disc of lumbosacral " region, unspecified whether pain present  Chronic back pain.  Rare use of hydrocodone prn back pain.  Trying to get back into aquatic therapy.    2. Adjustment disorder with mixed anxiety and depressed mood  Not on an medication for anxiety and depression and defers.  Her 's memory has not been as good and she worries about him.    3. Sensorineural hearing loss (SNHL) of left ear with restricted hearing of right ear  Chronic, stable.    4. Pulmonary nodule  Stable on CT from 2022, PCP following.    5. Essential hypertension  Stable, controlled on current medical therapy, followed by PCP.  /60.  Tx with coreg, lasix.    6. Hyperlipidemia, mixed  Stable, controlled on current medical therapy, followed by PCP.  Lab work reviewed.  Tx with pravastatin.  Lab Results   Component Value Date    CHOL 145 03/27/2024    TRIG 103 03/27/2024    HDL 48 03/27/2024    LDLCALC 76.4 03/27/2024     7. Nonrheumatic aortic valve stenosis  S/p AVR in 2014 and 2023.    8. S/P AVR    9. Atherosclerosis of aorta  Modifying risk factors.  Tx with statin.      10. Stage 3b chronic kidney disease  Chronic, stable.  Baseline creatinine 1.1-1.4.    11. Thrombocytopenia, unspecified  Mild, stable, lab work reviewed.    12. History of pulmonary embolism  On lifetime tx with eliquis.    13. Family history of colon cancer  Last colonoscopy in 2023 f/u 5 years.    14. History of breast cancer  First on left, then on right.  She follows closely with oncology.  Mammogram is upcoming and scheduled and f/u with Dr. Dwyer after.    15. Diabetes mellitus type 2 in obese  Stable, controlled on current medical therapy, followed by PCP.  Lab work reviewed.  Tx with metformin and glimepiride.  Lab Results   Component Value Date    HGBA1C 7.7 (H) 07/23/2024    HGBA1C 7.4 (H) 03/27/2024    HGBA1C 7.2 (H) 09/25/2023       16. Acquired hypothyroidism  Stable, controlled on current medical therapy, followed by PCP.  Tx with synthroid 75 mcg daily.  Lab  work reviewed.  TSH   Date Value Ref Range Status   03/27/2024 2.420 0.400 - 4.000 uIU/mL Final     Comment:     Warning:  Heterophilic antibodies in serum or plasma of   certain individuals are known to cause interference with   immunoassays. These antibodies may be present in blood samples   from individuals regularly exposed to animal or who have been   treated with animal products.     Patients taking high doses of supplemental biotin may have  negatively biased results.      09/25/2023 1.664 0.400 - 4.000 uIU/mL Final   03/02/2023 2.724 0.400 - 4.000 uIU/mL Final      17. Diabetes mellitus due to underlying condition with stage 3a chronic kidney disease, without long-term current use of insulin  Stable, controlled on current medical therapy, followed by PCP.    18. Vitamin D deficiency  Stable, controlled on current medical therapy, followed by PCP.    19. Severe obesity (BMI >= 40)  She is trying to get back in aquatic PT at [facility] to be more active.  Ambulates with walker.    20. Fatty liver  Work on weight loss, healthy diet, exercise.    21. Dependence on other enabling machines and devices  Uses walker for ambulation.    22. Abnormality of gait and mobility  Uses walker for ambulation.    23. Encounter for preventive health examination  Assessments completed.  Preventative health recommendations reviewed.    24. Lymphedema of right upper extremity  Chronic, stable.  She completed lymphedema therapy with no significant change.    25. Obstructive sleep apnea  Continue CPAP.      Provided Alina with a 5-10 year written screening schedule and personal prevention plan. Recommendations were developed using the USPSTF age appropriate recommendations. Education, counseling, and referrals were provided as needed. After Visit Summary printed and given to patient which includes a list of additional screenings\tests needed.    No follow-ups on file.    Karen Bhardwaj PA-C  I offered to discuss advanced care  planning, including how to pick a person who would make decisions for you if you were unable to make them for yourself, called a health care power of , and what kind of decisions you might make such as use of life sustaining treatments such as ventilators and tube feeding when faced with a life limiting illness recorded on a living will that they will need to know. (How you want to be cared for as you near the end of your natural life)     X Patient is interested in learning more about how to make advanced directives.  I provided them paperwork and offered to discuss this with them.

## 2024-12-16 ENCOUNTER — OFFICE VISIT (OUTPATIENT)
Dept: HEMATOLOGY/ONCOLOGY | Facility: CLINIC | Age: 79
End: 2024-12-16
Payer: MEDICARE

## 2024-12-16 ENCOUNTER — HOSPITAL ENCOUNTER (OUTPATIENT)
Dept: RADIOLOGY | Facility: HOSPITAL | Age: 79
Discharge: HOME | End: 2024-12-16
Attending: INTERNAL MEDICINE
Payer: MEDICARE

## 2024-12-16 VITALS
BODY MASS INDEX: 46.13 KG/M2 | DIASTOLIC BLOOD PRESSURE: 61 MMHG | TEMPERATURE: 98 F | HEIGHT: 62 IN | OXYGEN SATURATION: 96 % | SYSTOLIC BLOOD PRESSURE: 128 MMHG | HEART RATE: 74 BPM | RESPIRATION RATE: 17 BRPM

## 2024-12-16 DIAGNOSIS — Z12.31 ENCOUNTER FOR SCREENING MAMMOGRAM FOR HIGH-RISK PATIENT: ICD-10-CM

## 2024-12-16 DIAGNOSIS — Z85.3 HISTORY OF BREAST CANCER: Primary | ICD-10-CM

## 2024-12-16 PROCEDURE — 99999 PR PBB SHADOW E&M-EST. PATIENT-LVL IV: CPT | Mod: PBBFAC,HCNC,, | Performed by: INTERNAL MEDICINE

## 2024-12-16 PROCEDURE — 77063 BREAST TOMOSYNTHESIS BI: CPT | Mod: 26,52,HCNC, | Performed by: RADIOLOGY

## 2024-12-16 PROCEDURE — 77067 SCR MAMMO BI INCL CAD: CPT | Mod: 26,52,HCNC, | Performed by: RADIOLOGY

## 2024-12-16 PROCEDURE — 77063 BREAST TOMOSYNTHESIS BI: CPT | Mod: TC,52,HCNC

## 2024-12-16 NOTE — PROGRESS NOTES
Subjective:       Patient ID: Alina Narayan is a 79 y.o. female.    Chief Complaint: No chief complaint on file.      HPI 78 Y/O white female who returns for follow-up of triple negative carcinoma of the right breast.  She had complete pathologic response to neoadjuvant chemotherapy.  She also has mild thrombocytopenia - stable around 100,000 .    She continues to have problems with knee pain which limits her activities.  She has not been able to get back to aquatic exercise because of that.  She also continues to significant anxiety and stress related to 's issues with dementia.      She had AVR 5/10/23.  She has a history of pulmonary embolism diagnosed in May of 2019.She is on chronic anticoagulation with low-dose Eliquis.    She is also being followed a small right upper lobe pulmonary nodule.        Previous breast cancer history History:   On September 25, 2012 she underwent a screening mammogram which showed an asymmetric density in the left breast at 2:00 position.  Core needle biopsy on October 1, 2012 showed invasive carcinoma ER 90% positive AR 80% positive and HER-2 negative.   On October 29, 2012 she underwent lumpectomy and sentinel lymph node biopsy. That pathology showed a 7 mm low-grade (1+2+1) infiltrating carcinoma. 3 sentinel lymph nodes were negative. Final pathological stage TIB N0 stage IA.  She completed letrozole therapy in 2017    Recent breast cancer history:  abnormal mammogram of the right breast in December 2018 which showed a 13 mm mass in the right axillary tail.    By ultrasound there was a 6 x 9 x 11 mm intramammary node and a 2nd 5 x 6 x 6 mm hypoechoic mass in the axillary tail.    On January 7, 2019 both masses were biopsied and both the breast mass and  lymph nodes biopsy showed carcinoma which was ER negative AR negative and HER2 negative.  Ki-67 was 40%.    MRI on January 15, 2019 showed 2 right axillary lymph nodes the largest measured 1.4 x 1.0 cm.  There were no  abnormalities in the right breast.    PET scan was then performed which showed only low-grade activity in the right axilla with an SUV of 1.32.      She  received 12 cycles of weekly Taxol and 4 cycles of CMF which she completed on July 30th, 2019.    On August 26, 2019 right mastectomy performed showed complete pathological response in the right breast and axilla.      Review of Systems   Constitutional:  Negative for activity change, appetite change, fever and unexpected weight change.   HENT:  Negative for mouth sores.    Eyes:  Negative for visual disturbance.   Respiratory:  Positive for shortness of breath. Negative for cough.    Cardiovascular:  Negative for chest pain.   Gastrointestinal:  Negative for abdominal pain and diarrhea.   Genitourinary:  Negative for frequency.   Musculoskeletal:  Positive for arthralgias (Knees). Negative for back pain.   Integumentary:  Negative for rash.   Neurological:  Negative for headaches.   Hematological:  Negative for adenopathy.   Psychiatric/Behavioral:  Positive for dysphoric mood. The patient is nervous/anxious.          Objective:      Physical Exam  Vitals reviewed.   Constitutional:       General: She is not in acute distress.     Appearance: She is obese. She is not ill-appearing.   Eyes:      General: No scleral icterus.  Cardiovascular:      Rate and Rhythm: Normal rate and regular rhythm.   Pulmonary:      Effort: Pulmonary effort is normal. No respiratory distress.      Breath sounds: Normal breath sounds. No wheezing or rales.   Chest:   Breasts:     Left: Skin change and tenderness present. No mass.       Abdominal:      Palpations: Abdomen is soft.      Tenderness: There is no abdominal tenderness.   Lymphadenopathy:      Cervical: No cervical adenopathy.      Upper Body:      Right upper body: No supraclavicular or axillary adenopathy.      Left upper body: No supraclavicular or axillary adenopathy.   Skin:     Findings: No rash.   Neurological:       Mental Status: She is alert and oriented to person, place, and time.   Psychiatric:         Mood and Affect: Mood normal.         Behavior: Behavior normal.         Thought Content: Thought content normal.         Judgment: Judgment normal.         Assessment:     Mammogram - pending  1. History of breast cancer        Plan:       RTC 12 months with mammogram      Route Chart for Scheduling  Med Onc Route Chart for Scheduling

## 2024-12-18 DIAGNOSIS — Z95.2 S/P AVR: ICD-10-CM

## 2024-12-18 DIAGNOSIS — I10 ESSENTIAL HYPERTENSION: ICD-10-CM

## 2024-12-18 RX ORDER — CARVEDILOL 12.5 MG/1
12.5 TABLET ORAL 2 TIMES DAILY WITH MEALS
Qty: 180 TABLET | Refills: 3 | Status: SHIPPED | OUTPATIENT
Start: 2024-12-18

## 2025-01-06 ENCOUNTER — PATIENT MESSAGE (OUTPATIENT)
Dept: INTERNAL MEDICINE | Facility: CLINIC | Age: 80
End: 2025-01-06
Payer: MEDICARE

## 2025-01-10 ENCOUNTER — PATIENT MESSAGE (OUTPATIENT)
Dept: INTERNAL MEDICINE | Facility: CLINIC | Age: 80
End: 2025-01-10
Payer: MEDICARE

## 2025-01-10 ENCOUNTER — TELEPHONE (OUTPATIENT)
Dept: INTERNAL MEDICINE | Facility: CLINIC | Age: 80
End: 2025-01-10
Payer: MEDICARE

## 2025-01-10 RX ORDER — AMOXICILLIN 500 MG/1
TABLET, FILM COATED ORAL
Qty: 28 TABLET | Refills: 1 | Status: SHIPPED | OUTPATIENT
Start: 2025-01-10

## 2025-01-11 RX ORDER — AMOXICILLIN 500 MG/1
TABLET, FILM COATED ORAL
Qty: 28 TABLET | Refills: 1 | OUTPATIENT
Start: 2025-01-11

## 2025-01-13 ENCOUNTER — TELEPHONE (OUTPATIENT)
Dept: CARDIOLOGY | Facility: CLINIC | Age: 80
End: 2025-01-13
Payer: MEDICARE

## 2025-01-13 DIAGNOSIS — E66.2 CLASS 3 OBESITY WITH ALVEOLAR HYPOVENTILATION AND BODY MASS INDEX (BMI) OF 40.0 TO 44.9 IN ADULT, UNSPECIFIED WHETHER SERIOUS COMORBIDITY PRESENT: ICD-10-CM

## 2025-01-13 DIAGNOSIS — I26.99 BILATERAL PULMONARY EMBOLISM: ICD-10-CM

## 2025-01-13 DIAGNOSIS — E66.813 CLASS 3 OBESITY WITH ALVEOLAR HYPOVENTILATION AND BODY MASS INDEX (BMI) OF 40.0 TO 44.9 IN ADULT, UNSPECIFIED WHETHER SERIOUS COMORBIDITY PRESENT: ICD-10-CM

## 2025-01-13 NOTE — TELEPHONE ENCOUNTER
The Prior Authorization for Eliquis was approved by insurance:    phone request, received approval fax. Issue was w. the valve    Pt  was updated per phone call and was encouraged to contact her insurance to see if can get into their anticoagulation program.

## 2025-01-13 NOTE — TELEPHONE ENCOUNTER
----- Message from Molly sent at 1/13/2025 12:23 PM CST -----  Regarding: Drug interactions  Pt [phone] called [facility] to refill her Eliquis 2.5 mg and they refused stating it may cause drug interactions with some of her other medications and they didn't tell her which ones.    LOV 6/16/23 [facility] - [place], [place] - [address]  Phone: [phone]  Fax: [phone]    Thanks

## 2025-02-06 ENCOUNTER — TELEPHONE (OUTPATIENT)
Dept: INTERNAL MEDICINE | Facility: CLINIC | Age: 80
End: 2025-02-06
Payer: MEDICARE

## 2025-02-06 DIAGNOSIS — E78.5 HYPERLIPIDEMIA, UNSPECIFIED HYPERLIPIDEMIA TYPE: ICD-10-CM

## 2025-02-06 DIAGNOSIS — E55.9 MILD VITAMIN D DEFICIENCY: ICD-10-CM

## 2025-02-06 DIAGNOSIS — E66.9 DIABETES MELLITUS TYPE 2 IN OBESE: ICD-10-CM

## 2025-02-06 DIAGNOSIS — I10 ESSENTIAL HYPERTENSION: Primary | ICD-10-CM

## 2025-02-06 DIAGNOSIS — E11.69 DIABETES MELLITUS TYPE 2 IN OBESE: ICD-10-CM

## 2025-02-06 RX ORDER — CIPROFLOXACIN 250 MG/1
250 TABLET, FILM COATED ORAL 2 TIMES DAILY
Qty: 14 TABLET | Refills: 0 | Status: SHIPPED | OUTPATIENT
Start: 2025-02-06

## 2025-02-06 NOTE — TELEPHONE ENCOUNTER
----- Message from Imani sent at 2/6/2025  1:07 PM CST -----  Type:  Needs Medical Advice    Who Called:   Pt  Symptoms (please be specific):bladder infection    How long has patient had these symptoms:   this morning  Pharmacy name and phone #:    [facility] [phone]  Would the patient rather a call back or a response via [facility]?  Call  back  Best Call Back Number:  [phone]  Additional Information:

## 2025-02-06 NOTE — TELEPHONE ENCOUNTER
-Describe symptoms?   -Fever? No   Burning urination?- burning   -Blood in Urine? No   -Discoloration? No   -Urgency/Frequency? Pt Takes lasix so it's hard to tell   -Pain? Location? No   -Have you recently been treated for this? No   -When did symptoms start? Started this morning

## 2025-02-06 NOTE — TELEPHONE ENCOUNTER
Pt with UTI symptoms - cipro sent.    Please schedule fasting lab and an appt with me next month thanks!

## 2025-02-27 ENCOUNTER — RESEARCH ENCOUNTER (OUTPATIENT)
Dept: RESEARCH | Facility: HOSPITAL | Age: 80
End: 2025-02-27
Payer: MEDICARE

## 2025-02-27 ENCOUNTER — PATIENT MESSAGE (OUTPATIENT)
Dept: RESEARCH | Facility: HOSPITAL | Age: 80
End: 2025-02-27
Payer: MEDICARE

## 2025-02-27 NOTE — PROGRESS NOTES
Trial: Corcym MANTRA (2021.298)  PI: Dr. Webb  Date: 02/27/2025  Visit: Annual Visit ( 2-year Follow-Up)  Participant ID: [id number]      2/27/2025 Called patient, no answer. Left a message with my phone number, asking the patient to call me back in reference to the MANTRA Trial    Also send a my chart message to patient.     Deisy Gross, RN, CCM, CRC

## 2025-03-11 ENCOUNTER — LAB VISIT (OUTPATIENT)
Dept: LAB | Facility: HOSPITAL | Age: 80
End: 2025-03-11
Attending: INTERNAL MEDICINE
Payer: MEDICARE

## 2025-03-11 DIAGNOSIS — E11.69 DIABETES MELLITUS TYPE 2 IN OBESE: ICD-10-CM

## 2025-03-11 DIAGNOSIS — E78.5 HYPERLIPIDEMIA, UNSPECIFIED HYPERLIPIDEMIA TYPE: ICD-10-CM

## 2025-03-11 DIAGNOSIS — E55.9 MILD VITAMIN D DEFICIENCY: ICD-10-CM

## 2025-03-11 DIAGNOSIS — E66.9 DIABETES MELLITUS TYPE 2 IN OBESE: ICD-10-CM

## 2025-03-11 DIAGNOSIS — I10 ESSENTIAL HYPERTENSION: ICD-10-CM

## 2025-03-11 LAB
25(OH)D3+25(OH)D2 SERPL-MCNC: 47 NG/ML (ref 30–96)
ALBUMIN SERPL BCP-MCNC: 3.9 G/DL (ref 3.5–5.2)
ALP SERPL-CCNC: 81 U/L (ref 40–150)
ALT SERPL W/O P-5'-P-CCNC: 10 U/L (ref 10–44)
ANION GAP SERPL CALC-SCNC: 8 MMOL/L (ref 8–16)
AST SERPL-CCNC: 16 U/L (ref 10–40)
BASOPHILS # BLD AUTO: 0.01 K/UL (ref 0–0.2)
BASOPHILS NFR BLD: 0.2 % (ref 0–1.9)
BILIRUB SERPL-MCNC: 0.4 MG/DL (ref 0.1–1)
BUN SERPL-MCNC: 23 MG/DL (ref 8–23)
CALCIUM SERPL-MCNC: 9.5 MG/DL (ref 8.7–10.5)
CHLORIDE SERPL-SCNC: 107 MMOL/L (ref 95–110)
CHOLEST SERPL-MCNC: 135 MG/DL (ref 120–199)
CHOLEST/HDLC SERPL: 3.1 {RATIO} (ref 2–5)
CO2 SERPL-SCNC: 27 MMOL/L (ref 23–29)
CREAT SERPL-MCNC: 1.2 MG/DL (ref 0.5–1.4)
DIFFERENTIAL METHOD BLD: ABNORMAL
EOSINOPHIL # BLD AUTO: 0 K/UL (ref 0–0.5)
EOSINOPHIL NFR BLD: 0.4 % (ref 0–8)
ERYTHROCYTE [DISTWIDTH] IN BLOOD BY AUTOMATED COUNT: 13.6 % (ref 11.5–14.5)
EST. GFR  (NO RACE VARIABLE): 46 ML/MIN/1.73 M^2
ESTIMATED AVG GLUCOSE: 171 MG/DL (ref 68–131)
GLUCOSE SERPL-MCNC: 147 MG/DL (ref 70–110)
HBA1C MFR BLD: 7.6 % (ref 4–5.6)
HCT VFR BLD AUTO: 40.3 % (ref 37–48.5)
HDLC SERPL-MCNC: 44 MG/DL (ref 40–75)
HDLC SERPL: 32.6 % (ref 20–50)
HGB BLD-MCNC: 12 G/DL (ref 12–16)
IMM GRANULOCYTES # BLD AUTO: 0.02 K/UL (ref 0–0.04)
IMM GRANULOCYTES NFR BLD AUTO: 0.4 % (ref 0–0.5)
LDLC SERPL CALC-MCNC: 69.6 MG/DL (ref 63–159)
LYMPHOCYTES # BLD AUTO: 1.7 K/UL (ref 1–4.8)
LYMPHOCYTES NFR BLD: 34.6 % (ref 18–48)
MCH RBC QN AUTO: 28.2 PG (ref 27–31)
MCHC RBC AUTO-ENTMCNC: 29.8 G/DL (ref 32–36)
MCV RBC AUTO: 95 FL (ref 82–98)
MONOCYTES # BLD AUTO: 0.5 K/UL (ref 0.3–1)
MONOCYTES NFR BLD: 10.9 % (ref 4–15)
NEUTROPHILS # BLD AUTO: 2.6 K/UL (ref 1.8–7.7)
NEUTROPHILS NFR BLD: 53.5 % (ref 38–73)
NONHDLC SERPL-MCNC: 91 MG/DL
NRBC BLD-RTO: 0 /100 WBC
PLATELET # BLD AUTO: 102 K/UL (ref 150–450)
PMV BLD AUTO: 12.4 FL (ref 9.2–12.9)
POTASSIUM SERPL-SCNC: 4.5 MMOL/L (ref 3.5–5.1)
PROT SERPL-MCNC: 7.1 G/DL (ref 6–8.4)
RBC # BLD AUTO: 4.25 M/UL (ref 4–5.4)
SODIUM SERPL-SCNC: 142 MMOL/L (ref 136–145)
TRIGL SERPL-MCNC: 107 MG/DL (ref 30–150)
TSH SERPL DL<=0.005 MIU/L-ACNC: 3.25 UIU/ML (ref 0.4–4)
WBC # BLD AUTO: 4.94 K/UL (ref 3.9–12.7)

## 2025-03-11 PROCEDURE — 80053 COMPREHEN METABOLIC PANEL: CPT | Mod: HCNC | Performed by: INTERNAL MEDICINE

## 2025-03-11 PROCEDURE — 85025 COMPLETE CBC W/AUTO DIFF WBC: CPT | Mod: HCNC | Performed by: INTERNAL MEDICINE

## 2025-03-11 PROCEDURE — 36415 COLL VENOUS BLD VENIPUNCTURE: CPT | Mod: HCNC | Performed by: INTERNAL MEDICINE

## 2025-03-11 PROCEDURE — 84443 ASSAY THYROID STIM HORMONE: CPT | Mod: HCNC | Performed by: INTERNAL MEDICINE

## 2025-03-11 PROCEDURE — 82306 VITAMIN D 25 HYDROXY: CPT | Mod: HCNC | Performed by: INTERNAL MEDICINE

## 2025-03-11 PROCEDURE — 83036 HEMOGLOBIN GLYCOSYLATED A1C: CPT | Mod: HCNC | Performed by: INTERNAL MEDICINE

## 2025-03-11 PROCEDURE — 80061 LIPID PANEL: CPT | Mod: HCNC | Performed by: INTERNAL MEDICINE

## 2025-03-18 ENCOUNTER — OFFICE VISIT (OUTPATIENT)
Dept: INTERNAL MEDICINE | Facility: CLINIC | Age: 80
End: 2025-03-18
Payer: MEDICARE

## 2025-03-18 ENCOUNTER — HOSPITAL ENCOUNTER (OUTPATIENT)
Dept: RADIOLOGY | Facility: HOSPITAL | Age: 80
Discharge: HOME | End: 2025-03-18
Attending: INTERNAL MEDICINE
Payer: MEDICARE

## 2025-03-18 VITALS
HEART RATE: 89 BPM | DIASTOLIC BLOOD PRESSURE: 68 MMHG | SYSTOLIC BLOOD PRESSURE: 130 MMHG | BODY MASS INDEX: 46.72 KG/M2 | WEIGHT: 253.88 LBS | HEIGHT: 62 IN | OXYGEN SATURATION: 96 %

## 2025-03-18 DIAGNOSIS — E66.9 DIABETES MELLITUS TYPE 2 IN OBESE: ICD-10-CM

## 2025-03-18 DIAGNOSIS — M25.551 RIGHT HIP PAIN: ICD-10-CM

## 2025-03-18 DIAGNOSIS — M25.551 RIGHT HIP PAIN: Primary | ICD-10-CM

## 2025-03-18 DIAGNOSIS — E66.01 SEVERE OBESITY (BMI >= 40): ICD-10-CM

## 2025-03-18 DIAGNOSIS — I10 ESSENTIAL HYPERTENSION: ICD-10-CM

## 2025-03-18 DIAGNOSIS — E11.69 DIABETES MELLITUS TYPE 2 IN OBESE: ICD-10-CM

## 2025-03-18 DIAGNOSIS — Z95.2 S/P AVR (AORTIC VALVE REPLACEMENT): ICD-10-CM

## 2025-03-18 DIAGNOSIS — E03.9 HYPOTHYROIDISM, UNSPECIFIED TYPE: ICD-10-CM

## 2025-03-18 PROBLEM — N18.32 STAGE 3B CHRONIC KIDNEY DISEASE: Status: RESOLVED | Noted: 2017-09-12 | Resolved: 2025-03-18

## 2025-03-18 PROCEDURE — 3075F SYST BP GE 130 - 139MM HG: CPT | Mod: HCNC,CPTII,S$GLB, | Performed by: INTERNAL MEDICINE

## 2025-03-18 PROCEDURE — 1125F AMNT PAIN NOTED PAIN PRSNT: CPT | Mod: HCNC,CPTII,S$GLB, | Performed by: INTERNAL MEDICINE

## 2025-03-18 PROCEDURE — 73502 X-RAY EXAM HIP UNI 2-3 VIEWS: CPT | Mod: TC,HCNC,PO,RT

## 2025-03-18 PROCEDURE — 3078F DIAST BP <80 MM HG: CPT | Mod: HCNC,CPTII,S$GLB, | Performed by: INTERNAL MEDICINE

## 2025-03-18 PROCEDURE — 1101F PT FALLS ASSESS-DOCD LE1/YR: CPT | Mod: HCNC,CPTII,S$GLB, | Performed by: INTERNAL MEDICINE

## 2025-03-18 PROCEDURE — 99999 PR PBB SHADOW E&M-EST. PATIENT-LVL V: CPT | Mod: PBBFAC,HCNC,, | Performed by: INTERNAL MEDICINE

## 2025-03-18 PROCEDURE — 99215 OFFICE O/P EST HI 40 MIN: CPT | Mod: HCNC,S$GLB,, | Performed by: INTERNAL MEDICINE

## 2025-03-18 PROCEDURE — 3288F FALL RISK ASSESSMENT DOCD: CPT | Mod: HCNC,CPTII,S$GLB, | Performed by: INTERNAL MEDICINE

## 2025-03-18 PROCEDURE — 1157F ADVNC CARE PLAN IN RCRD: CPT | Mod: HCNC,CPTII,S$GLB, | Performed by: INTERNAL MEDICINE

## 2025-03-18 PROCEDURE — 73502 X-RAY EXAM HIP UNI 2-3 VIEWS: CPT | Mod: 26,HCNC,RT, | Performed by: RADIOLOGY

## 2025-03-18 PROCEDURE — 1159F MED LIST DOCD IN RCRD: CPT | Mod: HCNC,CPTII,S$GLB, | Performed by: INTERNAL MEDICINE

## 2025-03-18 RX ORDER — HYDROCODONE BITARTRATE AND ACETAMINOPHEN 5; 325 MG/1; MG/1
TABLET ORAL
Qty: 20 TABLET | Refills: 0 | Status: SHIPPED | OUTPATIENT
Start: 2025-03-18

## 2025-03-18 RX ORDER — FUROSEMIDE 20 MG/1
20 TABLET ORAL DAILY
Qty: 90 TABLET | Refills: 3 | Status: SHIPPED | OUTPATIENT
Start: 2025-03-18

## 2025-03-18 RX ORDER — PRAVASTATIN SODIUM 20 MG/1
20 TABLET ORAL DAILY
Qty: 90 TABLET | Refills: 3 | Status: SHIPPED | OUTPATIENT
Start: 2025-03-18

## 2025-03-18 RX ORDER — METFORMIN HYDROCHLORIDE 500 MG/1
500 TABLET ORAL 2 TIMES DAILY WITH MEALS
Qty: 180 TABLET | Refills: 3 | Status: SHIPPED | OUTPATIENT
Start: 2025-03-18

## 2025-03-18 RX ORDER — LEVOTHYROXINE SODIUM 75 UG/1
TABLET ORAL
Qty: 90 TABLET | Refills: 3 | Status: SHIPPED | OUTPATIENT
Start: 2025-03-18

## 2025-03-18 RX ORDER — GLIMEPIRIDE 4 MG/1
4 TABLET ORAL
Qty: 90 TABLET | Refills: 3 | Status: SHIPPED | OUTPATIENT
Start: 2025-03-18 | End: 2026-03-18

## 2025-03-30 ENCOUNTER — RESULTS FOLLOW-UP (OUTPATIENT)
Dept: INTERNAL MEDICINE | Facility: CLINIC | Age: 80
End: 2025-03-30

## 2025-03-30 NOTE — PROGRESS NOTES
Subjective:       Patient ID: Alina Narayan is a 79 y.o. female.    Chief Complaint: Annual Exam    HPI Pt is having a lot of knee and back pain.  No CP or SOB.  Review of Systems   Respiratory:  Negative for shortness of breath (PND or orthopnea).    Cardiovascular:  Negative for chest pain (arm pain or jaw pain).   Gastrointestinal:  Negative for abdominal pain, diarrhea, nausea and vomiting.   Genitourinary:  Negative for dysuria.   Neurological:  Negative for seizures, syncope and headaches.       Objective:      Physical Exam  Constitutional:       General: She is not in acute distress.     Appearance: She is well-developed.   HENT:      Head: Normocephalic.   Eyes:      Pupils: Pupils are equal, round, and reactive to light.   Neck:      Thyroid: No thyromegaly.      Vascular: No JVD.   Cardiovascular:      Rate and Rhythm: Normal rate and regular rhythm.      Heart sounds: Normal heart sounds. No murmur heard.     No friction rub. No gallop.   Pulmonary:      Effort: Pulmonary effort is normal.      Breath sounds: Normal breath sounds. No wheezing or rales.   Abdominal:      General: Bowel sounds are normal. There is no distension.      Palpations: Abdomen is soft. There is no mass.      Tenderness: There is no abdominal tenderness. There is no guarding or rebound.   Musculoskeletal:      Cervical back: Neck supple.   Lymphadenopathy:      Cervical: No cervical adenopathy.   Skin:     General: Skin is warm and dry.   Neurological:      Mental Status: She is alert and oriented to person, place, and time.      Deep Tendon Reflexes: Reflexes are normal and symmetric.   Psychiatric:         Behavior: Behavior normal.         Thought Content: Thought content normal.         Judgment: Judgment normal.         Assessment:       1. Right hip pain    2. S/P AVR (aortic valve replacement)    3. Diabetes mellitus type 2 in obese    4. Essential hypertension    5. Hypothyroidism, unspecified type    6. Severe obesity (BMI  >= 40)        Plan:   Right hip pain  -     X-Ray Hip 2 or 3 views Right with Pelvis when performed; Future; Expected date: 03/18/2025  -     Ambulatory Referral/Consult to Physical/Occupational Therapy; Future; Expected date: 03/25/2025  -     HYDROcodone-acetaminophen (NORCO) 5-325 mg per tablet; One daily as needed for pain  Dispense: 20 tablet; Refill: 0  -     Ambulatory referral/consult to Orthopedics; Future; Expected date: 03/25/2025    S/P AVR (aortic valve replacement)  -     furosemide (LASIX) 20 MG tablet; Take 1 tablet (20 mg total) by mouth once daily.  Dispense: 90 tablet; Refill: 3  -     Ambulatory referral/consult to Cardiology; Future; Expected date: 03/25/2025    Diabetes mellitus type 2 in obese  -     furosemide (LASIX) 20 MG tablet; Take 1 tablet (20 mg total) by mouth once daily.  Dispense: 90 tablet; Refill: 3  Controlled HgbA1c<8  Essential hypertension  -     furosemide (LASIX) 20 MG tablet; Take 1 tablet (20 mg total) by mouth once daily.  Dispense: 90 tablet; Refill: 3    Hypothyroidism, unspecified type  -     levothyroxine (SYNTHROID) 75 MCG tablet; TAKE 1 TABLET BEFORE BREAKFAST  Dispense: 90 tablet; Refill: 3    Severe obesity (BMI >= 40)  Working on diet and exercise  Other orders  -     glimepiride (AMARYL) 4 MG tablet; Take 1 tablet (4 mg total) by mouth before breakfast.  Dispense: 90 tablet; Refill: 3  -     metFORMIN (GLUCOPHAGE) 500 MG tablet; Take 1 tablet (500 mg total) by mouth 2 (two) times daily with meals.  Dispense: 180 tablet; Refill: 3  -     pravastatin (PRAVACHOL) 20 MG tablet; Take 1 tablet (20 mg total) by mouth once daily.  Dispense: 90 tablet; Refill: 3

## 2025-04-14 ENCOUNTER — TELEPHONE (OUTPATIENT)
Dept: INTERNAL MEDICINE | Facility: CLINIC | Age: 80
End: 2025-04-14
Payer: MEDICARE

## 2025-04-14 DIAGNOSIS — G47.33 OBSTRUCTIVE SLEEP APNEA: Primary | ICD-10-CM

## 2025-04-15 ENCOUNTER — PATIENT MESSAGE (OUTPATIENT)
Dept: INTERNAL MEDICINE | Facility: CLINIC | Age: 80
End: 2025-04-15
Payer: MEDICARE

## 2025-04-15 ENCOUNTER — PATIENT MESSAGE (OUTPATIENT)
Dept: CARDIOLOGY | Facility: CLINIC | Age: 80
End: 2025-04-15
Payer: MEDICARE

## 2025-04-22 NOTE — PROGRESS NOTES
Six Minute Walking Test:  SpO2 96% at rest on RA  SpO2 86% with ambulation on RA  SpO2 91 % with ambulation on 1L O2  Pt. recovery time 3 minutes with NC O2 @ 1L with SpO2 100%.     details…

## 2025-04-24 ENCOUNTER — PATIENT MESSAGE (OUTPATIENT)
Dept: INTERNAL MEDICINE | Facility: CLINIC | Age: 80
End: 2025-04-24
Payer: MEDICARE

## 2025-04-24 NOTE — TELEPHONE ENCOUNTER
Please update pt CPAP code  and  ) .

## 2025-04-28 ENCOUNTER — TELEPHONE (OUTPATIENT)
Dept: INTERNAL MEDICINE | Facility: CLINIC | Age: 80
End: 2025-04-28
Payer: MEDICARE

## 2025-04-28 DIAGNOSIS — G47.33 OBSTRUCTIVE SLEEP APNEA: Primary | ICD-10-CM

## 2025-04-29 ENCOUNTER — RESEARCH ENCOUNTER (OUTPATIENT)
Dept: RESEARCH | Facility: HOSPITAL | Age: 80
End: 2025-04-29
Payer: MEDICARE

## 2025-04-29 NOTE — PROGRESS NOTES
Trial: Cormarym MANTRA (2021.298)  PI: Dr. Webb  Date: 04/29/2025  Visit: Annual Visit ( 2-year Follow-Up)  Participant ID: [id number]    04/29/2025: Spoke with the patient for the 2-year follow-up for the Corcym MANTRA Study. MANTRA is a post-market study is to monitor ongoing safety and performance of the Corcym devices and accessories used for aortic, mitral, and tricuspid valvular diseases after implant.     Current list of medications obtained and verified with patient:   Yes- Pt stopped Aspirin 6 months ago per MD request. Still on Eliquis 2.5 mg BID    Has the patient had any recent hospitalizations, ED visits, or other adverse events since the last visit? No  If yes, complete [facility]/Proc/Hosp/PD Form and update necessary log if applicable.    NYHA assessment conducted via telephone, patient reports that with regards to physical activity - Patient has Marked limitation of physical activity; comfortable at rest; less than ordinary activity causes fatigue, palpitation, or dyspnea . Uses walker- Has spurs in hips and needs to have bilaterally knee replacement. States her Ortho MD () reported that she is too sick to have knees replaced. Pt reports that she is on wait list at [facility] for water therapy (for over 1 year)  Which would make the patient a NYHA class of III.    As the patients assessed NYHA class is II or higher, the patient's cardiologist / primary care physician , notified via message through the patient's chart (inbox). Patient notified that I will message her doctor to alert of NYHA score and patient may hear back from her doctor with next steps. Notified patient if symptoms worsen to go to the ER.    Was an EKG performed since last visit (but within this visits time frame)? No  Was an ECHO preformed since last visit (but within this visits time frame)? No  Were Blood test collected since last visit (but within this visits time frame)? Yes 3-11-25-  reviewed by  at appointment on 3-18-25    Research staff contact information reviewed again with patient, should any questions or concerns arise. Their next follow up visit the 3-year follow-up visit.    Deisy Gross RN, CCM, CRC

## 2025-05-28 ENCOUNTER — CLINICAL SUPPORT (OUTPATIENT)
Dept: REHABILITATION | Facility: HOSPITAL | Age: 80
End: 2025-05-28
Attending: INTERNAL MEDICINE
Payer: MEDICARE

## 2025-05-28 DIAGNOSIS — M25.551 RIGHT HIP PAIN: ICD-10-CM

## 2025-05-28 DIAGNOSIS — Z86.711 HISTORY OF PULMONARY EMBOLISM: Primary | ICD-10-CM

## 2025-05-28 PROCEDURE — 97161 PT EVAL LOW COMPLEX 20 MIN: CPT | Mod: HCNC

## 2025-05-28 PROCEDURE — 97110 THERAPEUTIC EXERCISES: CPT | Mod: HCNC

## 2025-05-29 NOTE — PROGRESS NOTES
Outpatient Rehab    Physical Therapy Evaluation    Patient Name: Alina Narayan  MRN: [id number]  YOB: 1945  Encounter Date: 5/28/2025    Therapy Diagnosis:   Encounter Diagnoses   Name Primary?    Right hip pain     History of pulmonary embolism Yes     Physician: Aarti Dunn MD    Physician Orders: Eval and Treat  Medical Diagnosis: Right hip pain    Visit # / Visits Authorized:  1 / 1  Insurance Authorization Period: 3/18/2025 to 3/18/2026  Date of Evaluation: 5/28/2025  Plan of Care Certification: 5/28/2025 to 7/28/25     Time In: 1130   Time Out: 1215  Total Time (in minutes): 45   Total Billable Time (in minutes): 45    Precautions: standard, fall risk, history of cancer, history of DVT, B knee OA, DM       Subjective   History of Present Illness  Alina is a 80 y.o. female who reports to physical therapy with a chief concern of Chronic B knee and hip pain leading to difficulty with household and community mobility.     The patient reports a medical diagnosis of R hip pain.    Diagnostic tests related to this condition: X-ray.        History of Present Condition/Illness: The patient reports that she has a history of chronic B knee OA and needs B TKA but based on her past medical history the doctors do not want to operate on her unless it is emergent. She stated that she has a history of cancer 2 times with blood clots in her lungs as a side effect of chemo and chronic back pain with herniated discs.  She stated she thinks her hip pain is happening due to her ability to move secondary to her knees. She stated she is very limited around her house and only walks with her walker when she needs to.   She used to go across the street to the [facility] but over the last several months has had more pain and less mobility and is unable to get there anymore.     Activities of Daily Living  Social history was obtained from Patient.    General Prior Level of Function Comments: Modified  independent  General Current Level of Function Comments: Modified Independent, gets help from her  as needed  Patient Responsibilities: Driving, Community mobility, Meal prep, Personal ADL    Previously independent with activities of daily living? Yes     Currently independent with activities of daily living? No  Activities currently needing assistance include Dressing - lower body.   The patient stated that she has difficulty getting her lower extremities dressed due to inability to bend knees.    Previously independent with instrumental activities of daily living? Yes     Currently independent with instrumental activities of daily living? No  Activities currently needing assistance include: Meal prep, Community mobility, and Grocery/shopping.   Able to drive but difficulty getting in and out the car, she has difficulty moving around her house and has to use her RW for the short distance ambulation she does.        Pain     Patient reports a current pain level of 6/10. Pain at best is reported as 8/10. Pain at worst is reported as 9/10.   Location: R hip and B knees  Clinical Progression (since onset): Worsening  Pain Qualities: Aching, Burning, Other (Comment)  Other Pain Qualities: Sharp when it locks in B knees  Pain-Relieving Factors: Other (Comment), Heat, Lying down, Elevation  Other Pain-Relieving Factors: Elevation and lying in recliner  Pain-Aggravating Factors: Bending, Cooking, Exercise, Movement, Sitting, Standing, Walking  The patient reports that she has difficulty and pain going from sitting to stand, walking--uses RW in her house and only able to walk ~15-20 feet, standing--unable to stand greater than 5-8 minutes then has to sit.      Review of Systems  Patient reports: Cancer History, Cardiac History, and Osteoarthritis        Treatment History  Treatments  Previously Received Treatments: Yes  Previous Treatments: Physical therapy, Occupational therapy, Manual lymphatic treatment  Currently  Receiving Treatments: No  Additional Treatment Details: She was seen for Lyphedema OT at [facility]-2nd floor but was d/c in 2024.  Was seen in [facility]4/2024.    Living Arrangements  Living Situation  Housing: Home independently  Living Arrangements: Spouse/significant other    Home Setup  Type of Structure: House  Home Access: Level entry  Number of Levels in Home: One level  Bathroom Toilet: Raised  Bathroom Shower/Tub: Walk-in shower  Bathroom Equipment: Grab bars in shower, Raised toilet seat with rails    Rails around her house, her bed is able to be raised.      Employment  Patient does not report that: Does the patient's condition impact their ability to work?  Employment Status: Retired          Past Medical History/Physical Systems Review:   Alina Narayan  has a past medical history of Allergy, Anxiety, Arthritis, BRCA1 negative, Breast cancer, Colon polyp, Depression, Diabetes mellitus, Diabetes mellitus, type 2, Diverticular disease, Diverticulitis, Genetic testing, Hyperlipidemia, Hypertension, Morbid obesity, MITCHELL (obstructive sleep apnea), Pulmonary embolism, Stenosis, Stenosis and insufficiency of lacrimal passages, and Thyroid disease.    Alina Narayan  has a past surgical history that includes Carpal tunnel release; Shoulder surgery; Dilation and curettage of uterus (1999); Endometrial ablation (1999); left lumpectomy (2012); Eye surgery; Skin biopsy; Cardiac valve replacement (12/2013); Colonoscopy (N/A, 09/29/2017); Breast lumpectomy (Left, 2012); Breast biopsy (Left, 10/01/2012); Breast biopsy (Left, 12/2017); Cardiac valuve replacement (2013); Insertion of tunneled central venous catheter (CVC) with subcutaneous port (Right, 02/01/2019); Modified radical mastectomy w/ axillary lymph node dissection (Right, 08/26/2019); Mediport removal (Right, 08/26/2019); Mastectomy; Breast cyst aspiration; Breast mass excision; Colonoscopy (N/A, 1/5/2023); Coronary angiography (Left, 4/13/2023); and Aortic  valve replacement (N/A, 5/10/2023).    Alina has a current medication list which includes the following prescription(s): amoxicillin, apixaban, blood-glucose meter, carvedilol, cholecalciferol (vitamin d3), furosemide, gabapentin, glimepiride, hydrocodone-acetaminophen, hydrocortisone, ketoconazole, lancets, levothyroxine, metformin, pravastatin, triamcinolone acetonide 0.1%-magnesium hydroxide 400 mg/5 ml-ciclopirox, true metrix glucose test strip, and [DISCONTINUED] triamcinolone acetonide 0.1%.    Review of patient's allergies indicates:   Allergen Reactions    Augmentin [amoxicillin-pot clavulanate] Diarrhea    Dilaudid [hydromorphone (bulk)] Other (See Comments)     Other reaction(s): sedation    Hydromorphone Other (See Comments)     Other reaction(s): sedation    Cymbalta [duloxetine]      Dry mouth, agitation    Jardiance [empagliflozin] Hives    Byetta [exenatide] Rash     Other reaction(s): Rash        Objective   Knee Observations  Right Knee Observations  Not Present: Straight Leg Raise Extensor Lag  Left Knee Observations  Not Present: Straight Leg Raise Extensor Lag            Knee Palpation  Right Knee Palpation  Abnormal: Muscle and Bony Prominence/Bursa     (+) TTP noted to superficial depth of palpation to distal hamstring, Gastroc, ITB, Patellar tendon. Distal adductors.     Left Knee Palpation  Abnormal: Muscle and Bony Prominence/Bursa     (+) TTP noted to superficial depth of palpation to distal hamstring, Gastroc, ITB, Patellar tendon. Distal adductors.          Hip Range of Motion   Right Hip   Active (deg) Passive (deg) Pain   Flexion   90     Extension         ABduction         ADduction         External Rotation 90/90         External Rotation Prone         Internal Rotation 90/90         Internal Rotation Prone             Left Hip   Active (deg) Passive (deg) Pain   Flexion   90     Extension         ABduction         ADduction         External Rotation 90/90         External Rotation  Prone         Internal Rotation 90/90         Internal Rotation Prone                  Knee Range of Motion   Right Knee   Active (deg) Passive (deg) Pain   Flexion 108 110 Yes   Extension -18 -15 Yes       Left Knee   Active (deg) Passive (deg) Pain   Flexion 100 105 Yes   Extension -20 -15 Yes                      Hip Strength - Planes of Motion   Right Strength Right Pain Left Strength Left  Pain   Flexion (L2) 4-   4-     Extension 3-   3-     ABduction 3+   3+     ADduction           Internal Rotation 4-   4-     External Rotation 4-   4-         Knee Strength   Right Strength Right Pain Left Strength Left  Pain   Flexion (S2) 4   4     Prone Flexion           Extension (L3) 4   4       Knee Extensor Lag  No Lag: Right and Left           Hip Special Tests  Flex/Imbalance/Structural Tests  Positive: Right Piriformis and Left Piriformis  Straight Leg Raise Test  Positive: Right and Left  Right Knee Straight Leg Raise Extensor Lag: No  Left Knee Straight Leg Raise Extensor Lag: No             Bed Mobility Assessment  Rolling Assistance: Supervision  Rolling Assistance Details: Increased time and effort with rolling B      Fall Risk  Functional mobility test results suggest the patient is: At Risk for Falls  Timed Up & Go (TUG)  Time: 29 seconds  Observations: Slow tentative pace and Short strides  An older adult who takes >=12 seconds to complete the TUG is at risk for falling.    The patient performed the task with the use of R. She ambulates with reduced B knee flexion through swing phase B, reduced heel strike, reduced hip flexion. She ambulates with reduced speed, moderate antalgic gait.  Sit to Stand Testing      The patient completed 5 repetitions of a sit to stand transfer in 30 seconds. She performed 5.5 reps within the 30 seconds. She stated she felt fatigue with execution. She used B UE on RW for support with sit to stand and performed the task with equal WB through B LE.           "    Treatment:  Therapeutic Exercise  TE 1: Quad set 5"x 10, hip add wilma 5" x 10, Seated LAQ, hip flexion and heel raise      Time Entry(in minutes):  PT Evaluation (Low) Time Entry: 35  Therapeutic Exercise Time Entry: 8    Assessment & Plan   Assessment  Alina presents with a condition of Low complexity.   Presentation of Symptoms: Stable  Will Comorbidities Impact Care: Yes  Chronicity of pain, B knee OA, history of cancer with blood clots following chemo    Functional Limitations: Activity tolerance, Ambulating on uneven surfaces, Bed mobility, Completing self-care activities, Decreased ambulation distance/endurance, Functional mobility, Gait limitations, Increased risk of fall, Maintaining balance, Pain with ADLs/IADLs, Painful locomotion/ambulation, Performing household chores, Range of motion, Sitting tolerance, Standing tolerance  Impairments: Abnormal gait, Abnormal or restricted range of motion, Activity intolerance, Impaired balance, Impaired physical strength, Lack of appropriate home exercise program, Pain with functional activity    Patient Goal for Therapy (PT): to improve mobility at home and be able to get back into the pool across the street with her friends.  Prognosis: Guarded  Assessment Details: Alina is a 80 y.o. female referred to outpatient Physical Therapy with a medical diagnosis of R hip pain with PMH including B knee OA and history of cancer. The patient presents with limited B knee AROM and PROM with pain at end range, decreased B LE strength, limited posterior knee flexibility including limited Hamstring and Gastroc flexibility. She requires increased time and effort with all bed mobility and increased time to complete TUG and 5 reps completed in 30" sit to stand.  These impairments have led to functional limitations that impact the patient's ability to perform ADLs and preferred activities at prior level of function.      Plan  From a physical therapy perspective, the patient would " benefit from: Skilled Rehab Services    Planned therapy interventions include: Therapeutic exercise, Therapeutic activities, and Aquatic therapy.            Visit Frequency: 2 times Per Week for 8 Weeks.       This plan was discussed with Patient.   Discussion participants: Agreed Upon Plan of Care             The patient's spiritual, cultural, and educational needs were considered, and the patient is agreeable to the plan of care and goals.           Goals:   Active       LTG       HEP       Start:  05/29/25    Expected End:  07/29/25       The patient will demonstrate independence with final HEP and report compliance with execution at least 3x/week.         Aquatic        Start:  05/29/25    Expected End:  07/29/25       The patient will self report returning to the [facility] pool at least 1 day/week on days not attending PT.          AROM       Start:  05/29/25    Expected End:  07/29/25       The patient is able to demonstrate improved  B knee extension AROM to limited no greater than 6 degrees B.         MMT       Start:  05/28/25    Expected End:  07/28/25       Pt is able to demonstrate MMT B LE at least 4+/5 all measurements without pain reports or compensations noted.              STG       HEP       Start:  05/28/25    Expected End:  06/28/25       The patient will be able to demonstrate independence and compliance with initial HEP.           MMT       Start:  05/28/25    Expected End:  06/28/25       The patient will demonstrate improved B LE MMT by at least 1/3 muscle grade where deficits noted.          TUG       Start:  05/28/25    Expected End:  06/28/25       The patient will demonstrate improved TUG to less than 15 seconds to indicate improved functional mobility.           AROM       Start:  05/28/25    Expected End:  06/28/25       The patient will demonstrate improved B knee AROM by at least 5 degrees extension.              Kathleen Lane, PT

## 2025-06-04 ENCOUNTER — CLINICAL SUPPORT (OUTPATIENT)
Dept: REHABILITATION | Facility: HOSPITAL | Age: 80
End: 2025-06-04
Attending: INTERNAL MEDICINE
Payer: MEDICARE

## 2025-06-04 DIAGNOSIS — Z74.09 IMPAIRED MOBILITY: ICD-10-CM

## 2025-06-04 DIAGNOSIS — R52 PAIN AGGRAVATED BY ACTIVITIES OF DAILY LIVING: ICD-10-CM

## 2025-06-04 DIAGNOSIS — M25.551 RIGHT HIP PAIN: Primary | ICD-10-CM

## 2025-06-04 PROCEDURE — 97113 AQUATIC THERAPY/EXERCISES: CPT | Mod: HCNC

## 2025-06-06 ENCOUNTER — CLINICAL SUPPORT (OUTPATIENT)
Dept: REHABILITATION | Facility: HOSPITAL | Age: 80
End: 2025-06-06
Attending: INTERNAL MEDICINE
Payer: MEDICARE

## 2025-06-06 DIAGNOSIS — M25.551 RIGHT HIP PAIN: ICD-10-CM

## 2025-06-06 DIAGNOSIS — Z74.09 IMPAIRED MOBILITY: Primary | ICD-10-CM

## 2025-06-06 DIAGNOSIS — R52 PAIN AGGRAVATED BY ACTIVITIES OF DAILY LIVING: ICD-10-CM

## 2025-06-06 PROCEDURE — 97113 AQUATIC THERAPY/EXERCISES: CPT | Mod: HCNC

## 2025-06-09 ENCOUNTER — HOSPITAL ENCOUNTER (EMERGENCY)
Facility: HOSPITAL | Age: 80
Discharge: HOME | End: 2025-06-09
Attending: EMERGENCY MEDICINE
Payer: MEDICARE

## 2025-06-09 VITALS
DIASTOLIC BLOOD PRESSURE: 79 MMHG | BODY MASS INDEX: 45.64 KG/M2 | RESPIRATION RATE: 18 BRPM | OXYGEN SATURATION: 97 % | HEIGHT: 62 IN | HEART RATE: 90 BPM | WEIGHT: 248 LBS | TEMPERATURE: 98 F | SYSTOLIC BLOOD PRESSURE: 136 MMHG

## 2025-06-09 DIAGNOSIS — M25.562 ACUTE PAIN OF LEFT KNEE: Primary | ICD-10-CM

## 2025-06-09 DIAGNOSIS — M17.12 OSTEOARTHRITIS OF LEFT KNEE, UNSPECIFIED OSTEOARTHRITIS TYPE: ICD-10-CM

## 2025-06-09 PROCEDURE — 25000003 PHARM REV CODE 250: Mod: HCNC | Performed by: PHYSICIAN ASSISTANT

## 2025-06-09 PROCEDURE — 99283 EMERGENCY DEPT VISIT LOW MDM: CPT | Mod: 25,HCNC

## 2025-06-09 RX ORDER — HYDROCODONE BITARTRATE AND ACETAMINOPHEN 5; 325 MG/1; MG/1
1 TABLET ORAL
Refills: 0 | Status: COMPLETED | OUTPATIENT
Start: 2025-06-09 | End: 2025-06-09

## 2025-06-09 RX ADMIN — HYDROCODONE BITARTRATE AND ACETAMINOPHEN 1 TABLET: 5; 325 TABLET ORAL at 11:06

## 2025-06-09 NOTE — ED NOTES
Patient identifiers verified and correct for  MS Sylvain  C/C:  Left knee pain SEE NN  APPEARANCE: awake and alert in NAD. PAIN  10/10  SKIN: warm, dry and intact. No breakdown or bruising.  MUSCULOSKELETAL: Patient moving all extremities spontaneously, no obvious swelling or deformities noted. Ambulates independently with max assist   RESPIRATORY: Denies shortness of breath.Respirations unlabored.   CARDIAC: Denies CP, 2+ distal pulses; no peripheral edema  ABDOMEN: S/ND/NT, Denies nausea  : voids spontaneously, denies difficulty  Neurologic: AAO x 4; follows commands equal strength in all extremities; denies numbness/tingling. Denies dizziness Denies new weakness, reports left knee pain and swelling

## 2025-06-09 NOTE — ED NOTES
"Patient states left knee pain for " years" reports she needs surgery, will not do surgery. States pain incr after water therapy last week   " SUBJECTIVE: 
 
Feeling okay. Sitting in a chair. States he does not want to eat. No chest or abdominal pain. No nausea or vomiting. No SOB or cough. OBJECTIVE: 
 
/73 (BP 1 Location: Right arm, BP Patient Position: At rest)   Pulse 80   Temp 98.1 °F (36.7 °C)   Resp 20   Ht 6' 1\" (1.854 m)   Wt 71.3 kg (157 lb 3.2 oz)   SpO2 95%   BMI 20.74 kg/m² General appearance - alert, well appearing, and in no distress Chest - Good air entry noted in bases, no wheezes Heart - S1 and S2 normal 
Abdomen - soft, nondistended, Bowel sounds present Neurological - alert, normal speech, no focal findings noted in both UEs Extremities - + pedal edema noted ASSESSMENT: 
 
1. E coli Sepsis. 2. E coli UTI - emphysematous UTI 3. Hypoglycemia due to sepsis, resolved 4. Indeterminate elevation of troponin due to demand ischemia sec to sepsis 5. Paroxysmal atrial fibrillation, anticoagulation d/c as outpatient due to thrombocytopenia 6. Thrombocytopenia s/p platelet transfusion 7. Acute on chronic anemia s/p PRBCs 8. HTN 
9. Hx prostate cancer 10. Decub/foot ulcers 11. Macrocytosis 12. Hypokalemia, better 13. MGUS and history of thrombocytosis - off hydroxyurea 14. Pressure Ulcers bilat feet, POA 15. Cognitive impairment 16. Steroid induced hyperglycemia 17. Underweight with mild malnutrition PLAN: 
 
Cont current management Negative repeat blood culture Dr. Idris Barnett to follow for anemia/thrombocytopenia CBC in am 
D/w CM - Placement on Monday BMP:  
No results found for: NA, K, CL, CO2, AGAP, GLU, BUN, CREA, GFRAA, GFRNA 
CBC:  
No results found for: WBC, HGB, HGBEXT, HCT, HCTEXT, PLT, PLTEXT, HGBEXT, HCTEXT, PLTEXT

## 2025-06-09 NOTE — ED PROVIDER NOTES
"Encounter Date: 6/9/2025       History     Chief Complaint   Patient presents with    Knee Pain     L knee pain, ' bone on bone     80-year-old female with a past medical history of HTN, DM, hyperlipidemia, breast cancer reportedly in remission, aortic valve replacement status post radiation therapy, VTE on DOACs is presenting to the ED with acute on chronic left knee pain.  The patient reports she has always had knee problems and was told she had "bone on bone" but no one would do surgery due to comorbidities.  She has been doing water therapy to assist with pain but reports she has had increased pain in left knee since her last water therapy session a few days ago.  She denies known injury, trauma, or fall.  She denies distal numbness/tingling or weakness, calf pain or swelling, rash, or fever.  No therapies attempted prior to arrival; she reports she has Norco at home but is scared to take it.    The history is provided by the patient. No  was used.     Review of patient's allergies indicates:   Allergen Reactions    Augmentin [amoxicillin-pot clavulanate] Diarrhea    Dilaudid [hydromorphone (bulk)] Other (See Comments)     Other reaction(s): sedation    Cymbalta [duloxetine]      Dry mouth, agitation    Jardiance [empagliflozin] Hives    Byetta [exenatide] Rash     Other reaction(s): Rash    Hydromorphone Other (See Comments)     Sedation     Past Medical History:   Diagnosis Date    Allergy     seasonal    Anxiety     Arthritis     BRCA1 negative     Breast cancer 10/2012    left breast invasive ductal carcinoma    Colon polyp     Depression     Diabetes mellitus     Type 2    Diabetes mellitus, type 2     Diverticular disease     Diverticulitis 2009    Genetic testing 05/2017    negative Integrated BRACAnalysis    Hyperlipidemia     Hypertension     Morbid obesity     MITCHELL (obstructive sleep apnea)     Pulmonary embolism     Stenosis     Stenosis and insufficiency of lacrimal passages     " Thyroid disease      Past Surgical History:   Procedure Laterality Date    AORTIC VALVE REPLACEMENT N/A 5/10/2023    Procedure: Replacement-valve-aortic, redo sternotomy;  Surgeon: Venkat Webb MD;  Location: [facility] OR [address] [place] [address];  Service: Cardiothoracic;  Laterality: N/A;  Redo sternotomy    BREAST BIOPSY Left 10/01/2012    left breast- invasive ductal carcinoma    BREAST BIOPSY Left 12/2017    BREAST CYST ASPIRATION      BREAST LUMPECTOMY Left 2012    BREAST MASS EXCISION      CARDIAC VALVE REPLACEMENT  12/2013    CARDIAC VALVE SURGERY  2013    CARPAL TUNNEL RELEASE      Left    COLONOSCOPY N/A 09/29/2017    Procedure: COLONOSCOPY;  Surgeon: Phani Worley MD;  Location: [facility] ENDO (4TH FLR);  Service: Endoscopy;  Laterality: N/A;    COLONOSCOPY N/A 1/5/2023    Procedure: COLONOSCOPY;  Surgeon: Eladia Martino MD;  Location: [facility] ENDO (4TH FLR);  Service: Endoscopy;  Laterality: N/A;  instr via portal  ok to hole Eliquis-see encounter 12/9-MS  1/4 pateint cannot come earlier-rt    CORONARY ANGIOGRAPHY Left 4/13/2023    Procedure: ANGIOGRAM, CORONARY ARTERY;  Surgeon: Eze Sales MD;  Location: [facility] CATH LAB;  Service: Cardiology;  Laterality: Left;  low bleeding risk 2.9%    DILATION AND CURETTAGE OF UTERUS  1999    Endometrial polyps    ENDOMETRIAL ABLATION  1999    Enodmetrial polyps    EYE SURGERY      INSERTION OF TUNNELED CENTRAL VENOUS CATHETER (CVC) WITH SUBCUTANEOUS PORT Right 02/01/2019    Procedure: [id number]-[id number];  Surgeon: Gaston Flores MD;  Location: [address];  Service: General;  Laterality: Right;    left lumpectomy  2012    MASTECTOMY      MEDIPORT REMOVAL Right 08/26/2019    Procedure: REMOVAL, CATHETER, CENTRAL VENOUS, TUNNELED, WITH PORT;  Surgeon: Gaston Flores MD;  Location: [facility] OR [address] [place] [address];  Service: General;  Laterality: Right;    MODIFIED RADICAL MASTECTOMY W/ AXILLARY LYMPH NODE DISSECTION Right 08/26/2019    Procedure: MASTECTOMY, MODIFIED RADICAL;   "Surgeon: Gaston Flores MD;  Location: [facility] OR [address] [place] [address];  Service: General;  Laterality: Right;    SHOULDER SURGERY      SKIN BIOPSY       Family History   Problem Relation Name Age of Onset    Breast cancer Mother Violetta Wasserman 62    Colon cancer Father      Cancer Father          Colon CA (cause of death); Prostate CA (no chemo)    Heart disease Father      No Known Problems Sister      Alcohol abuse Brother      Cancer Maternal Grandfather          Colon CA    No Known Problems Son Gregg     Cancer Brother          prostate CA, no chemo, "it was bad"    Anxiety disorder Son Gato     SAL disease Son Gato     Sleep disorder Son Gato     Ovarian cancer Neg Hx      Melanoma Neg Hx      Psoriasis Neg Hx      Lupus Neg Hx      Eczema Neg Hx      Acne Neg Hx       Social History[1]  Review of Systems   Constitutional:  Negative for fever.   Musculoskeletal:  Positive for arthralgias.   Skin:  Negative for rash and wound.   Neurological:  Negative for weakness and numbness.   All other systems reviewed and are negative.      Physical Exam     Initial Vitals [06/09/25 1011]   BP Pulse Resp Temp SpO2   136/79 90 18 97.9 °F (36.6 °C) 97 %      MAP       --         Physical Exam    Vitals reviewed.  Constitutional: She appears well-developed. She is not diaphoretic. She is active.  Non-toxic appearance. She does not have a sickly appearance. She does not appear ill. No distress.   HENT:   Head: Normocephalic and atraumatic.   Eyes: Conjunctivae, EOM and lids are normal.   Neck: Neck supple.   Normal range of motion.  Cardiovascular:  Normal rate and regular rhythm.           Pulses:       Dorsalis pedis pulses are 2+ on the left side.   Pulmonary/Chest: Effort normal. No apnea. No respiratory distress.   Abdominal: Abdomen is flat. No signs of injury.   Musculoskeletal:      Cervical back: Normal range of motion and neck supple.      Left knee: Swelling, bony tenderness and crepitus present. No deformity or " erythema. Decreased range of motion.      Comments: Pain in left knee elicited with movement. No overlying signs of infection.     Neurological: She is alert. No cranial nerve deficit. She exhibits normal muscle tone.   Ambulatory with walker, baseline.  Distal left lower extremity neurovascularly intact.   Skin: Skin is warm and dry. No abrasion and no rash noted. No erythema.         ED Course   Procedures  Labs Reviewed - No data to display       Imaging Results              X-Ray Knee 1 or 2 View Left (Final result)  Result time 06/09/25 11:20:40      Final result by Wenceslao Valderrama MD (06/09/25 11:20:40)                   Impression:      See above      Electronically signed by: Wenceslao Valderrama MD  Date:    06/09/2025  Time:    11:20               Narrative:    EXAMINATION:  XR KNEE 1 OR 2 VIEW LEFT    CLINICAL HISTORY:  knee pain;    TECHNIQUE:  One or two views of the left knee were performed.    COMPARISON:  05/29/2020    FINDINGS:  Joint space shows some narrowing medially with little degenerative change.  Bony spurring is identified.  No joint effusion is seen                                    X-Rays:   Independently Interpreted Readings:   Other Readings:  Left knee XR: Osteoarthritis. No acute fracture.    Medications   HYDROcodone-acetaminophen 5-325 mg per tablet 1 tablet (1 tablet Oral Given 6/9/25 1114)     Medical Decision Making  80 y.o. female with multiple chronic medical comorbidities presented to the ED with worsening left knee pain. Differentials include, but not limited to fracture, ligamentous injury, arthritis, septic joint. Low suspicion for septic joint as there is no erythema, warmth, wounds, or signs of infection. She normally ambulates with walker. X-ray negative for acute processes; offered knee immobilizer to which she was initially agreeable but ultimately declined as she has something similar at home. She had some relief with [place] and has a prescription for [place] at home.  Referral to ortho was sent upon discharge.     Problems Addressed:  Acute pain of left knee: acute illness or injury  Osteoarthritis of left knee, unspecified osteoarthritis type: chronic illness or injury with exacerbation, progression, or side effects of treatment    Amount and/or Complexity of Data Reviewed  Radiology: ordered.    Risk  Prescription drug management.               ED Course as of 06/09/25 1742 Mon Jun 09, 2025   1204 Reassessment: The patient reported improvement in pain following Norco. She already has a prescription at home; we discussed maybe cutting in half if she is not comfortable taking a full dose. She is comfortable with plan to discharge with knee immobilizer and ortho referral. Recommended supportive care including ice and elevation as well as following with primary care if she is unable to be seen by ortho soon. Strict return precautions discussed. All questions answered. The patient verbalized understanding and agreed to plan.  [SE]      ED Course User Index  [SE] Karen Matias PA-C                           Clinical Impression:  Final diagnoses:  [M25.562] Acute pain of left knee (Primary)  [M17.12] Osteoarthritis of left knee, unspecified osteoarthritis type          ED Disposition Condition    Discharge Stable          ED Prescriptions    None       Follow-up Information    None                [1]   Social History  Tobacco Use    Smoking status: Never    Smokeless tobacco: Never   Substance Use Topics    Alcohol use: No     Alcohol/week: 0.0 standard drinks of alcohol    Drug use: Never        Karen Matias PA-C  06/09/25 1742

## 2025-06-10 ENCOUNTER — OFFICE VISIT (OUTPATIENT)
Dept: ORTHOPEDICS | Facility: CLINIC | Age: 80
End: 2025-06-10
Payer: MEDICARE

## 2025-06-10 DIAGNOSIS — M17.12 OSTEOARTHRITIS OF LEFT KNEE, UNSPECIFIED OSTEOARTHRITIS TYPE: ICD-10-CM

## 2025-06-10 DIAGNOSIS — M25.562 ACUTE PAIN OF LEFT KNEE: ICD-10-CM

## 2025-06-10 DIAGNOSIS — M17.0 PRIMARY OSTEOARTHRITIS OF BOTH KNEES: Primary | ICD-10-CM

## 2025-06-10 PROCEDURE — 1125F AMNT PAIN NOTED PAIN PRSNT: CPT | Mod: CPTII,HCNC,S$GLB, | Performed by: NURSE PRACTITIONER

## 2025-06-10 PROCEDURE — 1157F ADVNC CARE PLAN IN RCRD: CPT | Mod: CPTII,HCNC,S$GLB, | Performed by: NURSE PRACTITIONER

## 2025-06-10 PROCEDURE — 1159F MED LIST DOCD IN RCRD: CPT | Mod: CPTII,HCNC,S$GLB, | Performed by: NURSE PRACTITIONER

## 2025-06-10 PROCEDURE — 20610 DRAIN/INJ JOINT/BURSA W/O US: CPT | Mod: 50,HCNC,S$GLB, | Performed by: NURSE PRACTITIONER

## 2025-06-10 PROCEDURE — 99214 OFFICE O/P EST MOD 30 MIN: CPT | Mod: HCNC,25,S$GLB, | Performed by: NURSE PRACTITIONER

## 2025-06-10 PROCEDURE — 99999 PR PBB SHADOW E&M-EST. PATIENT-LVL III: CPT | Mod: PBBFAC,HCNC,, | Performed by: NURSE PRACTITIONER

## 2025-06-10 PROCEDURE — 1160F RVW MEDS BY RX/DR IN RCRD: CPT | Mod: CPTII,HCNC,S$GLB, | Performed by: NURSE PRACTITIONER

## 2025-06-10 RX ORDER — TRIAMCINOLONE ACETONIDE 40 MG/ML
80 INJECTION, SUSPENSION INTRA-ARTICULAR; INTRAMUSCULAR
Status: COMPLETED | OUTPATIENT
Start: 2025-06-10 | End: 2025-06-10

## 2025-06-10 RX ADMIN — TRIAMCINOLONE ACETONIDE 80 MG: 40 INJECTION, SUSPENSION INTRA-ARTICULAR; INTRAMUSCULAR at 03:06

## 2025-06-10 NOTE — PROGRESS NOTES
CHIEF COMPLAINT:  - Bilateral knee pain    HPI:  Alina presents with bilateral knee pain, primarily on the inside of her knees, with recent worsening. Pain has become severe to the point where she could not get out of bed or straighten her legs. She expresses that the pain is unbearable. Pain is also reported on the outside of the knees.    She started water therapy approximately 1.5 weeks ago, as recommended by Dr. Dunn to improve mobility. After attending three sessions, with the last one being on a Friday, knee pain had intensified significantly by Sunday.    Due to symptom severity, she visited the emergency room the day before this appointment. She reports significant difficulty with movement, stating that when she had to put her legs on a table for XRs, it was extremely painful and she could not roll on her side.    She has difficulty getting in and out of bed, requiring the use of a walker. She also struggles with entering and exiting her car, describing the process as painful. She continues to drive but uses a running board to assist with entering the vehicle, still experiencing significant discomfort during the process.    She has no pain in the groin area and no history of knee infections.    She has a history of multiple medical issues, including AVR x2, breast cancer twice, chemotherapy, and three blood clots in the lungs that nearly resulted in death.     PREVIOUS TREATMENTS:  - Water therapy: Attended 3 sessions, last session on a Friday, severe pain by Sunday, unable to get out of bed or straighten legs  - she is unable to take nsaids due to eliquis use    IMAGING:  - XR Knees: Taken at the emergency room the day before the current visit, showed a spur    SURGICAL HISTORY:  - Aortic valve replacement: 8 years ago  - Aortic valve replacement: Repeated due to valve closing off  - left breast Lumpectomy: For HER2-positive breast cancer  - right breast Mastectomy: For aggresive breast  cancer    SOCIAL HISTORY:  - Marital Status:   - Spouse:  beginning with dementia      ROS  General: denies fever and chills  Resp: no c/o sob  CVS: no c/o cp  MSK: +joint pain, +limb pain, +limb swelling, +pain with movement, +difficulty standing up    PE  General: AAOx3, pleasant and cooperative  Resp: respirations even and unlabored  MSK: bilateral knee exam  5 degrees extension  100 degrees flexion  No warmth or erythema   - effusion  + tenderness over the medial joint line  4/5 quad and hamstring strength         Xray:  Reviewed old xray which shows tricompartmental djd of both knees with medial joint space narrowing    Assessment:  Bilateral knee djd  Bilateral trochanteric bursitis  History of PE x3 on eliquis  History of breast CA x2  History of AVR x2    Plan:  Cortisone injections bilateral knees today  Tylenol prn  Ok to continue aquatherapy to strengthen muscles, but discuss severe pain with therapist so they can adjust exercises      PATIENT INSTRUCTIONS:  - Perform stretches when sitting, such as opening and closing knees while keeping heels together and figure 4 stretch to help with pain over the trochanteric bursa       This note was generated with the assistance of ambient listening technology. I attest to having reviewed and edited the generated note for accuracy, though some syntax or spelling errors may persist. Please contact the author of this note for any clarification.    Knee Injection Procedure Note  Diagnosis: bilateral knee degenerative arthritis  Indications: bilateral knee pain  Procedure Details: Verbal consent was obtained for the procedure. The injection site was identified and the skin was prepared with alcohol. The bilateral knee was injected from an anterolateral approach with 1 ml of Kenalog and 4 ml Lidocaine each under sterile technique using a 25 gauge needle. The needle was removed and the area cleansed and dressed.  Complications:  Patient tolerated the  procedure well.    she was advised to rest the knee today, using ice and elevation as needed for comfort and swelling.Immediate relief of the knee pain may be short lived and secondary to the lidocaine. she may have an increase in discomfort tonight followed by steady improvement over the next several days. It may take 1-2 weeks following the injection to get the full benefit of the medication.

## 2025-06-13 ENCOUNTER — OFFICE VISIT (OUTPATIENT)
Dept: CARDIOLOGY | Facility: CLINIC | Age: 80
End: 2025-06-13
Payer: MEDICARE

## 2025-06-13 VITALS
DIASTOLIC BLOOD PRESSURE: 82 MMHG | HEIGHT: 62 IN | SYSTOLIC BLOOD PRESSURE: 135 MMHG | OXYGEN SATURATION: 98 % | HEART RATE: 71 BPM | BODY MASS INDEX: 44.72 KG/M2 | WEIGHT: 243 LBS

## 2025-06-13 DIAGNOSIS — N18.32 STAGE 3B CHRONIC KIDNEY DISEASE: ICD-10-CM

## 2025-06-13 DIAGNOSIS — E66.9 DIABETES MELLITUS TYPE 2 IN OBESE: ICD-10-CM

## 2025-06-13 DIAGNOSIS — N18.31 DIABETES MELLITUS DUE TO UNDERLYING CONDITION WITH STAGE 3A CHRONIC KIDNEY DISEASE, WITHOUT LONG-TERM CURRENT USE OF INSULIN: ICD-10-CM

## 2025-06-13 DIAGNOSIS — Z74.09 IMPAIRED MOBILITY: ICD-10-CM

## 2025-06-13 DIAGNOSIS — E08.22 DIABETES MELLITUS DUE TO UNDERLYING CONDITION WITH STAGE 3A CHRONIC KIDNEY DISEASE, WITHOUT LONG-TERM CURRENT USE OF INSULIN: ICD-10-CM

## 2025-06-13 DIAGNOSIS — E78.2 HYPERLIPIDEMIA, MIXED: ICD-10-CM

## 2025-06-13 DIAGNOSIS — K76.0 FATTY LIVER: ICD-10-CM

## 2025-06-13 DIAGNOSIS — E11.69 DIABETES MELLITUS TYPE 2 IN OBESE: ICD-10-CM

## 2025-06-13 DIAGNOSIS — E55.9 VITAMIN D DEFICIENCY: ICD-10-CM

## 2025-06-13 DIAGNOSIS — E66.813 CLASS 3 OBESITY: ICD-10-CM

## 2025-06-13 DIAGNOSIS — I10 ESSENTIAL HYPERTENSION: ICD-10-CM

## 2025-06-13 DIAGNOSIS — G47.33 OBSTRUCTIVE SLEEP APNEA: ICD-10-CM

## 2025-06-13 DIAGNOSIS — Z95.2 S/P AVR (AORTIC VALVE REPLACEMENT): ICD-10-CM

## 2025-06-13 DIAGNOSIS — Z95.2 S/P AVR: Primary | ICD-10-CM

## 2025-06-13 PROCEDURE — 99999 PR PBB SHADOW E&M-EST. PATIENT-LVL V: CPT | Mod: PBBFAC,HCNC,, | Performed by: INTERNAL MEDICINE

## 2025-06-13 NOTE — PROGRESS NOTES
Subjective:   Patient ID:  Alina Narayan is a 80 y.o. female who presents for follow-up of VHD    HPI:  Patient is here for valvular heart disease.  The patient has no chest pain, SOB, TIA, palpitations, syncope or pre-syncope.Patient does not exercise.        Review of Systems   Constitutional: Negative for chills, decreased appetite, diaphoresis, fever, malaise/fatigue, night sweats, weight gain and weight loss.   HENT:  Negative for congestion, hoarse voice, nosebleeds, sore throat and tinnitus.    Eyes:  Negative for blurred vision, double vision, vision loss in left eye, vision loss in right eye, visual disturbance and visual halos.   Cardiovascular:  Negative for chest pain, claudication, cyanosis, dyspnea on exertion, irregular heartbeat, leg swelling, near-syncope, orthopnea, palpitations, paroxysmal nocturnal dyspnea and syncope.   Respiratory:  Negative for cough, hemoptysis, shortness of breath, sleep disturbances due to breathing, snoring, sputum production and wheezing.    Endocrine: Negative for cold intolerance, heat intolerance, polydipsia, polyphagia and polyuria.   Hematologic/Lymphatic: Negative for adenopathy and bleeding problem. Does not bruise/bleed easily.   Skin:  Negative for color change, dry skin, flushing, itching, nail changes, poor wound healing, rash, skin cancer, suspicious lesions and unusual hair distribution.   Musculoskeletal:  Positive for arthritis, back pain and joint pain. Negative for falls, gout, joint swelling, muscle cramps, muscle weakness, myalgias and stiffness.   Gastrointestinal:  Negative for abdominal pain, anorexia, change in bowel habit, constipation, diarrhea, dysphagia, heartburn, hematemesis, hematochezia, melena and vomiting.   Genitourinary:  Negative for decreased libido, dysuria, hematuria, hesitancy and urgency.   Neurological:  Negative for excessive daytime sleepiness, dizziness, focal weakness, headaches, light-headedness, loss of balance, numbness,  "paresthesias, seizures, sensory change, tremors, vertigo and weakness.   Psychiatric/Behavioral:  Negative for altered mental status, depression, hallucinations, memory loss, substance abuse and suicidal ideas. The patient does not have insomnia and is not nervous/anxious.    Allergic/Immunologic: Negative for environmental allergies and hives.       Objective: /82 (BP Location: Left arm, Patient Position: Sitting)   Pulse 71   Ht 5' 2" (1.575 m)   Wt 110.2 kg (243 lb)   SpO2 98%   BMI 44.45 kg/m²      Physical Exam  Constitutional:       Appearance: She is well-developed.   HENT:      Head: Normocephalic.   Eyes:      Pupils: Pupils are equal, round, and reactive to light.   Neck:      Thyroid: No thyromegaly.      Vascular: Normal carotid pulses. No carotid bruit, hepatojugular reflux or JVD.   Cardiovascular:      Rate and Rhythm: Normal rate and regular rhythm.      Pulses: Intact distal pulses.      Heart sounds: Murmur heard.      Harsh midsystolic murmur is present with a grade of 1/6 at the upper right sternal border radiating to the neck.      No friction rub. No gallop.   Pulmonary:      Effort: Pulmonary effort is normal. No tachypnea or respiratory distress.      Breath sounds: Normal breath sounds. No wheezing or rales.   Chest:      Chest wall: No tenderness.   Abdominal:      General: Bowel sounds are normal. There is no distension.      Palpations: Abdomen is soft. There is no mass.      Tenderness: There is no abdominal tenderness. There is no guarding or rebound.   Musculoskeletal:         General: No tenderness. Normal range of motion.      Cervical back: Normal range of motion.   Lymphadenopathy:      Cervical: No cervical adenopathy.   Skin:     General: Skin is warm.      Findings: No erythema or rash.   Neurological:      Mental Status: She is alert and oriented to person, place, and time.      Cranial Nerves: No cranial nerve deficit.      Coordination: Coordination normal. "   Psychiatric:         Behavior: Behavior normal.         Thought Content: Thought content normal.         Judgment: Judgment normal.       Assessment:     1. S/P AVR    2. Essential hypertension    3. Hyperlipidemia, mixed    4. Obstructive sleep apnea    5. Diabetes mellitus type 2 in obese    6. Stage 3b chronic kidney disease    7. Diabetes mellitus due to underlying condition with stage 3a chronic kidney disease, without long-term current use of insulin    8. Vitamin D deficiency    9. Impaired mobility    10. Fatty liver    11. S/P AVR (aortic valve replacement)    12. Class 3 obesity      Reviewed labs, ECGs and Echoes  Plan:   Discussed diet , achieving and maintaining ideal body weight, and exercise.   We reviewed meds in detail.  Reassured-Discussed goals, options, plan.  Omega-3 > 800 mg/d combined EPA/DHA.  We have discussed the need for endocarditis prophylaxis.     I offered bariatrics or the 2 obesity meds -she'll decide      Alina was seen today for valvular heart disease, hypertension and hyperlipidemia.    Diagnoses and all orders for this visit:    S/P AVR  -     CBC Auto Differential; Future  -     EKG 12-lead; Future  -     BNP; Future    Essential hypertension  -     Comprehensive Metabolic Panel; Future  -     TSH; Future  -     EKG 12-lead; Future  -     BNP; Future    Hyperlipidemia, mixed  -     Lipid Panel; Future  -     Comprehensive Metabolic Panel; Future  -     TSH; Future    Obstructive sleep apnea    Diabetes mellitus type 2 in obese  -     Hemoglobin A1C; Future    Stage 3b chronic kidney disease    Diabetes mellitus due to underlying condition with stage 3a chronic kidney disease, without long-term current use of insulin    Vitamin D deficiency    Impaired mobility    Fatty liver    S/P AVR (aortic valve replacement)  -     Ambulatory referral/consult to Cardiology    Class 3 obesity  -     TSH; Future  -     Hemoglobin A1C; Future  -     BNP; Future            Follow up in about 1  year (around 6/13/2026) for with ECG and labs.

## 2025-06-13 NOTE — PATIENT INSTRUCTIONS
Discussed diet , achieving and maintaining ideal body weight, and exercise.   We reviewed meds in detail.  Reassured-Discussed goals, options, plan.  Omega-3 > 800 mg/d combined EPA/DHA.  We have discussed the need for endocarditis prophylaxis.  I offered bariatrics or the 2 obesity meds -she'll decide

## 2025-06-18 ENCOUNTER — CLINICAL SUPPORT (OUTPATIENT)
Dept: REHABILITATION | Facility: HOSPITAL | Age: 80
End: 2025-06-18
Attending: INTERNAL MEDICINE
Payer: MEDICARE

## 2025-06-18 DIAGNOSIS — R52 PAIN AGGRAVATED BY ACTIVITIES OF DAILY LIVING: ICD-10-CM

## 2025-06-18 DIAGNOSIS — Z74.09 IMPAIRED MOBILITY: Primary | ICD-10-CM

## 2025-06-18 DIAGNOSIS — M25.551 RIGHT HIP PAIN: ICD-10-CM

## 2025-06-18 PROCEDURE — 97113 AQUATIC THERAPY/EXERCISES: CPT | Mod: HCNC,CQ

## 2025-06-18 NOTE — PROGRESS NOTES
Outpatient Rehab    Physical Therapy Visit    Patient Name: Alina Narayan  MRN: [id number]  YOB: 1945  Encounter Date: 6/18/2025    Therapy Diagnosis:   Encounter Diagnoses   Name Primary?    Impaired mobility Yes    Pain aggravated by activities of daily living     Right hip pain        Physician: Aarti Dunn MD    Physician Orders: Eval and Treat  Medical Diagnosis: Pain in right hip    Visit # / Visits Authorized:  3 / 20  Insurance Authorization Period: 5/28/2025 to 12/31/2025  Date of Evaluation: 3/18/2025  Plan of Care Certification: 5/29/2025 to 7/28/2025      PT/PTA: PTA   Number of PTA visits since last PT visit:1  Time In: 1130   Time Out: 1230  Total Time (in minutes): 60   Total Billable Time (in minutes): 60    Precautions: Standard, OA, history of DVT, history of cancer       Subjective   Patient reports a couple of weeks ago she started having severe pain and had to go to ER and they referred her to an Orthopeadist.  He drained her left knee and received an injection in both knees but relief only lasted a few days.  Patient reports her pain sitting is about 6/10 but if she gets up or walks around her pain goes to 9/10..  Pain reported as 6/10.      Objective            Treatment:  Therapeutic Exercise  TE 1: FUNCTIONAL MOBILITY TRAINING x 2 laps each at beginning and 1 lap each at end of session  Walk forward/backward/lateral  TE 2: Standing LE EX x 20 Heel raise with gluteal set,  Hip abduction,  Hip flexion,  Hip Extension, HS curls, Squat  TE 3: Seated on Pool stool x 20 reps LAQ, hip flexion, Clam,  Sit to stand, SEATED HAMSTRING STRETCH 1 x 1'  B, heel raise/toe raise alternating  TE 4: Walking marches x 2 laps-added today,   Tandem walking x 2 laps--ADD tendem as tolerated  TE 5: UE EX/CORE x 20 with closed paddles  Shoulder flex/ext TA activation, Shoulder horizontal abd/add TA activation, Shoulder abd/add with TA activation,      Mini squat with push/pull red kickboard   ---ADD ALL AS TOLERATED  TE 6: ENDURANCE in // bars x 2'        performed bicycle with noodle behind back in // bars--Not Today, OOT        Time Entry(in minutes):  Aquatic Therapy Time Entry: 60    Assessment & Plan   Assessment: Patient attempted to perform tandem walks but stopped due to causing pain.  Patient was moving a little slow through her exercises today due to pain which caused her to not have enough time to perform her endurance exercises as noted above.  Patient had no increase in symptoms prior to leaving the clinic.       The patient will continue to benefit from skilled outpatient physical therapy in order to address the deficits listed in the problem list on the initial evaluation, provide patient and family education, and maximize the patients level of independence in the home and community environments.     The patient's spiritual, cultural, and educational needs were considered, and the patient is agreeable to the plan of care and goals.           Plan: Progress POC as tolerated by the patient.    Goals:   Active       LTG       HEP       Start:  05/29/25    Expected End:  07/29/25       The patient will demonstrate independence with final HEP and report compliance with execution at least 3x/week.         Aquatic        Start:  05/29/25    Expected End:  07/29/25       The patient will self report returning to the [facility] pool at least 1 day/week on days not attending PT.          AROM       Start:  05/29/25    Expected End:  07/29/25       The patient is able to demonstrate improved  B knee extension AROM to limited no greater than 6 degrees B.         MMT       Start:  05/28/25    Expected End:  07/28/25       Pt is able to demonstrate MMT B LE at least 4+/5 all measurements without pain reports or compensations noted.              STG       HEP       Start:  05/28/25    Expected End:  06/28/25       The patient will be able to demonstrate independence and compliance with  initial HEP.           MMT       Start:  05/28/25    Expected End:  06/28/25       The patient will demonstrate improved B LE MMT by at least 1/3 muscle grade where deficits noted.          TUG       Start:  05/28/25    Expected End:  06/28/25       The patient will demonstrate improved TUG to less than 15 seconds to indicate improved functional mobility.           AROM       Start:  05/28/25    Expected End:  06/28/25       The patient will demonstrate improved B knee AROM by at least 5 degrees extension.                Edward Gibson, PTA

## 2025-06-20 ENCOUNTER — CLINICAL SUPPORT (OUTPATIENT)
Dept: REHABILITATION | Facility: HOSPITAL | Age: 80
End: 2025-06-20
Attending: INTERNAL MEDICINE
Payer: MEDICARE

## 2025-06-20 DIAGNOSIS — Z74.09 IMPAIRED MOBILITY: Primary | ICD-10-CM

## 2025-06-20 DIAGNOSIS — R52 PAIN AGGRAVATED BY ACTIVITIES OF DAILY LIVING: ICD-10-CM

## 2025-06-20 DIAGNOSIS — M25.551 RIGHT HIP PAIN: ICD-10-CM

## 2025-06-20 PROCEDURE — 97113 AQUATIC THERAPY/EXERCISES: CPT | Mod: HCNC,CQ

## 2025-06-20 NOTE — PROGRESS NOTES
Outpatient Rehab    Physical Therapy Visit    Patient Name: Alina Narayan  MRN: [id number]  YOB: 1945  Encounter Date: 6/20/2025    Therapy Diagnosis:   Encounter Diagnoses   Name Primary?    Impaired mobility Yes    Pain aggravated by activities of daily living     Right hip pain        Physician: Aarti Dunn MD    Physician Orders: Eval and Treat  Medical Diagnosis: Pain in right hip    Visit # / Visits Authorized:  4 / 20  Insurance Authorization Period: 5/28/2025 to 12/31/2025  Date of Evaluation: 3/18/2025  Plan of Care Certification: 5/29/2025 to 7/28/2025      PT/PTA: PTA   Number of PTA visits since last PT visit:2  Time In: 1026   Time Out: 1120  Total Time (in minutes): 54   Total Billable Time (in minutes): 45    Precautions: Standard, OA, history of DVT, history of cancer       Subjective   Patient reports left knee bothering her today, afraid it will give out on her.  Pain reported as 5/10.      Objective            Treatment:  Therapeutic Exercise  TE 1: FUNCTIONAL MOBILITY TRAINING x 2 laps each at beginning and 1 lap each at end of session  Walk forward/backward/lateral  TE 2: Standing LE EX x 20 Heel raise with gluteal set,  Hip abduction,  Hip flexion,  Hip Extension, HS curls, Squat  TE 3: Seated on Pool stool x 20 reps LAQ, hip flexion, Clam,  Sit to stand, SEATED HAMSTRING STRETCH 1 x 1'  B, heel raise/toe raise alternating  TE 4: Walking marches x 2 laps-added today,   Tandem walking x 2 laps--ADD tendem as tolerated  TE 5: UE EX/CORE x 20 with closed paddles  Shoulder flex/ext TA activation, Shoulder horizontal abd/add TA activation, Shoulder abd/add with TA activation,      Mini squat with push/pull red kickboard  TE 6: ENDURANCE in // bars x 2'        performed bicycle with noodle behind back in // bars--Not Today, OOT          Time Entry(in minutes):  Aquatic Therapy Time Entry: 45    Assessment & Plan   Assessment: Patient reports left knee pain throughout session  with frequent rest breaks required with pool walking performed during rest breaks. Cues required for increased glute activation with standing hip abduction. Cues for core activation with kickboard push/pull.       The patient will continue to benefit from skilled outpatient physical therapy in order to address the deficits listed in the problem list on the initial evaluation, provide patient and family education, and maximize the patients level of independence in the home and community environments.     The patient's spiritual, cultural, and educational needs were considered, and the patient is agreeable to the plan of care and goals.           Plan:      Goals:   Active       LTG       HEP       Start:  05/29/25    Expected End:  07/29/25       The patient will demonstrate independence with final HEP and report compliance with execution at least 3x/week.         Aquatic        Start:  05/29/25    Expected End:  07/29/25       The patient will self report returning to the [facility] pool at least 1 day/week on days not attending PT.          AROM       Start:  05/29/25    Expected End:  07/29/25       The patient is able to demonstrate improved  B knee extension AROM to limited no greater than 6 degrees B.         MMT       Start:  05/28/25    Expected End:  07/28/25       Pt is able to demonstrate MMT B LE at least 4+/5 all measurements without pain reports or compensations noted.              STG       HEP       Start:  05/28/25    Expected End:  06/28/25       The patient will be able to demonstrate independence and compliance with initial HEP.           MMT       Start:  05/28/25    Expected End:  06/28/25       The patient will demonstrate improved B LE MMT by at least 1/3 muscle grade where deficits noted.          TUG       Start:  05/28/25    Expected End:  06/28/25       The patient will demonstrate improved TUG to less than 15 seconds to indicate improved functional mobility.           AROM        Start:  05/28/25    Expected End:  06/28/25       The patient will demonstrate improved B knee AROM by at least 5 degrees extension.                VICKY PEÑA, PTA

## 2025-06-27 ENCOUNTER — CLINICAL SUPPORT (OUTPATIENT)
Dept: REHABILITATION | Facility: HOSPITAL | Age: 80
End: 2025-06-27
Attending: INTERNAL MEDICINE
Payer: MEDICARE

## 2025-06-27 DIAGNOSIS — Z74.09 IMPAIRED MOBILITY: Primary | ICD-10-CM

## 2025-06-27 DIAGNOSIS — R52 PAIN AGGRAVATED BY ACTIVITIES OF DAILY LIVING: ICD-10-CM

## 2025-06-27 DIAGNOSIS — M25.551 RIGHT HIP PAIN: ICD-10-CM

## 2025-06-27 PROCEDURE — 97113 AQUATIC THERAPY/EXERCISES: CPT | Mod: HCNC,CQ

## 2025-06-27 NOTE — PROGRESS NOTES
Outpatient Rehab    Physical Therapy Visit    Patient Name: Alina Narayan  MRN: [id number]  YOB: 1945  Encounter Date: 6/27/2025    Therapy Diagnosis:   Encounter Diagnoses   Name Primary?    Impaired mobility Yes    Pain aggravated by activities of daily living     Right hip pain        Physician: Aarti Dunn MD    Physician Orders: Eval and Treat  Medical Diagnosis: Pain in right hip    Visit # / Visits Authorized:  5 / 20  Insurance Authorization Period: 5/28/2025 to 12/31/2025  Date of Evaluation: 3/18/2025  Plan of Care Certification: 5/29/2025 to 7/28/2025      PT/PTA: PTA   Number of PTA visits since last PT visit:3  Time In: 1130   Time Out: 1225  Total Time (in minutes): 55   Total Billable Time (in minutes): 55    Precautions: Standard, OA, history of DVT, history of cancer       Subjective   Patient reports having a lot of pain in her left knee, has been for a few days..  Pain reported as 10/10.      Objective            Treatment:  Therapeutic Exercise  TE 1: FUNCTIONAL MOBILITY TRAINING x 2 laps each at beginning and 1 lap each at end of session  Walk forward/backward/lateral  TE 2: Standing LE EX x 20 Heel raise with gluteal set,  Hip abduction,  Hip flexion,  Hip Extension.  Did not perform HS curls, Squat due to knee pain.  TE 3: Seated on Pool stool x 20 reps LAQ, hip flexion, Clam,  Sit to stand, SEATED HAMSTRING STRETCH 1 x 1'  B, heel raise/toe raise alternating  TE 4: Walking marches x 2 laps, Tandem walking x 2 laps--NOT TODAY due to pain.  TE 5: UE EX/CORE x 20 with closed paddles  Shoulder flex/ext TA activation, Shoulder horizontal abd/add TA activation, Shoulder abd/add with TA activation.   Mini squat with push/pull red kickboard  TE 6: ENDURANCE in // bars x 2'  performed bicycle            Time Entry(in minutes):  Aquatic Therapy Time Entry: 55    Assessment & Plan   Assessment: Patient presents with severe pain in her left knee but wanted to do her therapy, hoping  it would help loosen it up.  Patient was not able to perform walking marches due to increased pain after two steps.  Patient completed the rest of her therapy as noted above with no increase in symptoms prior to leaving the clinic.       The patient will continue to benefit from skilled outpatient physical therapy in order to address the deficits listed in the problem list on the initial evaluation, provide patient and family education, and maximize the patients level of independence in the home and community environments.     The patient's spiritual, cultural, and educational needs were considered, and the patient is agreeable to the plan of care and goals.           Plan: Progress POC as tolerated by the patient.    Goals:   Active       LTG       HEP       Start:  05/29/25    Expected End:  07/29/25       The patient will demonstrate independence with final HEP and report compliance with execution at least 3x/week.         Aquatic        Start:  05/29/25    Expected End:  07/29/25       The patient will self report returning to the [facility] pool at least 1 day/week on days not attending PT.          AROM       Start:  05/29/25    Expected End:  07/29/25       The patient is able to demonstrate improved  B knee extension AROM to limited no greater than 6 degrees B.         MMT       Start:  05/28/25    Expected End:  07/28/25       Pt is able to demonstrate MMT B LE at least 4+/5 all measurements without pain reports or compensations noted.              STG       HEP       Start:  05/28/25    Expected End:  06/28/25       The patient will be able to demonstrate independence and compliance with initial HEP.           MMT       Start:  05/28/25    Expected End:  06/28/25       The patient will demonstrate improved B LE MMT by at least 1/3 muscle grade where deficits noted.          TUG       Start:  05/28/25    Expected End:  06/28/25       The patient will demonstrate improved TUG to less than 15  seconds to indicate improved functional mobility.           AROM       Start:  05/28/25    Expected End:  06/28/25       The patient will demonstrate improved B knee AROM by at least 5 degrees extension.                Edward Gibson, PTA

## 2025-07-03 ENCOUNTER — CLINICAL SUPPORT (OUTPATIENT)
Dept: REHABILITATION | Facility: HOSPITAL | Age: 80
End: 2025-07-03
Payer: MEDICARE

## 2025-07-03 DIAGNOSIS — M25.551 RIGHT HIP PAIN: ICD-10-CM

## 2025-07-03 DIAGNOSIS — Z74.09 IMPAIRED MOBILITY: Primary | ICD-10-CM

## 2025-07-03 DIAGNOSIS — R52 PAIN AGGRAVATED BY ACTIVITIES OF DAILY LIVING: ICD-10-CM

## 2025-07-03 PROCEDURE — 97113 AQUATIC THERAPY/EXERCISES: CPT | Mod: HCNC

## 2025-07-03 NOTE — PROGRESS NOTES
Outpatient Rehab    Physical Therapy Visit    Patient Name: Alina Narayan  MRN: [id number]  YOB: 1945  Encounter Date: 7/3/2025    Therapy Diagnosis:   Encounter Diagnoses   Name Primary?    Impaired mobility Yes    Pain aggravated by activities of daily living     Right hip pain        Physician: Aarti Dunn MD    Physician Orders: Eval and Treat  Medical Diagnosis: Pain in right hip    Visit # / Visits Authorized:  6 / 20  Insurance Authorization Period: 5/28/2025 to 12/31/2025  Date of Evaluation: 3/18/2025  Plan of Care Certification: 5/29/2025 to 7/28/2025      PT/PTA: PT   Number of PTA visits since last PT visit:0  Time In: 1128   Time Out: 1215  Total Time (in minutes): 47   Total Billable Time (in minutes): 30    Precautions: Standard, OA, history of DVT, history of cancer       Subjective   The patient reports that she feels like the pain in her knee has been getting worse since starting PT. She stated since her recent flare up that sent her to  ED she continues to have pain and swelling above her knee cap. She feels lot of crunching in her knee..  Pain reported as 8/10.      Objective            Treatment:  Therapeutic Exercise  TE 1: FUNCTIONAL MOBILITY TRAINING x 2 laps each at beginning and 1 lap each at end of session                                       Walk forward/backward/lateral  TE 2: Standing LE EX x 20             Heel raise with gluteal set,  Hip abduction,  Hip flexion,  Hip Extension.                                  Did not perform HS curls, Squat due to knee pain.  TE 3: Seated on Pool stool x 20 reps                           LAQ, hip flexion, Clam,  Sit to stand, SEATED HAMSTRING STRETCH 1 x 1'  B, heel raise/toe raise alternating  TE 4: Walking marches x 2 laps, Tandem walking x 2 laps--NP due to pain.----Performed stationary march due to pain at previous visit  TE 5: UE EX/CORE x 20 with closed paddles  with her back against the wall for support                                  Shoulder flex/ext TA activation, Shoulder horizontal abd/add TA activation, Shoulder abd/add with TA activation.   Mini squat with push/pull with kickboard  TE 6: ENDURANCE in // bars x 2'  performed bicycle            Time Entry(in minutes):  Aquatic Therapy Time Entry: 30    Assessment & Plan   Assessment: The patient continues to present to PT with severe knee pain but remains motivated to participate. She was able to perform stationary marches with no increase in symptoms today and will progress back to walking marches as tolerated.   Evaluation/Treatment Tolerance: Patient tolerated treatment well    The patient will continue to benefit from skilled outpatient physical therapy in order to address the deficits listed in the problem list on the initial evaluation, provide patient and family education, and maximize the patients level of independence in the home and community environments.     The patient's spiritual, cultural, and educational needs were considered, and the patient is agreeable to the plan of care and goals.           Plan: Progress POC as tolerated by the patient.    Goals:   Active       LTG       HEP       Start:  05/29/25    Expected End:  07/29/25       The patient will demonstrate independence with final HEP and report compliance with execution at least 3x/week.         Aquatic        Start:  05/29/25    Expected End:  07/29/25       The patient will self report returning to the [facility] pool at least 1 day/week on days not attending PT.          AROM       Start:  05/29/25    Expected End:  07/29/25       The patient is able to demonstrate improved  B knee extension AROM to limited no greater than 6 degrees B.         MMT       Start:  05/28/25    Expected End:  07/28/25       Pt is able to demonstrate MMT B LE at least 4+/5 all measurements without pain reports or compensations noted.              STG       HEP       Start:  05/28/25    Expected End:   06/28/25       The patient will be able to demonstrate independence and compliance with initial HEP.           MMT       Start:  05/28/25    Expected End:  06/28/25       The patient will demonstrate improved B LE MMT by at least 1/3 muscle grade where deficits noted.          TUG       Start:  05/28/25    Expected End:  06/28/25       The patient will demonstrate improved TUG to less than 15 seconds to indicate improved functional mobility.           AROM       Start:  05/28/25    Expected End:  06/28/25       The patient will demonstrate improved B knee AROM by at least 5 degrees extension.                Kathleen Lane, PT

## 2025-07-07 ENCOUNTER — CLINICAL SUPPORT (OUTPATIENT)
Dept: REHABILITATION | Facility: HOSPITAL | Age: 80
End: 2025-07-07
Payer: MEDICARE

## 2025-07-07 DIAGNOSIS — R52 PAIN AGGRAVATED BY ACTIVITIES OF DAILY LIVING: ICD-10-CM

## 2025-07-07 DIAGNOSIS — M25.551 RIGHT HIP PAIN: ICD-10-CM

## 2025-07-07 DIAGNOSIS — Z74.09 IMPAIRED MOBILITY: Primary | ICD-10-CM

## 2025-07-07 PROCEDURE — 97113 AQUATIC THERAPY/EXERCISES: CPT | Mod: HCNC,CQ

## 2025-07-07 NOTE — PROGRESS NOTES
Outpatient Rehab    Physical Therapy Visit    Patient Name: Alina Narayan  MRN: [id number]  YOB: 1945  Encounter Date: 7/7/2025    Therapy Diagnosis:   Encounter Diagnoses   Name Primary?    Impaired mobility Yes    Pain aggravated by activities of daily living     Right hip pain        Physician: Aarti Dunn MD    Physician Orders: Eval and Treat  Medical Diagnosis: Pain in right hip    Visit # / Visits Authorized:  7 / 20  Insurance Authorization Period: 5/28/2025 to 12/31/2025  Date of Evaluation: 3/18/2025  Plan of Care Certification: 5/29/2025 to 7/28/2025      PT/PTA: PTA   Number of PTA visits since last PT visit:1  Time In: 1030   Time Out: 1128  Total Time (in minutes): 58   Total Billable Time (in minutes): 58    Precautions: Standard, OA, history of DVT, history of cancer       Subjective   Patient reports having a lot of pain in her knees today..  Pain reported as 7/10.      Objective            Treatment:  Therapeutic Exercise  TE 1: FUNCTIONAL MOBILITY TRAINING x 2 laps each at beginning and 1 lap each at end of session, Walk forward/backward/lateral  TE 2: Standing LE EX x 20 Heel raise with gluteal set, Hip abduction, Hip flexion, Hip Extension, HS curls,  Squats.  TE 3: Seated on Pool stool x 20 reps LAQ, hip flexion, Clam,  Sit to stand, SEATED HAMSTRING STRETCH 1 x 1'  B, heel raise/toe raise alternating.  TE 4: Walking marches x 2 laps, Tandem walking x 2 laps  TE 5: UE EX/CORE x 20 with closed paddles  with her back against the wall for support Shoulder flex/ext TA activation, Shoulder horizontal abd/add TA activation, Shoulder abd/add with TA activation.   Mini squat with push/pull with kickboard.  TE 6: ENDURANCE in // bars x 2'  performed bicycle              Time Entry(in minutes):  Aquatic Therapy Time Entry: 58    Assessment & Plan   Assessment: Patient was able to perform walking marches and squats today.  Patient completed her therapy as noted above with no  increase in symptoms prior to leaving the clinic.       The patient will continue to benefit from skilled outpatient physical therapy in order to address the deficits listed in the problem list on the initial evaluation, provide patient and family education, and maximize the patients level of independence in the home and community environments.     The patient's spiritual, cultural, and educational needs were considered, and the patient is agreeable to the plan of care and goals.           Plan: Progress POC as tolerated by the patient.    Goals:   Active       LTG       HEP       Start:  05/29/25    Expected End:  07/29/25       The patient will demonstrate independence with final HEP and report compliance with execution at least 3x/week.         Aquatic        Start:  05/29/25    Expected End:  07/29/25       The patient will self report returning to the [facility] pool at least 1 day/week on days not attending PT.          AROM       Start:  05/29/25    Expected End:  07/29/25       The patient is able to demonstrate improved  B knee extension AROM to limited no greater than 6 degrees B.         MMT       Start:  05/28/25    Expected End:  07/28/25       Pt is able to demonstrate MMT B LE at least 4+/5 all measurements without pain reports or compensations noted.              STG       HEP       Start:  05/28/25    Expected End:  06/28/25       The patient will be able to demonstrate independence and compliance with initial HEP.           MMT       Start:  05/28/25    Expected End:  06/28/25       The patient will demonstrate improved B LE MMT by at least 1/3 muscle grade where deficits noted.          TUG       Start:  05/28/25    Expected End:  06/28/25       The patient will demonstrate improved TUG to less than 15 seconds to indicate improved functional mobility.           AROM       Start:  05/28/25    Expected End:  06/28/25       The patient will demonstrate improved B knee AROM by at least 5  degrees extension.                Edward Gibson, PTA

## 2025-07-10 ENCOUNTER — CLINICAL SUPPORT (OUTPATIENT)
Dept: REHABILITATION | Facility: HOSPITAL | Age: 80
End: 2025-07-10
Payer: MEDICARE

## 2025-07-10 DIAGNOSIS — Z74.09 IMPAIRED MOBILITY: Primary | ICD-10-CM

## 2025-07-10 DIAGNOSIS — R52 PAIN AGGRAVATED BY ACTIVITIES OF DAILY LIVING: ICD-10-CM

## 2025-07-10 DIAGNOSIS — M25.551 RIGHT HIP PAIN: ICD-10-CM

## 2025-07-10 PROCEDURE — 97113 AQUATIC THERAPY/EXERCISES: CPT | Mod: HCNC,CQ

## 2025-07-10 NOTE — PROGRESS NOTES
Outpatient Rehab    Physical Therapy Visit    Patient Name: Alina Narayan  MRN: [id number]  YOB: 1945  Encounter Date: 7/10/2025    Therapy Diagnosis:   Encounter Diagnoses   Name Primary?    Impaired mobility Yes    Pain aggravated by activities of daily living     Right hip pain        Physician: Aarti Dunn MD    Physician Orders: Eval and Treat  Medical Diagnosis: Pain in right hip    Visit # / Visits Authorized:  8 / 20  Insurance Authorization Period: 5/28/2025 to 12/31/2025  Date of Evaluation: 3/18/2025  Plan of Care Certification: 5/29/2025 to 7/28/2025      PT/PTA: PTA   Number of PTA visits since last PT visit:2  Time In: 1120   Time Out: 1215  Total Time (in minutes): 55   Total Billable Time (in minutes): 55    Precautions: Standard, OA, history of DVT, history of cancer       Subjective   Patient reports her pain is about the same although not as stiff today..  Pain reported as 7/10.      Objective            Treatment:  Therapeutic Exercise  TE 1: FUNCTIONAL MOBILITY TRAINING x 2 laps each at beginning and 1 lap each at end of session, Walk forward/backward/lateral  TE 2: Standing LE EX x 20 Heel raise with gluteal set, Hip abduction, Hip flexion, Hip Extension, HS curls,  Squats.  TE 3: Seated on Pool stool x 20 reps LAQ, hip flexion, Clam,  Sit to stand, SEATED HAMSTRING STRETCH 1 x 1'  B, heel raise/toe raise alternating.  TE 4: Walking marches x 2 laps, Tandem walking x 2 laps  TE 5: UE EX/CORE x 20 with closed paddles  with her back against the wall for support Shoulder flex/ext TA activation, Shoulder horizontal abd/add TA activation, Shoulder abd/add with TA activation.   Mini squat with push/pull with kickboard.  TE 6: ENDURANCE in // bars x 2'  performed bicycle                Time Entry(in minutes):  Aquatic Therapy Time Entry: 55    Assessment & Plan   Assessment: Patient did not perform squatting type of exercises today due to her knees starting to bother her when  she attempted to squat.  Patient completed her therapy with no increase in symptoms prior to leaving the clinic.         The patient will continue to benefit from skilled outpatient physical therapy in order to address the deficits listed in the problem list on the initial evaluation, provide patient and family education, and maximize the patients level of independence in the home and community environments.     The patient's spiritual, cultural, and educational needs were considered, and the patient is agreeable to the plan of care and goals.           Plan: Progress POC as tolerated by the patient.    Goals:   Active       LTG       HEP       Start:  05/29/25    Expected End:  07/29/25       The patient will demonstrate independence with final HEP and report compliance with execution at least 3x/week.         Aquatic        Start:  05/29/25    Expected End:  07/29/25       The patient will self report returning to the [facility] pool at least 1 day/week on days not attending PT.          AROM       Start:  05/29/25    Expected End:  07/29/25       The patient is able to demonstrate improved  B knee extension AROM to limited no greater than 6 degrees B.         MMT       Start:  05/28/25    Expected End:  07/28/25       Pt is able to demonstrate MMT B LE at least 4+/5 all measurements without pain reports or compensations noted.              STG       HEP       Start:  05/28/25    Expected End:  06/28/25       The patient will be able to demonstrate independence and compliance with initial HEP.           MMT       Start:  05/28/25    Expected End:  06/28/25       The patient will demonstrate improved B LE MMT by at least 1/3 muscle grade where deficits noted.          TUG       Start:  05/28/25    Expected End:  06/28/25       The patient will demonstrate improved TUG to less than 15 seconds to indicate improved functional mobility.           AROM       Start:  05/28/25    Expected End:  06/28/25        The patient will demonstrate improved B knee AROM by at least 5 degrees extension.                Edward Gibson, PTA

## 2025-07-14 ENCOUNTER — CLINICAL SUPPORT (OUTPATIENT)
Dept: REHABILITATION | Facility: HOSPITAL | Age: 80
End: 2025-07-14
Payer: MEDICARE

## 2025-07-14 DIAGNOSIS — R52 PAIN AGGRAVATED BY ACTIVITIES OF DAILY LIVING: ICD-10-CM

## 2025-07-14 DIAGNOSIS — Z74.09 IMPAIRED MOBILITY: Primary | ICD-10-CM

## 2025-07-14 DIAGNOSIS — M25.551 RIGHT HIP PAIN: ICD-10-CM

## 2025-07-14 PROCEDURE — 97113 AQUATIC THERAPY/EXERCISES: CPT | Mod: HCNC

## 2025-07-14 NOTE — PROGRESS NOTES
Outpatient Rehab    Physical Therapy Visit    Patient Name: Alina Narayan  MRN: [id number]  YOB: 1945  Encounter Date: 7/14/2025    Therapy Diagnosis:   Encounter Diagnoses   Name Primary?    Impaired mobility Yes    Pain aggravated by activities of daily living     Right hip pain        Physician: aArti Dunn MD    Physician Orders: Eval and Treat  Medical Diagnosis: Pain in right hip    Visit # / Visits Authorized:  9 / 20  Insurance Authorization Period: 5/28/2025 to 12/31/2025  Date of Evaluation: 3/18/2025  Plan of Care Certification: 5/29/2025 to 7/28/2025      PT/PTA: PT   Number of PTA visits since last PT visit:0  Time In: 1010   Time Out: 1115  Total Time (in minutes): 65   Total Billable Time (in minutes): 65    Precautions: Standard, OA, history of DVT, history of cancer       Subjective   The patient stated the back of her right knee grabs and catches sometimes and it was doing that this morning. She stated some days it does not happen and other days it happens often..  Pain reported as 6/10.      Objective            Treatment:  Therapeutic Exercise  TE 1: FUNCTIONAL MOBILITY TRAINING x 2 laps each at beginning and 1 lap each at end of session, Walk forward/backward/lateral  TE 2: Standing LE EX x 25                          Heel raise with gluteal set, Hip abduction, Hip flexion, Hip Extension, HS curls,  Squats.  TE 3: Seated on Pool stool x 25 reps                                  LAQ, hip flexion, Clam,  Sit to stand, SEATED HAMSTRING STRETCH 1 x 1'  B  TE 4: Walking marches x 2 laps, Tandem walking x 2 laps  TE 5: UE EX/CORE x 25 with closed paddles  with her back against the wall for support                                        Shoulder flex/ext TA activation, Shoulder horizontal abd/add TA activation, Shoulder abd/add with TA activation.   Mini squat with push/pull with kickboard.  TE 6: ENDURANCE in // bars x 2'  performed bicycle                Time Entry(in  minutes):  Aquatic Therapy Time Entry: 65    Assessment & Plan   Assessment: The patient was able to perform advanced reps to 25 for all UE and LE exercises today with good tolerance and endurance noted. She reported one instance of the knee grabbing with sit to stand today but was able to complete all reps after a slight seated rest break.   Evaluation/Treatment Tolerance: Patient tolerated treatment well    The patient will continue to benefit from skilled outpatient physical therapy in order to address the deficits listed in the problem list on the initial evaluation, provide patient and family education, and maximize the patients level of independence in the home and community environments.     The patient's spiritual, cultural, and educational needs were considered, and the patient is agreeable to the plan of care and goals.           Plan: Progress POC as tolerated by the patient.    Goals:   Active       LTG       HEP       Start:  05/29/25    Expected End:  07/29/25       The patient will demonstrate independence with final HEP and report compliance with execution at least 3x/week.         Aquatic        Start:  05/29/25    Expected End:  07/29/25       The patient will self report returning to the [email or web address] pool at least 1 day/week on days not attending PT.          AROM       Start:  05/29/25    Expected End:  07/29/25       The patient is able to demonstrate improved  B knee extension AROM to limited no greater than 6 degrees B.         MMT       Start:  05/28/25    Expected End:  07/28/25       Pt is able to demonstrate MMT B LE at least 4+/5 all measurements without pain reports or compensations noted.              STG       HEP       Start:  05/28/25    Expected End:  06/28/25       The patient will be able to demonstrate independence and compliance with initial HEP.           MMT       Start:  05/28/25    Expected End:  06/28/25       The patient will demonstrate improved B LE MMT by at  least 1/3 muscle grade where deficits noted.          TUG       Start:  05/28/25    Expected End:  06/28/25       The patient will demonstrate improved TUG to less than 15 seconds to indicate improved functional mobility.           AROM       Start:  05/28/25    Expected End:  06/28/25       The patient will demonstrate improved B knee AROM by at least 5 degrees extension.                Kathleen Lane, PT

## 2025-07-18 ENCOUNTER — PATIENT MESSAGE (OUTPATIENT)
Dept: INTERNAL MEDICINE | Facility: CLINIC | Age: 80
End: 2025-07-18
Payer: MEDICARE

## 2025-07-18 ENCOUNTER — NURSE TRIAGE (OUTPATIENT)
Dept: ADMINISTRATIVE | Facility: CLINIC | Age: 80
End: 2025-07-18
Payer: MEDICARE

## 2025-07-18 NOTE — TELEPHONE ENCOUNTER
Pt states she tried to reorder her gabapentin 300mg twice a day the 1st of July. She called pharmacy and they stated she needed another order. She needs to use another pharmacy. Pt states she is on her last dose tonight. She is worried she will not reach anyone before closing and get a new prescription. Dispo is discuss with PCP and callback by nurse within 1 hour. Pt informed and vu.    [id number] [id number] [facility] [address]    Followed up with patient at 1730. Pt did not receive a call back or a prescription for her gabapentin.     Reason for Disposition   Caller has URGENT medicine question about med that PCP or specialist prescribed and triager unable to answer question    Additional Information   Negative: Intentional drug overdose and suicidal thoughts or ideas   Negative: MORE THAN A DOUBLE DOSE of a prescription or over-the-counter (OTC) drug   Negative: DOUBLE DOSE (an extra dose or lesser amount) of prescription drug and any symptoms (e.g., dizziness, nausea, pain, sleepiness)   Negative: DOUBLE DOSE (an extra dose or lesser amount) of over-the-counter (OTC) drug and any symptoms (e.g., dizziness, nausea, pain, sleepiness)   Negative: Took another person's prescription drug   Negative: DOUBLE DOSE (an extra dose or lesser amount) of prescription drug and NO symptoms  (Exception: A double dose of antibiotics.)   Negative: Diabetes drug error or overdose (e.g., took wrong type of insulin or took extra dose)   Negative: Caller has medication question about med NOT prescribed by PCP and triager unable to answer question (e.g., compatibility with other med, storage)   Negative: Prescription not at pharmacy and was prescribed by PCP recently  (Exception: triager has access to EMR and prescription is recorded there. Go to Home Care and confirm for pharmacy.)   Negative: Pharmacy calling with prescription question and triager unable to answer  question    Protocols used: Medication Question Call-A-[place]

## 2025-07-19 ENCOUNTER — NURSE TRIAGE (OUTPATIENT)
Dept: ADMINISTRATIVE | Facility: CLINIC | Age: 80
End: 2025-07-19
Payer: MEDICARE

## 2025-07-19 ENCOUNTER — PATIENT MESSAGE (OUTPATIENT)
Dept: INTERNAL MEDICINE | Facility: CLINIC | Age: 80
End: 2025-07-19
Payer: MEDICARE

## 2025-07-19 ENCOUNTER — OCHSNER VIRTUAL EMERGENCY DEPARTMENT (OUTPATIENT)
Facility: CLINIC | Age: 80
End: 2025-07-19
Payer: MEDICARE

## 2025-07-19 DIAGNOSIS — G62.9 NEUROPATHY: Primary | ICD-10-CM

## 2025-07-19 RX ORDER — GABAPENTIN 300 MG/1
300 CAPSULE ORAL 2 TIMES DAILY
Qty: 60 CAPSULE | Refills: 0 | Status: SHIPPED | OUTPATIENT
Start: 2025-07-19 | End: 2026-07-19

## 2025-07-19 NOTE — TELEPHONE ENCOUNTER
Speaking with patient. States her RX from [facility] has not arrived yet. She was told it is stuck in [place]. [facility] attempted to call in a short term supply to [facility] and they declined. Stating it was a controlled substance. It is not listed as such. Will reach out to on call and call patient back.     Message sent via secure chat to on call Dr. Chairez    Message left on VM.     Chema    Patient's RX for Gabapentin has not yet arrived from [facility]. Is stuck in [place]. [facility] attempted to call in 30 day for patient to [facility] and [facility] declined. States Controlled. This is what patient said. She took her last dose this am. Could you send in a 30 day supply to the pharmacy [id number] [place] [id number] AT [facility] [address] (Pharmacy) [phone] (Work)    Per Chema    great I will send a month for her and hope they helps until she can get hers in the mail       Reason for Disposition   [1] Prescription refill request for ESSENTIAL medicine (i.e., likelihood of harm to patient if not taken) AND [2] triager unable to refill per department policy    Protocols used: Medication Refill and Renewal Call-A-

## 2025-07-19 NOTE — PLAN OF CARE-OVED
[facility] Emergency Department Plan of Care Note  Referral Source: Nurse On-Call                               Chief Complaint   Patient presents with    Medication Refill     81 yo female with PMhx of HTN, HLD, DM, OA, anxiety calls for medication problem. Patient's RX for Gabapentin has not yet arrived from [facility]. Is stuck in [place]. [facility] attempted to call in 30 day for patient to [facility] and [facility] declined. States Controlled. This is what patient said. She took her last dose this am. Patient states [facility] was not able to give her a delivery date. No other complaints.She reports she takes 300 mg gabapentin every 12 hours (was written for 300 mg in AM and 600 mg in PM but reports she takes a lower dose now).      Recommendation: Treat in place                          Patient medicine from mail in pharmacy currently lost in [place] per patient. Per [place]  [facility] filled 7/3/25 for 90 day supply by [facility] pharmacy. Given weekend and not able to reach PCP will fill 1 month supply. Patient reports currently taking 300 mg BID. Allergies confirmed by nurse and pharmacy confirmed by nurse.     Encounter Diagnosis   Name Primary?    Neuropathy Yes        Orders Placed This Encounter    gabapentin (NEURONTIN) 300 MG capsule     Sig: Take 1 capsule (300 mg total) by mouth 2 (two) times daily.     Dispense:  60 capsule     Refill:  0

## 2025-07-20 NOTE — TELEPHONE ENCOUNTER
Per chart review, pt had SHANNEN VV on yesterday and new rx of gabapentin was sent to local [facility]

## 2025-07-20 NOTE — TELEPHONE ENCOUNTER
Per chart review, pt had virtual SHANNEN visit following triage call on yesterday and new rx was sent to local pharmacy

## 2025-07-21 ENCOUNTER — CLINICAL SUPPORT (OUTPATIENT)
Dept: REHABILITATION | Facility: HOSPITAL | Age: 80
End: 2025-07-21
Payer: MEDICARE

## 2025-07-21 ENCOUNTER — TELEPHONE (OUTPATIENT)
Dept: INTERNAL MEDICINE | Facility: CLINIC | Age: 80
End: 2025-07-21
Payer: MEDICARE

## 2025-07-21 DIAGNOSIS — M25.551 RIGHT HIP PAIN: ICD-10-CM

## 2025-07-21 DIAGNOSIS — R52 PAIN AGGRAVATED BY ACTIVITIES OF DAILY LIVING: ICD-10-CM

## 2025-07-21 DIAGNOSIS — Z74.09 IMPAIRED MOBILITY: Primary | ICD-10-CM

## 2025-07-21 PROCEDURE — 97113 AQUATIC THERAPY/EXERCISES: CPT | Mod: HCNC,CQ

## 2025-07-21 NOTE — PROGRESS NOTES
Outpatient Rehab    Physical Therapy Visit    Patient Name: Alina Narayan  MRN: [id number]  YOB: 1945  Encounter Date: 7/21/2025    Therapy Diagnosis:   Encounter Diagnoses   Name Primary?    Impaired mobility Yes    Pain aggravated by activities of daily living     Right hip pain        Physician: Aarti Dunn MD    Physician Orders: Eval and Treat  Medical Diagnosis: Pain in right hip    Visit # / Visits Authorized:  10 / 20  Insurance Authorization Period: 5/28/2025 to 12/31/2025  Date of Evaluation: 3/18/2025  Plan of Care Certification: 5/29/2025 to 7/28/2025      PT/PTA: PTA   Number of PTA visits since last PT visit:1  Time In: 1030   Time Out: 1130  Total Time (in minutes): 60   Total Billable Time (in minutes): 30    Precautions: Standard, OA, history of DVT, history of cancer       Subjective   Patient reports having a lot of pain and tenderness in her right hip today..  Pain reported as 9/10.      Objective            Treatment:  Therapeutic Exercise  TE 1: FUNCTIONAL MOBILITY TRAINING x 2 laps each at beginning and 1 lap each at end of session, Walk forward/backward/lateral  TE 2: Standing LE EX x 25 Heel raise with gluteal set, Hip abduction, Hip flexion, Hip Extension, HS curls,  Squats.  TE 3: Seated on Pool stool x 25 reps LAQ, hip flexion, Clam,  Sit to stand, SEATED HAMSTRING STRETCH 1 x 1'  B  TE 4: Walking marches x 2 laps, Tandem walking x 2 laps  TE 5: UE EX/CORE x 25 with closed paddles  with her back against the wall for support Shoulder flex/ext TA activation, Shoulder horizontal abd/add TA activation, Shoulder abd/add with TA activation.   Mini squat with push/pull with kickboard.  TE 6: ENDURANCE in // bars x 2'  performed bicycle                  Time Entry(in minutes):  Aquatic Therapy Time Entry: 30    Assessment & Plan   Assessment: Patient presents with increased pain in her right hip today.  Patient was moving through her exercises slowly due to increased pain  level today and was not able to perform all 25 reps of squats nor sit-to-stand due to hip pain.         The patient will continue to benefit from skilled outpatient physical therapy in order to address the deficits listed in the problem list on the initial evaluation, provide patient and family education, and maximize the patients level of independence in the home and community environments.     The patient's spiritual, cultural, and educational needs were considered, and the patient is agreeable to the plan of care and goals.           Plan: Progress POC as tolerated by the patient.    Goals:   Active       LTG       HEP       Start:  05/29/25    Expected End:  07/29/25       The patient will demonstrate independence with final HEP and report compliance with execution at least 3x/week.         Aquatic        Start:  05/29/25    Expected End:  07/29/25       The patient will self report returning to the [facility] pool at least 1 day/week on days not attending PT.          AROM       Start:  05/29/25    Expected End:  07/29/25       The patient is able to demonstrate improved  B knee extension AROM to limited no greater than 6 degrees B.         MMT       Start:  05/28/25    Expected End:  07/28/25       Pt is able to demonstrate MMT B LE at least 4+/5 all measurements without pain reports or compensations noted.              STG       HEP       Start:  05/28/25    Expected End:  06/28/25       The patient will be able to demonstrate independence and compliance with initial HEP.           MMT       Start:  05/28/25    Expected End:  06/28/25       The patient will demonstrate improved B LE MMT by at least 1/3 muscle grade where deficits noted.          TUG       Start:  05/28/25    Expected End:  06/28/25       The patient will demonstrate improved TUG to less than 15 seconds to indicate improved functional mobility.           AROM       Start:  05/28/25    Expected End:  06/28/25       The patient will  demonstrate improved B knee AROM by at least 5 degrees extension.                Edward Gibson, PTA

## 2025-07-23 ENCOUNTER — CLINICAL SUPPORT (OUTPATIENT)
Dept: REHABILITATION | Facility: HOSPITAL | Age: 80
End: 2025-07-23
Payer: MEDICARE

## 2025-07-23 DIAGNOSIS — Z74.09 IMPAIRED MOBILITY: Primary | ICD-10-CM

## 2025-07-23 DIAGNOSIS — R52 PAIN AGGRAVATED BY ACTIVITIES OF DAILY LIVING: ICD-10-CM

## 2025-07-23 DIAGNOSIS — M25.551 RIGHT HIP PAIN: ICD-10-CM

## 2025-07-23 PROCEDURE — 97113 AQUATIC THERAPY/EXERCISES: CPT | Mod: HCNC

## 2025-07-23 NOTE — PROGRESS NOTES
Outpatient Rehab    Physical Therapy Visit    Patient Name: Alina Narayan  MRN: [id number]  YOB: 1945  Encounter Date: 7/23/2025    Therapy Diagnosis:   Encounter Diagnoses   Name Primary?    Impaired mobility Yes    Pain aggravated by activities of daily living     Right hip pain      Physician: Aarti Dunn MD    Physician Orders: Eval and Treat  Medical Diagnosis: Pain in right hip    Visit # / Visits Authorized:  11 / 20  Insurance Authorization Period: 5/28/2025 to 12/31/2025  Date of Evaluation: 3/18/2025  Plan of Care Certification: 5/29/2025 to 7/28/2025      PT/PTA:     Number of PTA visits since last PT visit:   Time In: 0935   Time Out: 1046  Total Time (in minutes): 71   Total Billable Time (in minutes): 70    Precautions: Standard, OA, history of DVT, history of cancer       Subjective   Patient says that she cannot lay on her right side, and knees continue to be painful. She has been able to perform sit to stands with less pain lately after carrying out therapist recommendation for hip hinge ..  Pain reported as 5/10.      Objective            Treatment:  Therapeutic Exercise  TE 1: FUNCTIONAL MOBILITY TRAINING x 2 laps each at beginning and 1 lap each at end of session, Walk forward/backward/lateral  TE 2: Standing LE EX x 25 Heel raise with gluteal set, Hip abduction, Hip flexion, Hip Extension, HS curls,  Squats w/OTB  TE 3: Seated on Pool stool x 25 reps LAQ, hip flexion, Clam,  Sit to stand, SEATED HAMSTRING STRETCH 1 x 1'  B  TE 4: Walking marches x 2 laps, Tandem walking x 2 laps w/Orange dumbbell  TE 5: UE EX/CORE x 25 with closed paddles with her back against the wall for support Shoulder flex/ext TA activation, Shoulder horizontal abd/add TA activation, Shoulder abd/add with TA activation.   Mini squat with push/pull with kickboard.  TE 6: ENDURANCE in // bars x 2'  performed bicycle                  Time Entry(in minutes):  Aquatic Therapy Time Entry:  70    Assessment & Plan   Assessment: Patient presents with report of knee pain being more limiting than hip pain. Treatment included attempt to introduce light banded resistance to patient's lower quarter strengthening, however due to skin sensitivity from lower quarter neuropathy the band was excessively irritating on skin. She may benefit from attempt to try ankle weights instead at next visit. Progress with dynamic gait stability demonstrated through minimal loss of balance when dynamic marching and narrow base of support gait training with advancement of unstable upper extremity support.  Evaluation/Treatment Tolerance: Patient tolerated treatment well    The patient will continue to benefit from skilled outpatient physical therapy in order to address the deficits listed in the problem list on the initial evaluation, provide patient and family education, and maximize the patients level of independence in the home and community environments.     The patient's spiritual, cultural, and educational needs were considered, and the patient is agreeable to the plan of care and goals.           Plan: Progress POC as tolerated by the patient.    Goals:   Active       LTG       HEP       Start:  05/29/25    Expected End:  07/29/25       The patient will demonstrate independence with final HEP and report compliance with execution at least 3x/week.         Aquatic        Start:  05/29/25    Expected End:  07/29/25       The patient will self report returning to the [facility] pool at least 1 day/week on days not attending PT.          AROM       Start:  05/29/25    Expected End:  07/29/25       The patient is able to demonstrate improved  B knee extension AROM to limited no greater than 6 degrees B.         MMT       Start:  05/28/25    Expected End:  07/28/25       Pt is able to demonstrate MMT B LE at least 4+/5 all measurements without pain reports or compensations noted.              STG       HEP        Start:  05/28/25    Expected End:  06/28/25       The patient will be able to demonstrate independence and compliance with initial HEP.           MMT       Start:  05/28/25    Expected End:  06/28/25       The patient will demonstrate improved B LE MMT by at least 1/3 muscle grade where deficits noted.          TUG       Start:  05/28/25    Expected End:  06/28/25       The patient will demonstrate improved TUG to less than 15 seconds to indicate improved functional mobility.           AROM       Start:  05/28/25    Expected End:  06/28/25       The patient will demonstrate improved B knee AROM by at least 5 degrees extension.                Genet Walton, PT

## 2025-07-31 ENCOUNTER — CLINICAL SUPPORT (OUTPATIENT)
Dept: REHABILITATION | Facility: HOSPITAL | Age: 80
End: 2025-07-31
Payer: MEDICARE

## 2025-07-31 DIAGNOSIS — M25.551 RIGHT HIP PAIN: ICD-10-CM

## 2025-07-31 DIAGNOSIS — R52 PAIN AGGRAVATED BY ACTIVITIES OF DAILY LIVING: ICD-10-CM

## 2025-07-31 DIAGNOSIS — Z74.09 IMPAIRED MOBILITY: Primary | ICD-10-CM

## 2025-07-31 PROCEDURE — 97113 AQUATIC THERAPY/EXERCISES: CPT | Mod: HCNC

## 2025-07-31 PROCEDURE — 97164 PT RE-EVAL EST PLAN CARE: CPT | Mod: HCNC

## 2025-07-31 NOTE — PROGRESS NOTES
Outpatient Rehab    Physical Therapy Progress Note : Updated Plan of Care    Patient Name: Alina Narayan  MRN: [id number]  YOB: 1945  Encounter Date: 7/31/2025    Therapy Diagnosis:   Encounter Diagnoses   Name Primary?    Impaired mobility Yes    Pain aggravated by activities of daily living     Right hip pain      Physician: Aarti Dunn MD    Physician Orders: Eval and Treat  Medical Diagnosis: Pain in right hip  Surgical Diagnosis: Not applicable for this Episode   Surgical Date: Not applicable for this Episode  Days Since Last Surgery: Not applicable for this Episode    Visit # / Visits Authorized: 12 / 20  Insurance Authorization Period: 5/28/2025 to 12/31/2025  Date of Evaluation: 5/28/2025   Plan of Care Certification: 7/31/2025 to 8/31/25     PT/PTA: PT   Number of PTA visits since last PT visit:0  Time In: 1025   Time Out: 1135  Total Time (in minutes): 70   Total Billable Time (in minutes): 60      Precautions: Standard, OA, history of DVT, history of cancer       Subjective   The patient stated that she can not say that anything is better than when she started. She stated that her knees are still the same and her hip still flares up from time to time. She reports she is still very limited by her ability to stand and cook at home and walk any distance. She voiced that she used to go to [facility] [place] for the pool but has not been since the cold snap in January. She voiced that her main limitations are the walk from the parking lot but stated she can try to get her  to push her in the wheel chair to enter. She also voiced that the step to enter the warm pool is high which she does not think she will be able to step down into such a big step. She is worried the knee will just give out on her..  Family / care giver present for this visit:   Pain reported as 5/10. The patient reports that she has difficulty and pain going from sitting to stand, walking--uses RW in her house  and only able to walk ~15-20 feet-no change in distance of walking as of 7/31/25, standing--unable to stand greater than 5-8 minutes then has to sit.    Objective   Knee Observations  Right Knee Observations  Not Present: Straight Leg Raise Extensor Lag  Left Knee Observations  Not Present: Straight Leg Raise Extensor Lag             Knee Range of Motion   Right Knee   Active (deg) Passive (deg) Pain   Flexion 110       Extension -10           Left Knee   Active (deg) Passive (deg) Pain   Flexion 110       Extension -10                          Hip Strength - Planes of Motion   Right Strength Right Pain Left Strength Left  Pain   Flexion (L2) 4   4     Extension 3   3     ABduction           ADduction           Internal Rotation 4   4     External Rotation 4   4         Knee Strength   Right Strength Right Pain Left Strength Left  Pain   Flexion (S2) 4+   4+     Prone Flexion           Extension (L3) 4+   4+       Knee Extensor Lag  No Lag: Right and Left              Fall Risk  Functional mobility test results suggest the patient is: At Risk for Falls  Timed Up & Go (TUG)  Time: 22 seconds  Observations: Short strides  An older adult who takes >=12 seconds to complete the TUG is at risk for falling.    The patient performed the task with use of RW for assist. She ambulates with short strides, reduced B knee flexion and hip flexion through swing phase.   Sit to Stand Testing      The patient completed 8 repetitions of a sit to stand transfer in 30 seconds. She was able tot perform without the use of her hands.              Treatment:  Therapeutic Exercise  TE 1: FUNCTIONAL MOBILITY TRAINING x 2 laps each at beginning and 1 lap each at end of session, Walk forward/backward/lateral  TE 2: Standing LE EX x 30              Heel raise with gluteal set, Hip abduction, Hip flexion, Hip Extension, HS curls,  Squats w/OTB  TE 3: Seated on Pool stool x 30 reps                    LAQ, hip flexion, Clam,  Sit to stand, SEATED  HAMSTRING STRETCH 1 x 1'  B  TE 4: Walking marches x 2 laps, Tandem walking x 2 laps w/Orange dumbbell  TE 5: UE EX/CORE x 25 with closed paddles with her back against the wall for support Shoulder flex/ext TA activation, Shoulder horizontal abd/add TA activation, Shoulder abd/add with TA activation.   Mini squat with push/pull with kickboard.  TE 6: ENDURANCE in // bars x 2'  performed bicycle  TE 7: Step up on 8 inch x 10 reps B and lateral step ups on 8 inch step x 10 reps B      Time Entry(in minutes):  PT Evaluation (Low) Time Entry: 10  Aquatic Therapy Time Entry: 50    Assessment & Plan   Assessment  The patient has attended 14 Aquatic PT visits since the start of care. Subjectively the patient reports that she can not say that anything is better than when she started. She stated that her knees are still the same and her hip still flares up from time to time. She reports she is still very limited by her ability to stand and cook at home and walk any distance. Objectively she has improved B hip and knee MMT, improved B knee AROM, improved time to complete TUG and 5 times sit to stand.  The patient could benefit from 1 additional month of skilled Aquatic PT interventions then plan for d/c with Aquatic HEP.  She was educated that although she has her membership at [facility] if she feels that the step to enter the pool is the biggest limiting factor she could look into the [facility] pool since they have a wheel chair ramp to enter and she would be able to get into the pool with greater ease. In addition she could try to use to pool lift to get in and out of the pool at [facility]. These 2 options would allow her to continue with her Aquatic exercises once skilled PT is no longer needed.      Plan  From a physical therapy perspective, the patient would benefit from: Skilled Rehab Services    Planned therapy interventions include: Therapeutic exercise, Therapeutic  activities, and Aquatic therapy.            Visit Frequency: 2 times Per Week for 4 Weeks.       This plan was discussed with Patient.   Discussion participants: Agreed Upon Plan of Care             The patient will continue to benefit from skilled outpatient physical therapy in order to address the deficits listed in the problem list on the initial evaluation, provide patient and family education, and maximize the patients level of independence in the home and community environments.     The patient's spiritual, cultural, and educational needs were considered, and the patient is agreeable to the plan of care and goals.           Goals:   Active       LTG       HEP       Start:  05/29/25    Expected End:  08/31/25       The patient will demonstrate independence with final HEP and report compliance with execution at least 3x/week.         Aquatic        Start:  05/29/25    Expected End:  08/31/25       The patient will self report returning to the [facility] pool at least 1 day/week on days not attending PT.          AROM       Start:  05/29/25    Expected End:  08/31/25       The patient is able to demonstrate improved  B knee extension AROM to limited no greater than 6 degrees B.         MMT       Start:  05/28/25    Expected End:  08/31/25       Pt is able to demonstrate MMT B LE at least 4+/5 all measurements without pain reports or compensations noted.              STG       HEP (Ongoing)       Start:  05/28/25    Expected End:  08/31/25       The patient will be able to demonstrate independence and compliance with initial HEP.           MMT (Met)       Start:  05/28/25    Expected End:  08/31/25    Resolved:  07/31/25    The patient will demonstrate improved B LE MMT by at least 1/3 muscle grade where deficits noted.          TUG (Progressing)       Start:  05/28/25    Expected End:  08/31/25       The patient will demonstrate improved TUG to less than 15 seconds to indicate improved functional  mobility.           AROM (Progressing)       Start:  05/28/25    Expected End:  08/31/25       The patient will demonstrate improved B knee AROM by at least 5 degrees extension.              Kathleen Lane, PT

## 2025-08-04 ENCOUNTER — CLINICAL SUPPORT (OUTPATIENT)
Dept: REHABILITATION | Facility: HOSPITAL | Age: 80
End: 2025-08-04
Payer: MEDICARE

## 2025-08-04 DIAGNOSIS — M25.551 RIGHT HIP PAIN: ICD-10-CM

## 2025-08-04 DIAGNOSIS — Z74.09 IMPAIRED MOBILITY: Primary | ICD-10-CM

## 2025-08-04 DIAGNOSIS — R52 PAIN AGGRAVATED BY ACTIVITIES OF DAILY LIVING: ICD-10-CM

## 2025-08-04 PROCEDURE — 97113 AQUATIC THERAPY/EXERCISES: CPT | Mod: HCNC

## 2025-08-04 NOTE — PROGRESS NOTES
Outpatient Rehab    Physical Therapy Visit    Patient Name: Alina Narayan  MRN: [id number]  YOB: 1945  Encounter Date: 8/4/2025    Therapy Diagnosis:   Encounter Diagnoses   Name Primary?    Impaired mobility Yes    Pain aggravated by activities of daily living     Right hip pain      Physician: Aarti Dunn MD    Physician Orders: Eval and Treat  Medical Diagnosis: Pain in right hip  Surgical Diagnosis: Not applicable for this Episode   Surgical Date: Not applicable for this Episode  Days Since Last Surgery: Not applicable for this Episode    Visit # / Visits Authorized:  13 / 20  Insurance Authorization Period: 5/28/2025 to 12/31/2025  Date of Evaluation: 5/28/2025  Plan of Care Certification: 7/31/2025 to 8/31/2025      PT/PTA: PT   Number of PTA visits since last PT visit:0  Time In: 1015   Time Out: 1115  Total Time (in minutes): 60   Total Billable Time (in minutes): 30    Precautions: Standard, OA, history of DVT, history of cancer         Subjective   The patient reports that she thought her appt was today and was able to be accomodated. The patient reports that she is feeling ok today,.         Objective            Treatment:  Therapeutic Exercise  TE 1: FUNCTIONAL MOBILITY TRAINING x 2 laps each at beginning and 1 lap each at end of session, Walk forward/backward/lateral  TE 2: Standing LE EX x20                  WITH 2.5# ANKLE WEIGHTS                     Heel raise with gluteal set, Hip abduction, Hip flexion, Hip Extension, HS curls,  Squats  TE 3: Seated on Pool stool x 20 reps        WITH 2.5# ANKLE WEIGHTS                   LAQ, hip flexion, Clam,  Sit to stand, SEATED HAMSTRING STRETCH 1 x 1'  B  TE 4: Walking marches x 2 laps, Tandem walking x 2 laps w/Orange dumbbell  TE 5: UE EX/CORE x 25 with closed paddles with her back against the wall for support                     Shoulder flex/ext TA activation, Shoulder horizontal abd/add TA activation, Shoulder abd/add with TA  activation.   Mini squat with push/pull with kickboard.  TE 6: ENDURANCE in // bars x 2'  performed bicycle  TE 7: Step up on 8 inch x 10 reps B and lateral step ups on 8 inch step x 10 reps B      Time Entry(in minutes):  Aquatic Therapy Time Entry: 30    Assessment & Plan   Assessment: The patient initially reported donning of ankle weights was very sensitive and painful on her ankles but was able to tolerate for trial of added resistance today.  She reported feeling it in her legs and feeling fatigued upon completion of the session but was able to complete full session without modifications.  Evaluation/Treatment Tolerance: Patient tolerated treatment well    The patient will continue to benefit from skilled outpatient physical therapy in order to address the deficits listed in the problem list on the initial evaluation, provide patient and family education, and maximize the patients level of independence in the home and community environments.     The patient's spiritual, cultural, and educational needs were considered, and the patient is agreeable to the plan of care and goals.           Plan: Progress POC as tolerated by the patient.    Goals:   Active       LTG       HEP       Start:  05/29/25    Expected End:  08/31/25       The patient will demonstrate independence with final HEP and report compliance with execution at least 3x/week.         Aquatic        Start:  05/29/25    Expected End:  08/31/25       The patient will self report returning to the [facility] pool at least 1 day/week on days not attending PT.          AROM       Start:  05/29/25    Expected End:  08/31/25       The patient is able to demonstrate improved  B knee extension AROM to limited no greater than 6 degrees B.         MMT       Start:  05/28/25    Expected End:  08/31/25       Pt is able to demonstrate MMT B LE at least 4+/5 all measurements without pain reports or compensations noted.              STG       HEP  (Ongoing)       Start:  05/28/25    Expected End:  08/31/25       The patient will be able to demonstrate independence and compliance with initial HEP.           MMT (Met)       Start:  05/28/25    Expected End:  08/31/25    Resolved:  07/31/25    The patient will demonstrate improved B LE MMT by at least 1/3 muscle grade where deficits noted.          TUG (Progressing)       Start:  05/28/25    Expected End:  08/31/25       The patient will demonstrate improved TUG to less than 15 seconds to indicate improved functional mobility.           AROM (Progressing)       Start:  05/28/25    Expected End:  08/31/25       The patient will demonstrate improved B knee AROM by at least 5 degrees extension.              Kathleen Lane, PT

## 2025-08-06 ENCOUNTER — CLINICAL SUPPORT (OUTPATIENT)
Dept: REHABILITATION | Facility: HOSPITAL | Age: 80
End: 2025-08-06
Payer: MEDICARE

## 2025-08-06 DIAGNOSIS — R52 PAIN AGGRAVATED BY ACTIVITIES OF DAILY LIVING: ICD-10-CM

## 2025-08-06 DIAGNOSIS — M25.551 RIGHT HIP PAIN: ICD-10-CM

## 2025-08-06 DIAGNOSIS — Z74.09 IMPAIRED MOBILITY: Primary | ICD-10-CM

## 2025-08-06 PROCEDURE — 97113 AQUATIC THERAPY/EXERCISES: CPT | Mod: HCNC

## 2025-08-06 NOTE — PROGRESS NOTES
Outpatient Rehab    Physical Therapy Visit    Patient Name: Alina Narayan  MRN: [id number]  YOB: 1945  Encounter Date: 8/6/2025    Therapy Diagnosis:   Encounter Diagnoses   Name Primary?    Impaired mobility Yes    Pain aggravated by activities of daily living     Right hip pain      Physician: Aarti Dunn MD    Physician Orders: Eval and Treat  Medical Diagnosis: Pain in right hip  Surgical Diagnosis: Not applicable for this Episode   Surgical Date: Not applicable for this Episode  Days Since Last Surgery: Not applicable for this Episode    Visit # / Visits Authorized:  14 / 20  Insurance Authorization Period: 5/28/2025 to 12/31/2025  Date of Evaluation: 5/28/2025  Plan of Care Certification: 7/31/2025 to 8/31/2025      PT/PTA:     Number of PTA visits since last PT visit:   Time In: 1025   Time Out: 1120  Total Time (in minutes): 55   Total Billable Time (in minutes): 55    Precautions: Standard, OA, history of DVT, history of cancer         Subjective   The patient stated she is hurting today mostly in her right hip. She stated she is stressed about the situation with her husbands memory. She is worried about being able to care for him if things progress..  Pain reported as 7/10.      Objective            Treatment:  Therapeutic Exercise  TE 1: FUNCTIONAL MOBILITY TRAINING x 2 laps each at beginning and 1 lap each at end of session, Walk forward/backward/lateral  TE 2: Standing LE EX x20                  WITH 2.5# ANKLE WEIGHTS                     Heel raise with gluteal set, Hip abduction, Hip flexion, Hip Extension, HS curls,  Squats  TE 3: Seated on Pool stool x 20 reps        WITH 2.5# ANKLE WEIGHTS                   LAQ, hip flexion, Clam,  Sit to stand, SEATED HAMSTRING STRETCH 1 x 1'  B  TE 4: Walking marches x 2 laps, Tandem walking x 2 laps w/Orange dumbbell  TE 5: UE EX/CORE x 25 with closed paddles with her back against the wall for support                     Shoulder flex/ext TA  activation, Shoulder horizontal abd/add TA activation, Shoulder abd/add with TA activation.   Mini squat with push/pull with kickboard.  TE 6: ENDURANCE in // bars x 2'  performed bicycle  TE 7: Step up on 8 inch x 10 reps B and lateral step ups on 8 inch step x 10 reps B      Time Entry(in minutes):  Aquatic Therapy Time Entry: 55    Assessment & Plan   Assessment: Despite presenting with elevated pain levels in her hip she was able to complete all exercises today with the use of ankle weights. She did not need any modifications to complete her treatment today and was motivated to participate in treatment.     Evaluation/Treatment Tolerance: Patient tolerated treatment well    The patient will continue to benefit from skilled outpatient physical therapy in order to address the deficits listed in the problem list on the initial evaluation, provide patient and family education, and maximize the patients level of independence in the home and community environments.     The patient's spiritual, cultural, and educational needs were considered, and the patient is agreeable to the plan of care and goals.           Plan: Progress POC as tolerated by the patient.    Goals:   Active       LTG       HEP       Start:  05/29/25    Expected End:  08/31/25       The patient will demonstrate independence with final HEP and report compliance with execution at least 3x/week.         Aquatic        Start:  05/29/25    Expected End:  08/31/25       The patient will self report returning to the [facility] pool at least 1 day/week on days not attending PT.          AROM       Start:  05/29/25    Expected End:  08/31/25       The patient is able to demonstrate improved  B knee extension AROM to limited no greater than 6 degrees B.         MMT       Start:  05/28/25    Expected End:  08/31/25       Pt is able to demonstrate MMT B LE at least 4+/5 all measurements without pain reports or compensations noted.              STG        HEP (Ongoing)       Start:  05/28/25    Expected End:  08/31/25       The patient will be able to demonstrate independence and compliance with initial HEP.           MMT (Met)       Start:  05/28/25    Expected End:  08/31/25    Resolved:  07/31/25    The patient will demonstrate improved B LE MMT by at least 1/3 muscle grade where deficits noted.          TUG (Progressing)       Start:  05/28/25    Expected End:  08/31/25       The patient will demonstrate improved TUG to less than 15 seconds to indicate improved functional mobility.           AROM (Progressing)       Start:  05/28/25    Expected End:  08/31/25       The patient will demonstrate improved B knee AROM by at least 5 degrees extension.              Kathleen Lane, PT

## 2025-08-12 ENCOUNTER — OFFICE VISIT (OUTPATIENT)
Dept: PODIATRY | Facility: CLINIC | Age: 80
End: 2025-08-12
Payer: MEDICARE

## 2025-08-12 DIAGNOSIS — R23.4 SKIN FISSURE: ICD-10-CM

## 2025-08-12 DIAGNOSIS — E08.22 DIABETES MELLITUS DUE TO UNDERLYING CONDITION WITH STAGE 3A CHRONIC KIDNEY DISEASE, WITHOUT LONG-TERM CURRENT USE OF INSULIN: Primary | ICD-10-CM

## 2025-08-12 DIAGNOSIS — L85.3 XEROSIS OF SKIN: ICD-10-CM

## 2025-08-12 DIAGNOSIS — N18.31 DIABETES MELLITUS DUE TO UNDERLYING CONDITION WITH STAGE 3A CHRONIC KIDNEY DISEASE, WITHOUT LONG-TERM CURRENT USE OF INSULIN: Primary | ICD-10-CM

## 2025-08-12 PROCEDURE — 99999 PR PBB SHADOW E&M-EST. PATIENT-LVL III: CPT | Mod: PBBFAC,HCNC,, | Performed by: PODIATRIST

## 2025-08-12 RX ORDER — UREA 40 %
CREAM (GRAM) TOPICAL 2 TIMES DAILY
Qty: 85 G | Refills: 2 | Status: SHIPPED | OUTPATIENT
Start: 2025-08-12

## (undated) DEVICE — FOGERTY SOFT JAW DISP 2/PK

## (undated) DEVICE — GLOVE BIOGEL PI MICRO SZ 7.5

## (undated) DEVICE — SUT SILK BLK BR. 2 2-60

## (undated) DEVICE — SUT SILK 2-0 SH 18IN BLACK

## (undated) DEVICE — TRAY MINOR GEN SURG

## (undated) DEVICE — SUT 6 18IN STEEL MONO CCS

## (undated) DEVICE — STRIP MEDI WND CLSR 1/2X4IN

## (undated) DEVICE — SUT MONOCRYL 4-0 PS-2

## (undated) DEVICE — BRA POST SURGICAL WHT 44-46IN

## (undated) DEVICE — APPLICATOR ARISTA FLEX XL

## (undated) DEVICE — CATH INFINITI 4F JL4 .042X100

## (undated) DEVICE — DRAIN CHANNEL ROUND 19FR

## (undated) DEVICE — SUT ETHIBOND EXCEL 2-0 SH-2

## (undated) DEVICE — ELECTRODE REM PLYHSV RETURN 9

## (undated) DEVICE — Device

## (undated) DEVICE — SEE MEDLINE ITEM 157117

## (undated) DEVICE — APPLICATOR CHLORAPREP ORN 26ML

## (undated) DEVICE — SPONGE SURGIFOAM 100 8.5X12X10

## (undated) DEVICE — SUT MCRYL PLUS 4-0 PS2 27IN

## (undated) DEVICE — SUT 2-0 VICRYL / SH (J417)

## (undated) DEVICE — SEE MEDLINE ITEM 152622

## (undated) DEVICE — PROTECTION STATION PLUS

## (undated) DEVICE — ELECTRODE PAD DEFIB STERILE

## (undated) DEVICE — SUT 2/0 30IN ETHIBOND

## (undated) DEVICE — STRIP STERI REIN CLSR 1/2X2IN

## (undated) DEVICE — STAPLER SKIN PROXIMATE WIDE

## (undated) DEVICE — DRESSING AQUACEL SACRAL 9 X 9

## (undated) DEVICE — TEGADERM IV

## (undated) DEVICE — DRAPE C ARM 42 X 120 10/BX

## (undated) DEVICE — CONTAINER SPECIMEN STRL 4OZ

## (undated) DEVICE — SEE MEDLINE ITEM 146298

## (undated) DEVICE — DRAPE STERI INSTRUMENT 1018

## (undated) DEVICE — DRESSING XEROFORM FOIL PK 1X8

## (undated) DEVICE — SUT PROLENE 5-0 BL C-1 4-24

## (undated) DEVICE — TRAY HEART OMC

## (undated) DEVICE — KIT URINARY CATH URINE METER

## (undated) DEVICE — SEE MEDLINE ITEM 146417

## (undated) DEVICE — BLADE 4IN EDGE INSULATED

## (undated) DEVICE — SUT VICRYL 3-0 27 SH

## (undated) DEVICE — KIT CUSTOM MANIFOLD

## (undated) DEVICE — APPLIER CLIP LIAGCLIP 9.375IN

## (undated) DEVICE — SUT VICRYL BR 1 GEN 27 CT-1

## (undated) DEVICE — SUT 0 VICRYL PLUS CT-1 27IN

## (undated) DEVICE — GAUZE FLUFF XXLG 36X36 2 PLY

## (undated) DEVICE — ADHESIVE MASTISOL VIAL 48/BX

## (undated) DEVICE — GAUZE SPONGE 4X4 12PLY

## (undated) DEVICE — SUT ETHILON 2-0 PSLX 30IN

## (undated) DEVICE — POWDER ARISTA AH 3G

## (undated) DEVICE — WIRE INTRAMYOCARDIAL TEMP

## (undated) DEVICE — DRAPE THYROID WITH ARMBOARD

## (undated) DEVICE — SEE MEDLINE ITEM 157131

## (undated) DEVICE — OMNIPAQUE 350 200ML

## (undated) DEVICE — CATH 4FR JL 3.5

## (undated) DEVICE — BRA SURGICAL XL 40-42

## (undated) DEVICE — BLADE SURG CARBON STEEL SZ11

## (undated) DEVICE — SHEATH INTRODUCER 6FR 11CM

## (undated) DEVICE — RETRACTOR OCTOBASE INSERT HOLD

## (undated) DEVICE — SUT 5/0 18IN COATED VICRYL

## (undated) DEVICE — CATH URETHRAL RED RUBBER 18FR

## (undated) DEVICE — DRAPE SLUSH WARMER WITH DISC

## (undated) DEVICE — GOWN SURGICAL X-LARGE

## (undated) DEVICE — PACK UNIVERSAL SPLIT II

## (undated) DEVICE — EVACUATOR WOUND BULB 100CC

## (undated) DEVICE — SPONGE GAUZE 16PLY 4X4

## (undated) DEVICE — KIT MICROINTRO 4F .018X40X7CM

## (undated) DEVICE — SET DECANTER MEDICHOICE

## (undated) DEVICE — GUIDEWIRE SUPRA CORE 035 190CM

## (undated) DEVICE — DEVICE PERCLOSE SUT CLSR 6FR

## (undated) DEVICE — TRAY CATH LAB OMC

## (undated) DEVICE — BOWL STERILE LARGE 32OZ

## (undated) DEVICE — SOL NACL 0.9% INJ PF/50151

## (undated) DEVICE — SUT COATED VICRYL 4/0 27IN

## (undated) DEVICE — CATH JL5 4FR INFINITY

## (undated) DEVICE — SPONGE LAP 18X18 PREWASHED

## (undated) DEVICE — PAD DEFIB CADENCE ADULT R2

## (undated) DEVICE — SUT 2/0 36IN COATED VICRYL

## (undated) DEVICE — SPONGE COTTON TRAY 4X4IN

## (undated) DEVICE — KIT SAHARA DRAPE DRAW/LIFT

## (undated) DEVICE — DRAPE CVMAX SPLIT ANES SCRN

## (undated) DEVICE — INSERTS STEALTH FIBRA SIZE 5

## (undated) DEVICE — DRAIN CHEST DRY SUCTION

## (undated) DEVICE — DRESSING ADH ISLAND 3.6 X 14

## (undated) DEVICE — INFLATION DEVICE ENCORE 26

## (undated) DEVICE — SUT PROLENE 4-0 SH BLU 36IN

## (undated) DEVICE — DRESSING ADH FILM TELFA 2X3IN

## (undated) DEVICE — SUT 3-0 CTD VICRYL 27IN PS

## (undated) DEVICE — SUT VICRYL CTD 2-0 GI 27 SH

## (undated) DEVICE — SPIKE CONTRAST CONTROLLER

## (undated) DEVICE — BLADE SAW STERNAL 5/BX

## (undated) DEVICE — TRANSDUCER ADULT DISP

## (undated) DEVICE — SEE MEDLINE ITEM 157128

## (undated) DEVICE — CLOSURE SKIN STERI STRIP 1/2X4

## (undated) DEVICE — SUT PROLENE 4-0 RB-1 BL MO

## (undated) DEVICE — SEE MEDLINE ITEM 146345